# Patient Record
Sex: FEMALE | Race: BLACK OR AFRICAN AMERICAN | NOT HISPANIC OR LATINO | Employment: OTHER | URBAN - METROPOLITAN AREA
[De-identification: names, ages, dates, MRNs, and addresses within clinical notes are randomized per-mention and may not be internally consistent; named-entity substitution may affect disease eponyms.]

---

## 2017-01-02 DIAGNOSIS — G43.009 MIGRAINE WITHOUT AURA AND WITHOUT STATUS MIGRAINOSUS, NOT INTRACTABLE: ICD-10-CM

## 2017-01-02 DIAGNOSIS — F41.9 ANXIETY: Primary | ICD-10-CM

## 2017-01-02 RX ORDER — MIRTAZAPINE 15 MG/1
7.5 TABLET, ORALLY DISINTEGRATING ORAL AT BEDTIME
Qty: 45 TABLET | Refills: 0 | Status: SHIPPED | OUTPATIENT
Start: 2017-01-02 | End: 2017-03-20

## 2017-01-02 RX ORDER — SUMATRIPTAN 50 MG/1
50-100 TABLET, FILM COATED ORAL
Qty: 18 TABLET | Refills: 1 | Status: SHIPPED | OUTPATIENT
Start: 2017-01-02 | End: 2017-02-28

## 2017-01-02 NOTE — TELEPHONE ENCOUNTER
Mirtazapine       Last Written Prescription Date: 10/4/16  Last Fill Quantity: 15; # refills: 3  Last Office Visit with [facility], [facility] or  Health prescribing provider:  11/10/16        Last PHQ-9 score on record=   PHQ-9 SCORE 8/3/2016   Total Score -   Total Score 7       AST       26   11/14/2016  ALT       40   11/14/2016          Sumatriptan      Last Written Prescription Date: 10/21/16  Last Fill Quantity: 18, # refills: 0  Last Office Visit with [facility], [facility] or  Health prescribing provider: 11/10/16       BP Readings from Last 3 Encounters:   11/14/16 101/64   11/10/16 135/79   11/10/16 146/97

## 2017-01-03 ENCOUNTER — TELEPHONE (OUTPATIENT)
Dept: GASTROENTEROLOGY | Facility: CLINIC | Age: 37
End: 2017-01-03

## 2017-01-03 ENCOUNTER — TELEPHONE (OUTPATIENT)
Dept: OTHER | Facility: CLINIC | Age: 37
End: 2017-01-03

## 2017-01-03 DIAGNOSIS — K31.84 GASTROPARESIS: Primary | ICD-10-CM

## 2017-01-03 NOTE — TELEPHONE ENCOUNTER
Patient called  and indicated she needed a j tube change. She states the balloon broke and the tube keeps pulling out. Per pt and IR notes she should have this procedure done with offsite anesthesia. I will coordinate this and call her back on 1/4/17. She confirms this.    Velma Cheek DNP, APRN  Interventional Radiology   Phone: [phone]  Pager: [phone]

## 2017-01-03 NOTE — TELEPHONE ENCOUNTER
Doreen is calling Dr. Wahl.  She reports she has both a G tube and a J tube.  She reports her J tube slides in and out.  She thinks there is a balloon problem.  She seems to recall that last time she had J tube problems, after wards IR told her she should have it done in the OR next time. For now she has the tube taped securely to her stomach.    I spoke with Dr. Maryuri Henriquez's clinic nurse.  Needs to call IR. [phone].    I called Doreen back and gave her the above information and transferred her to IR.  She verbalizes understanding and agreement with the plan.

## 2017-01-04 NOTE — TELEPHONE ENCOUNTER
J tube change scheduled for 1/11 at 9 am, check-in to unit 3C at 7 am. Will need H/P completed prior to procedure. Pt confirms this. NPO 8 hrs prior. Water ok up to 2 hrs prior. May take meds that AM. Do not need to hold anticoagulation for j tube change.     Velma Cheek DNP, APRN  Interventional Radiology   Phone: [phone]  Pager: [phone]

## 2017-01-05 ENCOUNTER — HOSPITAL ENCOUNTER (EMERGENCY)
Facility: CLINIC | Age: 37
Discharge: HOME | End: 2017-01-05
Attending: EMERGENCY MEDICINE | Admitting: EMERGENCY MEDICINE
Payer: COMMERCIAL

## 2017-01-05 ENCOUNTER — CARE COORDINATION (OUTPATIENT)
Dept: GASTROENTEROLOGY | Facility: CLINIC | Age: 37
End: 2017-01-05

## 2017-01-05 ENCOUNTER — APPOINTMENT (OUTPATIENT)
Dept: INTERVENTIONAL RADIOLOGY/VASCULAR | Facility: CLINIC | Age: 37
End: 2017-01-05
Attending: NURSE PRACTITIONER
Payer: COMMERCIAL

## 2017-01-05 VITALS
WEIGHT: 179 LBS | DIASTOLIC BLOOD PRESSURE: 110 MMHG | BODY MASS INDEX: 28.91 KG/M2 | TEMPERATURE: 98.2 F | RESPIRATION RATE: 15 BRPM | HEART RATE: 84 BPM | OXYGEN SATURATION: 100 % | SYSTOLIC BLOOD PRESSURE: 128 MMHG

## 2017-01-05 DIAGNOSIS — K31.84 GASTROPARESIS: ICD-10-CM

## 2017-01-05 DIAGNOSIS — K94.13 MALFUNCTION OF JEJUNOSTOMY TUBE (H): ICD-10-CM

## 2017-01-05 LAB
ALBUMIN SERPL-MCNC: 3.6 G/DL (ref 3.4–5)
ALP SERPL-CCNC: 149 U/L (ref 40–150)
ALT SERPL W P-5'-P-CCNC: 50 U/L (ref 0–50)
ANION GAP SERPL CALCULATED.3IONS-SCNC: 8 MMOL/L (ref 3–14)
AST SERPL W P-5'-P-CCNC: 33 U/L (ref 0–45)
BASOPHILS # BLD AUTO: 0 10E9/L (ref 0–0.2)
BASOPHILS NFR BLD AUTO: 0.3 %
BILIRUB SERPL-MCNC: 0.4 MG/DL (ref 0.2–1.3)
BUN SERPL-MCNC: 8 MG/DL (ref 7–30)
CALCIUM SERPL-MCNC: 9.1 MG/DL (ref 8.5–10.1)
CHLORIDE SERPL-SCNC: 106 MMOL/L (ref 94–109)
CO2 SERPL-SCNC: 24 MMOL/L (ref 20–32)
CREAT SERPL-MCNC: 0.84 MG/DL (ref 0.52–1.04)
DIFFERENTIAL METHOD BLD: ABNORMAL
EOSINOPHIL # BLD AUTO: 0 10E9/L (ref 0–0.7)
EOSINOPHIL NFR BLD AUTO: 0.4 %
ERYTHROCYTE [DISTWIDTH] IN BLOOD BY AUTOMATED COUNT: 18.4 % (ref 10–15)
GFR SERPL CREATININE-BSD FRML MDRD: 77 ML/MIN/1.7M2
GLUCOSE SERPL-MCNC: 95 MG/DL (ref 70–99)
HCG SERPL QL: NEGATIVE
HCT VFR BLD AUTO: 35.3 % (ref 35–47)
HGB BLD-MCNC: 11.2 G/DL (ref 11.7–15.7)
IMM GRANULOCYTES # BLD: 0 10E9/L (ref 0–0.4)
IMM GRANULOCYTES NFR BLD: 0.2 %
INR PPP: 1.06 (ref 0.86–1.14)
LIPASE SERPL-CCNC: 148 U/L (ref 73–393)
LYMPHOCYTES # BLD AUTO: 2.4 10E9/L (ref 0.8–5.3)
LYMPHOCYTES NFR BLD AUTO: 21.4 %
MCH RBC QN AUTO: 25.5 PG (ref 26.5–33)
MCHC RBC AUTO-ENTMCNC: 31.7 G/DL (ref 31.5–36.5)
MCV RBC AUTO: 80 FL (ref 78–100)
MONOCYTES # BLD AUTO: 0.4 10E9/L (ref 0–1.3)
MONOCYTES NFR BLD AUTO: 3.3 %
NEUTROPHILS # BLD AUTO: 8.5 10E9/L (ref 1.6–8.3)
NEUTROPHILS NFR BLD AUTO: 74.4 %
NRBC # BLD AUTO: 0 10*3/UL
NRBC BLD AUTO-RTO: 0 /100
PLATELET # BLD AUTO: 496 10E9/L (ref 150–450)
POTASSIUM SERPL-SCNC: 3.9 MMOL/L (ref 3.4–5.3)
PROT SERPL-MCNC: 8.6 G/DL (ref 6.8–8.8)
RBC # BLD AUTO: 4.4 10E12/L (ref 3.8–5.2)
SODIUM SERPL-SCNC: 138 MMOL/L (ref 133–144)
WBC # BLD AUTO: 11.4 10E9/L (ref 4–11)

## 2017-01-05 PROCEDURE — 25000132 ZZH RX MED GY IP 250 OP 250 PS 637: Performed by: EMERGENCY MEDICINE

## 2017-01-05 PROCEDURE — 99153 MOD SED SAME PHYS/QHP EA: CPT

## 2017-01-05 PROCEDURE — 25000125 ZZHC RX 250: Performed by: EMERGENCY MEDICINE

## 2017-01-05 PROCEDURE — 85025 COMPLETE CBC W/AUTO DIFF WBC: CPT | Performed by: EMERGENCY MEDICINE

## 2017-01-05 PROCEDURE — 49451 REPLACE DUOD/JEJ TUBE PERC: CPT

## 2017-01-05 PROCEDURE — 27210903 ZZH KIT CR5

## 2017-01-05 PROCEDURE — 27210916 ZZ H TUBE GASTRO CR5

## 2017-01-05 PROCEDURE — 96376 TX/PRO/DX INJ SAME DRUG ADON: CPT | Mod: XE | Performed by: EMERGENCY MEDICINE

## 2017-01-05 PROCEDURE — 25000125 ZZHC RX 250: Mod: ZNDC | Performed by: RADIOLOGY

## 2017-01-05 PROCEDURE — 96361 HYDRATE IV INFUSION ADD-ON: CPT | Performed by: EMERGENCY MEDICINE

## 2017-01-05 PROCEDURE — 96374 THER/PROPH/DIAG INJ IV PUSH: CPT | Mod: XE | Performed by: EMERGENCY MEDICINE

## 2017-01-05 PROCEDURE — C1769 GUIDE WIRE: HCPCS

## 2017-01-05 PROCEDURE — 99285 EMERGENCY DEPT VISIT HI MDM: CPT | Mod: 25 | Performed by: EMERGENCY MEDICINE

## 2017-01-05 PROCEDURE — 80053 COMPREHEN METABOLIC PANEL: CPT | Performed by: EMERGENCY MEDICINE

## 2017-01-05 PROCEDURE — 84703 CHORIONIC GONADOTROPIN ASSAY: CPT | Performed by: RADIOLOGY

## 2017-01-05 PROCEDURE — 96375 TX/PRO/DX INJ NEW DRUG ADDON: CPT | Performed by: EMERGENCY MEDICINE

## 2017-01-05 PROCEDURE — 85610 PROTHROMBIN TIME: CPT | Performed by: EMERGENCY MEDICINE

## 2017-01-05 PROCEDURE — 25000128 H RX IP 250 OP 636: Performed by: EMERGENCY MEDICINE

## 2017-01-05 PROCEDURE — 99285 EMERGENCY DEPT VISIT HI MDM: CPT | Mod: Z6 | Performed by: EMERGENCY MEDICINE

## 2017-01-05 PROCEDURE — 99152 MOD SED SAME PHYS/QHP 5/>YRS: CPT

## 2017-01-05 PROCEDURE — 27210995 ZZH RX 272: Performed by: RADIOLOGY

## 2017-01-05 PROCEDURE — 83690 ASSAY OF LIPASE: CPT | Performed by: EMERGENCY MEDICINE

## 2017-01-05 RX ORDER — HYDROMORPHONE HYDROCHLORIDE 1 MG/ML
0.5 INJECTION, SOLUTION INTRAMUSCULAR; INTRAVENOUS; SUBCUTANEOUS
Status: COMPLETED | OUTPATIENT
Start: 2017-01-05 | End: 2017-01-05

## 2017-01-05 RX ORDER — HYDROMORPHONE HYDROCHLORIDE 1 MG/ML
0.5 INJECTION, SOLUTION INTRAMUSCULAR; INTRAVENOUS; SUBCUTANEOUS
Status: ACTIVE | OUTPATIENT
Start: 2017-01-05 | End: 2017-01-05

## 2017-01-05 RX ORDER — DIPHENHYDRAMINE HYDROCHLORIDE 50 MG/ML
50 INJECTION INTRAMUSCULAR; INTRAVENOUS EVERY 6 HOURS PRN
Status: DISCONTINUED | OUTPATIENT
Start: 2017-01-05 | End: 2017-01-05 | Stop reason: HOSPADM

## 2017-01-05 RX ORDER — HYDROMORPHONE HYDROCHLORIDE 2 MG/1
2 TABLET ORAL EVERY 4 HOURS PRN
Qty: 18 TABLET | Refills: 0 | Status: SHIPPED | OUTPATIENT
Start: 2017-01-05 | End: 2017-01-09

## 2017-01-05 RX ORDER — HYDROMORPHONE HYDROCHLORIDE 1 MG/ML
0.5 INJECTION, SOLUTION INTRAMUSCULAR; INTRAVENOUS; SUBCUTANEOUS
Status: DISCONTINUED | OUTPATIENT
Start: 2017-01-05 | End: 2017-01-05 | Stop reason: HOSPADM

## 2017-01-05 RX ORDER — HYDROCODONE BITARTRATE AND ACETAMINOPHEN 5; 325 MG/1; MG/1
1 TABLET ORAL ONCE
Status: DISCONTINUED | OUTPATIENT
Start: 2017-01-05 | End: 2017-01-05 | Stop reason: HOSPADM

## 2017-01-05 RX ORDER — HYDROMORPHONE HCL/0.9% NACL/PF 0.2MG/0.2
0.2 SYRINGE (ML) INTRAVENOUS
Status: DISCONTINUED | OUTPATIENT
Start: 2017-01-05 | End: 2017-01-05

## 2017-01-05 RX ORDER — HYDROMORPHONE HYDROCHLORIDE 2 MG/1
2 TABLET ORAL ONCE
Status: COMPLETED | OUTPATIENT
Start: 2017-01-05 | End: 2017-01-05

## 2017-01-05 RX ORDER — LIDOCAINE HYDROCHLORIDE 10 MG/ML
INJECTION, SOLUTION INFILTRATION; PERINEURAL
Status: DISCONTINUED
Start: 2017-01-05 | End: 2017-01-05 | Stop reason: HOSPADM

## 2017-01-05 RX ORDER — FLUMAZENIL 0.1 MG/ML
0.2 INJECTION, SOLUTION INTRAVENOUS
Status: DISCONTINUED | OUTPATIENT
Start: 2017-01-05 | End: 2017-01-05 | Stop reason: HOSPADM

## 2017-01-05 RX ORDER — DIPHENHYDRAMINE HYDROCHLORIDE 50 MG/ML
25 INJECTION INTRAMUSCULAR; INTRAVENOUS EVERY 6 HOURS PRN
Status: DISCONTINUED | OUTPATIENT
Start: 2017-01-05 | End: 2017-01-05

## 2017-01-05 RX ORDER — SODIUM CHLORIDE 9 MG/ML
INJECTION, SOLUTION INTRAVENOUS CONTINUOUS
Status: DISCONTINUED | OUTPATIENT
Start: 2017-01-05 | End: 2017-01-05 | Stop reason: HOSPADM

## 2017-01-05 RX ORDER — DIPHENHYDRAMINE HCL 25 MG
25 CAPSULE ORAL EVERY 6 HOURS PRN
Status: DISCONTINUED | OUTPATIENT
Start: 2017-01-05 | End: 2017-01-05

## 2017-01-05 RX ADMIN — LIDOCAINE HYDROCHLORIDE 8 ML: 10 INJECTION, SOLUTION EPIDURAL; INFILTRATION; INTRACAUDAL; PERINEURAL at 18:25

## 2017-01-05 RX ADMIN — HYDROMORPHONE HYDROCHLORIDE 2 MG: 10 INJECTION, SOLUTION INTRAMUSCULAR; INTRAVENOUS; SUBCUTANEOUS at 19:06

## 2017-01-05 RX ADMIN — MIDAZOLAM 4 MG: 1 INJECTION INTRAMUSCULAR; INTRAVENOUS at 19:05

## 2017-01-05 RX ADMIN — LIDOCAINE HYDROCHLORIDE 10 ML: 20 JELLY TOPICAL at 18:20

## 2017-01-05 RX ADMIN — HYDROMORPHONE HYDROCHLORIDE 0.5 MG: 10 INJECTION, SOLUTION INTRAMUSCULAR; INTRAVENOUS; SUBCUTANEOUS at 16:17

## 2017-01-05 RX ADMIN — HYDROMORPHONE HYDROCHLORIDE 0.5 MG: 10 INJECTION, SOLUTION INTRAMUSCULAR; INTRAVENOUS; SUBCUTANEOUS at 17:33

## 2017-01-05 RX ADMIN — HYDROMORPHONE HYDROCHLORIDE 0.5 MG: 10 INJECTION, SOLUTION INTRAMUSCULAR; INTRAVENOUS; SUBCUTANEOUS at 14:17

## 2017-01-05 RX ADMIN — SODIUM CHLORIDE: 9 INJECTION, SOLUTION INTRAVENOUS at 14:21

## 2017-01-05 RX ADMIN — HYDROMORPHONE HYDROCHLORIDE 2 MG: 2 TABLET ORAL at 20:35

## 2017-01-05 RX ADMIN — DIPHENHYDRAMINE HYDROCHLORIDE 50 MG: 50 INJECTION, SOLUTION INTRAMUSCULAR; INTRAVENOUS at 18:20

## 2017-01-05 ASSESSMENT — ENCOUNTER SYMPTOMS
ABDOMINAL PAIN: 1
NECK PAIN: 0
BACK PAIN: 0
AGITATION: 0
DYSURIA: 0
FEVER: 0
LIGHT-HEADEDNESS: 0
CHILLS: 0
DIFFICULTY URINATING: 0
ABDOMINAL DISTENTION: 1
BRUISES/BLEEDS EASILY: 0
ADENOPATHY: 0
NAUSEA: 0
COLOR CHANGE: 0
SHORTNESS OF BREATH: 0
NECK STIFFNESS: 0
VOMITING: 0
POLYDIPSIA: 0

## 2017-01-05 NOTE — ED NOTES
J tube fell out sometime last night or this am. Tube broke over the weekend. Had had this tract for over 1 year.

## 2017-01-05 NOTE — ED PROVIDER NOTES
"  History     Chief Complaint   Patient presents with     Abdominal Pain     HPI  Doreen Peralta is a 36 year old female with a history of gastroparesis s/p J-tube placement who presents for evaluation of abdominal pain. The patient reports that over the weekend she was having trouble with her J-tube becoming displaced. She was able to manage this by placing it and taping. The patient reports that this morning she awoke to find her the balloon of her J-tube completely out of the stoma. It had been in place prior to going to sleep the night before. Since this morning, she did try eating soup, and this made her quite nauseated and gave her abdominal discomfort. She also has pain at the J-tube stoma site from trying to place the tube back in herself. The patient reports that she has appointment scheduled next week Wednesday to have this tube replaced. She states that she has to \"special schedule\" these procedures for general anesthesia as she \"doesn't do good\" otherwise.    I have reviewed the Medications, Allergies, Past Medical and [facility] History, and Social History in the Epic system.    Current Facility-Administered Medications   Medication     HYDROcodone-acetaminophen (NORCO) 5-325 MG per tablet 1 tablet     0.9% sodium chloride infusion     lidocaine 1 % injection     Current Outpatient Prescriptions   Medication     mirtazapine (REMERON SOL-TAB) 15 MG ODT tab     SUMAtriptan (IMITREX) 50 MG tablet     ondansetron (ZOFRAN ODT) 4 MG ODT tab     rivaroxaban ANTICOAGULANT (XARELTO) 20 MG TABS tablet     rivaroxaban ANTICOAGULANT (XARELTO) 15 MG TABS tablet     DULoxetine (CYMBALTA) 60 MG capsule     cloNIDine (CATAPRES) 0.2 MG tablet     nortriptyline (PAMELOR) 10 MG capsule     Nutritional Supplements (COMPLEAT) LIQD     cholestyramine light (QUESTRAN) 4 GM packet     ACETAMINOPHEN PO     albuterol 90 MCG/ACT inhaler     Past Medical History   Diagnosis Date     PONV (postoperative nausea and vomiting)      " Asthma      Constipation      chronic     Enteritis      Chronic pain      Hx of abnormal Pap smear      s/p LEEP - no further details provided     Colonic dysmotility      s/p subtotal colectomy     Thrombosis, hepatic vein (H)      microvascular     Bilateral ovarian cysts      Gastro-oesophageal reflux disease      Other chronic pain      Cervical cancer (H) 01/01/2008     cervical cancer      H/O ileostomy      IBS (irritable bowel syndrome)      Fungemia 5/5/2016     E. coli sepsis (H) 5/8/2016     Hypertension        Past [facility] History   Procedure Laterality Date     Laparoscopic oophorectomy Right 2009     [facility]     Laparoscopic cholecystectomy  2002     [facility] c[facility]. stones duct     Esophagoscopy, gastroscopy, duodenoscopy (egd), combined  7/10/2012     Procedure: COMBINED ESOPHAGOSCOPY, GASTROSCOPY, DUODENOSCOPY (EGD);  Upper Endoscopy, Ileoscopy    Latex Allergy  with biopsies;  Surgeon: Nicole Redding MD;  Location: [facility]     Colonoscopy  7/10/2012     Procedure: COLONOSCOPY;;  Surgeon: Nicole Redding MD;  Location: [facility] OR     Leep tx, cervical  2009     [facility]     Laparoscopic ileostomy  1/20/2012     [facility], loop     Esophagoscopy, gastroscopy, duodenoscopy (egd), combined N/A 11/5/2014     Procedure: COMBINED ESOPHAGOSCOPY, GASTROSCOPY, DUODENOSCOPY (EGD);  Surgeon: Nicole Redding MD;  [facility]     Laparoscopic assisted colectomy  1/20/2012     Procedure:LAPAROSCOPIC ASSISTED COLECTOMY; Laparoscopic Ileostomy       Laparoscopic assisted colectomy left (descending)  10/24/2012     Procedure: LAPAROSCOPIC ASSISTED COLECTOMY LEFT (DESCENDING);   Hand Assisted Laproscopic Subtotal abdominal Colectomy,Iieorectal anastamosis, Ileostomy Closure.       Remove gastrostomy tube adult N/A 12/12/2014     Procedure: REMOVE GASTROSTOMY TUBE ADULT;  Surgeon: Nicole Redding MD;  Location: [facility] GI     Replace gastrostomy tube adult  5/19/15     Hc replace  gastrostomy/cecostomy tube percutaneous Left 5/19/2015     Procedure: REPLACE GASTROSTOMY TUBE, PERCUTANEOUS;  Surgeon: Melecio Morejon Chi, MD;  Location: [facility] GI     Hc replace duodenostomy/jejunostomy tube percutaneous N/A 8/27/2015     Procedure: REPLACE GASTROJEJUNOSTOMY TUBE, PERCUTANEOUS;  Surgeon: Mio Holder MD;  Location: [facility]     Hc [facility] endoscopy w placement gastrostomy tube percut N/A 10/1/2015     Procedure: COMBINED ESOPHAGOSCOPY, GASTROSCOPY, DUODENOSCOPY (EGD), PLACE PERCUTANEOUS ENDOSCOPIC GASTROSTOMY TUBE;  Surgeon: Mio Holder MD;  [facility]     Laparoscopic assisted insertion tube jejunostomy N/A 10/16/2015     Procedure: LAPAROSCOPIC ASSISTED INSERTION TUBE JEJUNOSTOMY;  Surgeon: Elsa Mdeel MD;  Location[facility]     Picc insertion Left 10/21/2015     5fr DL Power PICC, 37cm (2cm external) in the L basilic vein w/ tip in the SVC RA junction.     Hc replace duodenostomy/jejunostomy tube percutaneous N/A 1/7/2016     Procedure: REPLACE JEJUNOSTOMY TUBE, PERCUTANEOUS;  Surgeon: Elsa Medel MD;  [facility]     Endoscopic insertion tube gastrostomy N/A 1/21/2016     Procedure: ENDOSCOPIC INSERTION TUBE GASTROSTOMY;  Surgeon: Nicole Redding MD;  [facility]     Hc replace duodenostomy/jejunostomy tube percutaneous N/A 1/28/2016     Procedure: REPLACE JEJUNOSTOMY TUBE, PERCUTANEOUS;  Surgeon: Elsa Medel MD;  [facility]     Laparotomy exploratory N/A 1/28/2016     Procedure: LAPAROTOMY EXPLORATORY;  Surgeon: Elsa Medel MD;  [facility]     Remove port vascular access Right 6/30/2016     Procedure: REMOVE PORT VASCULAR ACCESS;  Surgeon: Pradeep Orosco MD;  Location: [facility] OR     Echo adal  7/19/2016            N/A 7/20/2016     Procedure: ANESTHESIA OUT OF OR;  Surgeon: GENERIC ANESTHESIA PROVIDER;  Location: [facility]       Family History   Problem Relation Age of Onset     Thyroid Disease Mother      Sjogren's Mother       GASTROINTESTINAL DISEASE Mother      Intermittent nausea vomiting diarrhea     Colon Polyps Mother      Lupus Maternal Grandmother      CANCER Maternal Grandfather      Lung     Colon Cancer Maternal Grandfather 65     CANCER Paternal Grandmother      Lung      CEREBROVASCULAR DISEASE Paternal Grandmother      DIABETES Paternal Grandmother      Cardiovascular Paternal Grandmother      CHF     CANCER Paternal Grandfather      Lung     Glaucoma Paternal Grandfather      Prostate Problems Father      prostate enlargement     Abdominal Aortic Aneurysm Other      Macular Degeneration No family hx of        Social History   Substance Use Topics     Smoking status: Former Smoker -- 1.00 packs/day for 4 years     Types: Cigarettes     Quit date: 01/01/2004     Smokeless tobacco: Former User     Alcohol Use: No        Allergies   Allergen Reactions     Hyoscyamine Rash     Metoclopramide Other (See Comments)     Eye twitching.      Peaches [Peach] Other (See Comments)     Raw. Cooked OK     Sucralose Other (See Comments)     All artificial sweeteners. Aspartame also. Swollen glands     Advair Diskus Other (See Comments)     Throat burns     Azithromycin Other (See Comments)     Burning in throat     Compazine [Prochlorperazine] Visual Disturbance     Contrast Dye Itching     States is allergic to CT contrast dye     Cyclobenzaprine Visual Disturbance     Fentanyl Other (See Comments)     migraine     Ibuprofen GI Disturbance     Lactulose Nausea and Vomiting     Gas and bloating     Levaquin [Levofloxacin] Swelling     Per ED M.D. And RN      Morphine Sulfate Other (See Comments)     Chest pain       Oxycodone Other (See Comments)     Burning throat, but can take Norco.      Penicillins Other (See Comments)     Family hx of resp arrest, she has never taken  Ok with cephalosporins     Rizatriptan Visual Disturbance     Droperidol Hives and Rash     Isovue [Iopamidol] Palpitations     Pt had racing heart and sob      Ketorolac  Anxiety     Latex Swelling and Rash     Kiwi, likely also avacado, ? banana     Levsin Rash       Review of Systems   Constitutional: Negative for fever and chills.   HENT: Negative for congestion.    Eyes: Negative for visual disturbance.   Respiratory: Negative for shortness of breath.    Cardiovascular: Negative for chest pain.   Gastrointestinal: Positive for abdominal pain and abdominal distention. Negative for nausea and vomiting.   Endocrine: Negative for polydipsia and polyuria.   Genitourinary: Negative for dysuria and difficulty urinating.   Musculoskeletal: Negative for back pain, neck pain and neck stiffness.   Skin: Negative for color change.   Neurological: Negative for light-headedness.   Hematological: Negative for adenopathy. Does not bruise/bleed easily.   Psychiatric/Behavioral: Negative for behavioral problems and agitation.       Physical Exam   BP: (!) 145/113 mmHg  Pulse: 86  Resp: 18  Weight: 81.194 kg (179 lb)  SpO2: 100 %  Physical Exam   Constitutional: She is oriented to person, place, and time. She appears well-developed and well-nourished. No distress.   HENT:   Head: Normocephalic and atraumatic.   Mouth/Throat: Oropharynx is clear and moist. No oropharyngeal exudate.   Eyes: Conjunctivae and EOM are normal. No scleral icterus.   Neck: Normal range of motion.   Cardiovascular: Normal rate, normal heart sounds and intact distal pulses.    Pulmonary/Chest: Effort normal and breath sounds normal. No respiratory distress. She has no wheezes. She has no rales.   Abdominal: Soft. Bowel sounds are normal. She exhibits no distension. There is no tenderness. There is no rebound and no guarding.   G-tube in place with green grainage. J-tube stoma present without drainage.   Musculoskeletal: Normal range of motion. She exhibits no edema or tenderness.   Neurological: She is alert and oriented to person, place, and time. No cranial nerve deficit. She exhibits normal muscle tone. Coordination  normal.   Skin: Skin is warm. No rash noted. She is not diaphoretic.   Psychiatric: She has a normal mood and affect. Her behavior is normal. Judgment and thought content normal.   Nursing note and vitals reviewed.      ED Course   Procedures       1:03 PM  The patient was seen and examined by Jennie Ramirez MD, in Room 09.        Critical Care time:  none               Labs Ordered and Resulted from Time of ED Arrival Up to the Time of Departure from the ED   CBC WITH PLATELETS DIFFERENTIAL - Abnormal; Notable for the following:     WBC 11.4 (*)     Hemoglobin 11.2 (*)     MCH 25.5 (*)     RDW 18.4 (*)     Platelet Count 496 (*)     Absolute Neutrophil 8.5 (*)     All other components within normal limits   COMPREHENSIVE METABOLIC PANEL   LIPASE   INR   PERIPHERAL IV CATHETER       Assessments & Plan (with Medical Decision Making)   This is a 36 year old woman presenting with J tube malfunction. Differential diagnosis: J tube malfunction, accidental dislodgment of J tube, gastroparesis.    After thorough history and physical examination, the patient appears to be in no acute distress.  I will attempt to place a red rubber catheter and consult interventional radiology service. Patient agrees with plan.    1:40 PM  I was unable to advance rubber catheterthrough patient's J-tube stoma site. There was significant amount of resistance and patient was in pain. I will obtain intravenous line, hydrate her with IV saline, treat her pain with IV Dilaudid, obtain laboratory studies. I did discuss her case with interventional radiology nurse and they will consult and attempt to place a J-tube via procedural sedation.    3:47 PM  Patient will be signed out to my partner pending IR placement of jejunostomy tube.       This part of the medical record was transcribed by Remi Carrasco, Medical Scribe, from a dictation done by Jennie Ramirez MD.   I have reviewed the nursing notes.  I have reviewed the findings, diagnosis, plan  and need for follow up with the patient.    New Prescriptions    No medications on file       Final diagnoses:   Malfunction of jejunostomy tube (H)     IUmer, am serving as a trained medical scribe to document services personally performed by Jennie Ramirez MD, based on the provider's statements to me.      Jennie DE LA TORRE MD, was physically present and have reviewed and verified the accuracy of this note documented by Umer Martinez.    1/5/2017   [facility], [place], EMERGENCY DEPARTMENT      Jennie Ramirez MD  01/05/17 1541

## 2017-01-05 NOTE — ED AVS SNAPSHOT
[facility], Emergency Department    [address], [place] [address]    Phone:  [phone]                                       Doreen Peralta   MRN: [id number]    Department:  [facility], Emergency Department   Date of Visit:  1/5/2017           Patient Information     Date Of Birth          1980        Your diagnoses for this visit were:     Malfunction of jejunostomy tube (H)     Gastroparesis        You were seen by Jennie Ramirez MD and Onesimo Landon MD.        Discharge Instructions       Please make an appointment to follow up with Your Primary Care Provider in 5 days even if entirely better.  You can call to discuss the appropriate follow up timing with your doctor.     Return to the emergency department for any issues with your new tube, worsening back pain, abdominal pain, fevers or chills, burning with urination, nausea or vomiting, weakness or any other concerns as given or discussed.      Future Appointments        Provider Department Dept Phone Center    1/9/2017 9:20 AM Paul Mata MD [facility] [phone] [place]    1/11/2017 9:00 AM [facility] IR ROOM 2 [facility], Interventional Radiology [phone] [facility]    3/27/2017 9:00 AM Yrui Wahl MD Gastroenterology and [phone] [facility]      24 Hour Appointment Hotline       To make an appointment at any [facility], call [phone] ([phone]). If you don't have a family doctor or clinic, we will help you find one. [facility] are conveniently located to serve the needs of you and your family.          ED Discharge Orders     Discharge Instructions       If questions or problems arise regarding tube function (e.g. leaking, dislodges, etc.) Contact Interventional Radiology department 24 hours a day.    For procedures that were done at the [facility] sites,   8:00-4:30 PM Monday through Friday    Contact:[phone].    For afterhours and weekends  call the [place] main phone line [phone] and ask for the [place] IR on call physician number.    If DIRECTED by the RADIOLOGIST, related to specific problems with the tube functioning,  go to the Emergency Department.                     Review of your medicines      START taking        Dose / Directions Last dose taken    HYDROmorphone 2 MG tablet   Commonly known as:  DILAUDID   Dose:  2 mg   Quantity:  18 tablet        Take 1 tablet (2 mg) by mouth every 4 hours as needed for pain   Refills:  0          Our records show that you are taking the medicines listed below. If these are incorrect, please call your family doctor or clinic.        Dose / Directions Last dose taken    ACETAMINOPHEN PO   Dose:  500-1000 mg        Take 500-1,000 mg by mouth every 6 hours as needed for pain   Refills:  0        albuterol 90 MCG/ACT inhaler   Dose:  2 puff        Inhale 2 puffs into the lungs every 6 hours as needed   Refills:  0        cholestyramine light 4 GM Packet   Commonly known as:  QUESTRAN   Dose:  4 g   Quantity:  100 packet        Take 1 packet (4 g) by mouth 2 times daily   Refills:  3        cloNIDine 0.2 MG tablet   Commonly known as:  CATAPRES   Dose:  0.4 mg   Quantity:  60 tablet        Take 2 tablets (0.4 mg) by mouth every evening   Refills:  5        COMPLEAT Liqd   Quantity:  30 each        Infuse at 50 ml/hour for 5 hours daily through j-tube Flush j-tube with 30 ml water before and after each infusion of Compleat   Refills:  0        DULoxetine 60 MG EC capsule   Commonly known as:  CYMBALTA   Dose:  60 mg   Quantity:  90 capsule        Take 1 capsule (60 mg) by mouth daily   Refills:  3        mirtazapine 15 MG ODT tab   Commonly known as:  REMERON SOL-TAB   Dose:  7.5 mg   Quantity:  45 tablet        0.5 tablets (7.5 mg) by Orally disintegrating tablet route At Bedtime   Refills:  0        nortriptyline 10 MG capsule   Commonly known as:  PAMELOR   Dose:  30-40 mg   Quantity:  120 capsule         Take 3-4 capsules (30-40 mg) by mouth At Bedtime   Refills:  3        ondansetron 4 MG ODT tab   Commonly known as:  ZOFRAN ODT   Dose:  4 mg   Quantity:  30 tablet        Take 1 tablet (4 mg) by mouth every 6 hours as needed for nausea   Refills:  0        * rivaroxaban ANTICOAGULANT 15 MG Tabs tablet   Commonly known as:  XARELTO   Dose:  15 mg   Quantity:  60 tablet        Take 1 tablet (15 mg) by mouth 2 times daily (with meals)   Refills:  1        * rivaroxaban ANTICOAGULANT 20 MG Tabs tablet   Commonly known as:  XARELTO   Dose:  20 mg   Quantity:  90 tablet        Take 1 tablet (20 mg) by mouth daily (with dinner)   Refills:  1        SUMAtriptan 50 MG tablet   Commonly known as:  IMITREX   Dose:   mg   Quantity:  18 tablet        Take 1-2 tablets ( mg) by mouth at onset of headache for migraine - may repeat dose after 2h if headache recurs.  Max: 200mg/24 hours   Refills:  1        * Notice:  This list has 2 medication(s) that are the same as other medications prescribed for you. Read the directions carefully, and ask your doctor or other care provider to review them with you.            Prescriptions were sent or printed at these locations (1 Prescription)                   Other Prescriptions                Printed at Department/Unit printer (1 of 1)         HYDROmorphone (DILAUDID) 2 MG tablet                Procedures and tests performed during your visit     CBC with platelets differential    Comprehensive metabolic panel    HCG qualitative    INR    IR Jejunostomy Tube Change    Lipase    Peripheral IV catheter      Orders Needing Specimen Collection     None      Pending Results     Date and Time Order Name Status Description    1/5/2017 1555 IR Jejunostomy Tube Change Preliminary             Pending Culture Results     No orders found from 1/4/2017 to 1/6/2017.            Thank you for choosing [facility]       Thank you for choosing [facility] for your care. Our goal is always to  provide you with excellent care. Hearing back from our patients is one way we can continue to improve our services. Please take a few minutes to complete the written survey that you may receive in the mail after you visit with us. Thank you!        [employer] Information     [employer] gives you secure access to your electronic health record. If you see a primary care provider, you can also send messages to your care team and make appointments. If you have questions, please call your primary care clinic.  If you do not have a primary care provider, please call [phone] and they will assist you.        Care EveryWhere ID     This is your Care EveryWhere ID. This could be used by other organizations to access your [facility] medical records  [id number]        After Visit Summary       This is your record. Keep this with you and show to your community pharmacist(s) and doctor(s) at your next visit.

## 2017-01-05 NOTE — ED AVS SNAPSHOT
[facility], [place], Emergency Department    [address] [place] [address]    Phone:  [phone]                                       Doreen Peralta   MRN: [id number]    Department:  [facility], Emergency Department   Date of Visit:  1/5/2017           After Visit Summary Signature Page     I have received my discharge instructions, and my questions have been answered. I have discussed any challenges I see with this plan with the nurse or doctor.    ..........................................................................................................................................  Patient/Patient Representative Signature      ..........................................................................................................................................  Patient Representative Print Name and Relationship to Patient    ..................................................               ................................................  Date                                            Time    ..........................................................................................................................................  Reviewed by Signature/Title    ...................................................              ..............................................  Date                                                            Time

## 2017-01-05 NOTE — PROGRESS NOTES
Patient called to report GJ tube had completed fell out. Patient called requesting urgent appointment to replace GJ tube as she does not want tract to close. Patient has needed to visit ED in past. Patient was already scheduled for OR procedure to replace Tube. Recommended patient contact IR or OR to see if appointment could be moved up. Patient states she will go to ED if can not get appointment.

## 2017-01-06 NOTE — PROGRESS NOTES
Interventional Radiology Brief Post Procedure Note    Procedure: @FVRISFRMTLINK([id number])@    Proceduralist: Kylie Carl MD    Assistant: Alex P. Pallas, MD    Time Out: Prior to the start of the procedure and with procedural staff participation, I verbally confirmed the patient s identity using two indicators, relevant allergies, that the procedure was appropriate and matched the consent or emergent situation, and that the correct equipment/implants were available. Immediately prior to starting the procedure I conducted the Time Out with the procedural staff and re-confirmed the patient s name, procedure, and site/side. (The Joint [facility] universal protocol was followed.)  Yes    Sedation: IR Nurse Monitored Care   Post Procedure Summary:  Prior to the start of the procedure and with procedural staff participation, I verbally confirmed the patient s identity using two indicators, relevant allergies, that the procedure was appropriate and matched the consent or emergent situation, and that the correct equipment/implants were available. Immediately prior to starting the procedure I conducted the Time Out with the procedural staff and re-confirmed the patient s name, procedure, and site/side. (The Joint Commission universal protocol was followed.)  Yes       Sedatives: Fentanyl and Midazolam (Versed)    Vital signs, airway and pulse oximetry were monitored and remained stable throughout the procedure and sedation was maintained until the procedure was complete.  The patient was monitored by staff until sedation discharge criteria were met.    Patient tolerance: Patient tolerated the procedure well with no immediate complications.    Time of sedation in minutes: 45 Minutes minutes from beginning to end of physician one to one monitoring.    Findings: replaced J tube.    Estimated Blood Loss: Minimal    Fluoroscopy Time: 15 minutes    SPECIMENS: None    Complications: 1. None     Condition: Stable    Plan: See  dictation.    Comments: See dictated procedure note for full details.    Alex P. Pallas, MD

## 2017-01-06 NOTE — PROGRESS NOTES
Interventional Radiology Intra-procedural Nursing Note    Patient Name: Doreen Peralta  Medical Record Number: [id number]  Today's Date: January 5, 2017    Start Time: 1820  End of procedure time: 1910  Procedure: direct jejunostomy tube replacement  Report given to: [facility]  : not needed    Attending MD in room during timeout: Dr Carl  Proceduralist: Dr Pallas    Sedation start time: 1820  Sedation end time: 1910  Sedation medications given:  benadryl 50 mg, versed 4 mg, dilaudid 2 mg IV    D: Patient brought to IR room 3 at 1800. Informed consent obtained by Dr Pallas. Patient and her Mother; Aby, expressed their concern regarding the Patient receiving adequate sedation during the procedure. Patient has multiple allergies to narcotics. Reviewed sedation doses given with previous IR repositioning of the direct jejunostomy and gastric tube. Discussed sedation plan with Dr Pallas and the Patient.   A: Patient tolerated the procedure without apparent incident. She experienced moderate discomfort during the procedure despite receiving similar doses of conscious sedation medications.   P: Patient returned to the ED post procedure. Patient continues to complain of sharp stabbing pain post procedure.     Laurence Da Silva RN  [phone]

## 2017-01-06 NOTE — PROGRESS NOTES
Interventional Radiology Pre-Procedure Sedation Assessment   Time of Assessment: 6:08 PM    Expected Level: Moderate Sedation    Indication: Sedation is required for the following type of Procedure: GI    Sedation and procedural consent: Risks, benefits and alternatives were discussed with Patient    PO Intake: Appropriately NPO for procedure    ASA Class: Class 2 - MILD SYSTEMIC DISEASE, NO ACUTE PROBLEMS, NO FUNCTIONAL LIMITATIONS.    Mallampati: Grade 2:  Soft palate, base of uvula, tonsillar pillars, and portion of posterior pharyngeal wall visible    Lungs: Lungs Clear with good breath sounds bilaterally    Heart: Normal heart sounds and rate    History and physical reviewed and no updates needed. I have reviewed the lab findings, diagnostic data, medications, and the plan for sedation. I have determined this patient to be an appropriate candidate for the planned sedation and procedure and have reassessed the patient IMMEDIATELY PRIOR to sedation and procedure.    Alex P. Pallas, MD

## 2017-01-06 NOTE — ED NOTES
Patient signed out to me by evening attending.  Anticipated IR replacement of feeding tube. If successfully performed, patient should be discharged  Reason for my R with successfully performed feeding tube. Patient had some ongoing pain following the procedure, requesting several doses for oral analgesia. This was provided. She will follow up with her primary doctors or return to the emergency department for worsening signs or symptoms.    Onesimo Landon MD  01/05/17 [id number]

## 2017-01-06 NOTE — DISCHARGE INSTRUCTIONS
Please make an appointment to follow up with Your Primary Care Provider in 5 days even if entirely better.  You can call to discuss the appropriate follow up timing with your doctor.     Return to the emergency department for any issues with your new tube, worsening back pain, abdominal pain, fevers or chills, burning with urination, nausea or vomiting, weakness or any other concerns as given or discussed.

## 2017-01-09 ENCOUNTER — OFFICE VISIT (OUTPATIENT)
Dept: RHEUMATOLOGY | Facility: CLINIC | Age: 37
End: 2017-01-09
Attending: PHYSICIAN ASSISTANT
Payer: COMMERCIAL

## 2017-01-09 VITALS
BODY MASS INDEX: 29.54 KG/M2 | OXYGEN SATURATION: 99 % | HEIGHT: 66 IN | WEIGHT: 183.8 LBS | TEMPERATURE: 97.7 F | SYSTOLIC BLOOD PRESSURE: 129 MMHG | HEART RATE: 98 BPM | DIASTOLIC BLOOD PRESSURE: 79 MMHG

## 2017-01-09 DIAGNOSIS — R50.9 FEVER, UNSPECIFIED: ICD-10-CM

## 2017-01-09 DIAGNOSIS — R11.10 INTRACTABLE VOMITING, PRESENCE OF NAUSEA NOT SPECIFIED, UNSPECIFIED VOMITING TYPE: Primary | ICD-10-CM

## 2017-01-09 DIAGNOSIS — Z83.2 FAMILY HISTORY OF AUTOIMMUNE DISORDER: Primary | ICD-10-CM

## 2017-01-09 LAB
CK SERPL-CCNC: 44 U/L (ref 30–225)
CRP SERPL-MCNC: 142 MG/L (ref 0–8)
ERYTHROCYTE [SEDIMENTATION RATE] IN BLOOD BY WESTERGREN METHOD: 75 MM/H (ref 0–20)

## 2017-01-09 PROCEDURE — 99000 SPECIMEN HANDLING OFFICE-LAB: CPT | Performed by: INTERNAL MEDICINE

## 2017-01-09 PROCEDURE — 86146 BETA-2 GLYCOPROTEIN ANTIBODY: CPT | Performed by: INTERNAL MEDICINE

## 2017-01-09 PROCEDURE — 85597 PHOSPHOLIPID PLTLT NEUTRALIZ: CPT | Performed by: INTERNAL MEDICINE

## 2017-01-09 PROCEDURE — 82306 VITAMIN D 25 HYDROXY: CPT | Performed by: INTERNAL MEDICINE

## 2017-01-09 PROCEDURE — 85613 RUSSELL VIPER VENOM DILUTED: CPT | Performed by: INTERNAL MEDICINE

## 2017-01-09 PROCEDURE — 86140 C-REACTIVE PROTEIN: CPT | Mod: 90 | Performed by: INTERNAL MEDICINE

## 2017-01-09 PROCEDURE — 85730 THROMBOPLASTIN TIME PARTIAL: CPT | Performed by: INTERNAL MEDICINE

## 2017-01-09 PROCEDURE — 85652 RBC SED RATE AUTOMATED: CPT | Performed by: INTERNAL MEDICINE

## 2017-01-09 PROCEDURE — 99203 OFFICE O/P NEW LOW 30 MIN: CPT | Performed by: INTERNAL MEDICINE

## 2017-01-09 PROCEDURE — 86225 DNA ANTIBODY NATIVE: CPT | Performed by: INTERNAL MEDICINE

## 2017-01-09 PROCEDURE — 86147 CARDIOLIPIN ANTIBODY EA IG: CPT | Performed by: INTERNAL MEDICINE

## 2017-01-09 PROCEDURE — 36415 COLL VENOUS BLD VENIPUNCTURE: CPT | Performed by: INTERNAL MEDICINE

## 2017-01-09 PROCEDURE — 86235 NUCLEAR ANTIGEN ANTIBODY: CPT | Performed by: INTERNAL MEDICINE

## 2017-01-09 PROCEDURE — 00000401 ZZHCL STATISTIC THROMBIN TIME NC: Performed by: INTERNAL MEDICINE

## 2017-01-09 PROCEDURE — 86160 COMPLEMENT ANTIGEN: CPT | Performed by: INTERNAL MEDICINE

## 2017-01-09 PROCEDURE — 82550 ASSAY OF CK (CPK): CPT | Performed by: INTERNAL MEDICINE

## 2017-01-09 PROCEDURE — 00000167 ZZHCL STATISTIC INR NC: Performed by: INTERNAL MEDICINE

## 2017-01-09 RX ORDER — ONDANSETRON 4 MG/1
4 TABLET, ORALLY DISINTEGRATING ORAL EVERY 6 HOURS PRN
Qty: 30 TABLET | Refills: 0 | Status: ON HOLD | OUTPATIENT
Start: 2017-01-09 | End: 2017-01-28

## 2017-01-09 NOTE — Clinical Note
Larisa,  I don't think that Ms. Peralta's recurrent fevers are due to an autoimmune issue.  I am more concerned about infection > blood clot.  Fevers start each time she has a J-tube change, per patient, but also sometimes spontaneously.  She notes having a tender hard area around the J-tube insertion site that was tender on my exam - could this be the source if infectious?  Another though is blood clot causing the fevers.  She has had multiple clots and possibly is clotting despite her anticoagulation?  I recommended that she speak with the provider managing her J-tube, an ID specialist (she wanted to see someone else than Dr. Chavis so I put in a referral), and her hematologist.  Please let me know if you have questions.  Thanks, Paul

## 2017-01-09 NOTE — NURSING NOTE
"Chief Complaint   Patient presents with     Consult     fevers,rashs and digestive issues x4 mo. ESR and CRP have been high       Initial /79 mmHg  Pulse 98  Temp(Src) 97.7  F (36.5  C) (Oral)  Ht 1.67 m (5' 5.75\")  Wt 83.371 kg (183 lb 12.8 oz)  BMI 29.89 kg/m2  SpO2 99% Estimated body mass index is 29.89 kg/(m^2) as calculated from the following:    Height as of this encounter: 1.67 m (5' 5.75\").    Weight as of this encounter: 83.371 kg (183 lb 12.8 oz).  BP completed using cuff size: regular  Sandra Stoner MA           RAPID3 (0-30) Cumulative Score  13.7          RAPID3 Weighted Score (divide #4 by 3 and that is the weighted score)  4.6           "

## 2017-01-09 NOTE — TELEPHONE ENCOUNTER
ONDANSETRON      Last Written Prescription Date: 12-16-16  Last Fill Quantity: 30,  # refills: 0   Last Office Visit with G, [facility] or [facility] prescribing provider: 11-10-16

## 2017-01-09 NOTE — PROGRESS NOTES
Rheumatology Clinic Visit      Doreen Peralta MRN# [id number]   YOB: 1980 Age: 36 year old      Date of visit: 1/09/17   Referring provider: Larisa Lorenzo   PCP: Larisa Lorenzo     Chief Complaint   Patient presents with:  Consult: fevers,rashs and digestive issues x4 mo. ESR and CRP have been high      Assessment and Plan     1. Recurrent fevers and a family history of lupus: Recurrent fevers, occuring about once weekly but not occuring at regular intervals; always associated with J-tube changes but may also occur spontaneously.  Fevers take 3-4 days to resolve.  Fevers started in July when she was septic.  Fevers are not associated with a rash, abdominal pain, joint pain, or any other symptom.  History of blood clots in her bilateral arms that she relates to PICC lines; also history of portal vein thrombosis; she is on chronic anticoagulation now.  She comes today to be assessed for the recurrent fevers in the setting of a family history of lupus. Low suspicion for lupus as the cause but will check labs today.  Also low suspicion for an autoinflammatory cause.  I suspect that her fevers are either related to blood clot or possibly infection.  She notes having a hard tender area just superior to the J-tube insertion site and she correlates fevers with J-tube changes so I recommended that she have this evaluated by the provider helping to manage the J-tube.  I also recommended that she continue to follow with an infectious disease specialist.  A clotting event could cause fever, but it seems unusual to be on and off in that case; I advised her to speak about this with her hematologist.    - Labs: CBC, CMP, ESR, CRP, FERMÍN by immunofluorescence, SMITA, dsDNA, C3, C4, APS labs, CK, UA, Uprotein:creatinine    Ms. Peralta verbalized agreement with and understanding of the rational for the diagnosis and treatment plan.  All questions were answered to best of my ability and the patient's satisfaction. Ms. Peralta  "was advised to contact the clinic with any questions that may arise after the clinic visit.      Thank you for involving me in the care of the patient    Return to clinic: No scheduled return appointment in rheumatology needed at this time. Return PRN.    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    Today, Ms. Peralta reports that she has been having weekly fevers that occur for 3-4 days at a time.  She wears a \"potato necklace\" that helps some.  This has been occuring since she was septic in July.  Has been to the ED multiple times because of the fevers without etiology found.  Fevers always happen after J-tube changes. She also tells me that there is a hard tender area just superior to where the J-tube enters.  Fevers may also occur spontaneously that still take a few days to resolve.  Has had a week without a fever, and this has happened 2-3 times since onset of the fevers.  Fevers measured by the patient to be 103-104 degrees F.  Fevers resolve spontaneously; will improve for several hours with tylenol.  Ancestry of , , northern .  She had a PICC line and port that had both become infected.  She has these ports because she says that she does not absorb things well and needs additional nutrients.  Hx of hepatic blood clot and bilateral arm clots related to PICC lines; followed by hematologist who is treating with chronic anticoagulation with rivaroxaban.   Nausea all the time.  Rash on her face once in a while that she says is her psoriasis.  Reportedly dx'd with psoriatic arthritis in the past and was told to use topical " cream; she says it smelled like tar. No joint pain.  No morning stiffness.  No rash associated with her fevers. No other symptoms occur with the fevers.     Denies nausea, vomiting, constipation, diarrhea. No abdominal pain. No chest pain/pressure, palpitations, or shortness of breath. No LE swelling. No neck pain. No oral or nasal sores.  No rash. No sicca symptoms. No photosensitivity or photophobia. No eye pain or redness. No history of inflammatory eye disease.  No history of serositis.  No history of Raynaud's Phenomenon.  No seizure history.       Tobacco: none  EtOH: none  Drugs: none    ROS   GEN: See HPI  SKIN: See HPI  HEENT: No epistaxis. No oral or nasal ulcers.  CV: No chest pain, pressure, palpitations, or dyspnea on exertion.  PULM: No SOB, wheeze, cough.  GI: See HPI  : No blood in urine.  MSK: See HPI.  NEURO: No numbness, tingling, or weakness.  ENDO: No heat/cold intolerance.  EXT: No LE swelling  PSYCH: see HPI    Active Problem List     Patient Active Problem List   Diagnosis     Dehydration     Constipation by delayed colonic transit     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     Unspecified intestinal obstruction (H)     Hx SBO     S/P LEEP of cervix     Gastroparesis     Migraines     Intermittent asthma     Allergic rhinitis     Abnormal Pap smear of cervix     PEG (percutaneous endoscopic gastrostomy) status     Health Care Home     PEG tube malfunction (H)     Malfunction of gastrostomy tube (H)     Malfunctioning jejunostomy tube (H)     Jejunostomy tube fell out     Jejunostomy tube present (H)     S/P partial resection of colon     Malnutrition (H)     Long-term (current) use of anticoagulants [Z79.01]     Anxiety     Sepsis due to Klebsiella (H)     Anemia in other chronic diseases classified elsewhere     Munchausen syndrome - previously suspected     Gram-negative bacteremia     Sepsis (H)     Anemia, iron deficiency     Mitral regurgitation mild-mod by Echo June 2016     Candidemia  (H)     Atopic rhinitis     Duodenitis     Migraine     Patellofemoral stress syndrome     Hyperbilirubinemia     Recurrent polymicrobial bacteremia assiciated with IV access     Right brachail/axillary dvt     Chronic diarrhea     Intractable vomiting     Fever, unspecified     Deep vein thrombosis (DVT) (H) [I82.409]     Coagulation defect (H) [D68.9]     Abdominal pain     Nausea & vomiting     Fever     Dislodged gastrostomy tube (H)     Attention to G-tube (H)     Chronic abdominal pain     Past Medical History     Past Medical History   Diagnosis Date     PONV (postoperative nausea and vomiting)      Asthma      Constipation      chronic     Enteritis      Chronic pain      Hx of abnormal Pap smear      s/p LEEP - no further details provided     Colonic dysmotility      s/p subtotal colectomy     Thrombosis, hepatic vein (H)      microvascular     Bilateral ovarian cysts      Gastro-oesophageal reflux disease      Other chronic pain      Cervical cancer (H) 01/01/2008     cervical cancer      H/O ileostomy      IBS (irritable bowel syndrome)      Fungemia 5/5/2016     E. coli sepsis (H) 5/8/2016     Hypertension      Past [facility] History     Past [facility] History   Procedure Laterality Date     Laparoscopic oophorectomy Right 2009     [facility]     Laparoscopic cholecystectomy  2002     [facility] stones duct     Esophagoscopy, gastroscopy, duodenoscopy (egd), combined  7/10/2012     Procedure: COMBINED ESOPHAGOSCOPY, GASTROSCOPY, DUODENOSCOPY (EGD);  Upper Endoscopy, Ileoscopy    Latex Allergy  with biopsies;  Surgeon: Nicole Redding MD;  [facility]: [facility]     Colonoscopy  7/10/2012     Procedure: COLONOSCOPY;;  Surgeon: Nicole Redding MD;  Location: [facility]     Leep tx, cervical  2009     [facility]     Laparoscopic ileostomy  1/20/2012     [facility], loop     Esophagoscopy, gastroscopy, duodenoscopy (egd), combined N/A 11/5/2014     Procedure: COMBINED ESOPHAGOSCOPY, GASTROSCOPY,  DUODENOSCOPY (EGD);  Surgeon: Nicole Redding MD;  Location: [facility]     Laparoscopic assisted colectomy  1/20/2012     Procedure:LAPAROSCOPIC ASSISTED COLECTOMY; Laparoscopic Ileostomy       Laparoscopic assisted colectomy left (descending)  10/24/2012     Procedure: LAPAROSCOPIC ASSISTED COLECTOMY LEFT (DESCENDING);   Hand Assisted Laproscopic Subtotal abdominal Colectomy,Iieorectal anastamosis, Ileostomy Closure.       Remove gastrostomy tube adult N/A 12/12/2014     Procedure: REMOVE GASTROSTOMY TUBE ADULT;  Surgeon: Nicole Redding MD;  [facility]     Replace gastrostomy tube adult  5/19/15     Hc replace gastrostomy/cecostomy tube percutaneous Left 5/19/2015     Procedure: REPLACE GASTROSTOMY TUBE, PERCUTANEOUS;  Surgeon: Melecio Morejon Chi, MD;  Location: [facility]     Hc replace duodenostomy/jejunostomy tube percutaneous N/A 8/27/2015     Procedure: REPLACE GASTROJEJUNOSTOMY TUBE, PERCUTANEOUS;  Surgeon: Mio Holder MD;  [facility]     Hc ugi endoscopy w placement gastrostomy tube percut N/A 10/1/2015     Procedure: COMBINED ESOPHAGOSCOPY, GASTROSCOPY, DUODENOSCOPY (EGD), PLACE PERCUTANEOUS ENDOSCOPIC GASTROSTOMY TUBE;  Surgeon: Mio Holder MD;  [facility]     Laparoscopic assisted insertion tube jejunostomy N/A 10/16/2015     Procedure: LAPAROSCOPIC ASSISTED INSERTION TUBE JEJUNOSTOMY;  Surgeon: Elsa Medel MD;  [facility]     Picc insertion Left 10/21/2015     5fr DL Power PICC, 37cm (2cm external) in the L basilic vein w/ tip in the SVC RA junction.     Hc replace duodenostomy/jejunostomy tube percutaneous N/A 1/7/2016     Procedure: REPLACE JEJUNOSTOMY TUBE, PERCUTANEOUS;  Surgeon: Elsa Medel MD;  [facility]     Endoscopic insertion tube gastrostomy N/A 1/21/2016     Procedure: ENDOSCOPIC INSERTION TUBE GASTROSTOMY;  Surgeon: Nicole Redding MD;  [facility]     Hc replace duodenostomy/jejunostomy tube percutaneous N/A 1/28/2016      Procedure: REPLACE JEJUNOSTOMY TUBE, PERCUTANEOUS;  Surgeon: Elsa Medel MD;  [facility]     Laparotomy exploratory N/A 1/28/2016     Procedure: LAPAROTOMY EXPLORATORY;  Surgeon: Elsa Medel MD;  [facility]     Remove port vascular access Right 6/30/2016     Procedure: REMOVE PORT VASCULAR ACCESS;  Surgeon: Pradeep Orosco MD;  [facility]     Echo adal  7/19/2016            N/A 7/20/2016     Procedure: ANESTHESIA OUT OF OR;  Surgeon: GENERIC ANESTHESIA PROVIDER;  Location: [facility]     Allergy     Allergies   Allergen Reactions     Hyoscyamine Rash     Metoclopramide Other (See Comments)     Eye twitching.      Peaches [Peach] Other (See Comments)     Raw. Cooked OK     Sucralose Other (See Comments)     All artificial sweeteners. Aspartame also. Swollen glands     Advair Diskus Other (See Comments)     Throat burns     Azithromycin Other (See Comments)     Burning in throat     Compazine [Prochlorperazine] Visual Disturbance     Contrast Dye Itching     States is allergic to CT contrast dye     Cyclobenzaprine Visual Disturbance     Fentanyl Other (See Comments)     migraine     Ibuprofen GI Disturbance     Lactulose Nausea and Vomiting     Gas and bloating     Levaquin [Levofloxacin] Swelling     Per ED M.D. And RN      Morphine Sulfate Other (See Comments)     Chest pain       Oxycodone Other (See Comments)     Burning throat, but can take Norco.      Penicillins Other (See Comments)     Family hx of resp arrest, she has never taken  Ok with cephalosporins     Rizatriptan Visual Disturbance     Droperidol Hives and Rash     Isovue [Iopamidol] Palpitations     Pt had racing heart and sob      Ketorolac Anxiety     Latex Swelling and Rash     Kiwi, likely also avacado, ? banana     Levsin Rash     Current Medication List     Current Outpatient Prescriptions   Medication Sig     mirtazapine (REMERON SOL-TAB) 15 MG ODT tab 0.5 tablets (7.5 mg) by Orally disintegrating tablet route At  Bedtime     SUMAtriptan (IMITREX) 50 MG tablet Take 1-2 tablets ( mg) by mouth at onset of headache for migraine - may repeat dose after 2h if headache recurs.  Max: 200mg/24 hours     ondansetron (ZOFRAN ODT) 4 MG ODT tab Take 1 tablet (4 mg) by mouth every 6 hours as needed for nausea     rivaroxaban ANTICOAGULANT (XARELTO) 20 MG TABS tablet Take 1 tablet (20 mg) by mouth daily (with dinner)     rivaroxaban ANTICOAGULANT (XARELTO) 15 MG TABS tablet Take 1 tablet (15 mg) by mouth 2 times daily (with meals)     DULoxetine (CYMBALTA) 60 MG capsule Take 1 capsule (60 mg) by mouth daily     cloNIDine (CATAPRES) 0.2 MG tablet Take 2 tablets (0.4 mg) by mouth every evening     nortriptyline (PAMELOR) 10 MG capsule Take 3-4 capsules (30-40 mg) by mouth At Bedtime     Nutritional Supplements (COMPLEAT) LIQD Infuse at 50 ml/hour for 5 hours daily through j-tube  Flush j-tube with 30 ml water before and after each infusion of Compleat     cholestyramine light (QUESTRAN) 4 GM packet Take 1 packet (4 g) by mouth 2 times daily     ACETAMINOPHEN PO Take 500-1,000 mg by mouth every 6 hours as needed for pain      albuterol 90 MCG/ACT inhaler Inhale 2 puffs into the lungs every 6 hours as needed      No current facility-administered medications for this visit.         Social History   See HPI    Family History     Family History   Problem Relation Age of Onset     Thyroid Disease Mother      Sjogren's Mother      GASTROINTESTINAL DISEASE Mother      Intermittent nausea vomiting diarrhea     Colon Polyps Mother      Lupus Maternal Grandmother      CANCER Maternal Grandfather      Lung     Colon Cancer Maternal Grandfather 65     CANCER Paternal Grandmother      Lung      CEREBROVASCULAR DISEASE Paternal Grandmother      DIABETES Paternal Grandmother      Cardiovascular Paternal Grandmother      CHF     CANCER Paternal Grandfather      Lung     Glaucoma Paternal Grandfather      Prostate Problems Father      prostate  "enlargement     Abdominal Aortic Aneurysm Other      Macular Degeneration No family hx of      Daughter: \"kikuchi's lupus\"  Mother: Sjogren's Syndrome, thyroid disease  Mother's cousin: SLE    Physical Exam     Temp Readings from Last 3 Encounters:   01/09/17 97.7  F (36.5  C) Oral   01/05/17 98.2  F (36.8  C)    11/14/16 99  F (37.2  C) Axillary     BP Readings from Last 5 Encounters:   01/09/17 129/79   01/05/17 128/110   11/14/16 101/64   11/10/16 135/79   11/10/16 146/97     Pulse Readings from Last 1 Encounters:   01/09/17 98     Resp Readings from Last 1 Encounters:   01/05/17 15     Estimated body mass index is 29.89 kg/(m^2) as calculated from the following:    Height as of this encounter: 1.67 m (5' 5.75\").    Weight as of this encounter: 83.371 kg (183 lb 12.8 oz).    GEN: NAD  HEENT: MMM. No oral lesions. Anicteric, noninjected sclera  CV: S1, S2. RRR. No m/r/g.  PULM: CTA bilaterally. No w/c.  ABD: +BS.   MSK:  Hands, wrists, and elbows without synovial swelling, increased warmth, tenderness to palpation, or overlying erythema.  Negative MCP squeeze.   Bilateral shoulders nontender to palpation and without swelling.  Knees, ankles, and feet without swelling to tenderness to palpation.       NEURO: UE and LE strengths 5/5 and equal bilaterally.   SKIN: No rash.  Mildly tender to palpation just superior to the J-tube insertion point on the skin, but no hard bump on palpation that she described  EXT: No LE edema  PSYCH: Alert. Appropriate.    Labs / Imaging (select studies)     Antiphospholipid Antibodies  Recent Labs   Lab Test  04/11/13   1611   CARG  <15.0 Interpretation:  Negative   JAEL  <12.5 Interpretation:  Negative     CBC  Recent Labs   Lab Test  01/05/17   1421  11/14/16   2100  11/10/16   1622   WBC  11.4*  6.4  6.5   RBC  4.40  4.18  4.25   HGB  11.2*  10.7*  11.0*   HCT  35.3  33.1*  34.4*   MCV  80  79  81   RDW  18.4*  18.3*  18.4*   PLT  496*  317  305   MCH  25.5*  25.6*  25.9*   MCHC  " 31.7  32.3  32.0   NEUTROPHIL  74.4  77.2  83.0   LYMPH  21.4  15.7  11.2   MONOCYTE  3.3  5.3  5.4   EOSINOPHIL  0.4  1.3  0.2   BASOPHIL  0.3  0.3  0.2   ANEU  8.5*  4.9  5.4   ALYM  2.4  1.0  0.7*   JORJE  0.4  0.3  0.4   AEOS  0.0  0.1  0.0   ABAS  0.0  0.0  0.0     CMP  Recent Labs   Lab Test  01/05/17   1421  11/14/16   2100  11/10/16   1622   NA  138  138  136   POTASSIUM  3.9  4.0  3.4   CHLORIDE  106  104  101   CO2  24  24  27   ANIONGAP  8  10  8   GLC  95  102*  100*   BUN  8  5*  5*   CR  0.84  1.15*  0.96   GFRESTIMATED  77  54*  66   GFRESTBLACK  >90   GFR Calc    65  80   TARA  9.1  8.9  9.2   BILITOTAL  0.4  0.5  1.0   ALBUMIN  3.6  3.2*  3.7   PROTTOTAL  8.6  8.3  8.6   ALKPHOS  149  165*  149   AST  33  26  25   ALT  50  40  45     HgA1c  Recent Labs   Lab Test  01/07/12   0748   A1C  5.1     Iron Studies  Recent Labs   Lab Test  08/03/16   0928  07/10/16   0604  01/23/13   1515   JORDYN  43  16  11   IRON  38  11*  26*   FEB  532*  400  393   IRONSAT  7*  3*  7*     Calcium/VitaminD  Recent Labs   Lab Test  01/05/17   1421  11/14/16   2100  11/10/16   1622   03/12/16   0638   10/15/14   1639   01/23/13   1515   TARA  9.1  8.9  9.2   < >  8.7   < >  9.2   < >  8.5   VITDT   --    --    --    --   <13  Season, race, dietary intake, and treatment affect the concentration of   25-hydroxy-Vitamin D. Values may decrease during winter months and increase   during summer months. Values 20-29 ug/L may indicate Vitamin D insufficiency   and values <20 ug/L may indicate Vitamin D deficiency.   Vitamin D determination is routinely performed by an immunoassay specific for   25 hydroxyvitamin D3.  If an individual is on vitamin D2 (ergocalciferol)   supplementation, please specify 25 OH vitamin D2 and D3 level determination by   LCMSMS test VITD23.  *   --   21*   --   20*    < > = values in this interval not displayed.     ESR/CRP  Recent Labs   Lab Test  10/20/16   0703  08/30/16   0505  08/29/16    1800   07/16/16   1835   06/22/16   1520  06/21/16   1416   SED   --    --    --    --   52*   --   55*  58*   CRP  28.5*  100.0*  109.0*   < >  12.4*   < >  12.2*  14.0*    < > = values in this interval not displayed.     TSH/T4  Recent Labs   Lab Test  07/21/16   1027  06/21/16   1416  04/15/14   0755  01/23/13   1515  01/07/12   0748   TSH  0.69  0.25*  1.37  0.90  1.08   T4   --   0.99   --   1.00  1.04     Lipid Panel  Recent Labs   Lab Test  01/19/16   0548 12/15/15 11/30/15   04/15/14   0755   CHOL   --    --    --    --   152   TRIG  135  60  157   < >  69   HDL   --    --    --    --   75   LDL   --    --    --    --   63   VLDL   --    --    --    --   14   CHOLHDLRATIO   --    --    --    --   2.0    < > = values in this interval not displayed.     UA  Recent Labs   Lab Test  11/14/16   2145  11/10/16   1803  10/20/16   0720  10/16/16   1740   08/29/16   1715   07/07/16   1550   06/24/16   0246   04/27/16   1405   02/03/16   1801   COLOR  Straw  Yellow  Pink  Yellow   < >  Yellow   < >  Red   < >  Yellow   < >  Light Yellow   < >  Yellow   APPEARANCE  Clear  Clear  Clear  Clear   < >  Clear   < >  Turbid   < >  Clear   < >  Clear   < >  Clear   URINEGLC  Negative  Negative  Negative  Negative   < >  Negative   < >  Negative   < >  Negative   < >  Negative   < >  Negative   URINEBILI  Negative  Negative  Negative  Negative   < >  Negative   < >  Negative   < >  Negative   < >  Negative   < >  Negative   SG  1.010  1.011  1.010  1.015   < >  1.010   < >  1.010   < >  1.010   < >  1.004   < >  1.050*   URINEPH  7.5*  6.0  7.0  6.0   < >  6.0   < >  7.5*   < >  7.0   < >  7.0   < >  8.0*   PROTEIN  Negative  Negative  Negative  Negative   < >  Negative   < >  30*   < >  Negative   < >  Negative   < >  Negative   UROBILINOGEN  0.2   --   0.2  0.2   < >  1.0   < >  0.2   < >  0.2   < >   --    < >   --    NITRITE  Negative  Negative  Negative  Negative   < >  Negative   < >  Negative   < >  Negative   < >   Negative   < >  Negative   UBLD  Moderate*  Negative  Large*  Trace*   < >  Large*   < >  Moderate*   < >  Negative   < >  Negative   < >  Negative   LEUKEST  Negative  Negative  Trace*  Negative   < >  Negative   < >  Moderate*   < >  Negative   < >  Negative   < >  Negative   WBCU  O - 2   --   2-5*  O - 2   --   O - 2   < >  >100*   < >  O - 2   < >  <1   < >  1   RBCU  5-10*   --   25-50*  O - 2   --   5-10*   < >  >100*   < >  O - 2   < >  0   < >  2   SQUAMOUSEPI  Many*   --   Moderate*   --    --   Few   < >  Moderate*   < >  Few   --    --    < >   --    BACTERIA  Moderate*   --    --    --    --    --    --   Many*   --   Few*   --    --    --    --    MUCOUS   --    --    --    --    --    --    --   Present*   --    --    --   Present*   --   Present*    < > = values in this interval not displayed.     Urine Microscopic  Recent Labs   Lab Test  11/14/16   2145  10/20/16   0720  10/16/16   1740  08/29/16   1715   07/07/16   1550   06/24/16   0246   04/27/16   1405   02/03/16   1801   WBCU  O - 2  2-5*  O - 2  O - 2   < >  >100*   < >  O - 2   < >  <1   < >  1   RBCU  5-10*  25-50*  O - 2  5-10*   < >  >100*   < >  O - 2   < >  0   < >  2   SQUAMOUSEPI  Many*  Moderate*   --   Few   < >  Moderate*   < >  Few   --    --    < >   --    BACTERIA  Moderate*   --    --    --    --   Many*   --   Few*   --    --    --    --    MUCOUS   --    --    --    --    --   Present*   --    --    --   Present*   --   Present*    < > = values in this interval not displayed.       Immunization History     Immunization History   Administered Date(s) Administered     Influenza (IIV3) 10/01/2009     Pneumococcal 23 valent 07/18/2014          Chart documentation done in part with Dragon Voice recognition Software. Although reviewed after completion, some word and grammatical error may remain.    Paul Mata MD

## 2017-01-09 NOTE — MR AVS SNAPSHOT
After Visit Summary   1/9/2017    Doreen Peralta    MRN: [id number]           Patient Information     Date Of Birth          1980        Visit Information        Provider Department      1/9/2017 9:20 AM Paul Mata MD [facility]        Today's Diagnoses     Family history of autoimmune disorder    -  1     Fever, unspecified            Follow-ups after your visit        Additional Services     INFECTIOUS DISEASE REFERRAL       Your provider has referred you to:   [facility]: [facility] (Infectious Disease and HIV Clinic) [facility] [phone]   [email or web address]    Please be aware that coverage of these services is subject to the terms and limitations of your health insurance plan.  Call member services at your health plan with any benefit or coverage questions.      Please bring the following with you to your appointment:    (1) Any X-Rays, CTs or MRIs which have been performed.  Contact the facility where they were done to arrange for  prior to your scheduled appointment.    (2) List of current medications   (3) This referral request   (4) Any documents/labs given to you for this referral                  Your next 10 appointments already scheduled     Mar 27, 2017  9:00 AM   (Arrive by 8:45 AM)   Return Visit with Yuri Wahl MD   Gastroenterology and IBD ([facility])    [address] [place] [id number] [phone]              Future tests that were ordered for you today     Open Future Orders        Priority Expected Expires Ordered    Beta 2 Glycoprotein 1 Antibody IgM Routine 1/9/2017 1/23/2017 1/9/2017    CK total Routine 1/9/2017 1/23/2017 1/9/2017    Complement C3 Routine 1/9/2017 1/23/2017 1/9/2017    Complement C4 Routine 1/9/2017 1/23/2017 1/9/2017    CRP inflammation Routine 1/9/2017 1/23/2017 1/9/2017    DNA double stranded antibodies  Routine 1/9/2017 1/23/2017 1/9/2017    Erythrocyte sedimentation rate auto Routine 1/9/2017 1/23/2017 1/9/2017    Protein  random urine Routine 1/9/2017 1/23/2017 1/9/2017    UA with Microscopic reflex to Culture Routine 1/9/2017 1/23/2017 1/9/2017    Vitamin D Deficiency Routine 1/9/2017 1/30/2017 1/9/2017    Nuclear Antibody FERMÍN by IFA IgG Routine 1/9/2017 1/23/2017 1/9/2017    SMITA antibody panel Routine 1/9/2017 1/23/2017 1/9/2017    Cardiolipin Roc[place]le IgG and IgM Routine 1/9/2017 1/23/2017 1/9/2017    Lupus panel Routine 1/9/2017 1/23/2017 1/9/2017    Beta 2 Glycoprotein 1 Antibody IgG Routine 1/9/2017 1/23/2017 1/9/2017            Who to contact     If you have questions or need follow up information about today's clinic visit or your schedule please contact [facility] directly at [phone].  Normal or non-critical lab and imaging results will be communicated to you by true[x] Mediahart, letter or phone within 4 business days after the clinic has received the results. If you do not hear from us within 7 days, please contact the clinic through [employer] or phone. If you have a critical or abnormal lab result, we will notify you by phone as soon as possible.  Submit refill requests through [employer] or call your pharmacy and they will forward the refill request to us. Please allow 3 business days for your refill to be completed.          Additional Information About Your Visit        [employer] Information     [employer] gives you secure access to your electronic health record. If you see a primary care provider, you can also send messages to your care team and make appointments. If you have questions, please call your primary care clinic.  If you do not have a primary care provider, please call [phone] and they will assist you.        Care EveryWhere ID     This is your Care EveryWhere ID. This could be used by other organizations to access your [place] medical records  [id number]        Your Vitals Were      "Pulse Temperature Height BMI (Body Mass Index) Pulse Oximetry       98 97.7  F (36.5  C) (Oral) 1.67 m (5' 5.75\") 29.89 kg/m2 99%        Blood Pressure from Last 3 Encounters:   01/09/17 129/79   01/05/17 128/110   11/14/16 101/64    Weight from Last 3 Encounters:   01/09/17 83.371 kg (183 lb 12.8 oz)   01/05/17 81.194 kg (179 lb)   11/14/16 86.183 kg (190 lb)              We Performed the Following     INFECTIOUS DISEASE REFERRAL        Primary Care Provider Office Phone # Fax #    Larisa Lorenzo PA-C [phone]       [facility] [place] [address]  [place] [address]        Thank you!     Thank you for choosing [facility]  for your care. Our goal is always to provide you with excellent care. Hearing back from our patients is one way we can continue to improve our services. Please take a few minutes to complete the written survey that you may receive in the mail after your visit with us. Thank you!             Your Updated Medication List - Protect others around you: Learn how to safely use, store and throw away your medicines at www.disposemymeds.org.          This list is accurate as of: 1/9/17 10:29 AM.  Always use your most recent med list.                   Brand Name Dispense Instructions for use    ACETAMINOPHEN PO      Take 500-1,000 mg by mouth every 6 hours as needed for pain       albuterol 90 MCG/ACT inhaler      Inhale 2 puffs into the lungs every 6 hours as needed       cholestyramine light 4 GM Packet    QUESTRAN    100 packet    Take 1 packet (4 g) by mouth 2 times daily       cloNIDine 0.2 MG tablet    CATAPRES    60 tablet    Take 2 tablets (0.4 mg) by mouth every evening       COMPLEAT Liqd     30 each    Infuse at 50 ml/hour for 5 hours daily through j-tube Flush j-tube with 30 ml water before and after each infusion of Compleat       DULoxetine 60 MG EC capsule    CYMBALTA    90 capsule    Take 1 capsule (60 mg) by mouth daily       mirtazapine 15 MG ODT " tab    REMERON SOL-TAB    45 tablet    0.5 tablets (7.5 mg) by Orally disintegrating tablet route At Bedtime       nortriptyline 10 MG capsule    PAMELOR    120 capsule    Take 3-4 capsules (30-40 mg) by mouth At Bedtime       ondansetron 4 MG ODT tab    ZOFRAN ODT    30 tablet    Take 1 tablet (4 mg) by mouth every 6 hours as needed for nausea       * rivaroxaban ANTICOAGULANT 15 MG Tabs tablet    XARELTO    60 tablet    Take 1 tablet (15 mg) by mouth 2 times daily (with meals)       * rivaroxaban ANTICOAGULANT 20 MG Tabs tablet    XARELTO    90 tablet    Take 1 tablet (20 mg) by mouth daily (with dinner)       SUMAtriptan 50 MG tablet    IMITREX    18 tablet    Take 1-2 tablets ( mg) by mouth at onset of headache for migraine - may repeat dose after 2h if headache recurs.  Max: 200mg/24 hours       * Notice:  This list has 2 medication(s) that are the same as other medications prescribed for you. Read the directions carefully, and ask your doctor or other care provider to review them with you.

## 2017-01-10 LAB
C3 SERPL-MCNC: 188 MG/DL (ref 76–169)
C4 SERPL-MCNC: 29 MG/DL (ref 15–50)
CARDIOLIPIN ANTIBODY IGG: NORMAL GPL-U/ML (ref 0–19.9)
CARDIOLIPIN ANTIBODY IGM: 0.4 MPL-U/ML (ref 0–19.9)
DEPRECATED CALCIDIOL+CALCIFEROL SERPL-MC: ABNORMAL UG/L (ref 20–75)
ENA RNP IGG SER IA-ACNC: 0.3 AI (ref 0–0.9)
ENA SCL70 IGG SER IA-ACNC: NORMAL AI (ref 0–0.9)
ENA SM IGG SER-ACNC: NORMAL AI (ref 0–0.9)
ENA SS-A IGG SER IA-ACNC: NORMAL AI (ref 0–0.9)
ENA SS-B IGG SER IA-ACNC: NORMAL AI (ref 0–0.9)

## 2017-01-11 LAB — NUCLEAR IGG TITR SER IF: NORMAL {TITER}

## 2017-01-12 LAB
B2 GLYCOPROT1 IGG SERPL IA-ACNC: 1 U/ML
B2 GLYCOPROT1 IGM SERPL IA-ACNC: NORMAL U/ML
DSDNA AB SER-ACNC: 1 IU/ML
LA PPP-IMP: ABNORMAL

## 2017-01-15 NOTE — PROGRESS NOTES
"Quick Note:    Suzy KELLER, please see message below.    CHARMS PPEC message sent:  \"Ms. Peralta,    Your labs do not suggest a rheumatologic etiology for your fevers. I am still more concerned about possible infection. Another thought is recurrent blood clot causing the fevers despite the anticoagulation; it would be a good idea to pass this by your hematologist.     Your vitamin D is very low. Please speak with Ms. Lorenzo to have the vitamin D deficiency addressed.     Please let me know if you have any questions.    Sincerely,  Paul Mata MD  1/15/2017 1:13 AM\"  ______  "

## 2017-01-16 ENCOUNTER — TELEPHONE (OUTPATIENT)
Dept: FAMILY MEDICINE | Facility: CLINIC | Age: 37
End: 2017-01-16

## 2017-01-16 DIAGNOSIS — E55.9 VITAMIN D DEFICIENCY: Primary | ICD-10-CM

## 2017-01-16 NOTE — TELEPHONE ENCOUNTER
Please call patient with the following info:    Prescription for jacklyn dose vitamin D sent to patient's pharmacy - take 1 tab/cap once weekly x12 weeks. We'll recheck her levels in 3 months.

## 2017-01-18 ENCOUNTER — OFFICE VISIT (OUTPATIENT)
Dept: INFECTIOUS DISEASES | Facility: CLINIC | Age: 37
End: 2017-01-18
Attending: INTERNAL MEDICINE
Payer: COMMERCIAL

## 2017-01-18 VITALS
SYSTOLIC BLOOD PRESSURE: 138 MMHG | BODY MASS INDEX: 29.8 KG/M2 | DIASTOLIC BLOOD PRESSURE: 91 MMHG | HEIGHT: 66 IN | WEIGHT: 185.4 LBS | HEART RATE: 62 BPM | TEMPERATURE: 98.5 F

## 2017-01-18 DIAGNOSIS — E55.9 VITAMIN D DEFICIENCY: ICD-10-CM

## 2017-01-18 DIAGNOSIS — T50.8X5S ALLERGIC REACTION TO CONTRAST DYE, SEQUELA: ICD-10-CM

## 2017-01-18 DIAGNOSIS — R50.9 FEVER, UNSPECIFIED: ICD-10-CM

## 2017-01-18 DIAGNOSIS — R50.9 FEVER, UNSPECIFIED: Primary | ICD-10-CM

## 2017-01-18 LAB
ALBUMIN SERPL-MCNC: 3.8 G/DL (ref 3.4–5)
ALP SERPL-CCNC: 166 U/L (ref 40–150)
ALT SERPL W P-5'-P-CCNC: 32 U/L (ref 0–50)
ANION GAP SERPL CALCULATED.3IONS-SCNC: 7 MMOL/L (ref 3–14)
AST SERPL W P-5'-P-CCNC: 19 U/L (ref 0–45)
BASOPHILS # BLD AUTO: 0.1 10E9/L (ref 0–0.2)
BASOPHILS NFR BLD AUTO: 0.4 %
BILIRUB SERPL-MCNC: 0.4 MG/DL (ref 0.2–1.3)
BUN SERPL-MCNC: 13 MG/DL (ref 7–30)
CALCIUM SERPL-MCNC: 9.7 MG/DL (ref 8.5–10.1)
CHLORIDE SERPL-SCNC: 103 MMOL/L (ref 94–109)
CO2 SERPL-SCNC: 25 MMOL/L (ref 20–32)
CREAT SERPL-MCNC: 0.91 MG/DL (ref 0.52–1.04)
CRP SERPL-MCNC: 8.2 MG/L (ref 0–8)
DIFFERENTIAL METHOD BLD: ABNORMAL
EOSINOPHIL # BLD AUTO: 0.1 10E9/L (ref 0–0.7)
EOSINOPHIL NFR BLD AUTO: 0.6 %
ERYTHROCYTE [DISTWIDTH] IN BLOOD BY AUTOMATED COUNT: 17.9 % (ref 10–15)
ERYTHROCYTE [SEDIMENTATION RATE] IN BLOOD BY WESTERGREN METHOD: 94 MM/H (ref 0–20)
GFR SERPL CREATININE-BSD FRML MDRD: 70 ML/MIN/1.7M2
GLUCOSE SERPL-MCNC: 81 MG/DL (ref 70–99)
HCT VFR BLD AUTO: 36.1 % (ref 35–47)
HGB BLD-MCNC: 11.2 G/DL (ref 11.7–15.7)
IMM GRANULOCYTES # BLD: 0.1 10E9/L (ref 0–0.4)
IMM GRANULOCYTES NFR BLD: 0.6 %
LYMPHOCYTES # BLD AUTO: 3.1 10E9/L (ref 0.8–5.3)
LYMPHOCYTES NFR BLD AUTO: 28 %
MCH RBC QN AUTO: 25.1 PG (ref 26.5–33)
MCHC RBC AUTO-ENTMCNC: 31 G/DL (ref 31.5–36.5)
MCV RBC AUTO: 81 FL (ref 78–100)
MONOCYTES # BLD AUTO: 0.5 10E9/L (ref 0–1.3)
MONOCYTES NFR BLD AUTO: 4.7 %
NEUTROPHILS # BLD AUTO: 7.4 10E9/L (ref 1.6–8.3)
NEUTROPHILS NFR BLD AUTO: 65.7 %
NRBC # BLD AUTO: 0 10*3/UL
NRBC BLD AUTO-RTO: 0 /100
PLATELET # BLD AUTO: 597 10E9/L (ref 150–450)
POTASSIUM SERPL-SCNC: 4 MMOL/L (ref 3.4–5.3)
PROCALCITONIN SERPL-MCNC: NORMAL NG/ML
PROT SERPL-MCNC: 9.4 G/DL (ref 6.8–8.8)
RBC # BLD AUTO: 4.46 10E12/L (ref 3.8–5.2)
SODIUM SERPL-SCNC: 135 MMOL/L (ref 133–144)
WBC # BLD AUTO: 11.2 10E9/L (ref 4–11)

## 2017-01-18 PROCEDURE — 87385 HISTOPLASMA CAPSUL AG IA: CPT | Performed by: INTERNAL MEDICINE

## 2017-01-18 PROCEDURE — 80053 COMPREHEN METABOLIC PANEL: CPT | Performed by: PHYSICIAN ASSISTANT

## 2017-01-18 PROCEDURE — 86698 HISTOPLASMA ANTIBODY: CPT | Performed by: PHYSICIAN ASSISTANT

## 2017-01-18 PROCEDURE — 86606 ASPERGILLUS ANTIBODY: CPT | Performed by: PHYSICIAN ASSISTANT

## 2017-01-18 PROCEDURE — 86140 C-REACTIVE PROTEIN: CPT | Performed by: PHYSICIAN ASSISTANT

## 2017-01-18 PROCEDURE — 86635 COCCIDIOIDES ANTIBODY: CPT | Performed by: PHYSICIAN ASSISTANT

## 2017-01-18 PROCEDURE — 87040 BLOOD CULTURE FOR BACTERIA: CPT | Performed by: INTERNAL MEDICINE

## 2017-01-18 PROCEDURE — 86612 BLASTOMYCES ANTIBODY: CPT | Performed by: PHYSICIAN ASSISTANT

## 2017-01-18 PROCEDURE — 82306 VITAMIN D 25 HYDROXY: CPT | Performed by: PHYSICIAN ASSISTANT

## 2017-01-18 PROCEDURE — 85025 COMPLETE CBC W/AUTO DIFF WBC: CPT | Performed by: PHYSICIAN ASSISTANT

## 2017-01-18 PROCEDURE — 36415 COLL VENOUS BLD VENIPUNCTURE: CPT | Performed by: PHYSICIAN ASSISTANT

## 2017-01-18 PROCEDURE — 84145 PROCALCITONIN (PCT): CPT | Performed by: PHYSICIAN ASSISTANT

## 2017-01-18 PROCEDURE — 85652 RBC SED RATE AUTOMATED: CPT | Performed by: PHYSICIAN ASSISTANT

## 2017-01-18 PROCEDURE — 99212 OFFICE O/P EST SF 10 MIN: CPT | Mod: ZF

## 2017-01-18 ASSESSMENT — PAIN SCALES - GENERAL: PAINLEVEL: NO PAIN (0)

## 2017-01-18 NOTE — NURSING NOTE
"Chief Complaint   Patient presents with     New Patient     fever unspecified       Initial /91 mmHg  Pulse 62  Temp(Src) 98.5  F (36.9  C) (Oral)  Ht 1.676 m (5' 6\")  Wt 84.097 kg (185 lb 6.4 oz)  BMI 29.94 kg/m2 Estimated body mass index is 29.94 kg/(m^2) as calculated from the following:    Height as of this encounter: 1.676 m (5' 6\").    Weight as of this encounter: 84.097 kg (185 lb 6.4 oz).  BP completed using cuff size: regular  "

## 2017-01-18 NOTE — PROGRESS NOTES
INFECTIOUS DISEASE CLINIC    REASON FOR VISIT:   Fevers    REFERRED BY:  Dr. Mata, Rheumatologist and Dr. Chavis, Infectious Disease    HISTORY OF PRESENT ILLNESS:   Doreen Peralta is a 35 y/o female with a complex medical history.   She has been followed by Dr. Chavis of ID for previous bacteremia and candidemia and more recently for persistent fevers, but Dr. Chavis's practice is closing.  Dr. Mata of Rheumatology and Dr. Chavis referred Doreen to us to continue evaluation and management of persistent fevers.    Doreen has undergone several bowel surgeries.  It seems that the initial concern was gastroparesis and irritable bowel syndrome. She has a subtotal colectomy and history of ileostomy and has suffered from malnutrition.  She currently has a G-J tube, and at one point was on TPN > 1 year ago. Now she uses formula via her G-tube as needed.  Over the summer 2016, she did still have a port in that was previously used for TPN.  This is when she developed an Acinetobacter bacteremia as well as a a Candida parapsilosis fungemia.  Her port was removed, and she has not had any further bacteremias or fungemias identified on culture. She also has a history of bilateral upper extremity DVTs and portal vein thrombosis, and is on chronic anticoagulation (first warfarin and changed to Xarelto in September 2016).  No other recent medication changes.    Since the summer 2016 Doreen reports that she has fevers that recur every 1-2 weeks.  She does not think she has gone more than 2 weeks without having a fever since July 2016.  She describes feeling body aches first, followed by fevers up to 103-104 degrees F.  This is measured with a thermometer in the mouth, or under the arm (if under the arm, she adds a degree when reporting).  Fevers will last up to 3-4 days and she is usually bedridden during this time.  Acetaminophen will bring the temperature down to 101 F, but will not take the fevers away.  She describes a loss of  appetite, with abdominal pain in the right middle with frequent vomiting of gastric contents and occasional bile. She has a baseline watery diarrhea up to 10 times per day that does not change with fevers.  She denies any cough or other respiratory symptoms.  No sore throat or rhinorrhea. No SOB or chest pain.  No specific joint pains or swelling, just overall body aches.  She does note occasional lymphadenopathy but not always.  No rashes.  She notes that every time she has her G-J tube manipulated she will have fevers.  But, the fevers can also occur independently of tube changes.     Doreen is seen frequently in medical care.  She was hospitalized 11/8/16 for a dislodged G-tube and had fevers 100.7 F - 102 F.  She was discharged but returned to the ED 11/14/16.   The last time Dr. Chavis saw her in the office, she was concerned for sepsis and sent her to the ED.   Most times when Doreen presents for care, she has blood cultures drawn.  They have all been negative since July 2016.  She also frequently has CTs of the Abdomen/Pelvis (upwards of 30 in the [employer] system alone) due to her history and frequent complaints of abdominal pain.  She has a reported allergy to IV contrast and requests IV benadryl before her scans.    She was scheduled for a routine J tube change 1/11/17, but the tube fell out 1/5/17 and it was replaced by IR on 1/5/17.  After this most recent placement, Doreen noted a hard tender area just superior to the J-tube insertion site.   Labs from 1/5/17 did show evidence of significant inflammation with a WBC count of 11.4, ESR of 75, and CRP of 142.  Platelets were also up at 496. She did have a fever episode after this procedure.  However, today this area is soft and non tender.  She is currently afebrile.    Exposures:  No known contacts with active tuberculosis.  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  She has not been around anyone with HIV to  her knowledge, but is around other immunocompromised hosts with cancer frequently.  She gets infusions of IV fluids twice weekly in an infusion center with others receiving chemotherapy.  Her aunt  of uterine cancer in 2016.  She does not have any contacts with those incarcerated or with a history of prolonged incarceration.    Doreen has not traveled internationally.  She has not traveled to the [place]  [place]  This past summer she was in the hospital for much of the time.  She does not recall any time out at a cabin, in the woods, on a lake, and doubts she had any exposure to ticks.  No caves.  No bat exposure.    She works only a couple of hours per week now in a clinic.  She lives with her  and 3 daughters aged 16, 15, and 13 in an apartment.  Her parents are 5 miles away and help a lot.  All family members are healthy and have not had any fevers.       PAST MEDICAL HISTORY:    Past Medical History   Diagnosis Date     Asthma      Bilateral ovarian cysts      Cervical cancer (H) 2008     cervical cancer      Chronic pain      Colonic dysmotility      s/p subtotal colectomy     Constipation      chronic     E. coli sepsis (H) 2016     Enteritis      Fungemia 2016     Gastro-oesophageal reflux disease      H/O ileostomy      Hx of abnormal Pap smear      s/p LEEP - no further details provided     Hypertension      IBS (irritable bowel syndrome)      Other chronic pain      PONV (postoperative nausea and vomiting)      Thrombosis, hepatic vein (H)      microvascular     PAST SURGICAL HISTORY:  Past [facility] History   Procedure Laterality Date     Laparoscopic oophorectomy Right 2009     Yazdanism     Laparoscopic cholecystectomy       [facility] stones duct     Esophagoscopy, gastroscopy, duodenoscopy (egd), combined  7/10/2012     Procedure: COMBINED ESOPHAGOSCOPY, GASTROSCOPY, DUODENOSCOPY (EGD);  Upper  Endoscopy, Ileoscopy    Latex Allergy  with biopsies;  Surgeon: Nicole Redding MD;  Location: [facility]     Colonoscopy  7/10/2012     Procedure: COLONOSCOPY;;  Surgeon: Nicole Redding MD;  Location: [facility] OR     Leep tx, cervical  2009     [facility]     Laparoscopic ileostomy  1/20/2012     [facility], loop     Esophagoscopy, gastroscopy, duodenoscopy (egd), combined N/A 11/5/2014     Procedure: COMBINED ESOPHAGOSCOPY, GASTROSCOPY, DUODENOSCOPY (EGD);  Surgeon: Nicole Redding MD;  Location: [facility] OR     Laparoscopic assisted colectomy  1/20/2012     Procedure:LAPAROSCOPIC ASSISTED COLECTOMY; Laparoscopic Ileostomy       Laparoscopic assisted colectomy left (descending)  10/24/2012     Procedure: LAPAROSCOPIC ASSISTED COLECTOMY LEFT (DESCENDING);   Hand Assisted Laproscopic Subtotal abdominal Colectomy,Iieorectal anastamosis, Ileostomy Closure.       Remove gastrostomy tube adult N/A 12/12/2014     Procedure: REMOVE GASTROSTOMY TUBE ADULT;  Surgeon: Nicole Redding MD;  Location: [facility]     Replace gastrostomy tube adult  5/19/15     Hc replace gastrostomy/cecostomy tube percutaneous Left 5/19/2015     Procedure: REPLACE GASTROSTOMY TUBE, PERCUTANEOUS;  Surgeon: Melecio Morejon Chi, MD;  Location: [facility]     Hc replace duodenostomy/jejunostomy tube percutaneous N/A 8/27/2015     Procedure: REPLACE GASTROJEJUNOSTOMY TUBE, PERCUTANEOUS;  Surgeon: Mio Holder MD;  Location: [facility] OR      ugi endoscopy w placement gastrostomy tube percut N/A 10/1/2015     Procedure: COMBINED ESOPHAGOSCOPY, GASTROSCOPY, DUODENOSCOPY (EGD), PLACE PERCUTANEOUS ENDOSCOPIC GASTROSTOMY TUBE;  Surgeon: Mio Holder MD;  Location: [facility]     Laparoscopic assisted insertion tube jejunostomy N/A 10/16/2015     Procedure: LAPAROSCOPIC ASSISTED INSERTION TUBE JEJUNOSTOMY;  Surgeon: Elsa Medel MD;  Location: [facility]     Picc insertion Left 10/21/2015     5fr DL Power PICC, 37cm (2cm external) in the L basilic  vein w/ tip in the SVC RA junction.     Hc replace duodenostomy/jejunostomy tube percutaneous N/A 1/7/2016     Procedure: REPLACE JEJUNOSTOMY TUBE, PERCUTANEOUS;  Surgeon: Elsa Medel MD;  Location[facility]     Endoscopic insertion tube gastrostomy N/A 1/21/2016     Procedure: ENDOSCOPIC INSERTION TUBE GASTROSTOMY;  Surgeon: Nicole Redding MD;  Location: [facility]     Hc replace duodenostomy/jejunostomy tube percutaneous N/A 1/28/2016     Procedure: REPLACE JEJUNOSTOMY TUBE, PERCUTANEOUS;  Surgeon: Elsa Medel MD;  [facility]: [facility]     Laparotomy exploratory N/A 1/28/2016     Procedure: LAPAROTOMY EXPLORATORY;  Surgeon: Elsa Medel MD;  Location: [facility]     Remove port vascular access Right 6/30/2016     Procedure: REMOVE PORT VASCULAR ACCESS;  Surgeon: Pradeep rOosco MD;  Location: [facility]     Echo adal  7/19/2016            N/A 7/20/2016     Procedure: ANESTHESIA OUT OF OR;  Surgeon: GENERIC ANESTHESIA PROVIDER;  Location: [facility] OR       FAMILY PAST MEDICAL HISTORY:  Family History   Problem Relation Age of Onset     Thyroid Disease Mother      Sjogren's Mother      GASTROINTESTINAL DISEASE Mother      Intermittent nausea vomiting diarrhea     Colon Polyps Mother      Lupus Maternal Grandmother      CANCER Maternal Grandfather      Lung     Colon Cancer Maternal Grandfather 65     CANCER Paternal Grandmother      Lung      CEREBROVASCULAR DISEASE Paternal Grandmother      DIABETES Paternal Grandmother      Cardiovascular Paternal Grandmother      CHF     CANCER Paternal Grandfather      Lung     Glaucoma Paternal Grandfather      Prostate Problems Father      prostate enlargement     Abdominal Aortic Aneurysm Other      Macular Degeneration No family hx of      SOCIAL HISTORY:  Social History     Social History     Marital status:      Spouse name: Mitali     Number of children: 3     Years of education: N/A     Occupational History     Medical Assistant [employer]      Pediatric clinic     Social History Main Topics     Smoking status: Former Smoker     Packs/day: 1.00     Years: 4.00     Types: Cigarettes     Quit date: 1/1/2004     Smokeless tobacco: Former User     Alcohol use No     Drug use: No     Sexual activity: Yes     Partners: Male     Other Topics Concern     Not on file     Social History Narrative     CURRENT MEDICATIONS:    Current Outpatient Prescriptions   Medication     SUMAtriptan (IMITREX) 50 MG tablet     rivaroxaban ANTICOAGULANT (XARELTO) 20 MG TABS tablet     DULoxetine (CYMBALTA) 60 MG capsule     cloNIDine (CATAPRES) 0.2 MG tablet     Nutritional Supplements (COMPLEAT) LIQD     ACETAMINOPHEN PO     albuterol 90 MCG/ACT inhaler     nortriptyline (PAMELOR) 10 MG capsule     HYDROmorphone (DILAUDID) 2 MG tablet     ondansetron (ZOFRAN ODT) 4 MG ODT tab     mirtazapine (REMERON SOL-TAB) 15 MG ODT tab     No current facility-administered medications for this visit.         ALLERGIES:      Allergies   Allergen Reactions     Hyoscyamine Rash     Metoclopramide Other (See Comments)     Eye twitching.      Peaches [Peach] Other (See Comments)     Raw. Cooked OK     Sucralose Other (See Comments)     All artificial sweeteners. Aspartame also. Swollen glands     Advair Diskus Other (See Comments)     Throat burns     Azithromycin Other (See Comments)     Burning in throat     Compazine [Prochlorperazine] Visual Disturbance     Contrast Dye Itching     States is allergic to CT contrast dye     Cyclobenzaprine Visual Disturbance     Fentanyl Other (See Comments)     migraine     Ibuprofen GI Disturbance     Lactulose Nausea and Vomiting     Gas and bloating     Levaquin [Levofloxacin] Swelling     Per ED M.D. And RN      Morphine Sulfate Other (See Comments)     Chest pain       Oxycodone Other (See Comments)     Burning throat, but can take Norco.      Penicillins Other (See Comments)     Family hx of resp arrest, she has never taken  Ok with cephalosporins      "Rizatriptan Visual Disturbance     Droperidol Hives and Rash     Isovue [Iopamidol] Palpitations     Pt had racing heart and sob      Ketorolac Anxiety     Latex Swelling and Rash     Kiwi, likely also avacado, ? banana     Levsin Rash     REVIEW OF SYSTEMS: A full 12-point review of systems was obtained, pertinent positives and negatives as above.     PHYSICAL EXAMINATION:    VITAL SIGNS:  BP (!) 138/91  Pulse 62  Temp 98.5  F (36.9  C) (Oral)  Ht 1.676 m (5' 6\")  Wt 84.1 kg (185 lb 6.4 oz)  BMI 29.92 kg/m2  GENERAL:   No acute distress    HEENT: No icterus or injection. Oropharynx moist and clear without lesions or exudate.    NECK: Supple and nontender  LYMPH:  No cervical, axillary or inguinal lymphadenopathy  LUNGS: Clear to ausculation bilaterally without any increased work of breathing  HEART: Regular rate and rhythm and no murmur, gallop or rub    ABDOMEN: Tubes site clean and intact.  No induration, swelling, or tenderness. Normoactive bowel sounds, soft, nontender, nondistended, no hepatosplenomegaly.    /GYN:  Deferred.  EXTREMITIES: Warm and well perfused without clubbing, cyanosis, or edema  SKIN:  No rashes  NEURO:  Awake, alert, no focal neurologic deficits.  PSYCH: Affect normal. Speech fluent and appropriate.     LABORATORY DATA:    Reviewed in EPIC  Labs from 1/5/17 did show evidence of significant inflammation with a WBC count of 11.4, ESR of 75, and CRP of 142.  Platelets were also up at 496.    ASSESSMENT AND PLAN:   Doreen Peralta is a 35 y/o female with a complex medical history that includes multiple bowel surgeries (initially with gastroparesis) and currently with G/J tube.  History of Acinetobacter bacteremia and Candida parapsilosis fungemia in 7/2016 s/p port removal and no subsequent positive cultures.  With recurrently fevers every 1-2 weeks since summer 2016.    At this point, the etiology of her fevers are unclear. Many blood cultures have been negative, which is reassuring. We are " concerned for a sub-acute endocarditis that could present this way with an organism that may not grow readily on routine blood cultures.  Doreen did have an echocardiogram during her summer 2016 hospital stay, but nothing since.  We will get some blood cultures.   Multiple CTs of the Abdomen/Pelvis have failed to demonstrate an intra-abdominal source.  She does not have respiratory symptoms, but with this degree of prolonged fevers we would like a more complete evaluation of her lungs.  Since we will be getting a scan of her lungs, we will go ahead and do another CT of the Abdomen/Pelvis as well, this time with both oral and IV contrast.  Endemic mycoses could cause these symptoms.  We will check a fungal antibody panel that will tell us what she may have been exposed to (checks for Histoplasma, Blastomyces, and Coccidioides antibodies.  Also checks for Aspergillus antibodies which is usually not helpful as many people have had exposure to Aspergillus).  We will also get Histoplasma urinary antigen and repeat lab work to trend inflammatory markers.  We will add a procalcitonin as this has not previously been checked.     1.  CT Chest/Abdomen/Pelvis with oral and IV contrast  (patient requests IV benadryl before scans)   2.  Echocardiogram  3.  Blood cultures  4.  Fungal ab panel, Histoplasma urinary antigen, CMP, CBC, CRP, Sed Rate, procalcitonin   5.  Asked patient to keep a careful journal of fevers and associated symptoms.  We may consider checking labs again when febrile.  6.  Return to care in 1 month.  Patient has my email address to contact me with interval concerns or events.    Patient was discussed with the Infectious Disease attending and clinic preceptor, Dr. Grazyna Ferguson.      Todd Price MD    Fellow in Adult and Pediatric Infectious Disease    pager: [phone]    Attending Attestation:  I evaluated Ms. Peralta with fellow Dr. Price.  I have reviewed today's labs, vitals and imaging  results. This note reflects our joint findings, assessment and recommendations.    Grazyna Ferguson MD  [phone]

## 2017-01-18 NOTE — MR AVS SNAPSHOT
After Visit Summary   1/18/2017    Doreen Peralta    MRN: [id number]           Patient Information     Date Of Birth          1980        Visit Information        Provider Department      1/18/2017 2:00 PM Todd Price MD [facility] and Infectious Diseases        Today's Diagnoses     Fever, unspecified    -  1       Care Instructions        Thank you for coming in to see us today regarding your persistent fevers.      We will draw some blood today to run the following tests:    Orders Placed This Encounter   Procedures     Comprehensive metabolic panel     Standing Status: Future      Number of Occurrences:       Standing Expiration Date: 2/18/2017     CBC with platelets differential     Last Lab Result: HEMOGLOBIN (g/dL)       Date                     Value                 01/05/2017               11.2*            ----------     Standing Status: Future      Number of Occurrences:       Standing Expiration Date: 2/18/2017     CRP inflammation     Standing Status: Future      Number of Occurrences:       Standing Expiration Date: 2/18/2017     Erythrocyte sedimentation rate auto     Standing Status: Future      Number of Occurrences:       Standing Expiration Date: 2/18/2017     Procalcitonin     Standing Status: Future      Number of Occurrences:       Standing Expiration Date: 2/18/2017     Fungal Antibody by Immunodiffusion     Standing Status: Future      Number of Occurrences:       Standing Expiration Date: 2/18/2017     Histoplasma Capsulatum Antigen (Non Blood)     Standing Status: Future      Number of Occurrences:       Standing Expiration Date: 2/18/2017     Blood culture, aerobic     Blood culture, aerobic     Standing Status: Future      Number of Occurrences:       Standing Expiration Date: 2/18/2017     Blood culture, anaerobic     Blood culture, anaerobic     Standing Status: Future      Number of Occurrences:       Standing Expiration Date: 2/18/2017     Blood  culture, aerobic     Blood culture, aerobic     Standing Status: Future      Number of Occurrences:       Standing Expiration Date: 2/18/2017     Blood culture, anaerobic     Blood culture, anaerobic     Standing Status: Future      Number of Occurrences:       Standing Expiration Date: 2/18/2017       We have also ordered a CT of the Chest + Abdomen + Pelvis with both oral and IV contrast.   It is ok to have Benadryl beforehand given your allergy to IV contrast.  I have ordered this and also put it into the CT order along with my pager number if there are issues.    We have also ordered an Echocardiogram to be repeated.    We will call you with any concerning results, and otherwise planning on seeing you back in about 1 month.   (2/15)    Please keep a careful diary of your fevers - when they start, how long they last, if they respond to acetaminophen/ibuprofen, how you feel before and during fevers, any additional symptoms, and anything else you think is important.    Please contact us if you have any interval concerns before our next appointment.    Todd Price MD  Fellow, Infectious Disease    and    Grazyna Ferguson MD  Senior Staff in clinic, Infectious Disease    Please contact me if you have questions or concerns in the interim.        Follow-ups after your visit        Your next 10 appointments already scheduled     Mar 27, 2017  9:00 AM   (Arrive by 8:45 AM)   Return Visit with Yuri Wahl MD   [facility] ([facility])    [address] [place] [address] [id number] [phone]              Future tests that were ordered for you today     Open Future Orders        Priority Expected Expires Ordered    Blood culture, aerobic Routine 1/18/2017 2/18/2017 1/18/2017    Blood culture, anaerobic Routine 1/18/2017 2/18/2017 1/18/2017    Blood culture, aerobic Routine 1/18/2017 2/18/2017 1/18/2017    Blood culture, anaerobic Routine 1/18/2017  2/18/2017 1/18/2017    Echocardiogram Complete Routine  1/18/2018 1/18/2017    CT Chest abdomen w contrast Routine  1/18/2018 1/18/2017    Comprehensive metabolic panel Routine 1/18/2017 2/18/2017 1/18/2017    CBC with platelets differential Routine 1/18/2017 2/18/2017 1/18/2017    CRP inflammation Routine 1/18/2017 2/18/2017 1/18/2017    Erythrocyte sedimentation rate auto Routine 1/18/2017 2/18/2017 1/18/2017    Procalcitonin Routine 1/18/2017 2/18/2017 1/18/2017    Fungal Antibody by Immunodiffusion Routine 1/18/2017 2/18/2017 1/18/2017    Histoplasma Capsulatum Antigen (Non Blood) Routine 1/18/2017 2/18/2017 1/18/2017            Who to contact     If you have questions or need follow up information about today's clinic visit or your schedule please contact [facility] DISE[facility] directly at [phone].  Normal or non-critical lab and imaging results will be communicated to you by ChatIDhart, letter or phone within 4 business days after the clinic has received the results. If you do not hear from us within 7 days, please contact the clinic through Football Meistert or phone. If you have a critical or abnormal lab result, we will notify you by phone as soon as possible.  Submit refill requests through Colingo or call your pharmacy and they will forward the refill request to us. Please allow 3 business days for your refill to be completed.          Additional Information About Your Visit        Colingo Information     Colingo gives you secure access to your electronic health record. If you see a primary care provider, you can also send messages to your care team and make appointments. If you have questions, please call your primary care clinic.  If you do not have a primary care provider, please call [phone] and they will assist you.        Care EveryWhere ID     This is your Care EveryWhere ID. This could be used by other organizations to access your [facility] medical records  [id number]       "  Your Vitals Were     Pulse Temperature Height BMI (Body Mass Index)          62 98.5  F (36.9  C) (Oral) 1.676 m (5' 6\") 29.94 kg/m2         Blood Pressure from Last 3 Encounters:   01/18/17 138/91   01/09/17 129/79   01/05/17 128/110    Weight from Last 3 Encounters:   01/18/17 84.097 kg (185 lb 6.4 oz)   01/09/17 83.371 kg (183 lb 12.8 oz)   01/05/17 81.194 kg (179 lb)               Primary Care Provider Office Phone # Fax #    Larisa Lorenzo PA-C [phone]       [facility] [address]  [place] [address]        Thank you!     Thank you for choosing [facility] INFECTIOUS DISEAS[facility]  for your care. Our goal is always to provide you with excellent care. Hearing back from our patients is one way we can continue to improve our services. Please take a few minutes to complete the written survey that you may receive in the mail after your visit with us. Thank you!             Your Updated Medication List - Protect others around you: Learn how to safely use, store and throw away your medicines at www.disposemymeds.org.          This list is accurate as of: 1/18/17  3:47 PM.  Always use your most recent med list.                   Brand Name Dispense Instructions for use    ACETAMINOPHEN PO      Take 500-1,000 mg by mouth every 6 hours as needed for pain       albuterol 90 MCG/ACT inhaler      Inhale 2 puffs into the lungs every 6 hours as needed       cholecalciferol 21763 UNITS capsule    VITAMIN D3    12 capsule    Take 1 capsule (50,000 Units) by mouth once a week for 12 doses       cholestyramine light 4 GM Packet    QUESTRAN    100 packet    Take 1 packet (4 g) by mouth 2 times daily       cloNIDine 0.2 MG tablet    CATAPRES    60 tablet    Take 2 tablets (0.4 mg) by mouth every evening       COMPLEAT Liqd     30 each    Infuse at 50 ml/hour for 5 hours daily through j-tube Flush j-tube with 30 ml water before and after each infusion of Compleat       DULoxetine 60 " MG EC capsule    CYMBALTA    90 capsule    Take 1 capsule (60 mg) by mouth daily       mirtazapine 15 MG ODT tab    REMERON SOL-TAB    45 tablet    0.5 tablets (7.5 mg) by Orally disintegrating tablet route At Bedtime       nortriptyline 10 MG capsule    PAMELOR    120 capsule    Take 3-4 capsules (30-40 mg) by mouth At Bedtime       ondansetron 4 MG ODT tab    ZOFRAN ODT    30 tablet    Take 1 tablet (4 mg) by mouth every 6 hours as needed for nausea       * rivaroxaban ANTICOAGULANT 15 MG Tabs tablet    XARELTO    60 tablet    Take 1 tablet (15 mg) by mouth 2 times daily (with meals)       * rivaroxaban ANTICOAGULANT 20 MG Tabs tablet    XARELTO    90 tablet    Take 1 tablet (20 mg) by mouth daily (with dinner)       SUMAtriptan 50 MG tablet    IMITREX    18 tablet    Take 1-2 tablets ( mg) by mouth at onset of headache for migraine - may repeat dose after 2h if headache recurs.  Max: 200mg/24 hours       * Notice:  This list has 2 medication(s) that are the same as other medications prescribed for you. Read the directions carefully, and ask your doctor or other care provider to review them with you.

## 2017-01-18 NOTE — PATIENT INSTRUCTIONS
Thank you for coming in to see us today regarding your persistent fevers.      We will draw some blood today to run the following tests:    Orders Placed This Encounter   Procedures     Comprehensive metabolic panel     Standing Status: Future      Number of Occurrences:       Standing Expiration Date: 2/18/2017     CBC with platelets differential     Last Lab Result: HEMOGLOBIN (g/dL)       Date                     Value                 01/05/2017               11.2*            ----------     Standing Status: Future      Number of Occurrences:       Standing Expiration Date: 2/18/2017     CRP inflammation     Standing Status: Future      Number of Occurrences:       Standing Expiration Date: 2/18/2017     Erythrocyte sedimentation rate auto     Standing Status: Future      Number of Occurrences:       Standing Expiration Date: 2/18/2017     Procalcitonin     Standing Status: Future      Number of Occurrences:       Standing Expiration Date: 2/18/2017     Fungal Antibody by Immunodiffusion     Standing Status: Future      Number of Occurrences:       Standing Expiration Date: 2/18/2017     Histoplasma Capsulatum Antigen (Non Blood)     Standing Status: Future      Number of Occurrences:       Standing Expiration Date: 2/18/2017     Blood culture, aerobic     Blood culture, aerobic     Standing Status: Future      Number of Occurrences:       Standing Expiration Date: 2/18/2017     Blood culture, anaerobic     Blood culture, anaerobic     Standing Status: Future      Number of Occurrences:       Standing Expiration Date: 2/18/2017     Blood culture, aerobic     Blood culture, aerobic     Standing Status: Future      Number of Occurrences:       Standing Expiration Date: 2/18/2017     Blood culture, anaerobic     Blood culture, anaerobic     Standing Status: Future      Number of Occurrences:       Standing Expiration Date: 2/18/2017       We have also ordered a CT of the Chest + Abdomen + Pelvis with both oral  and IV contrast.   It is ok to have Benadryl beforehand given your allergy to IV contrast.  I have ordered this and also put it into the CT order along with my pager number if there are issues.    We have also ordered an Echocardiogram to be repeated.    We will call you with any concerning results, and otherwise planning on seeing you back in about 1 month.   (2/15)    Please keep a careful diary of your fevers - when they start, how long they last, if they respond to acetaminophen/ibuprofen, how you feel before and during fevers, any additional symptoms, and anything else you think is important.    Please contact us if you have any interval concerns before our next appointment.    Todd Price MD  Fellow, Infectious Disease    and    Grazyna Ferguson MD  Senior Staff in clinic, Infectious Disease    Please contact me if you have questions or concerns in the interim.

## 2017-01-19 RX ORDER — METHYLPREDNISOLONE 4 MG/1
TABLET ORAL
Qty: 16 TABLET | Refills: 0 | Status: SHIPPED | OUTPATIENT
Start: 2017-01-19 | End: 2017-01-20

## 2017-01-20 ENCOUNTER — HOSPITAL ENCOUNTER (OUTPATIENT)
Dept: CT IMAGING | Facility: CLINIC | Age: 37
Discharge: HOME | End: 2017-01-20
Admitting: INTERNAL MEDICINE
Payer: COMMERCIAL

## 2017-01-20 ENCOUNTER — HOSPITAL ENCOUNTER (OUTPATIENT)
Dept: CARDIOLOGY | Facility: CLINIC | Age: 37
Discharge: HOME | End: 2017-01-20
Admitting: INTERNAL MEDICINE
Payer: COMMERCIAL

## 2017-01-20 DIAGNOSIS — R50.9 FEVER, UNSPECIFIED: ICD-10-CM

## 2017-01-20 LAB
BACTERIA SPEC CULT: NORMAL
BACTERIA SPEC CULT: NORMAL
DEPRECATED CALCIDIOL+CALCIFEROL SERPL-MC: ABNORMAL UG/L (ref 20–75)
MICRO REPORT STATUS: NORMAL
MICRO REPORT STATUS: NORMAL
SPECIMEN SOURCE: NORMAL
SPECIMEN SOURCE: NORMAL
VITAMIN D2 SERPL-MCNC: <5 UG/L
VITAMIN D3 SERPL-MCNC: 14 UG/L

## 2017-01-20 PROCEDURE — 93306 TTE W/DOPPLER COMPLETE: CPT | Mod: 26 | Performed by: INTERNAL MEDICINE

## 2017-01-20 PROCEDURE — 74177 CT ABD & PELVIS W/CONTRAST: CPT

## 2017-01-20 PROCEDURE — 25000125 ZZHC RX 250: Performed by: RADIOLOGY

## 2017-01-20 PROCEDURE — 25500064 ZZH RX 255 OP 636: Performed by: RADIOLOGY

## 2017-01-20 PROCEDURE — 71260 CT THORAX DX C+: CPT

## 2017-01-20 RX ORDER — IOPAMIDOL 755 MG/ML
100 INJECTION, SOLUTION INTRAVASCULAR ONCE
Status: COMPLETED | OUTPATIENT
Start: 2017-01-20 | End: 2017-01-20

## 2017-01-20 RX ADMIN — SODIUM CHLORIDE 60 ML: 9 INJECTION, SOLUTION INTRAVENOUS at 08:24

## 2017-01-20 RX ADMIN — IOPAMIDOL 90 ML: 755 INJECTION, SOLUTION INTRAVENOUS at 08:24

## 2017-01-23 LAB
ASPERGILLUS AB SER QL ID: ABNORMAL
B DERMAT AB SER QL ID: ABNORMAL
COCCIDIOIDES AB SPEC QL ID: ABNORMAL
H CAPSUL AB TITR SER ID: ABNORMAL {TITER}
LAB SCANNED RESULT: NORMAL

## 2017-01-23 NOTE — TELEPHONE ENCOUNTER
Left message for patient to call back pod 2 line.([phone]). Please inform patient of below info. Also needs to repeat in 3 months.

## 2017-01-23 NOTE — TELEPHONE ENCOUNTER
Nahid dose is not covered by insurance. Patient taking 2,000 IU 2x weekly. Wondering if this is okay?

## 2017-01-24 LAB
BACTERIA SPEC CULT: NO GROWTH
BACTERIA SPEC CULT: NO GROWTH
Lab: NORMAL
MICRO REPORT STATUS: NORMAL
MICRO REPORT STATUS: NORMAL
SPECIMEN SOURCE: NORMAL
SPECIMEN SOURCE: NORMAL

## 2017-01-26 ENCOUNTER — APPOINTMENT (OUTPATIENT)
Dept: GENERAL RADIOLOGY | Facility: CLINIC | Age: 37
DRG: 392 | End: 2017-01-26
Attending: FAMILY MEDICINE
Payer: COMMERCIAL

## 2017-01-26 ENCOUNTER — OFFICE VISIT (OUTPATIENT)
Dept: FAMILY MEDICINE | Facility: CLINIC | Age: 37
End: 2017-01-26
Payer: COMMERCIAL

## 2017-01-26 ENCOUNTER — HOSPITAL ENCOUNTER (INPATIENT)
Facility: CLINIC | Age: 37
LOS: 2 days | Discharge: HOME | DRG: 392 | End: 2017-01-28
Attending: FAMILY MEDICINE | Admitting: INTERNAL MEDICINE
Payer: COMMERCIAL

## 2017-01-26 DIAGNOSIS — R10.9 CHRONIC ABDOMINAL PAIN: ICD-10-CM

## 2017-01-26 DIAGNOSIS — Z93.4 JEJUNOSTOMY TUBE PRESENT (H): ICD-10-CM

## 2017-01-26 DIAGNOSIS — G89.29 CHRONIC ABDOMINAL PAIN: ICD-10-CM

## 2017-01-26 DIAGNOSIS — Z93.1 PEG (PERCUTANEOUS ENDOSCOPIC GASTROSTOMY) STATUS (H): Primary | ICD-10-CM

## 2017-01-26 DIAGNOSIS — R10.84 ABDOMINAL PAIN, GENERALIZED: Primary | ICD-10-CM

## 2017-01-26 DIAGNOSIS — R11.10 INTRACTABLE VOMITING, PRESENCE OF NAUSEA NOT SPECIFIED, UNSPECIFIED VOMITING TYPE: ICD-10-CM

## 2017-01-26 DIAGNOSIS — K31.84 GASTROPARESIS: ICD-10-CM

## 2017-01-26 PROBLEM — R65.10 SIRS (SYSTEMIC INFLAMMATORY RESPONSE SYNDROME) (H): Status: ACTIVE | Noted: 2017-01-26

## 2017-01-26 PROBLEM — R11.2 NAUSEA AND VOMITING: Status: ACTIVE | Noted: 2017-01-26

## 2017-01-26 PROBLEM — Z86.718 HISTORY OF DEEP VENOUS THROMBOSIS: Status: ACTIVE | Noted: 2017-01-26

## 2017-01-26 LAB
ALBUMIN SERPL-MCNC: 4.2 G/DL (ref 3.4–5)
ALP SERPL-CCNC: 156 U/L (ref 40–150)
ALT SERPL W P-5'-P-CCNC: 24 U/L (ref 0–50)
ANION GAP SERPL CALCULATED.3IONS-SCNC: 9 MMOL/L (ref 3–14)
AST SERPL W P-5'-P-CCNC: 12 U/L (ref 0–45)
BASOPHILS # BLD AUTO: 0 10E9/L (ref 0–0.2)
BASOPHILS NFR BLD AUTO: 0.1 %
BILIRUB SERPL-MCNC: 0.4 MG/DL (ref 0.2–1.3)
BUN SERPL-MCNC: 13 MG/DL (ref 7–30)
CALCIUM SERPL-MCNC: 9.8 MG/DL (ref 8.5–10.1)
CHLORIDE SERPL-SCNC: 104 MMOL/L (ref 94–109)
CO2 SERPL-SCNC: 26 MMOL/L (ref 20–32)
CREAT SERPL-MCNC: 0.96 MG/DL (ref 0.52–1.04)
DIFFERENTIAL METHOD BLD: ABNORMAL
EOSINOPHIL # BLD AUTO: 0 10E9/L (ref 0–0.7)
EOSINOPHIL NFR BLD AUTO: 0 %
ERYTHROCYTE [DISTWIDTH] IN BLOOD BY AUTOMATED COUNT: 18.8 % (ref 10–15)
GFR SERPL CREATININE-BSD FRML MDRD: 66 ML/MIN/1.7M2
GLUCOSE SERPL-MCNC: 147 MG/DL (ref 70–99)
HCT VFR BLD AUTO: 37.6 % (ref 35–47)
HGB BLD-MCNC: 12.2 G/DL (ref 11.7–15.7)
IMM GRANULOCYTES # BLD: 0.1 10E9/L (ref 0–0.4)
IMM GRANULOCYTES NFR BLD: 0.3 %
LACTATE BLD-SCNC: 1.9 MMOL/L (ref 0.7–2.1)
LYMPHOCYTES # BLD AUTO: 0.9 10E9/L (ref 0.8–5.3)
LYMPHOCYTES NFR BLD AUTO: 5.4 %
MCH RBC QN AUTO: 25.3 PG (ref 26.5–33)
MCHC RBC AUTO-ENTMCNC: 32.4 G/DL (ref 31.5–36.5)
MCV RBC AUTO: 78 FL (ref 78–100)
MONOCYTES # BLD AUTO: 0.2 10E9/L (ref 0–1.3)
MONOCYTES NFR BLD AUTO: 1.1 %
NEUTROPHILS # BLD AUTO: 14.9 10E9/L (ref 1.6–8.3)
NEUTROPHILS NFR BLD AUTO: 93.1 %
PLATELET # BLD AUTO: 584 10E9/L (ref 150–450)
POTASSIUM SERPL-SCNC: 4.2 MMOL/L (ref 3.4–5.3)
PROT SERPL-MCNC: 9.4 G/DL (ref 6.8–8.8)
RBC # BLD AUTO: 4.82 10E12/L (ref 3.8–5.2)
SODIUM SERPL-SCNC: 139 MMOL/L (ref 133–144)
WBC # BLD AUTO: 16 10E9/L (ref 4–11)

## 2017-01-26 PROCEDURE — 25000132 ZZH RX MED GY IP 250 OP 250 PS 637: Performed by: PEDIATRICS

## 2017-01-26 PROCEDURE — 96374 THER/PROPH/DIAG INJ IV PUSH: CPT

## 2017-01-26 PROCEDURE — 25000125 ZZHC RX 250: Performed by: FAMILY MEDICINE

## 2017-01-26 PROCEDURE — 99285 EMERGENCY DEPT VISIT HI MDM: CPT | Performed by: FAMILY MEDICINE

## 2017-01-26 PROCEDURE — 25000132 ZZH RX MED GY IP 250 OP 250 PS 637: Performed by: FAMILY MEDICINE

## 2017-01-26 PROCEDURE — 96376 TX/PRO/DX INJ SAME DRUG ADON: CPT

## 2017-01-26 PROCEDURE — 99222 1ST HOSP IP/OBS MODERATE 55: CPT | Mod: AI | Performed by: INTERNAL MEDICINE

## 2017-01-26 PROCEDURE — 25000128 H RX IP 250 OP 636: Performed by: PEDIATRICS

## 2017-01-26 PROCEDURE — 80053 COMPREHEN METABOLIC PANEL: CPT | Performed by: FAMILY MEDICINE

## 2017-01-26 PROCEDURE — 87086 URINE CULTURE/COLONY COUNT: CPT | Performed by: INTERNAL MEDICINE

## 2017-01-26 PROCEDURE — 99214 OFFICE O/P EST MOD 30 MIN: CPT | Performed by: PHYSICIAN ASSISTANT

## 2017-01-26 PROCEDURE — 25000125 ZZHC RX 250: Performed by: INTERNAL MEDICINE

## 2017-01-26 PROCEDURE — 25000128 H RX IP 250 OP 636: Performed by: FAMILY MEDICINE

## 2017-01-26 PROCEDURE — 99207 ZZC MOONLIGHTING INDICATOR: CPT | Performed by: INTERNAL MEDICINE

## 2017-01-26 PROCEDURE — 99285 EMERGENCY DEPT VISIT HI MDM: CPT | Mod: 25

## 2017-01-26 PROCEDURE — 36415 COLL VENOUS BLD VENIPUNCTURE: CPT | Performed by: FAMILY MEDICINE

## 2017-01-26 PROCEDURE — 84145 PROCALCITONIN (PCT): CPT | Performed by: INTERNAL MEDICINE

## 2017-01-26 PROCEDURE — 12000000 ZZH R&B MED SURG/OB

## 2017-01-26 PROCEDURE — 71010 XR CHEST PORT 1 VW: CPT | Mod: TC

## 2017-01-26 PROCEDURE — 25000125 ZZHC RX 250: Performed by: PEDIATRICS

## 2017-01-26 PROCEDURE — 74000 XR ABDOMEN 1 VW: CPT | Mod: TC

## 2017-01-26 PROCEDURE — 96375 TX/PRO/DX INJ NEW DRUG ADDON: CPT

## 2017-01-26 PROCEDURE — 25000132 ZZH RX MED GY IP 250 OP 250 PS 637: Performed by: INTERNAL MEDICINE

## 2017-01-26 PROCEDURE — 87040 BLOOD CULTURE FOR BACTERIA: CPT | Performed by: FAMILY MEDICINE

## 2017-01-26 PROCEDURE — 83605 ASSAY OF LACTIC ACID: CPT | Performed by: FAMILY MEDICINE

## 2017-01-26 PROCEDURE — 96361 HYDRATE IV INFUSION ADD-ON: CPT

## 2017-01-26 PROCEDURE — 87040 BLOOD CULTURE FOR BACTERIA: CPT | Performed by: INTERNAL MEDICINE

## 2017-01-26 PROCEDURE — 85025 COMPLETE CBC W/AUTO DIFF WBC: CPT | Performed by: FAMILY MEDICINE

## 2017-01-26 PROCEDURE — 25000128 H RX IP 250 OP 636: Performed by: INTERNAL MEDICINE

## 2017-01-26 RX ORDER — ERTAPENEM 1 G/1
1 INJECTION, POWDER, LYOPHILIZED, FOR SOLUTION INTRAMUSCULAR; INTRAVENOUS ONCE
Status: DISCONTINUED | OUTPATIENT
Start: 2017-01-26 | End: 2017-01-26

## 2017-01-26 RX ORDER — ONDANSETRON 2 MG/ML
4 INJECTION INTRAMUSCULAR; INTRAVENOUS EVERY 6 HOURS PRN
Status: DISCONTINUED | OUTPATIENT
Start: 2017-01-26 | End: 2017-01-28 | Stop reason: HOSPADM

## 2017-01-26 RX ORDER — CHOLESTYRAMINE 4 G/9G
1 POWDER, FOR SUSPENSION ORAL 2 TIMES DAILY
Status: DISCONTINUED | OUTPATIENT
Start: 2017-01-26 | End: 2017-01-28 | Stop reason: HOSPADM

## 2017-01-26 RX ORDER — MIRTAZAPINE 7.5 MG/1
7.5 TABLET, FILM COATED ORAL AT BEDTIME
Status: DISCONTINUED | OUTPATIENT
Start: 2017-01-26 | End: 2017-01-28 | Stop reason: HOSPADM

## 2017-01-26 RX ORDER — ONDANSETRON 4 MG/1
4 TABLET, ORALLY DISINTEGRATING ORAL EVERY 6 HOURS PRN
Status: DISCONTINUED | OUTPATIENT
Start: 2017-01-26 | End: 2017-01-28 | Stop reason: HOSPADM

## 2017-01-26 RX ORDER — HYDROMORPHONE HYDROCHLORIDE 1 MG/ML
.3-.5 INJECTION, SOLUTION INTRAMUSCULAR; INTRAVENOUS; SUBCUTANEOUS
Status: DISCONTINUED | OUTPATIENT
Start: 2017-01-26 | End: 2017-01-26

## 2017-01-26 RX ORDER — ERTAPENEM 1 G/1
1 INJECTION, POWDER, LYOPHILIZED, FOR SOLUTION INTRAMUSCULAR; INTRAVENOUS EVERY 24 HOURS
Status: DISCONTINUED | OUTPATIENT
Start: 2017-01-26 | End: 2017-01-28 | Stop reason: HOSPADM

## 2017-01-26 RX ORDER — MIRTAZAPINE 15 MG/1
7.5 TABLET, ORALLY DISINTEGRATING ORAL AT BEDTIME
Status: DISCONTINUED | OUTPATIENT
Start: 2017-01-26 | End: 2017-01-26

## 2017-01-26 RX ORDER — NUTRIT SUPP/INULIN/FOS/FIBER 0.05G-1.06
50 LIQUID (ML) ORAL DAILY
Status: DISCONTINUED | OUTPATIENT
Start: 2017-01-26 | End: 2017-01-26 | Stop reason: RX

## 2017-01-26 RX ORDER — ONDANSETRON 4 MG/1
4 TABLET, FILM COATED ORAL EVERY 6 HOURS PRN
Status: DISCONTINUED | OUTPATIENT
Start: 2017-01-26 | End: 2017-01-26

## 2017-01-26 RX ORDER — NALOXONE HYDROCHLORIDE 0.4 MG/ML
.1-.4 INJECTION, SOLUTION INTRAMUSCULAR; INTRAVENOUS; SUBCUTANEOUS
Status: DISCONTINUED | OUTPATIENT
Start: 2017-01-26 | End: 2017-01-28 | Stop reason: HOSPADM

## 2017-01-26 RX ORDER — CLONIDINE HYDROCHLORIDE 0.1 MG/1
0.4 TABLET ORAL EVERY EVENING
Status: DISCONTINUED | OUTPATIENT
Start: 2017-01-26 | End: 2017-01-28 | Stop reason: HOSPADM

## 2017-01-26 RX ORDER — ACETAMINOPHEN 325 MG/1
650 TABLET ORAL EVERY 4 HOURS PRN
Status: DISCONTINUED | OUTPATIENT
Start: 2017-01-26 | End: 2017-01-28 | Stop reason: HOSPADM

## 2017-01-26 RX ORDER — LIDOCAINE 40 MG/G
CREAM TOPICAL
Status: DISCONTINUED | OUTPATIENT
Start: 2017-01-26 | End: 2017-01-26

## 2017-01-26 RX ORDER — SODIUM CHLORIDE 9 MG/ML
INJECTION, SOLUTION INTRAVENOUS CONTINUOUS
Status: DISCONTINUED | OUTPATIENT
Start: 2017-01-26 | End: 2017-01-28 | Stop reason: HOSPADM

## 2017-01-26 RX ORDER — CHOLESTYRAMINE LIGHT 4 G/5.7G
4 POWDER, FOR SUSPENSION ORAL 2 TIMES DAILY
Status: DISCONTINUED | OUTPATIENT
Start: 2017-01-26 | End: 2017-01-26

## 2017-01-26 RX ORDER — SODIUM CHLORIDE 9 MG/ML
1000 INJECTION, SOLUTION INTRAVENOUS CONTINUOUS
Status: DISCONTINUED | OUTPATIENT
Start: 2017-01-26 | End: 2017-01-26

## 2017-01-26 RX ORDER — NORTRIPTYLINE HCL 10 MG
30-40 CAPSULE ORAL AT BEDTIME
Status: DISCONTINUED | OUTPATIENT
Start: 2017-01-26 | End: 2017-01-28 | Stop reason: HOSPADM

## 2017-01-26 RX ORDER — ALBUTEROL SULFATE 0.83 MG/ML
2.5 SOLUTION RESPIRATORY (INHALATION)
Status: DISCONTINUED | OUTPATIENT
Start: 2017-01-26 | End: 2017-01-26 | Stop reason: CLARIF

## 2017-01-26 RX ORDER — ONDANSETRON 2 MG/ML
4 INJECTION INTRAMUSCULAR; INTRAVENOUS EVERY 30 MIN PRN
Status: DISCONTINUED | OUTPATIENT
Start: 2017-01-26 | End: 2017-01-26

## 2017-01-26 RX ORDER — DULOXETIN HYDROCHLORIDE 30 MG/1
60 CAPSULE, DELAYED RELEASE ORAL DAILY
Status: DISCONTINUED | OUTPATIENT
Start: 2017-01-26 | End: 2017-01-28 | Stop reason: HOSPADM

## 2017-01-26 RX ORDER — NALOXONE HYDROCHLORIDE 0.4 MG/ML
.1-.4 INJECTION, SOLUTION INTRAMUSCULAR; INTRAVENOUS; SUBCUTANEOUS
Status: DISCONTINUED | OUTPATIENT
Start: 2017-01-26 | End: 2017-01-26

## 2017-01-26 RX ORDER — SUMATRIPTAN 25 MG/1
50-100 TABLET, FILM COATED ORAL
Status: DISCONTINUED | OUTPATIENT
Start: 2017-01-26 | End: 2017-01-28 | Stop reason: HOSPADM

## 2017-01-26 RX ADMIN — SODIUM CHLORIDE 1000 ML: 9 INJECTION, SOLUTION INTRAVENOUS at 15:39

## 2017-01-26 RX ADMIN — NORTRIPTYLINE HYDROCHLORIDE 40 MG: 10 CAPSULE ORAL at 21:45

## 2017-01-26 RX ADMIN — CLONIDINE HYDROCHLORIDE 0.4 MG: 0.1 TABLET ORAL at 20:47

## 2017-01-26 RX ADMIN — MIRTAZAPINE 7.5 MG: 7.5 TABLET, FILM COATED ORAL at 21:45

## 2017-01-26 RX ADMIN — Medication 1 MG: at 22:28

## 2017-01-26 RX ADMIN — VANCOMYCIN HYDROCHLORIDE 1500 MG: 10 INJECTION, POWDER, LYOPHILIZED, FOR SOLUTION INTRAVENOUS at 21:46

## 2017-01-26 RX ADMIN — HYDROMORPHONE HYDROCHLORIDE 1 MG: 2 TABLET ORAL at 18:56

## 2017-01-26 RX ADMIN — HYDROMORPHONE HYDROCHLORIDE 1 MG: 1 INJECTION, SOLUTION INTRAMUSCULAR; INTRAVENOUS; SUBCUTANEOUS at 15:34

## 2017-01-26 RX ADMIN — RIVAROXABAN 20 MG: 10 TABLET, FILM COATED ORAL at 20:47

## 2017-01-26 RX ADMIN — ERTAPENEM SODIUM 1 G: 1 INJECTION, POWDER, LYOPHILIZED, FOR SOLUTION INTRAMUSCULAR; INTRAVENOUS at 21:43

## 2017-01-26 RX ADMIN — ONDANSETRON HYDROCHLORIDE 4 MG: 2 SOLUTION INTRAMUSCULAR; INTRAVENOUS at 15:34

## 2017-01-26 RX ADMIN — DULOXETINE HYDROCHLORIDE 60 MG: 30 CAPSULE, DELAYED RELEASE ORAL at 20:47

## 2017-01-26 RX ADMIN — HYDROMORPHONE HYDROCHLORIDE 1 MG: 1 INJECTION, SOLUTION INTRAMUSCULAR; INTRAVENOUS; SUBCUTANEOUS at 18:08

## 2017-01-26 RX ADMIN — SODIUM CHLORIDE: 9 INJECTION, SOLUTION INTRAVENOUS at 20:46

## 2017-01-26 RX ADMIN — HYDROMORPHONE HYDROCHLORIDE 1 MG: 1 INJECTION, SOLUTION INTRAMUSCULAR; INTRAVENOUS; SUBCUTANEOUS at 16:54

## 2017-01-26 ASSESSMENT — ACTIVITIES OF DAILY LIVING (ADL)
RETIRED_EATING: 0-->INDEPENDENT
EATING: 0-->INDEPENDENT
TRANSFERRING: 0-->INDEPENDENT
FALL_HISTORY_WITHIN_LAST_SIX_MONTHS: NO
RETIRED_COMMUNICATION: 0-->UNDERSTANDS/COMMUNICATES WITHOUT DIFFICULTY
AMBULATION: 0-->INDEPENDENT
BATHING: 0-->INDEPENDENT
CHANGE_IN_FUNCTIONAL_STATUS_SINCE_ONSET_OF_CURRENT_ILLNESS/INJURY: NO
TOILETING: 0-->INDEPENDENT
AMBULATION: 0-->INDEPENDENT
SWALLOWING: 0-->SWALLOWS FOODS/LIQUIDS WITHOUT DIFFICULTY
BATHING: 0-->INDEPENDENT
DRESS: 0-->INDEPENDENT
TOILETING: 0-->INDEPENDENT
SWALLOWING: 0-->SWALLOWS FOODS/LIQUIDS WITHOUT DIFFICULTY
COMMUNICATION: 0-->UNDERSTANDS/COMMUNICATES WITHOUT DIFFICULTY
COGNITION: 0 - NO COGNITION ISSUES REPORTED
TRANSFERRING: 0-->INDEPENDENT
DRESS: 0-->INDEPENDENT

## 2017-01-26 ASSESSMENT — ENCOUNTER SYMPTOMS: ABDOMINAL PAIN: 1

## 2017-01-26 NOTE — PROGRESS NOTES
SUBJECTIVE:                                                    Doreen Peralta is a 36 year old female who presents to clinic today for the following health issues:    Abdominal pain and bloating  Started Tuesday following daughters gymnastics meet - helped moved some equipment   Getting worse since Tuesday   Bloating and pain - constant   Scale 1-10 patient is rating pain at an 8   Nothing relieves pain   Different pain than she's ever had      Problem list and histories reviewed & adjusted, as indicated.  Additional history: as documented    Patient Active Problem List   Diagnosis     Dehydration     Constipation by delayed colonic transit     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     Unspecified intestinal obstruction (H)     Hx SBO     S/P LEEP of cervix     Gastroparesis     Migraines     Intermittent asthma     Allergic rhinitis     Abnormal Pap smear of cervix     PEG (percutaneous endoscopic gastrostomy) status     Health Care Home     PEG tube malfunction (H)     Malfunction of gastrostomy tube (H)     Malfunctioning jejunostomy tube (H)     Jejunostomy tube fell out     Jejunostomy tube present (H)     S/P partial resection of colon     Malnutrition (H)     Long-term (current) use of anticoagulants [Z79.01]     Anxiety     Sepsis due to Klebsiella (H)     Anemia in other chronic diseases classified elsewhere     Munchausen syndrome - previously suspected     Gram-negative bacteremia     Sepsis (H)     Anemia, iron deficiency     Mitral regurgitation mild-mod by Echo June 2016     Candidemia (H)     Atopic rhinitis     Duodenitis     Migraine     Patellofemoral stress syndrome     Hyperbilirubinemia     Recurrent polymicrobial bacteremia assiciated with IV access     Right brachail/axillary dvt     Chronic diarrhea     Intractable vomiting     Fever, unspecified     Deep vein thrombosis (DVT) (H) [I82.409]     Coagulation defect (H) [D68.9]     Nausea & vomiting     Fever     Dislodged gastrostomy tube (H)      Attention to G-tube (H)     Chronic abdominal pain     Vitamin D deficiency     Past [facility] History   Procedure Laterality Date     Laparoscopic oophorectomy Right 2009     [facility]     Laparoscopic cholecystectomy  2002     [facility] stones duct     Esophagoscopy, gastroscopy, duodenoscopy (egd), combined  7/10/2012     Procedure: COMBINED ESOPHAGOSCOPY, GASTROSCOPY, DUODENOSCOPY (EGD);  Upper Endoscopy, Ileoscopy    Latex Allergy  with biopsies;  Surgeon: Nicole Redding MD;  [facility]: [facility]     Colonoscopy  7/10/2012     Procedure: COLONOSCOPY;;  Surgeon: Nicole Redding MD;  Location: [facility]     Leep tx, cervical  2009     [facility]     Laparoscopic ileostomy  1/20/2012     [facility], loop     Esophagoscopy, gastroscopy, duodenoscopy (egd), combined N/A 11/5/2014     Procedure: COMBINED ESOPHAGOSCOPY, GASTROSCOPY, DUODENOSCOPY (EGD);  Surgeon: Nicole Redidng MD;  Location: [facility]     Laparoscopic assisted colectomy  1/20/2012     Procedure:LAPAROSCOPIC ASSISTED COLECTOMY; Laparoscopic Ileostomy       Laparoscopic assisted colectomy left (descending)  10/24/2012     Procedure: LAPAROSCOPIC ASSISTED COLECTOMY LEFT (DESCENDING);   Hand Assisted Laproscopic Subtotal abdominal Colectomy,Iieorectal anastamosis, Ileostomy Closure.       Remove gastrostomy tube adult N/A 12/12/2014     Procedure: REMOVE GASTROSTOMY TUBE ADULT;  Surgeon: Nicole Redding MD;  [facility]: [facility]     Replace gastrostomy tube adult  5/19/15     Hc replace gastrostomy/cecostomy tube percutaneous Left 5/19/2015     Procedure: REPLACE GASTROSTOMY TUBE, PERCUTANEOUS;  Surgeon: Melecio Morejon Chi, MD;  Location: [facility]     Hc replace duodenostomy/jejunostomy tube percutaneous N/A 8/27/2015     Procedure: REPLACE GASTROJEJUNOSTOMY TUBE, PERCUTANEOUS;  Surgeon: Mio Holder MD;  Location[facility]     Hc [facility] endoscopy w placement gastrostomy tube percut N/A 10/1/2015     Procedure: COMBINED ESOPHAGOSCOPY,  GASTROSCOPY, DUODENOSCOPY (EGD), PLACE PERCUTANEOUS ENDOSCOPIC GASTROSTOMY TUBE;  Surgeon: Mio Holder MD;  Location: [facility]     Laparoscopic assisted insertion tube jejunostomy N/A 10/16/2015     Procedure: LAPAROSCOPIC ASSISTED INSERTION TUBE JEJUNOSTOMY;  Surgeon: Elsa Medel MD;  Location: [facility]     Picc insertion Left 10/21/2015     5fr DL Power PICC, 37cm (2cm external) in the L basilic vein w/ tip in the SVC RA junction.     Hc replace duodenostomy/jejunostomy tube percutaneous N/A 1/7/2016     Procedure: REPLACE JEJUNOSTOMY TUBE, PERCUTANEOUS;  Surgeon: Elsa Medel MD;  [facility]     Endoscopic insertion tube gastrostomy N/A 1/21/2016     Procedure: ENDOSCOPIC INSERTION TUBE GASTROSTOMY;  Surgeon: Nicole Redding MD;  [facility]     Hc replace duodenostomy/jejunostomy tube percutaneous N/A 1/28/2016     Procedure: REPLACE JEJUNOSTOMY TUBE, PERCUTANEOUS;  Surgeon: Elsa Medel MD;  [facility]     Laparotomy exploratory N/A 1/28/2016     Procedure: LAPAROTOMY EXPLORATORY;  Surgeon: Elsa Medel MD;  [facility]     Remove port vascular access Right 6/30/2016     Procedure: REMOVE PORT VASCULAR ACCESS;  Surgeon: Pradeep Orosco MD;  Location[facility]     Echo adal  7/19/2016            N/A 7/20/2016     Procedure: ANESTHESIA OUT OF OR;  Surgeon: GENERIC ANESTHESIA PROVIDER;  Location: [facility]       Social History   Substance Use Topics     Smoking status: Former Smoker -- 1.00 packs/day for 4 years     Types: Cigarettes     Quit date: 01/01/2004     Smokeless tobacco: Former User     Alcohol Use: No     Family History   Problem Relation Age of Onset     Thyroid Disease Mother      Sjogren's Mother      GASTROINTESTINAL DISEASE Mother      Intermittent nausea vomiting diarrhea     Colon Polyps Mother      Lupus Maternal Grandmother      CANCER Maternal Grandfather      Lung     Colon Cancer Maternal Grandfather 65     CANCER Paternal Grandmother       Lung      CEREBROVASCULAR DISEASE Paternal Grandmother      DIABETES Paternal Grandmother      Cardiovascular Paternal Grandmother      CHF     CANCER Paternal Grandfather      Lung     Glaucoma Paternal Grandfather      Prostate Problems Father      prostate enlargement     Abdominal Aortic Aneurysm Other      Macular Degeneration No family hx of            ROS:  Constitutional, musculoskeletal, gi systems are negative, except as otherwise noted.    OBJECTIVE:                                                    There were no vitals taken for this visit.  There is no weight on file to calculate BMI.  GENERAL APPEARANCE: healthy, alert and mild distress  MS: extremities normal- no gross deformities noted  NEURO: Normal strength and tone  PSYCH: mentation appears normal and tearful       ASSESSMENT:                                                      1. PEG (percutaneous endoscopic gastrostomy) status    2. Chronic abdominal pain         PLAN:                                                    Attempted to get a hold of Interventional radiology for ~40 minutes. Once I finally was able to discuss the patient's case with someone they recommended eval in the ER. This was discussed with the patient - there isn't much I can do for her in family medicine. She needs to be evaluated for a dislodged tube or hematoma, etc. Patient instructed to get to the ER as soon as she can.    The patient was in agreement with the plan today and had no questions or concerns prior to leaving the clinic.     Larisa Lorenzo PA-C  [facility]

## 2017-01-26 NOTE — ED NOTES
Doreen Peralta is a 36 year old female who was seen in clinic today for the following health issues:    Abdominal pain and bloating  Started Tuesday following daughters gymnastics meet - helped moved some objects    Getting worse since Tuesday    Bloating and pain - constant    Scale 1-10 patient is rating pain at an 8    Nothing relieves pain    Different pain than she's ever had    Sent to ED from the clinic.   Pt has a G tube and a J tube.

## 2017-01-26 NOTE — IP AVS SNAPSHOT
MRN:[id number]                      After Visit Summary   1/26/2017    Doreen Peralta    MRN: [id number]           Thank you!     Thank you for choosing [facility] for your care. Our goal is always to provide you with excellent care. Hearing back from our patients is one way we can continue to improve our services. Please take a few minutes to complete the written survey that you may receive in the mail after you visit with us. Thank you!        Patient Information     Date Of Birth          1980        About your hospital stay     You were admitted on:  January 26, 2017 You last received care in the:  [address] [facility]    You were discharged on:  January 28, 2017        Reason for your hospital stay       Hospitalized for abdominal pain with vomiting and improved                  Who to Call     For medical emergencies, please call 911.  For non-urgent questions about your medical care, please call your primary care provider or clinic, [phone]          Attending Provider     Provider    Jake Warren MD Katter, James T, MD       Primary Care Provider Office Phone # Fax #    Larisa Lorenzo PA-C [phone]       [facility] [place] [address]  [place] [address]        After Care Instructions     Activity       Your activity upon discharge: no driving while on narcotic analgesics (hydromorphone)            Diet       Follow this diet upon discharge: Advance to your usual diet as tolerated, use home enteral tube feeds (Complete) via your j-tube according to your usual home routine            Tubes and drains       You are going home with the following tubes or drains: feeding tube J-Tube.   Tube cares per hospital or home care instructions                  Follow-up Appointments     Follow-up and recommended labs and tests        Follow up with primary care provider, Larisa Lorenzo, within 2 weeks, for hospital follow- up.       "            Your next 10 appointments already scheduled     Feb 06, 2017  9:00 AM   Office Visit with Larisa Lorenzo PA-C   [facility]    [address] [address] [phone]           Bring a current list of meds and any records pertaining to this visit.  For Physicals, please bring immunization records and any forms needing to be filled out.  Please arrive 10 minutes early to complete paperwork.            Feb 15, 2017 12:30 PM   (Arrive by 12:15 PM)   Return Visit with Todd Price MD   [facility] ([facility] [place])    [address]  3rd [address]  [place] [facility] [address] [phone]            Mar 27, 2017  9:00 AM   (Arrive by 8:45 AM)   Return Visit with Yuri Wahl MD   [facility] ([facility])    [address]  [address]  [place] [facility] [address] [phone]              Pending Results     Date and Time Order Name Status Description    1/26/2017 2002 Blood culture Preliminary     1/26/2017 1810 Blood culture ONE site Preliminary             Statement of Approval     Ordered          01/28/17 0846  I have reviewed and agree with all the recommendations and orders detailed in this document.   EFFECTIVE NOW     Approved and electronically signed by:  Gilmer Lazcano MD             Admission Information        Provider Department Dept Phone    1/26/2017 Gilmer Lazcano MD [facility] [phone]      Your Vitals Were     Blood Pressure Pulse Temperature Respirations    128/73 mmHg 76 97.6  F (36.4  C) (Oral) 18    Height Weight BMI (Body Mass Index) Pulse Oximetry    1.676 m (5' 6\") 86 kg (189 lb 9.5 oz) 30.62 kg/m2 100%    Last Period             01/19/2017         MyChart Information     [facility] gives you secure access to your electronic health record. If you see a primary care provider, you can also " send messages to your care team and make appointments. If you have questions, please call your primary care clinic.  If you do not have a primary care provider, please call [phone] and they will assist you.        Care EveryWhere ID     This is your Care EveryWhere ID. This could be used by other organizations to access your [facility] medical records  [id number]           Review of your medicines      START taking        Dose / Directions    HYDROmorphone 2 MG tablet   Commonly known as:  DILAUDID   Used for:  Abdominal pain, generalized        Dose:  2 mg   Take 1 tablet (2 mg) by mouth every 3 hours as needed for moderate to severe pain   Quantity:  60 tablet   Refills:  0         CONTINUE these medicines which may have CHANGED, or have new prescriptions. If we are uncertain of the size of tablets/capsules you have at home, strength may be listed as something that might have changed.        Dose / Directions    ondansetron 4 MG ODT tab   Commonly known as:  ZOFRAN ODT   This may have changed:  how much to take   Used for:  Intractable vomiting, presence of nausea not specified, unspecified vomiting type, Gastroparesis        Dose:  4-8 mg   Take 1-2 tablets (4-8 mg) by mouth every 6 hours as needed for nausea   Quantity:  40 tablet   Refills:  0         CONTINUE these medicines which have NOT CHANGED        Dose / Directions    ACETAMINOPHEN PO        Dose:  500-1000 mg   Take 500-1,000 mg by mouth every 6 hours as needed for pain   Refills:  0       albuterol 90 MCG/ACT inhaler        Dose:  2 puff   Inhale 2 puffs into the lungs every 6 hours as needed   Refills:  0       cloNIDine 0.2 MG tablet   Commonly known as:  CATAPRES   Used for:  Anxiety        Dose:  0.4 mg   Take 2 tablets (0.4 mg) by mouth every evening   Quantity:  60 tablet   Refills:  5       COMPLEAT Liqd   Used for:  Non-intractable vomiting with nausea, vomiting of unspecified type, Jejunostomy tube present (H), Malnutrition (H)         Infuse at 50 ml/hour for 5 hours daily through j-tube Flush j-tube with 30 ml water before and after each infusion of Compleat   Quantity:  30 each   Refills:  0       DULoxetine 60 MG EC capsule   Commonly known as:  CYMBALTA   Used for:  Abdominal pain, epigastric, Abdominal migraine, not intractable        Dose:  60 mg   Take 1 capsule (60 mg) by mouth daily   Quantity:  90 capsule   Refills:  3       mirtazapine 15 MG ODT tab   Commonly known as:  REMERON SOL-TAB   Used for:  Anxiety        Dose:  7.5 mg   0.5 tablets (7.5 mg) by Orally disintegrating tablet route At Bedtime   Quantity:  45 tablet   Refills:  0       nortriptyline 10 MG capsule   Commonly known as:  PAMELOR   Used for:  Neuropathic pain, Primary insomnia        Dose:  30-40 mg   Take 3-4 capsules (30-40 mg) by mouth At Bedtime   Quantity:  120 capsule   Refills:  3       rivaroxaban ANTICOAGULANT 20 MG Tabs tablet   Commonly known as:  XARELTO   Used for:  Deep vein thrombosis (DVT) of upper extremity, unspecified chronicity, unspecified laterality, unspecified vein (H)        Dose:  20 mg   Take 1 tablet (20 mg) by mouth daily (with dinner)   Quantity:  90 tablet   Refills:  1       SUMAtriptan 50 MG tablet   Commonly known as:  IMITREX   Used for:  Migraine without aura and without status migrainosus, not intractable        Dose:   mg   Take 1-2 tablets ( mg) by mouth at onset of headache for migraine - may repeat dose after 2h if headache recurs.  Max: 200mg/24 hours   Quantity:  18 tablet   Refills:  1            Where to get your medicines      Some of these will need a paper prescription and others can be bought over the counter. Ask your nurse if you have questions.     Bring a paper prescription for each of these medications    - HYDROmorphone 2 MG tablet  - ondansetron 4 MG ODT tab             Protect others around you: Learn how to safely use, store and throw away your medicines at www.disposemymeds.org.              Medication List: This is a list of all your medications and when to take them. Check marks below indicate your daily home schedule. Keep this list as a reference.      Medications           Morning Afternoon Evening Bedtime As Needed    ACETAMINOPHEN PO   Take 500-1,000 mg by mouth every 6 hours as needed for pain                                albuterol 90 MCG/ACT inhaler   Inhale 2 puffs into the lungs every 6 hours as needed                                cloNIDine 0.2 MG tablet   Commonly known as:  CATAPRES   Take 2 tablets (0.4 mg) by mouth every evening   Last time this was given:  0.4 mg on 1/27/2017  7:00 PM                                COMPLEAT Liqd   Infuse at 50 ml/hour for 5 hours daily through j-tube Flush j-tube with 30 ml water before and after each infusion of Compleat                                DULoxetine 60 MG EC capsule   Commonly known as:  CYMBALTA   Take 1 capsule (60 mg) by mouth daily   Last time this was given:  60 mg on 1/27/2017  9:59 AM                                HYDROmorphone 2 MG tablet   Commonly known as:  DILAUDID   Take 1 tablet (2 mg) by mouth every 3 hours as needed for moderate to severe pain   Last time this was given:  2 mg on 1/28/2017  7:31 AM                                mirtazapine 15 MG ODT tab   Commonly known as:  REMERON SOL-TAB   0.5 tablets (7.5 mg) by Orally disintegrating tablet route At Bedtime                                nortriptyline 10 MG capsule   Commonly known as:  PAMELOR   Take 3-4 capsules (30-40 mg) by mouth At Bedtime   Last time this was given:  40 mg on 1/27/2017  9:28 PM                                ondansetron 4 MG ODT tab   Commonly known as:  ZOFRAN ODT   Take 1-2 tablets (4-8 mg) by mouth every 6 hours as needed for nausea   Last time this was given:  4 mg on 1/28/2017  5:50 AM                                rivaroxaban ANTICOAGULANT 20 MG Tabs tablet   Commonly known as:  XARELTO   Take 1 tablet (20 mg) by mouth daily (with  dinner)   Last time this was given:  20 mg on 1/27/2017  5:34 PM                                SUMAtriptan 50 MG tablet   Commonly known as:  IMITREX   Take 1-2 tablets ( mg) by mouth at onset of headache for migraine - may repeat dose after 2h if headache recurs.  Max: 200mg/24 hours

## 2017-01-26 NOTE — ED PROVIDER NOTES
"  History     Chief Complaint   Patient presents with     Abdominal Pain     The history is provided by the patient and a parent.     Doreen Peralta is a 36 year old female with a history of chronic abdominal pain who presents to the emergency department with abdominal pain. Patient has a G tube and J tube in place. She states that on Tuesday her abdominal pain worsened. She tried flushing out her J tube yesterday but states this caused significant pain in her left side abdomen. Patient reports it feels as if the bottom of her J tube is \"poking wrong\" and causing this pain. She says the G tube feels normal. Patient has tried Tylenol without relief. She reports that she usually receives Dilaudid because she is allergic to every other pain medication. Patient recently had a CT scan last Friday (1/20) because she was having fevers and her mother states it was for infectious disease and not abdominal pain.    She was seen in the [facility] clinic today for this abdominal pain:  Abdominal pain and bloating  Started Tuesday following daughters gymnastics meet - helped moved some equipment    Getting worse since Tuesday    Bloating and pain - constant    Scale 1-10 patient is rating pain at an 8    Nothing relieves pain    Different pain than she's ever had    I have reviewed the Medications, Allergies, Past Medical and [facility] History, and Social History in the Epic system.    Patient Active Problem List   Diagnosis     Dehydration     Constipation by delayed colonic transit     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     Unspecified intestinal obstruction (H)     Hx SBO     S/P LEEP of cervix     Gastroparesis     Migraines     Intermittent asthma     Allergic rhinitis     Abnormal Pap smear of cervix     PEG (percutaneous endoscopic gastrostomy) status     Health Care Home     PEG tube malfunction (H)     Malfunction of gastrostomy tube (H)     Malfunctioning jejunostomy tube (H)     Jejunostomy tube fell out     " Jejunostomy tube present (H)     S/P partial resection of colon     Malnutrition (H)     Long-term (current) use of anticoagulants [Z79.01]     Anxiety     Sepsis due to Klebsiella (H)     Anemia in other chronic diseases classified elsewhere     Munchausen syndrome - previously suspected     Gram-negative bacteremia     Sepsis (H)     Anemia, iron deficiency     Mitral regurgitation mild-mod by Echo June 2016     Candidemia (H)     Atopic rhinitis     Duodenitis     Migraine     Patellofemoral stress syndrome     Hyperbilirubinemia     Recurrent polymicrobial bacteremia assiciated with IV access     Right brachail/axillary dvt     Chronic diarrhea     Intractable vomiting     Fever, unspecified     Deep vein thrombosis (DVT) (H) [I82.409]     Coagulation defect (H) [D68.9]     Nausea & vomiting     Fever     Dislodged gastrostomy tube (H)     Attention to G-tube (H)     Chronic abdominal pain     Vitamin D deficiency     Past Medical History   Diagnosis Date     PONV (postoperative nausea and vomiting)      Asthma      Constipation      chronic     Enteritis      Chronic pain      Hx of abnormal Pap smear      s/p LEEP - no further details provided     Colonic dysmotility      s/p subtotal colectomy     Thrombosis, hepatic vein (H)      microvascular     Bilateral ovarian cysts      Gastro-oesophageal reflux disease      Other chronic pain      Cervical cancer (H) 01/01/2008     cervical cancer      H/O ileostomy      IBS (irritable bowel syndrome)      Fungemia 5/5/2016     E. coli sepsis (H) 5/8/2016     Hypertension        Past [facility] History   Procedure Laterality Date     Laparoscopic oophorectomy Right 2009     [facility]     Laparoscopic cholecystectomy  2002     [facility] stones duct     Esophagoscopy, gastroscopy, duodenoscopy (egd), combined  7/10/2012     Procedure: COMBINED ESOPHAGOSCOPY, GASTROSCOPY, DUODENOSCOPY (EGD);  Upper Endoscopy, Ileoscopy    Latex Allergy  with biopsies;  Surgeon:  Nicole Redding MD;  [facility]: [facility]     Colonoscopy  7/10/2012     Procedure: COLONOSCOPY;;  Surgeon: Nicole Redding MD;  Location: [facility]     Leep tx, cervical  2009     [facility]     Laparoscopic ileostomy  1/20/2012     [facility], loop     Esophagoscopy, gastroscopy, duodenoscopy (egd), combined N/A 11/5/2014     Procedure: COMBINED ESOPHAGOSCOPY, GASTROSCOPY, DUODENOSCOPY (EGD);  Surgeon: Nicole Redding MD;  [facility]     Laparoscopic assisted colectomy  1/20/2012     Procedure:LAPAROSCOPIC ASSISTED COLECTOMY; Laparoscopic Ileostomy       Laparoscopic assisted colectomy left (descending)  10/24/2012     Procedure: LAPAROSCOPIC ASSISTED COLECTOMY LEFT (DESCENDING);   Hand Assisted Laproscopic Subtotal abdominal Colectomy,Iieorectal anastamosis, Ileostomy Closure.       Remove gastrostomy tube adult N/A 12/12/2014     Procedure: REMOVE GASTROSTOMY TUBE ADULT;  Surgeon: Nicole Redding MD;  [facility]     Replace gastrostomy tube adult  5/19/15     Hc replace gastrostomy/cecostomy tube percutaneous Left 5/19/2015     Procedure: REPLACE GASTROSTOMY TUBE, PERCUTANEOUS;  Surgeon: Melecio Morejon Chi, MD;  Location: [facility]     Hc replace duodenostomy/jejunostomy tube percutaneous N/A 8/27/2015     Procedure: REPLACE GASTROJEJUNOSTOMY TUBE, PERCUTANEOUS;  Surgeon: Mio Holder MD;  [facility]     Hc [facility] endoscopy w placement gastrostomy tube percut N/A 10/1/2015     Procedure: COMBINED ESOPHAGOSCOPY, GASTROSCOPY, DUODENOSCOPY (EGD), PLACE PERCUTANEOUS ENDOSCOPIC GASTROSTOMY TUBE;  Surgeon: Mio Holder MD;  [facility]     Laparoscopic assisted insertion tube jejunostomy N/A 10/16/2015     Procedure: LAPAROSCOPIC ASSISTED INSERTION TUBE JEJUNOSTOMY;  Surgeon: Elsa Medel MD;  [facility]     Picc insertion Left 10/21/2015     5fr DL Power PICC, 37cm (2cm external) in the L basilic vein w/ tip in the SVC RA junction.     Hc replace  duodenostomy/jejunostomy tube percutaneous N/A 1/7/2016     Procedure: REPLACE JEJUNOSTOMY TUBE, PERCUTANEOUS;  Surgeon: Elsa Medel MD;  [facility]     Endoscopic insertion tube gastrostomy N/A 1/21/2016     Procedure: ENDOSCOPIC INSERTION TUBE GASTROSTOMY;  Surgeon: Nicole Redding MD;  [facility]     Hc replace duodenostomy/jejunostomy tube percutaneous N/A 1/28/2016     Procedure: REPLACE JEJUNOSTOMY TUBE, PERCUTANEOUS;  Surgeon: Elsa Medel MD;  [facility]     Laparotomy exploratory N/A 1/28/2016     Procedure: LAPAROTOMY EXPLORATORY;  Surgeon: Elsa Medel MD;  [facility]     Remove port vascular access Right 6/30/2016     Procedure: REMOVE PORT VASCULAR ACCESS;  Surgeon: Pradeep Orosco MD;  [facility]     Echo adal  7/19/2016            N/A 7/20/2016     Procedure: ANESTHESIA OUT OF OR;  Surgeon: GENERIC ANESTHESIA PROVIDER;  Location: [facility]       Family History   Problem Relation Age of Onset     Thyroid Disease Mother      Sjogren's Mother      GASTROINTESTINAL DISEASE Mother      Intermittent nausea vomiting diarrhea     Colon Polyps Mother      Lupus Maternal Grandmother      CANCER Maternal Grandfather      Lung     Colon Cancer Maternal Grandfather 65     CANCER Paternal Grandmother      Lung      CEREBROVASCULAR DISEASE Paternal Grandmother      DIABETES Paternal Grandmother      Cardiovascular Paternal Grandmother      CHF     CANCER Paternal Grandfather      Lung     Glaucoma Paternal Grandfather      Prostate Problems Father      prostate enlargement     Abdominal Aortic Aneurysm Other      Macular Degeneration No family hx of        Social History   Substance Use Topics     Smoking status: Former Smoker -- 1.00 packs/day for 4 years     Types: Cigarettes     Quit date: 01/01/2004     Smokeless tobacco: Former User     Alcohol Use: No        Immunization History   Administered Date(s) Administered     Influenza (IIV3) 10/01/2009      Pneumococcal 23 valent 07/18/2014          Allergies   Allergen Reactions     Hyoscyamine Rash     Metoclopramide Other (See Comments)     Eye twitching.      Peaches [Peach] Other (See Comments)     Raw. Cooked OK     Sucralose Other (See Comments)     All artificial sweeteners. Aspartame also. Swollen glands     Advair Diskus Other (See Comments)     Throat burns     Azithromycin Other (See Comments)     Burning in throat     Compazine [Prochlorperazine] Visual Disturbance     Contrast Dye Itching     States is allergic to CT contrast dye     Cyclobenzaprine Visual Disturbance     Fentanyl Other (See Comments)     migraine     Ibuprofen GI Disturbance     Lactulose Nausea and Vomiting     Gas and bloating     Levaquin [Levofloxacin] Swelling     Per ED M.D. And RN      Morphine Sulfate Other (See Comments)     Chest pain       Oxycodone Other (See Comments)     Burning throat, but can take Norco.      Penicillins Other (See Comments)     Family hx of resp arrest, she has never taken  Ok with cephalosporins     Rizatriptan Visual Disturbance     Droperidol Hives and Rash     Isovue [Iopamidol] Palpitations     Pt had racing heart and sob      Ketorolac Anxiety     Latex Swelling and Rash     Kiwi, likely also avacado, ? banana     Levsin Rash       Current Outpatient Prescriptions   Medication Sig Dispense Refill     cholecalciferol (VITAMIN D3) 85936 UNITS capsule Take 1 capsule (50,000 Units) by mouth once a week for 12 doses 12 capsule 0     ondansetron (ZOFRAN ODT) 4 MG ODT tab Take 1 tablet (4 mg) by mouth every 6 hours as needed for nausea 30 tablet 0     mirtazapine (REMERON SOL-TAB) 15 MG ODT tab 0.5 tablets (7.5 mg) by Orally disintegrating tablet route At Bedtime 45 tablet 0     SUMAtriptan (IMITREX) 50 MG tablet Take 1-2 tablets ( mg) by mouth at onset of headache for migraine - may repeat dose after 2h if headache recurs.  Max: 200mg/24 hours 18 tablet 1     rivaroxaban ANTICOAGULANT (XARELTO) 20  MG TABS tablet Take 1 tablet (20 mg) by mouth daily (with dinner) 90 tablet 1     rivaroxaban ANTICOAGULANT (XARELTO) 15 MG TABS tablet Take 1 tablet (15 mg) by mouth 2 times daily (with meals) 60 tablet 1     DULoxetine (CYMBALTA) 60 MG capsule Take 1 capsule (60 mg) by mouth daily 90 capsule 3     cloNIDine (CATAPRES) 0.2 MG tablet Take 2 tablets (0.4 mg) by mouth every evening 60 tablet 5     nortriptyline (PAMELOR) 10 MG capsule Take 3-4 capsules (30-40 mg) by mouth At Bedtime 120 capsule 3     Nutritional Supplements (COMPLEAT) LIQD Infuse at 50 ml/hour for 5 hours daily through j-tube  Flush j-tube with 30 ml water before and after each infusion of Compleat 30 each 0     cholestyramine light (QUESTRAN) 4 GM packet Take 1 packet (4 g) by mouth 2 times daily 100 packet 3     ACETAMINOPHEN PO Take 500-1,000 mg by mouth every 6 hours as needed for pain        albuterol 90 MCG/ACT inhaler Inhale 2 puffs into the lungs every 6 hours as needed        Review of Systems   Gastrointestinal: Positive for abdominal pain.   All other systems reviewed and are negative.        From the [place] Prescription Drug Monitoring website:  She did get 18 tabs dilaudid on Jan 5th.     01/05/2017 01/05/2017 HYDROMORPHONE 2 MG TABLET  [id number] 18  3 0  0 [id number] REGLA SHUKLA)  [place], [place]  [id number] [facility].  [place], [place] TAIWO JIMENEZ  1980  [address] [place] [id number] 11/15/2016  11/15/2016 HYDROMORPHONE 2 MG TABLET  [id number] 20  3 0  0 [id number] SAKINA MITCHELL  [place]  [id number] [place], [place] TAIWO JIMENEZ  1980  [address] [facility] [place] [address] [id number] 11/09/2016 11/09/2016 HYDROMORPHONE 2 MG TABLET  [id number] 24  3 0  0 [id number] [place], [place]  [id number] [facility]  [place], [place] TAIWO JIMENEZ  1980  [address]  [place] [address]        Physical  "Exam   BP: 147/89 mmHg  Pulse: 121  Temp: 98.5  F (36.9  C)  Resp: 16  SpO2: 100 %    Physical Exam   Constitutional: She is oriented to person, place, and time. She appears well-developed.   HENT:   Head: Normocephalic and atraumatic.   Eyes: Conjunctivae and EOM are normal.   Neck: Normal range of motion.   Abdominal: Bowel sounds are normal.   Musculoskeletal: Normal range of motion.   Neurological: She is alert and oriented to person, place, and time.   Skin: Skin is warm and dry.   Psychiatric: She has a normal mood and affect. Her behavior is normal.   Nursing note and vitals reviewed.      ED Course  This patient has a narcotic pain agreement signed with notes that we are not to give narcotics in the emergency department.  She also has suspected Munchhausen's syndrome noted on her chart.  She has chronic abdominal pain with a G-tube which drains her stomach and a J-tube used for nutrition. The patient is complaining today of pain at the site of the J-tube and states that the J-tube is not working \"normally.\"  Because of these type of complaints and her medical history this patient has had an excessive amount of radiology studies.  The most recent CT study from Jan 20th has a note from the radiologist verifying this concern: \"This patient has had an excessive number of CT scans with almost 30 CT abdomen and pelvis scans since 2012 in the [employer] system alone, not counting other sites. This significantly raises the patient's risk due to the radiation exposure. Recommend close clinical evaluation prior to ordering prior CT scans.\"  I went back 12 abdominal CT scans to March 2016 before there was a scan that shows possible SBO.   I am going to try to work this visit up without CT scan initially. I recognized that there is some risk clinically with this plan of action given that this patient has several complicating factors as noted above.  I don't think that another CT scan should be ordered before we try to " evaluate her with labs, detailed exam, flushing and aspirating from both the G-tube and J-tube, etc.  Until I have a better idea of the cause of her symptoms I am willing to give one IV dose of dilaudid, 1 mg, here in the ED.      Procedures      Results for orders placed or performed during the hospital encounter of 01/26/17 (from the past 24 hour(s))   CBC with platelets differential   Result Value Ref Range    WBC 16.0 (H) 4.0 - 11.0 10e9/L    RBC Count 4.82 3.8 - 5.2 10e12/L    Hemoglobin 12.2 11.7 - 15.7 g/dL    Hematocrit 37.6 35.0 - 47.0 %    MCV 78 78 - 100 fl    MCH 25.3 (L) 26.5 - 33.0 pg    MCHC 32.4 31.5 - 36.5 g/dL    RDW 18.8 (H) 10.0 - 15.0 %    Platelet Count 584 (H) 150 - 450 10e9/L    Diff Method Automated Method     % Neutrophils 93.1 %    % Lymphocytes 5.4 %    % Monocytes 1.1 %    % Eosinophils 0.0 %    % Basophils 0.1 %    % Immature Granulocytes 0.3 %    Absolute Neutrophil 14.9 (H) 1.6 - 8.3 10e9/L    Absolute Lymphocytes 0.9 0.8 - 5.3 10e9/L    Absolute Monocytes 0.2 0.0 - 1.3 10e9/L    Absolute Eosinophils 0.0 0.0 - 0.7 10e9/L    Absolute Basophils 0.0 0.0 - 0.2 10e9/L    Abs Immature Granulocytes 0.1 0 - 0.4 10e9/L   Comprehensive metabolic panel   Result Value Ref Range    Sodium 139 133 - 144 mmol/L    Potassium 4.2 3.4 - 5.3 mmol/L    Chloride 104 94 - 109 mmol/L    Carbon Dioxide 26 20 - 32 mmol/L    Anion Gap 9 3 - 14 mmol/L    Glucose 147 (H) 70 - 99 mg/dL    Urea Nitrogen 13 7 - 30 mg/dL    Creatinine 0.96 0.52 - 1.04 mg/dL    GFR Estimate 66 >60 mL/min/1.7m2    GFR Estimate If Black 80 >60 mL/min/1.7m2    Calcium 9.8 8.5 - 10.1 mg/dL    Bilirubin Total 0.4 0.2 - 1.3 mg/dL    Albumin 4.2 3.4 - 5.0 g/dL    Protein Total 9.4 (H) 6.8 - 8.8 g/dL    Alkaline Phosphatase 156 (H) 40 - 150 U/L    ALT 24 0 - 50 U/L    AST 12 0 - 45 U/L   Lactic acid whole blood   Result Value Ref Range    Lactic Acid 1.9 0.7 - 2.1 mmol/L   Chest  XR, 1 view portable    Narrative    PORTABLE CHEST ONE VIEW    1/26/2017 5:09 PM     HISTORY: Elevated WBC.    COMPARISON: 11/10/2016      Impression    IMPRESSION: Normal.   XR Abdomen 1 View    Narrative    ABDOMEN ONE VIEW  1/26/2017  5:09 PM     HISTORY: Elevated WBC    COMPARISON: None.    FINDINGS: There is a gastrostomy tube whose tip projects within small  bowel. The bowel gas pattern is within normal limits. No evidence to  suggest obstruction.     *Note: Due to a large number of results and/or encounters for the requested time period, some results have not been displayed. A complete set of results can be found in Results Review.       Medications   0.9% sodium chloride BOLUS (0 mLs Intravenous Stopped 1/26/17 1656)     Followed by   0.9% sodium chloride infusion (not administered)   sodium chloride (PF) 0.9% PF flush 3 mL (not administered)   sodium chloride (PF) 0.9% PF flush 3 mL (not administered)   HYDROmorphone (DILAUDID) half-tab 1 mg (not administered)   ondansetron (ZOFRAN) tablet 4 mg (not administered)   HYDROmorphone (DILAUDID) injection 1 mg (1 mg Intravenous Given 1/26/17 3540)       Assessments & Plan (with Medical Decision Making)  Doreen is here with abdominal pain and tachycardia. She has been ill now for 2 days. Her J tube has not been infusing well and she has pain when anything is put into the J-tube. Here in the ED she has an oral temperature below 99 F, tachycardia of 121 bpm, normal blood pressure and abdominal pain on exam.  The J tube did accept 10 mL of sterile water with no resistance but she had pain when it was infused. Her labs today show a WBC of 16,000, normal hgb, and a her CMP shows glucose 147, alk phos 156 and protein 9.4. We did an xray of the abdomen and of the chest and these show no obvious abnormalities. I tried to speak to Infectious Disease about this patient.   We were not able to connect apparently. I spoke with Dr Mario and are planning to schedule her dilaudid as it is at home, use oral zofran and not start any  antibiotics on her. She will come in as an observation patient.      I have reviewed the nursing notes.    I have reviewed the findings, diagnosis, plan and need for follow up with the patient.    New Prescriptions    No medications on file       Final diagnoses:   Chronic abdominal pain       This document serves as a record of services personally performed by Jake Warren MD. It was created on their behalf by Justine Mario, a trained medical scribe. The creation of this record is based on the provider's personal observations and the statements of the patient. This document has been checked and approved by the attending provider.     Note: Chart documentation done in part with Dragon Voice Recognition software. Although reviewed after completion, some word and grammatical errors may remain.    1/26/2017   [facility] EMERGENCY DEPARTMENT      Jake Warren MD  01/26/17 1827

## 2017-01-26 NOTE — IP AVS SNAPSHOT
[address] [facility]    [address] [place] [address]    Phone:  [phone]                                       After Visit Summary   1/26/2017    Doreen Peralta    MRN: [id number]           After Visit Summary Signature Page     I have received my discharge instructions, and my questions have been answered. I have discussed any challenges I see with this plan with the nurse or doctor.    ..........................................................................................................................................  Patient/Patient Representative Signature      ..........................................................................................................................................  Patient Representative Print Name and Relationship to Patient    ..................................................               ................................................  Date                                            Time    ..........................................................................................................................................  Reviewed by Signature/Title    ...................................................              ..............................................  Date                                                            Time

## 2017-01-27 LAB
ANION GAP SERPL CALCULATED.3IONS-SCNC: 9 MMOL/L (ref 3–14)
BUN SERPL-MCNC: 11 MG/DL (ref 7–30)
CALCIUM SERPL-MCNC: 8.1 MG/DL (ref 8.5–10.1)
CHLORIDE SERPL-SCNC: 107 MMOL/L (ref 94–109)
CO2 SERPL-SCNC: 23 MMOL/L (ref 20–32)
CREAT SERPL-MCNC: 0.82 MG/DL (ref 0.52–1.04)
ERYTHROCYTE [DISTWIDTH] IN BLOOD BY AUTOMATED COUNT: 18.7 % (ref 10–15)
GFR SERPL CREATININE-BSD FRML MDRD: 79 ML/MIN/1.7M2
GLUCOSE SERPL-MCNC: 107 MG/DL (ref 70–99)
HCT VFR BLD AUTO: 32.6 % (ref 35–47)
HGB BLD-MCNC: 10.3 G/DL (ref 11.7–15.7)
MCH RBC QN AUTO: 24.8 PG (ref 26.5–33)
MCHC RBC AUTO-ENTMCNC: 31.6 G/DL (ref 31.5–36.5)
MCV RBC AUTO: 79 FL (ref 78–100)
PLATELET # BLD AUTO: 459 10E9/L (ref 150–450)
POTASSIUM SERPL-SCNC: 3.9 MMOL/L (ref 3.4–5.3)
PROCALCITONIN SERPL-MCNC: NORMAL NG/ML
RBC # BLD AUTO: 4.15 10E12/L (ref 3.8–5.2)
SODIUM SERPL-SCNC: 139 MMOL/L (ref 133–144)
WBC # BLD AUTO: 15.6 10E9/L (ref 4–11)

## 2017-01-27 PROCEDURE — 12000000 ZZH R&B MED SURG/OB

## 2017-01-27 PROCEDURE — 85027 COMPLETE CBC AUTOMATED: CPT | Performed by: INTERNAL MEDICINE

## 2017-01-27 PROCEDURE — 25000132 ZZH RX MED GY IP 250 OP 250 PS 637: Performed by: PEDIATRICS

## 2017-01-27 PROCEDURE — 80048 BASIC METABOLIC PNL TOTAL CA: CPT | Performed by: INTERNAL MEDICINE

## 2017-01-27 PROCEDURE — 25000125 ZZHC RX 250: Performed by: INTERNAL MEDICINE

## 2017-01-27 PROCEDURE — 25000128 H RX IP 250 OP 636: Performed by: PEDIATRICS

## 2017-01-27 PROCEDURE — 25000132 ZZH RX MED GY IP 250 OP 250 PS 637: Performed by: FAMILY MEDICINE

## 2017-01-27 PROCEDURE — 25000132 ZZH RX MED GY IP 250 OP 250 PS 637: Performed by: INTERNAL MEDICINE

## 2017-01-27 PROCEDURE — 36415 COLL VENOUS BLD VENIPUNCTURE: CPT | Performed by: INTERNAL MEDICINE

## 2017-01-27 PROCEDURE — 25000125 ZZHC RX 250: Performed by: PEDIATRICS

## 2017-01-27 PROCEDURE — 99232 SBSQ HOSP IP/OBS MODERATE 35: CPT | Performed by: INTERNAL MEDICINE

## 2017-01-27 RX ORDER — HYDROMORPHONE HYDROCHLORIDE 2 MG/1
2 TABLET ORAL
Status: DISCONTINUED | OUTPATIENT
Start: 2017-01-27 | End: 2017-01-28 | Stop reason: HOSPADM

## 2017-01-27 RX ADMIN — Medication 1 MG: at 07:35

## 2017-01-27 RX ADMIN — HYDROMORPHONE HYDROCHLORIDE 2 MG: 2 TABLET ORAL at 13:03

## 2017-01-27 RX ADMIN — VANCOMYCIN HYDROCHLORIDE 1500 MG: 10 INJECTION, POWDER, LYOPHILIZED, FOR SOLUTION INTRAVENOUS at 09:42

## 2017-01-27 RX ADMIN — HYDROMORPHONE HYDROCHLORIDE 2 MG: 2 TABLET ORAL at 22:18

## 2017-01-27 RX ADMIN — VANCOMYCIN HYDROCHLORIDE 1500 MG: 10 INJECTION, POWDER, LYOPHILIZED, FOR SOLUTION INTRAVENOUS at 22:18

## 2017-01-27 RX ADMIN — Medication 1 MG: at 04:36

## 2017-01-27 RX ADMIN — DULOXETINE HYDROCHLORIDE 60 MG: 30 CAPSULE, DELAYED RELEASE ORAL at 09:59

## 2017-01-27 RX ADMIN — HYDROMORPHONE HYDROCHLORIDE 2 MG: 2 TABLET ORAL at 09:59

## 2017-01-27 RX ADMIN — HYDROMORPHONE HYDROCHLORIDE 2 MG: 2 TABLET ORAL at 19:00

## 2017-01-27 RX ADMIN — ONDANSETRON 4 MG: 4 TABLET, ORALLY DISINTEGRATING ORAL at 08:34

## 2017-01-27 RX ADMIN — HYDROMORPHONE HYDROCHLORIDE 2 MG: 2 TABLET ORAL at 15:51

## 2017-01-27 RX ADMIN — Medication 1 MG: at 01:34

## 2017-01-27 RX ADMIN — MIRTAZAPINE 7.5 MG: 7.5 TABLET, FILM COATED ORAL at 22:18

## 2017-01-27 RX ADMIN — ONDANSETRON 4 MG: 2 INJECTION INTRAMUSCULAR; INTRAVENOUS at 17:40

## 2017-01-27 RX ADMIN — ERTAPENEM SODIUM 1 G: 1 INJECTION, POWDER, LYOPHILIZED, FOR SOLUTION INTRAMUSCULAR; INTRAVENOUS at 21:28

## 2017-01-27 RX ADMIN — NORTRIPTYLINE HYDROCHLORIDE 40 MG: 10 CAPSULE ORAL at 21:28

## 2017-01-27 RX ADMIN — RIVAROXABAN 20 MG: 10 TABLET, FILM COATED ORAL at 17:34

## 2017-01-27 RX ADMIN — CLONIDINE HYDROCHLORIDE 0.4 MG: 0.1 TABLET ORAL at 19:00

## 2017-01-27 NOTE — H&P
[facility]    History and Physical  Hospitalist       Date of Admission:  1/26/2017  Date of Service (when I saw the patient): 01/26/2017    Assessment and Plan      Active Problems:    SIRS (systemic inflammatory response syndrome) (H)    Assessment: presents with leucocytosis and tachycardia and reports a fever but no fever documented here thus far.  She has a history of recurrent polymicrobial infections including GNR bacteremia but has not had recurrent bacteremia since her port was taken out.  Her only symptom currently is abdominal pain which has been consistent every time she presents with concerns for infection.  She has had multiple CT scans done including one done 6 days ago with no sources found for infection.  She has had CHELO that was unremarkable.  She does not currently have any neurologic, respiratory, urinary symptoms and reports only her chronic diarrhea.   Even though she has not had recurrent bacteremia since her port was taken out she presents with tachycardia and leucocytosis which could be early sepsis.  She has normal BP and lactic acid is normal but with her history of recurrent blood stream infections feel she is at high enough risk for developing sepsis that she will need IV antibiotics for at least 48 hours while culture results are being monitored.  She is being followed now by ID due to these recurring infections.    Plan: admit to inpatient, BC done, send UC, start invanz and vanco, follow temps and WBC      Munchausen syndrome - previously suspected    Assessment: This has been previously suspected and was addressed at the [facility].  Patient has denied and her mother felt this was unlikely.  She has previously lost both her J tube and G tube on the same night and as mentioned above has not had recurring infections since her port was removed and there was concern she may have been intentionally contaminating her port.  She was previously offered psychiatry  referral but declined     Plan: no intervention      Chronic abdominal pain    Assessment: chronic and once again worsened when she gets ill.  No CT done due to more than 30 CT's being done with no etiology found    Plan: treat her pain but hold on further imaging at this time      History of deep venous thrombosis    Assessment: will continue her xarelto    Plan: as above      Nausea and vomiting    Assessment: chronic and stable.  She previously has mentioned recurring vomiting but when hospitalized does not have recurring vomiting and has been witnessed at the pharmacy buying food    Plan: IV fluids, clear liquids and ADAT, zofran as needed      Intermittent asthma    Assessment: inactive    Plan: no intervention      PEG (percutaneous endoscopic gastrostomy) status    Assessment: functioning normally and site looks good    Plan: no intervention      Jejunostomy tube present (H)    Assessment: flushes easily and site looks good    Plan: no intervention      Chronic diarrhea    Assessment: reports chronic diarrhea    Plan: will monitor while here    DVT Prophylaxis: xarelto  Code Status: Full Code    Disposition: Expected discharge in 2 days once BC and UC return negative.    Nikhil Mario MD    Primary Care Physician  Larisa Lorenzo    Chief Complaint  Abdominal pain    History is obtained from the patient    History of Present Illness  Doreen Peralta is a 36 year old female who presents with abdominal pain.  She has fever to as high as 103 she reports at home that comes once every 1 - 2 weeks.  She hasn't had any recent fevers but previously when she has increasing abdominal pain is when she tends to have fever.  She has not been having problems with headache, neck pain, cough, SOB, CP, dysuria, urgency or urinary frequency.  She reports chronic diarrhea but is not changed from her baseline.  She denies myalagias or joint pain.  On evaluation in the ED she is found to have leucocytosis and  tachycardia and so is now admitted with plans as outlined above.     Past Medical History   I have reviewed this patient's medical history and updated it with pertinent information if needed.   Past Medical History   Diagnosis Date     PONV (postoperative nausea and vomiting)      Asthma      Constipation      chronic     Enteritis      Chronic pain      Hx of abnormal Pap smear      s/p LEEP - no further details provided     Colonic dysmotility      s/p subtotal colectomy     Thrombosis, hepatic vein (H)      microvascular     Bilateral ovarian cysts      Gastro-oesophageal reflux disease      Other chronic pain      Cervical cancer (H) 01/01/2008     cervical cancer      H/O ileostomy      IBS (irritable bowel syndrome)      Fungemia 5/5/2016     E. coli sepsis (H) 5/8/2016     Hypertension        Past [facility] History  I have reviewed this patient's surgical history and updated it with pertinent information if needed.  Past [facility] History   Procedure Laterality Date     Laparoscopic oophorectomy Right 2009     [facility]     Laparoscopic cholecystectomy  2002     [facility] c[facility]. stones duct     Esophagoscopy, gastroscopy, duodenoscopy (egd), combined  7/10/2012     Procedure: COMBINED ESOPHAGOSCOPY, GASTROSCOPY, DUODENOSCOPY (EGD);  Upper Endoscopy, Ileoscopy    Latex Allergy  with biopsies;  Surgeon: Nicole Redding MD;  [facility]: [facility]     Colonoscopy  7/10/2012     Procedure: COLONOSCOPY;;  Surgeon: Nicole Redding MD;  Location: [facility] OR     Leep tx, cervical  2009     [facility]     Laparoscopic ileostomy  1/20/2012     [facility], loop     Esophagoscopy, gastroscopy, duodenoscopy (egd), combined N/A 11/5/2014     Procedure: COMBINED ESOPHAGOSCOPY, GASTROSCOPY, DUODENOSCOPY (EGD);  Surgeon: Nicole Redding MD;  [facility]: [facility]     Laparoscopic assisted colectomy  1/20/2012     Procedure:LAPAROSCOPIC ASSISTED COLECTOMY; Laparoscopic Ileostomy       Laparoscopic assisted colectomy left  (descending)  10/24/2012     Procedure: LAPAROSCOPIC ASSISTED COLECTOMY LEFT (DESCENDING);   Hand Assisted Laproscopic Subtotal abdominal Colectomy,Iieorectal anastamosis, Ileostomy Closure.       Remove gastrostomy tube adult N/A 12/12/2014     Procedure: REMOVE GASTROSTOMY TUBE ADULT;  Surgeon: Nicole Redding MD;  [facility]     Replace gastrostomy tube adult  5/19/15     Hc replace gastrostomy/cecostomy tube percutaneous Left 5/19/2015     Procedure: REPLACE GASTROSTOMY TUBE, PERCUTANEOUS;  Surgeon: Melecio Morejon Chi, MD;  Location: [facility]     Hc replace duodenostomy/jejunostomy tube percutaneous N/A 8/27/2015     Procedure: REPLACE GASTROJEJUNOSTOMY TUBE, PERCUTANEOUS;  Surgeon: Mio Holder MD;  Location: [facility]     Hc [facility] endoscopy w placement gastrostomy tube percut N/A 10/1/2015     Procedure: COMBINED ESOPHAGOSCOPY, GASTROSCOPY, DUODENOSCOPY (EGD), PLACE PERCUTANEOUS ENDOSCOPIC GASTROSTOMY TUBE;  Surgeon: Mio Holder MD;  [facility]     Laparoscopic assisted insertion tube jejunostomy N/A 10/16/2015     Procedure: LAPAROSCOPIC ASSISTED INSERTION TUBE JEJUNOSTOMY;  Surgeon: Elsa Medel MD;  Location[facility]     Picc insertion Left 10/21/2015     5fr DL Power PICC, 37cm (2cm external) in the L basilic vein w/ tip in the SVC RA junction.     Hc replace duodenostomy/jejunostomy tube percutaneous N/A 1/7/2016     Procedure: REPLACE JEJUNOSTOMY TUBE, PERCUTANEOUS;  Surgeon: Elsa Medel MD;  [facility]     Endoscopic insertion tube gastrostomy N/A 1/21/2016     Procedure: ENDOSCOPIC INSERTION TUBE GASTROSTOMY;  Surgeon: Nicole Redding MD;  [facility]     Hc replace duodenostomy/jejunostomy tube percutaneous N/A 1/28/2016     Procedure: REPLACE JEJUNOSTOMY TUBE, PERCUTANEOUS;  Surgeon: Elsa Medel MD;  [facility]     Laparotomy exploratory N/A 1/28/2016     Procedure: LAPAROTOMY EXPLORATORY;  Surgeon: Elsa Medel MD;  [facility]      Remove port vascular access Right 2016     Procedure: REMOVE PORT VASCULAR ACCESS;  Surgeon: Pradeep Orosco MD;  Location[facility]     Echo adal  2016            N/A 2016     Procedure: ANESTHESIA OUT OF OR;  Surgeon: GENERIC ANESTHESIA PROVIDER;  Location: [facility]       Prior to Admission Medications  Prior to Admission Medications   Prescriptions Last Dose Informant Patient Reported? Taking?   ACETAMINOPHEN PO  Self Yes No   Sig: Take 500-1,000 mg by mouth every 6 hours as needed for pain    DULoxetine (CYMBALTA) 60 MG capsule   No No   Sig: Take 1 capsule (60 mg) by mouth daily   Nutritional Supplements (COMPLEAT) LIQD  Self No No   Sig: Infuse at 50 ml/hour for 5 hours daily through j-tube  Flush j-tube with 30 ml water before and after each infusion of Compleat   SUMAtriptan (IMITREX) 50 MG tablet   No No   Sig: Take 1-2 tablets ( mg) by mouth at onset of headache for migraine - may repeat dose after 2h if headache recurs.  Max: 200mg/24 hours   albuterol 90 MCG/ACT inhaler  Self Yes No   Sig: Inhale 2 puffs into the lungs every 6 hours as needed    cholecalciferol (VITAMIN D3) 56141 UNITS capsule   No No   Sig: Take 1 capsule (50,000 Units) by mouth once a week for 12 doses   cholestyramine light (QUESTRAN) 4 GM packet  Self No No   Sig: Take 1 packet (4 g) by mouth 2 times daily   cloNIDine (CATAPRES) 0.2 MG tablet   No No   Sig: Take 2 tablets (0.4 mg) by mouth every evening   mirtazapine (REMERON SOL-TAB) 15 MG ODT tab   No No   Si.5 tablets (7.5 mg) by Orally disintegrating tablet route At Bedtime   nortriptyline (PAMELOR) 10 MG capsule   No No   Sig: Take 3-4 capsules (30-40 mg) by mouth At Bedtime   ondansetron (ZOFRAN ODT) 4 MG ODT tab   No No   Sig: Take 1 tablet (4 mg) by mouth every 6 hours as needed for nausea   rivaroxaban ANTICOAGULANT (XARELTO) 15 MG TABS tablet   No No   Sig: Take 1 tablet (15 mg) by mouth 2 times daily (with meals)   rivaroxaban ANTICOAGULANT  (XARELTO) 20 MG TABS tablet   No No   Sig: Take 1 tablet (20 mg) by mouth daily (with dinner)      Facility-Administered Medications: None     Allergies  Allergies   Allergen Reactions     Hyoscyamine Rash     Metoclopramide Other (See Comments)     Eye twitching.      Peaches [Peach] Other (See Comments)     Raw. Cooked OK     Sucralose Other (See Comments)     All artificial sweeteners. Aspartame also. Swollen glands     Advair Diskus Other (See Comments)     Throat burns     Azithromycin Other (See Comments)     Burning in throat     Compazine [Prochlorperazine] Visual Disturbance     Contrast Dye Itching     States is allergic to CT contrast dye     Cyclobenzaprine Visual Disturbance     Fentanyl Other (See Comments)     migraine     Ibuprofen GI Disturbance     Lactulose Nausea and Vomiting     Gas and bloating     Levaquin [Levofloxacin] Swelling     Per ED M.D. And RN      Morphine Sulfate Other (See Comments)     Chest pain       Oxycodone Other (See Comments)     Burning throat, but can take Norco.      Penicillins Other (See Comments)     Family hx of resp arrest, she has never taken  Ok with cephalosporins     Rizatriptan Visual Disturbance     Droperidol Hives and Rash     Isovue [Iopamidol] Palpitations     Pt had racing heart and sob      Ketorolac Anxiety     Latex Swelling and Rash     Kiwi, likely also avacado, ? banana     Levsin Rash       Social History  I have reviewed this patient's social history and updated it with pertinent information if needed. Doreen JOSEPH Peralta  reports that she quit smoking about 13 years ago. Her smoking use included Cigarettes. She has a 4 pack-year smoking history. She has quit using smokeless tobacco. She reports that she does not drink alcohol or use illicit drugs.    Family History  I have reviewed this patient's family history and updated it with pertinent information if needed.   Family History   Problem Relation Age of Onset     Thyroid Disease Mother       Sjogren's Mother      GASTROINTESTINAL DISEASE Mother      Intermittent nausea vomiting diarrhea     Colon Polyps Mother      Lupus Maternal Grandmother      CANCER Maternal Grandfather      Lung     Colon Cancer Maternal Grandfather 65     CANCER Paternal Grandmother      Lung      CEREBROVASCULAR DISEASE Paternal Grandmother      DIABETES Paternal Grandmother      Cardiovascular Paternal Grandmother      CHF     CANCER Paternal Grandfather      Lung     Glaucoma Paternal Grandfather      Prostate Problems Father      prostate enlargement     Abdominal Aortic Aneurysm Other      Macular Degeneration No family hx of        Review of Systems  The 10 point Review of Systems is negative other than noted in the HPI or here.     Physical Exam  Temp: 98.9  F (37.2  C) Temp src: Oral BP: 126/72 mmHg Pulse: 121   Resp: 16 SpO2: 100 %      Vital Signs with Ranges  Temp:  [98.5  F (36.9  C)-98.9  F (37.2  C)] 98.9  F (37.2  C)  Pulse:  [121] 121  Resp:  [16] 16  BP: (119-147)/(72-89) 126/72 mmHg  SpO2:  [100 %] 100 %  0 lbs 0 oz    Constitutional:   awake, alert, cooperative, no apparent distress, and appears stated age     Eyes:   Lids and lashes normal, pupils equal, round and reactive to light, extra ocular muscles intact, sclera clear, conjunctiva normal     ENT:   Normocephalic, without obvious abnormality, atraumatic, sinuses nontender on palpation, external ears without lesions, oral pharynx with moist mucous membranes, tonsils without erythema or exudates, gums normal and good dentition.     Neck:   Supple, symmetrical, trachea midline, no adenopathy, thyroid symmetric, not enlarged and no tenderness, skin normal     Hematologic / Lymphatic:   no cervical lymphadenopathy and no supraclavicular lymphadenopathy     Back:   Symmetric, no curvature, spinous processes are non-tender on palpation, paraspinous muscles are non-tender on palpation, no costal vertebral tenderness     Lungs:   No increased work of breathing,  good air exchange, clear to auscultation bilaterally, no crackles or wheezing     Cardiovascular:   Normal apical impulse, regular rate and rhythm, normal S1 and S2, no S3 or S4, and no murmur noted     Abdomen:   +BS.  Soft.  Non distended.  Non tender when distracted.  G tube and J tube sites both look good     Neurologic:   Awake, alert, oriented to name, place and time.  Cranial nerves II-XII are grossly intact.  Motor is 5 out of 5 bilaterally.    Sensory is intact.        Data  Data reviewed today:  I personally reviewed the chest x-ray image(s) showing no infiltrates.    Recent Labs  Lab 01/26/17  1500   WBC 16.0*   HGB 12.2   MCV 78   *      POTASSIUM 4.2   CHLORIDE 104   CO2 26   BUN 13   CR 0.96   ANIONGAP 9   TARA 9.8   *   ALBUMIN 4.2   PROTTOTAL 9.4*   BILITOTAL 0.4   ALKPHOS 156*   ALT 24   AST 12       Recent Results (from the past 24 hour(s))   Chest  XR, 1 view portable    Narrative    PORTABLE CHEST ONE VIEW   1/26/2017 5:09 PM     HISTORY: Elevated WBC.    COMPARISON: 11/10/2016      Impression    IMPRESSION: Normal.    ROSALVA MCDUFFIE MD   XR Abdomen 1 View    Narrative    ABDOMEN ONE VIEW  1/26/2017  5:09 PM     HISTORY: Elevated WBC    COMPARISON: None.    FINDINGS: There is a gastrostomy tube whose tip projects within small  bowel. The bowel gas pattern is within normal limits. No evidence to  suggest obstruction.    ROSALVA MCDUFFIE MD

## 2017-01-27 NOTE — PROGRESS NOTES
S-(situation): Patient arrives to room 271 from ED @ 2000    B-(background): abdominal pain    A-(assessment): Pain across her abdomen, VSS, afebrile, no skin issues    R-(recommendations): Orders reviewed with pt . Will monitor patient per MD orders.    Inpatient nursing criteria listed below were met:    Health care directives status obtained and documented:yes  Core Measures assessed (SSI): {no  SCD's Documented: no order as of yet  Vaccine assessment done and vaccines ordered if appropriate: prior and refused  Skin issues/needs documented:n/a  Isolation needs addressed, if appropriate:none  Fall Prevention: Care plan updated, Education given and documentedyes  MRSA swab completed for patient 55 years and older (exclude BENITO and TKA): yes  My Chart patient sign up addressed and documented:prior  Care Plan initiated: yes  Education Assessment documented:yes  Education Documented (Pre-existing chronic infection such as, MRSA/VRE need education on admission): yes  New medication patient education completed and documented (Possible Side Effects of Common Medications handout):yes  Home medications if not able to send immediately home with family stored here:n/a.     Reminder note placed in discharge instructions: n/a  Discharge planning review completed (admission navigator) yes

## 2017-01-27 NOTE — CONSULTS
Pharmacy Vancomycin Initial Note  Date of Service 2017  Patient's  1980  36 year old, female    Indication: Sepsis    Current estimated CrCl = Estimated Creatinine Clearance: 88.8 mL/min (based on Cr of 0.96).    Creatinine for last 3 days  2017:  3:00 PM Creatinine 0.96 mg/dL    Recent Vancomycin Level(s) for last 3 days  No results found for requested labs within last 3 days.      Vancomycin IV Administrations (past 72 hours)      No vancomycin orders with administrations in past 72 hours.                Nephrotoxins and other renal medications (Future)    Start     Dose/Rate Route Frequency Ordered Stop    17  vancomycin (VANCOCIN) 1,500 mg in NaCl 0.9 % 250 mL intermittent infusion      1,500 mg  200 mL/hr over 1.5 Hours Intravenous EVERY 12 HOURS 17            Contrast Orders - past 72 hours     None                Plan:  1.  Start vancomycin  1500 mg IV q12h.   2.  Goal Trough Level: 15-20 mg/L   3.  Pharmacy will check trough levels as appropriate in 1-3 Days.    4. Serum creatinine levels will be ordered daily for the first week of therapy and at least twice weekly for subsequent weeks.    5. [place] method utilized to dose vancomycin therapy: Method 2    Deanne Moore

## 2017-01-27 NOTE — PROGRESS NOTES
"[facility]    Hospitalist Progress Note    Date of Service (when I saw the patient): 01/27/2017    Assessment and Plan  Doreen Peralta is a 36 year old female who was admitted on 1/26/2017.     Active Problems:    SIRS (systemic inflammatory response syndrome) (H)    Assessment: tachycardia now resolved.  WBC stable to slightly improved.  No fever.  Abdominal pain unchanged.  No reports of positive cultures but cultures are obviously young.  Still need to consider the possibility of recurrent bacteremia considering her history.  She has not had recurrent bacteremia after her port was taken out other than right after the port was taken out and then in association with an upper extremity clot associated with her port that occurred about 2 weeks after the port was out.  Recent CT negative.  CHELO done last summer showed no vegetations.  She is following now with ID.     Plan: continue invanz and vanco.  Monitor vital signs and WBC as well as UC and BC results.  Have planned on at least 48 hours of IV antibiotics and monitoring culture results before deciding to stop antibiotics      Chronic abdominal pain    Assessment: some worsening with her abdominal pain but this has been a consistent history with her.  Multiple CT scans have not shown any abnormalities so repeat imaging not repeated on this admission.  Exam is reassuring.    Plan: continue with po dilaudid for now      History of deep venous thrombosis    Assessment: on xarelto with no new symptoms    Plan: continue xarelto      Nausea and vomiting    Assessment: no vomiting since being here despite her report of vomiting being \"normal\" for her.  Tolerating regular diet without difficulty    Plan: monitor      Intermittent asthma    Assessment: inactive    Plan: no intervention      PEG (percutaneous endoscopic gastrostomy) status    Assessment: no concerns about her PEG    Plan: monitor      Jejunostomy tube present (H)    " "Assessment: no concerns    Plan: monitor      Munchausen syndrome - previously suspected    Assessment: as previously noted.  Has had episodes of both PEG and PEJ \"fallling out\" in the same night and previous concerns about her contaminating her port leading to the bacteremia, but both she and her mother deny this.  She has previously been offered psychiatry evaluation which she declined    Plan: no intervention      Chronic diarrhea    Assessment: no diarrhea since being here    Plan: monitor      DVT Prophylaxis: Xarelto  Code Status: Full Code    Disposition: Expected discharge in 2 days once BC and UC remain negative.    Nikhil Mario MD    Interval History  Still with ongoing abdominal pain.  No vomiting or diarrhea    -Data reviewed today: I reviewed all new labs and imaging results over the last 24 hours. I personally reviewed no images or EKG's today.    Physical Exam  Temp: 97.4  F (36.3  C) Temp src: Oral BP: 134/79 mmHg Pulse: 69   Resp: 18 SpO2: 96 % O2 Device: None (Room air)    Filed Vitals:    01/26/17 2003   Weight: 83 kg (182 lb 15.7 oz)     Vital Signs with Ranges  Temp:  [97.4  F (36.3  C)-98.9  F (37.2  C)] 97.4  F (36.3  C)  Pulse:  [] 69  Resp:  [16-18] 18  BP: (108-147)/(72-89) 134/79 mmHg  SpO2:  [96 %-100 %] 96 %  I/O last 3 completed shifts:  In: 681.67 [P.O.:160; I.V.:521.67]  Out: 700 [Urine:700]    Lungs:  CTA auscultation throughout        Cardiovascular:   RRR with no murmur, rub or gallop     Abdomen:   +BS.  Soft, NT         Medications    - MEDICATION INSTRUCTIONS -       NaCl 100 mL/hr at 01/26/17 2046       cloNIDine  0.4 mg Oral QPM     DULoxetine  60 mg Oral Daily     nortriptyline  30-40 mg Oral At Bedtime     rivaroxaban ANTICOAGULANT  20 mg Oral Daily with supper     ertapenem (INVanz) IV  1 g Intravenous Q24H     vancomycin (VANCOCIN) IV  1,500 mg Intravenous Q12H     mirtazapine  7.5 mg Oral At Bedtime     cholestyramine  1 packet Oral BID       Data    Recent " Labs  Lab 01/27/17  0518 01/26/17  1500   WBC 15.6* 16.0*   HGB 10.3* 12.2   MCV 79 78   * 584*    139   POTASSIUM 3.9 4.2   CHLORIDE 107 104   CO2 23 26   BUN 11 13   CR 0.82 0.96   ANIONGAP 9 9   TARA 8.1* 9.8   * 147*   ALBUMIN  --  4.2   PROTTOTAL  --  9.4*   BILITOTAL  --  0.4   ALKPHOS  --  156*   ALT  --  24   AST  --  12       Recent Results (from the past 24 hour(s))   Chest  XR, 1 view portable    Narrative    PORTABLE CHEST ONE VIEW   1/26/2017 5:09 PM     HISTORY: Elevated WBC.    COMPARISON: 11/10/2016      Impression    IMPRESSION: Normal.    ROSALVA MCDUFFIE MD   XR Abdomen 1 View    Narrative    ABDOMEN ONE VIEW  1/26/2017  5:09 PM     HISTORY: Elevated WBC    COMPARISON: None.    FINDINGS: There is a gastrostomy tube whose tip projects within small  bowel. The bowel gas pattern is within normal limits. No evidence to  suggest obstruction.    ROSALVA MCDUFFIE MD

## 2017-01-27 NOTE — ED NOTES
ED Nursing criteria listed below was addressed during verbal handoff:     Abnormal vitals: N/A  Abnormal results: N/A  Med Reconciliation completed: Yes  Meds given in ED: Yes  Any Overdue Meds: N/A  Core Measures: N/A  Isolation: N/A  Special needs: N/A  Skin assessment: Yes    Observation Patient  Education provided: Yes

## 2017-01-27 NOTE — PLAN OF CARE
Problem: Goal Outcome Summary  Goal: Goal Outcome Summary  Outcome: No Change  VSS. Afebrile. Pt c/o pain/discomfort to abdomen. PRN pain medication given per MAR. Somewhat effective. Pt requesting toast and walking off the floor to vending machines. BS active throughout. No other complaints.

## 2017-01-27 NOTE — PHARMACY-VANCOMYCIN DOSING SERVICE
Pharmacy Vancomycin Note  Date of Service 2017  Patient's  1980   36 year old, female    Indication: Sepsis  Goal Trough Level: 15-20 mg/L  Day of Therapy: 2  Current Vancomycin regimen:  1500 mg IV q12h    Current estimated CrCl = Estimated Creatinine Clearance: 104 mL/min (based on Cr of 0.82).    Creatinine for last 3 days  2017:  3:00 PM Creatinine 0.96 mg/dL  2017:  5:18 AM Creatinine 0.82 mg/dL    Recent Vancomycin Levels (past 3 days)  No results found for requested labs within last 3 days.    Vancomycin IV Administrations (past 72 hours)                   vancomycin (VANCOCIN) 1,500 mg in NaCl 0.9 % 250 mL intermittent infusion (mg) 1,500 mg New Bag 17 0942     1,500 mg New Bag 17                Nephrotoxins and other renal medications (Future)    Start     Dose/Rate Route Frequency Ordered Stop    17 2100  vancomycin (VANCOCIN) 1,500 mg in NaCl 0.9 % 250 mL intermittent infusion      1,500 mg  200 mL/hr over 1.5 Hours Intravenous EVERY 12 HOURS 17               Contrast Orders - past 72 hours     None          Interpretation of levels and current regimen:    Has serum creatinine changed > 50% in last 72 hours: No    Urine output:  unable to determine    Renal Function: Improving    Plan:  1.  Continue Current Dose  2.  Pharmacy will check trough levels as appropriate in 1-3 Days.    3. Serum creatinine levels will be ordered daily for the first week of therapy and at least twice weekly for subsequent weeks.      Deanne Moore        .

## 2017-01-28 VITALS
OXYGEN SATURATION: 100 % | RESPIRATION RATE: 18 BRPM | HEIGHT: 66 IN | BODY MASS INDEX: 30.47 KG/M2 | HEART RATE: 76 BPM | DIASTOLIC BLOOD PRESSURE: 73 MMHG | WEIGHT: 189.6 LBS | TEMPERATURE: 97.6 F | SYSTOLIC BLOOD PRESSURE: 128 MMHG

## 2017-01-28 PROBLEM — R10.84 ABDOMINAL PAIN, GENERALIZED: Status: ACTIVE | Noted: 2017-01-28

## 2017-01-28 LAB
BACTERIA SPEC CULT: NORMAL
ERYTHROCYTE [DISTWIDTH] IN BLOOD BY AUTOMATED COUNT: 18.7 % (ref 10–15)
HCT VFR BLD AUTO: 30 % (ref 35–47)
HGB BLD-MCNC: 9.6 G/DL (ref 11.7–15.7)
MCH RBC QN AUTO: 25.3 PG (ref 26.5–33)
MCHC RBC AUTO-ENTMCNC: 32 G/DL (ref 31.5–36.5)
MCV RBC AUTO: 79 FL (ref 78–100)
MICRO REPORT STATUS: NORMAL
PLATELET # BLD AUTO: 376 10E9/L (ref 150–450)
RBC # BLD AUTO: 3.8 10E12/L (ref 3.8–5.2)
SPECIMEN SOURCE: NORMAL
WBC # BLD AUTO: 12.2 10E9/L (ref 4–11)

## 2017-01-28 PROCEDURE — 99238 HOSP IP/OBS DSCHRG MGMT 30/<: CPT | Performed by: PEDIATRICS

## 2017-01-28 PROCEDURE — 85027 COMPLETE CBC AUTOMATED: CPT | Performed by: INTERNAL MEDICINE

## 2017-01-28 PROCEDURE — 25000125 ZZHC RX 250: Performed by: INTERNAL MEDICINE

## 2017-01-28 PROCEDURE — 36416 COLLJ CAPILLARY BLOOD SPEC: CPT | Performed by: INTERNAL MEDICINE

## 2017-01-28 PROCEDURE — 25000132 ZZH RX MED GY IP 250 OP 250 PS 637: Performed by: PEDIATRICS

## 2017-01-28 RX ORDER — ONDANSETRON 4 MG/1
4-8 TABLET, ORALLY DISINTEGRATING ORAL EVERY 6 HOURS PRN
Qty: 40 TABLET | Refills: 0 | Status: SHIPPED | OUTPATIENT
Start: 2017-01-28 | End: 2017-03-20

## 2017-01-28 RX ORDER — HYDROMORPHONE HYDROCHLORIDE 2 MG/1
2 TABLET ORAL
Qty: 60 TABLET | Refills: 0 | Status: SHIPPED | OUTPATIENT
Start: 2017-01-28 | End: 2017-02-15

## 2017-01-28 RX ADMIN — HYDROMORPHONE HYDROCHLORIDE 2 MG: 2 TABLET ORAL at 01:28

## 2017-01-28 RX ADMIN — HYDROMORPHONE HYDROCHLORIDE 2 MG: 2 TABLET ORAL at 07:31

## 2017-01-28 RX ADMIN — ONDANSETRON 4 MG: 4 TABLET, ORALLY DISINTEGRATING ORAL at 05:50

## 2017-01-28 RX ADMIN — HYDROMORPHONE HYDROCHLORIDE 2 MG: 2 TABLET ORAL at 04:31

## 2017-01-28 NOTE — PLAN OF CARE
Problem: Goal Outcome Summary  Goal: Goal Outcome Summary  Outcome: No Change  Pt has been without an IV since 0010. IV had a probable extravasation at the end of Vancomycin infusion. L upper arm was tender at site, slightly firm at site, no redness or inflammation noted. IV was dc'd, cold pack applied per pharmacy recommendation. IV extravasation documentation in EPIC, see f/s. Attempt to start new IV multiple times by three nurses with no success. Pt has asked for oral Dilaudid every three hours for abdominal pain. Bowel sounds are active, abdomen is soft. Pt asked for zofran after returning to room from going outside for a short time. Vss. Will continue to monitor previous IV site, attempt new IV start, pain control.

## 2017-01-28 NOTE — PLAN OF CARE
Problem: Goal Outcome Summary  Goal: Goal Outcome Summary  Outcome: No Change  Patients vitals stable. Alert and oriented x4. Up independently in the room. Patient has been ambulating in the halls. Bowels present. Patient has moderate to severe pain in lower mid-right abdomen. Receiving oral Dilaudid every 3 hours to manage pain. Patient was nauseous after dinner, received Zofran x1. Patients IV infiltrated. Was able to get access in left upper forearm with the ultrasound. WBC 15.6 today.

## 2017-01-28 NOTE — DISCHARGE SUMMARY
[facility]    Discharge Summary  Hospitalist    Date of Admission:  1/26/2017  Date of Discharge:  1/28/2017  Discharging Provider: Gilmer Lazcano  Date of Service (when I saw the patient): 01/28/2017    Discharge Diagnoses    SIRS (systemic inflammatory response syndrome) (H)    Abdominal pain, generalized    Gastroparesis    Chronic diarrhea    Chronic abdominal pain    Nausea and vomiting    Hx SBO    Intermittent asthma    PEG (percutaneous endoscopic gastrostomy) status    Jejunostomy tube present (H)    S/P partial resection of colon    Munchausen syndrome - previously suspected    History of deep venous thrombosis    * No resolved hospital problems. *      History of Present Illness  Doreen Peralta is an 36 year old female with very complicated health history including recurrent bacteremia, chronic abdominal pain, and previous bowel surgery including partial colon resection with ongoing jejunostomy tube who presented with self-reported history of recurring fevers and worsening abdominal pain.   Because of concern for possible infection including bacteremia or sepsis, she was hospitalized. See admission history and physical for details.    Hospital Course  Doreen Peralta was admitted on 1/26/2017.  The following problems were addressed during her hospitalization:    Problem #1 neutrophilic leukocytosis and SIRS due to hypovolemia with hemoconcentration.  Initial WBC was elevated with left shift.   Blood and urine cultures were negative during her hospital stay.  She did not have fever during this hospital stay.  Pro-calcitonin level was undetectable.  She was initially treated with IV fluids and empiric parenteral antibiotics. Leukocytosis improved.  Once infection was not identified after 24-48 hours and her normal pro-calcitonin results were available, empiric antibiotic therapy was discontinued.  After investigation, there was suspicion for apparent SIRS due to hypovolemia  and dehydration, and hemoconcentration probably contributed to her initial leukocytosis.  Hypovolemia, dehydration, and hemoconcentration was attributed to inadequate oral intake and recent vomiting and diarrhea.    Problem #2 gastroparesis, abdominal pain, vomiting, and diarrhea.  Her abdominal pain was worse as compared with her usual baseline.  She described nausea with vomiting to her severe pain exacerbating underlying chronic gastroparesis with which she said she had been previously diagnosed after extensive GI evaluation.  Abdominal CT about a week previously had not demonstrated any acute abnormalities and was not repeated during this hospital stay because of her relatively benign clinical course and the numerous abdominal CT scans that she has had previously raising concern for excessive radiation exposure.  Abdominal x-ray during this hospital stay was benign without any signs of malpositioning of her j-tube, and her j-tube appeared to be in good position clinically with normal returns.  Vomiting resolved after starting oral narcotic analgesics.  Nausea was controlled with Zofran and pain was adequately controlled with oral narcotic analgesics by discharge.   She did not have ongoing diarrhea during her hospital stay.   She was able to tolerate advancing diet with adequate oral intake by discharge and was noted by nursing to be purchasing food from a vending machine of her own accord during this hospital stay.  Her indwelling jejunostomy tube was not used for enteral nutrition during this hospital stay, but she expressed intent to resume use of enteral feeding at home after discharge according to her usual home protocol.  Cause for her recently worsening abdominal pain, vomiting, and diarrhea was unclear although exacerbation of chronic gastroparesis was possible.  Close outpatient follow-up with gastroenterology was recommended.    Gilmer Lazcano MD    Significant Results and Procedures  No procedures  "performed during this admission    Pending Results  These results will be followed up by PCP   Unresulted Labs Ordered in the Past 30 Days of this Admission     Date and Time Order Name Status Description    1/26/2017 2002 Blood culture Preliminary     1/26/2017 1810 Blood culture ONE site Preliminary           Code Status  Full Code       Primary Care Physician  Larisa Lorenzo    Physical Exam  189 lbs 9.53 oz  /73 mmHg  Pulse 76  Temp(Src) 97.6  F (36.4  C) (Oral)  Resp 18  Ht 1.676 m (5' 6\")  Wt 86 kg (189 lb 9.5 oz)  BMI 30.62 kg/m2  SpO2 100%  LMP 01/19/2017  Breastfeeding? No    Appears worried  Hypoactive bowel sounds, possible mild abdominal distention, soft abdomen, tenderness present throughout without involuntary guarding or rebound    Discharge Disposition  Discharged to home  Condition at discharge: Stable    Consultations This Hospital Stay  PHARMACY TO DOSE VANCO    Time Spent on This Encounter  I, Gilmer Lazcano, personally saw the patient today and spent less than or equal to 30 minutes discharging this patient.    Discharge Orders    Reason for your hospital stay   Hospitalized for abdominal pain with vomiting and improved     Follow-up and recommended labs and tests    Follow up with primary care provider, Larisa Lorenzo, within 2 weeks, for hospital follow- up.     Activity   Your activity upon discharge: no driving while on narcotic analgesics (hydromorphone)     Tubes and drains   You are going home with the following tubes or drains: feeding tube J-Tube.   Tube cares per hospital or home care instructions     Full Code     Diet   Follow this diet upon discharge: Advance to your usual diet as tolerated, use home enteral tube feeds (Complete) via your j-tube according to your usual home routine       Discharge Medications  Current Discharge Medication List      START taking these medications    Details   HYDROmorphone (DILAUDID) 2 MG tablet Take 1 tablet (2 mg) by mouth " every 3 hours as needed for moderate to severe pain  Qty: 60 tablet, Refills: 0    Associated Diagnoses: Abdominal pain, generalized         CONTINUE these medications which have CHANGED    Details   ondansetron (ZOFRAN ODT) 4 MG ODT tab Take 1-2 tablets (4-8 mg) by mouth every 6 hours as needed for nausea  Qty: 40 tablet, Refills: 0    Associated Diagnoses: Intractable vomiting, presence of nausea not specified, unspecified vomiting type; Gastroparesis         CONTINUE these medications which have NOT CHANGED    Details   mirtazapine (REMERON SOL-TAB) 15 MG ODT tab 0.5 tablets (7.5 mg) by Orally disintegrating tablet route At Bedtime  Qty: 45 tablet, Refills: 0    Associated Diagnoses: Anxiety      rivaroxaban ANTICOAGULANT (XARELTO) 20 MG TABS tablet Take 1 tablet (20 mg) by mouth daily (with dinner)  Qty: 90 tablet, Refills: 1    Associated Diagnoses: Deep vein thrombosis (DVT) of upper extremity, unspecified chronicity, unspecified laterality, unspecified vein (H)      DULoxetine (CYMBALTA) 60 MG capsule Take 1 capsule (60 mg) by mouth daily  Qty: 90 capsule, Refills: 3    Associated Diagnoses: Abdominal pain, epigastric; Abdominal migraine, not intractable      cloNIDine (CATAPRES) 0.2 MG tablet Take 2 tablets (0.4 mg) by mouth every evening  Qty: 60 tablet, Refills: 5    Associated Diagnoses: Anxiety      nortriptyline (PAMELOR) 10 MG capsule Take 3-4 capsules (30-40 mg) by mouth At Bedtime  Qty: 120 capsule, Refills: 3    Associated Diagnoses: Neuropathic pain; Primary insomnia      Nutritional Supplements (COMPLEAT) LIQD Infuse at 50 ml/hour for 5 hours daily through j-tube  Flush j-tube with 30 ml water before and after each infusion of Compleat  Qty: 30 each, Refills: 0    Associated Diagnoses: Non-intractable vomiting with nausea, vomiting of unspecified type; Jejunostomy tube present (H); Malnutrition (H)      ACETAMINOPHEN PO Take 500-1,000 mg by mouth every 6 hours as needed for pain       albuterol 90  MCG/ACT inhaler Inhale 2 puffs into the lungs every 6 hours as needed       SUMAtriptan (IMITREX) 50 MG tablet Take 1-2 tablets ( mg) by mouth at onset of headache for migraine - may repeat dose after 2h if headache recurs.  Max: 200mg/24 hours  Qty: 18 tablet, Refills: 1    Associated Diagnoses: Migraine without aura and without status migrainosus, not intractable           Allergies  Allergies   Allergen Reactions     Hyoscyamine Rash     Metoclopramide Other (See Comments)     Eye twitching.      Peaches [Peach] Other (See Comments)     Raw. Cooked OK     Sucralose Other (See Comments)     All artificial sweeteners. Aspartame also. Swollen glands     Advair Diskus Other (See Comments)     Throat burns     Azithromycin Other (See Comments)     Burning in throat     Compazine [Prochlorperazine] Visual Disturbance     Contrast Dye Itching     States is allergic to CT contrast dye     Cyclobenzaprine Visual Disturbance     Fentanyl Other (See Comments)     migraine     Ibuprofen GI Disturbance     Lactulose Nausea and Vomiting     Gas and bloating     Levaquin [Levofloxacin] Swelling     Per ED M.D. And RN      Morphine Sulfate Other (See Comments)     Chest pain       Oxycodone Other (See Comments)     Burning throat, but can take Norco.      Penicillins Other (See Comments)     Family hx of resp arrest, she has never taken  Ok with cephalosporins     Rizatriptan Visual Disturbance     Droperidol Hives and Rash     Isovue [Iopamidol] Palpitations     Pt had racing heart and sob      Ketorolac Anxiety     Latex Swelling and Rash     Kiwi, likely also avacado, ? banana     Levsin Rash     Data  Most Recent 3 CBC's:  Recent Labs   Lab Test  01/28/17   0525  01/27/17   0518  01/26/17   1500   WBC  12.2*  15.6*  16.0*   HGB  9.6*  10.3*  12.2   MCV  79  79  78   PLT  376  459*  584*      Most Recent 3 BMP's:  Recent Labs   Lab Test  01/27/17   0518  01/26/17   1500  01/18/17   1644   NA  139  139  135   POTASSIUM   3.9  4.2  4.0   CHLORIDE  107  104  103   CO2  23  26  25   BUN  11  13  13   CR  0.82  0.96  0.91   ANIONGAP  9  9  7   TARA  8.1*  9.8  9.7   GLC  107*  147*  81     Most Recent 2 LFT's:  Recent Labs   Lab Test  01/26/17   1500  01/18/17   1644   AST  12  19   ALT  24  32   ALKPHOS  156*  166*   BILITOTAL  0.4  0.4     Most Recent Cholesterol Panel:  Recent Labs   Lab Test  01/19/16   0548   04/15/14   0755   CHOL   --    --   152   LDL   --    --   63   HDL   --    --   75   TRIG  135   < >  69    < > = values in this interval not displayed.     Most Recent 6 Bacteria Isolates From Any Culture (See EPIC Reports for Culture Details):  Recent Labs   Lab Test  01/26/17   2015  01/26/17   1835  01/26/17   1640  01/18/17   1644  01/18/17   1610  01/18/17   1609   CULT  <10,000 colonies/mL Mixed gram positive hipolito  No growth after 2 days  No growth after 2 days  No growth  No growth  Canceled, Test credited Duplicate request  Canceled, Test credited Duplicate request       Results for orders placed or performed during the hospital encounter of 01/26/17   Chest  XR, 1 view portable    Narrative    PORTABLE CHEST ONE VIEW   1/26/2017 5:09 PM     HISTORY: Elevated WBC.    COMPARISON: 11/10/2016      Impression    IMPRESSION: Normal.    ROSALVA MCDUFFIE MD   XR Abdomen 1 View    Narrative    ABDOMEN ONE VIEW  1/26/2017  5:09 PM     HISTORY: Elevated WBC    COMPARISON: None.    FINDINGS: There is a gastrostomy tube whose tip projects within small  bowel. The bowel gas pattern is within normal limits. No evidence to  suggest obstruction.    ROSALVA MCDUFFIE MD     *Note: Due to a large number of results and/or encounters for the requested time period, some results have not been displayed. A complete set of results can be found in Results Review.

## 2017-01-28 NOTE — PLAN OF CARE
Problem: Goal Outcome Summary  Goal: Goal Outcome Summary  Outcome: Adequate for Discharge Date Met:  01/28/17  S-(situation): Patient discharged to home via ambulatory with family    B-(background): abdominal pain    A-(assessment): patient reports pain tolerable with pain regimen of dilaudid and receiving oral zofran for nausea with relief of nausea.  VSS and afebrile.    R-(recommendations): Discharge instructions reviewed with patient . Listed belongings gathered and returned to patient.           Discharge Nursing Criteria:     Care Plan and Patient education resolved: Yes    New Medications- pt has been educated about purpose and side effects: Yes    Vaccines  Pneumonia Vaccine verified at discharge: Yes  Influenza status verified at discharge:  Yes      MISC  Prescriptions if needed, hard copies sent with patient  Yes  Home and hospital aquired medications returned to patient: NA  Medication Bin checked and emptied on discharge Yes  Patient reports post-discharge pain management plan is effective: Yes

## 2017-01-30 ENCOUNTER — CARE COORDINATION (OUTPATIENT)
Dept: CARE COORDINATION | Facility: CLINIC | Age: 37
End: 2017-01-30

## 2017-01-30 NOTE — Clinical Note
Health Care Home - Access Care Plan    About Me  Patient Name:  Doreen Peralta    YOB: 1980  Age:                            36 year old   Kristyn MRN:         [id number] Telephone Information:     Home Phone [phone]   Mobile [phone]       Address:    [address]   [place] [address] Email address:  [email or web address]      Emergency Contact(s)  Name Relationship Lgl Grd Work Phone Home Phone Mobile Phone   1. NIECY PERALTAR* Spouse No none [phone]   2. DEBI KENDRICK Mother No none [phone]             Health Maintenance:      My Access Plan  Medical Emergency 911   Questions or concerns during clinic hours Primary Clinic Line, I will call the clinic directly: Primary Clinic: [facility] [phone]   24 Hour Appointment Line [phone] or  [phone]  (toll free)   24 Hour Nurse Line [phone] (toll free)   Questions or concerns outside clinic hours 24 Hour Appointment Line, I will call the after-hours on-call line:   [facility] [phone] or [phone] (toll-free)   Preferred Urgent Care Preferred Urgent Care: [facility] [phone]   Preferred Hospital Preferred Hospital: [facility]  [phone]   Preferred Pharmacy [facility] [address] [place] [address] AT [address]     Behavioral Health Crisis Line [facility], [phone] or [phone]     My Care Team Members  Patient Care Team       Relationship Specialty Notifications Start End    Larisa Lorenzo PA-C PCP - General Physician Assistant  4/11/14     Phone: [phone] Fax: [phone]         [facility] [address] [place] [address]    Sheridan Harris PA-C Physician Assistant Physician Assistant  2/4/14     Phone: [phone] Fax: [phone]         [facility]  [facility] [address] [facility] [address]    Elsa Medel MD Referring Physician Colon and Rectal Surgery  11/13/15     Comment:  referring to interventional radiology    Phone: [phone] Fax: [phone]         [address] [facility] [address]    Crystal Pérez, RN Nurse Coordinator Colon and Rectal Surgery Admissions 11/19/15     Comment:  [phone]    Phone: [phone]         Yuri Wahl MD Physician Internal Medicine  9/13/16     Phone: [phone] Fax: [phone]         [address] [address]    Paul Mata MD MD Rheumatology  1/13/17     Comment:  Referred pt to ID    Phone: [phone] Fax: [phone]         [address] [address]    Todd Price MD Resident Student in organized health care education/training program  1/13/17     Phone: [phone] Fax: [phone]         [address] [address]        My Medical and Care Information  Problem List   Patient Active Problem List   Diagnosis     Constipation by delayed colonic transit     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     Hx SBO     S/P LEEP of cervix     Gastroparesis     Migraines     Intermittent asthma     Allergic rhinitis     Abnormal Pap smear of cervix     PEG (percutaneous endoscopic gastrostomy) status     Health Care Home     PEG tube malfunction (H)     Malfunction of gastrostomy tube (H)     Malfunctioning jejunostomy tube (H)     Jejunostomy tube present (H)     S/P partial resection of colon     Malnutrition (H)     Long-term (current) use of anticoagulants [Z79.01]     Anxiety     Anemia in other chronic diseases classified elsewhere     Munchausen syndrome - previously suspected     Anemia, iron deficiency     Mitral regurgitation mild-mod by Echo June 2016     Atopic rhinitis     Migraine     Patellofemoral stress syndrome     Hyperbilirubinemia     Chronic  diarrhea     Coagulation defect (H) [D68.9]     Attention to G-tube (H)     Chronic abdominal pain     Vitamin D deficiency     SIRS (systemic inflammatory response syndrome) (H)     History of deep venous thrombosis     Nausea and vomiting     Abdominal pain, generalized      Current Medications and Allergies:  See printed Medication Report

## 2017-01-30 NOTE — Clinical Note
[place] [facility]  [address] [facility] [address] [place] [address]  [phone]        January 30, 2017      Doreen Peralta  [address]   [place] [address]    Dear Doreen,  I am the Clinic Care Coordinator that works with your primary care provider's clinic. I wanted to introduce myself and provide you with my contact information for you to be able to call me with any questions or concerns. Below is a description of what Clinic Care Coordination is and how I can further assist you.     The Clinic Care Coordinator role is a Registered Nurse and/or  who understands the health care system. The goal of Clinic Care Coordination is to help you manage your health and improve access to the [facility] system in the most efficient manner.  The Registered Nurse can assist you in meeting your health care goals by providing education, coordinating services, and strengthening the communication among your providers. The  can assist you with financial, behavioral, psychosocial, and chemical dependency and counseling/psychiatric resources.    Please feel free to keep this letter and contact information to contact me at [phone], [phone] with any further questions or concerns that may arise. We at [facility] are focused on providing you with the highest-quality healthcare experience possible and that all starts with you.       Sincerely,     Melecio Smith RN  Clinic Care Coordinator    Enclosed: I have enclosed a copy of a 24 Hour Access Plan. This has helpful phone numbers for you to call when needed. Please keep this in an easy to access place to use as needed.

## 2017-01-30 NOTE — PROGRESS NOTES
Clinic Care Coordination Contact  [facility]/Voicemail    Referral Source: IP/TCU Report  Clinical Data: Care Coordinator Outreach  Outreach attempted x 1.  Left message on voicemail with call back information and requested return call.  Plan: Care Coordinator will mail out care coordination introduction letter with care coordinator contact information and explanation of care coordination services. Care Coordinator will try to reach patient again in 3-5 business days.  Melecio Smith RN  Clinic Care Coordinator  [phone] or [phone]

## 2017-01-31 ENCOUNTER — TELEPHONE (OUTPATIENT)
Dept: FAMILY MEDICINE | Facility: CLINIC | Age: 37
End: 2017-01-31

## 2017-01-31 DIAGNOSIS — F51.01 PRIMARY INSOMNIA: ICD-10-CM

## 2017-01-31 DIAGNOSIS — M79.2 NEUROPATHIC PAIN: Primary | ICD-10-CM

## 2017-01-31 DIAGNOSIS — R50.9 FEVER, UNSPECIFIED: ICD-10-CM

## 2017-01-31 LAB
ALBUMIN SERPL-MCNC: 3.1 G/DL (ref 3.4–5)
ALBUMIN UR-MCNC: NEGATIVE MG/DL
ALP SERPL-CCNC: 184 U/L (ref 40–150)
ALT SERPL W P-5'-P-CCNC: 73 U/L (ref 0–50)
ANION GAP SERPL CALCULATED.3IONS-SCNC: 9 MMOL/L (ref 3–14)
APPEARANCE UR: CLEAR
AST SERPL W P-5'-P-CCNC: 63 U/L (ref 0–45)
BACTERIA #/AREA URNS HPF: ABNORMAL /HPF
BASOPHILS # BLD AUTO: 0 10E9/L (ref 0–0.2)
BASOPHILS NFR BLD AUTO: 0.1 %
BILIRUB SERPL-MCNC: 0.7 MG/DL (ref 0.2–1.3)
BILIRUB UR QL STRIP: NEGATIVE
BUN SERPL-MCNC: 5 MG/DL (ref 7–30)
CALCIUM SERPL-MCNC: 8.5 MG/DL (ref 8.5–10.1)
CHLORIDE SERPL-SCNC: 103 MMOL/L (ref 94–109)
CO2 SERPL-SCNC: 24 MMOL/L (ref 20–32)
COLOR UR AUTO: YELLOW
CREAT SERPL-MCNC: 0.74 MG/DL (ref 0.52–1.04)
CRP SERPL-MCNC: 154 MG/L (ref 0–8)
DIFFERENTIAL METHOD BLD: ABNORMAL
EOSINOPHIL # BLD AUTO: 0 10E9/L (ref 0–0.7)
EOSINOPHIL NFR BLD AUTO: 0.2 %
ERYTHROCYTE [DISTWIDTH] IN BLOOD BY AUTOMATED COUNT: 18.3 % (ref 10–15)
ERYTHROCYTE [SEDIMENTATION RATE] IN BLOOD BY WESTERGREN METHOD: 54 MM/H (ref 0–20)
GFR SERPL CREATININE-BSD FRML MDRD: 89 ML/MIN/1.7M2
GLUCOSE SERPL-MCNC: 152 MG/DL (ref 70–99)
GLUCOSE UR STRIP-MCNC: NEGATIVE MG/DL
HCT VFR BLD AUTO: 32.9 % (ref 35–47)
HGB BLD-MCNC: 10.2 G/DL (ref 11.7–15.7)
HGB UR QL STRIP: NEGATIVE
IMM GRANULOCYTES # BLD: 0 10E9/L (ref 0–0.4)
IMM GRANULOCYTES NFR BLD: 0.2 %
KETONES UR STRIP-MCNC: NEGATIVE MG/DL
LEUKOCYTE ESTERASE UR QL STRIP: NEGATIVE
LYMPHOCYTES # BLD AUTO: 0.4 10E9/L (ref 0.8–5.3)
LYMPHOCYTES NFR BLD AUTO: 4.3 %
MCH RBC QN AUTO: 25 PG (ref 26.5–33)
MCHC RBC AUTO-ENTMCNC: 31 G/DL (ref 31.5–36.5)
MCV RBC AUTO: 81 FL (ref 78–100)
MONOCYTES # BLD AUTO: 0.1 10E9/L (ref 0–1.3)
MONOCYTES NFR BLD AUTO: 1.3 %
NEUTROPHILS # BLD AUTO: 8.6 10E9/L (ref 1.6–8.3)
NEUTROPHILS NFR BLD AUTO: 93.9 %
NITRATE UR QL: NEGATIVE
NON-SQ EPI CELLS #/AREA URNS LPF: ABNORMAL /LPF
PH UR STRIP: 7.5 PH (ref 5–7)
PLATELET # BLD AUTO: 297 10E9/L (ref 150–450)
POTASSIUM SERPL-SCNC: 3.8 MMOL/L (ref 3.4–5.3)
PROCALCITONIN SERPL-MCNC: 0.51 NG/ML
PROT SERPL-MCNC: 7.8 G/DL (ref 6.8–8.8)
RBC # BLD AUTO: 4.08 10E12/L (ref 3.8–5.2)
RBC #/AREA URNS AUTO: ABNORMAL /HPF (ref 0–2)
SODIUM SERPL-SCNC: 136 MMOL/L (ref 133–144)
SP GR UR STRIP: 1.01 (ref 1–1.03)
URN SPEC COLLECT METH UR: ABNORMAL
UROBILINOGEN UR STRIP-ACNC: 1 EU/DL (ref 0.2–1)
WBC # BLD AUTO: 9.1 10E9/L (ref 4–11)
WBC #/AREA URNS AUTO: ABNORMAL /HPF (ref 0–2)

## 2017-01-31 PROCEDURE — 87449 NOS EACH ORGANISM AG IA: CPT | Mod: 90 | Performed by: FAMILY MEDICINE

## 2017-01-31 PROCEDURE — 81001 URINALYSIS AUTO W/SCOPE: CPT | Performed by: FAMILY MEDICINE

## 2017-01-31 PROCEDURE — 99000 SPECIMEN HANDLING OFFICE-LAB: CPT | Performed by: FAMILY MEDICINE

## 2017-01-31 PROCEDURE — 86140 C-REACTIVE PROTEIN: CPT | Performed by: INTERNAL MEDICINE

## 2017-01-31 PROCEDURE — 36415 COLL VENOUS BLD VENIPUNCTURE: CPT | Performed by: FAMILY MEDICINE

## 2017-01-31 PROCEDURE — 80053 COMPREHEN METABOLIC PANEL: CPT | Performed by: INTERNAL MEDICINE

## 2017-01-31 PROCEDURE — 87385 HISTOPLASMA CAPSUL AG IA: CPT | Mod: 90 | Performed by: FAMILY MEDICINE

## 2017-01-31 PROCEDURE — 85025 COMPLETE CBC W/AUTO DIFF WBC: CPT | Performed by: FAMILY MEDICINE

## 2017-01-31 PROCEDURE — 85652 RBC SED RATE AUTOMATED: CPT | Performed by: FAMILY MEDICINE

## 2017-01-31 PROCEDURE — 84145 PROCALCITONIN (PCT): CPT | Performed by: INTERNAL MEDICINE

## 2017-01-31 RX ORDER — NORTRIPTYLINE HCL 10 MG
30-40 CAPSULE ORAL AT BEDTIME
Qty: 120 CAPSULE | Refills: 1 | Status: SHIPPED | OUTPATIENT
Start: 2017-01-31 | End: 2017-03-22

## 2017-01-31 NOTE — PROGRESS NOTES
"Doreen Peralta  Gender: female  : 1980  [address]   [place] [address]  [phone] (home)     Medical Record: [id number]  Pharmacy: [id number] - [place], [place] - [address] AT [facility] [address]  Primary Care Provider: Larisa Lorenzo    Parent's names are: Aby Rainey (mother) and Data Unavailable (father).      [facility]      Discharge Phone Call:  Key Words/Key Times      Introduction - AIDET (Acknowledge, Introduce, Duration, Explanation)      Empathy-   We are calling to see how you are since your recent stay in the hospital?     Call back COMMENTS: Doing a little better. Keeping food down, but still having some pain.      Clinical Questions -  (f/u appts, medication side effects/purpose, ability to care for self at home) \"For your safety, it is important to us that you understand the purpose and side effects of your medications, can you tell me what your new medications are?\"     Call back COMMENTS: zofran for nausea. Pain med makes me sleepy.      Staff Recognition -  We like to recognize staff and physicians who have done an excellent job.  Do you remember any people from your care team that you would like recognize?     Call back COMMENTS: All the nurses were great. Dr Lazcano is awesome.      Very Good Care -  We want to provide very good care to all patients.  How was your care?     Call back COMMENTS: pretty good      Opportunities for Improvement -  Our goal is to be the best.  Do you have any suggestions for things that we could improve upon?     Call back COMMENTS: I would have liked to be kept updated in the ER.       Thank You       "

## 2017-01-31 NOTE — TELEPHONE ENCOUNTER
Nortriptyline     Last Written Prescription Date: 01/08/17  Last Fill Quantity: 120, # refills: 3  Last Office Visit with [facility], [facility] or [facility] prescribing provider: 01/26/17        BP Readings from Last 3 Encounters:   01/28/17 128/73   01/18/17 138/91   01/09/17 129/79     Last PHQ-9 score on record=   PHQ-9 SCORE 8/3/2016   Total Score -   Total Score 7

## 2017-02-01 LAB
BACTERIA SPEC CULT: NORMAL
BACTERIA SPEC CULT: NORMAL
MICRO REPORT STATUS: NORMAL
MICRO REPORT STATUS: NORMAL
SPECIMEN SOURCE: NORMAL
SPECIMEN SOURCE: NORMAL

## 2017-02-02 LAB
LAB SCANNED RESULT: NORMAL

## 2017-02-03 ENCOUNTER — E-VISIT (OUTPATIENT)
Dept: INTERNAL MEDICINE | Facility: CLINIC | Age: 37
End: 2017-02-03

## 2017-02-03 DIAGNOSIS — Z53.9 ERRONEOUS ENCOUNTER--DISREGARD: Primary | ICD-10-CM

## 2017-02-03 NOTE — PROGRESS NOTES
Clinic Care Coordination Contact  [facility]/Voicemail    Referral Source: IP/TCU Report  Clinical Data: Care Coordinator Outreach  Outreach attempted x 2.  Left message on voicemail with call back information and requested return call.  Plan: Care Coordinator mailed out care coordination introduction letter on 1-30. Care Coordinator will do no further outreaches at this time.  Melecio Smith RN  Clinic Care Coordinator  [phone] or [phone]

## 2017-02-05 LAB — LAB SCANNED RESULT: NORMAL

## 2017-02-06 ENCOUNTER — HOSPITAL ENCOUNTER (EMERGENCY)
Facility: CLINIC | Age: 37
Discharge: ANOTHER HOSPITAL | End: 2017-02-06
Attending: NURSE PRACTITIONER | Admitting: NURSE PRACTITIONER
Payer: COMMERCIAL

## 2017-02-06 VITALS
SYSTOLIC BLOOD PRESSURE: 125 MMHG | OXYGEN SATURATION: 99 % | BODY MASS INDEX: 29.07 KG/M2 | RESPIRATION RATE: 28 BRPM | WEIGHT: 180 LBS | HEART RATE: 107 BPM | TEMPERATURE: 100.7 F | DIASTOLIC BLOOD PRESSURE: 64 MMHG

## 2017-02-06 DIAGNOSIS — K31.84 GASTROPARESIS: ICD-10-CM

## 2017-02-06 DIAGNOSIS — R10.9 CHRONIC ABDOMINAL PAIN: ICD-10-CM

## 2017-02-06 DIAGNOSIS — A41.9 SEPSIS, DUE TO UNSPECIFIED ORGANISM: Primary | ICD-10-CM

## 2017-02-06 DIAGNOSIS — G89.29 CHRONIC ABDOMINAL PAIN: ICD-10-CM

## 2017-02-06 LAB
ALBUMIN SERPL-MCNC: 3.2 G/DL (ref 3.4–5)
ALBUMIN UR-MCNC: NEGATIVE MG/DL
ALP SERPL-CCNC: 269 U/L (ref 40–150)
ALT SERPL W P-5'-P-CCNC: 51 U/L (ref 0–50)
ANION GAP SERPL CALCULATED.3IONS-SCNC: 9 MMOL/L (ref 3–14)
APPEARANCE UR: CLEAR
AST SERPL W P-5'-P-CCNC: 46 U/L (ref 0–45)
BASOPHILS # BLD AUTO: 0 10E9/L (ref 0–0.2)
BASOPHILS NFR BLD AUTO: 0.1 %
BILIRUB SERPL-MCNC: 0.8 MG/DL (ref 0.2–1.3)
BILIRUB UR QL STRIP: NEGATIVE
BUN SERPL-MCNC: 6 MG/DL (ref 7–30)
CALCIUM SERPL-MCNC: 9.3 MG/DL (ref 8.5–10.1)
CHLORIDE SERPL-SCNC: 104 MMOL/L (ref 94–109)
CO2 SERPL-SCNC: 25 MMOL/L (ref 20–32)
COLOR UR AUTO: YELLOW
CREAT SERPL-MCNC: 0.85 MG/DL (ref 0.52–1.04)
DIFFERENTIAL METHOD BLD: ABNORMAL
EOSINOPHIL # BLD AUTO: 0 10E9/L (ref 0–0.7)
EOSINOPHIL NFR BLD AUTO: 0.1 %
ERYTHROCYTE [DISTWIDTH] IN BLOOD BY AUTOMATED COUNT: 18.1 % (ref 10–15)
FLUAV+FLUBV AG SPEC QL: NEGATIVE
FLUAV+FLUBV AG SPEC QL: NORMAL
GFR SERPL CREATININE-BSD FRML MDRD: 76 ML/MIN/1.7M2
GLUCOSE SERPL-MCNC: 102 MG/DL (ref 70–99)
GLUCOSE UR STRIP-MCNC: NEGATIVE MG/DL
HCG UR QL: NEGATIVE
HCT VFR BLD AUTO: 32.7 % (ref 35–47)
HGB BLD-MCNC: 10.5 G/DL (ref 11.7–15.7)
HGB UR QL STRIP: ABNORMAL
IMM GRANULOCYTES # BLD: 0.1 10E9/L (ref 0–0.4)
IMM GRANULOCYTES NFR BLD: 0.4 %
KETONES UR STRIP-MCNC: NEGATIVE MG/DL
LACTATE BLD-SCNC: 1.9 MMOL/L (ref 0.7–2.1)
LACTATE BLD-SCNC: 2 MMOL/L (ref 0.7–2.1)
LEUKOCYTE ESTERASE UR QL STRIP: NEGATIVE
LYMPHOCYTES # BLD AUTO: 0.8 10E9/L (ref 0.8–5.3)
LYMPHOCYTES NFR BLD AUTO: 4.2 %
MCH RBC QN AUTO: 24.6 PG (ref 26.5–33)
MCHC RBC AUTO-ENTMCNC: 32.1 G/DL (ref 31.5–36.5)
MCV RBC AUTO: 77 FL (ref 78–100)
MONOCYTES # BLD AUTO: 0.8 10E9/L (ref 0–1.3)
MONOCYTES NFR BLD AUTO: 4.5 %
NEUTROPHILS # BLD AUTO: 16.4 10E9/L (ref 1.6–8.3)
NEUTROPHILS NFR BLD AUTO: 90.7 %
NITRATE UR QL: NEGATIVE
NON-SQ EPI CELLS #/AREA URNS LPF: ABNORMAL /LPF
PH UR STRIP: 8 PH (ref 5–7)
PLATELET # BLD AUTO: 382 10E9/L (ref 150–450)
POTASSIUM SERPL-SCNC: 3.9 MMOL/L (ref 3.4–5.3)
PROT SERPL-MCNC: 8.4 G/DL (ref 6.8–8.8)
RBC # BLD AUTO: 4.26 10E12/L (ref 3.8–5.2)
RBC #/AREA URNS AUTO: ABNORMAL /HPF (ref 0–2)
SODIUM SERPL-SCNC: 138 MMOL/L (ref 133–144)
SP GR UR STRIP: 1.01 (ref 1–1.03)
SPECIMEN SOURCE: NORMAL
URN SPEC COLLECT METH UR: ABNORMAL
UROBILINOGEN UR STRIP-ACNC: 0.2 EU/DL (ref 0.2–1)
WBC # BLD AUTO: 18.1 10E9/L (ref 4–11)
WBC #/AREA URNS AUTO: ABNORMAL /HPF (ref 0–2)

## 2017-02-06 PROCEDURE — 87077 CULTURE AEROBIC IDENTIFY: CPT | Performed by: NURSE PRACTITIONER

## 2017-02-06 PROCEDURE — 96367 TX/PROPH/DG ADDL SEQ IV INF: CPT

## 2017-02-06 PROCEDURE — 80053 COMPREHEN METABOLIC PANEL: CPT | Performed by: NURSE PRACTITIONER

## 2017-02-06 PROCEDURE — 96366 THER/PROPH/DIAG IV INF ADDON: CPT

## 2017-02-06 PROCEDURE — 96376 TX/PRO/DX INJ SAME DRUG ADON: CPT

## 2017-02-06 PROCEDURE — 25000125 ZZHC RX 250: Performed by: NURSE PRACTITIONER

## 2017-02-06 PROCEDURE — 87040 BLOOD CULTURE FOR BACTERIA: CPT | Performed by: NURSE PRACTITIONER

## 2017-02-06 PROCEDURE — 99285 EMERGENCY DEPT VISIT HI MDM: CPT | Mod: 25

## 2017-02-06 PROCEDURE — 83605 ASSAY OF LACTIC ACID: CPT | Performed by: NURSE PRACTITIONER

## 2017-02-06 PROCEDURE — 87804 INFLUENZA ASSAY W/OPTIC: CPT | Performed by: NURSE PRACTITIONER

## 2017-02-06 PROCEDURE — 36415 COLL VENOUS BLD VENIPUNCTURE: CPT | Performed by: NURSE PRACTITIONER

## 2017-02-06 PROCEDURE — 87186 SC STD MICRODIL/AGAR DIL: CPT | Performed by: NURSE PRACTITIONER

## 2017-02-06 PROCEDURE — 25000132 ZZH RX MED GY IP 250 OP 250 PS 637: Performed by: NURSE PRACTITIONER

## 2017-02-06 PROCEDURE — 25000128 H RX IP 250 OP 636: Performed by: NURSE PRACTITIONER

## 2017-02-06 PROCEDURE — 96375 TX/PRO/DX INJ NEW DRUG ADDON: CPT

## 2017-02-06 PROCEDURE — 81025 URINE PREGNANCY TEST: CPT | Performed by: NURSE PRACTITIONER

## 2017-02-06 PROCEDURE — 96361 HYDRATE IV INFUSION ADD-ON: CPT

## 2017-02-06 PROCEDURE — 96365 THER/PROPH/DIAG IV INF INIT: CPT

## 2017-02-06 PROCEDURE — 85025 COMPLETE CBC W/AUTO DIFF WBC: CPT | Performed by: NURSE PRACTITIONER

## 2017-02-06 PROCEDURE — 81001 URINALYSIS AUTO W/SCOPE: CPT | Performed by: NURSE PRACTITIONER

## 2017-02-06 PROCEDURE — 99284 EMERGENCY DEPT VISIT MOD MDM: CPT | Performed by: EMERGENCY MEDICINE

## 2017-02-06 RX ORDER — ACETAMINOPHEN 650 MG/1
650 SUPPOSITORY RECTAL ONCE
Status: COMPLETED | OUTPATIENT
Start: 2017-02-06 | End: 2017-02-06

## 2017-02-06 RX ORDER — ERTAPENEM 1 G/1
1 INJECTION, POWDER, LYOPHILIZED, FOR SOLUTION INTRAMUSCULAR; INTRAVENOUS ONCE
Status: COMPLETED | OUTPATIENT
Start: 2017-02-06 | End: 2017-02-06

## 2017-02-06 RX ORDER — SODIUM CHLORIDE 9 MG/ML
1000 INJECTION, SOLUTION INTRAVENOUS CONTINUOUS
Status: DISCONTINUED | OUTPATIENT
Start: 2017-02-06 | End: 2017-02-06 | Stop reason: HOSPADM

## 2017-02-06 RX ORDER — ONDANSETRON 2 MG/ML
4 INJECTION INTRAMUSCULAR; INTRAVENOUS EVERY 30 MIN PRN
Status: DISCONTINUED | OUTPATIENT
Start: 2017-02-06 | End: 2017-02-06 | Stop reason: HOSPADM

## 2017-02-06 RX ORDER — DEXAMETHASONE SODIUM PHOSPHATE 10 MG/ML
10 INJECTION, SOLUTION INTRAMUSCULAR; INTRAVENOUS ONCE
Status: COMPLETED | OUTPATIENT
Start: 2017-02-06 | End: 2017-02-06

## 2017-02-06 RX ORDER — LORAZEPAM 2 MG/ML
1 INJECTION INTRAMUSCULAR ONCE
Status: COMPLETED | OUTPATIENT
Start: 2017-02-06 | End: 2017-02-06

## 2017-02-06 RX ORDER — DIPHENHYDRAMINE HYDROCHLORIDE 50 MG/ML
25 INJECTION INTRAMUSCULAR; INTRAVENOUS ONCE
Status: COMPLETED | OUTPATIENT
Start: 2017-02-06 | End: 2017-02-06

## 2017-02-06 RX ADMIN — HYDROMORPHONE HYDROCHLORIDE 1 MG: 1 INJECTION, SOLUTION INTRAMUSCULAR; INTRAVENOUS; SUBCUTANEOUS at 18:14

## 2017-02-06 RX ADMIN — LORAZEPAM 1 MG: 2 INJECTION INTRAMUSCULAR; INTRAVENOUS at 17:30

## 2017-02-06 RX ADMIN — DEXAMETHASONE SODIUM PHOSPHATE 10 MG: 10 INJECTION, SOLUTION INTRAMUSCULAR; INTRAVENOUS at 19:25

## 2017-02-06 RX ADMIN — HYDROMORPHONE HYDROCHLORIDE 1 MG: 1 INJECTION, SOLUTION INTRAMUSCULAR; INTRAVENOUS; SUBCUTANEOUS at 17:05

## 2017-02-06 RX ADMIN — VANCOMYCIN HYDROCHLORIDE 1500 MG: 1 INJECTION, POWDER, LYOPHILIZED, FOR SOLUTION INTRAVENOUS at 18:14

## 2017-02-06 RX ADMIN — ERTAPENEM SODIUM 1 G: 1 INJECTION, POWDER, LYOPHILIZED, FOR SOLUTION INTRAMUSCULAR; INTRAVENOUS at 17:29

## 2017-02-06 RX ADMIN — DIPHENHYDRAMINE HYDROCHLORIDE 25 MG: 50 INJECTION, SOLUTION INTRAMUSCULAR; INTRAVENOUS at 18:14

## 2017-02-06 RX ADMIN — SODIUM CHLORIDE 1000 ML: 9 INJECTION, SOLUTION INTRAVENOUS at 16:55

## 2017-02-06 RX ADMIN — HYDROMORPHONE HYDROCHLORIDE 1 MG: 1 INJECTION, SOLUTION INTRAMUSCULAR; INTRAVENOUS; SUBCUTANEOUS at 17:29

## 2017-02-06 RX ADMIN — SODIUM CHLORIDE 1000 ML: 9 INJECTION, SOLUTION INTRAVENOUS at 18:14

## 2017-02-06 RX ADMIN — ONDANSETRON HYDROCHLORIDE 4 MG: 2 SOLUTION INTRAMUSCULAR; INTRAVENOUS at 16:52

## 2017-02-06 RX ADMIN — DIPHENHYDRAMINE HYDROCHLORIDE 25 MG: 50 INJECTION, SOLUTION INTRAMUSCULAR; INTRAVENOUS at 16:52

## 2017-02-06 RX ADMIN — ACETAMINOPHEN 650 MG: 325 SUPPOSITORY RECTAL at 16:52

## 2017-02-06 RX ADMIN — HYDROMORPHONE HYDROCHLORIDE 1 MG: 1 INJECTION, SOLUTION INTRAMUSCULAR; INTRAVENOUS; SUBCUTANEOUS at 19:51

## 2017-02-06 RX ADMIN — HYDROMORPHONE HYDROCHLORIDE 1 MG: 1 INJECTION, SOLUTION INTRAMUSCULAR; INTRAVENOUS; SUBCUTANEOUS at 16:51

## 2017-02-06 ASSESSMENT — ENCOUNTER SYMPTOMS
EYES NEGATIVE: 1
FEVER: 1
ROS SKIN COMMENTS: FLUSHED
NAUSEA: 1
RESPIRATORY NEGATIVE: 1
ACTIVITY CHANGE: 1
MYALGIAS: 1
ABDOMINAL PAIN: 1
HEADACHES: 1

## 2017-02-06 NOTE — ED PROVIDER NOTES
History     Chief Complaint   Patient presents with     Fever     Headache     The history is provided by the patient.     Doreen Peralta is a 36 year old female who presents to the emergency department today via EMS with complaints of abdominal pain headache and fever upwards of 104 at home today.  Patient was just discharged on January 28 after being admitted for SIRS, she was started on antibiotics empirically and after having no source of infection the antibiotics were stopped, it was felt that her elevated lactic acid and leukocytosis was secondary to significant dehydration.  Patient reports nausea, she reports normal bowel movements, she denies a sore throat, she denies any neck pain.    I have reviewed the Medications, Allergies, Past Medical and [facility] History, and Social History in the Epic system.    Review of Systems   Constitutional: Positive for fever and activity change.   Eyes: Negative.    Respiratory: Negative.    Gastrointestinal: Positive for nausea and abdominal pain.   Musculoskeletal: Positive for myalgias.   Skin:        Flushed   Neurological: Positive for headaches.   All other systems reviewed and are negative.      Physical Exam   BP: 124/72 mmHg  Pulse: 107  Temp: 101.3  F (38.5  C)  Resp: 22  Weight: 81.647 kg (180 lb)  SpO2: 97 %  Physical Exam   Constitutional: She appears distressed (Secondary to pain).   HENT:   Head: Normocephalic.   Mouth/Throat: No oropharyngeal exudate.   Modestly dry mucous membranes   Neck: Normal range of motion. No rigidity.   Cardiovascular: Regular rhythm, normal heart sounds and intact distal pulses.    Mildly tachycardic but normotensive   Pulmonary/Chest: Effort normal and breath sounds normal. No respiratory distress.   Abdominal: Soft. There is tenderness (significant, generalized, increased on right side G and J-tube sites are unremarkable).   Musculoskeletal: Normal range of motion.   Lymphadenopathy:     She has no cervical adenopathy.    Neurological: She is alert.   Psychiatric:   Screaming in pain       ED Course   Procedures        Assessments & Plan (with Medical Decision Making)  Neck: Is a 36-year-old female with a complicated medical history including gastroparesis, colectomy, and placement of the J-tube/G-tube who presents to the emergency department today with increased abdominal pain, fever, nausea and headache.  Patient arrives here via EMS, she is crying because she reports her stomach pain is severe, she denies any respiratory complaints, she denies a sore throat, she denies any dysuria, she reports she has been stooling normally.  Patient was just discharged from our facility on January 28 after being treated for sepsis, she was taken off of antibiotics as blood cultures return negative and no source at that time was identified.  Patient on arrival here has a temperature of 101.3.  Peripheral IV was established and patient was given a liter of fluid with Dilaudid for pain and Zofran for nausea.  CBC reveals a white count of 18.1 with a left shift, hemoglobin is stable at 10.5.  Lactic acid returned at the upper limits of normal at 2.  She was swabbed for influenza given her fever and time of year with combined headache, this is negative.  Urinalysis was obtained and is negative, urine pregnancy is negative.  Comprehensive metabolic panel is consistent with a transaminitis with a mildly elevated ALT and AST and alkaline phosphatase of 269.  Patient has had multiple doses of pain medications while here, she has received 2 L normal saline, she has had Ativan for anxiety and Zofran for nausea.  Based on patient's septic status I feel she is appropriate for admission, I discussed patient with the hospitalist Dr. Mario, he voices concern about bringing patient into our facility as she continues to present with these fevers, leukocytosis and sepsis state with no evidence of bacterial infection or source.  Patient has established a  "infectious disease doctor at the [facility], Dr. Price.  It is felt at this time, patient will be better suited at the [facility] with infectious disease involvement and possibly more specialties.  I feel this is reasonable and in patient's best interest.  Patient was initially accepted at the [facility] by Dr. Bradshaw.  I was contacted later and informed that they are full and unable to accept patient, [facility] was contacted as they also have infectious disease, they accepted patient, I discussed this with the patient who begin to cry and refuses to be transferred to [facility] with reported prior \"bad\" experiences there, \"they almost killed me\".  At this time, patient likely needs to go outside of the [facility] system due to lack availability and refusal to be transferred to available hospital.  Patient and her mom both feel that [facility] would be acceptable.  I discussed patient with Dr. Rios who was gracious enough to accept patient for transfer.  Patient and mom are updated on plan of care, patient will be transferred in stable condition via ALS ground transport.  Patient was staffed in the emergency department with Dr. Lam.       I have reviewed the nursing notes.    I have reviewed the findings, diagnosis, plan and need for follow up with the patient.    New Prescriptions    No medications on file       Final diagnoses:   Sepsis, due to unspecified organism (H)   Gastroparesis   Chronic abdominal pain       2/6/2017   [facility] EMERGENCY DEPARTMENT      Shaunna Raines APRN CNP  02/06/17 2025    Shaunna Rainse APRN CNP  02/06/17 2025  "

## 2017-02-06 NOTE — CONSULTS
Doreen Peralta is a 36 year old female, Vancomycin dose: 1500 mg IV Q12H  Indication: Sepsis  Day of Therapy: 1  Renal Function: Stable  Goal Trough Level: 15-20 mg/L  Plan: Continue Current Dose  Will continue to follow daily and check levels as appropriate in 1-3 Days.  Avinash Jennings, [facility]

## 2017-02-07 NOTE — ED NOTES
"Pt states \"I didn't feel that benadryl go in my IV, what dosage did you give me.\" Pt told given 25 mg. \"I usually get 50 mg, I need a lot of medication before I feel better.\"   "

## 2017-02-07 NOTE — ED NOTES
"During entire ED stay pt has been chilled then flushed. \"This is the worst I have ever felt.\" Pt given emotional support. Pt requesting benadryl at this time. Pt states decadron has helped headache a little bit. Last temp 101.7. Antibiotics infused.   "

## 2017-02-09 LAB
BACTERIA SPEC CULT: ABNORMAL
Lab: 30
MICRO REPORT STATUS: ABNORMAL
MICROORGANISM SPEC CULT: ABNORMAL
SPECIMEN SOURCE: ABNORMAL

## 2017-02-12 LAB
BACTERIA SPEC CULT: NORMAL
Lab: 3392
MICRO REPORT STATUS: NORMAL
SPECIMEN SOURCE: NORMAL

## 2017-02-15 ENCOUNTER — HOSPITAL ENCOUNTER (INPATIENT)
Facility: CLINIC | Age: 37
LOS: 7 days | Discharge: HOME HEALTH | DRG: 394 | End: 2017-02-22
Attending: EMERGENCY MEDICINE | Admitting: INTERNAL MEDICINE
Payer: COMMERCIAL

## 2017-02-15 ENCOUNTER — OFFICE VISIT (OUTPATIENT)
Dept: INFECTIOUS DISEASES | Facility: CLINIC | Age: 37
End: 2017-02-15
Attending: COLON & RECTAL SURGERY
Payer: COMMERCIAL

## 2017-02-15 VITALS
DIASTOLIC BLOOD PRESSURE: 80 MMHG | HEART RATE: 86 BPM | SYSTOLIC BLOOD PRESSURE: 116 MMHG | RESPIRATION RATE: 18 BRPM | TEMPERATURE: 98.9 F

## 2017-02-15 DIAGNOSIS — R78.81 BACTEREMIA: ICD-10-CM

## 2017-02-15 DIAGNOSIS — R50.9 FEVER OF UNKNOWN ORIGIN: Primary | ICD-10-CM

## 2017-02-15 DIAGNOSIS — R10.84 ABDOMINAL PAIN, GENERALIZED: ICD-10-CM

## 2017-02-15 DIAGNOSIS — D72.829 LEUKOCYTOSIS, UNSPECIFIED TYPE: ICD-10-CM

## 2017-02-15 PROBLEM — R10.9 ABDOMINAL PAIN: Status: ACTIVE | Noted: 2017-02-15

## 2017-02-15 LAB
ALBUMIN SERPL-MCNC: 3.5 G/DL (ref 3.4–5)
ALBUMIN UR-MCNC: NEGATIVE MG/DL
ALP SERPL-CCNC: 164 U/L (ref 40–150)
ALT SERPL W P-5'-P-CCNC: 31 U/L (ref 0–50)
ANION GAP SERPL CALCULATED.3IONS-SCNC: 8 MMOL/L (ref 3–14)
APPEARANCE UR: CLEAR
AST SERPL W P-5'-P-CCNC: 20 U/L (ref 0–45)
BASOPHILS # BLD AUTO: 0 10E9/L (ref 0–0.2)
BASOPHILS NFR BLD AUTO: 0.2 %
BILIRUB SERPL-MCNC: 0.8 MG/DL (ref 0.2–1.3)
BILIRUB UR QL STRIP: NEGATIVE
BUN SERPL-MCNC: 11 MG/DL (ref 7–30)
CALCIUM SERPL-MCNC: 8.9 MG/DL (ref 8.5–10.1)
CHLORIDE SERPL-SCNC: 103 MMOL/L (ref 94–109)
CO2 SERPL-SCNC: 25 MMOL/L (ref 20–32)
COLOR UR AUTO: ABNORMAL
CREAT SERPL-MCNC: 0.94 MG/DL (ref 0.52–1.04)
CRP SERPL-MCNC: 8.3 MG/L (ref 0–8)
DIFFERENTIAL METHOD BLD: ABNORMAL
EOSINOPHIL # BLD AUTO: 0.1 10E9/L (ref 0–0.7)
EOSINOPHIL NFR BLD AUTO: 0.3 %
ERYTHROCYTE [DISTWIDTH] IN BLOOD BY AUTOMATED COUNT: 17.2 % (ref 10–15)
ERYTHROCYTE [SEDIMENTATION RATE] IN BLOOD BY WESTERGREN METHOD: 71 MM/H (ref 0–20)
ERYTHROCYTE [SEDIMENTATION RATE] IN BLOOD BY WESTERGREN METHOD: 82 MM/H (ref 0–20)
GFR SERPL CREATININE-BSD FRML MDRD: 68 ML/MIN/1.7M2
GLUCOSE BLDC GLUCOMTR-MCNC: 91 MG/DL (ref 70–99)
GLUCOSE SERPL-MCNC: 79 MG/DL (ref 70–99)
GLUCOSE UR STRIP-MCNC: NEGATIVE MG/DL
HCT VFR BLD AUTO: 31.7 % (ref 35–47)
HGB BLD-MCNC: 9.9 G/DL (ref 11.7–15.7)
HGB UR QL STRIP: NEGATIVE
IMM GRANULOCYTES # BLD: 0 10E9/L (ref 0–0.4)
IMM GRANULOCYTES NFR BLD: 0.2 %
KETONES UR STRIP-MCNC: NEGATIVE MG/DL
LACTATE BLD-SCNC: 1.6 MMOL/L (ref 0.7–2.1)
LEUKOCYTE ESTERASE UR QL STRIP: NEGATIVE
LYMPHOCYTES # BLD AUTO: 1.8 10E9/L (ref 0.8–5.3)
LYMPHOCYTES NFR BLD AUTO: 10.5 %
MCH RBC QN AUTO: 24.3 PG (ref 26.5–33)
MCHC RBC AUTO-ENTMCNC: 31.2 G/DL (ref 31.5–36.5)
MCV RBC AUTO: 78 FL (ref 78–100)
MONOCYTES # BLD AUTO: 0.8 10E9/L (ref 0–1.3)
MONOCYTES NFR BLD AUTO: 4.9 %
MUCOUS THREADS #/AREA URNS LPF: PRESENT /LPF
NEUTROPHILS # BLD AUTO: 14.3 10E9/L (ref 1.6–8.3)
NEUTROPHILS NFR BLD AUTO: 83.9 %
NITRATE UR QL: NEGATIVE
NRBC # BLD AUTO: 0.1 10*3/UL
NRBC BLD AUTO-RTO: 0 /100
PH UR STRIP: 6.5 PH (ref 5–7)
PLATELET # BLD AUTO: 639 10E9/L (ref 150–450)
POTASSIUM SERPL-SCNC: 3.5 MMOL/L (ref 3.4–5.3)
PROCALCITONIN SERPL-MCNC: 0.26 NG/ML
PROT SERPL-MCNC: 8.9 G/DL (ref 6.8–8.8)
RBC # BLD AUTO: 4.07 10E12/L (ref 3.8–5.2)
RBC #/AREA URNS AUTO: <1 /HPF (ref 0–2)
SODIUM SERPL-SCNC: 136 MMOL/L (ref 133–144)
SP GR UR STRIP: 1.01 (ref 1–1.03)
SQUAMOUS #/AREA URNS AUTO: 1 /HPF (ref 0–1)
URN SPEC COLLECT METH UR: ABNORMAL
UROBILINOGEN UR STRIP-MCNC: NORMAL MG/DL (ref 0–2)
WBC # BLD AUTO: 17 10E9/L (ref 4–11)
WBC #/AREA URNS AUTO: 1 /HPF (ref 0–2)

## 2017-02-15 PROCEDURE — 87385 HISTOPLASMA CAPSUL AG IA: CPT | Performed by: STUDENT IN AN ORGANIZED HEALTH CARE EDUCATION/TRAINING PROGRAM

## 2017-02-15 PROCEDURE — 96376 TX/PRO/DX INJ SAME DRUG ADON: CPT | Performed by: EMERGENCY MEDICINE

## 2017-02-15 PROCEDURE — 87186 SC STD MICRODIL/AGAR DIL: CPT | Performed by: EMERGENCY MEDICINE

## 2017-02-15 PROCEDURE — 96375 TX/PRO/DX INJ NEW DRUG ADDON: CPT | Performed by: EMERGENCY MEDICINE

## 2017-02-15 PROCEDURE — 83605 ASSAY OF LACTIC ACID: CPT | Performed by: EMERGENCY MEDICINE

## 2017-02-15 PROCEDURE — 80053 COMPREHEN METABOLIC PANEL: CPT | Performed by: EMERGENCY MEDICINE

## 2017-02-15 PROCEDURE — 80307 DRUG TEST PRSMV CHEM ANLYZR: CPT | Performed by: STUDENT IN AN ORGANIZED HEALTH CARE EDUCATION/TRAINING PROGRAM

## 2017-02-15 PROCEDURE — 85025 COMPLETE CBC W/AUTO DIFF WBC: CPT | Performed by: EMERGENCY MEDICINE

## 2017-02-15 PROCEDURE — 96361 HYDRATE IV INFUSION ADD-ON: CPT | Performed by: EMERGENCY MEDICINE

## 2017-02-15 PROCEDURE — 12000008 ZZH R&B INTERMEDIATE UMMC

## 2017-02-15 PROCEDURE — 25000128 H RX IP 250 OP 636: Performed by: EMERGENCY MEDICINE

## 2017-02-15 PROCEDURE — 87077 CULTURE AEROBIC IDENTIFY: CPT | Performed by: EMERGENCY MEDICINE

## 2017-02-15 PROCEDURE — 85652 RBC SED RATE AUTOMATED: CPT | Performed by: STUDENT IN AN ORGANIZED HEALTH CARE EDUCATION/TRAINING PROGRAM

## 2017-02-15 PROCEDURE — 87800 DETECT AGNT MULT DNA DIREC: CPT | Performed by: EMERGENCY MEDICINE

## 2017-02-15 PROCEDURE — 99285 EMERGENCY DEPT VISIT HI MDM: CPT | Performed by: EMERGENCY MEDICINE

## 2017-02-15 PROCEDURE — 99223 1ST HOSP IP/OBS HIGH 75: CPT | Mod: AI | Performed by: INTERNAL MEDICINE

## 2017-02-15 PROCEDURE — 80320 DRUG SCREEN QUANTALCOHOLS: CPT | Performed by: STUDENT IN AN ORGANIZED HEALTH CARE EDUCATION/TRAINING PROGRAM

## 2017-02-15 PROCEDURE — 87086 URINE CULTURE/COLONY COUNT: CPT | Performed by: EMERGENCY MEDICINE

## 2017-02-15 PROCEDURE — 99213 OFFICE O/P EST LOW 20 MIN: CPT | Mod: ZF

## 2017-02-15 PROCEDURE — 00000146 ZZHCL STATISTIC GLUCOSE BY METER IP

## 2017-02-15 PROCEDURE — 36415 COLL VENOUS BLD VENIPUNCTURE: CPT | Performed by: STUDENT IN AN ORGANIZED HEALTH CARE EDUCATION/TRAINING PROGRAM

## 2017-02-15 PROCEDURE — 85652 RBC SED RATE AUTOMATED: CPT | Performed by: EMERGENCY MEDICINE

## 2017-02-15 PROCEDURE — 25000125 ZZHC RX 250: Performed by: STUDENT IN AN ORGANIZED HEALTH CARE EDUCATION/TRAINING PROGRAM

## 2017-02-15 PROCEDURE — 84145 PROCALCITONIN (PCT): CPT | Performed by: EMERGENCY MEDICINE

## 2017-02-15 PROCEDURE — 25000132 ZZH RX MED GY IP 250 OP 250 PS 637: Performed by: STUDENT IN AN ORGANIZED HEALTH CARE EDUCATION/TRAINING PROGRAM

## 2017-02-15 PROCEDURE — 99285 EMERGENCY DEPT VISIT HI MDM: CPT | Mod: Z6 | Performed by: EMERGENCY MEDICINE

## 2017-02-15 PROCEDURE — 81001 URINALYSIS AUTO W/SCOPE: CPT | Performed by: EMERGENCY MEDICINE

## 2017-02-15 PROCEDURE — 87040 BLOOD CULTURE FOR BACTERIA: CPT | Performed by: EMERGENCY MEDICINE

## 2017-02-15 PROCEDURE — 96365 THER/PROPH/DIAG IV INF INIT: CPT | Performed by: EMERGENCY MEDICINE

## 2017-02-15 PROCEDURE — 82308 ASSAY OF CALCITONIN: CPT | Performed by: EMERGENCY MEDICINE

## 2017-02-15 PROCEDURE — 25000128 H RX IP 250 OP 636: Performed by: STUDENT IN AN ORGANIZED HEALTH CARE EDUCATION/TRAINING PROGRAM

## 2017-02-15 PROCEDURE — 86140 C-REACTIVE PROTEIN: CPT | Performed by: EMERGENCY MEDICINE

## 2017-02-15 RX ORDER — NORTRIPTYLINE HCL 10 MG
30-40 CAPSULE ORAL AT BEDTIME
Status: DISCONTINUED | OUTPATIENT
Start: 2017-02-15 | End: 2017-02-22 | Stop reason: HOSPADM

## 2017-02-15 RX ORDER — ALBUTEROL SULFATE 90 UG/1
2 AEROSOL, METERED RESPIRATORY (INHALATION) 4 TIMES DAILY PRN
Status: DISCONTINUED | OUTPATIENT
Start: 2017-02-15 | End: 2017-02-22 | Stop reason: HOSPADM

## 2017-02-15 RX ORDER — CEFTRIAXONE 2 G/1
2 INJECTION, POWDER, FOR SOLUTION INTRAMUSCULAR; INTRAVENOUS EVERY 24 HOURS
Status: DISCONTINUED | OUTPATIENT
Start: 2017-02-15 | End: 2017-02-17

## 2017-02-15 RX ORDER — HYDROMORPHONE HYDROCHLORIDE 1 MG/ML
0.5 INJECTION, SOLUTION INTRAMUSCULAR; INTRAVENOUS; SUBCUTANEOUS ONCE
Status: COMPLETED | OUTPATIENT
Start: 2017-02-15 | End: 2017-02-15

## 2017-02-15 RX ORDER — MIRTAZAPINE 15 MG/1
7.5 TABLET, ORALLY DISINTEGRATING ORAL AT BEDTIME
Status: DISCONTINUED | OUTPATIENT
Start: 2017-02-15 | End: 2017-02-22 | Stop reason: HOSPADM

## 2017-02-15 RX ORDER — ACETAMINOPHEN 325 MG/1
650 TABLET ORAL EVERY 4 HOURS PRN
Status: DISCONTINUED | OUTPATIENT
Start: 2017-02-15 | End: 2017-02-22 | Stop reason: HOSPADM

## 2017-02-15 RX ORDER — HYDROMORPHONE HYDROCHLORIDE 2 MG/1
2-4 TABLET ORAL EVERY 4 HOURS PRN
Status: DISCONTINUED | OUTPATIENT
Start: 2017-02-15 | End: 2017-02-16

## 2017-02-15 RX ORDER — NALOXONE HYDROCHLORIDE 0.4 MG/ML
.1-.4 INJECTION, SOLUTION INTRAMUSCULAR; INTRAVENOUS; SUBCUTANEOUS
Status: DISCONTINUED | OUTPATIENT
Start: 2017-02-15 | End: 2017-02-22 | Stop reason: HOSPADM

## 2017-02-15 RX ORDER — SODIUM CHLORIDE 9 MG/ML
INJECTION, SOLUTION INTRAVENOUS CONTINUOUS
Status: DISCONTINUED | OUTPATIENT
Start: 2017-02-15 | End: 2017-02-22 | Stop reason: HOSPADM

## 2017-02-15 RX ORDER — ONDANSETRON 4 MG/1
4 TABLET, ORALLY DISINTEGRATING ORAL EVERY 6 HOURS PRN
Status: DISCONTINUED | OUTPATIENT
Start: 2017-02-15 | End: 2017-02-22 | Stop reason: HOSPADM

## 2017-02-15 RX ORDER — ONDANSETRON 2 MG/ML
4 INJECTION INTRAMUSCULAR; INTRAVENOUS EVERY 6 HOURS PRN
Status: DISCONTINUED | OUTPATIENT
Start: 2017-02-15 | End: 2017-02-22 | Stop reason: HOSPADM

## 2017-02-15 RX ORDER — CLONIDINE HYDROCHLORIDE 0.2 MG/1
0.4 TABLET ORAL EVERY EVENING
Status: DISCONTINUED | OUTPATIENT
Start: 2017-02-15 | End: 2017-02-22 | Stop reason: HOSPADM

## 2017-02-15 RX ORDER — ONDANSETRON 2 MG/ML
4 INJECTION INTRAMUSCULAR; INTRAVENOUS
Status: COMPLETED | OUTPATIENT
Start: 2017-02-15 | End: 2017-02-15

## 2017-02-15 RX ORDER — DULOXETIN HYDROCHLORIDE 60 MG/1
60 CAPSULE, DELAYED RELEASE ORAL DAILY
Status: DISCONTINUED | OUTPATIENT
Start: 2017-02-16 | End: 2017-02-22 | Stop reason: HOSPADM

## 2017-02-15 RX ADMIN — HYDROMORPHONE HYDROCHLORIDE 4 MG: 2 TABLET ORAL at 19:54

## 2017-02-15 RX ADMIN — HYDROMORPHONE HYDROCHLORIDE 0.5 MG: 10 INJECTION, SOLUTION INTRAMUSCULAR; INTRAVENOUS; SUBCUTANEOUS at 15:30

## 2017-02-15 RX ADMIN — Medication 7.5 MG: at 21:15

## 2017-02-15 RX ADMIN — ONDANSETRON 4 MG: 4 TABLET, ORALLY DISINTEGRATING ORAL at 21:54

## 2017-02-15 RX ADMIN — SODIUM CHLORIDE: 9 INJECTION, SOLUTION INTRAVENOUS at 20:15

## 2017-02-15 RX ADMIN — SODIUM CHLORIDE 1000 ML: 9 INJECTION, SOLUTION INTRAVENOUS at 16:29

## 2017-02-15 RX ADMIN — HYDROMORPHONE HYDROCHLORIDE 1 MG: 1 INJECTION, SOLUTION INTRAMUSCULAR; INTRAVENOUS; SUBCUTANEOUS at 17:22

## 2017-02-15 RX ADMIN — HYDROMORPHONE HYDROCHLORIDE 1 MG: 1 INJECTION, SOLUTION INTRAMUSCULAR; INTRAVENOUS; SUBCUTANEOUS at 16:27

## 2017-02-15 RX ADMIN — HYDROMORPHONE HYDROCHLORIDE 4 MG: 2 TABLET ORAL at 23:57

## 2017-02-15 RX ADMIN — CEFTRIAXONE 2 G: 2 INJECTION, POWDER, FOR SOLUTION INTRAMUSCULAR; INTRAVENOUS at 21:05

## 2017-02-15 RX ADMIN — NORTRIPTYLINE HYDROCHLORIDE 40 MG: 10 CAPSULE ORAL at 21:16

## 2017-02-15 RX ADMIN — ONDANSETRON 4 MG: 2 INJECTION INTRAMUSCULAR; INTRAVENOUS at 16:28

## 2017-02-15 RX ADMIN — CLONIDINE HYDROCHLORIDE 0.4 MG: 0.2 TABLET ORAL at 21:15

## 2017-02-15 RX ADMIN — CEFEPIME 2 G: 2 INJECTION, POWDER, FOR SOLUTION INTRAMUSCULAR; INTRAVENOUS at 17:14

## 2017-02-15 RX ADMIN — HYDROMORPHONE HYDROCHLORIDE 1 MG: 1 INJECTION, SOLUTION INTRAMUSCULAR; INTRAVENOUS; SUBCUTANEOUS at 18:52

## 2017-02-15 ASSESSMENT — ACTIVITIES OF DAILY LIVING (ADL)
AMBULATION: 0-->INDEPENDENT
TRANSFERRING: 0-->INDEPENDENT
TRANSFERRING: 0-->INDEPENDENT
RETIRED_EATING: 0-->INDEPENDENT
FALL_HISTORY_WITHIN_LAST_SIX_MONTHS: NO
SWALLOWING: 0-->SWALLOWS FOODS/LIQUIDS WITHOUT DIFFICULTY
COMMUNICATION: 0-->UNDERSTANDS/COMMUNICATES WITHOUT DIFFICULTY
BATHING: 0-->INDEPENDENT
EATING: 0-->INDEPENDENT
COGNITION: 0 - NO COGNITION ISSUES REPORTED
DRESS: 0-->INDEPENDENT
RETIRED_COMMUNICATION: 0-->UNDERSTANDS/COMMUNICATES WITHOUT DIFFICULTY
SWALLOWING: 0-->SWALLOWS FOODS/LIQUIDS WITHOUT DIFFICULTY
DRESS: 0-->INDEPENDENT
TOILETING: 0-->INDEPENDENT
TOILETING: 0-->INDEPENDENT
BATHING: 0-->INDEPENDENT
AMBULATION: 0-->INDEPENDENT

## 2017-02-15 ASSESSMENT — ENCOUNTER SYMPTOMS
NAUSEA: 1
WEAKNESS: 1
BLOOD IN STOOL: 0
DIARRHEA: 1
SHORTNESS OF BREATH: 0
FATIGUE: 1
FEVER: 0
ABDOMINAL PAIN: 1
ANAL BLEEDING: 0

## 2017-02-15 ASSESSMENT — PAIN SCALES - GENERAL: PAINLEVEL: EXTREME PAIN (8)

## 2017-02-15 NOTE — IP AVS SNAPSHOT
[address] [address]    Phone:  [phone]                                       After Visit Summary   2/15/2017    Doreen Peralta    MRN: [id number]           After Visit Summary Signature Page     I have received my discharge instructions, and my questions have been answered. I have discussed any challenges I see with this plan with the nurse or doctor.    ..........................................................................................................................................  Patient/Patient Representative Signature      ..........................................................................................................................................  Patient Representative Print Name and Relationship to Patient    ..................................................               ................................................  Date                                            Time    ..........................................................................................................................................  Reviewed by Signature/Title    ...................................................              ..............................................  Date                                                            Time

## 2017-02-15 NOTE — LETTER
Transition Communication Hand-off for Care Transitions to Next Level of Care Provider    Name: Doreen Peralta  MRN #: [id number]  Primary Care Provider: Larisa Lorenzo     Primary Clinic:  [facility] [address]  [place] [address]     Reason for Hospitalization:  Abdominal pain, generalized [R10.84]  Bacteremia [R78.81]  Leukocytosis, unspecified type [D72.829]  Admit Date/Time: 2/15/2017  2:23 PM  Discharge Date: 2/22/17  Payor Source: Payor: BLUE PLUS / Plan: BLUE PLUS [place] / Product Type: HMO /            Reason for Communication Hand-off Referral: Multiple providers/specialties    Discharge Plan: home with continued tube feedings. GJ tube to come out outpatient       Concern for non-adherence with plan of care:   Y/N n  Discharge Needs Assessment:  Needs       Most Recent Value    Home Infusion Provider [facility] [phone], Fax: [phone]          Already enrolled in Tele-monitoring program and name of program:    Follow-up specialty is recommended: Yes    Follow-up plan:  Future Appointments  Date Time Provider Department Center   3/27/2017 9:00 AM Yuri Wahl MD [facility]       Any outstanding tests or procedures:        Referrals     Future Labs/Procedures    Home infusion referral     Comments:    Your provider has referred you to: [facility]: [facility] [phone]   [email or web address]    Local Address (if different from home address): N/A    Anticipated Length of Therapy: per md order    Home Infusion Pharmacist to adjust therapy based on labs and clinical assessments: Yes    Labs:  May draw labs from Venous Catheter: Yes  Home Infusion Pharmacist to order labs based on therapy type and clinical assessments: Yes  Call/Fax Lab Results to: pcp    Agency Staff to assess nursing needs for Infusion Therapy.    Tube feedings and supplies  Compleat 50ml/hour for 5 hours daily            Key Recommendations:       Moriah Toribio    AVS/Discharge Summary is the source of truth; this is a helpful guide for improved communication of patient story

## 2017-02-15 NOTE — NURSING NOTE
"Chief Complaint   Patient presents with     RECHECK     Infection follow up       Initial /80 (BP Location: Left arm, Patient Position: Prone)  Pulse 86  Temp 98.9  F (37.2  C) (Oral)  Resp 18  LMP 01/19/2017 Estimated body mass index is 29.05 kg/(m^2) as calculated from the following:    Height as of 1/26/17: 1.676 m (5' 6\").    Weight as of 2/6/17: 81.6 kg (180 lb).  Medication Reconciliation: complete    "

## 2017-02-15 NOTE — MR AVS SNAPSHOT
After Visit Summary   2/15/2017    Doreen Peralta    MRN: [id number]           Patient Information     Date Of Birth          1980        Visit Information        Provider Department      2/15/2017 12:30 PM Todd Price MD [facility] and Infectious Diseases        Today's Diagnoses     Fever of unknown origin    -  1       Follow-ups after your visit        Your next 10 appointments already scheduled     Mar 23, 2017  2:00 PM CDT   (Arrive by 1:45 PM)   Return Visit with Todd Price MD   [facility] and Infectious Diseases ([facility])    [address] [place] [address]  [address] [facility] [address] [phone]            Mar 27, 2017  9:00 AM CDT   (Arrive by 8:45 AM)   Return Visit with Yuri Wahl MD   [facility] and IBD ([facility])    [address] [place] [address]  [address] [facility] [address]   [phone]            Mar 27, 2017 12:30 PM CDT   (Arrive by 12:15 PM)   New Patient Visit with Adria Joshi MD   [facility] Pan[facility] Biliary ([facility])    [address] [place] [address]  [address] [facility] [id number] [phone]              Who to contact     If you have questions or need follow up information about today's clinic visit or your schedule please contact [facility] AND INFECTIOUS DISEASES directly at [phone].  Normal or non-critical lab and imaging results will be communicated to you by MyChart, letter or phone within 4 business days after the clinic has received the results. If you do not hear from us within 7 days, please contact the clinic through MyChart or phone. If you have a critical or abnormal lab result, we will notify you by phone as soon as possible.  Submit refill requests through [employer] or call your pharmacy and they will forward the refill request to us. Please allow 3 business days for your refill to be  completed.          Additional Information About Your Visit        [employer] Information     [employer] gives you secure access to your electronic health record. If you see a primary care provider, you can also send messages to your care team and make appointments. If you have questions, please call your primary care clinic.  If you do not have a primary care provider, please call [phone] and they will assist you.        Care EveryWhere ID     This is your Care EveryWhere ID. This could be used by other organizations to access your [facility] medical records  [id number]        Your Vitals Were     Pulse Temperature Respirations Last Period          86 98.9  F (37.2  C) (Oral) 18 01/19/2017         Blood Pressure from Last 3 Encounters:   02/22/17 140/77   02/15/17 116/80   02/06/17 125/64    Weight from Last 3 Encounters:   02/20/17 84.8 kg (187 lb)   02/06/17 81.6 kg (180 lb)   01/28/17 86 kg (189 lb 9.5 oz)              Today, you had the following     No orders found for display       Primary Care Provider Office Phone # Fax #    Larisa Lorenzo PA-C [phone]       [facility] [address]  [place] [address]        Thank you!     Thank you for choosing [facility] AND INFECTIOUS DISEASES  for your care. Our goal is always to provide you with excellent care. Hearing back from our patients is one way we can continue to improve our services. Please take a few minutes to complete the written survey that you may receive in the mail after your visit with us. Thank you!             Your Updated Medication List - Protect others around you: Learn how to safely use, store and throw away your medicines at www.disposemymeds.org.          This list is accurate as of: 2/15/17  2:23 PM.  Always use your most recent med list.                   Brand Name Dispense Instructions for use    ACETAMINOPHEN PO      Take 500-1,000 mg by mouth every 6 hours as needed for pain       albuterol  90 MCG/ACT inhaler      Inhale 2 puffs into the lungs every 6 hours as needed       cloNIDine 0.2 MG tablet    CATAPRES    60 tablet    Take 2 tablets (0.4 mg) by mouth every evening       COMPLEAT Liqd     30 each    Infuse at 50 ml/hour for 5 hours daily through j-tube Flush j-tube with 30 ml water before and after each infusion of Compleat       DULoxetine 60 MG EC capsule    CYMBALTA    90 capsule    Take 1 capsule (60 mg) by mouth daily       levofloxacin 750 MG tablet    LEVAQUIN    9 tablet    Take 1 tablet (750 mg) by mouth daily for 9 days       mirtazapine 15 MG ODT tab    REMERON SOL-TAB    45 tablet    0.5 tablets (7.5 mg) by Orally disintegrating tablet route At Bedtime       nortriptyline 10 MG capsule    PAMELOR    120 capsule    Take 3-4 capsules (30-40 mg) by mouth At Bedtime       ondansetron 4 MG ODT tab    ZOFRAN ODT    40 tablet    Take 1-2 tablets (4-8 mg) by mouth every 6 hours as needed for nausea       rivaroxaban ANTICOAGULANT 20 MG Tabs tablet    XARELTO    90 tablet    Take 1 tablet (20 mg) by mouth daily (with dinner)       VITAMIN D (CHOLECALCIFEROL) PO      Take 2,000 Units by mouth daily

## 2017-02-15 NOTE — ED PROVIDER NOTES
History     Chief Complaint   Patient presents with     Abdominal Pain     HPI  Doreen Peralta is a 36 year old female with a complex past medical history significant for gastroparesis s/p GJ-tube placement for tube feedings, chronic abdominal pain, multiple bowel surgeries including partial colectomy, chronic malnutrition and recurrent infections with history of sepsis/fungemia d/t infected port (7/2016) who presents to the emergency department today from clinic with complaint of abdominal pain.     Of note, the patient had hospital stay at [facility] 1/26-1/28/17 for neutrophilic leukocytosis and SIRS d/t hypovolemia; blood and urine cultures negative during that stay, abdominal CT and x-ray also negative, and the patient remained afebrile during that admission. Subsequently, the patient was seen at [facility] on 2/6/2017. She had fever with temperature of 101.3  F, white count of 18.1 with left shift, transaminitis with mildly elevated ALT and AST, alkaline phosphatase of 269. Most recent blood cultures were negative, and no other source of infection was immediately identified; however, the plan was made to admit the patient. Due to availability and preference concerns, the patient was ultimately transferred to [facility]. The patient was subsequently hospitalized at [facility] 2/6-2/9/2017, and during that stay her blood cultures drawn at [facility] ED did return positive initially for GNR, but eventually grew Klebsiella oxytoca resistant to ampicillin, otherwise pan-sensitive. She transitioned from a course of IV ertapenem to PO ciprofloxacin (to complete a 14-day course), with subsequent CT abdomen/pelvis again unremarkable and UA during that stay also negative. In addition to ciprofloxacin, the patient was discharged with a 5-day course of PO Dilaudid to manage pain.    The patient reports that since her recent discharge 9 days ago, she has had worsening right-sided abdominal pain. This  pain is not relieved by her at-home PO Dilaudid. She states that this pain is similar to the pain she's had when she's had previous infections. She cites specific concern that she is not well absorbing the ciprofloxacin she is taking. The patient also endorses nausea, fatigue, generalized weakness, and malaise. The patient denies any back pain/posterior component to her abdominal pain. She also denies any vaginal bleeding or rectal bleeding. She states that she has been passing gas/stool as usual, though she notes that she has loose stools chronically, which is unchanged.    The patient reports that she was advised by her ID specialist, Dr. Price, to come in to the ER for possible admission.     No history of diabetes. History of IBS, no history of UC.    [facility] history: In addition to multiple bowel surgeries, including partial colectomy, patient has had cholecystectomy    I have reviewed the Medications, Allergies, Past Medical and [facility] History, and Social History in the Epic system.    Current Facility-Administered Medications   Medication     cloNIDine (CATAPRES) tablet 0.4 mg     DULoxetine (CYMBALTA) EC capsule 60 mg     nortriptyline (PAMELOR) capsule 30-40 mg     mirtazapine (REMERON SOL-TAB) solu-half-tab 7.5 mg     rivaroxaban ANTICOAGULANT (XARELTO) tablet 20 mg     naloxone (NARCAN) injection 0.1-0.4 mg     Patient is already receiving anticoagulation with heparin, enoxaparin (LOVENOX), warfarin (COUMADIN)  or other anticoagulant medication     0.9% sodium chloride infusion     acetaminophen (TYLENOL) tablet 650 mg     ondansetron (ZOFRAN-ODT) ODT tab 4 mg    Or     ondansetron (ZOFRAN) injection 4 mg     HYDROmorphone (DILAUDID) tablet 2-4 mg     albuterol (PROAIR HFA/PROVENTIL HFA/VENTOLIN HFA) Inhaler 2 puff     cefTRIAXone (ROCEPHIN) 2 g vial to attach to  ml bag for ADULTS or NS 50 ml bag for PEDS     Past Medical History   Diagnosis Date     Asthma      Bilateral ovarian cysts       Cervical cancer (H) 01/01/2008     cervical cancer      Chronic pain      Colonic dysmotility      s/p subtotal colectomy     Constipation      chronic     E. coli sepsis (H) 5/8/2016     Enteritis      Fungemia 5/5/2016     Gastro-oesophageal reflux disease      H/O ileostomy      Hx of abnormal Pap smear      s/p LEEP - no further details provided     Hypertension      IBS (irritable bowel syndrome)      Other chronic pain      PONV (postoperative nausea and vomiting)      Thrombosis, hepatic vein (H)      microvascular       Past [facility] History   Procedure Laterality Date     Laparoscopic oophorectomy Right 2009     [facility]     Laparoscopic cholecystectomy  2002     [facility] ctr. stones duct     Esophagoscopy, gastroscopy, duodenoscopy (egd), combined  7/10/2012     Procedure: COMBINED ESOPHAGOSCOPY, GASTROSCOPY, DUODENOSCOPY (EGD);  Upper Endoscopy, Ileoscopy    Latex Allergy  with biopsies;  Surgeon: Nicole Redding MD;  [facility]: [facility]     Colonoscopy  7/10/2012     Procedure: COLONOSCOPY;;  Surgeon: Nicole Redding MD;  Location: [facility]     Leep tx, cervical  2009     [facility]     Laparoscopic ileostomy  1/20/2012     [facility], loop     Esophagoscopy, gastroscopy, duodenoscopy (egd), combined N/A 11/5/2014     Procedure: COMBINED ESOPHAGOSCOPY, GASTROSCOPY, DUODENOSCOPY (EGD);  Surgeon: Nicole Redding MD;  [facility]     Laparoscopic assisted colectomy  1/20/2012     Procedure:LAPAROSCOPIC ASSISTED COLECTOMY; Laparoscopic Ileostomy       Laparoscopic assisted colectomy left (descending)  10/24/2012     Procedure: LAPAROSCOPIC ASSISTED COLECTOMY LEFT (DESCENDING);   Hand Assisted Laproscopic Subtotal abdominal Colectomy,Iieorectal anastamosis, Ileostomy Closure.       Remove gastrostomy tube adult N/A 12/12/2014     Procedure: REMOVE GASTROSTOMY TUBE ADULT;  Surgeon: Nicole Redding MD;  [facility]     Replace gastrostomy tube adult  5/19/15     Hc replace  gastrostomy/cecostomy tube percutaneous Left 5/19/2015     Procedure: REPLACE GASTROSTOMY TUBE, PERCUTANEOUS;  Surgeon: Melecio Morejon Chi, MD;  [facility]     Hc replace duodenostomy/jejunostomy tube percutaneous N/A 8/27/2015     Procedure: REPLACE GASTROJEJUNOSTOMY TUBE, PERCUTANEOUS;  Surgeon: Mio Holder MD;  [facility]     Hc [facility] endoscopy w placement gastrostomy tube percut N/A 10/1/2015     Procedure: COMBINED ESOPHAGOSCOPY, GASTROSCOPY, DUODENOSCOPY (EGD), PLACE PERCUTANEOUS ENDOSCOPIC GASTROSTOMY TUBE;  Surgeon: Mio Holder MD;  [facility]     Laparoscopic assisted insertion tube jejunostomy N/A 10/16/2015     Procedure: LAPAROSCOPIC ASSISTED INSERTION TUBE JEJUNOSTOMY;  Surgeon: Elsa Medel MD;  [facility]     Picc insertion Left 10/21/2015     5fr DL Power PICC, 37cm (2cm external) in the L basilic vein w/ tip in the SVC RA junction.     Hc replace duodenostomy/jejunostomy tube percutaneous N/A 1/7/2016     Procedure: REPLACE JEJUNOSTOMY TUBE, PERCUTANEOUS;  Surgeon: Elsa Medel MD;  [facility]     Endoscopic insertion tube gastrostomy N/A 1/21/2016     Procedure: ENDOSCOPIC INSERTION TUBE GASTROSTOMY;  Surgeon: Nicole Redding MD;  [facility]     Hc replace duodenostomy/jejunostomy tube percutaneous N/A 1/28/2016     Procedure: REPLACE JEJUNOSTOMY TUBE, PERCUTANEOUS;  Surgeon: Elsa Medel MD;  [facility]     Laparotomy exploratory N/A 1/28/2016     Procedure: LAPAROTOMY EXPLORATORY;  Surgeon: Elsa Medel MD;  [facility]     Remove port vascular access Right 6/30/2016     Procedure: REMOVE PORT VASCULAR ACCESS;  Surgeon: Pradeep Orosco MD;  Location: [facility]     Echo adal  7/19/2016            N/A 7/20/2016     Procedure: ANESTHESIA OUT OF OR;  Surgeon: GENERIC ANESTHESIA PROVIDER;  Location: [facility]       Family History   Problem Relation Age of Onset     Thyroid Disease Mother      Sjogren's Mother       GASTROINTESTINAL DISEASE Mother      Intermittent nausea vomiting diarrhea     Colon Polyps Mother      Prostate Problems Father      prostate enlargement     Lupus Maternal Grandmother      CANCER Maternal Grandfather      Lung     Colon Cancer Maternal Grandfather 65     CANCER Paternal Grandmother      Lung      CEREBROVASCULAR DISEASE Paternal Grandmother      DIABETES Paternal Grandmother      Cardiovascular Paternal Grandmother      CHF     CANCER Paternal Grandfather      Lung     Glaucoma Paternal Grandfather      Abdominal Aortic Aneurysm Other      Macular Degeneration No family hx of        Social History   Substance Use Topics     Smoking status: Former Smoker     Packs/day: 1.00     Years: 4.00     Types: Cigarettes     Quit date: 1/1/2004     Smokeless tobacco: Former User     Alcohol use No        Allergies   Allergen Reactions     Hyoscyamine Rash     Metoclopramide Other (See Comments)     Eye twitching.      Peaches [Peach] Other (See Comments)     Raw. Cooked OK     Sucralose Other (See Comments)     All artificial sweeteners. Aspartame also. Swollen glands     Advair Diskus Other (See Comments)     Throat burns     Azithromycin Other (See Comments)     Burning in throat     Compazine [Prochlorperazine] Visual Disturbance     Contrast Dye Itching     States is allergic to CT contrast dye     Cyclobenzaprine Visual Disturbance     Fentanyl Other (See Comments)     migraine     Ibuprofen GI Disturbance     Lactulose Nausea and Vomiting     Gas and bloating     Levaquin [Levofloxacin] Swelling     Per ED M.D. And RN      Morphine Sulfate Other (See Comments)     Chest pain       Oxycodone Other (See Comments)     Burning throat, but can take Norco.      Penicillins Other (See Comments)     Family hx of resp arrest, she has never taken  Ok with cephalosporins     Rizatriptan Visual Disturbance     Droperidol Hives and Rash     Isovue [Iopamidol] Palpitations     Pt had racing heart and sob       Ketorolac Anxiety     Latex Swelling and Rash     Kiwi, likely also avacado, ? banana     Levsin Rash       Review of Systems   Constitutional: Positive for fatigue. Negative for fever.        Malaise   Respiratory: Negative for shortness of breath.    Cardiovascular: Negative for chest pain.   Gastrointestinal: Positive for abdominal pain (right-sided), diarrhea (chronically loose stools; unchnaged from her baseline) and nausea. Negative for anal bleeding and blood in stool.   Genitourinary: Negative for vaginal bleeding.   Neurological: Positive for weakness (generalized).       Physical Exam   BP: 135/88  Heart Rate: 109  Temp: 98.9  F (37.2  C)  SpO2: 100 %  Physical Exam   Constitutional: She appears well-developed and well-nourished. No distress.   Eyes: Right eye exhibits no discharge. Left eye exhibits no discharge.   Cardiovascular: Normal rate.    Pulmonary/Chest: No stridor. No respiratory distress. She has no wheezes.   Abdominal: She exhibits no distension. There is no tenderness.   Musculoskeletal: She exhibits no edema.   Neurological: She is alert. No cranial nerve deficit.   Skin: She is not diaphoretic. No erythema.       ED Course     ED Course     Procedures  Results for orders placed or performed during the hospital encounter of 02/15/17 (from the past 24 hour(s))   CBC with platelets differential   Result Value Ref Range    WBC 17.0 (H) 4.0 - 11.0 10e9/L    RBC Count 4.07 3.8 - 5.2 10e12/L    Hemoglobin 9.9 (L) 11.7 - 15.7 g/dL    Hematocrit 31.7 (L) 35.0 - 47.0 %    MCV 78 78 - 100 fl    MCH 24.3 (L) 26.5 - 33.0 pg    MCHC 31.2 (L) 31.5 - 36.5 g/dL    RDW 17.2 (H) 10.0 - 15.0 %    Platelet Count 639 (H) 150 - 450 10e9/L    Diff Method Automated Method     % Neutrophils 83.9 %    % Lymphocytes 10.5 %    % Monocytes 4.9 %    % Eosinophils 0.3 %    % Basophils 0.2 %    % Immature Granulocytes 0.2 %    Nucleated RBCs 0 0 /100    Absolute Neutrophil 14.3 (H) 1.6 - 8.3 10e9/L    Absolute Lymphocytes  1.8 0.8 - 5.3 10e9/L    Absolute Monocytes 0.8 0.0 - 1.3 10e9/L    Absolute Eosinophils 0.1 0.0 - 0.7 10e9/L    Absolute Basophils 0.0 0.0 - 0.2 10e9/L    Abs Immature Granulocytes 0.0 0 - 0.4 10e9/L    Absolute Nucleated RBC 0.1    Comprehensive metabolic panel   Result Value Ref Range    Sodium 136 133 - 144 mmol/L    Potassium 3.5 3.4 - 5.3 mmol/L    Chloride 103 94 - 109 mmol/L    Carbon Dioxide 25 20 - 32 mmol/L    Anion Gap 8 3 - 14 mmol/L    Glucose 79 70 - 99 mg/dL    Urea Nitrogen 11 7 - 30 mg/dL    Creatinine 0.94 0.52 - 1.04 mg/dL    GFR Estimate 68 >60 mL/min/1.7m2    GFR Estimate If Black 82 >60 mL/min/1.7m2    Calcium 8.9 8.5 - 10.1 mg/dL    Bilirubin Total 0.8 0.2 - 1.3 mg/dL    Albumin 3.5 3.4 - 5.0 g/dL    Protein Total 8.9 (H) 6.8 - 8.8 g/dL    Alkaline Phosphatase 164 (H) 40 - 150 U/L    ALT 31 0 - 50 U/L    AST 20 0 - 45 U/L   Lactic acid   Result Value Ref Range    Lactic Acid 1.6 0.7 - 2.1 mmol/L   CRP inflammation   Result Value Ref Range    CRP Inflammation 8.3 (H) 0.0 - 8.0 mg/L   Erythrocyte sedimentation rate auto   Result Value Ref Range    Sed Rate 71 (H) 0 - 20 mm/h   Procalcitonin   Result Value Ref Range    Procalcitonin 0.26 ng/ml   UA with Microscopic   Result Value Ref Range    Color Urine Light Yellow     Appearance Urine Clear     Glucose Urine Negative NEG mg/dL    Bilirubin Urine Negative NEG    Ketones Urine Negative NEG mg/dL    Specific Gravity Urine 1.010 1.003 - 1.035    Blood Urine Negative NEG    pH Urine 6.5 5.0 - 7.0 pH    Protein Albumin Urine Negative NEG mg/dL    Urobilinogen mg/dL Normal 0.0 - 2.0 mg/dL    Nitrite Urine Negative NEG    Leukocyte Esterase Urine Negative NEG    Source Midstream Urine     WBC Urine 1 0 - 2 /HPF    RBC Urine <1 0 - 2 /HPF    Squamous Epithelial /HPF Urine 1 0 - 1 /HPF    Mucous Urine Present (A) NEG /LPF   Urine Culture Aerobic Bacterial   Result Value Ref Range    Specimen Description Midstream Urine     Special Requests Specimen  received in preservative     Culture Micro Pending     Micro Report Status Pending    Drug abuse screen 8 urine (UR)   Result Value Ref Range    Amphetamine Qual Urine  NEG     Negative   Cutoff for a negative amphetamine is 500 ng/mL or less.      Barbiturates Qual Urine  NEG     Negative   Cutoff for a negative barbiturate is 200 ng/mL or less.      Benzodiazepine Qual Urine  NEG     Negative   Cutoff for a negative benzodiazepine is 200 ng/mL or less.      Cannabinoids Qual Urine  NEG     Negative   Cutoff for a negative cannabinoid is 50 ng/mL or less.      Cocaine Qual Urine  NEG     Negative   Cutoff for a negative cocaine is 300 ng/mL or less.      Ethanol Qual Urine  NEG     Negative   Cutoff for a negative urine ethanol is 0.05 g/dL or less      Opiates Qualitative Urine (A) NEG     Positive   Cutoff for a positive opiate is greater than 300 ng/mL. This is an unconfirmed   screening result to be used for medical purposes only.      PCP Qual Urine Pending NEG   Erythrocyte sedimentation rate auto   Result Value Ref Range    Sed Rate 82 (H) 0 - 20 mm/h   Glucose by meter   Result Value Ref Range    Glucose 91 70 - 99 mg/dL     *Note: Due to a large number of results and/or encounters for the requested time period, some results have not been displayed. A complete set of results can be found in Results Review.       Assessments & Plan (with Medical Decision Making)   This is a 36-year-old female with a history of IBS status post colonic resection who presents with a history of recurrent infection and fevers. The patient was seen at the ID clinic earlier today and was transferred to here. She was recently admitted 1 week prior with sepsis and her culture grew Klebsiella; she was discharged to home with ciprofloxacin, however, she has had loose stools and does not feel like she has been absorbing quite appropriately. She is presenting today with severe abdominal pain which she reports that she usually has when  she has infections. In the ED, her white count is 17,000, which is almost unchanged from her white count 1 week prior, which was 18 when she was bacteremic. I have discussed the case with TONY Doyle, and we will obtain more labs, including sed rate and CRP, and we ll plan to admit her for further evaluation.    This part of the document was transcribed by Umer Martinez for Mary Peterson MD.    I have reviewed the nursing notes.    I have reviewed the findings, diagnosis, plan and need for follow up with the patient.    Current Discharge Medication List          Final diagnoses:   Abdominal pain, generalized   Leukocytosis, unspecified type   Bacteremia     I, Umer Martinez, am serving as a trained medical scribe to document services personally performed by Mary Peterson MD, based on the provider's statements to me.      I, Mary Peterson MD, was physically present and have reviewed and verified the accuracy of this note documented by Umer Martinez.      2/15/2017   [facility], [place], EMERGENCY DEPARTMENT     Mary Peterson MD  02/16/17 0044

## 2017-02-15 NOTE — IP AVS SNAPSHOT
MRN:[id number]                      After Visit Summary   2/15/2017    Doreen Peralta    MRN: [id number]           Thank you!     Thank you for choosing [facility] for your care. Our goal is always to provide you with excellent care. Hearing back from our patients is one way we can continue to improve our services. Please take a few minutes to complete the written survey that you may receive in the mail after you visit with us. Thank you!        Patient Information     Date Of Birth          1980        About your hospital stay     You were admitted on:  February 15, 2017 You last received care in the:  [address] [facility]    You were discharged on:  February 22, 2017        Reason for your hospital stay       You had bacterial infection in your bloodstream from unknown source which we are continuing treatment for (until 3/3). YOu were started on meropenem IV, transitioned to levofloxacin and continue until 3/3/17 as prescribed. There's a possibility the G-J tube is the source of infection, but not clear, and no medical reason why you would need this, so would encourage oral diet, and follow-up with GI as outpatient to determine whether you still need this. Workup was negative for gastrointestinal etiology at this time.                  Who to Call     For medical emergencies, please call 911.  For non-urgent questions about your medical care, please call your primary care provider or clinic, [phone]  For questions related to your surgery, please call your surgery clinic        Attending Provider     Provider Specialty    Mary Peterson MD Emergency Medicine    Franck Meraz MD Internal Medicine    Rosanne Tan DO Internal Medicine    Tien Hunt MD Internal Medicine       Primary Care Provider Office Phone # Fax #    Larisa Lorenzo PA-C [phone]       [facility] [address]  [place] [address]        After Care Instructions     Activity        Your activity upon discharge: activity as tolerated            Diet       Follow this diet upon discharge:      Regular Diet Adult            Discharge Instructions       Please go to all follow-up appointments and take all medications as prescribed.  Take levofloxacin 750 mg once a day until 3/3/17.  Follow-up with your PCP in 1 week and with infectious disease in 3-4 weeks with Dr. Price after you finish your antibiotic and see how you are doing.  Will need to trial just oral intake for 3 months and at that time if you tolerate, discuss with your primary care physician and infectious disease to determine plan for your G-tube. Primary care physician can help you set up an appointment with the gastroenterologists.  Consider psychology and psychiatry follow-up if you need help with life-stressors and pain.  Avoid narcotics as they can worsen your abdominal symptoms and a cause for dependency.  If you experience any fevers, shortness of breath, worsening abdominal symptoms, or any other life-threatening or concerning symptoms, please see a health-care provider as soon as possible.                  Follow-up Appointments     Adult [facility] Follow-up and recommended labs and tests       Follow up with primary care provider, Larisa Lorenzo, within 7 days for hospital follow- up.    Follow-up with infectious disease in 3-4 weeks with Dr. Price.    Appointments on [place] and/or [facility] (with [facility] or [facility] provider or service). Call [phone] if you haven't heard regarding these appointments within 7 days of discharge.                  Your next 10 appointments already scheduled     Mar 27, 2017  9:00 AM CDT   (Arrive by 8:45 AM)   Return Visit with Yuri Wahl MD   [facility] ([facility])    [address] [place] [id number] [phone]              Additional Services     Home infusion referral       Your provider has  referred you to: [facility]: [facility] [phone]   [email or web address]    Local Address (if different from home address): N/A    Anticipated Length of Therapy: per md order    Home Infusion Pharmacist to adjust therapy based on labs and clinical assessments: Yes    Labs:  May draw labs from Venous Catheter: Yes  Home Infusion Pharmacist to order labs based on therapy type and clinical assessments: Yes  Call/Fax Lab Results to: pcp    Agency Staff to assess nursing needs for Infusion Therapy.    Tube feedings and supplies                  Pending Results     Date and Time Order Name Status Description    2/20/2017 0100 Blood culture Preliminary     2/20/2017 0100 Blood culture Preliminary     2/19/2017 0100 Blood culture Preliminary     2/19/2017 0100 Blood culture Preliminary     2/18/2017 0100 Blood culture Preliminary     2/18/2017 0100 Blood culture Preliminary     2/17/2017 0729 Blood culture Preliminary     2/17/2017 0729 Blood culture Preliminary     2/15/2017 1548 Blood culture Preliminary             Statement of Approval     Ordered          02/22/17 0956  I have reviewed and agree with all the recommendations and orders detailed in this document.  EFFECTIVE NOW     Approved and electronically signed by:  Gee Peraza MD             Admission Information     Date & Time Provider Department Dept. Phone    2/15/2017 Tien Hunt MD [address] [facility] [phone]      Your Vitals Were     Blood Pressure Pulse Temperature Respirations Weight Last Period    140/77 80 96.7  F (35.9  C) (Oral) 16 84.8 kg (187 lb) 01/19/2017    Pulse Oximetry BMI (Body Mass Index)                98% 30.18 kg/m2          [facility] Information     [employer] gives you secure access to your electronic health record. If you see a primary care provider, you can also send messages to your care team and make appointments. If you have questions, please call your  primary care clinic.  If you do not have a primary care provider, please call [phone] and they will assist you.        Care EveryWhere ID     This is your Care EveryWhere ID. This could be used by other organizations to access your [facility] medical records  [id number]           Review of your medicines      START taking        Dose / Directions    levofloxacin 750 MG tablet   Commonly known as:  LEVAQUIN   Used for:  Bacteremia        Dose:  750 mg   Take 1 tablet (750 mg) by mouth daily for 9 days   Quantity:  9 tablet   Refills:  0         CONTINUE these medicines which have NOT CHANGED        Dose / Directions    ACETAMINOPHEN PO        Dose:  500-1000 mg   Take 500-1,000 mg by mouth every 6 hours as needed for pain   Refills:  0       albuterol 90 MCG/ACT inhaler        Dose:  2 puff   Inhale 2 puffs into the lungs every 6 hours as needed   Refills:  0       cloNIDine 0.2 MG tablet   Commonly known as:  CATAPRES   Used for:  Anxiety        Dose:  0.4 mg   Take 2 tablets (0.4 mg) by mouth every evening   Quantity:  60 tablet   Refills:  5       COMPLEAT Liqd   Used for:  Non-intractable vomiting with nausea, vomiting of unspecified type, Jejunostomy tube present (H), Malnutrition (H)        Infuse at 50 ml/hour for 5 hours daily through j-tube Flush j-tube with 30 ml water before and after each infusion of Compleat   Quantity:  30 each   Refills:  0       DULoxetine 60 MG EC capsule   Commonly known as:  CYMBALTA   Used for:  Abdominal pain, epigastric, Abdominal migraine, not intractable        Dose:  60 mg   Take 1 capsule (60 mg) by mouth daily   Quantity:  90 capsule   Refills:  3       mirtazapine 15 MG ODT tab   Commonly known as:  REMERON SOL-TAB   Used for:  Anxiety        Dose:  7.5 mg   0.5 tablets (7.5 mg) by Orally disintegrating tablet route At Bedtime   Quantity:  45 tablet   Refills:  0       nortriptyline 10 MG capsule   Commonly known as:  PAMELOR   Used for:  Neuropathic pain, Primary  insomnia        Dose:  30-40 mg   Take 3-4 capsules (30-40 mg) by mouth At Bedtime   Quantity:  120 capsule   Refills:  1       ondansetron 4 MG ODT tab   Commonly known as:  ZOFRAN ODT   Used for:  Intractable vomiting, presence of nausea not specified, unspecified vomiting type, Gastroparesis        Dose:  4-8 mg   Take 1-2 tablets (4-8 mg) by mouth every 6 hours as needed for nausea   Quantity:  40 tablet   Refills:  0       rivaroxaban ANTICOAGULANT 20 MG Tabs tablet   Commonly known as:  XARELTO   Used for:  Deep vein thrombosis (DVT) of upper extremity, unspecified chronicity, unspecified laterality, unspecified vein (H)        Dose:  20 mg   Take 1 tablet (20 mg) by mouth daily (with dinner)   Quantity:  90 tablet   Refills:  1       SUMAtriptan 50 MG tablet   Commonly known as:  IMITREX   Used for:  Migraine without aura and without status migrainosus, not intractable        Dose:   mg   Take 1-2 tablets ( mg) by mouth at onset of headache for migraine - may repeat dose after 2h if headache recurs.  Max: 200mg/24 hours   Quantity:  18 tablet   Refills:  1       VITAMIN D (CHOLECALCIFEROL) PO        Dose:  2000 Units   Take 2,000 Units by mouth daily   Refills:  0            Where to get your medicines      These medications were sent to [facility] [address] [place] [address] AT [facility] & [address] [facility] [address] [address]     Phone:  [phone]     levofloxacin 750 MG tablet                Protect others around you: Learn how to safely use, store and throw away your medicines at www.disposemymeds.org.             Medication List: This is a list of all your medications and when to take them. Check marks below indicate your daily home schedule. Keep this list as a reference.      Medications           Morning Afternoon Evening Bedtime As Needed    ACETAMINOPHEN PO   Take 500-1,000 mg by mouth every 6 hours as needed for pain   Last time this was  given:  650 mg on 2/18/2017  9:24 AM                                albuterol 90 MCG/ACT inhaler   Inhale 2 puffs into the lungs every 6 hours as needed                                cloNIDine 0.2 MG tablet   Commonly known as:  CATAPRES   Take 2 tablets (0.4 mg) by mouth every evening   Last time this was given:  0.4 mg on 2/21/2017  8:56 PM   Next Dose Due:  2/22/17 @ 8:00 pm                                   COMPLEAT Liqd   Infuse at 50 ml/hour for 5 hours daily through j-tube Flush j-tube with 30 ml water before and after each infusion of Compleat                                DULoxetine 60 MG EC capsule   Commonly known as:  CYMBALTA   Take 1 capsule (60 mg) by mouth daily   Last time this was given:  60 mg on 2/22/2017  8:17 AM   Next Dose Due:  2/23/17 @ 8:00 am                                   levofloxacin 750 MG tablet   Commonly known as:  LEVAQUIN   Take 1 tablet (750 mg) by mouth daily for 9 days                                mirtazapine 15 MG ODT tab   Commonly known as:  REMERON SOL-TAB   0.5 tablets (7.5 mg) by Orally disintegrating tablet route At Bedtime   Last time this was given:  7.5 mg on 2/21/2017 10:07 PM   Next Dose Due:  2/22/17 @ 10:00 pm                                   nortriptyline 10 MG capsule   Commonly known as:  PAMELOR   Take 3-4 capsules (30-40 mg) by mouth At Bedtime   Last time this was given:  40 mg on 2/21/2017 10:07 PM   Next Dose Due:  2/22/17 @ 10:00 pm                                   ondansetron 4 MG ODT tab   Commonly known as:  ZOFRAN ODT   Take 1-2 tablets (4-8 mg) by mouth every 6 hours as needed for nausea   Last time this was given:  4 mg on 2/21/2017  4:21 PM                                rivaroxaban ANTICOAGULANT 20 MG Tabs tablet   Commonly known as:  XARELTO   Take 1 tablet (20 mg) by mouth daily (with dinner)   Last time this was given:  20 mg on 2/21/2017  4:21 PM   Next Dose Due:  2/22/17 @ 6:00 pm                                   SUMAtriptan 50 MG  tablet   Commonly known as:  IMITREX   Take 1-2 tablets ( mg) by mouth at onset of headache for migraine - may repeat dose after 2h if headache recurs.  Max: 200mg/24 hours                                VITAMIN D (CHOLECALCIFEROL) PO   Take 2,000 Units by mouth daily

## 2017-02-15 NOTE — ED NOTES
Pt was sent from clinic today for continued abdominal pain since a week ago Monday. Pt also reports that at that time she was admitted to [facility] for sepsis and abdominal pain. Pt received IV antibiotics at [facility] and on Thursday of last week went home on oral antibiotics. Per pt abdominal pain has not decreased since discharge. Pt reports pain 7-8/10, on arrival pt afebrile. , other vitals stable. Pt gets tube feedings from G/J tube at home but has not been able to take feedings due to abdominal pain.

## 2017-02-15 NOTE — PROGRESS NOTES
INFECTIOUS DISEASE CLINIC    REASON FOR VISIT:  follow-up recurrent fevers    HISTORY OF PRESENT ILLNESS:   Doreen Peralta is a 37 y/o female with a complex medical history that includes multiple bowel surgeries (initially with gastroparesis) and currently with G/J tube.  History of Acinetobacter bacteremia and Candida parapsilosis fungemia in 7/2016 s/p port removal and clearance of cultures.  With recurrent fevers every 1-2 weeks since summer 2016..  She returns for her regularly scheduled appointment.  We last saw her in clinic 1/18/17 when she first presented to care.    Our work up from 1/18/17 included an echocardiogram (normal), CT of the Chest/Abdomen/Pelvis (no acute findings), blood cultures (no growth, FINAL), Fungal ab panel (Histoplasma Ab positive), Histoplasma urinary antigen (negative), CMP (stable elevation of Alk Phos 166), CBC (WBC 11.2, Hg 11, Platelets up 597), CRP (8.2), Sed Rate (94), procalcitonin (<0.05).    Since our last visit, Doreen reports two hospitalizations and at least three discrete febrile episodes:    () Her first hospitalization 1/26-1/28/17 at [facility] was for severe abdominal pain.  No etiology was identified.  She was afebrile throughout the hospitalization.      () Doreen contacted me after this hospitalization to report active fevers.  We repeated labs while she was febrile (by self-report) on 1/31/17 and noted a significant increase in CRP and procalcitonin from labs on 1/18 when she was not reporting fever.  CRP 8.2 > 154 and procalcitonin <0.05 to 0.51.   Our initial work up had revealed a positive Histoplasma ab from blood, but negative Histoplasma ag from urine.  We checked Blastomyces blood and urine Ags and Histoplasma blood and urine Ags to be sure there was no active Histoplasma infection, and these were all negative.   The fever episode seemed to go away without further intervention.    () Fevers returned and Doreen had severe right sided abdominal pain  "once again.  She returned to the ED at [facility].  This time she had a leukocytosis to 18 and one blood culture grew Klebsiella oxytoca. They attempted transfer to [facility], but beds were full.  Doreen was admitted to [facility] instead.  We do not have those records, but Doreen reports that they repeated a CT of the Abdomen and discharged her with oral ciprofloxacin.    Since hospital discharge Doreen reports feeling \"exhausted\" with persistent severe right sided abdominal pain.  She has felt chills, and has elevated temperatures up to 100 F.  She appears quite ill during our encounter, and is tearful and emotional.  She is having normal bowel movements (last one this morning, formed and soft), and no vomiting.  She is taking the ciprofloxacin.  She is not eating, and not using any formula via G/J tube either due to pain.      PRIOR HISTORY FOR DOCUMENTATION:    Doreen has undergone several bowel surgeries.  It seems that the initial concern was gastroparesis and irritable bowel syndrome. She has a subtotal colectomy and history of ileostomy and has suffered from malnutrition.  She currently has a G-J tube, and at one point was on TPN > 1 year ago. Now she uses formula via her G-tube as needed.  Over the summer 2016, she did still have a port in that was previously used for TPN.  This is when she developed an Acinetobacter bacteremia as well as a a Candida parapsilosis fungemia.  Her port was removed, and she has not had any further bacteremias or fungemias identified on culture. She also has a history of bilateral upper extremity DVTs and portal vein thrombosis, and is on chronic anticoagulation (first warfarin and changed to Xarelto in September 2016).  No other recent medication changes.    Since the summer 2016 Doreen reports that she has fevers that recur every 1-2 weeks.  She does not think she has gone more than 2 weeks without having a fever since July 2016.  She describes feeling body aches " first, followed by fevers up to 103-104 degrees F.  This is measured with a thermometer in the mouth, or under the arm (if under the arm, she adds a degree when reporting).  Fevers will last up to 3-4 days and she is usually bedridden during this time.  Acetaminophen will bring the temperature down to 101 F, but will not take the fevers away.  She describes a loss of appetite, with abdominal pain in the right middle with frequent vomiting of gastric contents and occasional bile. She has a baseline watery diarrhea up to 10 times per day that does not change with fevers.  She denies any cough or other respiratory symptoms.  No sore throat or rhinorrhea. No SOB or chest pain.  No specific joint pains or swelling, just overall body aches.  She does note occasional lymphadenopathy but not always.  No rashes.  She notes that every time she has her G-J tube manipulated she will have fevers.  But, the fevers can also occur independently of tube changes.     Doreen is seen frequently in medical care.  She was hospitalized 11/8/16 for a dislodged G-tube and had fevers 100.7 F - 102 F.  She was discharged but returned to the ED 11/14/16.   The last time Dr. Chavis saw her in the office, she was concerned for sepsis and sent her to the ED.   Most times when Doreen presents for care, she has blood cultures drawn.  They had all been negative since July 2016.  She also frequently has CTs of the Abdomen/Pelvis (upwards of 30 in the [facility] system alone) due to her history and frequent complaints of abdominal pain.  She has a reported allergy to IV contrast and requests IV benadryl before her scans.    She was scheduled for a routine J tube change 1/11/17, but the tube fell out 1/5/17 and it was replaced by IR on 1/5/17.  After this most recent placement, Doreen noted a hard tender area just superior to the J-tube insertion site.   Labs from 1/5/17 did show evidence of significant inflammation with a WBC count of 11.4, ESR of 75, and  CRP of 142.  Platelets were also up at 496. She did have a fever episode after this procedure.  However, today this area is soft and non tender.  She is currently afebrile.    Exposures:  No known contacts with active tuberculosis.  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  She has not been around anyone with HIV to her knowledge, but is around other immunocompromised hosts with cancer frequently.  She gets infusions of IV fluids twice weekly in an infusion center with others receiving chemotherapy.  Her aunt  of uterine cancer in 2016.  She does not have any contacts with those incarcerated or with a history of prolonged incarceration.    Doreen has not traveled internationally.  She has not traveled to the [place]  [place]  This past summer she was in the hospital for much of the time.  She does not recall any time out at a cabin, in the woods, on a lake, and doubts she had any exposure to ticks.  No caves.  No bat exposure.    She works only a couple of hours per week now in a clinic.  She lives with her  and 3 daughters aged 16, 15, and 13 in an apartment.  Her parents are 5 miles away and help a lot.  All family members are healthy and have not had any fevers.       PAST MEDICAL HISTORY:    Past Medical History   Diagnosis Date     Asthma      Bilateral ovarian cysts      Cervical cancer (H) 2008     cervical cancer      Chronic pain      Colonic dysmotility      s/p subtotal colectomy     Constipation      chronic     E. coli sepsis (H) 2016     Enteritis      Fungemia 2016     Gastro-oesophageal reflux disease      H/O ileostomy      Hx of abnormal Pap smear      s/p LEEP - no further details provided     Hypertension      IBS (irritable bowel syndrome)      Other chronic pain      PONV (postoperative nausea and vomiting)      Thrombosis, hepatic vein (H)       microvascular     PAST SURGICAL HISTORY:  Past [facility] History   Procedure Laterality Date     Laparoscopic oophorectomy Right 2009     [facility]     Laparoscopic cholecystectomy  2002     [facility] stones duct     Esophagoscopy, gastroscopy, duodenoscopy (egd), combined  7/10/2012     Procedure: COMBINED ESOPHAGOSCOPY, GASTROSCOPY, DUODENOSCOPY (EGD);  Upper Endoscopy, Ileoscopy    Latex Allergy  with biopsies;  Surgeon: Nicole Redding MD;  Location: [facility]     Colonoscopy  7/10/2012     Procedure: COLONOSCOPY;;  Surgeon: Nicole Redding MD;  Location: [facility] OR     Leep tx, cervical  2009     [facility]     Laparoscopic ileostomy  1/20/2012     [facility], loop     Esophagoscopy, gastroscopy, duodenoscopy (egd), combined N/A 11/5/2014     Procedure: COMBINED ESOPHAGOSCOPY, GASTROSCOPY, DUODENOSCOPY (EGD);  Surgeon: Nicole Redding MD;  Location: [facility] OR     Laparoscopic assisted colectomy  1/20/2012     Procedure:LAPAROSCOPIC ASSISTED COLECTOMY; Laparoscopic Ileostomy       Laparoscopic assisted colectomy left (descending)  10/24/2012     Procedure: LAPAROSCOPIC ASSISTED COLECTOMY LEFT (DESCENDING);   Hand Assisted Laproscopic Subtotal abdominal Colectomy,Iieorectal anastamosis, Ileostomy Closure.       Remove gastrostomy tube adult N/A 12/12/2014     Procedure: REMOVE GASTROSTOMY TUBE ADULT;  Surgeon: Nicole Redding MD;  Location: [facility]     Replace gastrostomy tube adult  5/19/15     Hc replace gastrostomy/cecostomy tube percutaneous Left 5/19/2015     Procedure: REPLACE GASTROSTOMY TUBE, PERCUTANEOUS;  Surgeon: Melecio Morejon Chi, MD;  Location: [facility]     Hc replace duodenostomy/jejunostomy tube percutaneous N/A 8/27/2015     Procedure: REPLACE GASTROJEJUNOSTOMY TUBE, PERCUTANEOUS;  Surgeon: Mio Holder MD;  Location: [facility]     Hc [facility] endoscopy w placement gastrostomy tube percut N/A 10/1/2015     Procedure: COMBINED ESOPHAGOSCOPY, GASTROSCOPY, DUODENOSCOPY (EGD), PLACE  PERCUTANEOUS ENDOSCOPIC GASTROSTOMY TUBE;  Surgeon: Mio Holder MD;  Location: [facility]     Laparoscopic assisted insertion tube jejunostomy N/A 10/16/2015     Procedure: LAPAROSCOPIC ASSISTED INSERTION TUBE JEJUNOSTOMY;  Surgeon: Elsa Medel MD;  Location: [facility]     Picc insertion Left 10/21/2015     5fr DL Power PICC, 37cm (2cm external) in the L basilic vein w/ tip in the SVC RA junction.     Hc replace duodenostomy/jejunostomy tube percutaneous N/A 1/7/2016     Procedure: REPLACE JEJUNOSTOMY TUBE, PERCUTANEOUS;  Surgeon: Elsa Medel MD;  Location: [facility]     Endoscopic insertion tube gastrostomy N/A 1/21/2016     Procedure: ENDOSCOPIC INSERTION TUBE GASTROSTOMY;  Surgeon: Nicole Redding MD;  Location: [facility]     Hc replace duodenostomy/jejunostomy tube percutaneous N/A 1/28/2016     Procedure: REPLACE JEJUNOSTOMY TUBE, PERCUTANEOUS;  Surgeon: Elsa Medel MD;  [facility]     Laparotomy exploratory N/A 1/28/2016     Procedure: LAPAROTOMY EXPLORATORY;  Surgeon: Elsa Medel MD;  Location: [facility]     Remove port vascular access Right 6/30/2016     Procedure: REMOVE PORT VASCULAR ACCESS;  Surgeon: Pradeep Orosco MD;  Location: [facility]     Echo adal  7/19/2016            N/A 7/20/2016     Procedure: ANESTHESIA OUT OF OR;  Surgeon: GENERIC ANESTHESIA PROVIDER;  Location: [facility]       FAMILY PAST MEDICAL HISTORY:  Family History   Problem Relation Age of Onset     Thyroid Disease Mother      Sjogren's Mother      GASTROINTESTINAL DISEASE Mother      Intermittent nausea vomiting diarrhea     Colon Polyps Mother      Lupus Maternal Grandmother      CANCER Maternal Grandfather      Lung     Colon Cancer Maternal Grandfather 65     CANCER Paternal Grandmother      Lung      CEREBROVASCULAR DISEASE Paternal Grandmother      DIABETES Paternal Grandmother      Cardiovascular Paternal Grandmother      CHF     CANCER Paternal Grandfather      Lung     Glaucoma Paternal Grandfather       Prostate Problems Father      prostate enlargement     Abdominal Aortic Aneurysm Other      Macular Degeneration No family hx of      SOCIAL HISTORY:  Social History     Social History     Marital status:      Spouse name: Mitali     Number of children: 3     Years of education: N/A     Occupational History     Medical Assistant [employer]     Pediatric clinic     Social History Main Topics     Smoking status: Former Smoker     Packs/day: 1.00     Years: 4.00     Types: Cigarettes     Quit date: 1/1/2004     Smokeless tobacco: Former User     Alcohol use No     Drug use: No     Sexual activity: Yes     Partners: Male     Other Topics Concern     Not on file     Social History Narrative     CURRENT MEDICATIONS:    No current facility-administered medications for this visit.      Current Outpatient Prescriptions   Medication     nortriptyline (PAMELOR) 10 MG capsule     ondansetron (ZOFRAN ODT) 4 MG ODT tab     mirtazapine (REMERON SOL-TAB) 15 MG ODT tab     SUMAtriptan (IMITREX) 50 MG tablet     rivaroxaban ANTICOAGULANT (XARELTO) 20 MG TABS tablet     DULoxetine (CYMBALTA) 60 MG capsule     cloNIDine (CATAPRES) 0.2 MG tablet     Nutritional Supplements (COMPLEAT) LIQD     ACETAMINOPHEN PO     albuterol 90 MCG/ACT inhaler        ALLERGIES:      Allergies   Allergen Reactions     Hyoscyamine Rash     Metoclopramide Other (See Comments)     Eye twitching.      Peaches [Peach] Other (See Comments)     Raw. Cooked OK     Sucralose Other (See Comments)     All artificial sweeteners. Aspartame also. Swollen glands     Advair Diskus Other (See Comments)     Throat burns     Azithromycin Other (See Comments)     Burning in throat     Compazine [Prochlorperazine] Visual Disturbance     Contrast Dye Itching     States is allergic to CT contrast dye     Cyclobenzaprine Visual Disturbance     Fentanyl Other (See Comments)     migraine     Ibuprofen GI Disturbance     Lactulose Nausea and Vomiting     Gas  and bloating     Levaquin [Levofloxacin] Swelling     Per ED M.D. And RN      Morphine Sulfate Other (See Comments)     Chest pain       Oxycodone Other (See Comments)     Burning throat, but can take Norco.      Penicillins Other (See Comments)     Family hx of resp arrest, she has never taken  Ok with cephalosporins     Rizatriptan Visual Disturbance     Droperidol Hives and Rash     Isovue [Iopamidol] Palpitations     Pt had racing heart and sob      Ketorolac Anxiety     Latex Swelling and Rash     Kiwi, likely also avacado, ? banana     Levsin Rash     REVIEW OF SYSTEMS: A full 12-point review of systems was obtained, pertinent positives and negatives as above.     PHYSICAL EXAMINATION:    VITAL SIGNS:  /80 (BP Location: Left arm, Patient Position: Prone)  Pulse 86  Temp 98.9  F (37.2  C) (Oral)  Resp 18  LMP 01/19/2017  GENERAL:   Appears ill, is tearful and emotional  HEENT: No icterus or injection. Oropharynx moist and clear without lesions or exudate.    NECK: Supple and nontender  LYMPH:  No cervical, axillary or inguinal lymphadenopathy  LUNGS: Clear to ausculation bilaterally without any increased work of breathing  HEART: Regular rate and rhythm and no murmur, gallop or rub    ABDOMEN: Tubes site clean and intact.  No induration, swelling, or tenderness around sites. Normoactive bowel sounds, soft, generally tender over the right side, nondistended, no hepatosplenomegaly.    /GYN:  Deferred.  EXTREMITIES: Warm and well perfused without clubbing, cyanosis, or edema  SKIN:  No rashes  NEURO:  Awake, alert, no focal neurologic deficits.  PSYCH: Affect emotional. Speech fluent and appropriate.     LABORATORY DATA:    Reviewed in EPIC and detailed above.     ASSESSMENT AND PLAN:   Doreen Peralta is a 35 y/o female with a complex medical history that includes multiple bowel surgeries (initially with gastroparesis) and currently with G/J tube.  History of Acinetobacter bacteremia and Candida  parapsilosis fungemia in 7/2016 s/p port removal with clearance of cultures.  She reports recurrent fevers every 1-2 weeks ever since summer 2016.  Now, she has a recent bacteremia 2/6/17 with Klebsiella oxytoca of an un-identified etiology on oral ciprofloxacin, still experiencing chills and fatigue and abdominal pain despite 9 days of antibiotic therapy.    We will send Doreen directly to our ED today for further evaluation.  She is currently afebrile with normal vital signs, otherwise we would have sought direct admission.  Still, we would recommend admission for further work-up and management.  Given her persistent symptoms, we worry that she has persisted with bacteremia.  We are concerned that the etiology of this bacteremia was not identified, especially in the setting of recurrent fevers for the last 8 months.  We are concerned for an occult infection that has not yet been identified.   We would suggest repeat labs for CBC, CMP, CRP, ESR, and procalcitonin.  We would repeat blood cultures, from at least two sites, both aerobic and anaerobic.   We would consider additional work-up for etiology to include invasive testing, but would refrain from ordering a repeat CT of the Abdomen/Pelvis as patient has had 30+ without any evidence of infection.    Patient was discussed with the Infectious Disease attending and clinic preceptor, Dr. Grazyna Ferguson.      Please contact me directly with any further questions regarding her care.     Todd Price MD    Fellow in Adult and Pediatric Infectious Disease    pager: [phone]    Attending Attestation:  I evaluated Ms. Peralta with fellow Dr. Price and agree with transfer to the ED for further evaluation.  We will discuss with the ED and accepting team, should she be admitted.   I have reviewed today's labs, vitals and imaging results. This note reflects our joint findings, assessment and recommendations.    Grazyna Ferguson MD  [phone]

## 2017-02-15 NOTE — LETTER
2/15/2017       RE: Doreen Peralta  [address]   [place] [address]     Dear Colleague,    Thank you for referring your patient, Doreen Peralta, to the [facility] AND INFECTIOUS DISEASES at [facility]. Please see a copy of my visit note below.      INFECTIOUS DISEASE CLINIC    REASON FOR VISIT:  follow-up recurrent fevers    HISTORY OF PRESENT ILLNESS:   Doreen Peralta is a 35 y/o female with a complex medical history that includes multiple bowel surgeries (initially with gastroparesis) and currently with G/J tube.  History of Acinetobacter bacteremia and Candida parapsilosis fungemia in 7/2016 s/p port removal and clearance of cultures.  With recurrent fevers every 1-2 weeks since summer 2016..  She returns for her regularly scheduled appointment.  We last saw her in clinic 1/18/17 when she first presented to care.    Our work up from 1/18/17 included an echocardiogram (normal), CT of the Chest/Abdomen/Pelvis (no acute findings), blood cultures (no growth, FINAL), Fungal ab panel (Histoplasma Ab positive), Histoplasma urinary antigen (negative), CMP (stable elevation of Alk Phos 166), CBC (WBC 11.2, Hg 11, Platelets up 597), CRP (8.2), Sed Rate (94), procalcitonin (<0.05).    Since our last visit, Doreen reports two hospitalizations and at least three discrete febrile episodes:    () Her first hospitalization 1/26-1/28/17 at [facility] was for severe abdominal pain.  No etiology was identified.  She was afebrile throughout the hospitalization.      () Doreen contacted me after this hospitalization to report active fevers.  We repeated labs while she was febrile (by self-report) on 1/31/17 and noted a significant increase in CRP and procalcitonin from labs on 1/18 when she was not reporting fever.  CRP 8.2 > 154 and procalcitonin <0.05 to 0.51.   Our initial work up had revealed a positive Histoplasma ab from blood, but negative Histoplasma ag  "from urine.  We checked Blastomyces blood and urine Ags and Histoplasma blood and urine Ags to be sure there was no active Histoplasma infection, and these were all negative.   The fever episode seemed to go away without further intervention.    () Fevers returned and Doreen had severe right sided abdominal pain once again.  She returned to the ED at [facility].  This time she had a leukocytosis to 18 and one blood culture grew Klebsiella oxytoca. They attempted transfer to [facility], but beds were full.  Doreen was admitted to [facility] instead.  We do not have those records, but Doreen reports that they repeated a CT of the Abdomen and discharged her with oral ciprofloxacin.    Since hospital discharge Doreen reports feeling \"exhausted\" with persistent severe right sided abdominal pain.  She has felt chills, and has elevated temperatures up to 100 F.  She appears quite ill during our encounter, and is tearful and emotional.  She is having normal bowel movements (last one this morning, formed and soft), and no vomiting.  She is taking the ciprofloxacin.  She is not eating, and not using any formula via G/J tube either due to pain.      PRIOR HISTORY FOR DOCUMENTATION:    Doreen has undergone several bowel surgeries.  It seems that the initial concern was gastroparesis and irritable bowel syndrome. She has a subtotal colectomy and history of ileostomy and has suffered from malnutrition.  She currently has a G-J tube, and at one point was on TPN > 1 year ago. Now she uses formula via her G-tube as needed.  Over the summer 2016, she did still have a port in that was previously used for TPN.  This is when she developed an Acinetobacter bacteremia as well as a a Candida parapsilosis fungemia.  Her port was removed, and she has not had any further bacteremias or fungemias identified on culture. She also has a history of bilateral upper extremity DVTs and portal vein thrombosis, and is on chronic " anticoagulation (first warfarin and changed to Xarelto in September 2016).  No other recent medication changes.    Since the summer 2016 Doreen reports that she has fevers that recur every 1-2 weeks.  She does not think she has gone more than 2 weeks without having a fever since July 2016.  She describes feeling body aches first, followed by fevers up to 103-104 degrees F.  This is measured with a thermometer in the mouth, or under the arm (if under the arm, she adds a degree when reporting).  Fevers will last up to 3-4 days and she is usually bedridden during this time.  Acetaminophen will bring the temperature down to 101 F, but will not take the fevers away.  She describes a loss of appetite, with abdominal pain in the right middle with frequent vomiting of gastric contents and occasional bile. She has a baseline watery diarrhea up to 10 times per day that does not change with fevers.  She denies any cough or other respiratory symptoms.  No sore throat or rhinorrhea. No SOB or chest pain.  No specific joint pains or swelling, just overall body aches.  She does note occasional lymphadenopathy but not always.  No rashes.  She notes that every time she has her G-J tube manipulated she will have fevers.  But, the fevers can also occur independently of tube changes.     Doreen is seen frequently in medical care.  She was hospitalized 11/8/16 for a dislodged G-tube and had fevers 100.7 F - 102 F.  She was discharged but returned to the ED 11/14/16.   The last time Dr. Chavis saw her in the office, she was concerned for sepsis and sent her to the ED.   Most times when Doreen presents for care, she has blood cultures drawn.  They had all been negative since July 2016.  She also frequently has CTs of the Abdomen/Pelvis (upwards of 30 in the [employer] system alone) due to her history and frequent complaints of abdominal pain.  She has a reported allergy to IV contrast and requests IV benadryl before her scans.    She was  scheduled for a routine J tube change 17, but the tube fell out 17 and it was replaced by IR on 17.  After this most recent placement, Doreen noted a hard tender area just superior to the J-tube insertion site.   Labs from 17 did show evidence of significant inflammation with a WBC count of 11.4, ESR of 75, and CRP of 142.  Platelets were also up at 496. She did have a fever episode after this procedure.  However, today this area is soft and non tender.  She is currently afebrile.    Exposures:  No known contacts with active tuberculosis.  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  She has not been around anyone with HIV to her knowledge, but is around other immunocompromised hosts with cancer frequently.  She gets infusions of IV fluids twice weekly in an infusion center with others receiving chemotherapy.  Her aunt  of uterine cancer in 2016.  She does not have any contacts with those incarcerated or with a history of prolonged incarceration.    Doreen has not traveled internationally.  She has not traveled to the [place]  [place]  This past summer she was in the hospital for much of the time.  She does not recall any time out at a cabin, in the woods, on a lake, and doubts she had any exposure to ticks.  No caves.  No bat exposure.    She works only a couple of hours per week now in a clinic.  She lives with her  and 3 daughters aged 16, 15, and 13 in an apartment.  Her parents are 5 miles away and help a lot.  All family members are healthy and have not had any fevers.       PAST MEDICAL HISTORY:    Past Medical History   Diagnosis Date     Asthma      Bilateral ovarian cysts      Cervical cancer (H) 2008     cervical cancer      Chronic pain      Colonic dysmotility      s/p subtotal colectomy     Constipation      chronic     E. coli sepsis (H) 2016      Enteritis      Fungemia 5/5/2016     Gastro-oesophageal reflux disease      H/O ileostomy      Hx of abnormal Pap smear      s/p LEEP - no further details provided     Hypertension      IBS (irritable bowel syndrome)      Other chronic pain      PONV (postoperative nausea and vomiting)      Thrombosis, hepatic vein (H)      microvascular     PAST SURGICAL HISTORY:  Past [facility] History   Procedure Laterality Date     Laparoscopic oophorectomy Right 2009     [facility]     Laparoscopic cholecystectomy  2002     [facility] stones duct     Esophagoscopy, gastroscopy, duodenoscopy (egd), combined  7/10/2012     Procedure: COMBINED ESOPHAGOSCOPY, GASTROSCOPY, DUODENOSCOPY (EGD);  Upper Endoscopy, Ileoscopy    Latex Allergy  with biopsies;  Surgeon: Nicole Redding MD;  Location: [facility]     Colonoscopy  7/10/2012     Procedure: COLONOSCOPY;;  Surgeon: Nicole Redding MD;  Location: [facility] OR     Leep tx, cervical  2009     [facility]     Laparoscopic ileostomy  1/20/2012     [facility], loop     Esophagoscopy, gastroscopy, duodenoscopy (egd), combined N/A 11/5/2014     Procedure: COMBINED ESOPHAGOSCOPY, GASTROSCOPY, DUODENOSCOPY (EGD);  Surgeon: Nicole Redding MD;  Location: [facility] OR     Laparoscopic assisted colectomy  1/20/2012     Procedure:LAPAROSCOPIC ASSISTED COLECTOMY; Laparoscopic Ileostomy       Laparoscopic assisted colectomy left (descending)  10/24/2012     Procedure: LAPAROSCOPIC ASSISTED COLECTOMY LEFT (DESCENDING);   Hand Assisted Laproscopic Subtotal abdominal Colectomy,Iieorectal anastamosis, Ileostomy Closure.       Remove gastrostomy tube adult N/A 12/12/2014     Procedure: REMOVE GASTROSTOMY TUBE ADULT;  Surgeon: Nicole Redding MD;  Location:  GI     Replace gastrostomy tube adult  5/19/15     Hc replace gastrostomy/cecostomy tube percutaneous Left 5/19/2015     Procedure: REPLACE GASTROSTOMY TUBE, PERCUTANEOUS;  Surgeon: Melecio Morejon Chi, MD;  Location:  GI     Hc replace  duodenostomy/jejunostomy tube percutaneous N/A 8/27/2015     Procedure: REPLACE GASTROJEJUNOSTOMY TUBE, PERCUTANEOUS;  Surgeon: Mio Holder MD;  Location: [facility] OR     Hc [facility] endoscopy w placement gastrostomy tube percut N/A 10/1/2015     Procedure: COMBINED ESOPHAGOSCOPY, GASTROSCOPY, DUODENOSCOPY (EGD), PLACE PERCUTANEOUS ENDOSCOPIC GASTROSTOMY TUBE;  Surgeon: Mio Holder MD;  Location: [facility] GI     Laparoscopic assisted insertion tube jejunostomy N/A 10/16/2015     Procedure: LAPAROSCOPIC ASSISTED INSERTION TUBE JEJUNOSTOMY;  Surgeon: Elsa Medel MD;  Location: [facility] OR     Picc insertion Left 10/21/2015     5fr DL Power PICC, 37cm (2cm external) in the L basilic vein w/ tip in the SVC RA junction.     Hc replace duodenostomy/jejunostomy tube percutaneous N/A 1/7/2016     Procedure: REPLACE JEJUNOSTOMY TUBE, PERCUTANEOUS;  Surgeon: Elsa Medel MD;  Location: [facility] OR     Endoscopic insertion tube gastrostomy N/A 1/21/2016     Procedure: ENDOSCOPIC INSERTION TUBE GASTROSTOMY;  Surgeon: Nicole Redding MD;  Location: [facility] OR     Hc replace duodenostomy/jejunostomy tube percutaneous N/A 1/28/2016     Procedure: REPLACE JEJUNOSTOMY TUBE, PERCUTANEOUS;  Surgeon: Elsa Medel MD;  Location: [facility]     Laparotomy exploratory N/A 1/28/2016     Procedure: LAPAROTOMY EXPLORATORY;  Surgeon: Elsa Medel MD;  Location: [facility]     Remove port vascular access Right 6/30/2016     Procedure: REMOVE PORT VASCULAR ACCESS;  Surgeon: Pradeep Orosco MD;  Location: [facility] OR     Echo adal  7/19/2016            N/A 7/20/2016     Procedure: ANESTHESIA OUT OF OR;  Surgeon: GENERIC ANESTHESIA PROVIDER;  Location: [facility] OR       FAMILY PAST MEDICAL HISTORY:  Family History   Problem Relation Age of Onset     Thyroid Disease Mother      Sjogren's Mother      GASTROINTESTINAL DISEASE Mother      Intermittent nausea vomiting diarrhea     Colon Polyps Mother      Lupus Maternal Grandmother      CANCER  Maternal Grandfather      Lung     Colon Cancer Maternal Grandfather 65     CANCER Paternal Grandmother      Lung      CEREBROVASCULAR DISEASE Paternal Grandmother      DIABETES Paternal Grandmother      Cardiovascular Paternal Grandmother      CHF     CANCER Paternal Grandfather      Lung     Glaucoma Paternal Grandfather      Prostate Problems Father      prostate enlargement     Abdominal Aortic Aneurysm Other      Macular Degeneration No family hx of      SOCIAL HISTORY:  Social History     Social History     Marital status:      Spouse name: Mitali     Number of children: 3     Years of education: N/A     Occupational History     Medical Assistant [employer]     Pediatric clinic     Social History Main Topics     Smoking status: Former Smoker     Packs/day: 1.00     Years: 4.00     Types: Cigarettes     Quit date: 1/1/2004     Smokeless tobacco: Former User     Alcohol use No     Drug use: No     Sexual activity: Yes     Partners: Male     Other Topics Concern     Not on file     Social History Narrative     CURRENT MEDICATIONS:    No current facility-administered medications for this visit.      Current Outpatient Prescriptions   Medication     nortriptyline (PAMELOR) 10 MG capsule     ondansetron (ZOFRAN ODT) 4 MG ODT tab     mirtazapine (REMERON SOL-TAB) 15 MG ODT tab     SUMAtriptan (IMITREX) 50 MG tablet     rivaroxaban ANTICOAGULANT (XARELTO) 20 MG TABS tablet     DULoxetine (CYMBALTA) 60 MG capsule     cloNIDine (CATAPRES) 0.2 MG tablet     Nutritional Supplements (COMPLEAT) LIQD     ACETAMINOPHEN PO     albuterol 90 MCG/ACT inhaler        ALLERGIES:      Allergies   Allergen Reactions     Hyoscyamine Rash     Metoclopramide Other (See Comments)     Eye twitching.      Peaches [Peach] Other (See Comments)     Raw. Cooked OK     Sucralose Other (See Comments)     All artificial sweeteners. Aspartame also. Swollen glands     Advair Diskus Other (See Comments)     Throat burns      Azithromycin Other (See Comments)     Burning in throat     Compazine [Prochlorperazine] Visual Disturbance     Contrast Dye Itching     States is allergic to CT contrast dye     Cyclobenzaprine Visual Disturbance     Fentanyl Other (See Comments)     migraine     Ibuprofen GI Disturbance     Lactulose Nausea and Vomiting     Gas and bloating     Levaquin [Levofloxacin] Swelling     Per ED M.D. And RN      Morphine Sulfate Other (See Comments)     Chest pain       Oxycodone Other (See Comments)     Burning throat, but can take Norco.      Penicillins Other (See Comments)     Family hx of resp arrest, she has never taken  Ok with cephalosporins     Rizatriptan Visual Disturbance     Droperidol Hives and Rash     Isovue [Iopamidol] Palpitations     Pt had racing heart and sob      Ketorolac Anxiety     Latex Swelling and Rash     Kiwi, likely also avacado, ? banana     Levsin Rash     REVIEW OF SYSTEMS: A full 12-point review of systems was obtained, pertinent positives and negatives as above.     PHYSICAL EXAMINATION:    VITAL SIGNS:  /80 (BP Location: Left arm, Patient Position: Prone)  Pulse 86  Temp 98.9  F (37.2  C) (Oral)  Resp 18  LMP 01/19/2017  GENERAL:   Appears ill, is tearful and emotional  HEENT: No icterus or injection. Oropharynx moist and clear without lesions or exudate.    NECK: Supple and nontender  LYMPH:  No cervical, axillary or inguinal lymphadenopathy  LUNGS: Clear to ausculation bilaterally without any increased work of breathing  HEART: Regular rate and rhythm and no murmur, gallop or rub    ABDOMEN: Tubes site clean and intact.  No induration, swelling, or tenderness around sites. Normoactive bowel sounds, soft, generally tender over the right side, nondistended, no hepatosplenomegaly.    /GYN:  Deferred.  EXTREMITIES: Warm and well perfused without clubbing, cyanosis, or edema  SKIN:  No rashes  NEURO:  Awake, alert, no focal neurologic deficits.  PSYCH: Affect emotional. Speech  fluent and appropriate.     LABORATORY DATA:    Reviewed in EPIC and detailed above.     ASSESSMENT AND PLAN:   Doreen Peralta is a 35 y/o female with a complex medical history that includes multiple bowel surgeries (initially with gastroparesis) and currently with G/J tube.  History of Acinetobacter bacteremia and Candida parapsilosis fungemia in 7/2016 s/p port removal with clearance of cultures.  She reports recurrent fevers every 1-2 weeks ever since summer 2016.  Now, she has a recent bacteremia 2/6/17 with Klebsiella oxytoca of an un-identified etiology on oral ciprofloxacin, still experiencing chills and fatigue and abdominal pain despite 9 days of antibiotic therapy.    We will send Doreen directly to our ED today for further evaluation.  She is currently afebrile with normal vital signs, otherwise we would have sought direct admission.  Still, we would recommend admission for further work-up and management.  Given her persistent symptoms, we worry that she has persisted with bacteremia.  We are concerned that the etiology of this bacteremia was not identified, especially in the setting of recurrent fevers for the last 8 months.  We are concerned for an occult infection that has not yet been identified.   We would suggest repeat labs for CBC, CMP, CRP, ESR, and procalcitonin.  We would repeat blood cultures, from at least two sites, both aerobic and anaerobic.   We would consider additional work-up for etiology to include invasive testing, but would refrain from ordering a repeat CT of the Abdomen/Pelvis as patient has had 30+ without any evidence of infection.    Patient was discussed with the Infectious Disease attending and clinic preceptor, Dr. Grazyna Ferguson.      Please contact me directly with any further questions regarding her care.     Todd Price MD    Fellow in Adult and Pediatric Infectious Disease    pager: [phone]    Attending Attestation:  I evaluated Ms. Peralta with fellow  Dr. Price and agree with transfer to the ED for further evaluation.  We will discuss with the ED and accepting team, should she be admitted.   I have reviewed today's labs, vitals and imaging results. This note reflects our joint findings, assessment and recommendations.    Grazyna Ferguson MD  [phone]        Again, thank you for allowing me to participate in the care of your patient.      Sincerely,    Todd Price MD

## 2017-02-16 ENCOUNTER — APPOINTMENT (OUTPATIENT)
Dept: ULTRASOUND IMAGING | Facility: CLINIC | Age: 37
DRG: 394 | End: 2017-02-16
Attending: INTERNAL MEDICINE
Payer: COMMERCIAL

## 2017-02-16 LAB
AMPHETAMINES UR QL SCN: ABNORMAL
ANION GAP SERPL CALCULATED.3IONS-SCNC: 9 MMOL/L (ref 3–14)
BACTERIA SPEC CULT: NORMAL
BARBITURATES UR QL: ABNORMAL
BENZODIAZ UR QL: ABNORMAL
BUN SERPL-MCNC: 7 MG/DL (ref 7–30)
CALCIUM SERPL-MCNC: 8.2 MG/DL (ref 8.5–10.1)
CANNABINOIDS UR QL SCN: ABNORMAL
CHLORIDE SERPL-SCNC: 110 MMOL/L (ref 94–109)
CO2 SERPL-SCNC: 23 MMOL/L (ref 20–32)
COCAINE UR QL: ABNORMAL
CREAT SERPL-MCNC: 0.96 MG/DL (ref 0.52–1.04)
CRP SERPL-MCNC: 22 MG/L (ref 0–8)
ERYTHROCYTE [DISTWIDTH] IN BLOOD BY AUTOMATED COUNT: 17 % (ref 10–15)
ERYTHROCYTE [SEDIMENTATION RATE] IN BLOOD BY WESTERGREN METHOD: 84 MM/H (ref 0–20)
ETHANOL UR QL SCN: ABNORMAL
GFR SERPL CREATININE-BSD FRML MDRD: 66 ML/MIN/1.7M2
GLUCOSE SERPL-MCNC: 103 MG/DL (ref 70–99)
HCT VFR BLD AUTO: 27.5 % (ref 35–47)
HGB BLD-MCNC: 8.4 G/DL (ref 11.7–15.7)
INR PPP: 1.11 (ref 0.86–1.14)
Lab: NORMAL
MAGNESIUM SERPL-MCNC: 2.3 MG/DL (ref 1.6–2.3)
MCH RBC QN AUTO: 24 PG (ref 26.5–33)
MCHC RBC AUTO-ENTMCNC: 30.5 G/DL (ref 31.5–36.5)
MCV RBC AUTO: 79 FL (ref 78–100)
MICRO REPORT STATUS: NORMAL
OPIATES UR QL SCN: ABNORMAL
PCP UR QL SCN: ABNORMAL
PHOSPHATE SERPL-MCNC: 3.4 MG/DL (ref 2.5–4.5)
PLATELET # BLD AUTO: 465 10E9/L (ref 150–450)
POTASSIUM SERPL-SCNC: 3.3 MMOL/L (ref 3.4–5.3)
POTASSIUM SERPL-SCNC: 4.2 MMOL/L (ref 3.4–5.3)
PROCALCITONIN SERPL-MCNC: 0.07 NG/ML
RBC # BLD AUTO: 3.5 10E12/L (ref 3.8–5.2)
SODIUM SERPL-SCNC: 141 MMOL/L (ref 133–144)
SPECIMEN SOURCE: NORMAL
WBC # BLD AUTO: 6.1 10E9/L (ref 4–11)

## 2017-02-16 PROCEDURE — 76700 US EXAM ABDOM COMPLETE: CPT

## 2017-02-16 PROCEDURE — 40000556 ZZH STATISTIC PERIPHERAL IV START W US GUIDANCE

## 2017-02-16 PROCEDURE — 36415 COLL VENOUS BLD VENIPUNCTURE: CPT | Performed by: STUDENT IN AN ORGANIZED HEALTH CARE EDUCATION/TRAINING PROGRAM

## 2017-02-16 PROCEDURE — 85652 RBC SED RATE AUTOMATED: CPT | Performed by: INTERNAL MEDICINE

## 2017-02-16 PROCEDURE — 25000128 H RX IP 250 OP 636: Performed by: INTERNAL MEDICINE

## 2017-02-16 PROCEDURE — 85027 COMPLETE CBC AUTOMATED: CPT | Performed by: STUDENT IN AN ORGANIZED HEALTH CARE EDUCATION/TRAINING PROGRAM

## 2017-02-16 PROCEDURE — 99233 SBSQ HOSP IP/OBS HIGH 50: CPT | Mod: GC | Performed by: INTERNAL MEDICINE

## 2017-02-16 PROCEDURE — 80048 BASIC METABOLIC PNL TOTAL CA: CPT | Performed by: STUDENT IN AN ORGANIZED HEALTH CARE EDUCATION/TRAINING PROGRAM

## 2017-02-16 PROCEDURE — 85610 PROTHROMBIN TIME: CPT | Performed by: STUDENT IN AN ORGANIZED HEALTH CARE EDUCATION/TRAINING PROGRAM

## 2017-02-16 PROCEDURE — 25000125 ZZHC RX 250: Performed by: STUDENT IN AN ORGANIZED HEALTH CARE EDUCATION/TRAINING PROGRAM

## 2017-02-16 PROCEDURE — 99222 1ST HOSP IP/OBS MODERATE 55: CPT | Performed by: NURSE PRACTITIONER

## 2017-02-16 PROCEDURE — 86140 C-REACTIVE PROTEIN: CPT | Performed by: INTERNAL MEDICINE

## 2017-02-16 PROCEDURE — 25000132 ZZH RX MED GY IP 250 OP 250 PS 637: Performed by: STUDENT IN AN ORGANIZED HEALTH CARE EDUCATION/TRAINING PROGRAM

## 2017-02-16 PROCEDURE — 84100 ASSAY OF PHOSPHORUS: CPT | Performed by: INTERNAL MEDICINE

## 2017-02-16 PROCEDURE — 84145 PROCALCITONIN (PCT): CPT | Performed by: INTERNAL MEDICINE

## 2017-02-16 PROCEDURE — 93976 VASCULAR STUDY: CPT | Mod: XS

## 2017-02-16 PROCEDURE — 25000128 H RX IP 250 OP 636: Performed by: STUDENT IN AN ORGANIZED HEALTH CARE EDUCATION/TRAINING PROGRAM

## 2017-02-16 PROCEDURE — 25000132 ZZH RX MED GY IP 250 OP 250 PS 637: Performed by: INTERNAL MEDICINE

## 2017-02-16 PROCEDURE — 84132 ASSAY OF SERUM POTASSIUM: CPT | Performed by: INTERNAL MEDICINE

## 2017-02-16 PROCEDURE — 12000001 ZZH R&B MED SURG/OB UMMC

## 2017-02-16 PROCEDURE — 83735 ASSAY OF MAGNESIUM: CPT | Performed by: INTERNAL MEDICINE

## 2017-02-16 RX ORDER — HYDROCODONE BITARTRATE AND ACETAMINOPHEN 5; 325 MG/1; MG/1
1 TABLET ORAL EVERY 4 HOURS PRN
Status: DISCONTINUED | OUTPATIENT
Start: 2017-02-16 | End: 2017-02-16

## 2017-02-16 RX ORDER — POTASSIUM CHLORIDE 20MEQ/15ML
40 LIQUID (ML) ORAL ONCE
Status: COMPLETED | OUTPATIENT
Start: 2017-02-16 | End: 2017-02-16

## 2017-02-16 RX ORDER — HYDROMORPHONE HYDROCHLORIDE 2 MG/1
2-4 TABLET ORAL EVERY 4 HOURS PRN
Status: DISCONTINUED | OUTPATIENT
Start: 2017-02-16 | End: 2017-02-17

## 2017-02-16 RX ORDER — LIDOCAINE 50 MG/G
1-3 PATCH TOPICAL
Status: DISCONTINUED | OUTPATIENT
Start: 2017-02-16 | End: 2017-02-22 | Stop reason: HOSPADM

## 2017-02-16 RX ADMIN — RIVAROXABAN 20 MG: 20 TABLET, FILM COATED ORAL at 16:39

## 2017-02-16 RX ADMIN — Medication 7.5 MG: at 21:10

## 2017-02-16 RX ADMIN — HYDROCODONE BITARTRATE AND ACETAMINOPHEN 1 TABLET: 5; 325 TABLET ORAL at 15:27

## 2017-02-16 RX ADMIN — CLONIDINE HYDROCHLORIDE 0.4 MG: 0.2 TABLET ORAL at 19:44

## 2017-02-16 RX ADMIN — CEFTRIAXONE 2 G: 2 INJECTION, POWDER, FOR SOLUTION INTRAMUSCULAR; INTRAVENOUS at 19:43

## 2017-02-16 RX ADMIN — HYDROCODONE BITARTRATE AND ACETAMINOPHEN 1 TABLET: 5; 325 TABLET ORAL at 11:29

## 2017-02-16 RX ADMIN — NORTRIPTYLINE HYDROCHLORIDE 40 MG: 10 CAPSULE ORAL at 21:10

## 2017-02-16 RX ADMIN — Medication 40 MEQ: at 11:20

## 2017-02-16 RX ADMIN — HYDROMORPHONE HYDROCHLORIDE 4 MG: 2 TABLET ORAL at 16:39

## 2017-02-16 RX ADMIN — LIDOCAINE 2 PATCH: 50 PATCH TOPICAL at 11:22

## 2017-02-16 RX ADMIN — SODIUM CHLORIDE: 9 INJECTION, SOLUTION INTRAVENOUS at 22:57

## 2017-02-16 RX ADMIN — HYDROMORPHONE HYDROCHLORIDE 4 MG: 2 TABLET ORAL at 03:57

## 2017-02-16 RX ADMIN — SODIUM CHLORIDE: 9 INJECTION, SOLUTION INTRAVENOUS at 07:45

## 2017-02-16 RX ADMIN — HYDROMORPHONE HYDROCHLORIDE 4 MG: 2 TABLET ORAL at 19:45

## 2017-02-16 RX ADMIN — HYDROMORPHONE HYDROCHLORIDE 4 MG: 2 TABLET ORAL at 08:01

## 2017-02-16 RX ADMIN — DULOXETINE 60 MG: 60 CAPSULE, DELAYED RELEASE ORAL at 08:01

## 2017-02-16 RX ADMIN — ONDANSETRON 4 MG: 2 INJECTION INTRAMUSCULAR; INTRAVENOUS at 07:44

## 2017-02-16 NOTE — PHARMACY-ADMISSION MEDICATION HISTORY
Admission medication history interview status for the 2/15/2017 admission is complete. See Epic admission navigator for allergy information, pharmacy, prior to admission medications and immunization status.     Medication history interview sources:  Patient's recall, [facility] fill history (QUIANA Pham [phone])    Changes made to PTA medication list (reason)  Added:   1. Cholecalficerol 2000 IU daily - patient reports taking this at home  Deleted: None  Changed: None    Additional medication history information (including reliability of information, actions taken by pharmacist):  1. Was not able to verify with [facility] that the patient received her flu shot this year.  2. The patient reports that PTA she was taking Ciprofloxacin 500 mg BID for a positive blood culture from her recent hospitalization at [facility]. Started on 2/9/17, duration of therapy is 12 days (end date is 2/20/17)  3. The patient is taking Xarelto 20 mg QD d/t her past medical history of clots (in liver and arms).      Prior to Admission medications    Medication Sig Last Dose Taking? Auth Provider   VITAMIN D, CHOLECALCIFEROL, PO Take 2,000 Units by mouth daily Past Month Yes Unknown, Entered By History   nortriptyline (PAMELOR) 10 MG capsule Take 3-4 capsules (30-40 mg) by mouth At Bedtime 2/14/2017 at PM Yes Larisa Lorenzo PA-C   ondansetron (ZOFRAN ODT) 4 MG ODT tab Take 1-2 tablets (4-8 mg) by mouth every 6 hours as needed for nausea 2/15/2017 at AM Yes Gilmer Lazcano MD   mirtazapine (REMERON SOL-TAB) 15 MG ODT tab 0.5 tablets (7.5 mg) by Orally disintegrating tablet route At Bedtime 2/14/2017 at PM Yes Larisa Lorenzo PA-C   SUMAtriptan (IMITREX) 50 MG tablet Take 1-2 tablets ( mg) by mouth at onset of headache for migraine - may repeat dose after 2h if headache recurs.  Max: 200mg/24 hours Past Month Yes Larisa Lorenzo PA-C   rivaroxaban ANTICOAGULANT (XARELTO) 20 MG TABS tablet Take 1 tablet (20  mg) by mouth daily (with dinner) 2/14/2017 at PM Yes Larisa Lorenzo PA-C   DULoxetine (CYMBALTA) 60 MG capsule Take 1 capsule (60 mg) by mouth daily 2/15/2017 at AM Yes aLrisa Lorenzo PA-C   cloNIDine (CATAPRES) 0.2 MG tablet Take 2 tablets (0.4 mg) by mouth every evening 2/14/2017 at PM Yes Larisa Lorenzo PA-C   ACETAMINOPHEN PO Take 500-1,000 mg by mouth every 6 hours as needed for pain  Past Week Yes Reported, Patient   albuterol 90 MCG/ACT inhaler Inhale 2 puffs into the lungs every 6 hours as needed  Past Week Yes Reported, Patient   Nutritional Supplements (COMPLEAT) LIQD Infuse at 50 ml/hour for 5 hours daily through j-tube  Flush j-tube with 30 ml water before and after each infusion of Compleat Unknown  Gilmer Lazcano MD         Medication history completed by:  Oli Arboleda, PharmD Student  February 16, 2017

## 2017-02-16 NOTE — PROGRESS NOTES
CLINICAL NUTRITION SERVICES - ASSESSMENT NOTE     Nutrition Prescription    RECOMMENDATIONS FOR MDs/PROVIDERS TO ORDER:  Patient is Requesting to advance diet as tolerated to Regular. Pt states that she knows what to order. Otherwise, Low fiber , low fat diet is recommended for  Gastroparesis     Malnutrition Status:    Patient does not meet two of the above criteria necessary for diagnosing malnutrition    Recommendations already ordered by Registered Dietitian (RD):  Ordered 2 can's of complete daily - pt to utilize per need via pump per pt's request. ( 480 ml, likely 20 ml/hr if to run at continuous rate).     Future/Additional Recommendations:  None      REASON FOR ASSESSMENT  Doreen Peralta is a/an 36 year old female assessed by the dietitian for Provider Order - Registered Dietitian to Assess and Order TF per Medical Nutrition Therapy Protocol      - Chart reviewed: pt with past hx of gastroparesis that spiraled into multiple bowel surgeries including colon resection and cholecystectomy and current G-J tube.    - Has had recurrent fevers with abdominal pain since summer 2016. She has had previous episodes of Klebsiella bacteremia and from 2/6 - 2/9.    - Presented with progressive worsening of abdominal pain, Admitted with recurrent chills, abdominal pain, malaise, and anorexia/dehydration after recent hospitalization and treatment for Klebsiella oxytoca.       NUTRITION HISTORY  Per discussion with patient, has not been able to eat or have tube feeds since Tuesday.    - Home TF regimen in the past has consisted of using Compleat (non soy, non whey - proteins) formula via J-tube with goal of 2 can's per day the most.     - Patient does take PO, regular diet. If unable to tolerate much PO, then uses 1 can of complete TF via pump, low rate infusion only.     - Pt has past history of gastroparesis and GI motility issues. She is s/p colectomy per chart review. Pt had a J-tube placed in October 2015 and was  "exchanged later on. Pt notes that, she has tried other tube feed formulas, but Compleat is the one she seems to tolerate best.      - Pt's home care is [facility]. Compleat tube feeding (soy and whey free) formula,   Is not on [employer] formulary list. Pt Notes that she is not following any specific diet at this time. Eats what she can tolerate. Pt would like to be on a Regular diet while in the hospital.       CURRENT NUTRITION ORDERS  Diet: NPO ( Patient NPO since midnight for abdominal and pelvic US on 2/16 today). Pt was eating a clear liquid diet tray at the time     EN: J tube clamped, G tube to gravity.    - Pt is requesting to send 2 can's of Compleat formula daily and she will set it up via pump, when needs to start TF.       LABS  K+: 3.3 - L  Mg++/Phos: N/A       MEDICATIONS  Medications reviewed    ANTHROPOMETRICS  Height: 0 cm (Data Unavailable) - 167.6 cm ( 5'6\")    Most Recent Weight: 85.5 kg (188 lb 8 oz) - admission wt on 2/15/17   IBW: 59 kg ( 144% IBW)   BMI: 30.42 kg /m2 - Obesity Grade I BMI 30-34.9  Weight History: within baseline,  wt with large fluctuations  Wt Readings from Last 25 Encounters:   02/15/17 85.5 kg (188 lb 8 oz)   02/06/17 81.6 kg (180 lb)   01/28/17 86 kg (189 lb 9.5 oz)   01/18/17 84.1 kg (185 lb 6.4 oz)   01/09/17 83.4 kg (183 lb 12.8 oz)   01/05/17 81.2 kg (179 lb)   11/14/16 86.2 kg (190 lb)   11/10/16 86.2 kg (190 lb)   11/07/16 86.2 kg (190 lb)   11/03/16 89.4 kg (197 lb)   10/27/16 82 kg (180 lb 12.8 oz)   10/26/16 88.4 kg (194 lb 12.8 oz)   10/25/16 81.6 kg (180 lb)   10/20/16 81.6 kg (180 lb)   10/16/16 85.5 kg (188 lb 7.9 oz)   10/14/16 82.6 kg (182 lb)   10/11/16 81 kg (178 lb 9.6 oz)   09/26/16 80.7 kg (178 lb)   09/19/16 81.6 kg (180 lb)   09/16/16 86.2 kg (190 lb)   09/08/16 82.2 kg (181 lb 3.2 oz)   09/02/16 81.6 kg (180 lb)   08/30/16 86.8 kg (191 lb 5.8 oz)   08/26/16 84.6 kg (186 lb 9 oz)   08/16/16 81.7 kg (180 lb 3.2 oz)         Dosing Weight: 66 kg adjusted wt " based on admission wt     ASSESSED NUTRITION NEEDS  Estimated Energy Needs: 1320 - 1650  kcals/day (20 - 25 kcals/kg)  Justification: Maintenance  Estimated Protein Needs: 66 - 79  grams protein/day (1 - 1.2 grams of pro/kg)  Justification: Maintenance  Estimated Fluid Needs: (1 mL/kcal)   Justification: Maintenance    PHYSICAL FINDINGS  Extremities: No LE edema, no LE lesions  G-J tube located epigastric       MALNUTRITION  % Intake: low po intake since 3 days ago - No meeting malnutrition criteria  % Weight Loss: None noted / unable to determine   Subcutaneous Fat Loss: None observed  Muscle Loss: None observed  Fluid Accumulation/Edema: None noted  Malnutrition Diagnosis: Patient does not meet two of the above criteria necessary for diagnosing malnutrition    NUTRITION DIAGNOSIS  Inadequate oral intake related to abdominal pain and recent infection as evidenced by pt's report of minimal po intake since 2-3 days ago.     INTERVENTIONS  Implementation  Nutrition Education: Role of RD in TF plan of care   Enteral Nutrition - Ordered   Discussed TF with [facility]     Goals  Total average nutritional intake to meet a minimum of 20 kcal/kg and 1.0 gm PRO/kg daily (per dosing wt 66 kg adjusted wt ).    Monitoring/Evaluation  Progress toward goals will be monitored and evaluated per protocol.    Kathy Johnson RD/YASH  Pager [phone]

## 2017-02-16 NOTE — H&P
[facility]   Internal Medicine H&P     Patient Name: Doreen Peralta   : 1980, Age: 36 year old  MRN: [id number]  PCP: Larisa Lorenzo    CC: fever, abdominal pain, malaise    HPI: 35 yo F with hx of gastroparesis that spiraled into multiple bowel surgeries including colon resection and cholecystectomy and current G-J tube, recurrent fevers with abdominal pain since summer . She has had previous episodes of Klebsiella bacteremia and from  -  she was admitted to  [facility] for fever 101.3, WBC 18.1 w/left shift found to be due to Klebsiella oxytoca. She was initially treated with IV ertapenem then switched to PO cipro 14d for d/c. UA remained neg throughout. She was d/c'd with 5 day course of PO dilaudid.    Today she reports that she felt progressively worst since discharge with worsening abdominal pain culminating in her being unable to eat or have tube feeds since Saturday or , though she has tried to keep drinking water. She has had shaking chills three times this past week (last on Monday) though she has not taken her temperature. Per mom, she was in bed all day for the past several days.    She has had diarrhea for some time, particularly since her tube feeds, and has BMs variable number of times a day but feels she is at her baseline. She has felt dizzy, room spinning, when getting up from laying fown for the last 2-3 days. She has not noticed any blood in her stool. She also has not noticed any change with her urine. She has on and off headaches at baseline which have not changed.    In the ED she rec'd 1L NS, 1x cefepime 2g, and had hr 94, bp 134/74, and sat 99% on RA.    ROS: 10 point ROS completed and negative except as noted in HPI.    ALLERGIES:   Allergies   Allergen Reactions     Hyoscyamine Rash     Metoclopramide Other (See Comments)     Eye twitching.      Peaches [Peach] Other (See Comments)     Raw. Cooked OK     Sucralose  Other (See Comments)     All artificial sweeteners. Aspartame also. Swollen glands     Advair Diskus Other (See Comments)     Throat burns     Azithromycin Other (See Comments)     Burning in throat     Compazine [Prochlorperazine] Visual Disturbance     Contrast Dye Itching     States is allergic to CT contrast dye     Cyclobenzaprine Visual Disturbance     Fentanyl Other (See Comments)     migraine     Ibuprofen GI Disturbance     Lactulose Nausea and Vomiting     Gas and bloating     Levaquin [Levofloxacin] Swelling     Per ED M.D. And RN      Morphine Sulfate Other (See Comments)     Chest pain       Oxycodone Other (See Comments)     Burning throat, but can take Norco.      Penicillins Other (See Comments)     Family hx of resp arrest, she has never taken  Ok with cephalosporins     Rizatriptan Visual Disturbance     Droperidol Hives and Rash     Isovue [Iopamidol] Palpitations     Pt had racing heart and sob      Ketorolac Anxiety     Latex Swelling and Rash     Kiwi, likely also avacado, ? banana     Levsin Rash       Home Meds:  No current facility-administered medications on file prior to encounter.   Current Outpatient Prescriptions on File Prior to Encounter:  ondansetron (ZOFRAN ODT) 4 MG ODT tab Take 1-2 tablets (4-8 mg) by mouth every 6 hours as needed for nausea   mirtazapine (REMERON SOL-TAB) 15 MG ODT tab 0.5 tablets (7.5 mg) by Orally disintegrating tablet route At Bedtime   SUMAtriptan (IMITREX) 50 MG tablet Take 1-2 tablets ( mg) by mouth at onset of headache for migraine - may repeat dose after 2h if headache recurs.  Max: 200mg/24 hours   rivaroxaban ANTICOAGULANT (XARELTO) 20 MG TABS tablet Take 1 tablet (20 mg) by mouth daily (with dinner)   DULoxetine (CYMBALTA) 60 MG capsule Take 1 capsule (60 mg) by mouth daily   cloNIDine (CATAPRES) 0.2 MG tablet Take 2 tablets (0.4 mg) by mouth every evening   Nutritional Supplements (COMPLEAT) LIQD Infuse at 50 ml/hour for 5 hours daily through  j-tubeFlush j-tube with 30 ml water before and after each infusion of Compleat   ACETAMINOPHEN PO Take 500-1,000 mg by mouth every 6 hours as needed for pain    nortriptyline (PAMELOR) 10 MG capsule Take 3-4 capsules (30-40 mg) by mouth At Bedtime   albuterol 90 MCG/ACT inhaler Inhale 2 puffs into the lungs every 6 hours as needed        PMedHx/PSurgHx:    Patient Active Problem List   Diagnosis     Constipation by delayed colonic transit     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     Hx SBO     S/P LEEP of cervix     Gastroparesis     Migraines     Intermittent asthma     Allergic rhinitis     Abnormal Pap smear of cervix     PEG (percutaneous endoscopic gastrostomy) status     Health Care Home     PEG tube malfunction (H)     Malfunction of gastrostomy tube (H)     Malfunctioning jejunostomy tube (H)     Jejunostomy tube present (H)     S/P partial resection of colon     Malnutrition (H)     Long-term (current) use of anticoagulants [Z79.01]     Anxiety     Anemia in other chronic diseases classified elsewhere     Munchausen syndrome - previously suspected     Anemia, iron deficiency     Mitral regurgitation mild-mod by Echo June 2016     Atopic rhinitis     Migraine     Patellofemoral stress syndrome     Hyperbilirubinemia     Chronic diarrhea     Coagulation defect (H) [D68.9]     Attention to G-tube (H)     Chronic abdominal pain     Vitamin D deficiency     SIRS (systemic inflammatory response syndrome) (H)     History of deep venous thrombosis     Nausea and vomiting     Abdominal pain, generalized     Past [facility] History   Procedure Laterality Date     Laparoscopic oophorectomy Right 2009     [facility]     Laparoscopic cholecystectomy  2002     [facility] stones duct     Esophagoscopy, gastroscopy, duodenoscopy (egd), combined  7/10/2012     Procedure: COMBINED ESOPHAGOSCOPY, GASTROSCOPY, DUODENOSCOPY (EGD);  Upper Endoscopy, Ileoscopy    Latex Allergy  with biopsies;  Surgeon: Nicole Redding MD;   [facility]     Colonoscopy  7/10/2012     Procedure: COLONOSCOPY;;  Surgeon: Nicole Redding MD;  Location: [facility]     Leep tx, cervical  2009     [facility]     Laparoscopic ileostomy  1/20/2012     [facility], loop     Esophagoscopy, gastroscopy, duodenoscopy (egd), combined N/A 11/5/2014     Procedure: COMBINED ESOPHAGOSCOPY, GASTROSCOPY, DUODENOSCOPY (EGD);  Surgeon: Nicole Redding MD;  [facility]     Laparoscopic assisted colectomy  1/20/2012     Procedure:LAPAROSCOPIC ASSISTED COLECTOMY; Laparoscopic Ileostomy       Laparoscopic assisted colectomy left (descending)  10/24/2012     Procedure: LAPAROSCOPIC ASSISTED COLECTOMY LEFT (DESCENDING);   Hand Assisted Laproscopic Subtotal abdominal Colectomy,Iieorectal anastamosis, Ileostomy Closure.       Remove gastrostomy tube adult N/A 12/12/2014     Procedure: REMOVE GASTROSTOMY TUBE ADULT;  Surgeon: Nicole Redding MD;  [facility]     Replace gastrostomy tube adult  5/19/15     Hc replace gastrostomy/cecostomy tube percutaneous Left 5/19/2015     Procedure: REPLACE GASTROSTOMY TUBE, PERCUTANEOUS;  Surgeon: Melecio Morejon Chi, MD;  [facility]     Hc replace duodenostomy/jejunostomy tube percutaneous N/A 8/27/2015     Procedure: REPLACE GASTROJEJUNOSTOMY TUBE, PERCUTANEOUS;  Surgeon: Mio Holder MD;  [facility]     Hc [facility] endoscopy w placement gastrostomy tube percut N/A 10/1/2015     Procedure: COMBINED ESOPHAGOSCOPY, GASTROSCOPY, DUODENOSCOPY (EGD), PLACE PERCUTANEOUS ENDOSCOPIC GASTROSTOMY TUBE;  Surgeon: Mio Holder MD;  [facility]     Laparoscopic assisted insertion tube jejunostomy N/A 10/16/2015     Procedure: LAPAROSCOPIC ASSISTED INSERTION TUBE JEJUNOSTOMY;  Surgeon: Elsa Medel MD;  [facility]     Picc insertion Left 10/21/2015     5fr DL Power PICC, 37cm (2cm external) in the L basilic vein w/ tip in the SVC RA junction.     Hc replace duodenostomy/jejunostomy tube percutaneous  N/A 1/7/2016     Procedure: REPLACE JEJUNOSTOMY TUBE, PERCUTANEOUS;  Surgeon: Elsa Medel MD;  [facility]     Endoscopic insertion tube gastrostomy N/A 1/21/2016     Procedure: ENDOSCOPIC INSERTION TUBE GASTROSTOMY;  Surgeon: Nicole Redding MD;  [facility]     Hc replace duodenostomy/jejunostomy tube percutaneous N/A 1/28/2016     Procedure: REPLACE JEJUNOSTOMY TUBE, PERCUTANEOUS;  Surgeon: Elsa Medel MD;  [facility]     Laparotomy exploratory N/A 1/28/2016     Procedure: LAPAROTOMY EXPLORATORY;  Surgeon: Elsa Medel MD;  [facility]     Remove port vascular access Right 6/30/2016     Procedure: REMOVE PORT VASCULAR ACCESS;  Surgeon: Pradeep Orosco MD;  Location: [facility]     Echo adal  7/19/2016            N/A 7/20/2016     Procedure: ANESTHESIA OUT OF OR;  Surgeon: GENERIC ANESTHESIA PROVIDER;  Location: [facility]       SocHx:  Social History     Social History     Marital status:      Spouse name: Mitali     Number of children: 3     Years of education: N/A     Occupational History     Medical Assistant [employer]     Pediatric clinic     Social History Main Topics     Smoking status: Former Smoker     Packs/day: 1.00     Years: 4.00     Types: Cigarettes     Quit date: 1/1/2004     Smokeless tobacco: Former User     Alcohol use No     Drug use: No     Sexual activity: Yes     Partners: Male     Other Topics Concern     Not on file     Social History Narrative       FamHx:  Family History   Problem Relation Age of Onset     Thyroid Disease Mother      Sjogren's Mother      GASTROINTESTINAL DISEASE Mother      Intermittent nausea vomiting diarrhea     Colon Polyps Mother      Lupus Maternal Grandmother      CANCER Maternal Grandfather      Lung     Colon Cancer Maternal Grandfather 65     CANCER Paternal Grandmother      Lung      CEREBROVASCULAR DISEASE Paternal Grandmother      DIABETES Paternal Grandmother      Cardiovascular Paternal  Grandmother      CHF     CANCER Paternal Grandfather      Lung     Glaucoma Paternal Grandfather      Prostate Problems Father      prostate enlargement     Abdominal Aortic Aneurysm Other      Macular Degeneration No family hx of         Vitals, Exam, Data     Physical exam:  Temp:  [98.9  F (37.2  C)] 98.9  F (37.2  C)  Pulse:  [86] 86  Heart Rate:  [109] 109  Resp:  [16-18] 16  BP: (115-135)/(64-89) 128/75  SpO2:  [98 %-100 %] 98 %  On RA    Wt Readings from Last 2 Encounters:   02/06/17 81.6 kg (180 lb)   01/28/17 86 kg (189 lb 9.5 oz)     No intake or output data in the 24 hours ending 02/15/17 1817    Physical Exam:   General: in some distress, abdominal guarding, crying  HEENT: No Scleral icterus. NCAT, MMM. PERRL, EOMI. No LAD  Cardiac: Normal rate, regular rhythm. No m/r/g. Normal S1, S2.  Pulm: CTAB, no wheezes, or crackles. Normal respiratory effort  Abd: Soft, non distended, TTP in RUQ without rebound. G-J tube located epigastric without erythema, irritation, or discharge. No hepatosplenomegaly.  Skin: No jaundice, No rash, dry skin over knuckles bilaterally. Small erosion under lip surrounded by old scab (patient reports picking at it).  Extremities: No LE edema, no LE lesions  Joints: No inflammation noted on joints  Neuro: A&Ox3, no focal deficits    CBC  Recent Labs  Lab 02/15/17  1445   WBC 17.0*   HGB 9.9*   MCV 78   *       BMP  Recent Labs  Lab 02/15/17  1445      POTASSIUM 3.5   CHLORIDE 103   CO2 25   ANIONGAP 8   GLC 79   BUN 11   CR 0.94   TARA 8.9     Liver panel  Recent Labs  Lab 02/15/17  1445   PROTTOTAL 8.9*   ALBUMIN 3.5   BILITOTAL 0.8   ALKPHOS 164*   AST 20   ALT 31     CRP  1/31/17 154.0  2/8/17 4.75  2/15/17 8.3    procal  2/7/17 23.41  2/15/17 0.26    tsh  2/7/17 0.18  (while ill)  7/21/16 0.69    Cultures  2/6/17 Blood culture [facility] +klebsiella  CULTURED ON THE 1ST DAY OF INCUBATION: KLEBSIELLA OXYTOCA (ANJEL)      Antibiotic Sensitivity Unit Status     AMIKACIN <=2  Susceptible ug/mL Final     AMPICILLIN 16 Resistant ug/mL Final     AMPICILLIN/SULBACTAM 4 Susceptible ug/mL Final     CEFEPIME <=1 Susceptible ug/mL Final     CEFTAZIDIME <=1 Susceptible ug/mL Final     CEFTRIAXONE <=1 Susceptible ug/mL Final     CIPROFLOXACIN <=0.25 Susceptible ug/mL Final     GENTAMICIN <=1 Susceptible ug/mL Final     LEVOFLOXACIN <=0.12 Susceptible ug/mL Final     MEROPENEM <=0.25 Susceptible ug/mL Final     Piperacillin/Tazo <=4 Susceptible ug/mL Final     TOBRAMYCIN <=1 Susceptible ug/mL Final     Trimethoprim/Sulfa <=1/19 Susceptible ug/mL Final     Blood - see assessment and plan. 2 blood Cx taken today, pending  UA w/o bacteria, leuk esterase negative    Imaging:  CT C/A/P w/contrast 1/20/17  1. A few tiny (0.2 cm) nodules are seen in the lungs and are likely of no clinical significance. For an indeterminate lung nodule < or equal to 4 mm   2. Percutaneous gastric and small bowel feeding tubes are again noted.  3. No evidence for infiltrate, abscess, or other etiology for patient's fever is identified.  4. Postoperative changes status post cholecystectomy, partial colectomy and possible oophorectomy.  5. Tiny nonobstructive right intrarenal calculus is unchanged.  6. This patient has had an excessive number of CT scans with almost 30 CT abdomen and pelvis scans since 2012 in the [facility] system alone, not counting other sites. This significantly raises the patient's risk due to the radiation exposure. Recommend close clinical evaluation prior to ordering prior CT scans.    CT Abd/pelvis w/contrast Feb 7th @ [facility]  Visualized lung parenchyma is clear.  No pericardial or pleural effusion.  No hiatus hernia.  Cholecystectomy clips.  Mild intrahepatic ductal dilatation.  No mass or cyst.  Spleen is uniform attenuation and normal size.  No adrenal mass.  No pancreatic inflammation.  Percutaneous gastrostomy.  Anastomotic surgical staples right colon.  No dilatation or inflammation of small  bowel.  Percutaneous jejunostomy.  No air or fluid in the peritoneum.  Kidneys enhance normally.  Tiny nonobstructing calculus midpole right kidney measures about 2-3 mm.  Uterus is unremarkable.  No adnexal mass.  Urinary bladder is unremarkable.  No pelvic or inguinal adenopathy.  No retroperitoneal or mesenteric adenopathy.  No bone finding of significance.    Impression:  1. No source for fever or pain.  Prior attenuation differential seen in the posterior right liver is no longer apparent.  2. Right hemicolectomy changes.  3. Percutaneous gastrostomy and jejunostomy.  4. Small stable nonobstructing caliceal calculus right kidney   5. Prior cholecystectomy.    Cardio: EF 55-60% no mass/veg     Assessment & Plan     37 yo F with hx of gastroparesis that spiraled into multiple bowel surgeries now with G-J tube admitted with recurrent chills, abdominal pain, malaise, and anorexia/dehydration after recent hospitalization and treatment for Klebsiella oxytoca.     # subjective fevers, chills, RUQ pain likely 2/2 klebsiella infection with un-identified source  # chronic diarrhea, related to tube feeds v.  Klebsiella  # dehydration, elevated inflammatory markers  Recurrent klebsiella infections without identified source, particularly this occurrence on which patient does not have a portocath ro any lines beside G-J. Patient has had a TTE w/o mass/vege (1/20/17), negative Right heart cath culture 9/27/2016, EF 55-60% as well as multiple CTs without evidence of nidus of infection. Mild elevations of alkP.    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 Kidney stone, splenic microabscesses (though no such evidence on CT and no splenomegaly). Cardiac source seems unlikely given normal TTE and RH acth Cx but still a  possibility in the absence of CHELO.    Notably, Klebsiella oxytoca is a known cause ofdiarrhea. (Miguel et al 2013)    ID history:  - E. Coli 5/3/16  - acinetobacter bacteremia 7/2016  - candida parapsilosis fungemia 7/2016  - histoplasma Ab+ 1/2017  - histoplasma urinary antigen negative 1/2017  - Klebsiella oxytoca 6/21/16 from portocath, 7/7/16 from [facility], 2/9/17 from [facility] (most recent)     PLAN:    > ID consult place, case discussed with Dr. Price (outpatient provider)    > US full abd/pelvis with dopplers    > GI consult for possible colonoscopy    > Consider urology and general surgery consults to further evaluate potential sources    > ceftriaxone 2g q24hr    > trend CRP QOD, procal twice weekly    # dehydration, loss of appetite  # abdominal pain, waxing/waning chronic pain  Patient reports receiving 4 mg dilaudid PO q3h at [facility]. There, the Pain Service was consulted given the patient has had numerous prescriptions and intermittent courses of opioids related to her many ED and hospital visits. She was discharged with 5 day course (2/9 - 2/14) of She rec'd 3 mg IV dilaudid in the ED without change in mental status and only mild-mod reduction in pain    PLAN:   > tylenol 650 mg prn   > 2-4 mg dilaudid PO q4h   > zofran prn   > pain management consult   > nutrition consult to restart tube feeds   > normal saline maintenance fluid at 100 cc/hr   > strict I&O, monitor urine output    # stable conditions  Bilateral UE DVTs - continue home xarelto  Asthma - albuterol prn  Anxiety - monitor    FEN: 100 cc/hr, replete prn, regular diet+/-tube feeds as toelrated  Prophylaxis:  DVT: continue home xarelto   GI: none at this time Lung: incentive spirometer  Code Status: FULL CODE  Medical Decision Maker: mother, Aby gordillo, [phone]    Disposition: pending futher evaluation of infectious source    please contact [facility] cross cover pager with questions.    Eitan Chris MD, PhD  PGY-1, Internal  Medicine  [phone]    Patient was seen and discussed with attending physician Dr. Meraz who agrees with above assessment and plan.    Patient seen with the house staff today. Agree with plan as outlined with the following additions/exceptions    Vitals, imaging and labs reviewed for today.  Franck Meraz MD  [facility] Medicine Attending  [phone]

## 2017-02-16 NOTE — PROGRESS NOTES
Care Coordinator Progress Note     Admission Date/Time:  2/15/2017  Attending MD:  Rsoanne Tan DO     Data  Chart reviewed, discussed with interdisciplinary team.   Patient was admitted for:    Abdominal pain, generalized  Leukocytosis, unspecified type  Bacteremia.    Concerns with insurance coverage for discharge needs: None.  Current Living Situation: Patient lives with spouse.  Support System: Supportive and Involved  Services Involved: Home Infusion  Transportation: Family or Friend will provide  Barriers to Discharge: medical course    Coordination of Care and Referrals: Provided patient/family with options for Home Infusion.        Assessment  Chart reviewed. Met with patient at bedside, introduced self and RNCC role. Patient open to [facility] for tube feedings; resumption order placed. Denies further needs at this time. RNCC to continue to follow for discharge planning and needs.     Plan  Anticipated Discharge Date:  TBD  Anticipated Discharge Plan:  Home with resumption of home infusion    Moriah Toribio RN

## 2017-02-16 NOTE — PROVIDER NOTIFICATION
Writer sent text page to Dr. Dyson [phone] regarding pt complaint of new pain regime Norco 5-325 not being effective with pain control. Writer received call back at 1615 from Dr. Dyson and stated he would consult with pain team. After consult with pain team order was placed for Dilaudid 2-4mg, and Norco was D/Cd

## 2017-02-16 NOTE — PLAN OF CARE
Problem: Goal Outcome Summary  Goal: Goal Outcome Summary  Outcome: No Change  VSS, on RA, A&Ox4. Pt had small emesis this AM, IV zofran given with relief. Pt went down for an abdominal and pelvic US at 0900, returned to floor at 1100. Pt NPO in prep for US. Pt has had decreased appetite over the past week, nutritional consult placed, clear liquid diet ordered. G and J tubes clamped. NS running at 100cc/hr through PIV. 2 lidocaine patches applied to right lower abdomen for pain control. Pt was getting oral dilaudid, dilaudid discontinued and pt now taking norco Q4 hours for abdominal pain. Pt independent, ambulates halls frequently.

## 2017-02-16 NOTE — PLAN OF CARE
Problem: Goal Outcome Summary  Goal: Goal Outcome Summary  5B-PT: Per consult with RN, OT and chart review, pt is indep with ADLs and functional mobility. No acute PT needs at this time. Will complete orders, please reorder if needs arise.

## 2017-02-16 NOTE — PHARMACY-CONSULT NOTE
Pharmacy Tube Feeding Consult    Medication reviewed for administration by feeding tube and for potential food/drug interactions.    Recommendation: No changes are needed at this time.     Pharmacy will continue to follow as new medications are ordered.    Paola Espinal, PharmD, BCPS

## 2017-02-16 NOTE — PROGRESS NOTES
Inpatient Pain Management Service: Consultation      DATE OF CONSULT: February 16, 2017        REASON FOR PAIN CONSULTATION:  Doreen Peralta is a 36 year old female I am seeing in consultation at the request of Flavio Gonsalez MD for evaluation and recommendations for her acute on chronic pain      CHIEF PAIN COMPLAINT: abdominal pain      ASSESSMENT:   1. Acute on chronic abdominal pain, etiology unclear at this time, Patient was admitted to [facility] 02/06-02/09 and treated for Klebsiella Bacteremia unidentified source with recurrent fevers and increased abdominal pain. Patient has had multiple admissions over the past several months for similar symptoms, recurrent fevers and increased abdominal pain.   2. History of gastroparesis with history of multiple abdominal surgeries including: colon resections, oophorectomy, cholecystectomy.   3. History of anxiety disorder  4. Has GJ tube in place.   5. Not on chronic opioids prior to admission, not currently being followed by a pain clinic. Patient has been seen in at the [place] pain clinic in 2015, and was tapered off tramadol. Patient reports since that time, she manages her chronic abdominal pain with non-opioid adjuvants and non-pharmacologic therapies at home very well, until she has episodes of fever that she describes increases her abdominal pain.     Outpatient medications related to pain prior to admission:   --NOT receiving chronic opioids prior to admission  --Patient receiving opioids from multiple providers for limited supplies.    Outpatient opioids prescribed by: multiple providers, for limited supplies.  PRIMARY CARE PROVIDER: Larisa Lorenzo  PAIN SPECIALIST: Not currently following with a Chronic Pain Specialist.    [facility] database reviewed: reviewed, multiple prescriptions for short supplies of opioids from multiple providers.      TREATMENT RECOMMENDATIONS/PLAN:   1. Agree with continuing oral opioids at low doses while workup is  ongoing    Continue hydrocodone-acetaminophen 5-325 mg, 1 tablet PO Q 4 hours PRN    If workup is negative, would recommend tapering off opioids prior to discharge.  2. Avoid IV opioids unless there is a contraindication to patient taking orals.   3. Start lidocaine 5% patches, 1-3 patches TD Q 12 hours on and 12 hours off, apply patches to abdomen, apply patches at night per patient request.    If helpful, she can obtain these over the counter outpatient.   4. Start menthol patch, 1 patch, TD Q 8hours, apply when lidocaine patches are off.    If helpful she can obtain these over the counter outpatient   5. If primary team ok with the possibility of masking fevers can schedule additional acetaminophen 325mg PO Q 8hours, (this dose accounts for acetaminophen in hydrocodone-acetaminophen and is less than 3 grams total daily dose). If hydrocodone-acetaminophen dose changes, you will need to adjust dose of scheduled acetaminophen.  6. Ice/heat 20 minutes Q1 hours PRN apply to abdomen.   7. Patient reports she uses a TENS unit at home that is effective for management of her chronic abdominal pain.     Patient to coordinate with her mother the possibility of bringing her home TENS unit in to the hospital to use.   8. Health Psychology consult for coping strategies.   9. Defer anxiety control to the primary team, if anxiety becomes an issue consult Psychiatry.   10. Bowel regimen for opioid induced constipation per primary team, while on opioids.   11. Avoid  ketorolac, and ibuprofen, due to intolerance (ketorolac-anxiety, and ibuprofen- GI upset). Can consider celecoxib (Celebrex) but patient declines trying new medications due to apprehension of adverse side effects, and multiple drug intolerances/allergies.  12. Avoid gabapentin and pregabalin patient reports she has sever nausea and vomiting with these  13. Avoid hyoscyamine due to allergy, patient denies any abdominal cramping at this time.   14. Continue duloxetine,  and nortiptyline, these are her home medications.   15. Patient would benefit from re-establishing care with a chronic pain specialist outpatient, for management of her chronic abdominal pain.   16. Pain service will sign off at this time.         ASSESSMENT AND RECOMMENDATIONS DISCUSSED WITH: Flavio Gonsalez MD, plan discussed with Dr. Rasheed Brito MD from the pain service.       Thank you for consulting the Inpatient Pain Management Service.   The above recommendations are to be acted upon at the primary team s discretion.    To reach us:  Mon - Friday 8 AM - 3 PM: Pager [phone]   After hours, weekends and holidays: Primary service should call [phone] for the on-call pain specialist    HISTORY OF PRESENT ILLNESS: Doreen Peralta is a 36 year old female with history of gastroparesis, multiple abdominal surgeries, colon resections, oophorectomy, cholecystectomy, GJ tube, anxiety disorder, depression, recurrent fevers and klebsiella bacteremia. Patient was admitted to [facility] 02/06-02/09 and treated for Klebsiella Bacteremia unidentified source with recurrent fevers and increased abdominal pain. Patient has had multiple admissions over the past several months for similar symptoms, recurrent fevers and increased abdominal pain. Patient was admitted here at [facility] on 02/15/17 with re-current fevers and increased abdominal pain with iteology of abdominal pain unknown and infection source undetermined at this time. Workup is ongoing. GI and ID consulted.    Today patient states her pain is located in on the right side of her abdomen. She reports the pain is sharp, constant. She reports he pain is worse with activity, and her pain is improved with pain medication. She reports he last BM was yesterday, She reports having diarrhea 5-10 times per day at baseline. She is up ambulating in the room and hallway. She reports poor sleep last night due to pain. She denies any ETOH abuse or illicit drug use.        PAIN HISTORY:   Location: abdomen, right upper and lower quadrants  Duration: constant  Quality of the pain: sharp  Aggravating factors: activity  Relieving factors : rest and pain medication    CAPA (Clinically Aligned Pain Assessment)  Comfort (How is your pain?): Tolerable with discomfort  Change in Pain (Since your last medication/intervention?): Getting better  Pain Control (How are your pain treatments working?): Partially effective pain control  Functioning (Are you able to do activities to get better?) : Can do most things, but pain gets in the way of some   Sleep (Does your pain management allow you to sleep or rest?): Awake with pain most of the night       FUNCTIONAL STATUS:  Change:      improving  Oral intake:     NPO for abdominal US today.   Bowel function:    Liquid or diarrhea 5 times per day  Activity level:     Up ambulating independently in room  Ambulating in [place]  Sleep:      Reports poor sleep last night due to pain  Mood:      anxiety and tired, poor energy      REVIEW OF 10 BODY SYSTEMS: 10 point ROS of systems including Constitutional, Eyes, Respiratory, Cardiovascular, Gastroenterology, Genitourinary, Integumentary, Musculoskeletal, Psychiatric were all negative except for pertinent positives noted in my HPI.       INPATIENT MEDICATIONS PERTINENT TO PAIN CONSULT:   --HYDROmorphone 4 mg PO Q 4 hours PRN  --acetaminophen 650 mg PO Q 4 hours PRN  --duloxetine 60 mg PO Daily   --notriptyline 40 mg PO Q HS  --mirtazapine 7.5 mg PO Q HS      PAST PAIN TREATMENTS:   TENS unit- helps  Aromatherapy-helps  Relaxation/meditation-helps  Distraction-helps  --ketorolac- anxiety  --oxycodone- burning throat  --morphine- chest pain  --ibuprofen- GI upset  --hyoscyamine-rash  --cyclobenzaprine- visual disturbances  --fentanyl- migraines      CURRENT MEDICATIONS:   Current Facility-Administered Medications Ordered in Epic   Medication Dose Route Frequency Provider Last Rate Last Dose     potassium  chloride (KAYCIEL) solution 40 mEq  40 mEq Oral Once Flavio Gonsalez MD         HYDROcodone-acetaminophen (NORCO) 5-325 MG per tablet 1 tablet  1 tablet Oral Q4H PRN Gee Peraza MD         cloNIDine (CATAPRES) tablet 0.4 mg  0.4 mg Oral QPM Eitan Chris MD   0.4 mg at 02/15/17 2115     DULoxetine (CYMBALTA) EC capsule 60 mg  60 mg Oral Daily Eitan Chris MD   60 mg at 02/16/17 0801     nortriptyline (PAMELOR) capsule 30-40 mg  30-40 mg Oral At Bedtime Eitan Chris MD   40 mg at 02/15/17 2116     mirtazapine (REMERON SOL-TAB) solu-half-tab 7.5 mg  7.5 mg Orally disintegrating tablet At Bedtime Eitan Chris MD   7.5 mg at 02/15/17 2115     rivaroxaban ANTICOAGULANT (XARELTO) tablet 20 mg  20 mg Oral Daily with supper Eitan Chris MD         naloxone (NARCAN) injection 0.1-0.4 mg  0.1-0.4 mg Intravenous Q2 Min PRN Eitan Chris MD         Patient is already receiving anticoagulation with heparin, enoxaparin (LOVENOX), warfarin (COUMADIN)  or other anticoagulant medication   Does not apply Continuous PRN Eitan Chris MD         0.9% sodium chloride infusion   Intravenous Continuous Eitan Chris  mL/hr at 02/16/17 0745       acetaminophen (TYLENOL) tablet 650 mg  650 mg Oral Q4H PRN Eitan Chris MD         ondansetron (ZOFRAN-ODT) ODT tab 4 mg  4 mg Oral Q6H PRN Eitan Chris MD   4 mg at 02/15/17 2154    Or     ondansetron (ZOFRAN) injection 4 mg  4 mg Intravenous Q6H PRN Eitan Chris MD   4 mg at 02/16/17 0744     albuterol (PROAIR HFA/PROVENTIL HFA/VENTOLIN HFA) Inhaler 2 puff  2 puff Inhalation 4x Daily PRN Eitan Chris MD         cefTRIAXone (ROCEPHIN) 2 g vial to attach to  ml bag for ADULTS or NS 50 ml bag for PEDS  2 g Intravenous Q24H Eitan Chris  mL/hr at 02/15/17 2105 2 g at 02/15/17 2105     No current [facility]-ordered outpatient prescriptions on file.           HOME/PREVIOUS MEDICATIONS:    Prior to Admission medications    Medication Sig Start Date End Date Taking? Authorizing Provider   ondansetron (ZOFRAN ODT) 4 MG ODT tab Take 1-2 tablets (4-8 mg) by mouth every 6 hours as needed for nausea 1/28/17  Yes Gilmer Lazcano MD   mirtazapine (REMERON SOL-TAB) 15 MG ODT tab 0.5 tablets (7.5 mg) by Orally disintegrating tablet route At Bedtime 1/2/17  Yes Larisa Lorenzo PA-C   SUMAtriptan (IMITREX) 50 MG tablet Take 1-2 tablets ( mg) by mouth at onset of headache for migraine - may repeat dose after 2h if headache recurs.  Max: 200mg/24 hours 1/2/17  Yes Larisa Lorenzo PA-C   rivaroxaban ANTICOAGULANT (XARELTO) 20 MG TABS tablet Take 1 tablet (20 mg) by mouth daily (with dinner) 11/14/16  Yes Larisa Lorenzo PA-C   DULoxetine (CYMBALTA) 60 MG capsule Take 1 capsule (60 mg) by mouth daily 9/23/16  Yes Larisa Lorenzo PA-C   cloNIDine (CATAPRES) 0.2 MG tablet Take 2 tablets (0.4 mg) by mouth every evening 9/23/16  Yes Larisa Lorenzo PA-C   Nutritional Supplements (COMPLEAT) LIQD Infuse at 50 ml/hour for 5 hours daily through j-tube  Flush j-tube with 30 ml water before and after each infusion of Compleat 7/10/16  Yes Gilmer Lazcano MD   ACETAMINOPHEN PO Take 500-1,000 mg by mouth every 6 hours as needed for pain    Yes Reported, Patient   nortriptyline (PAMELOR) 10 MG capsule Take 3-4 capsules (30-40 mg) by mouth At Bedtime 1/31/17   Larisa Lorenzo PA-C   albuterol 90 MCG/ACT inhaler Inhale 2 puffs into the lungs every 6 hours as needed     Reported, Patient         ALLERGIES:    Allergies   Allergen Reactions     Hyoscyamine Rash     Metoclopramide Other (See Comments)     Eye twitching.      Peaches [Peach] Other (See Comments)     Raw. Cooked OK     Sucralose Other (See Comments)     All artificial sweeteners. Aspartame also. Swollen glands     Advair Diskus Other (See Comments)     Throat burns     Azithromycin Other (See Comments)     Burning in throat      Compazine [Prochlorperazine] Visual Disturbance     Contrast Dye Itching     States is allergic to CT contrast dye     Cyclobenzaprine Visual Disturbance     Fentanyl Other (See Comments)     migraine     Ibuprofen GI Disturbance     Lactulose Nausea and Vomiting     Gas and bloating     Levaquin [Levofloxacin] Swelling     Per ED M.D. And RN      Morphine Sulfate Other (See Comments)     Chest pain       Oxycodone Other (See Comments)     Burning throat, but can take Norco.      Penicillins Other (See Comments)     Family hx of resp arrest, she has never taken  Ok with cephalosporins     Rizatriptan Visual Disturbance     Droperidol Hives and Rash     Isovue [Iopamidol] Palpitations     Pt had racing heart and sob      Ketorolac Anxiety     Latex Swelling and Rash     Kiwi, likely also avacado, ? banana     Levsin Rash            PAST MEDICAL AND PSYCHIATRIC HISTORY:    Past Medical History   Diagnosis Date     Asthma      Bilateral ovarian cysts      Cervical cancer (H) 01/01/2008     cervical cancer      Chronic pain      Colonic dysmotility      s/p subtotal colectomy     Constipation      chronic     E. coli sepsis (H) 5/8/2016     Enteritis      Fungemia 5/5/2016     Gastro-oesophageal reflux disease      H/O ileostomy      Hx of abnormal Pap smear      s/p LEEP - no further details provided     Hypertension      IBS (irritable bowel syndrome)      Other chronic pain      PONV (postoperative nausea and vomiting)      Thrombosis, hepatic vein (H)      microvascular           PAST SURGICAL HISTORY:   Past [facility] History   Procedure Laterality Date     Laparoscopic oophorectomy Right 2009     [facility]     Laparoscopic cholecystectomy  2002     [facility] ctr. stones duct     Esophagoscopy, gastroscopy, duodenoscopy (egd), combined  7/10/2012     Procedure: COMBINED ESOPHAGOSCOPY, GASTROSCOPY, DUODENOSCOPY (EGD);  Upper Endoscopy, Ileoscopy    Latex Allergy  with biopsies;  Surgeon: Nicole Redding,  MD;  Location: [facility] OR     Colonoscopy  7/10/2012     Procedure: COLONOSCOPY;;  Surgeon: Nicole Redding MD;  Location: [facility] OR     Leep tx, cervical  2009     [facility]     Laparoscopic ileostomy  1/20/2012     [facility], loop     Esophagoscopy, gastroscopy, duodenoscopy (egd), combined N/A 11/5/2014     Procedure: COMBINED ESOPHAGOSCOPY, GASTROSCOPY, DUODENOSCOPY (EGD);  Surgeon: Nicole Redding MD;  Location: [facility] OR     Laparoscopic assisted colectomy  1/20/2012     Procedure:LAPAROSCOPIC ASSISTED COLECTOMY; Laparoscopic Ileostomy       Laparoscopic assisted colectomy left (descending)  10/24/2012     Procedure: LAPAROSCOPIC ASSISTED COLECTOMY LEFT (DESCENDING);   Hand Assisted Laproscopic Subtotal abdominal Colectomy,Iieorectal anastamosis, Ileostomy Closure.       Remove gastrostomy tube adult N/A 12/12/2014     Procedure: REMOVE GASTROSTOMY TUBE ADULT;  Surgeon: Nicole Redding MD;  Location: [facility] GI     Replace gastrostomy tube adult  5/19/15     Hc replace gastrostomy/cecostomy tube percutaneous Left 5/19/2015     Procedure: REPLACE GASTROSTOMY TUBE, PERCUTANEOUS;  Surgeon: Melecio Morejon Chi, MD;  Location: [facility] GI     Hc replace duodenostomy/jejunostomy tube percutaneous N/A 8/27/2015     Procedure: REPLACE GASTROJEJUNOSTOMY TUBE, PERCUTANEOUS;  Surgeon: Mio Holder MD;  Location: [facility] OR     Hc [facility] endoscopy w placement gastrostomy tube percut N/A 10/1/2015     Procedure: COMBINED ESOPHAGOSCOPY, GASTROSCOPY, DUODENOSCOPY (EGD), PLACE PERCUTANEOUS ENDOSCOPIC GASTROSTOMY TUBE;  Surgeon: Mio Holder MD;  Location: [facility]     Laparoscopic assisted insertion tube jejunostomy N/A 10/16/2015     Procedure: LAPAROSCOPIC ASSISTED INSERTION TUBE JEJUNOSTOMY;  Surgeon: Elsa Medel MD;  Location: [facility] OR     Picc insertion Left 10/21/2015     5fr DL Power PICC, 37cm (2cm external) in the L basilic vein w/ tip in the SVC RA junction.     Hc replace duodenostomy/jejunostomy tube  percutaneous N/A 1/7/2016     Procedure: REPLACE JEJUNOSTOMY TUBE, PERCUTANEOUS;  Surgeon: Elsa Medel MD;  [facility]     Endoscopic insertion tube gastrostomy N/A 1/21/2016     Procedure: ENDOSCOPIC INSERTION TUBE GASTROSTOMY;  Surgeon: Nicole Redding MD;  [facility]     Hc replace duodenostomy/jejunostomy tube percutaneous N/A 1/28/2016     Procedure: REPLACE JEJUNOSTOMY TUBE, PERCUTANEOUS;  Surgeon: Elsa Medel MD;  [facility]     Laparotomy exploratory N/A 1/28/2016     Procedure: LAPAROTOMY EXPLORATORY;  Surgeon: Elsa Medel MD;  Location[facility]     Remove port vascular access Right 6/30/2016     Procedure: REMOVE PORT VASCULAR ACCESS;  Surgeon: Pradeep Orosco MD;  Location: [facility]     Echo adal  7/19/2016            N/A 7/20/2016     Procedure: ANESTHESIA OUT OF OR;  Surgeon: GENERIC ANESTHESIA PROVIDER;  Location: [facility] OR           FAMILY HISTORY: family history includes Abdominal Aortic Aneurysm in an other family member; CANCER in her maternal grandfather, paternal grandfather, and paternal grandmother; CEREBROVASCULAR DISEASE in her paternal grandmother; Cardiovascular in her paternal grandmother; Colon Cancer (age of onset: 65) in her maternal grandfather; Colon Polyps in her mother; DIABETES in her paternal grandmother; GASTROINTESTINAL DISEASE in her mother; Glaucoma in her paternal grandfather; Lupus in her maternal grandmother; Prostate Problems in her father; Sjogren's in her mother; Thyroid Disease in her mother. There is no history of Macular Degeneration.      HEALTH & LIFESTYLE PRACTICES:   Tobacco:  reports that she quit smoking about 13 years ago. Her smoking use included Cigarettes. She has a 4.00 pack-year smoking history. She has quit using smokeless tobacco.  Alcohol:  reports that she does not drink alcohol.  Illicit drugs:  reports that she does not use illicit drugs.       SOCIAL HISTORY: Lives in [place], [place]      LABORATORY VALUES:   Last  Basic Metabolic Panel:  Lab Results   Component Value Date     02/16/2017      Lab Results   Component Value Date    POTASSIUM 3.3 02/16/2017     Lab Results   Component Value Date    CHLORIDE 110 02/16/2017     Lab Results   Component Value Date    TARA 8.2 02/16/2017     Lab Results   Component Value Date    CO2 23 02/16/2017     Lab Results   Component Value Date    BUN 7 02/16/2017     Lab Results   Component Value Date    CR 0.96 02/16/2017     Lab Results   Component Value Date     02/16/2017       CBC results:  Lab Results   Component Value Date    WBC 6.1 02/16/2017     Lab Results   Component Value Date    HGB 8.4 02/16/2017     Lab Results   Component Value Date    HCT 27.5 02/16/2017     Lab Results   Component Value Date     02/16/2017     11/30/2015       LFT results:  Lab Results   Component Value Date    AST 20 02/15/2017     Lab Results   Component Value Date    ALT 31 02/15/2017     Lab Results   Component Value Date    ALKPHOS 164 02/15/2017         Lab Results   Component Value Date    ALBUMIN 3.5 02/15/2017         Lab Results   Component Value Date    INR 1.11 02/16/2017    INR 1.0 11/08/2016    INR 3.6 11/02/2016         DIAGNOSTIC TESTS:   02/16/17 abdominal US:  Impression:   1. Normal grayscale and color Doppler ultrasound evaluation of the  liver.  2. No abdominal source of infection identified sonographically.    02/16/17 Pelvic US:  IMPRESSION: Negative pelvic ultrasound. No sonographic findings to  explain bacteremia.    Labs above reviewed as well as additional relevant diagnostic studies from the EPIC record.       PHYSICAL EXAMINATION:  VITAL SIGNS:  B/P: 133/76, T: 98.1, P: 89, R: 16    CONSTITUTIONAL/GENERAL APPEARANCE: Alert and Oriented x3  HEAD:Normocephalic  NECK: free range of motion  ENT: moist mucous membranes  EYES: PERRLA and Pupils 5mm  PULMONARY:non-labored  CHEST WALL:symetrical chest wall expansion  CARDIOVASCULAR:acyanotic and peripheral  pulses +2 all extremities  ABDOMINAL:abdomen, round, tender to light palpation over right upper and lower quadrant, GJ tube intact, non-tender to left abdomen. Able to perform straight leg raises with some mild increase in abdominal pain.  MUSCULOSKELETAL/BACK/SPINE/EXTREMITIES: gross motor function intact all extremities.    GAIT: not assessed, patient resting in bed  NEURO:  SILT all extremities, no allodynia to abdomen  SKIN:  normal, warm, dry, no rashes on exposed skin  PSYCHIATRIC/BEHAVIORAL/OBSERVATIONS:     Judgment/Insight - intact   Orientation - oriented x 3   Memory - intact   Mood and affect - anxious, tired      TIME SPENT: 50 minutes including 35 minutes of face-to-face time counseling her  about her pain management treatment options, and coordinating care with the primary team.    Derek Lieberman CNP  February 16, 2017, 10:31 AM  Inpatient Pain Management Service

## 2017-02-16 NOTE — CONSULTS
[facility] ID SERVICE CONSULTATION     Patient:  Doreen Peralta   Date of birth 1980, Medical record number [id number]  Date of Visit:  02/16/2017  Date of Admission: 2/15/2017  Consult Requester: Franck Meraz MD            Assessment and Recommendations:   ASSESSMENT:  1. Klebsiella oxytoca bacteremia  2. Acute on chronic abdominal pain     RECOMMENDATION:  1. Etiology of acute on chronic abdominal pain continues to be unclear. Given her extensive GI problems and surgeries, translocation of gut hipolito is the most likely source. This has not been confirmed by imaging despite exhaustive imaging. There are some concerns regarding factitious disorder, with several red flags present (heavily medicalized lifestyle, background as a medical technician, unexplained fevers and bacteremias, heavy focus on pain medications and advocating for line access). This concern was exacerbated when nursing noted some evidence of possible skin popping lesions on her flank (non-dominant side); when an attempt to examined her skin was performed by the ID team, the patient refused. At the same time, her past medical history provides a very plausible reason for her to have issues with bacteremias. Also, each episode involves a different organism, which may suggest against a factitious source. She has been having recurrent fevers and increased WBC. Her blood cultures during her admission at [facility] from 2/6-2/9 did grow out Klebsiella oxytoca resistant to ampicillin. She was treated with IV ertapenem during her admission at [facility] from 2/6-2/9/17 and was discharged on oral ciprofloxacin for a 14-day course. She had an unremarkable abdominal/pelvis CT scan. The patient is concerned that 2/2 her previous surgeries, chronic diarrhea, and vomiting, she has not been absorbing the ciprofloxacin. Possible etiology of her infections with different organisms could be a microperforation of her colon or a type of bowel leakage. Agree with GI  "for consideration of colonoscopy rather than another CT scan; at this point the patient is declining this procedure because she \"does not like the GI people here\". Due to her history of multiple blood infections in the past year and her significant past GI surgical history, would recommend further GI work up in addition to antibiotic treatment.   - colonoscopy, per GI  - continue ceftriaxone 2g Q24H  - will follow blood cultures closely; will want no growth x 48 hrs prior to consideration for line placement     Attestation:  This patient has been seen and evaluated by me, Kwadwo Ansari MD.  I discussed this patient with the fellow and/or resident(s) and agree with the findings and plan in this note. I also personally edited this note to reflect my findings. I have reviewed today's vital signs, medications, labs and imaging.   MALDONADO Ansari M.D.  [facility] ID Service Staff  [phone]   ________________________________________________________________    Consult Question: Recent Klebsiella bacteremia, assistance with antibiotic management  Admission Diagnosis: Abdominal pain, generalized [R10.84]  Bacteremia [R78.81]  Leukocytosis, unspecified type [D72.829]         History of Present Illness:     Doreen Peralta is a 36 year old female with complicated past medical history including gastroparesis, multiple intra-abdominal surgeries (including subtotal colectomy, cholecystectomy, exploratory laparotomy, oophorectomy, laparoscopic ileostomy), chronic abdominal pain, GJ tube in place for intermittent tube feedings, PICC-associated DVT and portal vein thrombosis on rivaroxaban, recent hospitalization at [facility] (2/6-2/9) for Klebsiella bacteremia discharged on ciprofloxacin, and FUO since summer 2016 (has been evaluated by hematology, gastroenterology, rheumatology, and infectious diseases with no definitive diagnosis) who presents with worsening abdominal pain, nausea, vomiting, and intermittent chills since discharge on " 2/9. She has been unable to take in food or thick liquids, both through her G-J tube and orally. This exacerbates her abdominal pain and causes nausea/vomiting. She has been able to take her oral medications with small sips of water. She continues to experience multiple episodes of diarrhea per day, which she states is chronic since her bowel surgeries. She was admitted from clinic (see excellent medical summary from [facility] Clinic on 2/15/2017) and placed on Ceftriaxone. Since admission she has been afebrile. He main complaint is her abdominal pain. Her major focus during our discussion was regarding PICC line placement and advocating for prolonged IV therapy.       Recent culture results include:  Culture Micro   Date Value Ref Range Status   02/15/2017 Pending  Incomplete   02/15/2017 No growth after 11 hours  Final   02/15/2017 No growth after 13 hours  Final   02/06/2017 No growth after 6 days  Final   02/06/2017 (A)  Final    Cultured on the 1st day of incubation: Klebsiella oxytoca  Called and read back by Faiza Venegas at [facility] Lab result line at 1114 CR  PLEASE CALL DR. JOHN WITH UPDATES ON THIS CULTURE [phone]     01/26/2017 <10,000 colonies/mL Mixed gram positive hipolito  Final   01/26/2017 No growth after 6 days  Final   01/26/2017 No growth after 6 days  Final   01/18/2017 No growth  Final   01/18/2017 No growth  Final              Review of Systems:   CONSTITUTIONAL:  No fevers or chills   RESPIRATORY:  negative for cough with sputum and dyspnea  CARDIOVASCULAR:  negative for chest pain, dyspnea  GASTROINTESTINAL:  Positive for nausea, vomiting, abdominal pain and diarrhea (chronic). No constipation  GENITOURINARY:  negative for dysuria  HEME:  No easy bruising  INTEGUMENT:  negative for rash and pruritus  NEURO:  Negative for headache           Past Medical History:     Past Medical History   Diagnosis Date     Asthma      Bilateral ovarian cysts      Cervical cancer (H) 01/01/2008      cervical cancer      Chronic pain      Colonic dysmotility      s/p subtotal colectomy     Constipation      chronic     E. coli sepsis (H) 5/8/2016     Enteritis      Fungemia 5/5/2016     Gastro-oesophageal reflux disease      H/O ileostomy      Hx of abnormal Pap smear      s/p LEEP - no further details provided     Hypertension      IBS (irritable bowel syndrome)      Other chronic pain      PONV (postoperative nausea and vomiting)      Thrombosis, hepatic vein (H)      microvascular            Past [facility] History:     Past [facility] History   Procedure Laterality Date     Laparoscopic oophorectomy Right 2009     [facility]     Laparoscopic cholecystectomy  2002     [facility] stones duct     Esophagoscopy, gastroscopy, duodenoscopy (egd), combined  7/10/2012     Procedure: COMBINED ESOPHAGOSCOPY, GASTROSCOPY, DUODENOSCOPY (EGD);  Upper Endoscopy, Ileoscopy    Latex Allergy  with biopsies;  Surgeon: Nicole Redding MD;  [facility]     Colonoscopy  7/10/2012     Procedure: COLONOSCOPY;;  Surgeon: Nicole Redding MD;  [facility] OR     Leep tx, cervical  2009     [facility]     Laparoscopic ileostomy  1/20/2012     [facility], loop     Esophagoscopy, gastroscopy, duodenoscopy (egd), combined N/A 11/5/2014     Procedure: COMBINED ESOPHAGOSCOPY, GASTROSCOPY, DUODENOSCOPY (EGD);  Surgeon: Nicole Redding MD;  [facility]     Laparoscopic assisted colectomy  1/20/2012     Procedure:LAPAROSCOPIC ASSISTED COLECTOMY; Laparoscopic Ileostomy       Laparoscopic assisted colectomy left (descending)  10/24/2012     Procedure: LAPAROSCOPIC ASSISTED COLECTOMY LEFT (DESCENDING);   Hand Assisted Laproscopic Subtotal abdominal Colectomy,Iieorectal anastamosis, Ileostomy Closure.       Remove gastrostomy tube adult N/A 12/12/2014     Procedure: REMOVE GASTROSTOMY TUBE ADULT;  Surgeon: Nicole Redding MD;  [facility]     Replace gastrostomy tube adult  5/19/15     Hc replace  gastrostomy/cecostomy tube percutaneous Left 5/19/2015     Procedure: REPLACE GASTROSTOMY TUBE, PERCUTANEOUS;  Surgeon: Melecio Morejon Chi, MD;  [facility]     Hc replace duodenostomy/jejunostomy tube percutaneous N/A 8/27/2015     Procedure: REPLACE GASTROJEJUNOSTOMY TUBE, PERCUTANEOUS;  Surgeon: Mio Holder MD;  [facility]     Hc [facility] endoscopy w placement gastrostomy tube percut N/A 10/1/2015     Procedure: COMBINED ESOPHAGOSCOPY, GASTROSCOPY, DUODENOSCOPY (EGD), PLACE PERCUTANEOUS ENDOSCOPIC GASTROSTOMY TUBE;  Surgeon: Mio Holder MD;  [facility]     Laparoscopic assisted insertion tube jejunostomy N/A 10/16/2015     Procedure: LAPAROSCOPIC ASSISTED INSERTION TUBE JEJUNOSTOMY;  Surgeon: Elsa Medel MD;  [facility]     Picc insertion Left 10/21/2015     5fr DL Power PICC, 37cm (2cm external) in the L basilic vein w/ tip in the SVC RA junction.     Hc replace duodenostomy/jejunostomy tube percutaneous N/A 1/7/2016     Procedure: REPLACE JEJUNOSTOMY TUBE, PERCUTANEOUS;  Surgeon: Elsa Medel MD;  [facility]     Endoscopic insertion tube gastrostomy N/A 1/21/2016     Procedure: ENDOSCOPIC INSERTION TUBE GASTROSTOMY;  Surgeon: Nicole Redding MD;  [facility]     Hc replace duodenostomy/jejunostomy tube percutaneous N/A 1/28/2016     Procedure: REPLACE JEJUNOSTOMY TUBE, PERCUTANEOUS;  Surgeon: Elsa Medel MD;  [facility]     Laparotomy exploratory N/A 1/28/2016     Procedure: LAPAROTOMY EXPLORATORY;  Surgeon: Elsa Medel MD;  [facility]     Remove port vascular access Right 6/30/2016     Procedure: REMOVE PORT VASCULAR ACCESS;  Surgeon: Pradeep Orosco MD;  Location: [facility]     Echo adal  7/19/2016            N/A 7/20/2016     Procedure: ANESTHESIA OUT OF OR;  Surgeon: GENERIC ANESTHESIA PROVIDER;  Location[facility]            Family History:     Family History   Problem Relation Age of Onset     Thyroid Disease Mother       Sjogren's Mother      GASTROINTESTINAL DISEASE Mother      Intermittent nausea vomiting diarrhea     Colon Polyps Mother      Prostate Problems Father      prostate enlargement     Lupus Maternal Grandmother      CANCER Maternal Grandfather      Lung     Colon Cancer Maternal Grandfather 65     CANCER Paternal Grandmother      Lung      CEREBROVASCULAR DISEASE Paternal Grandmother      DIABETES Paternal Grandmother      Cardiovascular Paternal Grandmother      CHF     CANCER Paternal Grandfather      Lung     Glaucoma Paternal Grandfather      Abdominal Aortic Aneurysm Other      Macular Degeneration No family hx of             Social History:     Social History   Substance Use Topics     Smoking status: Former Smoker     Packs/day: 1.00     Years: 4.00     Types: Cigarettes     Quit date: 1/1/2004     Smokeless tobacco: Former User     Alcohol use No     History   Sexual Activity     Sexual activity: Yes     Partners: Male            Current Medications (antimicrobials listed in bold):       potassium chloride  40 mEq Oral Once     cloNIDine  0.4 mg Oral QPM     DULoxetine  60 mg Oral Daily     nortriptyline  30-40 mg Oral At Bedtime     mirtazapine  7.5 mg Orally disintegrating tablet At Bedtime     rivaroxaban ANTICOAGULANT  20 mg Oral Daily with supper     cefTRIAXone  2 g Intravenous Q24H            Allergies:     Allergies   Allergen Reactions     Hyoscyamine Rash     Metoclopramide Other (See Comments)     Eye twitching.      Peaches [Peach] Other (See Comments)     Raw. Cooked OK     Sucralose Other (See Comments)     All artificial sweeteners. Aspartame also. Swollen glands     Advair Diskus Other (See Comments)     Throat burns     Azithromycin Other (See Comments)     Burning in throat     Compazine [Prochlorperazine] Visual Disturbance     Contrast Dye Itching     States is allergic to CT contrast dye     Cyclobenzaprine Visual Disturbance     Fentanyl Other (See Comments)     migraine     Ibuprofen GI  Disturbance     Lactulose Nausea and Vomiting     Gas and bloating     Levaquin [Levofloxacin] Swelling     Per ED M.D. And RN      Morphine Sulfate Other (See Comments)     Chest pain       Oxycodone Other (See Comments)     Burning throat, but can take Norco.      Penicillins Other (See Comments)     Family hx of resp arrest, she has never taken  Ok with cephalosporins     Rizatriptan Visual Disturbance     Droperidol Hives and Rash     Isovue [Iopamidol] Palpitations     Pt had racing heart and sob      Ketorolac Anxiety     Latex Swelling and Rash     Kiwi, likely also avacado, ? banana     Levsin Rash            Physical Exam:   Vitals were reviewed  Patient Vitals for the past 24 hrs:   BP Temp Temp src Pulse Heart Rate Resp SpO2 Weight   02/16/17 0600 133/76 98.1  F (36.7  C) Axillary - 84 16 96 % -   02/16/17 0222 130/80 98.1  F (36.7  C) Oral - 85 16 97 % -   02/15/17 2254 127/77 97.9  F (36.6  C) Oral - 85 16 99 % -   02/15/17 2115 131/69 - - - - - - -   02/15/17 2043 - - - - - - - 85.5 kg (188 lb 8 oz)   02/15/17 1915 131/80 98.3  F (36.8  C) Axillary 89 89 18 100 % -   02/15/17 1900 151/64 - - - - - 97 % -   02/15/17 1845 (!) 155/91 - - - - - 98 % -   02/15/17 1830 131/69 - - - - - 99 % -   02/15/17 1827 - - - - - - 100 % -   02/15/17 1815 134/74 - - - - - 100 % -   02/15/17 1725 - - - - - 16 98 % -   02/15/17 1715 128/75 - - - - - 98 % -   02/15/17 1645 - - - - - 16 99 % -   02/15/17 1630 - - - - - - 100 % -   02/15/17 1615 134/89 - - - - - 100 % -   02/15/17 1600 115/64 - - - - - 100 % -   02/15/17 1417 135/88 98.9  F (37.2  C) Oral - 109 - 100 % -     Ranges for his vital signs:  Temp:  [97.9  F (36.6  C)-98.9  F (37.2  C)] 98.1  F (36.7  C)  Pulse:  [86-89] 89  Heart Rate:  [] 84  Resp:  [16-18] 16  BP: (115-155)/(64-91) 133/76  SpO2:  [96 %-100 %] 96 %    Physical Examination:  GENERAL:  well-developed, well-nourished, in bed in no acute distress.   HEENT:  Head is normocephalic, atraumatic    EYES:  Eyes have anicteric sclerae without conjunctival injection or stigmata of endocarditis.    ENT:  Oropharynx is moist without exudates or ulcers. Tongue is midline  NECK:  Supple. No cervical lymphadenopathy  LUNGS:  Clear to auscultation bilateral.   CARDIOVASCULAR:  Regular rate and rhythm with no murmurs, gallops or rubs.  ABDOMEN:  Normal bowel sounds, soft, nondistended. TTP, R>L. No appreciable hepatosplenomegaly. G-J tube in place, no noted surrounding erythema or discharge.  SKIN:  Line is in place without any surrounding erythema or exudate. Unable to perform thorough skin exam. Patient is wearing long sleeves and pants and would prefer to defer exam.  NEUROLOGIC:  Grossly nonfocal. Active x4 extremities         Laboratory Data:     Inflammatory Markers    Recent Labs   Lab Test  02/15/17   2039  02/15/17   1445  01/31/17   1010  01/18/17   1644  01/09/17   1042  10/20/16   0703  08/30/16   0505  08/29/16   1800  08/03/16   0928   07/16/16   1835   06/22/16   1520  06/21/16   1416   SED  82*  71*  54*  94*  75*   --    --    --    --    --   52*   --   55*  58*   CRP   --   8.3*  154.0*  8.2*  142.0*  28.5*  100.0*  109.0*  3.4   < >  12.4*   < >  12.2*  14.0*    < > = values in this interval not displayed.       Hematology Studies  Recent Labs   Lab Test  02/16/17   0707  02/15/17   1445  02/06/17   1650  01/31/17   1010  01/28/17   0525  01/27/17   0518  01/26/17   1500  01/18/17   1644  01/05/17   1421   WBC  6.1  17.0*  18.1*  9.1  12.2*  15.6*  16.0*  11.2*  11.4*   ANEU   --   14.3*  16.4*  8.6*   --    --   14.9*  7.4  8.5*   AEOS   --   0.1  0.0  0.0   --    --   0.0  0.1  0.0   HGB  8.4*  9.9*  10.5*  10.2*  9.6*  10.3*  12.2  11.2*  11.2*   MCV  79  78  77*  81  79  79  78  81  80   PLT  465*  639*  382  297  376  459*  584*  597*  496*       Immune Globulin Studies  Recent Labs   Lab Test  01/23/13   1515   IGA  252       Metabolic Studies   Recent Labs   Lab Test  02/16/17   0707   02/15/17   1445  02/06/17   1650  01/31/17   1010  01/27/17   0518   NA  141  136  138  136  139   POTASSIUM  3.3*  3.5  3.9  3.8  3.9   CHLORIDE  110*  103  104  103  107   CO2  23  25  25  24  23   BUN  7  11  6*  5*  11   CR  0.96  0.94  0.85  0.74  0.82   GFRESTIMATED  66  68  76  89  79       Hepatic Studies  Recent Labs   Lab Test  02/15/17   1445  02/06/17   1650  01/31/17   1010  01/26/17   1500  01/18/17   1644  01/05/17   1421   BILITOTAL  0.8  0.8  0.7  0.4  0.4  0.4   ALKPHOS  164*  269*  184*  156*  166*  149   ALBUMIN  3.5  3.2*  3.1*  4.2  3.8  3.6   AST  20  46*  63*  12  19  33   ALT  31  51*  73*  24  32  50       Thyroid Studies    Recent Labs   Lab Test  07/21/16   1027  06/21/16   1416   01/23/13   1515  01/07/12   0748   TSH  0.69  0.25*   < >  0.90  1.08   T4   --   0.99   --   1.00  1.04   T3   --    --    --    --   74    < > = values in this interval not displayed.       Microbiology:  Culture Micro   Date Value Ref Range Status   02/15/2017 Pending  Incomplete   02/15/2017 No growth after 11 hours  Final   02/15/2017 No growth after 13 hours  Final   02/06/2017 No growth after 6 days  Final   02/06/2017 (A)  Final    Cultured on the 1st day of incubation: Klebsiella oxytoca  Called and read back by Faiza Venegas at [facility] Lab result line at 1114 CR  PLEASE CALL DR. JOHN WITH UPDATES ON THIS CULTURE [phone]     01/26/2017 <10,000 colonies/mL Mixed gram positive hipolito  Final   01/26/2017 No growth after 6 days  Final   01/26/2017 No growth after 6 days  Final   01/18/2017 No growth  Final   01/18/2017 No growth  Final   01/18/2017 Canceled, Test credited Duplicate request  Final   01/18/2017 Canceled, Test credited Duplicate request  Final   11/14/2016 No beta hemolytic Streptococcus Group A isolated  Final   11/14/2016 No growth after 7 days  Final   11/14/2016 No growth after 7 days  Final   11/10/2016 No growth  Final   11/10/2016 No growth  Final   10/20/2016 No growth  after 6 days  Final   10/20/2016 No growth after 6 days  Final   10/20/2016 Moderate growth Coagulase negative Staphylococcus (A)  Final   10/16/2016 No growth after 6 days  Final   10/16/2016 No growth after 6 days  Final   09/22/2016 No growth after 5 days  Final   08/29/2016 No growth after 6 days  Final   08/29/2016 No growth after 6 days  Final   08/03/2016 No growth  Final   07/20/2016 No growth  Final   07/19/2016 No growth  Final   07/18/2016 No growth  Final   07/18/2016 No growth  Final   07/17/2016 No growth  Final   07/16/2016 (A)  Final    Cultured on the 2nd day of incubation: Acinetobacter ursingii  Cultured on the 2nd day of incubation: Candida parapsilosis complex  Critical Value/Significant Value, preliminary result only, called to and read   back by JEFFERY MILLS RN [facility] ON 07.18.2106 AT 0250 YEAST CALLED TO JEFFERY MILLS RN AT 0645 DS/TA  Sent to [facility] for Verigene testing 7/18/16 TA  ID and Sensitivities are being done by [facility] . Susceptibility testing   requested by Dr. Romero for Ertapenem on Acinetobacter 7/20/16 @1308 AV  (Note)  POSITIVE for ACINETOBACTER SPECIES by Verigene multiplex nucleic acid  test. Final identification and antimicrobial susceptibility testing  will be verified by standard methods.    Specimen tested with Verigene multiplex, gram-negative blood culture  nucleic acid test for the following targets: Acinetobacter sp.,  Citrobacter sp., Enterobacter sp., Proteus sp., E. coli, K.  pneumoniae/oxytoca, P. aeruginosa, and the following resistance  markers: CTXM, KPC, NDM, VIM, IMP and OXA.    Critical Value/Significant Value called to and read back by Jessica Amaya RN (7C) at 1712 on 7.18.16.KD     07/16/2016 No growth after 6 days  Final   07/10/2016 No growth after 6 days  Final   07/10/2016 No growth after 6 days  Final   07/07/2016 <10,000 colonies/mL Mixed gram positive hipolito  Final   07/07/2016 (A)  Final    Cultured on the 1st day of incubation: Klebsiella  oxytoca  Critical Value/Significant Value, preliminary result only, called to and read   back by Doreen Pereyra at 0825 7/8/16 DS  Sent for Verigene testing to [facility]  (Note)  POSITIVE for KLEBSIELLA OXYTOCA by Verigene multiplex nucleic acid  test. Final identification and antimicrobial susceptibility testing  will be verified by standard methods.    Specimen tested with Verigene multiplex, gram-negative blood culture  nucleic acid test for the following targets: Acinetobacter sp.,  Citrobacter sp., Enterobacter sp., Proteus sp., E. coli, K.  pneumoniae/oxytoca, P. aeruginosa, and the following resistance  markers: CTXM, KPC, NDM, VIM, IMP and OXA.    Critical Value/Significant Value called to and read back by Connor Cavanaugh, [facility] @ 8054 on 7.8.16. CONCHIS     07/07/2016 No growth after 6 days  Final   07/01/2016 No growth after 6 days  Final   07/01/2016 No growth after 6 days  Final   06/30/2016   Final    Canceled, Test credited Incorrectly ordered by PCU/Clinic   06/30/2016 No growth  Final   06/30/2016 <10 colonies yeast, no further identification (A)  Final   06/29/2016 (A)  Final    Cultured on the 1st day of incubation: Candida parapsilosis complex  Critical Value/Significant Value, preliminary result only, called to and read   back by NATHANAEL LOW RN IN MED SURG AT 1140 ON 6/30/16 TA  Cultured on the 2nd day of incubation: Enterococcus faecalis  Critical Value/Significant Value, preliminary result only, called to and read   back by Shanae Hough Lab  @0852 07/02/16 gd     06/29/2016 <10,000 colonies/mL Mixed gram positive hipolito  Final   06/29/2016 No growth after 6 days  Final   06/29/2016 No growth  Final   06/29/2016 No growth  Final   06/25/2016 (A)  Final    Cultured on the 2nd day of incubation: Candida parapsilosis complex  Critical Value/Significant Value, preliminary result only, called to and read   back by Maria Dolores Kinney RN at [facility] at 1440 on  6/27/16 DC  sent to [facility] to ID DC     06/24/2016 <10,000 colonies/mL Mixed gram positive hipolito  Final   06/23/2016 No growth after 6 days  Final   06/23/2016 (A)  Final    Cultured on the 1st day of incubation: Klebsiella oxytoca  Critical Value/Significant Value, preliminary result only, called to and read   back by NATHANAEL IN MED SURG AT 1315 ON 6/24/16 DC     06/22/2016 (A)  Final    Cultured on the 1st day of incubation: Klebsiella oxytoca  Critical Value/Significant Value, preliminary result only, called to and read   back by EMILIANA MORENO RN IN ED AT 0650 TARA  SAME ORGANISM GROWING IN OTHER BLOOD CULTURE BOTTLE, SEE [id number] FOR ID/SENS DC     06/21/2016 No growth after 6 days  Final   06/21/2016 (A)  Final    Cultured on the 1st day of incubation: Klebsiella oxytoca  Called and read back by Rosario Chamberlain in ed at 0809 CR     06/14/2016   Final    10,000 to 50,000 colonies/mL Mixed gram positive hipolito   06/14/2016 No growth after 6 days  Final   06/14/2016 No growth after 6 days  Final   06/14/2016 No growth after 6 days  Final   06/14/2016 No growth after 6 days  Final   05/10/2016 No growth after 6 days  Final   05/09/2016 No growth  Final   05/08/2016 No growth  Final   05/07/2016 No growth  Final   05/06/2016 No growth  Final   05/05/2016 No growth  Final   05/03/2016 No anaerobes isolated  Final   05/03/2016 (A)  Final    Candida parapsilosis complex Susceptibility testing done on previous specimen  ACCESSION [id number]  Smear Critical Value, preliminary result only, called to and read back by SANDY GUARDADO 1035 05.05.2016 KLA     05/03/2016 (A)  Final    Candida parapsilosis complex Susceptibility testing done on previous specimen  [id number]  Smear Critical Value, preliminary result only, called to and read back by  ALANA HERNANDEZ, RM. 8820 1239 05.05.2016 KLA     05/03/2016 (A)  Final    Candida parapsilosis complex Susceptibility testing done on previous specimen  ACCESSION  [id number]  Critical Value, preliminary result only, called to and read back by Margaret Ward RN on Med Surg,05/05/16 0311.RR     05/03/2016 (A)  Final    Escherichia coli Susceptibility testing done on previous specimen ,see Accession   Number [id number].  Critical Value, preliminary result only, called to and read back by GURMEET GORDON RN IN ED, 05.03.16 1900 BY YW  [place]indy parapsilosis complex isolated  Critical Value, preliminary result only, called to and read back by AYDIN PRATHER   ON MEDSURG, 05.04.16 1000 BY EMILY     05/03/2016 (A)  Final    Escherichia coli  Critical Value, preliminary result only, called to and read back by ADA APTHAK RN IN ED, 05/03/16 1720 BY MIGUE     02/23/2016 Test canceled - Lab  error  Final   02/18/2016 No growth  Final   02/18/2016   Final    Incorrectly ordered by PCU/Clinic  Ordered CTC instead.  Canceled, Test credited     02/18/2016 No growth  Final   02/18/2016 No growth  Final   01/30/2016 No growth  Final   01/30/2016 No growth  Final   01/30/2016 No growth  Final   01/29/2016 (A)  Final    Cultured on the 1st day of incubation: Acinetobacter species, not baumannii  Critical Value/Significant Value, preliminary result only, called to and read   back by Bruna Little RN 6B 1/30/16 @0427 AV  Cultured on the 1st day of incubation: Pseudomonas fluorescens putida group  Critical Value/Significant Value, preliminary result only, called to and read   back by Zoë Painter RN @ 1102. 01/31/16  (Note)  POSITIVE for ACINETOBACTER SPECIES by [employer] multiplex nucleic acid  test. Final identification and antimicrobial susceptibility testing  will be verified by standard methods.    Specimen tested with Verigene multiplex, gram-negative blood culture  nucleic acid test for the following targets: Acinetobacter sp.,  Citrobacter sp., Enterobacter sp., Proteus sp., E. coli, K.  pneumoniae/oxytoca, P. aeruginosa, and the following resistance  markers: CTXM, KPC, NDM, VIM, IMP  and OXA.    Critical Value/Significant Value called to and read back by Bruna Lyon RN at 0642 1/30/16.DK       01/29/2016 No growth  Final   01/26/2016 No growth after 6 days  Final   01/04/2016   Final    No Salmonella, Shigella, Campylobacter, E. coli O157, Aeromonas, or Plesiomonas   isolated.     08/26/2015   Final    No Salmonella, Shigella, Campylobacter, E. coli O157, Aeromonas, or Plesiomonas   isolated.     04/10/2013   Final    10 to 50,000 colonies/mL Mixed gram positive hipolito  Multiple species present, probable perineal contamination.  Susceptibility testing not routinely done   04/10/2013 No growth  Final   04/10/2013 No growth  Final   12/24/2012   Final    No Salmonella, Shigella, Campylobacter, E. coli O157, Aeromonas, or Plesiomonas   isolated.   01/06/2012 No growth  Final   01/06/2012 No growth  Final       Urine Studies    Recent Labs   Lab Test  02/15/17   1712  02/06/17   1745  01/31/17   1038  11/14/16   2145  11/10/16   1803  10/20/16   0720   LEUKEST  Negative  Negative  Negative  Negative  Negative  Trace*   WBCU  1  O - 2  O - 2  O - 2   --   2-5*       Vancomycin Levels  Recent Labs   Lab Test  06/15/16   1910  05/20/16   0845  05/16/16   0825   VANCOMYCIN  10.4  11.9  13.1

## 2017-02-16 NOTE — PLAN OF CARE
Problem: Goal Outcome Summary  Goal: Goal Outcome Summary  Outcome: No Change  Pt arrived to 5B at 1915, pt was settled and adult profile completed.Pt admitted for abdominal pain.  Pt is making complaints of pain to right side of abdomen. Pt was given oral pain medications and stated that pain was tolerable when pain follow up was made. Pt made complaints of nausea and was given oral zofran. Pt is independent with ADLs and ambulation. Pt NPO. Pt has Normal Saline running at 100 mLs. Pt had IV antibiotics this shift and showed no adverse s/sx. Pt felt blood sugar was low writer took BG and was normal at 91.Continue to monitor.

## 2017-02-16 NOTE — PROGRESS NOTES
INTERNAL MEDICINE PROGRESS NOTE    Doreen Peralta ([id number]) admitted on 2/15/2017  02/16/2017    Assessment & Plan:  35 yo F with hx of gastroparesis that spiraled into multiple bowel surgeries now with G-J tube admitted with recurrent chills, abdominal pain, malaise, and anorexia/dehydration after recent hospitalization and treatment for Klebsiella oxytoca.      # subjective fevers, chills, RUQ pain likely 2/2 klebsiella infection with un-identified source  # chronic diarrhea, related to tube feeds v. Klebsiella  # dehydration, elevated inflammatory markers  Recurrent klebsiella infections without identified source, particularly this occurrence on which patient does not have a portocath ro any lines beside G-J. Patient has had a TTE w/o mass/vege (1/20/17), negative Right heart cath culture 9/27/2016, EF 55-60% as well as multiple CTs without evidence of nidus of infection. Mild elevations of alkP.        Notably, Klebsiella oxytoca is a known cause ofdiarrhea. (Miguel et al 2013)     ID history:  - E. Coli 5/3/16  - acinetobacter bacteremia 7/2016  - candida parapsilosis fungemia 7/2016  - histoplasma Ab+ 1/2017  - histoplasma urinary antigen negative 1/2017  - Klebsiella oxytoca 6/21/16 from portocath, 7/7/16 from [facility], 2/9/17 from [facility] (most recent)    Currently we are repeating her cultures -blood c/s , urine c/s , UA  So far no bacterial growth ; US abdomen with no obvious source found   Will continue with ceftriaxone    Given that we did not get any bacteremic source or growth  or obvious source of infection further workup at this time is not warranted  Will wait formal ID consult;  There was discussion with ID today regarding further GI work up; pt declined GI consult or colonoscopy at this time despite having clear understanding of our rationale of recommendations.    Will revisit that with ID tomorrow;     -Continue with ceftriaxone 2gm IV q 24hr  - IV fluids   -continue to trend inflammatory  markers  -will continue to address further work up depending on culture result  -holding off GI consult for now given pt refusal     # dehydration, loss of appetite  # abdominal pain, waxing/waning chronic pain  Patient reports receiving 4 mg dilaudid PO q3h at [facility]. There, the Pain Service was consulted given the patient has had numerous prescriptions and intermittent courses of opioids related to her many ED and hospital visits. She was discharged with 5 day course (2/9 - 2/14) of She rec'd 3 mg IV dilaudid in the ED without change in mental status and only mild-mod reduction in pain     PLAN:  > tylenol 650 mg prn  > 2-4 mg dilaudid PO q4h  > zofran prn  > pain management consult- lidocaine patch, along with adjunctive management with ice and heat pack, TENS unit if available was recommended by pain consult team; will out referral to follow with pain clinic as an outpatient  > nutrition consult to restart tube feeds  > normal saline maintenance fluid at 100 cc/hr       # stable conditions  Bilateral UE DVTs - continue home xarelto  Asthma - albuterol prn  Anxiety - monitor     FEN: 100 cc/hr, replete prn, regular diet+/-tube feeds as toelrated  Prophylaxis: DVT: continue home xarelto GI: none at this time Lung: incentive spirometer  Code Status: FULL CODE  Medical Decision Maker: mother, Aby gordillo, [phone]     Disposition: pending futher evaluation of infectious source  Patient was seen and discussed with  who agrees with above assessment and plan.    ==================================================================    24 hr events- was complaining of abdominal pain  Subjective:  Felt better in am; still lower abdominal pain but better that yesterday; no fever or chills overnight   Denies nausea, vomiting   Was getting dilaudid oral overnight for pain management     Objective:  Most recent vital signs:  /76 (BP Location: Left arm)  Pulse 89  Temp 98.1  F (36.7  C) (Axillary)  Resp 16  Wt  85.5 kg (188 lb 8 oz)  LMP 01/19/2017  SpO2 96%  BMI 30.42 kg/m2  Temp:  [97.9  F (36.6  C)-98.9  F (37.2  C)] 98.1  F (36.7  C)  Pulse:  [86-89] 89  Heart Rate:  [] 84  Resp:  [16-18] 16  BP: (115-155)/(64-91) 133/76  SpO2:  [96 %-100 %] 96 %  Wt Readings from Last 2 Encounters:   02/15/17 85.5 kg (188 lb 8 oz)   02/06/17 81.6 kg (180 lb)       Intake/Output Summary (Last 24 hours) at 02/16/17 0812  Last data filed at 02/16/17 0200   Gross per 24 hour   Intake              275 ml   Output             1000 ml   Net             -725 ml       Physical exam:  General: no apparent distress;  HEENT: No Scleral icterus. NCAT, MMM. PERRL, EOMI. No LAD  Cardiac: Normal rate, regular rhythm. No m/r/g. Normal S1, S2.  Pulm: CTAB, no wheezes, or crackles. Normal respiratory effort  Abd: Soft, non distended, TTP in RUQ without rebound. G-J tube located epigastric without erythema, irritation, or discharge. No hepatosplenomegaly.  Skin: No jaundice, No rash. Small erosion under lip surrounded by old scab (patient reports picking at it).  Extremities: No LE edema, no LE lesions  Joints: No inflammation noted on joints  Neuro: A&Ox3, no focal deficits     Labs (Past three days):  CMP  Recent Labs  Lab 02/16/17  0707 02/15/17  1445    136   POTASSIUM 3.3* 3.5   CHLORIDE 110* 103   CO2 23 25   ANIONGAP 9 8   * 79   BUN 7 11   CR 0.96 0.94   GFRESTIMATED 66 68   GFRESTBLACK 80 82   TARA 8.2* 8.9   PROTTOTAL  --  8.9*   ALBUMIN  --  3.5   BILITOTAL  --  0.8   ALKPHOS  --  164*   AST  --  20   ALT  --  31     CBC  Recent Labs  Lab 02/16/17  0707 02/15/17  1445   WBC 6.1 17.0*   RBC 3.50* 4.07   HGB 8.4* 9.9*   HCT 27.5* 31.7*   MCV 79 78   MCH 24.0* 24.3*   MCHC 30.5* 31.2*   RDW 17.0* 17.2*   * 639*     INR  Recent Labs  Lab 02/16/17  0707   INR 1.11     Arterial Blood GasNo lab results found in last 7 days.      Imaging/procedure results:  US pelvic complete:  IMPRESSION: Negative pelvic ultrasound. No  sonographic findings to  explain bacteremia.

## 2017-02-16 NOTE — PLAN OF CARE
Problem: Goal Outcome Summary  Goal: Goal Outcome Summary  Patient A/Ox4, VSS. Patient NPO since midnight for abdominal and pelvic US in a.m., tech called to ask that she drink several cups of water to have full bladder for test. Pt c/o abdominal pain, PRN oxy given 2x with some relief, T pump ordered and applied. J tube clamped, G tube to gravity and output is watery. Pt independently ambulates in [place] and room, can make needs known. Did not sleep during shift. Will continue to monitor and alert MDs to any changes.

## 2017-02-16 NOTE — PLAN OF CARE
Problem: Goal Outcome Summary  Goal: Goal Outcome Summary     5B: OT orders received. Per chart review and discussion with RN, pt is (I) with ADLs, transfers, and mobility. Pt currently does not have any acute OT needs. Will complete orders - please reorder if situation changes.

## 2017-02-17 LAB
ANION GAP SERPL CALCULATED.3IONS-SCNC: 9 MMOL/L (ref 3–14)
BACTERIA SPEC CULT: NORMAL
BACTERIA SPEC CULT: NORMAL
BUN SERPL-MCNC: 3 MG/DL (ref 7–30)
CALCIT SERPL-MCNC: NORMAL NG/L
CALCIUM SERPL-MCNC: 8.1 MG/DL (ref 8.5–10.1)
CHLORIDE SERPL-SCNC: 107 MMOL/L (ref 94–109)
CO2 SERPL-SCNC: 22 MMOL/L (ref 20–32)
CREAT SERPL-MCNC: 0.89 MG/DL (ref 0.52–1.04)
CRP SERPL-MCNC: 27 MG/L (ref 0–8)
ERYTHROCYTE [DISTWIDTH] IN BLOOD BY AUTOMATED COUNT: 17.1 % (ref 10–15)
FLUAV+FLUBV RNA SPEC QL NAA+PROBE: NORMAL
FLUAV+FLUBV RNA SPEC QL NAA+PROBE: NORMAL
GFR SERPL CREATININE-BSD FRML MDRD: 71 ML/MIN/1.7M2
GLUCOSE SERPL-MCNC: 98 MG/DL (ref 70–99)
HCT VFR BLD AUTO: 26.8 % (ref 35–47)
HGB BLD-MCNC: 8.2 G/DL (ref 11.7–15.7)
MAGNESIUM SERPL-MCNC: 2.2 MG/DL (ref 1.6–2.3)
MCH RBC QN AUTO: 24.3 PG (ref 26.5–33)
MCHC RBC AUTO-ENTMCNC: 30.6 G/DL (ref 31.5–36.5)
MCV RBC AUTO: 79 FL (ref 78–100)
MICRO REPORT STATUS: NORMAL
MICRO REPORT STATUS: NORMAL
PHOSPHATE SERPL-MCNC: 2.8 MG/DL (ref 2.5–4.5)
PLATELET # BLD AUTO: 442 10E9/L (ref 150–450)
POTASSIUM SERPL-SCNC: 3.6 MMOL/L (ref 3.4–5.3)
PROCALCITONIN SERPL-MCNC: 0.46 NG/ML
RBC # BLD AUTO: 3.38 10E12/L (ref 3.8–5.2)
RSV RNA SPEC NAA+PROBE: NORMAL
SODIUM SERPL-SCNC: 139 MMOL/L (ref 133–144)
SPECIMEN SOURCE: NORMAL
WBC # BLD AUTO: 5.9 10E9/L (ref 4–11)

## 2017-02-17 PROCEDURE — 25000132 ZZH RX MED GY IP 250 OP 250 PS 637: Performed by: STUDENT IN AN ORGANIZED HEALTH CARE EDUCATION/TRAINING PROGRAM

## 2017-02-17 PROCEDURE — 99233 SBSQ HOSP IP/OBS HIGH 50: CPT | Mod: GC | Performed by: INTERNAL MEDICINE

## 2017-02-17 PROCEDURE — 87631 RESP VIRUS 3-5 TARGETS: CPT | Performed by: INTERNAL MEDICINE

## 2017-02-17 PROCEDURE — 86140 C-REACTIVE PROTEIN: CPT | Performed by: INTERNAL MEDICINE

## 2017-02-17 PROCEDURE — 84100 ASSAY OF PHOSPHORUS: CPT | Performed by: INTERNAL MEDICINE

## 2017-02-17 PROCEDURE — 36415 COLL VENOUS BLD VENIPUNCTURE: CPT | Performed by: INTERNAL MEDICINE

## 2017-02-17 PROCEDURE — 25000132 ZZH RX MED GY IP 250 OP 250 PS 637: Performed by: INTERNAL MEDICINE

## 2017-02-17 PROCEDURE — 87633 RESP VIRUS 12-25 TARGETS: CPT | Performed by: INTERNAL MEDICINE

## 2017-02-17 PROCEDURE — 25000128 H RX IP 250 OP 636: Performed by: INTERNAL MEDICINE

## 2017-02-17 PROCEDURE — 80048 BASIC METABOLIC PNL TOTAL CA: CPT | Performed by: INTERNAL MEDICINE

## 2017-02-17 PROCEDURE — 84145 PROCALCITONIN (PCT): CPT | Performed by: INTERNAL MEDICINE

## 2017-02-17 PROCEDURE — 87077 CULTURE AEROBIC IDENTIFY: CPT | Performed by: INTERNAL MEDICINE

## 2017-02-17 PROCEDURE — 87040 BLOOD CULTURE FOR BACTERIA: CPT | Performed by: INTERNAL MEDICINE

## 2017-02-17 PROCEDURE — 84134 ASSAY OF PREALBUMIN: CPT | Performed by: INTERNAL MEDICINE

## 2017-02-17 PROCEDURE — 83735 ASSAY OF MAGNESIUM: CPT | Performed by: INTERNAL MEDICINE

## 2017-02-17 PROCEDURE — 12000001 ZZH R&B MED SURG/OB UMMC

## 2017-02-17 PROCEDURE — 25000125 ZZHC RX 250: Performed by: STUDENT IN AN ORGANIZED HEALTH CARE EDUCATION/TRAINING PROGRAM

## 2017-02-17 PROCEDURE — 85027 COMPLETE CBC AUTOMATED: CPT | Performed by: INTERNAL MEDICINE

## 2017-02-17 PROCEDURE — 25000125 ZZHC RX 250: Performed by: INTERNAL MEDICINE

## 2017-02-17 RX ORDER — HYDROMORPHONE HYDROCHLORIDE 2 MG/1
2 TABLET ORAL
Status: DISCONTINUED | OUTPATIENT
Start: 2017-02-17 | End: 2017-02-17

## 2017-02-17 RX ORDER — DIPHENHYDRAMINE HYDROCHLORIDE 50 MG/ML
50 INJECTION INTRAMUSCULAR; INTRAVENOUS ONCE
Status: DISCONTINUED | OUTPATIENT
Start: 2017-02-17 | End: 2017-02-17

## 2017-02-17 RX ORDER — HYDROCODONE BITARTRATE AND ACETAMINOPHEN 5; 325 MG/1; MG/1
1-2 TABLET ORAL EVERY 6 HOURS PRN
Status: DISCONTINUED | OUTPATIENT
Start: 2017-02-17 | End: 2017-02-17

## 2017-02-17 RX ORDER — MEROPENEM 1 G/1
1 INJECTION, POWDER, FOR SOLUTION INTRAVENOUS EVERY 8 HOURS
Status: DISCONTINUED | OUTPATIENT
Start: 2017-02-17 | End: 2017-02-22 | Stop reason: HOSPADM

## 2017-02-17 RX ORDER — OXYCODONE HYDROCHLORIDE 5 MG/1
5 TABLET ORAL EVERY 6 HOURS PRN
Status: DISCONTINUED | OUTPATIENT
Start: 2017-02-17 | End: 2017-02-18

## 2017-02-17 RX ORDER — HYDROCODONE BITARTRATE AND ACETAMINOPHEN 5; 325 MG/1; MG/1
1 TABLET ORAL EVERY 6 HOURS PRN
Status: DISCONTINUED | OUTPATIENT
Start: 2017-02-17 | End: 2017-02-17

## 2017-02-17 RX ORDER — HYDROMORPHONE HYDROCHLORIDE 2 MG/1
2 TABLET ORAL EVERY 4 HOURS PRN
Status: DISCONTINUED | OUTPATIENT
Start: 2017-02-17 | End: 2017-02-17

## 2017-02-17 RX ORDER — LIDOCAINE 40 MG/G
CREAM TOPICAL
Status: DISCONTINUED | OUTPATIENT
Start: 2017-02-17 | End: 2017-02-22 | Stop reason: HOSPADM

## 2017-02-17 RX ORDER — HEPARIN SODIUM,PORCINE 10 UNIT/ML
2-5 VIAL (ML) INTRAVENOUS
Status: DISCONTINUED | OUTPATIENT
Start: 2017-02-17 | End: 2017-02-22 | Stop reason: HOSPADM

## 2017-02-17 RX ADMIN — CLONIDINE HYDROCHLORIDE 0.4 MG: 0.2 TABLET ORAL at 22:13

## 2017-02-17 RX ADMIN — RIVAROXABAN 20 MG: 20 TABLET, FILM COATED ORAL at 17:46

## 2017-02-17 RX ADMIN — HYDROMORPHONE HYDROCHLORIDE 4 MG: 2 TABLET ORAL at 04:18

## 2017-02-17 RX ADMIN — Medication 7.5 MG: at 21:15

## 2017-02-17 RX ADMIN — OXYCODONE HYDROCHLORIDE 5 MG: 5 TABLET ORAL at 14:06

## 2017-02-17 RX ADMIN — SODIUM CHLORIDE: 9 INJECTION, SOLUTION INTRAVENOUS at 10:49

## 2017-02-17 RX ADMIN — HYDROMORPHONE HYDROCHLORIDE 2 MG: 2 TABLET ORAL at 08:24

## 2017-02-17 RX ADMIN — ACETAMINOPHEN 650 MG: 325 TABLET, FILM COATED ORAL at 14:09

## 2017-02-17 RX ADMIN — NORTRIPTYLINE HYDROCHLORIDE 40 MG: 10 CAPSULE ORAL at 21:15

## 2017-02-17 RX ADMIN — OXYCODONE HYDROCHLORIDE 5 MG: 5 TABLET ORAL at 20:45

## 2017-02-17 RX ADMIN — DULOXETINE 60 MG: 60 CAPSULE, DELAYED RELEASE ORAL at 08:24

## 2017-02-17 RX ADMIN — LIDOCAINE 1 PATCH: 50 PATCH TOPICAL at 12:37

## 2017-02-17 RX ADMIN — HYDROMORPHONE HYDROCHLORIDE 4 MG: 2 TABLET ORAL at 00:07

## 2017-02-17 RX ADMIN — MEROPENEM 1 G: 1 INJECTION, POWDER, FOR SOLUTION INTRAVENOUS at 17:45

## 2017-02-17 NOTE — CONSULTS
GASTROENTEROLOGY CONSULTATION      Date of Admission:  2/15/2017          Chief Complaint:   We were asked by Flavio Gonsalez MD to evaluate this patient with Klebsiella bacteremia with concern of GI source.         ASSESSMENT AND RECOMMENDATIONS:   Assessment:  Doreen Peralta is a 36 year old female with a history of slow transit that required subtotal colectomy with G and J tube placement, and eventually ileo-rectal anastamosis with chronic nausea, vomiting, diarrhea and abdominal bloating. PMH fungemia, E. Coli sepsis, hepatic vein thrombosis, chronic pain, colonic dysmotility, IBS, GERD, asthma, line related upper extremity DVT on Xarelto, cervical cancer, oophorectomy and cholecystectomy.  Patient had gastric emptying study in September in 2009, which was normal. Last abd/pelvic CT on 2/7/17 from OSH was normal. Pelvic US completed on 2/15/17 is negative. LFTs, lipase and albumin are normal.  CRP is elevated in the setting of Klebsiella oxytoca bacteremia, appreciate our colleagues from infectious diseases recommendation for bacteremia management.     Etiology of patient's acute on chronic RUQ abdominal pain is unclear. Given patient's recurrent bacteremia, it is reasonable to proceed with colonoscopy to evaluate if there is GI source that might explain the bacteremia. However, we have to consider other sources, such as skin or G and J tubes. Patient could benefit reviewing hand hygiene and keeping skin clean and intact.      Recommendations  -- PPI twice daily  -- Please obtain stool test for her the diarrhea (C. Diff, enteric pathogen, giardia, ova and parasite)  -- Monitor stool amount, consistency and color until patient starts her bowel prep  -- Full liquid diet today  -- Clear liquid on tomorrow and 4 liter Golytely on 2/18/17  -- Avoid narcotics as it worsens nausea, vomiting and abdominal pain  -- Toxins  screening  -- If stool infectious study come back negative, add fiber and/or guar gum for  stool thickening in the setting of tube feeds  Gastroenterology follow up recommendations: To be determined  Patient care plan discussed with Dr. Muller, GI staff physician. Thank you for involving us in this patient's care. Please do not hesitate to contact the GI service with any questions or concerns.     Zoë Mae CNP  Department of Gastroenterology   -------------------------------------------------------------------------------------------------------------------   History is obtained from the patient and the medical record.          History of Present Illness:   Doreen Peralta is a 36 year old female with a history of slow transit that required subtotal colectomy with G and J tube placement, and eventually ileo-rectal anastamosis with chronic nausea, vomiting, diarrhea and abdominal bloating. PMH fungemia, E. Coli sepsis, hepatic vein thrombosis, chronic pain, colonic dysmotility, IBS, GERD, asthma, line related upper extremity DVT on Xarelto, cervical cancer, oophorectomy and cholecystectomy.  Patient had gastric emptying study in September in 2009, which was normal. Last abd/pelvic CT on 2/7/17 from OSH was normal. Pelvic US completed on 2/15/17 is negative. LFTs, lipase and albumin are normal.  CRP is elevated in the setting of Klebsiella oxytoca bacteremia, appreciate our colleagues from infectious diseases recommendation for bacteremia management.     Patient came in with worsening RUQ abdominal pain, nausea/vomiting and diarrhea. She also had fevers at home. Patient denies NSAID, ETOH, or drug use.         Past Medical History:   Reviewed and edited as appropriate  Past Medical History   Diagnosis Date     Asthma      Bilateral ovarian cysts      Cervical cancer (H) 01/01/2008     cervical cancer      Chronic pain      Colonic dysmotility      s/p subtotal colectomy     Constipation      chronic     E. coli sepsis (H) 5/8/2016     Enteritis      Fungemia 5/5/2016     Gastro-oesophageal reflux  disease      H/O ileostomy      Hx of abnormal Pap smear      s/p LEEP - no further details provided     Hypertension      IBS (irritable bowel syndrome)      Other chronic pain      PONV (postoperative nausea and vomiting)      Thrombosis, hepatic vein (H)      microvascular            Past [facility] History:   Reviewed and edited as appropriate   Past [facility] History   Procedure Laterality Date     Laparoscopic oophorectomy Right 2009     [facility]     Laparoscopic cholecystectomy  2002     [facility] ctr. stones duct     Esophagoscopy, gastroscopy, duodenoscopy (egd), combined  7/10/2012     Procedure: COMBINED ESOPHAGOSCOPY, GASTROSCOPY, DUODENOSCOPY (EGD);  Upper Endoscopy, Ileoscopy    Latex Allergy  with biopsies;  Surgeon: Nicole Redding MD;  [facility]     Colonoscopy  7/10/2012     Procedure: COLONOSCOPY;;  Surgeon: Nicole Redding MD;  [facility]     Leep tx, cervical  2009     [facility]     Laparoscopic ileostomy  1/20/2012     [facility], loop     Esophagoscopy, gastroscopy, duodenoscopy (egd), combined N/A 11/5/2014     Procedure: COMBINED ESOPHAGOSCOPY, GASTROSCOPY, DUODENOSCOPY (EGD);  Surgeon: Nicole Redding MD;  [facility]     Laparoscopic assisted colectomy  1/20/2012     Procedure:LAPAROSCOPIC ASSISTED COLECTOMY; Laparoscopic Ileostomy       Laparoscopic assisted colectomy left (descending)  10/24/2012     Procedure: LAPAROSCOPIC ASSISTED COLECTOMY LEFT (DESCENDING);   Hand Assisted Laproscopic Subtotal abdominal Colectomy,Iieorectal anastamosis, Ileostomy Closure.       Remove gastrostomy tube adult N/A 12/12/2014     Procedure: REMOVE GASTROSTOMY TUBE ADULT;  Surgeon: Nicole Redding MD;  Location:  GI     Replace gastrostomy tube adult  5/19/15     Hc replace gastrostomy/cecostomy tube percutaneous Left 5/19/2015     Procedure: REPLACE GASTROSTOMY TUBE, PERCUTANEOUS;  Surgeon: Melecio Morejon Chi, MD;  Location:  GI     Hc replace  duodenostomy/jejunostomy tube percutaneous N/A 8/27/2015     Procedure: REPLACE GASTROJEJUNOSTOMY TUBE, PERCUTANEOUS;  Surgeon: Mio Holder MD;  [facility]     Hc [facility] endoscopy w placement gastrostomy tube percut N/A 10/1/2015     Procedure: COMBINED ESOPHAGOSCOPY, GASTROSCOPY, DUODENOSCOPY (EGD), PLACE PERCUTANEOUS ENDOSCOPIC GASTROSTOMY TUBE;  Surgeon: Mio Holder MD;  [facility]     Laparoscopic assisted insertion tube jejunostomy N/A 10/16/2015     Procedure: LAPAROSCOPIC ASSISTED INSERTION TUBE JEJUNOSTOMY;  Surgeon: Elsa Medel MD;  [facility]     Picc insertion Left 10/21/2015     5fr DL Power PICC, 37cm (2cm external) in the L basilic vein w/ tip in the SVC RA junction.     Hc replace duodenostomy/jejunostomy tube percutaneous N/A 1/7/2016     Procedure: REPLACE JEJUNOSTOMY TUBE, PERCUTANEOUS;  Surgeon: Esla Medel MD;  [facility]     Endoscopic insertion tube gastrostomy N/A 1/21/2016     Procedure: ENDOSCOPIC INSERTION TUBE GASTROSTOMY;  Surgeon: Nicole Redding MD;  [facility]     Hc replace duodenostomy/jejunostomy tube percutaneous N/A 1/28/2016     Procedure: REPLACE JEJUNOSTOMY TUBE, PERCUTANEOUS;  Surgeon: Elsa Medel MD;  [facility]     Laparotomy exploratory N/A 1/28/2016     Procedure: LAPAROTOMY EXPLORATORY;  Surgeon: Elsa Medel MD;  [facility]     Remove port vascular access Right 6/30/2016     Procedure: REMOVE PORT VASCULAR ACCESS;  Surgeon: Pradeep Orosco MD;  Location[facility]     Echo adal  7/19/2016            N/A 7/20/2016     Procedure: ANESTHESIA OUT OF OR;  Surgeon: GENERIC ANESTHESIA PROVIDER;  Location: [facility]            Previous Endoscopy:   No results found. However, due to the size of the patient record, not all encounters were searched. Please check Results Review for a complete set of results.         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[employer]     Pediatric clinic     Social History Main Topics     Smoking status: Former Smoker     Packs/day: 1.00     Years: 4.00     Types: Cigarettes     Quit date: 1/1/2004     Smokeless tobacco: Former User     Alcohol use No     Drug use: No     Sexual activity: Yes     Partners: Male     Other Topics Concern     Not on file     Social History Narrative            Family History:   Reviewed and edited as appropriate  Family History   Problem Relation Age of Onset     Thyroid Disease Mother      Sjogren's Mother      GASTROINTESTINAL DISEASE Mother      Intermittent nausea vomiting diarrhea     Colon Polyps Mother      Prostate Problems Father      prostate enlargement     Lupus Maternal Grandmother      CANCER Maternal Grandfather      Lung     Colon Cancer Maternal Grandfather 65     CANCER Paternal Grandmother      Lung      CEREBROVASCULAR DISEASE Paternal Grandmother      DIABETES Paternal Grandmother      Cardiovascular Paternal Grandmother      CHF     CANCER Paternal Grandfather      Lung     Glaucoma Paternal Grandfather      Abdominal Aortic Aneurysm Other      Macular Degeneration No family hx of            Allergies:   Reviewed and edited as appropriate     Allergies   Allergen Reactions     Hyoscyamine Rash     Metoclopramide Other (See Comments)     Eye twitching.      Peaches [Peach] Other (See Comments)     Raw. Cooked OK     Sucralose Other (See Comments)     All artificial sweeteners. Aspartame also. Swollen glands     Advair Diskus Other (See Comments)     Throat burns     Azithromycin Other (See Comments)     Burning in throat     Compazine [Prochlorperazine] Visual Disturbance     Contrast Dye Itching     States is allergic to CT contrast dye     Cyclobenzaprine Visual Disturbance     Fentanyl Other (See Comments)      migraine     Ibuprofen GI Disturbance     Lactulose Nausea and Vomiting     Gas and bloating     Levaquin [Levofloxacin] Swelling     Per ED M.D. And RN      Morphine Sulfate Other (See Comments)     Chest pain       Oxycodone Other (See Comments)     Burning throat, but can take Norco.      Penicillins Other (See Comments)     Family hx of resp arrest, she has never taken  Ok with cephalosporins     Rizatriptan Visual Disturbance     Droperidol Hives and Rash     Isovue [Iopamidol] Palpitations     Pt had racing heart and sob      Ketorolac Anxiety     Latex Swelling and Rash     Kiwi, likely also avacado, ? banana     Levsin Rash            Medications:     Prescriptions Prior to Admission   Medication Sig Dispense Refill Last Dose     VITAMIN D, CHOLECALCIFEROL, PO Take 2,000 Units by mouth daily   Past Month     nortriptyline (PAMELOR) 10 MG capsule Take 3-4 capsules (30-40 mg) by mouth At Bedtime 120 capsule 1 2/14/2017 at PM     ondansetron (ZOFRAN ODT) 4 MG ODT tab Take 1-2 tablets (4-8 mg) by mouth every 6 hours as needed for nausea 40 tablet 0 2/15/2017 at AM     mirtazapine (REMERON SOL-TAB) 15 MG ODT tab 0.5 tablets (7.5 mg) by Orally disintegrating tablet route At Bedtime 45 tablet 0 2/14/2017 at PM     SUMAtriptan (IMITREX) 50 MG tablet Take 1-2 tablets ( mg) by mouth at onset of headache for migraine - may repeat dose after 2h if headache recurs.  Max: 200mg/24 hours 18 tablet 1 Past Month     rivaroxaban ANTICOAGULANT (XARELTO) 20 MG TABS tablet Take 1 tablet (20 mg) by mouth daily (with dinner) 90 tablet 1 2/14/2017 at PM     DULoxetine (CYMBALTA) 60 MG capsule Take 1 capsule (60 mg) by mouth daily 90 capsule 3 2/15/2017 at AM     cloNIDine (CATAPRES) 0.2 MG tablet Take 2 tablets (0.4 mg) by mouth every evening 60 tablet 5 2/14/2017 at PM     ACETAMINOPHEN PO Take 500-1,000 mg by mouth every 6 hours as needed for pain    Past Week     albuterol 90 MCG/ACT inhaler Inhale 2 puffs into the lungs  every 6 hours as needed    Past Week     Nutritional Supplements (COMPLEAT) LIQD Infuse at 50 ml/hour for 5 hours daily through j-tube  Flush j-tube with 30 ml water before and after each infusion of Compleat 30 each 0 Unknown             Review of Systems:   A complete review of systems was performed and is negative except as noted in the HPI           Physical Exam:   /88 (BP Location: Right arm)  Pulse 89  Temp 99.7  F (37.6  C) (Axillary)  Resp 18  Wt 85.6 kg (188 lb 12.8 oz)  LMP 01/19/2017  SpO2 100%  BMI 30.47 kg/m2  Wt:   Wt Readings from Last 2 Encounters:   02/16/17 85.6 kg (188 lb 12.8 oz)   02/06/17 81.6 kg (180 lb)      Constitutional: cooperative, pleasant, not dyspneic/diaphoretic, no acute distress  Eyes: Sclera anicteric/injected  Ears/nose/mouth/throat: Normal oropharynx without ulcers or exudate, mucus membranes moist, hearing intact  Neck: supple, thyroid normal size  CV: RRR. No edema in LE  Respiratory: Unlabored breathing. CTA bilaterally.  Lymph: No axillary, submandibular, supraclavicular or inguinal lymphadenopathy  Abd: Nondistended, +bs, no hepatosplenomegaly, nontender, no peritoneal signs  Skin: warm, perfused, no jaundice  Neuro: AAO x 3, No asterixis  Psych: Normal affect  MSK: Normal gait         Data:   Labs and imaging below were independently reviewed and interpreted    BMP  Recent Labs  Lab 02/17/17  0646 02/16/17  1422 02/16/17  0707 02/15/17  1445     --  141 136   POTASSIUM 3.6 4.2 3.3* 3.5   CHLORIDE 107  --  110* 103   TARA 8.1*  --  8.2* 8.9   CO2 22  --  23 25   BUN 3*  --  7 11   CR 0.89  --  0.96 0.94   GLC 98  --  103* 79     CBC  Recent Labs  Lab 02/17/17  0646 02/16/17  0707 02/15/17  1445   WBC 5.9 6.1 17.0*   RBC 3.38* 3.50* 4.07   HGB 8.2* 8.4* 9.9*   HCT 26.8* 27.5* 31.7*   MCV 79 79 78   MCH 24.3* 24.0* 24.3*   MCHC 30.6* 30.5* 31.2*   RDW 17.1* 17.0* 17.2*    465* 639*     INR  Recent Labs  Lab 02/16/17  0707   INR 1.11     LFTs  Recent  Labs  Lab 02/15/17  1445   ALKPHOS 164*   AST 20   ALT 31   BILITOTAL 0.8   PROTTOTAL 8.9*   ALBUMIN 3.5      PANCNo lab results found in last 7 days.

## 2017-02-17 NOTE — PLAN OF CARE
Problem: Goal Outcome Summary  Goal: Goal Outcome Summary  Patient A/O, VSS. No acute incidences during shift, but patient slept very little. C/o pain, Oxy given 2x with some relief. Pt up ambulating in boudreaux and room. NS infusing at 125 ml/hr. Previous site of right upper arm PIV slightly pink, tender, warm. Ice pack applied. Likely d/c on Saturday. Will continue to monitor and alert MDs to any changes.

## 2017-02-17 NOTE — PROGRESS NOTES
General Infectious Disease Service Progress Note     Patient:  Doreen Peralta  Date of birth 1980, Medical record number [id number]  Date of Visit:  02/17/2017  Attending Physician: Rosanne Tan DO         Assessment and Recommendations:   ASSESSMENT:  1. Klebsiella oxytoca bacteremia; now with Acinetobacter bacteremia; unclear etiology  2. Acute on chronic abdominal pain      RECOMMENDATION:  1. Etiology of acute on chronic abdominal pain continues to be unclear. Given her extensive GI problems and surgeries, translocation of gut hipolito is the most likely source. This has not been confirmed by imaging despite exhaustive imaging. There are some concerns regarding factitious disorder, with several red flags present (heavily medicalized lifestyle, background as a medical technician, unexplained fevers and bacteremias, heavy focus on pain medications and advocating for line access). This concern was exacerbated when nursing noted some evidence of possible skin popping lesions on her flank (non-dominant side); when an attempt to examined her skin was performed by the ID team, the patient refused. At the same time, her past medical history provides a very plausible reason for her to have issues with bacteremias. Also, each episode involves a different organism, which may suggest against a factitious source. She has been having recurrent fevers and increased WBC. Her blood cultures during her admission at [facility] from 2/6-2/9 did grow out Klebsiella oxytoca resistant to ampicillin. She was treated with IV ertapenem during her admission at [facility] from 2/6-2/9/17 and was discharged on oral ciprofloxacin for a 14-day course. She had an unremarkable abdominal/pelvis CT scan. The patient is concerned that 2/2 her previous surgeries, chronic diarrhea, and vomiting, she has not been absorbing the ciprofloxacin. She did have a subtotal colectomy with ileosigmoid anastomosis in 2012. Possible etiology of her  "infections with different organisms could be a microperforation of her colon or a type of bowel leakage. Agree with GI for consideration of colonoscopy rather than another CT scan; at this point the patient is declining this procedure because she \"does not like the GI people here\". On discussing the subject again this morning, patient is willing to have colonoscopy here during her hospital stay, if it is deemed necessary. However, she reports she has needed general anesthesia in the past due to a \"kink\". She also is willing to have the colonoscopy as an outpatient, as she is feeling better today. Due to her history of multiple blood infections in the past year and her significant past GI surgical history, would recommend further GI work up in addition to antibiotic treatment. Her blood culture from 2/15/2017 returned positive for Acinetobacter. She previously had blood cultures positive for Acinetobacter on 7/16/16, and also on 1/29/16 sensitivities below. As this can be a resistant organism and she has had considerable antibiotic exposure since these cultures, recommend switching to meropenem until full sensitivities are back.    - Defer PICC placement until no growth x48 hours  - Would defer to GI for colonoscopy during inpatient stay, patient reports she requires general anesthesia.  - D/C Ceftriaxone and swtich to Meropenem 1g IV Q8 hrs  - awaiting susceptibility report     7/16/16   CULTURED ON THE 2ND DAY OF INCUBATION: ACINETOBACTER URSINGII (ANJEL)      Antibiotic Sensitivity Unit Status     AMIKACIN <=4.0 Susceptible ug/mL Final     AMPICILLIN/SULBACTAM <=8.0 Susceptible ug/mL Final     CEFEPIME <=2.0 Susceptible ug/mL Final     CEFTAZIDIME 4.0 Susceptible ug/mL Final     CEFTRIAXONE 8.0 Susceptible ug/mL Final     CIPROFLOXACIN <=0.5 Susceptible ug/mL Final     Comment: See comment below  MICs (minimum inhibitory concentrations) of antibiotics which include \"No   Interpretation\" means there are no national " regulatory guidelines for   interpretation available.  MICs with a greater than sign (>) should be   considered resistant for safety reasons.  Further advice can be sought from   IDDL, Pharm Ds, and Infectious Diseases consultants.  Final     ERTAPENEM 2.0 No interp available ug/mL Final     GENTAMICIN <=1.0 Susceptible ug/mL Final     LEVOFLOXACIN <=1.0 Susceptible ug/mL Final     MEROPENEM <=1.0 Susceptible ug/mL Final     Piperacillin/Tazo <=8.0 Susceptible ug/mL Final     TOBRAMYCIN <=1.0 Susceptible ug/mL Final     Trimethoprim/Sulfa <=2.0/38.0 Susceptible ug/mL Final             1/29/16  CULTURED ON THE 1ST DAY OF INCUBATION: ACINETOBACTER SPECIES, NOT BAUMANNII (ANJEL)      Antibiotic Sensitivity Unit Status     AMIKACIN <=4.0 Susceptible ug/mL Final     AMPICILLIN/SULBACTAM <=8.0 Susceptible ug/mL Final     CEFEPIME <=2.0 Susceptible ug/mL Final     CEFTAZIDIME <=1.0 Susceptible ug/mL Final     CEFTRIAXONE <=4.0 Susceptible ug/mL Final     CIPROFLOXACIN <=0.5 Susceptible ug/mL Final     GENTAMICIN <=1.0 Susceptible ug/mL Final     LEVOFLOXACIN <=1.0 Susceptible ug/mL Final     MEROPENEM <=1.0 Susceptible ug/mL Final     Piperacillin/Tazo <=8.0 Susceptible ug/mL Final     TOBRAMYCIN <=1.0 Susceptible ug/mL Final     Trimethoprim/Sulfa <=2.0/38.0 Susceptible ug/mL Final       ID will continue to follow this patient. This patient will be transferred to the [facility] ID Service on Monday for ongoing ID services. Please contact the [facility] ID Service faculty for any further questions on this patient after 8AM Monday morning.    Scribe Disclosure:   I, Kwadwo Ansari, am serving as a scribe; to document services personally performed by MALDONADO Ansari- -based on data collection and the provider's statements to me.     Provider Disclosure:  I agree with above History, Review of Systems, Physical exam and Plan.  I have reviewed the content of the documentation and have edited it as needed. I have personally  performed the services documented here and the documentation accurately represents those services and the decisions I have made.      MALDONADO Ansari M.D.  [facility] Service Staff  [phone]         Interval History:     HPI:  Doreen is still experiencing abdominal pain this morning, but states it is improving. She is still experiencing nausea. She feels the IV antibiotics are helping her symptoms and would like to continue her IV antibiotics at home with a PICC. She has continued to be afebrile without a white count during her admission.        Pertinent cultures include:  Culture Micro   Date Value Ref Range Status   02/17/2017 Pending  Incomplete   02/15/2017   Final    <10,000 colonies/mL urogenital hipolito Susceptibility testing not routinely done   02/15/2017 No growth after 2 days  Final   02/15/2017 No growth after 2 days  Final   02/06/2017 No growth after 6 days  Final       CRP Trend:  Recent Labs   Lab Test  02/17/17   0646  02/16/17   1422  02/15/17   1445  01/31/17   1010  01/18/17   1644  01/09/17   1042   CRP  27.0*  22.0*  8.3*  154.0*  8.2*  142.0*            Review of Systems:   CONSTITUTIONAL:    No fever  ; no chills.      RESPIRATORY:  negative for cough or shortness of breath  CARDIOVASCULAR:  negative for chest pain  GASTROINTESTINAL:  Positive for nausea, diarrhea and abdominal pain. No vomiting or constipation  GENITOURINARY:  negative for dysuria  INTEGUMENT:  negative for rash, pruritus or skin changes  NEURO:  Positive for headache (chronic)         Current Medications (antimicrobials listed in bold):       lidocaine  1-3 patch Transdermal Q24h    And     lidocaine   Transdermal Q24H    And     lidocaine   Transdermal Q8H     cloNIDine  0.4 mg Oral QPM     DULoxetine  60 mg Oral Daily     nortriptyline  30-40 mg Oral At Bedtime     mirtazapine  7.5 mg Orally disintegrating tablet At Bedtime     rivaroxaban ANTICOAGULANT  20 mg Oral Daily with supper     cefTRIAXone  2 g Intravenous Q24H               Allergies:     Allergies   Allergen Reactions     Hyoscyamine Rash     Metoclopramide Other (See Comments)     Eye twitching.      Peaches [Peach] Other (See Comments)     Raw. Cooked OK     Sucralose Other (See Comments)     All artificial sweeteners. Aspartame also. Swollen glands     Advair Diskus Other (See Comments)     Throat burns     Azithromycin Other (See Comments)     Burning in throat     Compazine [Prochlorperazine] Visual Disturbance     Contrast Dye Itching     States is allergic to CT contrast dye     Cyclobenzaprine Visual Disturbance     Fentanyl Other (See Comments)     migraine     Ibuprofen GI Disturbance     Lactulose Nausea and Vomiting     Gas and bloating     Levaquin [Levofloxacin] Swelling     Per ED M.D. And RN      Morphine Sulfate Other (See Comments)     Chest pain       Oxycodone Other (See Comments)     Burning throat, but can take Norco.      Penicillins Other (See Comments)     Family hx of resp arrest, she has never taken  Ok with cephalosporins     Rizatriptan Visual Disturbance     Droperidol Hives and Rash     Isovue [Iopamidol] Palpitations     Pt had racing heart and sob      Ketorolac Anxiety     Latex Swelling and Rash     Kiwi, likely also avacado, ? banana     Levsin Rash          Physical Exam:   Vitals were reviewed  Patient Vitals for the past 24 hrs:   BP Temp Temp src Pulse Heart Rate Resp SpO2 Weight   02/17/17 0634 122/74 98.3  F (36.8  C) Oral 89 - 18 - -   02/16/17 2232 119/59 98.3  F (36.8  C) Oral 90 90 18 100 % -   02/16/17 1944 134/66 - - - - - - -   02/16/17 1923 - - - - - - - 85.6 kg (188 lb 12.8 oz)   02/16/17 1452 139/72 99.3  F (37.4  C) Axillary - 82 18 98 % -       Physical Examination:  GENERAL: well-developed, well-nourished, in no acute distress. Walking with IV pole  HEENT: Head is normocephalic, atraumatic   ABDOMEN: Normal bowel sounds, soft, nondistended. TTP, R>L. No appreciable hepatosplenomegaly. G-J tube in place, no noted  surrounding erythema or discharge.  SKIN: Line is in place without any surrounding erythema or exudate.   NEUROLOGIC: Grossly nonfocal. Active x4 extremities         Laboratory Data:   ID Labs:  Recent Labs   Lab Test  02/17/17   0646  02/16/17   1422  02/15/17   1445  01/31/17   1010  01/18/17   1644  01/09/17   1042   CRP  27.0*  22.0*  8.3*  154.0*  8.2*  142.0*     Recent Labs   Lab Test  02/17/17   0646  02/16/17   0707  02/15/17   1445  02/06/17   1650  01/31/17   1010  01/28/17   0525   WBC  5.9  6.1  17.0*  18.1*  9.1  12.2*     Recent Labs   Lab Test  02/17/17   0646  02/16/17   0707  02/15/17   1445  02/06/17   1650   CR  0.89  0.96  0.94  0.85   GFRESTIMATED  71  66  68  76       Hematology Studies  Recent Labs   Lab Test  02/17/17   0646  02/16/17   0707  02/15/17   1445  02/06/17   1650  01/31/17   1010  01/28/17   0525   01/26/17   1500  01/18/17   1644  01/05/17   1421   WBC  5.9  6.1  17.0*  18.1*  9.1  12.2*   < >  16.0*  11.2*  11.4*   ANEU   --    --   14.3*  16.4*  8.6*   --    --   14.9*  7.4  8.5*   AEOS   --    --   0.1  0.0  0.0   --    --   0.0  0.1  0.0   HCT  26.8*  27.5*  31.7*  32.7*  32.9*  30.0*   < >  37.6  36.1  35.3   PLT  442  465*  639*  382  297  376   < >  584*  597*  496*    < > = values in this interval not displayed.       Metabolic  Recent Labs   Lab Test  02/17/17   0646  02/16/17   0707  02/15/17   1445   NA  139  141  136   BUN  3*  7  11   CO2  22  23  25   CR  0.89  0.96  0.94   GFRESTIMATED  71  66  68       Hepatic Studies  Recent Labs   Lab Test  02/15/17   1445  02/06/17   1650  01/31/17   1010   BILITOTAL  0.8  0.8  0.7   ALKPHOS  164*  269*  184*   ALBUMIN  3.5  3.2*  3.1*   AST  20  46*  63*   ALT  31  51*  73*       Immunologlobulins  Recent Labs   Lab Test  02/16/17   1422  02/15/17   2039  02/15/17   1445   01/23/13   1515   IGA   --    --    --    --   252   SED  84*  82*  71*   < >  41*    < > = values in this interval not displayed.

## 2017-02-17 NOTE — PROGRESS NOTES
"SPIRITUAL HEALTH SERVICES  SPIRITUAL ASSESSMENT Progress Note  [facility]    Although Doreen's chart SHS column was listed as \"no thank you\" I noticed she had SHS visits during previous admits. Her nurse, Hosea, said she was \"sleeping lightly but might be okay with a visit.\" I entered Doreen's room and she awoke when I spoke her name. She said she \"appreciated the visit but wanted to sleep - there's so much noise here and I couldn't sleep overnight.\" I offered prayer, which she accepted. She requested prayer for healing so she could return home.       [facility] is available anytime upon request and will follow up per LOS.      Edith Multani   Intern  Pager [phone]    "

## 2017-02-17 NOTE — PROGRESS NOTES
Care Coordinator- Discharge Planning     Admission Date/Time:  2/15/2017  Attending MD:  Rosanne Tan DO     Data  Date of initial CC assessment:  Chart reviewed, discussed with interdisciplinary team.   Patient was admitted for:   1. Abdominal pain, generalized    2. Leukocytosis, unspecified type    3. Bacteremia           Assessment  Notified by MD that patient will be discharging home on IV antibiotics. Met with patient at bedside. States she has done this multiple times in the past with different antibiotics. Choosing to continue to use [facility]. Referral placed and patient will be covered at 100%. Refusing patient learning center and feels she will not need any education. PICC line to be placed and still waiting on which IV antibiotic. Probable discharge home tomorrow. Please page on call [facility] [phone]) to help coordinate with [facility] on delivery of med and supplies.    Coordination of Care and Referrals: Provided patient/family with options for home infusion    Addendum 1452: updated by MD that patient has positive blood cultures and PICC line will not be placed today. Patient will remain hospitalized over the weekend.      Plan  Anticipated Discharge Date: tomorrow  Anticipated Discharge Plan: home with home infusion    Moriah Toribio

## 2017-02-17 NOTE — PROVIDER NOTIFICATION
Dr Stauffer paged: Pt has temp of 100.7. Pt has tylenol ordered for pain. Are you okay with giving Tylenol (masking temp)?

## 2017-02-17 NOTE — PROVIDER NOTIFICATION
"Pt refused labs that were put in at 1630, pt stated she had been \"stuck enough today\",writer paged oncall at [phone], provider did return call and labs are ok to be drawn in am.  "

## 2017-02-17 NOTE — PROGRESS NOTES
INTERNAL MEDICINE PROGRESS NOTE    Doreen Peralta ([id number]) admitted on 2/15/2017  02/17/2017    Assessment & Plan:  37 yo F with hx of gastroparesis that spiraled into multiple bowel surgeries now with G-J tube admitted with recurrent chills, abdominal pain, malaise, and anorexia/dehydration after recent hospitalization and treatment for Klebsiella oxytoca.      # subjective fevers, chills, RUQ pain likely 2/2 klebsiella infection with un-identified source  # chronic diarrhea, related to tube feeds v. Klebsiella  # dehydration, elevated inflammatory markers      Recurrent klebsiella infections without identified source, particularly this occurrence on which patient does not have a portocath r/o any lines beside G-J. Patient has had a TTE w/o mass/vege (1/20/17), negative Right heart cath culture 9/27/2016, EF 55-60% as well as multiple CTs without evidence of nidus of infection. Mild elevations of alkP.        Notably, Klebsiella oxytoca is a known cause ofdiarrhea. (Miguel et al 2013)     ID history:  - E. Coli 5/3/16  - acinetobacter bacteremia 7/2016  - candida parapsilosis fungemia 7/2016  - histoplasma Ab+ 1/2017  - histoplasma urinary antigen negative 1/2017  - Klebsiella oxytoca 6/21/16 from portocath, 7/7/16 from R. [facility], 2/9/17 from [facility] (most recent)    Now repeat culture with gram negative rods- acinetobacter     Currently we are repeating her cultures -blood c/s , urine c/s , UA  US with no obvious source  As per ID recs started on meropenem IV  Also GI consult for possible colonoscopy to look for any microperforation or source of infection-colonoscopy on sunday  Also stool studies sent today - cdiff, enteric panel and stool ova and parasites      -Continue with meropenem 1gm IV every 8hrs   - IV fluids   -continue to trend inflammatory markers    Source of infection is still unknown; pt does have presence of scab and puncture wound on her  upper extremity   dont know if there was any form of  factitious source  So far no known source and pt does not have any PICC line or port line to be cause of infection         # dehydration, loss of appetite  # abdominal pain, waxing/waning chronic pain  Patient reports receiving 4 mg dilaudid PO q3h at [facility]. There, the Pain Service was consulted given the patient has had numerous prescriptions and intermittent courses of opioids related to her many ED and hospital visits.     She was discharged with 5 day course (2/9 - 2/14) of She rec'd 3 mg IV dilaudid in the ED without change in mental status and only mild-mod reduction in pain     PLAN:  > tylenol 650 mg prn  > deescalating from dilaudid PRN to oxycodone 5mg PO q6hrs PRN  > zofran prn  > pain management consult- lidocaine patch, along with adjunctive management with ice and heat pack, TENS unit if available was recommended by pain consult team; will out referral to follow with pain clinic as an outpatient  > nutrition consult to restart tube feeds  > normal saline maintenance fluid at 100 cc/hr       # stable conditions  Bilateral UE DVTs - continue home xarelto; will hold dose for tm  Asthma - albuterol prn   Anxiety - monitor     FEN: 100 cc/hr, replete prn, regular diet+/-tube feeds as toelrated  Prophylaxis: DVT: continue home xarelto GI: none at this time Lung: incentive spirometer  Code Status: FULL CODE  Medical Decision Maker: mother, Aby gordillo, [phone]     Disposition: pending futher evaluation of infectious source  Patient was seen and discussed with  who agrees with above assessment and plan.    ==================================================================    24 hr events-   Subjective:  Felt better in am; still lower abdominal pain but better that yesterday; no fever or chills overnight   Denies nausea, vomiting   Was getting dilaudid oral overnight for pain management     Objective:  Most recent vital signs:  /81 (BP Location: Right arm)  Pulse 89  Temp 99  F (37.2  C)  (Axillary)  Resp 18  Wt 85.6 kg (188 lb 12.8 oz)  LMP 01/19/2017  SpO2 98%  BMI 30.47 kg/m2  Temp:  [98.3  F (36.8  C)-100.7  F (38.2  C)] 99  F (37.2  C)  Pulse:  [89-90] 89  Heart Rate:  [] 101  Resp:  [18] 18  BP: (119-143)/(59-88) 141/81  SpO2:  [98 %-100 %] 98 %  Wt Readings from Last 2 Encounters:   02/16/17 85.6 kg (188 lb 12.8 oz)   02/06/17 81.6 kg (180 lb)       Intake/Output Summary (Last 24 hours) at 02/16/17 0812  Last data filed at 02/16/17 0200   Gross per 24 hour   Intake              275 ml   Output             1000 ml   Net             -725 ml       Physical exam:  General: no apparent distress;  HEENT: No Scleral icterus. NCAT, MMM. PERRL, EOMI. No LAD  Cardiac: Normal rate, regular rhythm. No m/r/g. Normal S1, S2.  Pulm: CTAB, no wheezes, or crackles. Normal respiratory effort  Abd: Soft, non distended, TTP in RUQ without rebound. G-J tube located epigastric without erythema, irritation, or discharge. No hepatosplenomegaly.  Skin: No jaundice, No rash. Small erosion under lip surrounded by old scab (patient reports picking at it).  Extremities: No LE edema, no LE lesions  Joints: No inflammation noted on joints  Neuro: A&Ox3, no focal deficits     Labs (Past three days):  CMP    Recent Labs  Lab 02/17/17  0646 02/16/17  1422 02/16/17  0707 02/15/17  1445     --  141 136   POTASSIUM 3.6 4.2 3.3* 3.5   CHLORIDE 107  --  110* 103   CO2 22  --  23 25   ANIONGAP 9  --  9 8   GLC 98  --  103* 79   BUN 3*  --  7 11   CR 0.89  --  0.96 0.94   GFRESTIMATED 71  --  66 68   GFRESTBLACK 86  --  80 82   TARA 8.1*  --  8.2* 8.9   MAG 2.2 2.3  --   --    PHOS 2.8 3.4  --   --    PROTTOTAL  --   --   --  8.9*   ALBUMIN  --   --   --  3.5   BILITOTAL  --   --   --  0.8   ALKPHOS  --   --   --  164*   AST  --   --   --  20   ALT  --   --   --  31     CBC    Recent Labs  Lab 02/17/17  0646 02/16/17  0707 02/15/17  1445   WBC 5.9 6.1 17.0*   RBC 3.38* 3.50* 4.07   HGB 8.2* 8.4* 9.9*   HCT 26.8* 27.5*  31.7*   MCV 79 79 78   MCH 24.3* 24.0* 24.3*   MCHC 30.6* 30.5* 31.2*   RDW 17.1* 17.0* 17.2*    465* 639*     INR    Recent Labs  Lab 02/16/17  0707   INR 1.11     Arterial Blood GasNo lab results found in last 7 days.    Microbiology:  Culture Micro (Abnormal) 02/15/2017  4:41       Cultured on the 2nd day of incubation: Gram negative rods   Critical Value/Significant Value, preliminary result only, called to and read    back by Shazia Encinas RN @1257 on 2/17/17. .   (Note)   POSITIVE for ACINETOBACTER SPECIES by [employer] multiplex nucleic acid   test. Final identification and antimicrobial susceptibility testing   will be verified by standard methods.          Imaging/procedure results:  US pelvic complete:  IMPRESSION: Negative pelvic ultrasound. No sonographic findings to  explain bacteremia.

## 2017-02-17 NOTE — PLAN OF CARE
Problem: Goal Outcome Summary  Goal: Goal Outcome Summary  Outcome: No Change  Writer cared for pt from [phone]. VSS besides temp this afternoon (MD's aware). A/Ox4. Dilaudid PO given for c/o abd pain x1. Up ind in room and halls. Blood cultures obtained this morning. Tolerating clears. IVMF of NS infusing at 125ml/hr. Old PIV site painful; hot packs provided with some improvement. PICC to be placed sometime after 3pm today.      Plan: Continue to monitor and support POC. Provided pain/fever interventions. Possible DC tomorrow.

## 2017-02-17 NOTE — PROVIDER NOTIFICATION
Pt upset with change in pain medication, Pt tried Norco yesterday with no relief in pain. Writer sent text page to Dr. Gonsalez and did not receive response. Writer then sent text page to Britney Infante and updated on labs call regarding postive blood cultures to right hand, gram negative rods, were noted from sample collected on 2/15/17. Dr. Tan contacted writer and was updated, PICC line cancelled due to cultures, and will speak with pt regarding pain medications.

## 2017-02-17 NOTE — PLAN OF CARE
Problem: Goal Outcome Summary  Goal: Goal Outcome Summary  Outcome: No Change  Pt is A/Ox4. Pt is making complaints of pain to abdomen and did get oral dilaudid 8mg x2. Pt had IV antibiotics this shift and did no adverse s/sx were noted. Pt has IV fluids running at 125 mLs. Pt diet is clear liquid and did tolerate well this shift intaking 600 mLs of liquids, no nausea or emesis this shift. VSS. Continue to monitor.

## 2017-02-18 ENCOUNTER — APPOINTMENT (OUTPATIENT)
Dept: ULTRASOUND IMAGING | Facility: CLINIC | Age: 37
DRG: 394 | End: 2017-02-18
Attending: INTERNAL MEDICINE
Payer: COMMERCIAL

## 2017-02-18 LAB
ANION GAP SERPL CALCULATED.3IONS-SCNC: 8 MMOL/L (ref 3–14)
BASOPHILS # BLD AUTO: 0 10E9/L (ref 0–0.2)
BASOPHILS NFR BLD AUTO: 0.3 %
BUN SERPL-MCNC: 4 MG/DL (ref 7–30)
C DIFF TOX B STL QL: NORMAL
CALCIUM SERPL-MCNC: 8.5 MG/DL (ref 8.5–10.1)
CAMPYLOBACTER GROUP BY NAT: NOT DETECTED
CHLORIDE SERPL-SCNC: 107 MMOL/L (ref 94–109)
CO2 SERPL-SCNC: 22 MMOL/L (ref 20–32)
CREAT SERPL-MCNC: 0.76 MG/DL (ref 0.52–1.04)
DIFFERENTIAL METHOD BLD: ABNORMAL
ENTERIC PATHOGEN COMMENT: NORMAL
EOSINOPHIL # BLD AUTO: 0.2 10E9/L (ref 0–0.7)
EOSINOPHIL NFR BLD AUTO: 2.4 %
ERYTHROCYTE [DISTWIDTH] IN BLOOD BY AUTOMATED COUNT: 16.9 % (ref 10–15)
FLUAV H1 2009 PAND RNA SPEC QL NAA+PROBE: NEGATIVE
FLUAV H1 RNA SPEC QL NAA+PROBE: NEGATIVE
FLUAV H3 RNA SPEC QL NAA+PROBE: NEGATIVE
FLUAV RNA SPEC QL NAA+PROBE: NEGATIVE
FLUBV RNA SPEC QL NAA+PROBE: NEGATIVE
GFR SERPL CREATININE-BSD FRML MDRD: 87 ML/MIN/1.7M2
GLUCOSE BLDC GLUCOMTR-MCNC: 106 MG/DL (ref 70–99)
GLUCOSE BLDC GLUCOMTR-MCNC: 96 MG/DL (ref 70–99)
GLUCOSE SERPL-MCNC: 81 MG/DL (ref 70–99)
HADV DNA SPEC QL NAA+PROBE: NEGATIVE
HADV DNA SPEC QL NAA+PROBE: NEGATIVE
HCT VFR BLD AUTO: 27.2 % (ref 35–47)
HGB BLD-MCNC: 8.4 G/DL (ref 11.7–15.7)
HMPV RNA SPEC QL NAA+PROBE: NEGATIVE
HPIV1 RNA SPEC QL NAA+PROBE: NEGATIVE
HPIV2 RNA SPEC QL NAA+PROBE: NEGATIVE
HPIV3 RNA SPEC QL NAA+PROBE: NEGATIVE
IMM GRANULOCYTES # BLD: 0 10E9/L (ref 0–0.4)
IMM GRANULOCYTES NFR BLD: 0.1 %
LYMPHOCYTES # BLD AUTO: 2 10E9/L (ref 0.8–5.3)
LYMPHOCYTES NFR BLD AUTO: 26.6 %
MAGNESIUM SERPL-MCNC: 2.4 MG/DL (ref 1.6–2.3)
MCH RBC QN AUTO: 24.1 PG (ref 26.5–33)
MCHC RBC AUTO-ENTMCNC: 30.9 G/DL (ref 31.5–36.5)
MCV RBC AUTO: 78 FL (ref 78–100)
MICROBIOLOGIST REVIEW: NORMAL
MONOCYTES # BLD AUTO: 0.4 10E9/L (ref 0–1.3)
MONOCYTES NFR BLD AUTO: 4.7 %
NEUTROPHILS # BLD AUTO: 4.9 10E9/L (ref 1.6–8.3)
NEUTROPHILS NFR BLD AUTO: 65.9 %
NOROVIRUS I AND II BY NAT: NOT DETECTED
NRBC # BLD AUTO: 0 10*3/UL
NRBC BLD AUTO-RTO: 1 /100
PHOSPHATE SERPL-MCNC: 2.6 MG/DL (ref 2.5–4.5)
PLATELET # BLD AUTO: 479 10E9/L (ref 150–450)
POTASSIUM SERPL-SCNC: 4.1 MMOL/L (ref 3.4–5.3)
PREALB SERPL IA-MCNC: 18 MG/DL (ref 15–45)
RBC # BLD AUTO: 3.49 10E12/L (ref 3.8–5.2)
RHINOVIRUS RNA SPEC QL NAA+PROBE: NEGATIVE
ROTAVIRUS A BY NAT: NOT DETECTED
RSV RNA SPEC QL NAA+PROBE: NEGATIVE
RSV RNA SPEC QL NAA+PROBE: NEGATIVE
SALMONELLA SPECIES BY NAT: NOT DETECTED
SHIGA TOXIN 1 GENE BY NAT: NOT DETECTED
SHIGA TOXIN 2 GENE BY NAT: NOT DETECTED
SHIGELLA SP+EIEC IPAH STL QL NAA+PROBE: NOT DETECTED
SODIUM SERPL-SCNC: 138 MMOL/L (ref 133–144)
SPECIMEN SOURCE: NORMAL
SPECIMEN SOURCE: NORMAL
VIBRIO GROUP BY NAT: NOT DETECTED
WBC # BLD AUTO: 7.5 10E9/L (ref 4–11)
YERSINIA ENTEROCOLITICA BY NAT: NOT DETECTED

## 2017-02-18 PROCEDURE — 93971 EXTREMITY STUDY: CPT | Mod: RT

## 2017-02-18 PROCEDURE — 84100 ASSAY OF PHOSPHORUS: CPT | Performed by: INTERNAL MEDICINE

## 2017-02-18 PROCEDURE — 87329 GIARDIA AG IA: CPT | Performed by: INTERNAL MEDICINE

## 2017-02-18 PROCEDURE — 99233 SBSQ HOSP IP/OBS HIGH 50: CPT | Mod: GC | Performed by: INTERNAL MEDICINE

## 2017-02-18 PROCEDURE — 36415 COLL VENOUS BLD VENIPUNCTURE: CPT | Performed by: INTERNAL MEDICINE

## 2017-02-18 PROCEDURE — 25000132 ZZH RX MED GY IP 250 OP 250 PS 637: Performed by: STUDENT IN AN ORGANIZED HEALTH CARE EDUCATION/TRAINING PROGRAM

## 2017-02-18 PROCEDURE — 00000146 ZZHCL STATISTIC GLUCOSE BY METER IP

## 2017-02-18 PROCEDURE — 25000132 ZZH RX MED GY IP 250 OP 250 PS 637: Performed by: INTERNAL MEDICINE

## 2017-02-18 PROCEDURE — 25000128 H RX IP 250 OP 636: Performed by: INTERNAL MEDICINE

## 2017-02-18 PROCEDURE — 87209 SMEAR COMPLEX STAIN: CPT | Performed by: INTERNAL MEDICINE

## 2017-02-18 PROCEDURE — 80048 BASIC METABOLIC PNL TOTAL CA: CPT | Performed by: INTERNAL MEDICINE

## 2017-02-18 PROCEDURE — 12000001 ZZH R&B MED SURG/OB UMMC

## 2017-02-18 PROCEDURE — 87177 OVA AND PARASITES SMEARS: CPT | Performed by: INTERNAL MEDICINE

## 2017-02-18 PROCEDURE — 85025 COMPLETE CBC W/AUTO DIFF WBC: CPT | Performed by: INTERNAL MEDICINE

## 2017-02-18 PROCEDURE — 87040 BLOOD CULTURE FOR BACTERIA: CPT | Performed by: INTERNAL MEDICINE

## 2017-02-18 PROCEDURE — 87506 IADNA-DNA/RNA PROBE TQ 6-11: CPT | Performed by: INTERNAL MEDICINE

## 2017-02-18 PROCEDURE — 25000125 ZZHC RX 250: Performed by: INTERNAL MEDICINE

## 2017-02-18 PROCEDURE — 83735 ASSAY OF MAGNESIUM: CPT | Performed by: INTERNAL MEDICINE

## 2017-02-18 PROCEDURE — 87493 C DIFF AMPLIFIED PROBE: CPT | Performed by: INTERNAL MEDICINE

## 2017-02-18 RX ORDER — OXYCODONE HYDROCHLORIDE 5 MG/1
5 TABLET ORAL EVERY 4 HOURS PRN
Status: DISCONTINUED | OUTPATIENT
Start: 2017-02-18 | End: 2017-02-20

## 2017-02-18 RX ORDER — NAPROXEN 250 MG/1
250 TABLET ORAL 2 TIMES DAILY WITH MEALS
Status: DISCONTINUED | OUTPATIENT
Start: 2017-02-18 | End: 2017-02-18

## 2017-02-18 RX ORDER — OXYCODONE HYDROCHLORIDE 5 MG/1
5 TABLET ORAL ONCE
Status: COMPLETED | OUTPATIENT
Start: 2017-02-18 | End: 2017-02-18

## 2017-02-18 RX ADMIN — NORTRIPTYLINE HYDROCHLORIDE 40 MG: 10 CAPSULE ORAL at 21:59

## 2017-02-18 RX ADMIN — OXYCODONE HYDROCHLORIDE 5 MG: 5 TABLET ORAL at 20:03

## 2017-02-18 RX ADMIN — Medication 7.5 MG: at 21:59

## 2017-02-18 RX ADMIN — OXYCODONE HYDROCHLORIDE 5 MG: 5 TABLET ORAL at 15:23

## 2017-02-18 RX ADMIN — DULOXETINE 60 MG: 60 CAPSULE, DELAYED RELEASE ORAL at 09:23

## 2017-02-18 RX ADMIN — POLYETHYLENE GLYCOL 3350, SODIUM SULFATE ANHYDROUS, SODIUM BICARBONATE, SODIUM CHLORIDE, POTASSIUM CHLORIDE 4000 ML: 236; 22.74; 6.74; 5.86; 2.97 POWDER, FOR SOLUTION ORAL at 20:04

## 2017-02-18 RX ADMIN — OXYCODONE HYDROCHLORIDE 5 MG: 5 TABLET ORAL at 09:24

## 2017-02-18 RX ADMIN — OXYCODONE HYDROCHLORIDE 5 MG: 5 TABLET ORAL at 04:50

## 2017-02-18 RX ADMIN — MEROPENEM 1 G: 1 INJECTION, POWDER, FOR SOLUTION INTRAVENOUS at 09:24

## 2017-02-18 RX ADMIN — MEROPENEM 1 G: 1 INJECTION, POWDER, FOR SOLUTION INTRAVENOUS at 23:56

## 2017-02-18 RX ADMIN — ACETAMINOPHEN 650 MG: 325 TABLET, FILM COATED ORAL at 09:24

## 2017-02-18 RX ADMIN — CLONIDINE HYDROCHLORIDE 0.4 MG: 0.2 TABLET ORAL at 20:03

## 2017-02-18 RX ADMIN — OXYCODONE HYDROCHLORIDE 5 MG: 5 TABLET ORAL at 23:56

## 2017-02-18 RX ADMIN — VANCOMYCIN HYDROCHLORIDE 1500 MG: 10 INJECTION, POWDER, LYOPHILIZED, FOR SOLUTION INTRAVENOUS at 02:48

## 2017-02-18 RX ADMIN — MEROPENEM 1 G: 1 INJECTION, POWDER, FOR SOLUTION INTRAVENOUS at 01:07

## 2017-02-18 RX ADMIN — LIDOCAINE 1 PATCH: 50 PATCH TOPICAL at 17:05

## 2017-02-18 RX ADMIN — OXYCODONE HYDROCHLORIDE 5 MG: 5 TABLET ORAL at 11:20

## 2017-02-18 RX ADMIN — MEROPENEM 1 G: 1 INJECTION, POWDER, FOR SOLUTION INTRAVENOUS at 18:12

## 2017-02-18 RX ADMIN — SODIUM CHLORIDE: 9 INJECTION, SOLUTION INTRAVENOUS at 20:07

## 2017-02-18 NOTE — PLAN OF CARE
"Problem: Goal Outcome Summary  Goal: Goal Outcome Summary  Outcome: No Change  Patient continues to experience abdominal pain, partially relieved by oxycodone. Frequency increased to every 4 hours. Patient remains on clear liquid diet wiith TF off. Patient told this RN that she would be having a colonoscopy tomorrow and that there \"would be no point\" to starting TF today. G-J clamped. US of Right upper arm done. Area slightly pink and slightly warmer than rest of arm. Patient states it was \"much worse yesterday\". Vancomycin DC'd. Continue analgesics as ordered.      "

## 2017-02-18 NOTE — PLAN OF CARE
"Problem: Goal Outcome Summary  Goal: Goal Outcome Summary  Outcome: No Change  Pt is A/Ox4. Pt has made complaints of pain and has received oxycodone x2 this shift. Pt stated to writer that oxycodone was \"helping\" with pain relief. Pt made complaints of pain to right arm,  Writer noted redness, and did outline area with marker, site was also warm to touch, text page was sent to on call at [phone], return call was made and writer updated provider, provider saw pt and ordered ultrasound. Pt had IV that was removed yesterday evening. IV was removed by writer last night due to pain with infusion of fluids, no redness or pain was noted by writer after removal.Pt had no stools this shift so stool sample was not collected, will update on coming staff. Sabrina did communicate with pt the importance of collecting all stools for nursing to chart and sample to be taken. Pt VSS this shift. Continue to monitor.      "

## 2017-02-18 NOTE — PHARMACY-VANCOMYCIN DOSING SERVICE
Pharmacy Vancomycin Initial Note  Date of Service 2017  Patient's  1980  36 year old, female    Indication: Skin and Soft Tissue Infection    Current estimated CrCl = Estimated Creatinine Clearance: 97.2 mL/min (based on Cr of 0.89).    Creatinine for last 3 days  2/15/2017:  2:45 PM Creatinine 0.94 mg/dL  2017:  7:07 AM Creatinine 0.96 mg/dL  2017:  6:46 AM Creatinine 0.89 mg/dL    Recent Vancomycin Level(s) for last 3 days  No results found for requested labs within last 72 hours.      Vancomycin IV Administrations (past 72 hours)      No vancomycin orders with administrations in past 72 hours.                Nephrotoxins and other renal medications (Future)    Start     Dose/Rate Route Frequency Ordered Stop    17  vancomycin (VANCOCIN) 1,500 mg in NaCl 0.9 % 250 mL intermittent infusion      1,500 mg Intravenous EVERY 12 HOURS 17 1950            Contrast Orders - past 72 hours     None                Plan:  1.  Start vancomycin  1500 mg IV q12hrs.   2.  Goal Trough Level: 10-15 mg/L   3.  Pharmacy will check trough levels as appropriate in 1-3 Days.    4. Serum creatinine levels will be ordered a minimum of twice weekly.    5. [place] method utilized to dose vancomycin therapy: Method 2    Thanks for the consult.  Trever Fabian, Pharm.D, BCPS            .

## 2017-02-18 NOTE — PLAN OF CARE
Problem: Goal Outcome Summary  Goal: Goal Outcome Summary  Outcome: No Change  VSS. A&O. NS @ 125 ml/hr. PRN oxy x1. Continues on IV antibiotics. Slept on & off throughout night. Continue to monitor.

## 2017-02-18 NOTE — PROGRESS NOTES
INTERNAL MEDICINE PROGRESS NOTE    Doreen Peralta ([id number]) admitted on 2/15/2017  02/18/2017    Assessment & Plan:  35 yo F with hx of gastroparesis that spiraled into multiple bowel surgeries now with G-J tube admitted with recurrent chills, abdominal pain, malaise, and anorexia/dehydration after recent hospitalization and treatment for Klebsiella oxytoca.     Now positive with Acinetobacter bacteremia and working up for source      # subjective fevers, chills, RUQ pain likely 2/2 klebsiella infection with un-identified source  # chronic diarrhea, related to tube feeds v. Klebsiella  # elevated inflammatory markers    Recurrent klebsiella infections without identified source, particularly this occurrence on which patient does not have a portocath r/o any lines beside G-J. Patient has had a TTE w/o mass/vege (1/20/17), negative Right heart cath culture 9/27/2016, EF 55-60% as well as multiple CTs without evidence of nidus of infection. Mild elevations of alkP.        Notably, Klebsiella oxytoca is a known cause ofdiarrhea. (Miguel et al 2013)     ID history:  - E. Coli 5/3/16  - acinetobacter bacteremia 7/2016  - candida parapsilosis fungemia 7/2016  - histoplasma Ab+ 1/2017  - histoplasma urinary antigen negative 1/2017  - Klebsiella oxytoca 6/21/16 from portocath, 7/7/16 from R. Arm, 2/9/17 from [facility] (most recent)    Now repeat culture with gram negative rods- acinetobacter ; non lactose fermenting bacteria     Currently we are repeating her cultures -blood c/s , urine c/s , UA  US with no obvious source  As per ID recs started on meropenem IV  Also GI consult for possible colonoscopy to look for any microperforation or source of infection-colonoscopy on sunday  Also stool studies sent today - cdiff, enteric panel and stool ova and parasites    -Plan for colonoscopy tm  -hold xeralto  -Continue with meropenem 1gm IV every 8hrs   - IV fluids   -continue to trend inflammatory markers    Source of  infection is still unknown; pt does have presence of scab and puncture wound on her  upper extremity   dont know if there was any form of factitious source  So far no known source and pt does not have any PICC line or port line to be cause of infection      #) Superficial thrombophlebitis:  #)Partially occlusive thrombus in basilic vein    - supportive management with cold compression, arm elevation, NSAIDS and diclofenac gel  -no indication of antibiotics           # dehydration, loss of appetite  # abdominal pain, waxing/waning chronic pain  Patient reports receiving 4 mg dilaudid PO q3h at [facility]. There, the Pain Service was consulted given the patient has had numerous prescriptions and intermittent courses of opioids related to her many ED and hospital visits. Multiple providers have expressed concern that patient may have factitious/Munchausen syndrome. Will pursue colonscopy to r/o possible source of infectious, but if negative, then will involve psychiatry. If negative colonoscopy, consider removal of GJ tube. Please refer to addended medicine progress note on 2/17/17.     She was discharged with 5 day course (2/9 - 2/14) of She rec'd 3 mg IV dilaudid in the ED without change in mental status and only mild-mod reduction in pain     PLAN:  > tylenol 650 mg prn  > will continue with oxycodone 5mg q4Hrs PRN; have specifically told her that we will attempt to wean before discharge  > zofran prn  > pain management consult- lidocaine patch, along with adjunctive management with ice and heat pack, TENS unit if available was recommended by pain consult team; will out referral to follow with pain clinic as an outpatient  > nutrition consult to restart tube feeds  > normal saline maintenance fluid at 100 cc/hr       # stable conditions  Bilateral UE DVTs - continue home xarelto; will hold dose for overnight for colonoscopy tomorrow   Asthma - albuterol prn   Anxiety - monitor     FEN: 100 cc/hr, replete prn, regular  diet+/-tube feeds as toelrated  Prophylaxis: DVT: continue home xarelto GI: none at this time Lung: incentive spirometer  Code Status: FULL CODE  Medical Decision Maker: mother, Aby gordillo, [phone]     Disposition: pending futher evaluation of infectious source  Patient was seen and discussed with  who agrees with above assessment and plan.    ==================================================================    24 hr events- found to have right upper extremity swelling and redness, RUQ US obtained, started on vanc due to concern for cellulitis, and have discontinued this morning as US shows superficial thrombophlebitis, no evidence of cellulitis    Subjective:  Complaining of right upper extremity pain and swelling  Objective:  Most recent vital signs:  /76 (BP Location: Right leg)  Pulse 90  Temp 99.1  F (37.3  C) (Oral)  Resp 18  Wt 85.6 kg (188 lb 12.8 oz)  LMP 01/19/2017  SpO2 98%  BMI 30.47 kg/m2  Temp:  [98.3  F (36.8  C)-99.1  F (37.3  C)] 99.1  F (37.3  C)  Pulse:  [78-90] 90  Heart Rate:  [] 83  Resp:  [16-18] 18  BP: (117-152)/(62-81) 152/76  SpO2:  [97 %-99 %] 98 %  Wt Readings from Last 2 Encounters:   02/16/17 85.6 kg (188 lb 12.8 oz)   02/06/17 81.6 kg (180 lb)       Intake/Output Summary (Last 24 hours) at 02/16/17 0812  Last data filed at 02/16/17 0200   Gross per 24 hour   Intake              275 ml   Output             1000 ml   Net             -725 ml       Physical exam:  General: no apparent distress;  HEENT: No Scleral icterus. NCAT, MMM. PERRL, EOMI. No LAD  Cardiac: Normal rate, regular rhythm. No m/r/g. Normal S1, S2.  Pulm: CTAB, no wheezes, or crackles. Normal respiratory effort  Abd: Soft, non distended, TTP in RUQ and right estrella-umbilical area without rebound. G-J tube located epigastric without erythema, irritation, or discharge. No hepatosplenomegaly.  Skin: No jaundice, No rash. Small erosion under lip surrounded by old scab (patient reports picking at  it). Eroded papules RUE. Area of improving erythema RUE.  Extremities: No LE edema, no LE lesions  Joints: No inflammation noted on joints  Neuro: A&Ox3, no focal deficits     Labs (Past three days):  CMP    Recent Labs  Lab 02/18/17  0719 02/17/17  0646 02/16/17  1422 02/16/17  0707 02/15/17  1445    139  --  141 136   POTASSIUM 4.1 3.6 4.2 3.3* 3.5   CHLORIDE 107 107  --  110* 103   CO2 22 22  --  23 25   ANIONGAP 8 9  --  9 8   GLC 81 98  --  103* 79   BUN 4* 3*  --  7 11   CR 0.76 0.89  --  0.96 0.94   GFRESTIMATED 87 71  --  66 68   GFRESTBLACK >90African American GFR Calc 86  --  80 82   TARA 8.5 8.1*  --  8.2* 8.9   MAG 2.4* 2.2 2.3  --   --    PHOS 2.6 2.8 3.4  --   --    PROTTOTAL  --   --   --   --  8.9*   ALBUMIN  --   --   --   --  3.5   BILITOTAL  --   --   --   --  0.8   ALKPHOS  --   --   --   --  164*   AST  --   --   --   --  20   ALT  --   --   --   --  31     CBC    Recent Labs  Lab 02/18/17  0719 02/17/17  0646 02/16/17  0707 02/15/17  1445   WBC 7.5 5.9 6.1 17.0*   RBC 3.49* 3.38* 3.50* 4.07   HGB 8.4* 8.2* 8.4* 9.9*   HCT 27.2* 26.8* 27.5* 31.7*   MCV 78 79 79 78   MCH 24.1* 24.3* 24.0* 24.3*   MCHC 30.9* 30.6* 30.5* 31.2*   RDW 16.9* 17.1* 17.0* 17.2*   * 442 465* 639*     INR    Recent Labs  Lab 02/16/17  0707   INR 1.11     Arterial Blood GasNo lab results found in last 7 days.    Microbiology:  Culture Micro (Abnormal) 02/15/2017  4:41       Cultured on the 2nd day of incubation: Gram negative rods   Critical Value/Significant Value, preliminary result only, called to and read    back by Shazia Encinas RN @1257 on 2/17/17. .   (Note)   POSITIVE for ACINETOBACTER SPECIES by [employer] multiplex nucleic acid   test. Final identification and antimicrobial susceptibility testing   will be verified by standard methods.          Imaging/procedure results:  US pelvic complete:  IMPRESSION: Negative pelvic ultrasound. No sonographic findings to  explain bacteremia.    PABLOE  U/S:  Partially occlusive thrombus in the basilic vein in the  mid upper arm. The basilic vein is a superficial vein.

## 2017-02-18 NOTE — PROGRESS NOTES
Called about worsening pain and redness of right upper extremity at site of previous extravasated IV.     Right bicep area was warm, swollen, painful to touch, and red. I was able to palpate a cord in the bicep area that was concerning for a thrombus.     Cellulitis vs Superficial Thrombophlebitis: Right upper extremity ultrasound was ordered stat and patient was initiated on vancomycin for possible cellulitis

## 2017-02-19 LAB
ANION GAP SERPL CALCULATED.3IONS-SCNC: 8 MMOL/L (ref 3–14)
BASOPHILS # BLD AUTO: 0 10E9/L (ref 0–0.2)
BASOPHILS NFR BLD AUTO: 0.1 %
BUN SERPL-MCNC: 4 MG/DL (ref 7–30)
CALCIUM SERPL-MCNC: 8.4 MG/DL (ref 8.5–10.1)
CHLORIDE SERPL-SCNC: 110 MMOL/L (ref 94–109)
CO2 SERPL-SCNC: 22 MMOL/L (ref 20–32)
COLONOSCOPY: NORMAL
CREAT SERPL-MCNC: 0.76 MG/DL (ref 0.52–1.04)
DIFFERENTIAL METHOD BLD: ABNORMAL
EOSINOPHIL # BLD AUTO: 0.1 10E9/L (ref 0–0.7)
EOSINOPHIL NFR BLD AUTO: 1.3 %
ERYTHROCYTE [DISTWIDTH] IN BLOOD BY AUTOMATED COUNT: 16.8 % (ref 10–15)
GFR SERPL CREATININE-BSD FRML MDRD: 86 ML/MIN/1.7M2
GLUCOSE BLDC GLUCOMTR-MCNC: 112 MG/DL (ref 70–99)
GLUCOSE BLDC GLUCOMTR-MCNC: 90 MG/DL (ref 70–99)
GLUCOSE SERPL-MCNC: 86 MG/DL (ref 70–99)
HCT VFR BLD AUTO: 27 % (ref 35–47)
HGB BLD-MCNC: 8.2 G/DL (ref 11.7–15.7)
IMM GRANULOCYTES # BLD: 0 10E9/L (ref 0–0.4)
IMM GRANULOCYTES NFR BLD: 0.1 %
LYMPHOCYTES # BLD AUTO: 1.8 10E9/L (ref 0.8–5.3)
LYMPHOCYTES NFR BLD AUTO: 22.6 %
MCH RBC QN AUTO: 23.7 PG (ref 26.5–33)
MCHC RBC AUTO-ENTMCNC: 30.4 G/DL (ref 31.5–36.5)
MCV RBC AUTO: 78 FL (ref 78–100)
MONOCYTES # BLD AUTO: 0.4 10E9/L (ref 0–1.3)
MONOCYTES NFR BLD AUTO: 5.3 %
NEUTROPHILS # BLD AUTO: 5.7 10E9/L (ref 1.6–8.3)
NEUTROPHILS NFR BLD AUTO: 70.6 %
NRBC # BLD AUTO: 0 10*3/UL
NRBC BLD AUTO-RTO: 0 /100
PLATELET # BLD AUTO: 503 10E9/L (ref 150–450)
POTASSIUM SERPL-SCNC: 4.1 MMOL/L (ref 3.4–5.3)
RBC # BLD AUTO: 3.46 10E12/L (ref 3.8–5.2)
SODIUM SERPL-SCNC: 140 MMOL/L (ref 133–144)
WBC # BLD AUTO: 8 10E9/L (ref 4–11)

## 2017-02-19 PROCEDURE — 36415 COLL VENOUS BLD VENIPUNCTURE: CPT | Performed by: INTERNAL MEDICINE

## 2017-02-19 PROCEDURE — 12000001 ZZH R&B MED SURG/OB UMMC

## 2017-02-19 PROCEDURE — 25000132 ZZH RX MED GY IP 250 OP 250 PS 637: Performed by: INTERNAL MEDICINE

## 2017-02-19 PROCEDURE — 40000141 ZZH STATISTIC PERIPHERAL IV START W/O US GUIDANCE

## 2017-02-19 PROCEDURE — 25000132 ZZH RX MED GY IP 250 OP 250 PS 637: Performed by: STUDENT IN AN ORGANIZED HEALTH CARE EDUCATION/TRAINING PROGRAM

## 2017-02-19 PROCEDURE — 40000257 ZZH STATISTIC CONSULT NO CHARGE VASC ACCESS

## 2017-02-19 PROCEDURE — 99233 SBSQ HOSP IP/OBS HIGH 50: CPT | Mod: GC | Performed by: INTERNAL MEDICINE

## 2017-02-19 PROCEDURE — 45378 DIAGNOSTIC COLONOSCOPY: CPT | Performed by: INTERNAL MEDICINE

## 2017-02-19 PROCEDURE — 87040 BLOOD CULTURE FOR BACTERIA: CPT | Performed by: INTERNAL MEDICINE

## 2017-02-19 PROCEDURE — 0DJD8ZZ INSPECTION OF LOWER INTESTINAL TRACT, VIA NATURAL OR ARTIFICIAL OPENING ENDOSCOPIC: ICD-10-PCS | Performed by: INTERNAL MEDICINE

## 2017-02-19 PROCEDURE — 25000128 H RX IP 250 OP 636: Performed by: INTERNAL MEDICINE

## 2017-02-19 PROCEDURE — 80048 BASIC METABOLIC PNL TOTAL CA: CPT | Performed by: INTERNAL MEDICINE

## 2017-02-19 PROCEDURE — 25000125 ZZHC RX 250: Performed by: INTERNAL MEDICINE

## 2017-02-19 PROCEDURE — 85025 COMPLETE CBC W/AUTO DIFF WBC: CPT | Performed by: INTERNAL MEDICINE

## 2017-02-19 PROCEDURE — 00000146 ZZHCL STATISTIC GLUCOSE BY METER IP

## 2017-02-19 PROCEDURE — 40000556 ZZH STATISTIC PERIPHERAL IV START W US GUIDANCE

## 2017-02-19 PROCEDURE — 27210995 ZZH RX 272

## 2017-02-19 RX ORDER — DIPHENHYDRAMINE HYDROCHLORIDE 50 MG/ML
INJECTION INTRAMUSCULAR; INTRAVENOUS PRN
Status: DISCONTINUED | OUTPATIENT
Start: 2017-02-19 | End: 2017-02-19 | Stop reason: HOSPADM

## 2017-02-19 RX ORDER — HYDROMORPHONE HCL/0.9% NACL/PF 0.2MG/0.2
.2-.4 SYRINGE (ML) INTRAVENOUS
Status: DISCONTINUED | OUTPATIENT
Start: 2017-02-19 | End: 2017-02-19 | Stop reason: HOSPADM

## 2017-02-19 RX ADMIN — SODIUM CHLORIDE: 9 INJECTION, SOLUTION INTRAVENOUS at 21:48

## 2017-02-19 RX ADMIN — OXYCODONE HYDROCHLORIDE 5 MG: 5 TABLET ORAL at 12:47

## 2017-02-19 RX ADMIN — LIDOCAINE 1 PATCH: 50 PATCH TOPICAL at 13:13

## 2017-02-19 RX ADMIN — MEROPENEM 1 G: 1 INJECTION, POWDER, FOR SOLUTION INTRAVENOUS at 16:57

## 2017-02-19 RX ADMIN — OXYCODONE HYDROCHLORIDE 5 MG: 5 TABLET ORAL at 04:06

## 2017-02-19 RX ADMIN — DULOXETINE 60 MG: 60 CAPSULE, DELAYED RELEASE ORAL at 09:41

## 2017-02-19 RX ADMIN — MEROPENEM 1 G: 1 INJECTION, POWDER, FOR SOLUTION INTRAVENOUS at 09:41

## 2017-02-19 RX ADMIN — SODIUM CHLORIDE: 9 INJECTION, SOLUTION INTRAVENOUS at 04:13

## 2017-02-19 RX ADMIN — MEROPENEM 1 G: 1 INJECTION, POWDER, FOR SOLUTION INTRAVENOUS at 23:58

## 2017-02-19 RX ADMIN — NORTRIPTYLINE HYDROCHLORIDE 40 MG: 10 CAPSULE ORAL at 21:47

## 2017-02-19 RX ADMIN — RIVAROXABAN 20 MG: 20 TABLET, FILM COATED ORAL at 16:48

## 2017-02-19 RX ADMIN — OXYCODONE HYDROCHLORIDE 5 MG: 5 TABLET ORAL at 16:48

## 2017-02-19 RX ADMIN — Medication 7.5 MG: at 21:47

## 2017-02-19 RX ADMIN — OXYCODONE HYDROCHLORIDE 5 MG: 5 TABLET ORAL at 21:46

## 2017-02-19 RX ADMIN — OXYCODONE HYDROCHLORIDE 5 MG: 5 TABLET ORAL at 08:16

## 2017-02-19 RX ADMIN — CLONIDINE HYDROCHLORIDE 0.4 MG: 0.2 TABLET ORAL at 19:15

## 2017-02-19 RX ADMIN — Medication 1 ML: at 19:07

## 2017-02-19 NOTE — PROCEDURES
[facility] Brief Operative Note    Procedure:  Colonoscopy   Post-operative diagnosis:  Incomplete colonoscopy due to patient intolerance   Staff Physician:  Dr. Randell Muller   Fellow/Assistant(s):  NA    Specimens:  Please see final procedure note for further details.   Findings:  Normal   Complications:  None.   Condition:  Stable   Recommendations  Diet:  Clear liquid diet  PPI:  N/A  Anti-coagulants/platelets:  N/A  Octreotide:  N/A  Discharge Planning:   Will plan to repeat colonoscopy under [facility] on Tuesday 2/21/17

## 2017-02-19 NOTE — PLAN OF CARE
Problem: Goal Outcome Summary  Goal: Goal Outcome Summary  Outcome: No Change  VSS. A&Ox4. NPO. NS @ 125 ml/hr. Reporting abdominal pain; PRN oxy x2. Working on GoLYTELY throughout night. Drank approx half. Stools still not clear. Writer provided education & encouragement to patient about importance of drinking all of it. Patient reported that it was not comfortable to push too much thorough G tube at a time. Is currently still working on medication. Continue to monitor.

## 2017-02-19 NOTE — PLAN OF CARE
Problem: Goal Outcome Summary  Goal: Goal Outcome Summary  Outcome: No Change  VSS on RA.  AOx4.  Up ad berny and independent.  New PIV placed.  IVF running per order.  Oxycodone administered X2 for ABD and R arm pain.  Both cold and warm packs placed on right upper arm throughout shift- warm packs more effective for pain reduction per pt statement.  Positive blood culture for gram neg rods in R arm.  TF held per pt, for colonoscopy in AM.  NPO at midnight.  Golytely given- pt slowly pushing via G tube.  Pt able to make needs known.  Continue with POC and notify provider with any changes or concerns.

## 2017-02-19 NOTE — PROGRESS NOTES
GI Progress note:     Colonoscopy attempted today, but unable to pass scope beyond rectosigmoid junction due to patient intolerance.  Plan to repeat with [facility].      I note that patient has a history of gastroparesis.  This appears to be based on OSH gastric emptying study with t-1/2 of 170 minutes (not in our system) and a smart pill study (2012) with gastric transit time of 4 hours and 48 minutes.  It appears at this time that she was on narcotics and also having constipation.  A repeat gastric emptying study at the [facility] was normal: 5/26/2011 - t-1/2 of 55 minutes.  A gastric emptying study in 2009 ([facility]) was also normal.  Additionally a 24 hour duodenojejunal manometry showed normal fasting activity and no major impairment to motor activity on feeding.     She did have evidence of chronic constipation (which can secondarily delay gastric and small bowel motility).  Sitz marker study in 2011 showed retention of 23 markers on day 5.     It seems her predominant GI motility issues was chronic constipation (although unclear to me if this was primary or secondary to narcotics).  She underwent a loop ileostomy trial to determine if her symptoms improved. She developed dehydration and high ileostomy output.  Ultimately underwent subtotal colectomy on 10/24/12 and reversal of ileostomy. Following that she had good bowel function although still required chronic narcotics.      At this point I would hesitate to give her any diagnosis of abnormal GI motility due to her ongoing medications that affect GI motility.  Some of her earlier abnormalities were likely related to chronic constipation.  I am not convinced that there is a current reason for her G or J tube.  If the colonoscopy is normal I would advocate a full trial of PO intake with the of removing G and J tubes as they are certainly a potential source of infection (although would suspect something on the CT scan - abscess, fat stranding etc).       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  I recommend minimal GI work up for chronic symptoms.  We will repeat colonoscopy under [facility].  However chronic GI symptoms including abdominal pain are best managed through psychiatry and pain clinic.  Strongly recommend against narcotics for chronic pain in this setting.      Will plan for colonoscopy Tuesday 2/21/17 under [facility].     Randell Muller  GI Staff  [id number]

## 2017-02-19 NOTE — PROGRESS NOTES
INTERNAL MEDICINE PROGRESS NOTE    Doreen Peralta ([id number]) admitted on 2/15/2017  02/19/2017    Assessment & Plan:  35 yo F with hx of gastroparesis that spiraled into multiple bowel surgeries now with G-J tube admitted with recurrent chills, abdominal pain, malaise, and anorexia/dehydration after recent hospitalization and treatment for Klebsiella oxytoca.      Now positive with Acinetobacter bacteremia and working up for source      # subjective fevers, chills, RUQ pain likely 2/2 klebsiella infection with un-identified source  # chronic diarrhea, related to tube feeds v. Klebsiella  # elevated inflammatory markers    Recurrent klebsiella infections without identified source, particularly this occurrence on which patient does not have a portocath r/o any lines beside G-J. Patient has had a TTE w/o mass/vege (1/20/17), negative Right heart cath culture 9/27/2016, EF 55-60% as well as multiple CTs without evidence of nidus of infection. Mild elevations of alkP.      ID history:  - E. Coli 5/3/16  - acinetobacter bacteremia 7/2016  - candida parapsilosis fungemia 7/2016  - histoplasma Ab+ 1/2017  - histoplasma urinary antigen negative 1/2017  - Klebsiella oxytoca 6/21/16 from portocath, 7/7/16 from R. Arm, 2/9/17 from [facility] (most recent)    =Now repeat culture with gram negative rods- acinetobacter ; non lactose fermenting bacteria     Currently we are repeating her cultures -blood c/s , urine c/s , UA  US with no obvious source  As per ID recs started on meropenem IV -meropenem IV started on 2/17 - till now  -failed colonoscopy today as patient was not able to tolerate this and GI will re  attempt on tuesday  -Continue with meropenem 1gm IV every 8hrs  Started on meropenem 2/17   - IV fluids     Source of infection is still unknown; pt does have presence of scab and puncture wound on her  upper extremity   dont know if there was any form of factitious source  So far no known source and pt does not have any  PICC line or port line to be cause of infection  Colonoscopy under [facility] on tuesday    -will continue to follow blood culture; if persistent positive then consider TTE        #) Superficial thrombophlebitis:  #)Partially occlusive thrombus in basilic vein    - supportive management with cold compression, arm elevation, NSAIDS and diclofenac gel  -no indication of antibiotics           # dehydration, loss of appetite  # abdominal pain, waxing/waning chronic pain  Patient reports receiving 4 mg dilaudid PO q3h at [facility]. There, the Pain Service was consulted given the patient has had numerous prescriptions and intermittent courses of opioids related to her many ED and hospital visits.         Multiple providers have expressed concern that patient may have factitious/Munchausen syndrome. Will pursue colonscopy to r/o possible source of infectious, but if negative, then will involve psychiatry. If negative colonoscopy, consider removal of GJ tube. Please refer to addended medicine progress note on 2/17/17.     She was discharged with 5 day course (2/9 - 2/14) of She rec'd 3 mg IV dilaudid in the ED without change in mental status and only mild-mod reduction in pain     PLAN:  > tylenol 650 mg prn  > will continue with oxycodone 5mg q6Hrs PRN- deescalated today ; have specifically told her that we will attempt to wean before discharge  > zofran prn  > pain management consult- lidocaine patch, along with adjunctive management with ice and heat pack, TENS unit if available was recommended by pain consult team; will out referral to follow with pain clinic as an outpatient  > nutrition consult to restart tube feeds  > normal saline maintenance fluid at 100 cc/hr       # stable conditions  Bilateral UE DVTs - continue home xarelto  Asthma - albuterol prn   Anxiety - monitor     FEN: 100 cc/hr, regular diet+/-tube feeds as tolerated  Prophylaxis: DVT: continue home xarelto GI: none at this time Lung: incentive spirometer  Code  Status: FULL CODE  Medical Decision Maker: mother, Aby gordillo, [phone]     Disposition: pending futher evaluation of infectious source  Patient was seen and discussed with  who agrees with above assessment and plan.    ==================================================================      Subjective:  Feeling better today; wanted to make sure that her colonoscopy in under anesthesia   Objective:  Most recent vital signs:  /86 (BP Location: Right leg)  Pulse 70  Temp 98.6  F (37  C) (Oral)  Resp 18  Wt 84.7 kg (186 lb 11.2 oz)  LMP 01/19/2017  SpO2 100%  BMI 30.13 kg/m2  Temp:  [97.8  F (36.6  C)-99.4  F (37.4  C)] 98.6  F (37  C)  Pulse:  [70-90] 70  Heart Rate:  [83] 83  Resp:  [18] 18  BP: (130-152)/(68-86) 151/86  SpO2:  [98 %-100 %] 100 %  Wt Readings from Last 2 Encounters:   02/18/17 84.7 kg (186 lb 11.2 oz)   02/06/17 81.6 kg (180 lb)       Intake/Output Summary (Last 24 hours) at 02/16/17 0812  Last data filed at 02/16/17 0200   Gross per 24 hour   Intake              275 ml   Output             1000 ml   Net             -725 ml       Physical exam:  General: no apparent distress;  HEENT: No Scleral icterus. NCAT, MMM. PERRL, EOMI. No LAD  Cardiac: Normal rate, regular rhythm. No m/r/g. Normal S1, S2.  Pulm: CTAB, no wheezes, or crackles. Normal respiratory effort  Abd: Soft, non distended, TTP in RUQ and right estrella-umbilical area without rebound. G-J tube located epigastric without erythema, irritation, or discharge. No hepatosplenomegaly.  Skin: No jaundice, No rash. Small erosion under lip surrounded by old scab (patient reports picking at it). Eroded papules RUE. Area of improving erythema RUE.  Extremities: No LE edema, no LE lesions  Joints: No inflammation noted on joints  Neuro: A&Ox3, no focal deficits     Labs (Past three days):  CMP    Recent Labs  Lab 02/19/17  0728 02/18/17  0719 02/17/17  0646 02/16/17  1422 02/16/17  0707 02/15/17  1445    138 139  --  141 136    POTASSIUM 4.1 4.1 3.6 4.2 3.3* 3.5   CHLORIDE 110* 107 107  --  110* 103   CO2 22 22 22  --  23 25   ANIONGAP 8 8 9  --  9 8   GLC 86 81 98  --  103* 79   BUN 4* 4* 3*  --  7 11   CR 0.76 0.76 0.89  --  0.96 0.94   GFRESTIMATED 86 87 71  --  66 68   GFRESTBLACK >90African American GFR Calc >90African American GFR Calc 86  --  80 82   TARA 8.4* 8.5 8.1*  --  8.2* 8.9   MAG  --  2.4* 2.2 2.3  --   --    PHOS  --  2.6 2.8 3.4  --   --    PROTTOTAL  --   --   --   --   --  8.9*   ALBUMIN  --   --   --   --   --  3.5   BILITOTAL  --   --   --   --   --  0.8   ALKPHOS  --   --   --   --   --  164*   AST  --   --   --   --   --  20   ALT  --   --   --   --   --  31     CBC    Recent Labs  Lab 02/19/17  0728 02/18/17  0719 02/17/17  0646 02/16/17  0707   WBC 8.0 7.5 5.9 6.1   RBC 3.46* 3.49* 3.38* 3.50*   HGB 8.2* 8.4* 8.2* 8.4*   HCT 27.0* 27.2* 26.8* 27.5*   MCV 78 78 79 79   MCH 23.7* 24.1* 24.3* 24.0*   MCHC 30.4* 30.9* 30.6* 30.5*   RDW 16.8* 16.9* 17.1* 17.0*   * 479* 442 465*     INR    Recent Labs  Lab 02/16/17  0707   INR 1.11     Arterial Blood GasNo lab results found in last 7 days.    Microbiology:  Culture Micro (Abnormal) 02/15/2017  4:41       Cultured on the 2nd day of incubation: Gram negative rods   Critical Value/Significant Value, preliminary result only, called to and read    back by Shazia Encinas, RN @1257 on 2/17/17. .   (Note)   POSITIVE for ACINETOBACTER SPECIES by [employer] multiplex nucleic acid   test. Final identification and antimicrobial susceptibility testing   will be verified by standard methods.        Positive culture from gram negative rods        Imaging/procedure results:  US pelvic complete:  IMPRESSION: Negative pelvic ultrasound. No sonographic findings to  explain bacteremia.    RUE U/S:  Partially occlusive thrombus in the basilic vein in the  mid upper arm. The basilic vein is a superficial vein.

## 2017-02-20 LAB
ANION GAP SERPL CALCULATED.3IONS-SCNC: 7 MMOL/L (ref 3–14)
BUN SERPL-MCNC: 6 MG/DL (ref 7–30)
CALCIUM SERPL-MCNC: 8.6 MG/DL (ref 8.5–10.1)
CHLORIDE SERPL-SCNC: 110 MMOL/L (ref 94–109)
CO2 SERPL-SCNC: 22 MMOL/L (ref 20–32)
CREAT SERPL-MCNC: 0.74 MG/DL (ref 0.52–1.04)
ERYTHROCYTE [DISTWIDTH] IN BLOOD BY AUTOMATED COUNT: 16.9 % (ref 10–15)
G LAMBLIA AG STL QL IA: NORMAL
GFR SERPL CREATININE-BSD FRML MDRD: 90 ML/MIN/1.7M2
GLUCOSE SERPL-MCNC: 85 MG/DL (ref 70–99)
HCT VFR BLD AUTO: 27.8 % (ref 35–47)
HGB BLD-MCNC: 8.7 G/DL (ref 11.7–15.7)
LAB SCANNED RESULT: NORMAL
MCH RBC QN AUTO: 24.3 PG (ref 26.5–33)
MCHC RBC AUTO-ENTMCNC: 31.3 G/DL (ref 31.5–36.5)
MCV RBC AUTO: 78 FL (ref 78–100)
MICRO REPORT STATUS: NORMAL
MICRO REPORT STATUS: NORMAL
O+P STL MICRO: NORMAL
PLATELET # BLD AUTO: 455 10E9/L (ref 150–450)
POTASSIUM SERPL-SCNC: 4.4 MMOL/L (ref 3.4–5.3)
RBC # BLD AUTO: 3.58 10E12/L (ref 3.8–5.2)
SODIUM SERPL-SCNC: 139 MMOL/L (ref 133–144)
SPECIMEN SOURCE: NORMAL
SPECIMEN SOURCE: NORMAL
WBC # BLD AUTO: 5.8 10E9/L (ref 4–11)

## 2017-02-20 PROCEDURE — 12000001 ZZH R&B MED SURG/OB UMMC

## 2017-02-20 PROCEDURE — 25000132 ZZH RX MED GY IP 250 OP 250 PS 637: Performed by: INTERNAL MEDICINE

## 2017-02-20 PROCEDURE — 25000128 H RX IP 250 OP 636: Performed by: INTERNAL MEDICINE

## 2017-02-20 PROCEDURE — 80048 BASIC METABOLIC PNL TOTAL CA: CPT | Performed by: INTERNAL MEDICINE

## 2017-02-20 PROCEDURE — 25000125 ZZHC RX 250: Performed by: STUDENT IN AN ORGANIZED HEALTH CARE EDUCATION/TRAINING PROGRAM

## 2017-02-20 PROCEDURE — 99233 SBSQ HOSP IP/OBS HIGH 50: CPT | Performed by: INTERNAL MEDICINE

## 2017-02-20 PROCEDURE — 25000132 ZZH RX MED GY IP 250 OP 250 PS 637: Performed by: STUDENT IN AN ORGANIZED HEALTH CARE EDUCATION/TRAINING PROGRAM

## 2017-02-20 PROCEDURE — 87040 BLOOD CULTURE FOR BACTERIA: CPT | Performed by: INTERNAL MEDICINE

## 2017-02-20 PROCEDURE — 36415 COLL VENOUS BLD VENIPUNCTURE: CPT | Performed by: INTERNAL MEDICINE

## 2017-02-20 PROCEDURE — 85027 COMPLETE CBC AUTOMATED: CPT | Performed by: INTERNAL MEDICINE

## 2017-02-20 RX ORDER — OXYCODONE HYDROCHLORIDE 5 MG/1
5 TABLET ORAL EVERY 6 HOURS PRN
Status: DISCONTINUED | OUTPATIENT
Start: 2017-02-20 | End: 2017-02-21

## 2017-02-20 RX ORDER — OXYCODONE HYDROCHLORIDE 5 MG/1
5 TABLET ORAL EVERY 8 HOURS PRN
Status: DISCONTINUED | OUTPATIENT
Start: 2017-02-20 | End: 2017-02-20

## 2017-02-20 RX ADMIN — MEROPENEM 1 G: 1 INJECTION, POWDER, FOR SOLUTION INTRAVENOUS at 08:19

## 2017-02-20 RX ADMIN — CLONIDINE HYDROCHLORIDE 0.4 MG: 0.2 TABLET ORAL at 19:41

## 2017-02-20 RX ADMIN — OXYCODONE HYDROCHLORIDE 5 MG: 5 TABLET ORAL at 13:11

## 2017-02-20 RX ADMIN — MEROPENEM 1 G: 1 INJECTION, POWDER, FOR SOLUTION INTRAVENOUS at 16:44

## 2017-02-20 RX ADMIN — Medication 7.5 MG: at 21:16

## 2017-02-20 RX ADMIN — LIDOCAINE 1 PATCH: 50 PATCH TOPICAL at 12:24

## 2017-02-20 RX ADMIN — POLYETHYLENE GLYCOL 3350, SODIUM SULFATE ANHYDROUS, SODIUM BICARBONATE, SODIUM CHLORIDE, POTASSIUM CHLORIDE 2000 ML: 236; 22.74; 6.74; 5.86; 2.97 POWDER, FOR SOLUTION ORAL at 16:44

## 2017-02-20 RX ADMIN — OXYCODONE HYDROCHLORIDE 5 MG: 5 TABLET ORAL at 19:41

## 2017-02-20 RX ADMIN — NORTRIPTYLINE HYDROCHLORIDE 40 MG: 10 CAPSULE ORAL at 21:16

## 2017-02-20 RX ADMIN — SODIUM CHLORIDE: 9 INJECTION, SOLUTION INTRAVENOUS at 23:40

## 2017-02-20 RX ADMIN — OXYCODONE HYDROCHLORIDE 5 MG: 5 TABLET ORAL at 06:49

## 2017-02-20 RX ADMIN — DULOXETINE 60 MG: 60 CAPSULE, DELAYED RELEASE ORAL at 08:19

## 2017-02-20 RX ADMIN — ONDANSETRON 4 MG: 4 TABLET, ORALLY DISINTEGRATING ORAL at 21:16

## 2017-02-20 RX ADMIN — SODIUM CHLORIDE: 9 INJECTION, SOLUTION INTRAVENOUS at 14:29

## 2017-02-20 RX ADMIN — SODIUM CHLORIDE: 9 INJECTION, SOLUTION INTRAVENOUS at 05:39

## 2017-02-20 RX ADMIN — MEROPENEM 1 G: 1 INJECTION, POWDER, FOR SOLUTION INTRAVENOUS at 23:40

## 2017-02-20 NOTE — PLAN OF CARE
Problem: Goal Outcome Summary  Goal: Goal Outcome Summary  Outcome: No Change  D: Up herself in room and boudreaux. To start golReflektionly prep later today, She has tolerated clear liquid diet and has rec. One pain medication today.

## 2017-02-20 NOTE — PROGRESS NOTES
GASTROENTEROLOGY PROGRESS NOTE    ASSESSMENT:  Doreen Peralta is a 36 year old female with a history of slow transit and chronic constipation that required subtotal colectomy with G and J tube placement, and eventually ileo-rectal anastamosis with chronic nausea, vomiting, diarrhea and abdominal bloating. PMH fungemia, E. Coli sepsis, hepatic vein thrombosis, chronic pain, colonic dysmotility, IBS, GERD, asthma, line related upper extremity DVT on Xarelto, cervical cancer, oophorectomy and cholecystectomy. Patient had gastric emptying study in September 2009 and in May 2011, which were both normal. Last abd/pelvic CT on 2/7/17 from OSH was normal. Pelvic US completed on 2/15/17 is negative. LFTs, lipase and albumin are normal. CRP is elevated in the setting of Klebsiella oxytoca bacteremia, appreciate our colleagues from infectious diseases recommendation for bacteremia management. C. Diff and enteric pathogens were negative on 2/18/17.     Etiology of patient's acute on chronic RUQ abdominal pain is unclear. Given patient's recurrent bacteremia, colonoscopy to evaluate if there is GI source that might explain the bacteremia was attempted, and due to discomfort, the procedure was aborted. Plan is to attempt colonoscopy under [facility] tomorrow.  Other sources are also on differential, such as skin or G and J tubes contamination. Patient could also benefit weaning off narcotics, and Dr. Tan is currently working on tapering off in next couple of days.    RECOMMENDATIONS:  -- PPI twice daily  -- Review giardia, ova and parasite  -- Clear liquid and 2 liter Golytely on today, NPO at 7:00am tomorrow for colonoscopy under [facility]  -- If colonoscopy is negative plan is to maximize oral nutrition     Gastroenterology follow up recommendations:  Overall, if colonoscopy is negative, we recommend oral nutrition as patient does not have evidence of gastroparesis. She could also benefit psychological evaluation for her chronic  pain.  G-tube can be pulled at bedside but J-tube will need to be arranged as an outpatient with   Patient care plan discussed with Dr. Muller, GI staff physician. Thank you for involving us in this patient's care. Please do not hesitate to contact the GI service with any questions or concerns.     Zoë Mae  Gastroenterology Nurse Practitioner  _______________________________________________________________  S: Abdominal pain    O:  Blood pressure 128/70, pulse 72, temperature 97.2  F (36.2  C), temperature source Oral, resp. rate 18, weight 84.1 kg (185 lb 6.4 oz), last menstrual period 01/19/2017, SpO2 98 %, not currently breastfeeding.    Constitutional: cooperative, pleasant, not dyspneic/diaphoretic, no acute distress  Eyes: Sclera anicteric/injected  Ears/nose/mouth/throat: Normal oropharynx without ulcers or exudate, mucus membranes moist, hearing intact  Neck: supple, thyroid normal size  CV: RRR. No edema in LE  Respiratory: Unlabored breathing. CTA bilaterally.  Lymph: No axillary, submandibular, supraclavicular or inguinal lymphadenopathy  Abd: Nondistended, +bs, no hepatosplenomegaly, nontender, no peritoneal signs  Skin: warm, perfused, no jaundice  Neuro: AAO x 3, No asterixis  Psych: Normal affect  MSK: Normal gait    LABS:  BMP  Recent Labs  Lab 02/20/17  0631 02/19/17  0728 02/18/17  0719 02/17/17  0646    140 138 139   POTASSIUM 4.4 4.1 4.1 3.6   CHLORIDE 110* 110* 107 107   TARA 8.6 8.4* 8.5 8.1*   CO2 22 22 22 22   BUN 6* 4* 4* 3*   CR 0.74 0.76 0.76 0.89   GLC 85 86 81 98     CBC  Recent Labs  Lab 02/20/17  0631 02/19/17  0728 02/18/17  0719 02/17/17  0646   WBC 5.8 8.0 7.5 5.9   RBC 3.58* 3.46* 3.49* 3.38*   HGB 8.7* 8.2* 8.4* 8.2*   HCT 27.8* 27.0* 27.2* 26.8*   MCV 78 78 78 79   MCH 24.3* 23.7* 24.1* 24.3*   MCHC 31.3* 30.4* 30.9* 30.6*   RDW 16.9* 16.8* 16.9* 17.1*   * 503* 479* 442     INR  Recent Labs  Lab 02/16/17  0707   INR 1.11     LFTs  Recent Labs  Lab  02/15/17  1445   ALKPHOS 164*   AST 20   ALT 31   BILITOTAL 0.8   PROTTOTAL 8.9*   ALBUMIN 3.5      PANCNo lab results found in last 7 days.    HPI:  Doreen Peralta is a 36 year old female with a history of slow transit that required subtotal colectomy with G and J tube placement, and eventually ileo-rectal anastamosis with chronic nausea, vomiting, diarrhea and abdominal bloating. PMH fungemia, E. Coli sepsis, hepatic vein thrombosis, chronic pain, colonic dysmotility, IBS, GERD, asthma, line related upper extremity DVT on Xarelto, cervical cancer, oophorectomy and cholecystectomy. Patient had gastric emptying study in September in 2009, which was normal. Last abd/pelvic CT on 2/7/17 from OSH was normal. Pelvic US completed on 2/15/17 is negative. LFTs, lipase and albumin are normal. CRP is elevated in the setting of Klebsiella oxytoca bacteremia, appreciate our colleagues from infectious diseases recommendation for bacteremia management.      Patient came in with worsening RUQ abdominal pain, nausea/vomiting and diarrhea. She also had fevers at home. Patient denies NSAID, ETOH, or drug use.     ROS: A comprehensive Review of Systems was asked and answered in the negative unless specifically commented upon in the HPI    PMHx:  Past Medical History   Diagnosis Date     Asthma      Bilateral ovarian cysts      Cervical cancer (H) 01/01/2008     cervical cancer      Chronic pain      Colonic dysmotility      s/p subtotal colectomy     Constipation      chronic     E. coli sepsis (H) 5/8/2016     Enteritis      Fungemia 5/5/2016     Gastro-oesophageal reflux disease      H/O ileostomy      Hx of abnormal Pap smear      s/p LEEP - no further details provided     Hypertension      IBS (irritable bowel syndrome)      Other chronic pain      PONV (postoperative nausea and vomiting)      Thrombosis, hepatic vein (H)      microvascular       PSurgHx:   Past [facility] History   Procedure Laterality Date     Laparoscopic  oophorectomy Right 2009     [facility]     Laparoscopic cholecystectomy  2002     [facility] stones duct     Esophagoscopy, gastroscopy, duodenoscopy (egd), combined  7/10/2012     Procedure: COMBINED ESOPHAGOSCOPY, GASTROSCOPY, DUODENOSCOPY (EGD);  Upper Endoscopy, Ileoscopy    Latex Allergy  with biopsies;  Surgeon: Nicole Redding MD;  [facility]     Colonoscopy  7/10/2012     Procedure: COLONOSCOPY;;  Surgeon: Nicole Redding MD;  Location: [facility]     Leep tx, cervical  2009     [facility]     Laparoscopic ileostomy  1/20/2012     [facility], loop     Esophagoscopy, gastroscopy, duodenoscopy (egd), combined N/A 11/5/2014     Procedure: COMBINED ESOPHAGOSCOPY, GASTROSCOPY, DUODENOSCOPY (EGD);  Surgeon: Nicole Redding MD;  [facility]     Laparoscopic assisted colectomy  1/20/2012     Procedure:LAPAROSCOPIC ASSISTED COLECTOMY; Laparoscopic Ileostomy       Laparoscopic assisted colectomy left (descending)  10/24/2012     Procedure: LAPAROSCOPIC ASSISTED COLECTOMY LEFT (DESCENDING);   Hand Assisted Laproscopic Subtotal abdominal Colectomy,Iieorectal anastamosis, Ileostomy Closure.       Remove gastrostomy tube adult N/A 12/12/2014     Procedure: REMOVE GASTROSTOMY TUBE ADULT;  Surgeon: Nicole Redding MD;  [facility]: [facility]     Replace gastrostomy tube adult  5/19/15     Hc replace gastrostomy/cecostomy tube percutaneous Left 5/19/2015     Procedure: REPLACE GASTROSTOMY TUBE, PERCUTANEOUS;  Surgeon: Melecio Morejon Chi, MD;  Location: [facility]     Hc replace duodenostomy/jejunostomy tube percutaneous N/A 8/27/2015     Procedure: REPLACE GASTROJEJUNOSTOMY TUBE, PERCUTANEOUS;  Surgeon: Mio Holder MD;  [facility]     Hc [facility] endoscopy w placement gastrostomy tube percut N/A 10/1/2015     Procedure: COMBINED ESOPHAGOSCOPY, GASTROSCOPY, DUODENOSCOPY (EGD), PLACE PERCUTANEOUS ENDOSCOPIC GASTROSTOMY TUBE;  Surgeon: Mio Holder MD;  [facility]     Laparoscopic  assisted insertion tube jejunostomy N/A 10/16/2015     Procedure: LAPAROSCOPIC ASSISTED INSERTION TUBE JEJUNOSTOMY;  Surgeon: Elsa Medel MD;  [facility]     Picc insertion Left 10/21/2015     5fr DL Power PICC, 37cm (2cm external) in the L basilic vein w/ tip in the SVC RA junction.     Hc replace duodenostomy/jejunostomy tube percutaneous N/A 1/7/2016     Procedure: REPLACE JEJUNOSTOMY TUBE, PERCUTANEOUS;  Surgeon: Elsa Medel MD;  [facility]     Endoscopic insertion tube gastrostomy N/A 1/21/2016     Procedure: ENDOSCOPIC INSERTION TUBE GASTROSTOMY;  Surgeon: Nicole Redding MD;  [facility]     Hc replace duodenostomy/jejunostomy tube percutaneous N/A 1/28/2016     Procedure: REPLACE JEJUNOSTOMY TUBE, PERCUTANEOUS;  Surgeon: Elsa Medle MD;  [facility]     Laparotomy exploratory N/A 1/28/2016     Procedure: LAPAROTOMY EXPLORATORY;  Surgeon: Elsa Medel MD;  [facility]     Remove port vascular access Right 6/30/2016     Procedure: REMOVE PORT VASCULAR ACCESS;  Surgeon: Pradeep Orosco MD;  [facility]     Echo adal  7/19/2016            N/A 7/20/2016     Procedure: ANESTHESIA OUT OF OR;  Surgeon: GENERIC ANESTHESIA PROVIDER;  Location: [facility]     Colonoscopy N/A 2/19/2017     Procedure: COLONOSCOPY;  Surgeon: Randell Muller MD;  Location: [facility]       MEDS:    No current facility-administered medications on file prior to encounter.   Current Outpatient Prescriptions on File Prior to Encounter:  nortriptyline (PAMELOR) 10 MG capsule Take 3-4 capsules (30-40 mg) by mouth At Bedtime   ondansetron (ZOFRAN ODT) 4 MG ODT tab Take 1-2 tablets (4-8 mg) by mouth every 6 hours as needed for nausea   mirtazapine (REMERON SOL-TAB) 15 MG ODT tab 0.5 tablets (7.5 mg) by Orally disintegrating tablet route At Bedtime   SUMAtriptan (IMITREX) 50 MG tablet Take 1-2 tablets ( mg) by mouth at onset of headache for migraine - may repeat dose after 2h if  headache recurs.  Max: 200mg/24 hours   rivaroxaban ANTICOAGULANT (XARELTO) 20 MG TABS tablet Take 1 tablet (20 mg) by mouth daily (with dinner)   DULoxetine (CYMBALTA) 60 MG capsule Take 1 capsule (60 mg) by mouth daily   cloNIDine (CATAPRES) 0.2 MG tablet Take 2 tablets (0.4 mg) by mouth every evening   ACETAMINOPHEN PO Take 500-1,000 mg by mouth every 6 hours as needed for pain    albuterol 90 MCG/ACT inhaler Inhale 2 puffs into the lungs every 6 hours as needed    Nutritional Supplements (COMPLEAT) LIQD Infuse at 50 ml/hour for 5 hours daily through j-tubeFlush j-tube with 30 ml water before and after each infusion of Compleat       ALLERGIES:    Allergies   Allergen Reactions     Hyoscyamine Rash     Metoclopramide Other (See Comments)     Eye twitching.      Peaches [Peach] Other (See Comments)     Raw. Cooked OK     Sucralose Other (See Comments)     All artificial sweeteners. Aspartame also. Swollen glands     Advair Diskus Other (See Comments)     Throat burns     Azithromycin Other (See Comments)     Burning in throat     Compazine [Prochlorperazine] Visual Disturbance     Contrast Dye Itching     States is allergic to CT contrast dye     Cyclobenzaprine Visual Disturbance     Fentanyl Other (See Comments)     migraine     Ibuprofen GI Disturbance     Lactulose Nausea and Vomiting     Gas and bloating     Levaquin [Levofloxacin] Swelling     Per ED M.D. And RN      Morphine Sulfate Other (See Comments)     Chest pain       Oxycodone Other (See Comments)     Burning throat, but can take Norco.      Penicillins Other (See Comments)     Family hx of resp arrest, she has never taken  Ok with cephalosporins     Rizatriptan Visual Disturbance     Droperidol Hives and Rash     Isovue [Iopamidol] Palpitations     Pt had racing heart and sob      Ketorolac Anxiety     Latex Swelling and Rash     Kiwi, likely also avacado, ? banana     Levsin Rash       FHx:  Family History   Problem Relation Age of Onset      Thyroid Disease Mother      Sjogren's Mother      GASTROINTESTINAL DISEASE Mother      Intermittent nausea vomiting diarrhea     Colon Polyps Mother      Prostate Problems Father      prostate enlargement     Lupus Maternal Grandmother      CANCER Maternal Grandfather      Lung     Colon Cancer Maternal Grandfather 65     CANCER Paternal Grandmother      Lung      CEREBROVASCULAR DISEASE Paternal Grandmother      DIABETES Paternal Grandmother      Cardiovascular Paternal Grandmother      CHF     CANCER Paternal Grandfather      Lung     Glaucoma Paternal Grandfather      Abdominal Aortic Aneurysm Other      Macular Degeneration No family hx of        SOCIAL Hx:  Social History     Social History     Marital status:      Spouse name: Mitali     Number of children: 3     Years of education: N/A     Occupational History     Medical Assistant [employer]     Pediatric clinic     Social History Main Topics     Smoking status: Former Smoker     Packs/day: 1.00     Years: 4.00     Types: Cigarettes     Quit date: 1/1/2004     Smokeless tobacco: Former User     Alcohol use No     Drug use: No     Sexual activity: Yes     Partners: Male     Other Topics Concern     Not on file     Social History Narrative

## 2017-02-20 NOTE — PLAN OF CARE
Problem: Goal Outcome Summary  Goal: Goal Outcome Summary  Outcome: No Change  VSS. NS @ 125 ml/hr. No request for pain medication this shift. Slept throughout night. Continue to monitor.

## 2017-02-20 NOTE — PLAN OF CARE
Problem: Goal Outcome Summary  Goal: Goal Outcome Summary  Outcome: No Change  VSS on RA.  AOx4.  Up ad berny and independent.  New PIV placed.  IVF running per order.  Oxycodone administered X2 for ABD and R arm pain.  Cold packs placed on right upper arm.  TF held per pt.  Colonoscopy scheduled for Tuesday.  Pt able to make needs known.  Continue with POC and notify provider with any changes or concerns.

## 2017-02-20 NOTE — PROGRESS NOTES
[facility] - Internal Medicine Daily Note   Date of Service: 2/20/2017  Patient: Doreen Peralta  MRN: [id number]  Admission Date: 2/15/2017  Hospital Day # 5    Assessment & Plan: Doreen Peralta is a 36 year old female slow transit constipation s/p partial colectomy, GJ tube placement for venting/nutriotion who presents with recurrent bacteremia.      Acinetobacter & Ochrobacterium anthropi bacteremia - source unclear.  - colonoscopy tomorrow  - If colonoscopy is negative recommendation is removal of GJ tube.  Please see previous notes. The patient does not have gastroparesis and based on GI work up thus far there is not currently a clinical indication for GJ placement.  - ID and GI consulted  - BC 2/18,19, 20 negative to date  - Continue meropenem pending ID recs   - would recommend AGAINST placing a PICC line.  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  The patient can get antibiotics in the infusion center to continue treatment of her bacteremia once discharged.  - If colonoscopy is negative would recommend psychiatry consult.  - Please see my attestation to medicine note dated 2/17/2017 for further details    Chronic abd pain - there is not pathology to warrant the use of opiates.  The patient has had ~39 CT abd/pelvis without pathology.    - Wean opiates: oxycodone 5mg Q6hr prn today, 5mg Q8hr prn tomorrow then discontinue  - Would not recommend opiates on discharge  - the patient does NOT have gastroparesis  - Please see my attestation to medicine note dated 2/17/2017 for further details    Consulting Services: ID, GI    CODE: Full  DVT: Ambulate TID which the patient is doing  Diet/fluids: Clear liquid diet, bowel prep tonight  Disposition: Home in the next 1-2 days      Pt's care was discussed with bedside RN     Rosanne Tan D.O.  Internal Medicine Staff Hospitalist Service   [facility]         ___________________________________________________________________    Subjective & Interval Hx:  Patient feels well.  Denies current abd pain.  Long discussion today regarding removal of GJ tube if her colonoscopy is negative given concern that this is a source or recurrent bacteremia.  Patient is amenable to this plan.    Last 24 hr care team notes reviewed.   ROS:  4 point ROS including Respiratory, CV, GI and , other than that noted in the HPI, is negative.    Medications: Reviewed in EPIC. List below for reference    Physical Exam:    Blood pressure 128/70, pulse 72, temperature 97.2  F (36.2  C), temperature source Oral, resp. rate 18, weight 84.1 kg (185 lb 6.4 oz), last menstrual period 01/19/2017, SpO2 98 %, not currently breastfeeding.  General: Resting comfortbaly  HEENT: no scleral icterus  Heart/CV: Regular rate and rythm  Abdomen/GI: Soft,  ND, NT GJ in place without surrounding erythema  Extremities/MSK: no edema  Neuro/Psych: Alert and oriented x 4    Lines/Tubes:   Peripheral IV 02/19/17 Left Upper arm (Active)   Site Assessment WDL 2/20/2017  8:00 AM   Line Status Infusing 2/20/2017  8:00 AM   Phlebitis Scale 0-->no symptoms 2/20/2017  8:00 AM   Infiltration Scale 0 2/20/2017  8:00 AM   Number of days:1       Labs & Studies of Note: I personally reviewed the following studies:     Blood cultures:    Unresulted Labs Ordered in the Past 30 Days of this Admission     Date and Time Order Name Status Description    2/20/2017 0100 Blood culture Preliminary     2/20/2017 0100 Blood culture Preliminary     2/19/2017 0100 Blood culture Preliminary     2/19/2017 0100 Blood culture Preliminary     2/18/2017 0100 Blood culture Preliminary     2/18/2017 0100 Blood culture Preliminary     2/17/2017 1515 Ova and Parasite Exam Routine In process     2/17/2017 0729 Blood culture Preliminary     2/17/2017 0729 Blood culture Preliminary     2/15/2017 1939 Histoplasma capsulatum antigen In process     2/15/2017  1548 Blood culture Preliminary

## 2017-02-20 NOTE — PROGRESS NOTES
"  Care Coordinator- Discharge Planning     Admission Date/Time:  2/15/2017  Attending MD:  Rosanne Tan DO     Data  Date of initial CC assessment:  Chart reviewed, discussed with interdisciplinary team.   Patient was admitted for:   1. Abdominal pain, generalized    2. Leukocytosis, unspecified type    3. Bacteremia           Assessment  Notified by Dr. Tan that patient will possibly be discharged on an IV antibiotic that could be done outpatient daily. Currently patient on merrem every 8 hours which would need to be done at home and PICC line would need to placed.   Met with patient to discuss option of outpatient infusion. Patient has done this in the past at [facility] outpatient infusion. Patient states she would rather have a PICC line because she \"has no veins and last time it would take about 3 pokes to get a PIV in.\" message relayed to medicine team. Treatment plan will need to be placed if outpatient infusion. Awaiting ID recs.    Coordination of Care and Referrals: Provided patient/family with options for outpatient infusion     Plan  Anticipated Discharge Date: TBD  Anticipated Discharge Plan: home with home infusion vs. Outpatient infusion    Moriah Toribio  "

## 2017-02-21 ENCOUNTER — ANESTHESIA (OUTPATIENT)
Dept: GASTROENTEROLOGY | Facility: CLINIC | Age: 37
DRG: 394 | End: 2017-02-21
Payer: COMMERCIAL

## 2017-02-21 ENCOUNTER — ANESTHESIA EVENT (OUTPATIENT)
Dept: GASTROENTEROLOGY | Facility: CLINIC | Age: 37
DRG: 394 | End: 2017-02-21
Payer: COMMERCIAL

## 2017-02-21 LAB
BACTERIA SPEC CULT: ABNORMAL
COLONOSCOPY: NORMAL
GLUCOSE BLDC GLUCOMTR-MCNC: 104 MG/DL (ref 70–99)
LAB SCANNED RESULT: NORMAL
MICRO REPORT STATUS: ABNORMAL
MICROORGANISM SPEC CULT: ABNORMAL
MICROORGANISM SPEC CULT: ABNORMAL
SPECIMEN SOURCE: ABNORMAL

## 2017-02-21 PROCEDURE — 25000125 ZZHC RX 250: Performed by: NURSE ANESTHETIST, CERTIFIED REGISTERED

## 2017-02-21 PROCEDURE — 99233 SBSQ HOSP IP/OBS HIGH 50: CPT | Mod: GC | Performed by: PEDIATRICS

## 2017-02-21 PROCEDURE — 25000128 H RX IP 250 OP 636: Performed by: INTERNAL MEDICINE

## 2017-02-21 PROCEDURE — 37000008 ZZH ANESTHESIA TECHNICAL FEE, 1ST 30 MIN: Performed by: INTERNAL MEDICINE

## 2017-02-21 PROCEDURE — 12000008 ZZH R&B INTERMEDIATE UMMC

## 2017-02-21 PROCEDURE — 00000146 ZZHCL STATISTIC GLUCOSE BY METER IP

## 2017-02-21 PROCEDURE — 45378 DIAGNOSTIC COLONOSCOPY: CPT | Performed by: INTERNAL MEDICINE

## 2017-02-21 PROCEDURE — 25000132 ZZH RX MED GY IP 250 OP 250 PS 637: Performed by: INTERNAL MEDICINE

## 2017-02-21 PROCEDURE — 25000132 ZZH RX MED GY IP 250 OP 250 PS 637: Performed by: STUDENT IN AN ORGANIZED HEALTH CARE EDUCATION/TRAINING PROGRAM

## 2017-02-21 PROCEDURE — 0DJD8ZZ INSPECTION OF LOWER INTESTINAL TRACT, VIA NATURAL OR ARTIFICIAL OPENING ENDOSCOPIC: ICD-10-PCS | Performed by: INTERNAL MEDICINE

## 2017-02-21 PROCEDURE — 37000009 ZZH ANESTHESIA TECHNICAL FEE, EACH ADDTL 15 MIN: Performed by: INTERNAL MEDICINE

## 2017-02-21 PROCEDURE — 25000125 ZZHC RX 250: Performed by: STUDENT IN AN ORGANIZED HEALTH CARE EDUCATION/TRAINING PROGRAM

## 2017-02-21 RX ORDER — ONDANSETRON 2 MG/ML
4 INJECTION INTRAMUSCULAR; INTRAVENOUS EVERY 30 MIN PRN
Status: CANCELLED | OUTPATIENT
Start: 2017-02-21

## 2017-02-21 RX ORDER — PROPOFOL 10 MG/ML
INJECTION, EMULSION INTRAVENOUS PRN
Status: DISCONTINUED | OUTPATIENT
Start: 2017-02-21 | End: 2017-02-21

## 2017-02-21 RX ORDER — NALOXONE HYDROCHLORIDE 0.4 MG/ML
.1-.4 INJECTION, SOLUTION INTRAMUSCULAR; INTRAVENOUS; SUBCUTANEOUS
Status: CANCELLED | OUTPATIENT
Start: 2017-02-21 | End: 2017-02-22

## 2017-02-21 RX ORDER — OXYCODONE HYDROCHLORIDE 5 MG/1
5 TABLET ORAL EVERY 8 HOURS PRN
Status: DISCONTINUED | OUTPATIENT
Start: 2017-02-21 | End: 2017-02-22 | Stop reason: HOSPADM

## 2017-02-21 RX ORDER — SODIUM CHLORIDE, SODIUM LACTATE, POTASSIUM CHLORIDE, CALCIUM CHLORIDE 600; 310; 30; 20 MG/100ML; MG/100ML; MG/100ML; MG/100ML
INJECTION, SOLUTION INTRAVENOUS CONTINUOUS
Status: CANCELLED | OUTPATIENT
Start: 2017-02-21

## 2017-02-21 RX ORDER — MEPERIDINE HYDROCHLORIDE 25 MG/ML
12.5 INJECTION INTRAMUSCULAR; INTRAVENOUS; SUBCUTANEOUS
Status: CANCELLED | OUTPATIENT
Start: 2017-02-21

## 2017-02-21 RX ORDER — PROPOFOL 10 MG/ML
INJECTION, EMULSION INTRAVENOUS CONTINUOUS PRN
Status: DISCONTINUED | OUTPATIENT
Start: 2017-02-21 | End: 2017-02-21

## 2017-02-21 RX ORDER — LIDOCAINE HYDROCHLORIDE 20 MG/ML
INJECTION, SOLUTION INFILTRATION; PERINEURAL PRN
Status: DISCONTINUED | OUTPATIENT
Start: 2017-02-21 | End: 2017-02-21

## 2017-02-21 RX ORDER — ONDANSETRON 4 MG/1
4 TABLET, ORALLY DISINTEGRATING ORAL EVERY 30 MIN PRN
Status: CANCELLED | OUTPATIENT
Start: 2017-02-21

## 2017-02-21 RX ADMIN — RIVAROXABAN 20 MG: 20 TABLET, FILM COATED ORAL at 16:21

## 2017-02-21 RX ADMIN — PROPOFOL 100 MCG/KG/MIN: 10 INJECTION, EMULSION INTRAVENOUS at 12:54

## 2017-02-21 RX ADMIN — PROPOFOL 20 MG: 10 INJECTION, EMULSION INTRAVENOUS at 12:50

## 2017-02-21 RX ADMIN — PROPOFOL 10 MG: 10 INJECTION, EMULSION INTRAVENOUS at 13:03

## 2017-02-21 RX ADMIN — SODIUM CHLORIDE: 9 INJECTION, SOLUTION INTRAVENOUS at 16:15

## 2017-02-21 RX ADMIN — PROPOFOL 50 MG: 10 INJECTION, EMULSION INTRAVENOUS at 12:58

## 2017-02-21 RX ADMIN — Medication 7.5 MG: at 22:07

## 2017-02-21 RX ADMIN — LIDOCAINE HYDROCHLORIDE 40 MG: 20 INJECTION, SOLUTION INFILTRATION; PERINEURAL at 12:47

## 2017-02-21 RX ADMIN — OXYCODONE HYDROCHLORIDE 5 MG: 5 TABLET ORAL at 05:53

## 2017-02-21 RX ADMIN — ONDANSETRON 4 MG: 4 TABLET, ORALLY DISINTEGRATING ORAL at 16:21

## 2017-02-21 RX ADMIN — DULOXETINE 60 MG: 60 CAPSULE, DELAYED RELEASE ORAL at 08:07

## 2017-02-21 RX ADMIN — PROPOFOL 40 MG: 10 INJECTION, EMULSION INTRAVENOUS at 13:01

## 2017-02-21 RX ADMIN — OXYCODONE HYDROCHLORIDE 5 MG: 5 TABLET ORAL at 14:10

## 2017-02-21 RX ADMIN — NORTRIPTYLINE HYDROCHLORIDE 40 MG: 10 CAPSULE ORAL at 22:07

## 2017-02-21 RX ADMIN — MEROPENEM 1 G: 1 INJECTION, POWDER, FOR SOLUTION INTRAVENOUS at 08:06

## 2017-02-21 RX ADMIN — PROPOFOL 80 MG: 10 INJECTION, EMULSION INTRAVENOUS at 12:46

## 2017-02-21 RX ADMIN — OXYCODONE HYDROCHLORIDE 5 MG: 5 TABLET ORAL at 22:08

## 2017-02-21 RX ADMIN — MEROPENEM 1 G: 1 INJECTION, POWDER, FOR SOLUTION INTRAVENOUS at 16:14

## 2017-02-21 RX ADMIN — CLONIDINE HYDROCHLORIDE 0.4 MG: 0.2 TABLET ORAL at 20:56

## 2017-02-21 RX ADMIN — SODIUM CHLORIDE: 9 INJECTION, SOLUTION INTRAVENOUS at 08:07

## 2017-02-21 NOTE — BRIEF OP NOTE
Gastroenterology Endoscopy Suite Brief Operative Note    Procedure:  Colonoscopy   Post-operative diagnosis:  unremarkable colonoscopy   Staff Physician:  Dr. Randell Muller   Fellow/Assistant(s):  Dr. Km Do    Specimens:  Please see final procedure note for further details.   Findings:  normal colonoscopy with healthy looking ileorectal anastomosis, normal examined ileum   Complications:  None.   Condition:  Stable   Recommendations  Diet:  Return to previous diet  PPI:  N/A  Anti-coagulants/platelets:  N/A  Octreotide:  N/A  Discharge Planning:   Based on primary team.

## 2017-02-21 NOTE — PROGRESS NOTES
[facility] Infectious Disease Service Progress Note     Patient:  Doreen Peralta  Date of birth 1980, Medical record number [id number]  Date of Visit:  02/21/2017  Attending Physician: Rosanne Tan DO         Assessment and Recommendations:   ASSESSMENT:  1. Klebsiella oxytoca bacteremia; now with Acinetobacter (not baumannii) and Ochrobactrum anthropi bacteremia; unclear etiology; concern for G-J tube as possible source  2. Acute on chronic abdominal pain        RECOMMENDATIONS:  1. Continue meropenem while inpatient  2. Prior to discharge, transition to oral levofloxacin 750mg PO daily; may be provided IV if patient is intolerant to oral therapy down the road  3. Plan to complete a 14 day treatment course (through 3/3/2017)  4. Strongly favor removal of G-J tube if feasible as this is the most likely biologic source of her recurrent bacteremias  5. F/U with Dr. Price in ID clinic in 3-4 weeks     DISCUSSION:  1. Etiology of acute on chronic abdominal pain continues to be unclear. Per GI note, her need for her G-J tube is questionable. 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 There are some concerns regarding factitious disorder, with several red flags present (heavily medicalized lifestyle, background as a medical technician, unexplained fevers and bacteremias, heavy focus on pain medications and advocating for line access). At the same time, her past medical history provides a very plausible reason for her to have issues with bacteremias. Also, each episode involves a different organism, which may suggest against a factitious source. She has been having recurrent fevers and increased WBC. Her blood cultures during her admission at [facility] from 2/6-2/9 did grow out Klebsiella oxytoca resistant to ampicillin. She was treated with IV ertapenem during her admission at [facility] from  2/6-2/9/17 and was discharged on oral ciprofloxacin for a 14-day course. She had an unremarkable abdominal/pelvis CT scan. The patient is concerned that 2/2 her previous surgeries, chronic diarrhea, and vomiting, she has not been absorbing the ciprofloxacin. She did have a subtotal colectomy with ileosigmoid anastomosis in 2012. Agree with GI for consideration of colonoscopy rather than another CT scan; this occurred today.     Her blood cultures from 2/15/2017 returned positive for Acinetobacter (not baumannii) and Ochrobactrum anthropi. She has had issues with the former isolate back in 7/2016. She has not had the latter isolated before. Ochrobactrum anthropi is a rare cause of human disease; when it does occur, it is generally in the immunocompromised and associated with lines. It is not considered to be particularly virulent, although cases of IE have occurred. It has been associated with inducible AmpC beta-lactamase. Her current meropenem is covering both these organisms. Given the frequency of dosing required by meropenem, ertapenem was added to the sensitivity testing; both isolates show an ANJEL of 2, which would make the use of this agent questionable. Alternatively, fluoroquinolones are an option. If we go with this option, then I would favor using an oral route as it would eliminate the need for IV access. This may be especially important as the organisms she has are associated with line-infections and she has also had issues with PICC-related thromboses.     MALDONADO Ansari M.D.  [facility] Service Staff  [phone]           Interval History:     HPI:  Coloscopy went well; preliminary report shows normal bowel. We discussed the latest culture results and plan for treatment. Patient is contemplating her need for the G-J tube. Anticipates discharge tomorrow. No new complaints. No fever or leukocytosis. Last positive blood culture was 2/17; no growth since.        Pertinent cultures include:  Culture  Micro   Date Value Ref Range Status   02/20/2017 No growth after 22 hours  Final   02/20/2017 No growth after 22 hours  Final   02/19/2017 No growth after 2 days  Final   02/19/2017 No growth after 2 days  Final   02/18/2017 No growth after 3 days  Final       CRP Trend:  Recent Labs   Lab Test  02/17/17   0646  02/16/17   1422  02/15/17   1445  01/31/17   1010  01/18/17   1644  01/09/17   1042   CRP  27.0*  22.0*  8.3*  154.0*  8.2*  142.0*            Review of Systems:   CONSTITUTIONAL:    No fever  ; no chills.      RESPIRATORY:  negative for cough or shortness of breath  CARDIOVASCULAR:  negative for chest pain  GASTROINTESTINAL:  Positive for nausea, diarrhea and abdominal pain. No vomiting or constipation  GENITOURINARY:  negative for dysuria  INTEGUMENT:  negative for rash, pruritus or skin changes  NEURO:  Positive for headache (chronic)         Current Medications (antimicrobials listed in bold):       polyethylene glycol  2,000 mL Oral Once     meropenem  1 g Intravenous Q8H     lidocaine  1-3 patch Transdermal Q24h    And     lidocaine   Transdermal Q24H    And     lidocaine   Transdermal Q8H     cloNIDine  0.4 mg Oral QPM     DULoxetine  60 mg Oral Daily     nortriptyline  30-40 mg Oral At Bedtime     mirtazapine  7.5 mg Orally disintegrating tablet At Bedtime              Allergies:     Allergies   Allergen Reactions     Hyoscyamine Rash     Metoclopramide Other (See Comments)     Eye twitching.      Peaches [Peach] Other (See Comments)     Raw. Cooked OK     Sucralose Other (See Comments)     All artificial sweeteners. Aspartame also. Swollen glands     Advair Diskus Other (See Comments)     Throat burns     Azithromycin Other (See Comments)     Burning in throat     Compazine [Prochlorperazine] Visual Disturbance     Contrast Dye Itching     States is allergic to CT contrast dye     Cyclobenzaprine Visual Disturbance     Fentanyl Other (See Comments)     migraine     Ibuprofen GI Disturbance      Lactulose Nausea and Vomiting     Gas and bloating     Levaquin [Levofloxacin] Swelling     Per ED M.D. And RN      Morphine Sulfate Other (See Comments)     Chest pain       Oxycodone Other (See Comments)     Burning throat, but can take Norco.      Penicillins Other (See Comments)     Family hx of resp arrest, she has never taken  Ok with cephalosporins     Rizatriptan Visual Disturbance     Droperidol Hives and Rash     Isovue [Iopamidol] Palpitations     Pt had racing heart and sob      Ketorolac Anxiety     Latex Swelling and Rash     Kiwi, likely also avacado, ? banana     Levsin Rash          Physical Exam:   Vitals were reviewed  Patient Vitals for the past 24 hrs:   BP Temp Temp src Pulse Resp SpO2 Weight   02/21/17 0546 120/55 97  F (36.1  C) Oral 77 18 99 % -   02/20/17 2215 121/55 97.5  F (36.4  C) Oral 64 18 100 % -   02/20/17 1838 152/79 98.5  F (36.9  C) Oral 78 18 100 % -   02/20/17 1700 - - - - - - 84.8 kg (187 lb)   02/20/17 1525 146/75 98.4  F (36.9  C) Oral 81 18 99 % -       Physical Examination:  GENERAL: well-developed, well-nourished, in no acute distress. Walking with IV pole  HEENT: Head is normocephalic, atraumatic   ABDOMEN: Normal bowel sounds, soft, nondistended. TTP, R>L. No appreciable hepatosplenomegaly. G-J tube in place, no noted surrounding erythema or discharge.  SKIN: Line is in place without any surrounding erythema or exudate.   NEUROLOGIC: Grossly nonfocal. Active x4 extremities         Laboratory Data:   ID Labs:  Recent Labs   Lab Test  02/17/17   0646  02/16/17   1422  02/15/17   1445  01/31/17   1010  01/18/17   1644  01/09/17   1042   CRP  27.0*  22.0*  8.3*  154.0*  8.2*  142.0*     Recent Labs   Lab Test  02/20/17   0631  02/19/17   0728  02/18/17   0719  02/17/17   0646  02/16/17   0707  02/15/17   1445   WBC  5.8  8.0  7.5  5.9  6.1  17.0*     Recent Labs   Lab Test  02/20/17   0631  02/19/17   0728  02/18/17   0719  02/17/17   0646   CR  0.74  0.76  0.76  0.89    GFRESTIMATED  90  86  87  71       Hematology Studies  Recent Labs   Lab Test  02/20/17   0631  02/19/17   0728  02/18/17   0719  02/17/17   0646  02/16/17   0707  02/15/17   1445  02/06/17   1650  01/31/17   1010   01/26/17   1500   WBC  5.8  8.0  7.5  5.9  6.1  17.0*  18.1*  9.1   < >  16.0*   ANEU   --   5.7  4.9   --    --   14.3*  16.4*  8.6*   --   14.9*   AEOS   --   0.1  0.2   --    --   0.1  0.0  0.0   --   0.0   HCT  27.8*  27.0*  27.2*  26.8*  27.5*  31.7*  32.7*  32.9*   < >  37.6   PLT  455*  503*  479*  442  465*  639*  382  297   < >  584*    < > = values in this interval not displayed.       Metabolic  Recent Labs   Lab Test  02/20/17   0631  02/19/17   0728  02/18/17   0719   NA  139  140  138   BUN  6*  4*  4*   CO2  22  22  22   CR  0.74  0.76  0.76   GFRESTIMATED  90  86  87       Hepatic Studies  Recent Labs   Lab Test  02/15/17   1445  02/06/17   1650  01/31/17   1010   BILITOTAL  0.8  0.8  0.7   ALKPHOS  164*  269*  184*   ALBUMIN  3.5  3.2*  3.1*   AST  20  46*  63*   ALT  31  51*  73*       Immunologlobulins  Recent Labs   Lab Test  02/16/17   1422  02/15/17   2039  02/15/17   1445   01/23/13   1515   IGA   --    --    --    --   252   SED  84*  82*  71*   < >  41*    < > = values in this interval not displayed.

## 2017-02-21 NOTE — ANESTHESIA PREPROCEDURE EVALUATION
Anesthesia Evaluation         Anesthesia Plan      History & Physical Review  History and physical reviewed and following examination; no interval change.    ASA Status:  3 .    NPO Status:  > 6 hours    Plan for MAC with Intravenous induction. Maintenance will be TIVA.  Reason for MAC:  Difficulty with conscious sedation (QS)         Postoperative Care      Consents  Anesthetic plan, risks, benefits and alternatives discussed with:  Patient..          .  ANESTHESIA PREOP EVALUATION  Yes, NPO. NPO Status: Room Service  Adult Formula Drip Feeding: Continuous Other; Compleat; Jejunostomy; Goal Rate: 20; mL/hr; Medication - Tube Feeding Flush Frequency: At least 15-30 mL water before and after medication administration and with tube clogging; Amout to Send (Nutriti...  NPO Time Specified    Procedure: Procedure(s):   - Wound Class: II-Clean Contaminated    HPI: Presents for colonoscopy     PMHx/PSHx/ROS:  PAST MEDICAL HISTORY:   Past Medical History   Diagnosis Date     Asthma      Bilateral ovarian cysts      Cervical cancer (H) 01/01/2008     cervical cancer      Chronic pain      Colonic dysmotility      s/p subtotal colectomy     Constipation      chronic     E. coli sepsis (H) 5/8/2016     Enteritis      Fungemia 5/5/2016     Gastro-oesophageal reflux disease      H/O ileostomy      Hx of abnormal Pap smear      s/p LEEP - no further details provided     Hypertension      IBS (irritable bowel syndrome)      Other chronic pain      PONV (postoperative nausea and vomiting)      Thrombosis, hepatic vein (H)      microvascular       PAST SURGICAL HISTORY:   Past [facility] History   Procedure Laterality Date     Laparoscopic oophorectomy Right 2009     [facility]     Laparoscopic cholecystectomy  2002     [facility] stones duct     Esophagoscopy, gastroscopy, duodenoscopy (egd), combined  7/10/2012     Procedure: COMBINED ESOPHAGOSCOPY, GASTROSCOPY, DUODENOSCOPY (EGD);  Upper Endoscopy, Ileoscopy    Latex  Allergy  with biopsies;  Surgeon: Nicole Redding MD;  Location: [facility]     Colonoscopy  7/10/2012     Procedure: COLONOSCOPY;;  Surgeon: Nicole Redding MD;  Location: [facility] OR     Leep tx, cervical  2009     [facility]     Laparoscopic ileostomy  1/20/2012     [facility], loop     Esophagoscopy, gastroscopy, duodenoscopy (egd), combined N/A 11/5/2014     Procedure: COMBINED ESOPHAGOSCOPY, GASTROSCOPY, DUODENOSCOPY (EGD);  Surgeon: Nicole Redding MD;  Location: [facility] OR     Laparoscopic assisted colectomy  1/20/2012     Procedure:LAPAROSCOPIC ASSISTED COLECTOMY; Laparoscopic Ileostomy       Laparoscopic assisted colectomy left (descending)  10/24/2012     Procedure: LAPAROSCOPIC ASSISTED COLECTOMY LEFT (DESCENDING);   Hand Assisted Laproscopic Subtotal abdominal Colectomy,Iieorectal anastamosis, Ileostomy Closure.       Remove gastrostomy tube adult N/A 12/12/2014     Procedure: REMOVE GASTROSTOMY TUBE ADULT;  Surgeon: Nicole Redding MD;  Location: [facility]     Replace gastrostomy tube adult  5/19/15     Hc replace gastrostomy/cecostomy tube percutaneous Left 5/19/2015     Procedure: REPLACE GASTROSTOMY TUBE, PERCUTANEOUS;  Surgeon: Melecio Morejon Chi, MD;  Location: [facility]     Hc replace duodenostomy/jejunostomy tube percutaneous N/A 8/27/2015     Procedure: REPLACE GASTROJEJUNOSTOMY TUBE, PERCUTANEOUS;  Surgeon: Mio Holder MD;  Location: [facility]     Hc [facility] endoscopy w placement gastrostomy tube percut N/A 10/1/2015     Procedure: COMBINED ESOPHAGOSCOPY, GASTROSCOPY, DUODENOSCOPY (EGD), PLACE PERCUTANEOUS ENDOSCOPIC GASTROSTOMY TUBE;  Surgeon: Mio Holder MD;  Location: [facility]     Laparoscopic assisted insertion tube jejunostomy N/A 10/16/2015     Procedure: LAPAROSCOPIC ASSISTED INSERTION TUBE JEJUNOSTOMY;  Surgeon: Elsa Medel MD;  Location: [facility]     Picc insertion Left 10/21/2015     5fr DL Power PICC, 37cm (2cm external) in the L basilic vein w/ tip in the SVC RA  junction.     Hc replace duodenostomy/jejunostomy tube percutaneous N/A 1/7/2016     Procedure: REPLACE JEJUNOSTOMY TUBE, PERCUTANEOUS;  Surgeon: Elsa Medel MD;  Location: [facility]     Endoscopic insertion tube gastrostomy N/A 1/21/2016     Procedure: ENDOSCOPIC INSERTION TUBE GASTROSTOMY;  Surgeon: Nicole Redding MD;  Location: [facility]     Hc replace duodenostomy/jejunostomy tube percutaneous N/A 1/28/2016     Procedure: REPLACE JEJUNOSTOMY TUBE, PERCUTANEOUS;  Surgeon: Elsa Medel MD;  Location: [facility]     Laparotomy exploratory N/A 1/28/2016     Procedure: LAPAROTOMY EXPLORATORY;  Surgeon: Elsa Medel MD;  Location: [facility]     Remove port vascular access Right 6/30/2016     Procedure: REMOVE PORT VASCULAR ACCESS;  Surgeon: Pradeep Orosco MD;  Location: [facility]     Echo adal  7/19/2016            N/A 7/20/2016     Procedure: ANESTHESIA OUT OF OR;  Surgeon: GENERIC ANESTHESIA PROVIDER;  Location: [facility]     Colonoscopy N/A 2/19/2017     Procedure: COLONOSCOPY;  Surgeon: Randell Muller MD;  Location: [facility]       FAMILY HISTORY:   Family History   Problem Relation Age of Onset     Thyroid Disease Mother      Sjogren's Mother      GASTROINTESTINAL DISEASE Mother      Intermittent nausea vomiting diarrhea     Colon Polyps Mother      Prostate Problems Father      prostate enlargement     Lupus Maternal Grandmother      CANCER Maternal Grandfather      Lung     Colon Cancer Maternal Grandfather 65     CANCER Paternal Grandmother      Lung      CEREBROVASCULAR DISEASE Paternal Grandmother      DIABETES Paternal Grandmother      Cardiovascular Paternal Grandmother      CHF     CANCER Paternal Grandfather      Lung     Glaucoma Paternal Grandfather      Abdominal Aortic Aneurysm Other      Macular Degeneration No family hx of          Past Anes Hx: No personal or family h/o anesthesia problems    Soc Hx:   Tobacco:   EtOH:     Allergies:   Allergies   Allergen Reactions     Hyoscyamine Rash      Metoclopramide Other (See Comments)     Eye twitching.      Peaches [Peach] Other (See Comments)     Raw. Cooked OK     Sucralose Other (See Comments)     All artificial sweeteners. Aspartame also. Swollen glands     Advair Diskus Other (See Comments)     Throat burns     Azithromycin Other (See Comments)     Burning in throat     Compazine [Prochlorperazine] Visual Disturbance     Contrast Dye Itching     States is allergic to CT contrast dye     Cyclobenzaprine Visual Disturbance     Fentanyl Other (See Comments)     migraine     Ibuprofen GI Disturbance     Lactulose Nausea and Vomiting     Gas and bloating     Levaquin [Levofloxacin] Swelling     Per ED M.D. And RN      Morphine Sulfate Other (See Comments)     Chest pain       Oxycodone Other (See Comments)     Burning throat, but can take Norco.      Penicillins Other (See Comments)     Family hx of resp arrest, she has never taken  Ok with cephalosporins     Rizatriptan Visual Disturbance     Droperidol Hives and Rash     Isovue [Iopamidol] Palpitations     Pt had racing heart and sob      Ketorolac Anxiety     Latex Swelling and Rash     Kiwi, likely also avacado, ? banana     Levsin Rash       Meds:   Prescriptions Prior to Admission   Medication Sig Dispense Refill Last Dose     VITAMIN D, CHOLECALCIFEROL, PO Take 2,000 Units by mouth daily   Past Month     nortriptyline (PAMELOR) 10 MG capsule Take 3-4 capsules (30-40 mg) by mouth At Bedtime 120 capsule 1 2/14/2017 at PM     ondansetron (ZOFRAN ODT) 4 MG ODT tab Take 1-2 tablets (4-8 mg) by mouth every 6 hours as needed for nausea 40 tablet 0 2/15/2017 at AM     mirtazapine (REMERON SOL-TAB) 15 MG ODT tab 0.5 tablets (7.5 mg) by Orally disintegrating tablet route At Bedtime 45 tablet 0 2/14/2017 at PM     SUMAtriptan (IMITREX) 50 MG tablet Take 1-2 tablets ( mg) by mouth at onset of headache for migraine - may repeat dose after 2h if headache recurs.  Max: 200mg/24 hours 18 tablet 1 Past Month      rivaroxaban ANTICOAGULANT (XARELTO) 20 MG TABS tablet Take 1 tablet (20 mg) by mouth daily (with dinner) 90 tablet 1 2/14/2017 at PM     DULoxetine (CYMBALTA) 60 MG capsule Take 1 capsule (60 mg) by mouth daily 90 capsule 3 2/15/2017 at AM     cloNIDine (CATAPRES) 0.2 MG tablet Take 2 tablets (0.4 mg) by mouth every evening 60 tablet 5 2/14/2017 at PM     ACETAMINOPHEN PO Take 500-1,000 mg by mouth every 6 hours as needed for pain    Past Week     albuterol 90 MCG/ACT inhaler Inhale 2 puffs into the lungs every 6 hours as needed    Past Week     Nutritional Supplements (COMPLEAT) LIQD Infuse at 50 ml/hour for 5 hours daily through j-tube  Flush j-tube with 30 ml water before and after each infusion of Compleat 30 each 0 Unknown       No current outpatient prescriptions on file.       Physical Exam:  VS: T 97, P 77, /55, R 18, SpO2 99% Weight   Wt Readings from Last 2 Encounters:   02/20/17 84.8 kg (187 lb)   02/06/17 81.6 kg (180 lb)         Airway: MP 2, TM>3FB, Neck full ROM  Dentition: no loose teeth  Heart: RRR  Lungs: CTAB      BMP:  Lab Results   Component Value Date     02/20/2017      Lab Results   Component Value Date    POTASSIUM 4.4 02/20/2017     Lab Results   Component Value Date    CHLORIDE 110 02/20/2017     Lab Results   Component Value Date    TARA 8.6 02/20/2017     Lab Results   Component Value Date    CO2 22 02/20/2017     Lab Results   Component Value Date    BUN 6 02/20/2017     Lab Results   Component Value Date    CR 0.74 02/20/2017     Lab Results   Component Value Date    GLC 85 02/20/2017        CBC:  Lab Results   Component Value Date    WBC 5.8 02/20/2017     Lab Results   Component Value Date    HGB 8.7 02/20/2017     Lab Results   Component Value Date    HCT 27.8 02/20/2017     Lab Results   Component Value Date     02/20/2017     11/30/2015        Coags/Type and Screen  Lab Results   Component Value Date    INR 1.11 02/16/2017    INR 1.0 11/08/2016    INR  3.6 11/02/2016     No results found for: PT  Type and Screen:      Assessment/Plan:  - ASA 3  - MAC with standard ASA monitors, IV induction, balanced anesthetic  - PIV  - Antibiotics per surgery  - PONV prophylaxis  - Blood products available, possible administration discussed with patient  Aldair Jaimes MD    2/21/2017  10:18 AM

## 2017-02-21 NOTE — PLAN OF CARE
Problem: Goal Outcome Summary  Goal: Goal Outcome Summary  Outcome: No Change  Patient started colonoscopy prep around 1700. She is pushing it down her G port with a syringe. She will have colonoscopy in OR tomorrow, so will need shower. Uncertain as to time of procedure. Up ad berny in room and halls. Continue prep. Analgesics as ordered for pain.

## 2017-02-21 NOTE — ANESTHESIA CARE TRANSFER NOTE
Patient: Doreen Peralta    Procedure(s):   - Wound Class: II-Clean Contaminated    Diagnosis: Pain  Diagnosis Additional Information: No value filed.    Anesthesia Type:   MAC     Note:  Airway :Face Mask  Patient transferred to:Phase II  Comments: VSS on arrival, report to RN.      Vitals: (Last set prior to Anesthesia Care Transfer)    CRNA VITALS  2/21/2017 1253 - 2/21/2017 1323      2/21/2017             Resp Rate (set): 10                Electronically Signed By: GERARD Baeza CRNA  February 21, 2017  1:23 PM

## 2017-02-21 NOTE — PROGRESS NOTES
GASTROENTEROLOGY PROGRESS NOTE    ASSESSMENT:  Doreen Peralta is a 36 year old female with a history of slow transit and chronic constipation that required subtotal colectomy with G and J tube placement, and eventually ileo-rectal anastamosis with chronic nausea, vomiting, diarrhea and abdominal bloating. PMH fungemia, E. Coli sepsis, hepatic vein thrombosis, chronic pain, colonic dysmotility, IBS, GERD, asthma, line related upper extremity DVT on Xarelto, cervical cancer, oophorectomy and cholecystectomy. Patient had gastric emptying study in September 2009 and in May 2011, which were both normal. Last abd/pelvic CT on 2/7/17 from OSH was normal. Pelvic US completed on 2/15/17 is negative. LFTs, lipase and albumin are normal. CRP is elevated in the setting of Klebsiella oxytoca bacteremia, appreciate our colleagues from infectious diseases recommendation for bacteremia management. C. Diff, ova/prasite, giardia and enteric pathogens were negative on 2/18/17.     Etiology of patient's acute on chronic RUQ abdominal pain is unclear. Given patient's recurrent bacteremia, colonoscopy was done today (2/21/17) to evaluate for GI source of bacteremia, and the entire examined colon was unremarkable.  Other sources are also on differential, such as skin or G and J tubes contamination. We recommend patient's nutritional intake to be transitioned to oral.      RECOMMENDATIONS:  -- Patient needs to follow up with her primary care provider to monitor that patient is doing well with oral intake  -- After 3 months of oral nutrition only, PCP can refer patient for endoscopy procedure to pull J tube and G tube as an outpatient  -- PPI twice daily  -- Avoid narcotics as it can worsen patient's nausea and abdominal pain  -- We appreciate primary care team's care  -- GI team is signing off patient to primary team      Patient care plan discussed with Dr. Muller, GI staff physician. Thank you for involving us in this patient's  care. Please do not hesitate to contact the GI service with any questions or concerns.     Zoë Mae  Gastroenterology Nurse Practitioner  _______________________________________________________________  S: Abdominal pain is unchanged.     O:  Blood pressure 136/74, pulse 73, temperature 97.8  F (36.6  C), temperature source Oral, resp. rate 14, weight 84.8 kg (187 lb), last menstrual period 01/19/2017, SpO2 98 %, not currently breastfeeding.    Constitutional: cooperative, pleasant, not dyspneic/diaphoretic, no acute distress  Eyes: Sclera anicteric/injected  Ears/nose/mouth/throat: Normal oropharynx without ulcers or exudate, mucus membranes moist, hearing intact  Neck: supple, thyroid normal size  CV: RRR. No edema in LE  Respiratory: Unlabored breathing. CTA bilaterally.  Lymph: No axillary, submandibular, supraclavicular or inguinal lymphadenopathy  Abd: Nondistended, +bs, no hepatosplenomegaly, nontender, no peritoneal signs  Skin: warm, perfused, no jaundice  Neuro: AAO x 3, No asterixis  Psych: Normal affect  MSK: Normal gait    LABS:  BMP    Recent Labs  Lab 02/20/17  0631 02/19/17  0728 02/18/17  0719 02/17/17  0646    140 138 139   POTASSIUM 4.4 4.1 4.1 3.6   CHLORIDE 110* 110* 107 107   TARA 8.6 8.4* 8.5 8.1*   CO2 22 22 22 22   BUN 6* 4* 4* 3*   CR 0.74 0.76 0.76 0.89   GLC 85 86 81 98     CBC    Recent Labs  Lab 02/20/17  0631 02/19/17  0728 02/18/17  0719 02/17/17  0646   WBC 5.8 8.0 7.5 5.9   RBC 3.58* 3.46* 3.49* 3.38*   HGB 8.7* 8.2* 8.4* 8.2*   HCT 27.8* 27.0* 27.2* 26.8*   MCV 78 78 78 79   MCH 24.3* 23.7* 24.1* 24.3*   MCHC 31.3* 30.4* 30.9* 30.6*   RDW 16.9* 16.8* 16.9* 17.1*   * 503* 479* 442     INR    Recent Labs  Lab 02/16/17  0707   INR 1.11     LFTs    Recent Labs  Lab 02/15/17  1445   ALKPHOS 164*   AST 20   ALT 31   BILITOTAL 0.8   PROTTOTAL 8.9*   ALBUMIN 3.5      PANCNo lab results found in last 7 days.    HPI:  Doreen Peralta is a 36 year old female with a  history of slow transit that required subtotal colectomy with G and J tube placement, and eventually ileo-rectal anastamosis with chronic nausea, vomiting, diarrhea and abdominal bloating. PMH fungemia, E. Coli sepsis, hepatic vein thrombosis, chronic pain, colonic dysmotility, IBS, GERD, asthma, line related upper extremity DVT on Xarelto, cervical cancer, oophorectomy and cholecystectomy. Patient had gastric emptying study in September in 2009, which was normal. Last abd/pelvic CT on 2/7/17 from OSH was normal. Pelvic US completed on 2/15/17 is negative. LFTs, lipase and albumin are normal. CRP is elevated in the setting of Klebsiella oxytoca bacteremia, appreciate our colleagues from infectious diseases recommendation for bacteremia management.      Patient came in with worsening RUQ abdominal pain, nausea/vomiting and diarrhea. She also had fevers at home. Patient denies NSAID, ETOH, or drug use.     ROS: A comprehensive Review of Systems was asked and answered in the negative unless specifically commented upon in the HPI    PMHx:  Past Medical History   Diagnosis Date     Asthma      Bilateral ovarian cysts      Cervical cancer (H) 01/01/2008     cervical cancer      Chronic pain      Colonic dysmotility      s/p subtotal colectomy     Constipation      chronic     E. coli sepsis (H) 5/8/2016     Enteritis      Fungemia 5/5/2016     Gastro-oesophageal reflux disease      H/O ileostomy      Hx of abnormal Pap smear      s/p LEEP - no further details provided     Hypertension      IBS (irritable bowel syndrome)      Other chronic pain      PONV (postoperative nausea and vomiting)      Thrombosis, hepatic vein (H)      microvascular       PSurgHx:   Past [facility] History   Procedure Laterality Date     Laparoscopic oophorectomy Right 2009     [facility]     Laparoscopic cholecystectomy  2002     [facility] stones duct     Esophagoscopy, gastroscopy, duodenoscopy (egd), combined  7/10/2012     Procedure:  COMBINED ESOPHAGOSCOPY, GASTROSCOPY, DUODENOSCOPY (EGD);  Upper Endoscopy, Ileoscopy    Latex Allergy  with biopsies;  Surgeon: Nicole Redding MD;  [facility]: [facility]     Colonoscopy  7/10/2012     Procedure: COLONOSCOPY;;  Surgeon: Nicole Redding MD;  Location: [facility]     Leep tx, cervical  2009     [facility]     Laparoscopic ileostomy  1/20/2012     [facility], loop     Esophagoscopy, gastroscopy, duodenoscopy (egd), combined N/A 11/5/2014     Procedure: COMBINED ESOPHAGOSCOPY, GASTROSCOPY, DUODENOSCOPY (EGD);  Surgeon: Nicole Redding MD;  [facility]     Laparoscopic assisted colectomy  1/20/2012     Procedure:LAPAROSCOPIC ASSISTED COLECTOMY; Laparoscopic Ileostomy       Laparoscopic assisted colectomy left (descending)  10/24/2012     Procedure: LAPAROSCOPIC ASSISTED COLECTOMY LEFT (DESCENDING);   Hand Assisted Laproscopic Subtotal abdominal Colectomy,Iieorectal anastamosis, Ileostomy Closure.       Remove gastrostomy tube adult N/A 12/12/2014     Procedure: REMOVE GASTROSTOMY TUBE ADULT;  Surgeon: Nicole Redding MD;  [facility]: [facility]     Replace gastrostomy tube adult  5/19/15     Hc replace gastrostomy/cecostomy tube percutaneous Left 5/19/2015     Procedure: REPLACE GASTROSTOMY TUBE, PERCUTANEOUS;  Surgeon: Melecio Morejon Chi, MD;  Location: [facility]     Hc replace duodenostomy/jejunostomy tube percutaneous N/A 8/27/2015     Procedure: REPLACE GASTROJEJUNOSTOMY TUBE, PERCUTANEOUS;  Surgeon: Mio Holder MD;  [facility]      ugi endoscopy w placement gastrostomy tube percut N/A 10/1/2015     Procedure: COMBINED ESOPHAGOSCOPY, GASTROSCOPY, DUODENOSCOPY (EGD), PLACE PERCUTANEOUS ENDOSCOPIC GASTROSTOMY TUBE;  Surgeon: Mio Holder MD;  [facility]     Laparoscopic assisted insertion tube jejunostomy N/A 10/16/2015     Procedure: LAPAROSCOPIC ASSISTED INSERTION TUBE JEJUNOSTOMY;  Surgeon: Elsa Medel MD;  [facility]     Picc insertion Left 10/21/2015      5fr DL Power PICC, 37cm (2cm external) in the L basilic vein w/ tip in the SVC RA junction.     Hc replace duodenostomy/jejunostomy tube percutaneous N/A 1/7/2016     Procedure: REPLACE JEJUNOSTOMY TUBE, PERCUTANEOUS;  Surgeon: Elsa Medel MD;  [facility]     Endoscopic insertion tube gastrostomy N/A 1/21/2016     Procedure: ENDOSCOPIC INSERTION TUBE GASTROSTOMY;  Surgeon: Nicole Redding MD;  [facility]     Hc replace duodenostomy/jejunostomy tube percutaneous N/A 1/28/2016     Procedure: REPLACE JEJUNOSTOMY TUBE, PERCUTANEOUS;  Surgeon: Elsa Medel MD;  [facility]     Laparotomy exploratory N/A 1/28/2016     Procedure: LAPAROTOMY EXPLORATORY;  Surgeon: Elsa Medel MD;  [facility]     Remove port vascular access Right 6/30/2016     Procedure: REMOVE PORT VASCULAR ACCESS;  Surgeon: Pradeep Orosco MD;  [facility]     Echo adal  7/19/2016            N/A 7/20/2016     Procedure: ANESTHESIA OUT OF OR;  Surgeon: GENERIC ANESTHESIA PROVIDER;  Location: [facility]     Colonoscopy N/A 2/19/2017     Procedure: COLONOSCOPY;  Surgeon: Randell Muller MD;  Location: [facility]       MEDS:    No current facility-administered medications on file prior to encounter.   Current Outpatient Prescriptions on File Prior to Encounter:  nortriptyline (PAMELOR) 10 MG capsule Take 3-4 capsules (30-40 mg) by mouth At Bedtime   ondansetron (ZOFRAN ODT) 4 MG ODT tab Take 1-2 tablets (4-8 mg) by mouth every 6 hours as needed for nausea   mirtazapine (REMERON SOL-TAB) 15 MG ODT tab 0.5 tablets (7.5 mg) by Orally disintegrating tablet route At Bedtime   SUMAtriptan (IMITREX) 50 MG tablet Take 1-2 tablets ( mg) by mouth at onset of headache for migraine - may repeat dose after 2h if headache recurs.  Max: 200mg/24 hours   rivaroxaban ANTICOAGULANT (XARELTO) 20 MG TABS tablet Take 1 tablet (20 mg) by mouth daily (with dinner)   DULoxetine (CYMBALTA) 60 MG capsule Take 1 capsule (60 mg)  by mouth daily   cloNIDine (CATAPRES) 0.2 MG tablet Take 2 tablets (0.4 mg) by mouth every evening   ACETAMINOPHEN PO Take 500-1,000 mg by mouth every 6 hours as needed for pain    albuterol 90 MCG/ACT inhaler Inhale 2 puffs into the lungs every 6 hours as needed    Nutritional Supplements (COMPLEAT) LIQD Infuse at 50 ml/hour for 5 hours daily through j-tubeFlush j-tube with 30 ml water before and after each infusion of Compleat       ALLERGIES:    Allergies   Allergen Reactions     Hyoscyamine Rash     Metoclopramide Other (See Comments)     Eye twitching.      Peaches [Peach] Other (See Comments)     Raw. Cooked OK     Sucralose Other (See Comments)     All artificial sweeteners. Aspartame also. Swollen glands     Advair Diskus Other (See Comments)     Throat burns     Azithromycin Other (See Comments)     Burning in throat     Compazine [Prochlorperazine] Visual Disturbance     Contrast Dye Itching     States is allergic to CT contrast dye     Cyclobenzaprine Visual Disturbance     Fentanyl Other (See Comments)     migraine     Ibuprofen GI Disturbance     Lactulose Nausea and Vomiting     Gas and bloating     Levaquin [Levofloxacin] Swelling     Per ED M.D. And RN      Morphine Sulfate Other (See Comments)     Chest pain       Oxycodone Other (See Comments)     Burning throat, but can take Norco.      Penicillins Other (See Comments)     Family hx of resp arrest, she has never taken  Ok with cephalosporins     Rizatriptan Visual Disturbance     Droperidol Hives and Rash     Isovue [Iopamidol] Palpitations     Pt had racing heart and sob      Ketorolac Anxiety     Latex Swelling and Rash     Kiwi, likely also avacado, ? banana     Levsin Rash       FHx:  Family History   Problem Relation Age of Onset     Thyroid Disease Mother      Sjogren's Mother      GASTROINTESTINAL DISEASE Mother      Intermittent nausea vomiting diarrhea     Colon Polyps Mother      Prostate Problems Father      prostate enlargement      Lupus Maternal Grandmother      CANCER Maternal Grandfather      Lung     Colon Cancer Maternal Grandfather 65     CANCER Paternal Grandmother      Lung      CEREBROVASCULAR DISEASE Paternal Grandmother      DIABETES Paternal Grandmother      Cardiovascular Paternal Grandmother      CHF     CANCER Paternal Grandfather      Lung     Glaucoma Paternal Grandfather      Abdominal Aortic Aneurysm Other      Macular Degeneration No family hx of        SOCIAL Hx:  Social History     Social History     Marital status:      Spouse name: Mitali     Number of children: 3     Years of education: N/A     Occupational History     Medical Assistant [employer]     Pediatric clinic     Social History Main Topics     Smoking status: Former Smoker     Packs/day: 1.00     Years: 4.00     Types: Cigarettes     Quit date: 1/1/2004     Smokeless tobacco: Former User     Alcohol use No     Drug use: No     Sexual activity: Yes     Partners: Male     Other Topics Concern     Not on file     Social History Narrative

## 2017-02-21 NOTE — OR NURSING
Colonoscopy completed. Patient tolerated procedure well with MAC anesthesia, all monitoring of vitals signs and administration of sedation per CRNA, please refer to flowsheets in EPIC for further information.

## 2017-02-21 NOTE — PLAN OF CARE
Problem: Goal Outcome Summary  Goal: Goal Outcome Summary  Outcome: No Change  VSS. A&Ox4. NS @ 125 ml/hr. Reporting abdominal pain; PRN oxy x1. Had coloscopy done today. Plan for tentative d/c tomorrow.

## 2017-02-21 NOTE — ANESTHESIA POSTPROCEDURE EVALUATION
Patient: Doreen Peralta    Procedure(s):   - Wound Class: II-Clean Contaminated    Diagnosis:Pain  Diagnosis Additional Information: No value filed.    Anesthesia Type:  MAC    Note:  Anesthesia Post Evaluation    Patient location during evaluation: PACU  Patient participation: Able to fully participate in evaluation  Level of consciousness: awake  Pain management: adequate  Airway patency: patent  Cardiovascular status: acceptable  Respiratory status: acceptable  Hydration status: acceptable     Anesthetic complications: None          Last vitals:  Vitals:    02/21/17 1335 02/21/17 1339 02/21/17 1355   BP: 129/76  136/74   Pulse:   73   Resp: 14 12 14   Temp:   36.6  C (97.8  F)   SpO2: 98% 99% 98%         Electronically Signed By: Aldair Jaimes MD  February 21, 2017  2:07 PM

## 2017-02-21 NOTE — PROGRESS NOTES
INTERNAL MEDICINE PROGRESS NOTE    Doreen Peralta ([id number]) admitted on 2/15/2017  02/21/2017    Assessment & Plan:    Doreen Peralta is a 36 year old female slow transit constipation s/p partial colectomy, GJ tube placement for venting/nutriotion who presents with recurrent bacteremia of unknown source and chronic abdominal pain of unclear origin.        Acinetobacter & Ochrobacterium anthropi bacteremia - source unclear.  - colonoscopy today  - If colonoscopy is negative recommendation is removal of GJ tube. Please see previous notes. Per GI, they would be hesitant to diagnose patient with abnormal GI motility disorder given her opioid use and largely negative workup for underlying GI disorder. Thus, there is not currently a clinical indication for GJ placement per GI, and could be possible source of her bacteremia.  - ID and GI consulted  - BC 2/18,19, 20 negative to date  - On meropenem per ID, but would need to discuss possibility of qday antibiotic, ie ertapenem. Pending ID recs, will discuss today.   - would recommend AGAINST placing a PICC line. Patient has had line associated infections and concern for opioid use.   The patient can get antibiotics in the infusion center to continue treatment of her bacteremia once discharged.  - If colonoscopy is negative may recommend psychiatry consult to assist her chronic pain  - Please see Dr. Tan's attestation to medicine note dated 2/17/2017 for further details     Chronic abd pain - there is not pathology to warrant the use of opiates. The patient has had ~39 CT abd/pelvis without pathology.   - Wean opiates: oxycodone 5mg Q6hr prn today, 5mg Q8hr prn tomorrow then discontinue  - Would not recommend opiates on discharge  - the patient does not have evidence of gastroparesis per GI  - Please see attestation to medicine note dated 2/17/2017 for further details       # stable conditions  Bilateral UE DVTs - resume home xarelto after colonscopy ; will hold  dose for overnight for colonoscopy tomorrow   Asthma - albuterol prn   Anxiety - monitor    Consulting Services: ID, GI     CODE: Full  DVT: Ambulate TID which the patient is doing  Diet/fluids: Resume clear liquid diet after colonoscopy  Disposition: Home in the next 1-2 days    Patient was seen and discussed with Dr. Hunt who agrees with above assessment and plan.    ==================================================================    24 hr events:  Completed GoLytely before MN, stool clear, GT clamped, NPO at MN    Subjective:  Patient with tolerable abdominal pain worse on right side, laying comfortably. No F/C. No SOB. Increased stool output after GoLytely.    Objective:  Most recent vital signs:  /55 (BP Location: Left leg)  Pulse 77  Temp 97  F (36.1  C) (Oral)  Resp 18  Wt 84.8 kg (187 lb)  LMP 01/19/2017  SpO2 99%  BMI 30.18 kg/m2  Temp:  [97  F (36.1  C)-98.5  F (36.9  C)] 97  F (36.1  C)  Pulse:  [64-81] 77  Resp:  [18] 18  BP: (120-152)/(55-79) 120/55  SpO2:  [99 %-100 %] 99 %  Wt Readings from Last 2 Encounters:   02/20/17 84.8 kg (187 lb)   02/06/17 81.6 kg (180 lb)       Intake/Output Summary (Last 24 hours) at 02/21/17 0721  Last data filed at 02/21/17 0659   Gross per 24 hour   Intake             3340 ml   Output                0 ml   Net             3340 ml       Physical exam:  General: Patient lying comfortably in bed, NAD  HEENT: EOMI, PERRL, no scleral icterus or injection, no bruits appreciated, no JVD, MMM  Cardiac: RRR, no m/r/g appreciated.   Respiratory: CTAB, no wheezes, rhonchi or crackles appreciated.  GI: NABS, ND, mild TTP right periumbilical area, no guarding or rebound  Extremities: No LE edema, pulses DP 2+, radial pulses 2+   Skin: No acute lesions appreciated  Neuro: AOx3, CN II-XII grossly intact, normal muscle power, normal sensory function    Labs (Past three days):  CMP  Recent Labs  Lab 02/20/17  0631 02/19/17  0728 02/18/17  0719 02/17/17  0646 02/16/17  1422   02/15/17  1445    140 138 139  --   < > 136   POTASSIUM 4.4 4.1 4.1 3.6 4.2  < > 3.5   CHLORIDE 110* 110* 107 107  --   < > 103   CO2 22 22 22 22  --   < > 25   ANIONGAP 7 8 8 9  --   < > 8   GLC 85 86 81 98  --   < > 79   BUN 6* 4* 4* 3*  --   < > 11   CR 0.74 0.76 0.76 0.89  --   < > 0.94   GFRESTIMATED 90 86 87 71  --   < > 68   GFRESTBLACK >90African American GFR Calc >90African American GFR Calc >90African American GFR Calc 86  --   < > 82   TARA 8.6 8.4* 8.5 8.1*  --   < > 8.9   MAG  --   --  2.4* 2.2 2.3  --   --    PHOS  --   --  2.6 2.8 3.4  --   --    PROTTOTAL  --   --   --   --   --   --  8.9*   ALBUMIN  --   --   --   --   --   --  3.5   BILITOTAL  --   --   --   --   --   --  0.8   ALKPHOS  --   --   --   --   --   --  164*   AST  --   --   --   --   --   --  20   ALT  --   --   --   --   --   --  31   < > = values in this interval not displayed.  CBC    Recent Labs  Lab 02/20/17  0631 02/19/17  0728 02/18/17  0719 02/17/17  0646   WBC 5.8 8.0 7.5 5.9   RBC 3.58* 3.46* 3.49* 3.38*   HGB 8.7* 8.2* 8.4* 8.2*   HCT 27.8* 27.0* 27.2* 26.8*   MCV 78 78 78 79   MCH 24.3* 23.7* 24.1* 24.3*   MCHC 31.3* 30.4* 30.9* 30.6*   RDW 16.9* 16.8* 16.9* 17.1*   * 503* 479* 442     INR    Recent Labs  Lab 02/16/17  0707   INR 1.11     Arterial Blood GasNo lab results found in last 7 days.

## 2017-02-21 NOTE — PLAN OF CARE
Problem: Goal Outcome Summary  Goal: Goal Outcome Summary  Outcome: No Change  [id number]: A&O, VSS on RA. Pt c/o pain in her abdomen that was well controlled with PRN Oxy x1. C/o nausea x1; PRN Zofran given with relief. Pt continues with colonoscopy prep using her G tube as insertion site; tolerating well. Pt to have colonoscopy in OR tomorrow, time of procedure unknown. Pt will need to shower before hand. NPO at midnight. Pt was incontinent of urine x1. PIV patent and infusing IVMF's. Continue to monitor and follow POC.

## 2017-02-21 NOTE — PLAN OF CARE
Problem: Goal Outcome Summary  Goal: Goal Outcome Summary  Outcome: No Change  D/I: Pt is A&O x4, vitally stable on RA. Completed Golytely before midnight and stool clear. No c/o nausea/pain this shift. G tube clamped. Up in room independently. Maintenance IVF infusing at 125/hr.    P: Continue to monitor and follow POC. NPO for colonoscopy today.

## 2017-02-21 NOTE — PROGRESS NOTES
Care Coordinator- Discharge Planning     Admission Date/Time:  2/15/2017  Attending MD:  Tien Hunt MD     Data  Date of initial CC assessment:  Chart reviewed, discussed with interdisciplinary team.   Patient was admitted for:   1. Abdominal pain, generalized    2. Leukocytosis, unspecified type    3. Bacteremia           Assessment  Per ID recommendations, patient able to discharge on oral levaquin. IV antibiotic referral with [facility] discontinued. It is also recommended that patients GJ tube be removed. If that is the case, writer will cancel patients TF with [facility] as well. [facility] to continue to follow for discharge planning.       Plan  Anticipated Discharge Date: TBD  Anticipated Discharge Plan: home    Moriah Toribio

## 2017-02-21 NOTE — PLAN OF CARE
Problem: Goal Outcome Summary  Goal: Goal Outcome Summary  Outcome: No Change  D/I: Pt is A&O x4, vitally stable on RA. Completed Golytely before midnight and stool clear. No c/o nausea/pain this shift. G tube clamped. Up in room independently. Maintenance IVF infusing at 100/hr.    P: Continue to monitor and follow POC. NPO for colonoscopy today.

## 2017-02-22 ENCOUNTER — CARE COORDINATION (OUTPATIENT)
Dept: CARDIOLOGY | Facility: CLINIC | Age: 37
End: 2017-02-22

## 2017-02-22 ENCOUNTER — MEDICAL CORRESPONDENCE (OUTPATIENT)
Dept: HEALTH INFORMATION MANAGEMENT | Facility: CLINIC | Age: 37
End: 2017-02-22

## 2017-02-22 VITALS
DIASTOLIC BLOOD PRESSURE: 77 MMHG | RESPIRATION RATE: 16 BRPM | WEIGHT: 187 LBS | SYSTOLIC BLOOD PRESSURE: 140 MMHG | HEART RATE: 80 BPM | OXYGEN SATURATION: 98 % | BODY MASS INDEX: 30.18 KG/M2 | TEMPERATURE: 96.7 F

## 2017-02-22 LAB
ANION GAP SERPL CALCULATED.3IONS-SCNC: 8 MMOL/L (ref 3–14)
BACTERIA SPEC CULT: ABNORMAL
BUN SERPL-MCNC: 6 MG/DL (ref 7–30)
CALCIUM SERPL-MCNC: 8.5 MG/DL (ref 8.5–10.1)
CHLORIDE SERPL-SCNC: 110 MMOL/L (ref 94–109)
CO2 SERPL-SCNC: 23 MMOL/L (ref 20–32)
CREAT SERPL-MCNC: 0.67 MG/DL (ref 0.52–1.04)
ERYTHROCYTE [DISTWIDTH] IN BLOOD BY AUTOMATED COUNT: 16.5 % (ref 10–15)
GFR SERPL CREATININE-BSD FRML MDRD: ABNORMAL ML/MIN/1.7M2
GLUCOSE SERPL-MCNC: 83 MG/DL (ref 70–99)
HCT VFR BLD AUTO: 26.4 % (ref 35–47)
HGB BLD-MCNC: 8 G/DL (ref 11.7–15.7)
MCH RBC QN AUTO: 23.8 PG (ref 26.5–33)
MCHC RBC AUTO-ENTMCNC: 30.3 G/DL (ref 31.5–36.5)
MCV RBC AUTO: 79 FL (ref 78–100)
MICRO REPORT STATUS: ABNORMAL
PLATELET # BLD AUTO: 504 10E9/L (ref 150–450)
POTASSIUM SERPL-SCNC: 3.8 MMOL/L (ref 3.4–5.3)
RBC # BLD AUTO: 3.36 10E12/L (ref 3.8–5.2)
SODIUM SERPL-SCNC: 141 MMOL/L (ref 133–144)
SPECIMEN SOURCE: ABNORMAL
WBC # BLD AUTO: 4.9 10E9/L (ref 4–11)

## 2017-02-22 PROCEDURE — 80048 BASIC METABOLIC PNL TOTAL CA: CPT | Performed by: STUDENT IN AN ORGANIZED HEALTH CARE EDUCATION/TRAINING PROGRAM

## 2017-02-22 PROCEDURE — 25000132 ZZH RX MED GY IP 250 OP 250 PS 637: Performed by: STUDENT IN AN ORGANIZED HEALTH CARE EDUCATION/TRAINING PROGRAM

## 2017-02-22 PROCEDURE — 25000128 H RX IP 250 OP 636: Performed by: INTERNAL MEDICINE

## 2017-02-22 PROCEDURE — 25000132 ZZH RX MED GY IP 250 OP 250 PS 637: Performed by: INTERNAL MEDICINE

## 2017-02-22 PROCEDURE — 36415 COLL VENOUS BLD VENIPUNCTURE: CPT | Performed by: STUDENT IN AN ORGANIZED HEALTH CARE EDUCATION/TRAINING PROGRAM

## 2017-02-22 PROCEDURE — 85027 COMPLETE CBC AUTOMATED: CPT | Performed by: STUDENT IN AN ORGANIZED HEALTH CARE EDUCATION/TRAINING PROGRAM

## 2017-02-22 PROCEDURE — 99239 HOSP IP/OBS DSCHRG MGMT >30: CPT | Mod: GC | Performed by: PEDIATRICS

## 2017-02-22 RX ORDER — LEVOFLOXACIN 750 MG/1
750 TABLET, FILM COATED ORAL DAILY
Qty: 9 TABLET | Refills: 0 | Status: SHIPPED
Start: 2017-02-22 | End: 2017-03-03

## 2017-02-22 RX ADMIN — LIDOCAINE 2 PATCH: 50 PATCH TOPICAL at 10:13

## 2017-02-22 RX ADMIN — DULOXETINE 60 MG: 60 CAPSULE, DELAYED RELEASE ORAL at 08:17

## 2017-02-22 RX ADMIN — MEROPENEM 1 G: 1 INJECTION, POWDER, FOR SOLUTION INTRAVENOUS at 00:01

## 2017-02-22 RX ADMIN — MEROPENEM 1 G: 1 INJECTION, POWDER, FOR SOLUTION INTRAVENOUS at 08:17

## 2017-02-22 RX ADMIN — SODIUM CHLORIDE: 9 INJECTION, SOLUTION INTRAVENOUS at 01:45

## 2017-02-22 NOTE — PROGRESS NOTES
"[facility]  \"Hello, my name is Layla Isidro , and I am calling from the [facility].  I want to check in and see how you are doing, after leaving the hospital.  You may also receive a call from your Care Coordinator (care team), but I want to make sure you don t have any urgent needs.  I have a couple questions to review with you:     Post-Discharge Outreach                                                    Doreen Peralta is a 36 year old female     Follow-up Appointments           Adult [facility] Follow-up and recommended labs and tests       Follow up with primary care provider, Larisa Lorenzo, within 7 days for hospital follow- up.   Follow-up with infectious disease in 3-4 weeks with Dr. Price.     Appointments on [place] and/or [facility] (with [facility] or [facility] provider or service). Call [phone] if you haven't heard regarding these appointments within 7 days of discharge.                       Your next 10 appointments already scheduled            Mar 27, 2017 9:00 AM CDT   (Arrive by 8:45 AM)   Return Visit with Yuri Wahl MD   [facility] ([facility])               Care Team:    Patient Care Team       Relationship Specialty Notifications Start End    Larisa Lorenzo PA-C PCP - General Physician Assistant  4/11/14     Phone: [phone] Fax: [phone]         [facility] [address] [facility] [address]    Sheridan Harris PA-C Physician Assistant Physician Assistant  2/4/14     Phone: [phone] Fax: [phone]         [facility] [address] [facility] [address]    Elsa Medel MD Referring Physician Colon and Rectal Surgery  11/13/15     Comment:  referring to interventional radiology    Phone: [phone] Fax: [phone]          [facility] [address] [facility] [address] [facility] [address]    Crystal Pérez, RN Nurse Coordinator Colon and Rectal " Surgery Admissions 11/19/15     Comment:  [phone]    Phone: [phone]         Yuri Wahl MD Physician Internal Medicine  9/13/16     Phone: [phone] Fax: [phone]         [facility] [address] [facility] [address]    Paul Mata MD MD Rheumatology  1/13/17     Comment:  Referred pt to ID    Phone: [phone] Fax: [phone]         [address] [address]    Todd Price MD Resident Student in organized health care education/training program  1/13/17     Phone: [phone] Fax: [phone]         [facility] [address] [address]            Transition of Care Review                                                      Patient was called three times and no answer so post 24 hr DC follow up calls will be closed out       Next 5 appointments (look out 90 days)     Feb 28, 2017 12:40 PM CST   Office Visit with Larisa Lorenzo PA-C   [facility]    [address] [address] [facility] [address] [phone]                      Plan                                                      Thanks for your time.  Your Care Coordinator may follow-up within the next couple days.  In the meantime if you have questions, concerns or problems call your care team.        Layla Isidro

## 2017-02-22 NOTE — PLAN OF CARE
Problem: Goal Outcome Summary  Goal: Goal Outcome Summary  Outcome: Improving  /77  Pulse 80  Temp 96.7  F (35.9  C) (Oral)  Resp 16  Wt 84.8 kg (187 lb)  LMP 01/19/2017  SpO2 98%  BMI 30.18 kg/m2 VSS stable on RA.  Alert/ox4.  Enteric isolation.  36 year old female admitted with abdominal pain, leukocytosis, anorexia, dehydration,and bacteremia.  PMH includes;  Gastroparesis s/p multiple bowel surgeries resulting in G-J tube. Pt slept between cares. G-J tube intact and changed dressings at HS. Regular diet.  Patient endorses her plans to make decision whether to have G-J tube removed and is considering the changes as potential source of infection per GI.  PRN oxy given x1 for abdominal  Pain.  L PIV patent and infusing NS at 125ml/hr.   IV Meropenem given per orders.  Pt hopeful to be discharged early in am. Note recommendations for follow up with chronic GI symptoms including abdominal pain via pain clinic and psychiatry.  Pt would benefit from f/u support  With  Melecio Smith RN CC   ( [phone] or [phone] ) to help coordinate oupt follow pain team support.    Plan:  Continue to monitor and follow POC.  Notify MD of changes.  Pt hopeful to be discharged early in am.

## 2017-02-22 NOTE — PLAN OF CARE
Problem: Goal Outcome Summary  Goal: Goal Outcome Summary  Outcome: No Change  Pt is A/Ox4. Pt ate 75% of the evening meal, and did request oral Zofran for nausea, no emesis was noted this shift, no further complaints of nausea was made after Zofran was given. Pt independent with ambulation and did ambulate outside of room this shift. VSS. Continue to monitor.

## 2017-02-22 NOTE — PLAN OF CARE
Problem: Goal Outcome Summary  Goal: Goal Outcome Summary  Outcome: Adequate for Discharge Date Met:  02/22/17  Pt discharging home, family providing ride. PIV removed. Pt instructed on importance of antibiotic regimen and finishing the entire dose. Discharge packet given with follow up appt instructions, where to  antibiotic scripts, and activity and diet instructions. Pt had no further questions.

## 2017-02-22 NOTE — PROGRESS NOTES
Care Coordinator- Discharge Planning     Admission Date/Time:  2/15/2017  Attending MD:  Tien Hunt MD     Data  Date of initial CC assessment:  2/17/17  Chart reviewed, discussed with interdisciplinary team.   Patient was admitted for:   1. Abdominal pain, generalized    2. Leukocytosis, unspecified type    3. Bacteremia         Assessment  Full assessment completed in previous note    Coordination of Care and Referrals: Provided patient/family with options for Home Infusion.  Patient medically cleared for discharge today. Will continue on TF at home until outpatient appointment to take GJ tube out. Notified [facility]. DC on oral abx.      Plan  Anticipated Discharge Date:  today  Anticipated Discharge Plan:  Home with resumption of services    CTS Handoff completed:  YES    Moriah Toribio RN

## 2017-02-23 ENCOUNTER — CARE COORDINATION (OUTPATIENT)
Dept: CARE COORDINATION | Facility: CLINIC | Age: 37
End: 2017-02-23

## 2017-02-23 LAB
BACTERIA SPEC CULT: NO GROWTH
MICRO REPORT STATUS: NORMAL
SPECIMEN SOURCE: NORMAL

## 2017-02-23 NOTE — LETTER
[facility]  [address] [place] [address]    February 23, 2017    Doreen Peralta  [facility]   [place] [address]    Dear Doreen,  I am the Clinic Care Coordinator that works with your primary care provider's clinic. I have been trying to reach you recently to introduce Clinic Care Coordination and to see if there was anything I could assist you with.  Below is a description of what Clinic Care Coordination is and how I can further assist you.     The Clinic Care Coordinator role is a Registered Nurse and/or  who understands the health care system. The goal of Clinic Care Coordination is to help you manage your health and improve access to the [facility] in the most efficient manner.  The Registered Nurse can assist you in meeting your health care goals by providing education, coordinating services, and strengthening the communication among your providers. The  can assist you with financial, behavioral, psychosocial, and chemical dependency and counseling/psychiatric resources.    Please feel free to keep this letter and contact information to contact me at [phone] with any further questions or concerns that may arise. We at [facility] are focused on providing you with the highest-quality healthcare experience possible and that all starts with you.       Sincerely,     Neha Pierre RN BSN, PHN RN Care Coordinator  [facility]  [email or web address]  [phone]  2/23/2017 3:35 PM  Enclosed: I have enclosed a copy of a 24 Hour Access Plan. This has helpful phone numbers for you to call when needed. Please keep this in an easy to access place to use as needed.

## 2017-02-23 NOTE — PROGRESS NOTES
Clinic Care Coordination Contact  [facility]/Voicemail    Referral Source: CTS    Clinical Data: Care Coordinator Outreach: Pt discharged from [facility] with history of chronic abdominal pain requiring enteral support.  Pt has a f/u appointment with her PCP scheduled for 2/28/17, ID f/u on 3/23/17 and GI f/u on 3/27/17.  Per hospital discharge summary providers are to review possibility of having pt GJ removed.  Pt diet was advanced to a regular diet however pt continues with enteral support.     Outreach attempted x 1.  Left message on voicemail with call back information and requested return call.    Plan: Care Coordinator will mail out care coordination introduction letter with care coordinator contact information and explanation of care coordination services. Care Coordinator will try to reach patient again in 1-2 business days.      Neha Pierre RN BSN, PHN RN Care Coordinator  [facility]  [email or web address]  [phone]  2/23/2017 3:29 PM

## 2017-02-23 NOTE — LETTER
My Access Plan   Presenting Problem Signs and Symptoms Treatment Plan    Questions or concerns during clinic hours    I will call the clinic directly:    [facility]   [address]  [place] [address]   [phone]        Questions or concerns outside clinic hours    I will call the 24 hour nurse line at [phone]    Patient needs to schedule an appointment    I will call the 24 hour scheduling team at [phone] or clinic directly at [phone]    Same day treatment     I will call the clinic first, nurse line if after hours, urgent care and express care if needed   Clinic Care Coordinators (RN/Social Work):   Melecio Smith RN   [facility]      Melecio WARNER [phone]    Nohelia VIDALW  [phone]       Crisis Services: Behavioral or Mental Health   [facility] ([facility] Providers) [phone]    [facility] [phone]    [facility] [phone]    [facility] (24/7): [phone]       Emergency treatment--Immediately    [facility] 821

## 2017-02-23 NOTE — DISCHARGE SUMMARY
[facility] 2  Discharge Summary    Doreen Peralta  [id number]    Date of Admission:  2/15/2017  Date of Discharge:  2/22/2017   Admitting Physician:  Franck Meraz MD  Discharge Physician:  Tien Hunt  Discharging Service:  Internal Medicine, [facility] 2           Follow up Summary:     Primary Care Physician   Larisa Lorenzo     Follow up with PCP in 1-2 weeks post discharge.  - Discuss pain management with non opioids.    - Discuss G-J tube removal given concern for possible source.      Follow up with ID   - Monitor for resolution of bacteremia.      Follow up with GI upon decision to remove G-J tube as discussed .      Discharge Diagnoses      1] Bacteremia - acinetobacter and ochrobacterium.     2] Acute on chronic abdominal pain.     3] h/o DVT.     4] Asthma.     5] Anxiety.      History of Present Illness     Doreen Peralta is a 36 year old female slow transit constipation s/p partial colectomy, GJ tube placement for venting/nutriotion who presents with recurrent bacteremia of unknown source and chronic abdominal pain of unclear origin.    Consultations This Hospital Stay      GI  Infectious disease.      Hospital Course       Acinetobacter & Ochrobacterium anthropi bacteremia - source unclear.  - Colonoscopy is negative recommendation is removal of GJ tube - see results below.  - ID and GI consulted  - BC 2/18,19, 20 negative to date  - On meropenem per ID during hospital stay.    - Transitioned to Levofloxacin PO to complete a course until 3/3/2017.    - Follow up with ID for continued monitoring for bacteremia.    - Long discussion with GI and ID - strange circumstances for recurrent bacteremia  - in the past with klebsiella oxytoca and now with the organisms above. There was a concern for possible G-J tube causing persistent bacteremia - however there is currently no clear source identified. See imaging that has been done thus far - more than 40 CTs over the past few  years. Unlikely this intentional contamination as one would expect more of a polymicrobial infection.    - Given ongoing concerns with recurrent bacteremia - pt will follow up with PCP, ID and discuss G-J tube removal as an outpt - she will need to schedule with Dr. Joshi - GI for removal of her separate J tube endoscopically.  G tube can be removed at bedside.     Chronic abd pain - there is not pathology to warrant the use of opiates. The patient has had ~39 CT abd/pelvis without pathology.   Per GI, they would be hesitant to diagnose patient with abnormal GI motility disorder given her opioid use and largely negative workup for underlying GI disorder. Thus, there is not currently a clinical indication for GJ placement per GI, and could be possible source of her bacteremia.  - Wean opiates: oxycodone 5mg Q6hr prn today, 5mg Q8hr prn tomorrow then discontinue  - Would not recommend opiates on discharge  - the patient does not have evidence of gastroparesis per GI  - Counseled patient on considering psychology and psychiatry to assist with coping with chronic pain.        Stable conditions  Bilateral UE DVTs - On Xarelto  Asthma - albuterol prn   Anxiety - monitor      Physical Exam   Blood pressure 140/77, pulse 80, temperature 96.7  F (35.9  C), temperature source Oral, resp. rate 16, weight 84.8 kg (187 lb), last menstrual period 01/19/2017, SpO2 98 %, not currently breastfeeding.  General: No acute distress, resting comfortably in hospital bed.    HEENT: EOMI, PERRL MM moist.    Neck: Supple.    Respiratory: CTAB.    Heart/CV: RRR, no murmurs, rubs or gallops.    Abdomen/GI: soft, non tender. NA bowel sounds.  G-J tube in place.    Extremities/MSK: No lower extremity edema.    Skin: No skin breaks or rash.    Neuro: No focal neurologic findings.    Psych: Affect normal. Mood normal.      Lines/Tubes:      Significant Results and Procedures        Colonoscopy 2/19:     Findings:        The perianal and digital  rectal examinations were normal.        The rectum and sigmoid colon appeared normal.        There was evidence of a prior side-to-side ileo-colonic anastomosis in        the recto-sigmoid colon. This was patent. This was characterized by        healthy appearing mucosa. This was traversed. Healthy look mucosa in        entire examined terminal ileum.                                                                                     Impression:          - The rectum and sigmoid colon appeared normal.                        - Patent side-to-side healthy looking ileo-colonic                        anastomosis.                        - Normal examined terminal ileum.                        - No source of bacteremia found during this endoscopic                        exam.   Recommendation:      - Return patient to [facility] for [facility] care.                        - Return to previous diet.                                                       Pending Results   These results will be followed up by PCP, ID  Unresulted Labs Ordered in the Past 30 Days of this Admission     Date and Time Order Name Status Description    2/20/2017 0100 Blood culture Preliminary     2/20/2017 0100 Blood culture Preliminary     2/19/2017 0100 Blood culture Preliminary     2/19/2017 0100 Blood culture Preliminary     2/18/2017 0100 Blood culture Preliminary     2/18/2017 0100 Blood culture Preliminary     2/17/2017 0729 Blood culture Preliminary     2/17/2017 0729 Blood culture Preliminary           Discharge Medications   Discharge Medication List as of 2/22/2017 10:00 AM      START taking these medications    Details   levofloxacin (LEVAQUIN) 750 MG tablet Take 1 tablet (750 mg) by mouth daily for 9 days, Disp-9 tablet, R-0, Fax         CONTINUE these medications which have NOT CHANGED    Details   ondansetron (ZOFRAN ODT) 4 MG ODT tab Take 1-2 tablets (4-8 mg) by mouth every 6 hours as needed for nausea, Disp-40 tablet, R-0, Local  Print      mirtazapine (REMERON SOL-TAB) 15 MG ODT tab 0.5 tablets (7.5 mg) by Orally disintegrating tablet route At Bedtime, Disp-45 tablet, R-0, E-Prescribe      rivaroxaban ANTICOAGULANT (XARELTO) 20 MG TABS tablet Take 1 tablet (20 mg) by mouth daily (with dinner), Disp-90 tablet, R-1, E-Prescribe      DULoxetine (CYMBALTA) 60 MG capsule Take 1 capsule (60 mg) by mouth daily, Disp-90 capsule, R-3, E-Prescribe      cloNIDine (CATAPRES) 0.2 MG tablet Take 2 tablets (0.4 mg) by mouth every evening, Disp-60 tablet, R-5, E-Prescribe      Nutritional Supplements (COMPLEAT) LIQD Infuse at 50 ml/hour for 5 hours daily through j-tube  Flush j-tube with 30 ml water before and after each infusion of Compleat, Disp-30 each, R-0, Fax      ACETAMINOPHEN PO Take 500-1,000 mg by mouth every 6 hours as needed for pain , Historical      albuterol 90 MCG/ACT inhaler Inhale 2 puffs into the lungs every 6 hours as needed , Historical      VITAMIN D, CHOLECALCIFEROL, PO Take 2,000 Units by mouth daily, Historical      nortriptyline (PAMELOR) 10 MG capsule Take 3-4 capsules (30-40 mg) by mouth At Bedtime, Disp-120 capsule, R-1, E-Prescribe      SUMAtriptan (IMITREX) 50 MG tablet Take 1-2 tablets ( mg) by mouth at onset of headache for migraine - may repeat dose after 2h if headache recurs.  Max: 200mg/24 hours, Disp-18 tablet, R-1, E-Prescribe           Discharge Orders     Discharge Procedure Orders  Home infusion referral     Activity   Order Comments: Your activity upon discharge: activity as tolerated   Order Specific Question Answer Comments   Is discharge order? Yes      Reason for your hospital stay   Order Comments: You had bacterial infection in your bloodstream from unknown source which we are continuing treatment for (until 3/3). YOu were started on meropenem IV, transitioned to levofloxacin and continue until 3/3/17 as prescribed. There's a possibility the G-J tube is the source of infection, but not clear, and no  medical reason why you would need this, so would encourage oral diet, and follow-up with GI as outpatient to determine whether you still need this. Workup was negative for gastrointestinal etiology at this time.     Adult [facility] Follow-up and recommended labs and tests   Order Comments: Follow up with primary care provider, Larisa Lorenzo, within 7 days for hospital follow- up.    Follow-up with infectious disease in 3-4 weeks with Dr. Price.    Appointments on [place] and/or [facility] (with [facility] or [facility] provider or service). Call [phone] if you haven't heard regarding these appointments within 7 days of discharge.     Discharge Instructions   Order Comments: Please go to all follow-up appointments and take all medications as prescribed.  Take levofloxacin 750 mg once a day until 3/3/17.  Follow-up with your PCP in 1 week and with infectious disease in 3-4 weeks with Dr. Price after you finish your antibiotic and see how you are doing.  Will need to trial just oral intake for 3 months and at that time if you tolerate, discuss with your primary care physician and infectious disease to determine plan for your G-tube. Primary care physician can help you set up an appointment with the gastroenterologists.  Consider psychology and psychiatry follow-up if you need help with life-stressors and pain.  Avoid narcotics as they can worsen your abdominal symptoms and a cause for dependency.  If you experience any fevers, shortness of breath, worsening abdominal symptoms, or any other life-threatening or concerning symptoms, please see a health-care provider as soon as possible.     Full Code     Diet   Order Comments: Follow this diet upon discharge:     Regular Diet Adult   Order Specific Question Answer Comments   Is discharge order? Yes         Allergies   Allergies   Allergen Reactions     Hyoscyamine Rash     Metoclopramide Other (See Comments)     Eye twitching.      Peaches [Peach] Other (See  Comments)     Raw. Cooked OK     Sucralose Other (See Comments)     All artificial sweeteners. Aspartame also. Swollen glands     Advair Diskus Other (See Comments)     Throat burns     Azithromycin Other (See Comments)     Burning in throat     Compazine [Prochlorperazine] Visual Disturbance     Contrast Dye Itching     States is allergic to CT contrast dye     Cyclobenzaprine Visual Disturbance     Fentanyl Other (See Comments)     migraine     Ibuprofen GI Disturbance     Lactulose Nausea and Vomiting     Gas and bloating     Levaquin [Levofloxacin] Swelling     Per ED M.D. And RN      Morphine Sulfate Other (See Comments)     Chest pain       Oxycodone Other (See Comments)     Burning throat, but can take Norco.      Penicillins Other (See Comments)     Family hx of resp arrest, she has never taken  Ok with cephalosporins     Rizatriptan Visual Disturbance     Droperidol Hives and Rash     Isovue [Iopamidol] Palpitations     Pt had racing heart and sob      Ketorolac Anxiety     Latex Swelling and Rash     Kiwi, likely also avacado, ? banana     Levsin Rash     Data   Results for orders placed or performed during the hospital encounter of 02/15/17   US Abdomen Complete w Doppler Complete    Narrative    EXAMINATION: US ABDOMEN COMPLETE WITH DOPPLER,  2/16/2017 10:43 AM     COMPARISON: CT 1/20/2017    HISTORY: Klebsiella bacteremia, unknown source.    TECHNIQUE: The abdomen was scanned in standard fashion with  specialized ultrasound transducer(s) using both gray-scale and limited  color Doppler techniques.    Findings:  Liver: The liver demonstrates normal homogeneous echotexture. No  evidence of a focal hepatic mass. The main portal vein is patent with  antegrade flow, measuring 0.6 cm.    Extrahepatic portal vein flow is antegrade, measuring 39 cm/sec.  Right portal vein flow is antegrade, measuring 19 cm/sec.  Left portal vein flow is antegrade, measuring 30 cm/sec.    Flow in the hepatic artery is towards  the liver and:  144 cm/sec peak systolic  0.67 resistive index.     The splenic vein is patent and flow is towards the liver.  The left,  middle, and right hepatic veins are patent with flow towards the IVC.  The IVC is patent with flow towards the heart.   The visualized aorta  is not dilated.    Gallbladder: Gallbladder is surgically absent.    Bile Ducts: Both the intra- and extrahepatic biliary system are of  normal caliber. The common bile duct measures 4 mm in diameter.    Pancreas: Visualized portions of the pancreas are unremarkable.     Kidneys: Both kidneys are of normal echotexture, without mass or  hydronephrosis.  The craniocaudal dimensions are: right- 11 cm, left-  11.2 cm.    Spleen: The spleen is normal in size,  measuring 12.5 cm in sagittal  dimension.    Aorta and IVC: The visualized portions of the aorta and IVC are  unremarkable. The proximal aorta measures 1.8 cm in diameter and the  IVC measures 1.4 cm in diameter.    Fluid: No evidence of ascites or pleural effusions.      Impression    Impression:   1.  Normal grayscale and color Doppler ultrasound evaluation of the  liver.  2.  No abdominal source of infection identified sonographically.    I have personally reviewed the examination and initial interpretation  and I agree with the findings.    CHANO ARIZA MD   US Pelvic Complete w Transvaginal & Abd/Pel Duplex Limited    Narrative    EXAMINATION: US PELVIS COMPLETE W TRANSVAGINAL AND DOPPLER LIMITED,  2/16/2017 10:55 AM     COMPARISON: CT 1/20/2017.    HISTORY: Bacteremia, evaluation for source    TECHNIQUE: The pelvis was scanned in standard fashion with  transabdominal and transvaginal transducer(s) using both grey scale  and color Doppler techniques.    FINDINGS:  The uterus measures 9.5 x 5.0 x 4.1 cm cm, and there is no evidence of  a focal fibroid.  The uterus is retroverted. The endometrium is within  normal limits and measures 4.3 mm. There is a small cyst in the lower  uterine  segment. There is no free fluid in the pelvis.    The right ovary is not visualized, reportedly surgically absent. No  abnormal mass seen in the right adnexa. The left ovary measures 3.0 x  2.4 x 1.8 cm. There is normal blood flow to the ovaries.      Impression    IMPRESSION: Negative pelvic ultrasound. No sonographic findings to  explain bacteremia.    I have personally reviewed the examination and initial interpretation  and I agree with the findings.    CHANO ARIZA MD   US Upper Extremity Venous Duplex Right Portable    Narrative    US UPPER EXTREMITY VENOUS DUPLEX RIGHT PORTABLE   2/18/2017 9:24 AM      HISTORY: Concern for superficial thrombophlebitis vs cellulitis    COMPARISON: CT chest 1/20/2017    Findings:   Right side:  The internal jugular, innominate, subclavian, and axillary veins  demonstrate normal phasicity. The internal jugular, axillary,  brachial, and cephalic veins are fully compressible without evidence  of internal thrombus. Partially occlusive thrombus in the basilic vein  in the mid upper arm. The basilic vein is otherwise fully compressible  throughout.      Impression    Impression: Partially occlusive thrombus in the basilic vein in the  mid upper arm. The basilic vein is a superficial vein.    I have personally reviewed the examination and initial interpretation  and I agree with the findings.    JOON HUFF MD     *Note: Due to a large number of results and/or encounters for the requested time period, some results have not been displayed. A complete set of results can be found in Results Review.       Discharge Disposition   Discharged to home  Condition at discharge: Stable  Code Status   Full Code    Pt was seen and discussed with Dr. Gilbert Og MD    PGY2  [phone]

## 2017-02-24 ENCOUNTER — TELEPHONE (OUTPATIENT)
Dept: FAMILY MEDICINE | Facility: CLINIC | Age: 37
End: 2017-02-24

## 2017-02-24 LAB
BACTERIA SPEC CULT: ABNORMAL
BACTERIA SPEC CULT: NO GROWTH
BACTERIA SPEC CULT: NO GROWTH
MICRO REPORT STATUS: ABNORMAL
MICRO REPORT STATUS: NORMAL
MICRO REPORT STATUS: NORMAL
SPECIMEN SOURCE: ABNORMAL
SPECIMEN SOURCE: NORMAL
SPECIMEN SOURCE: NORMAL

## 2017-02-24 NOTE — TELEPHONE ENCOUNTER
Care plan/treatment orders received for patient. Form placed in Dr. Lopez's in box for signature in absence of Larisa.

## 2017-02-25 LAB
BACTERIA SPEC CULT: NO GROWTH
BACTERIA SPEC CULT: NO GROWTH
MICRO REPORT STATUS: NORMAL
MICRO REPORT STATUS: NORMAL
SPECIMEN SOURCE: NORMAL
SPECIMEN SOURCE: NORMAL

## 2017-02-28 ENCOUNTER — TELEPHONE (OUTPATIENT)
Dept: FAMILY MEDICINE | Facility: CLINIC | Age: 37
End: 2017-02-28

## 2017-02-28 DIAGNOSIS — G43.009 MIGRAINE WITHOUT AURA AND WITHOUT STATUS MIGRAINOSUS, NOT INTRACTABLE: ICD-10-CM

## 2017-02-28 RX ORDER — SUMATRIPTAN 50 MG/1
50-100 TABLET, FILM COATED ORAL
Qty: 18 TABLET | Refills: 1 | Status: SHIPPED | OUTPATIENT
Start: 2017-02-28 | End: 2018-02-20

## 2017-02-28 NOTE — TELEPHONE ENCOUNTER
Routing refill request to provider for review/approval because:  Patient needs to be seen because:  Due for hospital f/u and flag warnings, RN unable to refill  Pended for approval.  Ekta Jaimes RN

## 2017-02-28 NOTE — TELEPHONE ENCOUNTER
SUMATRIPTAN      Last Written Prescription Date: 2-3-17  Last Fill Quantity: 18, # refills: 1  Last Office Visit with [facility], [facility] or [facility] prescribing provider: 1-26-17       BP Readings from Last 3 Encounters:   02/22/17 140/77   02/15/17 116/80   02/06/17 125/64

## 2017-03-03 NOTE — PROGRESS NOTES
Clinic Care Coordination Contact  [facility]/Voicemail    Referral Source: CTS  Clinical Data: Care Coordinator Outreach  Outreach attempted x 3.  Left message on voicemail with call back information and requested return call.  Plan: Care Coordinator mailed out care coordination introduction letter on 2/23/17. Care Coordinator will do no further outreaches at this time.      Neha Pierre, RN BSN, PHN RN Care Coordinator  [facility]  [email or web address]  [phone]  3/3/2017 4:05 PM

## 2017-03-08 ENCOUNTER — TELEPHONE (OUTPATIENT)
Dept: GASTROENTEROLOGY | Facility: CLINIC | Age: 37
End: 2017-03-08

## 2017-03-15 ENCOUNTER — CARE COORDINATION (OUTPATIENT)
Dept: GASTROENTEROLOGY | Facility: CLINIC | Age: 37
End: 2017-03-15

## 2017-03-15 NOTE — PROGRESS NOTES
Left detailed message on voicemail for Doreen reminding of upcoming appointment with Dr. Joshi 3/27.  Provided contact information if needs to reschedule.

## 2017-03-20 DIAGNOSIS — F41.9 ANXIETY: ICD-10-CM

## 2017-03-20 DIAGNOSIS — K31.84 GASTROPARESIS: ICD-10-CM

## 2017-03-20 DIAGNOSIS — R11.10 INTRACTABLE VOMITING, PRESENCE OF NAUSEA NOT SPECIFIED, UNSPECIFIED VOMITING TYPE: ICD-10-CM

## 2017-03-20 RX ORDER — CLONIDINE HYDROCHLORIDE 0.2 MG/1
0.4 TABLET ORAL EVERY EVENING
Qty: 180 TABLET | Refills: 0 | Status: SHIPPED | OUTPATIENT
Start: 2017-03-20 | End: 2017-06-24

## 2017-03-20 RX ORDER — ONDANSETRON 4 MG/1
4-8 TABLET, ORALLY DISINTEGRATING ORAL EVERY 6 HOURS PRN
Qty: 40 TABLET | Refills: 0 | Status: ON HOLD | OUTPATIENT
Start: 2017-03-20 | End: 2017-04-09

## 2017-03-20 RX ORDER — MIRTAZAPINE 15 MG/1
7.5 TABLET, ORALLY DISINTEGRATING ORAL AT BEDTIME
Qty: 45 TABLET | Refills: 0 | Status: SHIPPED | OUTPATIENT
Start: 2017-03-20 | End: 2018-04-08

## 2017-03-20 NOTE — TELEPHONE ENCOUNTER
CLONIDINE      Last Written Prescription Date: 2-22-17  Last Fill Quantity: 60, # refills: 5  Last Office Visit with [facility], [facility] or [facility] prescribing provider: 1-26-17       Potassium   Date Value Ref Range Status   02/22/2017 3.8 3.4 - 5.3 mmol/L Final     Creatinine   Date Value Ref Range Status   02/22/2017 0.67 0.52 - 1.04 mg/dL Final     BP Readings from Last 3 Encounters:   02/22/17 140/77   02/15/17 116/80   02/06/17 125/64       Mirtazapine       Last Written Prescription Date: 02/22/17  Last Fill Quantity: 45; # refills: 0  Last Office Visit with [facility], [facility] or [facility] prescribing provider:  01/26/17        Last PHQ-9 score on record=   PHQ-9 SCORE 8/3/2016   Total Score -   Total Score 7       Lab Results   Component Value Date    AST 20 02/15/2017     Lab Results   Component Value Date    ALT 31 02/15/2017         Ondansetron      Last Written Prescription Date: 01/09/17  Last Fill Quantity: 30,  # refills: 0   Last Office Visit with [facility], [facility] or [facility] prescribing provider: 01/26/17

## 2017-03-20 NOTE — TELEPHONE ENCOUNTER
Routing refill request to provider for review/approval because:  Drug interaction warning-above recommended dose  Zofran is new dosage from other provider.   Pt due for follow up and reminder given X1.

## 2017-03-22 DIAGNOSIS — M79.2 NEUROPATHIC PAIN: ICD-10-CM

## 2017-03-22 DIAGNOSIS — F51.01 PRIMARY INSOMNIA: ICD-10-CM

## 2017-03-22 NOTE — TELEPHONE ENCOUNTER
Nortriptyline     Last Written Prescription Date:2/24/17  Last Fill Quantity: 120, # refills:1  Last Office Visit with [facility], [facility]P or  Health prescribing provider:1/26/17       BP Readings from Last 3 Encounters:   02/22/17 140/77   02/15/17 116/80   02/06/17 125/64     Last PHQ-9 score on record=   PHQ-9 SCORE 8/3/2016   Total Score -   Total Score 7

## 2017-03-22 NOTE — TELEPHONE ENCOUNTER
Routing refill request to provider for review/approval because:  Rn unable to refill with associated diagnosis.  Pended for approval.  Ekta Jaimes RN

## 2017-03-23 RX ORDER — NORTRIPTYLINE HCL 10 MG
30-40 CAPSULE ORAL AT BEDTIME
Qty: 120 CAPSULE | Refills: 5 | Status: SHIPPED | OUTPATIENT
Start: 2017-03-23 | End: 2018-04-08

## 2017-03-27 ENCOUNTER — CARE COORDINATION (OUTPATIENT)
Dept: GASTROENTEROLOGY | Facility: CLINIC | Age: 37
End: 2017-03-27

## 2017-03-27 ENCOUNTER — OFFICE VISIT (OUTPATIENT)
Dept: GASTROENTEROLOGY | Facility: CLINIC | Age: 37
End: 2017-03-27

## 2017-03-27 VITALS
WEIGHT: 185 LBS | BODY MASS INDEX: 29.73 KG/M2 | TEMPERATURE: 97.6 F | DIASTOLIC BLOOD PRESSURE: 83 MMHG | HEIGHT: 66 IN | HEART RATE: 93 BPM | OXYGEN SATURATION: 100 % | SYSTOLIC BLOOD PRESSURE: 138 MMHG

## 2017-03-27 DIAGNOSIS — K94.13 MALFUNCTIONING JEJUNOSTOMY TUBE (H): ICD-10-CM

## 2017-03-27 DIAGNOSIS — Z78.9 ENCOUNTER FOR GASTROJEJUNAL TUBE PLACEMENT: Primary | ICD-10-CM

## 2017-03-27 DIAGNOSIS — R10.84 ABDOMINAL PAIN, GENERALIZED: Primary | ICD-10-CM

## 2017-03-27 DIAGNOSIS — E46 MALNUTRITION (H): ICD-10-CM

## 2017-03-27 RX ORDER — ONDANSETRON 4 MG/1
TABLET, FILM COATED ORAL
Status: ON HOLD | COMMUNITY
Start: 2011-04-04 | End: 2017-03-30

## 2017-03-27 ASSESSMENT — PAIN SCALES - GENERAL: PAINLEVEL: MILD PAIN (2)

## 2017-03-27 NOTE — MR AVS SNAPSHOT
After Visit Summary   3/27/2017    Doreen Peralta    MRN: [id number]           Patient Information     Date Of Birth          1980        Visit Information        Provider Department      3/27/2017 9:00 AM Yuri Wahl MD [facility]        Care Instructions    I've included a brief summary of our discussion and care plan from today's visit below.  Please review this information with your primary care provider.  _______________________________________________________________________      1. Thanks for coming into see us today!    2. We will have Dr. Dunaway nurse contact you with scheduling IR for GJ exchange for efren-key    3. Start benefiber (slowly) with sugar (not the sugar free kind) 1/2 teaspoon at a time and then slowly ramp up to make sure you are tolerating it ok. Go slow and expect some bloating over time.    4. Consider additional oral nutritional supplementation by using a fortified smoothie formula: Boost/Ensure/Vivonex + ice +fruit of your choice + protein/milk powder (almond/soy milk can be a good protein alternative if lactose-intolerant), may also add yogurt/Kefir if you like.  - You may use this 1-2 times per day as your baseline nutritional supplement in addition to your regular meals.    5. Return to GI Clinic with me in 3-4 months to review your progress, sooner if symptomatic.    - If you are unable to schedule this follow-up appointment today, please contact Roxie Gregg at [phone] within the next week to help set up this necessary appointment.    _______________________________________________________________________    It was a pleasure seeing you in clinic today - please be in touch if there are any further questions that arise following today's visit.  During business hours, you may reach my Clinic Coordinator at [phone].  For urgent/emergent questions after business hours, you may reach the on-call GI Fellow by contacting the [facility]  at [phone].    Any benign/non-urgent test results are usually communicated via letter or [employer] message within 1-2 weeks after completion.  Urgent results (those that require a change in the previously-discussed care plan) are usually communicated via a phone call once available from our clinic staff to discuss the results and the next steps in your evaluation.    I recommend signing up for [employer] access if you have not already done so and are comfortable with using a computer.  This allows for online access to your lab results and also helps you communicate efficiently with my clinic should any questions arise in your care.    We have Financial Counseling services available through our clinic.  If you have questions about your insurance coverage or payment responsibilities, particularly before you undergo any tests or procedures, please let us know and we can arrange a consultation accordingly to help you make informed decisions about your healthcare.    Sincerely,     Yuri Wahl MD  Gastroenterology Fellow          Follow-ups after your visit        Follow-up notes from your care team     Return in about 3 months (around 6/27/2017).      Your next 10 appointments already scheduled     Mar 27, 2017 12:30 PM CDT   (Arrive by 12:15 PM)   New Patient Visit with Adria Joshi MD   [facility] ([facility])    [address] [place] [address] [id number] [phone]            Jul 17, 2017  9:00 AM CDT   (Arrive by 8:45 AM)   Return Visit with Yuri Wahl MD   [facility] ([facility])    [address] [place] [address] [id number] [phone]              Future tests that were ordered for you today     Open Future Orders        Priority Expected Expires Ordered    IR Gastro Jejunostomy Tube Placement Routine  3/28/2018 3/27/2017            Who to contact      "Please call your clinic at [phone] to:    Ask questions about your health    Make or cancel appointments    Discuss your medicines    Learn about your test results    Speak to your doctor   If you have compliments or concerns about an experience at your clinic, or if you wish to file a complaint, please contact [facility] Patient Relations at [phone] or email us at [email or web address]         Additional Information About Your Visit        [employer] Information     [employer] gives you secure access to your electronic health record. If you see a primary care provider, you can also send messages to your care team and make appointments. If you have questions, please call your primary care clinic.  If you do not have a primary care provider, please call [phone] and they will assist you.      [employer] is an electronic gateway that provides easy, online access to your medical records. With [employer], you can request a clinic appointment, read your test results, renew a prescription or communicate with your care team.     To access your existing account, please contact your [facility] or call [phone] for assistance.        Care EveryWhere ID     This is your Care EveryWhere ID. This could be used by other organizations to access your [place] medical records  [id number]        Your Vitals Were     Pulse Temperature Height Last Period Pulse Oximetry BMI (Body Mass Index)    93 97.6  F (36.4  C) 1.676 m (5' 6\") 03/20/2017 100% 29.86 kg/m2       Blood Pressure from Last 3 Encounters:   03/27/17 138/83   02/22/17 140/77   02/15/17 116/80    Weight from Last 3 Encounters:   03/27/17 83.9 kg (185 lb)   02/20/17 84.8 kg (187 lb)   02/06/17 81.6 kg (180 lb)              Today, you had the following     No orders found for display       Primary Care Provider Office Phone # Fax #    Larisa Lorenzo PA-C [phone]       [facility] " [place] [address]  [place] [address]        Thank you!     Thank you for choosing [facility]  for your care. Our goal is always to provide you with excellent care. Hearing back from our patients is one way we can continue to improve our services. Please take a few minutes to complete the written survey that you may receive in the mail after your visit with us. Thank you!             Your Updated Medication List - Protect others around you: Learn how to safely use, store and throw away your medicines at www.disposemymeds.org.          This list is accurate as of: 3/27/17 10:19 AM.  Always use your most recent med list.                   Brand Name Dispense Instructions for use    ACETAMINOPHEN PO      Take 500-1,000 mg by mouth every 6 hours as needed for pain       albuterol 90 MCG/ACT inhaler      Inhale 2 puffs into the lungs every 6 hours as needed       cloNIDine 0.2 MG tablet    CATAPRES    180 tablet    Take 2 tablets (0.4 mg) by mouth every evening       COMPLEAT Liqd     30 each    Infuse at 50 ml/hour for 5 hours daily through j-tube Flush j-tube with 30 ml water before and after each infusion of Compleat       DULoxetine 60 MG EC capsule    CYMBALTA    90 capsule    Take 1 capsule (60 mg) by mouth daily       mirtazapine 15 MG ODT tab    REMERON SOL-TAB    45 tablet    0.5 tablets (7.5 mg) by Orally disintegrating tablet route At Bedtime       nortriptyline 10 MG capsule    PAMELOR    120 capsule    Take 3-4 capsules (30-40 mg) by mouth At Bedtime       ondansetron 4 MG ODT tab    ZOFRAN ODT    40 tablet    Take 1-2 tablets (4-8 mg) by mouth every 6 hours as needed for nausea       ondansetron 4 MG tablet    ZOFRAN         * study - rivaroxaban oral suspension    IDS# [id number]     Take 20 mg by mouth       * rivaroxaban ANTICOAGULANT 20 MG Tabs tablet    XARELTO    90 tablet    Take 1 tablet (20 mg) by mouth daily (with dinner)       SUMAtriptan 50 MG tablet    IMITREX    18  tablet    Take 1-2 tablets ( mg) by mouth at onset of headache for migraine - may repeat dose after 2h if headache recurs.  Max: 200mg/24 hours       VITAMIN D (CHOLECALCIFEROL) PO      Take 2,000 Units by mouth daily       * Notice:  This list has 2 medication(s) that are the same as other medications prescribed for you. Read the directions carefully, and ask your doctor or other care provider to review them with you.

## 2017-03-27 NOTE — NURSING NOTE
"Chief Complaint   Patient presents with     RECHECK     abd pain       Vitals:    03/27/17 0928   BP: 138/83   Pulse: 93   Temp: 97.6  F (36.4  C)   SpO2: 100%   Weight: 83.9 kg (185 lb)   Height: 1.676 m (5' 6\")       Body mass index is 29.86 kg/(m^2).                            "

## 2017-03-27 NOTE — PATIENT INSTRUCTIONS
I've included a brief summary of our discussion and care plan from today's visit below.  Please review this information with your primary care provider.  _______________________________________________________________________      1. Thanks for coming into see us today!    2. We will have Dr. Dunaway nurse contact you with scheduling IR for GJ exchange for efren-key    3. Start benefiber (slowly) with sugar (not the sugar free kind) 1/2 teaspoon at a time and then slowly ramp up to make sure you are tolerating it ok. Go slow and expect some bloating over time.    4. Consider additional oral nutritional supplementation by using a fortified smoothie formula: Boost/Ensure/Vivonex + ice +fruit of your choice + protein/milk powder (almond/soy milk can be a good protein alternative if lactose-intolerant), may also add yogurt/Kefir if you like.  - You may use this 1-2 times per day as your baseline nutritional supplement in addition to your regular meals.    5. Return to GI Clinic with me in 3-4 months to review your progress, sooner if symptomatic.    - If you are unable to schedule this follow-up appointment today, please contact Roxie Gregg at [phone] within the next week to help set up this necessary appointment.    _______________________________________________________________________    It was a pleasure seeing you in clinic today - please be in touch if there are any further questions that arise following today's visit.  During business hours, you may reach my Clinic Coordinator at [phone].  For urgent/emergent questions after business hours, you may reach the on-call GI Fellow by contacting the [facility] at [phone].    Any benign/non-urgent test results are usually communicated via letter or MyChart message within 1-2 weeks after completion.  Urgent results (those that require a change in the previously-discussed care plan) are usually communicated via a phone call once  available from our clinic staff to discuss the results and the next steps in your evaluation.    I recommend signing up for [employer] access if you have not already done so and are comfortable with using a computer.  This allows for online access to your lab results and also helps you communicate efficiently with my clinic should any questions arise in your care.    We have Financial Counseling services available through our clinic.  If you have questions about your insurance coverage or payment responsibilities, particularly before you undergo any tests or procedures, please let us know and we can arrange a consultation accordingly to help you make informed decisions about your healthcare.    Sincerely,     Yuri Wahl MD  Gastroenterology Fellow

## 2017-03-27 NOTE — PROGRESS NOTES
Received message from GI fellow to organize a GJ tube exchange to a low profile Dwight key tube in IR.   Message left for patient for plan and to call to schedule through IR. Number given.    Order placed for tube exchange in IR.     Lea BEY RN Coordinator  Dr. Joshi, Dr. Holder & Dr. Millan  [facility]creas[facility]Biliary  [phone] #[phone]

## 2017-03-27 NOTE — LETTER
3/27/2017       RE: Doreen Peralta  [address]   [place] [address]     Dear Colleague,    Thank you for referring your patient, Doreen Peralta, to the [facility] at [facility]. Please see a copy of my visit note below.    Clinic follow-up. Last seen 4/2016.     Background: complex abdominal history mostly centered around lifelong slow transit constipation eventually requiring subtotal colectomy and eventual ileo-rectal (?some sigmoid as well) anastamosis. Her baseline stool habits are diarrhea with loose/watery stools 3-4 times daily. Tried cholestyramine, pediatric loperamide at very low doses but could not tolerate due to abdominal pain. Tried fiber, although it was metamucil, so got bloating and pain, couldn't tolerate so stopped taking.   Has unfortunately had several hospitalizations in the interim for sepsis of unclear origin, working with ID to further elucidate cause. Has had polymicrobial bacteremia even without lines. There is some suspicion the GJ may be causing issues. She does have a fair bit of granulation tissue around it which bleeds from time to time and still has drainage from the area frequently. No pain at site, no surrounding skin erythema. Keeps vigilant about cleaning and has protective gauze over at all times.   She is still using the G tube to vent at least weekly when she has nausea, vomiting but has not used the J portion of the tube for some time as she eats normally in between periods of nausea and when she is sick she can't tolerate J tube feedings anyhow. Has an appointment later today with Dr. Joshi to have tubes removed.   She does note that she will at time forget that the tube is there when hooked up to drainage and she will put considerable strain on the tube itself, causing some bleeding and trauma. This happens, unfortunately, several times per week.   For the past few weeks, she has been fever free and  "(with exception of spells of nausea and abdominal pain) she is doing relatively well.  It is worth noting that during most recent hospitalization, she had a colonoscopy that showed a healthy ileo-colonic anastamosis and no mucosal disease. She has also had CTs that show no inflammation/fat stranding or sinus tracts near the GJ site.     O:  /83  Pulse 93  Temp 97.6  F (36.4  C)  Ht 1.676 m (5' 6\")  Wt 83.9 kg (185 lb)  LMP 03/20/2017  SpO2 100%  BMI 29.86 kg/m2  GEN: well-appearing, in NAD, her hair is shorter than the last time she saw me  HEENT: sclera anicteric, NCAT, PERRL  PULM: CTAB  CV: borderline tachy, RR  ABD: GJ to R of midline, covered in gauze. Under gauze GJ entry point looks healthy with a fair amount of granulation tissue present. No drainage. Diffuse mild TTP, no rebound or involuntary guarding.  EXTREM: WWP, trace LE edema    Weight trend has been stable.     Labs reviewed, albumin 3.5, stable anemia with borderline low MCV and mild increase in RDW, micro reviewed as well.     A: Mrs. Peralta is a 36 year old female with history as detailed above who presents to clinic for routine follow-up. She is overall still symptomatic with frequent nausea requiring G-tube for venting and drainage of enteric contents. Although I certainly understand IDs concern regarding removal of all indwelling lines, at this time, I cannot in good fan recommend removal as I am certain she will require immediate or near immediate replacement.    #Abdominal pain, nausea, diarrhea:  -Most c/w central abdominal pain in the setting of significant medicalization and intra-abdominal/luminal surgeries.   -Not entirely c/w IBS-D given no relation of pain to alteratiions in defecation and no relief with defecation.  -Will d/w Yvoana Linda, our GI health psychologist, regarding utility of CBT/DBT for what component of central abdominal pain can be addressed.  -Trials of insoluble fiber, loperamide and cholestyramine " limited by patient symptoms  -Slow trial (SLOW!) of soluble fiber-->1/2 tsp of beniefiber/citrucel daily in 8 oz water when feeling good, then slowly go up by 1/2 tsp until at 1 tbs BID for assistance with consistency.     #Need for G-tube  -Patient does not need J portion of tube and, for whatever reason, manipulation of the J portion is what causes fevers, pain. Will convert GJ to Dwight-Key (low profile with no extension tubing) through same tract to allow her to vent/drain. Placing through different tract in no way guarantees the same problem wont occur and exposes patient to unnecessary invasive procedures. IR will do this in the next few days.  -Extensive counseling on the importance of taking extreme care when G hooked up to drainage as chronic irritation/pulling on G tube is the reason she has granulation tissue and bleeding.       RTC 3 months to assess interval symptom burden. Will discuss meeting with Yovana Linda at that time as well    Total time spent in direct counseling and conversation 35 minutes    Discussed with Dr. Nabeel Wahl  GI fellow     ATTENDING ATTESTATION:    REFERRING PROVIDER: KYLE Lorenzo  DATE SEEN: 3/31/17      Thank you for this consultation. It was a pleasure to participate in the care of this patient; please contact us with any further questions.    Patient was discussed, seen, and examined by me, Gilmer Lees. The plan of care and pertinent data/imaging were also reviewed with the GI Fellow in clinic today. Agree with the joint assessment and plan as delineated above.    Please contact me with any further questions.    Gilmer Lees MD    [facility] - Department of Medicine  Division of Gastroenterology

## 2017-03-28 DIAGNOSIS — G89.29 CHRONIC ABDOMINAL PAIN: Primary | ICD-10-CM

## 2017-03-28 DIAGNOSIS — R10.9 CHRONIC ABDOMINAL PAIN: Primary | ICD-10-CM

## 2017-03-28 NOTE — PROGRESS NOTES
Clinic follow-up. Last seen 4/2016.     Background: complex abdominal history mostly centered around lifelong slow transit constipation eventually requiring subtotal colectomy and eventual ileo-rectal (?some sigmoid as well) anastamosis. Her baseline stool habits are diarrhea with loose/watery stools 3-4 times daily. Tried cholestyramine, pediatric loperamide at very low doses but could not tolerate due to abdominal pain. Tried fiber, although it was metamucil, so got bloating and pain, couldn't tolerate so stopped taking.   Has unfortunately had several hospitalizations in the interim for sepsis of unclear origin, working with ID to further elucidate cause. Has had polymicrobial bacteremia even without lines. There is some suspicion the GJ may be causing issues. She does have a fair bit of granulation tissue around it which bleeds from time to time and still has drainage from the area frequently. No pain at site, no surrounding skin erythema. Keeps vigilant about cleaning and has protective gauze over at all times.   She is still using the G tube to vent at least weekly when she has nausea, vomiting but has not used the J portion of the tube for some time as she eats normally in between periods of nausea and when she is sick she can't tolerate J tube feedings anyhow. Has an appointment later today with Dr. Joshi to have tubes removed.   She does note that she will at time forget that the tube is there when hooked up to drainage and she will put considerable strain on the tube itself, causing some bleeding and trauma. This happens, unfortunately, several times per week.   For the past few weeks, she has been fever free and (with exception of spells of nausea and abdominal pain) she is doing relatively well.  It is worth noting that during most recent hospitalization, she had a colonoscopy that showed a healthy ileo-colonic anastamosis and no mucosal disease. She has also had CTs that show no inflammation/fat  "stranding or sinus tracts near the GJ site.     O:  /83  Pulse 93  Temp 97.6  F (36.4  C)  Ht 1.676 m (5' 6\")  Wt 83.9 kg (185 lb)  LMP 03/20/2017  SpO2 100%  BMI 29.86 kg/m2  GEN: well-appearing, in NAD, her hair is shorter than the last time she saw me  HEENT: sclera anicteric, NCAT, PERRL  PULM: CTAB  CV: borderline tachy, RR  ABD: GJ to R of midline, covered in gauze. Under gauze GJ entry point looks healthy with a fair amount of granulation tissue present. No drainage. Diffuse mild TTP, no rebound or involuntary guarding.  EXTREM: WWP, trace LE edema    Weight trend has been stable.     Labs reviewed, albumin 3.5, stable anemia with borderline low MCV and mild increase in RDW, micro reviewed as well.     A: Mrs. Peralta is a 36 year old female with history as detailed above who presents to clinic for routine follow-up. She is overall still symptomatic with frequent nausea requiring G-tube for venting and drainage of enteric contents. Although I certainly understand IDs concern regarding removal of all indwelling lines, at this time, I cannot in good fan recommend removal as I am certain she will require immediate or near immediate replacement.    #Abdominal pain, nausea, diarrhea:  -Most c/w central abdominal pain in the setting of significant medicalization and intra-abdominal/luminal surgeries.   -Not entirely c/w IBS-D given no relation of pain to alteratiions in defecation and no relief with defecation.  -Will d/w Yovana Linda, our GI health psychologist, regarding utility of CBT/DBT for what component of central abdominal pain can be addressed.  -Trials of insoluble fiber, loperamide and cholestyramine limited by patient symptoms  -Slow trial (SLOW!) of soluble fiber-->1/2 tsp of beniefiber/citrucel daily in 8 oz water when feeling good, then slowly go up by 1/2 tsp until at 1 tbs BID for assistance with consistency.     #Need for G-tube  -Patient does not need J portion of tube and, for " whatever reason, manipulation of the J portion is what causes fevers, pain. Will convert GJ to Dwight-Key (low profile with no extension tubing) through same tract to allow her to vent/drain. Placing through different tract in no way guarantees the same problem wont occur and exposes patient to unnecessary invasive procedures. IR will do this in the next few days.  -Extensive counseling on the importance of taking extreme care when G hooked up to drainage as chronic irritation/pulling on G tube is the reason she has granulation tissue and bleeding.       RTC 3 months to assess interval symptom burden. Will discuss meeting with Yovana Linda at that time as well    Total time spent in direct counseling and conversation 35 minutes    Discussed with Dr. Nabeel Wahl  GI fellow     ATTENDING ATTESTATION:    REFERRING PROVIDER: KYLE Lorenzo  DATE SEEN: 3/31/17      Thank you for this consultation. It was a pleasure to participate in the care of this patient; please contact us with any further questions.    Patient was discussed, seen, and examined by me, Gilmer Lees. The plan of care and pertinent data/imaging were also reviewed with the GI Fellow in clinic today. Agree with the joint assessment and plan as delineated above.    Please contact me with any further questions.    Gilmer Lees MD    [facility] - Department of Medicine  Division of Gastroenterology

## 2017-03-29 ENCOUNTER — TELEPHONE (OUTPATIENT)
Dept: INTERVENTIONAL RADIOLOGY/VASCULAR | Facility: CLINIC | Age: 37
End: 2017-03-29

## 2017-03-29 DIAGNOSIS — G89.29 CHRONIC ABDOMINAL PAIN: ICD-10-CM

## 2017-03-29 DIAGNOSIS — R10.9 CHRONIC ABDOMINAL PAIN: ICD-10-CM

## 2017-03-29 LAB
ALBUMIN SERPL-MCNC: 3.4 G/DL (ref 3.4–5)
ALP SERPL-CCNC: 148 U/L (ref 40–150)
ALT SERPL W P-5'-P-CCNC: 35 U/L (ref 0–50)
ANION GAP SERPL CALCULATED.3IONS-SCNC: 7 MMOL/L (ref 3–14)
AST SERPL W P-5'-P-CCNC: 23 U/L (ref 0–45)
BASOPHILS # BLD AUTO: 0 10E9/L (ref 0–0.2)
BASOPHILS NFR BLD AUTO: 0.3 %
BILIRUB SERPL-MCNC: 0.4 MG/DL (ref 0.2–1.3)
BUN SERPL-MCNC: 13 MG/DL (ref 7–30)
CALCIUM SERPL-MCNC: 9.1 MG/DL (ref 8.5–10.1)
CHLORIDE SERPL-SCNC: 103 MMOL/L (ref 94–109)
CO2 SERPL-SCNC: 24 MMOL/L (ref 20–32)
CREAT SERPL-MCNC: 0.88 MG/DL (ref 0.52–1.04)
CRP SERPL-MCNC: <2.9 MG/L (ref 0–8)
DIFFERENTIAL METHOD BLD: ABNORMAL
EOSINOPHIL # BLD AUTO: 0 10E9/L (ref 0–0.7)
EOSINOPHIL NFR BLD AUTO: 0 %
ERYTHROCYTE [DISTWIDTH] IN BLOOD BY AUTOMATED COUNT: 16.7 % (ref 10–15)
ERYTHROCYTE [SEDIMENTATION RATE] IN BLOOD BY WESTERGREN METHOD: 36 MM/H (ref 0–20)
GFR SERPL CREATININE-BSD FRML MDRD: 73 ML/MIN/1.7M2
GLUCOSE SERPL-MCNC: 97 MG/DL (ref 70–99)
HCT VFR BLD AUTO: 33.2 % (ref 35–47)
HGB BLD-MCNC: 10.2 G/DL (ref 11.7–15.7)
LYMPHOCYTES # BLD AUTO: 1.4 10E9/L (ref 0.8–5.3)
LYMPHOCYTES NFR BLD AUTO: 11.8 %
MCH RBC QN AUTO: 23.6 PG (ref 26.5–33)
MCHC RBC AUTO-ENTMCNC: 30.7 G/DL (ref 31.5–36.5)
MCV RBC AUTO: 77 FL (ref 78–100)
MONOCYTES # BLD AUTO: 0.3 10E9/L (ref 0–1.3)
MONOCYTES NFR BLD AUTO: 2.2 %
NEUTROPHILS # BLD AUTO: 10.1 10E9/L (ref 1.6–8.3)
NEUTROPHILS NFR BLD AUTO: 85.7 %
PLATELET # BLD AUTO: 490 10E9/L (ref 150–450)
POTASSIUM SERPL-SCNC: 4.1 MMOL/L (ref 3.4–5.3)
PROT SERPL-MCNC: 8.2 G/DL (ref 6.8–8.8)
RBC # BLD AUTO: 4.32 10E12/L (ref 3.8–5.2)
SODIUM SERPL-SCNC: 134 MMOL/L (ref 133–144)
WBC # BLD AUTO: 11.7 10E9/L (ref 4–11)

## 2017-03-29 PROCEDURE — 80053 COMPREHEN METABOLIC PANEL: CPT | Performed by: INTERNAL MEDICINE

## 2017-03-29 PROCEDURE — 85652 RBC SED RATE AUTOMATED: CPT | Performed by: FAMILY MEDICINE

## 2017-03-29 PROCEDURE — 85025 COMPLETE CBC W/AUTO DIFF WBC: CPT | Performed by: FAMILY MEDICINE

## 2017-03-29 PROCEDURE — 36415 COLL VENOUS BLD VENIPUNCTURE: CPT | Performed by: FAMILY MEDICINE

## 2017-03-29 PROCEDURE — 86140 C-REACTIVE PROTEIN: CPT | Performed by: INTERNAL MEDICINE

## 2017-03-30 ENCOUNTER — HOSPITAL ENCOUNTER (OUTPATIENT)
Facility: CLINIC | Age: 37
Discharge: HOME | End: 2017-03-30
Attending: INTERNAL MEDICINE | Admitting: INTERNAL MEDICINE
Payer: COMMERCIAL

## 2017-03-30 ENCOUNTER — APPOINTMENT (OUTPATIENT)
Dept: MEDSURG UNIT | Facility: CLINIC | Age: 37
End: 2017-03-30
Attending: INTERNAL MEDICINE
Payer: COMMERCIAL

## 2017-03-30 ENCOUNTER — APPOINTMENT (OUTPATIENT)
Dept: INTERVENTIONAL RADIOLOGY/VASCULAR | Facility: CLINIC | Age: 37
End: 2017-03-30
Attending: INTERNAL MEDICINE
Payer: COMMERCIAL

## 2017-03-30 VITALS
SYSTOLIC BLOOD PRESSURE: 143 MMHG | OXYGEN SATURATION: 98 % | BODY MASS INDEX: 29.73 KG/M2 | DIASTOLIC BLOOD PRESSURE: 89 MMHG | TEMPERATURE: 98.4 F | HEART RATE: 77 BPM | RESPIRATION RATE: 16 BRPM | HEIGHT: 66 IN | WEIGHT: 185 LBS

## 2017-03-30 DIAGNOSIS — K94.13 MALFUNCTIONING JEJUNOSTOMY TUBE (H): Primary | ICD-10-CM

## 2017-03-30 DIAGNOSIS — Z78.9 ENCOUNTER FOR GASTROJEJUNAL TUBE PLACEMENT: ICD-10-CM

## 2017-03-30 PROCEDURE — C1887 CATHETER, GUIDING: HCPCS

## 2017-03-30 PROCEDURE — 25000128 H RX IP 250 OP 636: Performed by: PHYSICIAN ASSISTANT

## 2017-03-30 PROCEDURE — C1769 GUIDE WIRE: HCPCS

## 2017-03-30 PROCEDURE — 40000556 ZZH STATISTIC PERIPHERAL IV START W US GUIDANCE

## 2017-03-30 PROCEDURE — 25000132 ZZH RX MED GY IP 250 OP 250 PS 637: Performed by: STUDENT IN AN ORGANIZED HEALTH CARE EDUCATION/TRAINING PROGRAM

## 2017-03-30 PROCEDURE — 25000128 H RX IP 250 OP 636: Performed by: STUDENT IN AN ORGANIZED HEALTH CARE EDUCATION/TRAINING PROGRAM

## 2017-03-30 PROCEDURE — 84703 CHORIONIC GONADOTROPIN ASSAY: CPT | Performed by: RADIOLOGY

## 2017-03-30 PROCEDURE — 49446 CHANGE G-TUBE TO G-J PERC: CPT

## 2017-03-30 PROCEDURE — 40000166 ZZH STATISTIC PP CARE STAGE 1

## 2017-03-30 PROCEDURE — 27210995 ZZH RX 272

## 2017-03-30 PROCEDURE — 27210905 ZZH KIT CR7

## 2017-03-30 PROCEDURE — 25000125 ZZHC RX 250: Performed by: STUDENT IN AN ORGANIZED HEALTH CARE EDUCATION/TRAINING PROGRAM

## 2017-03-30 PROCEDURE — 96374 THER/PROPH/DIAG INJ IV PUSH: CPT

## 2017-03-30 RX ORDER — HYDROMORPHONE HYDROCHLORIDE 2 MG/1
2 TABLET ORAL ONCE
Status: COMPLETED | OUTPATIENT
Start: 2017-03-30 | End: 2017-03-30

## 2017-03-30 RX ORDER — HYDROMORPHONE HYDROCHLORIDE 1 MG/ML
0.5 INJECTION, SOLUTION INTRAMUSCULAR; INTRAVENOUS; SUBCUTANEOUS EVERY 5 MIN PRN
Status: DISCONTINUED | OUTPATIENT
Start: 2017-03-30 | End: 2017-03-30 | Stop reason: HOSPADM

## 2017-03-30 RX ORDER — LIDOCAINE 40 MG/G
CREAM TOPICAL
Status: DISCONTINUED | OUTPATIENT
Start: 2017-03-30 | End: 2017-03-30 | Stop reason: HOSPADM

## 2017-03-30 RX ORDER — SODIUM CHLORIDE 9 MG/ML
INJECTION, SOLUTION INTRAVENOUS CONTINUOUS
Status: DISCONTINUED | OUTPATIENT
Start: 2017-03-30 | End: 2017-03-30 | Stop reason: HOSPADM

## 2017-03-30 RX ORDER — FLUMAZENIL 0.1 MG/ML
0.2 INJECTION, SOLUTION INTRAVENOUS
Status: DISCONTINUED | OUTPATIENT
Start: 2017-03-30 | End: 2017-03-30 | Stop reason: HOSPADM

## 2017-03-30 RX ORDER — DIPHENHYDRAMINE HYDROCHLORIDE 50 MG/ML
50 INJECTION INTRAMUSCULAR; INTRAVENOUS ONCE
Status: COMPLETED | OUTPATIENT
Start: 2017-03-30 | End: 2017-03-30

## 2017-03-30 RX ORDER — HYDROMORPHONE HCL/0.9% NACL/PF 0.2MG/0.2
0.2 SYRINGE (ML) INTRAVENOUS ONCE
Status: COMPLETED | OUTPATIENT
Start: 2017-03-30 | End: 2017-03-30

## 2017-03-30 RX ADMIN — SODIUM CHLORIDE: 9 INJECTION, SOLUTION INTRAVENOUS at 08:50

## 2017-03-30 RX ADMIN — MIDAZOLAM 4 MG: 1 INJECTION INTRAMUSCULAR; INTRAVENOUS at 10:37

## 2017-03-30 RX ADMIN — DIPHENHYDRAMINE HYDROCHLORIDE 50 MG: 50 INJECTION, SOLUTION INTRAMUSCULAR; INTRAVENOUS at 09:53

## 2017-03-30 RX ADMIN — LIDOCAINE HYDROCHLORIDE 10 ML: 20 JELLY TOPICAL at 10:43

## 2017-03-30 RX ADMIN — HYDROMORPHONE HYDROCHLORIDE 1 MG: 10 INJECTION, SOLUTION INTRAMUSCULAR; INTRAVENOUS; SUBCUTANEOUS at 10:37

## 2017-03-30 RX ADMIN — HYDROMORPHONE HYDROCHLORIDE 0.2 MG: 10 INJECTION, SOLUTION INTRAMUSCULAR; INTRAVENOUS; SUBCUTANEOUS at 11:42

## 2017-03-30 RX ADMIN — HYDROMORPHONE HYDROCHLORIDE 2 MG: 2 TABLET ORAL at 12:17

## 2017-03-30 NOTE — PROGRESS NOTES
Interventional Radiology Brief Post Procedure Note    Procedure: G tube exchange for a GJ. Direct J removal    Proceduralist: AKHIL Mcmullen    Assistant: SWATI Bullock    Time Out: Prior to the start of the procedure and with procedural staff participation, I verbally confirmed the patient s identity using two indicators, relevant allergies, that the procedure was appropriate and matched the consent or emergent situation, and that the correct equipment/implants were available. Immediately prior to starting the procedure I conducted the Time Out with the procedural staff and re-confirmed the patient s name, procedure, and site/side. (The Joint Commission universal protocol was followed.)  Yes    Sedation: None. Local Anesthestic used    Findings: Uncomplicated procedure    Estimated Blood Loss: None    Fluoroscopy Time: 9.4 minutes    SPECIMENS: None    Complications: 1. None     Condition: Stable    Plan: tube ready for use    Comments: See dictated procedure note for full details.    Alfredo Bullock MD

## 2017-03-30 NOTE — PROGRESS NOTES
Prep and teaching complete for GJ tube change to button. Mother, Aby at bedside, 2A, room 4. Pt requires vascular access for IV, [facility] here now. Awaiting consent and H and P update.

## 2017-03-30 NOTE — PROGRESS NOTES
"1040Interventional Radiology Intra-procedural Nursing Note    Patient Name: Doreen Peralta  Medical Record Number: [id number]  Today's Date: March 30, 2017    Start Time: 1005  End of procedure time: 1040  Procedure: exchange gastrostomy to a gastrojejunostomy tube, remove direct jejunostomy tube   Report given to: Vijaya WARNER 2A  : not needed    Attending MD in room during timeout: Dr Mcmullen  Proceduralist: Dr Bullock    Sedation start time: 1000  Sedation end time: 1040  Sedation medications given: versed 4 mg, benadryl 50 mg, dilaudid 1 mg    D: Patient brought to IR room 3 at 0940. Verified informed consent with Patient. She expressed her concern regarding pain during the procedure. Her concerns were addressed and reassurances were given. Despite reassurances, Patient appeared anxious and requested sedation and narcotics prior to the procedure.   A: Patient tolerated the procedure without apparent incident. She continued to be anxious during the procedure and complained of \"sharp inside\" pain whenever the guidewire was advanced.  P: Patient returned to 2A for post procedure cares. She continues to complain of \"inside cramping pain\".    Laurence Da Silva RN  [phone]    "

## 2017-03-30 NOTE — IP AVS SNAPSHOT
MRN:[id number]                      After Visit Summary   3/30/2017    Doreen Peralta    MRN: [id number]           Visit Information        Department      3/30/2017  7:58 AM [address] [facility]          Review of your medicines      UNREVIEWED medicines. Ask your doctor about these medicines        Dose / Directions    ACETAMINOPHEN PO        Dose:  500-1000 mg   Take 500-1,000 mg by mouth every 6 hours as needed for pain   Refills:  0       albuterol 90 MCG/ACT inhaler        Dose:  2 puff   Inhale 2 puffs into the lungs every 6 hours as needed   Refills:  0       cloNIDine 0.2 MG tablet   Commonly known as:  CATAPRES   Used for:  Anxiety        Dose:  0.4 mg   Take 2 tablets (0.4 mg) by mouth every evening   Quantity:  180 tablet   Refills:  0       COMPLEAT Liqd   Used for:  Non-intractable vomiting with nausea, vomiting of unspecified type, Jejunostomy tube present (H), Malnutrition (H)        Infuse at 50 ml/hour for 5 hours daily through j-tube Flush j-tube with 30 ml water before and after each infusion of Compleat   Quantity:  30 each   Refills:  0       DULoxetine 60 MG EC capsule   Commonly known as:  CYMBALTA   Used for:  Abdominal pain, epigastric, Abdominal migraine, not intractable        Dose:  60 mg   Take 1 capsule (60 mg) by mouth daily   Quantity:  90 capsule   Refills:  3       mirtazapine 15 MG ODT tab   Commonly known as:  REMERON SOL-TAB   Used for:  Anxiety        Dose:  7.5 mg   0.5 tablets (7.5 mg) by Orally disintegrating tablet route At Bedtime   Quantity:  45 tablet   Refills:  0       nortriptyline 10 MG capsule   Commonly known as:  PAMELOR   Used for:  Neuropathic pain, Primary insomnia        Dose:  30-40 mg   Take 3-4 capsules (30-40 mg) by mouth At Bedtime   Quantity:  120 capsule   Refills:  5       ondansetron 4 MG ODT tab   Commonly known as:  ZOFRAN ODT   Used for:  Intractable vomiting, presence of nausea not specified, unspecified vomiting type,  Gastroparesis        Dose:  4-8 mg   Take 1-2 tablets (4-8 mg) by mouth every 6 hours as needed for nausea   Quantity:  40 tablet   Refills:  0       * study - rivaroxaban oral suspension   Commonly known as:  IDS# [id number]        Dose:  20 mg   Take 20 mg by mouth   Refills:  0       * rivaroxaban ANTICOAGULANT 20 MG Tabs tablet   Commonly known as:  XARELTO   Used for:  Deep vein thrombosis (DVT) of upper extremity, unspecified chronicity, unspecified laterality, unspecified vein (H)        Dose:  20 mg   Take 1 tablet (20 mg) by mouth daily (with dinner)   Quantity:  90 tablet   Refills:  1       SUMAtriptan 50 MG tablet   Commonly known as:  IMITREX   Used for:  Migraine without aura and without status migrainosus, not intractable        Dose:   mg   Take 1-2 tablets ( mg) by mouth at onset of headache for migraine - may repeat dose after 2h if headache recurs.  Max: 200mg/24 hours   Quantity:  18 tablet   Refills:  1       VITAMIN D (CHOLECALCIFEROL) PO        Dose:  2000 Units   Take 2,000 Units by mouth daily   Refills:  0       * Notice:  This list has 2 medication(s) that are the same as other medications prescribed for you. Read the directions carefully, and ask your doctor or other care provider to review them with you.             Protect others around you: Learn how to safely use, store and throw away your medicines at www.disposemymeds.org.         Follow-ups after your visit        Your next 10 appointments already scheduled     Jul 17, 2017  9:00 AM CDT   (Arrive by 8:45 AM)   Return Visit with Yuri Wahl MD   [facility] G[facility]enterology [facility] IBD ([facility])    [address] [place] [id number] [phone]               Care Instructions        After Care Instructions     Discharge Instructions       If questions or problems arise regarding tube function (e.g. leaking, dislodges, etc.) Contact Interventional Radiology department 24 hours a  day.     For procedures that were done at the [facility] sites   8:00-4:30 PM Monday through Friday    Contact:[phone]    For afterhours and weekends call the [facility] main phone line   [phone] and ask for the [facility] IR on call physician number.    If DIRECTED by the RADIOLOGIST, related to specific problems with the tube functioning,  go to the Emergency Department.            Discharge Instructions       Patient to make a follow up appointment in IR clinic in 10-14 days for the removal of the retention sutures at the G or GJ tube site. Phone number is [phone] if needed.                  Further instructions from your care team         Feeding Tube Replacement  Your feeding tube has been replaced. Unless advised otherwise, you may resume your usual feeding schedule. Feeding tubes are usually replaced every 6 to 12 months.  Home care  The following will help you care for yourself at home:    Continue feedings as usual.    Before preparing the formula or touching the feeding tube, the caregiver should wash his or her hands with soap and water.    Because the tube is narrow, use only commercial feeding formulas so the tube won't get clogged. These formulas are designed to provide all the protein, carbohydrates, vitamins, and minerals needed. Follow your doctor s advice (or the registered dietitian or nurse who is working with you) regarding the number of feedings to give each day.    Flush the tube with clear water before and after feedings or after medicine has been given through the tube.    When feeding, the patient should be upright or reclining at not less than 30  to reduce the risk of the feeding solution draining upward toward the throat causing aspiration into the lungs. Keep the patient in this position for at least 30 minutes after the feeding.    Infuse food slowly. It may take more than 1 hour for 1 feeding session.    If the tube becomes blocked, flush with a syringe  full of water.    If the patient feels bloated after feeding, remove the cap from the end of the tube so that extra air in the stomach can flow out. Ask the patient to cough. This will help remove the extra air.    Oral and dental care is needed, even if the patient is not taking any food or liquids by mouth. Brush teeth and gums daily. Keep the lips moist with a lip balm or petroleum jelly.  Follow-up care  Follow up with your doctor or as advised by our staff.  Call 911  Call 911 if any of the following occur:    Chest pain    Trouble breathing  When to seek medical care  Get prompt medical attention or contact your doctor if any of the following occur:    Feeding tube falls out    Feeding tube becomes blocked and you are not able to clear it    Redness, pus, or bleeding at the insertion site    Leakage of stomach contents around the tube onto the abdomen    Pain or swelling in your abdomen that gets worse    Fever of 100.4 F (38 C) or higher, or as directed by your health care provider    2193-0733 The [employer]. [address] [place] [address]. All rights reserved. This information is not intended as a substitute for professional medical care. Always follow your healthcare professional's instructions.    [facility]  Going Home after Sedation      For 24 hours:    An adult should stay with you.    Relax and take it easy.    DO NOT make any important legal decisions.    DO NOT drive or operate machines at home or at work.    Resume your regular diet and drink plenty of fluids.    CALL THE PHYSICIAN IF:    You develop nausea or vomiting    You develop hives or a rash or any unexplained itching    ADDITIONAL INSTRUCTIONS:    [facility] INTERVENTIONAL RADIOLOGY DEPARTMENT        Procedure Physician:    Dr Bullock and Dr Blunt                             Date of procedure:March 30, 2017        Telephone numbers:     [phone]      Monday-Friday 8:00 am to 4:30 pm                "                               [phone]       After 4:30 pm Monday-Friday, Weekends & Holidays. Ask for the Interventional Radiologist on call.                                                                                                                                                                       Someone is  available 24 hours/day        [facility] toll free number: [phone]  Monday-Friday 8:00 am to 4:30 pm                                                                                                                                                                                                                              Additional Information About Your Visit        [employer] Information     [employer] gives you secure access to your electronic health record. If you see a primary care provider, you can also send messages to your care team and make appointments. If you have questions, please call your primary care clinic.  If you do not have a primary care provider, please call [phone] and they will assist you.        Care EveryWhere ID     This is your Care EveryWhere ID. This could be used by other organizations to access your [facility] medical records  [id number]        Your Vitals Were     Blood Pressure Pulse Temperature Respirations Height Weight    145/95 77 98.4  F (36.9  C) (Oral) 14 1.676 m (5' 6\") 83.9 kg (185 lb)    Last Period Pulse Oximetry BMI (Body Mass Index)             03/20/2017 98% 29.86 kg/m2          Primary Care Provider Office Phone # Fax #    Larisa Lorenzo PA-C [phone]      Thank you!     Thank you for choosing [facility] for your care. Our goal is always to provide you with excellent care. Hearing back from our patients is one way we can continue to improve our services. Please take a few minutes to complete the written survey that you may receive in the mail after you visit with us. Thank you!             Medication List: This is a list of all " your medications and when to take them. Check marks below indicate your daily home schedule. Keep this list as a reference.      Medications           Morning Afternoon Evening Bedtime As Needed    ACETAMINOPHEN PO   Take 500-1,000 mg by mouth every 6 hours as needed for pain                                albuterol 90 MCG/ACT inhaler   Inhale 2 puffs into the lungs every 6 hours as needed                                cloNIDine 0.2 MG tablet   Commonly known as:  CATAPRES   Take 2 tablets (0.4 mg) by mouth every evening                                COMPLEAT Liqd   Infuse at 50 ml/hour for 5 hours daily through j-tube Flush j-tube with 30 ml water before and after each infusion of Compleat                                DULoxetine 60 MG EC capsule   Commonly known as:  CYMBALTA   Take 1 capsule (60 mg) by mouth daily                                mirtazapine 15 MG ODT tab   Commonly known as:  REMERON SOL-TAB   0.5 tablets (7.5 mg) by Orally disintegrating tablet route At Bedtime                                nortriptyline 10 MG capsule   Commonly known as:  PAMELOR   Take 3-4 capsules (30-40 mg) by mouth At Bedtime                                ondansetron 4 MG ODT tab   Commonly known as:  ZOFRAN ODT   Take 1-2 tablets (4-8 mg) by mouth every 6 hours as needed for nausea                                * study - rivaroxaban oral suspension   Commonly known as:  IDS# [id number]   Take 20 mg by mouth                                * rivaroxaban ANTICOAGULANT 20 MG Tabs tablet   Commonly known as:  XARELTO   Take 1 tablet (20 mg) by mouth daily (with dinner)                                SUMAtriptan 50 MG tablet   Commonly known as:  IMITREX   Take 1-2 tablets ( mg) by mouth at onset of headache for migraine - may repeat dose after 2h if headache recurs.  Max: 200mg/24 hours                                VITAMIN D (CHOLECALCIFEROL) PO   Take 2,000 Units by mouth daily                                * Notice:   This list has 2 medication(s) that are the same as other medications prescribed for you. Read the directions carefully, and ask your doctor or other care provider to review them with you.

## 2017-03-30 NOTE — DISCHARGE INSTRUCTIONS
Feeding Tube Replacement  Your feeding tube has been replaced. Unless advised otherwise, you may resume your usual feeding schedule. Feeding tubes are usually replaced every 6 to 12 months.  Home care  The following will help you care for yourself at home:    Continue feedings as usual.    Before preparing the formula or touching the feeding tube, the caregiver should wash his or her hands with soap and water.    Because the tube is narrow, use only commercial feeding formulas so the tube won't get clogged. These formulas are designed to provide all the protein, carbohydrates, vitamins, and minerals needed. Follow your doctor s advice (or the registered dietitian or nurse who is working with you) regarding the number of feedings to give each day.    Flush the tube with clear water before and after feedings or after medicine has been given through the tube.    When feeding, the patient should be upright or reclining at not less than 30  to reduce the risk of the feeding solution draining upward toward the throat causing aspiration into the lungs. Keep the patient in this position for at least 30 minutes after the feeding.    Infuse food slowly. It may take more than 1 hour for 1 feeding session.    If the tube becomes blocked, flush with a syringe full of water.    If the patient feels bloated after feeding, remove the cap from the end of the tube so that extra air in the stomach can flow out. Ask the patient to cough. This will help remove the extra air.    Oral and dental care is needed, even if the patient is not taking any food or liquids by mouth. Brush teeth and gums daily. Keep the lips moist with a lip balm or petroleum jelly.  Follow-up care  Follow up with your doctor or as advised by our staff.  Call 911  Call 911 if any of the following occur:    Chest pain    Trouble breathing  When to seek medical care  Get prompt medical attention or contact your doctor if any of the following occur:    Feeding tube  falls out    Feeding tube becomes blocked and you are not able to clear it    Redness, pus, or bleeding at the insertion site    Leakage of stomach contents around the tube onto the abdomen    Pain or swelling in your abdomen that gets worse    Fever of 100.4 F (38 C) or higher, or as directed by your health care provider    0922-7694 [employer]. [address] [place] [address]. All rights reserved. This information is not intended as a substitute for professional medical care. Always follow your healthcare professional's instructions.    [facility]  Going Home after Sedation      For 24 hours:    An adult should stay with you.    Relax and take it easy.    DO NOT make any important legal decisions.    DO NOT drive or operate machines at home or at work.    Resume your regular diet and drink plenty of fluids.    CALL THE PHYSICIAN IF:    You develop nausea or vomiting    You develop hives or a rash or any unexplained itching    ADDITIONAL INSTRUCTIONS:    [facility] INTERVENTIONAL RADIOLOGY DEPARTMENT        Procedure Physician:    Dr Bullock and Dr Blunt                             Date of procedure:March 30, 2017        Telephone numbers:     [phone]      Monday-Friday 8:00 am to 4:30 pm                                              [phone]       After 4:30 pm Monday-Friday, Weekends & Holidays. Ask for the Interventional Radiologist on call.                                                                                                                                                                       Someone is  available 24 hours/day        [facility] toll free number: [phone]  Monday-Friday 8:00 am to 4:30 pm

## 2017-03-30 NOTE — PROGRESS NOTES
Pt reported increased pain post proedure. Pt clutching abdomen, sites (new GJ tube and site from previous GTube) are dry and intact. Pt reports pain is from new GT radiating to the right. This writer has cared for pt twice previously and her pain concerns are consistent with previous procedures. With pain BP increased and pt wasteary. IR staff was here to speak with pt. Pain meds given, IV and PO dose. Pt requested 1 or 2 pain pills to take home. IR staff denied pt's request. Encouraged pt to talk to her primary doctor to see if she could get pain meds for procedures.     Pt and Mother report concern that she was expecting a button G Tube but instead got a G J tube; encouraged pt to speak with patient relations, gave pt the information for Pt relations. IR staff did speak with pt about this.     Pt was able to walk but continues to clutch abdomen and report pain, although reports pain is lessened some with oral medication. Sites remain flat and dry. Pt only taking sips of water, declines any food; Voided. Discharge papers reviewed and a copy given to pt. Discharged to home per wheelchair with family.

## 2017-03-30 NOTE — IP AVS SNAPSHOT
[address] [address]                                       After Visit Summary   3/30/2017    Doreen Peralta    MRN: [id number]           After Visit Summary Signature Page     I have received my discharge instructions, and my questions have been answered. I have discussed any challenges I see with this plan with the nurse or doctor.    ..........................................................................................................................................  Patient/Patient Representative Signature      ..........................................................................................................................................  Patient Representative Print Name and Relationship to Patient    ..................................................               ................................................  Date                                            Time    ..........................................................................................................................................  Reviewed by Signature/Title    ...................................................              ..............................................  Date                                                            Time

## 2017-03-30 NOTE — PROGRESS NOTES
Pt returned to 2A s/p G tube exchange. VSS, pt c/o site discomfort. Pt repositioned. Mom at bedside. Continue to monitor

## 2017-03-30 NOTE — PROGRESS NOTES
Interventional Radiology Pre-Procedure Sedation Assessment   Time of Assessment: 9:33 AM    Expected Level: Moderate Sedation    Indication: Sedation is required for the following type of Procedure: GI    Sedation and procedural consent: Risks, benefits and alternatives were discussed with Patient    PO Intake: Appropriately NPO for procedure    ASA Class: Class 3 - SEVERE SYSTEMIC DISEASE, DEFINITE FUNCTIONAL LIMITATIONS.    Mallampati: Grade 2:  Soft palate, base of uvula, tonsillar pillars, and portion of posterior pharyngeal wall visible    Lungs: Lungs Clear with good breath sounds bilaterally    Heart: Normal heart sounds and rate    History and physical reviewed and no updates needed. I have reviewed the lab findings, diagnostic data, medications, and the plan for sedation. I have determined this patient to be an appropriate candidate for the planned sedation and procedure and have reassessed the patient IMMEDIATELY PRIOR to sedation and procedure.    Alfredo Bullock MD

## 2017-03-31 DIAGNOSIS — G47.00 INSOMNIA, UNSPECIFIED TYPE: Primary | ICD-10-CM

## 2017-03-31 DIAGNOSIS — K31.84 GASTROPARESIS: ICD-10-CM

## 2017-03-31 RX ORDER — TRAZODONE HYDROCHLORIDE 50 MG/1
50-100 TABLET, FILM COATED ORAL
Qty: 180 TABLET | Refills: 1 | Status: SHIPPED | OUTPATIENT
Start: 2017-03-31 | End: 2017-10-13

## 2017-03-31 NOTE — TELEPHONE ENCOUNTER
Trazodone      Last Written Prescription Date:  11/09/15  Last Fill Quantity: 90,   # refills: 0  Last Office Visit with [facility], [facility] or [facility] prescribing provider: 01/26/17  Future Office visit:       Routing refill request to provider for review/approval because:  Drug not active on patient's medication list  Medication is reported/historical

## 2017-04-02 ENCOUNTER — OFFICE VISIT (OUTPATIENT)
Dept: URGENT CARE | Facility: URGENT CARE | Age: 37
End: 2017-04-02
Payer: COMMERCIAL

## 2017-04-02 ENCOUNTER — APPOINTMENT (OUTPATIENT)
Dept: CT IMAGING | Facility: CLINIC | Age: 37
End: 2017-04-02
Attending: EMERGENCY MEDICINE
Payer: COMMERCIAL

## 2017-04-02 ENCOUNTER — HOSPITAL ENCOUNTER (OUTPATIENT)
Facility: CLINIC | Age: 37
Setting detail: OBSERVATION
Discharge: HOME | End: 2017-04-03
Attending: EMERGENCY MEDICINE | Admitting: HOSPITALIST
Payer: COMMERCIAL

## 2017-04-02 ENCOUNTER — RADIANT APPOINTMENT (OUTPATIENT)
Dept: GENERAL RADIOLOGY | Facility: CLINIC | Age: 37
End: 2017-04-02
Attending: NURSE PRACTITIONER
Payer: COMMERCIAL

## 2017-04-02 VITALS
BODY MASS INDEX: 31.99 KG/M2 | SYSTOLIC BLOOD PRESSURE: 138 MMHG | OXYGEN SATURATION: 100 % | DIASTOLIC BLOOD PRESSURE: 86 MMHG | HEART RATE: 102 BPM | WEIGHT: 198.2 LBS | TEMPERATURE: 99.8 F

## 2017-04-02 DIAGNOSIS — R10.13 ABDOMINAL PAIN, EPIGASTRIC: ICD-10-CM

## 2017-04-02 DIAGNOSIS — K31.84 GASTROPARESIS: Primary | ICD-10-CM

## 2017-04-02 DIAGNOSIS — Z93.1 GASTROINTESTINAL TUBE PRESENT (H): ICD-10-CM

## 2017-04-02 DIAGNOSIS — Z93.1 G TUBE FEEDINGS (H): ICD-10-CM

## 2017-04-02 DIAGNOSIS — T85.528A GASTROJEJUNOSTOMY TUBE DISLODGEMENT: ICD-10-CM

## 2017-04-02 LAB
ALBUMIN SERPL-MCNC: 3.9 G/DL (ref 3.4–5)
ALP SERPL-CCNC: 162 U/L (ref 40–150)
ALT SERPL W P-5'-P-CCNC: 31 U/L (ref 0–50)
ANION GAP SERPL CALCULATED.3IONS-SCNC: 11 MMOL/L (ref 3–14)
AST SERPL W P-5'-P-CCNC: 14 U/L (ref 0–45)
BASOPHILS # BLD AUTO: 0 10E9/L (ref 0–0.2)
BASOPHILS NFR BLD AUTO: 0.2 %
BILIRUB SERPL-MCNC: 0.4 MG/DL (ref 0.2–1.3)
BUN SERPL-MCNC: 10 MG/DL (ref 7–30)
CALCIUM SERPL-MCNC: 9.9 MG/DL (ref 8.5–10.1)
CHLORIDE SERPL-SCNC: 108 MMOL/L (ref 94–109)
CO2 SERPL-SCNC: 21 MMOL/L (ref 20–32)
CREAT SERPL-MCNC: 0.86 MG/DL (ref 0.52–1.04)
CRP SERPL-MCNC: 4.1 MG/L (ref 0–8)
DIFFERENTIAL METHOD BLD: ABNORMAL
EOSINOPHIL # BLD AUTO: 0 10E9/L (ref 0–0.7)
EOSINOPHIL NFR BLD AUTO: 0 %
ERYTHROCYTE [DISTWIDTH] IN BLOOD BY AUTOMATED COUNT: 16.8 % (ref 10–15)
ERYTHROCYTE [SEDIMENTATION RATE] IN BLOOD BY WESTERGREN METHOD: 37 MM/H (ref 0–20)
GFR SERPL CREATININE-BSD FRML MDRD: 74 ML/MIN/1.7M2
GLUCOSE SERPL-MCNC: 106 MG/DL (ref 70–99)
HCT VFR BLD AUTO: 35.4 % (ref 35–47)
HGB BLD-MCNC: 11 G/DL (ref 11.7–15.7)
IMM GRANULOCYTES # BLD: 0.1 10E9/L (ref 0–0.4)
IMM GRANULOCYTES NFR BLD: 0.4 %
LACTATE BLD-SCNC: 1.6 MMOL/L (ref 0.7–2.1)
LIPASE SERPL-CCNC: 128 U/L (ref 73–393)
LYMPHOCYTES # BLD AUTO: 1.2 10E9/L (ref 0.8–5.3)
LYMPHOCYTES NFR BLD AUTO: 8.9 %
MCH RBC QN AUTO: 23.6 PG (ref 26.5–33)
MCHC RBC AUTO-ENTMCNC: 31.1 G/DL (ref 31.5–36.5)
MCV RBC AUTO: 76 FL (ref 78–100)
MONOCYTES # BLD AUTO: 0.2 10E9/L (ref 0–1.3)
MONOCYTES NFR BLD AUTO: 1.7 %
NEUTROPHILS # BLD AUTO: 11.8 10E9/L (ref 1.6–8.3)
NEUTROPHILS NFR BLD AUTO: 88.8 %
NRBC # BLD AUTO: 0 10*3/UL
NRBC BLD AUTO-RTO: 0 /100
PLATELET # BLD AUTO: 527 10E9/L (ref 150–450)
POTASSIUM SERPL-SCNC: 4.8 MMOL/L (ref 3.4–5.3)
PROT SERPL-MCNC: 9.2 G/DL (ref 6.8–8.8)
RBC # BLD AUTO: 4.66 10E12/L (ref 3.8–5.2)
SODIUM SERPL-SCNC: 140 MMOL/L (ref 133–144)
WBC # BLD AUTO: 13.2 10E9/L (ref 4–11)

## 2017-04-02 PROCEDURE — 74176 CT ABD & PELVIS W/O CONTRAST: CPT

## 2017-04-02 PROCEDURE — 87040 BLOOD CULTURE FOR BACTERIA: CPT | Performed by: EMERGENCY MEDICINE

## 2017-04-02 PROCEDURE — 99220 ZZC INITIAL OBSERVATION CARE,LEVL III: CPT | Mod: AI | Performed by: HOSPITALIST

## 2017-04-02 PROCEDURE — 99207 ZZC APP CREDIT; MD BILLING SHARED VISIT: CPT | Performed by: PHYSICIAN ASSISTANT

## 2017-04-02 PROCEDURE — 85025 COMPLETE CBC W/AUTO DIFF WBC: CPT | Performed by: EMERGENCY MEDICINE

## 2017-04-02 PROCEDURE — 25000128 H RX IP 250 OP 636: Performed by: EMERGENCY MEDICINE

## 2017-04-02 PROCEDURE — 40000556 ZZH STATISTIC PERIPHERAL IV START W US GUIDANCE

## 2017-04-02 PROCEDURE — 99285 EMERGENCY DEPT VISIT HI MDM: CPT | Mod: 25

## 2017-04-02 PROCEDURE — 83605 ASSAY OF LACTIC ACID: CPT | Performed by: EMERGENCY MEDICINE

## 2017-04-02 PROCEDURE — 96374 THER/PROPH/DIAG INJ IV PUSH: CPT

## 2017-04-02 PROCEDURE — 99214 OFFICE O/P EST MOD 30 MIN: CPT | Performed by: NURSE PRACTITIONER

## 2017-04-02 PROCEDURE — 25000128 H RX IP 250 OP 636

## 2017-04-02 PROCEDURE — 25000132 ZZH RX MED GY IP 250 OP 250 PS 637: Performed by: PHYSICIAN ASSISTANT

## 2017-04-02 PROCEDURE — 86140 C-REACTIVE PROTEIN: CPT | Performed by: EMERGENCY MEDICINE

## 2017-04-02 PROCEDURE — 83690 ASSAY OF LIPASE: CPT | Performed by: EMERGENCY MEDICINE

## 2017-04-02 PROCEDURE — 74020 XR ABDOMEN 2 VW: CPT

## 2017-04-02 PROCEDURE — G0378 HOSPITAL OBSERVATION PER HR: HCPCS

## 2017-04-02 PROCEDURE — 80053 COMPREHEN METABOLIC PANEL: CPT | Performed by: EMERGENCY MEDICINE

## 2017-04-02 PROCEDURE — 85652 RBC SED RATE AUTOMATED: CPT | Performed by: EMERGENCY MEDICINE

## 2017-04-02 PROCEDURE — 27210995 ZZH RX 272

## 2017-04-02 PROCEDURE — 96375 TX/PRO/DX INJ NEW DRUG ADDON: CPT

## 2017-04-02 PROCEDURE — 99284 EMERGENCY DEPT VISIT MOD MDM: CPT | Mod: Z6 | Performed by: EMERGENCY MEDICINE

## 2017-04-02 PROCEDURE — 25000128 H RX IP 250 OP 636: Performed by: PHYSICIAN ASSISTANT

## 2017-04-02 RX ORDER — ACETAMINOPHEN 500 MG
500-1000 TABLET ORAL EVERY 6 HOURS PRN
Status: DISCONTINUED | OUTPATIENT
Start: 2017-04-02 | End: 2017-04-03 | Stop reason: HOSPADM

## 2017-04-02 RX ORDER — HYDROMORPHONE HYDROCHLORIDE 2 MG/1
2 TABLET ORAL ONCE
Status: DISCONTINUED | OUTPATIENT
Start: 2017-04-02 | End: 2017-04-02

## 2017-04-02 RX ORDER — ONDANSETRON 2 MG/ML
4 INJECTION INTRAMUSCULAR; INTRAVENOUS ONCE
Status: DISCONTINUED | OUTPATIENT
Start: 2017-04-02 | End: 2017-04-02

## 2017-04-02 RX ORDER — MIRTAZAPINE 15 MG/1
7.5 TABLET, ORALLY DISINTEGRATING ORAL AT BEDTIME
Status: DISCONTINUED | OUTPATIENT
Start: 2017-04-02 | End: 2017-04-03 | Stop reason: HOSPADM

## 2017-04-02 RX ORDER — ONDANSETRON 2 MG/ML
INJECTION INTRAMUSCULAR; INTRAVENOUS
Status: DISCONTINUED
Start: 2017-04-02 | End: 2017-04-02 | Stop reason: HOSPADM

## 2017-04-02 RX ORDER — CLONIDINE HYDROCHLORIDE 0.2 MG/1
0.4 TABLET ORAL EVERY EVENING
Status: DISCONTINUED | OUTPATIENT
Start: 2017-04-02 | End: 2017-04-03 | Stop reason: HOSPADM

## 2017-04-02 RX ORDER — ONDANSETRON 2 MG/ML
4 INJECTION INTRAMUSCULAR; INTRAVENOUS EVERY 6 HOURS PRN
Status: DISCONTINUED | OUTPATIENT
Start: 2017-04-02 | End: 2017-04-03 | Stop reason: HOSPADM

## 2017-04-02 RX ORDER — ONDANSETRON 4 MG/1
4 TABLET, ORALLY DISINTEGRATING ORAL EVERY 6 HOURS PRN
Status: DISCONTINUED | OUTPATIENT
Start: 2017-04-02 | End: 2017-04-03 | Stop reason: HOSPADM

## 2017-04-02 RX ORDER — LIDOCAINE 40 MG/G
CREAM TOPICAL
Status: DISCONTINUED | OUTPATIENT
Start: 2017-04-02 | End: 2017-04-03

## 2017-04-02 RX ORDER — HYDROMORPHONE HYDROCHLORIDE 1 MG/ML
INJECTION, SOLUTION INTRAMUSCULAR; INTRAVENOUS; SUBCUTANEOUS
Status: DISCONTINUED
Start: 2017-04-02 | End: 2017-04-02 | Stop reason: HOSPADM

## 2017-04-02 RX ORDER — NORTRIPTYLINE HCL 10 MG
40 CAPSULE ORAL AT BEDTIME
Status: DISCONTINUED | OUTPATIENT
Start: 2017-04-02 | End: 2017-04-03 | Stop reason: HOSPADM

## 2017-04-02 RX ORDER — LIDOCAINE 40 MG/G
CREAM TOPICAL
Status: DISCONTINUED | OUTPATIENT
Start: 2017-04-02 | End: 2017-04-03 | Stop reason: HOSPADM

## 2017-04-02 RX ORDER — SODIUM CHLORIDE 9 MG/ML
INJECTION, SOLUTION INTRAVENOUS CONTINUOUS
Status: DISCONTINUED | OUTPATIENT
Start: 2017-04-02 | End: 2017-04-03 | Stop reason: HOSPADM

## 2017-04-02 RX ORDER — AMOXICILLIN 250 MG
1-2 CAPSULE ORAL 2 TIMES DAILY PRN
Status: DISCONTINUED | OUTPATIENT
Start: 2017-04-02 | End: 2017-04-03 | Stop reason: HOSPADM

## 2017-04-02 RX ORDER — DULOXETIN HYDROCHLORIDE 60 MG/1
60 CAPSULE, DELAYED RELEASE ORAL DAILY
Status: DISCONTINUED | OUTPATIENT
Start: 2017-04-03 | End: 2017-04-03 | Stop reason: HOSPADM

## 2017-04-02 RX ORDER — NALOXONE HYDROCHLORIDE 0.4 MG/ML
.1-.4 INJECTION, SOLUTION INTRAMUSCULAR; INTRAVENOUS; SUBCUTANEOUS
Status: DISCONTINUED | OUTPATIENT
Start: 2017-04-02 | End: 2017-04-03 | Stop reason: HOSPADM

## 2017-04-02 RX ORDER — SODIUM CHLORIDE 9 MG/ML
INJECTION, SOLUTION INTRAVENOUS CONTINUOUS
Status: DISCONTINUED | OUTPATIENT
Start: 2017-04-02 | End: 2017-04-02

## 2017-04-02 RX ORDER — NALOXONE HYDROCHLORIDE 0.4 MG/ML
.1-.4 INJECTION, SOLUTION INTRAMUSCULAR; INTRAVENOUS; SUBCUTANEOUS
Status: DISCONTINUED | OUTPATIENT
Start: 2017-04-02 | End: 2017-04-03

## 2017-04-02 RX ORDER — POLYETHYLENE GLYCOL 3350 17 G/17G
17 POWDER, FOR SOLUTION ORAL DAILY PRN
Status: DISCONTINUED | OUTPATIENT
Start: 2017-04-02 | End: 2017-04-03 | Stop reason: HOSPADM

## 2017-04-02 RX ORDER — HYDROMORPHONE HYDROCHLORIDE 1 MG/ML
INJECTION, SOLUTION INTRAMUSCULAR; INTRAVENOUS; SUBCUTANEOUS
Status: COMPLETED
Start: 2017-04-02 | End: 2017-04-02

## 2017-04-02 RX ORDER — TRAZODONE HYDROCHLORIDE 50 MG/1
50-100 TABLET, FILM COATED ORAL
Status: DISCONTINUED | OUTPATIENT
Start: 2017-04-02 | End: 2017-04-03 | Stop reason: HOSPADM

## 2017-04-02 RX ORDER — HYDROMORPHONE HYDROCHLORIDE 1 MG/ML
0.5 INJECTION, SOLUTION INTRAMUSCULAR; INTRAVENOUS; SUBCUTANEOUS ONCE
Status: COMPLETED | OUTPATIENT
Start: 2017-04-02 | End: 2017-04-02

## 2017-04-02 RX ADMIN — HYDROMORPHONE HYDROCHLORIDE 2 MG: 2 TABLET ORAL at 23:20

## 2017-04-02 RX ADMIN — HYDROMORPHONE HYDROCHLORIDE 0.5 MG: 10 INJECTION, SOLUTION INTRAMUSCULAR; INTRAVENOUS; SUBCUTANEOUS at 21:31

## 2017-04-02 RX ADMIN — ONDANSETRON 4 MG: 2 INJECTION INTRAMUSCULAR; INTRAVENOUS at 23:19

## 2017-04-02 RX ADMIN — SODIUM CHLORIDE: 9 INJECTION, SOLUTION INTRAVENOUS at 21:31

## 2017-04-02 RX ADMIN — CLONIDINE HYDROCHLORIDE 0.4 MG: 0.2 TABLET ORAL at 23:20

## 2017-04-02 RX ADMIN — HYDROMORPHONE HYDROCHLORIDE 0.5 MG: 1 INJECTION, SOLUTION INTRAMUSCULAR; INTRAVENOUS; SUBCUTANEOUS at 21:31

## 2017-04-02 ASSESSMENT — ENCOUNTER SYMPTOMS
ABDOMINAL DISTENTION: 1
ABDOMINAL PAIN: 1
VOMITING: 0
FEVER: 1
CHILLS: 1

## 2017-04-02 ASSESSMENT — PAIN DESCRIPTION - DESCRIPTORS: DESCRIPTORS: ACHING

## 2017-04-02 NOTE — ED PROVIDER NOTES
History     Chief Complaint   Patient presents with     Abdominal Pain     Nausea & Vomiting     Bloated     Gtube Problem     new on Th 3/30, GJ tube flushing but not draining, no residual     HPI  Doreen Peralta is a 36 year old female with a complex past medical history significant for gastroparesis s/p GJ-tube placement for venting/feedings, chronic abdominal pain, multiple bowel surgeries including partial colectomy, chronic malnutrition and recurrent infections with history of sepsis/fungemia d/t infected port (7/2016) who presents for evaluation of abdominal pain and GJ tube dysfunction. Of note, patient is four days status post G tube exchange (performed 03/30/17).     Patient presents today with complaints of severe abdominal pain and increased abdominal distention over the past four days since her tube was replaced. She reports she was supposed to have a G tube placed, but reportedly mistakenly had a GJ tube placed instead. She believes her abdominal distention is secondary to dysfunction and/or improper placement of the GJ tube that was replaced Thursday. She reports she has had to lay down all weekend because when she sits up she has pain and difficulty breathing. She reports she has been trying to eat PO on her own, so was only supposed to have a G tube for venting. She reports she drains her tube multiple times throughout the day. She has been able to flush her GJ tube appropriately with no issues. Additionally, she complains of chills for the past two days. On arrival, she does have a low grade temperature of 99.4 degrees. She has been burping since her tube was exchanged, but denies vomiting. She has been having diarrhea, but has not been passing any flatus. Her last bowel movement was this afternoon around 1 PM. She reports a history of bowel obstructions and notes her current symptoms could be due to a bowel obstruction.     Abdominal x-ray from [facility] @ 12:02 PM  Impression:  Gastrostomy tube in place overlying the gastric antrum.  The visualized bowel gas pattern appears unremarkable. No radiographic  evidence to suggest obstruction.    I have reviewed the Medications, Allergies, Past Medical and [facility] History, and Social History in the [facility] system.  Past Medical History:   Diagnosis Date     Asthma      Bilateral ovarian cysts      Cervical cancer (H) 01/01/2008    cervical cancer      Chronic pain      Colonic dysmotility     s/p subtotal colectomy     Constipation     chronic     E. coli sepsis (H) 5/8/2016     Enteritis      Fungemia 5/5/2016     Gastro-oesophageal reflux disease      H/O ileostomy      Hx of abnormal Pap smear     s/p LEEP - no further details provided     Hypertension      IBS (irritable bowel syndrome)      Other chronic pain      PONV (postoperative nausea and vomiting)      Thrombosis, hepatic vein (H)     microvascular       Past [facility] History:   Procedure Laterality Date     COLONOSCOPY  7/10/2012    Procedure: COLONOSCOPY;;  Surgeon: Nicole Redding MD;  Location: [facility] OR     COLONOSCOPY N/A 2/19/2017    Procedure: COLONOSCOPY;  Surgeon: Randell Muller MD;  Location: [facility] GI     COLONOSCOPY N/A 2/21/2017    Procedure: COLONOSCOPY;  Surgeon: Randell Muller MD;  Location:  GI     ECHO CHELO  7/19/2016          ENDOSCOPIC INSERTION TUBE GASTROSTOMY N/A 1/21/2016    Procedure: ENDOSCOPIC INSERTION TUBE GASTROSTOMY;  Surgeon: Nicole Redding MD;  Location:  OR     ESOPHAGOSCOPY, GASTROSCOPY, DUODENOSCOPY (EGD), COMBINED  7/10/2012    Procedure: COMBINED ESOPHAGOSCOPY, GASTROSCOPY, DUODENOSCOPY (EGD);  Upper Endoscopy, Ileoscopy    Latex Allergy  with biopsies;  Surgeon: Nicole Redding MD;  Location:  OR     ESOPHAGOSCOPY, GASTROSCOPY, DUODENOSCOPY (EGD), COMBINED N/A 11/5/2014    Procedure: COMBINED ESOPHAGOSCOPY, GASTROSCOPY, DUODENOSCOPY (EGD);  Surgeon: Nicole Redding MD;  Location:  OR      REPLACE  DUODENOSTOMY/JEJUNOSTOMY TUBE PERCUTANEOUS N/A 8/27/2015    Procedure: REPLACE GASTROJEJUNOSTOMY TUBE, PERCUTANEOUS;  Surgeon: Mio Holder MD;  Location: [facility] OR     HC REPLACE DUODENOSTOMY/JEJUNOSTOMY TUBE PERCUTANEOUS N/A 1/7/2016    Procedure: REPLACE JEJUNOSTOMY TUBE, PERCUTANEOUS;  Surgeon: Elsa Medel MD;  Location: [facility] OR     HC REPLACE DUODENOSTOMY/JEJUNOSTOMY TUBE PERCUTANEOUS N/A 1/28/2016    Procedure: REPLACE JEJUNOSTOMY TUBE, PERCUTANEOUS;  Surgeon: Elsa Medel MD;  Location: [facility] OR     HC REPLACE GASTROSTOMY/CECOSTOMY TUBE PERCUTANEOUS Left 5/19/2015    Procedure: REPLACE GASTROSTOMY TUBE, PERCUTANEOUS;  Surgeon: Melecio Morejon Chi, MD;  [facility]     HC UGI ENDOSCOPY W PLACEMENT GASTROSTOMY TUBE PERCUT N/A 10/1/2015    Procedure: COMBINED ESOPHAGOSCOPY, GASTROSCOPY, DUODENOSCOPY (EGD), PLACE PERCUTANEOUS ENDOSCOPIC GASTROSTOMY TUBE;  Surgeon: Mio Holder MD;  Location:  GI     LAPAROSCOPIC ASSISTED COLECTOMY  1/20/2012    Procedure:LAPAROSCOPIC ASSISTED COLECTOMY; Laparoscopic Ileostomy       LAPAROSCOPIC ASSISTED COLECTOMY LEFT (DESCENDING)  10/24/2012    Procedure: LAPAROSCOPIC ASSISTED COLECTOMY LEFT (DESCENDING);   Hand Assisted Laproscopic Subtotal abdominal Colectomy,Iieorectal anastamosis, Ileostomy Closure.       LAPAROSCOPIC ASSISTED INSERTION TUBE JEJUNOSTOMY N/A 10/16/2015    Procedure: LAPAROSCOPIC ASSISTED INSERTION TUBE JEJUNOSTOMY;  Surgeon: Elsa Medel MD;  Location:  OR     LAPAROSCOPIC CHOLECYSTECTOMY  2002    [facility] stones duct     LAPAROSCOPIC ILEOSTOMY  1/20/2012    [facility], loop     LAPAROSCOPIC OOPHORECTOMY Right 2009    [facility]     LAPAROTOMY EXPLORATORY N/A 1/28/2016    Procedure: LAPAROTOMY EXPLORATORY;  Surgeon: Elsa Medel MD;  Location:  OR     LEEP TX, CERVICAL  2009    [facility]     PICC INSERTION Left 10/21/2015    5fr DL Power PICC, 37cm (2cm external) in the L basilic vein w/ tip in the SVC RA  junction.     REMOVE GASTROSTOMY TUBE ADULT N/A 12/12/2014    Procedure: REMOVE GASTROSTOMY TUBE ADULT;  Surgeon: Nicole Redding MD;  Location: [facility] GI     REMOVE PORT VASCULAR ACCESS Right 6/30/2016    Procedure: REMOVE PORT VASCULAR ACCESS;  Surgeon: Pradeep Orosco MD;  Location: [facility] OR     replace GASTROSTOMY TUBE ADULT  5/19/15       Family History   Problem Relation Age of Onset     Thyroid Disease Mother      Sjogren's Mother      GASTROINTESTINAL DISEASE Mother      Intermittent nausea vomiting diarrhea     Colon Polyps Mother      Prostate Problems Father      prostate enlargement     Lupus Maternal Grandmother      CANCER Maternal Grandfather      Lung     Colon Cancer Maternal Grandfather 65     CANCER Paternal Grandmother      Lung      CEREBROVASCULAR DISEASE Paternal Grandmother      DIABETES Paternal Grandmother      Cardiovascular Paternal Grandmother      CHF     CANCER Paternal Grandfather      Lung     Glaucoma Paternal Grandfather      Abdominal Aortic Aneurysm Other      Macular Degeneration No family hx of        Social History   Substance Use Topics     Smoking status: Former Smoker     Packs/day: 1.00     Years: 4.00     Types: Cigarettes     Quit date: 1/1/2004     Smokeless tobacco: Former User     Alcohol use No     No current facility-administered medications for this encounter.      Current Outpatient Prescriptions   Medication     traZODone (DESYREL) 50 MG tablet     study - rivaroxaban (IDS# [id number]) oral suspension     nortriptyline (PAMELOR) 10 MG capsule     cloNIDine (CATAPRES) 0.2 MG tablet     mirtazapine (REMERON SOL-TAB) 15 MG ODT tab     ondansetron (ZOFRAN ODT) 4 MG ODT tab     SUMAtriptan (IMITREX) 50 MG tablet     VITAMIN D, CHOLECALCIFEROL, PO     rivaroxaban ANTICOAGULANT (XARELTO) 20 MG TABS tablet     DULoxetine (CYMBALTA) 60 MG capsule     Nutritional Supplements (COMPLEAT) LIQD     ACETAMINOPHEN PO     albuterol 90 MCG/ACT inhaler        Allergies   Allergen  "Reactions     Hyoscyamine Rash     Metoclopramide Other (See Comments)     Eye twitching.      Peaches [Peach] Other (See Comments)     Raw. Cooked OK     Sucralose Other (See Comments)     All artificial sweeteners. Aspartame also. Swollen glands     Advair Diskus Other (See Comments)     Throat burns     Azithromycin Other (See Comments)     Burning in throat     Compazine [Prochlorperazine] Visual Disturbance     Contrast Dye Itching     States is allergic to CT contrast dye     Cyclobenzaprine Visual Disturbance     Fentanyl Other (See Comments)     migraine     Ibuprofen GI Disturbance     Lactulose Nausea and Vomiting     Gas and bloating     Levaquin [Levofloxacin] Swelling     Per ED M.D. And RN      Morphine Sulfate Other (See Comments)     Chest pain       Oxycodone Other (See Comments)     Burning throat, but can take Norco.      Penicillins Other (See Comments)     Family hx of resp arrest, she has never taken  Ok with cephalosporins     Rizatriptan Visual Disturbance     Droperidol Hives and Rash     Isovue [Iopamidol] Palpitations     Pt had racing heart and sob      Ketorolac Anxiety     Latex Swelling and Rash     Kiwi, likely also avacado, ? banana     Levsin Rash       Review of Systems   Constitutional: Positive for chills and fever (low grade 99.4).   Gastrointestinal: Positive for abdominal distention and abdominal pain. Negative for vomiting.       Physical Exam   BP: (!) 146/100  Heart Rate: 124  Temp: 99.4  F (37.4  C)  Height: 167.6 cm (5' 6\")  Weight: 83.9 kg (185 lb)  SpO2: 100 %    Physical Exam  Gen:A&Ox3, uncomfortable.   HEENT:PERRL, no facial tenderness or wounds, head atraumatic, oropharynx clear, mucous membranes moist, TMs clear bilaterally  Back: no CVA tendernes  CV:RRR without murmurs  PULM:Clear to auscultation bilaterally  Abd: tender in epigastrium and LUQ, moderately distended. Bowel sounds present. GJ tube in LUQ.   UE:No traumatic injuries, skin normal  LE:no traumatic " injuries, skin normal, no LE edema  Neuro:CN II-XII intact, strength 5/5 throughout  Skin: no rashes or ecchymoses    ED Course     ED Course     Procedures       3:52 PM  The patient was seen and examined by Dr. Paredes in Room 6.          Critical Care time:  none  Labs Ordered and Resulted from Time of ED Arrival Up to the Time of Departure from the ED   CBC WITH PLATELETS DIFFERENTIAL - Abnormal; Notable for the following:        Result Value    WBC 13.2 (*)     Hemoglobin 11.0 (*)     MCV 76 (*)     MCH 23.6 (*)     MCHC 31.1 (*)     RDW 16.8 (*)     Platelet Count 527 (*)     Absolute Neutrophil 11.8 (*)     All other components within normal limits   COMPREHENSIVE METABOLIC PANEL - Abnormal; Notable for the following:     Glucose 106 (*)     Protein Total 9.2 (*)     Alkaline Phosphatase 162 (*)     All other components within normal limits   ERYTHROCYTE SEDIMENTATION RATE AUTO - Abnormal; Notable for the following:     Sed Rate 37 (*)     All other components within normal limits   LIPASE   CRP INFLAMMATION   LACTIC ACID WHOLE BLOOD       Assessments & Plan (with Medical Decision Making)   35 yo F with a hx of partial colectomy presenting with abdominal pain, distention and malfunctioning GJ tube.   Afebrile, tachycardic.   IV access obtained and pt was treated with IV fluid, dilaudid and zofran.   Labs sent and notable for lactic acid of 1.6, Normal CRP, minimally elevated ESR at 37, normal Cr and lipase. WBC increased to 13.   I reviewed the abdominal xray done earlier today at Urgent Care. Concerning for possible malpositioning of GJ tube.   Pt's case was discussed with the Radiology resident on call and given concern for tube malpositioning and possible SBO she was sent for a CT A/P for additional evaluation.   CT negative for SBO or other cause of abdominal pain. Migration of the jejunostomy tube into the stomach.   I attempted to decompress the pt's GI bloating by connecting the G tube to low  intermittent suction. Some air and succus removed but pt did not tolerate suction for more than 10-15 minutes due to pain.     Due to pt's hx of bacteremia, I have concerns today about risk for infection since pt is reporting chills and her WBC count has increased. Blood cultures x2 were sent.     Pt's case was discussed with the IM triage hospitalist and she was admitted on Obs status for infection monitoring and for IR tube change or repositioning tomorrow.     Some of pt's care was provided during EPIC downtime. Please see the scanned paper documentation for additional details.     I have reviewed the nursing notes.    I have reviewed the findings, diagnosis, plan and need for follow up with the patient.    Discharge Medication List as of 4/3/2017  7:36 PM          Final diagnoses:   Gastrojejunostomy tube dislodgement (H)   Abdominal pain, epigastric   I, Korin Prather, am serving as a trained medical scribe to document services personally performed by Caitie Paredes MD, based on the provider's statements to me.   I, Caitie Paredes MD, was physically present and have reviewed and verified the accuracy of this note documented by Korin Prather.      4/2/2017   [facility], [place], EMERGENCY DEPARTMENT    MD FATEMEH Mesa Katrina Anne, MD  04/04/17 [id number]

## 2017-04-02 NOTE — PATIENT INSTRUCTIONS
Discussed results of evaluation and tests today.  N. Observe symptoms x 12-24 hours. To ER if significant increase in symptoms or concerning symptoms occur. Symptomatic care discussed.    Follow-up with your [facility] on Monday to the Emergency department of worsening  of symptoms or further concerns     Audrain diet, rest, fluids as tolerated.     Go to Emergency Room if sx worsen or change.     Patient voiced understanding of instructions given.          Abdominal Pain  Abdominal pain is pain in the stomach or intestinal area. Everyone has this pain from time to time. In many cases it goes away on its own. But abdominal pain can sometimes be due to a serious problem, such as appendicitis. So it s important to know when to seek help.  Causes of abdominal pain  There are many possible causes of abdominal pain. Common causes in adults include:    Constipation, diarrhea, or gas    GERD (gastroesophageal reflux disease) movement of stomach acid into the esophagus, also known as acid reflux or heartburn    Peptic ulcer (a sore in the lining of the stomach or small intestine)    Inflammation of the gallbladder, liver, or pancreas    Gallstones or kidney stones    Appendicitis     Obstruction of the intestines     Hernia (bulging of an internal organ through a muscle or other tissue)    Urinary tract infections    In women, menstrual cramps, fibroids, or endometriosis of the uterus    Inflammation or infection of the intestines  Diagnosing the cause of abdominal pain  Your health care provider will examine you to help find the cause of your pain. If needed, tests will be ordered. Because abdominal pain has so many possible causes, it can be hard to discover the reason for the pain. Giving details about your pain can help. Be ready to tell your health care provider where and when you feel the pain and what makes it better or worse. Also mention whether you have other symptoms such as fever, tiredness, nausea,  vomiting, or changes in bathroom habits.  Treating abdominal pain  Certain causes of pain, such as appendicitis or a bowel obstruction, need emergency treatment. Other problems can be treated with rest, fluids, or medications. Your health care provider can give you specific instructions for treatment or self-care based on the cause of your pain.  If you have vomiting or diarrhea, sip water or other clear fluids. When you are ready to eat solid foods again, start with small amounts of easy-to-digest, low-fat foods, such as applesauce, toast, or crackers.   When to call the doctor  Call 911 or go to the hospital right away if you:    Can t pass stool and are vomiting    Are vomiting blood or have black, tarry diarrhea    Also have chest, neck, or shoulder pain    Feel like you are about to pass out    Have pain in your shoulder blades with nausea    Have sudden, excruciating abdominal pain    Have new, severe pain unlike any you have felt before    Have a belly that is rigid, hard, and tender to touch  Call your doctor if you have:    Pain for more than 5 days    Bloating for more than 2 days    Diarrhea for more than 5 days    Fever of 101 F (38.3 C) or higher    Pain that continues to worsen    Unexplained weight loss    Continued lack of appetite    Blood in the stool  How to prevent abdominal pain  Here are some tips to help prevent abdominal pain:    Eat smaller amounts of food at one time.    Avoid greasy, fried, or other high-fat foods.    Avoid foods that give you gas.    Exercise regularly.    Drink plenty of fluids.  To help prevent symptoms of gastroesophageal reflux disease (GERD):    Quit smoking.    Reduce alcohol and certain foods that increase stomach acid.     Lose excess weight.    Finish eating at least 2 hours before you go to bed or lie down.    Elevate the head of your bed.    7489-3332 [employer]. [address] [place] [address], [place] [address]. All rights reserved. This information is  not intended as a substitute for professional medical care. Always follow your healthcare professional's instructions.        Gastroparesis  Gastroparesis (also called delayed gastric emptying) occurs when the stomach takes longer than normal to empty of food. This is due to a problem with motility (the movement of the muscles in the digestive tract). For many people, gastroparesis is a lifelong condition. But treatment can help relieve symptoms and prevent complications. Read on to learn more about gastroparesis and how it can be managed.  How gastroparesis develops     Gastroparesis means that food and fluids move too slowly out of the stomach into the duodenum.   With normal motility, signals from nerves tell the stomach muscles when to contract. These muscles move food from the stomach into the duodenum (the first part of the small bowel). With gastroparesis, the nerves or muscles are damaged. This causes motility to slow down or stop completely. As a result, food cannot move from the stomach properly. This delayed emptying can cause nausea, vomiting, and other symptoms. Malnutrition can result. Bezoars (hardened lumps of food) can form in the stomach and cause other complications as well.  Causes of gastroparesis  Gastroparesis can be caused by any of the following:    Diabetes    Surgery involving any of the digestive organs, such as the stomach and bowels    Certain medications, such as strong pain medicines (narcotics)    Certain conditions, such as systemic scleroderma, Parkinson s disease, and thyroid disease  In many cases, the cause of gastroparesis cannot be found.  Signs and symptoms of gastroparesis  These can include:    Nausea and vomiting    Feeling full quickly when eating    Abdominal pain    Heartburn    Abdominal bloating    Weight loss    Loss of appetite    High and low blood sugar levels (in people with diabetes)  Diagnosing gastroparesis  Your doctor will ask about your symptoms and health  history. You ll also be examined. In addition, blood tests and X-rays are often done to check your health and rule out other problems. To confirm the problem, you may need other tests as well. These can include:    Upper endoscopy. This is done to see inside the stomach and duodenum. For the test, an endoscope is used. This is a thin, flexible tube with a tiny camera on the end. It s inserted through the mouth and down into the stomach and duodenum.    Upper gastrointestinal (GI) series. This is done to take X-rays of the upper GI tract from the mouth to the small bowel. For the test, a substance called barium is used. The barium coats the upper GI tract so that it will show up clearly on X-rays.    Gastric emptying scan. This is done to measure how quickly food leaves the stomach. For the test, a meal containing a harmless radioactive substance (tracer) is eaten. Then scans of the stomach are done. The tracer shows up clearly on the scans and shows the movement of the food through the stomach.  Treating gastroparesis  The goal of treatment is to help you manage your condition. Treatment may include one or more of the following:    Dietary changes. You may need to make changes to your eating habits and daily diet. For instance, your doctor may instruct you to eat small meals throughout the day. Doing this can keep you from feeling full too quickly. You may be placed on a liquid or  soft  diet. This means you ll eat liquid foods or foods that are mashed or put through a . In addition, you may need to avoid foods high in fats and fiber. These can slow digestion. For more help with your diet, your doctor can refer you to a dietitian. In severe cases, you may need a feeding tube. This sends liquid food or medication directly to your small bowel, bypassing the stomach.    Medicines. These can help manage symptoms, such as nausea and vomiting. They can also improve motility. Each medicine has specific risks and  side effects. Your doctor can tell you more about any medicine that is prescribed for you.    Surgery. You may need to have a tube surgically inserted into the stomach. The tube removes excess air and fluid. This can relieve severe symptoms of nausea and vomiting. In rare cases, other surgery may be needed on the stomach or small bowel. This is to create a new passageway for food to be emptied from the stomach.    Gastric electrical stimulation. This treatment is done less often and may not be available. Your doctor can tell you more about this treatment if it is a choice for you.  Diabetes and gastroparesis  If you have diabetes, gastroparesis can make it harder to manage your blood sugar level. You ll need to take extra steps in your treatment to prevent complications. Work with your doctor to learn what you can do to protect your health. For more information, contact the American Diabetes Association, www.diabetes.org.   Long-term concerns   With treatment, most people can manage their symptoms and maintain their usual routines. If your symptoms are moderate to severe, you may need to see your doctor more frequently for checkups. Also, other treatments will likely be needed.    8200-2180 [employer]. [address] [place] [address], [place] [address]. All rights reserved. This information is not intended as a substitute for professional medical care. Always follow your healthcare professional's instructions.

## 2017-04-02 NOTE — MR AVS SNAPSHOT
After Visit Summary   4/2/2017    Doreen Peralta    MRN: [id number]           Patient Information     Date Of Birth          1980        Visit Information        Provider Department      4/2/2017 11:05 AM Chasity Mason APRN [facility]        Today's Diagnoses     G tube feedings (H)    -  1      Care Instructions    Discussed results of evaluation and tests today.  N. Observe symptoms x 12-24 hours. To ER if significant increase in symptoms or concerning symptoms occur. Symptomatic care discussed.    Follow-up with your [facility] on Monday to the Emergency department of worsening  of symptoms or further concerns     [place] diet, rest, fluids as tolerated.     Go to Emergency Room if sx worsen or change.     Patient voiced understanding of instructions given.          Abdominal Pain  Abdominal pain is pain in the stomach or intestinal area. Everyone has this pain from time to time. In many cases it goes away on its own. But abdominal pain can sometimes be due to a serious problem, such as appendicitis. So it s important to know when to seek help.  Causes of abdominal pain  There are many possible causes of abdominal pain. Common causes in adults include:    Constipation, diarrhea, or gas    GERD (gastroesophageal reflux disease) movement of stomach acid into the esophagus, also known as acid reflux or heartburn    Peptic ulcer (a sore in the lining of the stomach or small intestine)    Inflammation of the gallbladder, liver, or pancreas    Gallstones or kidney stones    Appendicitis     Obstruction of the intestines     Hernia (bulging of an internal organ through a muscle or other tissue)    Urinary tract infections    In women, menstrual cramps, fibroids, or endometriosis of the uterus    Inflammation or infection of the intestines  Diagnosing the cause of abdominal pain  Your health care provider will examine you to help find the cause of your  pain. If needed, tests will be ordered. Because abdominal pain has so many possible causes, it can be hard to discover the reason for the pain. Giving details about your pain can help. Be ready to tell your health care provider where and when you feel the pain and what makes it better or worse. Also mention whether you have other symptoms such as fever, tiredness, nausea, vomiting, or changes in bathroom habits.  Treating abdominal pain  Certain causes of pain, such as appendicitis or a bowel obstruction, need emergency treatment. Other problems can be treated with rest, fluids, or medications. Your health care provider can give you specific instructions for treatment or self-care based on the cause of your pain.  If you have vomiting or diarrhea, sip water or other clear fluids. When you are ready to eat solid foods again, start with small amounts of easy-to-digest, low-fat foods, such as applesauce, toast, or crackers.   When to call the doctor  Call 911 or go to the hospital right away if you:    Can t pass stool and are vomiting    Are vomiting blood or have black, tarry diarrhea    Also have chest, neck, or shoulder pain    Feel like you are about to pass out    Have pain in your shoulder blades with nausea    Have sudden, excruciating abdominal pain    Have new, severe pain unlike any you have felt before    Have a belly that is rigid, hard, and tender to touch  Call your doctor if you have:    Pain for more than 5 days    Bloating for more than 2 days    Diarrhea for more than 5 days    Fever of 101 F (38.3 C) or higher    Pain that continues to worsen    Unexplained weight loss    Continued lack of appetite    Blood in the stool  How to prevent abdominal pain  Here are some tips to help prevent abdominal pain:    Eat smaller amounts of food at one time.    Avoid greasy, fried, or other high-fat foods.    Avoid foods that give you gas.    Exercise regularly.    Drink plenty of fluids.  To help prevent  symptoms of gastroesophageal reflux disease (GERD):    Quit smoking.    Reduce alcohol and certain foods that increase stomach acid.     Lose excess weight.    Finish eating at least 2 hours before you go to bed or lie down.    Elevate the head of your bed.    7544-6984 [employer]. [address] [place] [address]. All rights reserved. This information is not intended as a substitute for professional medical care. Always follow your healthcare professional's instructions.        Gastroparesis  Gastroparesis (also called delayed gastric emptying) occurs when the stomach takes longer than normal to empty of food. This is due to a problem with motility (the movement of the muscles in the digestive tract). For many people, gastroparesis is a lifelong condition. But treatment can help relieve symptoms and prevent complications. Read on to learn more about gastroparesis and how it can be managed.  How gastroparesis develops     Gastroparesis means that food and fluids move too slowly out of the stomach into the duodenum.   With normal motility, signals from nerves tell the stomach muscles when to contract. These muscles move food from the stomach into the duodenum (the first part of the small bowel). With gastroparesis, the nerves or muscles are damaged. This causes motility to slow down or stop completely. As a result, food cannot move from the stomach properly. This delayed emptying can cause nausea, vomiting, and other symptoms. Malnutrition can result. Bezoars (hardened lumps of food) can form in the stomach and cause other complications as well.  Causes of gastroparesis  Gastroparesis can be caused by any of the following:    Diabetes    Surgery involving any of the digestive organs, such as the stomach and bowels    Certain medications, such as strong pain medicines (narcotics)    Certain conditions, such as systemic scleroderma, Parkinson s disease, and thyroid disease  In many cases, the  cause of gastroparesis cannot be found.  Signs and symptoms of gastroparesis  These can include:    Nausea and vomiting    Feeling full quickly when eating    Abdominal pain    Heartburn    Abdominal bloating    Weight loss    Loss of appetite    High and low blood sugar levels (in people with diabetes)  Diagnosing gastroparesis  Your doctor will ask about your symptoms and health history. You ll also be examined. In addition, blood tests and X-rays are often done to check your health and rule out other problems. To confirm the problem, you may need other tests as well. These can include:    Upper endoscopy. This is done to see inside the stomach and duodenum. For the test, an endoscope is used. This is a thin, flexible tube with a tiny camera on the end. It s inserted through the mouth and down into the stomach and duodenum.    Upper gastrointestinal (GI) series. This is done to take X-rays of the upper GI tract from the mouth to the small bowel. For the test, a substance called barium is used. The barium coats the upper GI tract so that it will show up clearly on X-rays.    Gastric emptying scan. This is done to measure how quickly food leaves the stomach. For the test, a meal containing a harmless radioactive substance (tracer) is eaten. Then scans of the stomach are done. The tracer shows up clearly on the scans and shows the movement of the food through the stomach.  Treating gastroparesis  The goal of treatment is to help you manage your condition. Treatment may include one or more of the following:    Dietary changes. You may need to make changes to your eating habits and daily diet. For instance, your doctor may instruct you to eat small meals throughout the day. Doing this can keep you from feeling full too quickly. You may be placed on a liquid or  soft  diet. This means you ll eat liquid foods or foods that are mashed or put through a . In addition, you may need to avoid foods high in fats and  fiber. These can slow digestion. For more help with your diet, your doctor can refer you to a dietitian. In severe cases, you may need a feeding tube. This sends liquid food or medication directly to your small bowel, bypassing the stomach.    Medicines. These can help manage symptoms, such as nausea and vomiting. They can also improve motility. Each medicine has specific risks and side effects. Your doctor can tell you more about any medicine that is prescribed for you.    Surgery. You may need to have a tube surgically inserted into the stomach. The tube removes excess air and fluid. This can relieve severe symptoms of nausea and vomiting. In rare cases, other surgery may be needed on the stomach or small bowel. This is to create a new passageway for food to be emptied from the stomach.    Gastric electrical stimulation. This treatment is done less often and may not be available. Your doctor can tell you more about this treatment if it is a choice for you.  Diabetes and gastroparesis  If you have diabetes, gastroparesis can make it harder to manage your blood sugar level. You ll need to take extra steps in your treatment to prevent complications. Work with your doctor to learn what you can do to protect your health. For more information, contact the American Diabetes Association, www.diabetes.org.   Long-term concerns   With treatment, most people can manage their symptoms and maintain their usual routines. If your symptoms are moderate to severe, you may need to see your doctor more frequently for checkups. Also, other treatments will likely be needed.    3967-3339 [employer]. [address] [place] [address], [place] [address]. All rights reserved. This information is not intended as a substitute for professional medical care. Always follow your healthcare professional's instructions.              Follow-ups after your visit        Your next 10 appointments already scheduled     Jul 17, 2017  9:00 AM CDT    (Arrive by 8:45 AM)   Return Visit with Yuri Wahl MD   [facility] ([facility] [place])    [address]  4th Floor  [facility] [address] [phone]              Who to contact     If you have questions or need follow up information about today's clinic visit or your schedule please contact [facility] directly at [phone].  Normal or non-critical lab and imaging results will be communicated to you by [employer], letter or phone within 4 business days after the clinic has received the results. If you do not hear from us within 7 days, please contact the clinic through "[employer]t or phone. If you have a critical or abnormal lab result, we will notify you by phone as soon as possible.  Submit refill requests through [email or web address] or call your pharmacy and they will forward the refill request to us. Please allow 3 business days for your refill to be completed.          Additional Information About Your Visit        [employer] Information     [email or web address] gives you secure access to your electronic health record. If you see a primary care provider, you can also send messages to your care team and make appointments. If you have questions, please call your primary care clinic.  If you do not have a primary care provider, please call [phone] and they will assist you.        Care EveryWhere ID     This is your Care EveryWhere ID. This could be used by other organizations to access your [facility] medical records  [id number]        Your Vitals Were     Pulse Temperature Last Period Pulse Oximetry BMI (Body Mass Index)       102 99.8  F (37.7  C) (Tympanic) 03/20/2017 100% 31.99 kg/m2        Blood Pressure from Last 3 Encounters:   04/02/17 138/86   03/30/17 143/89   03/27/17 138/83    Weight from Last 3 Encounters:   04/02/17 198 lb 3.2 oz (89.9 kg)   03/30/17 185 lb (83.9 kg)   03/27/17 185 lb (83.9 kg)               Primary Care Provider Office  Phone # Fax #    Larisa Lorenzo PA-C [phone]       [facility] [place] [address]  [place] [address]        Thank you!     Thank you for choosing [facility]  for your care. Our goal is always to provide you with excellent care. Hearing back from our patients is one way we can continue to improve our services. Please take a few minutes to complete the written survey that you may receive in the mail after your visit with us. Thank you!             Your Updated Medication List - Protect others around you: Learn how to safely use, store and throw away your medicines at www.disposemymeds.org.          This list is accurate as of: 4/2/17 12:35 PM.  Always use your most recent med list.                   Brand Name Dispense Instructions for use    ACETAMINOPHEN PO      Take 500-1,000 mg by mouth every 6 hours as needed for pain       albuterol 90 MCG/ACT inhaler      Inhale 2 puffs into the lungs every 6 hours as needed       cloNIDine 0.2 MG tablet    CATAPRES    180 tablet    Take 2 tablets (0.4 mg) by mouth every evening       COMPLEAT Liqd     30 each    Infuse at 50 ml/hour for 5 hours daily through j-tube Flush j-tube with 30 ml water before and after each infusion of Compleat       DULoxetine 60 MG EC capsule    CYMBALTA    90 capsule    Take 1 capsule (60 mg) by mouth daily       mirtazapine 15 MG ODT tab    REMERON SOL-TAB    45 tablet    0.5 tablets (7.5 mg) by Orally disintegrating tablet route At Bedtime       nortriptyline 10 MG capsule    PAMELOR    120 capsule    Take 3-4 capsules (30-40 mg) by mouth At Bedtime       ondansetron 4 MG ODT tab    ZOFRAN ODT    40 tablet    Take 1-2 tablets (4-8 mg) by mouth every 6 hours as needed for nausea       * study - rivaroxaban oral suspension    IDS# [id number]     Take 20 mg by mouth       * rivaroxaban ANTICOAGULANT 20 MG Tabs tablet    XARELTO    90 tablet    Take 1 tablet (20 mg) by mouth daily (with dinner)        SUMAtriptan 50 MG tablet    IMITREX    18 tablet    Take 1-2 tablets ( mg) by mouth at onset of headache for migraine - may repeat dose after 2h if headache recurs.  Max: 200mg/24 hours       traZODone 50 MG tablet    DESYREL    180 tablet    Take 1-2 tablets ( mg) by mouth nightly as needed 1-2 tabs at bedtime PRN       VITAMIN D (CHOLECALCIFEROL) PO      Take 2,000 Units by mouth daily       * Notice:  This list has 2 medication(s) that are the same as other medications prescribed for you. Read the directions carefully, and ask your doctor or other care provider to review them with you.

## 2017-04-02 NOTE — ED NOTES
"Pt presents with abd pain, nausea, abd bloating and GJ tube problems. GJ tube was replaced Th 3/30 and has not drained since then nor produced any residuals and her abd pain and distention has increased since placement. Pt feels \"that it is not in the right spot and it feels like it is rubbing right under my diaphragm.\" Pt was supposed to get a Sharif placed on Th but reports that the wrong device was placed. Pt took tylenol at 1330 with no relief.    Vital Signs - BP: 146/100 Patient Position: Sitting Heart Rate: 124 Pulse/Heart Rate Source: Left SpO2: 100 % O2 Device: None (Room air) Temp: 99.4  F (37.4  C) Temp src: Oral General Capillary Refill: less than/equal to 3 secs Patient Currently in Pain: Yes HR/Pulse Review: 124  "

## 2017-04-02 NOTE — IP AVS SNAPSHOT
MRN:[id number]                      After Visit Summary   4/2/2017    Doreen Peralta    MRN: [id number]           Thank you!     Thank you for choosing [facility] for your care. Our goal is always to provide you with excellent care. Hearing back from our patients is one way we can continue to improve our services. Please take a few minutes to complete the written survey that you may receive in the mail after you visit with us. Thank you!        Patient Information     Date Of Birth          1980        About your hospital stay     You were admitted on:  April 2, 2017 You last received care in the:  Unit 6D Observation [facility]    You were discharged on:  April 3, 2017        Reason for your hospital stay       You were admitted with abdominal pain and found to have the J part of your tube not in the right place. IR changed your GJ into a G tube - we cannot do the smaller Dwight-key because it will not vent well.                  Who to Call     For medical emergencies, please call 911.  For non-urgent questions about your medical care, please call your primary care provider or clinic, [phone]          Attending Provider     Provider Specialty    Caitie Paredes MD Emergency Medicine    Sparks, Nikhil IGLESIAS MD HOSPITALIST    Tommy Lozano MD Internal Medicine       Primary Care Provider Office Phone # Fax #    Larisa Lorenzo PA-C [phone]       [facility] [place] [address]  [place] [address]         When to contact your care team       Call your primary doctor if you have any of the following: new, severe abdominal pain, unable to vent tube, or have any other concerns.                  After Care Instructions     Activity       Your activity upon discharge: activity as tolerated and no driving today            Diet       Follow this diet upon discharge: Regular diet, as you were doing before.            Tubes and drains       You are going  home with the following tubes or drains: feeding tube G-Tube.   Tube cares per hospital or home care instructions                  Follow-up Appointments     Follow Up and recommended labs and tests       You should follow up with GI as planned in 3 months. Next time you see your PCP ask if you still need to be on the blood thinner. They may have you connect with the blood doctors again.                  Your next 10 appointments already scheduled     Apr 06, 2017  9:00 AM CDT   Office Visit with Larisa Lorenzo PA-C   [facility] ([facility]    [address] [facility] [address] [phone]           Bring a current list of meds and any records pertaining to this visit.  For Physicals, please bring immunization records and any forms needing to be filled out.  Please arrive 10 minutes early to complete paperwork.            Jul 17, 2017  9:00 AM CDT   (Arrive by 8:45 AM)   Return Visit with Yuri Wahl MD   [facility] ([facility])    [address] [place] [address]  4th [facility] [address] [phone]              Additional Services     Home infusion referral       Your provider has referred you to: [facility]: [facility] [place] [phone] [email or web address]     Local Address (if different from home address): N/A     Anticipated Length of Therapy: per md order     Home Infusion Pharmacist to adjust therapy based on labs and clinical assessments: Yes        Agency Staff to assess nursing needs for Infusion Therapy.     Tube Feedings and supplies  Complete 50ml/hour for 5 hours per day                  Pending Results     Date and Time Order Name Status Description    4/3/2017 0858 IR Gastrostomy Tube Change In process     4/2/2017 1605 Blood culture Preliminary             Statement of Approval     Ordered          04/03/17 1931  I have reviewed and agree with all  "the recommendations and orders detailed in this document.  EFFECTIVE NOW     Approved and electronically signed by:  Shawna Chavez NP             Admission Information     Date & Time Provider Department Dept. Phone    4/2/2017 Tommy Lozano MD Unit 6D Observation [facility] [phone]      Your Vitals Were     Blood Pressure Temperature Respirations Height Weight Last Period    154/110 (BP Location: Right arm) 98.1  F (36.7  C) (Axillary) 16 1.676 m (5' 6\") 89.6 kg (197 lb 8 oz) 03/20/2017 (Exact Date)    Pulse Oximetry BMI (Body Mass Index)                100% 31.88 kg/m2          [employer] [facility] Information     [employer] gives you secure access to your electronic health record. If you see a primary care provider, you can also send messages to your care team and make appointments. If you have questions, please call your primary care clinic.  If you do not have a primary care provider, please call [phone] and they will assist you.        Care EveryWhere ID     This is your Care EveryWhere ID. This could be used by other organizations to access your [facility] medical records  [id number]           Review of your medicines      CONTINUE these medicines which have NOT CHANGED        Dose / Directions    ACETAMINOPHEN PO        Dose:  500-1000 mg   Take 500-1,000 mg by mouth every 6 hours as needed for pain   Refills:  0       albuterol 90 MCG/ACT inhaler        Dose:  2 puff   Inhale 2 puffs into the lungs every 6 hours as needed   Refills:  0       cloNIDine 0.2 MG tablet   Commonly known as:  CATAPRES   Used for:  Anxiety        Dose:  0.4 mg   Take 2 tablets (0.4 mg) by mouth every evening   Quantity:  180 tablet   Refills:  0       COMPLEAT Liqd   Used for:  Non-intractable vomiting with nausea, vomiting of unspecified type, Jejunostomy tube present (H), Malnutrition (H)        Infuse at 50 ml/hour for 5 hours daily through j-tube Flush j-tube with 30 ml water before and after each infusion of " Compleat   Quantity:  30 each   Refills:  0       DULoxetine 60 MG EC capsule   Commonly known as:  CYMBALTA   Used for:  Abdominal pain, epigastric, Abdominal migraine, not intractable        Dose:  60 mg   Take 1 capsule (60 mg) by mouth daily   Quantity:  90 capsule   Refills:  3       mirtazapine 15 MG ODT tab   Commonly known as:  REMERON SOL-TAB   Used for:  Anxiety        Dose:  7.5 mg   0.5 tablets (7.5 mg) by Orally disintegrating tablet route At Bedtime   Quantity:  45 tablet   Refills:  0       nortriptyline 10 MG capsule   Commonly known as:  PAMELOR   Used for:  Neuropathic pain, Primary insomnia        Dose:  30-40 mg   Take 3-4 capsules (30-40 mg) by mouth At Bedtime   Quantity:  120 capsule   Refills:  5       ondansetron 4 MG ODT tab   Commonly known as:  ZOFRAN ODT   Used for:  Intractable vomiting, presence of nausea not specified, unspecified vomiting type, Gastroparesis        Dose:  4-8 mg   Take 1-2 tablets (4-8 mg) by mouth every 6 hours as needed for nausea   Quantity:  40 tablet   Refills:  0       * study - rivaroxaban oral suspension   Commonly known as:  IDS# [id number]        Dose:  20 mg   Take 20 mg by mouth   Refills:  0       * rivaroxaban ANTICOAGULANT 20 MG Tabs tablet   Commonly known as:  XARELTO   Used for:  Deep vein thrombosis (DVT) of upper extremity, unspecified chronicity, unspecified laterality, unspecified vein (H)        Dose:  20 mg   Take 1 tablet (20 mg) by mouth daily (with dinner)   Quantity:  90 tablet   Refills:  1       SUMAtriptan 50 MG tablet   Commonly known as:  IMITREX   Used for:  Migraine without aura and without status migrainosus, not intractable        Dose:   mg   Take 1-2 tablets ( mg) by mouth at onset of headache for migraine - may repeat dose after 2h if headache recurs.  Max: 200mg/24 hours   Quantity:  18 tablet   Refills:  1       traZODone 50 MG tablet   Commonly known as:  DESYREL   Used for:  Insomnia, unspecified type        Dose:    mg   Take 1-2 tablets ( mg) by mouth nightly as needed 1-2 tabs at bedtime PRN   Quantity:  180 tablet   Refills:  1       VITAMIN D (CHOLECALCIFEROL) PO        Dose:  2000 Units   Take 2,000 Units by mouth daily   Refills:  0       * Notice:  This list has 2 medication(s) that are the same as other medications prescribed for you. Read the directions carefully, and ask your doctor or other care provider to review them with you.             Protect others around you: Learn how to safely use, store and throw away your medicines at www.disposemymeds.org.             Medication List: This is a list of all your medications and when to take them. Check marks below indicate your daily home schedule. Keep this list as a reference.      Medications           Morning Afternoon Evening Bedtime As Needed    ACETAMINOPHEN PO   Take 500-1,000 mg by mouth every 6 hours as needed for pain   Last time this was given:  1,000 mg on 4/3/2017  2:35 PM                                albuterol 90 MCG/ACT inhaler   Inhale 2 puffs into the lungs every 6 hours as needed                                cloNIDine 0.2 MG tablet   Commonly known as:  CATAPRES   Take 2 tablets (0.4 mg) by mouth every evening   Last time this was given:  0.4 mg on 4/2/2017 11:20 PM                                COMPLEAT Liqd   Infuse at 50 ml/hour for 5 hours daily through j-tube Flush j-tube with 30 ml water before and after each infusion of Compleat                                DULoxetine 60 MG EC capsule   Commonly known as:  CYMBALTA   Take 1 capsule (60 mg) by mouth daily   Last time this was given:  60 mg on 4/3/2017  7:37 AM                                mirtazapine 15 MG ODT tab   Commonly known as:  REMERON SOL-TAB   0.5 tablets (7.5 mg) by Orally disintegrating tablet route At Bedtime   Last time this was given:  7.5 mg on 4/3/2017 12:03 AM                                nortriptyline 10 MG capsule   Commonly known as:  PAMELOR   Take 3-4  capsules (30-40 mg) by mouth At Bedtime   Last time this was given:  40 mg on 4/3/2017 12:03 AM                                ondansetron 4 MG ODT tab   Commonly known as:  ZOFRAN ODT   Take 1-2 tablets (4-8 mg) by mouth every 6 hours as needed for nausea                                * study - rivaroxaban oral suspension   Commonly known as:  IDS# [id number]   Take 20 mg by mouth                                * rivaroxaban ANTICOAGULANT 20 MG Tabs tablet   Commonly known as:  XARELTO   Take 1 tablet (20 mg) by mouth daily (with dinner)                                SUMAtriptan 50 MG tablet   Commonly known as:  IMITREX   Take 1-2 tablets ( mg) by mouth at onset of headache for migraine - may repeat dose after 2h if headache recurs.  Max: 200mg/24 hours                                traZODone 50 MG tablet   Commonly known as:  DESYREL   Take 1-2 tablets ( mg) by mouth nightly as needed 1-2 tabs at bedtime PRN   Last time this was given:  100 mg on 4/3/2017 12:13 AM                                VITAMIN D (CHOLECALCIFEROL) PO   Take 2,000 Units by mouth daily                                * Notice:  This list has 2 medication(s) that are the same as other medications prescribed for you. Read the directions carefully, and ask your doctor or other care provider to review them with you.

## 2017-04-03 ENCOUNTER — APPOINTMENT (OUTPATIENT)
Dept: INTERVENTIONAL RADIOLOGY/VASCULAR | Facility: CLINIC | Age: 37
End: 2017-04-03
Attending: INTERNAL MEDICINE
Payer: COMMERCIAL

## 2017-04-03 VITALS
HEIGHT: 66 IN | OXYGEN SATURATION: 100 % | DIASTOLIC BLOOD PRESSURE: 110 MMHG | TEMPERATURE: 98.1 F | RESPIRATION RATE: 16 BRPM | WEIGHT: 197.5 LBS | BODY MASS INDEX: 31.74 KG/M2 | SYSTOLIC BLOOD PRESSURE: 154 MMHG

## 2017-04-03 LAB
ALBUMIN SERPL-MCNC: 3.3 G/DL (ref 3.4–5)
ALP SERPL-CCNC: 127 U/L (ref 40–150)
ALT SERPL W P-5'-P-CCNC: 23 U/L (ref 0–50)
ANION GAP SERPL CALCULATED.3IONS-SCNC: 11 MMOL/L (ref 3–14)
AST SERPL W P-5'-P-CCNC: 11 U/L (ref 0–45)
BASOPHILS # BLD AUTO: 0 10E9/L (ref 0–0.2)
BASOPHILS NFR BLD AUTO: 0.2 %
BILIRUB SERPL-MCNC: 1.2 MG/DL (ref 0.2–1.3)
BUN SERPL-MCNC: 12 MG/DL (ref 7–30)
CALCIUM SERPL-MCNC: 8.3 MG/DL (ref 8.5–10.1)
CHLORIDE SERPL-SCNC: 106 MMOL/L (ref 94–109)
CO2 SERPL-SCNC: 21 MMOL/L (ref 20–32)
CREAT SERPL-MCNC: 0.92 MG/DL (ref 0.52–1.04)
DIFFERENTIAL METHOD BLD: ABNORMAL
EOSINOPHIL # BLD AUTO: 0 10E9/L (ref 0–0.7)
EOSINOPHIL NFR BLD AUTO: 0.3 %
ERYTHROCYTE [DISTWIDTH] IN BLOOD BY AUTOMATED COUNT: 16.7 % (ref 10–15)
GFR SERPL CREATININE-BSD FRML MDRD: 69 ML/MIN/1.7M2
GLUCOSE BLDC GLUCOMTR-MCNC: 88 MG/DL (ref 70–99)
GLUCOSE SERPL-MCNC: 88 MG/DL (ref 70–99)
HCT VFR BLD AUTO: 30.7 % (ref 35–47)
HGB BLD-MCNC: 9.1 G/DL (ref 11.7–15.7)
IMM GRANULOCYTES # BLD: 0 10E9/L (ref 0–0.4)
IMM GRANULOCYTES NFR BLD: 0.3 %
INR PPP: 1 (ref 0.86–1.14)
LYMPHOCYTES # BLD AUTO: 3.7 10E9/L (ref 0.8–5.3)
LYMPHOCYTES NFR BLD AUTO: 31.1 %
MCH RBC QN AUTO: 23 PG (ref 26.5–33)
MCHC RBC AUTO-ENTMCNC: 29.6 G/DL (ref 31.5–36.5)
MCV RBC AUTO: 78 FL (ref 78–100)
MONOCYTES # BLD AUTO: 0.9 10E9/L (ref 0–1.3)
MONOCYTES NFR BLD AUTO: 7.6 %
NEUTROPHILS # BLD AUTO: 7.1 10E9/L (ref 1.6–8.3)
NEUTROPHILS NFR BLD AUTO: 60.5 %
NRBC # BLD AUTO: 0 10*3/UL
NRBC BLD AUTO-RTO: 0 /100
PLATELET # BLD AUTO: 441 10E9/L (ref 150–450)
POTASSIUM SERPL-SCNC: 3.3 MMOL/L (ref 3.4–5.3)
PROT SERPL-MCNC: 7.4 G/DL (ref 6.8–8.8)
RBC # BLD AUTO: 3.96 10E12/L (ref 3.8–5.2)
SODIUM SERPL-SCNC: 139 MMOL/L (ref 133–144)
WBC # BLD AUTO: 11.8 10E9/L (ref 4–11)

## 2017-04-03 PROCEDURE — C1769 GUIDE WIRE: HCPCS

## 2017-04-03 PROCEDURE — 36415 COLL VENOUS BLD VENIPUNCTURE: CPT | Performed by: PHYSICIAN ASSISTANT

## 2017-04-03 PROCEDURE — 25000128 H RX IP 250 OP 636: Performed by: PHYSICIAN ASSISTANT

## 2017-04-03 PROCEDURE — 25000128 H RX IP 250 OP 636: Performed by: INTERNAL MEDICINE

## 2017-04-03 PROCEDURE — 25000128 H RX IP 250 OP 636: Performed by: STUDENT IN AN ORGANIZED HEALTH CARE EDUCATION/TRAINING PROGRAM

## 2017-04-03 PROCEDURE — 00000146 ZZHCL STATISTIC GLUCOSE BY METER IP

## 2017-04-03 PROCEDURE — 99217 ZZC OBSERVATION CARE DISCHARGE: CPT | Performed by: INTERNAL MEDICINE

## 2017-04-03 PROCEDURE — 25500064 ZZH RX 255 OP 636: Performed by: RADIOLOGY

## 2017-04-03 PROCEDURE — 85025 COMPLETE CBC W/AUTO DIFF WBC: CPT | Performed by: PHYSICIAN ASSISTANT

## 2017-04-03 PROCEDURE — G0378 HOSPITAL OBSERVATION PER HR: HCPCS

## 2017-04-03 PROCEDURE — 25000132 ZZH RX MED GY IP 250 OP 250 PS 637: Performed by: PHYSICIAN ASSISTANT

## 2017-04-03 PROCEDURE — 27210903 ZZH KIT CR5

## 2017-04-03 PROCEDURE — 80053 COMPREHEN METABOLIC PANEL: CPT | Performed by: PHYSICIAN ASSISTANT

## 2017-04-03 PROCEDURE — 85610 PROTHROMBIN TIME: CPT | Performed by: PHYSICIAN ASSISTANT

## 2017-04-03 PROCEDURE — 40000556 ZZH STATISTIC PERIPHERAL IV START W US GUIDANCE

## 2017-04-03 PROCEDURE — 27210916 ZZ H TUBE GASTRO CR5

## 2017-04-03 PROCEDURE — 49450 REPLACE G/C TUBE PERC: CPT

## 2017-04-03 RX ORDER — IODIXANOL 320 MG/ML
50 INJECTION, SOLUTION INTRAVASCULAR ONCE
Status: COMPLETED | OUTPATIENT
Start: 2017-04-03 | End: 2017-04-03

## 2017-04-03 RX ORDER — ONDANSETRON 4 MG/1
TABLET, FILM COATED ORAL
Qty: 90 TABLET | Refills: 0 | OUTPATIENT
Start: 2017-04-03

## 2017-04-03 RX ORDER — HYDROMORPHONE HYDROCHLORIDE 1 MG/ML
0.5 INJECTION, SOLUTION INTRAMUSCULAR; INTRAVENOUS; SUBCUTANEOUS ONCE
Status: COMPLETED | OUTPATIENT
Start: 2017-04-03 | End: 2017-04-03

## 2017-04-03 RX ORDER — DIPHENHYDRAMINE HYDROCHLORIDE 50 MG/ML
50 INJECTION INTRAMUSCULAR; INTRAVENOUS ONCE
Status: COMPLETED | OUTPATIENT
Start: 2017-04-03 | End: 2017-04-03

## 2017-04-03 RX ORDER — FLUMAZENIL 0.1 MG/ML
0.2 INJECTION, SOLUTION INTRAVENOUS
Status: DISCONTINUED | OUTPATIENT
Start: 2017-04-03 | End: 2017-04-03 | Stop reason: HOSPADM

## 2017-04-03 RX ADMIN — HYDROMORPHONE HYDROCHLORIDE 0.5 MG: 10 INJECTION, SOLUTION INTRAMUSCULAR; INTRAVENOUS; SUBCUTANEOUS at 06:49

## 2017-04-03 RX ADMIN — TRAZODONE HYDROCHLORIDE 100 MG: 50 TABLET ORAL at 00:13

## 2017-04-03 RX ADMIN — MIDAZOLAM 1 MG: 1 INJECTION INTRAMUSCULAR; INTRAVENOUS at 18:18

## 2017-04-03 RX ADMIN — ONDANSETRON 4 MG: 2 INJECTION INTRAMUSCULAR; INTRAVENOUS at 16:05

## 2017-04-03 RX ADMIN — HYDROMORPHONE HYDROCHLORIDE 2 MG: 2 TABLET ORAL at 15:37

## 2017-04-03 RX ADMIN — ONDANSETRON 4 MG: 2 INJECTION INTRAMUSCULAR; INTRAVENOUS at 07:38

## 2017-04-03 RX ADMIN — MIRTAZAPINE 7.5 MG: 15 TABLET, ORALLY DISINTEGRATING ORAL at 00:03

## 2017-04-03 RX ADMIN — HYDROMORPHONE HYDROCHLORIDE 2 MG: 2 TABLET ORAL at 03:14

## 2017-04-03 RX ADMIN — DIPHENHYDRAMINE HYDROCHLORIDE 50 MG: 50 INJECTION, SOLUTION INTRAMUSCULAR; INTRAVENOUS at 18:18

## 2017-04-03 RX ADMIN — HYDROMORPHONE HYDROCHLORIDE 2 MG: 2 TABLET ORAL at 11:22

## 2017-04-03 RX ADMIN — IODIXANOL 10 ML: 320 INJECTION, SOLUTION INTRAVASCULAR at 18:49

## 2017-04-03 RX ADMIN — HYDROMORPHONE HYDROCHLORIDE 2 MG: 2 TABLET ORAL at 18:56

## 2017-04-03 RX ADMIN — SODIUM CHLORIDE: 9 INJECTION, SOLUTION INTRAVENOUS at 07:37

## 2017-04-03 RX ADMIN — SODIUM CHLORIDE: 9 INJECTION, SOLUTION INTRAVENOUS at 00:03

## 2017-04-03 RX ADMIN — ACETAMINOPHEN 1000 MG: 500 TABLET, FILM COATED ORAL at 14:35

## 2017-04-03 RX ADMIN — HYDROMORPHONE HYDROCHLORIDE 2 MG: 2 TABLET ORAL at 07:38

## 2017-04-03 RX ADMIN — NORTRIPTYLINE HYDROCHLORIDE 40 MG: 10 CAPSULE ORAL at 00:03

## 2017-04-03 RX ADMIN — DULOXETINE 60 MG: 60 CAPSULE, DELAYED RELEASE ORAL at 07:37

## 2017-04-03 ASSESSMENT — PAIN DESCRIPTION - DESCRIPTORS
DESCRIPTORS: ACHING
DESCRIPTORS: ACHING

## 2017-04-03 NOTE — PLAN OF CARE
Problem: Discharge Planning  Goal: Discharge Planning (Adult, OB, Behavioral, Peds)  Outcome: No Change  Outpatient/Observation goals to be met before discharge home:  -diagnostic tests and consults completed and resulted: no  -vital signs normal or at patient baseline: yes  -tolerating oral intake to maintain hydration: no, patient is NPO  -adequate pain control on oral analgesics: yes  Replacement of GJ tube to G tube: No, to be replaced today

## 2017-04-03 NOTE — PROGRESS NOTES
Care Coordinator- Discharge Planning     Admission Date/Time:  4/2/2017  Attending MD:  Tommy Lozano*     Data  Chart reviewed, discussed with interdisciplinary team.   Patient was admitted for:   1. Gastroparesis         Assessment  Concerns with insurance coverage for discharge needs: None.  Current Living Situation: Patient lives with family.  Support System: Supportive and Involved  Services Involved: Home Infusion  Transportation: Family or Friend will provide  Barriers to Discharge: pending medical clearance, dc today to home with continued TF      Coordination of Care and Referrals: Provided patient/family with options for Home Infusion.      Plan  Anticipated Discharge Date:  4/3/2017   Anticipated Discharge Plan: home with continued TF with [facility].     CTS Handoff completed:  Pending final dc.     Jeanne Patel, RNCC, BSN    [facility]  [address] [place] [id number]    [email or web address]    Office: [phone] Pager: [phone]

## 2017-04-03 NOTE — PROGRESS NOTES
Interventional Radiology Brief Post Procedure Note    Procedure: GJ to G tube change    Proceduralist: Bakari Mcmullen MD and None    Assistant: None    Time Out: Prior to the start of the procedure and with procedural staff participation, I verbally confirmed the patient s identity using two indicators, relevant allergies, that the procedure was appropriate and matched the consent or emergent situation, and that the correct equipment/implants were available. Immediately prior to starting the procedure I conducted the Time Out with the procedural staff and re-confirmed the patient s name, procedure, and site/side. (The Joint Commission universal protocol was followed.)  Yes    Sedation: IR Nurse Monitored Care     Post Procedure Summary:  Prior to the start of the procedure and with procedural staff participation, I verbally confirmed the patient s identity using two indicators, relevant allergies, that the procedure was appropriate and matched the consent or emergent situation, and that the correct equipment/implants were available. Immediately prior to starting the procedure I conducted the Time Out with the procedural staff and re-confirmed the patient s name, procedure, and site/side. (The Joint Commission universal protocol was followed.)  Yes       Sedatives: Fentanyl and Midazolam (Versed)    Vital signs, airway and pulse oximetry were monitored and remained stable throughout the procedure and sedation was maintained until the procedure was complete.  The patient was monitored by staff until sedation discharge criteria were met.    Patient tolerance: Patient tolerated the procedure well with no immediate complications.    Time of sedation in minutes: 15 Minutes minutes from beginning to end of physician one to one monitoring.    Findings: GJ coiled in stomach. GJ changed to 16F ANJEL G tube.    Estimated Blood Loss: None    Fluoroscopy Time: Less than 1 minute    SPECIMENS: None    Complications: 1. None      Condition: Stable    Plan: G tube ready for use.    Comments: See dictated procedure note for full details.    Bakari Mcmullen MD

## 2017-04-03 NOTE — PROGRESS NOTES
Interventional Radiology Intra-procedural Nursing Note    Patient Name: Doreen Peralta  Medical Record Number: [id number]  Today's Date: April 3, 2017    Start Time: 1820  End of procedure time: 1840  Procedure:eXCHANGE G-J FOR G BUTTON  Report given to: Peggy 6 D RN  Time pt departs: 1878  : NO  Other Notes: Arrives from PCU  Stating that she wants sedation for  tube exchange. Became angry   stating that I was rude when said I  didn't think she needed it.  Insisted she needed sedation stating that it was our fault the wrong tube weven though original order states GJ tube placement.    Elizabeth M. Agerbeck

## 2017-04-03 NOTE — PLAN OF CARE
Problem: Discharge Planning  Goal: Discharge Planning (Adult, OB, Behavioral, Peds)  Outpatient/Observation goals to be met before discharge home:  -vital signs normal or at patient baseline: Monitoring   -tolerating oral intake to maintain hydration: No on NPO   -adequate pain control on oral analgesics: No   Replacement of GJ tube to G tube: No, scheduled today

## 2017-04-03 NOTE — PROVIDER NOTIFICATION
Text paged Gold [phone] at [phone], patient requesting to have Dilaudid frequency increased to every 3 hours, pain med not lasting for 4 hours, waiting for call back/orders.

## 2017-04-03 NOTE — H&P
[facility] History [facility] Physical  Department of Internal Medicine    Patient Name: Doreen Peralta MRN# [id number]   Age: 36 year old YOB: 1980     Date of Admission: 4/2/2017    Home clinic:   Primary care provider: Larisa Lorenzo         Assessment and Plan:   Doreen Peralta is a 36 year old female with a past medical history of slow transit constipation, gastroparesis s/p partial colectomy, GJ tube placement (w/ replacement 03/30 by IR) for venting, chronic abdominal pain who is admitted to medicine for acute on chronic abdominal pain, abdominal distention and GJ tube malfunction w/ G portion not able to be vented.     #Slow transit constipation, gastroparesis s/p partial colectomy, GJ tube placement (w/ replacement 03/30 by IR) for venting, acute chronic abdominal pain: GJ replaced 03/30, now unable to vent. CT abd w/ PCT GJ w/ malpositioned J tube coiled in the stomach and not post pyloric, no bowel obstruction, acute abnormality in abdomen/pelvis. Acute on chronic pain, distention and nausea is likely due to GJ malfunction and inability to vent. Did not tolerate LIMS due to pain. BP mildly elevated, HR mildly elevated, afebrile.   -IR, GI consults placed, ED did discuss w/ IR, would be helpful if teams would discuss replacement tomorrow prior to procedure as G was recommended by GI, GJ was placed by IR, discrepancy on buttons and tube per notes  -NPO w/ exception of meds  -IVF hydration w/ NS at 75 cc/hour  -Pain mediation w/ oral dilaudid overnight  -Zofran PRN for nausea  -If worsening distention, nausea would place NG to LIMS for decompression, patient refuses at this point  -Continue cymbalta, nortriptyline for chronic pain  -Trend labs in AM    #Leukocytosis: WBC count of 13.2, ANC of 11.8 in patient w/ history of fungemia, polymicrobial bacteremia. No lines in place. Afebrile, CRP negative, ESR mildly elevated (unchanged from 03/29 and improving from 02/2017) and non toxic  appearing. Could be 2/2 acute process as above.  -Blood cultures x2 in process from ED  -Trend WBC count    #Anxiety: On clonidine, nortriptyline, trazadone, cymbalta PTA. Continue.     #H/O DVT: Denies taking xarelto regularly PTA. Endorses h/o DVT in UE. Onc note from 11/2016 recommend continuation of anticoagulation and do not list stop date. No further follow up. Last US 02/2017 w/ non occlusive thrombus of basilic vein which is superficial, US in 07/2016 w/ obstructive thrombus in brachial vein extending to brachial/axillary venous confluence. No e/o DVT.   -Will hold xarelto tonight for procedure  -Consider repeat US tomorrow & discuss w/ hematology, patient likely doesn't need AC given previous US findings and no current line (PICC)    CODE: Full  FEN: NPO, IVF hydration  DVT: Mechanical, SCDs, no pharm w/ procedure tomorrow  LINES: PIV  Disposition/Admission Status: <2 midnights for GJ tube malfunction    Plan of care was discussed with attending physician, Dr. Whitt.     Roma Urban PA-C  Hospitalist Service           Chief Complaint:   Abdominal pain, distention, nausea         HPI:   History is obtained from the patient, and medical record.     The patient notes that she has a h/o chronic abdominal pain and distention that is improved with venting her G tube. She notes that she saw GI last Monday and they had recommended her GJ tube be exchanged for a G tube w/ buttons. She went to IR on 03/30 and a GJ was placed (the patient is upset by this). She notes since the procedure there is nothing coming out of her G when venting, her bag is always empty and she has noted nausea, distention. She notes a loose BM this AM which is normal for her, normal color, no bleeding noted. She has 3-4 loose stools/day. She denies any focal s/s of infection, maybe feels a low grade fever.          Review of Systems:   A 10 point ROS was performed and negative unless otherwise noted in HPI.          Past Medical History:    Reviewed and updated in Epic.   Past Medical History:   Diagnosis Date     Asthma      Bilateral ovarian cysts      Cervical cancer (H) 01/01/2008    cervical cancer      Chronic pain      Colonic dysmotility     s/p subtotal colectomy     Constipation     chronic     E. coli sepsis (H) 5/8/2016     Enteritis      Fungemia 5/5/2016     Gastro-oesophageal reflux disease      H/O ileostomy      Hx of abnormal Pap smear     s/p LEEP - no further details provided     Hypertension      IBS (irritable bowel syndrome)      Other chronic pain      PONV (postoperative nausea and vomiting)      Thrombosis, hepatic vein (H)     microvascular            Past [facility] History:   Reviewed and updated in Epic.   Past [facility] History:   Procedure Laterality Date     COLONOSCOPY  7/10/2012    Procedure: COLONOSCOPY;;  Surgeon: Nicole Redding MD;  Location: [facility]     COLONOSCOPY N/A 2/19/2017    Procedure: COLONOSCOPY;  Surgeon: Randell Muller MD;  Location: [facility] GI     COLONOSCOPY N/A 2/21/2017    Procedure: COLONOSCOPY;  Surgeon: Randell Muller MD;  Location: [facility]     ECHO CHELO  7/19/2016          ENDOSCOPIC INSERTION TUBE GASTROSTOMY N/A 1/21/2016    Procedure: ENDOSCOPIC INSERTION TUBE GASTROSTOMY;  Surgeon: Nicole Redding MD;  Location: [facility]     ESOPHAGOSCOPY, GASTROSCOPY, DUODENOSCOPY (EGD), COMBINED  7/10/2012    Procedure: COMBINED ESOPHAGOSCOPY, GASTROSCOPY, DUODENOSCOPY (EGD);  Upper Endoscopy, Ileoscopy    Latex Allergy  with biopsies;  Surgeon: Nicole Redding MD;  Location: [facility]     ESOPHAGOSCOPY, GASTROSCOPY, DUODENOSCOPY (EGD), COMBINED N/A 11/5/2014    Procedure: COMBINED ESOPHAGOSCOPY, GASTROSCOPY, DUODENOSCOPY (EGD);  Surgeon: Nicole Redding MD;  Location: [facility]     HC REPLACE DUODENOSTOMY/JEJUNOSTOMY TUBE PERCUTANEOUS N/A 8/27/2015    Procedure: REPLACE GASTROJEJUNOSTOMY TUBE, PERCUTANEOUS;  Surgeon: Mio Holder MD;  Location: [facility]     HC REPLACE  DUODENOSTOMY/JEJUNOSTOMY TUBE PERCUTANEOUS N/A 1/7/2016    Procedure: REPLACE JEJUNOSTOMY TUBE, PERCUTANEOUS;  Surgeon: Elsa Medel MD;  Location: [facility]     HC REPLACE DUODENOSTOMY/JEJUNOSTOMY TUBE PERCUTANEOUS N/A 1/28/2016    Procedure: REPLACE JEJUNOSTOMY TUBE, PERCUTANEOUS;  Surgeon: Elsa Medel MD;  Location: [facility]     HC REPLACE GASTROSTOMY/CECOSTOMY TUBE PERCUTANEOUS Left 5/19/2015    Procedure: REPLACE GASTROSTOMY TUBE, PERCUTANEOUS;  Surgeon: Melecio Morejon Chi, MD;  Location:  GI     HC UGI ENDOSCOPY W PLACEMENT GASTROSTOMY TUBE PERCUT N/A 10/1/2015    Procedure: COMBINED ESOPHAGOSCOPY, GASTROSCOPY, DUODENOSCOPY (EGD), PLACE PERCUTANEOUS ENDOSCOPIC GASTROSTOMY TUBE;  Surgeon: Mio Holder MD;  Location: [facility]     LAPAROSCOPIC ASSISTED COLECTOMY  1/20/2012    Procedure:LAPAROSCOPIC ASSISTED COLECTOMY; Laparoscopic Ileostomy       LAPAROSCOPIC ASSISTED COLECTOMY LEFT (DESCENDING)  10/24/2012    Procedure: LAPAROSCOPIC ASSISTED COLECTOMY LEFT (DESCENDING);   Hand Assisted Laproscopic Subtotal abdominal Colectomy,Iieorectal anastamosis, Ileostomy Closure.       LAPAROSCOPIC ASSISTED INSERTION TUBE JEJUNOSTOMY N/A 10/16/2015    Procedure: LAPAROSCOPIC ASSISTED INSERTION TUBE JEJUNOSTOMY;  Surgeon: Elsa Medel MD;  Location:  OR     LAPAROSCOPIC CHOLECYSTECTOMY  2002    [facility] c[facility]. stones duct     LAPAROSCOPIC ILEOSTOMY  1/20/2012    [facility], loop     LAPAROSCOPIC OOPHORECTOMY Right 2009    [facility]     LAPAROTOMY EXPLORATORY N/A 1/28/2016    Procedure: LAPAROTOMY EXPLORATORY;  Surgeon: Elsa Medel MD;  Location:  OR     LEEP TX, CERVICAL  2009    [facility]     PICC INSERTION Left 10/21/2015    5fr DL Power PICC, 37cm (2cm external) in the L basilic vein w/ tip in the SVC RA junction.     REMOVE GASTROSTOMY TUBE ADULT N/A 12/12/2014    Procedure: REMOVE GASTROSTOMY TUBE ADULT;  Surgeon: Nicole Redding MD;  Location:  GI     REMOVE PORT VASCULAR ACCESS  Right 6/30/2016    Procedure: REMOVE PORT VASCULAR ACCESS;  Surgeon: Pradeep Orosco MD;  Location: [facility] OR     replace GASTROSTOMY TUBE ADULT  5/19/15            Social History:   Reviewed and updated in [employer].   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   Family History   Problem Relation Age of Onset     Thyroid Disease Mother      Sjogren's Mother      GASTROINTESTINAL DISEASE Mother      Intermittent nausea vomiting diarrhea     Colon Polyps Mother      Prostate Problems Father      prostate enlargement     Lupus Maternal Grandmother      CANCER Maternal Grandfather      Lung     Colon Cancer Maternal Grandfather 65     CANCER Paternal Grandmother      Lung      CEREBROVASCULAR DISEASE Paternal Grandmother      DIABETES Paternal Grandmother      Cardiovascular Paternal Grandmother      CHF     CANCER Paternal Grandfather      Lung     Glaucoma Paternal Grandfather      Abdominal Aortic Aneurysm Other      Macular Degeneration No family hx of             Allergies:      Allergies   Allergen Reactions     Hyoscyamine Rash     Metoclopramide Other (See Comments)     Eye twitching.      Peaches [Peach] Other (See Comments)     Raw. Cooked OK     Sucralose Other (See Comments)     All artificial sweeteners. Aspartame also. Swollen glands     Advair Diskus Other (See Comments)     Throat burns     Azithromycin Other (See Comments)      "Burning in throat     Compazine [Prochlorperazine] Visual Disturbance     Contrast Dye Itching     States is allergic to CT contrast dye     Cyclobenzaprine Visual Disturbance     Fentanyl Other (See Comments)     migraine     Ibuprofen GI Disturbance     Lactulose Nausea and Vomiting     Gas and bloating     Levaquin [Levofloxacin] Swelling     Per ED M.D. And RN      Morphine Sulfate Other (See Comments)     Chest pain       Oxycodone Other (See Comments)     Burning throat, but can take Norco.      Penicillins Other (See Comments)     Family hx of resp arrest, she has never taken  Ok with cephalosporins     Rizatriptan Visual Disturbance     Droperidol Hives and Rash     Isovue [Iopamidol] Palpitations     Pt had racing heart and sob      Ketorolac Anxiety     Latex Swelling and Rash     Kiwi, likely also avacado, ? banana     Levsin Rash            Medications:     (Not in a hospital admission)          Physical Exam:   Blood pressure (!) 151/96, temperature 99.4  F (37.4  C), temperature source Oral, height 1.676 m (5' 6\"), weight 83.9 kg (185 lb), last menstrual period 03/20/2017, SpO2 100 %, not currently breastfeeding.  GENERAL: Alert and oriented x 3. Lying comfortably in bed.   HEENT: Anicteric sclera. Mucous membranes moist and without lesions.   CV: RRR. S1, S2. No murmurs appreciated.   RESPIRATORY: Effort normal on RA. Lungs CTAB with no wheezing, rales, rhonchi.   GI: Abdomen soft and distended, bowel sounds present. Diffuse tenderness to palpation, no rebound, guarding. GJ in place in RUQ.   MUSCULOSKELETAL: No joint swelling or tenderness. Moves all extremities.   NEUROLOGICAL: No focal deficits.   EXTREMITIES: No peripheral edema. Intact bilateral pedal pulses.   SKIN: No jaundice. No rashes.     Lines/Tubes/Drains:   Peripheral IV 04/02/17 Right Upper arm (Active)   Number of days:0            Data:   I personally reviewed the following studies:  CMP, lipase, INR, CBC, glucose  Unresulted Labs " Ordered in the Past 30 Days of this Admission     Date and Time Order Name Status Description    4/2/2017 1605 Blood culture Preliminary         XR ABDOMEN 2 VW 4/2/2017 12:02 PM  IMPRESSION: Gastrostomy tube in place overlying the gastric antrum.  The visualized bowel gas pattern appears unremarkable. No radiographic  evidence to suggest obstruction.    CT abd/pelvis 04/02  Impression:  1. Percutaneous gastrojejunostomy tube, malpositioned jejunostomy tube  coiled in the stomach and not postpyloric. Recommend reposition under  fluoroscopy by interventional radiology, on a nonemergent basis.  2. No bowel obstruction. No acute abnormality in the abdomen or  pelvis.  3. Nonobstructing calculus in the interpolar region right kidney, 3  mm.

## 2017-04-03 NOTE — PLAN OF CARE
Problem: Discharge Planning  Goal: Discharge Planning (Adult, OB, Behavioral, Peds)  Outcome: No Change  Outpatient/Observation goals to be met before discharge home:  diagnostic tests and consults completed and resulted: yes  -vital signs normal or at patient baseline: yes  -tolerating oral intake to maintain hydration: no, patient is NPO  -adequate pain control on oral analgesics: pain is manageable  Replacement of GJ tube to G tube: No, to be replaced today

## 2017-04-03 NOTE — DISCHARGE SUMMARY
[facility], [place]  Discharge Summary - Hospitalist    Date of Admission:  4/2/2017  Date of Discharge:  4/3/2017  Discharging Provider: Tommy Lozano    Discharge Diagnoses   # Malpositioned GJ tube  # Slow transit constipation, gastroparesis s/p partial colectomy, GJ tube placement  # Acute on chronic abdominal pain  # Leukocytosis  # Hx of provoked DVTs    History of Present Illness   Doreen Peralta is a 36 year old female with a past medical history of slow transit constipation, gastroparesis s/p partial colectomy, GJ tube placement (w/ replacement 03/30 by IR) for venting, chronic abdominal pain who is admitted to medicine for acute on chronic abdominal pain, abdominal distention and GJ tube malfunction w/ G portion not able to be vented.     Please see Admission H&P for additional details.     Hospital Course   Doreen Peralta was admitted on 4/2/2017.  The following problems were addressed during her hospitalization:    # Slow transit constipation, gastroparesis s/p partial colectomy, GJ tube placement  # Acute on chronic abdominal pain  # Malpositioned GJ tube  - Seen by GI 3/27/17 with plan to remove GJ and place G tube into same tract (Dwight-Anthony per note). Patient uses G for venting but not any feeds; was not using J and there was concern this was causing pain. On 3/30 pt underwent gastrostomy exchange to GH tube and removal of direct jejunostomy tube. Pt and mother expressed concern that they were anticipating a G Dwight-key and were given number for Patient Relations - per notes IR Staff spoke with patient about this.   - Admitted with acute on chronic abdominal pain, malpositioned J tube coiled in the stomach, and inability to vent G. I spoke with the GI Fellow (Dr. Gasca) and she agreed that the patient only needed G access. I spoke w/ IR and we discussed that the pt vents the G tube and sometimes hooks it up to a bag. She underwent removal of the GJ and placement  "of a G tube.   - She was treated with PO narcotics for acute pain while IP but was not discharged on any narcotics.   - Family very upset about GJ tube exchange on 3/30. I did not speak with the mother but left a VM with Risk Management.     # Leukocytosis  WBC count of 13.2 on admit, downtrended to 11.8 on day of discharge. Likely 2/2 stress given malpositioned J tube. Although she does have a history of fungemia, polymicrobial bacteremia she has no lines in place, afebrile, and CRP negative. BCx were drawn and NGTD at the time of discharge.     # Hx of provoked DVTs  Previously on coumadin. Last seen by Dr. Millan (Heme/Onc) on 11/3/16 - \"with the patient current medical issues and past medical history of severe pleural episodes of thromboembolic disease, the patient is a higher risk for recurrence of deep venous thrombosis. I will recommend patient to continue on anticoagulation.\" No stop date or follow up noted. Continued Xalerto at discharge (though pt reported she was not taking regularly) but recommend ongoing conversations w/ PCP/Hem-Onc regarding treatment duration as no longer has lines in place.     # Anxiety: Continued home clonidine, nortriptyline, trazadone, cymbalta PTA. .     Tommy Lozano    Significant Results and Procedures   GJ exchanged for G tube by IR    Pending Results   These results will be followed up by [facility].  Unresulted Labs Ordered in the Past 30 Days of this Admission     Date and Time Order Name Status Description    4/2/2017 1605 Blood culture Preliminary         Code Status   Full Code    Primary Care Physician   Larisa Lorenzo    Physical Exam   Temp: 98.1  F (36.7  C) Temp src: Axillary BP: (!) 154/110   Heart Rate: 84 Resp: 16 SpO2: 100 % O2 Device: None (Room air)    Vitals:    04/02/17 1520 04/02/17 2303   Weight: 83.9 kg (185 lb) 89.6 kg (197 lb 8 oz)     Vital Signs with Ranges  Temp:  [97.2  F (36.2  C)-98.5  F (36.9  C)] 98.1  F (36.7  C)  Heart " Rate:  [75-92] 84  Resp:  [11-16] 16  BP: (111-157)/() 154/110  SpO2:  [96 %-100 %] 100 %  I/O last 3 completed shifts:  In: 60 [P.O.:60]  Out: -   GENERAL: Alert and oriented x 3. Lying comfortably in bed. Shift in bed on her own.   HEENT: Anicteric sclera. Mucous membranes moist and without lesions.   CV: RRR. S1, S2. No murmurs appreciated.   RESPIRATORY: Effort normal on RA. Lungs CTAB with no wheezing, rales, rhonchi.   GI: Abdomen soft and distended, bowel sounds present. Mild diffuse tenderness to palpation, no rebound, guarding. Wearing TENS units on lower abdomen. GJ in place in RUQ.   MUSCULOSKELETAL: No joint swelling or tenderness. Moves all extremities.   NEUROLOGICAL: No focal deficits.   EXTREMITIES: No peripheral edema. Intact bilateral pedal pulses.   SKIN: No jaundice. No rashes.     Time Spent on this Encounter   I, Tommy Lozano, personally saw the patient today and spent greater than 30 minutes discharging this patient.    Discharge Disposition   Discharged to home  Condition at discharge: Stable    Consultations This Hospital Stay   GI LUMINAL ADULT IP CONSULT  VASCULAR ACCESS CARE ADULT IP CONSULT    Discharge Orders     Home infusion referral     Follow Up and recommended labs and tests   You should follow up with GI as planned in 3 months. Next time you see your PCP ask if you still need to be on the blood thinner. They may have you connect with the blood doctors again.     Activity   Your activity upon discharge: activity as tolerated and no driving today     When to contact your care team   Call your primary doctor if you have any of the following: new, severe abdominal pain, unable to vent tube, or have any other concerns.     Tubes and drains   You are going home with the following tubes or drains: feeding tube G-Tube.   Tube cares per hospital or home care instructions     Reason for your hospital stay   You were admitted with abdominal pain and found to have the J part  your tube not in the right place. IR changed your GJ into a G tube.     Full Code     Diet   Follow this diet upon discharge: Regular diet, as you were doing before.       Discharge Medications   Current Discharge Medication List      CONTINUE these medications which have NOT CHANGED    Details   traZODone (DESYREL) 50 MG tablet Take 1-2 tablets ( mg) by mouth nightly as needed 1-2 tabs at bedtime PRN  Qty: 180 tablet, Refills: 1    Associated Diagnoses: Insomnia, unspecified type      study - rivaroxaban (IDS# [id number]) oral suspension Take 20 mg by mouth      nortriptyline (PAMELOR) 10 MG capsule Take 3-4 capsules (30-40 mg) by mouth At Bedtime  Qty: 120 capsule, Refills: 5    Associated Diagnoses: Neuropathic pain; Primary insomnia      cloNIDine (CATAPRES) 0.2 MG tablet Take 2 tablets (0.4 mg) by mouth every evening  Qty: 180 tablet, Refills: 0    Associated Diagnoses: Anxiety      mirtazapine (REMERON SOL-TAB) 15 MG ODT tab 0.5 tablets (7.5 mg) by Orally disintegrating tablet route At Bedtime  Qty: 45 tablet, Refills: 0    Associated Diagnoses: Anxiety      ondansetron (ZOFRAN ODT) 4 MG ODT tab Take 1-2 tablets (4-8 mg) by mouth every 6 hours as needed for nausea  Qty: 40 tablet, Refills: 0    Associated Diagnoses: Intractable vomiting, presence of nausea not specified, unspecified vomiting type; Gastroparesis      SUMAtriptan (IMITREX) 50 MG tablet Take 1-2 tablets ( mg) by mouth at onset of headache for migraine - may repeat dose after 2h if headache recurs.  Max: 200mg/24 hours  Qty: 18 tablet, Refills: 1    Comments: Due for appointment for further refills, please give reminder.  Associated Diagnoses: Migraine without aura and without status migrainosus, not intractable      VITAMIN D, CHOLECALCIFEROL, PO Take 2,000 Units by mouth daily      rivaroxaban ANTICOAGULANT (XARELTO) 20 MG TABS tablet Take 1 tablet (20 mg) by mouth daily (with dinner)  Qty: 90 tablet, Refills: 1    Associated Diagnoses:  Deep vein thrombosis (DVT) of upper extremity, unspecified chronicity, unspecified laterality, unspecified vein (H)      DULoxetine (CYMBALTA) 60 MG capsule Take 1 capsule (60 mg) by mouth daily  Qty: 90 capsule, Refills: 3    Associated Diagnoses: Abdominal pain, epigastric; Abdominal migraine, not intractable      Nutritional Supplements (COMPLEAT) LIQD Infuse at 50 ml/hour for 5 hours daily through j-tube  Flush j-tube with 30 ml water before and after each infusion of Compleat  Qty: 30 each, Refills: 0    Associated Diagnoses: Non-intractable vomiting with nausea, vomiting of unspecified type; Jejunostomy tube present (H); Malnutrition (H)      ACETAMINOPHEN PO Take 500-1,000 mg by mouth every 6 hours as needed for pain       albuterol 90 MCG/ACT inhaler Inhale 2 puffs into the lungs every 6 hours as needed            Allergies   Allergies   Allergen Reactions     Hyoscyamine Rash     Metoclopramide Other (See Comments)     Eye twitching.      Peaches [Peach] Other (See Comments)     Raw. Cooked OK     Sucralose Other (See Comments)     All artificial sweeteners. Aspartame also. Swollen glands     Advair Diskus Other (See Comments)     Throat burns     Azithromycin Other (See Comments)     Burning in throat     Compazine [Prochlorperazine] Visual Disturbance     Contrast Dye Itching     States is allergic to CT contrast dye     Cyclobenzaprine Visual Disturbance     Fentanyl Other (See Comments)     migraine     Ibuprofen GI Disturbance     Lactulose Nausea and Vomiting     Gas and bloating     Levaquin [Levofloxacin] Swelling     Per ED M.D. And RN      Morphine Sulfate Other (See Comments)     Chest pain       Oxycodone Other (See Comments)     Burning throat, but can take Norco.      Penicillins Other (See Comments)     Family hx of resp arrest, she has never taken  Ok with cephalosporins     Rizatriptan Visual Disturbance     Droperidol Hives and Rash     Isovue [Iopamidol] Palpitations     Pt had racing  heart and sob      Ketorolac Anxiety     Latex Swelling and Rash     Kiwi, likely also avacado, ? banyara     Levsin Rash

## 2017-04-03 NOTE — ED NOTES
Patient requests IV pain medication. MD states patient has to wait awhile since patient recently received 2 mg oral dilaudid not long ago (see paper chart from epic down time

## 2017-04-03 NOTE — PROGRESS NOTES
Interventional Radiology Pre-Procedure Sedation Assessment   Time of Assessment: 6:35 PM    Expected Level: Minimal Sedation    Indication: Sedation is required for the following type of Procedure: GI    Sedation and procedural consent: Risks, benefits and alternatives were discussed with Patient    PO Intake: Appropriately NPO for procedure    ASA Class: Class 2 - MILD SYSTEMIC DISEASE, NO ACUTE PROBLEMS, NO FUNCTIONAL LIMITATIONS.    Mallampati: Grade 2:  Soft palate, base of uvula, tonsillar pillars, and portion of posterior pharyngeal wall visible    Lungs: Lungs Clear with good breath sounds bilaterally    Heart: Normal heart sounds and rate    History and physical reviewed and no updates needed. I have reviewed the lab findings, diagnostic data, medications, and the plan for sedation. I have determined this patient to be an appropriate candidate for the planned sedation and procedure and have reassessed the patient IMMEDIATELY PRIOR to sedation and procedure.    Alfredo Bullock MD

## 2017-04-04 ENCOUNTER — TELEPHONE (OUTPATIENT)
Dept: GASTROENTEROLOGY | Facility: CLINIC | Age: 37
End: 2017-04-04

## 2017-04-04 NOTE — PROGRESS NOTES
Text paged gold cross cover x 2, patient is very anxious to leave and would like to d/c ASAP, waiting for orders.

## 2017-04-04 NOTE — TELEPHONE ENCOUNTER
Doreen is calling for GT supplies.  Needs orders to [employer].  She requested [employer] fax us the request but they were unwilling.  Stating needs to come from us first.  Each month she needs:  Paper tape 2 inch,  #10 rolls   2 X 2 IV gauze   #4 boxes  60 cc catheter tip syringe    #1  Bard urinary drainage bag, 2.000 ml   #30  Adkins continuous pressure gastric relief valve system 50 ml   #30    Fax orders to [employer] [phone]    Will route to Dr. Maryuri Henriquez / Dr. Lees's clinic nurse.  She also is trying to get GI on her My Chart care team, currently unable to access them.

## 2017-04-05 ENCOUNTER — CARE COORDINATION (OUTPATIENT)
Dept: GASTROENTEROLOGY | Facility: CLINIC | Age: 37
End: 2017-04-05

## 2017-04-05 NOTE — PROGRESS NOTES
Contacted patient in regards to triage call- patient requesting GTube supplies.    Contacted [employer] at [phone] and started request for supplies.    Left message for patient with [employer] contact number and details for patient to call and complete order.    Doreen is calling for GT supplies. Needs orders to [employer]. She requested [employer] fax us the request but they were unwilling. Stating needs to come from us first.  Each month she needs:  Paper tape 2 inch, #10 rolls   2 X 2 IV gauze #4 boxes  60 cc catheter tip syringe #1  Bard urinary drainage bag, 2.000 ml #30  Adkins continuous pressure gastric relief valve system 50 ml #30     Fax orders to [employer] [phone]     Will route to Dr. Maryuri Henriquez / Dr. Lees's clinic nurse.  She also is trying to get GI on her My Chart care team, currently unable to access them.

## 2017-04-06 ENCOUNTER — TELEPHONE (OUTPATIENT)
Dept: FAMILY MEDICINE | Facility: CLINIC | Age: 37
End: 2017-04-06

## 2017-04-06 NOTE — TELEPHONE ENCOUNTER
Notification of [facility] discharge received. Patient is no longer doing tube feeding. Goals met. Form placed in Larisa's in basket for review and signature

## 2017-04-07 ENCOUNTER — HOSPITAL ENCOUNTER (INPATIENT)
Facility: CLINIC | Age: 37
LOS: 2 days | Discharge: HOME | DRG: 816 | End: 2017-04-09
Attending: FAMILY MEDICINE | Admitting: INTERNAL MEDICINE
Payer: COMMERCIAL

## 2017-04-07 ENCOUNTER — APPOINTMENT (OUTPATIENT)
Dept: GENERAL RADIOLOGY | Facility: CLINIC | Age: 37
DRG: 816 | End: 2017-04-07
Attending: FAMILY MEDICINE
Payer: COMMERCIAL

## 2017-04-07 ENCOUNTER — OFFICE VISIT (OUTPATIENT)
Dept: FAMILY MEDICINE | Facility: CLINIC | Age: 37
End: 2017-04-07
Payer: COMMERCIAL

## 2017-04-07 VITALS — DIASTOLIC BLOOD PRESSURE: 80 MMHG | OXYGEN SATURATION: 100 % | HEART RATE: 119 BPM | SYSTOLIC BLOOD PRESSURE: 130 MMHG

## 2017-04-07 DIAGNOSIS — R65.10 SIRS (SYSTEMIC INFLAMMATORY RESPONSE SYNDROME) (H): ICD-10-CM

## 2017-04-07 DIAGNOSIS — R65.10 SIRS (SYSTEMIC INFLAMMATORY RESPONSE SYNDROME) (H): Primary | ICD-10-CM

## 2017-04-07 DIAGNOSIS — R10.9 ABDOMINAL PAIN: ICD-10-CM

## 2017-04-07 DIAGNOSIS — R78.81 BACTEREMIA: ICD-10-CM

## 2017-04-07 DIAGNOSIS — K31.84 GASTROPARESIS: ICD-10-CM

## 2017-04-07 DIAGNOSIS — R53.83 FATIGUE, UNSPECIFIED TYPE: Primary | ICD-10-CM

## 2017-04-07 DIAGNOSIS — R10.9 CHRONIC ABDOMINAL PAIN: ICD-10-CM

## 2017-04-07 DIAGNOSIS — G89.29 CHRONIC ABDOMINAL PAIN: ICD-10-CM

## 2017-04-07 DIAGNOSIS — R11.10 INTRACTABLE VOMITING, PRESENCE OF NAUSEA NOT SPECIFIED, UNSPECIFIED VOMITING TYPE: ICD-10-CM

## 2017-04-07 DIAGNOSIS — R10.84 ABDOMINAL PAIN, GENERALIZED: Primary | ICD-10-CM

## 2017-04-07 LAB
ALBUMIN SERPL-MCNC: 3.6 G/DL (ref 3.4–5)
ALP SERPL-CCNC: 136 U/L (ref 40–150)
ALT SERPL W P-5'-P-CCNC: 36 U/L (ref 0–50)
ANION GAP SERPL CALCULATED.3IONS-SCNC: 8 MMOL/L (ref 3–14)
ANION GAP SERPL CALCULATED.3IONS-SCNC: 9 MMOL/L (ref 3–14)
AST SERPL W P-5'-P-CCNC: 21 U/L (ref 0–45)
BASOPHILS # BLD AUTO: 0 10E9/L (ref 0–0.2)
BASOPHILS # BLD AUTO: 0 10E9/L (ref 0–0.2)
BASOPHILS NFR BLD AUTO: 0.2 %
BASOPHILS NFR BLD AUTO: 0.3 %
BILIRUB SERPL-MCNC: 0.3 MG/DL (ref 0.2–1.3)
BUN SERPL-MCNC: 9 MG/DL (ref 7–30)
BUN SERPL-MCNC: 9 MG/DL (ref 7–30)
CALCIUM SERPL-MCNC: 8.6 MG/DL (ref 8.5–10.1)
CALCIUM SERPL-MCNC: 8.8 MG/DL (ref 8.5–10.1)
CHLORIDE SERPL-SCNC: 108 MMOL/L (ref 94–109)
CHLORIDE SERPL-SCNC: 109 MMOL/L (ref 94–109)
CO2 SERPL-SCNC: 22 MMOL/L (ref 20–32)
CO2 SERPL-SCNC: 23 MMOL/L (ref 20–32)
CREAT SERPL-MCNC: 0.92 MG/DL (ref 0.52–1.04)
CREAT SERPL-MCNC: 0.94 MG/DL (ref 0.52–1.04)
CRP SERPL-MCNC: 11.9 MG/L (ref 0–8)
CRP SERPL-MCNC: 9.8 MG/L (ref 0–8)
DIFFERENTIAL METHOD BLD: ABNORMAL
DIFFERENTIAL METHOD BLD: ABNORMAL
EOSINOPHIL # BLD AUTO: 0.1 10E9/L (ref 0–0.7)
EOSINOPHIL # BLD AUTO: 0.1 10E9/L (ref 0–0.7)
EOSINOPHIL NFR BLD AUTO: 0.4 %
EOSINOPHIL NFR BLD AUTO: 0.9 %
ERYTHROCYTE [DISTWIDTH] IN BLOOD BY AUTOMATED COUNT: 17.8 % (ref 10–15)
ERYTHROCYTE [DISTWIDTH] IN BLOOD BY AUTOMATED COUNT: 18.3 % (ref 10–15)
ERYTHROCYTE [SEDIMENTATION RATE] IN BLOOD BY WESTERGREN METHOD: 41 MM/H (ref 0–20)
ERYTHROCYTE [SEDIMENTATION RATE] IN BLOOD BY WESTERGREN METHOD: 45 MM/H (ref 0–20)
GFR SERPL CREATININE-BSD FRML MDRD: 67 ML/MIN/1.7M2
GFR SERPL CREATININE-BSD FRML MDRD: 69 ML/MIN/1.7M2
GLUCOSE SERPL-MCNC: 87 MG/DL (ref 70–99)
GLUCOSE SERPL-MCNC: 94 MG/DL (ref 70–99)
HCT VFR BLD AUTO: 33.6 % (ref 35–47)
HCT VFR BLD AUTO: 33.6 % (ref 35–47)
HGB BLD-MCNC: 10.2 G/DL (ref 11.7–15.7)
HGB BLD-MCNC: 10.3 G/DL (ref 11.7–15.7)
IMM GRANULOCYTES # BLD: 0 10E9/L (ref 0–0.4)
IMM GRANULOCYTES NFR BLD: 0.3 %
LACTATE BLD-SCNC: 1 MMOL/L (ref 0.7–2.1)
LYMPHOCYTES # BLD AUTO: 2.3 10E9/L (ref 0.8–5.3)
LYMPHOCYTES # BLD AUTO: 2.3 10E9/L (ref 0.8–5.3)
LYMPHOCYTES NFR BLD AUTO: 20.4 %
LYMPHOCYTES NFR BLD AUTO: 24.4 %
MCH RBC QN AUTO: 22.8 PG (ref 26.5–33)
MCH RBC QN AUTO: 23.5 PG (ref 26.5–33)
MCHC RBC AUTO-ENTMCNC: 30.4 G/DL (ref 31.5–36.5)
MCHC RBC AUTO-ENTMCNC: 30.7 G/DL (ref 31.5–36.5)
MCV RBC AUTO: 75 FL (ref 78–100)
MCV RBC AUTO: 77 FL (ref 78–100)
MONOCYTES # BLD AUTO: 0.7 10E9/L (ref 0–1.3)
MONOCYTES # BLD AUTO: 0.7 10E9/L (ref 0–1.3)
MONOCYTES NFR BLD AUTO: 6.3 %
MONOCYTES NFR BLD AUTO: 7.7 %
NEUTROPHILS # BLD AUTO: 6.2 10E9/L (ref 1.6–8.3)
NEUTROPHILS # BLD AUTO: 8.1 10E9/L (ref 1.6–8.3)
NEUTROPHILS NFR BLD AUTO: 66.8 %
NEUTROPHILS NFR BLD AUTO: 72.3 %
PLATELET # BLD AUTO: 483 10E9/L (ref 150–450)
PLATELET # BLD AUTO: 500 10E9/L (ref 150–450)
POTASSIUM SERPL-SCNC: 3.8 MMOL/L (ref 3.4–5.3)
POTASSIUM SERPL-SCNC: 4.2 MMOL/L (ref 3.4–5.3)
PROT SERPL-MCNC: 8.3 G/DL (ref 6.8–8.8)
RBC # BLD AUTO: 4.39 10E12/L (ref 3.8–5.2)
RBC # BLD AUTO: 4.47 10E12/L (ref 3.8–5.2)
SODIUM SERPL-SCNC: 139 MMOL/L (ref 133–144)
SODIUM SERPL-SCNC: 140 MMOL/L (ref 133–144)
WBC # BLD AUTO: 11.2 10E9/L (ref 4–11)
WBC # BLD AUTO: 9.3 10E9/L (ref 4–11)

## 2017-04-07 PROCEDURE — 99285 EMERGENCY DEPT VISIT HI MDM: CPT | Mod: 25 | Performed by: FAMILY MEDICINE

## 2017-04-07 PROCEDURE — 86140 C-REACTIVE PROTEIN: CPT | Performed by: FAMILY MEDICINE

## 2017-04-07 PROCEDURE — 83605 ASSAY OF LACTIC ACID: CPT | Performed by: FAMILY MEDICINE

## 2017-04-07 PROCEDURE — 85025 COMPLETE CBC W/AUTO DIFF WBC: CPT | Performed by: FAMILY MEDICINE

## 2017-04-07 PROCEDURE — 25000125 ZZHC RX 250: Performed by: PEDIATRICS

## 2017-04-07 PROCEDURE — 85652 RBC SED RATE AUTOMATED: CPT | Performed by: FAMILY MEDICINE

## 2017-04-07 PROCEDURE — 99207 ZZC CDG-MDM COMPONENT: MEETS HIGH - UP CODED: CPT | Performed by: INTERNAL MEDICINE

## 2017-04-07 PROCEDURE — 36415 COLL VENOUS BLD VENIPUNCTURE: CPT | Performed by: FAMILY MEDICINE

## 2017-04-07 PROCEDURE — 96375 TX/PRO/DX INJ NEW DRUG ADDON: CPT | Performed by: FAMILY MEDICINE

## 2017-04-07 PROCEDURE — 25000128 H RX IP 250 OP 636: Performed by: PEDIATRICS

## 2017-04-07 PROCEDURE — 25000132 ZZH RX MED GY IP 250 OP 250 PS 637: Performed by: INTERNAL MEDICINE

## 2017-04-07 PROCEDURE — 99223 1ST HOSP IP/OBS HIGH 75: CPT | Mod: AI | Performed by: INTERNAL MEDICINE

## 2017-04-07 PROCEDURE — 12000000 ZZH R&B MED SURG/OB

## 2017-04-07 PROCEDURE — 84145 PROCALCITONIN (PCT): CPT | Performed by: INTERNAL MEDICINE

## 2017-04-07 PROCEDURE — 99214 OFFICE O/P EST MOD 30 MIN: CPT | Performed by: PHYSICIAN ASSISTANT

## 2017-04-07 PROCEDURE — 86140 C-REACTIVE PROTEIN: CPT | Performed by: INTERNAL MEDICINE

## 2017-04-07 PROCEDURE — 96365 THER/PROPH/DIAG IV INF INIT: CPT | Performed by: FAMILY MEDICINE

## 2017-04-07 PROCEDURE — 99207 ZZC MOONLIGHTING INDICATOR: CPT | Performed by: INTERNAL MEDICINE

## 2017-04-07 PROCEDURE — 80048 BASIC METABOLIC PNL TOTAL CA: CPT | Performed by: FAMILY MEDICINE

## 2017-04-07 PROCEDURE — 80053 COMPREHEN METABOLIC PANEL: CPT | Performed by: INTERNAL MEDICINE

## 2017-04-07 PROCEDURE — 87040 BLOOD CULTURE FOR BACTERIA: CPT | Mod: 91 | Performed by: INTERNAL MEDICINE

## 2017-04-07 PROCEDURE — 25000128 H RX IP 250 OP 636: Performed by: INTERNAL MEDICINE

## 2017-04-07 PROCEDURE — 96376 TX/PRO/DX INJ SAME DRUG ADON: CPT | Performed by: FAMILY MEDICINE

## 2017-04-07 PROCEDURE — 74020 XR ABDOMEN 2 VW: CPT | Mod: TC

## 2017-04-07 PROCEDURE — 25000128 H RX IP 250 OP 636: Performed by: FAMILY MEDICINE

## 2017-04-07 RX ORDER — SUMATRIPTAN 25 MG/1
50-100 TABLET, FILM COATED ORAL
Status: DISCONTINUED | OUTPATIENT
Start: 2017-04-07 | End: 2017-04-09 | Stop reason: HOSPADM

## 2017-04-07 RX ORDER — MIRTAZAPINE 7.5 MG/1
7.5 TABLET, FILM COATED ORAL AT BEDTIME
Status: DISCONTINUED | OUTPATIENT
Start: 2017-04-07 | End: 2017-04-09 | Stop reason: HOSPADM

## 2017-04-07 RX ORDER — HYDROMORPHONE HYDROCHLORIDE 1 MG/ML
0.5 INJECTION, SOLUTION INTRAMUSCULAR; INTRAVENOUS; SUBCUTANEOUS
Status: COMPLETED | OUTPATIENT
Start: 2017-04-07 | End: 2017-04-07

## 2017-04-07 RX ORDER — MEROPENEM 1 G/1
1 INJECTION, POWDER, FOR SOLUTION INTRAVENOUS EVERY 6 HOURS
Status: DISCONTINUED | OUTPATIENT
Start: 2017-04-08 | End: 2017-04-09 | Stop reason: HOSPADM

## 2017-04-07 RX ORDER — DULOXETIN HYDROCHLORIDE 30 MG/1
60 CAPSULE, DELAYED RELEASE ORAL DAILY
Status: DISCONTINUED | OUTPATIENT
Start: 2017-04-08 | End: 2017-04-09 | Stop reason: HOSPADM

## 2017-04-07 RX ORDER — ERTAPENEM 1 G/1
1 INJECTION, POWDER, LYOPHILIZED, FOR SOLUTION INTRAMUSCULAR; INTRAVENOUS ONCE
Status: COMPLETED | OUTPATIENT
Start: 2017-04-07 | End: 2017-04-07

## 2017-04-07 RX ORDER — LIDOCAINE 40 MG/G
CREAM TOPICAL
Status: DISCONTINUED | OUTPATIENT
Start: 2017-04-07 | End: 2017-04-07

## 2017-04-07 RX ORDER — NALOXONE HYDROCHLORIDE 0.4 MG/ML
.1-.4 INJECTION, SOLUTION INTRAMUSCULAR; INTRAVENOUS; SUBCUTANEOUS
Status: DISCONTINUED | OUTPATIENT
Start: 2017-04-07 | End: 2017-04-09 | Stop reason: HOSPADM

## 2017-04-07 RX ORDER — TRAZODONE HYDROCHLORIDE 50 MG/1
50-100 TABLET, FILM COATED ORAL
Status: DISCONTINUED | OUTPATIENT
Start: 2017-04-07 | End: 2017-04-09 | Stop reason: HOSPADM

## 2017-04-07 RX ORDER — ONDANSETRON 4 MG/1
4 TABLET, ORALLY DISINTEGRATING ORAL EVERY 6 HOURS PRN
Status: DISCONTINUED | OUTPATIENT
Start: 2017-04-07 | End: 2017-04-09 | Stop reason: HOSPADM

## 2017-04-07 RX ORDER — ONDANSETRON 2 MG/ML
4 INJECTION INTRAMUSCULAR; INTRAVENOUS EVERY 6 HOURS PRN
Status: DISCONTINUED | OUTPATIENT
Start: 2017-04-07 | End: 2017-04-09 | Stop reason: HOSPADM

## 2017-04-07 RX ORDER — SODIUM CHLORIDE 9 MG/ML
1000 INJECTION, SOLUTION INTRAVENOUS CONTINUOUS
Status: DISCONTINUED | OUTPATIENT
Start: 2017-04-07 | End: 2017-04-07

## 2017-04-07 RX ORDER — ACETAMINOPHEN 325 MG/1
650 TABLET ORAL EVERY 4 HOURS PRN
Status: DISCONTINUED | OUTPATIENT
Start: 2017-04-07 | End: 2017-04-09 | Stop reason: HOSPADM

## 2017-04-07 RX ORDER — ONDANSETRON 2 MG/ML
4 INJECTION INTRAMUSCULAR; INTRAVENOUS EVERY 30 MIN PRN
Status: DISCONTINUED | OUTPATIENT
Start: 2017-04-07 | End: 2017-04-07

## 2017-04-07 RX ORDER — CLONIDINE HYDROCHLORIDE 0.1 MG/1
0.4 TABLET ORAL EVERY EVENING
Status: DISCONTINUED | OUTPATIENT
Start: 2017-04-07 | End: 2017-04-09 | Stop reason: HOSPADM

## 2017-04-07 RX ORDER — SODIUM CHLORIDE 9 MG/ML
INJECTION, SOLUTION INTRAVENOUS CONTINUOUS
Status: DISCONTINUED | OUTPATIENT
Start: 2017-04-07 | End: 2017-04-08

## 2017-04-07 RX ADMIN — ACETAMINOPHEN 650 MG: 325 TABLET ORAL at 21:27

## 2017-04-07 RX ADMIN — HYDROMORPHONE HYDROCHLORIDE 1 MG: 2 TABLET ORAL at 23:45

## 2017-04-07 RX ADMIN — MIRTAZAPINE 7.5 MG: 7.5 TABLET, FILM COATED ORAL at 20:33

## 2017-04-07 RX ADMIN — HYDROMORPHONE HYDROCHLORIDE 0.5 MG: 1 INJECTION, SOLUTION INTRAMUSCULAR; INTRAVENOUS; SUBCUTANEOUS at 19:27

## 2017-04-07 RX ADMIN — HYDROMORPHONE HYDROCHLORIDE 1 MG: 1 INJECTION, SOLUTION INTRAMUSCULAR; INTRAVENOUS; SUBCUTANEOUS at 17:14

## 2017-04-07 RX ADMIN — SODIUM CHLORIDE 1000 ML: 9 INJECTION, SOLUTION INTRAVENOUS at 17:14

## 2017-04-07 RX ADMIN — ERTAPENEM SODIUM 1 G: 1 INJECTION, POWDER, LYOPHILIZED, FOR SOLUTION INTRAMUSCULAR; INTRAVENOUS at 19:06

## 2017-04-07 RX ADMIN — HYDROMORPHONE HYDROCHLORIDE 0.5 MG: 1 INJECTION, SOLUTION INTRAMUSCULAR; INTRAVENOUS; SUBCUTANEOUS at 17:56

## 2017-04-07 RX ADMIN — CLONIDINE HYDROCHLORIDE 0.4 MG: 0.1 TABLET ORAL at 20:32

## 2017-04-07 RX ADMIN — RIVAROXABAN 20 MG: 10 TABLET, FILM COATED ORAL at 20:32

## 2017-04-07 RX ADMIN — SODIUM CHLORIDE: 9 INJECTION, SOLUTION INTRAVENOUS at 20:33

## 2017-04-07 RX ADMIN — ONDANSETRON 4 MG: 2 INJECTION, SOLUTION INTRAMUSCULAR; INTRAVENOUS at 17:18

## 2017-04-07 RX ADMIN — VANCOMYCIN HYDROCHLORIDE 1500 MG: 1 INJECTION, POWDER, LYOPHILIZED, FOR SOLUTION INTRAVENOUS at 20:32

## 2017-04-07 RX ADMIN — ONDANSETRON 4 MG: 2 INJECTION, SOLUTION INTRAMUSCULAR; INTRAVENOUS at 17:59

## 2017-04-07 RX ADMIN — HYDROMORPHONE HYDROCHLORIDE 0.5 MG: 1 INJECTION, SOLUTION INTRAMUSCULAR; INTRAVENOUS; SUBCUTANEOUS at 18:21

## 2017-04-07 RX ADMIN — HYDROMORPHONE HYDROCHLORIDE 1 MG: 2 TABLET ORAL at 20:32

## 2017-04-07 ASSESSMENT — ENCOUNTER SYMPTOMS
DIZZINESS: 0
NEUROLOGICAL NEGATIVE: 1
SEIZURES: 0
ORTHOPNEA: 0
DIARRHEA: 0
HEMOPTYSIS: 0
EYE PAIN: 0
SPUTUM PRODUCTION: 0
DEPRESSION: 0
DIAPHORESIS: 0
DYSURIA: 0
FOCAL WEAKNESS: 0
DOUBLE VISION: 0
BACK PAIN: 0
WEAKNESS: 0
MYALGIAS: 0
BLURRED VISION: 0
NAUSEA: 0
SORE THROAT: 0
LOSS OF CONSCIOUSNESS: 0
HALLUCINATIONS: 0
NECK PAIN: 0
HEARTBURN: 0
CONSTIPATION: 0
WEIGHT LOSS: 0
EYE DISCHARGE: 0
PALPITATIONS: 0
TINGLING: 0
SHORTNESS OF BREATH: 0
EYE REDNESS: 0
ABDOMINAL PAIN: 0
VOMITING: 0
SENSORY CHANGE: 0
FEVER: 0
FREQUENCY: 0
BLOOD IN STOOL: 0
PHOTOPHOBIA: 0
WHEEZING: 0
COUGH: 0
HEADACHES: 0
INSOMNIA: 0
NERVOUS/ANXIOUS: 0

## 2017-04-07 ASSESSMENT — ACTIVITIES OF DAILY LIVING (ADL)
FALL_HISTORY_WITHIN_LAST_SIX_MONTHS: NO
EATING: 0-->INDEPENDENT
RETIRED_EATING: 0-->INDEPENDENT
RETIRED_COMMUNICATION: 0-->UNDERSTANDS/COMMUNICATES WITHOUT DIFFICULTY
BATHING: 0-->INDEPENDENT
COGNITION: 0 - NO COGNITION ISSUES REPORTED
SWALLOWING: 0-->SWALLOWS FOODS/LIQUIDS WITHOUT DIFFICULTY
DRESS: 0-->INDEPENDENT
TRANSFERRING: 0-->INDEPENDENT
BATHING: 0-->INDEPENDENT
AMBULATION: 2-->ASSISTIVE PERSON
TRANSFERRING: 2-->ASSISTIVE PERSON
TOILETING: 0-->INDEPENDENT
DRESS: 0-->INDEPENDENT
COMMUNICATION: 0-->UNDERSTANDS/COMMUNICATES WITHOUT DIFFICULTY
TOILETING: 2-->ASSISTIVE PERSON
AMBULATION: 0-->INDEPENDENT
SWALLOWING: 0-->SWALLOWS FOODS/LIQUIDS WITHOUT DIFFICULTY

## 2017-04-07 ASSESSMENT — LIFESTYLE VARIABLES: SUBSTANCE_ABUSE: 0

## 2017-04-07 NOTE — IP AVS SNAPSHOT
[address] [facility]    [address] [place] [address]    Phone:  [phone]                                       After Visit Summary   4/7/2017    Doreen Peralta    MRN: [id number]           After Visit Summary Signature Page     I have received my discharge instructions, and my questions have been answered. I have discussed any challenges I see with this plan with the nurse or doctor.    ..........................................................................................................................................  Patient/Patient Representative Signature      ..........................................................................................................................................  Patient Representative Print Name and Relationship to Patient    ..................................................               ................................................  Date                                            Time    ..........................................................................................................................................  Reviewed by Signature/Title    ...................................................              ..............................................  Date                                                            Time

## 2017-04-07 NOTE — NURSING NOTE
"Chief Complaint   Patient presents with     Dizziness       Initial BP (!) 141/92 (BP Location: Right arm, Cuff Size: Adult Regular)  Pulse 119  LMP 03/20/2017 (Exact Date)  SpO2 100% Estimated body mass index is 31.88 kg/(m^2) as calculated from the following:    Height as of 4/2/17: 5' 6\" (1.676 m).    Weight as of 4/2/17: 197 lb 8 oz (89.6 kg).  Medication Reconciliation: unable or not appropriate to perform    Health Maintenance that is potentially due pending provider review:  na    N/a  Liv Monroy M.A.        "

## 2017-04-07 NOTE — MR AVS SNAPSHOT
After Visit Summary   4/7/2017    Doreen Peralta    MRN: [id number]           Patient Information     Date Of Birth          1980        Visit Information        Provider Department      4/7/2017 1:40 PM Neo Schafer PA-C [facility]        Today's Diagnoses     Fatigue, unspecified type    -  1       Follow-ups after your visit        Follow-up notes from your care team     Return if symptoms worsen or fail to improve.      Your next 10 appointments already scheduled     Jul 17, 2017  9:00 AM CDT   (Arrive by 8:45 AM)   Return Visit with Yuri Wahl MD   [facility]    [address]  [address] [facility] [address] [phone]              Who to contact     If you have questions or need follow up information about today's clinic visit or your schedule please contact [facility] directly at [phone].  Normal or non-critical lab and imaging results will be communicated to you by MyChart, letter or phone within 4 business days after the clinic has received the results. If you do not hear from us within 7 days, please contact the clinic through [employer] or phone. If you have a critical or abnormal lab result, we will notify you by phone as soon as possible.  Submit refill requests through [employer] or call your pharmacy and they will forward the refill request to us. Please allow 3 business days for your refill to be completed.          Additional Information About Your Visit        MyChart Information     [employer] gives you secure access to your electronic health record. If you see a primary care provider, you can also send messages to your care team and make appointments. If you have questions, please call your primary care clinic.  If you do not have a primary care provider, please call [phone] and they will assist you.        Care EveryWhere ID     This is your Care EveryWhere  ID. This could be used by other organizations to access your [facility] medical records  [id number]        Your Vitals Were     Pulse Last Period Pulse Oximetry             119 03/20/2017 (Exact Date) 100%          Blood Pressure from Last 3 Encounters:   04/07/17 130/80   04/03/17 (!) 154/110   04/02/17 138/86    Weight from Last 3 Encounters:   04/02/17 197 lb 8 oz (89.6 kg)   04/02/17 198 lb 3.2 oz (89.9 kg)   03/30/17 185 lb (83.9 kg)              Today, you had the following     No orders found for display       Primary Care Provider Office Phone # Fax #    Larisa Lorenzo PA-C [phone]       [facility] [address] [place] [address]        Thank you!     Thank you for choosing [facility]  for your care. Our goal is always to provide you with excellent care. Hearing back from our patients is one way we can continue to improve our services. Please take a few minutes to complete the written survey that you may receive in the mail after your visit with us. Thank you!             Your Updated Medication List - Protect others around you: Learn how to safely use, store and throw away your medicines at www.disposemymeds.org.          This list is accurate as of: 4/7/17  3:58 PM.  Always use your most recent med list.                   Brand Name Dispense Instructions for use    ACETAMINOPHEN PO      Take 500-1,000 mg by mouth every 6 hours as needed for pain       albuterol 90 MCG/ACT inhaler      Inhale 2 puffs into the lungs every 6 hours as needed       cloNIDine 0.2 MG tablet    CATAPRES    180 tablet    Take 2 tablets (0.4 mg) by mouth every evening       COMPLEAT Liqd     30 each    Infuse at 50 ml/hour for 5 hours daily through j-tube Flush j-tube with 30 ml water before and after each infusion of Compleat       DULoxetine 60 MG EC capsule    CYMBALTA    90 capsule    Take 1 capsule (60 mg) by mouth daily       mirtazapine 15 MG ODT tab    REMERON SOL-TAB     45 tablet    0.5 tablets (7.5 mg) by Orally disintegrating tablet route At Bedtime       nortriptyline 10 MG capsule    PAMELOR    120 capsule    Take 3-4 capsules (30-40 mg) by mouth At Bedtime       ondansetron 4 MG ODT tab    ZOFRAN ODT    40 tablet    Take 1-2 tablets (4-8 mg) by mouth every 6 hours as needed for nausea       * study - rivaroxaban oral suspension    IDS# [id number]     Take 20 mg by mouth       * rivaroxaban ANTICOAGULANT 20 MG Tabs tablet    XARELTO    90 tablet    Take 1 tablet (20 mg) by mouth daily (with dinner)       SUMAtriptan 50 MG tablet    IMITREX    18 tablet    Take 1-2 tablets ( mg) by mouth at onset of headache for migraine - may repeat dose after 2h if headache recurs.  Max: 200mg/24 hours       traZODone 50 MG tablet    DESYREL    180 tablet    Take 1-2 tablets ( mg) by mouth nightly as needed 1-2 tabs at bedtime PRN       VITAMIN D (CHOLECALCIFEROL) PO      Take 2,000 Units by mouth daily       * Notice:  This list has 2 medication(s) that are the same as other medications prescribed for you. Read the directions carefully, and ask your doctor or other care provider to review them with you.

## 2017-04-07 NOTE — PROGRESS NOTES
HPI    SUBJECTIVE:                                                    Doreen Peralta is a 36 year old female who presents to clinic today for lab work and was feeling lightheaded and was brought into an exam room. I was asked to see this patient because she needed help walking initially. She has had a recent history of sepsis and has been admitted to the hospital. Her infectious disease doctor has ordered lab work and that is the reason she is here.       Problem list and histories reviewed & adjusted, as indicated.  Additional history: as documented    Patient Active Problem List   Diagnosis     Constipation by delayed colonic transit     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     Hx SBO     S/P LEEP of cervix     Gastroparesis     Migraines     Intermittent asthma     Allergic rhinitis     Abnormal Pap smear of cervix     PEG (percutaneous endoscopic gastrostomy) status     Health Care Home     PEG tube malfunction (H)     Malfunction of gastrostomy tube (H)     Malfunctioning jejunostomy tube (H)     Jejunostomy tube present (H)     S/P partial resection of colon     Malnutrition (H)     Long-term (current) use of anticoagulants [Z79.01]     Anxiety     Anemia in other chronic diseases classified elsewhere     Munchausen syndrome - previously suspected     Anemia, iron deficiency     Mitral regurgitation mild-mod by Echo June 2016     Atopic rhinitis     Migraine     Patellofemoral stress syndrome     Hyperbilirubinemia     Chronic diarrhea     Coagulation defect (H) [D68.9]     Attention to G-tube (H)     Chronic abdominal pain     Vitamin D deficiency     SIRS (systemic inflammatory response syndrome) (H)     History of deep venous thrombosis     Nausea and vomiting     Abdominal pain, generalized     Abdominal pain     Insomnia, unspecified type     Past [facility] History:   Procedure Laterality Date     COLONOSCOPY  7/10/2012    Procedure: COLONOSCOPY;;  Surgeon: Nicole Redding MD;  Location:  OR      COLONOSCOPY N/A 2/19/2017    Procedure: COLONOSCOPY;  Surgeon: Randell Muller MD;  Location: [facility]     COLONOSCOPY N/A 2/21/2017    Procedure: COLONOSCOPY;  Surgeon: Randell Muller MD;  [facility]     ECHO CHELO  7/19/2016          ENDOSCOPIC INSERTION TUBE GASTROSTOMY N/A 1/21/2016    Procedure: ENDOSCOPIC INSERTION TUBE GASTROSTOMY;  Surgeon: Nicole Redding MD;  [facility]     ESOPHAGOSCOPY, GASTROSCOPY, DUODENOSCOPY (EGD), COMBINED  7/10/2012    Procedure: COMBINED ESOPHAGOSCOPY, GASTROSCOPY, DUODENOSCOPY (EGD);  Upper Endoscopy, Ileoscopy    Latex Allergy  with biopsies;  Surgeon: Nicole Redding MD;  [facility]     ESOPHAGOSCOPY, GASTROSCOPY, DUODENOSCOPY (EGD), COMBINED N/A 11/5/2014    Procedure: COMBINED ESOPHAGOSCOPY, GASTROSCOPY, DUODENOSCOPY (EGD);  Surgeon: Nicole Redding MD;  [facility]     HC REPLACE DUODENOSTOMY/JEJUNOSTOMY TUBE PERCUTANEOUS N/A 8/27/2015    Procedure: REPLACE GASTROJEJUNOSTOMY TUBE, PERCUTANEOUS;  Surgeon: Mio Holder MD;  [facility]     HC REPLACE DUODENOSTOMY/JEJUNOSTOMY TUBE PERCUTANEOUS N/A 1/7/2016    Procedure: REPLACE JEJUNOSTOMY TUBE, PERCUTANEOUS;  Surgeon: Elsa Medel MD;  [facility]     HC REPLACE DUODENOSTOMY/JEJUNOSTOMY TUBE PERCUTANEOUS N/A 1/28/2016    Procedure: REPLACE JEJUNOSTOMY TUBE, PERCUTANEOUS;  Surgeon: Elsa Medel MD;  [facility]     HC REPLACE GASTROSTOMY/CECOSTOMY TUBE PERCUTANEOUS Left 5/19/2015    Procedure: REPLACE GASTROSTOMY TUBE, PERCUTANEOUS;  Surgeon: Melecio Morejon Chi, MD;  [facility]     HC UGI ENDOSCOPY W PLACEMENT GASTROSTOMY TUBE PERCUT N/A 10/1/2015    Procedure: COMBINED ESOPHAGOSCOPY, GASTROSCOPY, DUODENOSCOPY (EGD), PLACE PERCUTANEOUS ENDOSCOPIC GASTROSTOMY TUBE;  Surgeon: Mio Holder MD;  [facility]     LAPAROSCOPIC ASSISTED COLECTOMY  1/20/2012    Procedure:LAPAROSCOPIC ASSISTED COLECTOMY; Laparoscopic Ileostomy       LAPAROSCOPIC  ASSISTED COLECTOMY LEFT (DESCENDING)  10/24/2012    Procedure: LAPAROSCOPIC ASSISTED COLECTOMY LEFT (DESCENDING);   Hand Assisted Laproscopic Subtotal abdominal Colectomy,Iieorectal anastamosis, Ileostomy Closure.       LAPAROSCOPIC ASSISTED INSERTION TUBE JEJUNOSTOMY N/A 10/16/2015    Procedure: LAPAROSCOPIC ASSISTED INSERTION TUBE JEJUNOSTOMY;  Surgeon: Elsa Medel MD;  [facility]     LAPAROSCOPIC CHOLECYSTECTOMY  2002    [facility] stones duct     LAPAROSCOPIC ILEOSTOMY  1/20/2012    [facility], loop     LAPAROSCOPIC OOPHORECTOMY Right 2009    [facility]     LAPAROTOMY EXPLORATORY N/A 1/28/2016    Procedure: LAPAROTOMY EXPLORATORY;  Surgeon: Elsa Medel MD;  [facility]     LEEP TX, CERVICAL  2009    [facility]     PICC INSERTION Left 10/21/2015    5fr DL Power PICC, 37cm (2cm external) in the L basilic vein w/ tip in the SVC RA junction.     REMOVE GASTROSTOMY TUBE ADULT N/A 12/12/2014    Procedure: REMOVE GASTROSTOMY TUBE ADULT;  Surgeon: Nicole Redding MD;  [facility]     REMOVE PORT VASCULAR ACCESS Right 6/30/2016    Procedure: REMOVE PORT VASCULAR ACCESS;  Surgeon: Pradeep Orosco MD;  Location: [facility]     replace GASTROSTOMY TUBE ADULT  5/19/15       Social History   Substance Use Topics     Smoking status: Former Smoker     Packs/day: 1.00     Years: 4.00     Types: Cigarettes     Quit date: 1/1/2004     Smokeless tobacco: Former User     Alcohol use No     Family History   Problem Relation Age of Onset     Thyroid Disease Mother      Sjogren's Mother      GASTROINTESTINAL DISEASE Mother      Intermittent nausea vomiting diarrhea     Colon Polyps Mother      Prostate Problems Father      prostate enlargement     Lupus Maternal Grandmother      CANCER Maternal Grandfather      Lung     Colon Cancer Maternal Grandfather 65     CANCER Paternal Grandmother      Lung      CEREBROVASCULAR DISEASE Paternal Grandmother      DIABETES Paternal Grandmother       Cardiovascular Paternal Grandmother      CHF     CANCER Paternal Grandfather      Lung     Glaucoma Paternal Grandfather      Abdominal Aortic Aneurysm Other      Macular Degeneration No family hx of          Current Outpatient Prescriptions   Medication Sig Dispense Refill     traZODone (DESYREL) 50 MG tablet Take 1-2 tablets ( mg) by mouth nightly as needed 1-2 tabs at bedtime  tablet 1     study - rivaroxaban (IDS# [id number]) oral suspension Take 20 mg by mouth       nortriptyline (PAMELOR) 10 MG capsule Take 3-4 capsules (30-40 mg) by mouth At Bedtime 120 capsule 5     cloNIDine (CATAPRES) 0.2 MG tablet Take 2 tablets (0.4 mg) by mouth every evening 180 tablet 0     mirtazapine (REMERON SOL-TAB) 15 MG ODT tab 0.5 tablets (7.5 mg) by Orally disintegrating tablet route At Bedtime 45 tablet 0     ondansetron (ZOFRAN ODT) 4 MG ODT tab Take 1-2 tablets (4-8 mg) by mouth every 6 hours as needed for nausea 40 tablet 0     SUMAtriptan (IMITREX) 50 MG tablet Take 1-2 tablets ( mg) by mouth at onset of headache for migraine - may repeat dose after 2h if headache recurs.  Max: 200mg/24 hours 18 tablet 1     VITAMIN D, CHOLECALCIFEROL, PO Take 2,000 Units by mouth daily       rivaroxaban ANTICOAGULANT (XARELTO) 20 MG TABS tablet Take 1 tablet (20 mg) by mouth daily (with dinner) 90 tablet 1     DULoxetine (CYMBALTA) 60 MG capsule Take 1 capsule (60 mg) by mouth daily 90 capsule 3     Nutritional Supplements (COMPLEAT) LIQD Infuse at 50 ml/hour for 5 hours daily through j-tube  Flush j-tube with 30 ml water before and after each infusion of Compleat 30 each 0     ACETAMINOPHEN PO Take 500-1,000 mg by mouth every 6 hours as needed for pain        albuterol 90 MCG/ACT inhaler Inhale 2 puffs into the lungs every 6 hours as needed        Allergies   Allergen Reactions     Hyoscyamine Rash     Metoclopramide Other (See Comments)     Eye twitching.      Peaches [Peach] Other (See Comments)     Raw. Cooked OK      Sucralose Other (See Comments)     All artificial sweeteners. Aspartame also. Swollen glands     Advair Diskus Other (See Comments)     Throat burns     Azithromycin Other (See Comments)     Burning in throat     Compazine [Prochlorperazine] Visual Disturbance     Contrast Dye Itching     States is allergic to CT contrast dye     Cyclobenzaprine Visual Disturbance     Fentanyl Other (See Comments)     migraine     Ibuprofen GI Disturbance     Lactulose Nausea and Vomiting     Gas and bloating     Levaquin [Levofloxacin] Swelling     Per ED M.D. And RN      Morphine Sulfate Other (See Comments)     Chest pain       Oxycodone Other (See Comments)     Burning throat, but can take Norco.      Penicillins Other (See Comments)     Family hx of resp arrest, she has never taken  Ok with cephalosporins     Rizatriptan Visual Disturbance     Droperidol Hives and Rash     Isovue [Iopamidol] Palpitations     Pt had racing heart and sob      Ketorolac Anxiety     Latex Swelling and Rash     Kiwi, likely also avacado, ? banana     Levsin Rash     Labs reviewed in EPIC    Reviewed and updated as needed this visit by clinical staff       Reviewed and updated as needed this visit by Provider               Review of Systems   Constitutional: Positive for malaise/fatigue. Negative for diaphoresis, fever and weight loss.   HENT: Negative for congestion, ear discharge, ear pain, hearing loss, nosebleeds and sore throat.    Eyes: Negative for blurred vision, double vision, photophobia, pain, discharge and redness.   Respiratory: Negative for cough, hemoptysis, sputum production, shortness of breath and wheezing.    Cardiovascular: Negative for chest pain, palpitations, orthopnea and leg swelling.   Gastrointestinal: Negative for abdominal pain, blood in stool, constipation, diarrhea, heartburn, melena, nausea and vomiting.   Genitourinary: Negative.  Negative for dysuria, frequency and urgency.   Musculoskeletal: Negative for back pain,  joint pain, myalgias and neck pain.   Skin: Negative for itching and rash.   Neurological: Negative.  Negative for dizziness, tingling, sensory change, focal weakness, seizures, loss of consciousness, weakness and headaches.   Endo/Heme/Allergies: Negative.    Psychiatric/Behavioral: Negative for depression, hallucinations, substance abuse and suicidal ideas. The patient is not nervous/anxious and does not have insomnia.          Physical Exam   Constitutional: She is oriented to person, place, and time and well-developed, well-nourished, and in no distress. No distress.   HENT:   Head: Normocephalic and atraumatic.   Right Ear: External ear normal.   Left Ear: External ear normal.   Nose: Nose normal.   Eyes: Conjunctivae and EOM are normal. Pupils are equal, round, and reactive to light. Right eye exhibits no discharge. Left eye exhibits no discharge. No scleral icterus.   Neck: Normal range of motion. Neck supple. No JVD present. No tracheal deviation present. No thyromegaly present.   Cardiovascular: Normal rate, regular rhythm, normal heart sounds and intact distal pulses.  Exam reveals no gallop and no friction rub.    No murmur heard.  Pulmonary/Chest: Effort normal and breath sounds normal. No stridor. No respiratory distress. She has no wheezes. She has no rales. She exhibits no tenderness.   Abdominal: Soft. Bowel sounds are normal. She exhibits no distension and no mass. There is no tenderness. There is no rebound and no guarding.   Musculoskeletal: Normal range of motion. She exhibits no edema or tenderness.   Lymphadenopathy:     She has no cervical adenopathy.   Neurological: She is alert and oriented to person, place, and time. She has normal reflexes. No cranial nerve deficit. She exhibits normal muscle tone. Gait normal.   Skin: Skin is warm and dry. No rash noted. She is not diaphoretic. No erythema. No pallor.   Psychiatric: Mood, memory, affect and judgment normal.         (R53.83) Fatigue,  unspecified type  (primary encounter diagnosis)  Comment:   Plan: Her CBC was reassuring and sedimentation rate was elevated at 45. After resting in the clinic she was able to ambulate on her own and she will go home and rest, drink fluids and discuss further cares with her infectious disease doctor

## 2017-04-07 NOTE — ED PROVIDER NOTES
"  History     Chief Complaint   Patient presents with     Fever     HPI  Doreen Peralta is a 36 year old female who presents with concerns of a fever, abdominal pain and headache for the past 24 hours.  Patient is a long history of sepsis in the past.  Patient recently had her feeding tubes changed and then it redone also in the past week.  She wonders if this could be the source of her new infection.  She states she's had a Tmax at home of 100.4.  She states her skin feels like it's on fire like it has before when she has been septic.  She's had some nausea but no vomiting.  She feels very bloated in her abdomen and is having her usual abdominal pain but it's worse than normal.  She denies any dysuria or hematuria.  Patient was seen at her clinic earlier today at the request of her infectious disease doctor to have labs done and they were normal.      I have reviewed the Medications, Allergies, Past Medical and [facility] History, and Social History in the Epic system.    Review of Systems   All other systems reviewed and are negative.      Physical Exam   BP: 119/77  Pulse: 91  Heart Rate: 91  Temp: 99.4  F (37.4  C)  Resp: 16  Height:  (5'6\")  Weight: 83.9 kg (185 lb)  SpO2: 100 %  Physical Exam   Constitutional: She is oriented to person, place, and time. She appears well-developed and well-nourished. No distress.   HENT:   Mouth/Throat: Oropharynx is clear and moist.   Eyes: Conjunctivae are normal.   Neck: Normal range of motion. Neck supple.   Cardiovascular: Normal rate, regular rhythm, normal heart sounds and intact distal pulses.  Exam reveals no gallop and no friction rub.    No murmur heard.  Pulmonary/Chest: Effort normal and breath sounds normal. No respiratory distress. She has no wheezes. She has no rales. She exhibits no tenderness.   Abdominal: Soft. Bowel sounds are normal. She exhibits no distension and no mass. There is tenderness (Tenderness to light palpation generalized.). There is no " guarding.   Musculoskeletal: Normal range of motion. She exhibits no edema or tenderness.   Neurological: She is alert and oriented to person, place, and time.   Skin: Skin is warm and dry. No rash noted. She is not diaphoretic.   Psychiatric: She has a normal mood and affect. Judgment normal.   Nursing note and vitals reviewed.      ED Course     ED Course     Procedures         Results for orders placed or performed during the hospital encounter of 04/07/17   Basic metabolic panel   Result Value Ref Range    Sodium 139 133 - 144 mmol/L    Potassium 4.2 3.4 - 5.3 mmol/L    Chloride 108 94 - 109 mmol/L    Carbon Dioxide 22 20 - 32 mmol/L    Anion Gap 9 3 - 14 mmol/L    Glucose 94 70 - 99 mg/dL    Urea Nitrogen 9 7 - 30 mg/dL    Creatinine 0.94 0.52 - 1.04 mg/dL    GFR Estimate 67 >60 mL/min/1.7m2    GFR Estimate If Black 81 >60 mL/min/1.7m2    Calcium 8.6 8.5 - 10.1 mg/dL   CBC with platelets differential   Result Value Ref Range    WBC 11.2 (H) 4.0 - 11.0 10e9/L    RBC Count 4.47 3.8 - 5.2 10e12/L    Hemoglobin 10.2 (L) 11.7 - 15.7 g/dL    Hematocrit 33.6 (L) 35.0 - 47.0 %    MCV 75 (L) 78 - 100 fl    MCH 22.8 (L) 26.5 - 33.0 pg    MCHC 30.4 (L) 31.5 - 36.5 g/dL    RDW 18.3 (H) 10.0 - 15.0 %    Platelet Count 483 (H) 150 - 450 10e9/L    Diff Method Automated Method     % Neutrophils 72.3 %    % Lymphocytes 20.4 %    % Monocytes 6.3 %    % Eosinophils 0.4 %    % Basophils 0.3 %    % Immature Granulocytes 0.3 %    Absolute Neutrophil 8.1 1.6 - 8.3 10e9/L    Absolute Lymphocytes 2.3 0.8 - 5.3 10e9/L    Absolute Monocytes 0.7 0.0 - 1.3 10e9/L    Absolute Eosinophils 0.1 0.0 - 0.7 10e9/L    Absolute Basophils 0.0 0.0 - 0.2 10e9/L    Abs Immature Granulocytes 0.0 0 - 0.4 10e9/L   CRP inflammation   Result Value Ref Range    CRP Inflammation 9.8 (H) 0.0 - 8.0 mg/L   Erythrocyte sedimentation rate auto   Result Value Ref Range    Sed Rate 41 (H) 0 - 20 mm/h   Lactic acid whole blood   Result Value Ref Range    Lactic Acid  1.0 0.7 - 2.1 mmol/L     *Note: Due to a large number of results and/or encounters for the requested time period, some results have not been displayed. A complete set of results can be found in Results Review.     Medications   lidocaine 1 % 1 mL (not administered)   lidocaine (LMX4) kit (not administered)   sodium chloride (PF) 0.9% PF flush 3 mL (not administered)   sodium chloride (PF) 0.9% PF flush 3 mL (not administered)   0.9% sodium chloride BOLUS (1,000 mLs Intravenous New Bag 4/7/17 1714)     Followed by   0.9% sodium chloride infusion (not administered)   ondansetron (ZOFRAN) injection 4 mg (4 mg Intravenous Given 4/7/17 1759)   HYDROmorphone (PF) (DILAUDID) injection 0.5 mg (0.5 mg Intravenous Given 4/7/17 1821)   HYDROmorphone (DILAUDID) injection 1 mg (1 mg Intravenous Given 4/7/17 1714)     labs reviewed and patience white count and CRP both have gone up from just a few hours earlier.  With her history of recurrent sepsis, this is highly differential.  Like to bring her in for observation for emperic IV antibiotics, recheck labs in the morning and follow-up of her blood culture.  The blood cultures were drawn earlier today.  I discussed the case with the hospital is and he is okay with this plan.  We will start the patient on Zoysyn and vancomycin.    Assessments & Plan (with Medical Decision Making)  bacteremia, chronic abdominal pain      I have reviewed the nursing notes.    I have reviewed the findings, diagnosis, plan and need for follow up with the patient.        4/7/2017   [facility] EMERGENCY DEPARTMENT     Braxton Almeida MD  04/07/17 8001       Braxton Almeida MD  04/13/17 2620

## 2017-04-07 NOTE — IP AVS SNAPSHOT
MRN:[id number]                      After Visit Summary   4/7/2017    Doreen Peralta    MRN: [id number]           Thank you!     Thank you for choosing [facility] for your care. Our goal is always to provide you with excellent care. Hearing back from our patients is one way we can continue to improve our services. Please take a few minutes to complete the written survey that you may receive in the mail after you visit with us. Thank you!        Patient Information     Date Of Birth          1980        Designated Caregiver       Most Recent Value    Caregiver    Will someone help with your care after discharge? yes    Name of designated caregiver Mitali- Spouse    Phone number of caregiver [phone]    Caregiver address same as patient       About your hospital stay     You were admitted on:  April 7, 2017 You last received care in the:  [address]    You were discharged on:  April 9, 2017        Reason for your hospital stay       Hospitalized due to fever and elevated WBC with recent blood stream infection and improved, no signs of bloodstream infection on preliminary results from this hospital stay.                  Who to Call     For medical emergencies, please call 911.  For non-urgent questions about your medical care, please call your primary care provider or clinic, [phone]          Attending Provider     Provider Specialty    Braxton Almeida MD Emergency Medicine    Gilmer Lazcano MD Internal Medicine       Primary Care Provider Office Phone # Fax #    Larisa Lorenzo PA-C [phone]       [facility] [address]  [place] [address]        After Care Instructions     Activity       Your activity upon discharge: activity as tolerated and no driving while on narcotic analgesics (hydromorphone)            Diet       Follow this diet upon discharge: Advance to a regular diet as tolerated            Tubes and  "drains       You are going home with the following tubes or drains: g-tube.  Tube cares per hospital or home care instructions. Vent g-tube daily according to usual home routine.                  Follow-up Appointments     Follow-up and recommended labs and tests        Follow up with primary care provider, Larisa Lorenzo, within 2 weeks, for hospital follow- up.                  Your next 10 appointments already scheduled     Jul 17, 2017  9:00 AM CDT   (Arrive by 8:45 AM)   Return Visit with Yuri Wahl MD   [facility] ([facility] [place])    [address] [place] [address]  [address] [facility] [address] [phone]              Further instructions from your care team       Patient will make own follow up appointment per her request.      Pending Results     Date and Time Order Name Status Description    4/7/2017 1310 BLOOD CULTURE Preliminary     4/7/2017 1310 BLOOD CULTURE Preliminary     4/7/2017 1310 BLOOD CULTURE Preliminary     4/7/2017 1310 BLOOD CULTURE Preliminary             Statement of Approval     Ordered          04/09/17 1022  I have reviewed and agree with all the recommendations and orders detailed in this document.  EFFECTIVE NOW     Approved and electronically signed by:  Gilmer Lazcano MD             Admission Information     Date & Time Provider Department Dept. Phone    4/7/2017 Gilmer Lazcano MD [address] Medical [facility] [phone]      Your Vitals Were     Blood Pressure Pulse Temperature Respirations Height Weight    109/69 89 98.9  F (37.2  C) (Oral) 16 1.676 m (5' 6\") 86 kg (189 lb 9.5 oz)    Last Period Pulse Oximetry BMI (Body Mass Index)             03/20/2017 (Exact Date) 100% 30.6 kg/m2         MyChart Information     [employer] gives you secure access to your electronic health record. If you see a primary care provider, you can also send messages to your care team and make appointments. If you have questions, please call " your primary care clinic.  If you do not have a primary care provider, please call [phone] and they will assist you.        Care EveryWhere ID     This is your Care EveryWhere ID. This could be used by other organizations to access your [facility] medical records  [id number]           Review of your medicines      START taking        Dose / Directions    HYDROmorphone 2 MG tablet   Commonly known as:  DILAUDID   Used for:  Abdominal pain, generalized        Dose:  2 mg   Take 1 tablet (2 mg) by mouth every 3 hours as needed for moderate to severe pain   Quantity:  24 tablet   Refills:  0         CONTINUE these medicines which have NOT CHANGED        Dose / Directions    ACETAMINOPHEN PO        Dose:  500-1000 mg   Take 500-1,000 mg by mouth every 6 hours as needed for pain   Refills:  0       albuterol 90 MCG/ACT inhaler        Dose:  2 puff   Inhale 2 puffs into the lungs every 6 hours as needed   Refills:  0       cloNIDine 0.2 MG tablet   Commonly known as:  CATAPRES   Used for:  Anxiety        Dose:  0.4 mg   Take 2 tablets (0.4 mg) by mouth every evening   Quantity:  180 tablet   Refills:  0       DULoxetine 60 MG EC capsule   Commonly known as:  CYMBALTA   Used for:  Abdominal pain, epigastric, Abdominal migraine, not intractable        Dose:  60 mg   Take 1 capsule (60 mg) by mouth daily   Quantity:  90 capsule   Refills:  3       mirtazapine 15 MG ODT tab   Commonly known as:  REMERON SOL-TAB   Used for:  Anxiety        Dose:  7.5 mg   0.5 tablets (7.5 mg) by Orally disintegrating tablet route At Bedtime   Quantity:  45 tablet   Refills:  0       nortriptyline 10 MG capsule   Commonly known as:  PAMELOR   Used for:  Neuropathic pain, Primary insomnia        Dose:  30-40 mg   Take 3-4 capsules (30-40 mg) by mouth At Bedtime   Quantity:  120 capsule   Refills:  5       ondansetron 4 MG ODT tab   Commonly known as:  ZOFRAN ODT   Used for:  Intractable vomiting, presence of nausea not specified, unspecified  vomiting type, Gastroparesis        Dose:  4-8 mg   Take 1-2 tablets (4-8 mg) by mouth every 6 hours as needed for nausea   Quantity:  40 tablet   Refills:  0       rivaroxaban ANTICOAGULANT 20 MG Tabs tablet   Commonly known as:  XARELTO   Used for:  Deep vein thrombosis (DVT) of upper extremity, unspecified chronicity, unspecified laterality, unspecified vein (H)        Dose:  20 mg   Take 1 tablet (20 mg) by mouth daily (with dinner)   Quantity:  90 tablet   Refills:  1       SUMAtriptan 50 MG tablet   Commonly known as:  IMITREX   Used for:  Migraine without aura and without status migrainosus, not intractable        Dose:   mg   Take 1-2 tablets ( mg) by mouth at onset of headache for migraine - may repeat dose after 2h if headache recurs.  Max: 200mg/24 hours   Quantity:  18 tablet   Refills:  1       traZODone 50 MG tablet   Commonly known as:  DESYREL   Used for:  Insomnia, unspecified type        Dose:   mg   Take 1-2 tablets ( mg) by mouth nightly as needed 1-2 tabs at bedtime PRN   Quantity:  180 tablet   Refills:  1            Where to get your medicines      Some of these will need a paper prescription and others can be bought over the counter. Ask your nurse if you have questions.     Bring a paper prescription for each of these medications     HYDROmorphone 2 MG tablet    ondansetron 4 MG ODT tab                Protect others around you: Learn how to safely use, store and throw away your medicines at www.disposemymeds.org.             Medication List: This is a list of all your medications and when to take them. Check marks below indicate your daily home schedule. Keep this list as a reference.      Medications           Morning Afternoon Evening Bedtime As Needed    ACETAMINOPHEN PO   Take 500-1,000 mg by mouth every 6 hours as needed for pain   Last time this was given:  650 mg on 4/9/2017  6:08 AM                                   albuterol 90 MCG/ACT inhaler   Inhale 2  puffs into the lungs every 6 hours as needed                                   cloNIDine 0.2 MG tablet   Commonly known as:  CATAPRES   Take 2 tablets (0.4 mg) by mouth every evening   Last time this was given:  0.4 mg on 4/8/2017  8:34 PM                                   DULoxetine 60 MG EC capsule   Commonly known as:  CYMBALTA   Take 1 capsule (60 mg) by mouth daily   Last time this was given:  60 mg on 4/9/2017  9:06 AM                                   HYDROmorphone 2 MG tablet   Commonly known as:  DILAUDID   Take 1 tablet (2 mg) by mouth every 3 hours as needed for moderate to severe pain   Last time this was given:  2 mg on 4/9/2017  9:05 AM                                   mirtazapine 15 MG ODT tab   Commonly known as:  REMERON SOL-TAB   0.5 tablets (7.5 mg) by Orally disintegrating tablet route At Bedtime                                   nortriptyline 10 MG capsule   Commonly known as:  PAMELOR   Take 3-4 capsules (30-40 mg) by mouth At Bedtime                                   ondansetron 4 MG ODT tab   Commonly known as:  ZOFRAN ODT   Take 1-2 tablets (4-8 mg) by mouth every 6 hours as needed for nausea   Last time this was given:  4 mg on 4/8/2017  2:59 PM                                   rivaroxaban ANTICOAGULANT 20 MG Tabs tablet   Commonly known as:  XARELTO   Take 1 tablet (20 mg) by mouth daily (with dinner)   Last time this was given:  20 mg on 4/8/2017  4:23 PM                                   SUMAtriptan 50 MG tablet   Commonly known as:  IMITREX   Take 1-2 tablets ( mg) by mouth at onset of headache for migraine - may repeat dose after 2h if headache recurs.  Max: 200mg/24 hours                                   traZODone 50 MG tablet   Commonly known as:  DESYREL   Take 1-2 tablets ( mg) by mouth nightly as needed 1-2 tabs at bedtime PRN

## 2017-04-08 LAB
ANION GAP SERPL CALCULATED.3IONS-SCNC: 8 MMOL/L (ref 3–14)
BACTERIA SPEC CULT: NO GROWTH
BUN SERPL-MCNC: 7 MG/DL (ref 7–30)
CALCIUM SERPL-MCNC: 7.8 MG/DL (ref 8.5–10.1)
CHLORIDE SERPL-SCNC: 109 MMOL/L (ref 94–109)
CO2 SERPL-SCNC: 23 MMOL/L (ref 20–32)
CREAT SERPL-MCNC: 0.9 MG/DL (ref 0.52–1.04)
ERYTHROCYTE [DISTWIDTH] IN BLOOD BY AUTOMATED COUNT: 17.8 % (ref 10–15)
GFR SERPL CREATININE-BSD FRML MDRD: 71 ML/MIN/1.7M2
GLUCOSE SERPL-MCNC: 99 MG/DL (ref 70–99)
HCT VFR BLD AUTO: 29.8 % (ref 35–47)
HGB BLD-MCNC: 9.1 G/DL (ref 11.7–15.7)
MCH RBC QN AUTO: 23.3 PG (ref 26.5–33)
MCHC RBC AUTO-ENTMCNC: 30.5 G/DL (ref 31.5–36.5)
MCV RBC AUTO: 76 FL (ref 78–100)
MICRO REPORT STATUS: NORMAL
PLATELET # BLD AUTO: 412 10E9/L (ref 150–450)
POTASSIUM SERPL-SCNC: 3.6 MMOL/L (ref 3.4–5.3)
PROCALCITONIN SERPL-MCNC: NORMAL NG/ML
RBC # BLD AUTO: 3.91 10E12/L (ref 3.8–5.2)
SODIUM SERPL-SCNC: 140 MMOL/L (ref 133–144)
SPECIMEN SOURCE: NORMAL
WBC # BLD AUTO: 7.5 10E9/L (ref 4–11)

## 2017-04-08 PROCEDURE — 36415 COLL VENOUS BLD VENIPUNCTURE: CPT | Performed by: INTERNAL MEDICINE

## 2017-04-08 PROCEDURE — 25000125 ZZHC RX 250: Performed by: PEDIATRICS

## 2017-04-08 PROCEDURE — 25000132 ZZH RX MED GY IP 250 OP 250 PS 637: Performed by: INTERNAL MEDICINE

## 2017-04-08 PROCEDURE — 25800025 ZZH RX 258: Performed by: PEDIATRICS

## 2017-04-08 PROCEDURE — 40000556 ZZH STATISTIC PERIPHERAL IV START W US GUIDANCE

## 2017-04-08 PROCEDURE — 80048 BASIC METABOLIC PNL TOTAL CA: CPT | Performed by: INTERNAL MEDICINE

## 2017-04-08 PROCEDURE — 25000128 H RX IP 250 OP 636: Performed by: PEDIATRICS

## 2017-04-08 PROCEDURE — 25000128 H RX IP 250 OP 636: Performed by: INTERNAL MEDICINE

## 2017-04-08 PROCEDURE — 25000125 ZZHC RX 250: Performed by: INTERNAL MEDICINE

## 2017-04-08 PROCEDURE — 85027 COMPLETE CBC AUTOMATED: CPT | Performed by: INTERNAL MEDICINE

## 2017-04-08 PROCEDURE — 99232 SBSQ HOSP IP/OBS MODERATE 35: CPT | Performed by: PEDIATRICS

## 2017-04-08 PROCEDURE — 12000000 ZZH R&B MED SURG/OB

## 2017-04-08 RX ORDER — DEXTROSE MONOHYDRATE, SODIUM CHLORIDE, AND POTASSIUM CHLORIDE 50; 1.49; 4.5 G/1000ML; G/1000ML; G/1000ML
INJECTION, SOLUTION INTRAVENOUS CONTINUOUS
Status: DISCONTINUED | OUTPATIENT
Start: 2017-04-08 | End: 2017-04-09 | Stop reason: HOSPADM

## 2017-04-08 RX ORDER — HYDROMORPHONE HYDROCHLORIDE 2 MG/1
2 TABLET ORAL
Status: DISCONTINUED | OUTPATIENT
Start: 2017-04-08 | End: 2017-04-09 | Stop reason: HOSPADM

## 2017-04-08 RX ADMIN — VANCOMYCIN HYDROCHLORIDE 1500 MG: 1 INJECTION, POWDER, LYOPHILIZED, FOR SOLUTION INTRAVENOUS at 08:29

## 2017-04-08 RX ADMIN — HYDROMORPHONE HYDROCHLORIDE 2 MG: 2 TABLET ORAL at 21:03

## 2017-04-08 RX ADMIN — MIRTAZAPINE 7.5 MG: 7.5 TABLET, FILM COATED ORAL at 20:34

## 2017-04-08 RX ADMIN — SODIUM CHLORIDE: 9 INJECTION, SOLUTION INTRAVENOUS at 11:57

## 2017-04-08 RX ADMIN — HYDROMORPHONE HYDROCHLORIDE 2 MG: 2 TABLET ORAL at 09:00

## 2017-04-08 RX ADMIN — DULOXETINE HYDROCHLORIDE 60 MG: 30 CAPSULE, DELAYED RELEASE ORAL at 09:00

## 2017-04-08 RX ADMIN — ACETAMINOPHEN 650 MG: 325 TABLET ORAL at 21:42

## 2017-04-08 RX ADMIN — ONDANSETRON 4 MG: 4 TABLET, ORALLY DISINTEGRATING ORAL at 09:00

## 2017-04-08 RX ADMIN — ACETAMINOPHEN 650 MG: 325 TABLET ORAL at 16:23

## 2017-04-08 RX ADMIN — POTASSIUM CHLORIDE, DEXTROSE MONOHYDRATE AND SODIUM CHLORIDE: 150; 5; 450 INJECTION, SOLUTION INTRAVENOUS at 15:02

## 2017-04-08 RX ADMIN — MEROPENEM 1 G: 1 INJECTION, POWDER, FOR SOLUTION INTRAVENOUS at 07:54

## 2017-04-08 RX ADMIN — ONDANSETRON 4 MG: 4 TABLET, ORALLY DISINTEGRATING ORAL at 01:57

## 2017-04-08 RX ADMIN — HYDROMORPHONE HYDROCHLORIDE 2 MG: 2 TABLET ORAL at 14:59

## 2017-04-08 RX ADMIN — RIVAROXABAN 20 MG: 10 TABLET, FILM COATED ORAL at 16:23

## 2017-04-08 RX ADMIN — MEROPENEM 1 G: 1 INJECTION, POWDER, FOR SOLUTION INTRAVENOUS at 15:53

## 2017-04-08 RX ADMIN — HYDROMORPHONE HYDROCHLORIDE 2 MG: 2 TABLET ORAL at 05:58

## 2017-04-08 RX ADMIN — CLONIDINE HYDROCHLORIDE 0.4 MG: 0.1 TABLET ORAL at 20:34

## 2017-04-08 RX ADMIN — HYDROMORPHONE HYDROCHLORIDE 2 MG: 2 TABLET ORAL at 11:56

## 2017-04-08 RX ADMIN — HYDROMORPHONE HYDROCHLORIDE 2 MG: 2 TABLET ORAL at 23:58

## 2017-04-08 RX ADMIN — ONDANSETRON 4 MG: 4 TABLET, ORALLY DISINTEGRATING ORAL at 14:59

## 2017-04-08 RX ADMIN — HYDROMORPHONE HYDROCHLORIDE 2 MG: 2 TABLET ORAL at 02:56

## 2017-04-08 RX ADMIN — HYDROMORPHONE HYDROCHLORIDE 2 MG: 2 TABLET ORAL at 18:00

## 2017-04-08 RX ADMIN — VANCOMYCIN HYDROCHLORIDE 1500 MG: 1 INJECTION, POWDER, LYOPHILIZED, FOR SOLUTION INTRAVENOUS at 21:42

## 2017-04-08 RX ADMIN — ONDANSETRON HYDROCHLORIDE 4 MG: 2 SOLUTION INTRAMUSCULAR; INTRAVENOUS at 21:03

## 2017-04-08 RX ADMIN — MEROPENEM 1 G: 1 INJECTION, POWDER, FOR SOLUTION INTRAVENOUS at 20:34

## 2017-04-08 ASSESSMENT — PAIN DESCRIPTION - DESCRIPTORS: DESCRIPTORS: CONSTANT;CRAMPING;SHARP

## 2017-04-08 NOTE — PHARMACY-VANCOMYCIN DOSING SERVICE
Pharmacy Vancomycin Note  Date of Service 2017  Patient's  1980   36 year old, female    Indication: Sepsis  Goal Trough Level: 15-20 mg/L  Day of Therapy: 2  Current Vancomycin regimen:  1500 mg IV q12h    Current estimated CrCl = Estimated Creatinine Clearance: 98.1 mL/min (based on Cr of 0.9).    Creatinine for last 3 days  2017:  1:10 PM Creatinine 0.92 mg/dL;  5:28 PM Creatinine 0.94 mg/dL  2017:  5:55 AM Creatinine 0.90 mg/dL    Recent Vancomycin Levels (past 3 days)  No results found for requested labs within last 72 hours.    Vancomycin IV Administrations (past 72 hours)                   vancomycin (VANCOCIN) 1500 mg in 0.9% NaCl 250 mL PREMIX (mg) 1,500 mg New Bag 17 0829     1,500 mg New Bag 17                Nephrotoxins and other renal medications (Future)    Start     Dose/Rate Route Frequency Ordered Stop    17  vancomycin (VANCOCIN) 1500 mg in 0.9% NaCl 250 mL PREMIX      1,500 mg Intravenous EVERY 12 HOURS 17 2019               Contrast Orders - past 72 hours     None          Interpretation of levels and current regimen:  Has serum creatinine changed > 50% in last 72 hours: No    Urine output:  good urine output    Renal Function: Stable    Plan:  1.  Continue Current Dose  2.  Pharmacy will check trough levels as appropriate in 1-3 Days.    3. Serum creatinine levels will be ordered daily for the first week of therapy and at least twice weekly for subsequent weeks.      Mark Anthony Jimenez, PharmD        .

## 2017-04-08 NOTE — PLAN OF CARE
Problem: Goal Outcome Summary  Goal: Goal Outcome Summary  Outcome: No Change  Pt has been afebrile this shift. VS are stable. Pt pain medication was increased d/t uncontrolled pain. Up ad berny. Bowel sounds are active et audible. SATISH et RU abdomen tender to palpate. Pt has been nauseated once this shift treated with antiemetic. Nausea was relieved. Cont on clear liquid diet pt not tolerating an advanced diet. Gtube is clamped. Call light within reach will cont to monitor.

## 2017-04-08 NOTE — H&P
[facility]    History and Physical  Hospitalist       Date of Admission:  4/7/2017  Date of Service (when I saw the patient): 04/07/17    Assessment & Plan       Active Problems:    Sepsis (H) - possible    Assessment: low grade fevers reported at home and WBC climbing from earlier this morning.  She has had recurrent bacteremic episodes of unclear etiology although the most recent suspicion has been her J and G tubes.  She recently had a GJ tube removed and G tube placed.  She has had some bleeding from the site.  With her last hospital stay she grew ochobactrum and acinetobacter whereas previously she would usually grow Klebsiella.  BC were done earlier today but will need IV antibiotics over two midnights since one of her BC took two days to grow with her last hospital stay and will therefore admit to inpatient.    Plan: admit to inpatient, IV zosyn and meropenem, check procalcitonin, follow BC results, follow temps and WBC.      Anemia in other chronic diseases classified elsewhere    Assessment: stable    Plan: follow      Chronic abdominal pain    Assessment: chronic although worsened in the last couple of days. Abdominal xray is unremarkable    Plan: oral dilaudid and venting using her G tube      History of deep venous thrombosis    Assessment: no signs of recurrent DVT    Plan: conitinue xarelto      Migraines    Assessment: chronic    Plan: continue home meds unchanged      Intermittent asthma    Assessment: chronic but has been inactive    Plan: no intervention      PEG (percutaneous endoscopic gastrostomy) status    Assessment: tube in place and appears to be functioning.  Can drain to gravity    Plan: as above    DVT Prophylaxis: xarelto  Code Status: Full Code    Disposition: Expected discharge in 2 days once BC negative and not having ongoing fevers or other signs of sepsis.    Nikhil Mario MD    Primary Care Physician   Larisa Lorenzo    Chief Complaint    Fever    History is obtained from the patient    History of Present Illness   Doreen Peralta is a 36 year old female who presents with fever, weakness, fatigue, chills.  She started feeling very tired yesterday.  Today her legs started feeling weak and she started having more diffuse abdominal pain.  She then started having fever to 100.4 and came to the ED.  She has an extensive previous history of recurrent bacteremia and sepsis with the most recent thought being the source of infections has been her J tube.  Her J tube has been removed and she now has just a G tube for venting. Due to concerns for early sepsis and the need to monitor closely for 2 midnights will admit to inpatient.    Past Medical History    I have reviewed this patient's medical history and updated it with pertinent information if needed.   Past Medical History:   Diagnosis Date     Asthma      Bilateral ovarian cysts      Cervical cancer (H) 01/01/2008    cervical cancer      Chronic pain      Colonic dysmotility     s/p subtotal colectomy     Constipation     chronic     E. coli sepsis (H) 5/8/2016     Enteritis      Fungemia 5/5/2016     Gastro-oesophageal reflux disease      H/O ileostomy      Hx of abnormal Pap smear     s/p LEEP - no further details provided     Hypertension      IBS (irritable bowel syndrome)      Other chronic pain      PONV (postoperative nausea and vomiting)      Thrombosis, hepatic vein (H)     microvascular       Past [facility] History   I have reviewed this patient's surgical history and updated it with pertinent information if needed.  Past [facility] History:   Procedure Laterality Date     COLONOSCOPY  7/10/2012    Procedure: COLONOSCOPY;;  Surgeon: Nicole Redding MD;  [facility]     COLONOSCOPY N/A 2/19/2017    Procedure: COLONOSCOPY;  Surgeon: Randell Muller MD;  Location: [facility]     COLONOSCOPY N/A 2/21/2017    Procedure: COLONOSCOPY;  Surgeon: Randell Muller MD;  Location: [facility]      ECHO CHELO  7/19/2016          ENDOSCOPIC INSERTION TUBE GASTROSTOMY N/A 1/21/2016    Procedure: ENDOSCOPIC INSERTION TUBE GASTROSTOMY;  Surgeon: Nicole Redding MD;  Location[facility]     ESOPHAGOSCOPY, GASTROSCOPY, DUODENOSCOPY (EGD), COMBINED  7/10/2012    Procedure: COMBINED ESOPHAGOSCOPY, GASTROSCOPY, DUODENOSCOPY (EGD);  Upper Endoscopy, Ileoscopy    Latex Allergy  with biopsies;  Surgeon: Nicole Redding MD;  [facility]     ESOPHAGOSCOPY, GASTROSCOPY, DUODENOSCOPY (EGD), COMBINED N/A 11/5/2014    Procedure: COMBINED ESOPHAGOSCOPY, GASTROSCOPY, DUODENOSCOPY (EGD);  Surgeon: Nicole Redding MD;  [facility]     HC REPLACE DUODENOSTOMY/JEJUNOSTOMY TUBE PERCUTANEOUS N/A 8/27/2015    Procedure: REPLACE GASTROJEJUNOSTOMY TUBE, PERCUTANEOUS;  Surgeon: Mio Holder MD;  [facility]     HC REPLACE DUODENOSTOMY/JEJUNOSTOMY TUBE PERCUTANEOUS N/A 1/7/2016    Procedure: REPLACE JEJUNOSTOMY TUBE, PERCUTANEOUS;  Surgeon: Elsa Medel MD;  [facility]     HC REPLACE DUODENOSTOMY/JEJUNOSTOMY TUBE PERCUTANEOUS N/A 1/28/2016    Procedure: REPLACE JEJUNOSTOMY TUBE, PERCUTANEOUS;  Surgeon: Elsa Medel MD;  [facility]     HC REPLACE GASTROSTOMY/CECOSTOMY TUBE PERCUTANEOUS Left 5/19/2015    Procedure: REPLACE GASTROSTOMY TUBE, PERCUTANEOUS;  Surgeon: Melecio Morejon Chi, MD;  [facility]     HC UGI ENDOSCOPY W PLACEMENT GASTROSTOMY TUBE PERCUT N/A 10/1/2015    Procedure: COMBINED ESOPHAGOSCOPY, GASTROSCOPY, DUODENOSCOPY (EGD), PLACE PERCUTANEOUS ENDOSCOPIC GASTROSTOMY TUBE;  Surgeon: Mio Holder MD;  [facility]     LAPAROSCOPIC ASSISTED COLECTOMY  1/20/2012    Procedure:LAPAROSCOPIC ASSISTED COLECTOMY; Laparoscopic Ileostomy       LAPAROSCOPIC ASSISTED COLECTOMY LEFT (DESCENDING)  10/24/2012    Procedure: LAPAROSCOPIC ASSISTED COLECTOMY LEFT (DESCENDING);   Hand Assisted Laproscopic Subtotal abdominal Colectomy,Iieorectal anastamosis, Ileostomy Closure.        LAPAROSCOPIC ASSISTED INSERTION TUBE JEJUNOSTOMY N/A 10/16/2015    Procedure: LAPAROSCOPIC ASSISTED INSERTION TUBE JEJUNOSTOMY;  Surgeon: Elsa Medel MD;  [facility]     LAPAROSCOPIC CHOLECYSTECTOMY      [facility] stones duct     LAPAROSCOPIC ILEOSTOMY  2012    [facility], loop     LAPAROSCOPIC OOPHORECTOMY Right     [facility]     LAPAROTOMY EXPLORATORY N/A 2016    Procedure: LAPAROTOMY EXPLORATORY;  Surgeon: Elsa Medel MD;  [facility]     LEEP TX, CERVICAL      [facility]     PICC INSERTION Left 10/21/2015    5fr DL Power PICC, 37cm (2cm external) in the L basilic vein w/ tip in the SVC RA junction.     REMOVE GASTROSTOMY TUBE ADULT N/A 2014    Procedure: REMOVE GASTROSTOMY TUBE ADULT;  Surgeon: Nicole Redding MD;  [facility]     REMOVE PORT VASCULAR ACCESS Right 2016    Procedure: REMOVE PORT VASCULAR ACCESS;  Surgeon: Pradeep Orosco MD;  [facility]     replace GASTROSTOMY TUBE ADULT  5/19/15       Prior to Admission Medications   Prior to Admission Medications   Prescriptions Last Dose Informant Patient Reported? Taking?   ACETAMINOPHEN PO 2017 at 1430 Self Yes Yes   Sig: Take 500-1,000 mg by mouth every 6 hours as needed for pain    DULoxetine (CYMBALTA) 60 MG capsule 2017 at 0800  No Yes   Sig: Take 1 capsule (60 mg) by mouth daily   SUMAtriptan (IMITREX) 50 MG tablet Past Month at Unknown time  No Yes   Sig: Take 1-2 tablets ( mg) by mouth at onset of headache for migraine - may repeat dose after 2h if headache recurs.  Max: 200mg/24 hours   albuterol 90 MCG/ACT inhaler Past Week at Unknown time Self Yes Yes   Sig: Inhale 2 puffs into the lungs every 6 hours as needed    cloNIDine (CATAPRES) 0.2 MG tablet 2017 at 2000  No Yes   Sig: Take 2 tablets (0.4 mg) by mouth every evening   mirtazapine (REMERON SOL-TAB) 15 MG ODT tab 2017 at 2200  No Yes   Si.5 tablets (7.5 mg) by Orally disintegrating  tablet route At Bedtime   nortriptyline (PAMELOR) 10 MG capsule 4/6/2017 at 2200  No Yes   Sig: Take 3-4 capsules (30-40 mg) by mouth At Bedtime   ondansetron (ZOFRAN ODT) 4 MG ODT tab Past Month at Unknown time  No Yes   Sig: Take 1-2 tablets (4-8 mg) by mouth every 6 hours as needed for nausea   rivaroxaban ANTICOAGULANT (XARELTO) 20 MG TABS tablet Past Month at Unknown time  No Yes   Sig: Take 1 tablet (20 mg) by mouth daily (with dinner)   traZODone (DESYREL) 50 MG tablet 4/6/2017 at 2200  No Yes   Sig: Take 1-2 tablets ( mg) by mouth nightly as needed 1-2 tabs at bedtime PRN      Facility-Administered Medications: None     Allergies   Allergies   Allergen Reactions     Hyoscyamine Rash     Metoclopramide Other (See Comments)     Eye twitching.      Peaches [Peach] Other (See Comments)     Raw. Cooked OK     Sucralose Other (See Comments)     All artificial sweeteners. Aspartame also. Swollen glands     Advair Diskus Other (See Comments)     Throat burns     Azithromycin Other (See Comments)     Burning in throat     Compazine [Prochlorperazine] Visual Disturbance     Contrast Dye Itching     States is allergic to CT contrast dye     Cyclobenzaprine Visual Disturbance     Fentanyl Other (See Comments)     migraine     Ibuprofen GI Disturbance     Lactulose Nausea and Vomiting     Gas and bloating     Levaquin [Levofloxacin] Swelling     Per ED M.D. And RN      Morphine Sulfate Other (See Comments)     Chest pain       Oxycodone Other (See Comments)     Burning throat, but can take Norco.      Penicillins Other (See Comments)     Family hx of resp arrest, she has never taken  Ok with cephalosporins     Rizatriptan Visual Disturbance     Droperidol Hives and Rash     Isovue [Iopamidol] Palpitations     Pt had racing heart and sob      Ketorolac Anxiety     Latex Swelling and Rash     Kiwi, likely also avacado, ? banana     Levsin Rash       Social History   I have reviewed this patient's social history and  updated it with pertinent information if needed. Doreen Peralta  reports that she quit smoking about 13 years ago. Her smoking use included Cigarettes. She has a 4.00 pack-year smoking history. She has quit using smokeless tobacco. She reports that she does not drink alcohol or use illicit drugs.    Family History   I have reviewed this patient's family history and updated it with pertinent information if needed.   Family History   Problem Relation Age of Onset     Thyroid Disease Mother      Sjogren's Mother      GASTROINTESTINAL DISEASE Mother      Intermittent nausea vomiting diarrhea     Colon Polyps Mother      Prostate Problems Father      prostate enlargement     Lupus Maternal Grandmother      CANCER Maternal Grandfather      Lung     Colon Cancer Maternal Grandfather 65     CANCER Paternal Grandmother      Lung      CEREBROVASCULAR DISEASE Paternal Grandmother      DIABETES Paternal Grandmother      Cardiovascular Paternal Grandmother      CHF     CANCER Paternal Grandfather      Lung     Glaucoma Paternal Grandfather      Abdominal Aortic Aneurysm Other      Macular Degeneration No family hx of        Review of Systems   The 10 point Review of Systems is negative other than noted in the HPI or here.     Physical Exam   Temp: 99.1  F (37.3  C) Temp src: Oral BP: 118/81 Pulse: 91 Heart Rate: 91 Resp: 16 SpO2: 98 % O2 Device: None (Room air)    Vital Signs with Ranges  Temp:  [99.1  F (37.3  C)-99.4  F (37.4  C)] 99.1  F (37.3  C)  Pulse:  [] 91  Heart Rate:  [91] 91  Resp:  [16] 16  BP: (107-141)/(77-92) 118/81  SpO2:  [98 %-100 %] 98 %  185 lbs 0 oz    Constitutional:   awake, alert, cooperative, no apparent distress, and appears stated age     Eyes:   Lids and lashes normal, pupils equal, round and reactive to light, extra ocular muscles intact, sclera clear, conjunctiva normal     ENT:   Normocephalic, without obvious abnormality, atraumatic, sinuses nontender on palpation, external ears without  lesions, oral pharynx with moist mucous membranes, tonsils without erythema or exudates, gums normal and good dentition.     Neck:   Supple, symmetrical, trachea midline, no adenopathy, thyroid symmetric, not enlarged and no tenderness, skin normal     Hematologic / Lymphatic:   no cervical lymphadenopathy and no supraclavicular lymphadenopathy     Back:   Symmetric, no curvature, spinous processes are non-tender on palpation, paraspinous muscles are non-tender on palpation, no costal vertebral tenderness     Lungs:   No increased work of breathing, good air exchange, clear to auscultation bilaterally, no crackles or wheezing     Cardiovascular:   Normal apical impulse, regular rate and rhythm, normal S1 and S2, no S3 or S4, and no murmur noted     Abdomen:   +BS.  Soft without focal tenderness.  No guarding or rebound     Neurologic:   Awake, alert, oriented to name, place and time.  Cranial nerves II-XII are grossly intact.  Motor is 5 out of 5 bilaterally.    Sensory is intact.         Data   Data reviewed today:  I personally reviewed no images or EKG's today.    Recent Labs  Lab 04/07/17  1728 04/07/17  1310 04/03/17  0852 04/02/17  1653   WBC 11.2* 9.3 11.8* 13.2*   HGB 10.2* 10.3* 9.1* 11.0*   MCV 75* 77* 78 76*   * 500* 441 527*   INR  --   --  1.00  --      --  139 140   POTASSIUM 4.2  --  3.3* 4.8   CHLORIDE 108  --  106 108   CO2 22  --  21 21   BUN 9  --  12 10   CR 0.94  --  0.92 0.86   ANIONGAP 9  --  11 11   TARA 8.6  --  8.3* 9.9   GLC 94  --  88 106*   ALBUMIN  --   --  3.3* 3.9   PROTTOTAL  --   --  7.4 9.2*   BILITOTAL  --   --  1.2 0.4   ALKPHOS  --   --  127 162*   ALT  --   --  23 31   AST  --   --  11 14   LIPASE  --   --   --  128       Recent Results (from the past 24 hour(s))   XR Abdomen 2 Views    Narrative    XR ABDOMEN 2 VW  4/7/2017 6:47 PM     HISTORY:  chronic abd pain, recent new g tube placed    COMPARISON: Film dated 4/3/2017    FINDINGS:  Gastrostomy tube is in place.  It does not appear to have  changed in position when compared to the previous film. No free air is  seen. Gas is seen in the colon. There are a few nonspecific air-fluid  levels in the midabdomen.      Impression    IMPRESSION: Nonspecific gas pattern with a few air-fluid levels in  nondilated loops of small bowel.

## 2017-04-08 NOTE — PROGRESS NOTES
[facility]    Hospitalist Progress Note    Date of Service (when I saw the patient): 04/08/2017    Assessment & Plan   Doreen Peralta is a 36 year old female who was admitted on 4/7/2017.  Clinical status is unchanged.  It remains unclear whether she has infection and sepsis.  Leukocytosis has resolved and she has not had fever in the hospital.  Blood cultures are negative at less than 24 hours.  PEG appears to be functioning adequately.  Chronic anticoagulation appears to be tolerated with minimal bleeding and stable hemoglobin.     Active Problems:    Sepsis (H) - possible    Migraines    Anemia in other chronic diseases classified elsewhere    Chronic abdominal pain    Intermittent asthma    PEG (percutaneous endoscopic gastrostomy) status    History of deep venous thrombosis    Continue empiric parenteral antibiotics for at least another 24 hours pending culture results and also await results of her pro-calcitonin level  Continue IV fluids but switch to D5 half-normal saline with potassium chloride today until oral intake improves.    Continue usual cares of her PEG tube     DVT Prophylaxis: Xarelto  Code Status: Full Code    Disposition: Expected discharge tomorrow if blood cultures are negative at that time and there are no other concerns for bacterial infection.    Gilmer Lazcano MD    Interval History   She is not feeling any better today.  She is generally achy all over.  She has had chills although she has not had fever.  She has mild headache.  She is nauseated although has not vomited.  She has not had appetite.  Her current symptoms remind her of how she has felt in the past with previous episodes of sepsis and bacteremia.  She does think that the bleeding and yellow-green drainage around her recently placed PEG tube are decreased today compared with yesterday.  She has been stable hemodynamically.  Her respiratory status has been stable.  She is voiding well.       -Data reviewed today: I reviewed all new labs and imaging results over the last 24 hours. I personally reviewed no images or EKG's today.    Physical Exam   Temp: 98.4  F (36.9  C) Temp src: Oral BP: 143/83 Pulse: 84 Heart Rate: 93 Resp: 18 SpO2: 99 % O2 Device: None (Room air)    Vitals:    04/07/17 1605 04/07/17 2007   Weight: 83.9 kg (185 lb) 89 kg (196 lb 3.4 oz)     Vital Signs with Ranges  Temp:  [97.2  F (36.2  C)-99.4  F (37.4  C)] 98.4  F (36.9  C)  Pulse:  [] 84  Heart Rate:  [87-93] 93  Resp:  [15-18] 18  BP: (107-153)/() 143/83  SpO2:  [96 %-100 %] 99 %  I/O last 3 completed shifts:  In: 1488 [P.O.:480; I.V.:1008]  Out: 100 [Emesis/NG output:100]    Constitutional: Appears uncomfortable resting in bed, tearful during the interview   Respiratory: Normal respiratory effort, clear lungs   Cardiovascular: Regular rate and rhythm, good peripheral pulses and normal capillary refill  GI: Dry dressing surrounding her PEG tube, soft abdomen without distention   Skin/Integumen: No rashes   Neuro: Normal mental status     Medications     - MEDICATION INSTRUCTIONS -       NaCl 100 mL/hr at 04/08/17 1157       meropenem  1 g Intravenous Q6H     cloNIDine  0.4 mg Oral QPM     DULoxetine  60 mg Oral Daily     mirtazapine  7.5 mg Oral At Bedtime     rivaroxaban ANTICOAGULANT  20 mg Oral Daily with supper     vancomycin (VANCOCIN) IV  1,500 mg Intravenous Q12H       Data   Data reviewed today:  I personally reviewed no images or EKG's today.    Recent Labs  Lab 04/08/17  0555 04/07/17  1728 04/07/17  1310 04/03/17  0852 04/02/17  1653   WBC 7.5 11.2* 9.3 11.8* 13.2*   HGB 9.1* 10.2* 10.3* 9.1* 11.0*   MCV 76* 75* 77* 78 76*    483* 500* 441 527*   INR  --   --   --  1.00  --     139 140 139 140   POTASSIUM 3.6 4.2 3.8 3.3* 4.8   CHLORIDE 109 108 109 106 108   CO2 23 22 23 21 21   BUN 7 9 9 12 10   CR 0.90 0.94 0.92 0.92 0.86   ANIONGAP 8 9 8 11 11   TARA 7.8* 8.6 8.8 8.3* 9.9   GLC 99 94 87 88  106*   ALBUMIN  --   --  3.6 3.3* 3.9   PROTTOTAL  --   --  8.3 7.4 9.2*   BILITOTAL  --   --  0.3 1.2 0.4   ALKPHOS  --   --  136 127 162*   ALT  --   --  36 23 31   AST  --   --  21 11 14   LIPASE  --   --   --   --  128       Recent Results (from the past 24 hour(s))   XR Abdomen 2 Views    Narrative    XR ABDOMEN 2 VW  4/7/2017 6:47 PM     HISTORY:  chronic abd pain, recent new g tube placed    COMPARISON: Film dated 4/3/2017    FINDINGS:  Gastrostomy tube is in place. It does not appear to have  changed in position when compared to the previous film. No free air is  seen. Gas is seen in the colon. There are a few nonspecific air-fluid  levels in the midabdomen.      Impression    IMPRESSION: Nonspecific gas pattern with a few air-fluid levels in  nondilated loops of small bowel.    NELIDA MCMAHON MD     All 4 blood cultures are negative so far at less than 24 hours  Pro-calcitonin level from yesterday is pending

## 2017-04-08 NOTE — CONSULTS
Pharmacy Vancomycin Initial Note  Date of Service 2017  Patient's  1980  36 year old, female    Indication: Bacteremia    Current estimated CrCl = Estimated Creatinine Clearance: 91.1 mL/min (based on Cr of 0.94).    Creatinine for last 3 days  2017:  5:28 PM Creatinine 0.94 mg/dL    Recent Vancomycin Level(s) for last 3 days  No results found for requested labs within last 72 hours.      Vancomycin IV Administrations (past 72 hours)      No vancomycin orders with administrations in past 72 hours.                Nephrotoxins and other renal medications (Future)    Start     Dose/Rate Route Frequency Ordered Stop    17  vancomycin (VANCOCIN) 1500 mg in 0.9% NaCl 250 mL PREMIX      1,500 mg Intravenous EVERY 12 HOURS 17            Contrast Orders - past 72 hours     None                Plan:  1.  Start vancomycin  1500 mg IV q12h.   2.  Goal Trough Level: 15-20 mg/L   3.  Pharmacy will check trough levels as appropriate in 1-3 Days.    4. Serum creatinine levels will be ordered daily for the first week of therapy and at least twice weekly for subsequent weeks.    5. Hutchinson method utilized to dose vancomycin therapy: Method 2    Gera Espinoza, PharmD

## 2017-04-08 NOTE — ED NOTES
ED Nursing criteria listed below was addressed during verbal handoff:     Abnormal vitals: Yes  Abnormal results: Yes  Med Reconciliation completed: Yes  Meds given in ED: Yes  Any Overdue Meds: Yes  Core Measures: Yes  Isolation: No  Special needs: No  Skin assessment: Yes    Observation Patient  Education provided: N/A

## 2017-04-08 NOTE — PROGRESS NOTES
S-(situation): Patient arrives to room 270 via cart from ED.     B-(background): Fever, abdominal pain and headache.     A-(assessment): Patient afebrile. Elevated BP; all other vitals stable. Patient complains of severe abdominal pain. Patients skin intact. G tube to abdomen. Lungs are clear. Bowels active and present throughout.     R-(recommendations): Orders reviewed with patient. Will monitor patient per MD orders.     Inpatient nursing criteria listed below were met:    Health care directives status obtained and documented: Yes  Core Measures assessed (SSI): Yes  SCD's Documented: Yes  Vaccine assessment done and vaccines ordered if appropriate: Yes  Skin issues/needs documented:NA  Isolation needs addressed, if appropriate: NA  Fall Prevention: Care plan updated, Education given and documented NA  MRSA swab completed for patient 55 years and older (exclude BENITO and TKA): NA  My Chart patient sign up addressed and documented: Yes  Care Plan initiated: Yes  Education Assessment documented:Yes  Education Documented (Pre-existing chronic infection such as, MRSA/VRE need education on admission): Yes  New medication patient education completed and documented (Possible Side Effects of Common Medications handout): Yes  Home medications if not able to send immediately home with family stored here: NA   Reminder note placed in discharge instructions: NA  Discharge planning review completed (admission navigator) Yes

## 2017-04-09 VITALS
BODY MASS INDEX: 30.47 KG/M2 | HEART RATE: 89 BPM | SYSTOLIC BLOOD PRESSURE: 109 MMHG | WEIGHT: 189.6 LBS | DIASTOLIC BLOOD PRESSURE: 69 MMHG | HEIGHT: 66 IN | RESPIRATION RATE: 16 BRPM | TEMPERATURE: 98.9 F | OXYGEN SATURATION: 100 %

## 2017-04-09 LAB
CREAT SERPL-MCNC: 0.9 MG/DL (ref 0.52–1.04)
GFR SERPL CREATININE-BSD FRML MDRD: 71 ML/MIN/1.7M2
VANCOMYCIN SERPL-MCNC: 16.5 MG/L

## 2017-04-09 PROCEDURE — 80202 ASSAY OF VANCOMYCIN: CPT | Performed by: PEDIATRICS

## 2017-04-09 PROCEDURE — 99238 HOSP IP/OBS DSCHRG MGMT 30/<: CPT | Performed by: PEDIATRICS

## 2017-04-09 PROCEDURE — 25800025 ZZH RX 258: Performed by: PEDIATRICS

## 2017-04-09 PROCEDURE — 82565 ASSAY OF CREATININE: CPT | Performed by: PEDIATRICS

## 2017-04-09 PROCEDURE — 25000125 ZZHC RX 250: Performed by: PEDIATRICS

## 2017-04-09 PROCEDURE — 25000132 ZZH RX MED GY IP 250 OP 250 PS 637: Performed by: INTERNAL MEDICINE

## 2017-04-09 PROCEDURE — 36415 COLL VENOUS BLD VENIPUNCTURE: CPT | Performed by: PEDIATRICS

## 2017-04-09 PROCEDURE — 25000128 H RX IP 250 OP 636: Performed by: INTERNAL MEDICINE

## 2017-04-09 PROCEDURE — 25000128 H RX IP 250 OP 636: Performed by: PEDIATRICS

## 2017-04-09 RX ORDER — HYDROMORPHONE HYDROCHLORIDE 2 MG/1
2 TABLET ORAL
Qty: 24 TABLET | Refills: 0 | Status: SHIPPED | OUTPATIENT
Start: 2017-04-09 | End: 2017-05-16

## 2017-04-09 RX ORDER — ONDANSETRON 4 MG/1
4-8 TABLET, ORALLY DISINTEGRATING ORAL EVERY 6 HOURS PRN
Qty: 40 TABLET | Refills: 0 | Status: SHIPPED | OUTPATIENT
Start: 2017-04-09 | End: 2017-06-26

## 2017-04-09 RX ADMIN — DULOXETINE HYDROCHLORIDE 60 MG: 30 CAPSULE, DELAYED RELEASE ORAL at 09:06

## 2017-04-09 RX ADMIN — HYDROMORPHONE HYDROCHLORIDE 2 MG: 2 TABLET ORAL at 09:05

## 2017-04-09 RX ADMIN — VANCOMYCIN HYDROCHLORIDE 1500 MG: 1 INJECTION, POWDER, LYOPHILIZED, FOR SOLUTION INTRAVENOUS at 09:05

## 2017-04-09 RX ADMIN — POTASSIUM CHLORIDE, DEXTROSE MONOHYDRATE AND SODIUM CHLORIDE: 150; 5; 450 INJECTION, SOLUTION INTRAVENOUS at 06:08

## 2017-04-09 RX ADMIN — HYDROMORPHONE HYDROCHLORIDE 2 MG: 2 TABLET ORAL at 06:01

## 2017-04-09 RX ADMIN — MEROPENEM 1 G: 1 INJECTION, POWDER, FOR SOLUTION INTRAVENOUS at 01:55

## 2017-04-09 RX ADMIN — ONDANSETRON HYDROCHLORIDE 4 MG: 2 SOLUTION INTRAMUSCULAR; INTRAVENOUS at 06:08

## 2017-04-09 RX ADMIN — ACETAMINOPHEN 650 MG: 325 TABLET ORAL at 06:08

## 2017-04-09 RX ADMIN — HYDROMORPHONE HYDROCHLORIDE 2 MG: 2 TABLET ORAL at 03:05

## 2017-04-09 RX ADMIN — MEROPENEM 1 G: 1 INJECTION, POWDER, FOR SOLUTION INTRAVENOUS at 07:46

## 2017-04-09 NOTE — PHARMACY-VANCOMYCIN DOSING SERVICE
Pharmacy Vancomycin Note  Date of Service 2017  Patient's  1980   36 year old, female    Indication: Bacteremia  Goal Trough Level: 15-20 mg/L  Day of Therapy: 3  Current Vancomycin regimen:  1500 mg IV q12h    Current estimated CrCl = Estimated Creatinine Clearance: 96.4 mL/min (based on Cr of 0.9).    Creatinine for last 3 days  2017:  1:10 PM Creatinine 0.92 mg/dL;  5:28 PM Creatinine 0.94 mg/dL  2017:  5:55 AM Creatinine 0.90 mg/dL  2017:  7:45 AM Creatinine 0.90 mg/dL    Recent Vancomycin Levels (past 3 days)  2017:  7:45 AM Vancomycin Level 16.5 mg/L    Vancomycin IV Administrations (past 72 hours)                   vancomycin (VANCOCIN) 1500 mg in 0.9% NaCl 250 mL PREMIX (mg) 1,500 mg New Bag 17     1,500 mg New Bag  0829     1,500 mg New Bag 17                Nephrotoxins and other renal medications (Future)    Start     Dose/Rate Route Frequency Ordered Stop    17  vancomycin (VANCOCIN) 1500 mg in 0.9% NaCl 250 mL PREMIX      1,500 mg Intravenous EVERY 12 HOURS 17 2019               Contrast Orders - past 72 hours     None          Interpretation of levels and current regimen:  Trough level is  Therapeutic    Has serum creatinine changed > 50% in last 72 hours: No    Urine output:  unable to determine    Renal Function: Stable    Plan:  1.  Continue Current Dose  2.  Pharmacy will check trough levels as appropriate in 1-3 Days.    3. Serum creatinine levels will be ordered a minimum of twice weekly.      Antonio Vazquez        .

## 2017-04-09 NOTE — PLAN OF CARE
Problem: Goal Outcome Summary  Goal: Goal Outcome Summary  Outcome: Adequate for Discharge Date Met:  04/09/17  S-(situation): Patient discharged to home via ambulatory with      B-(background): sepsis     A-(assessment): Continues to reports abdominal pain and nausea, requesting oral zofran and oral dilaudid for management - effective.  VSS, afebrile.  Patient will make follow up appointment.       R-(recommendations): Discharge instructions reviewed with patient. Listed belongings gathered and returned to patient.           Discharge Nursing Criteria:      Care Plan and Patient education resolved: Yes     New Medications- pt has been educated about purpose and side effects: Yes     Vaccines  Pneumonia Vaccine verified at discharge: Yes  Influenza status verified at discharge:  Yes     MISC  Prescriptions if needed, hard copies sent with patient  Yes  Home and hospital aquired medications returned to patient: NA  Medication Bin checked and emptied on discharge Yes  Patient reports post-discharge pain management plan is effective: Yes

## 2017-04-09 NOTE — DISCHARGE SUMMARY
[facility]    Discharge Summary  Hospitalist    Date of Admission:  4/7/2017  Date of Discharge:  4/9/2017  Discharging Provider: Gilmer Lazcano  Date of Service (when I saw the patient): 04/09/17    Discharge Diagnoses     Fever    Migraines    Anemia in other chronic diseases classified elsewhere    Chronic abdominal pain    Intermittent asthma    PEG (percutaneous endoscopic gastrostomy) status    History of deep venous thrombosis    * No resolved hospital problems. *      History of Present Illness   Doreen Peralta is an 36 year old female with complex health history including recurring sepsis and bacteremia most recently in February and recent replacement of G-tube who presented with three-day history of worsening malaise, abdominal pain, and new onset of fever.  Because of initial concern for possible sepsis, she was hospitalized. See admission history and physical for details.    Hospital Course   Doreen Peralta was admitted on 4/7/2017.  The following problems were addressed during her hospitalization:    Problem #1 fever and leukocytosis.  She reported fever to 100.4 at home, and WBC was 11,200 at admission.  Pro-calcitonin was undetectable and blood cultures were negative for 2 days.  She was initially admitted and treated with parenteral vancomycin and Zosyn because of concern for sepsis.  She had no fever during her hospital stay, and leukocytosis resolved.  No infection was identified during this hospital stay, so she was discharged home without antibiotic therapy once cultures had been negative for 2 days.  She may have had recent fever and mild leukocytosis associated with postprocedural reaction to recent G-tube replacement.    Problem #2 abdominal pain.  Abdominal x-ray demonstrated G-tube that appeared to be in appropriate position and unchanged compared with previously.  There was no free air or dilated loops of bowel.  She initially had small amount of bleeding  "and greenish drainage around her G-tube.  This subsided during her hospital stay with local cares.  She had persistent abdominal pain throughout her hospital stay.  Pain was adequately controlled with oral analgesics.  She was able to tolerate some oral intake.  Her G-tube appeared to be functioning adequately.  After investigation, postprocedural acute abdominal pain superimposed upon her chronic abdominal pain syndrome was suspected.    Problem #3 history of venous thromboembolism.  Chronic Xarelto therapy was continued during this hospital stay.  Aside from a scant amount of bloody drainage on the bandage surrounding her recently replaced G-tube, no active bleeding was evident during this hospital stay.  Hemoglobin dropped from 10 to 9 but she had been treated with IV fluids during this hospital stay, so this was probably dilutional effect.  She was hemodynamically stable.  Significant active bleeding with acute blood loss anemia was not suspected.    Gilmer Lazcano MD    Significant Results and Procedures   No procedures performed during this admission    Pending Results   These results will be followed up by PCP  Unresulted Labs Ordered in the Past 30 Days of this Admission     Date and Time Order Name Status Description    4/7/2017 1310 BLOOD CULTURE Preliminary     4/7/2017 1310 BLOOD CULTURE Preliminary     4/7/2017 1310 BLOOD CULTURE Preliminary     4/7/2017 1310 BLOOD CULTURE Preliminary           Code Status   Full Code       Primary Care Physician   Larisa Lorenzo    Physical Exam   189 lbs 9.53 oz  /69  Pulse 89  Temp 98.9  F (37.2  C) (Oral)  Resp 16  Ht 1.676 m (5' 6\")  Wt 86 kg (189 lb 9.5 oz)  LMP 03/20/2017 (Exact Date)  SpO2 100%  BMI 30.6 kg/m2    No acute distress resting in bed  dressing surrounding her G-tube site appears dry  Soft abdomen, diffusely tender without peritoneal signs    Discharge Disposition   Discharged to home  Condition at discharge: " Stable    Consultations This Hospital Stay   PHARMACY TO DOSE VANCO  PHARMACY TO DOSE VANCO    Time Spent on this Encounter   I, Gilmer Lazcano, personally saw the patient today and spent less than or equal to 30 minutes discharging this patient.    Discharge Orders     Reason for your hospital stay   Hospitalized due to fever and elevated WBC with recent blood stream infection and improved, no signs of bloodstream infection on preliminary results from this hospital stay.     Follow-up and recommended labs and tests    Follow up with primary care provider, Larisa Lorenzo, within 2 weeks, for hospital follow- up.     Activity   Your activity upon discharge: activity as tolerated and no driving while on narcotic analgesics (hydromorphone)     Tubes and drains   You are going home with the following tubes or drains: g-tube.  Tube cares per hospital or home care instructions. Vent g-tube daily according to usual home routine.     Full Code     Diet   Follow this diet upon discharge: Advance to a regular diet as tolerated       Discharge Medications   Current Discharge Medication List      START taking these medications    Details   HYDROmorphone (DILAUDID) 2 MG tablet Take 1 tablet (2 mg) by mouth every 3 hours as needed for moderate to severe pain  Qty: 24 tablet, Refills: 0    Associated Diagnoses: Abdominal pain, generalized         CONTINUE these medications which have CHANGED    Details   ondansetron (ZOFRAN ODT) 4 MG ODT tab Take 1-2 tablets (4-8 mg) by mouth every 6 hours as needed for nausea  Qty: 40 tablet, Refills: 0    Associated Diagnoses: Intractable vomiting, presence of nausea not specified, unspecified vomiting type; Gastroparesis         CONTINUE these medications which have NOT CHANGED    Details   traZODone (DESYREL) 50 MG tablet Take 1-2 tablets ( mg) by mouth nightly as needed 1-2 tabs at bedtime PRN  Qty: 180 tablet, Refills: 1    Associated Diagnoses: Insomnia, unspecified type       nortriptyline (PAMELOR) 10 MG capsule Take 3-4 capsules (30-40 mg) by mouth At Bedtime  Qty: 120 capsule, Refills: 5    Associated Diagnoses: Neuropathic pain; Primary insomnia      cloNIDine (CATAPRES) 0.2 MG tablet Take 2 tablets (0.4 mg) by mouth every evening  Qty: 180 tablet, Refills: 0    Associated Diagnoses: Anxiety      mirtazapine (REMERON SOL-TAB) 15 MG ODT tab 0.5 tablets (7.5 mg) by Orally disintegrating tablet route At Bedtime  Qty: 45 tablet, Refills: 0    Associated Diagnoses: Anxiety      SUMAtriptan (IMITREX) 50 MG tablet Take 1-2 tablets ( mg) by mouth at onset of headache for migraine - may repeat dose after 2h if headache recurs.  Max: 200mg/24 hours  Qty: 18 tablet, Refills: 1    Comments: Due for appointment for further refills, please give reminder.  Associated Diagnoses: Migraine without aura and without status migrainosus, not intractable      rivaroxaban ANTICOAGULANT (XARELTO) 20 MG TABS tablet Take 1 tablet (20 mg) by mouth daily (with dinner)  Qty: 90 tablet, Refills: 1    Associated Diagnoses: Deep vein thrombosis (DVT) of upper extremity, unspecified chronicity, unspecified laterality, unspecified vein (H)      DULoxetine (CYMBALTA) 60 MG capsule Take 1 capsule (60 mg) by mouth daily  Qty: 90 capsule, Refills: 3    Associated Diagnoses: Abdominal pain, epigastric; Abdominal migraine, not intractable      ACETAMINOPHEN PO Take 500-1,000 mg by mouth every 6 hours as needed for pain       albuterol 90 MCG/ACT inhaler Inhale 2 puffs into the lungs every 6 hours as needed            Allergies   Allergies   Allergen Reactions     Hyoscyamine Rash     Metoclopramide Other (See Comments)     Eye twitching.      Peaches [Peach] Other (See Comments)     Raw. Cooked OK     Sucralose Other (See Comments)     All artificial sweeteners. Aspartame also. Swollen glands     Advair Diskus Other (See Comments)     Throat burns     Azithromycin Other (See Comments)     Burning in throat      Compazine [Prochlorperazine] Visual Disturbance     Contrast Dye Itching     States is allergic to CT contrast dye     Cyclobenzaprine Visual Disturbance     Fentanyl Other (See Comments)     migraine     Ibuprofen GI Disturbance     Lactulose Nausea and Vomiting     Gas and bloating     Levaquin [Levofloxacin] Swelling     Per ED M.D. And RN      Morphine Sulfate Other (See Comments)     Chest pain       Oxycodone Other (See Comments)     Burning throat, but can take Norco.      Penicillins Other (See Comments)     Family hx of resp arrest, she has never taken  Ok with cephalosporins     Rizatriptan Visual Disturbance     Droperidol Hives and Rash     Isovue [Iopamidol] Palpitations     Pt had racing heart and sob      Ketorolac Anxiety     Latex Swelling and Rash     Kiwi, likely also avacado, ? banana     Levsin Rash     Data   Most Recent 3 CBC's:  Recent Labs   Lab Test  04/08/17   0555  04/07/17   1728  04/07/17   1310   WBC  7.5  11.2*  9.3   HGB  9.1*  10.2*  10.3*   MCV  76*  75*  77*   PLT  412  483*  500*      Most Recent 3 BMP's:  Recent Labs   Lab Test  04/09/17   0745  04/08/17   0555  04/07/17   1728  04/07/17   1310   NA   --   140  139  140   POTASSIUM   --   3.6  4.2  3.8   CHLORIDE   --   109  108  109   CO2   --   23  22  23   BUN   --   7  9  9   CR  0.90  0.90  0.94  0.92   ANIONGAP   --   8  9  8   TARA   --   7.8*  8.6  8.8   GLC   --   99  94  87     Most Recent 2 LFT's:  Recent Labs   Lab Test  04/07/17   1310  04/03/17   0852   AST  21  11   ALT  36  23   ALKPHOS  136  127   BILITOTAL  0.3  1.2       Most Recent 6 Bacteria Isolates From Any Culture (See EPIC Reports for Culture Details):  Recent Labs   Lab Test  04/07/17   1340  04/07/17   1330  04/02/17   1653  02/20/17   0636  02/20/17   0631  02/19/17   0914   CULT  No growth after 2 days  No growth after 2 days  No growth after 2 days  No growth after 2 days  No growth  No growth  No growth  No growth       Results for orders placed  or performed during the hospital encounter of 04/07/17   XR Abdomen 2 Views    Narrative    XR ABDOMEN 2 VW  4/7/2017 6:47 PM     HISTORY:  chronic abd pain, recent new g tube placed    COMPARISON: Film dated 4/3/2017    FINDINGS:  Gastrostomy tube is in place. It does not appear to have  changed in position when compared to the previous film. No free air is  seen. Gas is seen in the colon. There are a few nonspecific air-fluid  levels in the midabdomen.      Impression    IMPRESSION: Nonspecific gas pattern with a few air-fluid levels in  nondilated loops of small bowel.    NELIDA MCMAHON MD     *Note: Due to a large number of results and/or encounters for the requested time period, some results have not been displayed. A complete set of results can be found in Results Review.

## 2017-04-09 NOTE — PLAN OF CARE
Problem: Goal Outcome Summary  Goal: Goal Outcome Summary  Outcome: Improving  Abdominal pain controlled with Dilaudid, c/o nausea, zofran given, emesis X1. Ambulating independently in [place], tolerated well.   Abdomen soft, non tender. Active bowel sounds.

## 2017-04-09 NOTE — PLAN OF CARE
Problem: Goal Outcome Summary  Goal: Goal Outcome Summary  Outcome: No Change  VSS.  Dilaudid for upper abd pain, states Rx is effective.  No nausea.  Ambulated in [place].

## 2017-04-10 ENCOUNTER — TELEPHONE (OUTPATIENT)
Dept: GASTROENTEROLOGY | Facility: CLINIC | Age: 37
End: 2017-04-10

## 2017-04-10 NOTE — PROGRESS NOTES
"Doreen Peralta  Gender: female  : 1980  [address]   [place] [address]  [phone] (home)     Medical Record: [id number]  Pharmacy: [id number] - [place], [place] - [address] AT [address] [facility] & [address]  Primary Care Provider: Larisa Lorenzo    Parent's names are: Aby Rainey (mother) and Data Unavailable (father).      [facility]  April 10, 2017     Discharge Phone Call:  Key Words/Key Times      Introduction - AIDET (Acknowledge, Introduce, Duration, Explanation)      Empathy-   We are calling to see how you are since your recent stay in the hospital?     Call back COMMENTS: I'm doing much better.      Clinical Questions -  (f/u appts, medication side effects/purpose, ability to care for self at home) \"For your safety, it is important to us that you understand the purpose and side effects of your medications, can you tell me what your new medications are?\"     Call back COMMENTS: I was sent home with pain medication, which I have taken before. But I'm barely taking it anymore.      Staff Recognition -  We like to recognize staff and physicians who have done an excellent job.  Do you remember any people from your care team that you would like recognize?     Call back COMMENTS: My nurse Connie was great and I always like Dr. Lazcano.      Very Good Care -  We want to provide very good care to all patients.  How was your care?     Call back COMMENTS: Fabulous, it's always great there.      Opportunities for Improvement -  Our goal is to be the best.  Do you have any suggestions for things that we could improve upon?     Call back COMMENTS: Not really.      Thank You       G-tube in place upon discharge.      "

## 2017-04-10 NOTE — TELEPHONE ENCOUNTER
Nancy, RN case manager [employer], calling for Dr. Wahl.  She reports she left a message last week for Dr. Muller's nurse but hasn't heard back.  Most recently has seen Dr. Wahl, will pass onto [place].  Doreen has been in ED 15 times plus 11 inpatient visits in the past 12 months.  Reaching out to see if there is anything Nancy can do to help try to keep her out of the hospital or ED?  Is the patient compliant? Get a feel for what's going on?    Will route to [place].

## 2017-04-13 LAB
BACTERIA SPEC CULT: NORMAL
MICRO REPORT STATUS: NORMAL
SPECIMEN SOURCE: NORMAL

## 2017-05-10 DIAGNOSIS — R11.10 INTRACTABLE VOMITING, PRESENCE OF NAUSEA NOT SPECIFIED, UNSPECIFIED VOMITING TYPE: ICD-10-CM

## 2017-05-10 DIAGNOSIS — K31.84 GASTROPARESIS: ICD-10-CM

## 2017-05-11 RX ORDER — ONDANSETRON 4 MG/1
TABLET, ORALLY DISINTEGRATING ORAL
Qty: 40 TABLET | Refills: 0 | Status: SHIPPED | OUTPATIENT
Start: 2017-05-11 | End: 2017-05-16

## 2017-05-11 NOTE — TELEPHONE ENCOUNTER
ONDANSETRON      Last Written Prescription Date: 4-13-17  Last Fill Quantity: 40,  # refills: 0   Last Office Visit with G, P or [facility] prescribing provider: 4-7-17

## 2017-05-11 NOTE — TELEPHONE ENCOUNTER
Routing refill request to provider for review/approval because:  Med previously filled by another provider.  Pended for approval.  Ekta Jaimes RN

## 2017-05-15 ENCOUNTER — APPOINTMENT (OUTPATIENT)
Dept: GENERAL RADIOLOGY | Facility: CLINIC | Age: 37
End: 2017-05-15
Attending: FAMILY MEDICINE
Payer: COMMERCIAL

## 2017-05-15 ENCOUNTER — HOSPITAL ENCOUNTER (EMERGENCY)
Facility: CLINIC | Age: 37
Discharge: HOME | End: 2017-05-15
Attending: FAMILY MEDICINE | Admitting: FAMILY MEDICINE
Payer: COMMERCIAL

## 2017-05-15 VITALS
RESPIRATION RATE: 16 BRPM | DIASTOLIC BLOOD PRESSURE: 80 MMHG | OXYGEN SATURATION: 98 % | SYSTOLIC BLOOD PRESSURE: 126 MMHG | HEIGHT: 66 IN | TEMPERATURE: 98.6 F | HEART RATE: 86 BPM

## 2017-05-15 DIAGNOSIS — R10.9 ABDOMINAL PAIN, UNSPECIFIED LOCATION: ICD-10-CM

## 2017-05-15 DIAGNOSIS — G89.29 CHRONIC ABDOMINAL PAIN: ICD-10-CM

## 2017-05-15 DIAGNOSIS — R10.9 CHRONIC ABDOMINAL PAIN: ICD-10-CM

## 2017-05-15 LAB
ALBUMIN SERPL-MCNC: 3.4 G/DL (ref 3.4–5)
ALP SERPL-CCNC: 135 U/L (ref 40–150)
ALT SERPL W P-5'-P-CCNC: 26 U/L (ref 0–50)
ANION GAP SERPL CALCULATED.3IONS-SCNC: 12 MMOL/L (ref 3–14)
AST SERPL W P-5'-P-CCNC: 15 U/L (ref 0–45)
BASOPHILS # BLD AUTO: 0 10E9/L (ref 0–0.2)
BASOPHILS NFR BLD AUTO: 0.2 %
BILIRUB SERPL-MCNC: 0.3 MG/DL (ref 0.2–1.3)
BUN SERPL-MCNC: 8 MG/DL (ref 7–30)
CALCIUM SERPL-MCNC: 9.3 MG/DL (ref 8.5–10.1)
CHLORIDE SERPL-SCNC: 105 MMOL/L (ref 94–109)
CO2 SERPL-SCNC: 23 MMOL/L (ref 20–32)
CREAT SERPL-MCNC: 0.8 MG/DL (ref 0.52–1.04)
DIFFERENTIAL METHOD BLD: ABNORMAL
EOSINOPHIL # BLD AUTO: 0 10E9/L (ref 0–0.7)
EOSINOPHIL NFR BLD AUTO: 0.4 %
ERYTHROCYTE [DISTWIDTH] IN BLOOD BY AUTOMATED COUNT: 19.6 % (ref 10–15)
GFR SERPL CREATININE-BSD FRML MDRD: 81 ML/MIN/1.7M2
GLUCOSE SERPL-MCNC: 98 MG/DL (ref 70–99)
HCT VFR BLD AUTO: 36.4 % (ref 35–47)
HGB BLD-MCNC: 11.1 G/DL (ref 11.7–15.7)
IMM GRANULOCYTES # BLD: 0 10E9/L (ref 0–0.4)
IMM GRANULOCYTES NFR BLD: 0.4 %
LACTATE BLD-SCNC: 1.5 MMOL/L (ref 0.7–2.1)
LIPASE SERPL-CCNC: 120 U/L (ref 73–393)
LYMPHOCYTES # BLD AUTO: 2 10E9/L (ref 0.8–5.3)
LYMPHOCYTES NFR BLD AUTO: 18.2 %
MCH RBC QN AUTO: 23.4 PG (ref 26.5–33)
MCHC RBC AUTO-ENTMCNC: 30.5 G/DL (ref 31.5–36.5)
MCV RBC AUTO: 77 FL (ref 78–100)
MONOCYTES # BLD AUTO: 0.7 10E9/L (ref 0–1.3)
MONOCYTES NFR BLD AUTO: 6.3 %
NEUTROPHILS # BLD AUTO: 8.2 10E9/L (ref 1.6–8.3)
NEUTROPHILS NFR BLD AUTO: 74.5 %
PLATELET # BLD AUTO: 463 10E9/L (ref 150–450)
POTASSIUM SERPL-SCNC: 3.9 MMOL/L (ref 3.4–5.3)
PROT SERPL-MCNC: 8.1 G/DL (ref 6.8–8.8)
RBC # BLD AUTO: 4.74 10E12/L (ref 3.8–5.2)
SODIUM SERPL-SCNC: 140 MMOL/L (ref 133–144)
WBC # BLD AUTO: 11 10E9/L (ref 4–11)

## 2017-05-15 PROCEDURE — 74020 XR ABDOMEN 2 VW: CPT | Mod: TC

## 2017-05-15 PROCEDURE — 25000128 H RX IP 250 OP 636: Performed by: FAMILY MEDICINE

## 2017-05-15 PROCEDURE — 36415 COLL VENOUS BLD VENIPUNCTURE: CPT | Performed by: FAMILY MEDICINE

## 2017-05-15 PROCEDURE — 99285 EMERGENCY DEPT VISIT HI MDM: CPT | Mod: 25 | Performed by: FAMILY MEDICINE

## 2017-05-15 PROCEDURE — 85025 COMPLETE CBC W/AUTO DIFF WBC: CPT | Performed by: FAMILY MEDICINE

## 2017-05-15 PROCEDURE — 83605 ASSAY OF LACTIC ACID: CPT | Performed by: FAMILY MEDICINE

## 2017-05-15 PROCEDURE — 96361 HYDRATE IV INFUSION ADD-ON: CPT | Performed by: FAMILY MEDICINE

## 2017-05-15 PROCEDURE — 96374 THER/PROPH/DIAG INJ IV PUSH: CPT | Performed by: FAMILY MEDICINE

## 2017-05-15 PROCEDURE — 83690 ASSAY OF LIPASE: CPT | Performed by: FAMILY MEDICINE

## 2017-05-15 PROCEDURE — 96375 TX/PRO/DX INJ NEW DRUG ADDON: CPT | Performed by: FAMILY MEDICINE

## 2017-05-15 PROCEDURE — 99285 EMERGENCY DEPT VISIT HI MDM: CPT | Mod: Z6 | Performed by: FAMILY MEDICINE

## 2017-05-15 PROCEDURE — 80053 COMPREHEN METABOLIC PANEL: CPT | Performed by: FAMILY MEDICINE

## 2017-05-15 RX ORDER — SODIUM CHLORIDE 9 MG/ML
1000 INJECTION, SOLUTION INTRAVENOUS CONTINUOUS
Status: DISCONTINUED | OUTPATIENT
Start: 2017-05-15 | End: 2017-05-15 | Stop reason: HOSPADM

## 2017-05-15 RX ORDER — LIDOCAINE 40 MG/G
CREAM TOPICAL
Status: DISCONTINUED | OUTPATIENT
Start: 2017-05-15 | End: 2017-05-15 | Stop reason: HOSPADM

## 2017-05-15 RX ORDER — ONDANSETRON 2 MG/ML
4 INJECTION INTRAMUSCULAR; INTRAVENOUS EVERY 30 MIN PRN
Status: DISCONTINUED | OUTPATIENT
Start: 2017-05-15 | End: 2017-05-15 | Stop reason: HOSPADM

## 2017-05-15 RX ORDER — HYDROMORPHONE HYDROCHLORIDE 1 MG/ML
0.5 INJECTION, SOLUTION INTRAMUSCULAR; INTRAVENOUS; SUBCUTANEOUS ONCE
Status: COMPLETED | OUTPATIENT
Start: 2017-05-15 | End: 2017-05-15

## 2017-05-15 RX ADMIN — SODIUM CHLORIDE 1000 ML: 9 INJECTION, SOLUTION INTRAVENOUS at 15:53

## 2017-05-15 RX ADMIN — ONDANSETRON HYDROCHLORIDE 4 MG: 2 SOLUTION INTRAMUSCULAR; INTRAVENOUS at 15:53

## 2017-05-15 RX ADMIN — HYDROMORPHONE HYDROCHLORIDE 0.5 MG: 1 INJECTION, SOLUTION INTRAMUSCULAR; INTRAVENOUS; SUBCUTANEOUS at 16:08

## 2017-05-15 ASSESSMENT — ENCOUNTER SYMPTOMS
FEVER: 1
VOMITING: 1
ABDOMINAL PAIN: 1
SHORTNESS OF BREATH: 0
COUGH: 0
WEAKNESS: 1
NAUSEA: 1

## 2017-05-15 NOTE — ED AVS SNAPSHOT
[facility] Emergency Department    [address] [place] [address]    Phone:  [phone]    Fax:  [phone]                                       Doreen Peralta   MRN: [id number]    Department:  [facility] Emergency Department   Date of Visit:  5/15/2017           Patient Information     Date Of Birth          1980        Your diagnoses for this visit were:     Chronic abdominal pain        You were seen by Jake Warren MD.      Follow-up Information     Schedule an appointment as soon as possible for a visit with Larisa Lorenzo PA-C.    Specialty:  Physician Assistant    Why:  As needed    Contact information:    [facility] [place]  [address]  [place] [address] [phone]          Discharge Instructions         Chronic Abdominal Pain    The exact cause of your abdominal (stomach) pain is not certain. This does not mean that this is something to worry about, or the right tests were not done. Everyone likes to know the exact cause of the problem, but sometimes with abdominal pain, there is no clear-cut cause, and this could be a good thing. The good news is that your symptoms can be treated, and you will feel better.   Your condition does not seem serious now; however, sometimes the signs of a serious problem may take more time to appear. For this reason, it is important for you to watch for any new symptoms, problems, or worsening of your condition.  Over the next few days, the abdominal pain may come and go, or be continuous. Other common symptoms can include nausea and vomiting. Sometimes it can be difficult to tell if you feel nauseous, you may just feel bad and not associate that feeling with nausea. Constipation, diarrhea, and a fever may go along with the pain.  The pain may continue even if treated correctly over the following days. Depending on how things go, sometimes the cause can become clear and may require further or different treatment.  Additional evaluations, medications, or tests may be needed.  Home care  Your health care provider may prescribe medications for pain, symptoms, or an infection.  Follow the health care provider's instructions for taking these medications.  General care    Rest until your next exam. No strenuous activities.    Try to find positions that ease discomfort. A small pillow placed on the abdomen may help relieve pain.    Something warm on your abdomen (such as a heating pad) may help, but be careful not to burn yourself.  Diet    Do not force yourself to eat, especially if having cramps, vomiting, or diarrhea.    Water is important so you do not get dehydrated. Soup may also be good. Sports drinks may also help, especially if they are not too acidic. Make sure you don't drink sugary drinks as this can make things worse. Take liquids in small amounts. Do not guzzle them.    Caffeine sometimes makes the pain and cramping worse.    Avoid dairy products if you have vomiting or diarrhea.    Don't eat large amounts at a time. Wait a few minutes between bites.    Eat a diet low in fiber (called a low-residue diet). Foods allowed include refined breads, white rice, fruit and vegetable juices without pulp, tender meats. These foods will pass more easily through the intestine.    Avoid fried or fatty foods, dairy, alcohol and spicy foods until your symptoms go away.  Follow-up care  Please contact Larisa Lorenzo about follow up.   When to seek medical care  Seek prompt medical care if any of the following occur:    Pain gets worse or moves to the right lower abdomen    New or worsening vomiting or diarrhea    Swelling of the abdomen    Unable to pass stool for more than three days    New fever over 101  F (38.3 C), or rising fever    Blood in vomit or bowel movements (dark red or black color)    Jaundice (yellow color of eyes and skin)    Weakness, dizziness    Chest, arm, back, neck or jaw pain    Unexpected vaginal bleeding  or missed period  Call 911  Call emergency services if any of the following occur:    Trouble breathing    Confusion    Fainting or loss of consciousness    Rapid heart rate    Seizure    Thank you for choosing our Emergency Department for your care.     Sincerely,    Dr Brian Warren M.D.            Future Appointments        Provider Department Dept Phone Center    7/17/2017 8:40 AM Gilmer Lees MD [facility] Gastroenterology and -[phone] [facility]      24 Hour Appointment Hotline       To make an appointment at any [facility], call [phone] ([phone]). If you don't have a family doctor or clinic, we will help you find one. [facility] are conveniently located to serve the needs of you and your family.             Review of your medicines      Our records show that you are taking the medicines listed below. If these are incorrect, please call your family doctor or clinic.        Dose / Directions Last dose taken    ACETAMINOPHEN PO   Dose:  500-1000 mg        Take 500-1,000 mg by mouth every 6 hours as needed for pain   Refills:  0        albuterol 90 MCG/ACT inhaler   Dose:  2 puff        Inhale 2 puffs into the lungs every 6 hours as needed   Refills:  0        cloNIDine 0.2 MG tablet   Commonly known as:  CATAPRES   Dose:  0.4 mg   Quantity:  180 tablet        Take 2 tablets (0.4 mg) by mouth every evening   Refills:  0        DULoxetine 60 MG EC capsule   Commonly known as:  CYMBALTA   Dose:  60 mg   Quantity:  90 capsule        Take 1 capsule (60 mg) by mouth daily   Refills:  3        HYDROmorphone 2 MG tablet   Commonly known as:  DILAUDID   Dose:  2 mg   Quantity:  24 tablet        Take 1 tablet (2 mg) by mouth every 3 hours as needed for moderate to severe pain   Refills:  0        mirtazapine 15 MG ODT tab   Commonly known as:  REMERON SOL-TAB   Dose:  7.5 mg   Quantity:  45 tablet        0.5 tablets (7.5 mg) by Orally disintegrating tablet route At Bedtime   Refills:  0         nortriptyline 10 MG capsule   Commonly known as:  PAMELOR   Dose:  30-40 mg   Quantity:  120 capsule        Take 3-4 capsules (30-40 mg) by mouth At Bedtime   Refills:  5        * ondansetron 4 MG ODT tab   Commonly known as:  ZOFRAN ODT   Dose:  4-8 mg   Quantity:  40 tablet        Take 1-2 tablets (4-8 mg) by mouth every 6 hours as needed for nausea   Refills:  0        * ondansetron 4 MG ODT tab   Commonly known as:  ZOFRAN-ODT   Quantity:  40 tablet        DISSOLVE 1 TO 2 TABLETS(4 TO 8 MG) ON THE TONGUE EVERY 6 HOURS AS NEEDED FOR NAUSEA   Refills:  0        rivaroxaban ANTICOAGULANT 20 MG Tabs tablet   Commonly known as:  XARELTO   Dose:  20 mg   Quantity:  90 tablet        Take 1 tablet (20 mg) by mouth daily (with dinner)   Refills:  1        SUMAtriptan 50 MG tablet   Commonly known as:  IMITREX   Dose:   mg   Quantity:  18 tablet        Take 1-2 tablets ( mg) by mouth at onset of headache for migraine - may repeat dose after 2h if headache recurs.  Max: 200mg/24 hours   Refills:  1        traZODone 50 MG tablet   Commonly known as:  DESYREL   Dose:   mg   Quantity:  180 tablet        Take 1-2 tablets ( mg) by mouth nightly as needed 1-2 tabs at bedtime PRN   Refills:  1        * Notice:  This list has 2 medication(s) that are the same as other medications prescribed for you. Read the directions carefully, and ask your doctor or other care provider to review them with you.            Procedures and tests performed during your visit     CBC with platelets differential    Comprehensive metabolic panel    Lactic acid whole blood    Lipase    XR Abdomen 2 Views      Orders Needing Specimen Collection     None      Pending Results     No orders found from 5/13/2017 to 5/16/2017.            Pending Culture Results     No orders found from 5/13/2017 to 5/16/2017.            Pending Results Instructions     If you had any lab results that were not finalized at the time of your Discharge,  you can call the ED Lab Result RN at [phone]. You will be contacted by this team for any positive Lab results or changes in treatment. The nurses are available 7 days a week from 10A to 6:30P.  You can leave a message 24 hours per day and they will return your call.        Thank you for choosing [facility]       Thank you for choosing [facility] for your care. Our goal is always to provide you with excellent care. Hearing back from our patients is one way we can continue to improve our services. Please take a few minutes to complete the written survey that you may receive in the mail after you visit with us. Thank you!        [employer] Information     [employer] gives you secure access to your electronic health record. If you see a primary care provider, you can also send messages to your care team and make appointments. If you have questions, please call your primary care clinic.  If you do not have a primary care provider, please call [phone] and they will assist you.        Care EveryWhere ID     This is your Care EveryWhere ID. This could be used by other organizations to access your [facility] medical records  [id number]        After Visit Summary       This is your record. Keep this with you and show to your community pharmacist(s) and doctor(s) at your next visit.

## 2017-05-15 NOTE — ED AVS SNAPSHOT
[facility] Emergency Department    [address] [place] [address]    Phone:  [phone]    Fax:  [phone]                                       Doreen Peralta   MRN: [id number]    Department:  [facility] Emergency Department   Date of Visit:  5/15/2017           After Visit Summary Signature Page     I have received my discharge instructions, and my questions have been answered. I have discussed any challenges I see with this plan with the nurse or doctor.    ..........................................................................................................................................  Patient/Patient Representative Signature      ..........................................................................................................................................  Patient Representative Print Name and Relationship to Patient    ..................................................               ................................................  Date                                            Time    ..........................................................................................................................................  Reviewed by Signature/Title    ...................................................              ..............................................  Date                                                            Time

## 2017-05-15 NOTE — ED PROVIDER NOTES
"  History     Chief Complaint   Patient presents with     Abdominal Pain     Nausea & Vomiting     The history is provided by the patient and medical records.     Doreen Peralta is a 37 year old female who presents to the emergency department with abdominal pain and vomiting. She states that on Saturday she began feeling \"weird\" and weak. She said she laid in bed all Saturday and Sunday. Then Sunday night she developed a fever of 101 degrees then this morning she developed abdominal pain and vomiting. Patient has a significant history of abdominal issues and states she has had a feeding tube for over two years. However, she says she tries to eat oral fluids when possible. Patient was last on antibiotics in April when she was in the hospital for similar issues to today. Patient denies cough, shortness of breath, or anyone else sick at home. Patient just finished her period so does not believe she is pregnant.     She was hospitalized here in April 2017:  History of Present Illness  Doreen Peralta is an 36 year old female with complex health history including recurring sepsis and bacteremia most recently in February and recent replacement of G-tube who presented with three-day history of worsening malaise, abdominal pain, and new onset of fever. Because of initial concern for possible sepsis, she was hospitalized. See admission history and physical for details.        Hospital Course  Doreen Peralta was admitted on 4/7/2017. The following problems were addressed during her hospitalization:     Problem #1 fever and leukocytosis. She reported fever to 100.4 at home, and WBC was 11,200 at admission. Pro-calcitonin was undetectable and blood cultures were negative for 2 days. She was initially admitted and treated with parenteral vancomycin and Zosyn because of concern for sepsis. She had no fever during her hospital stay, and leukocytosis resolved. No infection was identified during this hospital stay, so she was " discharged home without antibiotic therapy once cultures had been negative for 2 days. She may have had recent fever and mild leukocytosis associated with postprocedural reaction to recent G-tube replacement.     Problem #2 abdominal pain. Abdominal x-ray demonstrated G-tube that appeared to be in appropriate position and unchanged compared with previously. There was no free air or dilated loops of bowel. She initially had small amount of bleeding and greenish drainage around her G-tube. This subsided during her hospital stay with local cares. She had persistent abdominal pain throughout her hospital stay. Pain was adequately controlled with oral analgesics. She was able to tolerate some oral intake. Her G-tube appeared to be functioning adequately. After investigation, postprocedural acute abdominal pain superimposed upon her chronic abdominal pain syndrome was suspected.     Problem #3 history of venous thromboembolism. Chronic Xarelto therapy was continued during this hospital stay. Aside from a scant amount of bloody drainage on the bandage surrounding her recently replaced G-tube, no active bleeding was evident during this hospital stay. Hemoglobin dropped from 10 to 9 but she had been treated with IV fluids during this hospital stay, so this was probably dilutional effect. She was hemodynamically stable. Significant active bleeding with acute blood loss anemia was not suspected.     Gilmer Lazcano MD      I have reviewed the Medications, Allergies, Past Medical and [facility] History, and Social History in the Epic system.    Patient Active Problem List   Diagnosis     Constipation by delayed colonic transit     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     Hx SBO     S/P LEEP of cervix     Gastroparesis     Migraines     Intermittent asthma     Allergic rhinitis     Abnormal Pap smear of cervix     PEG (percutaneous endoscopic gastrostomy) status     Health Care Home     PEG tube malfunction (H)     Malfunction of  gastrostomy tube (H)     Malfunctioning jejunostomy tube (H)     Jejunostomy tube present (H)     S/P partial resection of colon     Malnutrition (H)     Long-term (current) use of anticoagulants [Z79.01]     Anxiety     Anemia in other chronic diseases classified elsewhere     Munchausen syndrome - previously suspected     Anemia, iron deficiency     Mitral regurgitation mild-mod by Echo June 2016     Atopic rhinitis     Migraine     Patellofemoral stress syndrome     Hyperbilirubinemia     Chronic diarrhea     Coagulation defect (H) [D68.9]     Fever     Attention to G-tube (H)     Chronic abdominal pain     Vitamin D deficiency     SIRS (systemic inflammatory response syndrome) (H)     History of deep venous thrombosis     Nausea and vomiting     Abdominal pain, generalized     Abdominal pain     Insomnia, unspecified type     Past Medical History:   Diagnosis Date     Asthma      Bilateral ovarian cysts      Cervical cancer (H) 01/01/2008    cervical cancer      Chronic pain      Colonic dysmotility     s/p subtotal colectomy     Constipation     chronic     E. coli sepsis (H) 5/8/2016     Enteritis      Fungemia 5/5/2016     Gastro-oesophageal reflux disease      H/O ileostomy      Hx of abnormal Pap smear     s/p LEEP - no further details provided     Hypertension      IBS (irritable bowel syndrome)      Other chronic pain      PONV (postoperative nausea and vomiting)      Thrombosis, hepatic vein (H)     microvascular       Past [facility] History:   Procedure Laterality Date     COLONOSCOPY  7/10/2012    Procedure: COLONOSCOPY;;  Surgeon: Nicole Redding MD;  Location: [facility]     COLONOSCOPY N/A 2/19/2017    Procedure: COLONOSCOPY;  Surgeon: Randell Muller MD;  Location: [facility]     COLONOSCOPY N/A 2/21/2017    Procedure: COLONOSCOPY;  Surgeon: Randell Muller MD;  Location: [facility]     ECHO CHELO  7/19/2016          ENDOSCOPIC INSERTION TUBE GASTROSTOMY N/A 1/21/2016    Procedure: ENDOSCOPIC  INSERTION TUBE GASTROSTOMY;  Surgeon: Nicole Redding MD;  Location: [facility]     ESOPHAGOSCOPY, GASTROSCOPY, DUODENOSCOPY (EGD), COMBINED  7/10/2012    Procedure: COMBINED ESOPHAGOSCOPY, GASTROSCOPY, DUODENOSCOPY (EGD);  Upper Endoscopy, Ileoscopy    Latex Allergy  with biopsies;  Surgeon: Nicole Redding MD;  Location: [facility]     ESOPHAGOSCOPY, GASTROSCOPY, DUODENOSCOPY (EGD), COMBINED N/A 11/5/2014    Procedure: COMBINED ESOPHAGOSCOPY, GASTROSCOPY, DUODENOSCOPY (EGD);  Surgeon: Nicole Redding MD;  Location[facility]     HC REPLACE DUODENOSTOMY/JEJUNOSTOMY TUBE PERCUTANEOUS N/A 8/27/2015    Procedure: REPLACE GASTROJEJUNOSTOMY TUBE, PERCUTANEOUS;  Surgeon: Mio Holder MD;  [facility]     HC REPLACE DUODENOSTOMY/JEJUNOSTOMY TUBE PERCUTANEOUS N/A 1/7/2016    Procedure: REPLACE JEJUNOSTOMY TUBE, PERCUTANEOUS;  Surgeon: Elsa Medel MD;  [facility]     HC REPLACE DUODENOSTOMY/JEJUNOSTOMY TUBE PERCUTANEOUS N/A 1/28/2016    Procedure: REPLACE JEJUNOSTOMY TUBE, PERCUTANEOUS;  Surgeon: Elsa Medel MD;  [facility]     HC REPLACE GASTROSTOMY/CECOSTOMY TUBE PERCUTANEOUS Left 5/19/2015    Procedure: REPLACE GASTROSTOMY TUBE, PERCUTANEOUS;  Surgeon: Melecio Morejon Chi, MD;  Location: [facility]     HC UGI ENDOSCOPY W PLACEMENT GASTROSTOMY TUBE PERCUT N/A 10/1/2015    Procedure: COMBINED ESOPHAGOSCOPY, GASTROSCOPY, DUODENOSCOPY (EGD), PLACE PERCUTANEOUS ENDOSCOPIC GASTROSTOMY TUBE;  Surgeon: Mio Holder MD;  [facility]     LAPAROSCOPIC ASSISTED COLECTOMY  1/20/2012    Procedure:LAPAROSCOPIC ASSISTED COLECTOMY; Laparoscopic Ileostomy       LAPAROSCOPIC ASSISTED COLECTOMY LEFT (DESCENDING)  10/24/2012    Procedure: LAPAROSCOPIC ASSISTED COLECTOMY LEFT (DESCENDING);   Hand Assisted Laproscopic Subtotal abdominal Colectomy,Iieorectal anastamosis, Ileostomy Closure.       LAPAROSCOPIC ASSISTED INSERTION TUBE JEJUNOSTOMY N/A 10/16/2015    Procedure: LAPAROSCOPIC ASSISTED  INSERTION TUBE JEJUNOSTOMY;  Surgeon: Elsa Medel MD;  Location: [facility]     LAPAROSCOPIC CHOLECYSTECTOMY  2002    [facility] stones duct     LAPAROSCOPIC ILEOSTOMY  1/20/2012    [facility], loop     LAPAROSCOPIC OOPHORECTOMY Right 2009    [facility]     LAPAROTOMY EXPLORATORY N/A 1/28/2016    Procedure: LAPAROTOMY EXPLORATORY;  Surgeon: Elsa Medel MD;  Location: [facility]     LEEP TX, CERVICAL  2009    [facility]     PICC INSERTION Left 10/21/2015    5fr DL Power PICC, 37cm (2cm external) in the L basilic vein w/ tip in the SVC RA junction.     REMOVE GASTROSTOMY TUBE ADULT N/A 12/12/2014    Procedure: REMOVE GASTROSTOMY TUBE ADULT;  Surgeon: Nicole Redding MD;  Location: [facility]     REMOVE PORT VASCULAR ACCESS Right 6/30/2016    Procedure: REMOVE PORT VASCULAR ACCESS;  Surgeon: Pradeep Orosco MD;  [facility]     replace GASTROSTOMY TUBE ADULT  5/19/15       Family History   Problem Relation Age of Onset     Thyroid Disease Mother      Sjogren's Mother      GASTROINTESTINAL DISEASE Mother      Intermittent nausea vomiting diarrhea     Colon Polyps Mother      Prostate Problems Father      prostate enlargement     Lupus Maternal Grandmother      CANCER Maternal Grandfather      Lung     Colon Cancer Maternal Grandfather 65     CANCER Paternal Grandmother      Lung      CEREBROVASCULAR DISEASE Paternal Grandmother      DIABETES Paternal Grandmother      Cardiovascular Paternal Grandmother      CHF     CANCER Paternal Grandfather      Lung     Glaucoma Paternal Grandfather      Abdominal Aortic Aneurysm Other      Macular Degeneration No family hx of        Social History   Substance Use Topics     Smoking status: Former Smoker     Packs/day: 1.00     Years: 4.00     Types: Cigarettes     Quit date: 1/1/2004     Smokeless tobacco: Former User     Alcohol use No        Immunization History   Administered Date(s) Administered     Influenza (IIV3) 10/01/2009     Pneumococcal 23 valent  07/18/2014          Allergies   Allergen Reactions     Hyoscyamine Rash     Metoclopramide Other (See Comments)     Eye twitching.      Peaches [Peach] Other (See Comments)     Raw. Cooked OK     Sucralose Other (See Comments)     All artificial sweeteners. Aspartame also. Swollen glands     Advair Diskus Other (See Comments)     Throat burns     Azithromycin Other (See Comments)     Burning in throat     Compazine [Prochlorperazine] Visual Disturbance     Contrast Dye Itching     States is allergic to CT contrast dye     Cyclobenzaprine Visual Disturbance     Fentanyl Other (See Comments)     migraine     Ibuprofen GI Disturbance     Lactulose Nausea and Vomiting     Gas and bloating     Levaquin [Levofloxacin] Swelling     Per ED M.D. And RN      Morphine Sulfate Other (See Comments)     Chest pain       Oxycodone Other (See Comments)     Burning throat, but can take Norco.      Penicillins Other (See Comments)     Family hx of resp arrest, she has never taken  Ok with cephalosporins     Rizatriptan Visual Disturbance     Droperidol Hives and Rash     Isovue [Iopamidol] Palpitations     Pt had racing heart and sob      Ketorolac Anxiety     Latex Swelling and Rash     Kiwi, likely also avacado, ? banana     Levsin Rash       Current Outpatient Prescriptions   Medication Sig Dispense Refill     ondansetron (ZOFRAN ODT) 4 MG ODT tab Take 1-2 tablets (4-8 mg) by mouth every 6 hours as needed for nausea 40 tablet 0     traZODone (DESYREL) 50 MG tablet Take 1-2 tablets ( mg) by mouth nightly as needed 1-2 tabs at bedtime  tablet 1     nortriptyline (PAMELOR) 10 MG capsule Take 3-4 capsules (30-40 mg) by mouth At Bedtime 120 capsule 5     cloNIDine (CATAPRES) 0.2 MG tablet Take 2 tablets (0.4 mg) by mouth every evening 180 tablet 0     mirtazapine (REMERON SOL-TAB) 15 MG ODT tab 0.5 tablets (7.5 mg) by Orally disintegrating tablet route At Bedtime 45 tablet 0     SUMAtriptan (IMITREX) 50 MG tablet Take 1-2  "tablets ( mg) by mouth at onset of headache for migraine - may repeat dose after 2h if headache recurs.  Max: 200mg/24 hours 18 tablet 1     rivaroxaban ANTICOAGULANT (XARELTO) 20 MG TABS tablet Take 1 tablet (20 mg) by mouth daily (with dinner) 90 tablet 1     DULoxetine (CYMBALTA) 60 MG capsule Take 1 capsule (60 mg) by mouth daily 90 capsule 3     ACETAMINOPHEN PO Take 500-1,000 mg by mouth every 6 hours as needed for pain        albuterol 90 MCG/ACT inhaler Inhale 2 puffs into the lungs every 6 hours as needed        ondansetron (ZOFRAN-ODT) 4 MG ODT tab DISSOLVE 1 TO 2 TABLETS(4 TO 8 MG) ON THE TONGUE EVERY 6 HOURS AS NEEDED FOR NAUSEA 40 tablet 0     HYDROmorphone (DILAUDID) 2 MG tablet Take 1 tablet (2 mg) by mouth every 3 hours as needed for moderate to severe pain 24 tablet 0     Review of Systems   Constitutional: Positive for fever.   Respiratory: Negative for cough and shortness of breath.    Gastrointestinal: Positive for abdominal pain, nausea and vomiting.   Neurological: Positive for weakness (general).   All other systems reviewed and are negative.      Physical Exam   BP: (!) 138/98  Pulse: 86  Temp: 98.6  F (37  C)  Resp: 16  Height: 167.6 cm (5' 6\")  SpO2: 99 %    Physical Exam   Constitutional: She is oriented to person, place, and time. She appears well-developed and well-nourished. She appears distressed.   HENT:   Head: Normocephalic and atraumatic.   Right Ear: External ear normal.   Left Ear: External ear normal.   Eyes: Conjunctivae and EOM are normal.   Neck: Normal range of motion. Neck supple.   Cardiovascular: Normal rate, regular rhythm, normal heart sounds and intact distal pulses.    No murmur heard.  Pulmonary/Chest: Effort normal and breath sounds normal. No respiratory distress. She has no wheezes. She has no rales.   Abdominal: Soft. Bowel sounds are normal. She exhibits no distension. There is tenderness. There is rebound and guarding.   G tube noted midline "   Musculoskeletal: Normal range of motion. She exhibits no edema or tenderness.   Neurological: She is alert and oriented to person, place, and time.   Skin: Skin is warm and dry.   Psychiatric: She has a normal mood and affect. Her behavior is normal.   Nursing note and vitals reviewed.      ED Course     ED Course     Procedures       Results for orders placed or performed during the hospital encounter of 05/15/17 (from the past 24 hour(s))   CBC with platelets differential   Result Value Ref Range    WBC 11.0 4.0 - 11.0 10e9/L    RBC Count 4.74 3.8 - 5.2 10e12/L    Hemoglobin 11.1 (L) 11.7 - 15.7 g/dL    Hematocrit 36.4 35.0 - 47.0 %    MCV 77 (L) 78 - 100 fl    MCH 23.4 (L) 26.5 - 33.0 pg    MCHC 30.5 (L) 31.5 - 36.5 g/dL    RDW 19.6 (H) 10.0 - 15.0 %    Platelet Count 463 (H) 150 - 450 10e9/L    Diff Method Automated Method     % Neutrophils 74.5 %    % Lymphocytes 18.2 %    % Monocytes 6.3 %    % Eosinophils 0.4 %    % Basophils 0.2 %    % Immature Granulocytes 0.4 %    Absolute Neutrophil 8.2 1.6 - 8.3 10e9/L    Absolute Lymphocytes 2.0 0.8 - 5.3 10e9/L    Absolute Monocytes 0.7 0.0 - 1.3 10e9/L    Absolute Eosinophils 0.0 0.0 - 0.7 10e9/L    Absolute Basophils 0.0 0.0 - 0.2 10e9/L    Abs Immature Granulocytes 0.0 0 - 0.4 10e9/L   Comprehensive metabolic panel   Result Value Ref Range    Sodium 140 133 - 144 mmol/L    Potassium 3.9 3.4 - 5.3 mmol/L    Chloride 105 94 - 109 mmol/L    Carbon Dioxide 23 20 - 32 mmol/L    Anion Gap 12 3 - 14 mmol/L    Glucose 98 70 - 99 mg/dL    Urea Nitrogen 8 7 - 30 mg/dL    Creatinine 0.80 0.52 - 1.04 mg/dL    GFR Estimate 81 >60 mL/min/1.7m2    GFR Estimate If Black >90   GFR Calc   >60 mL/min/1.7m2    Calcium 9.3 8.5 - 10.1 mg/dL    Bilirubin Total 0.3 0.2 - 1.3 mg/dL    Albumin 3.4 3.4 - 5.0 g/dL    Protein Total 8.1 6.8 - 8.8 g/dL    Alkaline Phosphatase 135 40 - 150 U/L    ALT 26 0 - 50 U/L    AST 15 0 - 45 U/L   Lactic acid whole blood   Result Value Ref  Range    Lactic Acid 1.5 0.7 - 2.1 mmol/L   Lipase   Result Value Ref Range    Lipase 120 73 - 393 U/L   XR Abdomen 2 Views    Narrative    XR ABDOMEN 2 VW 5/15/2017 4:20 PM    HISTORY: Abdominal pain. G-tube.    COMPARISON: 4/7/2017    FINDINGS: The bowel gas pattern is nonobstructive. No evidence of free  intraperitoneal air. Gastrostomy tube is evident.      Impression    IMPRESSION: Nonobstructive gas pattern.    REJI YIP MD     *Note: Due to a large number of results and/or encounters for the requested time period, some results have not been displayed. A complete set of results can be found in Results Review.       Medications   lidocaine 1 % 1 mL (not administered)   lidocaine (LMX4) kit (not administered)   sodium chloride (PF) 0.9% PF flush 3 mL (not administered)   sodium chloride (PF) 0.9% PF flush 3 mL (3 mLs Intracatheter Given 5/15/17 1552)   0.9% sodium chloride BOLUS (0 mLs Intravenous Stopped 5/15/17 1655)     Followed by   0.9% sodium chloride infusion (not administered)   ondansetron (ZOFRAN) injection 4 mg (4 mg Intravenous Given 5/15/17 1553)   HYDROmorphone (PF) (DILAUDID) injection 0.5 mg (0.5 mg Intravenous Given 5/15/17 1608)     Assessments & Plan (with Medical Decision Making)  Doreen is a 37 year old female who presents to the ED with complaints of abdominal pain, fever, and vomiting that began today. Patient was recently hospitalized for fever and abdominal pain in April. Upon arrival, patient had blood pressure of 138/98 and was otherwise vitally stable. On exam, she has tenderness with palpation of any portion of her abdomen.  We did give her 1 dose of Dilaudid. Patient was given Zofran and IV fluids. Labs were drawn including CBC, CMP, lipase, and lactic acid.  These labs were completely normal. An abdominal xray was done showing no abnormalities.  I went back to talk to the patient.  Because she has normal labs and normal x-ray I do not plan to give her any more Dilaudid.  She  had one bolus of IV fluid and had 8 minutes left on the bag of IV fluid.  She told me that she felt comfortable going home and was discharged from the ED.  I did not send her home with any narcotics.       I have reviewed the nursing notes.    I have reviewed the findings, diagnosis, plan and need for follow up with the patient.    Discharge Medication List as of 5/15/2017  5:15 PM          Final diagnoses:   Chronic abdominal pain     This document serves as a record of services personally performed by Jake Warren MD. It was created on their behalf by Justine Mario, a trained medical scribe. The creation of this record is based on the provider's personal observations and the statements of the patient. This document has been checked and approved by the attending provider.     Note: Chart documentation done in part with Dragon Voice Recognition software. Although reviewed after completion, some word and grammatical errors may remain.    5/15/2017   [facility] EMERGENCY DEPARTMENT     Jake Warren MD  05/15/17 [id number]

## 2017-05-15 NOTE — ED NOTES
Started not feeling well since Saturday. Having loose diarrhea and fever today. Has a feeding tube for drainage in her abdomen. Has had this tube for the past 2 years.  She has history of obstruction issues. Having nausea today too and emesis

## 2017-05-16 ENCOUNTER — CARE COORDINATION (OUTPATIENT)
Dept: GASTROENTEROLOGY | Facility: CLINIC | Age: 37
End: 2017-05-16

## 2017-05-16 ENCOUNTER — APPOINTMENT (OUTPATIENT)
Dept: GENERAL RADIOLOGY | Facility: CLINIC | Age: 37
End: 2017-05-16
Attending: NURSE PRACTITIONER
Payer: COMMERCIAL

## 2017-05-16 ENCOUNTER — HOSPITAL ENCOUNTER (EMERGENCY)
Facility: CLINIC | Age: 37
Discharge: HOME | End: 2017-05-16
Attending: NURSE PRACTITIONER | Admitting: NURSE PRACTITIONER
Payer: COMMERCIAL

## 2017-05-16 VITALS
DIASTOLIC BLOOD PRESSURE: 90 MMHG | TEMPERATURE: 98.2 F | RESPIRATION RATE: 16 BRPM | OXYGEN SATURATION: 99 % | SYSTOLIC BLOOD PRESSURE: 140 MMHG | HEART RATE: 72 BPM

## 2017-05-16 DIAGNOSIS — K31.84 GASTROPARESIS: ICD-10-CM

## 2017-05-16 DIAGNOSIS — R10.84 ABDOMINAL PAIN, GENERALIZED: ICD-10-CM

## 2017-05-16 DIAGNOSIS — R11.2 INTRACTABLE VOMITING WITH NAUSEA, UNSPECIFIED VOMITING TYPE: ICD-10-CM

## 2017-05-16 LAB
ALBUMIN SERPL-MCNC: 3.6 G/DL (ref 3.4–5)
ALP SERPL-CCNC: 135 U/L (ref 40–150)
ALT SERPL W P-5'-P-CCNC: 35 U/L (ref 0–50)
ANION GAP SERPL CALCULATED.3IONS-SCNC: 11 MMOL/L (ref 3–14)
AST SERPL W P-5'-P-CCNC: 33 U/L (ref 0–45)
BASOPHILS # BLD AUTO: 0 10E9/L (ref 0–0.2)
BASOPHILS NFR BLD AUTO: 0.3 %
BILIRUB SERPL-MCNC: 0.3 MG/DL (ref 0.2–1.3)
BUN SERPL-MCNC: 9 MG/DL (ref 7–30)
CALCIUM SERPL-MCNC: 8.7 MG/DL (ref 8.5–10.1)
CHLORIDE SERPL-SCNC: 107 MMOL/L (ref 94–109)
CO2 SERPL-SCNC: 20 MMOL/L (ref 20–32)
CREAT SERPL-MCNC: 0.86 MG/DL (ref 0.52–1.04)
DIFFERENTIAL METHOD BLD: ABNORMAL
EOSINOPHIL # BLD AUTO: 0.1 10E9/L (ref 0–0.7)
EOSINOPHIL NFR BLD AUTO: 0.6 %
ERYTHROCYTE [DISTWIDTH] IN BLOOD BY AUTOMATED COUNT: 19.3 % (ref 10–15)
GFR SERPL CREATININE-BSD FRML MDRD: 74 ML/MIN/1.7M2
GLUCOSE SERPL-MCNC: 78 MG/DL (ref 70–99)
HCT VFR BLD AUTO: 35.9 % (ref 35–47)
HGB BLD-MCNC: 11 G/DL (ref 11.7–15.7)
IMM GRANULOCYTES # BLD: 0 10E9/L (ref 0–0.4)
IMM GRANULOCYTES NFR BLD: 0.2 %
LACTATE BLD-SCNC: 1.2 MMOL/L (ref 0.7–2.1)
LIPASE SERPL-CCNC: 127 U/L (ref 73–393)
LYMPHOCYTES # BLD AUTO: 2.4 10E9/L (ref 0.8–5.3)
LYMPHOCYTES NFR BLD AUTO: 25.8 %
MCH RBC QN AUTO: 23.7 PG (ref 26.5–33)
MCHC RBC AUTO-ENTMCNC: 30.6 G/DL (ref 31.5–36.5)
MCV RBC AUTO: 77 FL (ref 78–100)
MONOCYTES # BLD AUTO: 0.6 10E9/L (ref 0–1.3)
MONOCYTES NFR BLD AUTO: 5.9 %
NEUTROPHILS # BLD AUTO: 6.3 10E9/L (ref 1.6–8.3)
NEUTROPHILS NFR BLD AUTO: 67.2 %
PLATELET # BLD AUTO: 457 10E9/L (ref 150–450)
POTASSIUM SERPL-SCNC: 4.4 MMOL/L (ref 3.4–5.3)
PROT SERPL-MCNC: 8.1 G/DL (ref 6.8–8.8)
RBC # BLD AUTO: 4.64 10E12/L (ref 3.8–5.2)
SODIUM SERPL-SCNC: 138 MMOL/L (ref 133–144)
WBC # BLD AUTO: 9.3 10E9/L (ref 4–11)

## 2017-05-16 PROCEDURE — 80053 COMPREHEN METABOLIC PANEL: CPT | Performed by: NURSE PRACTITIONER

## 2017-05-16 PROCEDURE — 96374 THER/PROPH/DIAG INJ IV PUSH: CPT | Performed by: NURSE PRACTITIONER

## 2017-05-16 PROCEDURE — 74020 XR ABDOMEN 2 VW: CPT | Mod: TC

## 2017-05-16 PROCEDURE — 96375 TX/PRO/DX INJ NEW DRUG ADDON: CPT | Performed by: NURSE PRACTITIONER

## 2017-05-16 PROCEDURE — 25000128 H RX IP 250 OP 636: Performed by: NURSE PRACTITIONER

## 2017-05-16 PROCEDURE — 25800025 ZZH RX 258: Performed by: NURSE PRACTITIONER

## 2017-05-16 PROCEDURE — 96361 HYDRATE IV INFUSION ADD-ON: CPT | Performed by: NURSE PRACTITIONER

## 2017-05-16 PROCEDURE — 99285 EMERGENCY DEPT VISIT HI MDM: CPT | Mod: 25 | Performed by: NURSE PRACTITIONER

## 2017-05-16 PROCEDURE — 83605 ASSAY OF LACTIC ACID: CPT | Performed by: NURSE PRACTITIONER

## 2017-05-16 PROCEDURE — 99285 EMERGENCY DEPT VISIT HI MDM: CPT | Mod: Z6 | Performed by: NURSE PRACTITIONER

## 2017-05-16 PROCEDURE — 96376 TX/PRO/DX INJ SAME DRUG ADON: CPT | Performed by: NURSE PRACTITIONER

## 2017-05-16 PROCEDURE — 83690 ASSAY OF LIPASE: CPT | Performed by: NURSE PRACTITIONER

## 2017-05-16 PROCEDURE — 85025 COMPLETE CBC W/AUTO DIFF WBC: CPT | Performed by: NURSE PRACTITIONER

## 2017-05-16 PROCEDURE — 36415 COLL VENOUS BLD VENIPUNCTURE: CPT | Performed by: NURSE PRACTITIONER

## 2017-05-16 PROCEDURE — S5010 5% DEXTROSE AND 0.45% SALINE: HCPCS | Performed by: NURSE PRACTITIONER

## 2017-05-16 RX ORDER — DIPHENHYDRAMINE HYDROCHLORIDE 50 MG/ML
25 INJECTION INTRAMUSCULAR; INTRAVENOUS ONCE
Status: COMPLETED | OUTPATIENT
Start: 2017-05-16 | End: 2017-05-16

## 2017-05-16 RX ORDER — ONDANSETRON 2 MG/ML
4 INJECTION INTRAMUSCULAR; INTRAVENOUS EVERY 30 MIN PRN
Status: DISCONTINUED | OUTPATIENT
Start: 2017-05-16 | End: 2017-05-16 | Stop reason: HOSPADM

## 2017-05-16 RX ORDER — DIPHENHYDRAMINE HCL 25 MG
25 TABLET ORAL EVERY 6 HOURS PRN
Qty: 30 TABLET | Refills: 0 | Status: SHIPPED | OUTPATIENT
Start: 2017-05-16 | End: 2018-04-29

## 2017-05-16 RX ORDER — HYDROMORPHONE HYDROCHLORIDE 2 MG/1
2 TABLET ORAL EVERY 6 HOURS PRN
Qty: 18 TABLET | Refills: 0 | Status: SHIPPED | OUTPATIENT
Start: 2017-05-16 | End: 2017-06-26

## 2017-05-16 RX ADMIN — HYDROMORPHONE HYDROCHLORIDE 1 MG: 1 INJECTION, SOLUTION INTRAMUSCULAR; INTRAVENOUS; SUBCUTANEOUS at 17:12

## 2017-05-16 RX ADMIN — HYDROMORPHONE HYDROCHLORIDE 1 MG: 1 INJECTION, SOLUTION INTRAMUSCULAR; INTRAVENOUS; SUBCUTANEOUS at 15:00

## 2017-05-16 RX ADMIN — DIPHENHYDRAMINE HYDROCHLORIDE 25 MG: 50 INJECTION, SOLUTION INTRAMUSCULAR; INTRAVENOUS at 17:11

## 2017-05-16 RX ADMIN — DIPHENHYDRAMINE HYDROCHLORIDE 25 MG: 50 INJECTION, SOLUTION INTRAMUSCULAR; INTRAVENOUS at 15:40

## 2017-05-16 RX ADMIN — DEXTROSE AND SODIUM CHLORIDE 1000 ML: 5; 450 INJECTION, SOLUTION INTRAVENOUS at 17:32

## 2017-05-16 RX ADMIN — ONDANSETRON HYDROCHLORIDE 4 MG: 2 SOLUTION INTRAMUSCULAR; INTRAVENOUS at 14:57

## 2017-05-16 RX ADMIN — SODIUM CHLORIDE 1000 ML: 9 INJECTION, SOLUTION INTRAVENOUS at 14:56

## 2017-05-16 RX ADMIN — HYDROMORPHONE HYDROCHLORIDE 1 MG: 1 INJECTION, SOLUTION INTRAMUSCULAR; INTRAVENOUS; SUBCUTANEOUS at 15:42

## 2017-05-16 ASSESSMENT — ENCOUNTER SYMPTOMS
CHILLS: 1
DIARRHEA: 1
ABDOMINAL PAIN: 1
APPETITE CHANGE: 1
NAUSEA: 1
BLOOD IN STOOL: 0
VOMITING: 1
ANAL BLEEDING: 0
FATIGUE: 1
ABDOMINAL DISTENTION: 1

## 2017-05-16 NOTE — ED AVS SNAPSHOT
[facility] Emergency Department    [address] [place] [address]    Phone:  [phone]    Fax:  [phone]                                       Doreen Peralta   MRN: [id number]    Department:  [facility] Emergency Department   Date of Visit:  5/16/2017           Patient Information     Date Of Birth          1980        Your diagnoses for this visit were:     Abdominal pain, generalized     Gastroparesis     Intractable vomiting with nausea, unspecified vomiting type        You were seen by Shaunna Raines, GERARD CNP.      Follow-up Information     Follow up with Larisa Lorenzo PA-C In 1 week.    Specialty:  Physician Assistant    Contact information:     [facility]  [address]  [place] [id number] [phone]          Follow up with [facility] Emergency Department.    Specialty:  EMERGENCY MEDICINE    Why:  If symptoms worsen    Contact information:    [address] [place] [address] [phone]    Additional information:    From [address]: Exit at [address] on south side of [place]. Turn right on [address]. Turn left at stoplight on [address]. [facility] will be in view two blocks ahead        Discharge Instructions         Gastroparesis  Gastroparesis (also called delayed gastric emptying) occurs when the stomach takes longer than normal to empty of food. This is due to a problem with motility (the movement of the muscles in the digestive tract). For many people, gastroparesis is a lifelong condition. But treatment can help relieve symptoms and prevent complications. Read on to learn more about gastroparesis and how it can be managed.  How gastroparesis develops     Gastroparesis means that food and fluids move too slowly out of the stomach into the duodenum.   With normal motility, signals from nerves tell the stomach muscles when to contract. These muscles move food from the stomach into the  duodenum (the first part of the small bowel). With gastroparesis, the nerves or muscles are damaged. This causes motility to slow down or stop completely. As a result, food cannot move from the stomach properly. This delayed emptying can cause nausea, vomiting, and other symptoms. Malnutrition can result. Bezoars (hardened lumps of food) can form in the stomach and cause other complications as well.  Causes of gastroparesis  Gastroparesis can be caused by any of the following:    Diabetes    Surgery involving any of the digestive organs, such as the stomach and bowels    Certain medications, such as strong pain medicines (narcotics)    Certain conditions, such as systemic scleroderma, Parkinson s disease, and thyroid disease  In many cases, the cause of gastroparesis cannot be found.  Signs and symptoms of gastroparesis  These can include:    Nausea and vomiting    Feeling full quickly when eating    Abdominal pain    Heartburn    Abdominal bloating    Weight loss    Loss of appetite    High and low blood sugar levels (in people with diabetes)  Diagnosing gastroparesis  Your doctor will ask about your symptoms and health history. You ll also be examined. In addition, blood tests and X-rays are often done to check your health and rule out other problems. To confirm the problem, you may need other tests as well. These can include:    Upper endoscopy. This is done to see inside the stomach and duodenum. For the test, an endoscope is used. This is a thin, flexible tube with a tiny camera on the end. It s inserted through the mouth and down into the stomach and duodenum.    Upper gastrointestinal (GI) series. This is done to take X-rays of the upper GI tract from the mouth to the small bowel. For the test, a substance called barium is used. The barium coats the upper GI tract so that it will show up clearly on X-rays.    Gastric emptying scan. This is done to measure how quickly food leaves the stomach. For the test, a  meal containing a harmless radioactive substance (tracer) is eaten. Then scans of the stomach are done. The tracer shows up clearly on the scans and shows the movement of the food through the stomach.  Treating gastroparesis  The goal of treatment is to help you manage your condition. Treatment may include one or more of the following:    Dietary changes. You may need to make changes to your eating habits and daily diet. For instance, your doctor may instruct you to eat small meals throughout the day. Doing this can keep you from feeling full too quickly. You may be placed on a liquid or  soft  diet. This means you ll eat liquid foods or foods that are mashed or put through a . In addition, you may need to avoid foods high in fats and fiber. These can slow digestion. For more help with your diet, your doctor can refer you to a dietitian. In severe cases, you may need a feeding tube. This sends liquid food or medication directly to your small bowel, bypassing the stomach.    Medicines. These can help manage symptoms, such as nausea and vomiting. They can also improve motility. Each medicine has specific risks and side effects. Your doctor can tell you more about any medicine that is prescribed for you.    Surgery. You may need to have a tube surgically inserted into the stomach. The tube removes excess air and fluid. This can relieve severe symptoms of nausea and vomiting. In rare cases, other surgery may be needed on the stomach or small bowel. This is to create a new passageway for food to be emptied from the stomach.    Gastric electrical stimulation. This treatment is done less often and may not be available. Your doctor can tell you more about this treatment if it is a choice for you.  Diabetes and gastroparesis  If you have diabetes, gastroparesis can make it harder to manage your blood sugar level. You ll need to take extra steps in your treatment to prevent complications. Work with your doctor to learn  what you can do to protect your health. For more information, contact the American Diabetes Association, www.diabetes.org.   Long-term concerns   With treatment, most people can manage their symptoms and maintain their usual routines. If your symptoms are moderate to severe, you may need to see your doctor more frequently for checkups. Also, other treatments will likely be needed.    4220-4636 [employer]. [address] [place] [address], [place] [address]. All rights reserved. This information is not intended as a substitute for professional medical care. Always follow your healthcare professional's instructions.          Future Appointments        Provider Department Dept Phone Center    7/17/2017 8:40 AM Gilmer Lees MD [facility] Gastroenterology and [phone] [facility]      24 Hour Appointment Hotline       To make an appointment at any [facility], call [phone] ([phone]). If you don't have a family doctor or clinic, we will help you find one. [facility] clinics are conveniently located to serve the needs of you and your family.             Review of your medicines      START taking        Dose / Directions Last dose taken    diphenhydrAMINE 25 MG tablet   Commonly known as:  BENADRYL ALLERGY   Dose:  25 mg   Quantity:  30 tablet        Take 1 tablet (25 mg) by mouth every 6 hours as needed for itching or other (Nausea)   Refills:  0        HYDROmorphone 2 MG tablet   Commonly known as:  DILAUDID   Dose:  2 mg   Quantity:  18 tablet        Take 1 tablet (2 mg) by mouth every 6 hours as needed for severe pain   Refills:  0          Our records show that you are taking the medicines listed below. If these are incorrect, please call your family doctor or clinic.        Dose / Directions Last dose taken    ACETAMINOPHEN PO   Dose:  500-1000 mg        Take 500-1,000 mg by mouth every 6 hours as needed for pain   Refills:  0        albuterol 90 MCG/ACT inhaler   Dose:  2 puff        Inhale 2  puffs into the lungs every 6 hours as needed   Refills:  0        cloNIDine 0.2 MG tablet   Commonly known as:  CATAPRES   Dose:  0.4 mg   Quantity:  180 tablet        Take 2 tablets (0.4 mg) by mouth every evening   Refills:  0        DULoxetine 60 MG EC capsule   Commonly known as:  CYMBALTA   Dose:  60 mg   Quantity:  90 capsule        Take 1 capsule (60 mg) by mouth daily   Refills:  3        mirtazapine 15 MG ODT tab   Commonly known as:  REMERON SOL-TAB   Dose:  7.5 mg   Quantity:  45 tablet        0.5 tablets (7.5 mg) by Orally disintegrating tablet route At Bedtime   Refills:  0        nortriptyline 10 MG capsule   Commonly known as:  PAMELOR   Dose:  30-40 mg   Quantity:  120 capsule        Take 3-4 capsules (30-40 mg) by mouth At Bedtime   Refills:  5        ondansetron 4 MG ODT tab   Commonly known as:  ZOFRAN ODT   Dose:  4-8 mg   Quantity:  40 tablet        Take 1-2 tablets (4-8 mg) by mouth every 6 hours as needed for nausea   Refills:  0        rivaroxaban ANTICOAGULANT 20 MG Tabs tablet   Commonly known as:  XARELTO   Dose:  20 mg   Quantity:  90 tablet        Take 1 tablet (20 mg) by mouth daily (with dinner)   Refills:  1        SUMAtriptan 50 MG tablet   Commonly known as:  IMITREX   Dose:   mg   Quantity:  18 tablet        Take 1-2 tablets ( mg) by mouth at onset of headache for migraine - may repeat dose after 2h if headache recurs.  Max: 200mg/24 hours   Refills:  1        traZODone 50 MG tablet   Commonly known as:  DESYREL   Dose:   mg   Quantity:  180 tablet        Take 1-2 tablets ( mg) by mouth nightly as needed 1-2 tabs at bedtime PRN   Refills:  1                Prescriptions were sent or printed at these locations (2 Prescriptions)                   [facility] [place], [place] - [address]   [address] [address]    Telephone:  [phone]   Fax:  [phone]   Hours:                  E-Prescribed (1 of 2)          diphenhydrAMINE (BENADRYL ALLERGY) 25 MG tablet                 Printed at Department/Unit printer (1 of 2)         HYDROmorphone (DILAUDID) 2 MG tablet                Procedures and tests performed during your visit     CBC with platelets differential    Comprehensive metabolic panel    Lactic acid whole blood    Lipase    Peripheral IV catheter    XR Abdomen 2 Views      Orders Needing Specimen Collection     Ordered          05/16/17 1342  UA with Microscopic - STAT, Prio: STAT, Needs to be Collected     Scheduled Task Status   05/16/17 1341 Collect UA with Microscopic Open   Order Class:  PCU Collect                  Pending Results     No orders found from 5/14/2017 to 5/17/2017.            Pending Culture Results     No orders found from 5/14/2017 to 5/17/2017.            Pending Results Instructions     If you had any lab results that were not finalized at the time of your Discharge, you can call the ED Lab Result RN at [phone]. You will be contacted by this team for any positive Lab results or changes in treatment. The nurses are available 7 days a week from 10A to 6:30P.  You can leave a message 24 hours per day and they will return your call.        Thank you for choosing [facility]       Thank you for choosing [facility] for your care. Our goal is always to provide you with excellent care. Hearing back from our patients is one way we can continue to improve our services. Please take a few minutes to complete the written survey that you may receive in the mail after you visit with us. Thank you!        [facility] Information     [employer] gives you secure access to your electronic health record. If you see a primary care provider, you can also send messages to your care team and make appointments. If you have questions, please call your primary care clinic.  If you do not have a primary care provider, please call [phone] and they will assist you.        Care EveryWhere ID     This is your Care EveryWhere  ID. This could be used by other organizations to access your [facility] medical records  [id number]        After Visit Summary       This is your record. Keep this with you and show to your community pharmacist(s) and doctor(s) at your next visit.

## 2017-05-16 NOTE — ED AVS SNAPSHOT
[facility] Emergency Department    [address] [place] [address]    Phone:  [phone]    Fax:  [phone]                                       Doreen Peralta   MRN: [id number]    Department:  [facility] Emergency Department   Date of Visit:  5/16/2017           After Visit Summary Signature Page     I have received my discharge instructions, and my questions have been answered. I have discussed any challenges I see with this plan with the nurse or doctor.    ..........................................................................................................................................  Patient/Patient Representative Signature      ..........................................................................................................................................  Patient Representative Print Name and Relationship to Patient    ..................................................               ................................................  Date                                            Time    ..........................................................................................................................................  Reviewed by Signature/Title    ...................................................              ..............................................  Date                                                            Time

## 2017-05-16 NOTE — PROGRESS NOTES
Contacted patient in response call center message.     Per Dr. Lees will enter generic infusion plan for weekly PRN infusion.     Will add patient to cancellation list for clinic follow up.     Patient verbalized understanding of plan.

## 2017-05-16 NOTE — ED PROVIDER NOTES
History     Chief Complaint   Patient presents with     Abdominal Pain     The history is provided by the patient.     Doreen Peralta is a 37 year old female with an extensive GI history including gastroparesis s/p G tube placement, chronic abdominal pain, multiple bowel surgeries including partial colectomy, chronic malnutrition, and recurrent sepsis, who presents to the ED complaining of diffuse abdominal pain and vomiting. Patient was initially seen in the ED yesterday at which time she reported that she has been feeling unwell over the weekend. Two days ago, she developed a fever of 101 F. The following day, she had a sudden onset of abdominal pain and vomiting. She was given IV Fluids, IV Dilaudid, and IV Zofran at that time with some relief. She had normal labs and imaging, so she was discharged and told to follow up with GI. Today, patient complains of abdominal pain, vomiting, abdominal distension, increased diarrhea, and chills. She has been unable to stay hydrated because she cannot keep anything down. Patient says that she attempted to get an appointment with GI at the [facility], but she could not get in. She complains that her pain is intolerable. Patient denies any hematochezia, melena, or other associated symptoms.     Patient Active Problem List   Diagnosis     Constipation by delayed colonic transit     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     Hx SBO     S/P LEEP of cervix     Gastroparesis     Migraines     Intermittent asthma     Allergic rhinitis     Abnormal Pap smear of cervix     PEG (percutaneous endoscopic gastrostomy) status     Health Care Home     PEG tube malfunction (H)     Malfunction of gastrostomy tube (H)     Malfunctioning jejunostomy tube (H)     Jejunostomy tube present (H)     S/P partial resection of colon     Malnutrition (H)     Long-term (current) use of anticoagulants [Z79.01]     Anxiety     Anemia in other chronic diseases classified elsewhere     Munchausen syndrome -  previously suspected     Anemia, iron deficiency     Mitral regurgitation mild-mod by Echo June 2016     Atopic rhinitis     Migraine     Patellofemoral stress syndrome     Hyperbilirubinemia     Chronic diarrhea     Coagulation defect (H) [D68.9]     Fever     Attention to G-tube (H)     Chronic abdominal pain     Vitamin D deficiency     SIRS (systemic inflammatory response syndrome) (H)     History of deep venous thrombosis     Nausea and vomiting     Abdominal pain, generalized     Abdominal pain     Insomnia, unspecified type     Past Medical History:   Diagnosis Date     Asthma      Bilateral ovarian cysts      Cervical cancer (H) 01/01/2008    cervical cancer      Chronic pain      Colonic dysmotility     s/p subtotal colectomy     Constipation     chronic     E. coli sepsis (H) 5/8/2016     Enteritis      Fungemia 5/5/2016     Gastro-oesophageal reflux disease      H/O ileostomy      Hx of abnormal Pap smear     s/p LEEP - no further details provided     Hypertension      IBS (irritable bowel syndrome)      Other chronic pain      PONV (postoperative nausea and vomiting)      Thrombosis, hepatic vein (H)     microvascular       Past [facility] History:   Procedure Laterality Date     COLONOSCOPY  7/10/2012    Procedure: COLONOSCOPY;;  Surgeon: Nicole Redding MD;  [facility]     COLONOSCOPY N/A 2/19/2017    Procedure: COLONOSCOPY;  Surgeon: Randell Muller MD;  Location: [facility]     COLONOSCOPY N/A 2/21/2017    Procedure: COLONOSCOPY;  Surgeon: Randell Muller MD;  Location: [facility]     ECHO CHELO  7/19/2016          ENDOSCOPIC INSERTION TUBE GASTROSTOMY N/A 1/21/2016    Procedure: ENDOSCOPIC INSERTION TUBE GASTROSTOMY;  Surgeon: Nicole Redding MD;  [facility]     ESOPHAGOSCOPY, GASTROSCOPY, DUODENOSCOPY (EGD), COMBINED  7/10/2012    Procedure: COMBINED ESOPHAGOSCOPY, GASTROSCOPY, DUODENOSCOPY (EGD);  Upper Endoscopy, Ileoscopy    Latex Allergy  with biopsies;  Surgeon: Miladis  Nicole Trejo MD;  Location[facility]     ESOPHAGOSCOPY, GASTROSCOPY, DUODENOSCOPY (EGD), COMBINED N/A 11/5/2014    Procedure: COMBINED ESOPHAGOSCOPY, GASTROSCOPY, DUODENOSCOPY (EGD);  Surgeon: Nicole Redding MD;  [facility]     HC REPLACE DUODENOSTOMY/JEJUNOSTOMY TUBE PERCUTANEOUS N/A 8/27/2015    Procedure: REPLACE GASTROJEJUNOSTOMY TUBE, PERCUTANEOUS;  Surgeon: Mio Holder MD;  [facility]     HC REPLACE DUODENOSTOMY/JEJUNOSTOMY TUBE PERCUTANEOUS N/A 1/7/2016    Procedure: REPLACE JEJUNOSTOMY TUBE, PERCUTANEOUS;  Surgeon: Elsa Medel MD;  [facility]     HC REPLACE DUODENOSTOMY/JEJUNOSTOMY TUBE PERCUTANEOUS N/A 1/28/2016    Procedure: REPLACE JEJUNOSTOMY TUBE, PERCUTANEOUS;  Surgeon: lEsa Medel MD;  [facility]     HC REPLACE GASTROSTOMY/CECOSTOMY TUBE PERCUTANEOUS Left 5/19/2015    Procedure: REPLACE GASTROSTOMY TUBE, PERCUTANEOUS;  Surgeon: Melecio Morejon Chi, MD;  Location: [facility]     HC UGI ENDOSCOPY W PLACEMENT GASTROSTOMY TUBE PERCUT N/A 10/1/2015    Procedure: COMBINED ESOPHAGOSCOPY, GASTROSCOPY, DUODENOSCOPY (EGD), PLACE PERCUTANEOUS ENDOSCOPIC GASTROSTOMY TUBE;  Surgeon: Mio Holder MD;  [facility]     LAPAROSCOPIC ASSISTED COLECTOMY  1/20/2012    Procedure:LAPAROSCOPIC ASSISTED COLECTOMY; Laparoscopic Ileostomy       LAPAROSCOPIC ASSISTED COLECTOMY LEFT (DESCENDING)  10/24/2012    Procedure: LAPAROSCOPIC ASSISTED COLECTOMY LEFT (DESCENDING);   Hand Assisted Laproscopic Subtotal abdominal Colectomy,Iieorectal anastamosis, Ileostomy Closure.       LAPAROSCOPIC ASSISTED INSERTION TUBE JEJUNOSTOMY N/A 10/16/2015    Procedure: LAPAROSCOPIC ASSISTED INSERTION TUBE JEJUNOSTOMY;  Surgeon: Elsa Medel MD;  [facility]     LAPAROSCOPIC CHOLECYSTECTOMY  2002    [facility] stones duct     LAPAROSCOPIC ILEOSTOMY  1/20/2012    [facility], loop     LAPAROSCOPIC OOPHORECTOMY Right 2009    [facility]     LAPAROTOMY EXPLORATORY N/A 1/28/2016     Procedure: LAPAROTOMY EXPLORATORY;  Surgeon: Elsa Medel MD;  Location: [facility]     LEEP TX, CERVICAL  2009    [facility]     PICC INSERTION Left 10/21/2015    5fr DL Power PICC, 37cm (2cm external) in the L basilic vein w/ tip in the SVC RA junction.     REMOVE GASTROSTOMY TUBE ADULT N/A 12/12/2014    Procedure: REMOVE GASTROSTOMY TUBE ADULT;  Surgeon: Nicole Redding MD;  Location: [facility]     REMOVE PORT VASCULAR ACCESS Right 6/30/2016    Procedure: REMOVE PORT VASCULAR ACCESS;  Surgeon: Pradeep Orosco MD;  Location[facility]     replace GASTROSTOMY TUBE ADULT  5/19/15       Family History   Problem Relation Age of Onset     Thyroid Disease Mother      Sjogren's Mother      GASTROINTESTINAL DISEASE Mother      Intermittent nausea vomiting diarrhea     Colon Polyps Mother      Prostate Problems Father      prostate enlargement     Lupus Maternal Grandmother      CANCER Maternal Grandfather      Lung     Colon Cancer Maternal Grandfather 65     CANCER Paternal Grandmother      Lung      CEREBROVASCULAR DISEASE Paternal Grandmother      DIABETES Paternal Grandmother      Cardiovascular Paternal Grandmother      CHF     CANCER Paternal Grandfather      Lung     Glaucoma Paternal Grandfather      Abdominal Aortic Aneurysm Other      Macular Degeneration No family hx of        Social History   Substance Use Topics     Smoking status: Former Smoker     Packs/day: 1.00     Years: 4.00     Types: Cigarettes     Quit date: 1/1/2004     Smokeless tobacco: Former User     Alcohol use No        Immunization History   Administered Date(s) Administered     Influenza (IIV3) 10/01/2009     Pneumococcal 23 valent 07/18/2014          Allergies   Allergen Reactions     Hyoscyamine Rash     Metoclopramide Other (See Comments)     Eye twitching.      Peaches [Peach] Other (See Comments)     Raw. Cooked OK     Sucralose Other (See Comments)     All artificial sweeteners. Aspartame also. Swollen glands     Advair  Diskus Other (See Comments)     Throat burns     Azithromycin Other (See Comments)     Burning in throat     Compazine [Prochlorperazine] Visual Disturbance     Contrast Dye Itching     States is allergic to CT contrast dye     Cyclobenzaprine Visual Disturbance     Fentanyl Other (See Comments)     migraine     Ibuprofen GI Disturbance     Lactulose Nausea and Vomiting     Gas and bloating     Levaquin [Levofloxacin] Swelling     Per ED M.D. And RN      Morphine Sulfate Other (See Comments)     Chest pain       Oxycodone Other (See Comments)     Burning throat, but can take Norco.      Penicillins Other (See Comments)     Family hx of resp arrest, she has never taken  Ok with cephalosporins     Rizatriptan Visual Disturbance     Droperidol Hives and Rash     Isovue [Iopamidol] Palpitations     Pt had racing heart and sob      Ketorolac Anxiety     Latex Swelling and Rash     Kiwi, likely also avacado, ? banana     Levsin Rash       Current Outpatient Prescriptions   Medication Sig Dispense Refill     ondansetron (ZOFRAN ODT) 4 MG ODT tab Take 1-2 tablets (4-8 mg) by mouth every 6 hours as needed for nausea 40 tablet 0     traZODone (DESYREL) 50 MG tablet Take 1-2 tablets ( mg) by mouth nightly as needed 1-2 tabs at bedtime  tablet 1     nortriptyline (PAMELOR) 10 MG capsule Take 3-4 capsules (30-40 mg) by mouth At Bedtime 120 capsule 5     cloNIDine (CATAPRES) 0.2 MG tablet Take 2 tablets (0.4 mg) by mouth every evening 180 tablet 0     mirtazapine (REMERON SOL-TAB) 15 MG ODT tab 0.5 tablets (7.5 mg) by Orally disintegrating tablet route At Bedtime 45 tablet 0     DULoxetine (CYMBALTA) 60 MG capsule Take 1 capsule (60 mg) by mouth daily 90 capsule 3     ACETAMINOPHEN PO Take 500-1,000 mg by mouth every 6 hours as needed for pain        SUMAtriptan (IMITREX) 50 MG tablet Take 1-2 tablets ( mg) by mouth at onset of headache for migraine - may repeat dose after 2h if headache recurs.  Max: 200mg/24  hours 18 tablet 1     rivaroxaban ANTICOAGULANT (XARELTO) 20 MG TABS tablet Take 1 tablet (20 mg) by mouth daily (with dinner) 90 tablet 1     albuterol 90 MCG/ACT inhaler Inhale 2 puffs into the lungs every 6 hours as needed        I have reviewed the Medications, Allergies, Past Medical and [facility] History, and Social History in the Epic system.    Review of Systems   Constitutional: Positive for appetite change (decreased), chills and fatigue (subjective).   Gastrointestinal: Positive for abdominal distention, abdominal pain (generalized), diarrhea, nausea and vomiting. Negative for anal bleeding and blood in stool.   All other systems reviewed and are negative.      Physical Exam   BP: (!) 133/93  Pulse: 90  Temp: 98.1  F (36.7  C)  Resp: 18  SpO2: 100 %    Physical Exam   Constitutional: She is oriented to person, place, and time. She appears distressed.   HENT:   Head: Normocephalic and atraumatic.   Eyes: Conjunctivae and EOM are normal.   Neck: Neck supple.   Cardiovascular: Normal rate, regular rhythm, normal heart sounds and intact distal pulses.    No murmur heard.  Pulmonary/Chest: Effort normal and breath sounds normal. No respiratory distress. She has no wheezes. She has no rales. She exhibits no tenderness.   Abdominal: She exhibits distension (generalized). There is tenderness (throughout).   G tube present.    Musculoskeletal: Normal range of motion.   Neurological: She is alert and oriented to person, place, and time.   Skin: Skin is warm and dry. No rash noted. She is not diaphoretic. No pallor.   Psychiatric: She has a normal mood and affect. Her behavior is normal.   Nursing note and vitals reviewed.      ED Course     ED Course     Procedures      Results for orders placed or performed during the hospital encounter of 05/16/17 (from the past 24 hour(s))   Comprehensive metabolic panel   Result Value Ref Range    Sodium 138 133 - 144 mmol/L    Potassium 4.4 3.4 - 5.3 mmol/L    Chloride 107 94 -  109 mmol/L    Carbon Dioxide 20 20 - 32 mmol/L    Anion Gap 11 3 - 14 mmol/L    Glucose 78 70 - 99 mg/dL    Urea Nitrogen 9 7 - 30 mg/dL    Creatinine 0.86 0.52 - 1.04 mg/dL    GFR Estimate 74 >60 mL/min/1.7m2    GFR Estimate If Black 90 >60 mL/min/1.7m2    Calcium 8.7 8.5 - 10.1 mg/dL    Bilirubin Total 0.3 0.2 - 1.3 mg/dL    Albumin 3.6 3.4 - 5.0 g/dL    Protein Total 8.1 6.8 - 8.8 g/dL    Alkaline Phosphatase 135 40 - 150 U/L    ALT 35 0 - 50 U/L    AST 33 0 - 45 U/L   Lactic acid whole blood   Result Value Ref Range    Lactic Acid 1.2 0.7 - 2.1 mmol/L   Lipase   Result Value Ref Range    Lipase 127 73 - 393 U/L   CBC with platelets differential   Result Value Ref Range    WBC 9.3 4.0 - 11.0 10e9/L    RBC Count 4.64 3.8 - 5.2 10e12/L    Hemoglobin 11.0 (L) 11.7 - 15.7 g/dL    Hematocrit 35.9 35.0 - 47.0 %    MCV 77 (L) 78 - 100 fl    MCH 23.7 (L) 26.5 - 33.0 pg    MCHC 30.6 (L) 31.5 - 36.5 g/dL    RDW 19.3 (H) 10.0 - 15.0 %    Platelet Count 457 (H) 150 - 450 10e9/L    Diff Method Automated Method     % Neutrophils 67.2 %    % Lymphocytes 25.8 %    % Monocytes 5.9 %    % Eosinophils 0.6 %    % Basophils 0.3 %    % Immature Granulocytes 0.2 %    Absolute Neutrophil 6.3 1.6 - 8.3 10e9/L    Absolute Lymphocytes 2.4 0.8 - 5.3 10e9/L    Absolute Monocytes 0.6 0.0 - 1.3 10e9/L    Absolute Eosinophils 0.1 0.0 - 0.7 10e9/L    Absolute Basophils 0.0 0.0 - 0.2 10e9/L    Abs Immature Granulocytes 0.0 0 - 0.4 10e9/L   XR Abdomen 2 Views    Narrative    ABDOMEN TWO VIEWS   5/16/2017 3:56 PM     HISTORY: Pain.    COMPARISON: 5/15/2017.      Impression    IMPRESSION: Gastrostomy tube projects over the left upper quadrant.  There are surgical clips from cholecystectomy. Nonobstructive gas  pattern. No evidence of free air.    BALWINDER MEADE MD     *Note: Due to a large number of results and/or encounters for the requested time period, some results have not been displayed. A complete set of results can be found in Results  Review.     Medications   0.9% sodium chloride BOLUS (0 mLs Intravenous Stopped 5/16/17 1732)   dextrose 5% and 0.45% NaCl BOLUS (0 mLs Intravenous Stopped 5/16/17 1830)   HYDROmorphone (DILAUDID) injection 1 mg (1 mg Intravenous Given 5/16/17 1500)   HYDROmorphone (DILAUDID) injection 1 mg (1 mg Intravenous Given 5/16/17 1542)   diphenhydrAMINE (BENADRYL) injection 25 mg (25 mg Intravenous Given 5/16/17 1540)   HYDROmorphone (DILAUDID) injection 1 mg (1 mg Intravenous Given 5/16/17 1712)   diphenhydrAMINE (BENADRYL) injection 25 mg (25 mg Intravenous Given 5/16/17 1711)     Assessments & Plan (with Medical Decision Making)  Doreen is 37 year old female with an extensive GI history, who presents to the ED complaining of abdominal pain, distension, nausea, and vomiting. She was seen in the ED yesterday and treated with Dilaudid, Zofran, and IV fluids. She had normal imaging and labs at that time so she was discharged. Patient  Is afebrile with normal vitals upon presentation to the ED. On exam, she exhibits abdominal tenderness and distension throughout. Patient was given IV Fluids, IV Dilaudid, and IV Zofran/Benadryl here in the ED with good relief. Labs drawn including CBC, CMP, Lactic Acid, and Lipase.  CMP, lactic acid, lipase are unremarkable, CBC returns with a hemoglobin of 11 and other findings consistent with a mild iron deficiency anemia.  Patient is feeling much better here after several doses of IV Dilaudid.  X-ray today is unremarkable.  Patient reports she feels that she goes through these episodes where she has a flare of her gastroparesis causing her pain, nausea, vomiting and if she can get through them after a few days she feels much better.  Patient was given a liter of normal saline here, her initial IV infiltrated, a 2nd IV was placed and she was given a half a bag of D5 1 half normal saline prior to this IV infiltrating.  I feel patient at this time is stable to be discharged home, she does have  Zofran at home for her nausea and vomiting, she is requesting Benadryl for home as well, prescription was sent to pharmacy for this, I will send patient home with 18 tablets of Dilaudid for severe pain.  I did discuss with patient and her mom in detail reasons to return to the emergency department, they are agreeable to plan of care, questions were answered prior to discharge.    Patient was discharged from the emergency department her mom in stable condition.       I have reviewed the nursing notes.    I have reviewed the findings, diagnosis, plan and need for follow up with the patient.    Discharge Medication List as of 5/16/2017  6:31 PM      START taking these medications    Details   diphenhydrAMINE (BENADRYL ALLERGY) 25 MG tablet Take 1 tablet (25 mg) by mouth every 6 hours as needed for itching or other (Nausea), Disp-30 tablet, R-0, E-Prescribe      HYDROmorphone (DILAUDID) 2 MG tablet Take 1 tablet (2 mg) by mouth every 6 hours as needed for severe pain, Disp-18 tablet, R-0, Local Print             Final diagnoses:   Abdominal pain, generalized   Gastroparesis   Intractable vomiting with nausea, unspecified vomiting type     This document serves as a record of services personally performed by Shaunna Raines, PATRICIA. It was created on their behalf by Kia Webb, a trained medical scribe. The creation of this record is based on the provider's personal observations and the statements of the patient. This document has been checked and approved by the attending provider.    Note: Chart documentation done in part with Dragon Voice Recognition software. Although reviewed after completion, some word and grammatical errors may remain.    5/16/2017   [facility] EMERGENCY DEPARTMENT     Shaunna Raines, GERARD CNP  05/16/17 [id number]

## 2017-05-16 NOTE — DISCHARGE INSTRUCTIONS
Gastroparesis  Gastroparesis (also called delayed gastric emptying) occurs when the stomach takes longer than normal to empty of food. This is due to a problem with motility (the movement of the muscles in the digestive tract). For many people, gastroparesis is a lifelong condition. But treatment can help relieve symptoms and prevent complications. Read on to learn more about gastroparesis and how it can be managed.  How gastroparesis develops     Gastroparesis means that food and fluids move too slowly out of the stomach into the duodenum.   With normal motility, signals from nerves tell the stomach muscles when to contract. These muscles move food from the stomach into the duodenum (the first part of the small bowel). With gastroparesis, the nerves or muscles are damaged. This causes motility to slow down or stop completely. As a result, food cannot move from the stomach properly. This delayed emptying can cause nausea, vomiting, and other symptoms. Malnutrition can result. Bezoars (hardened lumps of food) can form in the stomach and cause other complications as well.  Causes of gastroparesis  Gastroparesis can be caused by any of the following:    Diabetes    Surgery involving any of the digestive organs, such as the stomach and bowels    Certain medications, such as strong pain medicines (narcotics)    Certain conditions, such as systemic scleroderma, Parkinson s disease, and thyroid disease  In many cases, the cause of gastroparesis cannot be found.  Signs and symptoms of gastroparesis  These can include:    Nausea and vomiting    Feeling full quickly when eating    Abdominal pain    Heartburn    Abdominal bloating    Weight loss    Loss of appetite    High and low blood sugar levels (in people with diabetes)  Diagnosing gastroparesis  Your doctor will ask about your symptoms and health history. You ll also be examined. In addition, blood tests and X-rays are often done to check your health and rule out other  problems. To confirm the problem, you may need other tests as well. These can include:    Upper endoscopy. This is done to see inside the stomach and duodenum. For the test, an endoscope is used. This is a thin, flexible tube with a tiny camera on the end. It s inserted through the mouth and down into the stomach and duodenum.    Upper gastrointestinal (GI) series. This is done to take X-rays of the upper GI tract from the mouth to the small bowel. For the test, a substance called barium is used. The barium coats the upper GI tract so that it will show up clearly on X-rays.    Gastric emptying scan. This is done to measure how quickly food leaves the stomach. For the test, a meal containing a harmless radioactive substance (tracer) is eaten. Then scans of the stomach are done. The tracer shows up clearly on the scans and shows the movement of the food through the stomach.  Treating gastroparesis  The goal of treatment is to help you manage your condition. Treatment may include one or more of the following:    Dietary changes. You may need to make changes to your eating habits and daily diet. For instance, your doctor may instruct you to eat small meals throughout the day. Doing this can keep you from feeling full too quickly. You may be placed on a liquid or  soft  diet. This means you ll eat liquid foods or foods that are mashed or put through a . In addition, you may need to avoid foods high in fats and fiber. These can slow digestion. For more help with your diet, your doctor can refer you to a dietitian. In severe cases, you may need a feeding tube. This sends liquid food or medication directly to your small bowel, bypassing the stomach.    Medicines. These can help manage symptoms, such as nausea and vomiting. They can also improve motility. Each medicine has specific risks and side effects. Your doctor can tell you more about any medicine that is prescribed for you.    Surgery. You may need to have a  tube surgically inserted into the stomach. The tube removes excess air and fluid. This can relieve severe symptoms of nausea and vomiting. In rare cases, other surgery may be needed on the stomach or small bowel. This is to create a new passageway for food to be emptied from the stomach.    Gastric electrical stimulation. This treatment is done less often and may not be available. Your doctor can tell you more about this treatment if it is a choice for you.  Diabetes and gastroparesis  If you have diabetes, gastroparesis can make it harder to manage your blood sugar level. You ll need to take extra steps in your treatment to prevent complications. Work with your doctor to learn what you can do to protect your health. For more information, contact the American Diabetes Association, www.diabetes.org.   Long-term concerns   With treatment, most people can manage their symptoms and maintain their usual routines. If your symptoms are moderate to severe, you may need to see your doctor more frequently for checkups. Also, other treatments will likely be needed.    8781-2090 [employer]. [address] [place] [address], [place] [address]. All rights reserved. This information is not intended as a substitute for professional medical care. Always follow your healthcare professional's instructions.

## 2017-05-17 ENCOUNTER — INFUSION THERAPY VISIT (OUTPATIENT)
Dept: INFUSION THERAPY | Facility: CLINIC | Age: 37
End: 2017-05-17
Attending: FAMILY MEDICINE
Payer: COMMERCIAL

## 2017-05-17 VITALS
WEIGHT: 195 LBS | TEMPERATURE: 98.2 F | HEART RATE: 84 BPM | SYSTOLIC BLOOD PRESSURE: 138 MMHG | RESPIRATION RATE: 18 BRPM | OXYGEN SATURATION: 100 % | DIASTOLIC BLOOD PRESSURE: 78 MMHG | BODY MASS INDEX: 31.47 KG/M2

## 2017-05-17 DIAGNOSIS — R11.2 NAUSEA AND VOMITING, INTRACTABILITY OF VOMITING NOT SPECIFIED, UNSPECIFIED VOMITING TYPE: ICD-10-CM

## 2017-05-17 DIAGNOSIS — R10.84 ABDOMINAL PAIN, GENERALIZED: Primary | ICD-10-CM

## 2017-05-17 PROCEDURE — 96361 HYDRATE IV INFUSION ADD-ON: CPT

## 2017-05-17 PROCEDURE — 25000128 H RX IP 250 OP 636: Performed by: INTERNAL MEDICINE

## 2017-05-17 PROCEDURE — 96365 THER/PROPH/DIAG IV INF INIT: CPT

## 2017-05-17 PROCEDURE — 25000125 ZZHC RX 250: Performed by: INTERNAL MEDICINE

## 2017-05-17 RX ADMIN — SODIUM CHLORIDE 1000 ML: 9 INJECTION, SOLUTION INTRAVENOUS at 11:25

## 2017-05-17 RX ADMIN — FOLIC ACID: 5 INJECTION, SOLUTION INTRAMUSCULAR; INTRAVENOUS; SUBCUTANEOUS at 12:48

## 2017-05-17 ASSESSMENT — PAIN SCALES - GENERAL: PAINLEVEL: MODERATE PAIN (4)

## 2017-05-17 NOTE — PROGRESS NOTES
Here for IVF including a banana bag, tolerated infusion well.  Multiple attempts necessary to obtain  IV access.

## 2017-05-17 NOTE — MR AVS SNAPSHOT
After Visit Summary   5/17/2017    Doreen Peralta    MRN: [id number]           Patient Information     Date Of Birth          1980        Visit Information        Provider Department      5/17/2017 10:30 AM NL INFUSION CHAIR 4 [facility]        Today's Diagnoses     Abdominal pain, generalized    -  1    Nausea and vomiting, intractability of vomiting not specified, unspecified vomiting type          Care Instructions    Pt to return as needed next week.         Follow-ups after your visit        Follow-up notes from your care team     Return if symptoms worsen or fail to improve.      Your next 10 appointments already scheduled     Jul 17, 2017  8:40 AM CDT   (Arrive by 8:25 AM)   Return Visit with Gilmer Lees MD   [facility] ([facility])    [address]  [address] [facility] [address] [phone]              Who to contact     If you have questions or need follow up information about today's clinic visit or your schedule please contact [facility] directly at [phone].  Normal or non-critical lab and imaging results will be communicated to you by Consultedhart, letter or phone within 4 business days after the clinic has received the results. If you do not hear from us within 7 days, please contact the clinic through Consultedhart or phone. If you have a critical or abnormal lab result, we will notify you by phone as soon as possible.  Submit refill requests through Websense or call your pharmacy and they will forward the refill request to us. Please allow 3 business days for your refill to be completed.          Additional Information About Your Visit        Consultedhart Information     Websense gives you secure access to your electronic health record. If you see a primary care provider, you can also send messages to your care team and make appointments. If you have questions, please call  your primary care clinic.  If you do not have a primary care provider, please call [phone] and they will assist you.        Care EveryWhere ID     This is your Care EveryWhere ID. This could be used by other organizations to access your [facility] medical records  [id number]        Your Vitals Were     Pulse Temperature Respirations Last Period Pulse Oximetry BMI (Body Mass Index)    84 98.2  F (36.8  C) (Oral) 18 05/09/2017 (Approximate) 100% 31.47 kg/m2       Blood Pressure from Last 3 Encounters:   05/17/17 138/78   05/16/17 140/90   05/15/17 126/80    Weight from Last 3 Encounters:   05/17/17 88.5 kg (195 lb)   04/09/17 86 kg (189 lb 9.5 oz)   04/02/17 89.6 kg (197 lb 8 oz)              Today, you had the following     No orders found for display       Primary Care Provider Office Phone # Fax #    Larisa Lorenzo PA-C [phone]       [facility] [place] [address]  [place] [address]        Thank you!     Thank you for choosing [facility]  for your care. Our goal is always to provide you with excellent care. Hearing back from our patients is one way we can continue to improve our services. Please take a few minutes to complete the written survey that you may receive in the mail after your visit with us. Thank you!             Your Updated Medication List - Protect others around you: Learn how to safely use, store and throw away your medicines at www.disposemymeds.org.          This list is accurate as of: 5/17/17  2:17 PM.  Always use your most recent med list.                   Brand Name Dispense Instructions for use    ACETAMINOPHEN PO      Take 500-1,000 mg by mouth every 6 hours as needed for pain       albuterol 90 MCG/ACT inhaler      Inhale 2 puffs into the lungs every 6 hours as needed       cloNIDine 0.2 MG tablet    CATAPRES    180 tablet    Take 2 tablets (0.4 mg) by mouth every evening       diphenhydrAMINE 25 MG tablet    BENADRYL ALLERGY     30 tablet    Take 1 tablet (25 mg) by mouth every 6 hours as needed for itching or other (Nausea)       DULoxetine 60 MG EC capsule    CYMBALTA    90 capsule    Take 1 capsule (60 mg) by mouth daily       HYDROmorphone 2 MG tablet    DILAUDID    18 tablet    Take 1 tablet (2 mg) by mouth every 6 hours as needed for severe pain       mirtazapine 15 MG ODT tab    REMERON SOL-TAB    45 tablet    0.5 tablets (7.5 mg) by Orally disintegrating tablet route At Bedtime       nortriptyline 10 MG capsule    PAMELOR    120 capsule    Take 3-4 capsules (30-40 mg) by mouth At Bedtime       ondansetron 4 MG ODT tab    ZOFRAN ODT    40 tablet    Take 1-2 tablets (4-8 mg) by mouth every 6 hours as needed for nausea       rivaroxaban ANTICOAGULANT 20 MG Tabs tablet    XARELTO    90 tablet    Take 1 tablet (20 mg) by mouth daily (with dinner)       SUMAtriptan 50 MG tablet    IMITREX    18 tablet    Take 1-2 tablets ( mg) by mouth at onset of headache for migraine - may repeat dose after 2h if headache recurs.  Max: 200mg/24 hours       traZODone 50 MG tablet    DESYREL    180 tablet    Take 1-2 tablets ( mg) by mouth nightly as needed 1-2 tabs at bedtime PRN

## 2017-05-22 ENCOUNTER — HOSPITAL ENCOUNTER (EMERGENCY)
Facility: CLINIC | Age: 37
Discharge: HOME | End: 2017-05-22
Attending: EMERGENCY MEDICINE | Admitting: EMERGENCY MEDICINE
Payer: COMMERCIAL

## 2017-05-22 ENCOUNTER — APPOINTMENT (OUTPATIENT)
Dept: GENERAL RADIOLOGY | Facility: CLINIC | Age: 37
End: 2017-05-22
Attending: EMERGENCY MEDICINE
Payer: COMMERCIAL

## 2017-05-22 VITALS
HEIGHT: 66 IN | SYSTOLIC BLOOD PRESSURE: 157 MMHG | BODY MASS INDEX: 32.14 KG/M2 | TEMPERATURE: 98.5 F | DIASTOLIC BLOOD PRESSURE: 100 MMHG | HEART RATE: 118 BPM | WEIGHT: 200 LBS | RESPIRATION RATE: 17 BRPM | OXYGEN SATURATION: 100 %

## 2017-05-22 DIAGNOSIS — G89.29 CHRONIC ABDOMINAL PAIN: ICD-10-CM

## 2017-05-22 DIAGNOSIS — R10.9 CHRONIC ABDOMINAL PAIN: ICD-10-CM

## 2017-05-22 PROCEDURE — 74020 XR ABDOMEN 2 VW: CPT

## 2017-05-22 PROCEDURE — 99284 EMERGENCY DEPT VISIT MOD MDM: CPT | Mod: Z6 | Performed by: EMERGENCY MEDICINE

## 2017-05-22 PROCEDURE — 99283 EMERGENCY DEPT VISIT LOW MDM: CPT | Performed by: EMERGENCY MEDICINE

## 2017-05-22 ASSESSMENT — ENCOUNTER SYMPTOMS
VOMITING: 1
ABDOMINAL PAIN: 1
NAUSEA: 1
CONSTIPATION: 0
DIARRHEA: 0

## 2017-05-22 NOTE — ED NOTES
"Triage Assessment & Note:    BP (!) 191/112  Pulse 118  Temp 98.5  F (36.9  C) (Oral)  Resp 18  Ht 1.676 m (5' 6\")  Wt 90.7 kg (200 lb)  LMP 05/09/2017 (Approximate)  SpO2 100%  BMI 32.28 kg/m2    Patient presents with: abdominal pain and vomiting. PT reports, \" something is not right this feels like the last time I had intussusception of my intestines.     Home Treatments/Remedies: None    Febrile / Afebrile? Afebrile    Duration of C/o: 1 day    Ryan An  May 22, 2017      "

## 2017-05-22 NOTE — ED PROVIDER NOTES
"  History     Chief Complaint   Patient presents with     Abdominal Pain     Nausea & Vomiting     HPI  Doreen Peralta is a 37 year old female with an extensive GI history including gastroparesis s/p G tube placement, chronic abdominal pain, multiple bowel surgeries including partial colectomy, chronic malnutrition, and recurrent sepsis who presents for evaluation of abdominal pain, nausea, and vomiting. Patient complains of severe right lower quadrant abdominal pain with associated nausea and vomiting that has been occurring today. She states the pain is worse when she sits up or is vomiting. She has had normal bowel movements today. She states this pain feels different from her chronic abdominal pain, and describes her pain as a \"muscle spasm\". She states she has had similar pain for months, however, is typically temporary and \"releases\", but today would not \"release\".     I have reviewed the Medications, Allergies, Past Medical and [facility] History, and Social History in the [employer] system.  Past Medical History:   Diagnosis Date     Asthma      Bilateral ovarian cysts      Cervical cancer (H) 01/01/2008    cervical cancer      Chronic pain      Colonic dysmotility     s/p subtotal colectomy     Constipation     chronic     E. coli sepsis (H) 5/8/2016     Enteritis      Fungemia 5/5/2016     Gastro-oesophageal reflux disease      H/O ileostomy      Hx of abnormal Pap smear     s/p LEEP - no further details provided     Hypertension      IBS (irritable bowel syndrome)      Other chronic pain      PONV (postoperative nausea and vomiting)      Thrombosis, hepatic vein (H)     microvascular       Past [facility] History:   Procedure Laterality Date     COLONOSCOPY  7/10/2012    Procedure: COLONOSCOPY;;  Surgeon: Nicole Redding MD;  [facility]: [facility]     COLONOSCOPY N/A 2/19/2017    Procedure: COLONOSCOPY;  Surgeon: Randell Muller MD;  Location: [facility]     COLONOSCOPY N/A 2/21/2017    Procedure: COLONOSCOPY;  " Surgeon: Randell Muller MD;  Location: [facility]     ECHO CHELO  7/19/2016          ENDOSCOPIC INSERTION TUBE GASTROSTOMY N/A 1/21/2016    Procedure: ENDOSCOPIC INSERTION TUBE GASTROSTOMY;  Surgeon: Nicole Redding MD;  Location: [facility]     ESOPHAGOSCOPY, GASTROSCOPY, DUODENOSCOPY (EGD), COMBINED  7/10/2012    Procedure: COMBINED ESOPHAGOSCOPY, GASTROSCOPY, DUODENOSCOPY (EGD);  Upper Endoscopy, Ileoscopy    Latex Allergy  with biopsies;  Surgeon: Nicole Redding MD;  Location: [facility]     ESOPHAGOSCOPY, GASTROSCOPY, DUODENOSCOPY (EGD), COMBINED N/A 11/5/2014    Procedure: COMBINED ESOPHAGOSCOPY, GASTROSCOPY, DUODENOSCOPY (EGD);  Surgeon: Nicole Redding MD;  Location[facility]     HC REPLACE DUODENOSTOMY/JEJUNOSTOMY TUBE PERCUTANEOUS N/A 8/27/2015    Procedure: REPLACE GASTROJEJUNOSTOMY TUBE, PERCUTANEOUS;  Surgeon: Mio Holder MD;  [facility]     HC REPLACE DUODENOSTOMY/JEJUNOSTOMY TUBE PERCUTANEOUS N/A 1/7/2016    Procedure: REPLACE JEJUNOSTOMY TUBE, PERCUTANEOUS;  Surgeon: Elsa Medel MD;  [facility]     HC REPLACE DUODENOSTOMY/JEJUNOSTOMY TUBE PERCUTANEOUS N/A 1/28/2016    Procedure: REPLACE JEJUNOSTOMY TUBE, PERCUTANEOUS;  Surgeon: Elsa Medel MD;  [facility]     HC REPLACE GASTROSTOMY/CECOSTOMY TUBE PERCUTANEOUS Left 5/19/2015    Procedure: REPLACE GASTROSTOMY TUBE, PERCUTANEOUS;  Surgeon: Melecio Morejon Chi, MD;  Location: [facility]     HC UGI ENDOSCOPY W PLACEMENT GASTROSTOMY TUBE PERCUT N/A 10/1/2015    Procedure: COMBINED ESOPHAGOSCOPY, GASTROSCOPY, DUODENOSCOPY (EGD), PLACE PERCUTANEOUS ENDOSCOPIC GASTROSTOMY TUBE;  Surgeon: Mio Holder MD;  [facility]     LAPAROSCOPIC ASSISTED COLECTOMY  1/20/2012    Procedure:LAPAROSCOPIC ASSISTED COLECTOMY; Laparoscopic Ileostomy       LAPAROSCOPIC ASSISTED COLECTOMY LEFT (DESCENDING)  10/24/2012    Procedure: LAPAROSCOPIC ASSISTED COLECTOMY LEFT (DESCENDING);   Hand Assisted Laproscopic Subtotal abdominal  Colectomy,Iieorectal anastamosis, Ileostomy Closure.       LAPAROSCOPIC ASSISTED INSERTION TUBE JEJUNOSTOMY N/A 10/16/2015    Procedure: LAPAROSCOPIC ASSISTED INSERTION TUBE JEJUNOSTOMY;  Surgeon: Elsa Medel MD;  Location: [facility]     LAPAROSCOPIC CHOLECYSTECTOMY  2002    [facility] stones duct     LAPAROSCOPIC ILEOSTOMY  1/20/2012    [facility], loop     LAPAROSCOPIC OOPHORECTOMY Right 2009    [facility]     LAPAROTOMY EXPLORATORY N/A 1/28/2016    Procedure: LAPAROTOMY EXPLORATORY;  Surgeon: Elsa Medel MD;  Location: [facility]     LEEP TX, CERVICAL  2009    [facility]     PICC INSERTION Left 10/21/2015    5fr DL Power PICC, 37cm (2cm external) in the L basilic vein w/ tip in the SVC RA junction.     REMOVE GASTROSTOMY TUBE ADULT N/A 12/12/2014    Procedure: REMOVE GASTROSTOMY TUBE ADULT;  Surgeon: Nicole Redding MD;  [facility]: [facility]     REMOVE PORT VASCULAR ACCESS Right 6/30/2016    Procedure: REMOVE PORT VASCULAR ACCESS;  Surgeon: Pradeep Orosco MD;  Location: [facility]     replace GASTROSTOMY TUBE ADULT  5/19/15       Family History   Problem Relation Age of Onset     Thyroid Disease Mother      Sjogren's Mother      GASTROINTESTINAL DISEASE Mother      Intermittent nausea vomiting diarrhea     Colon Polyps Mother      Prostate Problems Father      prostate enlargement     Lupus Maternal Grandmother      CANCER Maternal Grandfather      Lung     Colon Cancer Maternal Grandfather 65     CANCER Paternal Grandmother      Lung      CEREBROVASCULAR DISEASE Paternal Grandmother      DIABETES Paternal Grandmother      Cardiovascular Paternal Grandmother      CHF     CANCER Paternal Grandfather      Lung     Glaucoma Paternal Grandfather      Abdominal Aortic Aneurysm Other      Macular Degeneration No family hx of        Social History   Substance Use Topics     Smoking status: Former Smoker     Packs/day: 1.00     Years: 4.00     Types: Cigarettes     Quit date: 1/1/2004     Smokeless  tobacco: Former User     Alcohol use No     No current facility-administered medications for this encounter.      Current Outpatient Prescriptions   Medication     diphenhydrAMINE (BENADRYL ALLERGY) 25 MG tablet     HYDROmorphone (DILAUDID) 2 MG tablet     ondansetron (ZOFRAN ODT) 4 MG ODT tab     traZODone (DESYREL) 50 MG tablet     nortriptyline (PAMELOR) 10 MG capsule     cloNIDine (CATAPRES) 0.2 MG tablet     mirtazapine (REMERON SOL-TAB) 15 MG ODT tab     SUMAtriptan (IMITREX) 50 MG tablet     rivaroxaban ANTICOAGULANT (XARELTO) 20 MG TABS tablet     DULoxetine (CYMBALTA) 60 MG capsule     ACETAMINOPHEN PO     albuterol 90 MCG/ACT inhaler        Allergies   Allergen Reactions     Hyoscyamine Rash     Metoclopramide Other (See Comments)     Eye twitching.      Peaches [Peach] Other (See Comments)     Raw. Cooked OK     Sucralose Other (See Comments)     All artificial sweeteners. Aspartame also. Swollen glands     Advair Diskus Other (See Comments)     Throat burns     Azithromycin Other (See Comments)     Burning in throat     Compazine [Prochlorperazine] Visual Disturbance     Contrast Dye Itching     States is allergic to CT contrast dye     Cyclobenzaprine Visual Disturbance     Fentanyl Other (See Comments)     migraine     Ibuprofen GI Disturbance     Lactulose Nausea and Vomiting     Gas and bloating     Levaquin [Levofloxacin] Swelling     Per ED M.D. And RN      Morphine Sulfate Other (See Comments)     Chest pain       Oxycodone Other (See Comments)     Burning throat, but can take Norco.      Penicillins Other (See Comments)     Family hx of resp arrest, she has never taken  Ok with cephalosporins     Rizatriptan Visual Disturbance     Droperidol Hives and Rash     Isovue [Iopamidol] Palpitations     Pt had racing heart and sob      Ketorolac Anxiety     Latex Swelling and Rash     Kiwi, likely also avacado, ? banana     Levsin Rash       Review of Systems   Gastrointestinal: Positive for abdominal  "pain, nausea and vomiting. Negative for constipation and diarrhea.   Genitourinary: Negative.    All other systems reviewed and are negative.      Physical Exam   BP: (!) 191/112  Pulse: 118  Temp: 98.5  F (36.9  C)  Resp: 18  Height: 167.6 cm (5' 6\")  Weight: 90.7 kg (200 lb)  SpO2: 100 %  Physical Exam   Constitutional: She is oriented to person, place, and time. She appears well-developed and well-nourished. No distress.   Cardiovascular: Normal rate, regular rhythm and normal heart sounds.    Pulmonary/Chest: Breath sounds normal.   Abdominal: She exhibits no distension. There is no tenderness.   Neurological: She is alert and oriented to person, place, and time.   Psychiatric: Her mood appears anxious.   Nursing note and vitals reviewed.      ED Course     ED Course     Procedures       6:28 PM  The patient was seen and examined by Dr. Stern in Room 23.     Results for orders placed or performed during the hospital encounter of 05/22/17   XR Abdomen 2 Views    Narrative    EXAMINATION: XR ABDOMEN 2 VW, 5/22/2017 6:59 PM    COMPARISON: 5/16/2017    HISTORY: RLQ abd pain    FINDINGS: Percutaneous gastrostomy tube bulb is inflated and projects  over the stomach. Cholecystectomy clips are present.    Nonobstructive bowel gas pattern. No pneumatosis, portal venous gas or  pneumoperitoneum. Lung bases are clear.      Impression    Impression: Gastrostomy over the stomach. Nonobstructive bowel gas  pattern.    I have personally reviewed the examination and initial interpretation  and I agree with the findings.    IVETH JACKSON MD     *Note: Due to a large number of results and/or encounters for the requested time period, some results have not been displayed. A complete set of results can be found in Results Review.            Labs Ordered and Resulted from Time of ED Arrival Up to the Time of Departure from the ED - No data to display       8:09 PM The patient left without being completed  Assessments & Plan " (with Medical Decision Making)   Procedural female with chronic abdominal pain now with acute intermittent exacerbation of a very focal locus in the right lower quadrant at the site of her previous colostomy.  I didn't appreciate a hernia on my examination.  Flat and upright abdomen was negative.  Before the encounter could be completed she left the emergency department without informing me.  She was upset and thought that I wasn't taking her complaint seriously.    I have reviewed the nursing notes.    I have reviewed the findings, diagnosis, plan and need for follow up with the patient.    New Prescriptions    No medications on file       Final diagnoses:   Chronic abdominal pain   I, Korin Prather, am serving as a trained medical scribe to document services personally performed by Tonio Stern MD, based on the provider's statements to me.   ITonio MD, was physically present and have reviewed and verified the accuracy of this note documented by Korin Prather.      5/22/2017   [facility], [place], EMERGENCY DEPARTMENT     Kwadwo Stern MD  05/22/17 2013

## 2017-05-22 NOTE — Clinical Note
Patient left before she was officially discharged. She did not want to see the provider again. She signed the AMA form before departure

## 2017-05-26 ENCOUNTER — HEALTH MAINTENANCE LETTER (OUTPATIENT)
Age: 37
End: 2017-05-26

## 2017-06-24 DIAGNOSIS — F41.9 ANXIETY: ICD-10-CM

## 2017-06-26 ENCOUNTER — HOSPITAL ENCOUNTER (EMERGENCY)
Facility: CLINIC | Age: 37
Discharge: HOME | End: 2017-06-26
Attending: FAMILY MEDICINE | Admitting: FAMILY MEDICINE
Payer: COMMERCIAL

## 2017-06-26 VITALS
SYSTOLIC BLOOD PRESSURE: 142 MMHG | RESPIRATION RATE: 18 BRPM | OXYGEN SATURATION: 100 % | DIASTOLIC BLOOD PRESSURE: 91 MMHG | TEMPERATURE: 97.9 F | HEART RATE: 95 BPM

## 2017-06-26 DIAGNOSIS — R11.2 NAUSEA AND VOMITING, INTRACTABILITY OF VOMITING NOT SPECIFIED, UNSPECIFIED VOMITING TYPE: ICD-10-CM

## 2017-06-26 DIAGNOSIS — R11.10 INTRACTABLE VOMITING, PRESENCE OF NAUSEA NOT SPECIFIED, UNSPECIFIED VOMITING TYPE: ICD-10-CM

## 2017-06-26 DIAGNOSIS — K31.84 GASTROPARESIS: ICD-10-CM

## 2017-06-26 DIAGNOSIS — R50.9 FEVER, UNSPECIFIED FEVER CAUSE: Primary | ICD-10-CM

## 2017-06-26 DIAGNOSIS — R10.84 ABDOMINAL PAIN, GENERALIZED: ICD-10-CM

## 2017-06-26 DIAGNOSIS — R50.9 FEVER, UNSPECIFIED FEVER CAUSE: ICD-10-CM

## 2017-06-26 LAB
ALBUMIN SERPL-MCNC: 3.9 G/DL (ref 3.4–5)
ALBUMIN SERPL-MCNC: 4.1 G/DL (ref 3.4–5)
ALP SERPL-CCNC: 168 U/L (ref 40–150)
ALP SERPL-CCNC: 168 U/L (ref 40–150)
ALT SERPL W P-5'-P-CCNC: 30 U/L (ref 0–50)
ALT SERPL W P-5'-P-CCNC: 33 U/L (ref 0–50)
ANION GAP SERPL CALCULATED.3IONS-SCNC: 10 MMOL/L (ref 3–14)
ANION GAP SERPL CALCULATED.3IONS-SCNC: 9 MMOL/L (ref 3–14)
AST SERPL W P-5'-P-CCNC: 21 U/L (ref 0–45)
AST SERPL W P-5'-P-CCNC: 25 U/L (ref 0–45)
BASOPHILS # BLD AUTO: 0 10E9/L (ref 0–0.2)
BASOPHILS NFR BLD AUTO: 0.2 %
BILIRUB SERPL-MCNC: 0.4 MG/DL (ref 0.2–1.3)
BILIRUB SERPL-MCNC: 0.5 MG/DL (ref 0.2–1.3)
BUN SERPL-MCNC: 12 MG/DL (ref 7–30)
BUN SERPL-MCNC: 15 MG/DL (ref 7–30)
CALCIUM SERPL-MCNC: 9.1 MG/DL (ref 8.5–10.1)
CALCIUM SERPL-MCNC: 9.6 MG/DL (ref 8.5–10.1)
CHLORIDE SERPL-SCNC: 102 MMOL/L (ref 94–109)
CHLORIDE SERPL-SCNC: 104 MMOL/L (ref 94–109)
CO2 SERPL-SCNC: 23 MMOL/L (ref 20–32)
CO2 SERPL-SCNC: 24 MMOL/L (ref 20–32)
CREAT SERPL-MCNC: 1 MG/DL (ref 0.52–1.04)
CREAT SERPL-MCNC: 1.01 MG/DL (ref 0.52–1.04)
CRP SERPL-MCNC: 10.2 MG/L (ref 0–8)
CRP SERPL-MCNC: 9.4 MG/L (ref 0–8)
DIFFERENTIAL METHOD BLD: ABNORMAL
EOSINOPHIL # BLD AUTO: 0.1 10E9/L (ref 0–0.7)
EOSINOPHIL NFR BLD AUTO: 0.7 %
ERYTHROCYTE [DISTWIDTH] IN BLOOD BY AUTOMATED COUNT: 18.1 % (ref 10–15)
ERYTHROCYTE [SEDIMENTATION RATE] IN BLOOD BY WESTERGREN METHOD: 35 MM/H (ref 0–20)
GFR SERPL CREATININE-BSD FRML MDRD: 62 ML/MIN/1.7M2
GFR SERPL CREATININE-BSD FRML MDRD: 63 ML/MIN/1.7M2
GLUCOSE SERPL-MCNC: 77 MG/DL (ref 70–99)
GLUCOSE SERPL-MCNC: 85 MG/DL (ref 70–99)
HCT VFR BLD AUTO: 38.1 % (ref 35–47)
HGB BLD-MCNC: 12.3 G/DL (ref 11.7–15.7)
LIPASE SERPL-CCNC: 164 U/L (ref 73–393)
LYMPHOCYTES # BLD AUTO: 2.7 10E9/L (ref 0.8–5.3)
LYMPHOCYTES NFR BLD AUTO: 30.6 %
MCH RBC QN AUTO: 24.5 PG (ref 26.5–33)
MCHC RBC AUTO-ENTMCNC: 32.3 G/DL (ref 31.5–36.5)
MCV RBC AUTO: 76 FL (ref 78–100)
MONOCYTES # BLD AUTO: 0.5 10E9/L (ref 0–1.3)
MONOCYTES NFR BLD AUTO: 5.6 %
NEUTROPHILS # BLD AUTO: 5.5 10E9/L (ref 1.6–8.3)
NEUTROPHILS NFR BLD AUTO: 62.9 %
PLATELET # BLD AUTO: 540 10E9/L (ref 150–450)
POTASSIUM SERPL-SCNC: 4.8 MMOL/L (ref 3.4–5.3)
POTASSIUM SERPL-SCNC: 4.9 MMOL/L (ref 3.4–5.3)
PROT SERPL-MCNC: 8.4 G/DL (ref 6.8–8.8)
PROT SERPL-MCNC: 9.2 G/DL (ref 6.8–8.8)
RBC # BLD AUTO: 5.03 10E12/L (ref 3.8–5.2)
SODIUM SERPL-SCNC: 135 MMOL/L (ref 133–144)
SODIUM SERPL-SCNC: 137 MMOL/L (ref 133–144)
WBC # BLD AUTO: 8.8 10E9/L (ref 4–11)

## 2017-06-26 PROCEDURE — 80053 COMPREHEN METABOLIC PANEL: CPT | Performed by: FAMILY MEDICINE

## 2017-06-26 PROCEDURE — 85652 RBC SED RATE AUTOMATED: CPT | Performed by: FAMILY MEDICINE

## 2017-06-26 PROCEDURE — 96374 THER/PROPH/DIAG INJ IV PUSH: CPT | Performed by: FAMILY MEDICINE

## 2017-06-26 PROCEDURE — 80053 COMPREHEN METABOLIC PANEL: CPT | Performed by: INTERNAL MEDICINE

## 2017-06-26 PROCEDURE — 86140 C-REACTIVE PROTEIN: CPT | Performed by: FAMILY MEDICINE

## 2017-06-26 PROCEDURE — 96361 HYDRATE IV INFUSION ADD-ON: CPT | Performed by: FAMILY MEDICINE

## 2017-06-26 PROCEDURE — 96376 TX/PRO/DX INJ SAME DRUG ADON: CPT | Performed by: FAMILY MEDICINE

## 2017-06-26 PROCEDURE — 25000128 H RX IP 250 OP 636: Performed by: FAMILY MEDICINE

## 2017-06-26 PROCEDURE — 86140 C-REACTIVE PROTEIN: CPT | Performed by: INTERNAL MEDICINE

## 2017-06-26 PROCEDURE — 85025 COMPLETE CBC W/AUTO DIFF WBC: CPT | Performed by: FAMILY MEDICINE

## 2017-06-26 PROCEDURE — 99285 EMERGENCY DEPT VISIT HI MDM: CPT | Mod: 25 | Performed by: FAMILY MEDICINE

## 2017-06-26 PROCEDURE — 83690 ASSAY OF LIPASE: CPT | Performed by: FAMILY MEDICINE

## 2017-06-26 PROCEDURE — 99285 EMERGENCY DEPT VISIT HI MDM: CPT | Mod: Z6 | Performed by: FAMILY MEDICINE

## 2017-06-26 PROCEDURE — 96375 TX/PRO/DX INJ NEW DRUG ADDON: CPT | Performed by: FAMILY MEDICINE

## 2017-06-26 PROCEDURE — 36415 COLL VENOUS BLD VENIPUNCTURE: CPT | Performed by: FAMILY MEDICINE

## 2017-06-26 RX ORDER — HYDROMORPHONE HYDROCHLORIDE 2 MG/1
2 TABLET ORAL EVERY 6 HOURS PRN
Qty: 12 TABLET | Refills: 0 | Status: SHIPPED | OUTPATIENT
Start: 2017-06-26 | End: 2017-06-30

## 2017-06-26 RX ORDER — DIPHENHYDRAMINE HYDROCHLORIDE 50 MG/ML
50 INJECTION INTRAMUSCULAR; INTRAVENOUS ONCE
Status: COMPLETED | OUTPATIENT
Start: 2017-06-26 | End: 2017-06-26

## 2017-06-26 RX ORDER — LIDOCAINE 40 MG/G
CREAM TOPICAL
Status: DISCONTINUED | OUTPATIENT
Start: 2017-06-26 | End: 2017-06-26 | Stop reason: HOSPADM

## 2017-06-26 RX ORDER — SODIUM CHLORIDE 9 MG/ML
1000 INJECTION, SOLUTION INTRAVENOUS CONTINUOUS
Status: DISCONTINUED | OUTPATIENT
Start: 2017-06-26 | End: 2017-06-26 | Stop reason: HOSPADM

## 2017-06-26 RX ORDER — ONDANSETRON 4 MG/1
4-8 TABLET, ORALLY DISINTEGRATING ORAL EVERY 6 HOURS PRN
Qty: 20 TABLET | Refills: 0 | Status: ON HOLD | OUTPATIENT
Start: 2017-06-26 | End: 2017-07-30

## 2017-06-26 RX ORDER — HYDROMORPHONE HYDROCHLORIDE 1 MG/ML
0.5 INJECTION, SOLUTION INTRAMUSCULAR; INTRAVENOUS; SUBCUTANEOUS
Status: DISCONTINUED | OUTPATIENT
Start: 2017-06-26 | End: 2017-06-26 | Stop reason: HOSPADM

## 2017-06-26 RX ORDER — ONDANSETRON 2 MG/ML
4 INJECTION INTRAMUSCULAR; INTRAVENOUS EVERY 30 MIN PRN
Status: DISCONTINUED | OUTPATIENT
Start: 2017-06-26 | End: 2017-06-26 | Stop reason: HOSPADM

## 2017-06-26 RX ADMIN — SODIUM CHLORIDE 1000 ML: 9 INJECTION, SOLUTION INTRAVENOUS at 15:43

## 2017-06-26 RX ADMIN — ONDANSETRON 4 MG: 2 INJECTION INTRAMUSCULAR; INTRAVENOUS at 15:58

## 2017-06-26 RX ADMIN — HYDROMORPHONE HYDROCHLORIDE 0.5 MG: 1 INJECTION, SOLUTION INTRAMUSCULAR; INTRAVENOUS; SUBCUTANEOUS at 16:42

## 2017-06-26 RX ADMIN — HYDROMORPHONE HYDROCHLORIDE 0.5 MG: 1 INJECTION, SOLUTION INTRAMUSCULAR; INTRAVENOUS; SUBCUTANEOUS at 15:58

## 2017-06-26 RX ADMIN — DIPHENHYDRAMINE HYDROCHLORIDE 50 MG: 50 INJECTION, SOLUTION INTRAMUSCULAR; INTRAVENOUS at 16:42

## 2017-06-26 RX ADMIN — SODIUM CHLORIDE 1000 ML: 9 INJECTION, SOLUTION INTRAVENOUS at 14:39

## 2017-06-26 RX ADMIN — ONDANSETRON 4 MG: 2 INJECTION INTRAMUSCULAR; INTRAVENOUS at 16:40

## 2017-06-26 ASSESSMENT — ENCOUNTER SYMPTOMS
CHILLS: 1
DYSURIA: 0
SORE THROAT: 0
COUGH: 0
FEVER: 1
WEAKNESS: 1
SHORTNESS OF BREATH: 1

## 2017-06-26 NOTE — ED NOTES
"Pt states she has had weakness and fevers.  Called her infectious disease doctor and had labs done.  States that she feels like she \"just needs some fluids\".  Tried to get in to infusion therapy but they are unable to get her in until 06/29/17.   "

## 2017-06-26 NOTE — ED NOTES
Called lab at [facility], lab department states that the additional labs drawn this morning need to be sent to them from the [place] location.

## 2017-06-26 NOTE — TELEPHONE ENCOUNTER
CLONIDINE      Last Written Prescription Date: 5-28-17  Last Fill Quantity: 180, # refills: 0  Last Office Visit with [facility], [facility] or [facility] prescribing provider: 4-7-17       Potassium   Date Value Ref Range Status   05/16/2017 4.4 3.4 - 5.3 mmol/L Final     Creatinine   Date Value Ref Range Status   05/16/2017 0.86 0.52 - 1.04 mg/dL Final     BP Readings from Last 3 Encounters:   05/22/17 (!) 157/100   05/17/17 138/78   05/16/17 140/90

## 2017-06-26 NOTE — ED PROVIDER NOTES
History     Chief Complaint   Patient presents with     Generalized Weakness     The history is provided by the patient.     Doreen Peralta is a 37 year old female who presents to the ED with complaints of generalized weakness. The patient states that she began feeling weak all over since Thursday. She reports experiencing fever, chills, shortness of breath, burning feeling of the skin on her forearms, muscle cramps and chest pain. She endorses having blood tests done at approximately 10:30 this morning in [facility] site, ordered by her [facility]  infectious disease physician, due to her history of Sepsis. The patient denies having a cough, dysuria, sore throat, congestion and recently being around sick people. The patient is well-known to me from multiple previous ER visits. Patient with a chronic gastroparesis condition with G-tube in place. She has had multiple similar ER visits in the past. Patient states that she feels that if she gets some IV fluids that she will feel improved.     I have reviewed the Medications, Allergies, Past Medical and [facility] History, and Social History in the Epic system.    Patient Active Problem List   Diagnosis     Constipation by delayed colonic transit     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     Hx SBO     S/P LEEP of cervix     Gastroparesis     Migraines     Intermittent asthma     Allergic rhinitis     Abnormal Pap smear of cervix     PEG (percutaneous endoscopic gastrostomy) status     Health Care Home     PEG tube malfunction (H)     Malfunction of gastrostomy tube (H)     Malfunctioning jejunostomy tube (H)     Jejunostomy tube present (H)     S/P partial resection of colon     Malnutrition (H)     Long-term (current) use of anticoagulants [Z79.01]     Anxiety     Anemia in other chronic diseases classified elsewhere     Munchausen syndrome - previously suspected     Anemia, iron deficiency     Mitral regurgitation mild-mod by Echo June 2016     Atopic rhinitis      Migraine     Patellofemoral stress syndrome     Hyperbilirubinemia     Chronic diarrhea     Coagulation defect (H) [D68.9]     Fever     Attention to G-tube (H)     Chronic abdominal pain     Vitamin D deficiency     SIRS (systemic inflammatory response syndrome) (H)     History of deep venous thrombosis     Nausea and vomiting     Abdominal pain, generalized     Abdominal pain     Insomnia, unspecified type     Past Medical History:   Diagnosis Date     Asthma      Bilateral ovarian cysts      Cervical cancer (H) 01/01/2008    cervical cancer      Chronic pain      Colonic dysmotility     s/p subtotal colectomy     Constipation     chronic     E. coli sepsis (H) 5/8/2016     Enteritis      Fungemia 5/5/2016     Gastro-oesophageal reflux disease      H/O ileostomy      Hx of abnormal Pap smear     s/p LEEP - no further details provided     Hypertension      IBS (irritable bowel syndrome)      Other chronic pain      PONV (postoperative nausea and vomiting)      Thrombosis, hepatic vein (H)     microvascular     Past [facility] History:   Procedure Laterality Date     COLONOSCOPY  7/10/2012    Procedure: COLONOSCOPY;;  Surgeon: Nicole Redding MD;  [facility]     COLONOSCOPY N/A 2/19/2017    Procedure: COLONOSCOPY;  Surgeon: Randell Muller MD;  Location: [facility]     COLONOSCOPY N/A 2/21/2017    Procedure: COLONOSCOPY;  Surgeon: Randell Muller MD;  Location: [facility]     ECHO CHELO  7/19/2016          ENDOSCOPIC INSERTION TUBE GASTROSTOMY N/A 1/21/2016    Procedure: ENDOSCOPIC INSERTION TUBE GASTROSTOMY;  Surgeon: Nicole Redding MD;  Location[facility]     ESOPHAGOSCOPY, GASTROSCOPY, DUODENOSCOPY (EGD), COMBINED  7/10/2012    Procedure: COMBINED ESOPHAGOSCOPY, GASTROSCOPY, DUODENOSCOPY (EGD);  Upper Endoscopy, Ileoscopy    Latex Allergy  with biopsies;  Surgeon: Nicole Redding MD;  [facility]     ESOPHAGOSCOPY, GASTROSCOPY, DUODENOSCOPY (EGD), COMBINED N/A 11/5/2014    Procedure: COMBINED  ESOPHAGOSCOPY, GASTROSCOPY, DUODENOSCOPY (EGD);  Surgeon: Nicole Redding MD;  [facility]     HC REPLACE DUODENOSTOMY/JEJUNOSTOMY TUBE PERCUTANEOUS N/A 8/27/2015    Procedure: REPLACE GASTROJEJUNOSTOMY TUBE, PERCUTANEOUS;  Surgeon: Mio Holder MD;  [facility]     HC REPLACE DUODENOSTOMY/JEJUNOSTOMY TUBE PERCUTANEOUS N/A 1/7/2016    Procedure: REPLACE JEJUNOSTOMY TUBE, PERCUTANEOUS;  Surgeon: Elsa Medel MD;  [facility]     HC REPLACE DUODENOSTOMY/JEJUNOSTOMY TUBE PERCUTANEOUS N/A 1/28/2016    Procedure: REPLACE JEJUNOSTOMY TUBE, PERCUTANEOUS;  Surgeon: Elsa Medel MD;  [facility]     HC REPLACE GASTROSTOMY/CECOSTOMY TUBE PERCUTANEOUS Left 5/19/2015    Procedure: REPLACE GASTROSTOMY TUBE, PERCUTANEOUS;  Surgeon: Melecio Morejon Chi, MD;  Location: [facility]     HC UGI ENDOSCOPY W PLACEMENT GASTROSTOMY TUBE PERCUT N/A 10/1/2015    Procedure: COMBINED ESOPHAGOSCOPY, GASTROSCOPY, DUODENOSCOPY (EGD), PLACE PERCUTANEOUS ENDOSCOPIC GASTROSTOMY TUBE;  Surgeon: Mio Holder MD;  [facility]     LAPAROSCOPIC ASSISTED COLECTOMY  1/20/2012    Procedure:LAPAROSCOPIC ASSISTED COLECTOMY; Laparoscopic Ileostomy       LAPAROSCOPIC ASSISTED COLECTOMY LEFT (DESCENDING)  10/24/2012    Procedure: LAPAROSCOPIC ASSISTED COLECTOMY LEFT (DESCENDING);   Hand Assisted Laproscopic Subtotal abdominal Colectomy,Iieorectal anastamosis, Ileostomy Closure.       LAPAROSCOPIC ASSISTED INSERTION TUBE JEJUNOSTOMY N/A 10/16/2015    Procedure: LAPAROSCOPIC ASSISTED INSERTION TUBE JEJUNOSTOMY;  Surgeon: Elsa Medel MD;  [facility]     LAPAROSCOPIC CHOLECYSTECTOMY  2002    [facility] c[facility]. stones duct     LAPAROSCOPIC ILEOSTOMY  1/20/2012    [facility], loop     LAPAROSCOPIC OOPHORECTOMY Right 2009    [facility]     LAPAROTOMY EXPLORATORY N/A 1/28/2016    Procedure: LAPAROTOMY EXPLORATORY;  Surgeon: Elsa Medel MD;  [facility]     LEEP TX, CERVICAL  2009    [facility]      PICC INSERTION Left 10/21/2015    5fr DL Power PICC, 37cm (2cm external) in the L basilic vein w/ tip in the SVC RA junction.     REMOVE GASTROSTOMY TUBE ADULT N/A 12/12/2014    Procedure: REMOVE GASTROSTOMY TUBE ADULT;  Surgeon: Nicole Redding MD;  [facility]     REMOVE PORT VASCULAR ACCESS Right 6/30/2016    Procedure: REMOVE PORT VASCULAR ACCESS;  Surgeon: Pradeep Orosco MD;  [facility]     replace GASTROSTOMY TUBE ADULT  5/19/15     Family History   Problem Relation Age of Onset     Thyroid Disease Mother      Sjogren's Mother      GASTROINTESTINAL DISEASE Mother      Intermittent nausea vomiting diarrhea     Colon Polyps Mother      Prostate Problems Father      prostate enlargement     Lupus Maternal Grandmother      CANCER Maternal Grandfather      Lung     Colon Cancer Maternal Grandfather 65     CANCER Paternal Grandmother      Lung      CEREBROVASCULAR DISEASE Paternal Grandmother      DIABETES Paternal Grandmother      Cardiovascular Paternal Grandmother      CHF     CANCER Paternal Grandfather      Lung     Glaucoma Paternal Grandfather      Abdominal Aortic Aneurysm Other      Macular Degeneration No family hx of      Social History   Substance Use Topics     Smoking status: Former Smoker     Packs/day: 1.00     Years: 4.00     Types: Cigarettes     Quit date: 1/1/2004     Smokeless tobacco: Former User     Alcohol use No      Immunization History   Administered Date(s) Administered     Influenza (IIV3) 10/01/2009     Pneumococcal 23 valent 07/18/2014     Allergies   Allergen Reactions     Hyoscyamine Rash     Metoclopramide Other (See Comments)     Eye twitching.      Peaches [Peach] Other (See Comments)     Raw. Cooked OK     Sucralose Other (See Comments)     All artificial sweeteners. Aspartame also. Swollen glands     Advair Diskus Other (See Comments)     Throat burns     Azithromycin Other (See Comments)     Burning in throat     Compazine [Prochlorperazine] Visual  Disturbance     Contrast Dye Itching     States is allergic to CT contrast dye     Cyclobenzaprine Visual Disturbance     Fentanyl Other (See Comments)     migraine     Ibuprofen GI Disturbance     Lactulose Nausea and Vomiting     Gas and bloating     Levaquin [Levofloxacin] Swelling     Per ED M.D. And RN      Morphine Sulfate Other (See Comments)     Chest pain       Oxycodone Other (See Comments)     Burning throat, but can take Norco.      Penicillins Other (See Comments)     Family hx of resp arrest, she has never taken  Ok with cephalosporins     Rizatriptan Visual Disturbance     Droperidol Hives and Rash     Isovue [Iopamidol] Palpitations     Pt had racing heart and sob      Ketorolac Anxiety     Latex Swelling and Rash     Kiwi, likely also avacado, ? banana     Levsin Rash     Current Outpatient Prescriptions   Medication Sig Dispense Refill     diphenhydrAMINE (BENADRYL ALLERGY) 25 MG tablet Take 1 tablet (25 mg) by mouth every 6 hours as needed for itching or other (Nausea) 30 tablet 0     ondansetron (ZOFRAN ODT) 4 MG ODT tab Take 1-2 tablets (4-8 mg) by mouth every 6 hours as needed for nausea 40 tablet 0     traZODone (DESYREL) 50 MG tablet Take 1-2 tablets ( mg) by mouth nightly as needed 1-2 tabs at bedtime  tablet 1     nortriptyline (PAMELOR) 10 MG capsule Take 3-4 capsules (30-40 mg) by mouth At Bedtime 120 capsule 5     cloNIDine (CATAPRES) 0.2 MG tablet Take 2 tablets (0.4 mg) by mouth every evening 180 tablet 0     mirtazapine (REMERON SOL-TAB) 15 MG ODT tab 0.5 tablets (7.5 mg) by Orally disintegrating tablet route At Bedtime 45 tablet 0     SUMAtriptan (IMITREX) 50 MG tablet Take 1-2 tablets ( mg) by mouth at onset of headache for migraine - may repeat dose after 2h if headache recurs.  Max: 200mg/24 hours 18 tablet 1     rivaroxaban ANTICOAGULANT (XARELTO) 20 MG TABS tablet Take 1 tablet (20 mg) by mouth daily (with dinner) 90 tablet 1     DULoxetine (CYMBALTA) 60 MG  capsule Take 1 capsule (60 mg) by mouth daily 90 capsule 3     ACETAMINOPHEN PO Take 500-1,000 mg by mouth every 6 hours as needed for pain        albuterol 90 MCG/ACT inhaler Inhale 2 puffs into the lungs every 6 hours as needed        Review of Systems   Constitutional: Positive for chills and fever.   HENT: Negative for congestion and sore throat.    Respiratory: Positive for shortness of breath. Negative for cough.    Cardiovascular: Positive for chest pain.   Genitourinary: Negative for dysuria.   Musculoskeletal:        Positive for muscle cramps   Skin:        Positive for burning feeling on forearms   Neurological: Positive for weakness.   All other systems reviewed and are negative.    Physical Exam      Physical Exam   Constitutional: She is oriented to person, place, and time. She appears well-developed and well-nourished.   HENT:   Head: Atraumatic.   Eyes: Conjunctivae and EOM are normal.   Neck: Normal range of motion. Neck supple.   Cardiovascular: Normal rate, regular rhythm and normal heart sounds.    Pulmonary/Chest: Effort normal and breath sounds normal.   Abdominal: Soft. Bowel sounds are normal.   Musculoskeletal: Normal range of motion.   Neurological: She is alert and oriented to person, place, and time.   Skin: Skin is warm and dry.   Psychiatric: She has a normal mood and affect. Her behavior is normal.   Nursing note and vitals reviewed.      ED Course     ED Course     Procedures             Critical Care time:  none          Results for orders placed or performed in visit on 06/26/17 (from the past 24 hour(s))   CBC with platelets differential   Result Value Ref Range    WBC 8.8 4.0 - 11.0 10e9/L    RBC Count 5.03 3.8 - 5.2 10e12/L    Hemoglobin 12.3 11.7 - 15.7 g/dL    Hematocrit 38.1 35.0 - 47.0 %    MCV 76 (L) 78 - 100 fl    MCH 24.5 (L) 26.5 - 33.0 pg    MCHC 32.3 31.5 - 36.5 g/dL    RDW 18.1 (H) 10.0 - 15.0 %    Platelet Count 540 (H) 150 - 450 10e9/L    Diff Method Automated Method      % Neutrophils 62.9 %    % Lymphocytes 30.6 %    % Monocytes 5.6 %    % Eosinophils 0.7 %    % Basophils 0.2 %    Absolute Neutrophil 5.5 1.6 - 8.3 10e9/L    Absolute Lymphocytes 2.7 0.8 - 5.3 10e9/L    Absolute Monocytes 0.5 0.0 - 1.3 10e9/L    Absolute Eosinophils 0.1 0.0 - 0.7 10e9/L    Absolute Basophils 0.0 0.0 - 0.2 10e9/L   Erythrocyte sedimentation rate auto   Result Value Ref Range    Sed Rate 35 (H) 0 - 20 mm/h     *Note: Due to a large number of results and/or encounters for the requested time period, some results have not been displayed. A complete set of results can be found in Results Review.     Medications   lidocaine 1 % 1 mL (not administered)   lidocaine (LMX4) kit (not administered)   sodium chloride (PF) 0.9% PF flush 3 mL (not administered)   sodium chloride (PF) 0.9% PF flush 3 mL (not administered)   0.9% sodium chloride BOLUS (not administered)     Followed by   0.9% sodium chloride infusion (not administered)     Assessments & Plan (with Medical Decision Making)   There is 37-year-old female to the emergency concerns of exacerbation of her gastroparesis condition with recent fever history although is afebrile here in the emergency room. Patient has a history for recurrent sepsis episodes in the past and had blood tests ordered by her infectious disease physician at the  [facility]  earlier today. These tests are not available at time of this ER visit and so additional blood tests performed today with reassuring results. Patient had IV access obtained and IV fluids initiated. As per the patient's usual request she complained of abdomen pain and wanted IV Dilaudid given. She also requested oral Dilaudid tablets.  I spent some time with her about my concern of her persistent narcotic use and discuss options for pain control other than narcotic use as well as discussed options for prevention/treatment of narcotic addiction.  Patient is improved and was discharged. Follow-up ecommendations given  both verbally and by type written handouts.     I have reviewed the nursing notes.    I have reviewed the findings, diagnosis, plan and need for follow up with the patient.       Discharge Medication List as of 6/26/2017  5:19 PM      START taking these medications    Details   HYDROmorphone (DILAUDID) 2 MG tablet Take 1 tablet (2 mg) by mouth every 6 hours as needed for pain maximum 4 tablet(s) per day, Disp-12 tablet, R-0, Local Print           Final diagnoses:   Nausea and vomiting, intractability of vomiting not specified, unspecified vomiting type   Abdominal pain, generalized   Gastroparesis     This document serves as a record of services personally performed by Jake Rodriguez MD. It was created on their behalf by Eleonora Moser, a trained medical scribe. The creation of this record is based on the provider's personal observations and the statements of the patient. This document has been checked and approved by the attending provider.    Note: Chart documentation done in part with Dragon Voice Recognition software. Although reviewed after completion, some word and grammatical errors may remain.    6/26/2017   [facility] EMERGENCY DEPARTMENT     Jake Rodriguez, DO  06/27/17 0824

## 2017-06-26 NOTE — ED AVS SNAPSHOT
[facility] Emergency Department    [address] [place] [address]    Phone:  [phone]    Fax:  [phone]                                       Doreen Peralta   MRN: [id number]    Department:  [facility] Emergency Department   Date of Visit:  6/26/2017           After Visit Summary Signature Page     I have received my discharge instructions, and my questions have been answered. I have discussed any challenges I see with this plan with the nurse or doctor.    ..........................................................................................................................................  Patient/Patient Representative Signature      ..........................................................................................................................................  Patient Representative Print Name and Relationship to Patient    ..................................................               ................................................  Date                                            Time    ..........................................................................................................................................  Reviewed by Signature/Title    ...................................................              ..............................................  Date                                                            Time

## 2017-06-26 NOTE — ED NOTES
"Pt now states that her abd started hurting on Sunday and has had some nausea.  States that she didn't want to say anything in front of her mom because she \"gets to worried\".   "

## 2017-06-26 NOTE — ED AVS SNAPSHOT
[facility] Emergency Department    [address] [place] [address]    Phone:  [phone]    Fax:  [phone]                                       Doreen Peralta   MRN: [id number]    Department:  [facility] Emergency Department   Date of Visit:  6/26/2017           Patient Information     Date Of Birth          1980        Your diagnoses for this visit were:     Nausea and vomiting, intractability of vomiting not specified, unspecified vomiting type     Abdominal pain, generalized     Gastroparesis        You were seen by Jake Rodriguez DO.      Follow-up Information     Follow up with Larisa Lorenzo PA-C.    Specialty:  Physician Assistant    Contact information:    [facility]  [address] [facility] [address] [phone]          Follow up with Todd Price MD.    Specialty:  Student in organized health care education/training program    Contact information:    [address]  [facility] [address] [phone]          Follow up with [facility] Emergency Department.    Specialty:  EMERGENCY MEDICINE    Why:  If symptoms worsen    Contact information:    [address] [place] [address] [phone]    Additional information:    From [address]: Exit at [address] on south side of [place]. Turn right on [address]. Turn left at stoplight on [address]. [facility] will be in view two blocks ahead        Discharge Instructions       Please read and follow the handout(s) instructions. Return, if needed, for increased or worsening symptoms and as directed by the handout(s).    Increase your fluid intake. Drinking small amounts often is the best way to take in more fluids when you are feeling nauseated.    I hope you feel better soon! I sent your new script(s) to the [facility] pharmacy.      Electronically signed, Jake Rodriguez DO      Discharge  References/Attachments     GASTROPARESIS (ENGLISH)      Future Appointments        Provider Department Dept Phone Center    6/29/2017 11:30 AM ion [address] [place] [address] Chair 1 [facility] [phone] [facility]    7/17/2017 8:40 AM Gilmer Lees MD [facility] and -[phone] [facility]      24 Hour Appointment Hotline       To make an appointment at any [facility], call [phone] ([phone]). If you don't have a family doctor or clinic, we will help you find one. [facility] are conveniently located to serve the needs of you and your family.             Review of your medicines      START taking        Dose / Directions Last dose taken    HYDROmorphone 2 MG tablet   Commonly known as:  DILAUDID   Dose:  2 mg   Quantity:  12 tablet        Take 1 tablet (2 mg) by mouth every 6 hours as needed for pain maximum 4 tablet(s) per day   Refills:  0          Our records show that you are taking the medicines listed below. If these are incorrect, please call your family doctor or clinic.        Dose / Directions Last dose taken    ACETAMINOPHEN PO   Dose:  500-1000 mg        Take 500-1,000 mg by mouth every 6 hours as needed for pain   Refills:  0        albuterol 90 MCG/ACT inhaler   Dose:  2 puff        Inhale 2 puffs into the lungs every 6 hours as needed   Refills:  0        cloNIDine 0.2 MG tablet   Commonly known as:  CATAPRES   Dose:  0.4 mg   Quantity:  180 tablet        Take 2 tablets (0.4 mg) by mouth every evening   Refills:  0        diphenhydrAMINE 25 MG tablet   Commonly known as:  BENADRYL ALLERGY   Dose:  25 mg   Quantity:  30 tablet        Take 1 tablet (25 mg) by mouth every 6 hours as needed for itching or other (Nausea)   Refills:  0        DULoxetine 60 MG EC capsule   Commonly known as:  CYMBALTA   Dose:  60 mg   Quantity:  90 capsule        Take 1 capsule (60 mg) by mouth daily   Refills:  3        mirtazapine 15 MG ODT tab   Commonly known as:   REMERON SOL-TAB   Dose:  7.5 mg   Quantity:  45 tablet        0.5 tablets (7.5 mg) by Orally disintegrating tablet route At Bedtime   Refills:  0        nortriptyline 10 MG capsule   Commonly known as:  PAMELOR   Dose:  30-40 mg   Quantity:  120 capsule        Take 3-4 capsules (30-40 mg) by mouth At Bedtime   Refills:  5        ondansetron 4 MG ODT tab   Commonly known as:  ZOFRAN ODT   Dose:  4-8 mg   Quantity:  40 tablet        Take 1-2 tablets (4-8 mg) by mouth every 6 hours as needed for nausea   Refills:  0        rivaroxaban ANTICOAGULANT 20 MG Tabs tablet   Commonly known as:  XARELTO   Dose:  20 mg   Quantity:  90 tablet        Take 1 tablet (20 mg) by mouth daily (with dinner)   Refills:  1        SUMAtriptan 50 MG tablet   Commonly known as:  IMITREX   Dose:   mg   Quantity:  18 tablet        Take 1-2 tablets ( mg) by mouth at onset of headache for migraine - may repeat dose after 2h if headache recurs.  Max: 200mg/24 hours   Refills:  1        traZODone 50 MG tablet   Commonly known as:  DESYREL   Dose:   mg   Quantity:  180 tablet        Take 1-2 tablets ( mg) by mouth nightly as needed 1-2 tabs at bedtime PRN   Refills:  1                Prescriptions were sent or printed at these locations (1 Prescription)                   Other Prescriptions                Printed at Department/Unit printer (1 of 1)         HYDROmorphone (DILAUDID) 2 MG tablet                Procedures and tests performed during your visit     CRP inflammation    Comprehensive metabolic panel    Draw and hold blood cultures    Lipase    Peripheral IV catheter      Orders Needing Specimen Collection     None      Pending Results     Date and Time Order Name Status Description    6/26/2017 1038 CRP INFLAMMATION In process     6/26/2017 1038 COMPREHENSIVE METABOLIC PANEL In process             Pending Culture Results     No orders found from 6/24/2017 to 6/27/2017.            Pending Results Instructions     If  you had any lab results that were not finalized at the time of your Discharge, you can call the ED Lab Result RN at [phone]. You will be contacted by this team for any positive Lab results or changes in treatment. The nurses are available 7 days a week from 10A to 6:30P.  You can leave a message 24 hours per day and they will return your call.        Thank you for choosing [facility]       Thank you for choosing [facility] for your care. Our goal is always to provide you with excellent care. Hearing back from our patients is one way we can continue to improve our services. Please take a few minutes to complete the written survey that you may receive in the mail after you visit with us. Thank you!        [facility] Information     [facility] gives you secure access to your electronic health record. If you see a primary care provider, you can also send messages to your care team and make appointments. If you have questions, please call your primary care clinic.  If you do not have a primary care provider, please call [phone] and they will assist you.        Care EveryWhere ID     This is your Care EveryWhere ID. This could be used by other organizations to access your [facility] medical records  [id number]        Equal Access to Services     TRAMAINE CLAYTON : Carrie Law, clark villeda, vesta long, elham gomez. So [facility] [phone].    ATENCIÓN: Si habla español, tiene a wade disposición servicios gratuitos de asistencia lingüística. Llame al [phone].    We comply with applicable federal civil rights laws and [place] laws. We do not discriminate on the basis of race, color, national origin, age, disability sex, sexual orientation or gender identity.            After Visit Summary       This is your record. Keep this with you and show to your community pharmacist(s) and doctor(s) at your next visit.

## 2017-06-26 NOTE — TELEPHONE ENCOUNTER
Routing refill request to provider for review/approval because:  BP not at goal, needs to follow up with Larisa

## 2017-06-26 NOTE — DISCHARGE INSTRUCTIONS
Please read and follow the handout(s) instructions. Return, if needed, for increased or worsening symptoms and as directed by the handout(s).    Increase your fluid intake. Drinking small amounts often is the best way to take in more fluids when you are feeling nauseated.    I hope you feel better soon! I sent your new script(s) to the [facility] pharmacy.      Electronically signed, Jake Rodriguez DO

## 2017-06-27 RX ORDER — CLONIDINE HYDROCHLORIDE 0.2 MG/1
TABLET ORAL
Qty: 180 TABLET | Refills: 0 | Status: SHIPPED | OUTPATIENT
Start: 2017-06-27 | End: 2017-09-29

## 2017-06-30 ENCOUNTER — HOSPITAL ENCOUNTER (EMERGENCY)
Facility: CLINIC | Age: 37
Discharge: HOME | End: 2017-06-30
Attending: EMERGENCY MEDICINE | Admitting: EMERGENCY MEDICINE
Payer: COMMERCIAL

## 2017-06-30 ENCOUNTER — TELEPHONE (OUTPATIENT)
Dept: FAMILY MEDICINE | Facility: CLINIC | Age: 37
End: 2017-06-30

## 2017-06-30 VITALS
BODY MASS INDEX: 33.14 KG/M2 | SYSTOLIC BLOOD PRESSURE: 125 MMHG | OXYGEN SATURATION: 100 % | RESPIRATION RATE: 12 BRPM | TEMPERATURE: 98.1 F | DIASTOLIC BLOOD PRESSURE: 80 MMHG | HEART RATE: 100 BPM | WEIGHT: 205.3 LBS

## 2017-06-30 DIAGNOSIS — R50.9 FEVER OF UNKNOWN ORIGIN: ICD-10-CM

## 2017-06-30 DIAGNOSIS — R10.9 ABDOMINAL PAIN, UNSPECIFIED LOCATION: ICD-10-CM

## 2017-06-30 DIAGNOSIS — R10.13 ABDOMINAL PAIN, EPIGASTRIC: ICD-10-CM

## 2017-06-30 LAB
ALBUMIN SERPL-MCNC: 3.6 G/DL (ref 3.4–5)
ALBUMIN UR-MCNC: NEGATIVE MG/DL
ALP SERPL-CCNC: 154 U/L (ref 40–150)
ALT SERPL W P-5'-P-CCNC: 46 U/L (ref 0–50)
ANION GAP SERPL CALCULATED.3IONS-SCNC: 10 MMOL/L (ref 3–14)
APPEARANCE UR: CLEAR
AST SERPL W P-5'-P-CCNC: 31 U/L (ref 0–45)
BASOPHILS # BLD AUTO: 0.1 10E9/L (ref 0–0.2)
BASOPHILS NFR BLD AUTO: 0.6 %
BILIRUB SERPL-MCNC: 0.4 MG/DL (ref 0.2–1.3)
BILIRUB UR QL STRIP: NEGATIVE
BUN SERPL-MCNC: 10 MG/DL (ref 7–30)
CALCIUM SERPL-MCNC: 9.2 MG/DL (ref 8.5–10.1)
CHLORIDE SERPL-SCNC: 105 MMOL/L (ref 94–109)
CO2 SERPL-SCNC: 24 MMOL/L (ref 20–32)
COLOR UR AUTO: NORMAL
CREAT SERPL-MCNC: 0.93 MG/DL (ref 0.52–1.04)
CRP SERPL-MCNC: 37 MG/L (ref 0–8)
DIFFERENTIAL METHOD BLD: ABNORMAL
EOSINOPHIL # BLD AUTO: 0.1 10E9/L (ref 0–0.7)
EOSINOPHIL NFR BLD AUTO: 0.9 %
ERYTHROCYTE [DISTWIDTH] IN BLOOD BY AUTOMATED COUNT: 19.2 % (ref 10–15)
GFR SERPL CREATININE-BSD FRML MDRD: 68 ML/MIN/1.7M2
GLUCOSE SERPL-MCNC: 103 MG/DL (ref 70–99)
GLUCOSE UR STRIP-MCNC: NEGATIVE MG/DL
HCT VFR BLD AUTO: 38.4 % (ref 35–47)
HGB BLD-MCNC: 11.8 G/DL (ref 11.7–15.7)
HGB UR QL STRIP: NEGATIVE
IMM GRANULOCYTES # BLD: 0 10E9/L (ref 0–0.4)
IMM GRANULOCYTES NFR BLD: 0.2 %
KETONES UR STRIP-MCNC: NEGATIVE MG/DL
LACTATE BLD-SCNC: 2 MMOL/L (ref 0.7–2.1)
LEUKOCYTE ESTERASE UR QL STRIP: NEGATIVE
LIPASE SERPL-CCNC: 133 U/L (ref 73–393)
LYMPHOCYTES # BLD AUTO: 2 10E9/L (ref 0.8–5.3)
LYMPHOCYTES NFR BLD AUTO: 24.5 %
MCH RBC QN AUTO: 24.1 PG (ref 26.5–33)
MCHC RBC AUTO-ENTMCNC: 30.7 G/DL (ref 31.5–36.5)
MCV RBC AUTO: 79 FL (ref 78–100)
MONOCYTES # BLD AUTO: 0.9 10E9/L (ref 0–1.3)
MONOCYTES NFR BLD AUTO: 10.3 %
NEUTROPHILS # BLD AUTO: 5.2 10E9/L (ref 1.6–8.3)
NEUTROPHILS NFR BLD AUTO: 63.5 %
NITRATE UR QL: NEGATIVE
PH UR STRIP: 7 PH (ref 5–7)
PLATELET # BLD AUTO: 549 10E9/L (ref 150–450)
POTASSIUM SERPL-SCNC: 4.2 MMOL/L (ref 3.4–5.3)
PROT SERPL-MCNC: 8.7 G/DL (ref 6.8–8.8)
RBC # BLD AUTO: 4.89 10E12/L (ref 3.8–5.2)
RBC #/AREA URNS AUTO: <1 /HPF (ref 0–2)
SODIUM SERPL-SCNC: 139 MMOL/L (ref 133–144)
SP GR UR STRIP: 1 (ref 1–1.03)
SQUAMOUS #/AREA URNS AUTO: <1 /HPF (ref 0–1)
URN SPEC COLLECT METH UR: NORMAL
UROBILINOGEN UR STRIP-MCNC: 0 MG/DL (ref 0–2)
WBC # BLD AUTO: 8.2 10E9/L (ref 4–11)
WBC #/AREA URNS AUTO: 1 /HPF (ref 0–2)

## 2017-06-30 PROCEDURE — 86140 C-REACTIVE PROTEIN: CPT | Performed by: EMERGENCY MEDICINE

## 2017-06-30 PROCEDURE — 87086 URINE CULTURE/COLONY COUNT: CPT | Performed by: EMERGENCY MEDICINE

## 2017-06-30 PROCEDURE — 96374 THER/PROPH/DIAG INJ IV PUSH: CPT | Performed by: EMERGENCY MEDICINE

## 2017-06-30 PROCEDURE — 83605 ASSAY OF LACTIC ACID: CPT | Performed by: EMERGENCY MEDICINE

## 2017-06-30 PROCEDURE — 99284 EMERGENCY DEPT VISIT MOD MDM: CPT | Mod: 25 | Performed by: EMERGENCY MEDICINE

## 2017-06-30 PROCEDURE — 99284 EMERGENCY DEPT VISIT MOD MDM: CPT | Mod: Z6 | Performed by: EMERGENCY MEDICINE

## 2017-06-30 PROCEDURE — 96361 HYDRATE IV INFUSION ADD-ON: CPT | Performed by: EMERGENCY MEDICINE

## 2017-06-30 PROCEDURE — 81001 URINALYSIS AUTO W/SCOPE: CPT | Performed by: EMERGENCY MEDICINE

## 2017-06-30 PROCEDURE — 85025 COMPLETE CBC W/AUTO DIFF WBC: CPT | Performed by: EMERGENCY MEDICINE

## 2017-06-30 PROCEDURE — 25000128 H RX IP 250 OP 636: Performed by: EMERGENCY MEDICINE

## 2017-06-30 PROCEDURE — 25000132 ZZH RX MED GY IP 250 OP 250 PS 637: Performed by: EMERGENCY MEDICINE

## 2017-06-30 PROCEDURE — 87040 BLOOD CULTURE FOR BACTERIA: CPT | Performed by: EMERGENCY MEDICINE

## 2017-06-30 PROCEDURE — 80053 COMPREHEN METABOLIC PANEL: CPT | Performed by: EMERGENCY MEDICINE

## 2017-06-30 PROCEDURE — 96375 TX/PRO/DX INJ NEW DRUG ADDON: CPT | Performed by: EMERGENCY MEDICINE

## 2017-06-30 PROCEDURE — 83690 ASSAY OF LIPASE: CPT | Performed by: EMERGENCY MEDICINE

## 2017-06-30 RX ORDER — DIPHENHYDRAMINE HYDROCHLORIDE 50 MG/ML
50 INJECTION INTRAMUSCULAR; INTRAVENOUS ONCE
Status: COMPLETED | OUTPATIENT
Start: 2017-06-30 | End: 2017-06-30

## 2017-06-30 RX ORDER — LIDOCAINE 40 MG/G
CREAM TOPICAL
Status: DISCONTINUED | OUTPATIENT
Start: 2017-06-30 | End: 2017-06-30

## 2017-06-30 RX ORDER — SODIUM CHLORIDE 9 MG/ML
1000 INJECTION, SOLUTION INTRAVENOUS CONTINUOUS
Status: DISCONTINUED | OUTPATIENT
Start: 2017-06-30 | End: 2017-06-30

## 2017-06-30 RX ORDER — ONDANSETRON 2 MG/ML
4 INJECTION INTRAMUSCULAR; INTRAVENOUS EVERY 30 MIN PRN
Status: DISCONTINUED | OUTPATIENT
Start: 2017-06-30 | End: 2017-06-30

## 2017-06-30 RX ORDER — HYDROMORPHONE HYDROCHLORIDE 2 MG/1
2 TABLET ORAL EVERY 6 HOURS PRN
Qty: 12 TABLET | Refills: 0 | Status: SHIPPED | OUTPATIENT
Start: 2017-06-30 | End: 2017-07-17

## 2017-06-30 RX ORDER — HYDROMORPHONE HYDROCHLORIDE 2 MG/1
2 TABLET ORAL
Status: DISCONTINUED | OUTPATIENT
Start: 2017-06-30 | End: 2017-06-30

## 2017-06-30 RX ADMIN — SODIUM CHLORIDE 1000 ML: 9 INJECTION, SOLUTION INTRAVENOUS at 12:51

## 2017-06-30 RX ADMIN — ONDANSETRON 4 MG: 2 INJECTION INTRAMUSCULAR; INTRAVENOUS at 13:22

## 2017-06-30 RX ADMIN — HYDROMORPHONE HYDROCHLORIDE 2 MG: 2 TABLET ORAL at 13:24

## 2017-06-30 RX ADMIN — DIPHENHYDRAMINE HYDROCHLORIDE 50 MG: 50 INJECTION, SOLUTION INTRAMUSCULAR; INTRAVENOUS at 13:44

## 2017-06-30 ASSESSMENT — ENCOUNTER SYMPTOMS
CHILLS: 1
FATIGUE: 1
FLANK PAIN: 0
NAUSEA: 1
MUSCULOSKELETAL NEGATIVE: 1
VOMITING: 1
ABDOMINAL PAIN: 1
FREQUENCY: 0
SORE THROAT: 0
NUMBNESS: 0
LIGHT-HEADEDNESS: 1
RESPIRATORY NEGATIVE: 1
FEVER: 1
DYSURIA: 0
CARDIOVASCULAR NEGATIVE: 1
TROUBLE SWALLOWING: 0

## 2017-06-30 NOTE — ED PROVIDER NOTES
History     Chief Complaint   Patient presents with     Fever     The history is provided by the patient and medical records.     Doreen Peralta is a 37 year old female who presents to the ED for evaluation of fever, nausea and vomiting, and abdominal pain that's been going on for the last 8 days.  The patient was seen and evaluated 4 days ago in the ED for the same and states that she feels worse now than she did 4 days ago.  She reports that her fever has been running anywhere from 99 F to 10 2 F and typically worsens at nighttime.    I have reviewed the Medications, Allergies, Past Medical and [facility] History, and Social History in the Epic system.    Allergies:   Allergies   Allergen Reactions     Hyoscyamine Rash     Metoclopramide Other (See Comments)     Eye twitching.      Peaches [Peach] Other (See Comments)     Raw. Cooked OK     Sucralose Other (See Comments)     All artificial sweeteners. Aspartame also. Swollen glands     Advair Diskus Other (See Comments)     Throat burns     Azithromycin Other (See Comments)     Burning in throat     Compazine [Prochlorperazine] Visual Disturbance     Contrast Dye Itching     States is allergic to CT contrast dye     Cyclobenzaprine Visual Disturbance     Fentanyl Other (See Comments)     migraine     Ibuprofen GI Disturbance     Lactulose Nausea and Vomiting     Gas and bloating     Levaquin [Levofloxacin] Swelling     Per ED M.D. And RN      Morphine Sulfate Other (See Comments)     Chest pain       Oxycodone Other (See Comments)     Burning throat, but can take Norco.      Penicillins Other (See Comments)     Family hx of resp arrest, she has never taken  Ok with cephalosporins     Rizatriptan Visual Disturbance     Droperidol Hives and Rash     Isovue [Iopamidol] Palpitations     Pt had racing heart and sob      Ketorolac Anxiety     Latex Swelling and Rash     Kiwi, likely also avacado, ? banana     Levsin Rash         No current facility-administered  medications on file prior to encounter.   Current Outpatient Prescriptions on File Prior to Encounter:  cloNIDine (CATAPRES) 0.2 MG tablet TAKE 2 TABLETS(0.4 MG) BY MOUTH EVERY EVENING   ondansetron (ZOFRAN ODT) 4 MG ODT tab Take 1-2 tablets (4-8 mg) by mouth every 6 hours as needed for nausea   diphenhydrAMINE (BENADRYL ALLERGY) 25 MG tablet Take 1 tablet (25 mg) by mouth every 6 hours as needed for itching or other (Nausea)   traZODone (DESYREL) 50 MG tablet Take 1-2 tablets ( mg) by mouth nightly as needed 1-2 tabs at bedtime PRN   nortriptyline (PAMELOR) 10 MG capsule Take 3-4 capsules (30-40 mg) by mouth At Bedtime   mirtazapine (REMERON SOL-TAB) 15 MG ODT tab 0.5 tablets (7.5 mg) by Orally disintegrating tablet route At Bedtime   SUMAtriptan (IMITREX) 50 MG tablet Take 1-2 tablets ( mg) by mouth at onset of headache for migraine - may repeat dose after 2h if headache recurs.  Max: 200mg/24 hours   rivaroxaban ANTICOAGULANT (XARELTO) 20 MG TABS tablet Take 1 tablet (20 mg) by mouth daily (with dinner)   DULoxetine (CYMBALTA) 60 MG capsule Take 1 capsule (60 mg) by mouth daily   ACETAMINOPHEN PO Take 500-1,000 mg by mouth every 6 hours as needed for pain    albuterol 90 MCG/ACT inhaler Inhale 2 puffs into the lungs every 6 hours as needed        Patient Active Problem List   Diagnosis     Constipation by delayed colonic transit     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     Hx SBO     S/P LEEP of cervix     Gastroparesis     Migraines     Intermittent asthma     Allergic rhinitis     Abnormal Pap smear of cervix     PEG (percutaneous endoscopic gastrostomy) status     Health Care Home     PEG tube malfunction (H)     Malfunction of gastrostomy tube (H)     Malfunctioning jejunostomy tube (H)     Jejunostomy tube present (H)     S/P partial resection of colon     Malnutrition (H)     Long-term (current) use of anticoagulants [Z79.01]     Anxiety     Anemia in other chronic diseases classified elsewhere      Munchausen syndrome - previously suspected     Anemia, iron deficiency     Mitral regurgitation mild-mod by Echo June 2016     Atopic rhinitis     Migraine     Patellofemoral stress syndrome     Hyperbilirubinemia     Chronic diarrhea     Coagulation defect (H) [D68.9]     Fever     Attention to G-tube (H)     Chronic abdominal pain     Vitamin D deficiency     SIRS (systemic inflammatory response syndrome) (H)     History of deep venous thrombosis     Nausea and vomiting     Abdominal pain, generalized     Abdominal pain     Insomnia, unspecified type       Past [facility] History:   Procedure Laterality Date     COLONOSCOPY  7/10/2012    Procedure: COLONOSCOPY;;  Surgeon: Nicole Redding MD;  [facility]: [facility]     COLONOSCOPY N/A 2/19/2017    Procedure: COLONOSCOPY;  Surgeon: Randell Muller MD;  Location: [facility] GI     COLONOSCOPY N/A 2/21/2017    Procedure: COLONOSCOPY;  Surgeon: Randell Muller MD;  Location: [facility] GI     ECHO CHELO  7/19/2016          ENDOSCOPIC INSERTION TUBE GASTROSTOMY N/A 1/21/2016    Procedure: ENDOSCOPIC INSERTION TUBE GASTROSTOMY;  Surgeon: Nicole Redding MD;  Location[facility] OR     ESOPHAGOSCOPY, GASTROSCOPY, DUODENOSCOPY (EGD), COMBINED  7/10/2012    Procedure: COMBINED ESOPHAGOSCOPY, GASTROSCOPY, DUODENOSCOPY (EGD);  Upper Endoscopy, Ileoscopy    Latex Allergy  with biopsies;  Surgeon: Nicole Redding MD;  [facility] OR     ESOPHAGOSCOPY, GASTROSCOPY, DUODENOSCOPY (EGD), COMBINED N/A 11/5/2014    Procedure: COMBINED ESOPHAGOSCOPY, GASTROSCOPY, DUODENOSCOPY (EGD);  Surgeon: Nicole Redding MD;  [facility] OR     HC REPLACE DUODENOSTOMY/JEJUNOSTOMY TUBE PERCUTANEOUS N/A 8/27/2015    Procedure: REPLACE GASTROJEJUNOSTOMY TUBE, PERCUTANEOUS;  Surgeon: Mio Holder MD;  [facility] OR     HC REPLACE DUODENOSTOMY/JEJUNOSTOMY TUBE PERCUTANEOUS N/A 1/7/2016    Procedure: REPLACE JEJUNOSTOMY TUBE, PERCUTANEOUS;  Surgeon: Elsa Medel MD;  [facility]  OR     HC REPLACE DUODENOSTOMY/JEJUNOSTOMY TUBE PERCUTANEOUS N/A 1/28/2016    Procedure: REPLACE JEJUNOSTOMY TUBE, PERCUTANEOUS;  Surgeon: Elsa Medel MD;  [facility] OR     HC REPLACE GASTROSTOMY/CECOSTOMY TUBE PERCUTANEOUS Left 5/19/2015    Procedure: REPLACE GASTROSTOMY TUBE, PERCUTANEOUS;  Surgeon: Melecio Morejon Chi, MD;  Location:  GI     HC U[facility] ENDOSCOPY W PLACEMENT GASTROSTOMY TUBE PERCUT N/A 10/1/2015    Procedure: COMBINED ESOPHAGOSCOPY, GASTROSCOPY, DUODENOSCOPY (EGD), PLACE PERCUTANEOUS ENDOSCOPIC GASTROSTOMY TUBE;  Surgeon: Mio Holder MD;  [facility] GI     LAPAROSCOPIC ASSISTED COLECTOMY  1/20/2012    Procedure:LAPAROSCOPIC ASSISTED COLECTOMY; Laparoscopic Ileostomy       LAPAROSCOPIC ASSISTED COLECTOMY LEFT (DESCENDING)  10/24/2012    Procedure: LAPAROSCOPIC ASSISTED COLECTOMY LEFT (DESCENDING);   Hand Assisted Laproscopic Subtotal abdominal Colectomy,Iieorectal anastamosis, Ileostomy Closure.       LAPAROSCOPIC ASSISTED INSERTION TUBE JEJUNOSTOMY N/A 10/16/2015    Procedure: LAPAROSCOPIC ASSISTED INSERTION TUBE JEJUNOSTOMY;  Surgeon: Elsa Medel MD;  [facility] OR     LAPAROSCOPIC CHOLECYSTECTOMY  2002    [facility] stones duct     LAPAROSCOPIC ILEOSTOMY  1/20/2012    [facility], loop     LAPAROSCOPIC OOPHORECTOMY Right 2009    [facility]     LAPAROTOMY EXPLORATORY N/A 1/28/2016    Procedure: LAPAROTOMY EXPLORATORY;  Surgeon: Elsa Medel MD;  Location:  OR     LEEP TX, CERVICAL  2009    [facility]     PICC INSERTION Left 10/21/2015    5fr DL Power PICC, 37cm (2cm external) in the L basilic vein w/ tip in the SVC RA junction.     REMOVE GASTROSTOMY TUBE ADULT N/A 12/12/2014    Procedure: REMOVE GASTROSTOMY TUBE ADULT;  Surgeon: Nicole Redding MD;  [facility] GI     REMOVE PORT VASCULAR ACCESS Right 6/30/2016    Procedure: REMOVE PORT VASCULAR ACCESS;  Surgeon: Pradeep Orosco MD;  Location: [facility] OR     replace GASTROSTOMY TUBE ADULT   "5/19/15       Social History   Substance Use Topics     Smoking status: Former Smoker     Packs/day: 1.00     Years: 4.00     Types: Cigarettes     Quit date: 1/1/2004     Smokeless tobacco: Former User     Alcohol use No       Most Recent Immunizations   Administered Date(s) Administered     Influenza (IIV3) 10/01/2009     Pneumococcal 23 valent 07/18/2014       BMI: Estimated body mass index is 33.14 kg/(m^2) as calculated from the following:    Height as of 5/22/17: 1.676 m (5' 6\").    Weight as of this encounter: 93.1 kg (205 lb 4.8 oz).      Review of Systems   Constitutional: Positive for chills, fatigue and fever.   HENT: Negative for sore throat and trouble swallowing.    Respiratory: Negative.    Cardiovascular: Negative.    Gastrointestinal: Positive for abdominal pain, nausea and vomiting.   Genitourinary: Negative for dysuria, flank pain, frequency and urgency.   Musculoskeletal: Negative.    Neurological: Positive for light-headedness. Negative for numbness.   All other systems reviewed and are negative.      Physical Exam   BP: 130/84  Heart Rate: 105  Temp: 97.6  F (36.4  C)  Resp: 12  Weight: 93.1 kg (205 lb 4.8 oz)  SpO2: 100 %  Physical Exam   Constitutional: She is oriented to person, place, and time. She appears well-developed. She appears distressed.   HENT:   Head: Normocephalic and atraumatic.   Mouth/Throat: Oropharynx is clear and moist.   Eyes: EOM are normal. Pupils are equal, round, and reactive to light.   Neck: Normal range of motion. Neck supple.   Cardiovascular: Normal rate, normal heart sounds and intact distal pulses.    Pulmonary/Chest: Effort normal and breath sounds normal.   Abdominal: Bowel sounds are normal. There is tenderness (Moderate epigastric tenderness to palpation without guarding or rebound). There is no rebound and no guarding.   Musculoskeletal: Normal range of motion.   Neurological: She is alert and oriented to person, place, and time.   Skin: Skin is warm. No " rash noted.   Psychiatric: She has a normal mood and affect.   Nursing note and vitals reviewed.      ED Course     ED Course     Procedures        Results for orders placed or performed during the hospital encounter of 06/30/17   CBC with platelets differential   Result Value Ref Range    WBC 8.2 4.0 - 11.0 10e9/L    RBC Count 4.89 3.8 - 5.2 10e12/L    Hemoglobin 11.8 11.7 - 15.7 g/dL    Hematocrit 38.4 35.0 - 47.0 %    MCV 79 78 - 100 fl    MCH 24.1 (L) 26.5 - 33.0 pg    MCHC 30.7 (L) 31.5 - 36.5 g/dL    RDW 19.2 (H) 10.0 - 15.0 %    Platelet Count 549 (H) 150 - 450 10e9/L    Diff Method Automated Method     % Neutrophils 63.5 %    % Lymphocytes 24.5 %    % Monocytes 10.3 %    % Eosinophils 0.9 %    % Basophils 0.6 %    % Immature Granulocytes 0.2 %    Absolute Neutrophil 5.2 1.6 - 8.3 10e9/L    Absolute Lymphocytes 2.0 0.8 - 5.3 10e9/L    Absolute Monocytes 0.9 0.0 - 1.3 10e9/L    Absolute Eosinophils 0.1 0.0 - 0.7 10e9/L    Absolute Basophils 0.1 0.0 - 0.2 10e9/L    Abs Immature Granulocytes 0.0 0 - 0.4 10e9/L   Comprehensive metabolic panel   Result Value Ref Range    Sodium 139 133 - 144 mmol/L    Potassium 4.2 3.4 - 5.3 mmol/L    Chloride 105 94 - 109 mmol/L    Carbon Dioxide 24 20 - 32 mmol/L    Anion Gap 10 3 - 14 mmol/L    Glucose 103 (H) 70 - 99 mg/dL    Urea Nitrogen 10 7 - 30 mg/dL    Creatinine 0.93 0.52 - 1.04 mg/dL    GFR Estimate 68 >60 mL/min/1.7m2    GFR Estimate If Black 82 >60 mL/min/1.7m2    Calcium 9.2 8.5 - 10.1 mg/dL    Bilirubin Total 0.4 0.2 - 1.3 mg/dL    Albumin 3.6 3.4 - 5.0 g/dL    Protein Total 8.7 6.8 - 8.8 g/dL    Alkaline Phosphatase 154 (H) 40 - 150 U/L    ALT 46 0 - 50 U/L    AST 31 0 - 45 U/L   Lipase   Result Value Ref Range    Lipase 133 73 - 393 U/L   Lactic acid whole blood   Result Value Ref Range    Lactic Acid 2.0 0.7 - 2.1 mmol/L   CRP inflammation   Result Value Ref Range    CRP Inflammation 37.0 (H) 0.0 - 8.0 mg/L   UA with Microscopic   Result Value Ref Range    Color  Urine Straw     Appearance Urine Clear     Glucose Urine Negative NEG mg/dL    Bilirubin Urine Negative NEG    Ketones Urine Negative NEG mg/dL    Specific Gravity Urine 1.004 1.003 - 1.035    Blood Urine Negative NEG    pH Urine 7.0 5.0 - 7.0 pH    Protein Albumin Urine Negative NEG mg/dL    Urobilinogen mg/dL 0.0 0.0 - 2.0 mg/dL    Nitrite Urine Negative NEG    Leukocyte Esterase Urine Negative NEG    Source Midstream Urine     WBC Urine 1 0 - 2 /HPF    RBC Urine <1 0 - 2 /HPF    Squamous Epithelial /HPF Urine <1 0 - 1 /HPF     *Note: Due to a large number of results and/or encounters for the requested time period, some results have not been displayed. A complete set of results can be found in Results Review.              Assessments & Plan (with Medical Decision Making)  Doreen is a 37-year-old female who presents to the ED with epigastric abdominal pain and fever that have been intermittent for the last 8 days.  Patient is well-known to me from her for frequent ER visits and does have a history of recurrent sepsis for which she is being followed by infectious disease at [facility].  She apparently had 2 infections in her small intestine with Acinetobacter bacteremia as well as a a Candida parapsilosis fungemia. she was treated for both of these and started to get better, however her symptoms have returned and worsened over the last 8 days.  On exam, she is mildly distressed with dry mucous membranes, normal cardiopulmonary exam, soft abdomen with epigastric tenderness to palpation, and normal bowel sounds.  There is no evidence for a rash or oropharyngeal abnormalities.  Labs were obtained and compared to her previous labs on 6/26/2017 and remain unremarkable for any changes.  Certainly her abdominal exam is underwhelming for any significant pathology.  Given her fever and elevated CRP, this is either a viral gastroenteritis and/or recurrence of her previous infection.  For now we will treat her  pain with oral Dilaudid and she will follow up with her infectious disease doctor.  Patient is in agreement with this plan and stable for discharge.       I have reviewed the nursing notes.    I have reviewed the findings, diagnosis, plan and need for follow up with the patient.       Discharge Medication List as of 6/30/2017  2:54 PM          Final diagnoses:   Fever of unknown origin   Abdominal pain, epigastric       6/30/2017   [facility] EMERGENCY DEPARTMENT     Gera Mcconnell MD  06/30/17 1530

## 2017-06-30 NOTE — ED NOTES
Was here Monday with a fever, chills, comes back today because she still has the fever and chills and states she has gone down hill since Monday.  States would like labs drawn to compare to mondays labs.

## 2017-06-30 NOTE — ED AVS SNAPSHOT
[facility] Emergency Department    [address] [place] [address]    Phone:  [phone]    Fax:  [phone]                                       Doreen Peralta   MRN: [id number]    Department:  [facility] Emergency Department   Date of Visit:  6/30/2017           After Visit Summary Signature Page     I have received my discharge instructions, and my questions have been answered. I have discussed any challenges I see with this plan with the nurse or doctor.    ..........................................................................................................................................  Patient/Patient Representative Signature      ..........................................................................................................................................  Patient Representative Print Name and Relationship to Patient    ..................................................               ................................................  Date                                            Time    ..........................................................................................................................................  Reviewed by Signature/Title    ...................................................              ..............................................  Date                                                            Time

## 2017-06-30 NOTE — DISCHARGE INSTRUCTIONS
Febrile Illness, Uncertain Cause (Adult)  You have a fever, but the cause is not certain. A fever is a natural reaction of the body to an illness such as infection due to a virus or bacteria. In most cases, the temperature itself is not harmful. It actually helps the body fight infections. A fever does not need to be treated unless you feel very uncomfortable.  Sometimes a fever can be an early sign of a more serious infection, so make sure to follow up if your condition worsens.  Home care  Unless given other instructions by your healthcare provider, follow these guidelines when caring for yourself at home.  General care    If your symptoms are severe, rest at home for the first 2 to 3 days. When you resume activity, don't let yourself get too tired.    Do not smoke. Also avoid being exposed to secondhand smoke.    Your appetite may be poor, so a light diet is fine. Avoid dehydration by drinking 6 to 8 glasses of fluids per day (such as water, soft drinks, sports drinks, juices, tea, or soup). Extra fluids will help loosen secretions in the nose and lungs.  Medicines    You can take acetaminophen or ibuprofen for pain, unless you were given a different fever-reducing/pain medicine to use. (Note: If you have chronic liver or kidney disease or have ever had a stomach ulcer or gastrointestinal bleeding, talk with your healthcare provider before using these medicines. Also talk to your provider if you are taking medicine to prevent blood clots.) Aspirin should never be given to anyone younger than 18 years of age who is ill with a viral infection or fever. It may cause severe liver or brain damage.    If you were given antibiotics, take them until they are used up, or your healthcare provider tells you to stop. It is important to finish the antibiotics even though you feel better. This is to make sure the infection has cleared. Be aware that antibiotics are not usually given for a fever with an unknown  cause.    Over-the-counter medicines will not shorten the duration of the illness. However, they may be helpful for the following symptoms: cough, sore throat, or nasal and sinus congestion. Ask your pharmacist for product suggestions. (Note: Do not use decongestants if you have high blood pressure.)  Follow-up care  Follow up with your healthcare provider, or as advised.    If a culture was done, you will be notified if your treatment needs to be changed. You can call as directed for the results.    If X-rays, a CT, or an ultrasound were done, a specialist will review them. You will be notified of any findings that may affect your care.  Call 911  Contact emergency services right away if any of these occur:    Trouble breathing or swallowing, or wheezing    Chest pain    Confusion    Extreme drowsiness or trouble awakening    Fainting or loss of consciousness    Rapid heart rate    Low blood pressure    Vomiting blood, or large amounts of blood in stool    Seizure  When to seek medical advice  Call your healthcare provider right away if any of these occur:    Cough with lots of colored sputum (mucus) or blood in your sputum    Severe headache    Face, neck, throat, or ear pain    Feeling drowsy    Abdominal pain    Repeated vomiting or diarrhea    Joint pain or a new rash    Burning when urinating    Fever of 100.4 F (38 C) or higher, that does not get better after taking fever-reducing medicine    Feeling weak or dizzy  Date Last Reviewed: 7/30/2015 2000-2017 [employer]. [address] [place] [address]. All rights reserved. This information is not intended as a substitute for professional medical care. Always follow your healthcare professional's instructions.            *Abdominal Pain, Unknown Cause (Female)    The exact cause of your abdominal (stomach) pain is not certain. This does not mean that this is something to worry about, or the right tests were not done. Everyone likes to  know the exact cause of the problem, but sometimes with abdominal pain, there is no clear-cut cause, and this could be a good thing. The good news is that your symptoms can be treated, and you will feel better.   Your condition does not seem serious now; however, sometimes the signs of a serious problem may take more time to appear. For this reason, it is important for you to watch for any new symptoms, problems, or worsening of your condition.  Over the next few days, the abdominal pain may come and go, or be continuous. Other common symptoms can include nausea and vomiting. Sometimes it can be difficult to tell if you feel nauseous, you may just feel bad and not associate that feeling with nausea. Constipation, diarrhea, and a fever may go along with the pain.  The pain may continue even if treated correctly over the following days. Depending on how things go, sometimes the cause can become clear and may require further or different treatment. Additional evaluations, medications, or tests may be needed.  Home care  Your health care provider may prescribe medications for pain, symptoms, or an infection.  Follow the health care provider's instructions for taking these medications.  General care    Rest until your next exam. No strenuous activities.    Try to find positions that ease discomfort. A small pillow placed on the abdomen may help relieve pain.    Something warm on your abdomen (such as a heating pad) may help, but be careful not to burn yourself.  Diet    Do not force yourself to eat, especially if having cramps, vomiting, or diarrhea.    Water is important so you do not get dehydrated. Soup may also be good. Sports drinks may also help, especially if they are not too acidic. Make sure you don't drink sugary drinks as this can make things worse. Take liquids in small amounts. Do not guzzle them.    Caffeine sometimes makes the pain and cramping worse.    Avoid dairy products if you have vomiting or  diarrhea.    Don't eat large amounts at a time. Wait a few minutes between bites.    Eat a diet low in fiber (called a low-residue diet). Foods allowed include refined breads, white rice, fruit and vegetable juices without pulp, tender meats. These foods will pass more easily through the intestine.    Avoid fried or fatty foods, dairy, alcohol and spicy foods until your symptoms go away.  Follow-up care  Follow up with your health care provider as instructed, or if your pain does not begin to improve in the next 24 hours.  When to seek medical care  Seek prompt medical care if any of the following occur:    Pain gets worse or moves to the right lower abdomen    New or worsening vomiting or diarrhea    Swelling of the abdomen    Unable to pass stool for more than three days    New fever over 101  F (38.3 C), or rising fever    Blood in vomit or bowel movements (dark red or black color)    Jaundice (yellow color of eyes and skin)    Weakness, dizziness    Chest, arm, back, neck or jaw pain    Unexpected vaginal bleeding or missed period  Call 911  Call emergency services if any of the following occur:    Trouble breathing    Confusion    Fainting or loss of consciousness    Rapid heart rate    Seizure    5700-2021 [facility], [address] [place] [address], [place] [address]. All rights reserved. This information is not intended as a substitute for professional medical care. Always follow your healthcare professional's instructions.

## 2017-06-30 NOTE — ED AVS SNAPSHOT
[facility] Emergency Department    [address] [place] [address]    Phone:  [phone]    Fax:  [phone]                                       Doreen Peralta   MRN: [id number]    Department:  [facility] Emergency Department   Date of Visit:  6/30/2017           Patient Information     Date Of Birth          1980        Your diagnoses for this visit were:     Fever of unknown origin     Abdominal pain, epigastric        You were seen by Gera Mcconnell MD.      Follow-up Information     Follow up with Larisa Lorenzo PA-C.    Specialty:  Physician Assistant    Why:  As needed    Contact information:    [facility]  [address]  [place] [address]  [phone]          Discharge Instructions         Febrile Illness, Uncertain Cause (Adult)  You have a fever, but the cause is not certain. A fever is a natural reaction of the body to an illness such as infection due to a virus or bacteria. In most cases, the temperature itself is not harmful. It actually helps the body fight infections. A fever does not need to be treated unless you feel very uncomfortable.  Sometimes a fever can be an early sign of a more serious infection, so make sure to follow up if your condition worsens.  Home care  Unless given other instructions by your healthcare provider, follow these guidelines when caring for yourself at home.  General care    If your symptoms are severe, rest at home for the first 2 to 3 days. When you resume activity, don't let yourself get too tired.    Do not smoke. Also avoid being exposed to secondhand smoke.    Your appetite may be poor, so a light diet is fine. Avoid dehydration by drinking 6 to 8 glasses of fluids per day (such as water, soft drinks, sports drinks, juices, tea, or soup). Extra fluids will help loosen secretions in the nose and lungs.  Medicines    You can take acetaminophen or ibuprofen for pain, unless you were given a different  fever-reducing/pain medicine to use. (Note: If you have chronic liver or kidney disease or have ever had a stomach ulcer or gastrointestinal bleeding, talk with your healthcare provider before using these medicines. Also talk to your provider if you are taking medicine to prevent blood clots.) Aspirin should never be given to anyone younger than 18 years of age who is ill with a viral infection or fever. It may cause severe liver or brain damage.    If you were given antibiotics, take them until they are used up, or your healthcare provider tells you to stop. It is important to finish the antibiotics even though you feel better. This is to make sure the infection has cleared. Be aware that antibiotics are not usually given for a fever with an unknown cause.    Over-the-counter medicines will not shorten the duration of the illness. However, they may be helpful for the following symptoms: cough, sore throat, or nasal and sinus congestion. Ask your pharmacist for product suggestions. (Note: Do not use decongestants if you have high blood pressure.)  Follow-up care  Follow up with your healthcare provider, or as advised.    If a culture was done, you will be notified if your treatment needs to be changed. You can call as directed for the results.    If X-rays, a CT, or an ultrasound were done, a specialist will review them. You will be notified of any findings that may affect your care.  Call 911  Contact emergency services right away if any of these occur:    Trouble breathing or swallowing, or wheezing    Chest pain    Confusion    Extreme drowsiness or trouble awakening    Fainting or loss of consciousness    Rapid heart rate    Low blood pressure    Vomiting blood, or large amounts of blood in stool    Seizure  When to seek medical advice  Call your healthcare provider right away if any of these occur:    Cough with lots of colored sputum (mucus) or blood in your sputum    Severe headache    Face, neck, throat, or  ear pain    Feeling drowsy    Abdominal pain    Repeated vomiting or diarrhea    Joint pain or a new rash    Burning when urinating    Fever of 100.4 F (38 C) or higher, that does not get better after taking fever-reducing medicine    Feeling weak or dizzy  Date Last Reviewed: 7/30/2015 2000-2017 [employer]. [address] [place] [address], [place] [address]. All rights reserved. This information is not intended as a substitute for professional medical care. Always follow your healthcare professional's instructions.            *Abdominal Pain, Unknown Cause (Female)    The exact cause of your abdominal (stomach) pain is not certain. This does not mean that this is something to worry about, or the right tests were not done. Everyone likes to know the exact cause of the problem, but sometimes with abdominal pain, there is no clear-cut cause, and this could be a good thing. The good news is that your symptoms can be treated, and you will feel better.   Your condition does not seem serious now; however, sometimes the signs of a serious problem may take more time to appear. For this reason, it is important for you to watch for any new symptoms, problems, or worsening of your condition.  Over the next few days, the abdominal pain may come and go, or be continuous. Other common symptoms can include nausea and vomiting. Sometimes it can be difficult to tell if you feel nauseous, you may just feel bad and not associate that feeling with nausea. Constipation, diarrhea, and a fever may go along with the pain.  The pain may continue even if treated correctly over the following days. Depending on how things go, sometimes the cause can become clear and may require further or different treatment. Additional evaluations, medications, or tests may be needed.  Home care  Your health care provider may prescribe medications for pain, symptoms, or an infection.  Follow the health care provider's instructions for taking these  medications.  General care    Rest until your next exam. No strenuous activities.    Try to find positions that ease discomfort. A small pillow placed on the abdomen may help relieve pain.    Something warm on your abdomen (such as a heating pad) may help, but be careful not to burn yourself.  Diet    Do not force yourself to eat, especially if having cramps, vomiting, or diarrhea.    Water is important so you do not get dehydrated. Soup may also be good. Sports drinks may also help, especially if they are not too acidic. Make sure you don't drink sugary drinks as this can make things worse. Take liquids in small amounts. Do not guzzle them.    Caffeine sometimes makes the pain and cramping worse.    Avoid dairy products if you have vomiting or diarrhea.    Don't eat large amounts at a time. Wait a few minutes between bites.    Eat a diet low in fiber (called a low-residue diet). Foods allowed include refined breads, white rice, fruit and vegetable juices without pulp, tender meats. These foods will pass more easily through the intestine.    Avoid fried or fatty foods, dairy, alcohol and spicy foods until your symptoms go away.  Follow-up care  Follow up with your health care provider as instructed, or if your pain does not begin to improve in the next 24 hours.  When to seek medical care  Seek prompt medical care if any of the following occur:    Pain gets worse or moves to the right lower abdomen    New or worsening vomiting or diarrhea    Swelling of the abdomen    Unable to pass stool for more than three days    New fever over 101  F (38.3 C), or rising fever    Blood in vomit or bowel movements (dark red or black color)    Jaundice (yellow color of eyes and skin)    Weakness, dizziness    Chest, arm, back, neck or jaw pain    Unexpected vaginal bleeding or missed period  Call 911  Call emergency services if any of the following occur:    Trouble breathing    Confusion    Fainting or loss of  consciousness    Rapid heart rate    Seizure    3114-6416 [facility], [address] [place] [address], [place] [address]. All rights reserved. This information is not intended as a substitute for professional medical care. Always follow your healthcare professional's instructions.          Future Appointments        Provider Department Dept Phone Center    7/17/2017 8:40 AM Gilmer Lees MD [facility] and [phone] [facility]      24 Hour Appointment Hotline       To make an appointment at any [facility], call [phone] ([phone]). If you don't have a family doctor or clinic, we will help you find one. [facility] are conveniently located to serve the needs of you and your family.             Review of your medicines      CONTINUE these medicines which may have CHANGED, or have new prescriptions. If we are uncertain of the size of tablets/capsules you have at home, strength may be listed as something that might have changed.        Dose / Directions Last dose taken    HYDROmorphone 2 MG tablet   Commonly known as:  DILAUDID   Dose:  2 mg   What changed:  reasons to take this   Quantity:  12 tablet        Take 1 tablet (2 mg) by mouth every 6 hours as needed for severe pain maximum 4 tablet(s) per day   Refills:  0          Our records show that you are taking the medicines listed below. If these are incorrect, please call your family doctor or clinic.        Dose / Directions Last dose taken    ACETAMINOPHEN PO   Dose:  500-1000 mg        Take 500-1,000 mg by mouth every 6 hours as needed for pain   Refills:  0        albuterol 90 MCG/ACT inhaler   Dose:  2 puff        Inhale 2 puffs into the lungs every 6 hours as needed   Refills:  0        cloNIDine 0.2 MG tablet   Commonly known as:  CATAPRES   Quantity:  180 tablet        TAKE 2 TABLETS(0.4 MG) BY MOUTH EVERY EVENING   Refills:  0        diphenhydrAMINE 25 MG tablet   Commonly known as:  BENADRYL ALLERGY   Dose:  25 mg    Quantity:  30 tablet        Take 1 tablet (25 mg) by mouth every 6 hours as needed for itching or other (Nausea)   Refills:  0        DULoxetine 60 MG EC capsule   Commonly known as:  CYMBALTA   Dose:  60 mg   Quantity:  90 capsule        Take 1 capsule (60 mg) by mouth daily   Refills:  3        mirtazapine 15 MG ODT tab   Commonly known as:  REMERON SOL-TAB   Dose:  7.5 mg   Quantity:  45 tablet        0.5 tablets (7.5 mg) by Orally disintegrating tablet route At Bedtime   Refills:  0        nortriptyline 10 MG capsule   Commonly known as:  PAMELOR   Dose:  30-40 mg   Quantity:  120 capsule        Take 3-4 capsules (30-40 mg) by mouth At Bedtime   Refills:  5        ondansetron 4 MG ODT tab   Commonly known as:  ZOFRAN ODT   Dose:  4-8 mg   Quantity:  20 tablet        Take 1-2 tablets (4-8 mg) by mouth every 6 hours as needed for nausea   Refills:  0        rivaroxaban ANTICOAGULANT 20 MG Tabs tablet   Commonly known as:  XARELTO   Dose:  20 mg   Quantity:  90 tablet        Take 1 tablet (20 mg) by mouth daily (with dinner)   Refills:  1        SUMAtriptan 50 MG tablet   Commonly known as:  IMITREX   Dose:   mg   Quantity:  18 tablet        Take 1-2 tablets ( mg) by mouth at onset of headache for migraine - may repeat dose after 2h if headache recurs.  Max: 200mg/24 hours   Refills:  1        traZODone 50 MG tablet   Commonly known as:  DESYREL   Dose:   mg   Quantity:  180 tablet        Take 1-2 tablets ( mg) by mouth nightly as needed 1-2 tabs at bedtime PRN   Refills:  1                Prescriptions were sent or printed at these locations (1 Prescription)                   Other Prescriptions                Printed at Department/Unit printer (1 of 1)         HYDROmorphone (DILAUDID) 2 MG tablet                Procedures and tests performed during your visit     Blood culture    CBC with platelets differential    CRP inflammation    Comprehensive metabolic panel    Lactic acid whole  blood    Lipase    Peripheral IV catheter    UA with Microscopic    Urine Culture      Orders Needing Specimen Collection     None      Pending Results     Date and Time Order Name Status Description    6/30/2017 1209 Blood culture In process     6/30/2017 1209 Urine Culture In process             Pending Culture Results     Date and Time Order Name Status Description    6/30/2017 1209 Blood culture In process     6/30/2017 1209 Urine Culture In process             Pending Results Instructions     If you had any lab results that were not finalized at the time of your Discharge, you can call the ED Lab Result RN at [phone]. You will be contacted by this team for any positive Lab results or changes in treatment. The nurses are available 7 days a week from 10A to 6:30P.  You can leave a message 24 hours per day and they will return your call.        Thank you for choosing [facility]       Thank you for choosing [facility] for your care. Our goal is always to provide you with excellent care. Hearing back from our patients is one way we can continue to improve our services. Please take a few minutes to complete the written survey that you may receive in the mail after you visit with us. Thank you!        [facility] Information     [facility] gives you secure access to your electronic health record. If you see a primary care provider, you can also send messages to your care team and make appointments. If you have questions, please call your primary care clinic.  If you do not have a primary care provider, please call [phone] and they will assist you.        Care EveryWhere ID     This is your Care EveryWhere ID. This could be used by other organizations to access your [facility] medical records  [id number]        Equal Access to Services     TRAMAINE CLAYTON : Carrie Law, clark villeda, elham guerrero. So [facility] [phone].    ATENCIÓN: Iona dobbs  español, tiene a wade disposición servicios gratuitos de asistencia lingüística. Lljuan daniel al [phone].    We comply with applicable federal civil rights laws and [place] laws. We do not discriminate on the basis of race, color, national origin, age, disability sex, sexual orientation or gender identity.            After Visit Summary       This is your record. Keep this with you and show to your community pharmacist(s) and doctor(s) at your next visit.

## 2017-06-30 NOTE — TELEPHONE ENCOUNTER
Doreen Peralta is a 37 year old female who calls with fever and Nausea and abd pain.  Doreen was seen in the ED for this on 06/26. She has a fever of 102 with chill, shaking, and vomiting. It comes in waves but feels like it has gotten worse. She has had it for 8 days.  She is asking to see a provider today and have repeat labs drawn.  No appts avail in [facility], [facility], or [facility].  Advised Doreen to contact her Infectious disease doctor to see if she will order labs as she has done this on another occasion. Advised if no direction given in a timely manor today she should report to the ED for evaluation.   Allergies:   Allergies   Allergen Reactions     Hyoscyamine Rash     Metoclopramide Other (See Comments)     Eye twitching.      Peaches [Peach] Other (See Comments)     Raw. Cooked OK     Sucralose Other (See Comments)     All artificial sweeteners. Aspartame also. Swollen glands     Advair Diskus Other (See Comments)     Throat burns     Azithromycin Other (See Comments)     Burning in throat     Compazine [Prochlorperazine] Visual Disturbance     Contrast Dye Itching     States is allergic to CT contrast dye     Cyclobenzaprine Visual Disturbance     Fentanyl Other (See Comments)     migraine     Ibuprofen GI Disturbance     Lactulose Nausea and Vomiting     Gas and bloating     Levaquin [Levofloxacin] Swelling     Per ED M.D. And RN      Morphine Sulfate Other (See Comments)     Chest pain       Oxycodone Other (See Comments)     Burning throat, but can take Norco.      Penicillins Other (See Comments)     Family hx of resp arrest, she has never taken  Ok with cephalosporins     Rizatriptan Visual Disturbance     Droperidol Hives and Rash     Isovue [Iopamidol] Palpitations     Pt had racing heart and sob      Ketorolac Anxiety     Latex Swelling and Rash     Kiwi, likely also avacado, ? banana     Levsin Rash       RECOMMENDED DISPOSITION:  To ED, another person to drive - Unless ID MD will give her  direction today.  Will comply with recommendation: Yes  If further questions/concerns or if symptoms do not improve, worsen or new symptoms develop, call your PCP or [facility] as soon as possible.      Guideline used:  Telephone Triage Protocols for Nurses, Fifth Edition, Natasha Braun RN

## 2017-07-02 LAB
BACTERIA SPEC CULT: NORMAL
MICRO REPORT STATUS: NORMAL
SPECIMEN SOURCE: NORMAL

## 2017-07-06 LAB
BACTERIA SPEC CULT: NORMAL
Lab: NORMAL
MICRO REPORT STATUS: NORMAL
SPECIMEN SOURCE: NORMAL

## 2017-07-12 ENCOUNTER — TELEPHONE (OUTPATIENT)
Dept: GASTROENTEROLOGY | Facility: CLINIC | Age: 37
End: 2017-07-12

## 2017-07-17 ENCOUNTER — OFFICE VISIT (OUTPATIENT)
Dept: GASTROENTEROLOGY | Facility: CLINIC | Age: 37
End: 2017-07-17

## 2017-07-17 VITALS
TEMPERATURE: 98.5 F | DIASTOLIC BLOOD PRESSURE: 75 MMHG | BODY MASS INDEX: 33.4 KG/M2 | WEIGHT: 207.8 LBS | HEIGHT: 66 IN | HEART RATE: 83 BPM | SYSTOLIC BLOOD PRESSURE: 117 MMHG | OXYGEN SATURATION: 100 %

## 2017-07-17 DIAGNOSIS — K56.609 SBO (SMALL BOWEL OBSTRUCTION) (H): ICD-10-CM

## 2017-07-17 DIAGNOSIS — K52.9 CHRONIC DIARRHEA: Primary | ICD-10-CM

## 2017-07-17 DIAGNOSIS — G89.29 CHRONIC ABDOMINAL PAIN: ICD-10-CM

## 2017-07-17 DIAGNOSIS — R10.9 CHRONIC ABDOMINAL PAIN: ICD-10-CM

## 2017-07-17 DIAGNOSIS — Z93.1 GASTROSTOMY TUBE DEPENDENT (H): ICD-10-CM

## 2017-07-17 RX ORDER — ALBUTEROL SULFATE 90 UG/1
1 AEROSOL, METERED RESPIRATORY (INHALATION)
Status: ON HOLD | COMMUNITY
Start: 2012-08-13 | End: 2017-07-30

## 2017-07-17 RX ORDER — ALBUTEROL SULFATE 0.83 MG/ML
2.5 SOLUTION RESPIRATORY (INHALATION)
Status: ON HOLD | COMMUNITY
Start: 2017-01-24 | End: 2017-07-30

## 2017-07-17 RX ORDER — ONDANSETRON 4 MG/1
4 TABLET, FILM COATED ORAL
COMMUNITY
End: 2017-07-17

## 2017-07-17 ASSESSMENT — PAIN SCALES - GENERAL: PAINLEVEL: NO PAIN (0)

## 2017-07-17 NOTE — PATIENT INSTRUCTIONS
I've included a brief summary of our discussion and care plan from today's visit below.  Please review this information with your primary care provider.  _______________________________________________________________________      1. It was a pleasure getting a chance to see you again to discuss your chronic issues with recurrent bowel obstruction and malnutrition in the complicated post-surgical setting, particularly given your chronic needs for nutritional/fluid support and obstruction management - continue supportive care measures as we discussed at length today, keep up the great work!    2. Agree that you will need a more stable chronic venous access replaced, and discussed the risks (specifically serious recurrent bacterial/fungal infections, particularly given recent history with PICC line infection) at length today.  - Understanding the infection risks involved, a Port-a-Cath (PAC) would be the lowest-risk option. Would only plan for this to be accessed once weekly for outpatient IV fluid replacement + Multivitamin replacement (and subsequently deaccessed thereafter), may help to reduce risk for infections seen with chronic accession in past.  - No immediate plan to resume TPN for now.  - Will readdress this issue with your Infectious Diseases Provider (Dr. Price) prior to placing referral to Interventional Radiology, particularly to get her thoughts on this complicated situation in light of the somewhat unavoidable risks.  - Would not recommend PICC placement or chronic PAC accession given history of serious chronic indwelling catheter infections, discussed at length today.    3. Recommend routine studies including nutritional markers as we discussed today. Will readdress role/timing for these once we've resolved your venous access issues.    4. Continue to use your G-tube as a vent to reduce symptoms related to evolving obstruction/bloating as we discussed today, particularly using increased G-tube tract  "drainage as another trigger to vent.  - OK to hold off soluble fiber supplementation for now, but might warrant a longer re-trial in the near-future.  - OK to use liquid loperamide as needed for \"rescue\" when you have severe diarrhea (>10 episodes/day). I'm glad that this has been so effective at a low dose to help with your symptom management, discussed risks/benefits/considerations again today.    5. Will have my clinic team work on your mother's FMLA paperwork this week per your request.  - Future completions of FMLA paperwork might need to be done in conjunction with your PCP as well depending on your overall care issues.    6. Return to GI Clinic with me in 4 months to review your progress, sooner if symptomatic.   - If you are unable to schedule this follow-up appointment today, please contact Roxie Gregg at [phone] within the next week to help set up this necessary appointment.    _______________________________________________________________________    It was a pleasure seeing you in clinic today - please be in touch if there are any further questions that arise following today's visit.  During business hours, you may reach Clinic Nurse Triage Line at [phone].  For urgent/emergent questions after business hours, you may reach the on-call GI Fellow by contacting the [facility]  at [phone].    Any benign/non-urgent test results are usually communicated via letter or hipages [employer] message within 1-2 weeks after completion.  Urgent results (those that require a change in the previously-discussed care plan) are usually communicated via a phone call once available from our clinic staff to discuss the results and the next steps in your evaluation.    I recommend signing up for Bio[employer] access if you have not already done so and are comfortable with using a computer.  This allows for online access to your lab results and also helps you communicate efficiently with my clinic should any " questions arise in your care.    We have Financial Counseling services available through our clinic.  If you have questions about your insurance coverage or payment responsibilities, particularly before you undergo any tests or procedures, please let us know and we can arrange a consultation accordingly to help you make informed decisions about your healthcare.    Sincerely,    Gilmer Lees MD    [facility] - Department of Medicine  Division of Gastroenterology

## 2017-07-17 NOTE — MR AVS SNAPSHOT
After Visit Summary   7/17/2017    Doreen Peralta    MRN: [id number]           Patient Information     Date Of Birth          1980        Visit Information        Provider Department      7/17/2017 8:40 AM Gilmer Lees MD [facility] Gastroenterology and IBD        Care Instructions    I've included a brief summary of our discussion and care plan from today's visit below.  Please review this information with your primary care provider.  _______________________________________________________________________      1. It was a pleasure getting a chance to see you again to discuss your chronic issues with recurrent bowel obstruction and malnutrition in the complicated post-surgical setting, particularly given your chronic needs for nutritional/fluid support and obstruction management - continue supportive care measures as we discussed at length today, keep up the great work!    2. Agree that you will need a more stable chronic venous access replaced, and discussed the risks (specifically serious recurrent bacterial/fungal infections, particularly given recent history with PICC line infection) at length today.  - Understanding the infection risks involved, a Port-a-Cath (PAC) would be the lowest-risk option. Would only plan for this to be accessed once weekly for outpatient IV fluid replacement + Multivitamin replacement (and subsequently deaccessed thereafter), may help to reduce risk for infections seen with chronic accession in past.  - No immediate plan to resume TPN for now.  - Will readdress this issue with your Infectious Diseases Provider (Dr. Price) prior to placing referral to Interventional Radiology, particularly to get her thoughts on this complicated situation in light of the somewhat unavoidable risks.  - Would not recommend PICC placement or chronic PAC accession given history of serious chronic indwelling catheter infections, discussed at length today.    3. Recommend routine studies  "including nutritional markers as we discussed today. Will readdress role/timing for these once we've resolved your venous access issues.    4. Continue to use your G-tube as a vent to reduce symptoms related to evolving obstruction/bloating as we discussed today, particularly using increased G-tube tract drainage as another trigger to vent.  - OK to hold off soluble fiber supplementation for now, but might warrant a longer re-trial in the near-future.  - OK to use liquid loperamide as needed for \"rescue\" when you have severe diarrhea (>10 episodes/day). I'm glad that this has been so effective at a low dose to help with your symptom management, discussed risks/benefits/considerations again today.    5. Will have my clinic team work on your mother's FMLA paperwork this week per your request.  - Future completions of FMLA paperwork might need to be done in conjunction with your PCP as well depending on your overall care issues.    6. Return to GI Clinic with me in 4 months to review your progress, sooner if symptomatic.   - If you are unable to schedule this follow-up appointment today, please contact Roxie Gregg at [phone] within the next week to help set up this necessary appointment.    _______________________________________________________________________    It was a pleasure seeing you in clinic today - please be in touch if there are any further questions that arise following today's visit.  During business hours, you may reach Clinic Nurse Triage Line at [phone].  For urgent/emergent questions after business hours, you may reach the on-call GI Fellow by contacting the [facility]  at [phone].    Any benign/non-urgent test results are usually communicated via letter or MyChart message within 1-2 weeks after completion.  Urgent results (those that require a change in the previously-discussed care plan) are usually communicated via a phone call once available from our clinic " staff to discuss the results and the next steps in your evaluation.    I recommend signing up for [employer] access if you have not already done so and are comfortable with using a computer.  This allows for online access to your lab results and also helps you communicate efficiently with my clinic should any questions arise in your care.    We have Financial Counseling services available through our clinic.  If you have questions about your insurance coverage or payment responsibilities, particularly before you undergo any tests or procedures, please let us know and we can arrange a consultation accordingly to help you make informed decisions about your healthcare.    Sincerely,    Gilmer Lees MD    [facility] - Department of Medicine  Division of Gastroenterology            Follow-ups after your visit        Your next 10 appointments already scheduled     Dec 04, 2017 10:00 AM CST   (Arrive by 9:45 AM)   Return Visit with Gilmer eLes MD   [facility] Gastroenterology and IBD ([facility])    [address] [place] [id number] [phone]              Who to contact     Please call your clinic at [phone] to:    Ask questions about your health    Make or cancel appointments    Discuss your medicines    Learn about your test results    Speak to your doctor   If you have compliments or concerns about an experience at your clinic, or if you wish to file a complaint, please contact [facility] Patient Relations at [phone] or email us at [email or web address]         Additional Information About Your Visit        Deliveredhart Information     Cardley gives you secure access to your electronic health record. If you see a primary care provider, you can also send messages to your care team and make appointments. If you have questions, please call your primary care clinic.  If you do not have a  "primary care provider, please call [phone] and they will assist you.      BioDtech is an electronic gateway that provides easy, online access to your medical records. With BioDtech, you can request a clinic appointment, read your test results, renew a prescription or communicate with your care team.     To access your existing account, please contact your [facility] or call [phone] for assistance.        Care EveryWhere ID     This is your Care EveryWhere ID. This could be used by other organizations to access your [place] medical records  [id number]        Your Vitals Were     Pulse Temperature Height Last Period Pulse Oximetry BMI (Body Mass Index)    83 98.5  F (36.9  C) (Oral) 1.676 m (5' 6\") 06/19/2017 (Approximate) 100% 33.54 kg/m2       Blood Pressure from Last 3 Encounters:   07/17/17 117/75   06/30/17 125/80   06/26/17 (!) 142/91    Weight from Last 3 Encounters:   07/17/17 94.3 kg (207 lb 12.8 oz)   06/30/17 93.1 kg (205 lb 4.8 oz)   05/22/17 90.7 kg (200 lb)              Today, you had the following     No orders found for display         Today's Medication Changes          These changes are accurate as of: 7/17/17  9:36 AM.  If you have any questions, ask your nurse or doctor.               Stop taking these medicines if you haven't already. Please contact your care team if you have questions.     HYDROmorphone 2 MG tablet   Commonly known as:  DILAUDID   Stopped by:  Gilmer Lees MD           ondansetron 4 MG tablet   Commonly known as:  ZOFRAN   Stopped by:  Gilmer Lees MD                    Primary Care Provider Office Phone # Fax #    Larisa Lorenzo PA-C [phone]       [facility] [address] [address] [place] [address]        Equal Access to Services     [facility] Center[facility] : Hadii caitie torres Sorhona, waaxda luqadaha, qaybta kaalmada adeerasmo, elham sal . So [facility] [phone].    ATENCIÓN: Si " rinku dale, tiene a wade disposición servicios gratuitos de asistencia lingüística. Janes lebron [phone].    We comply with applicable federal civil rights laws and [place] laws. We do not discriminate on the basis of race, color, national origin, age, disability sex, sexual orientation or gender identity.            Thank you!     Thank you for choosing [facility] GASTROENTEROLOGY AND IBD  for your care. Our goal is always to provide you with excellent care. Hearing back from our patients is one way we can continue to improve our services. Please take a few minutes to complete the written survey that you may receive in the mail after your visit with us. Thank you!             Your Updated Medication List - Protect others around you: Learn how to safely use, store and throw away your medicines at www.disposemymeds.org.          This list is accurate as of: 7/17/17  9:36 AM.  Always use your most recent med list.                   Brand Name Dispense Instructions for use Diagnosis    ACETAMINOPHEN PO      Take 500-1,000 mg by mouth every 6 hours as needed for pain        * albuterol 108 (90 BASE) MCG/ACT Inhaler    PROAIR HFA/PROVENTIL HFA/VENTOLIN HFA     Inhale 1 puff into the lungs        * albuterol (2.5 MG/3ML) 0.083% neb solution      Inhale 2.5 mg into the lungs        albuterol 90 MCG/ACT inhaler      Inhale 2 puffs into the lungs every 6 hours as needed        cloNIDine 0.2 MG tablet    CATAPRES    180 tablet    TAKE 2 TABLETS(0.4 MG) BY MOUTH EVERY EVENING    Anxiety       diphenhydrAMINE 25 MG tablet    BENADRYL ALLERGY    30 tablet    Take 1 tablet (25 mg) by mouth every 6 hours as needed for itching or other (Nausea)        DULoxetine 60 MG EC capsule    CYMBALTA    90 capsule    Take 1 capsule (60 mg) by mouth daily    Abdominal pain, epigastric, Abdominal migraine, not intractable       ipratropium 0.02 % neb solution    ATROVENT     Inhale 0.5 mg into the lungs        mirtazapine 15 MG ODT tab     REMERON SOL-TAB    45 tablet    0.5 tablets (7.5 mg) by Orally disintegrating tablet route At Bedtime    Anxiety       nortriptyline 10 MG capsule    PAMELOR    120 capsule    Take 3-4 capsules (30-40 mg) by mouth At Bedtime    Neuropathic pain, Primary insomnia       ondansetron 4 MG ODT tab    ZOFRAN ODT    20 tablet    Take 1-2 tablets (4-8 mg) by mouth every 6 hours as needed for nausea    Intractable vomiting, presence of nausea not specified, unspecified vomiting type, Gastroparesis       rivaroxaban ANTICOAGULANT 20 MG Tabs tablet    XARELTO    90 tablet    Take 1 tablet (20 mg) by mouth daily (with dinner)    Deep vein thrombosis (DVT) of upper extremity, unspecified chronicity, unspecified laterality, unspecified vein (H)       SUMAtriptan 50 MG tablet    IMITREX    18 tablet    Take 1-2 tablets ( mg) by mouth at onset of headache for migraine - may repeat dose after 2h if headache recurs.  Max: 200mg/24 hours    Migraine without aura and without status migrainosus, not intractable       traZODone 50 MG tablet    DESYREL    180 tablet    Take 1-2 tablets ( mg) by mouth nightly as needed 1-2 tabs at bedtime PRN    Insomnia, unspecified type       * Notice:  This list has 2 medication(s) that are the same as other medications prescribed for you. Read the directions carefully, and ask your doctor or other care provider to review them with you.

## 2017-07-17 NOTE — PROGRESS NOTES
GI CLINIC VISIT    CC/REFERRING PROVIDER: Todd Roblero  REASON FOR CONSULTATION: chronic abdominal pain, recurrent obstruction in the complicated post-surgical setting    HPI: 37 year old female w/ h/o IBS-C + superimposed colonic inertia s/p loop ileostomy 1/2012, s/p subtotal colectomy + ileosigmoid anastomosis 10/2012, s/p open J-tube placement x2 (2015, 2016) w/ eventual transition to venting G-tube 1/2016, s/p ex-lap/KATIE 1/2016, gastroparesis, chronic abdominal pain syndrome + recurrent obstructions in post-surgical setting, chronic migraine HA, recurrent PICC line-associated infections, among multiple other medical issues - presenting for f/u chronic abd pain, recurrent obstructions. Doing well overall, reports having 10 soft/loose BM daily (baseline when doing well). Readily responsive to low-dose peds loperamide, only uses this for rescue if >10 stools/day w/ good effect. Unable to tolerate fiber supplementation d/t bloating, limited trial (encouraged to try again). Major issue is venous access, reports having multiple IV sticks to obtain access at ED and at [facility] - has led pt to avoid coming in even when she knows she is actively dehydrated. Denies any tenesmus or insecurity passing flatus, no fecal incontinence or new perianal/nocturnal sxs noted. Denies any N/V/F/C/HA/NS or other const/syst/cardiopulmonary sxs, no BRBPR/melena/urinary changes, no unintentional wt loss or appetite/satiety changes. No other bowel/bladder habit changes, no dysphagia/odynophagia. No jaundice/icterus/pruritus, no acholic stools/steatorrhea, no new lumps/bumps, no jt pain/oral ulcer/rash/eye sxs noted.    ROS: 10pt ROS performed and otherwise negative.    PERTINENT PAST MEDICAL/SURGICAL HISTORY:  As noted above.    PERTINENT MEDICATIONS:  Medications reviewed with patient today, see Medication List/Assessment for details.  No other NSAID/anticoagulation reported by patient.  No other  OTC/herbal/supplements reported by patient.    SOCIAL HISTORY: Tobacco: none.    PHYSICAL EXAMINATION:  Vitals reviewed, AFVSS  Wt 207# today (incr ~10# since 4/2017)  Gen: aaox3, cooperative, pleasant, not diaphoretic, nad  HEENT: ncat, neck supple, no clad/sclad, normal op w/o ulcer/exudate, anicteric, mmm  Resp/CV without acute findings, not dyspneic/tachycardic, +old R upper chest PAC scar  Abd: +nabs, soft, nt, nd, no peritoneal s/s noted. No ecchymoses, +complicated midline vertical surgical scar, +old RLQ ileostomy and lap scars, +LUQ 16Fr G-tube relatively c/d/i w/ minimal scant leakage around tube and no surrounding cellulitis, no CVA/spinal tenderness noted.  Ext: no c/c/e  Skin: warm, perfused, no jaundice  Neuro: grossly intact, no asterixis noted    PERTINENT STUDIES:  None today    ASSESSMENT/PLAN:    1. Recurrent obstructions + chronic abdominal pain syndrome in setting of complicated post-surgical management, stable weight/nutritional status off TPN - may need to readdress placement of chronic indwelling vascular access now (likely Port-a-Cath with restrictions on access), continue supportive care as ordered for now  1. It was a pleasure getting a chance to see you again to discuss your chronic issues with recurrent bowel obstruction and malnutrition in the complicated post-surgical setting, particularly given your chronic needs for nutritional/fluid support and obstruction management - continue supportive care measures as we discussed at length today, keep up the great work!    2. Agree that you will need a more stable chronic venous access replaced, and discussed the risks (specifically serious recurrent bacterial/fungal infections, particularly given recent history with PICC line infection) at length today.  - Understanding the infection risks involved, a Port-a-Cath (PAC) would be the lowest-risk option. Would only plan for this to be accessed once weekly for outpatient IV fluid replacement +  "Multivitamin replacement (and subsequently deaccessed thereafter), may help to reduce risk for infections seen with chronic accession in past.  - No immediate plan to resume TPN for now.  - Will readdress this issue with your Infectious Diseases Provider (Dr. Price) prior to placing referral to Interventional Radiology, particularly to get her thoughts on this complicated situation in light of the somewhat unavoidable risks.  - Would not recommend PICC placement or chronic PAC accession given history of serious chronic indwelling catheter infections, discussed at length today. Case reviewed with ID (Dr. Price) this evening.    3. Recommend routine studies including nutritional markers as we discussed today. Will readdress role/timing for these once we've resolved your venous access issues.    4. Continue to use your G-tube as a vent to reduce symptoms related to evolving obstruction/bloating as we discussed today, particularly using increased G-tube tract drainage as another trigger to vent.  - OK to hold off soluble fiber supplementation for now, but might warrant a longer re-trial in the near-future.  - OK to use liquid loperamide as needed for \"rescue\" when you have severe diarrhea (>10 episodes/day). I'm glad that this has been so effective at a low dose to help with your symptom management, discussed risks/benefits/considerations again today.    5. Will have my clinic team work on your mother's FMLA paperwork this week per your request.  - Future completions of FMLA paperwork might need to be done in conjunction with your PCP as well depending on your overall care issues.    2. Colorectal Cancer Screening  S/p subtotal colectomy 10/2012, can readdress in ongoing care if surveillance of distal remnant is required.    RTC 4 months, sooner if symptomatic.      Thank you for this consultation. It was a pleasure to participate in the care of this patient; please contact us with any further questions.     Gilmer SEO" MD Nabeel   of Medicine  [facility] - Department of Medicine  Division of Gastroenterology

## 2017-07-17 NOTE — LETTER
7/17/2017       RE: Doreen Peralta  [address]   [place] [address]     Dear Colleague,    Thank you for referring your patient, Doreen Peralta, to the [facility] GASTROENTEROLOGY AND IBD at [facility]. Please see a copy of my visit note below.    GI CLINIC VISIT    CC/REFERRING PROVIDER: Todd Roblero  REASON FOR CONSULTATION: chronic abdominal pain, recurrent obstruction in the complicated post-surgical setting    HPI: 37 year old female w/ h/o IBS-C + superimposed colonic inertia s/p loop ileostomy 1/2012, s/p subtotal colectomy + ileosigmoid anastomosis 10/2012, s/p open J-tube placement x2 (2015, 2016) w/ eventual transition to venting G-tube 1/2016, s/p ex-lap/KATIE 1/2016, gastroparesis, chronic abdominal pain syndrome + recurrent obstructions in post-surgical setting, chronic migraine HA, recurrent PICC line-associated infections, among multiple other medical issues - presenting for f/u chronic abd pain, recurrent obstructions. Doing well overall, reports having 10 soft/loose BM daily (baseline when doing well). Readily responsive to low-dose peds loperamide, only uses this for rescue if >10 stools/day w/ good effect. Unable to tolerate fiber supplementation d/t bloating, limited trial (encouraged to try again). Major issue is venous access, reports having multiple IV sticks to obtain access at ED and at [facility] - has led pt to avoid coming in even when she knows she is actively dehydrated. Denies any tenesmus or insecurity passing flatus, no fecal incontinence or new perianal/nocturnal sxs noted. Denies any N/V/F/C/HA/NS or other const/syst/cardiopulmonary sxs, no BRBPR/melena/urinary changes, no unintentional wt loss or appetite/satiety changes. No other bowel/bladder habit changes, no dysphagia/odynophagia. No jaundice/icterus/pruritus, no acholic stools/steatorrhea, no new lumps/bumps, no jt pain/oral ulcer/rash/eye sxs  noted.    ROS: 10pt ROS performed and otherwise negative.    PERTINENT PAST MEDICAL/SURGICAL HISTORY:  As noted above.    PERTINENT MEDICATIONS:  Medications reviewed with patient today, see Medication List/Assessment for details.  No other NSAID/anticoagulation reported by patient.  No other OTC/herbal/supplements reported by patient.    SOCIAL HISTORY: Tobacco: none.    PHYSICAL EXAMINATION:  Vitals reviewed, AFVSS  Wt 207# today (incr ~10# since 4/2017)  Gen: aaox3, cooperative, pleasant, not diaphoretic, nad  HEENT: ncat, neck supple, no clad/sclad, normal op w/o ulcer/exudate, anicteric, mmm  Resp/CV without acute findings, not dyspneic/tachycardic, +old R upper chest PAC scar  Abd: +nabs, soft, nt, nd, no peritoneal s/s noted. No ecchymoses, +complicated midline vertical surgical scar, +old RLQ ileostomy and lap scars, +LUQ 16Fr G-tube relatively c/d/i w/ minimal scant leakage around tube and no surrounding cellulitis, no CVA/spinal tenderness noted.  Ext: no c/c/e  Skin: warm, perfused, no jaundice  Neuro: grossly intact, no asterixis noted    PERTINENT STUDIES:  None today    ASSESSMENT/PLAN:    1. Recurrent obstructions + chronic abdominal pain syndrome in setting of complicated post-surgical management, stable weight/nutritional status off TPN - may need to readdress placement of chronic indwelling vascular access now (likely Port-a-Cath with restrictions on access), continue supportive care as ordered for now  1. It was a pleasure getting a chance to see you again to discuss your chronic issues with recurrent bowel obstruction and malnutrition in the complicated post-surgical setting, particularly given your chronic needs for nutritional/fluid support and obstruction management - continue supportive care measures as we discussed at length today, keep up the great work!    2. Agree that you will need a more stable chronic venous access replaced, and discussed the risks (specifically serious recurrent  "bacterial/fungal infections, particularly given recent history with PICC line infection) at length today.  - Understanding the infection risks involved, a Port-a-Cath (PAC) would be the lowest-risk option. Would only plan for this to be accessed once weekly for outpatient IV fluid replacement + Multivitamin replacement (and subsequently deaccessed thereafter), may help to reduce risk for infections seen with chronic accession in past.  - No immediate plan to resume TPN for now.  - Will readdress this issue with your Infectious Diseases Provider (Dr. Price) prior to placing referral to Interventional Radiology, particularly to get her thoughts on this complicated situation in light of the somewhat unavoidable risks.  - Would not recommend PICC placement or chronic PAC accession given history of serious chronic indwelling catheter infections, discussed at length today. Case reviewed with ID (Dr. Price) this evening.    3. Recommend routine studies including nutritional markers as we discussed today. Will readdress role/timing for these once we've resolved your venous access issues.    4. Continue to use your G-tube as a vent to reduce symptoms related to evolving obstruction/bloating as we discussed today, particularly using increased G-tube tract drainage as another trigger to vent.  - OK to hold off soluble fiber supplementation for now, but might warrant a longer re-trial in the near-future.  - OK to use liquid loperamide as needed for \"rescue\" when you have severe diarrhea (>10 episodes/day). I'm glad that this has been so effective at a low dose to help with your symptom management, discussed risks/benefits/considerations again today.    5. Will have my clinic team work on your mother's FMLA paperwork this week per your request.  - Future completions of FMLA paperwork might need to be done in conjunction with your PCP as well depending on your overall care issues.    2. Colorectal Cancer Screening  S/p subtotal " colectomy 10/2012, can readdress in ongoing care if surveillance of distal remnant is required.    RTC 4 months, sooner if symptomatic.      Thank you for this consultation. It was a pleasure to participate in the care of this patient; please contact us with any further questions.     Gilmer Lees MD   of Medicine  [facility] - Department of Medicine  Division of Gastroenterology

## 2017-07-17 NOTE — NURSING NOTE
"Chief Complaint   Patient presents with     RECHECK     Follow up       Vitals:    07/17/17 0823   BP: 117/75   Pulse: 83   Temp: 98.5  F (36.9  C)   TempSrc: Oral   SpO2: 100%   Weight: 207 lb 12.8 oz   Height: 5' 6\"       Body mass index is 33.54 kg/(m^2).    Lawrence Alfaro CMA                          "

## 2017-07-19 ENCOUNTER — ANESTHESIA EVENT (OUTPATIENT)
Dept: SURGERY | Facility: AMBULATORY SURGERY CENTER | Age: 37
End: 2017-07-19

## 2017-07-19 RX ORDER — HEPARIN SODIUM (PORCINE) LOCK FLUSH IV SOLN 100 UNIT/ML 100 UNIT/ML
5 SOLUTION INTRAVENOUS
Status: COMPLETED | OUTPATIENT
Start: 2017-07-20 | End: 2017-07-20

## 2017-07-20 ENCOUNTER — ANESTHESIA (OUTPATIENT)
Dept: SURGERY | Facility: AMBULATORY SURGERY CENTER | Age: 37
End: 2017-07-20

## 2017-07-20 ENCOUNTER — SURGERY (OUTPATIENT)
Age: 37
End: 2017-07-20

## 2017-07-20 ENCOUNTER — HOSPITAL ENCOUNTER (OUTPATIENT)
Facility: AMBULATORY SURGERY CENTER | Age: 37
End: 2017-07-20
Attending: PHYSICIAN ASSISTANT

## 2017-07-20 VITALS
HEART RATE: 68 BPM | TEMPERATURE: 97.1 F | HEIGHT: 66 IN | RESPIRATION RATE: 16 BRPM | WEIGHT: 207.8 LBS | OXYGEN SATURATION: 100 % | SYSTOLIC BLOOD PRESSURE: 149 MMHG | BODY MASS INDEX: 33.4 KG/M2 | DIASTOLIC BLOOD PRESSURE: 80 MMHG

## 2017-07-20 DIAGNOSIS — Z95.828 PORT CATHETER IN PLACE: Primary | ICD-10-CM

## 2017-07-20 LAB
HCG, SERUM, QUAL. - QUEST: NORMAL
INR PPP: 1.02 (ref 0.86–1.14)

## 2017-07-20 RX ORDER — HEPARIN SODIUM,PORCINE 10 UNIT/ML
5 VIAL (ML) INTRAVENOUS EVERY 24 HOURS
Status: DISCONTINUED | OUTPATIENT
Start: 2017-07-20 | End: 2017-07-21 | Stop reason: HOSPADM

## 2017-07-20 RX ORDER — ONDANSETRON 2 MG/ML
INJECTION INTRAMUSCULAR; INTRAVENOUS PRN
Status: DISCONTINUED | OUTPATIENT
Start: 2017-07-20 | End: 2017-07-20

## 2017-07-20 RX ORDER — PROPOFOL 10 MG/ML
INJECTION, EMULSION INTRAVENOUS CONTINUOUS PRN
Status: DISCONTINUED | OUTPATIENT
Start: 2017-07-20 | End: 2017-07-20

## 2017-07-20 RX ORDER — LIDOCAINE 40 MG/G
CREAM TOPICAL
Status: DISCONTINUED | OUTPATIENT
Start: 2017-07-20 | End: 2017-07-21 | Stop reason: HOSPADM

## 2017-07-20 RX ORDER — SODIUM CHLORIDE, SODIUM LACTATE, POTASSIUM CHLORIDE, CALCIUM CHLORIDE 600; 310; 30; 20 MG/100ML; MG/100ML; MG/100ML; MG/100ML
INJECTION, SOLUTION INTRAVENOUS CONTINUOUS
Status: DISCONTINUED | OUTPATIENT
Start: 2017-07-20 | End: 2017-07-21 | Stop reason: HOSPADM

## 2017-07-20 RX ORDER — HYDROCODONE BITARTRATE AND ACETAMINOPHEN 5; 325 MG/1; MG/1
1-2 TABLET ORAL
Status: COMPLETED | OUTPATIENT
Start: 2017-07-20 | End: 2017-07-20

## 2017-07-20 RX ORDER — LIDOCAINE HYDROCHLORIDE 20 MG/ML
INJECTION, SOLUTION INFILTRATION; PERINEURAL PRN
Status: DISCONTINUED | OUTPATIENT
Start: 2017-07-20 | End: 2017-07-20

## 2017-07-20 RX ORDER — PROPOFOL 10 MG/ML
INJECTION, EMULSION INTRAVENOUS PRN
Status: DISCONTINUED | OUTPATIENT
Start: 2017-07-20 | End: 2017-07-20

## 2017-07-20 RX ORDER — HYDROCODONE BITARTRATE AND ACETAMINOPHEN 5; 325 MG/1; MG/1
1-2 TABLET ORAL EVERY 4 HOURS PRN
Qty: 10 TABLET | Refills: 0 | Status: SHIPPED | OUTPATIENT
Start: 2017-07-20 | End: 2017-07-27

## 2017-07-20 RX ORDER — HEPARIN SODIUM (PORCINE) LOCK FLUSH IV SOLN 100 UNIT/ML 100 UNIT/ML
5 SOLUTION INTRAVENOUS
Status: DISCONTINUED | OUTPATIENT
Start: 2017-07-20 | End: 2017-07-21 | Stop reason: HOSPADM

## 2017-07-20 RX ORDER — SODIUM CHLORIDE, SODIUM LACTATE, POTASSIUM CHLORIDE, CALCIUM CHLORIDE 600; 310; 30; 20 MG/100ML; MG/100ML; MG/100ML; MG/100ML
INJECTION, SOLUTION INTRAVENOUS CONTINUOUS PRN
Status: DISCONTINUED | OUTPATIENT
Start: 2017-07-20 | End: 2017-07-20

## 2017-07-20 RX ORDER — OXYCODONE AND ACETAMINOPHEN 5; 325 MG/1; MG/1
1-2 TABLET ORAL EVERY 4 HOURS PRN
Qty: 10 TABLET | Refills: 0 | Status: SHIPPED | OUTPATIENT
Start: 2017-07-20 | End: 2017-07-20

## 2017-07-20 RX ADMIN — PROPOFOL 150 MCG/KG/MIN: 10 INJECTION, EMULSION INTRAVENOUS at 07:55

## 2017-07-20 RX ADMIN — PROPOFOL: 10 INJECTION, EMULSION INTRAVENOUS at 08:24

## 2017-07-20 RX ADMIN — ONDANSETRON 4 MG: 2 INJECTION INTRAMUSCULAR; INTRAVENOUS at 07:57

## 2017-07-20 RX ADMIN — LIDOCAINE HYDROCHLORIDE 100 MG: 20 INJECTION, SOLUTION INFILTRATION; PERINEURAL at 07:55

## 2017-07-20 RX ADMIN — PROPOFOL 50 MG: 10 INJECTION, EMULSION INTRAVENOUS at 08:12

## 2017-07-20 RX ADMIN — SODIUM CHLORIDE, SODIUM LACTATE, POTASSIUM CHLORIDE, CALCIUM CHLORIDE: 600; 310; 30; 20 INJECTION, SOLUTION INTRAVENOUS at 07:50

## 2017-07-20 RX ADMIN — PROPOFOL 30 MG: 10 INJECTION, EMULSION INTRAVENOUS at 08:20

## 2017-07-20 RX ADMIN — HEPARIN SODIUM (PORCINE) LOCK FLUSH IV SOLN 100 UNIT/ML 5 ML: 100 SOLUTION at 08:46

## 2017-07-20 RX ADMIN — HYDROCODONE BITARTRATE AND ACETAMINOPHEN 1 TABLET: 5; 325 TABLET ORAL at 09:26

## 2017-07-20 NOTE — ANESTHESIA CARE TRANSFER NOTE
Patient: Doreen Peralta    Procedure(s):  Chest Port Placement  **Latex Allergy** - Wound Class: I-Clean    Diagnosis: Gastrostomy Tube Dependent  Diagnosis Additional Information: No value filed.    Anesthesia Type:   MAC     Note:  Airway :Room Air  Patient transferred to:Phase II  Comments: VSS and WNL, denies pain, no PONV, report to Loli WARNER      Vitals: (Last set prior to Anesthesia Care Transfer)    CRNA VITALS  7/20/2017 0830 - 7/20/2017 0906      7/20/2017             NIBP: 98/58    NIBP Mean: 81                Electronically Signed By: GERARD Orosco CRNA  July 20, 2017  9:06 AM

## 2017-07-20 NOTE — IP AVS SNAPSHOT
[facility]    [address] [place] [id number]    Phone:  [phone]    Fax:  [phone]                                       After Visit Summary   7/20/2017    Doreen Peralta    MRN: [id number]           After Visit Summary Signature Page     I have received my discharge instructions, and my questions have been answered. I have discussed any challenges I see with this plan with the nurse or doctor.    ..........................................................................................................................................  Patient/Patient Representative Signature      ..........................................................................................................................................  Patient Representative Print Name and Relationship to Patient    ..................................................               ................................................  Date                                            Time    ..........................................................................................................................................  Reviewed by Signature/Title    ...................................................              ..............................................  Date                                                            Time

## 2017-07-20 NOTE — PROCEDURES
Interventional Radiology Brief Post Procedure Note    Procedure:  IR CHEST PORT PLACEMENT > 5 YRS OF AGE     Proceduralist: Paul Nj PA-C under the direct supervision of Wilfredo Rocha PA-C    Time Out: Prior to the start of the procedure and with procedural staff participation, I verbally confirmed the patient s identity using two indicators, relevant allergies, that the procedure was appropriate and matched the consent or emergent situation, and that the correct equipment/implants were available. Immediately prior to starting the procedure I conducted the Time Out with the procedural staff and re-confirmed the patient s name, procedure, and site/side. (The Joint Commission universal protocol was followed.)  Yes      Sedation: Monitored Anesthesia Care (MAC) administered and documented by Anesthesia Care Provider    Findings: Completed image-guided placement of 8 Iranian 22cm single lumen power-injectable central venous port via right IJ. Aspirates and flushes freely, heparin locked and is ready for immediate use.    Estimated Blood Loss: Less than 10 ml    Fluoroscopy Time:  0.05 minute(s)    SPECIMENS: None    Complications: 1. None     Condition: Stable    Plan: Follow-up per primary team.     Comments: See dictated procedure note for full details.    Paul Nj PA-C

## 2017-07-20 NOTE — ANESTHESIA PREPROCEDURE EVALUATION
Anesthesia Evaluation     . Pt has had prior anesthetic.     History of anesthetic complications   - PONV        ROS/MED HX    ENT/Pulmonary:     (+)asthma , . .    Neurologic:  - neg neurologic ROS     Cardiovascular:     (+) hypertension----. : . . . :. .       METS/Exercise Tolerance:     Hematologic:     (+) Anemia, -      Musculoskeletal:  - neg musculoskeletal ROS       GI/Hepatic: Comment: Multiple GI surgeries.  Gtube in place.    (+) GERD       Renal/Genitourinary:  - ROS Renal section negative       Endo:  - neg endo ROS       Psychiatric:  - neg psychiatric ROS   (+) psychiatric history anxiety and depression      Infectious Disease:  - neg infectious disease ROS       Malignancy:      - no malignancy   Other:    - neg other ROS                 Physical Exam  Normal systems: cardiovascular, pulmonary and dental    Airway   Mallampati: II  TM distance: >3 FB  Neck ROM: full    Dental     Cardiovascular       Pulmonary                     Anesthesia Plan      History & Physical Review  History and physical reviewed and following examination; no interval change.    ASA Status:  3 .    NPO Status:  > 8 hours    Plan for MAC with Intravenous induction. Maintenance will be TIVA.  Reason for MAC:  Deep or markedly invasive procedure (G8)  PONV prophylaxis:  Ondansetron (or other 5HT-3) and Dexamethasone or Solumedrol       Postoperative Care  Postoperative pain management:  IV analgesics and Oral pain medications.      Consents  Anesthetic plan, risks, benefits and alternatives discussed with:  Patient..                          .

## 2017-07-20 NOTE — DISCHARGE INSTRUCTIONS
A collaboration between [facility] and [facility]  Experts in minimally invasive, targeted treatments performed using imaging guidance    Venous Access Device,  Port Catheter or Tunneled Central Line Placement    Today you had a procedure today to install a venous access device; either a tunneled central vein catheter or a subcutaneous port catheter.    One of our Radiology PAs performed this procedure for you today:  ? Manuel Paris PA-C  ? Andre Good PA-C  ? Wilfredo Rocha PA-C  ? Nancy Morrison PA-C    After you go home:  - Drink plenty of fluids.  Generally 6-8 (8 ounce) glasses a day is recommended.  - Resume your regular diet unless otherwise ordered by a medical provider.  - Keep any applied tape/gauze dressings clean and dry.  Change tape/gauze dressings if they get wet or soiled.  - You may shower the following day after procedure, however cover and protect from moisture any tape/gauze dressings.  You may let water hit and run over dried skin glue, but do not scrub.  Pat the area dry after showering.  - Port placement incisions are closed with absorbable suture, meaning they do not need to be removed at a later date, and a topical skin adhesive (skin glue).  This glue will wear off in 7-14 days.  Do not remove before this time.  If 14 days have passed and residual glue is present, you may gently remove it.  - Do not apply gels, lotions, or ointments to the glue site for the first 10 days as this may cause the glue to prematurely soften and fail.  - Do not perform strenuous activities or lift greater than 10 pounds for the next three days.  - If there is bleeding or oozing from the procedure site, apply firm pressure to the area for 5-10 minutes.  If the bleeding continues seek medical advice at the numbers below.  - Mild procedure site discomfort can be treated with an ice pack and over-the-counter pain relievers.        For 24 hours after any  sedation used:  - Relax and take it easy.  No strenuous activities.  - Do not drive or operate machines at home or at work.  - No alcohol consumption.  - Do not make any important or legal decisions.    Call our Interventional Radiology (IR) service if:  - If you start bleeding from the procedure site.  If you do start to bleed from the site, lie down and hold some pressure on the site.  Our radiology provider can help you decide if you need to return to the hospital.  - If you have new or worsening pain related to the procedure.  - If you have concerning swelling at the procedure site.  - If you develop persistent nausea or vomiting.  - If you develop hives or a rash or any unexplained itching.  - If you have a fever of greater than 100.5  F and chills in the first 5 days after procedure.  - Any other concerns related to your procedure.      [facility]  Interventional Radiology (IR)  [address] [place] [address]    Contact Number:  [phone]  (IR control desk)  - Monday - Friday 8:00 am - 4:30 pm    After hours for urgent concerns:  [phone]  - After 4:30 pm Monday - Friday, Weekends and Holidays.   - Ask for Interventional Radiology on-call.  Someone is available 24 hours a day.  - [facility] toll free number:  [phone]  .

## 2017-07-20 NOTE — IP AVS SNAPSHOT
MRN:[id number]                      After Visit Summary   7/20/2017    Doreen Peralta    MRN: [id number]           Thank you!     Thank you for choosing [facility] for your care. Our goal is always to provide you with excellent care. Hearing back from our patients is one way we can continue to improve our services. Please take a few minutes to complete the written survey that you may receive in the mail after you visit with us. Thank you!        Patient Information     Date Of Birth          1980        About your hospital stay     You were admitted on:  July 20, 2017 You last received care in the:  [facility]    You were discharged on:  July 20, 2017       Who to Call     For medical emergencies, please call 911.  For non-urgent questions about your medical care, please call your primary care provider or clinic, [phone]  For questions related to your surgery, please call your surgery clinic        Attending Provider     Provider Specialty    Andre Good PA-C Radiology       Primary Care Provider Office Phone # Fax #    Larisa Lorenzo PA-C [phone]      Your next 10 appointments already scheduled     Jul 24, 2017 11:00 AM CDT   Level 2 with NL INFUSION CHAIR 5   [facility] ([facility])    [address] [place] [address]   [phone]            Dec 04, 2017 10:00 AM CST   (Arrive by 9:45 AM)   Return Visit with Gilmer Lees MD   [facility] IBD ([facility])    [address]  4th Floor  [facility] [address] [phone]              Further instructions from your care team         A collaboration between [facility] and [facility]  Experts in minimally invasive, targeted treatments performed using imaging guidance    Venous Access Device,  Port Catheter or Tunneled  Central Line Placement    Today you had a procedure today to install a venous access device; either a tunneled central vein catheter or a subcutaneous port catheter.    One of our Radiology PAs performed this procedure for you today:  ? Manuel Paris PA-C  ? Andre Good PA-C  ? Wilfredo Rocha PA-C  ? Nancy Morrison PA-C    After you go home:  - Drink plenty of fluids.  Generally 6-8 (8 ounce) glasses a day is recommended.  - Resume your regular diet unless otherwise ordered by a medical provider.  - Keep any applied tape/gauze dressings clean and dry.  Change tape/gauze dressings if they get wet or soiled.  - You may shower the following day after procedure, however cover and protect from moisture any tape/gauze dressings.  You may let water hit and run over dried skin glue, but do not scrub.  Pat the area dry after showering.  - Port placement incisions are closed with absorbable suture, meaning they do not need to be removed at a later date, and a topical skin adhesive (skin glue).  This glue will wear off in 7-14 days.  Do not remove before this time.  If 14 days have passed and residual glue is present, you may gently remove it.  - Do not apply gels, lotions, or ointments to the glue site for the first 10 days as this may cause the glue to prematurely soften and fail.  - Do not perform strenuous activities or lift greater than 10 pounds for the next three days.  - If there is bleeding or oozing from the procedure site, apply firm pressure to the area for 5-10 minutes.  If the bleeding continues seek medical advice at the numbers below.  - Mild procedure site discomfort can be treated with an ice pack and over-the-counter pain relievers.        For 24 hours after any sedation used:  - Relax and take it easy.  No strenuous activities.  - Do not drive or operate machines at home or at work.  - No alcohol consumption.  - Do not make any important or legal decisions.    Call our Interventional Radiology (IR)  "service if:  - If you start bleeding from the procedure site.  If you do start to bleed from the site, lie down and hold some pressure on the site.  Our radiology provider can help you decide if you need to return to the hospital.  - If you have new or worsening pain related to the procedure.  - If you have concerning swelling at the procedure site.  - If you develop persistent nausea or vomiting.  - If you develop hives or a rash or any unexplained itching.  - If you have a fever of greater than 100.5  F and chills in the first 5 days after procedure.  - Any other concerns related to your procedure.      [facility]  Interventional Radiology (IR)  [address]  2nd Floor, [facility] [place] [address]    Contact Number:  [phone]  (IR control desk)  - Monday - Friday 8:00 am - 4:30 pm    After hours for urgent concerns:  [phone]  - After 4:30 pm Monday - Friday, Weekends and Holidays.   - Ask for Interventional Radiology on-call.  Someone is available 24 hours a day.  - [facility] toll free number:  [phone]  .            Pending Results     Date and Time Order Name Status Description    7/20/2017 0714 IR CHEST PORT PLACEMENT > 5 YRS OF AGE In process             Admission Information     Date & Time Provider Department Dept. Phone    7/20/2017 Andre Good PA-C [facility] [phone]      Your Vitals Were     Blood Pressure Pulse Temperature Respirations Height Weight    149/80 68 97.1  F (36.2  C) (Temporal) 16 1.676 m (5' 5.98\") 94.3 kg (207 lb 12.8 oz)    Last Period Pulse Oximetry BMI (Body Mass Index)             06/19/2017 (Approximate) 100% 33.56 kg/m2         [employer] Information     [employer] gives you secure access to your electronic health record. If you see a primary care provider, you can also send messages to your care team and make appointments. If you have questions, please call your primary care clinic.  If " you do not have a primary care provider, please call [phone] and they will assist you.      [employer] is an electronic gateway that provides easy, online access to your medical records. With [employer] you can request a clinic appointment, read your test results, renew a prescription or communicate with your care team.     To access your existing account, please contact your [facility] or call [phone] for assistance.        Care EveryWhere ID     This is your Care EveryWhere ID. This could be used by other organizations to access your [facility] medical records  [id number]        Equal Access to Services     [facility] : Hadlarissa Law, clark villeda, vesta long, elham sal . So [facility] [phone].    ATENCIÓN: Si habla español, tiene a wade disposición servicios gratuitos de asistencia lingüística. [facility] [phone].    We comply with applicable federal civil rights laws and [place] laws. We do not discriminate on the basis of race, color, national origin, age, disability sex, sexual orientation or gender identity.               Review of your medicines      UNREVIEWED medicines. Ask your doctor about these medicines        Dose / Directions    ACETAMINOPHEN PO        Dose:  500-1000 mg   Take 500-1,000 mg by mouth every 6 hours as needed for pain   Refills:  0       * albuterol 108 (90 BASE) MCG/ACT Inhaler   Commonly known as:  PROAIR HFA/PROVENTIL HFA/VENTOLIN HFA   Used for:  Gastrostomy tube dependent (H), SBO (small bowel obstruction) (H), Chronic abdominal pain, Chronic diarrhea        Dose:  1 puff   Inhale 1 puff into the lungs   Refills:  0       * albuterol (2.5 MG/3ML) 0.083% neb solution   Used for:  Gastrostomy tube dependent (H), SBO (small bowel obstruction) (H), Chronic abdominal pain, Chronic diarrhea        Dose:  2.5 mg   Inhale 2.5 mg into the lungs   Refills:  0       albuterol 90 MCG/ACT  inhaler        Dose:  2 puff   Inhale 2 puffs into the lungs every 6 hours as needed   Refills:  0       cloNIDine 0.2 MG tablet   Commonly known as:  CATAPRES   Used for:  Anxiety        TAKE 2 TABLETS(0.4 MG) BY MOUTH EVERY EVENING   Quantity:  180 tablet   Refills:  0       diphenhydrAMINE 25 MG tablet   Commonly known as:  BENADRYL ALLERGY        Dose:  25 mg   Take 1 tablet (25 mg) by mouth every 6 hours as needed for itching or other (Nausea)   Quantity:  30 tablet   Refills:  0       DULoxetine 60 MG EC capsule   Commonly known as:  CYMBALTA   Used for:  Abdominal pain, epigastric, Abdominal migraine, not intractable        Dose:  60 mg   Take 1 capsule (60 mg) by mouth daily   Quantity:  90 capsule   Refills:  3       ipratropium 0.02 % neb solution   Commonly known as:  ATROVENT   Used for:  Gastrostomy tube dependent (H), SBO (small bowel obstruction) (H), Chronic abdominal pain, Chronic diarrhea        Dose:  0.5 mg   Inhale 0.5 mg into the lungs   Refills:  0       mirtazapine 15 MG ODT tab   Commonly known as:  REMERON SOL-TAB   Used for:  Anxiety        Dose:  7.5 mg   0.5 tablets (7.5 mg) by Orally disintegrating tablet route At Bedtime   Quantity:  45 tablet   Refills:  0       nortriptyline 10 MG capsule   Commonly known as:  PAMELOR   Used for:  Neuropathic pain, Primary insomnia        Dose:  30-40 mg   Take 3-4 capsules (30-40 mg) by mouth At Bedtime   Quantity:  120 capsule   Refills:  5       ondansetron 4 MG ODT tab   Commonly known as:  ZOFRAN ODT   Used for:  Intractable vomiting, presence of nausea not specified, unspecified vomiting type, Gastroparesis        Dose:  4-8 mg   Take 1-2 tablets (4-8 mg) by mouth every 6 hours as needed for nausea   Quantity:  20 tablet   Refills:  0       rivaroxaban ANTICOAGULANT 20 MG Tabs tablet   Commonly known as:  XARELTO   Used for:  Deep vein thrombosis (DVT) of upper extremity, unspecified chronicity, unspecified laterality, unspecified vein (H)         Dose:  20 mg   Take 1 tablet (20 mg) by mouth daily (with dinner)   Quantity:  90 tablet   Refills:  1       SUMAtriptan 50 MG tablet   Commonly known as:  IMITREX   Used for:  Migraine without aura and without status migrainosus, not intractable        Dose:   mg   Take 1-2 tablets ( mg) by mouth at onset of headache for migraine - may repeat dose after 2h if headache recurs.  Max: 200mg/24 hours   Quantity:  18 tablet   Refills:  1       traZODone 50 MG tablet   Commonly known as:  DESYREL   Used for:  Insomnia, unspecified type        Dose:   mg   Take 1-2 tablets ( mg) by mouth nightly as needed 1-2 tabs at bedtime PRN   Quantity:  180 tablet   Refills:  1       * Notice:  This list has 2 medication(s) that are the same as other medications prescribed for you. Read the directions carefully, and ask your doctor or other care provider to review them with you.      START taking        Dose / Directions    HYDROcodone-acetaminophen 5-325 MG per tablet   Commonly known as:  NORCO   Used for:  Port catheter in place        Dose:  1-2 tablet   Take 1-2 tablets by mouth every 4 hours as needed for moderate to severe pain   Quantity:  10 tablet   Refills:  0            Where to get your medicines      Some of these will need a paper prescription and others can be bought over the counter. Ask your nurse if you have questions.     Bring a paper prescription for each of these medications     HYDROcodone-acetaminophen 5-325 MG per tablet                Protect others around you: Learn how to safely use, store and throw away your medicines at www.disposemymeds.org.             Medication List: This is a list of all your medications and when to take them. Check marks below indicate your daily home schedule. Keep this list as a reference.      Medications           Morning Afternoon Evening Bedtime As Needed    ACETAMINOPHEN PO   Take 500-1,000 mg by mouth every 6 hours as needed for pain                                 * albuterol 108 (90 BASE) MCG/ACT Inhaler   Commonly known as:  PROAIR HFA/PROVENTIL HFA/VENTOLIN HFA   Inhale 1 puff into the lungs                                * albuterol (2.5 MG/3ML) 0.083% neb solution   Inhale 2.5 mg into the lungs                                albuterol 90 MCG/ACT inhaler   Inhale 2 puffs into the lungs every 6 hours as needed                                cloNIDine 0.2 MG tablet   Commonly known as:  CATAPRES   TAKE 2 TABLETS(0.4 MG) BY MOUTH EVERY EVENING                                diphenhydrAMINE 25 MG tablet   Commonly known as:  BENADRYL ALLERGY   Take 1 tablet (25 mg) by mouth every 6 hours as needed for itching or other (Nausea)                                DULoxetine 60 MG EC capsule   Commonly known as:  CYMBALTA   Take 1 capsule (60 mg) by mouth daily                                HYDROcodone-acetaminophen 5-325 MG per tablet   Commonly known as:  NORCO   Take 1-2 tablets by mouth every 4 hours as needed for moderate to severe pain   Last time this was given:  1 tablet on 7/20/2017  9:26 AM                                ipratropium 0.02 % neb solution   Commonly known as:  ATROVENT   Inhale 0.5 mg into the lungs                                mirtazapine 15 MG ODT tab   Commonly known as:  REMERON SOL-TAB   0.5 tablets (7.5 mg) by Orally disintegrating tablet route At Bedtime                                nortriptyline 10 MG capsule   Commonly known as:  PAMELOR   Take 3-4 capsules (30-40 mg) by mouth At Bedtime                                ondansetron 4 MG ODT tab   Commonly known as:  ZOFRAN ODT   Take 1-2 tablets (4-8 mg) by mouth every 6 hours as needed for nausea                                rivaroxaban ANTICOAGULANT 20 MG Tabs tablet   Commonly known as:  XARELTO   Take 1 tablet (20 mg) by mouth daily (with dinner)                                SUMAtriptan 50 MG tablet   Commonly known as:  IMITREX   Take 1-2 tablets ( mg) by mouth at  onset of headache for migraine - may repeat dose after 2h if headache recurs.  Max: 200mg/24 hours                                traZODone 50 MG tablet   Commonly known as:  DESYREL   Take 1-2 tablets ( mg) by mouth nightly as needed 1-2 tabs at bedtime PRN                                * Notice:  This list has 2 medication(s) that are the same as other medications prescribed for you. Read the directions carefully, and ask your doctor or other care provider to review them with you.

## 2017-07-20 NOTE — ANESTHESIA POSTPROCEDURE EVALUATION
Patient: Doreen RUIZ Fabian    Procedure(s):  Chest Port Placement  **Latex Allergy** - Wound Class: I-Clean    Diagnosis:Gastrostomy Tube Dependent  Diagnosis Additional Information: No value filed.    Anesthesia Type:  MAC    Note:  Anesthesia Post Evaluation    Patient location during evaluation: PACU  Patient participation: Able to fully participate in evaluation  Level of consciousness: awake  Pain management: adequate  Airway patency: patent  Cardiovascular status: acceptable  Respiratory status: acceptable  Hydration status: balanced  PONV: none     Anesthetic complications: None          Last vitals:  Vitals:    07/20/17 0915 07/20/17 0930 07/20/17 0958   BP: 114/71 121/68 117/66   Pulse:      Resp: 16 22 16   Temp:      SpO2: 100% 100% 100%         Electronically Signed By: Newton Dahl MD  July 20, 2017  10:57 AM

## 2017-07-24 ENCOUNTER — CARE COORDINATION (OUTPATIENT)
Dept: GASTROENTEROLOGY | Facility: CLINIC | Age: 37
End: 2017-07-24

## 2017-07-24 ENCOUNTER — INFUSION THERAPY VISIT (OUTPATIENT)
Dept: INFUSION THERAPY | Facility: CLINIC | Age: 37
End: 2017-07-24
Attending: INTERNAL MEDICINE
Payer: COMMERCIAL

## 2017-07-24 VITALS
SYSTOLIC BLOOD PRESSURE: 125 MMHG | OXYGEN SATURATION: 100 % | HEART RATE: 79 BPM | BODY MASS INDEX: 31.82 KG/M2 | WEIGHT: 198 LBS | TEMPERATURE: 97.6 F | HEIGHT: 66 IN | RESPIRATION RATE: 18 BRPM | DIASTOLIC BLOOD PRESSURE: 73 MMHG

## 2017-07-24 DIAGNOSIS — R10.84 ABDOMINAL PAIN, GENERALIZED: Primary | ICD-10-CM

## 2017-07-24 DIAGNOSIS — R11.2 NAUSEA AND VOMITING, INTRACTABILITY OF VOMITING NOT SPECIFIED, UNSPECIFIED VOMITING TYPE: ICD-10-CM

## 2017-07-24 DIAGNOSIS — K31.84 GASTROPARESIS: ICD-10-CM

## 2017-07-24 PROCEDURE — 25000128 H RX IP 250 OP 636: Performed by: INTERNAL MEDICINE

## 2017-07-24 PROCEDURE — 96360 HYDRATION IV INFUSION INIT: CPT

## 2017-07-24 PROCEDURE — 25000125 ZZHC RX 250: Performed by: INTERNAL MEDICINE

## 2017-07-24 PROCEDURE — 96365 THER/PROPH/DIAG IV INF INIT: CPT

## 2017-07-24 PROCEDURE — 96361 HYDRATE IV INFUSION ADD-ON: CPT

## 2017-07-24 RX ORDER — HEPARIN SODIUM (PORCINE) LOCK FLUSH IV SOLN 100 UNIT/ML 100 UNIT/ML
500 SOLUTION INTRAVENOUS ONCE
Status: COMPLETED | OUTPATIENT
Start: 2017-07-24 | End: 2017-07-24

## 2017-07-24 RX ADMIN — FOLIC ACID: 5 INJECTION, SOLUTION INTRAMUSCULAR; INTRAVENOUS; SUBCUTANEOUS at 12:45

## 2017-07-24 RX ADMIN — SODIUM CHLORIDE, PRESERVATIVE FREE 500 UNITS: 5 INJECTION INTRAVENOUS at 13:50

## 2017-07-24 RX ADMIN — SODIUM CHLORIDE 1000 ML: 9 INJECTION, SOLUTION INTRAVENOUS at 11:25

## 2017-07-24 ASSESSMENT — PAIN SCALES - GENERAL: PAINLEVEL: NO PAIN (0)

## 2017-07-24 NOTE — PATIENT INSTRUCTIONS
Pt to return on 07-31-17 for IV hydration and MultiVit. Copies of medication list and upcoming appointments given prior to discharge.

## 2017-07-24 NOTE — PROGRESS NOTES
Patient here for IV hydration and multi vitamin. Blood return pre and post infusion. Patient tolerate well. Patient discharged to home in stable condition.

## 2017-07-24 NOTE — MR AVS SNAPSHOT
After Visit Summary   7/24/2017    Doreen Peralta    MRN: [id number]           Patient Information     Date Of Birth          1980        Visit Information        Provider Department      7/24/2017 11:00 AM NL INFUSION CHAIR 5 [facility]        Today's Diagnoses     Abdominal pain, generalized    -  1    Nausea and vomiting, intractability of vomiting not specified, unspecified vomiting type        Gastroparesis          Care Instructions    Pt to return on 07-31-17 for IV hydration and MultiVit. Copies of medication list and upcoming appointments given prior to discharge.           Follow-ups after your visit        Follow-up notes from your care team     Return in about 7 days (around 7/31/2017).      Your next 10 appointments already scheduled     Jul 31, 2017  9:30 AM CDT   Level 2 with NL INFUSION CHAIR 5   [facility] ([facility])    [address] [place] [address]   [phone]            Aug 07, 2017  8:30 AM CDT   Level 2 with NL INFUSION CHAIR 5   [facility] ([facility])    [address] [place] [address]   [phone]            Aug 21, 2017  9:30 AM CDT   Level 2 with NL INFUSION CHAIR 1   [facility] ([facility])    [address] [place] [address]   [phone]            Aug 28, 2017  8:30 AM CDT   Level 2 with NL INFUSION CHAIR 4   [facility] ([facility])    [address] [place] [address]   [phone]            Sep 05, 2017  9:30 AM CDT   Level 2 with NL INFUSION CHAIR 5   [facility] ([facility])    [address] [place] [address]   [phone]            Dec 04, 2017 10:00 AM CST   (Arrive by 9:45 AM)   Return Visit with Gilmer Lees MD   [facility] ([facility]  "[facility])    [address]  4th Floor  [facility] [address] [phone]              Who to contact     If you have questions or need follow up information about today's clinic visit or your schedule please contact [facility] directly at [phone].  Normal or non-critical lab and imaging results will be communicated to you by MyChart, letter or phone within 4 business days after the clinic has received the results. If you do not hear from us within 7 days, please contact the clinic through [employer] or phone. If you have a critical or abnormal lab result, we will notify you by phone as soon as possible.  Submit refill requests through [employer] or call your pharmacy and they will forward the refill request to us. Please allow 3 business days for your refill to be completed.          Additional Information About Your Visit        MyChart Information     [employer] gives you secure access to your electronic health record. If you see a primary care provider, you can also send messages to your care team and make appointments. If you have questions, please call your primary care clinic.  If you do not have a primary care provider, please call [phone] and they will assist you.        Care EveryWhere ID     This is your Care EveryWhere ID. This could be used by other organizations to access your [place] medical records  [id number]        Your Vitals Were     Pulse Temperature Respirations Height Last Period Pulse Oximetry    79 97.6  F (36.4  C) (Temporal) 18 1.676 m (5' 6\") 06/19/2017 (Approximate) 100%    BMI (Body Mass Index)                   31.96 kg/m2            Blood Pressure from Last 3 Encounters:   07/24/17 125/73   07/20/17 149/80   07/17/17 117/75    Weight from Last 3 Encounters:   07/24/17 89.8 kg (198 lb)   07/20/17 94.3 kg (207 lb 12.8 oz)   07/17/17 94.3 kg (207 lb 12.8 oz)              Today, you had the following     No orders found for " display       Primary Care Provider Office Phone # Fax #    Larisa Lorenzo PA-C [phone]       [facility] [address]  [place] [address]        Equal Access to Services     TRAMAINE CLAYTON : Hadii aad ku hadfango Soomaali, waaxda luqadaha, qaybta kaalmada adeegyada, waxvirginia idiin hayximenan adeannamaria solomon lalesviatayo . So [facility] [phone].    ATENCIÓN: Si habla español, tiene a wade disposición servicios gratuitos de asistencia lingüística. [facility] [phone].    We comply with applicable federal civil rights laws and [place] laws. We do not discriminate on the basis of race, color, national origin, age, disability sex, sexual orientation or gender identity.            Thank you!     Thank you for choosing [facility]  for your care. Our goal is always to provide you with excellent care. Hearing back from our patients is one way we can continue to improve our services. Please take a few minutes to complete the written survey that you may receive in the mail after your visit with us. Thank you!             Your Updated Medication List - Protect others around you: Learn how to safely use, store and throw away your medicines at www.disposemymeds.org.          This list is accurate as of: 7/24/17  2:11 PM.  Always use your most recent med list.                   Brand Name Dispense Instructions for use Diagnosis    ACETAMINOPHEN PO      Take 500-1,000 mg by mouth every 6 hours as needed for pain        * albuterol 108 (90 BASE) MCG/ACT Inhaler    PROAIR HFA/PROVENTIL HFA/VENTOLIN HFA     Inhale 1 puff into the lungs    Gastrostomy tube dependent (H), SBO (small bowel obstruction) (H), Chronic abdominal pain, Chronic diarrhea       * albuterol (2.5 MG/3ML) 0.083% neb solution      Inhale 2.5 mg into the lungs    Gastrostomy tube dependent (H), SBO (small bowel obstruction) (H), Chronic abdominal pain, Chronic diarrhea       albuterol 90 MCG/ACT inhaler      Inhale 2  puffs into the lungs every 6 hours as needed        cloNIDine 0.2 MG tablet    CATAPRES    180 tablet    TAKE 2 TABLETS(0.4 MG) BY MOUTH EVERY EVENING    Anxiety       diphenhydrAMINE 25 MG tablet    BENADRYL ALLERGY    30 tablet    Take 1 tablet (25 mg) by mouth every 6 hours as needed for itching or other (Nausea)        DULoxetine 60 MG EC capsule    CYMBALTA    90 capsule    Take 1 capsule (60 mg) by mouth daily    Abdominal pain, epigastric, Abdominal migraine, not intractable       HYDROcodone-acetaminophen 5-325 MG per tablet    NORCO    10 tablet    Take 1-2 tablets by mouth every 4 hours as needed for moderate to severe pain    Port catheter in place       ipratropium 0.02 % neb solution    ATROVENT     Inhale 0.5 mg into the lungs    Gastrostomy tube dependent (H), SBO (small bowel obstruction) (H), Chronic abdominal pain, Chronic diarrhea       mirtazapine 15 MG ODT tab    REMERON SOL-TAB    45 tablet    0.5 tablets (7.5 mg) by Orally disintegrating tablet route At Bedtime    Anxiety       nortriptyline 10 MG capsule    PAMELOR    120 capsule    Take 3-4 capsules (30-40 mg) by mouth At Bedtime    Neuropathic pain, Primary insomnia       ondansetron 4 MG ODT tab    ZOFRAN ODT    20 tablet    Take 1-2 tablets (4-8 mg) by mouth every 6 hours as needed for nausea    Intractable vomiting, presence of nausea not specified, unspecified vomiting type, Gastroparesis       rivaroxaban ANTICOAGULANT 20 MG Tabs tablet    XARELTO    90 tablet    Take 1 tablet (20 mg) by mouth daily (with dinner)    Deep vein thrombosis (DVT) of upper extremity, unspecified chronicity, unspecified laterality, unspecified vein (H)       SUMAtriptan 50 MG tablet    IMITREX    18 tablet    Take 1-2 tablets ( mg) by mouth at onset of headache for migraine - may repeat dose after 2h if headache recurs.  Max: 200mg/24 hours    Migraine without aura and without status migrainosus, not intractable       traZODone 50 MG tablet    DESYREL     180 tablet    Take 1-2 tablets ( mg) by mouth nightly as needed 1-2 tabs at bedtime PRN    Insomnia, unspecified type       * Notice:  This list has 2 medication(s) that are the same as other medications prescribed for you. Read the directions carefully, and ask your doctor or other care provider to review them with you.

## 2017-07-25 ENCOUNTER — HOSPITAL ENCOUNTER (EMERGENCY)
Facility: CLINIC | Age: 37
Discharge: ANOTHER HOSPITAL | End: 2017-07-25
Attending: NURSE PRACTITIONER | Admitting: NURSE PRACTITIONER
Payer: COMMERCIAL

## 2017-07-25 ENCOUNTER — HOSPITAL ENCOUNTER (OUTPATIENT)
Facility: CLINIC | Age: 37
Setting detail: OBSERVATION
Discharge: HOME | End: 2017-07-26
Attending: INTERNAL MEDICINE | Admitting: INTERNAL MEDICINE
Payer: COMMERCIAL

## 2017-07-25 VITALS
HEART RATE: 88 BPM | OXYGEN SATURATION: 97 % | SYSTOLIC BLOOD PRESSURE: 123 MMHG | DIASTOLIC BLOOD PRESSURE: 70 MMHG | TEMPERATURE: 98.7 F | RESPIRATION RATE: 20 BRPM

## 2017-07-25 DIAGNOSIS — K94.23 MALFUNCTION OF GASTROSTOMY TUBE (H): ICD-10-CM

## 2017-07-25 DIAGNOSIS — R11.0 NAUSEA: ICD-10-CM

## 2017-07-25 DIAGNOSIS — K31.84 GASTROPARESIS: Primary | ICD-10-CM

## 2017-07-25 DIAGNOSIS — R10.84 ABDOMINAL PAIN, GENERALIZED: ICD-10-CM

## 2017-07-25 DIAGNOSIS — R14.0 ABDOMINAL DISTENSION: ICD-10-CM

## 2017-07-25 DIAGNOSIS — T85.528A DISLODGED GASTROSTOMY TUBE: ICD-10-CM

## 2017-07-25 LAB
ALBUMIN SERPL-MCNC: 3.4 G/DL (ref 3.4–5)
ALP SERPL-CCNC: 152 U/L (ref 40–150)
ALT SERPL W P-5'-P-CCNC: 26 U/L (ref 0–50)
ANION GAP SERPL CALCULATED.3IONS-SCNC: 10 MMOL/L (ref 3–14)
AST SERPL W P-5'-P-CCNC: 17 U/L (ref 0–45)
BASOPHILS # BLD AUTO: 0 10E9/L (ref 0–0.2)
BASOPHILS NFR BLD AUTO: 0.2 %
BILIRUB SERPL-MCNC: 0.6 MG/DL (ref 0.2–1.3)
BUN SERPL-MCNC: 6 MG/DL (ref 7–30)
CALCIUM SERPL-MCNC: 9.1 MG/DL (ref 8.5–10.1)
CHLORIDE SERPL-SCNC: 107 MMOL/L (ref 94–109)
CO2 SERPL-SCNC: 24 MMOL/L (ref 20–32)
CREAT SERPL-MCNC: 0.97 MG/DL (ref 0.52–1.04)
DIFFERENTIAL METHOD BLD: ABNORMAL
EOSINOPHIL # BLD AUTO: 0.1 10E9/L (ref 0–0.7)
EOSINOPHIL NFR BLD AUTO: 0.5 %
ERYTHROCYTE [DISTWIDTH] IN BLOOD BY AUTOMATED COUNT: 18.3 % (ref 10–15)
GFR SERPL CREATININE-BSD FRML MDRD: 65 ML/MIN/1.7M2
GLUCOSE SERPL-MCNC: 86 MG/DL (ref 70–99)
HCT VFR BLD AUTO: 34.5 % (ref 35–47)
HGB BLD-MCNC: 10.8 G/DL (ref 11.7–15.7)
IMM GRANULOCYTES # BLD: 0 10E9/L (ref 0–0.4)
IMM GRANULOCYTES NFR BLD: 0.2 %
LIPASE SERPL-CCNC: 111 U/L (ref 73–393)
LYMPHOCYTES # BLD AUTO: 2.3 10E9/L (ref 0.8–5.3)
LYMPHOCYTES NFR BLD AUTO: 19.3 %
MCH RBC QN AUTO: 24.7 PG (ref 26.5–33)
MCHC RBC AUTO-ENTMCNC: 31.3 G/DL (ref 31.5–36.5)
MCV RBC AUTO: 79 FL (ref 78–100)
MONOCYTES # BLD AUTO: 0.7 10E9/L (ref 0–1.3)
MONOCYTES NFR BLD AUTO: 5.5 %
NEUTROPHILS # BLD AUTO: 9 10E9/L (ref 1.6–8.3)
NEUTROPHILS NFR BLD AUTO: 74.3 %
PLATELET # BLD AUTO: 369 10E9/L (ref 150–450)
POTASSIUM SERPL-SCNC: 3.9 MMOL/L (ref 3.4–5.3)
PROT SERPL-MCNC: 7.9 G/DL (ref 6.8–8.8)
RBC # BLD AUTO: 4.38 10E12/L (ref 3.8–5.2)
SODIUM SERPL-SCNC: 141 MMOL/L (ref 133–144)
WBC # BLD AUTO: 12.1 10E9/L (ref 4–11)

## 2017-07-25 PROCEDURE — 80053 COMPREHEN METABOLIC PANEL: CPT | Performed by: NURSE PRACTITIONER

## 2017-07-25 PROCEDURE — 99285 EMERGENCY DEPT VISIT HI MDM: CPT | Mod: 25 | Performed by: EMERGENCY MEDICINE

## 2017-07-25 PROCEDURE — 99285 EMERGENCY DEPT VISIT HI MDM: CPT | Mod: Z6 | Performed by: EMERGENCY MEDICINE

## 2017-07-25 PROCEDURE — 83690 ASSAY OF LIPASE: CPT | Performed by: NURSE PRACTITIONER

## 2017-07-25 PROCEDURE — 96361 HYDRATE IV INFUSION ADD-ON: CPT | Performed by: EMERGENCY MEDICINE

## 2017-07-25 PROCEDURE — 96375 TX/PRO/DX INJ NEW DRUG ADDON: CPT | Performed by: EMERGENCY MEDICINE

## 2017-07-25 PROCEDURE — 25000128 H RX IP 250 OP 636: Performed by: NURSE PRACTITIONER

## 2017-07-25 PROCEDURE — 85025 COMPLETE CBC W/AUTO DIFF WBC: CPT | Performed by: NURSE PRACTITIONER

## 2017-07-25 PROCEDURE — 96376 TX/PRO/DX INJ SAME DRUG ADON: CPT | Performed by: EMERGENCY MEDICINE

## 2017-07-25 PROCEDURE — 96374 THER/PROPH/DIAG INJ IV PUSH: CPT | Performed by: EMERGENCY MEDICINE

## 2017-07-25 PROCEDURE — 25000128 H RX IP 250 OP 636

## 2017-07-25 RX ORDER — DIPHENHYDRAMINE HYDROCHLORIDE 50 MG/ML
25 INJECTION INTRAMUSCULAR; INTRAVENOUS ONCE
Status: COMPLETED | OUTPATIENT
Start: 2017-07-25 | End: 2017-07-25

## 2017-07-25 RX ORDER — DIPHENHYDRAMINE HYDROCHLORIDE 50 MG/ML
INJECTION INTRAMUSCULAR; INTRAVENOUS
Status: COMPLETED
Start: 2017-07-25 | End: 2017-07-25

## 2017-07-25 RX ADMIN — DIPHENHYDRAMINE HYDROCHLORIDE 25 MG: 50 INJECTION, SOLUTION INTRAMUSCULAR; INTRAVENOUS at 22:05

## 2017-07-25 RX ADMIN — DIPHENHYDRAMINE HYDROCHLORIDE 25 MG: 50 INJECTION INTRAMUSCULAR; INTRAVENOUS at 22:05

## 2017-07-25 RX ADMIN — DIPHENHYDRAMINE HYDROCHLORIDE 25 MG: 50 INJECTION, SOLUTION INTRAMUSCULAR; INTRAVENOUS at 19:49

## 2017-07-25 RX ADMIN — HYDROMORPHONE HYDROCHLORIDE 1 MG: 1 INJECTION, SOLUTION INTRAMUSCULAR; INTRAVENOUS; SUBCUTANEOUS at 21:25

## 2017-07-25 RX ADMIN — HYDROMORPHONE HYDROCHLORIDE 1 MG: 1 INJECTION, SOLUTION INTRAMUSCULAR; INTRAVENOUS; SUBCUTANEOUS at 19:52

## 2017-07-25 RX ADMIN — HYDROMORPHONE HYDROCHLORIDE 1 MG: 1 INJECTION, SOLUTION INTRAMUSCULAR; INTRAVENOUS; SUBCUTANEOUS at 20:25

## 2017-07-25 RX ADMIN — SODIUM CHLORIDE 1000 ML: 9 INJECTION, SOLUTION INTRAVENOUS at 19:49

## 2017-07-25 ASSESSMENT — ENCOUNTER SYMPTOMS
VOMITING: 1
NAUSEA: 1
APPETITE CHANGE: 1
ABDOMINAL PAIN: 1
ABDOMINAL DISTENTION: 1
ACTIVITY CHANGE: 1

## 2017-07-25 NOTE — IP AVS SNAPSHOT
[address] Observation [address] [address]    Phone:  [phone]    Fax:  [phone]                                       After Visit Summary   7/25/2017    Doreen Peralta    MRN: [id number]           After Visit Summary Signature Page     I have received my discharge instructions, and my questions have been answered. I have discussed any challenges I see with this plan with the nurse or doctor.    ..........................................................................................................................................  Patient/Patient Representative Signature      ..........................................................................................................................................  Patient Representative Print Name and Relationship to Patient    ..................................................               ................................................  Date                                            Time    ..........................................................................................................................................  Reviewed by Signature/Title    ...................................................              ..............................................  Date                                                            Time

## 2017-07-25 NOTE — ED NOTES
Pt presents with a gastrostomy tube that fell out sometime during the night.  She reports that she usually hooks it up to gravity drainage overnight and noticed this morning that it hadn't drained anything.  Today she has felt nauseated and bloated.  She removed the bandage this afternoon to take a shower and saw that the tube had come out under the bandage

## 2017-07-25 NOTE — IP AVS SNAPSHOT
MRN:[id number]                      After Visit Summary   7/25/2017    Doreen Peralta    MRN: [id number]           Thank you!     Thank you for choosing [facility] for your care. Our goal is always to provide you with excellent care. Hearing back from our patients is one way we can continue to improve our services. Please take a few minutes to complete the written survey that you may receive in the mail after you visit with us. Thank you!        Patient Information     Date Of Birth          1980        Designated Caregiver       Most Recent Value    Caregiver    Will someone help with your care after discharge? yes    Name of designated caregiver Mitali    Phone number of caregiver [phone]    [address]giver [address]       About your hospital stay     You were admitted on:  July 25, 2017 You last received care in the:  Unit 6D Observation [facility]    You were discharged on:  July 26, 2017        Reason for your hospital stay       You were admitted after you noted your G tube was dislodged and you started having abdominal bloating and pain. There was no sign of bowel obstruction, and G tube was replaced by IR. You are still having abdominal distention, but not vomitting. G tube is draining small amount. You are stable to leave the hospital as you are tolerating clears.                  Who to Call     For medical emergencies, please call 911.  For non-urgent questions about your medical care, please call your primary care provider or clinic, [phone]          Attending Provider     Provider Specialty    Liss Hendrickson MD Internal Medicine    King, Phuong Merida MD Internal Medicine    Melody, Sultana Henley MD Internal Medicine       Primary Care Provider Office Phone # Fax #    Larisa Lorenzo PA-C [phone]       When to contact your care team       Call your primary doctor if you have any of the following: increased swelling,  abdominal pain, unable to tolerate po.                  After Care Instructions     Activity       Your activity upon discharge: activity as tolerated            Diet       Follow this diet upon discharge: Orders Placed This Encounter      Regular Diet Adult advance slowly as tolerated            Discharge Instructions       If questions or problems arise regarding tube function (e.g. leaking, dislodges, etc.) Contact Interventional Radiology department 24 hours a day.    For procedures that were done at the [facility] sites,   8:00-4:30 PM Monday through Friday    Contact:[phone].    For afterhours and weekends call the [place] main phone line [phone] and ask for the [place] IR on call physician number.    If DIRECTED by the RADIOLOGIST, related to specific problems with the tube functioning,  go to the Emergency Department.            Tubes and drains       You are going home with the following tubes or drains: G tube care as prior to hospitalization                  Follow-up Appointments     Follow Up and recommended labs and tests       Follow up with primary care provider, Larisa Lorenzo, and GI provider as needed. Contact your outpatient provider or return to ED for worsening abdominal pain, unable to tolerate po.                  Your next 10 appointments already scheduled     Jul 31, 2017  9:30 AM CDT   Level 2 with NL INFUSION CHAIR 5   [facility] ([facility])    [address] [place] [address]   [phone]            Aug 07, 2017  8:30 AM CDT   Level 2 with NL INFUSION CHAIR 5   [facility] ([facility])    [address] [place] [address]   [phone]            Aug 21, 2017  9:30 AM CDT   Level 2 with NL INFUSION CHAIR 1   [facility] ([facility])    [address] [place] [address]   [phone]            Aug  28, 2017  8:30 AM CDT   Level 2 with NL INFUSION CHAIR 4   [facility] ([facility])    [address]   [place] [address]   [phone]            Sep 05, 2017  9:30 AM CDT   Level 2 with NL INFUSION CHAIR 5   [facility] ([facility])    [address] [place] [address]   [phone]            Dec 04, 2017 10:00 AM CST   (Arrive by 9:45 AM)   Return Visit with Gilmer Lees MD   [facility] ([facility] [place])    [address]  4th [address]  [place] [address] [phone]              Pending Results     No orders found for last 3 day(s).            Statement of Approval     Ordered          07/26/17 1434  I have reviewed and agree with all the recommendations and orders detailed in this document.  EFFECTIVE NOW     Approved and electronically signed by:  Sultana Oliver MD             Admission Information     Date & Time Provider Department Dept. Phone    7/25/2017 Sultana Oliver MD Unit 6D Observation [facility] [phone]      Your Vitals Were     Blood Pressure Pulse Temperature Respirations Last Period Pulse Oximetry    132/79 81 98.9  F (37.2  C) (Oral) 14 07/14/2017 97%      MyChart Information     [employer] gives you secure access to your electronic health record. If you see a primary care provider, you can also send messages to your care team and make appointments. If you have questions, please call your primary care clinic.  If you do not have a primary care provider, please call [phone] and they will assist you.        Care EveryWhere ID     This is your Care EveryWhere ID. This could be used by other organizations to access your [place] medical records  [id number]        Equal Access to Services     TRAMAINE CLAYTON : clark Torres, elham guerrero  gloriacuongbrianne neville'aan ah. So [facility] [phone].    ATENCIÓN: Si rinku dale, tiene a wade disposición servicios gratuitos de asistencia lingüística. Janes al [phone].    We comply with applicable federal civil rights laws and [place] laws. We do not discriminate on the basis of race, color, national origin, age, disability sex, sexual orientation or gender identity.               Review of your medicines      START taking        Dose / Directions    HYDROmorphone 2 MG tablet   Commonly known as:  DILAUDID   Used for:  Gastroparesis, Malfunction of gastrostomy tube (H)        Dose:  2 mg   Take 1 tablet (2 mg) by mouth every 3 hours as needed for moderate to severe pain   Quantity:  20 tablet   Refills:  0         CONTINUE these medicines which have NOT CHANGED        Dose / Directions    ACETAMINOPHEN PO        Dose:  500-1000 mg   Take 500-1,000 mg by mouth every 6 hours as needed for pain   Refills:  0       * albuterol 108 (90 BASE) MCG/ACT Inhaler   Commonly known as:  PROAIR HFA/PROVENTIL HFA/VENTOLIN HFA   Used for:  Gastrostomy tube dependent (H), SBO (small bowel obstruction) (H), Chronic abdominal pain, Chronic diarrhea        Dose:  1 puff   Inhale 1 puff into the lungs   Refills:  0       * albuterol (2.5 MG/3ML) 0.083% neb solution   Used for:  Gastrostomy tube dependent (H), SBO (small bowel obstruction) (H), Chronic abdominal pain, Chronic diarrhea        Dose:  2.5 mg   Inhale 2.5 mg into the lungs   Refills:  0       albuterol 90 MCG/ACT inhaler        Dose:  2 puff   Inhale 2 puffs into the lungs every 6 hours as needed   Refills:  0       cloNIDine 0.2 MG tablet   Commonly known as:  CATAPRES   Used for:  Anxiety        TAKE 2 TABLETS(0.4 MG) BY MOUTH EVERY EVENING   Quantity:  180 tablet   Refills:  0       diphenhydrAMINE 25 MG tablet   Commonly known as:  BENADRYL ALLERGY        Dose:  25 mg   Take 1 tablet (25 mg) by mouth every 6 hours as needed for itching or other (Nausea)   Quantity:  30  tablet   Refills:  0       DULoxetine 60 MG EC capsule   Commonly known as:  CYMBALTA   Used for:  Abdominal pain, epigastric, Abdominal migraine, not intractable        Dose:  60 mg   Take 1 capsule (60 mg) by mouth daily   Quantity:  90 capsule   Refills:  3       HYDROcodone-acetaminophen 5-325 MG per tablet   Commonly known as:  NORCO   Used for:  Port catheter in place        Dose:  1-2 tablet   Take 1-2 tablets by mouth every 4 hours as needed for moderate to severe pain   Quantity:  10 tablet   Refills:  0       ipratropium 0.02 % neb solution   Commonly known as:  ATROVENT   Used for:  Gastrostomy tube dependent (H), SBO (small bowel obstruction) (H), Chronic abdominal pain, Chronic diarrhea        Dose:  0.5 mg   Inhale 0.5 mg into the lungs   Refills:  0       mirtazapine 15 MG ODT tab   Commonly known as:  REMERON SOL-TAB   Used for:  Anxiety        Dose:  7.5 mg   0.5 tablets (7.5 mg) by Orally disintegrating tablet route At Bedtime   Quantity:  45 tablet   Refills:  0       nortriptyline 10 MG capsule   Commonly known as:  PAMELOR   Used for:  Neuropathic pain, Primary insomnia        Dose:  30-40 mg   Take 3-4 capsules (30-40 mg) by mouth At Bedtime   Quantity:  120 capsule   Refills:  5       ondansetron 4 MG ODT tab   Commonly known as:  ZOFRAN ODT   Used for:  Intractable vomiting, presence of nausea not specified, unspecified vomiting type, Gastroparesis        Dose:  4-8 mg   Take 1-2 tablets (4-8 mg) by mouth every 6 hours as needed for nausea   Quantity:  20 tablet   Refills:  0       rivaroxaban ANTICOAGULANT 20 MG Tabs tablet   Commonly known as:  XARELTO   Used for:  Deep vein thrombosis (DVT) of upper extremity, unspecified chronicity, unspecified laterality, unspecified vein (H)        Dose:  20 mg   Take 1 tablet (20 mg) by mouth daily (with dinner)   Quantity:  90 tablet   Refills:  1       SUMAtriptan 50 MG tablet   Commonly known as:  IMITREX   Used for:  Migraine without aura and  without status migrainosus, not intractable        Dose:   mg   Take 1-2 tablets ( mg) by mouth at onset of headache for migraine - may repeat dose after 2h if headache recurs.  Max: 200mg/24 hours   Quantity:  18 tablet   Refills:  1       traZODone 50 MG tablet   Commonly known as:  DESYREL   Used for:  Insomnia, unspecified type        Dose:   mg   Take 1-2 tablets ( mg) by mouth nightly as needed 1-2 tabs at bedtime PRN   Quantity:  180 tablet   Refills:  1       * Notice:  This list has 2 medication(s) that are the same as other medications prescribed for you. Read the directions carefully, and ask your doctor or other care provider to review them with you.         Where to get your medicines      Some of these will need a paper prescription and others can be bought over the counter. Ask your nurse if you have questions.     Bring a paper prescription for each of these medications     HYDROmorphone 2 MG tablet                Protect others around you: Learn how to safely use, store and throw away your medicines at www.disposemymeds.org.             Medication List: This is a list of all your medications and when to take them. Check marks below indicate your daily home schedule. Keep this list as a reference.      Medications           Morning Afternoon Evening Bedtime As Needed    ACETAMINOPHEN PO   Take 500-1,000 mg by mouth every 6 hours as needed for pain                                * albuterol 108 (90 BASE) MCG/ACT Inhaler   Commonly known as:  PROAIR HFA/PROVENTIL HFA/VENTOLIN HFA   Inhale 1 puff into the lungs                                * albuterol (2.5 MG/3ML) 0.083% neb solution   Inhale 2.5 mg into the lungs                                albuterol 90 MCG/ACT inhaler   Inhale 2 puffs into the lungs every 6 hours as needed                                cloNIDine 0.2 MG tablet   Commonly known as:  CATAPRES   TAKE 2 TABLETS(0.4 MG) BY MOUTH EVERY EVENING   Last time this  was given:  0.4 mg on 7/26/2017  2:36 AM                                diphenhydrAMINE 25 MG tablet   Commonly known as:  BENADRYL ALLERGY   Take 1 tablet (25 mg) by mouth every 6 hours as needed for itching or other (Nausea)                                DULoxetine 60 MG EC capsule   Commonly known as:  CYMBALTA   Take 1 capsule (60 mg) by mouth daily   Last time this was given:  60 mg on 7/26/2017  8:54 AM                                HYDROcodone-acetaminophen 5-325 MG per tablet   Commonly known as:  NORCO   Take 1-2 tablets by mouth every 4 hours as needed for moderate to severe pain   Last time this was given:  2 tablets on 7/26/2017  6:45 AM                                HYDROmorphone 2 MG tablet   Commonly known as:  DILAUDID   Take 1 tablet (2 mg) by mouth every 3 hours as needed for moderate to severe pain   Last time this was given:  2 mg on 7/26/2017 11:50 AM                                ipratropium 0.02 % neb solution   Commonly known as:  ATROVENT   Inhale 0.5 mg into the lungs                                mirtazapine 15 MG ODT tab   Commonly known as:  REMERON SOL-TAB   0.5 tablets (7.5 mg) by Orally disintegrating tablet route At Bedtime   Last time this was given:  7.5 mg on 7/26/2017  2:36 AM                                nortriptyline 10 MG capsule   Commonly known as:  PAMELOR   Take 3-4 capsules (30-40 mg) by mouth At Bedtime   Last time this was given:  40 mg on 7/26/2017  2:36 AM                                ondansetron 4 MG ODT tab   Commonly known as:  ZOFRAN ODT   Take 1-2 tablets (4-8 mg) by mouth every 6 hours as needed for nausea                                rivaroxaban ANTICOAGULANT 20 MG Tabs tablet   Commonly known as:  XARELTO   Take 1 tablet (20 mg) by mouth daily (with dinner)                                SUMAtriptan 50 MG tablet   Commonly known as:  IMITREX   Take 1-2 tablets ( mg) by mouth at onset of headache for migraine - may repeat dose after 2h if  headache recurs.  Max: 200mg/24 hours                                traZODone 50 MG tablet   Commonly known as:  DESYREL   Take 1-2 tablets ( mg) by mouth nightly as needed 1-2 tabs at bedtime PRN                                * Notice:  This list has 2 medication(s) that are the same as other medications prescribed for you. Read the directions carefully, and ask your doctor or other care provider to review them with you.

## 2017-07-26 ENCOUNTER — APPOINTMENT (OUTPATIENT)
Dept: GENERAL RADIOLOGY | Facility: CLINIC | Age: 37
End: 2017-07-26
Attending: INTERNAL MEDICINE
Payer: COMMERCIAL

## 2017-07-26 ENCOUNTER — APPOINTMENT (OUTPATIENT)
Dept: INTERVENTIONAL RADIOLOGY/VASCULAR | Facility: CLINIC | Age: 37
End: 2017-07-26
Attending: INTERNAL MEDICINE
Payer: COMMERCIAL

## 2017-07-26 VITALS
RESPIRATION RATE: 14 BRPM | DIASTOLIC BLOOD PRESSURE: 79 MMHG | TEMPERATURE: 98.9 F | OXYGEN SATURATION: 97 % | SYSTOLIC BLOOD PRESSURE: 132 MMHG | HEART RATE: 81 BPM

## 2017-07-26 LAB
ANION GAP SERPL CALCULATED.3IONS-SCNC: 9 MMOL/L (ref 3–14)
BUN SERPL-MCNC: 7 MG/DL (ref 7–30)
CALCIUM SERPL-MCNC: 8.2 MG/DL (ref 8.5–10.1)
CHLORIDE SERPL-SCNC: 106 MMOL/L (ref 94–109)
CO2 SERPL-SCNC: 24 MMOL/L (ref 20–32)
CREAT SERPL-MCNC: 0.99 MG/DL (ref 0.52–1.04)
ERYTHROCYTE [DISTWIDTH] IN BLOOD BY AUTOMATED COUNT: 18.1 % (ref 10–15)
GFR SERPL CREATININE-BSD FRML MDRD: 63 ML/MIN/1.7M2
GLUCOSE SERPL-MCNC: 120 MG/DL (ref 70–99)
HCT VFR BLD AUTO: 30.5 % (ref 35–47)
HGB BLD-MCNC: 9.3 G/DL (ref 11.7–15.7)
MCH RBC QN AUTO: 24.6 PG (ref 26.5–33)
MCHC RBC AUTO-ENTMCNC: 30.5 G/DL (ref 31.5–36.5)
MCV RBC AUTO: 81 FL (ref 78–100)
PLATELET # BLD AUTO: 274 10E9/L (ref 150–450)
POTASSIUM SERPL-SCNC: 3.2 MMOL/L (ref 3.4–5.3)
RBC # BLD AUTO: 3.78 10E12/L (ref 3.8–5.2)
SODIUM SERPL-SCNC: 139 MMOL/L (ref 133–144)
WBC # BLD AUTO: 7.8 10E9/L (ref 4–11)

## 2017-07-26 PROCEDURE — 25000132 ZZH RX MED GY IP 250 OP 250 PS 637: Performed by: INTERNAL MEDICINE

## 2017-07-26 PROCEDURE — 74020 XR ABDOMEN 2 VW: CPT

## 2017-07-26 PROCEDURE — C1769 GUIDE WIRE: HCPCS

## 2017-07-26 PROCEDURE — 25000128 H RX IP 250 OP 636: Performed by: PHYSICIAN ASSISTANT

## 2017-07-26 PROCEDURE — 85027 COMPLETE CBC AUTOMATED: CPT | Performed by: INTERNAL MEDICINE

## 2017-07-26 PROCEDURE — 80048 BASIC METABOLIC PNL TOTAL CA: CPT | Performed by: INTERNAL MEDICINE

## 2017-07-26 PROCEDURE — 96374 THER/PROPH/DIAG INJ IV PUSH: CPT

## 2017-07-26 PROCEDURE — 49450 REPLACE G/C TUBE PERC: CPT

## 2017-07-26 PROCEDURE — 25000125 ZZHC RX 250: Performed by: PHYSICIAN ASSISTANT

## 2017-07-26 PROCEDURE — 25000128 H RX IP 250 OP 636: Performed by: RADIOLOGY

## 2017-07-26 PROCEDURE — 99219 ZZC INITIAL OBSERVATION CARE,LEVL II: CPT | Mod: AI | Performed by: INTERNAL MEDICINE

## 2017-07-26 PROCEDURE — 36592 COLLECT BLOOD FROM PICC: CPT | Performed by: INTERNAL MEDICINE

## 2017-07-26 PROCEDURE — C1887 CATHETER, GUIDING: HCPCS

## 2017-07-26 PROCEDURE — 27210903 ZZH KIT CR5

## 2017-07-26 PROCEDURE — G0378 HOSPITAL OBSERVATION PER HR: HCPCS

## 2017-07-26 PROCEDURE — 27210916 ZZ H TUBE GASTRO CR5

## 2017-07-26 PROCEDURE — 25000128 H RX IP 250 OP 636: Performed by: INTERNAL MEDICINE

## 2017-07-26 PROCEDURE — 99152 MOD SED SAME PHYS/QHP 5/>YRS: CPT

## 2017-07-26 RX ORDER — HYDROCODONE BITARTRATE AND ACETAMINOPHEN 5; 325 MG/1; MG/1
1-2 TABLET ORAL EVERY 4 HOURS PRN
Status: DISCONTINUED | OUTPATIENT
Start: 2017-07-26 | End: 2017-07-26

## 2017-07-26 RX ORDER — MIRTAZAPINE 15 MG/1
7.5 TABLET, ORALLY DISINTEGRATING ORAL AT BEDTIME
Status: DISCONTINUED | OUTPATIENT
Start: 2017-07-26 | End: 2017-07-26 | Stop reason: HOSPADM

## 2017-07-26 RX ORDER — HYDROMORPHONE HCL/0.9% NACL/PF 0.2MG/0.2
0.2 SYRINGE (ML) INTRAVENOUS ONCE
Status: COMPLETED | OUTPATIENT
Start: 2017-07-26 | End: 2017-07-26

## 2017-07-26 RX ORDER — HYDROMORPHONE HYDROCHLORIDE 2 MG/1
2 TABLET ORAL
Qty: 20 TABLET | Refills: 0 | Status: ON HOLD | OUTPATIENT
Start: 2017-07-26 | End: 2017-07-30

## 2017-07-26 RX ORDER — DULOXETIN HYDROCHLORIDE 60 MG/1
60 CAPSULE, DELAYED RELEASE ORAL DAILY
Status: DISCONTINUED | OUTPATIENT
Start: 2017-07-26 | End: 2017-07-26 | Stop reason: HOSPADM

## 2017-07-26 RX ORDER — NORTRIPTYLINE HCL 10 MG
30-40 CAPSULE ORAL AT BEDTIME
Status: DISCONTINUED | OUTPATIENT
Start: 2017-07-26 | End: 2017-07-26

## 2017-07-26 RX ORDER — MIRTAZAPINE 15 MG/1
7.5 TABLET, ORALLY DISINTEGRATING ORAL AT BEDTIME
Status: DISCONTINUED | OUTPATIENT
Start: 2017-07-26 | End: 2017-07-26

## 2017-07-26 RX ORDER — DIPHENHYDRAMINE HCL 25 MG
25-50 CAPSULE ORAL EVERY 6 HOURS PRN
Status: DISCONTINUED | OUTPATIENT
Start: 2017-07-26 | End: 2017-07-26 | Stop reason: HOSPADM

## 2017-07-26 RX ORDER — NALOXONE HYDROCHLORIDE 0.4 MG/ML
.1-.4 INJECTION, SOLUTION INTRAMUSCULAR; INTRAVENOUS; SUBCUTANEOUS
Status: DISCONTINUED | OUTPATIENT
Start: 2017-07-26 | End: 2017-07-26 | Stop reason: HOSPADM

## 2017-07-26 RX ORDER — LIDOCAINE HYDROCHLORIDE 10 MG/ML
1-30 INJECTION, SOLUTION EPIDURAL; INFILTRATION; INTRACAUDAL; PERINEURAL
Status: DISCONTINUED | OUTPATIENT
Start: 2017-07-26 | End: 2017-07-26 | Stop reason: HOSPADM

## 2017-07-26 RX ORDER — ONDANSETRON 2 MG/ML
4 INJECTION INTRAMUSCULAR; INTRAVENOUS EVERY 6 HOURS PRN
Status: DISCONTINUED | OUTPATIENT
Start: 2017-07-26 | End: 2017-07-26 | Stop reason: HOSPADM

## 2017-07-26 RX ORDER — HYDROMORPHONE HYDROCHLORIDE 1 MG/ML
1 INJECTION, SOLUTION INTRAMUSCULAR; INTRAVENOUS; SUBCUTANEOUS ONCE
Status: COMPLETED | OUTPATIENT
Start: 2017-07-26 | End: 2017-07-26

## 2017-07-26 RX ORDER — CLONIDINE HYDROCHLORIDE 0.2 MG/1
0.4 TABLET ORAL AT BEDTIME
Status: DISCONTINUED | OUTPATIENT
Start: 2017-07-26 | End: 2017-07-26 | Stop reason: HOSPADM

## 2017-07-26 RX ORDER — HEPARIN SODIUM,PORCINE 10 UNIT/ML
5 VIAL (ML) INTRAVENOUS ONCE
Status: COMPLETED | OUTPATIENT
Start: 2017-07-26 | End: 2017-07-26

## 2017-07-26 RX ORDER — NORTRIPTYLINE HCL 10 MG
30-40 CAPSULE ORAL AT BEDTIME
Status: DISCONTINUED | OUTPATIENT
Start: 2017-07-26 | End: 2017-07-26 | Stop reason: HOSPADM

## 2017-07-26 RX ORDER — IODIXANOL 320 MG/ML
50 INJECTION, SOLUTION INTRAVASCULAR ONCE
Status: COMPLETED | OUTPATIENT
Start: 2017-07-26 | End: 2017-07-26

## 2017-07-26 RX ORDER — ONDANSETRON 4 MG/1
4 TABLET, ORALLY DISINTEGRATING ORAL EVERY 6 HOURS PRN
Status: DISCONTINUED | OUTPATIENT
Start: 2017-07-26 | End: 2017-07-26 | Stop reason: HOSPADM

## 2017-07-26 RX ORDER — CLONIDINE HYDROCHLORIDE 0.2 MG/1
0.4 TABLET ORAL AT BEDTIME
Status: DISCONTINUED | OUTPATIENT
Start: 2017-07-26 | End: 2017-07-26

## 2017-07-26 RX ORDER — HYDROMORPHONE HYDROCHLORIDE 2 MG/1
2 TABLET ORAL
Status: DISCONTINUED | OUTPATIENT
Start: 2017-07-26 | End: 2017-07-26 | Stop reason: HOSPADM

## 2017-07-26 RX ADMIN — HYDROMORPHONE HYDROCHLORIDE 2 MG: 2 TABLET ORAL at 15:11

## 2017-07-26 RX ADMIN — SODIUM CHLORIDE, PRESERVATIVE FREE 5 ML: 5 INJECTION INTRAVENOUS at 10:20

## 2017-07-26 RX ADMIN — CLONIDINE HYDROCHLORIDE 0.4 MG: 0.2 TABLET ORAL at 02:36

## 2017-07-26 RX ADMIN — DULOXETINE 60 MG: 60 CAPSULE, DELAYED RELEASE ORAL at 08:54

## 2017-07-26 RX ADMIN — HYDROCODONE BITARTRATE AND ACETAMINOPHEN 2 TABLET: 5; 325 TABLET ORAL at 06:45

## 2017-07-26 RX ADMIN — LIDOCAINE HYDROCHLORIDE 10 ML: 20 JELLY TOPICAL at 10:16

## 2017-07-26 RX ADMIN — HYDROCODONE BITARTRATE AND ACETAMINOPHEN 2 TABLET: 5; 325 TABLET ORAL at 01:54

## 2017-07-26 RX ADMIN — Medication 0.2 MG: at 05:41

## 2017-07-26 RX ADMIN — HYDROMORPHONE HYDROCHLORIDE 2 MG: 2 TABLET ORAL at 11:50

## 2017-07-26 RX ADMIN — HYDROMORPHONE HYDROCHLORIDE 2 MG: 2 TABLET ORAL at 08:54

## 2017-07-26 RX ADMIN — HYDROMORPHONE HYDROCHLORIDE 1 MG: 1 INJECTION, SOLUTION INTRAMUSCULAR; INTRAVENOUS; SUBCUTANEOUS at 10:00

## 2017-07-26 RX ADMIN — MIRTAZAPINE 7.5 MG: 15 TABLET, FILM COATED ORAL at 02:36

## 2017-07-26 RX ADMIN — NORTRIPTYLINE HYDROCHLORIDE 40 MG: 10 CAPSULE ORAL at 02:36

## 2017-07-26 RX ADMIN — MIDAZOLAM 2 MG: 1 INJECTION INTRAMUSCULAR; INTRAVENOUS at 10:00

## 2017-07-26 RX ADMIN — DIPHENHYDRAMINE HYDROCHLORIDE 50 MG: 25 CAPSULE ORAL at 11:50

## 2017-07-26 RX ADMIN — IODIXANOL 10 ML: 320 INJECTION, SOLUTION INTRAVASCULAR at 10:17

## 2017-07-26 ASSESSMENT — PAIN DESCRIPTION - DESCRIPTORS
DESCRIPTORS: ACHING;CONSTANT;CRAMPING;DISCOMFORT
DESCRIPTORS: ACHING;CRAMPING;DISCOMFORT
DESCRIPTORS: ACHING;CONSTANT
DESCRIPTORS: ACHING;CONSTANT;CRAMPING;DISCOMFORT
DESCRIPTORS: ACHING;CONSTANT;DISCOMFORT

## 2017-07-26 NOTE — PLAN OF CARE
Problem: Discharge Planning  Goal: Discharge Planning (Adult, OB, Behavioral, Peds)  -diagnostic tests and consults completed and resulted : Yes  -tolerating oral intake to maintain hydration : Yes  -adequate pain control on oral analgesics: Yes      Pt a/o x 4; VSS; pt reports abdominal pain; g-tube placed, pt still reports abd pain and bloating. Gave medications and applied ice pack.     Also gave pt Maya drainage tubing/bag to attempt draining some fluid through the new g-tube, no results.

## 2017-07-26 NOTE — PLAN OF CARE
Problem: Discharge Planning  Goal: Discharge Planning (Adult, OB, Behavioral, Peds)  -Diagnostic tests and consults completed and resulted: No  -Tolerating oral intake to maintain hydration: No, patient is NPO/ice chips   -Adequate pain control on oral analgesics: Yes

## 2017-07-26 NOTE — ED PROVIDER NOTES
History     Chief Complaint   Patient presents with     gastrostomy tube problem     HPI  Doreen Peralta is a 37 year old female who presents to the emergency department today with complaints of abdominal pain and distention since this morning.  Patient also reports nausea and vomiting, she did not realize until she went to change the dressing over her GJ tube that it had come out at some point today.  Patient has an extensive abdominal history including gastroparesis, small bowel obstruction, and colon resection with GJ tube placement at [facility].  Patient recently had a Port-A-Cath placed in her right chest as she is a very difficult IV start.  Patient denies any current fever.  Patient denies any diarrhea.    I have reviewed the Medications, Allergies, Past Medical and [facility] History, and Social History in the Epic system.    Allergies:   Allergies   Allergen Reactions     Hyoscyamine Rash     Metoclopramide Other (See Comments)     Eye twitching.      Peaches [Peach] Other (See Comments)     Raw. Cooked OK     Sucralose Other (See Comments)     All artificial sweeteners. Aspartame also. Swollen glands     Advair Diskus Other (See Comments)     Throat burns     Azithromycin Other (See Comments)     Burning in throat     Compazine [Prochlorperazine] Visual Disturbance     Contrast Dye Itching     States is allergic to CT contrast dye     Cyclobenzaprine Visual Disturbance     Fentanyl Other (See Comments)     migraine     Ibuprofen GI Disturbance     Lactulose Nausea and Vomiting     Gas and bloating     Levaquin [Levofloxacin] Swelling     Per ED M.D. And RN      Morphine Sulfate Other (See Comments)     Chest pain       Oxycodone Other (See Comments)     Burning throat, but can take Norco.      Penicillins Other (See Comments)     Family hx of resp arrest, she has never taken  Ok with cephalosporins     Rizatriptan Visual Disturbance     Droperidol Hives and Rash     Isovue [Iopamidol]  Palpitations     Pt had racing heart and sob      Ketorolac Anxiety     Latex Swelling and Rash     Kiwi, likely also avacado, ? banana     Levsin Rash         No current facility-administered medications on file prior to encounter.   Current Outpatient Prescriptions on File Prior to Encounter:  HYDROcodone-acetaminophen (NORCO) 5-325 MG per tablet Take 1-2 tablets by mouth every 4 hours as needed for moderate to severe pain (Patient not taking: Reported on 7/24/2017)   albuterol (2.5 MG/3ML) 0.083% neb solution Inhale 2.5 mg into the lungs   albuterol (PROAIR HFA/PROVENTIL HFA/VENTOLIN HFA) 108 (90 BASE) MCG/ACT Inhaler Inhale 1 puff into the lungs   ipratropium (ATROVENT) 0.02 % neb solution Inhale 0.5 mg into the lungs   cloNIDine (CATAPRES) 0.2 MG tablet TAKE 2 TABLETS(0.4 MG) BY MOUTH EVERY EVENING   ondansetron (ZOFRAN ODT) 4 MG ODT tab Take 1-2 tablets (4-8 mg) by mouth every 6 hours as needed for nausea   diphenhydrAMINE (BENADRYL ALLERGY) 25 MG tablet Take 1 tablet (25 mg) by mouth every 6 hours as needed for itching or other (Nausea)   traZODone (DESYREL) 50 MG tablet Take 1-2 tablets ( mg) by mouth nightly as needed 1-2 tabs at bedtime PRN   nortriptyline (PAMELOR) 10 MG capsule Take 3-4 capsules (30-40 mg) by mouth At Bedtime   mirtazapine (REMERON SOL-TAB) 15 MG ODT tab 0.5 tablets (7.5 mg) by Orally disintegrating tablet route At Bedtime   SUMAtriptan (IMITREX) 50 MG tablet Take 1-2 tablets ( mg) by mouth at onset of headache for migraine - may repeat dose after 2h if headache recurs.  Max: 200mg/24 hours   rivaroxaban ANTICOAGULANT (XARELTO) 20 MG TABS tablet Take 1 tablet (20 mg) by mouth daily (with dinner)   DULoxetine (CYMBALTA) 60 MG capsule Take 1 capsule (60 mg) by mouth daily   ACETAMINOPHEN PO Take 500-1,000 mg by mouth every 6 hours as needed for pain    albuterol 90 MCG/ACT inhaler Inhale 2 puffs into the lungs every 6 hours as needed        Patient Active Problem List    Diagnosis     Constipation by delayed colonic transit     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     Hx SBO     S/P LEEP of cervix     Gastroparesis     Migraines     Intermittent asthma     Allergic rhinitis     Abnormal Pap smear of cervix     PEG (percutaneous endoscopic gastrostomy) status     Health Care Home     PEG tube malfunction (H)     Malfunction of gastrostomy tube (H)     Malfunctioning jejunostomy tube (H)     Jejunostomy tube present (H)     S/P partial resection of colon     Malnutrition (H)     Long-term (current) use of anticoagulants [Z79.01]     Anxiety     Anemia in other chronic diseases classified elsewhere     Munchausen syndrome - previously suspected     Anemia, iron deficiency     Mitral regurgitation mild-mod by Echo June 2016     Atopic rhinitis     Migraine     Patellofemoral stress syndrome     Hyperbilirubinemia     Chronic diarrhea     Coagulation defect (H) [D68.9]     Fever     Attention to G-tube (H)     Chronic abdominal pain     Vitamin D deficiency     SIRS (systemic inflammatory response syndrome) (H)     History of deep venous thrombosis     Nausea and vomiting     Abdominal pain, generalized     Abdominal pain     Insomnia, unspecified type       Past [facility] History:   Procedure Laterality Date     COLONOSCOPY  7/10/2012    Procedure: COLONOSCOPY;;  Surgeon: Nicole Redding MD;  [facility]     COLONOSCOPY N/A 2/19/2017    Procedure: COLONOSCOPY;  Surgeon: Randell Muller MD;  Location: [facility]     COLONOSCOPY N/A 2/21/2017    Procedure: COLONOSCOPY;  Surgeon: Randell Muller MD;  Location: [facility]     ECHO CHELO  7/19/2016          ENDOSCOPIC INSERTION TUBE GASTROSTOMY N/A 1/21/2016    Procedure: ENDOSCOPIC INSERTION TUBE GASTROSTOMY;  Surgeon: Nicole Redding MD;  Location: [facility]     ESOPHAGOSCOPY, GASTROSCOPY, DUODENOSCOPY (EGD), COMBINED  7/10/2012    Procedure: COMBINED ESOPHAGOSCOPY, GASTROSCOPY, DUODENOSCOPY (EGD);  Upper Endoscopy, Ileoscopy     Latex Allergy  with biopsies;  Surgeon: Nicole Redding MD;  Location: [facility]     ESOPHAGOSCOPY, GASTROSCOPY, DUODENOSCOPY (EGD), COMBINED N/A 11/5/2014    Procedure: COMBINED ESOPHAGOSCOPY, GASTROSCOPY, DUODENOSCOPY (EGD);  Surgeon: Nicole Redding MD;  Location[facility]     HC REPLACE DUODENOSTOMY/JEJUNOSTOMY TUBE PERCUTANEOUS N/A 8/27/2015    Procedure: REPLACE GASTROJEJUNOSTOMY TUBE, PERCUTANEOUS;  Surgeon: Mio Holder MD;  [facility]     HC REPLACE DUODENOSTOMY/JEJUNOSTOMY TUBE PERCUTANEOUS N/A 1/7/2016    Procedure: REPLACE JEJUNOSTOMY TUBE, PERCUTANEOUS;  Surgeon: Elsa Medel MD;  [facility]     HC REPLACE DUODENOSTOMY/JEJUNOSTOMY TUBE PERCUTANEOUS N/A 1/28/2016    Procedure: REPLACE JEJUNOSTOMY TUBE, PERCUTANEOUS;  Surgeon: Elsa Medel MD;  [facility]     HC REPLACE GASTROSTOMY/CECOSTOMY TUBE PERCUTANEOUS Left 5/19/2015    Procedure: REPLACE GASTROSTOMY TUBE, PERCUTANEOUS;  Surgeon: Melecio Morejon Chi, MD;  Location: [facility] GI     HC UGI ENDOSCOPY W PLACEMENT GASTROSTOMY TUBE PERCUT N/A 10/1/2015    Procedure: COMBINED ESOPHAGOSCOPY, GASTROSCOPY, DUODENOSCOPY (EGD), PLACE PERCUTANEOUS ENDOSCOPIC GASTROSTOMY TUBE;  Surgeon: Mio Holder MD;  Location: [facility]     INSERT PORT VASCULAR ACCESS Right 7/20/2017    Procedure: INSERT PORT VASCULAR ACCESS;  Chest Port Placement  **Latex Allergy**;  Surgeon: Coy Rocha PA-C;  [facility]     LAPAROSCOPIC ASSISTED COLECTOMY  1/20/2012    Procedure:LAPAROSCOPIC ASSISTED COLECTOMY; Laparoscopic Ileostomy       LAPAROSCOPIC ASSISTED COLECTOMY LEFT (DESCENDING)  10/24/2012    Procedure: LAPAROSCOPIC ASSISTED COLECTOMY LEFT (DESCENDING);   Hand Assisted Laproscopic Subtotal abdominal Colectomy,Iieorectal anastamosis, Ileostomy Closure.       LAPAROSCOPIC ASSISTED INSERTION TUBE JEJUNOSTOMY N/A 10/16/2015    Procedure: LAPAROSCOPIC ASSISTED INSERTION TUBE JEJUNOSTOMY;  Surgeon: Elsa Medel MD;  Location:   "OR     LAPAROSCOPIC CHOLECYSTECTOMY  2002    [facility] stones duct     LAPAROSCOPIC ILEOSTOMY  1/20/2012    [facility], loop     LAPAROSCOPIC OOPHORECTOMY Right 2009    [facility]     LAPAROTOMY EXPLORATORY N/A 1/28/2016    Procedure: LAPAROTOMY EXPLORATORY;  Surgeon: Elsa Medel MD;  Location: [facility] OR     LEEP TX, CERVICAL  2009    [facility]     PICC INSERTION Left 10/21/2015    5fr DL Power PICC, 37cm (2cm external) in the L basilic vein w/ tip in the SVC RA junction.     REMOVE GASTROSTOMY TUBE ADULT N/A 12/12/2014    Procedure: REMOVE GASTROSTOMY TUBE ADULT;  Surgeon: Nicole Redding MD;  Location: [facility]     REMOVE PORT VASCULAR ACCESS Right 6/30/2016    Procedure: REMOVE PORT VASCULAR ACCESS;  Surgeon: Pradeep Orosco MD;  Location: [facility] OR     replace GASTROSTOMY TUBE ADULT  5/19/15       Social History   Substance Use Topics     Smoking status: Former Smoker     Packs/day: 1.00     Years: 4.00     Types: Cigarettes     Quit date: 1/1/2004     Smokeless tobacco: Former User     Alcohol use No       Most Recent Immunizations   Administered Date(s) Administered     Influenza (IIV3) 10/01/2009     Pneumococcal 23 valent 07/18/2014       BMI: Estimated body mass index is 31.96 kg/(m^2) as calculated from the following:    Height as of 7/24/17: 1.676 m (5' 6\").    Weight as of 7/24/17: 89.8 kg (198 lb).      Review of Systems   Constitutional: Positive for activity change and appetite change.   Gastrointestinal: Positive for abdominal distention, abdominal pain, nausea and vomiting.   All other systems reviewed and are negative.      Physical Exam   BP: (!) 168/115  Pulse: 88  Temp: 98.2  F (36.8  C)  Resp: 16  SpO2: 100 %  Physical Exam   Constitutional: She is oriented to person, place, and time. She appears well-developed and well-nourished. She appears distressed (Secondary to pain).   HENT:   Mouth/Throat: Oropharynx is clear and moist.   Eyes: Conjunctivae are normal.   Neck: " Normal range of motion. Neck supple.   Cardiovascular: Normal rate and regular rhythm.    Pulmonary/Chest: Effort normal and breath sounds normal.   Port-A-Cath in right chest, incision is healing well   Abdominal: She exhibits distension. She exhibits no mass. There is tenderness. There is no rebound and no guarding.   3 mm puncture site to left upper abdomen where tube has fallen out   Neurological: She is alert and oriented to person, place, and time.   Skin: Skin is warm and dry. There is pallor.   Psychiatric: She has a normal mood and affect.       ED Course     ED Course     Procedures    Results for orders placed or performed during the hospital encounter of 07/25/17   CBC with platelets differential   Result Value Ref Range    WBC 12.1 (H) 4.0 - 11.0 10e9/L    RBC Count 4.38 3.8 - 5.2 10e12/L    Hemoglobin 10.8 (L) 11.7 - 15.7 g/dL    Hematocrit 34.5 (L) 35.0 - 47.0 %    MCV 79 78 - 100 fl    MCH 24.7 (L) 26.5 - 33.0 pg    MCHC 31.3 (L) 31.5 - 36.5 g/dL    RDW 18.3 (H) 10.0 - 15.0 %    Platelet Count 369 150 - 450 10e9/L    Diff Method Automated Method     % Neutrophils 74.3 %    % Lymphocytes 19.3 %    % Monocytes 5.5 %    % Eosinophils 0.5 %    % Basophils 0.2 %    % Immature Granulocytes 0.2 %    Absolute Neutrophil 9.0 (H) 1.6 - 8.3 10e9/L    Absolute Lymphocytes 2.3 0.8 - 5.3 10e9/L    Absolute Monocytes 0.7 0.0 - 1.3 10e9/L    Absolute Eosinophils 0.1 0.0 - 0.7 10e9/L    Absolute Basophils 0.0 0.0 - 0.2 10e9/L    Abs Immature Granulocytes 0.0 0 - 0.4 10e9/L   Comprehensive metabolic panel   Result Value Ref Range    Sodium 141 133 - 144 mmol/L    Potassium 3.9 3.4 - 5.3 mmol/L    Chloride 107 94 - 109 mmol/L    Carbon Dioxide 24 20 - 32 mmol/L    Anion Gap 10 3 - 14 mmol/L    Glucose 86 70 - 99 mg/dL    Urea Nitrogen 6 (L) 7 - 30 mg/dL    Creatinine 0.97 0.52 - 1.04 mg/dL    GFR Estimate 65 >60 mL/min/1.7m2    GFR Estimate If Black 78 >60 mL/min/1.7m2    Calcium 9.1 8.5 - 10.1 mg/dL    Bilirubin Total  0.6 0.2 - 1.3 mg/dL    Albumin 3.4 3.4 - 5.0 g/dL    Protein Total 7.9 6.8 - 8.8 g/dL    Alkaline Phosphatase 152 (H) 40 - 150 U/L    ALT 26 0 - 50 U/L    AST 17 0 - 45 U/L   Lipase   Result Value Ref Range    Lipase 111 73 - 393 U/L     *Note: Due to a large number of results and/or encounters for the requested time period, some results have not been displayed. A complete set of results can be found in Results Review.       Labs Ordered and Resulted from Time of ED Arrival Up to the Time of Departure from the ED - No data to display    Assessments & Plan (with Medical Decision Making)  Doreen is a 37-year-old female with an extensive abdominal history including gastroparesis, colon resection and a GJ tube who presents to the emergency department today after realizing her GJ tube had fallen out at some point today.  Please refer to HPI and focused exam.  Patient's Port-A-Cath was accessed upon her arrival here, she was given Dilaudid for pain and Benadryl for nausea per her request.  Patient was given a liter of normal saline.  Blood work was obtained including a CBC, this returns with a mild leukocytosis of 12.1 and a left shift, hemoglobin is stable for patient at 10.8.  CMP returns with an alkaline fosse of 152 and is otherwise unremarkable, lipase is 111.    Patient continues to complain of abdominal pain, distention, nausea.  Patient reports that last time this happened she waited over 2 weeks to have her tube replaced and was miserable with multiple ED visits during that time.  I discussed patient's case with the hospitalist at [facility] as well as Dr. Connell from , I was asked to place a Maya in the tract of the tube to keep the tract open, I was able to do this and inflated balloon, it was suggested that patient is able to could go home and follow-up as an outpatient at the  to have her tube replaced, when I discussed this with the patient, she feels that her pain and nausea is too extreme in  order for her to take care of herself at home.  Patient's mom is at bedside who is very involved with her care and agrees, she feels patient is unable to deal with this at home.  We do not have the capability to replace patient's GJ tube here as this was previously done by GI under fluoroscopy.  I spoke again to Dr. Soriano, hospitalist at the [facility] and the [facility] who accepted patient for transfer.  Patient will be transferred via BLS in stable condition.  Patient staffed with Dr. Lam.      I have reviewed the nursing notes.    I have reviewed the findings, diagnosis, plan and need for follow up with the patient.    New Prescriptions    No medications on file       Final diagnoses:   Abdominal pain, generalized   Dislodged gastrostomy tube (H)   Abdominal distension   Nausea       7/25/2017   [facility] EMERGENCY DEPARTMENT     Shaunna Raines, GERARD CNP  07/25/17 [id number]

## 2017-07-26 NOTE — PROGRESS NOTES
[facility] provider called back. Updated that patient has arrived to the unit and is reporting pain to the abdomen. Provider will see patient shortly.

## 2017-07-26 NOTE — PROGRESS NOTES
Interventional Radiology Intra-procedural Nursing Note    Patient Name: Doreen Peralta  Medical Record Number: [id number]  Today's Date: July 26, 2017         Start Time: 1005  End of procedure time: 1015  Procedure: Gastrostomy tube replacement  Fire Safety Score: 1    Consent Review/Timeout Performed by: Coy Rocha PA-C  Procedure Performed By: Coy Rocha PA-C     Dilaudid administered: 1 mg  Versed administered: 2 mg    Start of Sedation Time: 1000  End of Sedation Time: 1015  Total Sedation Time: 15 minutes    Report given to: Caitie WARNER  Time pt departs:  1025    Other Notes:  Alert female transported via cart from inpatient room to IR Procedure Room 4 for planned intervention.  ID band confirmed and patient acknowledges understanding of planned procedure. Patient repositioned to procedure table via hover-mat and positioned supine.  Patient prepped and draped per policy see VS flowsheet, MAR for further information.       Tube replaced without complication. For complete details, please see Providers procedure note from event.    Patient condition post procedure is stable.   Patient returned to inpatient room for post-procedure monitoring.    Ayana Cha RN

## 2017-07-26 NOTE — PROVIDER NOTIFICATION
Sent message to provider - pt wants to know if she will be d/c today. Need orders to advance her diet.

## 2017-07-26 NOTE — H&P
[facility], [place]    Internal Medicine History and Physical - Gold Service       Date of Admission:  7/25/2017    Assessment & Plan   Doreen Peralta is a 37 year old female with a history of slow transit constipation, s/p partial colectomy, G tube placement, acute on chronic abdominal pain, and h/o DVT (on xarelto) who presents with c/o abdominal pain and G dislodgement.     # Acute on chronic abdominal pain  # G tube Dislodgement  # H/o slow transit constipation, s/p partial colectomy   Worsening abdominal pain throughout the day in setting of dislodged G-tube. Uses g-tube to vent. Exam not consistent with acute abdomen. Lab w/u unremarkable including nl lipase.   - Abdominal XR to r/o obstruction  - GI/IR to evaluate for G tube placement tomorrow  - Continue PTA norco for pain control  - Zofran PRN nausea  - NPO overnight    # Leukocytosis  Mild with WBC to 12.1 on admission. No s/sx of infection. Afebrile. Likely stress response in setting of pain. -Will repeat in AM.    # H/o DVT  - Holding PTA xarelto for possible procedure in AM    # Anxiety  # Insomnia  - Continue PTA cymbalta, nortriptyline, and mirtazapine    Diet: NPO for Medical/Clinical Reasons Except for: Ice Chips  DVT Prophylaxis: Xarelto- Holding for possible procedure.   Code Status: Full Code    Disposition Plan   Expected discharge: Tomorrow; recommended to prior living arrangement once G tube replaced, pain improved, and tolerating diet.    Phuong Malik  Internal Medicine Staff Hospitalist Service  Pager: [phone]    _______________________________________________________________    Chief Complaint   Abdominal pain    History of Present Illness   Doreen Peralta is a 37 year old female with a history of gastroparesis, s/p partial colectomy, G-tube placement, acute on chronic abdominal pain, and h/o DVT (on xarelto) who presents with c/o abdominal pain and G-tube dislodgement.     She states that earlier  today she had noted some decreased output from her G-tube. She typically has to drain the bag 2-3 times a day. Had noted some associated bloating with this. Then as she was changing her dressing, her G-tube came out. She states that has had worsening abdominal pain today and nausea. Reports an episode of emesis in the morning. Typically she tolerates oral diet, but has had little to eat today. Notes that she is having regular bowel movements, 4-5 times a day loose stools, which is her baseline. No BRBPR or melena. No fevers/chills.     In ED, she received normal saline and dilaudid. Maya was placed to maintain tract patency.     Review of Systems   The 10 point Review of Systems is negative other than noted in the HPI or here.     Past Medical History    Past Medical History:   Diagnosis Date     Asthma      Bilateral ovarian cysts      Cervical cancer (H) 01/01/2008    cervical cancer      Chronic pain      Colonic dysmotility     s/p subtotal colectomy     Constipation     chronic     E. coli sepsis (H) 5/8/2016     Enteritis      Fungemia 5/5/2016     Gastro-oesophageal reflux disease      H/O ileostomy      Hx of abnormal Pap smear     s/p LEEP - no further details provided     Hypertension      IBS (irritable bowel syndrome)      Other chronic pain      PONV (postoperative nausea and vomiting)      Thrombosis, hepatic vein (H)     microvascular       Past [facility] History   Past [facility] History:   Procedure Laterality Date     COLONOSCOPY  7/10/2012    Procedure: COLONOSCOPY;;  Surgeon: Nicole Redding MD;  [facility]: [facility]     COLONOSCOPY N/A 2/19/2017    Procedure: COLONOSCOPY;  Surgeon: Randell Muller MD;  Location: [facility]     COLONOSCOPY N/A 2/21/2017    Procedure: COLONOSCOPY;  Surgeon: Randell Muller MD;  Location: [facility]     ECHO CHELO  7/19/2016          ENDOSCOPIC INSERTION TUBE GASTROSTOMY N/A 1/21/2016    Procedure: ENDOSCOPIC INSERTION TUBE GASTROSTOMY;  Surgeon: Nicole Redding,  MD;  Location[facility]     ESOPHAGOSCOPY, GASTROSCOPY, DUODENOSCOPY (EGD), COMBINED  7/10/2012    Procedure: COMBINED ESOPHAGOSCOPY, GASTROSCOPY, DUODENOSCOPY (EGD);  Upper Endoscopy, Ileoscopy    Latex Allergy  with biopsies;  Surgeon: Nicole Redding MD;  [facility]     ESOPHAGOSCOPY, GASTROSCOPY, DUODENOSCOPY (EGD), COMBINED N/A 11/5/2014    Procedure: COMBINED ESOPHAGOSCOPY, GASTROSCOPY, DUODENOSCOPY (EGD);  Surgeon: Nicole Redding MD;  [facility]     HC REPLACE DUODENOSTOMY/JEJUNOSTOMY TUBE PERCUTANEOUS N/A 8/27/2015    Procedure: REPLACE GASTROJEJUNOSTOMY TUBE, PERCUTANEOUS;  Surgeon: Mio Holder MD;  [facility]     HC REPLACE DUODENOSTOMY/JEJUNOSTOMY TUBE PERCUTANEOUS N/A 1/7/2016    Procedure: REPLACE JEJUNOSTOMY TUBE, PERCUTANEOUS;  Surgeon: Elsa Medel MD;  [facility]     HC REPLACE DUODENOSTOMY/JEJUNOSTOMY TUBE PERCUTANEOUS N/A 1/28/2016    Procedure: REPLACE JEJUNOSTOMY TUBE, PERCUTANEOUS;  Surgeon: Elsa Medel MD;  [facility]     HC REPLACE GASTROSTOMY/CECOSTOMY TUBE PERCUTANEOUS Left 5/19/2015    Procedure: REPLACE GASTROSTOMY TUBE, PERCUTANEOUS;  Surgeon: Melecio Morejon Chi, MD;  Location: [facility]     HC UGI ENDOSCOPY W PLACEMENT GASTROSTOMY TUBE PERCUT N/A 10/1/2015    Procedure: COMBINED ESOPHAGOSCOPY, GASTROSCOPY, DUODENOSCOPY (EGD), PLACE PERCUTANEOUS ENDOSCOPIC GASTROSTOMY TUBE;  Surgeon: Mio Holder MD;  [facility]     INSERT PORT VASCULAR ACCESS Right 7/20/2017    Procedure: INSERT PORT VASCULAR ACCESS;  Chest Port Placement  **Latex Allergy**;  Surgeon: Coy Rocha PA-C;  [facility]     LAPAROSCOPIC ASSISTED COLECTOMY  1/20/2012    Procedure:LAPAROSCOPIC ASSISTED COLECTOMY; Laparoscopic Ileostomy       LAPAROSCOPIC ASSISTED COLECTOMY LEFT (DESCENDING)  10/24/2012    Procedure: LAPAROSCOPIC ASSISTED COLECTOMY LEFT (DESCENDING);   Hand Assisted Laproscopic Subtotal abdominal Colectomy,Iieorectal anastamosis,  Ileostomy Closure.       LAPAROSCOPIC ASSISTED INSERTION TUBE JEJUNOSTOMY N/A 10/16/2015    Procedure: LAPAROSCOPIC ASSISTED INSERTION TUBE JEJUNOSTOMY;  Surgeon: Elsa Medel MD;  [facility]     LAPAROSCOPIC CHOLECYSTECTOMY  2002    [facility] stones duct     LAPAROSCOPIC ILEOSTOMY  1/20/2012    [facility], loop     LAPAROSCOPIC OOPHORECTOMY Right 2009    [facility]     LAPAROTOMY EXPLORATORY N/A 1/28/2016    Procedure: LAPAROTOMY EXPLORATORY;  Surgeon: Elsa Medel MD;  Location: [facility]     LEEP TX, CERVICAL  2009    [facility]     PICC INSERTION Left 10/21/2015    5fr DL Power PICC, 37cm (2cm external) in the L basilic vein w/ tip in the SVC RA junction.     REMOVE GASTROSTOMY TUBE ADULT N/A 12/12/2014    Procedure: REMOVE GASTROSTOMY TUBE ADULT;  Surgeon: Nicole Redding MD;  [facility]: [facility]     REMOVE PORT VASCULAR ACCESS Right 6/30/2016    Procedure: REMOVE PORT VASCULAR ACCESS;  Surgeon: Pradeep Orosco MD;  Location: [facility]     replace GASTROSTOMY TUBE ADULT  5/19/15       Social History   Social History   Substance Use Topics     Smoking status: Former Smoker     Packs/day: 1.00     Years: 4.00     Types: Cigarettes     Quit date: 1/1/2004     Smokeless tobacco: Former User     Alcohol use No       Family History   Family History   Problem Relation Age of Onset     Thyroid Disease Mother      Sjogren's Mother      GASTROINTESTINAL DISEASE Mother      Intermittent nausea vomiting diarrhea     Colon Polyps Mother      Prostate Problems Father      prostate enlargement     Lupus Maternal Grandmother      CANCER Maternal Grandfather      Lung     Colon Cancer Maternal Grandfather 65     CANCER Paternal Grandmother      Lung      CEREBROVASCULAR DISEASE Paternal Grandmother      DIABETES Paternal Grandmother      Cardiovascular Paternal Grandmother      CHF     CANCER Paternal Grandfather      Lung     Glaucoma Paternal Grandfather      Abdominal Aortic Aneurysm Other       Macular Degeneration No family hx of        Prior to Admission Medications   Prior to Admission Medications   Prescriptions Last Dose Informant Patient Reported? Taking?   ACETAMINOPHEN PO 2017 at 1000 Self Yes Yes   Sig: Take 500-1,000 mg by mouth every 6 hours as needed for pain    DULoxetine (CYMBALTA) 60 MG capsule 2017 at 1000  No No   Sig: Take 1 capsule (60 mg) by mouth daily   HYDROcodone-acetaminophen (NORCO) 5-325 MG per tablet More than a month at Unknown time  No No   Sig: Take 1-2 tablets by mouth every 4 hours as needed for moderate to severe pain   Patient not taking: Reported on 2017   SUMAtriptan (IMITREX) 50 MG tablet More than a month at Unknown time  No No   Sig: Take 1-2 tablets ( mg) by mouth at onset of headache for migraine - may repeat dose after 2h if headache recurs.  Max: 200mg/24 hours   albuterol (2.5 MG/3ML) 0.083% neb solution More than a month at Unknown time  Yes No   Sig: Inhale 2.5 mg into the lungs   albuterol (PROAIR HFA/PROVENTIL HFA/VENTOLIN HFA) 108 (90 BASE) MCG/ACT Inhaler More than a month at Unknown time  Yes No   Sig: Inhale 1 puff into the lungs   albuterol 90 MCG/ACT inhaler More than a month at Unknown time Self Yes No   Sig: Inhale 2 puffs into the lungs every 6 hours as needed    cloNIDine (CATAPRES) 0.2 MG tablet 2017 at Unknown time  No Yes   Sig: TAKE 2 TABLETS(0.4 MG) BY MOUTH EVERY EVENING   diphenhydrAMINE (BENADRYL ALLERGY) 25 MG tablet Past Week at Unknown time  No Yes   Sig: Take 1 tablet (25 mg) by mouth every 6 hours as needed for itching or other (Nausea)   ipratropium (ATROVENT) 0.02 % neb solution More than a month at Unknown time  Yes No   Sig: Inhale 0.5 mg into the lungs   mirtazapine (REMERON SOL-TAB) 15 MG ODT tab 2017  No No   Si.5 tablets (7.5 mg) by Orally disintegrating tablet route At Bedtime   nortriptyline (PAMELOR) 10 MG capsule 2017  No No   Sig: Take 3-4 capsules (30-40 mg) by mouth At Bedtime    ondansetron (ZOFRAN ODT) 4 MG ODT tab 7/25/2017  No No   Sig: Take 1-2 tablets (4-8 mg) by mouth every 6 hours as needed for nausea   rivaroxaban ANTICOAGULANT (XARELTO) 20 MG TABS tablet More than a month at Unknown time  No No   Sig: Take 1 tablet (20 mg) by mouth daily (with dinner)   traZODone (DESYREL) 50 MG tablet 7/23/2017  No No   Sig: Take 1-2 tablets ( mg) by mouth nightly as needed 1-2 tabs at bedtime PRN      Facility-Administered Medications: None     Allergies   Allergies   Allergen Reactions     Hyoscyamine Rash     Metoclopramide Other (See Comments)     Eye twitching.      Peaches [Peach] Other (See Comments)     Raw. Cooked OK     Sucralose Other (See Comments)     All artificial sweeteners. Aspartame also. Swollen glands     Advair Diskus Other (See Comments)     Throat burns     Azithromycin Other (See Comments)     Burning in throat     Compazine [Prochlorperazine] Visual Disturbance     Contrast Dye Itching     States is allergic to CT contrast dye     Cyclobenzaprine Visual Disturbance     Fentanyl Other (See Comments)     migraine     Ibuprofen GI Disturbance     Lactulose Nausea and Vomiting     Gas and bloating     Levaquin [Levofloxacin] Swelling     Per ED M.D. And RN      Morphine Sulfate Other (See Comments)     Chest pain       Oxycodone Other (See Comments)     Burning throat, but can take Norco.      Penicillins Other (See Comments)     Family hx of resp arrest, she has never taken  Ok with cephalosporins     Rizatriptan Visual Disturbance     Droperidol Hives and Rash     Isovue [Iopamidol] Palpitations     Pt had racing heart and sob      Ketorolac Anxiety     Latex Swelling and Rash     Kiwi, likely also avacado, ? banana     Levsin Rash       Physical Exam   Vital Signs: Temp: 98.2  F (36.8  C) Temp src: Oral     Heart Rate: 79   SpO2: 98 % O2 Device: None (Room air)    Weight: 0 lbs 0 oz    General: NAD  HEENT: PERRLA, no scleral icterus,   Cardiac: Regular rhythm, no  murmurs, no rubs, no S3 or S4. No JVD.  Pulm: Clear to auscultation bilaterally, no crackles or wheezes  Abd: Distended, mild tenderness to palpation, no rebound or guarding, decreased BS, G tube site without drainage, catheter in place  : No  suprapubic tenderness  MSK: Normal bulk and tone  Skin: No rash or bruises appreciated  Neuro: AAO x 3, no gross deficits  Extremities: Warm, not diaphoretic, no LE edema    Data   Data reviewed today: I reviewed all medications, new labs and imaging results over the last 24 hours. I personally reviewed     Data     Recent Labs  Lab 07/25/17  1940 07/20/17  0819   WBC 12.1*  --    HGB 10.8*  --    MCV 79  --      --    INR  --  1.02     --    POTASSIUM 3.9  --    CHLORIDE 107  --    CO2 24  --    BUN 6*  --    CR 0.97  --    ANIONGAP 10  --    TARA 9.1  --    GLC 86  --    ALBUMIN 3.4  --    PROTTOTAL 7.9  --    BILITOTAL 0.6  --    ALKPHOS 152*  --    ALT 26  --    AST 17  --    LIPASE 111  --

## 2017-07-26 NOTE — DISCHARGE SUMMARY
[facility] [place]    Internal Medicine Discharge Summary- Gold Service    Date of Admission:  7/25/2017  Date of Discharge:  7/26/2017  Discharging Provider: Sultana Oliver  Discharge Team: Gold 4    Discharge Diagnoses   G tube dislodgement s/p IR gastrostomy tube change  Acute on chronic abdominal pain  History of slow transit constipation   s/p partial colectomy  Hx of DVT on xarelto  Anxiety  Insomnia  Recent IR port placement on 7/20    Follow-ups Needed After Discharge   Primary care physician and GI for chronic medical issues    Hospital Course   Doreen Peralta is a 37 year old female with a history of slow transit constipation, s/p partial colectomy, G tube placement for intermittent venting, acute on chronic abdominal pain and hx of DVT on xarelto but not compliant who was admitted to observation on 7/25/2017 for abdominal pain that worsened after she noted G tube dislodgement. The following problems were addressed during her hospitalization:    G tube dislodgement s/p IR gastrostomy tube change with Acute on chronic abdominal pain: she noted gradually worsening distention and abdominal pain/fullness after she noted G tube dislodgement. Abdominal XR did not reveal any sign of bowel obstruction. IR was consulted for new G tube placement via the old tract. This was done without any immediate complication. On discharge, she is tolerating oral intake and her pain is controlled on oral dilauded prn. She was advised to advance diet slowly as tolerated. She was advised to limit use of narcotics given history of recurrent obstruction and slow transit.    Hx of DVT on xarelto: she indicated that she has not been taking xarelto for about 1 month due to non compliance. She was instructed to continue as directed by her physician. There was no finding concerning for DVT this hospitalization.    Anxiety/Insomnia: she was kept on home meds including cymbalta, nortriptyline and  mirtazapine.    Recent IR chest port placement on 7/20: She has been followed closely as outpatient for recurrent obstruction and chronic abdominal pain. Port was placed for stable chronic venous access for outpatient use for weekly IVF and MVI replacements.    Consultations This Hospital Stay   Interventional radiology for G tube change    Code Status   Full Code    Time Spent on this Encounter   I, Sultana Oliver, personally saw the patient today and spent greater than 30 minutes discharging this patient.       Sultana Oliver  Internal Medicine Staff Hospitalist Service  [facility]  Pager: [phone]  ______________________________________________________________________    Physical Exam   Vital Signs: Temp: 98.9  F (37.2  C) Temp src: Oral BP: 132/79 Pulse: 81 Heart Rate: 86 Resp: 14 SpO2: 97 % O2 Device: None (Room air) Oxygen Delivery: 2 LPM  Weight: 0 lbs 0 oz    General Appearance: Alert, appears uncomfortable due to abdominal distention and pain  Respiratory: clear anteriorly  Cardiovascular: RRR  GI: soft, distended, BS present  Skin: no rash, new G tube in place    Significant Results and Procedures   Most Recent 3 CBC's:  Recent Labs   Lab Test  07/26/17   0654  07/25/17   1940  06/30/17   1235   WBC  7.8  12.1*  8.2   HGB  9.3*  10.8*  11.8   MCV  81  79  79   PLT  274  369  549*   Most Recent 3 BMP's:  Recent Labs   Lab Test  07/26/17   0654  07/25/17   1940  06/30/17   1235   NA  139  141  139   POTASSIUM  3.2*  3.9  4.2   CHLORIDE  106  107  105   CO2  24  24  24   BUN  7  6*  10   CR  0.99  0.97  0.93   ANIONGAP  9  10  10   TARA  8.2*  9.1  9.2   GLC  120*  86  103*       ,   Results for orders placed or performed during the hospital encounter of 07/25/17   XR Abdomen 2 Views    Narrative    EXAM: XR ABDOMEN 2 VW  7/26/2017 4:55 AM     HISTORY:  Abdominal pain, evaluate for obstruction       COMPARISON: 5/22/2017    FINDINGS: Interval removal of the percutaneous gastrostomy  tube.  Cholecystectomy clips over the right upper quadrant. [facility] sutures  over the left splenic flexure. Borderline distended left-sided colon  with nonobstructive small bowel gas pattern.    A metallic device is projected over the left pelvis on 2 views but is  moved on 2 additional views. The exact etiology of the metallic device  projecting over the left pelvis is uncertain?      Impression    IMPRESSION:   1. Borderline distended left-sided colon with nonobstructive small  bowel gas pattern.  2. Presumed metal device external to the patient projected over left  hemipelvis.    I have personally reviewed the examination and initial interpretation  and I agree with the findings.    MOO SIMPSON MD   IR Gastrostomy Tube Change    Narrative    PRE-PROCEDURE DIAGNOSIS: Gastrostomy in place; gastrostomy tube  dislodged    POST-PROCEDURE DIAGNOSIS: Same    PROCEDURE: Gastrostomy tube exchange    Impression    IMPRESSION: Completed fluoroscopy-guided over wire exchange of  gastrostomy tube. New 16 Irish gastrostomy tube ready for immediate  use.    PLAN: Patient should return in 9-12 months for routine exchange or  sooner if necessary.     ----------    CLINICAL HISTORY: Chronic pancreatitis leading to gastrostomy tube  placement for nutrition. Gastrojejunostomy tube change to gastrostomy  tube due to intolerance on 4/3/2017. Existing gastrostomy tube balloon  ruptured and gastrostomy became dislodged prompting a visit to the  emergency room. A Maya catheter was placed in the tract and exchange  has been requested. Patient with significant pain during past  exchanges, so she requires sedation.    PERFORMED BY: Wilfredo Rocha PA-C    ASSIST: RT Blanca(R)    CONSENT: Written informed consent was obtained and is documented in  the patient record.    SEDATION CONSENT: It was explained to the patient that medications  used for sedation and pain relief may be administered, if needed, to  reduce anxiety and  discomfort that may be associated with the  procedure. Serious side effects from the medications are rare but may  include prolonged drowsiness and difficulty breathing, possibly  requiring placement of a breathing tube to ventilate the lungs.    MEDICATIONS: Intravenous sedation was administered with 2 mg midazolam  and 1 mg hydromorphone. Topical lidocaine gel     NURSING: The patient was placed on continuous vital signs monitoring.  Vital signs were monitored by nursing staff under IR staff  supervision.     SEDATION TIME: 15 minutes face-to-face    DESCRIPTION: The patient's upper quadrant and existing tube were  prepped and draped in the usual sterile fashion. Lidocaine gel was  used to anesthetize the tube tract. Maya catheter was transected to  deflate the balloon. Contrast injection confirmed adequate position in  the stomach. [employer] wire placed through the Maya and into the  stomach and Maya catheter was exchanged over wire under fluoroscopic  guidance for new 16 Malian gastrostomy tube. Retention balloon filled  with 5 mL and disc snugged around 4 cm laura.    COMPLICATIONS: No immediate concerns, the patient remained stable  throughout the procedure and tolerated it well.    ESTIMATED BLOOD LOSS: None    SPECIMENS: None    TAVARES MURRELL PA-C     *Note: Due to a large number of results and/or encounters for the requested time period, some results have not been displayed. A complete set of results can be found in Results Review.       Pending Results     Unresulted Labs Ordered in the Past 30 Days of this Admission     No orders found for last 61 day(s).             Primary Care Physician   Larisa Lorenzo    Discharge Disposition   Discharged to home  Condition at discharge: Stable    Discharge Orders     Discharge Instructions   If questions or problems arise regarding tube function (e.g. leaking, dislodges, etc.) Contact Interventional Radiology department 24 hours a day.    For procedures that  were done at the [facility] sites,   8:00-4:30 PM Monday through Friday    Contact:[phone].    For afterhours and weekends call the [facility] main phone line [phone] and ask for the [facility] IR on call physician number.    If DIRECTED by the RADIOLOGIST, related to specific problems with the tube functioning,  go to the Emergency Department.     Reason for your hospital stay   You were admitted after you noted your G tube was dislodged and you started having abdominal bloating and pain. There was no sign of bowel obstruction, and G tube was replaced by IR. You are still having abdominal distention, but not vomitting. G tube is draining small amount. You are stable to leave the hospital as you are tolerating clears.     Follow Up and recommended labs and tests   Follow up with primary care provider, Larisa Lorenzo, and GI provider as needed. Contact your outpatient provider or return to ED for worsening abdominal pain, unable to tolerate po.     Activity   Your activity upon discharge: activity as tolerated     When to contact your care team   Call your primary doctor if you have any of the following: increased swelling, abdominal pain, unable to tolerate po.     Tubes and drains   You are going home with the following tubes or drains: G tube care as prior to hospitalization     Full Code     Diet   Follow this diet upon discharge: Orders Placed This Encounter     Regular Diet Adult advance slowly as tolerated       Discharge Medications   Current Discharge Medication List      START taking these medications    Details   HYDROmorphone (DILAUDID) 2 MG tablet Take 1 tablet (2 mg) by mouth every 3 hours as needed for moderate to severe pain  Qty: 20 tablet, Refills: 0    Associated Diagnoses: Gastroparesis; Malfunction of gastrostomy tube (H)         CONTINUE these medications which have NOT CHANGED    Details   albuterol (2.5 MG/3ML) 0.083% neb solution Inhale 2.5 mg into the lungs     Associated Diagnoses: Gastrostomy tube dependent (H); SBO (small bowel obstruction) (H); Chronic abdominal pain; Chronic diarrhea      cloNIDine (CATAPRES) 0.2 MG tablet TAKE 2 TABLETS(0.4 MG) BY MOUTH EVERY EVENING  Qty: 180 tablet, Refills: 0    Associated Diagnoses: Anxiety      diphenhydrAMINE (BENADRYL ALLERGY) 25 MG tablet Take 1 tablet (25 mg) by mouth every 6 hours as needed for itching or other (Nausea)  Qty: 30 tablet, Refills: 0      ACETAMINOPHEN PO Take 500-1,000 mg by mouth every 6 hours as needed for pain       HYDROcodone-acetaminophen (NORCO) 5-325 MG per tablet Take 1-2 tablets by mouth every 4 hours as needed for moderate to severe pain  Qty: 10 tablet, Refills: 0    Associated Diagnoses: Port catheter in place      albuterol (PROAIR HFA/PROVENTIL HFA/VENTOLIN HFA) 108 (90 BASE) MCG/ACT Inhaler Inhale 1 puff into the lungs    Associated Diagnoses: Gastrostomy tube dependent (H); SBO (small bowel obstruction) (H); Chronic abdominal pain; Chronic diarrhea      ipratropium (ATROVENT) 0.02 % neb solution Inhale 0.5 mg into the lungs    Associated Diagnoses: Gastrostomy tube dependent (H); SBO (small bowel obstruction) (H); Chronic abdominal pain; Chronic diarrhea      ondansetron (ZOFRAN ODT) 4 MG ODT tab Take 1-2 tablets (4-8 mg) by mouth every 6 hours as needed for nausea  Qty: 20 tablet, Refills: 0    Associated Diagnoses: Intractable vomiting, presence of nausea not specified, unspecified vomiting type; Gastroparesis      traZODone (DESYREL) 50 MG tablet Take 1-2 tablets ( mg) by mouth nightly as needed 1-2 tabs at bedtime PRN  Qty: 180 tablet, Refills: 1    Associated Diagnoses: Insomnia, unspecified type      nortriptyline (PAMELOR) 10 MG capsule Take 3-4 capsules (30-40 mg) by mouth At Bedtime  Qty: 120 capsule, Refills: 5    Associated Diagnoses: Neuropathic pain; Primary insomnia      mirtazapine (REMERON SOL-TAB) 15 MG ODT tab 0.5 tablets (7.5 mg) by Orally disintegrating tablet route  At Bedtime  Qty: 45 tablet, Refills: 0    Associated Diagnoses: Anxiety      SUMAtriptan (IMITREX) 50 MG tablet Take 1-2 tablets ( mg) by mouth at onset of headache for migraine - may repeat dose after 2h if headache recurs.  Max: 200mg/24 hours  Qty: 18 tablet, Refills: 1    Comments: Due for appointment for further refills, please give reminder.  Associated Diagnoses: Migraine without aura and without status migrainosus, not intractable      rivaroxaban ANTICOAGULANT (XARELTO) 20 MG TABS tablet Take 1 tablet (20 mg) by mouth daily (with dinner)  Qty: 90 tablet, Refills: 1    Associated Diagnoses: Deep vein thrombosis (DVT) of upper extremity, unspecified chronicity, unspecified laterality, unspecified vein (H)      DULoxetine (CYMBALTA) 60 MG capsule Take 1 capsule (60 mg) by mouth daily  Qty: 90 capsule, Refills: 3    Associated Diagnoses: Abdominal pain, epigastric; Abdominal migraine, not intractable      albuterol 90 MCG/ACT inhaler Inhale 2 puffs into the lungs every 6 hours as needed            Allergies   Allergies   Allergen Reactions     Hyoscyamine Rash     Metoclopramide Other (See Comments)     Eye twitching.      Peaches [Peach] Other (See Comments)     Raw. Cooked OK     Sucralose Other (See Comments)     All artificial sweeteners. Aspartame also. Swollen glands     Advair Diskus Other (See Comments)     Throat burns     Azithromycin Other (See Comments)     Burning in throat     Compazine [Prochlorperazine] Visual Disturbance     Contrast Dye Itching     States is allergic to CT contrast dye     Cyclobenzaprine Visual Disturbance     Fentanyl Other (See Comments)     migraine     Ibuprofen GI Disturbance     Lactulose Nausea and Vomiting     Gas and bloating     Levaquin [Levofloxacin] Swelling     Per ED M.D. And RN      Morphine Sulfate Other (See Comments)     Chest pain       Oxycodone Other (See Comments)     Burning throat, but can take Norco.      Penicillins Other (See Comments)      Family hx of resp arrest, she has never taken  Ok with cephalosporins     Rizatriptan Visual Disturbance     Droperidol Hives and Rash     Isovue [Iopamidol] Palpitations     Pt had racing heart and sob      Ketorolac Anxiety     Latex Swelling and Rash     Kiwi, likely also avacado, ? banana     Levsin Rash

## 2017-07-26 NOTE — PLAN OF CARE
Problem: Discharge Planning  Goal: Discharge Planning (Adult, OB, Behavioral, Peds)  -diagnostic tests and consults completed and resulted : No  -tolerating oral intake to maintain hydration : No, NPO  -adequate pain control on oral analgesics: No     Pt a/o x 4; VSS; pt reports abdominal pain and a change in oral medications. Pt ambulated in the hallways without problems.     Waiting for IR for g-tube placement.

## 2017-07-26 NOTE — PROGRESS NOTES
DC instructions given to pt, verbalized understanding.  All belongings with pt, IV DC'd and documented. Pt walked off the unit.

## 2017-07-26 NOTE — PLAN OF CARE
Problem: Discharge Planning  Goal: Discharge Planning (Adult, OB, Behavioral, Peds)  Diagnostic tests and consults completed and resulted: No  Tolerating oral intake to maintain hydration: No, patient is NPO/ice chips   Adequate pain control on oral analgesics: No

## 2017-07-26 NOTE — PROCEDURES
Interventional Radiology Brief Post Procedure Note    Procedure: IR GASTROSTOMY TUBE CHANGE    Proceduralist: Wilfredo Rocha PA-C    Assistant: RT Blanca(R)    Time Out: Prior to the start of the procedure and with procedural staff participation, I verbally confirmed the patient s identity using two indicators, relevant allergies, that the procedure was appropriate and matched the consent or emergent situation, and that the correct equipment/implants were available. Immediately prior to starting the procedure I conducted the Time Out with the procedural staff and re-confirmed the patient s name, procedure, and site/side. (The Joint Commission universal protocol was followed.)  Yes    Sedation: IR Nurse Monitored Care   Post Procedure Summary:  Prior to the start of the procedure and with procedural staff participation, I verbally confirmed the patient s identity using two indicators, relevant allergies, that the procedure was appropriate and matched the consent or emergent situation, and that the correct equipment/implants were available. Immediately prior to starting the procedure I conducted the Time Out with the procedural staff and re-confirmed the patient s name, procedure, and site/side. (The Joint Commission universal protocol was followed.)  Yes       Sedatives: Midazolam (Versed) and hydromorphone    Vital signs, airway and pulse oximetry were monitored and remained stable throughout the procedure and sedation was maintained until the procedure was complete.  The patient was monitored by staff until sedation discharge criteria were met.    Patient tolerance: Patient tolerated the procedure well with no immediate complications.    Time of sedation in minutes: 15 minutes from beginning to end of physician one to one monitoring.    Findings: Completed fluoroscopy-guided over wire exchange of 16 Czech gastrostomy tube. Tube is ready for immediate use.    Estimated Blood Loss: None    Fluoroscopy Time: 0.7  minute(s)    SPECIMENS: None    Complications: 1. None     Condition: Stable    Plan: Return in 9-12 months for routine exchange, or sooner if necessary.    Comments: See dictated procedure note for full details.    Wilfredo Rocha PA-C

## 2017-07-26 NOTE — CONSULTS
Patient is on IR schedule 7/26/2017 for a gastrostomy tube replacement.   Labs WNL for procedure.   Pt should remain NPO. She requests sedation for these procedures.  Consent will be done prior to procedure.     Please contact the IR [place] RN at [phone] for estimated time of procedure.     Case discussed with Dr. Styles from IR and Dr. Oliver.    Velma Cheek, RED, APRN  Interventional Radiology  Phone: [phone]  Pager: [phone]

## 2017-07-26 NOTE — PROVIDER NOTIFICATION
Medicine Gold text paged Re: Patient reports that she is still having abdominal pain after receiving 2 tabs of Norco at 0154. Patient has been up and walking around the unit.

## 2017-07-27 ENCOUNTER — APPOINTMENT (OUTPATIENT)
Dept: GENERAL RADIOLOGY | Facility: CLINIC | Age: 37
DRG: 314 | End: 2017-07-27
Attending: FAMILY MEDICINE
Payer: COMMERCIAL

## 2017-07-27 ENCOUNTER — HOSPITAL ENCOUNTER (INPATIENT)
Facility: CLINIC | Age: 37
LOS: 3 days | Discharge: HOME | DRG: 314 | End: 2017-07-30
Attending: FAMILY MEDICINE | Admitting: INTERNAL MEDICINE
Payer: COMMERCIAL

## 2017-07-27 DIAGNOSIS — G89.29 CHRONIC ABDOMINAL PAIN: ICD-10-CM

## 2017-07-27 DIAGNOSIS — Z45.2 ENCOUNTER FOR CARE RELATED TO VASCULAR ACCESS PORT: ICD-10-CM

## 2017-07-27 DIAGNOSIS — R50.9 FEVER, UNSPECIFIED: ICD-10-CM

## 2017-07-27 DIAGNOSIS — F68.10 MUNCHAUSEN SYNDROME: ICD-10-CM

## 2017-07-27 DIAGNOSIS — Z93.4 JEJUNOSTOMY TUBE PRESENT (H): ICD-10-CM

## 2017-07-27 DIAGNOSIS — K52.9 CHRONIC DIARRHEA: ICD-10-CM

## 2017-07-27 DIAGNOSIS — R78.81 POSITIVE BLOOD CULTURE: ICD-10-CM

## 2017-07-27 DIAGNOSIS — K56.609 SBO (SMALL BOWEL OBSTRUCTION) (H): ICD-10-CM

## 2017-07-27 DIAGNOSIS — Z93.1 GASTROSTOMY TUBE DEPENDENT (H): ICD-10-CM

## 2017-07-27 DIAGNOSIS — R11.10 INTRACTABLE VOMITING, PRESENCE OF NAUSEA NOT SPECIFIED, UNSPECIFIED VOMITING TYPE: ICD-10-CM

## 2017-07-27 DIAGNOSIS — Z43.1 ATTENTION TO G-TUBE (H): ICD-10-CM

## 2017-07-27 DIAGNOSIS — K31.84 GASTROPARESIS: ICD-10-CM

## 2017-07-27 DIAGNOSIS — R10.9 CHRONIC ABDOMINAL PAIN: ICD-10-CM

## 2017-07-27 DIAGNOSIS — A41.59 SEPSIS DUE TO KLEBSIELLA (H): Primary | ICD-10-CM

## 2017-07-27 DIAGNOSIS — K94.23 MALFUNCTION OF GASTROSTOMY TUBE (H): ICD-10-CM

## 2017-07-27 DIAGNOSIS — R10.84 ABDOMINAL PAIN, GENERALIZED: ICD-10-CM

## 2017-07-27 DIAGNOSIS — Z90.49 S/P PARTIAL RESECTION OF COLON: ICD-10-CM

## 2017-07-27 DIAGNOSIS — F41.9 ANXIETY: ICD-10-CM

## 2017-07-27 DIAGNOSIS — R68.83 CHILLS: ICD-10-CM

## 2017-07-27 PROBLEM — A41.9 SEPSIS (H): Status: ACTIVE | Noted: 2017-07-27

## 2017-07-27 LAB
ALBUMIN SERPL-MCNC: 3.2 G/DL (ref 3.4–5)
ALBUMIN UR-MCNC: NEGATIVE MG/DL
ALP SERPL-CCNC: 158 U/L (ref 40–150)
ALT SERPL W P-5'-P-CCNC: 33 U/L (ref 0–50)
ANION GAP SERPL CALCULATED.3IONS-SCNC: 7 MMOL/L (ref 3–14)
APPEARANCE UR: CLEAR
AST SERPL W P-5'-P-CCNC: 27 U/L (ref 0–45)
BASE EXCESS BLDV CALC-SCNC: 3 MMOL/L
BASOPHILS # BLD AUTO: 0 10E9/L (ref 0–0.2)
BASOPHILS NFR BLD AUTO: 0.3 %
BILIRUB SERPL-MCNC: 1.2 MG/DL (ref 0.2–1.3)
BILIRUB UR QL STRIP: NEGATIVE
BUN SERPL-MCNC: 4 MG/DL (ref 7–30)
CALCIUM SERPL-MCNC: 8.9 MG/DL (ref 8.5–10.1)
CHLORIDE SERPL-SCNC: 104 MMOL/L (ref 94–109)
CO2 SERPL-SCNC: 27 MMOL/L (ref 20–32)
COLOR UR AUTO: ABNORMAL
CREAT SERPL-MCNC: 0.9 MG/DL (ref 0.52–1.04)
CRP SERPL-MCNC: 88.8 MG/L (ref 0–8)
DIFFERENTIAL METHOD BLD: ABNORMAL
EOSINOPHIL # BLD AUTO: 0 10E9/L (ref 0–0.7)
EOSINOPHIL NFR BLD AUTO: 0.5 %
ERYTHROCYTE [DISTWIDTH] IN BLOOD BY AUTOMATED COUNT: 18.1 % (ref 10–15)
GFR SERPL CREATININE-BSD FRML MDRD: 70 ML/MIN/1.7M2
GLUCOSE SERPL-MCNC: 113 MG/DL (ref 70–99)
GLUCOSE UR STRIP-MCNC: NEGATIVE MG/DL
HCO3 BLDV-SCNC: 28 MMOL/L (ref 21–28)
HCT VFR BLD AUTO: 32.8 % (ref 35–47)
HGB BLD-MCNC: 10.2 G/DL (ref 11.7–15.7)
HGB UR QL STRIP: NEGATIVE
IMM GRANULOCYTES # BLD: 0 10E9/L (ref 0–0.4)
IMM GRANULOCYTES NFR BLD: 0.3 %
KETONES UR STRIP-MCNC: NEGATIVE MG/DL
LACTATE BLD-SCNC: 0.9 MMOL/L (ref 0.7–2.1)
LEUKOCYTE ESTERASE UR QL STRIP: NEGATIVE
LYMPHOCYTES # BLD AUTO: 0.4 10E9/L (ref 0.8–5.3)
LYMPHOCYTES NFR BLD AUTO: 5 %
MCH RBC QN AUTO: 24.7 PG (ref 26.5–33)
MCHC RBC AUTO-ENTMCNC: 31.1 G/DL (ref 31.5–36.5)
MCV RBC AUTO: 79 FL (ref 78–100)
MONOCYTES # BLD AUTO: 0.5 10E9/L (ref 0–1.3)
MONOCYTES NFR BLD AUTO: 6.3 %
NEUTROPHILS # BLD AUTO: 6.9 10E9/L (ref 1.6–8.3)
NEUTROPHILS NFR BLD AUTO: 87.6 %
NITRATE UR QL: NEGATIVE
O2/TOTAL GAS SETTING VFR VENT: 21 %
PCO2 BLDV: 41 MM HG (ref 40–50)
PH BLDV: 7.44 PH (ref 7.32–7.43)
PH UR STRIP: 7 PH (ref 5–7)
PLATELET # BLD AUTO: 265 10E9/L (ref 150–450)
PO2 BLDV: 33 MM HG (ref 25–47)
POTASSIUM SERPL-SCNC: 3.6 MMOL/L (ref 3.4–5.3)
PROT SERPL-MCNC: 7.7 G/DL (ref 6.8–8.8)
RBC # BLD AUTO: 4.13 10E12/L (ref 3.8–5.2)
RBC #/AREA URNS AUTO: 0 /HPF (ref 0–2)
SODIUM SERPL-SCNC: 138 MMOL/L (ref 133–144)
SP GR UR STRIP: 1 (ref 1–1.03)
SQUAMOUS #/AREA URNS AUTO: <1 /HPF (ref 0–1)
URN SPEC COLLECT METH UR: ABNORMAL
UROBILINOGEN UR STRIP-MCNC: 0 MG/DL (ref 0–2)
WBC # BLD AUTO: 7.8 10E9/L (ref 4–11)
WBC #/AREA URNS AUTO: <1 /HPF (ref 0–2)

## 2017-07-27 PROCEDURE — 87086 URINE CULTURE/COLONY COUNT: CPT | Performed by: FAMILY MEDICINE

## 2017-07-27 PROCEDURE — 96366 THER/PROPH/DIAG IV INF ADDON: CPT | Performed by: FAMILY MEDICINE

## 2017-07-27 PROCEDURE — 96376 TX/PRO/DX INJ SAME DRUG ADON: CPT | Performed by: FAMILY MEDICINE

## 2017-07-27 PROCEDURE — 85025 COMPLETE CBC W/AUTO DIFF WBC: CPT | Performed by: FAMILY MEDICINE

## 2017-07-27 PROCEDURE — 86140 C-REACTIVE PROTEIN: CPT | Performed by: INTERNAL MEDICINE

## 2017-07-27 PROCEDURE — 84145 PROCALCITONIN (PCT): CPT | Performed by: INTERNAL MEDICINE

## 2017-07-27 PROCEDURE — 99285 EMERGENCY DEPT VISIT HI MDM: CPT | Mod: Z6 | Performed by: FAMILY MEDICINE

## 2017-07-27 PROCEDURE — 99222 1ST HOSP IP/OBS MODERATE 55: CPT | Mod: AI | Performed by: INTERNAL MEDICINE

## 2017-07-27 PROCEDURE — 83605 ASSAY OF LACTIC ACID: CPT | Performed by: FAMILY MEDICINE

## 2017-07-27 PROCEDURE — 96368 THER/DIAG CONCURRENT INF: CPT | Performed by: FAMILY MEDICINE

## 2017-07-27 PROCEDURE — 96365 THER/PROPH/DIAG IV INF INIT: CPT | Performed by: FAMILY MEDICINE

## 2017-07-27 PROCEDURE — 82803 BLOOD GASES ANY COMBINATION: CPT | Performed by: FAMILY MEDICINE

## 2017-07-27 PROCEDURE — 87040 BLOOD CULTURE FOR BACTERIA: CPT | Performed by: FAMILY MEDICINE

## 2017-07-27 PROCEDURE — 81001 URINALYSIS AUTO W/SCOPE: CPT | Performed by: FAMILY MEDICINE

## 2017-07-27 PROCEDURE — 74020 XR ABDOMEN PORT 2 VW: CPT | Mod: TC

## 2017-07-27 PROCEDURE — 36415 COLL VENOUS BLD VENIPUNCTURE: CPT | Performed by: FAMILY MEDICINE

## 2017-07-27 PROCEDURE — 96375 TX/PRO/DX INJ NEW DRUG ADDON: CPT | Performed by: FAMILY MEDICINE

## 2017-07-27 PROCEDURE — 80053 COMPREHEN METABOLIC PANEL: CPT | Performed by: FAMILY MEDICINE

## 2017-07-27 PROCEDURE — 99285 EMERGENCY DEPT VISIT HI MDM: CPT | Mod: 25 | Performed by: FAMILY MEDICINE

## 2017-07-27 PROCEDURE — 71010 XR CHEST PORT 1 VW: CPT | Mod: TC

## 2017-07-27 PROCEDURE — 87186 SC STD MICRODIL/AGAR DIL: CPT | Performed by: FAMILY MEDICINE

## 2017-07-27 PROCEDURE — 12000000 ZZH R&B MED SURG/OB

## 2017-07-27 PROCEDURE — 25000128 H RX IP 250 OP 636: Performed by: FAMILY MEDICINE

## 2017-07-27 PROCEDURE — 87077 CULTURE AEROBIC IDENTIFY: CPT | Performed by: FAMILY MEDICINE

## 2017-07-27 RX ORDER — ALBUTEROL SULFATE 0.83 MG/ML
2.5 SOLUTION RESPIRATORY (INHALATION) EVERY 4 HOURS PRN
Status: DISCONTINUED | OUTPATIENT
Start: 2017-07-27 | End: 2017-07-30 | Stop reason: HOSPADM

## 2017-07-27 RX ORDER — DULOXETIN HYDROCHLORIDE 30 MG/1
60 CAPSULE, DELAYED RELEASE ORAL DAILY
Status: DISCONTINUED | OUTPATIENT
Start: 2017-07-28 | End: 2017-07-30 | Stop reason: HOSPADM

## 2017-07-27 RX ORDER — DIPHENHYDRAMINE HCL 25 MG
25 CAPSULE ORAL EVERY 6 HOURS PRN
Status: DISCONTINUED | OUTPATIENT
Start: 2017-07-27 | End: 2017-07-30 | Stop reason: HOSPADM

## 2017-07-27 RX ORDER — ONDANSETRON 2 MG/ML
4 INJECTION INTRAMUSCULAR; INTRAVENOUS EVERY 30 MIN PRN
Status: DISCONTINUED | OUTPATIENT
Start: 2017-07-27 | End: 2017-07-27

## 2017-07-27 RX ORDER — ACETAMINOPHEN 325 MG/1
650 TABLET ORAL EVERY 4 HOURS PRN
Status: DISCONTINUED | OUTPATIENT
Start: 2017-07-27 | End: 2017-07-30 | Stop reason: HOSPADM

## 2017-07-27 RX ORDER — LIDOCAINE 40 MG/G
CREAM TOPICAL
Status: DISCONTINUED | OUTPATIENT
Start: 2017-07-27 | End: 2017-07-27

## 2017-07-27 RX ORDER — SODIUM CHLORIDE, SODIUM LACTATE, POTASSIUM CHLORIDE, CALCIUM CHLORIDE 600; 310; 30; 20 MG/100ML; MG/100ML; MG/100ML; MG/100ML
INJECTION, SOLUTION INTRAVENOUS CONTINUOUS
Status: DISCONTINUED | OUTPATIENT
Start: 2017-07-27 | End: 2017-07-27

## 2017-07-27 RX ORDER — ONDANSETRON 4 MG/1
4 TABLET, ORALLY DISINTEGRATING ORAL EVERY 6 HOURS PRN
Status: DISCONTINUED | OUTPATIENT
Start: 2017-07-27 | End: 2017-07-30 | Stop reason: HOSPADM

## 2017-07-27 RX ORDER — ONDANSETRON 2 MG/ML
4 INJECTION INTRAMUSCULAR; INTRAVENOUS EVERY 6 HOURS PRN
Status: DISCONTINUED | OUTPATIENT
Start: 2017-07-27 | End: 2017-07-30 | Stop reason: HOSPADM

## 2017-07-27 RX ORDER — NALOXONE HYDROCHLORIDE 0.4 MG/ML
.1-.4 INJECTION, SOLUTION INTRAMUSCULAR; INTRAVENOUS; SUBCUTANEOUS
Status: DISCONTINUED | OUTPATIENT
Start: 2017-07-27 | End: 2017-07-30 | Stop reason: HOSPADM

## 2017-07-27 RX ORDER — MEROPENEM 1 G/1
1 INJECTION, POWDER, FOR SOLUTION INTRAVENOUS ONCE
Status: COMPLETED | OUTPATIENT
Start: 2017-07-27 | End: 2017-07-27

## 2017-07-27 RX ORDER — HYDROMORPHONE HYDROCHLORIDE 1 MG/ML
0.5 INJECTION, SOLUTION INTRAMUSCULAR; INTRAVENOUS; SUBCUTANEOUS
Status: COMPLETED | OUTPATIENT
Start: 2017-07-27 | End: 2017-07-27

## 2017-07-27 RX ORDER — MIRTAZAPINE 7.5 MG/1
7.5 TABLET, FILM COATED ORAL AT BEDTIME
Status: DISCONTINUED | OUTPATIENT
Start: 2017-07-27 | End: 2017-07-30 | Stop reason: HOSPADM

## 2017-07-27 RX ORDER — HYDROMORPHONE HYDROCHLORIDE 2 MG/1
2 TABLET ORAL
Status: DISCONTINUED | OUTPATIENT
Start: 2017-07-27 | End: 2017-07-30 | Stop reason: HOSPADM

## 2017-07-27 RX ORDER — MEROPENEM 1 G/1
1 INJECTION, POWDER, FOR SOLUTION INTRAVENOUS EVERY 8 HOURS
Status: DISCONTINUED | OUTPATIENT
Start: 2017-07-28 | End: 2017-07-30

## 2017-07-27 RX ADMIN — SODIUM CHLORIDE, POTASSIUM CHLORIDE, SODIUM LACTATE AND CALCIUM CHLORIDE 2859 ML: 600; 310; 30; 20 INJECTION, SOLUTION INTRAVENOUS at 20:26

## 2017-07-27 RX ADMIN — MEROPENEM 1 G: 1 INJECTION, POWDER, FOR SOLUTION INTRAVENOUS at 21:42

## 2017-07-27 RX ADMIN — SODIUM CHLORIDE, POTASSIUM CHLORIDE, SODIUM LACTATE AND CALCIUM CHLORIDE: 600; 310; 30; 20 INJECTION, SOLUTION INTRAVENOUS at 22:19

## 2017-07-27 RX ADMIN — VANCOMYCIN HYDROCHLORIDE 2000 MG: 10 INJECTION, POWDER, LYOPHILIZED, FOR SOLUTION INTRAVENOUS at 20:52

## 2017-07-27 RX ADMIN — HYDROMORPHONE HYDROCHLORIDE 0.5 MG: 1 INJECTION, SOLUTION INTRAMUSCULAR; INTRAVENOUS; SUBCUTANEOUS at 22:04

## 2017-07-27 RX ADMIN — HYDROMORPHONE HYDROCHLORIDE 0.5 MG: 1 INJECTION, SOLUTION INTRAMUSCULAR; INTRAVENOUS; SUBCUTANEOUS at 20:25

## 2017-07-27 RX ADMIN — HYDROMORPHONE HYDROCHLORIDE 0.5 MG: 1 INJECTION, SOLUTION INTRAMUSCULAR; INTRAVENOUS; SUBCUTANEOUS at 21:01

## 2017-07-27 RX ADMIN — ONDANSETRON 4 MG: 2 INJECTION INTRAMUSCULAR; INTRAVENOUS at 20:23

## 2017-07-27 ASSESSMENT — ENCOUNTER SYMPTOMS
FEVER: 1
PALPITATIONS: 0
EYES NEGATIVE: 1
ABDOMINAL DISTENTION: 1
WOUND: 0
ACTIVITY CHANGE: 1
PSYCHIATRIC NEGATIVE: 1
BLOOD IN STOOL: 0
CHILLS: 1
APPETITE CHANGE: 1
SHORTNESS OF BREATH: 0
NAUSEA: 1
POLYPHAGIA: 0
ABDOMINAL PAIN: 1
NEUROLOGICAL NEGATIVE: 1
POLYDIPSIA: 0
FATIGUE: 1
MYALGIAS: 1

## 2017-07-27 ASSESSMENT — ACTIVITIES OF DAILY LIVING (ADL)
TOILETING: 0-->INDEPENDENT
RETIRED_EATING: 0-->INDEPENDENT
FALL_HISTORY_WITHIN_LAST_SIX_MONTHS: NO
TRANSFERRING: 0-->INDEPENDENT
BATHING: 0-->INDEPENDENT
COGNITION: 0 - NO COGNITION ISSUES REPORTED
RETIRED_COMMUNICATION: 0-->UNDERSTANDS/COMMUNICATES WITHOUT DIFFICULTY
AMBULATION: 0-->INDEPENDENT
SWALLOWING: 0-->SWALLOWS FOODS/LIQUIDS WITHOUT DIFFICULTY
DRESS: 0-->INDEPENDENT

## 2017-07-27 NOTE — IP AVS SNAPSHOT
[address] [facility]    [address] [place] [address]    Phone:  [phone]                                       After Visit Summary   7/27/2017    Doreen Peralta    MRN: [id number]           After Visit Summary Signature Page     I have received my discharge instructions, and my questions have been answered. I have discussed any challenges I see with this plan with the nurse or doctor.    ..........................................................................................................................................  Patient/Patient Representative Signature      ..........................................................................................................................................  Patient Representative Print Name and Relationship to Patient    ..................................................               ................................................  Date                                            Time    ..........................................................................................................................................  Reviewed by Signature/Title    ...................................................              ..............................................  Date                                                            Time

## 2017-07-27 NOTE — LETTER
Transition Communication Hand-off for Care Transitions to Next Level of Care Provider    Name: Doreen Peralta  MRN #: [id number]  Primary Care Provider: Larisa Lorenzo  Primary Care MD Name: Larisa Lorenzo PA-C [phone]  Primary Clinic: [facility] [place] [address]  [place] [address]  Primary Care Clinic Name: [facility]   Reason for Hospitalization:  Abdominal pain, generalized [R10.84]  Chills [R68.83]  Anxiety [F41.9]  Munchausen syndrome [F68.10]  Chronic abdominal pain [R10.9, G89.29]  S/P partial resection of colon [Z90.49]  Fever, unspecified [R50.9]  Jejunostomy tube present (H) [Z93.4]  Encounter for care related to vascular access port [Z45.2]  Admit Date/Time: 7/27/2017  7:52 PM  Discharge Date: 7/30  Payor Source: Payor: BLUE PLUS / Plan: BLUE PLUS MA / Product Type: HMO /     Readmission Assessment Measure (ROB) Risk Score/category: Very High    Plan of Care Goals/Milestone Events:   Patient Concerns:    Patient Goals:   Short-term    Long-term    Medical Goals   Short-term    Long-term          Reason for Communication Hand-off Referral: Avoidable readmission within 30 days    Discharge Plan:       Concern for non-adherence with plan of care:   Y/N Yes  Discharge Needs Assessment:  Needs       Most Recent Value    Transportation Available family or friend will provide    # of Referrals Placed by [facility]          Already enrolled in Tele-monitoring program and name of program:  no  Follow-up specialty is recommended: Yes    Follow-up plan:  Future Appointments  Date Time Provider Department Center   8/1/2017 10:00 AM NL INFUSION CHAIR 1 [facility]   8/2/2017 10:00 AM NL INFUSION CHAIR 4 [facility]   8/3/2017 10:00 AM NL INFUSION CHAIR 3 [facility]   8/4/2017 9:00 AM NL INFUSION CHAIR 3 [facility]   8/7/2017 8:30 AM NL INFUSION CHAIR 5 [facility]   8/21/2017 9:30 AM NL INFUSION CHAIR 1 [facility]   8/28/2017 8:30 AM  NL INFUSION CHAIR 4 [facility]   9/5/2017 9:30 AM NL INFUSION CHAIR 5 [facility]   12/4/2017 10:00 AM Gilmer Lees MD [facility]       Any outstanding tests or procedures:              Key Recommendations:      Alexandra Allen    AVS/Discharge Summary is the source of truth; this is a helpful guide for improved communication of patient story

## 2017-07-27 NOTE — IP AVS SNAPSHOT
MRN:[id number]                      After Visit Summary   7/27/2017    Doreen Peralta    MRN: [id number]           Thank you!     Thank you for choosing [facility] for your care. Our goal is always to provide you with excellent care. Hearing back from our patients is one way we can continue to improve our services. Please take a few minutes to complete the written survey that you may receive in the mail after you visit with us. Thank you!        Patient Information     Date Of Birth          1980        Designated Caregiver       Most Recent Value    Caregiver    Will someone help with your care after discharge? yes    Name of designated caregiver Mitali    Phone number of caregiver [phone]    Caregiver address same      About your hospital stay     You were admitted on:  July 27, 2017 You last received care in the:  [address]    You were discharged on:  July 30, 2017        Reason for your hospital stay       Hospitalized due to suspected infection of Port-a-cath and improved                  Who to Call     For medical emergencies, please call 911.  For non-urgent questions about your medical care, please call your primary care provider or clinic, [phone]          Attending Provider     Provider Specialty    Jake Rodriguez, DO Family Practice    Gilmer Lazcano MD Internal Medicine       Primary Care Provider Office Phone # Fax #    Larisa Lorenzo PA-C [phone]      After Care Instructions     Activity       Your activity upon discharge: no driving while on narcotic analgesics (hydromorphone)            Diet       Follow this diet upon discharge: Advance to your usual diet as tolerated            IV access       You are going home with the following vascular access device: Port-a-Cath.                  Follow-up Appointments     Follow-up and recommended labs and tests        Follow up with primary care provider, Larisa  Amirah Lorenzo, within 7 days for hospital follow- up.      Follow up with Dr. Price of Infectious disease within 1 month  to evaluate treatment change and for hospital follow- up.                  Your next 10 appointments already scheduled     Jul 31, 2017  9:30 AM CDT   Level 2 with NL INFUSION CHAIR 5   [facility] ([facility])    [address] [place] [address]   [phone]            Aug 07, 2017  8:30 AM CDT   Level 2 with NL INFUSION CHAIR 5   [facility] ([facility])    [address] [place] [address]   [phone]            Aug 21, 2017  9:30 AM CDT   Level 2 with NL INFUSION CHAIR 1   [facility] ([facility])    [address] [place] [address]   [phone]            Aug 28, 2017  8:30 AM CDT   Level 2 with NL INFUSION CHAIR 4   [facility] ([facility])    [address] [place] [address]   [phone]            Sep 05, 2017  9:30 AM CDT   Level 2 with NL INFUSION CHAIR 5   [facility] ([facility])    [address] [place] [address]   [phone]            Dec 04, 2017 10:00 AM CST   (Arrive by 9:45 AM)   Return Visit with Gilmer Lees MD   [facility] ([facility])    [address] [place] [address]  4th [facility] [address]   [phone]              Pending Results     Date and Time Order Name Status Description    7/27/2017 2021 Blood culture Preliminary             Statement of Approval     Ordered          07/30/17 1037  I have reviewed and agree with all the recommendations and orders detailed in this document.  EFFECTIVE NOW     Approved and electronically signed by:  Gilmer Lazcano MD             Admission Information     Date & Time Provider Department Dept. Phone    7/27/2017  "Gilmer Lazcano MD [address] Medical [facility] [phone]      Your Vitals Were     Blood Pressure Pulse Temperature Respirations Height Weight    132/83 79 98.9  F (37.2  C) (Oral) 14 1.676 m (5' 6\") 98.5 kg (217 lb 2.5 oz)    Last Period Pulse Oximetry BMI (Body Mass Index)             07/14/2017 94% 35.05 kg/m2         [employer] Information     [employer] gives you secure access to your electronic health record. If you see a primary care provider, you can also send messages to your care team and make appointments. If you have questions, please call your primary care clinic.  If you do not have a primary care provider, please call [phone] and they will assist you.        Care EveryWhere ID     This is your Care EveryWhere ID. This could be used by other organizations to access your [place] medical records  [id number]        Equal Access to Services     TRAMAINE CLAYTON AH: Carrie Law, wafanny villeda, vesta kaalmada ethan, elham gomez. So [facility] [phone].    ATENCIÓN: Si habla español, tiene a wade disposición servicios gratuitos de asistencia lingüística. Llame al [phone].    We comply with applicable federal civil rights laws and [place] laws. We do not discriminate on the basis of race, color, national origin, age, disability sex, sexual orientation or gender identity.               Review of your medicines      START taking        Dose / Directions    cefTRIAXone 1 GM vial   Commonly known as:  ROCEPHIN   Indication:  Bacteria in the Blood   Used for:  Positive blood culture        Dose:  1 g   Start taking on:  7/31/2017   Inject 1 g into the vein daily   Quantity:  110 mL   Refills:  0       ethanol (74%) ADULT 74 %   Used for:  Positive blood culture        Inject IV through Port-a-cath daily for 11 days to lock Port-a-cath after each dose of ceftriaxone and also use to lock Port-a-cath after each weekly infusion of IV fluids and MVI "   Quantity:  5 mL   Refills:  0       * order for DME   Used for:  Attention to G-tube (H)        2 by 2 gauze pads, use for dressing changes as directed   Quantity:  1 Box   Refills:  1       * order for DME   Used for:  Attention to G-tube (H)        1 inch paper tape, Use for dressing changes as directed   Quantity:  1 each   Refills:  0       * Notice:  This list has 2 medication(s) that are the same as other medications prescribed for you. Read the directions carefully, and ask your doctor or other care provider to review them with you.      CONTINUE these medicines which may have CHANGED, or have new prescriptions. If we are uncertain of the size of tablets/capsules you have at home, strength may be listed as something that might have changed.        Dose / Directions    albuterol (2.5 MG/3ML) 0.083% neb solution   This may have changed:    - how to take this  - when to take this  - reasons to take this  - Another medication with the same name was removed. Continue taking this medication, and follow the directions you see here.   Used for:  Gastrostomy tube dependent (H), SBO (small bowel obstruction) (H), Chronic abdominal pain, Chronic diarrhea        Dose:  2.5 mg   Take 1 vial (2.5 mg) by nebulization every 4 hours as needed for shortness of breath / dyspnea or wheezing   Quantity:  360 mL   Refills:  0       ipratropium 0.02 % neb solution   Commonly known as:  ATROVENT   This may have changed:    - how to take this  - when to take this  - reasons to take this   Used for:  Gastrostomy tube dependent (H), SBO (small bowel obstruction) (H), Chronic abdominal pain, Chronic diarrhea        Dose:  0.5 mg   Take 2.5 mLs (0.5 mg) by nebulization every 6 hours as needed for wheezing   Quantity:  225 mL   Refills:  0         CONTINUE these medicines which have NOT CHANGED        Dose / Directions    ACETAMINOPHEN PO        Dose:  500-1000 mg   Take 500-1,000 mg by mouth every 6 hours as needed for pain   Refills:   0       albuterol 90 MCG/ACT inhaler        Dose:  2 puff   Inhale 2 puffs into the lungs every 6 hours as needed   Refills:  0       cloNIDine 0.2 MG tablet   Commonly known as:  CATAPRES   Used for:  Anxiety        TAKE 2 TABLETS(0.4 MG) BY MOUTH EVERY EVENING   Quantity:  180 tablet   Refills:  0       diphenhydrAMINE 25 MG tablet   Commonly known as:  BENADRYL ALLERGY        Dose:  25 mg   Take 1 tablet (25 mg) by mouth every 6 hours as needed for itching or other (Nausea)   Quantity:  30 tablet   Refills:  0       DULoxetine 60 MG EC capsule   Commonly known as:  CYMBALTA   Used for:  Abdominal pain, epigastric, Abdominal migraine, not intractable        Dose:  60 mg   Take 1 capsule (60 mg) by mouth daily   Quantity:  90 capsule   Refills:  3       HYDROmorphone 2 MG tablet   Commonly known as:  DILAUDID   Used for:  Gastroparesis, Malfunction of gastrostomy tube (H)        Dose:  2 mg   Take 1 tablet (2 mg) by mouth every 3 hours as needed for moderate to severe pain   Quantity:  30 tablet   Refills:  0       mirtazapine 15 MG ODT tab   Commonly known as:  REMERON SOL-TAB   Used for:  Anxiety        Dose:  7.5 mg   0.5 tablets (7.5 mg) by Orally disintegrating tablet route At Bedtime   Quantity:  45 tablet   Refills:  0       nortriptyline 10 MG capsule   Commonly known as:  PAMELOR   Used for:  Neuropathic pain, Primary insomnia        Dose:  30-40 mg   Take 3-4 capsules (30-40 mg) by mouth At Bedtime   Quantity:  120 capsule   Refills:  5       ondansetron 4 MG ODT tab   Commonly known as:  ZOFRAN ODT   Used for:  Intractable vomiting, presence of nausea not specified, unspecified vomiting type, Gastroparesis        Dose:  4-8 mg   Take 1-2 tablets (4-8 mg) by mouth every 6 hours as needed for nausea   Quantity:  30 tablet   Refills:  0       SUMAtriptan 50 MG tablet   Commonly known as:  IMITREX   Used for:  Migraine without aura and without status migrainosus, not intractable        Dose:   mg    Take 1-2 tablets ( mg) by mouth at onset of headache for migraine - may repeat dose after 2h if headache recurs.  Max: 200mg/24 hours   Quantity:  18 tablet   Refills:  1       traZODone 50 MG tablet   Commonly known as:  DESYREL   Used for:  Insomnia, unspecified type        Dose:   mg   Take 1-2 tablets ( mg) by mouth nightly as needed 1-2 tabs at bedtime PRN   Quantity:  180 tablet   Refills:  1            Where to get your medicines      Some of these will need a paper prescription and others can be bought over the counter. Ask your nurse if you have questions.     Bring a paper prescription for each of these medications     HYDROmorphone 2 MG tablet    ondansetron 4 MG ODT tab    order for DME    order for DME                Protect others around you: Learn how to safely use, store and throw away your medicines at www.disposemymeds.org.             Medication List: This is a list of all your medications and when to take them. Check marks below indicate your daily home schedule. Keep this list as a reference.      Medications           Morning Afternoon Evening Bedtime As Needed    ACETAMINOPHEN PO   Take 500-1,000 mg by mouth every 6 hours as needed for pain   Last time this was given:  650 mg on 7/30/2017  9:44 AM                                albuterol (2.5 MG/3ML) 0.083% neb solution   Take 1 vial (2.5 mg) by nebulization every 4 hours as needed for shortness of breath / dyspnea or wheezing   Last time this was given:  2.5 mg on 7/29/2017  8:47 AM                                albuterol 90 MCG/ACT inhaler   Inhale 2 puffs into the lungs every 6 hours as needed                                cefTRIAXone 1 GM vial   Commonly known as:  ROCEPHIN   Inject 1 g into the vein daily   Start taking on:  7/31/2017   Last time this was given:  1 g on 7/30/2017  1:47 PM                                cloNIDine 0.2 MG tablet   Commonly known as:  CATAPRES   TAKE 2 TABLETS(0.4 MG) BY MOUTH EVERY  EVENING                                diphenhydrAMINE 25 MG tablet   Commonly known as:  BENADRYL ALLERGY   Take 1 tablet (25 mg) by mouth every 6 hours as needed for itching or other (Nausea)                                DULoxetine 60 MG EC capsule   Commonly known as:  CYMBALTA   Take 1 capsule (60 mg) by mouth daily   Last time this was given:  60 mg on 7/30/2017  9:44 AM                                ethanol (74%) ADULT 74 %   Inject IV through Port-a-cath daily for 11 days to lock Port-a-cath after each dose of ceftriaxone and also use to lock Port-a-cath after each weekly infusion of IV fluids and MVI   Last time this was given:  5 mLs on 7/30/2017  2:19 PM                                HYDROmorphone 2 MG tablet   Commonly known as:  DILAUDID   Take 1 tablet (2 mg) by mouth every 3 hours as needed for moderate to severe pain   Last time this was given:  2 mg on 7/30/2017 12:50 PM                                ipratropium 0.02 % neb solution   Commonly known as:  ATROVENT   Take 2.5 mLs (0.5 mg) by nebulization every 6 hours as needed for wheezing                                mirtazapine 15 MG ODT tab   Commonly known as:  REMERON SOL-TAB   0.5 tablets (7.5 mg) by Orally disintegrating tablet route At Bedtime                                nortriptyline 10 MG capsule   Commonly known as:  PAMELOR   Take 3-4 capsules (30-40 mg) by mouth At Bedtime                                ondansetron 4 MG ODT tab   Commonly known as:  ZOFRAN ODT   Take 1-2 tablets (4-8 mg) by mouth every 6 hours as needed for nausea                                * order for DME   2 by 2 gauze pads, use for dressing changes as directed                                * order for DME   1 inch paper tape, Use for dressing changes as directed                                SUMAtriptan 50 MG tablet   Commonly known as:  IMITREX   Take 1-2 tablets ( mg) by mouth at onset of headache for migraine - may repeat dose after 2h if  headache recurs.  Max: 200mg/24 hours                                traZODone 50 MG tablet   Commonly known as:  DESYREL   Take 1-2 tablets ( mg) by mouth nightly as needed 1-2 tabs at bedtime PRN                                * Notice:  This list has 2 medication(s) that are the same as other medications prescribed for you. Read the directions carefully, and ask your doctor or other care provider to review them with you.

## 2017-07-28 LAB
ANION GAP SERPL CALCULATED.3IONS-SCNC: 8 MMOL/L (ref 3–14)
BUN SERPL-MCNC: 2 MG/DL (ref 7–30)
CALCIUM SERPL-MCNC: 7.9 MG/DL (ref 8.5–10.1)
CHLORIDE SERPL-SCNC: 108 MMOL/L (ref 94–109)
CO2 SERPL-SCNC: 27 MMOL/L (ref 20–32)
CREAT SERPL-MCNC: 0.97 MG/DL (ref 0.52–1.04)
ERYTHROCYTE [DISTWIDTH] IN BLOOD BY AUTOMATED COUNT: 18.6 % (ref 10–15)
GFR SERPL CREATININE-BSD FRML MDRD: 65 ML/MIN/1.7M2
GLUCOSE SERPL-MCNC: 117 MG/DL (ref 70–99)
HCT VFR BLD AUTO: 30.5 % (ref 35–47)
HGB BLD-MCNC: 9.3 G/DL (ref 11.7–15.7)
MCH RBC QN AUTO: 24.6 PG (ref 26.5–33)
MCHC RBC AUTO-ENTMCNC: 30.5 G/DL (ref 31.5–36.5)
MCV RBC AUTO: 81 FL (ref 78–100)
PLATELET # BLD AUTO: 210 10E9/L (ref 150–450)
POTASSIUM SERPL-SCNC: 3.9 MMOL/L (ref 3.4–5.3)
PROCALCITONIN SERPL-MCNC: 2.14 NG/ML
RBC # BLD AUTO: 3.78 10E12/L (ref 3.8–5.2)
SODIUM SERPL-SCNC: 143 MMOL/L (ref 133–144)
WBC # BLD AUTO: 9 10E9/L (ref 4–11)

## 2017-07-28 PROCEDURE — 80048 BASIC METABOLIC PNL TOTAL CA: CPT | Performed by: INTERNAL MEDICINE

## 2017-07-28 PROCEDURE — 99232 SBSQ HOSP IP/OBS MODERATE 35: CPT | Performed by: INTERNAL MEDICINE

## 2017-07-28 PROCEDURE — 25000132 ZZH RX MED GY IP 250 OP 250 PS 637: Performed by: INTERNAL MEDICINE

## 2017-07-28 PROCEDURE — 12000000 ZZH R&B MED SURG/OB

## 2017-07-28 PROCEDURE — 25000128 H RX IP 250 OP 636: Performed by: INTERNAL MEDICINE

## 2017-07-28 PROCEDURE — 85027 COMPLETE CBC AUTOMATED: CPT | Performed by: INTERNAL MEDICINE

## 2017-07-28 PROCEDURE — 25000125 ZZHC RX 250: Performed by: INTERNAL MEDICINE

## 2017-07-28 RX ORDER — HEPARIN SODIUM,PORCINE 10 UNIT/ML
5-10 VIAL (ML) INTRAVENOUS EVERY 24 HOURS
Status: DISCONTINUED | OUTPATIENT
Start: 2017-07-28 | End: 2017-07-30 | Stop reason: HOSPADM

## 2017-07-28 RX ORDER — HEPARIN SODIUM (PORCINE) LOCK FLUSH IV SOLN 100 UNIT/ML 100 UNIT/ML
5 SOLUTION INTRAVENOUS
Status: DISCONTINUED | OUTPATIENT
Start: 2017-07-28 | End: 2017-07-30 | Stop reason: HOSPADM

## 2017-07-28 RX ORDER — HEPARIN SODIUM,PORCINE 10 UNIT/ML
5-10 VIAL (ML) INTRAVENOUS
Status: DISCONTINUED | OUTPATIENT
Start: 2017-07-28 | End: 2017-07-30 | Stop reason: HOSPADM

## 2017-07-28 RX ORDER — LIDOCAINE 40 MG/G
CREAM TOPICAL
Status: DISCONTINUED | OUTPATIENT
Start: 2017-07-28 | End: 2017-07-30 | Stop reason: HOSPADM

## 2017-07-28 RX ADMIN — MEROPENEM 1 G: 1 INJECTION, POWDER, FOR SOLUTION INTRAVENOUS at 13:38

## 2017-07-28 RX ADMIN — ACETAMINOPHEN 650 MG: 325 TABLET ORAL at 14:43

## 2017-07-28 RX ADMIN — DIPHENHYDRAMINE HYDROCHLORIDE 25 MG: 25 CAPSULE ORAL at 15:40

## 2017-07-28 RX ADMIN — DIPHENHYDRAMINE HYDROCHLORIDE 25 MG: 25 CAPSULE ORAL at 23:56

## 2017-07-28 RX ADMIN — ONDANSETRON 4 MG: 2 INJECTION INTRAMUSCULAR; INTRAVENOUS at 00:16

## 2017-07-28 RX ADMIN — HYDROMORPHONE HYDROCHLORIDE 2 MG: 2 TABLET ORAL at 23:55

## 2017-07-28 RX ADMIN — DEXTROSE AND SODIUM CHLORIDE: 5; 900 INJECTION, SOLUTION INTRAVENOUS at 00:16

## 2017-07-28 RX ADMIN — ACETAMINOPHEN 650 MG: 325 TABLET ORAL at 06:20

## 2017-07-28 RX ADMIN — ACETAMINOPHEN 650 MG: 325 TABLET ORAL at 19:13

## 2017-07-28 RX ADMIN — HYDROMORPHONE HYDROCHLORIDE 2 MG: 2 TABLET ORAL at 00:16

## 2017-07-28 RX ADMIN — HYDROMORPHONE HYDROCHLORIDE 2 MG: 2 TABLET ORAL at 12:13

## 2017-07-28 RX ADMIN — MIRTAZAPINE 7.5 MG: 7.5 TABLET, FILM COATED ORAL at 21:35

## 2017-07-28 RX ADMIN — DULOXETINE HYDROCHLORIDE 60 MG: 30 CAPSULE, DELAYED RELEASE ORAL at 08:08

## 2017-07-28 RX ADMIN — DEXTROSE AND SODIUM CHLORIDE: 5; 900 INJECTION, SOLUTION INTRAVENOUS at 21:35

## 2017-07-28 RX ADMIN — HYDROMORPHONE HYDROCHLORIDE 2 MG: 2 TABLET ORAL at 03:22

## 2017-07-28 RX ADMIN — MIRTAZAPINE 7.5 MG: 7.5 TABLET, FILM COATED ORAL at 00:16

## 2017-07-28 RX ADMIN — HYDROMORPHONE HYDROCHLORIDE 2 MG: 2 TABLET ORAL at 15:40

## 2017-07-28 RX ADMIN — MEROPENEM 1 G: 1 INJECTION, POWDER, FOR SOLUTION INTRAVENOUS at 20:31

## 2017-07-28 RX ADMIN — ONDANSETRON 4 MG: 2 INJECTION INTRAMUSCULAR; INTRAVENOUS at 20:30

## 2017-07-28 RX ADMIN — MEROPENEM 1 G: 1 INJECTION, POWDER, FOR SOLUTION INTRAVENOUS at 06:24

## 2017-07-28 RX ADMIN — HYDROMORPHONE HYDROCHLORIDE 2 MG: 2 TABLET ORAL at 06:20

## 2017-07-28 RX ADMIN — ACETAMINOPHEN 650 MG: 325 TABLET ORAL at 01:41

## 2017-07-28 RX ADMIN — ONDANSETRON 4 MG: 2 INJECTION INTRAMUSCULAR; INTRAVENOUS at 08:07

## 2017-07-28 RX ADMIN — VANCOMYCIN HYDROCHLORIDE 2000 MG: 1 INJECTION, POWDER, LYOPHILIZED, FOR SOLUTION INTRAVENOUS at 21:35

## 2017-07-28 RX ADMIN — ONDANSETRON 4 MG: 2 INJECTION INTRAMUSCULAR; INTRAVENOUS at 14:43

## 2017-07-28 RX ADMIN — HYDROMORPHONE HYDROCHLORIDE 2 MG: 2 TABLET ORAL at 19:13

## 2017-07-28 RX ADMIN — DEXTROSE AND SODIUM CHLORIDE: 5; 900 INJECTION, SOLUTION INTRAVENOUS at 12:04

## 2017-07-28 RX ADMIN — HYDROMORPHONE HYDROCHLORIDE 2 MG: 2 TABLET ORAL at 09:25

## 2017-07-28 RX ADMIN — ACETAMINOPHEN 650 MG: 325 TABLET ORAL at 10:22

## 2017-07-28 RX ADMIN — VANCOMYCIN HYDROCHLORIDE 2000 MG: 1 INJECTION, POWDER, LYOPHILIZED, FOR SOLUTION INTRAVENOUS at 08:08

## 2017-07-28 ASSESSMENT — PAIN DESCRIPTION - DESCRIPTORS: DESCRIPTORS: SORE;CONSTANT

## 2017-07-28 NOTE — PROGRESS NOTES
S-(situation): Patient arrives to room 269 via cart from ED    B-(background): s/p port placement and g-tube replacement, febrile    A-(assessment): nauseated, abdominal pain, febrile, moves without assist, alert and oriented    R-(recommendations): Orders reviewed with patient. Will monitor patient per MD orders.     Inpatient nursing criteria listed below were met:    Health care directives status obtained and documented: Yes  Core Measures assessed (SSI): Yes  SCD's Documented: Yes  Vaccine assessment done and vaccines ordered if appropriate: Yes  Skin issues/needs documented:Yes  Isolation needs addressed, if appropriate: NA  Fall Prevention: Care plan updated, Education given and documented Yes  MRSA swab completed for patient 55 years and older (exclude BENITO and TKA): NA  My Chart patient sign up addressed and documented: Yes  Care Plan initiated: Yes  Education Assessment documented:Yes  Education Documented (Pre-existing chronic infection such as, MRSA/VRE need education on admission): Yes  New medication patient education completed and documented (Possible Side Effects of Common Medications handout): Yes  Home medications if not able to send immediately home with family stored here: NA   Reminder note placed in discharge instructions: NA  Discharge planning review completed (admission navigator) Yes

## 2017-07-28 NOTE — PHARMACY-VANCOMYCIN DOSING SERVICE
Pharmacy Vancomycin Initial Note  Date of Service 2017  Patient's  1980  37 year old, female    Indication: Sepsis    Current estimated CrCl = Estimated Creatinine Clearance: 93.4 mL/min (based on Cr of 0.97).    Creatinine for last 3 days  2017:  7:40 PM Creatinine 0.97 mg/dL  2017:  6:54 AM Creatinine 0.99 mg/dL  2017:  7:50 PM Creatinine 0.90 mg/dL  2017:  6:15 AM Creatinine 0.97 mg/dL    Recent Vancomycin Level(s) for last 3 days  No results found for requested labs within last 72 hours.      Vancomycin IV Administrations (past 72 hours)                   vancomycin (VANCOCIN) 2,000 mg in NaCl 0.9 % 500 mL intermittent infusion (mg) 2,000 mg New Bag 17 0808    vancomycin (VANCOCIN) 2,000 mg in NaCl 0.9 % 500 mL intermittent infusion (mg) 2,000 mg New Bag 17                Nephrotoxins and other renal medications (Future)    Start     Dose/Rate Route Frequency Ordered Stop    17 0900  vancomycin (VANCOCIN) 2,000 mg in NaCl 0.9 % 500 mL intermittent infusion      2,000 mg Intravenous EVERY 12 HOURS 17 0003            Contrast Orders - past 72 hours     None                Plan:  1.  Same vancomycin dose 2000 mg IV q12h.   2.  Goal Trough Level: 15-20 mg/L   3.  Pharmacy will check trough levels as appropriate in 1-3 Days.    4. Serum creatinine levels will be ordered daily for the first week of therapy and at least twice weekly for subsequent weeks.    5. [place] method utilized to dose vancomycin therapy: Method 2    Shayan De Oliveira

## 2017-07-28 NOTE — CONSULTS
Consult for pharmacy to dose vancomycin has been acknowledged.  Please see prior note for details regarding initial dosing regimen.    Thanks,  Mark Anthony Jimenez, PharmD

## 2017-07-28 NOTE — ED PROVIDER NOTES
History     Chief Complaint   Patient presents with     Fever     HPI  Doreen Peralta is a 37 year old female who presents to the ER with concerns about a sudden onset of fever and chills this AM. The patient is well know to this ER secondary to multiple previous ER visits secondary to chronic abdominal pain. She states she was discharged form the [facility] yesterday and felt pretty well. She awoke this AM with chills and increased abdominal pains. She noted fever and stated her temp was 104 at 1:00PM today.  She tried some oral Tylenol but continued to have chills and fever so decided to come in to be examined in the ER.  Her mother states that she's had previous febrile episodes following replacement of her gastric tube in the past.  They have never been this intense with this high of fever in the past, however.  Her abdominal cramping and pains are typical for her chronic pains but seemed more intense today.  She denies vomiting today.  She denies any changes to her stools or urination today.    I reviewed the discharge summary notes from her recent hospitalization at the [facility] in which she was discharged yesterday.  I copied an excerpt from that note below:  [facility] [place]     Internal Medicine Discharge Summary- Gold Service     Date of Admission:  7/25/2017  Date of Discharge:  7/26/2017  Discharging Provider: Sultana Oliver  Discharge Team: Gold 4        Discharge Diagnoses      G tube dislodgement s/p IR gastrostomy tube change  Acute on chronic abdominal pain  History of slow transit constipation   s/p partial colectomy  Hx of DVT on xarelto  Anxiety  Insomnia  Recent IR port placement on 7/20        Follow-ups Needed After Discharge      Primary care physician and GI for chronic medical issues        Hospital Course     Doreen Peralta is a 37 year old female with a history of slow transit constipation, s/p partial colectomy, G tube placement for  intermittent venting, acute on chronic abdominal pain and hx of DVT on xarelto but not compliant who was admitted to observation on 7/25/2017 for abdominal pain that worsened after she noted G tube dislodgement. The following problems were addressed during her hospitalization:     G tube dislodgement s/p IR gastrostomy tube change with Acute on chronic abdominal pain: she noted gradually worsening distention and abdominal pain/fullness after she noted G tube dislodgement. Abdominal XR did not reveal any sign of bowel obstruction. IR was consulted for new G tube placement via the old tract. This was done without any immediate complication. On discharge, she is tolerating oral intake and her pain is controlled on oral dilauded prn. She was advised to advance diet slowly as tolerated. She was advised to limit use of narcotics given history of recurrent obstruction and slow transit.     Hx of DVT on xarelto: she indicated that she has not been taking xarelto for about 1 month due to non compliance. She was instructed to continue as directed by her physician. There was no finding concerning for DVT this hospitalization.     Anxiety/Insomnia: she was kept on home meds including cymbalta, nortriptyline and mirtazapine.     Recent IR chest port placement on 7/20: She has been followed closely as outpatient for recurrent obstruction and chronic abdominal pain. Port was placed for stable chronic venous access for outpatient use for weekly IVF and MVI replacements.        Consultations This Hospital Stay       Interventional radiology for G tube change        Code Status       Full Code       Sultana Oliver  Internal Medicine Staff Hospitalist Service  [facility]  Pager: [phone]      I have reviewed the Medications, Allergies, Past Medical and [facility] History, and Social History in the Epic system.    Patient Active Problem List   Diagnosis     Constipation by delayed colonic transit     Hepatic flow  abnormality by CT/MRI     Hx SBO     S/P LEEP of cervix     Gastroparesis     Migraines     Intermittent asthma     Allergic rhinitis     Abnormal Pap smear of cervix     PEG (percutaneous endoscopic gastrostomy) status     Health Care Home     PEG tube malfunction (H)     Malfunction of gastrostomy tube (H)     Malfunctioning jejunostomy tube (H)     Jejunostomy tube present (H)     S/P partial resection of colon     Malnutrition (H)     Long-term (current) use of anticoagulants [Z79.01]     Anxiety     Anemia in other chronic diseases classified elsewhere     Munchausen syndrome - previously suspected     Anemia, iron deficiency     Mitral regurgitation mild-mod by Echo June 2016     Atopic rhinitis     Migraine     Patellofemoral stress syndrome     Hyperbilirubinemia     Chronic diarrhea     Coagulation defect (H) [D68.9]     Fever     Attention to G-tube (H)     Chronic abdominal pain     Vitamin D deficiency     SIRS (systemic inflammatory response syndrome) (H)     History of deep venous thrombosis     Nausea and vomiting     Abdominal pain, generalized     Abdominal pain     Insomnia, unspecified type       Past Medical History:   Diagnosis Date     Asthma      Bilateral ovarian cysts      Cervical cancer (H) 01/01/2008    cervical cancer      Chronic pain      Colonic dysmotility     s/p subtotal colectomy     Constipation     chronic     E. coli sepsis (H) 5/8/2016     Enteritis      Fungemia 5/5/2016     Gastro-oesophageal reflux disease      H/O ileostomy      Hx of abnormal Pap smear     s/p LEEP - no further details provided     Hypertension      IBS (irritable bowel syndrome)      Other chronic pain      PONV (postoperative nausea and vomiting)      Thrombosis, hepatic vein (H)     microvascular        Past [facility] History:   Procedure Laterality Date     COLONOSCOPY  7/10/2012    Procedure: COLONOSCOPY;;  Surgeon: Nicole Redding MD;  [facility]     COLONOSCOPY N/A 2/19/2017    Procedure:  COLONOSCOPY;  Surgeon: Randell Muller MD;  [facility]     COLONOSCOPY N/A 2/21/2017    Procedure: COLONOSCOPY;  Surgeon: Randell Muller MD;  [facility]     ECHO CHELO  7/19/2016          ENDOSCOPIC INSERTION TUBE GASTROSTOMY N/A 1/21/2016    Procedure: ENDOSCOPIC INSERTION TUBE GASTROSTOMY;  Surgeon: Nicole Redding MD;  [facility]     ESOPHAGOSCOPY, GASTROSCOPY, DUODENOSCOPY (EGD), COMBINED  7/10/2012    Procedure: COMBINED ESOPHAGOSCOPY, GASTROSCOPY, DUODENOSCOPY (EGD);  Upper Endoscopy, Ileoscopy    Latex Allergy  with biopsies;  Surgeon: Nicole Redding MD;  [facility]     ESOPHAGOSCOPY, GASTROSCOPY, DUODENOSCOPY (EGD), COMBINED N/A 11/5/2014    Procedure: COMBINED ESOPHAGOSCOPY, GASTROSCOPY, DUODENOSCOPY (EGD);  Surgeon: Nicoel Redding MD;  [facility]     HC REPLACE DUODENOSTOMY/JEJUNOSTOMY TUBE PERCUTANEOUS N/A 8/27/2015    Procedure: REPLACE GASTROJEJUNOSTOMY TUBE, PERCUTANEOUS;  Surgeon: Mio Holder MD;  [facility]     HC REPLACE DUODENOSTOMY/JEJUNOSTOMY TUBE PERCUTANEOUS N/A 1/7/2016    Procedure: REPLACE JEJUNOSTOMY TUBE, PERCUTANEOUS;  Surgeon: Elsa Medel MD;  [facility]     HC REPLACE DUODENOSTOMY/JEJUNOSTOMY TUBE PERCUTANEOUS N/A 1/28/2016    Procedure: REPLACE JEJUNOSTOMY TUBE, PERCUTANEOUS;  Surgeon: Elsa Medel MD;  [facility]     HC REPLACE GASTROSTOMY/CECOSTOMY TUBE PERCUTANEOUS Left 5/19/2015    Procedure: REPLACE GASTROSTOMY TUBE, PERCUTANEOUS;  Surgeon: Melecio Morejon Chi, MD;  Location[facility]     HC UGI ENDOSCOPY W PLACEMENT GASTROSTOMY TUBE PERCUT N/A 10/1/2015    Procedure: COMBINED ESOPHAGOSCOPY, GASTROSCOPY, DUODENOSCOPY (EGD), PLACE PERCUTANEOUS ENDOSCOPIC GASTROSTOMY TUBE;  Surgeon: Mio Holder MD;  [facility]     INSERT PORT VASCULAR ACCESS Right 7/20/2017    Procedure: INSERT PORT VASCULAR ACCESS;  Chest Port Placement  **Latex Allergy**;  Surgeon: Coy Rocha PA-C;   [facility]     LAPAROSCOPIC ASSISTED COLECTOMY  1/20/2012    Procedure:LAPAROSCOPIC ASSISTED COLECTOMY; Laparoscopic Ileostomy       LAPAROSCOPIC ASSISTED COLECTOMY LEFT (DESCENDING)  10/24/2012    Procedure: LAPAROSCOPIC ASSISTED COLECTOMY LEFT (DESCENDING);   Hand Assisted Laproscopic Subtotal abdominal Colectomy,Iieorectal anastamosis, Ileostomy Closure.       LAPAROSCOPIC ASSISTED INSERTION TUBE JEJUNOSTOMY N/A 10/16/2015    Procedure: LAPAROSCOPIC ASSISTED INSERTION TUBE JEJUNOSTOMY;  Surgeon: Elsa Medel MD;  [facility]     LAPAROSCOPIC CHOLECYSTECTOMY  2002    [facility] stones duct     LAPAROSCOPIC ILEOSTOMY  1/20/2012    [facility], loop     LAPAROSCOPIC OOPHORECTOMY Right 2009    [facility]     LAPAROTOMY EXPLORATORY N/A 1/28/2016    Procedure: LAPAROTOMY EXPLORATORY;  Surgeon: Elsa Medel MD;  [facility]     LEEP TX, CERVICAL  2009    [facility]     PICC INSERTION Left 10/21/2015    5fr DL Power PICC, 37cm (2cm external) in the L basilic vein w/ tip in the SVC RA junction.     REMOVE GASTROSTOMY TUBE ADULT N/A 12/12/2014    Procedure: REMOVE GASTROSTOMY TUBE ADULT;  Surgeon: Nicole Redding MD;  [facility]     REMOVE PORT VASCULAR ACCESS Right 6/30/2016    Procedure: REMOVE PORT VASCULAR ACCESS;  Surgeon: Pradeep Orosco MD;  Location: [facility] OR     replace GASTROSTOMY TUBE ADULT  5/19/15       Family History   Problem Relation Age of Onset     Thyroid Disease Mother      Sjogren's Mother      GASTROINTESTINAL DISEASE Mother      Intermittent nausea vomiting diarrhea     Colon Polyps Mother      Prostate Problems Father      prostate enlargement     Lupus Maternal Grandmother      CANCER Maternal Grandfather      Lung     Colon Cancer Maternal Grandfather 65     CANCER Paternal Grandmother      Lung      CEREBROVASCULAR DISEASE Paternal Grandmother      DIABETES Paternal Grandmother      Cardiovascular Paternal Grandmother      CHF     CANCER Paternal  Grandfather      Lung     Glaucoma Paternal Grandfather      Abdominal Aortic Aneurysm Other      Macular Degeneration No family hx of        Social History     Social History     Marital status:      Spouse name: Mitali     Number of children: 3     Years of education: N/A     Occupational History     Medical Assistant [employer]     Pediatric clinic     Social History Main Topics     Smoking status: Former Smoker     Packs/day: 1.00     Years: 4.00     Types: Cigarettes     Quit date: 1/1/2004     Smokeless tobacco: Former User     Alcohol use No     Drug use: No     Sexual activity: Yes     Partners: Male     Other Topics Concern     Not on file     Social History Narrative       Current Outpatient Rx   Medication Sig Dispense Refill     HYDROmorphone (DILAUDID) 2 MG tablet Take 1 tablet (2 mg) by mouth every 3 hours as needed for moderate to severe pain 20 tablet 0     HYDROcodone-acetaminophen (NORCO) 5-325 MG per tablet Take 1-2 tablets by mouth every 4 hours as needed for moderate to severe pain (Patient not taking: Reported on 7/24/2017) 10 tablet 0     albuterol (2.5 MG/3ML) 0.083% neb solution Inhale 2.5 mg into the lungs       albuterol (PROAIR HFA/PROVENTIL HFA/VENTOLIN HFA) 108 (90 BASE) MCG/ACT Inhaler Inhale 1 puff into the lungs       ipratropium (ATROVENT) 0.02 % neb solution Inhale 0.5 mg into the lungs       cloNIDine (CATAPRES) 0.2 MG tablet TAKE 2 TABLETS(0.4 MG) BY MOUTH EVERY EVENING 180 tablet 0     ondansetron (ZOFRAN ODT) 4 MG ODT tab Take 1-2 tablets (4-8 mg) by mouth every 6 hours as needed for nausea 20 tablet 0     diphenhydrAMINE (BENADRYL ALLERGY) 25 MG tablet Take 1 tablet (25 mg) by mouth every 6 hours as needed for itching or other (Nausea) 30 tablet 0     traZODone (DESYREL) 50 MG tablet Take 1-2 tablets ( mg) by mouth nightly as needed 1-2 tabs at bedtime  tablet 1     nortriptyline (PAMELOR) 10 MG capsule Take 3-4 capsules (30-40 mg) by mouth At  Bedtime 120 capsule 5     mirtazapine (REMERON SOL-TAB) 15 MG ODT tab 0.5 tablets (7.5 mg) by Orally disintegrating tablet route At Bedtime 45 tablet 0     SUMAtriptan (IMITREX) 50 MG tablet Take 1-2 tablets ( mg) by mouth at onset of headache for migraine - may repeat dose after 2h if headache recurs.  Max: 200mg/24 hours 18 tablet 1     rivaroxaban ANTICOAGULANT (XARELTO) 20 MG TABS tablet Take 1 tablet (20 mg) by mouth daily (with dinner) 90 tablet 1     DULoxetine (CYMBALTA) 60 MG capsule Take 1 capsule (60 mg) by mouth daily 90 capsule 3     ACETAMINOPHEN PO Take 500-1,000 mg by mouth every 6 hours as needed for pain        albuterol 90 MCG/ACT inhaler Inhale 2 puffs into the lungs every 6 hours as needed          Allergies   Allergen Reactions     Hyoscyamine Rash     Metoclopramide Other (See Comments)     Eye twitching.      Peaches [Peach] Other (See Comments)     Raw. Cooked OK     Sucralose Other (See Comments)     All artificial sweeteners. Aspartame also. Swollen glands     Advair Diskus Other (See Comments)     Throat burns     Azithromycin Other (See Comments)     Burning in throat     Compazine [Prochlorperazine] Visual Disturbance     Contrast Dye Itching     States is allergic to CT contrast dye     Cyclobenzaprine Visual Disturbance     Fentanyl Other (See Comments)     migraine     Ibuprofen GI Disturbance     Lactulose Nausea and Vomiting     Gas and bloating     Levaquin [Levofloxacin] Swelling     Per ED M.D. And RN      Morphine Sulfate Other (See Comments)     Chest pain       Oxycodone Other (See Comments)     Burning throat, but can take Norco.      Penicillins Other (See Comments)     Family hx of resp arrest, she has never taken  Ok with cephalosporins     Rizatriptan Visual Disturbance     Droperidol Hives and Rash     Isovue [Iopamidol] Palpitations     Pt had racing heart and sob      Ketorolac Anxiety     Latex Swelling and Rash     Kiwi, likely also avacado, ? banana      Levsin Rash       Immunization History   Administered Date(s) Administered     Influenza (IIV3) 10/01/2009     Pneumococcal 23 valent 07/18/2014       Review of Systems   Constitutional: Positive for activity change, appetite change, chills, fatigue and fever.   HENT: Negative.    Eyes: Negative.    Respiratory: Negative for shortness of breath.    Cardiovascular: Negative for chest pain, palpitations and leg swelling.   Gastrointestinal: Positive for abdominal distention, abdominal pain and nausea. Negative for blood in stool.   Endocrine: Negative for polydipsia, polyphagia and polyuria.   Genitourinary: Negative.    Musculoskeletal: Positive for myalgias.   Skin: Negative for rash and wound.   Neurological: Negative.    Psychiatric/Behavioral: Negative.    All other systems reviewed and are negative.      Physical Exam   BP: (!) 142/103  Pulse: 105  Temp: 102.3  F (39.1  C)  Resp: 20  Weight: 95.3 kg (210 lb)  SpO2: 94 %  Physical Exam   Constitutional: She is oriented to person, place, and time. She appears well-developed and well-nourished. She appears distressed.   HENT:   Head: Normocephalic and atraumatic.   Mouth/Throat: Oropharynx is clear and moist.   Eyes: Conjunctivae and EOM are normal. Pupils are equal, round, and reactive to light.   Neck: Normal range of motion. Neck supple.   Cardiovascular: Regular rhythm, intact distal pulses and normal pulses.  Tachycardia present.    Pulmonary/Chest: Breath sounds normal. No respiratory distress. She has no wheezes. She has no rhonchi. She has no rales.       Abdominal: Soft. Normal appearance. Bowel sounds are increased. There is tenderness in the right upper quadrant.       Neurological: She is alert and oriented to person, place, and time. She has normal strength.   Skin: Skin is warm and intact. No rash noted. She is not diaphoretic.   Psychiatric: Her behavior is normal. Thought content normal. Her mood appears anxious. Her affect is labile. Her speech is  rapid and/or pressured. Cognition and memory are normal.   Nursing note and vitals reviewed.      ED Course     ED Course     Procedures             Critical Care time:  none         Results for orders placed or performed during the hospital encounter of 07/27/17   XR Chest Port 1 View    Narrative    XR CHEST PORT 1 VW  7/27/2017 9:32 PM     HISTORY:  Fever    COMPARISON: Film dated 1/26/2017    FINDINGS:  Right Port-A-Cath is in place. Heart is normal in size.  Lungs are clear.      Impression    IMPRESSION: No active infiltrate.   XR Abdomen Port 2 Views    Narrative    XR ABDOMEN PORT 2 VW  7/27/2017 9:33 PM     HISTORY:  Chronic/acute abd pain recent IR feeding tube replacement,  fever    COMPARISON: None.    FINDINGS:  A gastrostomy tube is in place. No free air is identified.  Gas is seen in large and small bowel. No evidence for bowel  obstruction.      Impression    IMPRESSION: Normal gas pattern.   Comprehensive metabolic panel   Result Value Ref Range    Sodium 138 133 - 144 mmol/L    Potassium 3.6 3.4 - 5.3 mmol/L    Chloride 104 94 - 109 mmol/L    Carbon Dioxide 27 20 - 32 mmol/L    Anion Gap 7 3 - 14 mmol/L    Glucose 113 (H) 70 - 99 mg/dL    Urea Nitrogen 4 (L) 7 - 30 mg/dL    Creatinine 0.90 0.52 - 1.04 mg/dL    GFR Estimate 70 >60 mL/min/1.7m2    GFR Estimate If Black 85 >60 mL/min/1.7m2    Calcium 8.9 8.5 - 10.1 mg/dL    Bilirubin Total 1.2 0.2 - 1.3 mg/dL    Albumin 3.2 (L) 3.4 - 5.0 g/dL    Protein Total 7.7 6.8 - 8.8 g/dL    Alkaline Phosphatase 158 (H) 40 - 150 U/L    ALT 33 0 - 50 U/L    AST 27 0 - 45 U/L   CBC with platelets differential   Result Value Ref Range    WBC 7.8 4.0 - 11.0 10e9/L    RBC Count 4.13 3.8 - 5.2 10e12/L    Hemoglobin 10.2 (L) 11.7 - 15.7 g/dL    Hematocrit 32.8 (L) 35.0 - 47.0 %    MCV 79 78 - 100 fl    MCH 24.7 (L) 26.5 - 33.0 pg    MCHC 31.1 (L) 31.5 - 36.5 g/dL    RDW 18.1 (H) 10.0 - 15.0 %    Platelet Count 265 150 - 450 10e9/L    Diff Method Automated Method     %  Neutrophils 87.6 %    % Lymphocytes 5.0 %    % Monocytes 6.3 %    % Eosinophils 0.5 %    % Basophils 0.3 %    % Immature Granulocytes 0.3 %    Absolute Neutrophil 6.9 1.6 - 8.3 10e9/L    Absolute Lymphocytes 0.4 (L) 0.8 - 5.3 10e9/L    Absolute Monocytes 0.5 0.0 - 1.3 10e9/L    Absolute Eosinophils 0.0 0.0 - 0.7 10e9/L    Absolute Basophils 0.0 0.0 - 0.2 10e9/L    Abs Immature Granulocytes 0.0 0 - 0.4 10e9/L   Lactic acid whole blood   Result Value Ref Range    Lactic Acid 0.9 0.7 - 2.1 mmol/L   UA with Microscopic   Result Value Ref Range    Color Urine Straw     Appearance Urine Clear     Glucose Urine Negative NEG mg/dL    Bilirubin Urine Negative NEG    Ketones Urine Negative NEG mg/dL    Specific Gravity Urine 1.002 (L) 1.003 - 1.035    Blood Urine Negative NEG    pH Urine 7.0 5.0 - 7.0 pH    Protein Albumin Urine Negative NEG mg/dL    Urobilinogen mg/dL 0.0 0.0 - 2.0 mg/dL    Nitrite Urine Negative NEG    Leukocyte Esterase Urine Negative NEG    Source Midstream Urine     WBC Urine <1 0 - 2 /HPF    RBC Urine 0 0 - 2 /HPF    Squamous Epithelial /HPF Urine <1 0 - 1 /HPF   Blood gas venous   Result Value Ref Range    Ph Venous 7.44 (H) 7.32 - 7.43 pH    PCO2 Venous 41 40 - 50 mm Hg    PO2 Venous 33 25 - 47 mm Hg    Bicarbonate Venous 28 21 - 28 mmol/L    Base Excess Venous 3.0 mmol/L    FIO2 21      *Note: Due to a large number of results and/or encounters for the requested time period, some results have not been displayed. A complete set of results can be found in Results Review.     Medications ordered to be administered in the ER:    Medications   lidocaine 1 % 1 mL (not administered)   lidocaine (LMX4) kit (not administered)   sodium chloride (PF) 0.9% PF flush 3 mL (not administered)   sodium chloride (PF) 0.9% PF flush 3 mL (3 mLs Intravenous Not Given 7/27/17 2038)   lactated ringers infusion (not administered)   ondansetron (ZOFRAN) injection 4 mg (4 mg Intravenous Given 7/27/17 2023)   vancomycin  (VANCOCIN) 2,000 mg in NaCl 0.9 % 500 mL intermittent infusion (2,000 mg Intravenous New Bag 7/27/17 2052)   lactated ringers BOLUS 2,859 mL (2,859 mLs Intravenous New Bag 7/27/17 2026)   meropenem (MERREM) 1 g vial to attach to  mL bag (1 g Intravenous New Bag 7/27/17 2142)   HYDROmorphone (PF) (DILAUDID) injection 0.5 mg (0.5 mg Intravenous Given 7/27/17 2204)       Assessments & Plan (with Medical Decision Making)  37-year-old female to the emergency room secondary to concerns of chills, fever, increasing abdominal pains.  Patient was discharged from the [facility] yesterday after having her gastric feeding tube replaced.  She states that she woke this morning with chills and just not feeling well with increased abdominal pain symptoms.  She had increased symptoms through the day to include a fever 104 at 1:00 today.  Despite the use of Tylenol she had continued fever and chills.  Patient is difficult to evaluate because of her chronic abdominal pain issues.  The feeding tube appeared to be an appropriate position and functional.  Patient has a catheter port in her right anterior chest wall and this also did not show signs of significant inflammation or swelling.  Patient's laboratory screening exam as well as chest x-ray and abdominal x-ray are without signs of acute infection/infiltrate/free air.  Because of her recent procedure, indwelling catheters and access ports, history of possible Munchausen's syndrome, and chronic health issues, blood cultures and IV antibiotics were initiated.  I recommended to the patient that she remain in the hospital on IV antibiotics pending culture results and further evaluation/observation for cause of her febrile illness. I spoke to Dr. Mario, hospitalist, who agrees to take over the care of the patient and came to the ER to evaluate the patient and place orders for the hospital floor in the [facility] EMR.         I have reviewed the nursing notes.    I  have reviewed the findings, diagnosis, plan and need for hospitalization with the patient and her family.     Final diagnoses:   Fever, unspecified   Chills   Abdominal pain, generalized   Chronic abdominal pain   Munchausen syndrome - previously suspected   Anxiety   S/P partial resection of colon   Jejunostomy tube present (H)   Encounter for care related to vascular access port - Possible source for recent fever illness - cultures pending.       7/27/2017   [facility] EMERGENCY DEPARTMENT     Jake Rodriguez,   07/27/17 1774

## 2017-07-28 NOTE — PROGRESS NOTES
SPIRITUAL HEALTH SERVICES  SPIRITUAL ASSESSMENT Progress Note  [facility]      Pt declined a visit from .   is available for pt/family needs.    Marshal Blackman M.Div., [facility]  Staff   Office tel: [phone]

## 2017-07-28 NOTE — PHARMACY-VANCOMYCIN DOSING SERVICE
Pharmacy Vancomycin Initial Note  Date of Service 2017  Patient's  1980  37 year old, female    Indication: Sepsis    Current estimated CrCl = Estimated Creatinine Clearance: 100.5 mL/min (based on Cr of 0.9).    Creatinine for last 3 days  2017:  7:40 PM Creatinine 0.97 mg/dL  2017:  6:54 AM Creatinine 0.99 mg/dL  2017:  7:50 PM Creatinine 0.90 mg/dL    Recent Vancomycin Level(s) for last 3 days  No results found for requested labs within last 72 hours.      Vancomycin IV Administrations (past 72 hours)                   vancomycin (VANCOCIN) 2,000 mg in NaCl 0.9 % 500 mL intermittent infusion (mg) 2,000 mg New Bag 17                Nephrotoxins and other renal medications (Future)    Start     Dose/Rate Route Frequency Ordered Stop    17  vancomycin (VANCOCIN) 2,000 mg in NaCl 0.9 % 500 mL intermittent infusion      2,000 mg Intravenous EVERY 12 HOURS 17            Contrast Orders - past 72 hours     None                Plan:  1.  Start vancomycin  2000 mg IV q12h.   2.  Goal Trough Level: 15-20 mg/L   3.  Pharmacy will check trough levels as appropriate in 1-3 Days.    4. Serum creatinine levels will be ordered daily for the first week of therapy and at least twice weekly for subsequent weeks.    5. Culver method utilized to dose vancomycin therapy: Method 2    Ghanshyam Jane, PharmD.

## 2017-07-28 NOTE — PROGRESS NOTES
Patient alert and oriented, A febrile VSS, up ad berny. Constant abdominal pain, moderately controlled with PO dilaudid Q3h. Encouraged to walk due to small amount of edema in ankles an feet. Port-a-cath infusing maintenance fluids as ordered. Peg tube clamped. Complaining of nausea, zofran IV given with effect. Tylenol to prevent fever.

## 2017-07-28 NOTE — PLAN OF CARE
Problem: Pain, Acute (Adult)  Goal: Identify Related Risk Factors and Signs and Symptoms  Related risk factors and signs and symptoms are identified upon initiation of Human Response Clinical Practice Guideline (CPG)  Outcome: No Change  Pain well controlled with PO dilaudid Q3h. Stating pain is a 5/10 most of the time.

## 2017-07-28 NOTE — ED NOTES
ED Nursing criteria listed below was addressed during verbal handoff:     Abnormal vitals: Yes  Abnormal results: Yes  Med Reconciliation completed: Yes  Meds given in ED: Yes  Any Overdue Meds: Yes  Core Measures: Yes  Isolation: No  Special needs: Yes  Skin assessment: Yes    Observation Patient  Education provided: No - Not Observation

## 2017-07-28 NOTE — PLAN OF CARE
Problem: Goal Outcome Summary  Goal: Goal Outcome Summary  Outcome: Improving  Tmax 103.1 treated with tylenol, pain controlled with oral dilaudid, zofran given x1, up independently in halls, right chest port infusing with good blood return, g-tube site CDI

## 2017-07-28 NOTE — ED NOTES
Nurse Sepsis Evaluation Note    Any Sepsis BPA alert activated: YES  Action Taken: ED Sepsis Urgent Management Order Panel Adult initiated and MD notified    Infection Present: not known    Vital signs:  Temp: 100.1  F (37.8  C) Temp src: Oral BP: (!) 113/95 Pulse: 105 Heart Rate: 77 Resp: 16 SpO2: 95 % O2 Device: None (Room air)      Labs:  WBC   Date Value Ref Range Status   07/27/2017 7.8 4.0 - 11.0 10e9/L Final     Platelet Count   Date Value Ref Range Status   07/27/2017 265 150 - 450 10e9/L Final     Lactic Acid   Date Value Ref Range Status   07/27/2017 0.9 0.7 - 2.1 mmol/L Final     Creatinine   Date Value Ref Range Status   07/27/2017 0.90 0.52 - 1.04 mg/dL Final     INR   Date Value Ref Range Status   07/20/2017 1.02 0.86 - 1.14 Final     Bilirubin Total   Date Value Ref Range Status   07/27/2017 1.2 0.2 - 1.3 mg/dL Final     Repeat lactic acid not ordered for 0 hours from previous lactate result      The patient has signs of altered level of consciousness suspicious for Confusion - GCS 14.    The rest of their physical exam is significant for:    Blood cultures drawn prior to antibiotics: YES  The patient is currently on the following antibiotics:  Anti-infectives (Future)    Start     Dose/Rate Route Frequency Ordered Stop    07/27/17 2100  vancomycin (VANCOCIN) 2,000 mg in NaCl 0.9 % 500 mL intermittent infusion      2,000 mg Intravenous EVERY 12 HOURS 07/27/17 2027              Fluid: Fluid bolus ordered  Bolus amount ordered lactated Ringer's 2000 cc's.     Patient weight:   Wt Readings from Last 1 Encounters:   07/27/17 95.3 kg (210 lb)         Were there delays in treatment? If yes, why? NO    Disposition: The patient will be transferred to Inpatient - Med / Surg for admission.     Doreen Peralta  July 27, 2017  11:30 PM

## 2017-07-28 NOTE — PROGRESS NOTES
Called lab at direction of Dr. Lazcano to see if they could tell what source the blood cultures were drawn from (peripheral vs port).  At this time, the lab stated one blood culture was drawn from peripheral and one from port.  The lab also reported as this writer was on the phone that one blood culture- the one from the port was now positive with gram negative growth.  Awaiting further ID and sensitivities.  Dr. Lazcano made aware.

## 2017-07-28 NOTE — ED NOTES
Patient's glued surgical incision is very close to port - some blood leaked from incision underneath Tegaderm.  Removed dressing, cleaned underneath, installed Biopatch and replaced dressing.

## 2017-07-28 NOTE — PROGRESS NOTES
[facility]    Hospitalist Progress Note    Date of Service (when I saw the patient): 07/28/2017    Assessment & Plan   Doreen Peralta is a 37 year old female who was admitted on 7/27/2017.     Active Problems:    Sepsis (H)    Assessment: still with high fever overnight.  Feels somewhat better this with temps down. WBC, mentation and BP remain normal.     Plan: continue vanco and zosyn.  Monitor temps and WBC.  Follow culture results.        Anemia in other chronic diseases classified elsewhere    Assessment: chronic and stable    Plan: follow      Chronic abdominal pain    Assessment: doing well on oral dilaudid    Plan: no change      History of deep venous thrombosis    Assessment: on PCD and xarelto not started since having port placed    Plan: no change      Intermittent asthma    Assessment: controlled    Plan: no change      PEG (percutaneous endoscopic gastrostomy) status    Assessment: using for decompression    Plan: no change    DVT Prophylaxis: Pneumatic Compression Devices and Ambulate every shift  Code Status: Full Code    Disposition: Expected discharge in 2 days once fevers resolved for 24 hours and cultures negative.    Nikhil Mario MD    Interval History   Had chills again through the night with fever.  Abdominal pain controlled with oral dilaudid.  Chronic diarrhea but normal for her    -Data reviewed today: I reviewed all new labs and imaging results over the last 24 hours. I personally reviewed no images or EKG's today.    Physical Exam   Temp: 99.6  F (37.6  C) Temp src: Oral BP: 121/61 Pulse: 87 Heart Rate: 87 Resp: 16 SpO2: 91 % O2 Device: None (Room air)    Vitals:    07/27/17 1942 07/27/17 2339   Weight: 95.3 kg (210 lb) 95.5 kg (210 lb 8.6 oz)     Vital Signs with Ranges  Temp:  [98.7  F (37.1  C)-103.1  F (39.5  C)] 99.6  F (37.6  C)  Pulse:  [] 87  Heart Rate:  [77-99] 87  Resp:  [11-24] 16  BP: (113-148)/() 121/61  SpO2:  [91 %-95 %]  91 %       Lungs:  CTA auscultation throughout        Cardiovascular:   RRR with no murmur, rub or gallop     Abdomen:   +BS.  Soft, NT     Port and PEG are without drainage or erythema    Medications     dextrose 5% and 0.9% NaCl 100 mL/hr at 07/28/17 0016       vancomycin (VANCOCIN) IV  2,000 mg Intravenous Q12H     DULoxetine  60 mg Oral Daily     mirtazapine  7.5 mg Oral At Bedtime     meropenem  1 g Intravenous Q8H       Data     Recent Labs  Lab 07/28/17  0615 07/27/17  1950 07/26/17  0654 07/25/17  1940   WBC 9.0 7.8 7.8 12.1*   HGB 9.3* 10.2* 9.3* 10.8*   MCV 81 79 81 79    265 274 369   NA  --  138 139 141   POTASSIUM  --  3.6 3.2* 3.9   CHLORIDE  --  104 106 107   CO2  --  27 24 24   BUN  --  4* 7 6*   CR  --  0.90 0.99 0.97   ANIONGAP  --  7 9 10   TARA  --  8.9 8.2* 9.1   GLC  --  113* 120* 86   ALBUMIN  --  3.2*  --  3.4   PROTTOTAL  --  7.7  --  7.9   BILITOTAL  --  1.2  --  0.6   ALKPHOS  --  158*  --  152*   ALT  --  33  --  26   AST  --  27  --  17   LIPASE  --   --   --  111       Recent Results (from the past 24 hour(s))   XR Chest Port 1 View    Narrative    XR CHEST PORT 1 VW  7/27/2017 9:32 PM     HISTORY:  Fever    COMPARISON: Film dated 1/26/2017    FINDINGS:  Right Port-A-Cath is in place. Heart is normal in size.  Lungs are clear.      Impression    IMPRESSION: No active infiltrate.    NELIDA MCMAHON MD   XR Abdomen Port 2 Views    Narrative    XR ABDOMEN PORT 2 VW  7/27/2017 9:33 PM     HISTORY:  Chronic/acute abd pain recent IR feeding tube replacement,  fever    COMPARISON: None.    FINDINGS:  A gastrostomy tube is in place. No free air is identified.  Gas is seen in large and small bowel. No evidence for bowel  obstruction.      Impression    IMPRESSION: Normal gas pattern.    NELIDA MCMAHON MD

## 2017-07-28 NOTE — H&P
[facility]    History and Physical  Hospitalist       Date of Admission:  7/27/2017  Date of Service (when I saw the patient): 07/27/17    Assessment & Plan       Active Problems:    Sepsis (H)    Assessment: sepsis is suspected with fever and tachycardia.  She also had brief confusion when her fever was high but that resolved.  She has no urinary or respiratory symptoms.  No headache, cough or SOB.  No change in her stools.  She just had a port placed 7 days ago and her G tube was just replaced two days ago.  At this point the source of her infection is not known but sepsis is highly suspected.      Plan: admit to inpatient, BC and UC done, start meropenem and vanco, follow temps and CBC      Anemia in other chronic diseases classified elsewhere    Assessment: chronic and stable    Plan: monitor      Chronic abdominal pain    Assessment: some mild worsening today but currently appears comfortable.  She does better with po narcotics and has not been vomiting.    Plan: continue po dilaudid      History of deep venous thrombosis    Assessment: noted past history.  EDGARDO stopped when port placed    Plan:  Ambulate and PCD for now      Intermittent asthma    Assessment: controlled    Plan: pren albuterol      PEG (percutaneous endoscopic gastrostomy) status    Assessment: tube site looks good    Plan: can use for decompression    DVT Prophylaxis: Pneumatic Compression Devices and Ambulate every shift  Code Status: Full Code    Disposition: Expected discharge in 2-3 days once cultures are negative and fevers resolved.    Nikhil Mario MD    Primary Care Physician   Larisa Lorenzo    Chief Complaint   Fever    History is obtained from the patient    History of Present Illness   Doreen Peralta is a 37 year old female who presents with fever.  She had a port placed 7 days ago and has had previous problems with central line infections.  She had her G tube replaced 2 days ago.  She  had mild increased abdominal pain today and was having slightly more nausea but otherwise was not having other symptoms.  She has not had headache, sore throat, cough, SOB, CP, change in her stools, skin sores, dysuria or urgency.  She had fever over 104 at home and therefore presented to the ED and is now being admitted.    Past Medical History    I have reviewed this patient's medical history and updated it with pertinent information if needed.   Past Medical History:   Diagnosis Date     Asthma      Bilateral ovarian cysts      Cervical cancer (H) 01/01/2008    cervical cancer      Chronic pain      Colonic dysmotility     s/p subtotal colectomy     Constipation     chronic     E. coli sepsis (H) 5/8/2016     Enteritis      Fungemia 5/5/2016     Gastro-oesophageal reflux disease      H/O ileostomy      Hx of abnormal Pap smear     s/p LEEP - no further details provided     Hypertension      IBS (irritable bowel syndrome)      Other chronic pain      PONV (postoperative nausea and vomiting)      Thrombosis, hepatic vein (H)     microvascular       Past [facility] History   I have reviewed this patient's surgical history and updated it with pertinent information if needed.  Past [facility] History:   Procedure Laterality Date     COLONOSCOPY  7/10/2012    Procedure: COLONOSCOPY;;  Surgeon: Nicole Redding MD;  [facility]     COLONOSCOPY N/A 2/19/2017    Procedure: COLONOSCOPY;  Surgeon: Randell Muller MD;  [facility]     COLONOSCOPY N/A 2/21/2017    Procedure: COLONOSCOPY;  Surgeon: Randell Muller MD;  [facility]     ECHO CHELO  7/19/2016          ENDOSCOPIC INSERTION TUBE GASTROSTOMY N/A 1/21/2016    Procedure: ENDOSCOPIC INSERTION TUBE GASTROSTOMY;  Surgeon: Nicole Redding MD;  [facility]     ESOPHAGOSCOPY, GASTROSCOPY, DUODENOSCOPY (EGD), COMBINED  7/10/2012    Procedure: COMBINED ESOPHAGOSCOPY, GASTROSCOPY, DUODENOSCOPY (EGD);  Upper Endoscopy, Ileoscopy    Latex Allergy   with biopsies;  Surgeon: Nicole Redding MD;  [facility]     ESOPHAGOSCOPY, GASTROSCOPY, DUODENOSCOPY (EGD), COMBINED N/A 11/5/2014    Procedure: COMBINED ESOPHAGOSCOPY, GASTROSCOPY, DUODENOSCOPY (EGD);  Surgeon: Nicole Redding MD;  [facility]     HC REPLACE DUODENOSTOMY/JEJUNOSTOMY TUBE PERCUTANEOUS N/A 8/27/2015    Procedure: REPLACE GASTROJEJUNOSTOMY TUBE, PERCUTANEOUS;  Surgeon: Mio Holder MD;  [facility]     HC REPLACE DUODENOSTOMY/JEJUNOSTOMY TUBE PERCUTANEOUS N/A 1/7/2016    Procedure: REPLACE JEJUNOSTOMY TUBE, PERCUTANEOUS;  Surgeon: Elsa Medel MD;  [facility]     HC REPLACE DUODENOSTOMY/JEJUNOSTOMY TUBE PERCUTANEOUS N/A 1/28/2016    Procedure: REPLACE JEJUNOSTOMY TUBE, PERCUTANEOUS;  Surgeon: Elsa Medel MD;  [facility]     HC REPLACE GASTROSTOMY/CECOSTOMY TUBE PERCUTANEOUS Left 5/19/2015    Procedure: REPLACE GASTROSTOMY TUBE, PERCUTANEOUS;  Surgeon: Melecio Morejon Chi, MD;  [facility]     HC UGI ENDOSCOPY W PLACEMENT GASTROSTOMY TUBE PERCUT N/A 10/1/2015    Procedure: COMBINED ESOPHAGOSCOPY, GASTROSCOPY, DUODENOSCOPY (EGD), PLACE PERCUTANEOUS ENDOSCOPIC GASTROSTOMY TUBE;  Surgeon: Mio Holder MD;  [facility]     INSERT PORT VASCULAR ACCESS Right 7/20/2017    Procedure: INSERT PORT VASCULAR ACCESS;  Chest Port Placement  **Latex Allergy**;  Surgeon: Coy Rocha PA-C;  [facility]     LAPAROSCOPIC ASSISTED COLECTOMY  1/20/2012    Procedure:LAPAROSCOPIC ASSISTED COLECTOMY; Laparoscopic Ileostomy       LAPAROSCOPIC ASSISTED COLECTOMY LEFT (DESCENDING)  10/24/2012    Procedure: LAPAROSCOPIC ASSISTED COLECTOMY LEFT (DESCENDING);   Hand Assisted Laproscopic Subtotal abdominal Colectomy,Iieorectal anastamosis, Ileostomy Closure.       LAPAROSCOPIC ASSISTED INSERTION TUBE JEJUNOSTOMY N/A 10/16/2015    Procedure: LAPAROSCOPIC ASSISTED INSERTION TUBE JEJUNOSTOMY;  Surgeon: Elsa Medel MD;  [facility]      LAPAROSCOPIC CHOLECYSTECTOMY  2002    [facility] stones duct     LAPAROSCOPIC ILEOSTOMY  1/20/2012    [facility], loop     LAPAROSCOPIC OOPHORECTOMY Right 2009    [facility]     LAPAROTOMY EXPLORATORY N/A 1/28/2016    Procedure: LAPAROTOMY EXPLORATORY;  Surgeon: Elsa Medel MD;  [facility]     LEEP TX, CERVICAL  2009    [facility]     PICC INSERTION Left 10/21/2015    5fr DL Power PICC, 37cm (2cm external) in the L basilic vein w/ tip in the SVC RA junction.     REMOVE GASTROSTOMY TUBE ADULT N/A 12/12/2014    Procedure: REMOVE GASTROSTOMY TUBE ADULT;  Surgeon: Nicole Redding MD;  [facility]     REMOVE PORT VASCULAR ACCESS Right 6/30/2016    Procedure: REMOVE PORT VASCULAR ACCESS;  Surgeon: Pradeep Orosco MD;  Location: [facility]     replace GASTROSTOMY TUBE ADULT  5/19/15       Prior to Admission Medications   Prior to Admission Medications   Prescriptions Last Dose Informant Patient Reported? Taking?   ACETAMINOPHEN PO 7/27/2017 at 1630 Self Yes Yes   Sig: Take 500-1,000 mg by mouth every 6 hours as needed for pain    DULoxetine (CYMBALTA) 60 MG capsule 7/27/2017 at 0900  No Yes   Sig: Take 1 capsule (60 mg) by mouth daily   HYDROmorphone (DILAUDID) 2 MG tablet 7/27/2017 at 0800  No Yes   Sig: Take 1 tablet (2 mg) by mouth every 3 hours as needed for moderate to severe pain   SUMAtriptan (IMITREX) 50 MG tablet 7/27/2017 at 0900  No Yes   Sig: Take 1-2 tablets ( mg) by mouth at onset of headache for migraine - may repeat dose after 2h if headache recurs.  Max: 200mg/24 hours   albuterol (2.5 MG/3ML) 0.083% neb solution More than a month at Unknown time  Yes No   Sig: Inhale 2.5 mg into the lungs   albuterol (PROAIR HFA/PROVENTIL HFA/VENTOLIN HFA) 108 (90 BASE) MCG/ACT Inhaler More than a month at Unknown time  Yes No   Sig: Inhale 1 puff into the lungs   albuterol 90 MCG/ACT inhaler More than a month at Unknown time Self Yes No   Sig: Inhale 2 puffs into the lungs every 6  hours as needed    cloNIDine (CATAPRES) 0.2 MG tablet 2017 at 2030  No Yes   Sig: TAKE 2 TABLETS(0.4 MG) BY MOUTH EVERY EVENING   diphenhydrAMINE (BENADRYL ALLERGY) 25 MG tablet 2017 at 1910  No Yes   Sig: Take 1 tablet (25 mg) by mouth every 6 hours as needed for itching or other (Nausea)   ipratropium (ATROVENT) 0.02 % neb solution More than a month at Unknown time  Yes No   Sig: Inhale 0.5 mg into the lungs   mirtazapine (REMERON SOL-TAB) 15 MG ODT tab 2017 at 2000  No Yes   Si.5 tablets (7.5 mg) by Orally disintegrating tablet route At Bedtime   nortriptyline (PAMELOR) 10 MG capsule 2017 at 2000  No Yes   Sig: Take 3-4 capsules (30-40 mg) by mouth At Bedtime   ondansetron (ZOFRAN ODT) 4 MG ODT tab 2017 at 1000  No Yes   Sig: Take 1-2 tablets (4-8 mg) by mouth every 6 hours as needed for nausea   traZODone (DESYREL) 50 MG tablet 2017 at 2000  No Yes   Sig: Take 1-2 tablets ( mg) by mouth nightly as needed 1-2 tabs at bedtime PRN      Facility-Administered Medications: None     Allergies   Allergies   Allergen Reactions     Hyoscyamine Rash     Metoclopramide Other (See Comments)     Eye twitching.      Peaches [Peach] Other (See Comments)     Raw. Cooked OK     Sucralose Other (See Comments)     All artificial sweeteners. Aspartame also. Swollen glands     Advair Diskus Other (See Comments)     Throat burns     Azithromycin Other (See Comments)     Burning in throat     Compazine [Prochlorperazine] Visual Disturbance     Contrast Dye Itching     States is allergic to CT contrast dye     Cyclobenzaprine Visual Disturbance     Fentanyl Other (See Comments)     migraine     Ibuprofen GI Disturbance     Lactulose Nausea and Vomiting     Gas and bloating     Levaquin [Levofloxacin] Swelling     Per ED M.D. And RN      Morphine Sulfate Other (See Comments)     Chest pain       Oxycodone Other (See Comments)     Burning throat, but can take Norco.      Penicillins Other (See  Comments)     Family hx of resp arrest, she has never taken  Ok with cephalosporins     Rizatriptan Visual Disturbance     Droperidol Hives and Rash     Isovue [Iopamidol] Palpitations     Pt had racing heart and sob      Ketorolac Anxiety     Latex Swelling and Rash     Kiwi, likely also avacado, ? banana     Levsin Rash       Social History   I have reviewed this patient's social history and updated it with pertinent information if needed. Doreen Peralta  reports that she quit smoking about 13 years ago. Her smoking use included Cigarettes. She has a 4.00 pack-year smoking history. She has quit using smokeless tobacco. She reports that she does not drink alcohol or use illicit drugs.    Family History   I have reviewed this patient's family history and updated it with pertinent information if needed.   Family History   Problem Relation Age of Onset     Thyroid Disease Mother      Sjogren's Mother      GASTROINTESTINAL DISEASE Mother      Intermittent nausea vomiting diarrhea     Colon Polyps Mother      Prostate Problems Father      prostate enlargement     Lupus Maternal Grandmother      CANCER Maternal Grandfather      Lung     Colon Cancer Maternal Grandfather 65     CANCER Paternal Grandmother      Lung      CEREBROVASCULAR DISEASE Paternal Grandmother      DIABETES Paternal Grandmother      Cardiovascular Paternal Grandmother      CHF     CANCER Paternal Grandfather      Lung     Glaucoma Paternal Grandfather      Abdominal Aortic Aneurysm Other      Macular Degeneration No family hx of        Review of Systems   The 10 point Review of Systems is negative other than noted in the HPI or here.     Physical Exam   Temp: 102.3  F (39.1  C) Temp src: Oral BP: 133/82 Pulse: 105 Heart Rate: 99 Resp: 11 SpO2: 92 % O2 Device: None (Room air)    Vital Signs with Ranges  Temp:  [102.3  F (39.1  C)] 102.3  F (39.1  C)  Pulse:  [105] 105  Heart Rate:  [82-99] 99  Resp:  [11-20] 11  BP: (133-148)/() 133/82  SpO2:   [92 %-95 %] 92 %  210 lbs 0 oz    Constitutional:   awake, alert, cooperative, no apparent distress, and appears stated age     Eyes:   Lids and lashes normal, pupils equal, round and reactive to light, extra ocular muscles intact, sclera clear, conjunctiva normal     ENT:   Normocephalic, without obvious abnormality, atraumatic, sinuses nontender on palpation, external ears without lesions, oral pharynx with moist mucous membranes, tonsils without erythema or exudates, gums normal and good dentition.     Neck:   Supple, symmetrical, trachea midline, no adenopathy, thyroid symmetric, not enlarged and no tenderness, skin normal     Hematologic / Lymphatic:   no cervical lymphadenopathy and no supraclavicular lymphadenopathy     Back:   Symmetric, no curvature, spinous processes are non-tender on palpation, paraspinous muscles are non-tender on palpation, no costal vertebral tenderness     Lungs:   No increased work of breathing, good air exchange, clear to auscultation bilaterally, no crackles or wheezing     Cardiovascular:   Tachycardic but regular.  No audible murmur, rub or gallop     Abdomen:   +BS.  Soft.  Mild distension and mild diffuse tenderness but no focal tenderness     Neurologic:   Awake, alert, oriented to name, place and time.  Cranial nerves II-XII are grossly intact.  Motor is 5 out of 5 bilaterally.    Sensory is intact.       Skin: the skin surrounding her port and her G tube are both without erythema or drainage      Data   Data reviewed today:  I personally reviewed no images or EKG's today.    Recent Labs  Lab 07/27/17  1950 07/26/17  0654 07/25/17  1940   WBC 7.8 7.8 12.1*   HGB 10.2* 9.3* 10.8*   MCV 79 81 79    274 369    139 141   POTASSIUM 3.6 3.2* 3.9   CHLORIDE 104 106 107   CO2 27 24 24   BUN 4* 7 6*   CR 0.90 0.99 0.97   ANIONGAP 7 9 10   TARA 8.9 8.2* 9.1   * 120* 86   ALBUMIN 3.2*  --  3.4   PROTTOTAL 7.7  --  7.9   BILITOTAL 1.2  --  0.6   ALKPHOS 158*  --  152*    ALT 33  --  26   AST 27  --  17   LIPASE  --   --  111       Recent Results (from the past 24 hour(s))   XR Chest Port 1 View    Narrative    XR CHEST PORT 1 VW  7/27/2017 9:32 PM     HISTORY:  Fever    COMPARISON: Film dated 1/26/2017    FINDINGS:  Right Port-A-Cath is in place. Heart is normal in size.  Lungs are clear.      Impression    IMPRESSION: No active infiltrate.   XR Abdomen Port 2 Views    Narrative    XR ABDOMEN PORT 2 VW  7/27/2017 9:33 PM     HISTORY:  Chronic/acute abd pain recent IR feeding tube replacement,  fever    COMPARISON: None.    FINDINGS:  A gastrostomy tube is in place. No free air is identified.  Gas is seen in large and small bowel. No evidence for bowel  obstruction.      Impression    IMPRESSION: Normal gas pattern.

## 2017-07-29 LAB
BACTERIA SPEC CULT: NORMAL
ERYTHROCYTE [DISTWIDTH] IN BLOOD BY AUTOMATED COUNT: 18.7 % (ref 10–15)
HCT VFR BLD AUTO: 28.3 % (ref 35–47)
HEMOCCULT STL QL: NEGATIVE
HGB BLD-MCNC: 8.6 G/DL (ref 11.7–15.7)
MCH RBC QN AUTO: 24.9 PG (ref 26.5–33)
MCHC RBC AUTO-ENTMCNC: 30.4 G/DL (ref 31.5–36.5)
MCV RBC AUTO: 82 FL (ref 78–100)
MICRO REPORT STATUS: NORMAL
PLATELET # BLD AUTO: 168 10E9/L (ref 150–450)
RBC # BLD AUTO: 3.46 10E12/L (ref 3.8–5.2)
SPECIMEN SOURCE: NORMAL
VANCOMYCIN SERPL-MCNC: 10.5 MG/L
WBC # BLD AUTO: 6.5 10E9/L (ref 4–11)

## 2017-07-29 PROCEDURE — 12000000 ZZH R&B MED SURG/OB

## 2017-07-29 PROCEDURE — 25000132 ZZH RX MED GY IP 250 OP 250 PS 637: Performed by: INTERNAL MEDICINE

## 2017-07-29 PROCEDURE — 80202 ASSAY OF VANCOMYCIN: CPT | Performed by: PEDIATRICS

## 2017-07-29 PROCEDURE — 99233 SBSQ HOSP IP/OBS HIGH 50: CPT | Performed by: PEDIATRICS

## 2017-07-29 PROCEDURE — 25000128 H RX IP 250 OP 636: Performed by: INTERNAL MEDICINE

## 2017-07-29 PROCEDURE — 25000125 ZZHC RX 250: Performed by: INTERNAL MEDICINE

## 2017-07-29 PROCEDURE — 82272 OCCULT BLD FECES 1-3 TESTS: CPT | Performed by: PEDIATRICS

## 2017-07-29 PROCEDURE — 85027 COMPLETE CBC AUTOMATED: CPT | Performed by: INTERNAL MEDICINE

## 2017-07-29 RX ADMIN — ACETAMINOPHEN 650 MG: 325 TABLET ORAL at 21:53

## 2017-07-29 RX ADMIN — DEXTROSE AND SODIUM CHLORIDE: 5; 900 INJECTION, SOLUTION INTRAVENOUS at 09:12

## 2017-07-29 RX ADMIN — HYDROMORPHONE HYDROCHLORIDE 2 MG: 2 TABLET ORAL at 05:51

## 2017-07-29 RX ADMIN — HYDROMORPHONE HYDROCHLORIDE 2 MG: 2 TABLET ORAL at 21:53

## 2017-07-29 RX ADMIN — MEROPENEM 1 G: 1 INJECTION, POWDER, FOR SOLUTION INTRAVENOUS at 14:41

## 2017-07-29 RX ADMIN — ACETAMINOPHEN 650 MG: 325 TABLET ORAL at 03:10

## 2017-07-29 RX ADMIN — MIRTAZAPINE 7.5 MG: 7.5 TABLET, FILM COATED ORAL at 21:00

## 2017-07-29 RX ADMIN — DULOXETINE HYDROCHLORIDE 60 MG: 30 CAPSULE, DELAYED RELEASE ORAL at 08:47

## 2017-07-29 RX ADMIN — HYDROMORPHONE HYDROCHLORIDE 2 MG: 2 TABLET ORAL at 03:10

## 2017-07-29 RX ADMIN — HYDROMORPHONE HYDROCHLORIDE 2 MG: 2 TABLET ORAL at 11:57

## 2017-07-29 RX ADMIN — MEROPENEM 1 G: 1 INJECTION, POWDER, FOR SOLUTION INTRAVENOUS at 21:53

## 2017-07-29 RX ADMIN — HYDROMORPHONE HYDROCHLORIDE 2 MG: 2 TABLET ORAL at 18:45

## 2017-07-29 RX ADMIN — HYDROMORPHONE HYDROCHLORIDE 2 MG: 2 TABLET ORAL at 14:42

## 2017-07-29 RX ADMIN — ALBUTEROL SULFATE 2.5 MG: 2.5 SOLUTION RESPIRATORY (INHALATION) at 08:47

## 2017-07-29 RX ADMIN — DIPHENHYDRAMINE HYDROCHLORIDE 25 MG: 25 CAPSULE ORAL at 05:51

## 2017-07-29 RX ADMIN — VANCOMYCIN HYDROCHLORIDE 2000 MG: 1 INJECTION, POWDER, LYOPHILIZED, FOR SOLUTION INTRAVENOUS at 10:23

## 2017-07-29 RX ADMIN — DIPHENHYDRAMINE HYDROCHLORIDE 25 MG: 25 CAPSULE ORAL at 11:57

## 2017-07-29 RX ADMIN — MEROPENEM 1 G: 1 INJECTION, POWDER, FOR SOLUTION INTRAVENOUS at 05:43

## 2017-07-29 RX ADMIN — HYDROMORPHONE HYDROCHLORIDE 2 MG: 2 TABLET ORAL at 08:47

## 2017-07-29 RX ADMIN — DIPHENHYDRAMINE HYDROCHLORIDE 25 MG: 25 CAPSULE ORAL at 18:45

## 2017-07-29 RX ADMIN — ACETAMINOPHEN 650 MG: 325 TABLET ORAL at 08:47

## 2017-07-29 NOTE — PLAN OF CARE
Problem: Goal Outcome Summary  Goal: Goal Outcome Summary  Outcome: No Change  Patients vitals stable on room air. Patient complains of significant abdominal pain. Administering oral Dilaudid every 3 hours. Patient received Tylenol at 1530 and 1900. Administered Benadryl at 1530.

## 2017-07-29 NOTE — PLAN OF CARE
Problem: Goal Outcome Summary  Goal: Goal Outcome Summary  Outcome: Improving  Medicated with dilaudid every 3 hours for abdominal pain. Has appeared to sleep at intervals. No complaints of nausea.

## 2017-07-29 NOTE — PROGRESS NOTES
[facility]    Hospitalist Progress Note    Date of Service (when I saw the patient): 07/29/2017    Assessment & Plan   Doreen Peralta is a 37 year old female who was admitted on 7/27/2017 with concern for sepsis associated with positive blood culture obtained from her recently placed Port-A-Cath.  Signs of sepsis are resolving, and her peripheral blood culture remains negative so far.  Nevertheless, clinical presentation was concerning for sepsis and her pro-calcitonin level was elevated in the high risk zone raising concern that positive blood culture from her Port-A-Cath is indicative of pathologic infection rather than simple colonization.  She is having more symptoms from asthma today although does not otherwise demonstrate obvious clinical signs of asthma exacerbation.  Anemia appears to have worsened although this could be dilutional as no active bleeding is evident.  However, she did also have a recent procedure to replace her PEG.    Principal Problem:    Sepsis (H)  Active Problems:    Positive blood culture - gram neg layton from port on prelim    Intermittent asthma    PEG (percutaneous endoscopic gastrostomy) status    Anemia in other chronic diseases classified elsewhere    Chronic abdominal pain    History of deep venous thrombosis    Discontinue vancomycin but continue meropenem pending culture results  Reduce IV fluids to TKO  Follow hemoglobin  Bronchodilators as needed for asthma symptoms  Continue symptomatic treatment of abdominal pain and nausea with oral and/or IV narcotics if necessary and anti-emetics    DVT Prophylaxis: Pneumatic Compression Devices  Code Status: Full Code    Disposition: Expected discharge in 2-3 days once culture results are available and an appropriate plan for ongoing antibiotic therapy has been determined.    Gilmer Lazcano    Interval History   She says she feels worse today.  She feels generally tired, worn out, and aches all over.  Her  abdominal pain and nausea are about the same.  She has some tightness in her chest suspicious for her asthma symptoms.  Fever has subsided overnight.  She has been stable hemodynamically with normal oxygenation.  She is tolerating some oral intake.  Urine output has been adequate.    -Data reviewed today: I reviewed all new labs and imaging results over the last 24 hours. I personally reviewed no images or EKG's today.    Physical Exam   Temp: 97.9  F (36.6  C) Temp src: Oral BP: (!) 142/92 Pulse: 80 Heart Rate: 80 Resp: 16 SpO2: 97 % O2 Device: None (Room air)    Vitals:    07/27/17 1942 07/27/17 2339   Weight: 95.3 kg (210 lb) 95.5 kg (210 lb 8.6 oz)     Vital Signs with Ranges  Temp:  [97.9  F (36.6  C)-98.7  F (37.1  C)] 97.9  F (36.6  C)  Pulse:  [] 80  Heart Rate:  [] 80  Resp:  [16-18] 16  BP: (122-142)/(66-92) 142/92  SpO2:  [92 %-97 %] 97 %  I/O last 3 completed shifts:  In: 810 [P.O.:810]  Out: 300 [Emesis/NG output:300]    Constitutional: No acute distress resting in bed  Respiratory: Normal respiratory effort, adequate air movement, clear lungs  Cardiovascular: Regular rate and rhythm, no murmur, good radial pulse with normal capillary refill  GI: Nondistended abdomen, hypoactive bowel sounds, soft abdomen with tenderness but no peritoneal signs      Medications     dextrose 5% and 0.9% NaCl 100 mL/hr at 07/29/17 0912       vancomycin (VANCOCIN) IV  2,000 mg Intravenous Q12H     heparin lock flush  5-10 mL Intracatheter Q24H     heparin  5 mL Intracatheter Q28 Days     DULoxetine  60 mg Oral Daily     mirtazapine  7.5 mg Oral At Bedtime     meropenem  1 g Intravenous Q8H       Data   Data reviewed today:  I personally reviewed no images or EKG's today.    Recent Labs  Lab 07/29/17  0550 07/28/17  0615 07/27/17  1950 07/26/17  0654 07/25/17 1940   WBC 6.5 9.0 7.8 7.8 12.1*   HGB 8.6* 9.3* 10.2* 9.3* 10.8*   MCV 82 81 79 81 79    210 265 274 369   NA  --  143 138 139 141   POTASSIUM  --   3.9 3.6 3.2* 3.9   CHLORIDE  --  108 104 106 107   CO2  --  27 27 24 24   BUN  --  2* 4* 7 6*   CR  --  0.97 0.90 0.99 0.97   ANIONGAP  --  8 7 9 10   TARA  --  7.9* 8.9 8.2* 9.1   GLC  --  117* 113* 120* 86   ALBUMIN  --   --  3.2*  --  3.4   PROTTOTAL  --   --  7.7  --  7.9   BILITOTAL  --   --  1.2  --  0.6   ALKPHOS  --   --  158*  --  152*   ALT  --   --  33  --  26   AST  --   --  27  --  17   LIPASE  --   --   --   --  111     Blood culture obtained from her port is growing gram-negative rods  Blood culture obtained peripherally from her right arm is negative at 2 days  Urine culture is negative  Pro-calcitonin level at admission was 2.14 which is in the high risk zone for systemic infection

## 2017-07-30 VITALS
HEART RATE: 79 BPM | HEIGHT: 66 IN | TEMPERATURE: 98.9 F | WEIGHT: 217.15 LBS | OXYGEN SATURATION: 94 % | DIASTOLIC BLOOD PRESSURE: 83 MMHG | SYSTOLIC BLOOD PRESSURE: 132 MMHG | RESPIRATION RATE: 14 BRPM | BODY MASS INDEX: 34.9 KG/M2

## 2017-07-30 PROBLEM — A41.59 SEPSIS DUE TO KLEBSIELLA (H): Status: ACTIVE | Noted: 2017-07-27

## 2017-07-30 LAB
BACTERIA SPEC CULT: ABNORMAL
HGB BLD-MCNC: 8.7 G/DL (ref 11.7–15.7)
Lab: ABNORMAL
MICRO REPORT STATUS: ABNORMAL
MICROORGANISM SPEC CULT: ABNORMAL
PROCALCITONIN SERPL-MCNC: 1.25 NG/ML
SPECIMEN SOURCE: ABNORMAL

## 2017-07-30 PROCEDURE — 84145 PROCALCITONIN (PCT): CPT | Performed by: PEDIATRICS

## 2017-07-30 PROCEDURE — 25000128 H RX IP 250 OP 636: Performed by: INTERNAL MEDICINE

## 2017-07-30 PROCEDURE — 99239 HOSP IP/OBS DSCHRG MGMT >30: CPT | Performed by: PEDIATRICS

## 2017-07-30 PROCEDURE — 25000128 H RX IP 250 OP 636: Performed by: PEDIATRICS

## 2017-07-30 PROCEDURE — 27210995 ZZH RX 272: Performed by: PEDIATRICS

## 2017-07-30 PROCEDURE — 85018 HEMOGLOBIN: CPT | Performed by: PEDIATRICS

## 2017-07-30 PROCEDURE — 25000132 ZZH RX MED GY IP 250 OP 250 PS 637: Performed by: INTERNAL MEDICINE

## 2017-07-30 RX ORDER — CEFTRIAXONE 1 G/1
1 INJECTION, POWDER, FOR SOLUTION INTRAMUSCULAR; INTRAVENOUS EVERY 24 HOURS
Status: DISCONTINUED | OUTPATIENT
Start: 2017-07-30 | End: 2017-07-30 | Stop reason: HOSPADM

## 2017-07-30 RX ORDER — CEFTRIAXONE 1 G/1
1 INJECTION, POWDER, FOR SOLUTION INTRAMUSCULAR; INTRAVENOUS EVERY 24 HOURS
Status: CANCELLED | OUTPATIENT
Start: 2017-08-01

## 2017-07-30 RX ORDER — ALBUTEROL SULFATE 0.83 MG/ML
2.5 SOLUTION RESPIRATORY (INHALATION) EVERY 4 HOURS PRN
Qty: 360 ML | COMMUNITY
Start: 2017-07-30 | End: 2022-01-31

## 2017-07-30 RX ORDER — DEHYDRATED ALCOHOL 0.98 ML/ML
5 INJECTION, SOLUTION INTRASPINAL ONCE
Status: COMPLETED | OUTPATIENT
Start: 2017-07-30 | End: 2017-07-30

## 2017-07-30 RX ORDER — HYDROMORPHONE HYDROCHLORIDE 2 MG/1
2 TABLET ORAL
Qty: 30 TABLET | Refills: 0 | Status: SHIPPED | OUTPATIENT
Start: 2017-07-30 | End: 2017-08-06

## 2017-07-30 RX ORDER — DEHYDRATED ALCOHOL 0.98 ML/ML
INJECTION, SOLUTION INTRASPINAL
Qty: 5 ML | Refills: 0 | COMMUNITY
Start: 2017-07-30 | End: 2018-01-14

## 2017-07-30 RX ORDER — DEHYDRATED ALCOHOL 0.98 ML/ML
5 INJECTION, SOLUTION INTRASPINAL ONCE
Status: CANCELLED
Start: 2017-08-01 | End: 2017-08-01

## 2017-07-30 RX ORDER — ONDANSETRON 4 MG/1
4-8 TABLET, ORALLY DISINTEGRATING ORAL EVERY 6 HOURS PRN
Qty: 30 TABLET | Refills: 0 | Status: ON HOLD | OUTPATIENT
Start: 2017-07-30 | End: 2017-08-26

## 2017-07-30 RX ORDER — DEHYDRATED ALCOHOL 0.98 ML/ML
5 INJECTION, SOLUTION INTRASPINAL ONCE
Status: DISCONTINUED | OUTPATIENT
Start: 2017-07-30 | End: 2017-07-30 | Stop reason: CLARIF

## 2017-07-30 RX ORDER — CEFTRIAXONE 1 G/1
1 INJECTION, POWDER, FOR SOLUTION INTRAMUSCULAR; INTRAVENOUS DAILY
Qty: 110 ML | Refills: 0 | COMMUNITY
Start: 2017-07-31 | End: 2017-08-19

## 2017-07-30 RX ORDER — DEHYDRATED ALCOHOL 0.98 ML/ML
5 INJECTION, SOLUTION INTRASPINAL ONCE
Status: CANCELLED
Start: 2017-07-31 | End: 2017-07-31

## 2017-07-30 RX ADMIN — ACETAMINOPHEN 650 MG: 325 TABLET ORAL at 09:44

## 2017-07-30 RX ADMIN — MEROPENEM 1 G: 1 INJECTION, POWDER, FOR SOLUTION INTRAVENOUS at 05:58

## 2017-07-30 RX ADMIN — DIPHENHYDRAMINE HYDROCHLORIDE 25 MG: 25 CAPSULE ORAL at 12:49

## 2017-07-30 RX ADMIN — Medication 5 ML: at 14:19

## 2017-07-30 RX ADMIN — DIPHENHYDRAMINE HYDROCHLORIDE 25 MG: 25 CAPSULE ORAL at 06:51

## 2017-07-30 RX ADMIN — DIPHENHYDRAMINE HYDROCHLORIDE 25 MG: 25 CAPSULE ORAL at 00:49

## 2017-07-30 RX ADMIN — HYDROMORPHONE HYDROCHLORIDE 2 MG: 2 TABLET ORAL at 03:49

## 2017-07-30 RX ADMIN — DULOXETINE HYDROCHLORIDE 60 MG: 30 CAPSULE, DELAYED RELEASE ORAL at 09:44

## 2017-07-30 RX ADMIN — CEFTRIAXONE SODIUM 1 G: 1 INJECTION, POWDER, FOR SOLUTION INTRAMUSCULAR; INTRAVENOUS at 13:47

## 2017-07-30 RX ADMIN — ACETAMINOPHEN 650 MG: 325 TABLET ORAL at 03:49

## 2017-07-30 RX ADMIN — HYDROMORPHONE HYDROCHLORIDE 2 MG: 2 TABLET ORAL at 09:44

## 2017-07-30 RX ADMIN — HYDROMORPHONE HYDROCHLORIDE 2 MG: 2 TABLET ORAL at 06:51

## 2017-07-30 RX ADMIN — HYDROMORPHONE HYDROCHLORIDE 2 MG: 2 TABLET ORAL at 00:49

## 2017-07-30 RX ADMIN — HYDROMORPHONE HYDROCHLORIDE 2 MG: 2 TABLET ORAL at 12:50

## 2017-07-30 NOTE — PLAN OF CARE
Problem: Goal Outcome Summary  Goal: Goal Outcome Summary  Outcome: No Change  Patient continues to c/o abdominal pain and is on her call light right when her next dose of Dilaudid is available, along with Benadryl and Tylenol.  No nausea overnight.  Did report her PAC dressing was oozing and coming loose- biopatch was saturated with serosanguineous fluid so dressing changed.  Continues ambulating independently.

## 2017-07-30 NOTE — CONSULTS
CARE TRANSITION SOCIAL WORK INITIAL ASSESSMENT:  Reason For Consult: discharge planning   Met with: Patient.    DATA  Principal Problem:    Sepsis due to Klebsiella (H)  Active Problems:    Gastroparesis    Intermittent asthma    PEG (percutaneous endoscopic gastrostomy) status    Malnutrition (H)    Anemia in other chronic diseases classified elsewhere    Positive blood culture - Klebsiella oxytoca from Port-a-cath culture    Chronic diarrhea    Chronic abdominal pain    History of deep venous thrombosis       [facility]   Primary Care MD Name: Larisa Lorenzo PA-C [phone]  Contact information and PCP information verified: Yes      ASSESSMENT  Cognitive Status: awake, alert and oriented.             Lives With: spouse, child(shreya), dependent     Quality Of Family Relationships: supportive, helpful, involved  Description of Support System: Supportive, Involved   Who is your support system?: , Children, Parent(s)   Support Assessment: Adequate family and caregiver support, Adequate social supports   Insurance Concerns: No Insurance issues identified        This writer met with patient and introduced self and role. Discussed discharge planning.  Patient lives at home with her  and 3 teenage children.        PLAN    Patient plans to d/c back home today.  She will come in to outpatient infusion services 1x per day for about a week and a half for IV antibiotics.  Patient reports that she will have transportation.  Writer has spoken with nursing and the hospitalist about patient's concern that she does not have gauze and tape for her g-tube, as [facility] was rechecking with her insurance, so shipment is delayed.  Scripts to be written for supplies and patient to retain her receipt from the pharmacy if having to pay out of pocket and then submit to insurance for reimbursement.     Discharge Planner   Discharge Plans in progress: yes   Barriers to discharge plan: No    Follow up plan: Patient plans to attend all follow up appointments.        Entered by: AYDIN BOSCH 07/30/2017 11:36 AM

## 2017-07-30 NOTE — PLAN OF CARE
Problem: Goal Outcome Summary  Goal: Goal Outcome Summary  Outcome: Improving  Pt is alert and oriented. Vitals stable, afebrile. Pt is up independently in room. Pt reports abdominal pain and nausea. PRN Dilaudid, Benadryl and Tylenol given per MAR. IV in left hand would not flush so it was removed. Stool sent for Occult-Neg result.

## 2017-07-30 NOTE — PROGRESS NOTES
"S-(situation): Patient discharged to home via personal vehical with mom    B-(background): sepsis    A-(assessment): /83  Pulse 79  Temp 98.9  F (37.2  C) (Oral)  Resp 14  Ht 1.676 m (5' 6\")  Wt 98.5 kg (217 lb 2.5 oz)  LMP 07/14/2017  SpO2 94%  BMI 35.05 kg/m2  Vitals stable, pt alert and oriented. Port flushes and draws well.     R-(recommendations): Discharge instructions reviewed with pt. Listed belongings gathered and returned to patient.          Discharge Nursing Criteria:     Care Plan and Patient education resolved: Yes    New Medications- pt has been educated about purpose and side effects: Yes    Vaccines  Pneumonia Vaccine verified at discharge: Yes  Influenza status verified at discharge:  Yes      Intentionally Retained Items (examples: Drains, Wound packing, ureteral stents, feliciano, PICC line or IV)  Patient was sent home with port left in place.   Information you need to know about your procedure form filled out and copy given to patient: Yes  Follow up appointment made for removal: No    MISC  Prescriptions if needed, hard copies sent with patient  Yes  Home and hospital aquired medications returned to patient: NA  Medication Bin checked and emptied on discharge Yes  Patient reports post-discharge pain management plan is effective: Yes  "

## 2017-07-30 NOTE — DISCHARGE SUMMARY
[facility]    Discharge Summary  Hospitalist    Date of Admission:  7/27/2017  Date of Discharge:  7/30/2017  Discharging Provider: Gilmer Lazcano  Date of Service (when I saw the patient): 07/30/17    Discharge Diagnoses     Sepsis due to Klebsiella (H)    Positive blood culture - Klebsiella oxytoca from Port-a-cath culture    Gastroparesis    Chronic diarrhea    Intermittent asthma    PEG (percutaneous endoscopic gastrostomy) status    Malnutrition (H)    Anemia in other chronic diseases classified elsewhere    Chronic abdominal pain    History of deep venous thrombosis    * No resolved hospital problems. *      History of Present Illness   Doreen Peralta is an 37 year old female with highly complicated health history including complex GI history with bowel surgeries, recurring dehydration necessitating frequent IV fluid administration, malnutrition necessitating IV vitamin supplementation, and recurring infections and sepsis many of which were associated with previously indwelling vascular catheters who presented with one-day history of fever to 104 and brief confusion.  She had undergone placement of a new Port-A-Cath on July 20, infusion of IV fluids and multivitamin on July 24, and replacement of her malfunctioning PEG tube on July 25 just prior to this hospitalization.  Due to concern for sepsis, she was hospitalized.  The following problems were addressed during her hospitalization:    Problem #1 sepsis and positive blood culture with Klebsiella oxytoca.  Chest x-ray was clear. Abdominal x-ray did not demonstrate any free intraperitoneal air or radiographic signs of bowel obstruction.  Blood culture drawn from her Port-A-Cath grew Klebsiella oxytoca that was resistant to ampicillin but otherwise susceptible to other antibiotics that were tested.  Peripheral blood culture was negative throughout this hospital stay.  She did not have leukocytosis, but she was tachycardic and  had elevated pro calcitonin level.  Lactic acid level was normal.  She was initially treated empirically with meropenem and vancomycin as well as IV fluids.  She did not require vasopressors.  She quickly improved clinically with resolution of fever.  Case was discussed by telephone with infectious disease Dr. Butterfield at the [facility] who advised completing a two-week course of IV antibiotic therapy with ceftriaxone administered through her Port-A-Cath.  She was given her first dose of IV ceftriaxone on the day of discharge, and arrangements were made for her to receive daily IV ceftriaxone therapy through her Port-A-Cath to complete a course of treatment.  Infectious disease also recommended using 5 mL IV ethanol flushes to lock her Port-A-Cath after any use of her Port-A-Cath for antibiotic administration, IV fluid administration, or vitamin administration.  Ongoing use of ethanol flushes indefinitely was recommended by infectious disease.  After investigation, sepsis due to Klebsiella oxytoca was suspected and appeared to be associated with her indwelling Port-A-Cath.    Problem #2 iron deficiency anemia.  She had a slight drop in her hemoglobin that coincided with IV fluid administration and was probably dilutional as no active bleeding was evident.  She was advised to follow-up with her PCP to discuss restarting iron infusion therapy for treatment of iron deficiency given her complicated gastrointestinal history with malabsorption and malnutrition.      Her other chronic medical problems appear to be stable during this hospital stay.   She tolerated adequate oral intake.  Asthma was generally stable.    Gimler Lazcano MD    Significant Results and Procedures   No procedures performed during this admission    Pending Results   These results will be followed up by PCP  Unresulted Labs Ordered in the Past 30 Days of this Admission     Date and Time Order Name Status Description    7/30/2017 0000  "Procalcitonin In process     7/27/2017 2021 Blood culture Preliminary           Code Status   Full Code       Primary Care Physician   Larisa Lorenzo    Physical Exam   217 lbs 2.45 oz  /83  Pulse 79  Temp 98.9  F (37.2  C) (Oral)  Resp 14  Ht 1.676 m (5' 6\")  Wt 98.5 kg (217 lb 2.5 oz)  LMP 07/14/2017  SpO2 94%  BMI 35.05 kg/m2    No acute distress resting in bed   nondistended abdomen, soft    Discharge Disposition   Discharged to home  Condition at discharge: Stable    Consultations This Hospital Stay   PHARMACY TO DOSE VANCO  PHARMACY TO DOSE VANCO  SOCIAL WORK IP CONSULT    Time Spent on this Encounter   I, Gilmer Lazcano, personally saw the patient today and spent greater than 30 minutes discharging this patient.    Discharge Orders     Reason for your hospital stay   Hospitalized due to suspected infection of Port-a-cath and improved     Follow-up and recommended labs and tests    Follow up with primary care provider, Larisa Lorenzo, within 7 days for hospital follow- up.      Follow up with Dr. Price of Infectious disease within 1 month  to evaluate treatment change and for hospital follow- up.     Activity   Your activity upon discharge: no driving while on narcotic analgesics (hydromorphone)     IV access   You are going home with the following vascular access device: Port-a-Cath.     Full Code     Diet   Follow this diet upon discharge: Advance to your usual diet as tolerated       Discharge Medications   Current Discharge Medication List      START taking these medications    Details   cefTRIAXone (ROCEPHIN) 1 GM vial Inject 1 g into the vein daily  Qty: 110 mL, Refills: 0    Associated Diagnoses: Sepsis due to Klebsiella (H); Positive blood culture      ethanol, 74%, ADULT 74 % Inject IV through Port-a-cath daily for 11 days to lock Port-a-cath after each dose of ceftriaxone and also use to lock Port-a-cath after each weekly infusion of IV fluids and MVI  Qty: 5 mL, Refills: 0 "    Associated Diagnoses: Positive blood culture; Sepsis due to Klebsiella (H)         CONTINUE these medications which have CHANGED    Details   HYDROmorphone (DILAUDID) 2 MG tablet Take 1 tablet (2 mg) by mouth every 3 hours as needed for moderate to severe pain  Qty: 30 tablet, Refills: 0    Associated Diagnoses: Gastroparesis; Malfunction of gastrostomy tube (H)      albuterol (2.5 MG/3ML) 0.083% neb solution Take 1 vial (2.5 mg) by nebulization every 4 hours as needed for shortness of breath / dyspnea or wheezing  Qty: 360 mL    Associated Diagnoses: Gastrostomy tube dependent (H); SBO (small bowel obstruction) (H); Chronic abdominal pain; Chronic diarrhea      ipratropium (ATROVENT) 0.02 % neb solution Take 2.5 mLs (0.5 mg) by nebulization every 6 hours as needed for wheezing  Qty: 225 mL    Associated Diagnoses: Gastrostomy tube dependent (H); SBO (small bowel obstruction) (H); Chronic abdominal pain; Chronic diarrhea      ondansetron (ZOFRAN ODT) 4 MG ODT tab Take 1-2 tablets (4-8 mg) by mouth every 6 hours as needed for nausea  Qty: 30 tablet, Refills: 0    Associated Diagnoses: Intractable vomiting, presence of nausea not specified, unspecified vomiting type; Gastroparesis         CONTINUE these medications which have NOT CHANGED    Details   cloNIDine (CATAPRES) 0.2 MG tablet TAKE 2 TABLETS(0.4 MG) BY MOUTH EVERY EVENING  Qty: 180 tablet, Refills: 0    Associated Diagnoses: Anxiety      diphenhydrAMINE (BENADRYL ALLERGY) 25 MG tablet Take 1 tablet (25 mg) by mouth every 6 hours as needed for itching or other (Nausea)  Qty: 30 tablet, Refills: 0      traZODone (DESYREL) 50 MG tablet Take 1-2 tablets ( mg) by mouth nightly as needed 1-2 tabs at bedtime PRN  Qty: 180 tablet, Refills: 1    Associated Diagnoses: Insomnia, unspecified type      nortriptyline (PAMELOR) 10 MG capsule Take 3-4 capsules (30-40 mg) by mouth At Bedtime  Qty: 120 capsule, Refills: 5    Associated Diagnoses: Neuropathic pain;  Primary insomnia      mirtazapine (REMERON SOL-TAB) 15 MG ODT tab 0.5 tablets (7.5 mg) by Orally disintegrating tablet route At Bedtime  Qty: 45 tablet, Refills: 0    Associated Diagnoses: Anxiety      SUMAtriptan (IMITREX) 50 MG tablet Take 1-2 tablets ( mg) by mouth at onset of headache for migraine - may repeat dose after 2h if headache recurs.  Max: 200mg/24 hours  Qty: 18 tablet, Refills: 1    Comments: Due for appointment for further refills, please give reminder.  Associated Diagnoses: Migraine without aura and without status migrainosus, not intractable      DULoxetine (CYMBALTA) 60 MG capsule Take 1 capsule (60 mg) by mouth daily  Qty: 90 capsule, Refills: 3    Associated Diagnoses: Abdominal pain, epigastric; Abdominal migraine, not intractable      ACETAMINOPHEN PO Take 500-1,000 mg by mouth every 6 hours as needed for pain       albuterol 90 MCG/ACT inhaler Inhale 2 puffs into the lungs every 6 hours as needed          STOP taking these medications       albuterol (PROAIR HFA/PROVENTIL HFA/VENTOLIN HFA) 108 (90 BASE) MCG/ACT Inhaler Comments:   Reason for Stopping:             Allergies   Allergies   Allergen Reactions     Hyoscyamine Rash     Metoclopramide Other (See Comments)     Eye twitching.      Peaches [Peach] Other (See Comments)     Raw. Cooked OK     Sucralose Other (See Comments)     All artificial sweeteners. Aspartame also. Swollen glands     Advair Diskus Other (See Comments)     Throat burns     Azithromycin Other (See Comments)     Burning in throat     Compazine [Prochlorperazine] Visual Disturbance     Contrast Dye Itching     States is allergic to CT contrast dye     Cyclobenzaprine Visual Disturbance     Fentanyl Other (See Comments)     migraine     Ibuprofen GI Disturbance     Lactulose Nausea and Vomiting     Gas and bloating     Levaquin [Levofloxacin] Swelling     Per ED M.D. And RN      Morphine Sulfate Other (See Comments)     Chest pain       Oxycodone Other (See  Comments)     Burning throat, but can take Norco.      Penicillins Other (See Comments)     Family hx of resp arrest, she has never taken  Ok with cephalosporins     Rizatriptan Visual Disturbance     Droperidol Hives and Rash     Isovue [Iopamidol] Palpitations     Pt had racing heart and sob      Ketorolac Anxiety     Latex Swelling and Rash     Kiwi, likely also avacado, ? banana     Levsin Rash     Data   Most Recent 3 CBC's:  Recent Labs   Lab Test  07/30/17   0545  07/29/17   0550  07/28/17   0615  07/27/17 1950   WBC   --   6.5  9.0  7.8   HGB  8.7*  8.6*  9.3*  10.2*   MCV   --   82  81  79   PLT   --   168  210  265      Most Recent 3 BMP's:  Recent Labs   Lab Test  07/28/17   0615  07/27/17 1950 07/26/17   0654   NA  143  138  139   POTASSIUM  3.9  3.6  3.2*   CHLORIDE  108  104  106   CO2  27  27  24   BUN  2*  4*  7   CR  0.97  0.90  0.99   ANIONGAP  8  7  9   TARA  7.9*  8.9  8.2*   GLC  117*  113*  120*     Most Recent 2 LFT's:  Recent Labs   Lab Test  07/27/17 1950 07/25/17   1940   AST  27  17   ALT  33  26   ALKPHOS  158*  152*   BILITOTAL  1.2  0.6     Most Recent INR's and Anticoagulation Dosing History:  Anticoagulation Dose History     Recent Dosing and Labs Latest Ref Rng & Units 10/26/2016 11/2/2016 11/8/2016 1/5/2017 2/16/2017 4/3/2017 7/20/2017    INR 0.86 - 1.14 - - - 1.06 1.11 1.00 1.02    INR 0.86 - 1.14 3.2(A) 3.6(A) - - - - -    INR Point of Care 0.86 - 1.14 - - 1.0 - - - -          Most Recent 6 Bacteria Isolates From Any Culture (See EPIC Reports for Culture Details):  Recent Labs   Lab Test  07/27/17 2116  07/27/17 2030 07/27/17 1950 06/30/17   1326  06/30/17   1235  04/07/17   1340   CULT  10,000 to 50,000 colonies/mL Mixed gram positive hipolito  No growth after 3 days  Cultured on the 1st day of incubation: Klebsiella oxytoca  Critical Value/Significant Value, preliminary result only, called to and read   back by Manda Rodriguez at 0800 7/28/17 DS  *  10,000 to 50,000  colonies/mL Mixed gram positive hipolito  No growth after 6 days  No growth after 6 days  No growth after 6 days       Results for orders placed or performed during the hospital encounter of 07/27/17   XR Chest Port 1 View    Narrative    XR CHEST PORT 1 VW  7/27/2017 9:32 PM     HISTORY:  Fever    COMPARISON: Film dated 1/26/2017    FINDINGS:  Right Port-A-Cath is in place. Heart is normal in size.  Lungs are clear.      Impression    IMPRESSION: No active infiltrate.    NELIDA MCMAHON MD   XR Abdomen Port 2 Views    Narrative    XR ABDOMEN PORT 2 VW  7/27/2017 9:33 PM     HISTORY:  Chronic/acute abd pain recent IR feeding tube replacement,  fever    COMPARISON: None.    FINDINGS:  A gastrostomy tube is in place. No free air is identified.  Gas is seen in large and small bowel. No evidence for bowel  obstruction.      Impression    IMPRESSION: Normal gas pattern.    NELIDA MCMAHON MD     *Note: Due to a large number of results and/or encounters for the requested time period, some results have not been displayed. A complete set of results can be found in Results Review.

## 2017-07-31 ENCOUNTER — INFUSION THERAPY VISIT (OUTPATIENT)
Dept: INFUSION THERAPY | Facility: CLINIC | Age: 37
End: 2017-07-31
Attending: INTERNAL MEDICINE
Payer: COMMERCIAL

## 2017-07-31 VITALS
DIASTOLIC BLOOD PRESSURE: 99 MMHG | OXYGEN SATURATION: 97 % | HEART RATE: 81 BPM | SYSTOLIC BLOOD PRESSURE: 143 MMHG | TEMPERATURE: 97.6 F | RESPIRATION RATE: 16 BRPM

## 2017-07-31 DIAGNOSIS — A41.59 SEPSIS DUE TO KLEBSIELLA (H): Primary | ICD-10-CM

## 2017-07-31 DIAGNOSIS — R78.81 POSITIVE BLOOD CULTURE: ICD-10-CM

## 2017-07-31 DIAGNOSIS — E46 MALNUTRITION (H): ICD-10-CM

## 2017-07-31 DIAGNOSIS — Z93.1 PEG (PERCUTANEOUS ENDOSCOPIC GASTROSTOMY) STATUS (H): ICD-10-CM

## 2017-07-31 DIAGNOSIS — Z93.4 JEJUNOSTOMY TUBE PRESENT (H): ICD-10-CM

## 2017-07-31 PROCEDURE — 25000128 H RX IP 250 OP 636: Performed by: PEDIATRICS

## 2017-07-31 PROCEDURE — 27210995 ZZH RX 272: Performed by: PEDIATRICS

## 2017-07-31 PROCEDURE — 96365 THER/PROPH/DIAG IV INF INIT: CPT

## 2017-07-31 RX ORDER — DEHYDRATED ALCOHOL 0.98 ML/ML
5 INJECTION, SOLUTION INTRASPINAL ONCE
Status: CANCELLED
Start: 2017-08-01 | End: 2017-08-01

## 2017-07-31 RX ORDER — CEFTRIAXONE 1 G/1
1 INJECTION, POWDER, FOR SOLUTION INTRAMUSCULAR; INTRAVENOUS EVERY 24 HOURS
Status: DISCONTINUED | OUTPATIENT
Start: 2017-07-31 | End: 2017-07-31 | Stop reason: HOSPADM

## 2017-07-31 RX ORDER — CEFTRIAXONE 1 G/1
1 INJECTION, POWDER, FOR SOLUTION INTRAMUSCULAR; INTRAVENOUS EVERY 24 HOURS
Status: CANCELLED | OUTPATIENT
Start: 2017-08-01

## 2017-07-31 RX ORDER — DEHYDRATED ALCOHOL 0.98 ML/ML
5 INJECTION, SOLUTION INTRASPINAL ONCE
Status: COMPLETED | OUTPATIENT
Start: 2017-08-01 | End: 2017-07-31

## 2017-07-31 RX ADMIN — ALCOHOL 5 ML: 0.98 INJECTION INTRASPINAL at 10:21

## 2017-07-31 RX ADMIN — CEFTRIAXONE SODIUM 1 G: 1 INJECTION, POWDER, FOR SOLUTION INTRAMUSCULAR; INTRAVENOUS at 09:47

## 2017-07-31 ASSESSMENT — PAIN SCALES - GENERAL: PAINLEVEL: MODERATE PAIN (4)

## 2017-07-31 NOTE — PATIENT INSTRUCTIONS
Pt to return on 8/1 for Rocephin. Copies of medication list and upcoming appointments given prior to discharge.

## 2017-07-31 NOTE — MR AVS SNAPSHOT
After Visit Summary   7/31/2017    Doreen Peralta    MRN: [id number]           Patient Information     Date Of Birth          1980        Visit Information        Provider Department      7/31/2017 9:30 AM NL INFUSION CHAIR 5 [facility]        Today's Diagnoses     Sepsis due to Klebsiella (H)    -  1    Positive blood culture - Klebsiella oxytoca from Port-a-cath culture        Malnutrition (H)        Jejunostomy tube present (H)        PEG (percutaneous endoscopic gastrostomy) status          Care Instructions    Pt to return on 8/1 for Rocephin. Copies of medication list and upcoming appointments given prior to discharge.           Follow-ups after your visit        Follow-up notes from your care team     Return in 1 day (on 8/1/2017).      Your next 10 appointments already scheduled     Aug 01, 2017 10:00 AM CDT   Level 2 with NL INFUSION CHAIR 1   [facility])    [address] [place] [address] Dr Tye ARAYA [address]   [phone]            Aug 02, 2017 10:00 AM CDT   Level 2 with NL INFUSION CHAIR 4   [facility] ([facility])    [address] [place] [address] Dr Tye ARAYA [address]   [phone]            Aug 03, 2017 10:00 AM CDT   Level 2 with NL INFUSION CHAIR 3   [facility] ([facility])    [address] [place] [address] Dr Tye ARAYA [address]   [phone]            Aug 04, 2017  9:00 AM CDT   Level 2 with NL INFUSION CHAIR 3   [facility] ([facility])    [address] [place] [address] Dr Tye ARAYA [address]   [phone]            Aug 07, 2017  8:30 AM CDT   Level 2 with NL INFUSION CHAIR 5   [facility] ([facility])    [address] [place] [address] Dr Tye ARAYA [address]   [phone]            Aug 21, 2017  9:30 AM CDT   Level 2 with NL INFUSION CHAIR 1   [facility] ([facility])    [address] [facility] [address] Tye ARAYA [address]   [phone]            Aug 28, 2017  8:30 AM CDT   Level 2 with NL INFUSION CHAIR 4   [facility] ([facility])    [address] [facility] [address] Tye ARAYA [address]   [phone]            Sep 05, 2017  9:30 AM CDT   Level 2 with NL INFUSION CHAIR 5   [facility] ([facility])    [address] [facility]   [place] [address]   [phone]            Dec 04, 2017 10:00 AM CST   (Arrive by 9:45 AM)   Return Visit with Gilmer Lees MD   [facility] Gastroenterology and IBD ([facility])    [address] [place] [address]  4th [facility] [address] [phone]              Who to contact     If you have questions or need follow up information about today's clinic visit or your schedule please contact [facility] INFUSION SERVICES directly at [phone].  Normal or non-critical lab and imaging results will be communicated to you by KeepRecipeshart, letter or phone within 4 business days after the clinic has received the results. If you do not hear from us within 7 days, please contact the clinic through Automattict or phone. If you have a critical or abnormal lab result, we will notify you by phone as soon as possible.  Submit refill requests through Rockit Online or call your pharmacy and they will forward the refill request to us. Please allow 3 business days for your refill to be completed.          Additional Information About Your Visit        KeepRecipeshart Information     Rockit Online gives you secure access to your electronic health record. If you see a primary care provider, you can also send messages to your care team and make appointments. If you have questions, please call your primary care clinic.  If you do not have a primary care provider, please call [phone] and they will assist you.        Care EveryWhere ID     This is your Care  EveryWhere ID. This could be used by other organizations to access your [place] medical records  [id number]        Your Vitals Were     Pulse Temperature Respirations Last Period Pulse Oximetry       81 97.6  F (36.4  C) (Temporal) 16 07/14/2017 97%        Blood Pressure from Last 3 Encounters:   07/31/17 (!) 143/99   07/30/17 132/83   07/26/17 132/79    Weight from Last 3 Encounters:   07/30/17 98.5 kg (217 lb 2.5 oz)   07/24/17 89.8 kg (198 lb)   07/20/17 94.3 kg (207 lb 12.8 oz)              Today, you had the following     No orders found for display       Primary Care Provider Office Phone # Fax #    Larisa Lorenzo PA-C [phone]       [facility] [place] [address]  [place] [address]        Equal Access to Services     [facility] : Hadii aad ku hadasho Soomaali, waaxda luqadaha, qaybta kaalmada adeegyada, waxay idiin hayaan louise sal . So [facility] [phone].    ATENCIÓN: Si habla español, tiene a wade disposición servicios gratuitos de asistencia lingüística. Llame al [phone].    We comply with applicable federal civil rights laws and [place] laws. We do not discriminate on the basis of race, color, national origin, age, disability sex, sexual orientation or gender identity.            Thank you!     Thank you for choosing [facility] INFUSION SERVICES  for your care. Our goal is always to provide you with excellent care. Hearing back from our patients is one way we can continue to improve our services. Please take a few minutes to complete the written survey that you may receive in the mail after your visit with us. Thank you!             Your Updated Medication List - Protect others around you: Learn how to safely use, store and throw away your medicines at www.disposemymeds.org.          This list is accurate as of: 7/31/17 12:34 PM.  Always use your most recent med list.                   Brand Name Dispense Instructions for use Diagnosis     ACETAMINOPHEN PO      Take 500-1,000 mg by mouth every 6 hours as needed for pain        albuterol (2.5 MG/3ML) 0.083% neb solution     360 mL    Take 1 vial (2.5 mg) by nebulization every 4 hours as needed for shortness of breath / dyspnea or wheezing    Gastrostomy tube dependent (H), SBO (small bowel obstruction) (H), Chronic abdominal pain, Chronic diarrhea       albuterol 90 MCG/ACT inhaler      Inhale 2 puffs into the lungs every 6 hours as needed        cefTRIAXone 1 GM vial    ROCEPHIN    110 mL    Inject 1 g into the vein daily    Sepsis due to Klebsiella (H), Positive blood culture       cloNIDine 0.2 MG tablet    CATAPRES    180 tablet    TAKE 2 TABLETS(0.4 MG) BY MOUTH EVERY EVENING    Anxiety       diphenhydrAMINE 25 MG tablet    BENADRYL ALLERGY    30 tablet    Take 1 tablet (25 mg) by mouth every 6 hours as needed for itching or other (Nausea)        DULoxetine 60 MG EC capsule    CYMBALTA    90 capsule    Take 1 capsule (60 mg) by mouth daily    Abdominal pain, epigastric, Abdominal migraine, not intractable       ethanol (74%) ADULT 74 %     5 mL    Inject IV through Port-a-cath daily for 11 days to lock Port-a-cath after each dose of ceftriaxone and also use to lock Port-a-cath after each weekly infusion of IV fluids and MVI    Positive blood culture, Sepsis due to Klebsiella (H)       HYDROmorphone 2 MG tablet    DILAUDID    30 tablet    Take 1 tablet (2 mg) by mouth every 3 hours as needed for moderate to severe pain    Gastroparesis, Malfunction of gastrostomy tube (H)       ipratropium 0.02 % neb solution    ATROVENT    225 mL    Take 2.5 mLs (0.5 mg) by nebulization every 6 hours as needed for wheezing    Gastrostomy tube dependent (H), SBO (small bowel obstruction) (H), Chronic abdominal pain, Chronic diarrhea       mirtazapine 15 MG ODT tab    REMERON SOL-TAB    45 tablet    0.5 tablets (7.5 mg) by Orally disintegrating tablet route At Bedtime    Anxiety       nortriptyline 10 MG capsule     PAMELOR    120 capsule    Take 3-4 capsules (30-40 mg) by mouth At Bedtime    Neuropathic pain, Primary insomnia       ondansetron 4 MG ODT tab    ZOFRAN ODT    30 tablet    Take 1-2 tablets (4-8 mg) by mouth every 6 hours as needed for nausea    Intractable vomiting, presence of nausea not specified, unspecified vomiting type, Gastroparesis       * order for DME     1 Box    2 by 2 gauze pads, use for dressing changes as directed    Attention to G-tube (H)       * order for DME     1 each    1 inch paper tape, Use for dressing changes as directed    Attention to G-tube (H)       SUMAtriptan 50 MG tablet    IMITREX    18 tablet    Take 1-2 tablets ( mg) by mouth at onset of headache for migraine - may repeat dose after 2h if headache recurs.  Max: 200mg/24 hours    Migraine without aura and without status migrainosus, not intractable       traZODone 50 MG tablet    DESYREL    180 tablet    Take 1-2 tablets ( mg) by mouth nightly as needed 1-2 tabs at bedtime PRN    Insomnia, unspecified type       * Notice:  This list has 2 medication(s) that are the same as other medications prescribed for you. Read the directions carefully, and ask your doctor or other care provider to review them with you.

## 2017-08-01 ENCOUNTER — INFUSION THERAPY VISIT (OUTPATIENT)
Dept: INFUSION THERAPY | Facility: CLINIC | Age: 37
End: 2017-08-01
Attending: INTERNAL MEDICINE
Payer: COMMERCIAL

## 2017-08-01 VITALS
SYSTOLIC BLOOD PRESSURE: 151 MMHG | OXYGEN SATURATION: 97 % | RESPIRATION RATE: 18 BRPM | HEART RATE: 85 BPM | DIASTOLIC BLOOD PRESSURE: 95 MMHG | TEMPERATURE: 99 F

## 2017-08-01 DIAGNOSIS — E46 MALNUTRITION (H): ICD-10-CM

## 2017-08-01 DIAGNOSIS — Z93.1 PEG (PERCUTANEOUS ENDOSCOPIC GASTROSTOMY) STATUS (H): ICD-10-CM

## 2017-08-01 DIAGNOSIS — Z93.4 JEJUNOSTOMY TUBE PRESENT (H): ICD-10-CM

## 2017-08-01 DIAGNOSIS — A41.59 SEPSIS DUE TO KLEBSIELLA (H): ICD-10-CM

## 2017-08-01 DIAGNOSIS — R78.81 POSITIVE BLOOD CULTURE: ICD-10-CM

## 2017-08-01 DIAGNOSIS — R10.84 ABDOMINAL PAIN, GENERALIZED: Primary | ICD-10-CM

## 2017-08-01 DIAGNOSIS — R11.2 NAUSEA AND VOMITING, INTRACTABILITY OF VOMITING NOT SPECIFIED, UNSPECIFIED VOMITING TYPE: ICD-10-CM

## 2017-08-01 PROCEDURE — 96365 THER/PROPH/DIAG IV INF INIT: CPT

## 2017-08-01 PROCEDURE — 25000128 H RX IP 250 OP 636: Performed by: PEDIATRICS

## 2017-08-01 PROCEDURE — 27210995 ZZH RX 272: Performed by: PEDIATRICS

## 2017-08-01 RX ORDER — CEFTRIAXONE 1 G/1
1 INJECTION, POWDER, FOR SOLUTION INTRAMUSCULAR; INTRAVENOUS EVERY 24 HOURS
Status: CANCELLED | OUTPATIENT
Start: 2017-08-02

## 2017-08-01 RX ORDER — DEHYDRATED ALCOHOL 0.98 ML/ML
5 INJECTION, SOLUTION INTRASPINAL ONCE
Status: CANCELLED
Start: 2017-08-02 | End: 2017-08-02

## 2017-08-01 RX ORDER — DEHYDRATED ALCOHOL 0.98 ML/ML
5 INJECTION, SOLUTION INTRASPINAL ONCE
Status: COMPLETED | OUTPATIENT
Start: 2017-08-02 | End: 2017-08-01

## 2017-08-01 RX ORDER — CEFTRIAXONE 1 G/1
1 INJECTION, POWDER, FOR SOLUTION INTRAMUSCULAR; INTRAVENOUS EVERY 24 HOURS
Status: DISCONTINUED | OUTPATIENT
Start: 2017-08-01 | End: 2017-08-01 | Stop reason: HOSPADM

## 2017-08-01 RX ADMIN — ALCOHOL 5 ML: 0.98 INJECTION INTRASPINAL at 12:01

## 2017-08-01 RX ADMIN — CEFTRIAXONE SODIUM 1 G: 1 INJECTION, POWDER, FOR SOLUTION INTRAMUSCULAR; INTRAVENOUS at 11:16

## 2017-08-01 NOTE — MR AVS SNAPSHOT
After Visit Summary   8/1/2017    Doreen Peralta    MRN: [id number]           Patient Information     Date Of Birth          1980        Visit Information        Provider Department      8/1/2017 10:00 AM NL INFUSION CHAIR 1 [facility]        Today's Diagnoses     Abdominal pain, generalized    -  1    Nausea and vomiting, intractability of vomiting not specified, unspecified vomiting type        Sepsis due to Klebsiella (H)        Positive blood culture - Klebsiella oxytoca from Port-a-cath culture        Malnutrition (H)        Jejunostomy tube present (H)        PEG (percutaneous endoscopic gastrostomy) status          Care Instructions    Pt to ret'n 8/2 for IV Rocephin.          Follow-ups after your visit        Follow-up notes from your care team     Return in 1 day (on 8/2/2017).      Your next 10 appointments already scheduled     Aug 02, 2017 10:00 AM CDT   Level 2 with NL INFUSION CHAIR 4   [facility])    [address] [place] [address] Dr Tye ARAYA [address]   [phone]            Aug 03, 2017 10:00 AM CDT   Level 2 with NL INFUSION CHAIR 3   [facility] ([facility])    [address] [place] [address] Dr Tye ARAYA [address]   [phone]            Aug 04, 2017  9:00 AM CDT   Level 2 with NL INFUSION CHAIR 3   [facility] ([facility])    [address] [place] [address] Dr Tye ARAYA [address]   [phone]            Aug 07, 2017  8:30 AM CDT   Level 2 with NL INFUSION CHAIR 5   [facility] ([facility])    [address] [place] [address] Dr Tye ARAYA [address]   [phone]            Aug 21, 2017  9:30 AM CDT   Level 2 with NL INFUSION CHAIR 1   [facility] ([facility])    [address] [place] [address] Dr Tye ARAYA [address]   [phone]            Aug 28, 2017  8:30 AM CDT   Level 2 with NL  INFUSION CHAIR 4   [facility] ([facility])    [address] [place] [address]   [phone]            Sep 05, 2017  9:30 AM CDT   Level 2 with NL INFUSION CHAIR 5   [facility] ([facility])    [address] Tye ARAYA [address]   [phone]            Dec 04, 2017 10:00 AM CST   (Arrive by 9:45 AM)   Return Visit with Gilmer Lees MD   [facility] Gastroenterology and IBD ([facility])    [address] [facility]  4th Floor  [facility] [address] [phone]              Who to contact     If you have questions or need follow up information about today's clinic visit or your schedule please contact [facility] INFUSION SERVICES directly at [phone].  Normal or non-critical lab and imaging results will be communicated to you by MyChart, letter or phone within 4 business days after the clinic has received the results. If you do not hear from us within 7 days, please contact the clinic through Honeyhart or phone. If you have a critical or abnormal lab result, we will notify you by phone as soon as possible.  Submit refill requests through LifeVantage or call your pharmacy and they will forward the refill request to us. Please allow 3 business days for your refill to be completed.          Additional Information About Your Visit        MyChart Information     Life[employer]antage gives you secure access to your electronic health record. If you see a primary care provider, you can also send messages to your care team and make appointments. If you have questions, please call your primary care clinic.  If you do not have a primary care provider, please call [phone] and they will assist you.        Care EveryWhere ID     This is your Care EveryWhere ID. This could be used by other organizations to access your [place] medical records  [id number]        Your Vitals Were     Pulse Temperature  Respirations Last Period Pulse Oximetry       85 99  F (37.2  C) (Oral) 18 07/14/2017 97%        Blood Pressure from Last 3 Encounters:   08/01/17 (!) 151/95   07/31/17 (!) 143/99   07/30/17 132/83    Weight from Last 3 Encounters:   07/30/17 98.5 kg (217 lb 2.5 oz)   07/24/17 89.8 kg (198 lb)   07/20/17 94.3 kg (207 lb 12.8 oz)              Today, you had the following     No orders found for display       Primary Care Provider Office Phone # Fax #    Larisa Lorenzo PA-C [phone]       [facility] [place] [address]  [place] [address]        Equal Access to Services     [facility] FORREST : Hadii caitie horton hadasho Soomaali, waaxda luqadaha, qaybta kaalmada adeegyada, elham asl . So [facility] [phone].    ATENCIÓN: Si habla español, tiene a wade disposición servicios gratuitos de asistencia lingüística. LlKindred Hospital Dayton [phone].    We comply with applicable federal civil rights laws and [place] laws. We do not discriminate on the basis of race, color, national origin, age, disability sex, sexual orientation or gender identity.            Thank you!     Thank you for choosing [facility] INFUSION SERVICES  for your care. Our goal is always to provide you with excellent care. Hearing back from our patients is one way we can continue to improve our services. Please take a few minutes to complete the written survey that you may receive in the mail after your visit with us. Thank you!             Your Updated Medication List - Protect others around you: Learn how to safely use, store and throw away your medicines at www.disposemymeds.org.          This list is accurate as of: 8/1/17  4:08 PM.  Always use your most recent med list.                   Brand Name Dispense Instructions for use Diagnosis    ACETAMINOPHEN PO      Take 500-1,000 mg by mouth every 6 hours as needed for pain        albuterol (2.5 MG/3ML) 0.083% neb solution     360 mL    Take 1 vial (2.5 mg) by  nebulization every 4 hours as needed for shortness of breath / dyspnea or wheezing    Gastrostomy tube dependent (H), SBO (small bowel obstruction) (H), Chronic abdominal pain, Chronic diarrhea       albuterol 90 MCG/ACT inhaler      Inhale 2 puffs into the lungs every 6 hours as needed        cefTRIAXone 1 GM vial    ROCEPHIN    110 mL    Inject 1 g into the vein daily    Sepsis due to Klebsiella (H), Positive blood culture       cloNIDine 0.2 MG tablet    CATAPRES    180 tablet    TAKE 2 TABLETS(0.4 MG) BY MOUTH EVERY EVENING    Anxiety       diphenhydrAMINE 25 MG tablet    BENADRYL ALLERGY    30 tablet    Take 1 tablet (25 mg) by mouth every 6 hours as needed for itching or other (Nausea)        DULoxetine 60 MG EC capsule    CYMBALTA    90 capsule    Take 1 capsule (60 mg) by mouth daily    Abdominal pain, epigastric, Abdominal migraine, not intractable       ethanol (74%) ADULT 74 %     5 mL    Inject IV through Port-a-cath daily for 11 days to lock Port-a-cath after each dose of ceftriaxone and also use to lock Port-a-cath after each weekly infusion of IV fluids and MVI    Positive blood culture, Sepsis due to Klebsiella (H)       HYDROmorphone 2 MG tablet    DILAUDID    30 tablet    Take 1 tablet (2 mg) by mouth every 3 hours as needed for moderate to severe pain    Gastroparesis, Malfunction of gastrostomy tube (H)       ipratropium 0.02 % neb solution    ATROVENT    225 mL    Take 2.5 mLs (0.5 mg) by nebulization every 6 hours as needed for wheezing    Gastrostomy tube dependent (H), SBO (small bowel obstruction) (H), Chronic abdominal pain, Chronic diarrhea       mirtazapine 15 MG ODT tab    REMERON SOL-TAB    45 tablet    0.5 tablets (7.5 mg) by Orally disintegrating tablet route At Bedtime    Anxiety       nortriptyline 10 MG capsule    PAMELOR    120 capsule    Take 3-4 capsules (30-40 mg) by mouth At Bedtime    Neuropathic pain, Primary insomnia       ondansetron 4 MG ODT tab    ZOFRAN ODT    30 tablet     Take 1-2 tablets (4-8 mg) by mouth every 6 hours as needed for nausea    Intractable vomiting, presence of nausea not specified, unspecified vomiting type, Gastroparesis       * order for DME     1 Box    2 by 2 gauze pads, use for dressing changes as directed    Attention to G-tube (H)       * order for DME     1 each    1 inch paper tape, Use for dressing changes as directed    Attention to G-tube (H)       SUMAtriptan 50 MG tablet    IMITREX    18 tablet    Take 1-2 tablets ( mg) by mouth at onset of headache for migraine - may repeat dose after 2h if headache recurs.  Max: 200mg/24 hours    Migraine without aura and without status migrainosus, not intractable       traZODone 50 MG tablet    DESYREL    180 tablet    Take 1-2 tablets ( mg) by mouth nightly as needed 1-2 tabs at bedtime PRN    Insomnia, unspecified type       * Notice:  This list has 2 medication(s) that are the same as other medications prescribed for you. Read the directions carefully, and ask your doctor or other care provider to review them with you.

## 2017-08-02 ENCOUNTER — INFUSION THERAPY VISIT (OUTPATIENT)
Dept: INFUSION THERAPY | Facility: CLINIC | Age: 37
End: 2017-08-02
Attending: PEDIATRICS
Payer: COMMERCIAL

## 2017-08-02 VITALS
BODY MASS INDEX: 32.28 KG/M2 | SYSTOLIC BLOOD PRESSURE: 140 MMHG | RESPIRATION RATE: 16 BRPM | DIASTOLIC BLOOD PRESSURE: 96 MMHG | HEART RATE: 88 BPM | WEIGHT: 200 LBS | TEMPERATURE: 97.4 F

## 2017-08-02 DIAGNOSIS — E46 MALNUTRITION (H): ICD-10-CM

## 2017-08-02 DIAGNOSIS — Z93.4 JEJUNOSTOMY TUBE PRESENT (H): ICD-10-CM

## 2017-08-02 DIAGNOSIS — R78.81 POSITIVE BLOOD CULTURE: ICD-10-CM

## 2017-08-02 DIAGNOSIS — Z93.1 PEG (PERCUTANEOUS ENDOSCOPIC GASTROSTOMY) STATUS (H): ICD-10-CM

## 2017-08-02 DIAGNOSIS — A41.59 SEPSIS DUE TO KLEBSIELLA (H): Primary | ICD-10-CM

## 2017-08-02 LAB
BACTERIA SPEC CULT: NORMAL
Lab: 806
MICRO REPORT STATUS: NORMAL
SPECIMEN SOURCE: NORMAL

## 2017-08-02 PROCEDURE — 25000128 H RX IP 250 OP 636: Performed by: PEDIATRICS

## 2017-08-02 PROCEDURE — 27210995 ZZH RX 272: Performed by: PEDIATRICS

## 2017-08-02 PROCEDURE — 96365 THER/PROPH/DIAG IV INF INIT: CPT

## 2017-08-02 RX ORDER — CEFTRIAXONE 1 G/1
1 INJECTION, POWDER, FOR SOLUTION INTRAMUSCULAR; INTRAVENOUS EVERY 24 HOURS
Status: CANCELLED | OUTPATIENT
Start: 2017-08-03

## 2017-08-02 RX ORDER — DEHYDRATED ALCOHOL 0.98 ML/ML
5 INJECTION, SOLUTION INTRASPINAL ONCE
Status: CANCELLED
Start: 2017-08-03 | End: 2017-08-03

## 2017-08-02 RX ORDER — CEFTRIAXONE 1 G/1
1 INJECTION, POWDER, FOR SOLUTION INTRAMUSCULAR; INTRAVENOUS EVERY 24 HOURS
Status: DISCONTINUED | OUTPATIENT
Start: 2017-08-02 | End: 2017-08-02 | Stop reason: HOSPADM

## 2017-08-02 RX ORDER — DEHYDRATED ALCOHOL 0.98 ML/ML
5 INJECTION, SOLUTION INTRASPINAL ONCE
Status: COMPLETED | OUTPATIENT
Start: 2017-08-02 | End: 2017-08-02

## 2017-08-02 RX ADMIN — ALCOHOL 5 ML: 0.98 INJECTION INTRASPINAL at 11:23

## 2017-08-02 RX ADMIN — CEFTRIAXONE SODIUM 1 G: 1 INJECTION, POWDER, FOR SOLUTION INTRAMUSCULAR; INTRAVENOUS at 10:19

## 2017-08-02 NOTE — PROGRESS NOTES
Patient here for Rocephin infusion and tolerated well. Discharged in stable condition. Return tomorrow.

## 2017-08-02 NOTE — MR AVS SNAPSHOT
After Visit Summary   8/2/2017    Doreen Peralta    MRN: [id number]           Patient Information     Date Of Birth          1980        Visit Information        Provider Department      8/2/2017 10:00 AM NL INFUSION CHAIR 4 [facility]        Today's Diagnoses     Sepsis due to Klebsiella (H)    -  1    Positive blood culture - Klebsiella oxytoca from Port-a-cath culture        Malnutrition (H)        Jejunostomy tube present (H)        PEG (percutaneous endoscopic gastrostomy) status          Care Instructions    Pt to return on 8/3/17 for Rocephin. Copies of medication list and upcoming appointments given prior to discharge.             Follow-ups after your visit        Your next 10 appointments already scheduled     Aug 03, 2017 10:00 AM CDT   Level 2 with NL INFUSION CHAIR 3   [facility] ([facility])    [address] [place] [address] Dr Tye ARAYA [address]   [phone]            Aug 04, 2017  9:00 AM CDT   Level 2 with NL INFUSION CHAIR 3   [facility] ([facility])    [address] [place] [address] Dr Tye ARAYA [address]   [phone]            Aug 07, 2017  8:30 AM CDT   Level 2 with NL INFUSION CHAIR 5   [facility] ([facility])    [address] [place] [address] Dr Tye ARAYA [address]   [phone]            Aug 21, 2017  9:30 AM CDT   Level 2 with NL INFUSION CHAIR 1   [facility] ([facility])    [address] Tye ARAYA [address]   [phone]            Aug 28, 2017  8:30 AM CDT   Level 2 with NL INFUSION CHAIR 4   [facility] ([facility])    [address] Tye ARAYA [address]   [phone]            Sep 05, 2017  9:30 AM CDT   Level 2 with NL INFUSION CHAIR 5   [facility] ([facility])    [address] Tye ARAYA  [address]   [phone]            Dec 04, 2017 10:00 AM CST   (Arrive by 9:45 AM)   Return Visit with Gilmer Lees MD   [facility] ([facility] [place])    [address] [id number] [phone]              Who to contact     If you have questions or need follow up information about today's clinic visit or your schedule please contact [facility] directly at [phone].  Normal or non-critical lab and imaging results will be communicated to you by [employer], letter or phone within 4 business days after the clinic has received the results. If you do not hear from us within 7 days, please contact the clinic through [employer] or phone. If you have a critical or abnormal lab result, we will notify you by phone as soon as possible.  Submit refill requests through [employer] or call your pharmacy and they will forward the refill request to us. Please allow 3 business days for your refill to be completed.          Additional Information About Your Visit        [employer] Information     [employer] gives you secure access to your electronic health record. If you see a primary care provider, you can also send messages to your care team and make appointments. If you have questions, please call your primary care clinic.  If you do not have a primary care provider, please call [phone] and they will assist you.        Care EveryWhere ID     This is your Care EveryWhere ID. This could be used by other organizations to access your [place] medical records  [id number]        Your Vitals Were     Pulse Temperature Respirations Last Period BMI (Body Mass Index)       88 97.4  F (36.3  C) (Temporal) 16 07/14/2017 32.28 kg/m2        Blood Pressure from Last 3 Encounters:   08/02/17 (!) 140/96   08/01/17 (!) 151/95   07/31/17 (!) 143/99    Weight from Last 3 Encounters:   08/02/17 90.7 kg (200 lb)   07/30/17 98.5 kg (217 lb 2.5 oz)    07/24/17 89.8 kg (198 lb)              Today, you had the following     No orders found for display       Primary Care Provider Office Phone # Fax #    Larisa Lorenzo PA-C [phone]       [facility] [place] [address]  [place] [address]        Equal Access to Services     [facility]: Hadii aad ku hadasho Soomaali, waaxda luqadaha, qaybta kaalmada adeegyada, waxay idiin hayaan adeeg kharash la'aan . So [facility] [phone].    ATENCIÓN: Si habla español, tiene a wade disposición servicios gratuitos de asistencia lingüística. Ll[facility] [phone].    We comply with applicable federal civil rights laws and [place] laws. We do not discriminate on the basis of race, color, national origin, age, disability sex, sexual orientation or gender identity.            Thank you!     Thank you for choosing [facility] INF[facility]  for your care. Our goal is always to provide you with excellent care. Hearing back from our patients is one way we can continue to improve our services. Please take a few minutes to complete the written survey that you may receive in the mail after your visit with us. Thank you!             Your Updated Medication List - Protect others around you: Learn how to safely use, store and throw away your medicines at www.disposemymeds.org.          This list is accurate as of: 8/2/17  3:34 PM.  Always use your most recent med list.                   Brand Name Dispense Instructions for use Diagnosis    ACETAMINOPHEN PO      Take 500-1,000 mg by mouth every 6 hours as needed for pain        albuterol (2.5 MG/3ML) 0.083% neb solution     360 mL    Take 1 vial (2.5 mg) by nebulization every 4 hours as needed for shortness of breath / dyspnea or wheezing    Gastrostomy tube dependent (H), SBO (small bowel obstruction) (H), Chronic abdominal pain, Chronic diarrhea       albuterol 90 MCG/ACT inhaler      Inhale 2 puffs into the lungs every 6 hours as needed         cefTRIAXone 1 GM vial    ROCEPHIN    110 mL    Inject 1 g into the vein daily    Sepsis due to Klebsiella (H), Positive blood culture       cloNIDine 0.2 MG tablet    CATAPRES    180 tablet    TAKE 2 TABLETS(0.4 MG) BY MOUTH EVERY EVENING    Anxiety       diphenhydrAMINE 25 MG tablet    BENADRYL ALLERGY    30 tablet    Take 1 tablet (25 mg) by mouth every 6 hours as needed for itching or other (Nausea)        DULoxetine 60 MG EC capsule    CYMBALTA    90 capsule    Take 1 capsule (60 mg) by mouth daily    Abdominal pain, epigastric, Abdominal migraine, not intractable       ethanol (74%) ADULT 74 %     5 mL    Inject IV through Port-a-cath daily for 11 days to lock Port-a-cath after each dose of ceftriaxone and also use to lock Port-a-cath after each weekly infusion of IV fluids and MVI    Positive blood culture, Sepsis due to Klebsiella (H)       HYDROmorphone 2 MG tablet    DILAUDID    30 tablet    Take 1 tablet (2 mg) by mouth every 3 hours as needed for moderate to severe pain    Gastroparesis, Malfunction of gastrostomy tube (H)       ipratropium 0.02 % neb solution    ATROVENT    225 mL    Take 2.5 mLs (0.5 mg) by nebulization every 6 hours as needed for wheezing    Gastrostomy tube dependent (H), SBO (small bowel obstruction) (H), Chronic abdominal pain, Chronic diarrhea       mirtazapine 15 MG ODT tab    REMERON SOL-TAB    45 tablet    0.5 tablets (7.5 mg) by Orally disintegrating tablet route At Bedtime    Anxiety       nortriptyline 10 MG capsule    PAMELOR    120 capsule    Take 3-4 capsules (30-40 mg) by mouth At Bedtime    Neuropathic pain, Primary insomnia       ondansetron 4 MG ODT tab    ZOFRAN ODT    30 tablet    Take 1-2 tablets (4-8 mg) by mouth every 6 hours as needed for nausea    Intractable vomiting, presence of nausea not specified, unspecified vomiting type, Gastroparesis       * order for DME     1 Box    2 by 2 gauze pads, use for dressing changes as directed    Attention to G-tube (H)        * order for DME     1 each    1 inch paper tape, Use for dressing changes as directed    Attention to G-tube (H)       SUMAtriptan 50 MG tablet    IMITREX    18 tablet    Take 1-2 tablets ( mg) by mouth at onset of headache for migraine - may repeat dose after 2h if headache recurs.  Max: 200mg/24 hours    Migraine without aura and without status migrainosus, not intractable       traZODone 50 MG tablet    DESYREL    180 tablet    Take 1-2 tablets ( mg) by mouth nightly as needed 1-2 tabs at bedtime PRN    Insomnia, unspecified type       * Notice:  This list has 2 medication(s) that are the same as other medications prescribed for you. Read the directions carefully, and ask your doctor or other care provider to review them with you.

## 2017-08-02 NOTE — PATIENT INSTRUCTIONS
Pt to return on 8/3/17 for Rocephin. Copies of medication list and upcoming appointments given prior to discharge.

## 2017-08-03 ENCOUNTER — INFUSION THERAPY VISIT (OUTPATIENT)
Dept: INFUSION THERAPY | Facility: CLINIC | Age: 37
End: 2017-08-03
Attending: FAMILY MEDICINE
Payer: COMMERCIAL

## 2017-08-03 VITALS
SYSTOLIC BLOOD PRESSURE: 143 MMHG | RESPIRATION RATE: 16 BRPM | TEMPERATURE: 96.7 F | OXYGEN SATURATION: 100 % | HEART RATE: 93 BPM | DIASTOLIC BLOOD PRESSURE: 96 MMHG

## 2017-08-03 DIAGNOSIS — R78.81 POSITIVE BLOOD CULTURE: ICD-10-CM

## 2017-08-03 DIAGNOSIS — A41.59 SEPSIS DUE TO KLEBSIELLA (H): Primary | ICD-10-CM

## 2017-08-03 DIAGNOSIS — Z93.1 PEG (PERCUTANEOUS ENDOSCOPIC GASTROSTOMY) STATUS (H): ICD-10-CM

## 2017-08-03 DIAGNOSIS — E46 MALNUTRITION (H): ICD-10-CM

## 2017-08-03 DIAGNOSIS — Z93.4 JEJUNOSTOMY TUBE PRESENT (H): ICD-10-CM

## 2017-08-03 PROCEDURE — 96365 THER/PROPH/DIAG IV INF INIT: CPT

## 2017-08-03 PROCEDURE — 25000128 H RX IP 250 OP 636: Performed by: PEDIATRICS

## 2017-08-03 PROCEDURE — 27210995 ZZH RX 272: Performed by: PEDIATRICS

## 2017-08-03 RX ORDER — DEHYDRATED ALCOHOL 0.98 ML/ML
5 INJECTION, SOLUTION INTRASPINAL ONCE
Status: CANCELLED
Start: 2017-08-04 | End: 2017-08-04

## 2017-08-03 RX ORDER — DEHYDRATED ALCOHOL 0.98 ML/ML
5 INJECTION, SOLUTION INTRASPINAL ONCE
Status: COMPLETED | OUTPATIENT
Start: 2017-08-04 | End: 2017-08-03

## 2017-08-03 RX ORDER — CEFTRIAXONE 1 G/1
1 INJECTION, POWDER, FOR SOLUTION INTRAMUSCULAR; INTRAVENOUS EVERY 24 HOURS
Status: DISCONTINUED | OUTPATIENT
Start: 2017-08-04 | End: 2017-08-03 | Stop reason: HOSPADM

## 2017-08-03 RX ORDER — CEFTRIAXONE 1 G/1
1 INJECTION, POWDER, FOR SOLUTION INTRAMUSCULAR; INTRAVENOUS EVERY 24 HOURS
Status: CANCELLED | OUTPATIENT
Start: 2017-08-04

## 2017-08-03 RX ADMIN — ALCOHOL 5 ML: 0.98 INJECTION INTRASPINAL at 11:01

## 2017-08-03 RX ADMIN — CEFTRIAXONE SODIUM 1 G: 1 INJECTION, POWDER, FOR SOLUTION INTRAMUSCULAR; INTRAVENOUS at 10:17

## 2017-08-03 NOTE — PATIENT INSTRUCTIONS
Pt to return on 8/4/17 for rocephin. Copies of medication list and upcoming appointments given prior to discharge.

## 2017-08-03 NOTE — MR AVS SNAPSHOT
After Visit Summary   8/3/2017    Doreen Peralta    MRN: [id number]           Patient Information     Date Of Birth          1980        Visit Information        Provider Department      8/3/2017 10:00 AM NL INFUSION CHAIR 3 [facility]        Today's Diagnoses     Sepsis due to Klebsiella (H)    -  1    Positive blood culture - Klebsiella oxytoca from Port-a-cath culture        Malnutrition (H)        Jejunostomy tube present (H)        PEG (percutaneous endoscopic gastrostomy) status          Care Instructions    Pt to return on 8/4/17 for rocephin. Copies of medication list and upcoming appointments given prior to discharge.           Follow-ups after your visit        Your next 10 appointments already scheduled     Aug 04, 2017  9:00 AM CDT   Level 2 with NL INFUSION CHAIR 3   [facility] ([facility])    [address] Tye ARAYA [address]   [phone]            Aug 07, 2017  8:30 AM CDT   Level 2 with NL INFUSION CHAIR 5   [facility] ([facility])    [address] Tye A[place]A [address]   [phone]            Aug 21, 2017  9:30 AM CDT   Level 2 with NL INFUSION CHAIR 1   [facility] ([facility])    [address] Tye ARAYA [address]   [phone]            Aug 28, 2017  8:30 AM CDT   Level 2 with NL INFUSION CHAIR 4   [facility] ([facility])    [address] T[place]RAY[place] [address]   [phone]            Sep 05, 2017  9:30 AM CDT   Level 2 with NL INFUSION CHAIR 5   [facility] ([facility])    [address] [place] [address]   [phone]            Dec 04, 2017 10:00 AM CST   (Arrive by 9:45 AM)   Return Visit with Gilmer Lees MD   [facility] IBD ([facility])    [address] [id number] [phone]              Who to contact     If you have questions or need follow up information about today's clinic visit or your schedule please contact [facility] INF[facility]ION [facility] directly at [phone].  Normal or non-critical lab and imaging results will be communicated to you by MyChart, letter or phone within 4 business days after the clinic has received the results. If you do not hear from us within 7 days, please contact the clinic through [employer] or phone. If you have a critical or abnormal lab result, we will notify you by phone as soon as possible.  Submit refill requests through [employer] or call your pharmacy and they will forward the refill request to us. Please allow 3 business days for your refill to be completed.          Additional Information About Your Visit        [employer] Information     [employer] gives you secure access to your electronic health record. If you see a primary care provider, you can also send messages to your care team and make appointments. If you have questions, please call your primary care clinic.  If you do not have a primary care provider, please call [phone] and they will assist you.        Care EveryWhere ID     This is your Care EveryWhere ID. This could be used by other organizations to access your [place] medical records  [id number]        Your Vitals Were     Pulse Temperature Respirations Last Period Pulse Oximetry       93 96.7  F (35.9  C) (Oral) 16 07/14/2017 100%        Blood Pressure from Last 3 Encounters:   08/03/17 (!) 143/96   08/02/17 (!) 140/96   08/01/17 (!) 151/95    Weight from Last 3 Encounters:   08/02/17 90.7 kg (200 lb)   07/30/17 98.5 kg (217 lb 2.5 oz)   07/24/17 89.8 kg (198 lb)              Today, you had the following     No orders found for display       Primary Care Provider Office Phone # Fax #    Larisa Lorenzo PA-C [phone]       [facility]  [place] [address]  [place] [address]        Equal Access to Services     [facility] : Hadii aad ku hadfango Soomaali, waaxda luqadaha, qaybta kaalmada adeerasmo, waxvirginia joaquin chuytayo pyleannamaria solomon doron . So [facility] [phone].    ATENCIÓN: Si habla español, tiene a wade disposición servicios gratuitos de asistencia lingüística. Llame al [phone].    We comply with applicable federal civil rights laws and [place] laws. We do not discriminate on the basis of race, color, national origin, age, disability sex, sexual orientation or gender identity.            Thank you!     Thank you for choosing [facility] INF[facility]ION [facility]  for your care. Our goal is always to provide you with excellent care. Hearing back from our patients is one way we can continue to improve our services. Please take a few minutes to complete the written survey that you may receive in the mail after your visit with us. Thank you!             Your Updated Medication List - Protect others around you: Learn how to safely use, store and throw away your medicines at www.disposemymeds.org.          This list is accurate as of: 8/3/17  4:26 PM.  Always use your most recent med list.                   Brand Name Dispense Instructions for use Diagnosis    ACETAMINOPHEN PO      Take 500-1,000 mg by mouth every 6 hours as needed for pain        albuterol (2.5 MG/3ML) 0.083% neb solution     360 mL    Take 1 vial (2.5 mg) by nebulization every 4 hours as needed for shortness of breath / dyspnea or wheezing    Gastrostomy tube dependent (H), SBO (small bowel obstruction) (H), Chronic abdominal pain, Chronic diarrhea       albuterol 90 MCG/ACT inhaler      Inhale 2 puffs into the lungs every 6 hours as needed        cefTRIAXone 1 GM vial    ROCEPHIN    110 mL    Inject 1 g into the vein daily    Sepsis due to Klebsiella (H), Positive blood culture       cloNIDine 0.2 MG tablet    CATAPRES    180 tablet    TAKE 2 TABLETS(0.4 MG) BY MOUTH EVERY  EVENING    Anxiety       diphenhydrAMINE 25 MG tablet    BENADRYL ALLERGY    30 tablet    Take 1 tablet (25 mg) by mouth every 6 hours as needed for itching or other (Nausea)        DULoxetine 60 MG EC capsule    CYMBALTA    90 capsule    Take 1 capsule (60 mg) by mouth daily    Abdominal pain, epigastric, Abdominal migraine, not intractable       ethanol (74%) ADULT 74 %     5 mL    Inject IV through Port-a-cath daily for 11 days to lock Port-a-cath after each dose of ceftriaxone and also use to lock Port-a-cath after each weekly infusion of IV fluids and MVI    Positive blood culture, Sepsis due to Klebsiella (H)       HYDROmorphone 2 MG tablet    DILAUDID    30 tablet    Take 1 tablet (2 mg) by mouth every 3 hours as needed for moderate to severe pain    Gastroparesis, Malfunction of gastrostomy tube (H)       ipratropium 0.02 % neb solution    ATROVENT    225 mL    Take 2.5 mLs (0.5 mg) by nebulization every 6 hours as needed for wheezing    Gastrostomy tube dependent (H), SBO (small bowel obstruction) (H), Chronic abdominal pain, Chronic diarrhea       mirtazapine 15 MG ODT tab    REMERON SOL-TAB    45 tablet    0.5 tablets (7.5 mg) by Orally disintegrating tablet route At Bedtime    Anxiety       nortriptyline 10 MG capsule    PAMELOR    120 capsule    Take 3-4 capsules (30-40 mg) by mouth At Bedtime    Neuropathic pain, Primary insomnia       ondansetron 4 MG ODT tab    ZOFRAN ODT    30 tablet    Take 1-2 tablets (4-8 mg) by mouth every 6 hours as needed for nausea    Intractable vomiting, presence of nausea not specified, unspecified vomiting type, Gastroparesis       * order for DME     1 Box    2 by 2 gauze pads, use for dressing changes as directed    Attention to G-tube (H)       * order for DME     1 each    1 inch paper tape, Use for dressing changes as directed    Attention to G-tube (H)       SUMAtriptan 50 MG tablet    IMITREX    18 tablet    Take 1-2 tablets ( mg) by mouth at onset of  headache for migraine - may repeat dose after 2h if headache recurs.  Max: 200mg/24 hours    Migraine without aura and without status migrainosus, not intractable       traZODone 50 MG tablet    DESYREL    180 tablet    Take 1-2 tablets ( mg) by mouth nightly as needed 1-2 tabs at bedtime PRN    Insomnia, unspecified type       * Notice:  This list has 2 medication(s) that are the same as other medications prescribed for you. Read the directions carefully, and ask your doctor or other care provider to review them with you.

## 2017-08-03 NOTE — PROGRESS NOTES
Patient here for Rocephin infusion. Positive blood return pre/post infusion. Tolerated well. Discharged in stable condition.

## 2017-08-03 NOTE — PROGRESS NOTES
"Doreen Peralta  Gender: female  : 1980  [address]   [place] [address]  [phone] (home)     Medical Record: [id number]  Pharmacy: [id number] - [place], [place] - [address] AT [address]  Primary Care Provider: Larisa Lorenzo    Parent's names are: Aby Rainey (mother) and Data Unavailable (father).      [facility]  August 3, 2017     Discharge Phone Call:  Key Words/Key Times      Introduction - AIDET (Acknowledge, Introduce, Duration, Explanation)      Empathy-   We are calling to see how you are since your recent stay in the hospital?     Call back COMMENTS:       Clinical Questions -  (f/u appts, medication side effects/purpose, ability to care for self at home) \"For your safety, it is important to us that you understand the purpose and side effects of your medications, can you tell me what your new medications are?\"     Call back COMMENTS:       Staff Recognition -  We like to recognize staff and physicians who have done an excellent job.  Do you remember any people from your care team that you would like recognize?     Call back COMMENTS:       Very Good Care -  We want to provide very good care to all patients.  How was your care?     Call back COMMENTS:      Opportunities for Improvement -  Our goal is to be the best.  Do you have any suggestions for things that we could improve upon?     Call back COMMENTS:       Thank You           Safe Accounts (purposfully retained items) Examples: ABBI's, Hemovac, penrose,Wound packing, Ureteral stents, Maya, PIV/Picc Line    We care about the coordination of your care  1. Do you still have  in place?   2. If the item is in place, Can you review the plan for removal with me?     8-3-17 @ 180 First callback attempt. Child answered the phone and said her mother (pt) was sleeping. Told child we would call back another time. Nirmala Carreno RN  "

## 2017-08-04 ENCOUNTER — INFUSION THERAPY VISIT (OUTPATIENT)
Dept: INFUSION THERAPY | Facility: CLINIC | Age: 37
End: 2017-08-04
Attending: FAMILY MEDICINE
Payer: COMMERCIAL

## 2017-08-04 VITALS
HEART RATE: 75 BPM | RESPIRATION RATE: 16 BRPM | DIASTOLIC BLOOD PRESSURE: 69 MMHG | OXYGEN SATURATION: 98 % | SYSTOLIC BLOOD PRESSURE: 117 MMHG | TEMPERATURE: 97.3 F

## 2017-08-04 DIAGNOSIS — Z93.4 JEJUNOSTOMY TUBE PRESENT (H): ICD-10-CM

## 2017-08-04 DIAGNOSIS — Z93.1 PEG (PERCUTANEOUS ENDOSCOPIC GASTROSTOMY) STATUS (H): ICD-10-CM

## 2017-08-04 DIAGNOSIS — E46 MALNUTRITION (H): ICD-10-CM

## 2017-08-04 DIAGNOSIS — R78.81 POSITIVE BLOOD CULTURE: ICD-10-CM

## 2017-08-04 DIAGNOSIS — A41.59 SEPSIS DUE TO KLEBSIELLA (H): Primary | ICD-10-CM

## 2017-08-04 PROCEDURE — 96365 THER/PROPH/DIAG IV INF INIT: CPT

## 2017-08-04 PROCEDURE — 25000128 H RX IP 250 OP 636: Performed by: PEDIATRICS

## 2017-08-04 PROCEDURE — 27210995 ZZH RX 272: Performed by: PEDIATRICS

## 2017-08-04 RX ORDER — DEHYDRATED ALCOHOL 0.98 ML/ML
5 INJECTION, SOLUTION INTRASPINAL ONCE
Status: CANCELLED
Start: 2017-08-05 | End: 2017-08-05

## 2017-08-04 RX ORDER — CEFTRIAXONE 1 G/1
1 INJECTION, POWDER, FOR SOLUTION INTRAMUSCULAR; INTRAVENOUS EVERY 24 HOURS
Status: DISCONTINUED | OUTPATIENT
Start: 2017-08-04 | End: 2017-08-04 | Stop reason: HOSPADM

## 2017-08-04 RX ORDER — CEFTRIAXONE 1 G/1
1 INJECTION, POWDER, FOR SOLUTION INTRAMUSCULAR; INTRAVENOUS EVERY 24 HOURS
Status: CANCELLED | OUTPATIENT
Start: 2017-08-05

## 2017-08-04 RX ORDER — DEHYDRATED ALCOHOL 0.98 ML/ML
5 INJECTION, SOLUTION INTRASPINAL ONCE
Status: COMPLETED | OUTPATIENT
Start: 2017-08-05 | End: 2017-08-04

## 2017-08-04 RX ADMIN — ALCOHOL 5 ML: 0.98 INJECTION INTRASPINAL at 10:28

## 2017-08-04 RX ADMIN — CEFTRIAXONE SODIUM 1 G: 1 INJECTION, POWDER, FOR SOLUTION INTRAMUSCULAR; INTRAVENOUS at 09:37

## 2017-08-04 ASSESSMENT — PAIN SCALES - GENERAL: PAINLEVEL: NO PAIN (0)

## 2017-08-04 NOTE — MR AVS SNAPSHOT
After Visit Summary   8/4/2017    Doreen Peralta    MRN: [id number]           Patient Information     Date Of Birth          1980        Visit Information        Provider Department      8/4/2017 9:00 AM NL INFUSION CHAIR 3 [facility]        Today's Diagnoses     Sepsis due to Klebsiella (H)    -  1    Positive blood culture - Klebsiella oxytoca from Port-a-cath culture        Malnutrition (H)        Jejunostomy tube present (H)        PEG (percutaneous endoscopic gastrostomy) status          Care Instructions    Patient will go to ER over weekend at 10 am. Then  to return on 8/7/17 in IVO  for Rocephin. Copies of medication list and upcoming appointments given prior to discharge.             Follow-ups after your visit        Your next 10 appointments already scheduled     Aug 07, 2017  8:30 AM CDT   Level 2 with NL INFUSION CHAIR 5   [facility] ([facility])    [address] [place] [address]   [phone]            Aug 08, 2017  9:30 AM CDT   Level 2 with NL INFUSION CHAIR 5   [facility] ([facility])    [address] [place] [address]   [phone]            Aug 09, 2017  8:00 AM CDT   Level 2 with NL INFUSION CHAIR 1   [facility] ([facility])    [address] [place] [address]   [phone]            Aug 21, 2017  9:30 AM CDT   Level 2 with NL INFUSION CHAIR 1   [facility] ([facility])    [address] [place] [address]   [phone]            Aug 28, 2017  8:30 AM CDT   Level 2 with NL INFUSION CHAIR 4   [facility] ([facility])    [address] [place] [address]   [phone]            Sep 05, 2017  9:30 AM CDT   Level 2 with NL INFUSION CHAIR 5   [facility] ([place]  [facility])    [address] [place] [address]   [phone]            Dec 04, 2017 10:00 AM CST   (Arrive by 9:45 AM)   Return Visit with Gilmer Lees MD   [facility] ([facility])    [address] [place] [address] [phone]              Who to contact     If you have questions or need follow up information about today's clinic visit or your schedule please contact [facility] directly at [phone].  Normal or non-critical lab and imaging results will be communicated to you by MyChart, letter or phone within 4 business days after the clinic has received the results. If you do not hear from us within 7 days, please contact the clinic through [employer] or phone. If you have a critical or abnormal lab result, we will notify you by phone as soon as possible.  Submit refill requests through [employer] or call your pharmacy and they will forward the refill request to us. Please allow 3 business days for your refill to be completed.          Additional Information About Your Visit        MyChart Information     [employer] gives you secure access to your electronic health record. If you see a primary care provider, you can also send messages to your care team and make appointments. If you have questions, please call your primary care clinic.  If you do not have a primary care provider, please call [phone] and they will assist you.        Care EveryWhere ID     This is your Care EveryWhere ID. This could be used by other organizations to access your [place] medical records  [id number]        Your Vitals Were     Pulse Temperature Respirations Last Period Pulse Oximetry       75 97.3  F (36.3  C) (Temporal) 16 07/14/2017 98%        Blood Pressure from Last 3 Encounters:   08/04/17 117/69   08/03/17 (!) 143/96   08/02/17 (!) 140/96    Weight from Last 3 Encounters:   08/02/17 90.7 kg (200 lb)    07/30/17 98.5 kg (217 lb 2.5 oz)   07/24/17 89.8 kg (198 lb)              Today, you had the following     No orders found for display       Primary Care Provider Office Phone # Fax #    Larisa Lorenzo PA-C [phone]       [facility] [place] [address]  [place] [address]        Equal Access to Services     [facility]: Hadii aad ku hadasho Soomaali, waaxda luqadaha, qaybta kaalmada adeegyada, waxay idiin hayaan adeeg kharash la'aan . So [facility] [phone].    ATENCIÓN: Si habla español, tiene a wade disposición servicios gratuitos de asistencia lingüística. [facility] [phone].    We comply with applicable federal civil rights laws and [place] laws. We do not discriminate on the basis of race, color, national origin, age, disability sex, sexual orientation or gender identity.            Thank you!     Thank you for choosing [facility]  for your care. Our goal is always to provide you with excellent care. Hearing back from our patients is one way we can continue to improve our services. Please take a few minutes to complete the written survey that you may receive in the mail after your visit with us. Thank you!             Your Updated Medication List - Protect others around you: Learn how to safely use, store and throw away your medicines at www.disposemymeds.org.          This list is accurate as of: 8/4/17 10:40 AM.  Always use your most recent med list.                   Brand Name Dispense Instructions for use Diagnosis    ACETAMINOPHEN PO      Take 500-1,000 mg by mouth every 6 hours as needed for pain        albuterol (2.5 MG/3ML) 0.083% neb solution     360 mL    Take 1 vial (2.5 mg) by nebulization every 4 hours as needed for shortness of breath / dyspnea or wheezing    Gastrostomy tube dependent (H), SBO (small bowel obstruction) (H), Chronic abdominal pain, Chronic diarrhea       albuterol 90 MCG/ACT inhaler      Inhale 2 puffs into the lungs  every 6 hours as needed        cefTRIAXone 1 GM vial    ROCEPHIN    110 mL    Inject 1 g into the vein daily    Sepsis due to Klebsiella (H), Positive blood culture       cloNIDine 0.2 MG tablet    CATAPRES    180 tablet    TAKE 2 TABLETS(0.4 MG) BY MOUTH EVERY EVENING    Anxiety       diphenhydrAMINE 25 MG tablet    BENADRYL ALLERGY    30 tablet    Take 1 tablet (25 mg) by mouth every 6 hours as needed for itching or other (Nausea)        DULoxetine 60 MG EC capsule    CYMBALTA    90 capsule    Take 1 capsule (60 mg) by mouth daily    Abdominal pain, epigastric, Abdominal migraine, not intractable       ethanol (74%) ADULT 74 %     5 mL    Inject IV through Port-a-cath daily for 11 days to lock Port-a-cath after each dose of ceftriaxone and also use to lock Port-a-cath after each weekly infusion of IV fluids and MVI    Positive blood culture, Sepsis due to Klebsiella (H)       HYDROmorphone 2 MG tablet    DILAUDID    30 tablet    Take 1 tablet (2 mg) by mouth every 3 hours as needed for moderate to severe pain    Gastroparesis, Malfunction of gastrostomy tube (H)       ipratropium 0.02 % neb solution    ATROVENT    225 mL    Take 2.5 mLs (0.5 mg) by nebulization every 6 hours as needed for wheezing    Gastrostomy tube dependent (H), SBO (small bowel obstruction) (H), Chronic abdominal pain, Chronic diarrhea       mirtazapine 15 MG ODT tab    REMERON SOL-TAB    45 tablet    0.5 tablets (7.5 mg) by Orally disintegrating tablet route At Bedtime    Anxiety       nortriptyline 10 MG capsule    PAMELOR    120 capsule    Take 3-4 capsules (30-40 mg) by mouth At Bedtime    Neuropathic pain, Primary insomnia       ondansetron 4 MG ODT tab    ZOFRAN ODT    30 tablet    Take 1-2 tablets (4-8 mg) by mouth every 6 hours as needed for nausea    Intractable vomiting, presence of nausea not specified, unspecified vomiting type, Gastroparesis       * order for DME     1 Box    2 by 2 gauze pads, use for dressing changes as directed     Attention to G-tube (H)       * order for DME     1 each    1 inch paper tape, Use for dressing changes as directed    Attention to G-tube (H)       SUMAtriptan 50 MG tablet    IMITREX    18 tablet    Take 1-2 tablets ( mg) by mouth at onset of headache for migraine - may repeat dose after 2h if headache recurs.  Max: 200mg/24 hours    Migraine without aura and without status migrainosus, not intractable       traZODone 50 MG tablet    DESYREL    180 tablet    Take 1-2 tablets ( mg) by mouth nightly as needed 1-2 tabs at bedtime PRN    Insomnia, unspecified type       * Notice:  This list has 2 medication(s) that are the same as other medications prescribed for you. Read the directions carefully, and ask your doctor or other care provider to review them with you.

## 2017-08-04 NOTE — PATIENT INSTRUCTIONS
Patient will go to ER over weekend at 10 am. Then  to return on 8/7/17 in IVO  for Rocephin. Copies of medication list and upcoming appointments given prior to discharge.

## 2017-08-04 NOTE — PROGRESS NOTES
Patient here for rocephin infusion and tolerated well. Positive blood return in port  pre/post infusion. Flushed port with ns then Ethanol. Discharged in stable condition.

## 2017-08-05 ENCOUNTER — HOSPITAL ENCOUNTER (EMERGENCY)
Facility: CLINIC | Age: 37
Discharge: HOME | End: 2017-08-05
Attending: EMERGENCY MEDICINE | Admitting: EMERGENCY MEDICINE
Payer: COMMERCIAL

## 2017-08-05 VITALS
TEMPERATURE: 98.6 F | OXYGEN SATURATION: 100 % | HEART RATE: 78 BPM | SYSTOLIC BLOOD PRESSURE: 142 MMHG | RESPIRATION RATE: 14 BRPM | DIASTOLIC BLOOD PRESSURE: 88 MMHG

## 2017-08-05 DIAGNOSIS — E46 MALNUTRITION (H): ICD-10-CM

## 2017-08-05 DIAGNOSIS — E46 UNSPECIFIED PROTEIN-CALORIE MALNUTRITION (H): ICD-10-CM

## 2017-08-05 DIAGNOSIS — R78.81 POSITIVE BLOOD CULTURE: ICD-10-CM

## 2017-08-05 DIAGNOSIS — A41.59 SEPSIS DUE TO KLEBSIELLA (H): ICD-10-CM

## 2017-08-05 DIAGNOSIS — Z93.4 JEJUNOSTOMY TUBE PRESENT (H): ICD-10-CM

## 2017-08-05 DIAGNOSIS — Z93.4 JEJUNOSTOMY STATUS (H): ICD-10-CM

## 2017-08-05 DIAGNOSIS — J45.901 ASTHMA EXACERBATION: ICD-10-CM

## 2017-08-05 DIAGNOSIS — Z93.1 GASTROSTOMY STATUS (H): ICD-10-CM

## 2017-08-05 DIAGNOSIS — Z93.1 PEG (PERCUTANEOUS ENDOSCOPIC GASTROSTOMY) STATUS (H): ICD-10-CM

## 2017-08-05 DIAGNOSIS — R78.81 BACTEREMIA: ICD-10-CM

## 2017-08-05 PROCEDURE — 25000128 H RX IP 250 OP 636: Performed by: EMERGENCY MEDICINE

## 2017-08-05 PROCEDURE — 99284 EMERGENCY DEPT VISIT MOD MDM: CPT | Mod: 25 | Performed by: EMERGENCY MEDICINE

## 2017-08-05 PROCEDURE — 27210995 ZZH RX 272: Performed by: PEDIATRICS

## 2017-08-05 PROCEDURE — 96365 THER/PROPH/DIAG IV INF INIT: CPT | Performed by: EMERGENCY MEDICINE

## 2017-08-05 PROCEDURE — 99284 EMERGENCY DEPT VISIT MOD MDM: CPT | Mod: Z6 | Performed by: EMERGENCY MEDICINE

## 2017-08-05 RX ORDER — CEFTRIAXONE 1 G/1
1 INJECTION, POWDER, FOR SOLUTION INTRAMUSCULAR; INTRAVENOUS EVERY 24 HOURS
Status: DISCONTINUED | OUTPATIENT
Start: 2017-08-05 | End: 2017-08-05 | Stop reason: HOSPADM

## 2017-08-05 RX ORDER — CEFTRIAXONE 1 G/1
1 INJECTION, POWDER, FOR SOLUTION INTRAMUSCULAR; INTRAVENOUS EVERY 24 HOURS
Status: CANCELLED | OUTPATIENT
Start: 2017-08-06

## 2017-08-05 RX ORDER — DEHYDRATED ALCOHOL 0.98 ML/ML
5 INJECTION, SOLUTION INTRASPINAL ONCE
Status: CANCELLED
Start: 2017-08-06 | End: 2017-08-06

## 2017-08-05 RX ORDER — DEHYDRATED ALCOHOL 0.98 ML/ML
5 INJECTION, SOLUTION INTRASPINAL ONCE
Status: COMPLETED | OUTPATIENT
Start: 2017-08-06 | End: 2017-08-05

## 2017-08-05 RX ORDER — CEFTRIAXONE 1 G/1
1 INJECTION, POWDER, FOR SOLUTION INTRAMUSCULAR; INTRAVENOUS EVERY 24 HOURS
Status: DISCONTINUED | OUTPATIENT
Start: 2017-08-06 | End: 2017-08-05 | Stop reason: CLARIF

## 2017-08-05 RX ORDER — PREDNISONE 20 MG/1
TABLET ORAL
Qty: 10 TABLET | Refills: 0 | Status: SHIPPED | OUTPATIENT
Start: 2017-08-05 | End: 2017-08-19

## 2017-08-05 RX ADMIN — ALCOHOL 5 ML: 0.98 INJECTION INTRASPINAL at 11:10

## 2017-08-05 RX ADMIN — CEFTRIAXONE SODIUM 1 G: 1 INJECTION, POWDER, FOR SOLUTION INTRAMUSCULAR; INTRAVENOUS at 10:33

## 2017-08-05 NOTE — ED AVS SNAPSHOT
[facility]    [address] [place] [address]    Phone:  [phone]    Fax:  [phone]                                       Doreen Peralta   MRN: [id number]    Department:  [facility] Emergency Department   Date of Visit:  8/5/2017           After Visit Summary Signature Page     I have received my discharge instructions, and my questions have been answered. I have discussed any challenges I see with this plan with the nurse or doctor.    ..........................................................................................................................................  Patient/Patient Representative Signature      ..........................................................................................................................................  Patient Representative Print Name and Relationship to Patient    ..................................................               ................................................  Date                                            Time    ..........................................................................................................................................  Reviewed by Signature/Title    ...................................................              ..............................................  Date                                                            Time

## 2017-08-05 NOTE — DISCHARGE INSTRUCTIONS
"  Asthma (Adult)  Asthma is a disease where the medium and  small air passages within the lung go into spasm and restrict the flow of air. Inflammation and swelling of the airways cause further restriction. During an acute asthma attack, these factors cause difficulty breathing, wheezing, cough and chest tightness.    An asthma attack can be triggered by many things. Common triggers include infections such as the common cold, bronchitis, pneumonia. Irritants such as smoke or pollutants in the air, emotional upset, and exercise can also trigger an attack. In many adults with asthma, allergies to dust, mold, pollen and animal dander can cause an asthma attack. Skipping doses of daily asthma medicine can also bring on an asthma attack.  Asthma can be controlled using the proper medicines prescribed by your healthcare provider and avoiding exposure to known triggers including allergens and irritants.  Home care    Take prescribed medicine exactly at the times advised. If you need medicine such as from a hand held inhaler or aerosol breathing machine more than every 4 hours, contact your healthcare provider or seek immediate medical attention. If prescribed an antibiotic or prednisone, take all of the medicine as prescribed, even if you are feeling better after a few days.    Do not smoke. Avoid being exposed to the smoke of others.    Some people with asthma have worsening of their symptoms when they take aspirin and non-steroidal or fever-reducing medicines like ibuprofen and naproxen. Talk to your healthcare provider if you think this may apply to you.  Follow-up care  Follow up with your healthcare provider, or as advised. Always bring all of your current medicines to any appointments with your healthcare provider. Also bring a complete list of medications even those not taken for asthma. If you do not already have one, talk to your healthcare provider about developing a personalized \"Asthma Action Plan.\"  A " pneumococcal (pneumonia) vaccine and yearly flu shot (every fall) are recommended. Ask your doctor about this.  When to seek medical advice  Call your healthcare provider right away if any of these occur:     Increased wheezing or shortness of breath    Need to use your inhalers more often than usual without relief    Fever of 100.4 F (38 C) or higher, or as directed by your healthcare provider    Coughing up lots of dark-colored or bloody sputum (mucus)    Chest pain with each breath    If you use a peak flow meter as part of an Asthma Action Plan, and you are still in the yellow zone (50% to 80%) 15 minutes after using inhaler medicine.  Call 911  Call 911 if any of the following occur    Trouble walking or talking because of shortness of breath    If you use a peak flow meter as part of an Asthma Action Plan and you are still in the red zone (less than 50%) 15 minutes after using inhaler medicine    Lips or fingernails turning gray or blue  Date Last Reviewed: 12/2/2015 2000-2017 [employer]. [address] [place] [address], [place] [address]. All rights reserved. This information is not intended as a substitute for professional medical care. Always follow your healthcare professional's instructions.

## 2017-08-05 NOTE — ED AVS SNAPSHOT
[facility] Emergency Department    [address] [place] [address]    Phone:  [phone]    Fax:  [phone]                                       Doreen Peralta   MRN: [id number]    Department:  [facility] Emergency Department   Date of Visit:  8/5/2017           Patient Information     Date Of Birth          1980        Your diagnoses for this visit were:     Asthma exacerbation     Sepsis due to Klebsiella (H)     Positive blood culture - Klebsiella oxytoca from Port-a-cath culture     Malnutrition (H)     Jejunostomy tube present (H)     PEG (percutaneous endoscopic gastrostomy) status        You were seen by Sam Raines MD.      Follow-up Information     Follow up with Larisa Lorenzo PA-C.    Specialty:  Physician Assistant    Why:  next week    Contact information:     LEENA DODSON  [address]  [place] [address] [phone]          Follow up with [facility] Emergency Department.    Specialty:  EMERGENCY MEDICINE    Why:  If symptoms worsen    Contact information:    [address] [place] [address] [phone]    Additional information:    From [address]: Exit at [address] on [address] side of [place]. Turn right on [address]. Turn left at stoplight on [address]. [facility] will be in view two blocks ahead        Discharge Instructions         Asthma (Adult)  Asthma is a disease where the medium and  small air passages within the lung go into spasm and restrict the flow of air. Inflammation and swelling of the airways cause further restriction. During an acute asthma attack, these factors cause difficulty breathing, wheezing, cough and chest tightness.    An asthma attack can be triggered by many things. Common triggers include infections such as the common cold, bronchitis, pneumonia. Irritants such as smoke or pollutants in the air, emotional upset, and exercise can also trigger an attack.  "In many adults with asthma, allergies to dust, mold, pollen and animal dander can cause an asthma attack. Skipping doses of daily asthma medicine can also bring on an asthma attack.  Asthma can be controlled using the proper medicines prescribed by your healthcare provider and avoiding exposure to known triggers including allergens and irritants.  Home care    Take prescribed medicine exactly at the times advised. If you need medicine such as from a hand held inhaler or aerosol breathing machine more than every 4 hours, contact your healthcare provider or seek immediate medical attention. If prescribed an antibiotic or prednisone, take all of the medicine as prescribed, even if you are feeling better after a few days.    Do not smoke. Avoid being exposed to the smoke of others.    Some people with asthma have worsening of their symptoms when they take aspirin and non-steroidal or fever-reducing medicines like ibuprofen and naproxen. Talk to your healthcare provider if you think this may apply to you.  Follow-up care  Follow up with your healthcare provider, or as advised. Always bring all of your current medicines to any appointments with your healthcare provider. Also bring a complete list of medications even those not taken for asthma. If you do not already have one, talk to your healthcare provider about developing a personalized \"Asthma Action Plan.\"  A pneumococcal (pneumonia) vaccine and yearly flu shot (every fall) are recommended. Ask your doctor about this.  When to seek medical advice  Call your healthcare provider right away if any of these occur:     Increased wheezing or shortness of breath    Need to use your inhalers more often than usual without relief    Fever of 100.4 F (38 C) or higher, or as directed by your healthcare provider    Coughing up lots of dark-colored or bloody sputum (mucus)    Chest pain with each breath    If you use a peak flow meter as part of an Asthma Action Plan, and you are " still in the yellow zone (50% to 80%) 15 minutes after using inhaler medicine.  Call 911  Call 911 if any of the following occur    Trouble walking or talking because of shortness of breath    If you use a peak flow meter as part of an Asthma Action Plan and you are still in the red zone (less than 50%) 15 minutes after using inhaler medicine    Lips or fingernails turning gray or blue  Date Last Reviewed: 12/2/2015 2000-2017 [employer]. [address] [place] [address]. All rights reserved. This information is not intended as a substitute for professional medical care. Always follow your healthcare professional's instructions.          Future Appointments        Provider Department Dept Phone Center    8/7/2017 8:30 AM 5 Infustion Chair 1 [facility] [phone] [facility]    8/8/2017 9:30 AM 5 Infustion Chair 1 [facility] [phone] [facility]    8/9/2017 8:00 AM ion Chair 1 Chair 1 [facility] [phone] [facility]    8/21/2017 9:30 AM ion Chair 1 Chair 1 [facility] [phone] [facility]    8/28/2017 8:30 AM ion 4fustion Chair 1 [facility] [phone] [facility]    9/5/2017 9:30 AM 5 Infustion Chair 1 [facility] [phone] [facility]    12/4/2017 10:00 AM Gilmer Lees MD [facility] and [phone] [facility]      24 Hour Appointment Hotline       To make an appointment at any [facility], call [phone] ([phone]). If you don't have a family doctor or clinic, we will help you find one. [facility] are conveniently located to serve the needs of you and your family.             Review of your medicines      START taking        Dose / Directions Last dose taken    predniSONE 20 MG tablet   Commonly known as:  DELTASONE   Quantity:  10 tablet        Take two tablets (= 40mg) each  day for 5 (five) days   Refills:  0          Our records show that you are taking the medicines listed below. If these are incorrect, please call your family doctor or clinic.        Dose / Directions Last dose taken    ACETAMINOPHEN PO   Dose:  500-1000 mg        Take 500-1,000 mg by mouth every 6 hours as needed for pain   Refills:  0        albuterol (2.5 MG/3ML) 0.083% neb solution   Dose:  2.5 mg   Quantity:  360 mL        Take 1 vial (2.5 mg) by nebulization every 4 hours as needed for shortness of breath / dyspnea or wheezing   Refills:  0        albuterol 90 MCG/ACT inhaler   Dose:  2 puff        Inhale 2 puffs into the lungs every 6 hours as needed   Refills:  0        cefTRIAXone 1 GM vial   Commonly known as:  ROCEPHIN   Dose:  1 g   Indication:  Bacteria in the Blood   Quantity:  110 mL        Inject 1 g into the vein daily   Refills:  0        cloNIDine 0.2 MG tablet   Commonly known as:  CATAPRES   Quantity:  180 tablet        TAKE 2 TABLETS(0.4 MG) BY MOUTH EVERY EVENING   Refills:  0        diphenhydrAMINE 25 MG tablet   Commonly known as:  BENADRYL ALLERGY   Dose:  25 mg   Quantity:  30 tablet        Take 1 tablet (25 mg) by mouth every 6 hours as needed for itching or other (Nausea)   Refills:  0        DULoxetine 60 MG EC capsule   Commonly known as:  CYMBALTA   Dose:  60 mg   Quantity:  90 capsule        Take 1 capsule (60 mg) by mouth daily   Refills:  3        ethanol (74%) ADULT 74 %   Quantity:  5 mL        Inject IV through Port-a-cath daily for 11 days to lock Port-a-cath after each dose of ceftriaxone and also use to lock Port-a-cath after each weekly infusion of IV fluids and MVI   Refills:  0        HYDROmorphone 2 MG tablet   Commonly known as:  DILAUDID   Dose:  2 mg   Quantity:  30 tablet        Take 1 tablet (2 mg) by mouth every 3 hours as needed for moderate to severe pain   Refills:  0        ipratropium 0.02 % neb solution   Commonly known as:  ATROVENT   Dose:  0.5 mg    Quantity:  225 mL        Take 2.5 mLs (0.5 mg) by nebulization every 6 hours as needed for wheezing   Refills:  0        mirtazapine 15 MG ODT tab   Commonly known as:  REMERON SOL-TAB   Dose:  7.5 mg   Quantity:  45 tablet        0.5 tablets (7.5 mg) by Orally disintegrating tablet route At Bedtime   Refills:  0        nortriptyline 10 MG capsule   Commonly known as:  PAMELOR   Dose:  30-40 mg   Quantity:  120 capsule        Take 3-4 capsules (30-40 mg) by mouth At Bedtime   Refills:  5        ondansetron 4 MG ODT tab   Commonly known as:  ZOFRAN ODT   Dose:  4-8 mg   Quantity:  30 tablet        Take 1-2 tablets (4-8 mg) by mouth every 6 hours as needed for nausea   Refills:  0        * order for DME   Quantity:  1 Box        2 by 2 gauze pads, use for dressing changes as directed   Refills:  1        * order for DME   Quantity:  1 each        1 inch paper tape, Use for dressing changes as directed   Refills:  0        SUMAtriptan 50 MG tablet   Commonly known as:  IMITREX   Dose:   mg   Quantity:  18 tablet        Take 1-2 tablets ( mg) by mouth at onset of headache for migraine - may repeat dose after 2h if headache recurs.  Max: 200mg/24 hours   Refills:  1        traZODone 50 MG tablet   Commonly known as:  DESYREL   Dose:   mg   Quantity:  180 tablet        Take 1-2 tablets ( mg) by mouth nightly as needed 1-2 tabs at bedtime PRN   Refills:  1        * Notice:  This list has 2 medication(s) that are the same as other medications prescribed for you. Read the directions carefully, and ask your doctor or other care provider to review them with you.            Prescriptions were sent or printed at these locations (1 Prescription)                   [facility] [place], [place] - [facility]    [address] [place] [address]    Telephone:  [phone]   Fax:  [phone]   Hours:                  E-Prescribed (1 of 1)         predniSONE (DELTASONE) 20 MG tablet                 Orders Needing Specimen Collection     None      Pending Results     No orders found from 8/3/2017 to 8/6/2017.            Pending Culture Results     No orders found from 8/3/2017 to 8/6/2017.            Pending Results Instructions     If you had any lab results that were not finalized at the time of your Discharge, you can call the ED Lab Result RN at [phone]. You will be contacted by this team for any positive Lab results or changes in treatment. The nurses are available 7 days a week from 10A to 6:30P.  You can leave a message 24 hours per day and they will return your call.        Thank you for choosing [facility]       Thank you for choosing [facility] for your care. Our goal is always to provide you with excellent care. Hearing back from our patients is one way we can continue to improve our services. Please take a few minutes to complete the written survey that you may receive in the mail after you visit with us. Thank you!        [facility] Information     [facility] gives you secure access to your electronic health record. If you see a primary care provider, you can also send messages to your care team and make appointments. If you have questions, please call your primary care clinic.  If you do not have a primary care provider, please call [phone] and they will assist you.        Care EveryWhere ID     This is your Care EveryWhere ID. This could be used by other organizations to access your [facility] medical records  [id number]        Equal Access to Services     TRAMAINE CLAYTON : Carrie torres Sorhona, waaxda luqadaha, qaybta kaalelham adame. So [facility] [phone].    ATENCIÓN: Si habla español, tiene a wade disposición servicios gratuitos de asistencia lingüística. Llame al [phone].    We comply with applicable federal civil rights laws and [place] laws. We do not discriminate on the basis of race, color, national origin, age,  disability sex, sexual orientation or gender identity.            After Visit Summary       This is your record. Keep this with you and show to your community pharmacist(s) and doctor(s) at your next visit.

## 2017-08-05 NOTE — ED PROVIDER NOTES
History     Chief Complaint   Patient presents with     Asthma     Infusion Therapy Only     HPI  Doreen Peralta is a 37 year old female who presents with what she describes as an asthma exacerbation.  She has had a cough and cold over the last few days.  Dura-Vent and albuterol.  She states normally when she gets like this a burst of prednisone helps.  She denies adverse reactions to prednisone in the past.  She states she somewhat short of air.  She denies fever.    I have reviewed the Medications, Allergies, Past Medical and [facility] History, and Social History in the Epic system.    Allergies:   Allergies   Allergen Reactions     Hyoscyamine Rash     Metoclopramide Other (See Comments)     Eye twitching.      Peaches [Peach] Other (See Comments)     Raw. Cooked OK     Sucralose Other (See Comments)     All artificial sweeteners. Aspartame also. Swollen glands     Advair Diskus Other (See Comments)     Throat burns     Azithromycin Other (See Comments)     Burning in throat     Compazine [Prochlorperazine] Visual Disturbance     Contrast Dye Itching     States is allergic to CT contrast dye     Cyclobenzaprine Visual Disturbance     Fentanyl Other (See Comments)     migraine     Ibuprofen GI Disturbance     Lactulose Nausea and Vomiting     Gas and bloating     Levaquin [Levofloxacin] Swelling     Per ED M.D. And RN      Morphine Sulfate Other (See Comments)     Chest pain       Oxycodone Other (See Comments)     Burning throat, but can take Norco.      Penicillins Other (See Comments)     Family hx of resp arrest, she has never taken  Ok with cephalosporins     Rizatriptan Visual Disturbance     Droperidol Hives and Rash     Isovue [Iopamidol] Palpitations     Pt had racing heart and sob      Ketorolac Anxiety     Latex Swelling and Rash     Kiwi, likely also avacado, ? banana     Levsin Rash         Current Facility-Administered Medications on File Prior to Encounter:  [COMPLETED] ethanol (74%) ADULT  injection 5 mL   [DISCONTINUED] cefTRIAXone (ROCEPHIN) 1 g vial to attach to  mL bag for ADULTS or NS 50 mL bag for PEDS   [DISCONTINUED] sodium chloride (PF) 0.9% PF flush 10 mL     Current Outpatient Prescriptions on File Prior to Encounter:  HYDROmorphone (DILAUDID) 2 MG tablet Take 1 tablet (2 mg) by mouth every 3 hours as needed for moderate to severe pain   albuterol (2.5 MG/3ML) 0.083% neb solution Take 1 vial (2.5 mg) by nebulization every 4 hours as needed for shortness of breath / dyspnea or wheezing   ipratropium (ATROVENT) 0.02 % neb solution Take 2.5 mLs (0.5 mg) by nebulization every 6 hours as needed for wheezing   ondansetron (ZOFRAN ODT) 4 MG ODT tab Take 1-2 tablets (4-8 mg) by mouth every 6 hours as needed for nausea   cefTRIAXone (ROCEPHIN) 1 GM vial Inject 1 g into the vein daily   ethanol, 74%, ADULT 74 % Inject IV through Port-a-cath daily for 11 days to lock Port-a-cath after each dose of ceftriaxone and also use to lock Port-a-cath after each weekly infusion of IV fluids and MVI   order for DME 2 by 2 gauze pads, use for dressing changes as directed   order for DME 1 inch paper tape, Use for dressing changes as directed   cloNIDine (CATAPRES) 0.2 MG tablet TAKE 2 TABLETS(0.4 MG) BY MOUTH EVERY EVENING   diphenhydrAMINE (BENADRYL ALLERGY) 25 MG tablet Take 1 tablet (25 mg) by mouth every 6 hours as needed for itching or other (Nausea)   traZODone (DESYREL) 50 MG tablet Take 1-2 tablets ( mg) by mouth nightly as needed 1-2 tabs at bedtime PRN   nortriptyline (PAMELOR) 10 MG capsule Take 3-4 capsules (30-40 mg) by mouth At Bedtime   mirtazapine (REMERON SOL-TAB) 15 MG ODT tab 0.5 tablets (7.5 mg) by Orally disintegrating tablet route At Bedtime   SUMAtriptan (IMITREX) 50 MG tablet Take 1-2 tablets ( mg) by mouth at onset of headache for migraine - may repeat dose after 2h if headache recurs.  Max: 200mg/24 hours   DULoxetine (CYMBALTA) 60 MG capsule Take 1 capsule (60 mg) by  mouth daily   ACETAMINOPHEN PO Take 500-1,000 mg by mouth every 6 hours as needed for pain    albuterol 90 MCG/ACT inhaler Inhale 2 puffs into the lungs every 6 hours as needed        Patient Active Problem List   Diagnosis     Constipation by delayed colonic transit     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     Hx SBO     S/P LEEP of cervix     Gastroparesis     Migraines     Intermittent asthma     Allergic rhinitis     Abnormal Pap smear of cervix     PEG (percutaneous endoscopic gastrostomy) status     Health Care Home     PEG tube malfunction (H)     Malfunction of gastrostomy tube (H)     Malfunctioning jejunostomy tube (H)     Jejunostomy tube present (H)     S/P partial resection of colon     Malnutrition (H)     Long-term (current) use of anticoagulants [Z79.01]     Anxiety     Anemia in other chronic diseases classified elsewhere     Munchausen syndrome - previously suspected     Anemia, iron deficiency     Positive blood culture - Klebsiella oxytoca from Port-a-cath culture     Mitral regurgitation mild-mod by Echo June 2016     Atopic rhinitis     Migraine     Patellofemoral stress syndrome     Hyperbilirubinemia     Chronic diarrhea     Coagulation defect (H) [D68.9]     Fever     Attention to G-tube (H)     Chronic abdominal pain     Vitamin D deficiency     SIRS (systemic inflammatory response syndrome) (H)     History of deep venous thrombosis     Nausea and vomiting     Abdominal pain, generalized     Abdominal pain     Insomnia, unspecified type     Sepsis due to Klebsiella (H)       Past [facility] History:   Procedure Laterality Date     COLONOSCOPY  7/10/2012    Procedure: COLONOSCOPY;;  Surgeon: Nicole Redding MD;  [facility]     COLONOSCOPY N/A 2/19/2017    Procedure: COLONOSCOPY;  Surgeon: Randell Muller MD;  [facility] GI     COLONOSCOPY N/A 2/21/2017    Procedure: COLONOSCOPY;  Surgeon: Randell Muller MD;  Location: [facility]     ECHO CHELO  7/19/2016          ENDOSCOPIC  INSERTION TUBE GASTROSTOMY N/A 1/21/2016    Procedure: ENDOSCOPIC INSERTION TUBE GASTROSTOMY;  Surgeon: Nicole Redding MD;  [facility]     ESOPHAGOSCOPY, GASTROSCOPY, DUODENOSCOPY (EGD), COMBINED  7/10/2012    Procedure: COMBINED ESOPHAGOSCOPY, GASTROSCOPY, DUODENOSCOPY (EGD);  Upper Endoscopy, Ileoscopy    Latex Allergy  with biopsies;  Surgeon: Nicole Redding MD;  [facility]     ESOPHAGOSCOPY, GASTROSCOPY, DUODENOSCOPY (EGD), COMBINED N/A 11/5/2014    Procedure: COMBINED ESOPHAGOSCOPY, GASTROSCOPY, DUODENOSCOPY (EGD);  Surgeon: Nicole Redding MD;  [facility]     HC REPLACE DUODENOSTOMY/JEJUNOSTOMY TUBE PERCUTANEOUS N/A 8/27/2015    Procedure: REPLACE GASTROJEJUNOSTOMY TUBE, PERCUTANEOUS;  Surgeon: Mio Holder MD;  [facility]     HC REPLACE DUODENOSTOMY/JEJUNOSTOMY TUBE PERCUTANEOUS N/A 1/7/2016    Procedure: REPLACE JEJUNOSTOMY TUBE, PERCUTANEOUS;  Surgeon: Elsa Medel MD;  [facility]     HC REPLACE DUODENOSTOMY/JEJUNOSTOMY TUBE PERCUTANEOUS N/A 1/28/2016    Procedure: REPLACE JEJUNOSTOMY TUBE, PERCUTANEOUS;  Surgeon: Elsa Medel MD;  [facility]     HC REPLACE GASTROSTOMY/CECOSTOMY TUBE PERCUTANEOUS Left 5/19/2015    Procedure: REPLACE GASTROSTOMY TUBE, PERCUTANEOUS;  Surgeon: Melecio Morejon Chi, MD;  Location[facility]     HC UGI ENDOSCOPY W PLACEMENT GASTROSTOMY TUBE PERCUT N/A 10/1/2015    Procedure: COMBINED ESOPHAGOSCOPY, GASTROSCOPY, DUODENOSCOPY (EGD), PLACE PERCUTANEOUS ENDOSCOPIC GASTROSTOMY TUBE;  Surgeon: Mio Holder MD;  [facility]     INSERT PORT VASCULAR ACCESS Right 7/20/2017    Procedure: INSERT PORT VASCULAR ACCESS;  Chest Port Placement  **Latex Allergy**;  Surgeon: Coy Rocha PA-C;  [facility]     LAPAROSCOPIC ASSISTED COLECTOMY  1/20/2012    Procedure:LAPAROSCOPIC ASSISTED COLECTOMY; Laparoscopic Ileostomy       LAPAROSCOPIC ASSISTED COLECTOMY LEFT (DESCENDING)  10/24/2012    Procedure: LAPAROSCOPIC  "ASSISTED COLECTOMY LEFT (DESCENDING);   Hand Assisted Laproscopic Subtotal abdominal Colectomy,Iieorectal anastamosis, Ileostomy Closure.       LAPAROSCOPIC ASSISTED INSERTION TUBE JEJUNOSTOMY N/A 10/16/2015    Procedure: LAPAROSCOPIC ASSISTED INSERTION TUBE JEJUNOSTOMY;  Surgeon: Elsa Medel MD;  [facility]     LAPAROSCOPIC CHOLECYSTECTOMY  2002    [facility] stones duct     LAPAROSCOPIC ILEOSTOMY  1/20/2012    [facility], loop     LAPAROSCOPIC OOPHORECTOMY Right 2009    [facility]     LAPAROTOMY EXPLORATORY N/A 1/28/2016    Procedure: LAPAROTOMY EXPLORATORY;  Surgeon: Elsa Medel MD;  [facility]     LEEP TX, CERVICAL  2009    [facility]     PICC INSERTION Left 10/21/2015    5fr DL Power PICC, 37cm (2cm external) in the L basilic vein w/ tip in the SVC RA junction.     REMOVE GASTROSTOMY TUBE ADULT N/A 12/12/2014    Procedure: REMOVE GASTROSTOMY TUBE ADULT;  Surgeon: Nicole Redding MD;  [facility]     REMOVE PORT VASCULAR ACCESS Right 6/30/2016    Procedure: REMOVE PORT VASCULAR ACCESS;  Surgeon: Pradeep Orosco MD;  [facility] OR     replace GASTROSTOMY TUBE ADULT  5/19/15       Social History   Substance Use Topics     Smoking status: Former Smoker     Packs/day: 1.00     Years: 4.00     Types: Cigarettes     Quit date: 1/1/2004     Smokeless tobacco: Former User     Alcohol use No       Most Recent Immunizations   Administered Date(s) Administered     Influenza (IIV3) 10/01/2009     Pneumococcal 23 valent 07/18/2014       BMI: Estimated body mass index is 32.28 kg/(m^2) as calculated from the following:    Height as of 7/27/17: 1.676 m (5' 6\").    Weight as of 8/2/17: 90.7 kg (200 lb).      Review of Systems   She is currently finishing IV Rocephin treatment for sepsis from a port.  She denies fever.  She states she feels well in that regard.  She also noted has a significant history of GI difficulties but states her GI symptoms are doing fine right now.  All " other systems are reviewed and are negative      Physical Exam   BP: (!) 147/97  Pulse: 74  Temp: 98.6  F (37  C)  Resp: 14  SpO2: 100 %  Physical Exam   Constitutional: She appears well-developed and well-nourished. No distress.   HENT:   Head: Normocephalic and atraumatic.   Mouth/Throat: Oropharynx is clear and moist.   Eyes: Pupils are equal, round, and reactive to light. No scleral icterus.   Neck: Normal range of motion. Neck supple.   Cardiovascular: Normal rate, regular rhythm, normal heart sounds and intact distal pulses.    No murmur heard.  Pulmonary/Chest: No stridor. No respiratory distress. She has no wheezes. She has no rales.   Port and right anterior chest without obvious signs of infection at this point.   Abdominal: Soft. There is no tenderness.   Musculoskeletal: She exhibits no edema or tenderness.   Neurological: She is alert.   Skin: Skin is warm and dry. No rash noted. She is not diaphoretic. No erythema. No pallor.   Psychiatric: She has a normal mood and affect.   Nursing note and vitals reviewed.      ED Course     ED Course     Procedures             Critical Care time:  none               Labs Ordered and Resulted from Time of ED Arrival Up to the Time of Departure from the ED - No data to display    Assessments & Plan (with Medical Decision Making)  37-year-old female with known asthma who reports increased dyspnea.  We'll place on a 5 day burst of prednisone which she has tolerated well in the past.  Stable for outpatient management.  Continue with albuterol and Atrovent as needed.       I have reviewed the nursing notes.    I have reviewed the findings, diagnosis, plan and need for follow up with the patient.       New Prescriptions    PREDNISONE (DELTASONE) 20 MG TABLET    Take two tablets (= 40mg) each day for 5 (five) days       Final diagnoses:   Asthma exacerbation       8/5/2017   [facility] EMERGENCY DEPARTMENT     Sam Raines MD  08/05/17 0980

## 2017-08-06 ENCOUNTER — HOSPITAL ENCOUNTER (EMERGENCY)
Facility: CLINIC | Age: 37
Discharge: HOME | End: 2017-08-06
Admitting: FAMILY MEDICINE
Payer: COMMERCIAL

## 2017-08-06 VITALS
RESPIRATION RATE: 16 BRPM | OXYGEN SATURATION: 100 % | DIASTOLIC BLOOD PRESSURE: 84 MMHG | WEIGHT: 200 LBS | BODY MASS INDEX: 32.14 KG/M2 | SYSTOLIC BLOOD PRESSURE: 145 MMHG | HEIGHT: 66 IN | TEMPERATURE: 97.2 F | HEART RATE: 72 BPM

## 2017-08-06 DIAGNOSIS — A41.9 UNSPECIFIED SEPTICEMIA(038.9) (H): ICD-10-CM

## 2017-08-06 DIAGNOSIS — Z93.4 JEJUNOSTOMY TUBE PRESENT (H): ICD-10-CM

## 2017-08-06 DIAGNOSIS — A41.59 SEPSIS DUE TO KLEBSIELLA (H): ICD-10-CM

## 2017-08-06 DIAGNOSIS — Z93.1 PEG (PERCUTANEOUS ENDOSCOPIC GASTROSTOMY) STATUS (H): ICD-10-CM

## 2017-08-06 DIAGNOSIS — E46 MALNUTRITION (H): ICD-10-CM

## 2017-08-06 DIAGNOSIS — R11.2 NAUSEA AND VOMITING, INTRACTABILITY OF VOMITING NOT SPECIFIED, UNSPECIFIED VOMITING TYPE: ICD-10-CM

## 2017-08-06 DIAGNOSIS — R78.81 POSITIVE BLOOD CULTURE: ICD-10-CM

## 2017-08-06 DIAGNOSIS — R10.84 ABDOMINAL PAIN, GENERALIZED: ICD-10-CM

## 2017-08-06 PROCEDURE — 96375 TX/PRO/DX INJ NEW DRUG ADDON: CPT | Performed by: FAMILY MEDICINE

## 2017-08-06 PROCEDURE — 40000268 ZZH STATISTIC NO CHARGES: Performed by: FAMILY MEDICINE

## 2017-08-06 PROCEDURE — 27210995 ZZH RX 272: Performed by: PEDIATRICS

## 2017-08-06 PROCEDURE — 25000128 H RX IP 250 OP 636: Performed by: PEDIATRICS

## 2017-08-06 PROCEDURE — 96365 THER/PROPH/DIAG IV INF INIT: CPT | Performed by: FAMILY MEDICINE

## 2017-08-06 RX ORDER — DEHYDRATED ALCOHOL 0.98 ML/ML
5 INJECTION, SOLUTION INTRASPINAL ONCE
Status: CANCELLED
Start: 2017-08-07 | End: 2017-08-07

## 2017-08-06 RX ORDER — CEFTRIAXONE 1 G/1
1 INJECTION, POWDER, FOR SOLUTION INTRAMUSCULAR; INTRAVENOUS EVERY 24 HOURS
Status: CANCELLED | OUTPATIENT
Start: 2017-08-07

## 2017-08-06 RX ORDER — DEHYDRATED ALCOHOL 0.98 ML/ML
5 INJECTION, SOLUTION INTRASPINAL ONCE
Status: COMPLETED | OUTPATIENT
Start: 2017-08-06 | End: 2017-08-06

## 2017-08-06 RX ORDER — DEHYDRATED ALCOHOL 0.98 ML/ML
5 INJECTION, SOLUTION INTRASPINAL ONCE
Status: DISCONTINUED | OUTPATIENT
Start: 2017-08-07 | End: 2017-08-06

## 2017-08-06 RX ORDER — CEFTRIAXONE 1 G/1
1 INJECTION, POWDER, FOR SOLUTION INTRAMUSCULAR; INTRAVENOUS EVERY 24 HOURS
Status: DISCONTINUED | OUTPATIENT
Start: 2017-08-06 | End: 2017-08-06 | Stop reason: HOSPADM

## 2017-08-06 RX ADMIN — CEFTRIAXONE SODIUM 1 G: 1 INJECTION, POWDER, FOR SOLUTION INTRAMUSCULAR; INTRAVENOUS at 10:39

## 2017-08-06 RX ADMIN — ALCOHOL 5 ML: 0.98 INJECTION INTRASPINAL at 11:16

## 2017-08-07 ENCOUNTER — INFUSION THERAPY VISIT (OUTPATIENT)
Dept: INFUSION THERAPY | Facility: CLINIC | Age: 37
End: 2017-08-07
Attending: INTERNAL MEDICINE
Payer: COMMERCIAL

## 2017-08-07 VITALS
HEART RATE: 76 BPM | OXYGEN SATURATION: 100 % | SYSTOLIC BLOOD PRESSURE: 137 MMHG | WEIGHT: 200 LBS | RESPIRATION RATE: 16 BRPM | BODY MASS INDEX: 32.28 KG/M2 | DIASTOLIC BLOOD PRESSURE: 80 MMHG | TEMPERATURE: 97.5 F

## 2017-08-07 DIAGNOSIS — A41.59 SEPSIS DUE TO KLEBSIELLA (H): Primary | ICD-10-CM

## 2017-08-07 DIAGNOSIS — R11.2 NAUSEA AND VOMITING, INTRACTABILITY OF VOMITING NOT SPECIFIED, UNSPECIFIED VOMITING TYPE: ICD-10-CM

## 2017-08-07 DIAGNOSIS — Z93.1 PEG (PERCUTANEOUS ENDOSCOPIC GASTROSTOMY) STATUS (H): ICD-10-CM

## 2017-08-07 DIAGNOSIS — Z93.4 JEJUNOSTOMY TUBE PRESENT (H): ICD-10-CM

## 2017-08-07 DIAGNOSIS — R10.84 ABDOMINAL PAIN, GENERALIZED: ICD-10-CM

## 2017-08-07 DIAGNOSIS — E46 MALNUTRITION (H): ICD-10-CM

## 2017-08-07 DIAGNOSIS — R78.81 POSITIVE BLOOD CULTURE: ICD-10-CM

## 2017-08-07 PROCEDURE — 25000125 ZZHC RX 250: Performed by: INTERNAL MEDICINE

## 2017-08-07 PROCEDURE — 25000128 H RX IP 250 OP 636: Performed by: PEDIATRICS

## 2017-08-07 PROCEDURE — 25000128 H RX IP 250 OP 636: Performed by: INTERNAL MEDICINE

## 2017-08-07 PROCEDURE — 96361 HYDRATE IV INFUSION ADD-ON: CPT

## 2017-08-07 PROCEDURE — 27210995 ZZH RX 272: Performed by: INTERNAL MEDICINE

## 2017-08-07 PROCEDURE — 96365 THER/PROPH/DIAG IV INF INIT: CPT

## 2017-08-07 RX ORDER — DEHYDRATED ALCOHOL 0.98 ML/ML
5 INJECTION, SOLUTION INTRASPINAL ONCE
Status: CANCELLED
Start: 2017-08-08 | End: 2017-08-08

## 2017-08-07 RX ORDER — CEFTRIAXONE 1 G/1
1 INJECTION, POWDER, FOR SOLUTION INTRAMUSCULAR; INTRAVENOUS EVERY 24 HOURS
Status: CANCELLED | OUTPATIENT
Start: 2017-08-08

## 2017-08-07 RX ORDER — DEHYDRATED ALCOHOL 0.98 ML/ML
5 INJECTION, SOLUTION INTRASPINAL ONCE
Status: COMPLETED | OUTPATIENT
Start: 2017-08-07 | End: 2017-08-07

## 2017-08-07 RX ORDER — CEFTRIAXONE 1 G/1
1 INJECTION, POWDER, FOR SOLUTION INTRAMUSCULAR; INTRAVENOUS EVERY 24 HOURS
Status: DISCONTINUED | OUTPATIENT
Start: 2017-08-07 | End: 2017-08-07 | Stop reason: HOSPADM

## 2017-08-07 RX ORDER — DEHYDRATED ALCOHOL 0.98 ML/ML
5 INJECTION, SOLUTION INTRASPINAL ONCE
Status: DISCONTINUED | OUTPATIENT
Start: 2017-08-08 | End: 2017-08-07

## 2017-08-07 RX ADMIN — FOLIC ACID: 5 INJECTION, SOLUTION INTRAMUSCULAR; INTRAVENOUS; SUBCUTANEOUS at 09:48

## 2017-08-07 RX ADMIN — ALCOHOL 5 ML: 0.98 INJECTION INTRASPINAL at 11:04

## 2017-08-07 RX ADMIN — CEFTRIAXONE 1 G: 1 INJECTION, POWDER, FOR SOLUTION INTRAMUSCULAR; INTRAVENOUS at 08:55

## 2017-08-07 ASSESSMENT — PAIN SCALES - GENERAL: PAINLEVEL: NO PAIN (0)

## 2017-08-07 NOTE — MR AVS SNAPSHOT
After Visit Summary   8/7/2017    Doreen Peralta    MRN: [id number]           Patient Information     Date Of Birth          1980        Visit Information        Provider Department      8/7/2017 8:30 AM NL INFUSION CHAIR 5 [facility]        Today's Diagnoses     Sepsis due to Klebsiella (H)    -  1    Positive blood culture - Klebsiella oxytoca from Port-a-cath culture        Malnutrition (H)        Jejunostomy tube present (H)        PEG (percutaneous endoscopic gastrostomy) status        Abdominal pain, generalized        Nausea and vomiting, intractability of vomiting not specified, unspecified vomiting type          Care Instructions    Pt to return on 8/8 for Rocephin. Copies of medication list and upcoming appointments given prior to discharge.           Follow-ups after your visit        Follow-up notes from your care team     Return in 1 day (on 8/8/2017).      Your next 10 appointments already scheduled     Aug 08, 2017  9:30 AM CDT   Level 2 with NL INFUSION CHAIR 5   [facility])    [address] [place] [address] Dr Tye [place] [address]   [phone]            Aug 09, 2017  8:00 AM CDT   Level 2 with NL INFUSION CHAIR 1   [facility] ([facility])    [address] [place] [address]   [phone]            Aug 21, 2017  9:30 AM CDT   Level 2 with NL INFUSION CHAIR 1   [facility] ([facility])    [address] [place] [address] Dr Tye [place]A [address]   [phone]            Aug 28, 2017  8:30 AM CDT   Level 2 with NL INFUSION CHAIR 4   [facility] ([facility])    [address] [place] [address]   [phone]            Sep 05, 2017  9:30 AM CDT   Level 2 with NL INFUSION CHAIR 5   [facility] ([facility])    [address]  [address]   [phone]            Dec 04, 2017 10:00 AM CST   (Arrive by 9:45 AM)   Return Visit with Gilmer Lees MD   [facility] ([facility] [place])    [address] [id number] [phone]              Who to contact     If you have questions or need follow up information about today's clinic visit or your schedule please contact [facility] directly at [phone].  Normal or non-critical lab and imaging results will be communicated to you by Memorandomhart, letter or phone within 4 business days after the clinic has received the results. If you do not hear from us within 7 days, please contact the clinic through readeot or phone. If you have a critical or abnormal lab result, we will notify you by phone as soon as possible.  Submit refill requests through [employer] or call your pharmacy and they will forward the refill request to us. Please allow 3 business days for your refill to be completed.          Additional Information About Your Visit        [employer] Information     [employer] gives you secure access to your electronic health record. If you see a primary care provider, you can also send messages to your care team and make appointments. If you have questions, please call your primary care clinic.  If you do not have a primary care provider, please call [phone] and they will assist you.        Care EveryWhere ID     This is your Care EveryWhere ID. This could be used by other organizations to access your [place] medical records  [id number]        Your Vitals Were     Pulse Temperature Respirations Last Period Pulse Oximetry BMI (Body Mass Index)    76 97.5  F (36.4  C) (Temporal) 16 07/14/2017 100% 32.28 kg/m2       Blood Pressure from Last 3 Encounters:   08/07/17 137/80   08/06/17 145/84   08/05/17 142/88    Weight from Last 3 Encounters:   08/07/17 90.7 kg (200 lb)   08/06/17 90.7 kg (200 lb)    08/02/17 90.7 kg (200 lb)              Today, you had the following     No orders found for display       Primary Care Provider Office Phone # Fax #    Larisa Lorenzo PA-C [phone]       [facility] [place] [address]  [place] [address]        Equal Access to Services     [facility]: Hadii aad ku hadasho Soomaali, waaxda luqadaha, qaybta kaalmada adeegyada, waxay idiin hayaan adeeg kharash la'aan . So [facility] [phone].    ATENCIÓN: Si habla español, tiene a wade disposición servicios gratuitos de asistencia lingüística. [facility] [phone].    We comply with applicable federal civil rights laws and [place] laws. We do not discriminate on the basis of race, color, national origin, age, disability sex, sexual orientation or gender identity.            Thank you!     Thank you for choosing [facility] INF[facility]ION [facility]  for your care. Our goal is always to provide you with excellent care. Hearing back from our patients is one way we can continue to improve our services. Please take a few minutes to complete the written survey that you may receive in the mail after your visit with us. Thank you!             Your Updated Medication List - Protect others around you: Learn how to safely use, store and throw away your medicines at www.disposemymeds.org.          This list is accurate as of: 8/7/17  3:04 PM.  Always use your most recent med list.                   Brand Name Dispense Instructions for use Diagnosis    ACETAMINOPHEN PO      Take 500-1,000 mg by mouth every 6 hours as needed for pain        albuterol (2.5 MG/3ML) 0.083% neb solution     360 mL    Take 1 vial (2.5 mg) by nebulization every 4 hours as needed for shortness of breath / dyspnea or wheezing    Gastrostomy tube dependent (H), SBO (small bowel obstruction) (H), Chronic abdominal pain, Chronic diarrhea       albuterol 90 MCG/ACT inhaler      Inhale 2 puffs into the lungs every 6 hours as needed         cefTRIAXone 1 GM vial    ROCEPHIN    110 mL    Inject 1 g into the vein daily    Sepsis due to Klebsiella (H), Positive blood culture       cloNIDine 0.2 MG tablet    CATAPRES    180 tablet    TAKE 2 TABLETS(0.4 MG) BY MOUTH EVERY EVENING    Anxiety       diphenhydrAMINE 25 MG tablet    BENADRYL ALLERGY    30 tablet    Take 1 tablet (25 mg) by mouth every 6 hours as needed for itching or other (Nausea)        DULoxetine 60 MG EC capsule    CYMBALTA    90 capsule    Take 1 capsule (60 mg) by mouth daily    Abdominal pain, epigastric, Abdominal migraine, not intractable       ethanol (74%) ADULT 74 %     5 mL    Inject IV through Port-a-cath daily for 11 days to lock Port-a-cath after each dose of ceftriaxone and also use to lock Port-a-cath after each weekly infusion of IV fluids and MVI    Positive blood culture, Sepsis due to Klebsiella (H)       ipratropium 0.02 % neb solution    ATROVENT    225 mL    Take 2.5 mLs (0.5 mg) by nebulization every 6 hours as needed for wheezing    Gastrostomy tube dependent (H), SBO (small bowel obstruction) (H), Chronic abdominal pain, Chronic diarrhea       mirtazapine 15 MG ODT tab    REMERON SOL-TAB    45 tablet    0.5 tablets (7.5 mg) by Orally disintegrating tablet route At Bedtime    Anxiety       nortriptyline 10 MG capsule    PAMELOR    120 capsule    Take 3-4 capsules (30-40 mg) by mouth At Bedtime    Neuropathic pain, Primary insomnia       ondansetron 4 MG ODT tab    ZOFRAN ODT    30 tablet    Take 1-2 tablets (4-8 mg) by mouth every 6 hours as needed for nausea    Intractable vomiting, presence of nausea not specified, unspecified vomiting type, Gastroparesis       * order for DME     1 Box    2 by 2 gauze pads, use for dressing changes as directed    Attention to G-tube (H)       * order for DME     1 each    1 inch paper tape, Use for dressing changes as directed    Attention to G-tube (H)       predniSONE 20 MG tablet    DELTASONE    10 tablet    Take two tablets (=  40mg) each day for 5 (five) days        SUMAtriptan 50 MG tablet    IMITREX    18 tablet    Take 1-2 tablets ( mg) by mouth at onset of headache for migraine - may repeat dose after 2h if headache recurs.  Max: 200mg/24 hours    Migraine without aura and without status migrainosus, not intractable       traZODone 50 MG tablet    DESYREL    180 tablet    Take 1-2 tablets ( mg) by mouth nightly as needed 1-2 tabs at bedtime PRN    Insomnia, unspecified type       * Notice:  This list has 2 medication(s) that are the same as other medications prescribed for you. Read the directions carefully, and ask your doctor or other care provider to review them with you.

## 2017-08-07 NOTE — PROGRESS NOTES
Pt here for IV Rocephin. Asked for her liter of IVF w MTV which was given.  Tolerated all treatments well.  Pt discharged in stable condition.

## 2017-08-07 NOTE — PATIENT INSTRUCTIONS
Pt to return on 8/8 for Rocephin. Copies of medication list and upcoming appointments given prior to discharge.

## 2017-08-08 ENCOUNTER — INFUSION THERAPY VISIT (OUTPATIENT)
Dept: INFUSION THERAPY | Facility: CLINIC | Age: 37
End: 2017-08-08
Attending: PEDIATRICS
Payer: COMMERCIAL

## 2017-08-08 VITALS
HEART RATE: 69 BPM | RESPIRATION RATE: 16 BRPM | SYSTOLIC BLOOD PRESSURE: 144 MMHG | OXYGEN SATURATION: 100 % | DIASTOLIC BLOOD PRESSURE: 92 MMHG | TEMPERATURE: 97 F

## 2017-08-08 DIAGNOSIS — E46 MALNUTRITION (H): ICD-10-CM

## 2017-08-08 DIAGNOSIS — Z93.4 JEJUNOSTOMY TUBE PRESENT (H): ICD-10-CM

## 2017-08-08 DIAGNOSIS — A41.59 SEPSIS DUE TO KLEBSIELLA (H): Primary | ICD-10-CM

## 2017-08-08 DIAGNOSIS — R78.81 POSITIVE BLOOD CULTURE: ICD-10-CM

## 2017-08-08 DIAGNOSIS — Z93.1 PEG (PERCUTANEOUS ENDOSCOPIC GASTROSTOMY) STATUS (H): ICD-10-CM

## 2017-08-08 PROCEDURE — 25000128 H RX IP 250 OP 636: Performed by: PEDIATRICS

## 2017-08-08 PROCEDURE — 27210995 ZZH RX 272: Performed by: PEDIATRICS

## 2017-08-08 PROCEDURE — 96365 THER/PROPH/DIAG IV INF INIT: CPT

## 2017-08-08 RX ORDER — DEHYDRATED ALCOHOL 0.98 ML/ML
5 INJECTION, SOLUTION INTRASPINAL ONCE
Status: CANCELLED
Start: 2017-08-09 | End: 2017-08-09

## 2017-08-08 RX ORDER — CEFTRIAXONE 1 G/1
1 INJECTION, POWDER, FOR SOLUTION INTRAMUSCULAR; INTRAVENOUS EVERY 24 HOURS
Status: DISCONTINUED | OUTPATIENT
Start: 2017-08-08 | End: 2017-08-08 | Stop reason: HOSPADM

## 2017-08-08 RX ORDER — CEFTRIAXONE 1 G/1
1 INJECTION, POWDER, FOR SOLUTION INTRAMUSCULAR; INTRAVENOUS EVERY 24 HOURS
Status: CANCELLED | OUTPATIENT
Start: 2017-08-09

## 2017-08-08 RX ORDER — DEHYDRATED ALCOHOL 0.98 ML/ML
5 INJECTION, SOLUTION INTRASPINAL ONCE
Status: COMPLETED | OUTPATIENT
Start: 2017-08-09 | End: 2017-08-08

## 2017-08-08 RX ADMIN — ALCOHOL 5 ML: 0.98 INJECTION INTRASPINAL at 09:22

## 2017-08-08 RX ADMIN — CEFTRIAXONE SODIUM 1 G: 1 INJECTION, POWDER, FOR SOLUTION INTRAMUSCULAR; INTRAVENOUS at 08:38

## 2017-08-08 ASSESSMENT — PAIN SCALES - GENERAL: PAINLEVEL: MILD PAIN (2)

## 2017-08-08 NOTE — PATIENT INSTRUCTIONS
Pt to return on 8/9 for Rocephin. Copies of medication list and upcoming appointments given prior to discharge.

## 2017-08-08 NOTE — PROGRESS NOTES
"Doreen Peralta  Gender: female  : 1980  [address]   [place] [address]  [phone] (home)     Medical Record: [id number]  Pharmacy: [id number] - [place], [place] - [address]  Primary Care Provider: Larisa Lorenzo    Parent's names are: Aby Rainey (mother) and Data Unavailable (father).      [facility]  2017     Discharge Phone Call:  Key Words/Key Times      Introduction - AIDET (Acknowledge, Introduce, Duration, Explanation)      Empathy-   We are calling to see how you are since your recent stay in the hospital?     Call back COMMENTS:  \"I am doing very well, I only have 1 more day of out patient antibiotics to do tomorrow\"      Clinical Questions -  (f/u appts, medication side effects/purpose, ability to care for self at home) \"For your safety, it is important to us that you understand the purpose and side effects of your medications, can you tell me what your new medications are?\"     Call back COMMENTS:  I have no questions or concerns      Staff Recognition -  We like to recognize staff and physicians who have done an excellent job.  Do you remember any people from your care team that you would like recognize?     Call back COMMENTS: Dr. Mario and Dr. Lazcano always take good care of me when I am there, and the nursing staff is wonderful, I have no complaints there!      Very Good Care -  We want to provide very good care to all patients.  How was your care?     Call back COMMENTS: (see above)      Opportunities for Improvement -  Our goal is to be the best.  Do you have any suggestions for things that we could improve upon?     Call back COMMENTS: No, no concerns      Thank You             Safe Accounts (purposfully retained items) Examples: ABBI's, Hemovac, penrose,Wound packing, Ureteral stents, Maya, PIV/Picc Line    We care about the coordination of your care  1. Do you still have port-a-cath " in place? yes  2. If the item is in place, Can you review the plan for removal with me? No plans for removal at this time.

## 2017-08-08 NOTE — MR AVS SNAPSHOT
After Visit Summary   8/8/2017    Doreen Peralta    MRN: [id number]           Patient Information     Date Of Birth          1980        Visit Information        Provider Department      8/8/2017 9:30 AM NL INFUSION CHAIR 5 [facility]        Today's Diagnoses     Sepsis due to Klebsiella (H)    -  1    Positive blood culture - Klebsiella oxytoca from Port-a-cath culture        Malnutrition (H)        Jejunostomy tube present (H)        PEG (percutaneous endoscopic gastrostomy) status          Care Instructions    Pt to return on 8/9 for Rocephin. Copies of medication list and upcoming appointments given prior to discharge.           Follow-ups after your visit        Follow-up notes from your care team     Return in 1 day (on 8/9/2017).      Your next 10 appointments already scheduled     Aug 09, 2017  8:00 AM CDT   Level 2 with NL INFUSION CHAIR 1   [facility] ([facility])    [address] [place] [address] Dr Tye A[place]A [address]   [phone]            Aug 21, 2017  9:30 AM CDT   Level 2 with NL INFUSION CHAIR 1   [facility] ([facility])    [address] [place] [address]   [phone]            Aug 28, 2017  8:30 AM CDT   Level 2 with NL INFUSION CHAIR 4   [facility] ([facility])    [address] [place] [address]   [phone]            Sep 05, 2017  9:30 AM CDT   Level 2 with NL INFUSION CHAIR 5   [facility] ([facility])    [address] [address]   [phone]            Dec 04, 2017 10:00 AM CST   (Arrive by 9:45 AM)   Return Visit with Gilmer Lees MD   [facility] ([facility] [place])    [address] [facility] [address] [phone]              Who to contact     If you have questions or need  follow up information about today's clinic visit or your schedule please contact [facility] INF[facility]ION SERVICES directly at [phone].  Normal or non-critical lab and imaging results will be communicated to you by MyChart, letter or phone within 4 business days after the clinic has received the results. If you do not hear from us within 7 days, please contact the clinic through [employer] or phone. If you have a critical or abnormal lab result, we will notify you by phone as soon as possible.  Submit refill requests through [employer] or call your pharmacy and they will forward the refill request to us. Please allow 3 business days for your refill to be completed.          Additional Information About Your Visit        [employer] Information     [employer] gives you secure access to your electronic health record. If you see a primary care provider, you can also send messages to your care team and make appointments. If you have questions, please call your primary care clinic.  If you do not have a primary care provider, please call [phone] and they will assist you.        Care EveryWhere ID     This is your Care EveryWhere ID. This could be used by other organizations to access your [place] medical records  [id number]        Your Vitals Were     Pulse Temperature Respirations Last Period Pulse Oximetry       69 97  F (36.1  C) (Temporal) 16 07/14/2017 100%        Blood Pressure from Last 3 Encounters:   08/08/17 (!) 144/92   08/07/17 137/80   08/06/17 145/84    Weight from Last 3 Encounters:   08/07/17 90.7 kg (200 lb)   08/06/17 90.7 kg (200 lb)   08/02/17 90.7 kg (200 lb)              Today, you had the following     No orders found for display       Primary Care Provider Office Phone # Fax #    Larisa Lorenzo PA-C [phone]       [address] [place] [address]        Equal Access to Services     TRAMAINE CLAYTON : Carrie Law, clark villeda, vesta buitrago  elham longannamaria gloriaharsh la'aan ah. So [facility] [phone].    ATENCIÓN: Si sherlyla suyapa, tiene a wade disposición servicios gratuitos de asistencia lingüística. Janes al [phone].    We comply with applicable federal civil rights laws and [place] laws. We do not discriminate on the basis of race, color, national origin, age, disability sex, sexual orientation or gender identity.            Thank you!     Thank you for choosing [facility] INF[facility]ION [facility]  for your care. Our goal is always to provide you with excellent care. Hearing back from our patients is one way we can continue to improve our services. Please take a few minutes to complete the written survey that you may receive in the mail after your visit with us. Thank you!             Your Updated Medication List - Protect others around you: Learn how to safely use, store and throw away your medicines at www.disposemymeds.org.          This list is accurate as of: 8/8/17  3:56 PM.  Always use your most recent med list.                   Brand Name Dispense Instructions for use Diagnosis    ACETAMINOPHEN PO      Take 500-1,000 mg by mouth every 6 hours as needed for pain        albuterol (2.5 MG/3ML) 0.083% neb solution     360 mL    Take 1 vial (2.5 mg) by nebulization every 4 hours as needed for shortness of breath / dyspnea or wheezing    Gastrostomy tube dependent (H), SBO (small bowel obstruction) (H), Chronic abdominal pain, Chronic diarrhea       albuterol 90 MCG/ACT inhaler      Inhale 2 puffs into the lungs every 6 hours as needed        cefTRIAXone 1 GM vial    ROCEPHIN    110 mL    Inject 1 g into the vein daily    Sepsis due to Klebsiella (H), Positive blood culture       cloNIDine 0.2 MG tablet    CATAPRES    180 tablet    TAKE 2 TABLETS(0.4 MG) BY MOUTH EVERY EVENING    Anxiety       diphenhydrAMINE 25 MG tablet    BENADRYL ALLERGY    30 tablet    Take 1 tablet (25 mg) by mouth every 6 hours as needed for itching or  other (Nausea)        DULoxetine 60 MG EC capsule    CYMBALTA    90 capsule    Take 1 capsule (60 mg) by mouth daily    Abdominal pain, epigastric, Abdominal migraine, not intractable       ethanol (74%) ADULT 74 %     5 mL    Inject IV through Port-a-cath daily for 11 days to lock Port-a-cath after each dose of ceftriaxone and also use to lock Port-a-cath after each weekly infusion of IV fluids and MVI    Positive blood culture, Sepsis due to Klebsiella (H)       ipratropium 0.02 % neb solution    ATROVENT    225 mL    Take 2.5 mLs (0.5 mg) by nebulization every 6 hours as needed for wheezing    Gastrostomy tube dependent (H), SBO (small bowel obstruction) (H), Chronic abdominal pain, Chronic diarrhea       mirtazapine 15 MG ODT tab    REMERON SOL-TAB    45 tablet    0.5 tablets (7.5 mg) by Orally disintegrating tablet route At Bedtime    Anxiety       nortriptyline 10 MG capsule    PAMELOR    120 capsule    Take 3-4 capsules (30-40 mg) by mouth At Bedtime    Neuropathic pain, Primary insomnia       ondansetron 4 MG ODT tab    ZOFRAN ODT    30 tablet    Take 1-2 tablets (4-8 mg) by mouth every 6 hours as needed for nausea    Intractable vomiting, presence of nausea not specified, unspecified vomiting type, Gastroparesis       * order for DME     1 Box    2 by 2 gauze pads, use for dressing changes as directed    Attention to G-tube (H)       * order for DME     1 each    1 inch paper tape, Use for dressing changes as directed    Attention to G-tube (H)       predniSONE 20 MG tablet    DELTASONE    10 tablet    Take two tablets (= 40mg) each day for 5 (five) days        SUMAtriptan 50 MG tablet    IMITREX    18 tablet    Take 1-2 tablets ( mg) by mouth at onset of headache for migraine - may repeat dose after 2h if headache recurs.  Max: 200mg/24 hours    Migraine without aura and without status migrainosus, not intractable       traZODone 50 MG tablet    DESYREL    180 tablet    Take 1-2 tablets ( mg)  by mouth nightly as needed 1-2 tabs at bedtime PRN    Insomnia, unspecified type       * Notice:  This list has 2 medication(s) that are the same as other medications prescribed for you. Read the directions carefully, and ask your doctor or other care provider to review them with you.

## 2017-08-09 ENCOUNTER — INFUSION THERAPY VISIT (OUTPATIENT)
Dept: INFUSION THERAPY | Facility: CLINIC | Age: 37
End: 2017-08-09
Attending: PEDIATRICS
Payer: COMMERCIAL

## 2017-08-09 VITALS
DIASTOLIC BLOOD PRESSURE: 70 MMHG | SYSTOLIC BLOOD PRESSURE: 122 MMHG | HEART RATE: 66 BPM | RESPIRATION RATE: 16 BRPM | TEMPERATURE: 97.2 F

## 2017-08-09 DIAGNOSIS — R78.81 POSITIVE BLOOD CULTURE: ICD-10-CM

## 2017-08-09 DIAGNOSIS — Z93.1 PEG (PERCUTANEOUS ENDOSCOPIC GASTROSTOMY) STATUS (H): ICD-10-CM

## 2017-08-09 DIAGNOSIS — A41.59 SEPSIS DUE TO KLEBSIELLA (H): Primary | ICD-10-CM

## 2017-08-09 DIAGNOSIS — Z93.4 JEJUNOSTOMY TUBE PRESENT (H): ICD-10-CM

## 2017-08-09 DIAGNOSIS — E46 MALNUTRITION (H): ICD-10-CM

## 2017-08-09 PROCEDURE — 25000128 H RX IP 250 OP 636: Performed by: PEDIATRICS

## 2017-08-09 PROCEDURE — 96365 THER/PROPH/DIAG IV INF INIT: CPT

## 2017-08-09 PROCEDURE — 27210995 ZZH RX 272: Performed by: PEDIATRICS

## 2017-08-09 RX ORDER — CEFTRIAXONE 1 G/1
1 INJECTION, POWDER, FOR SOLUTION INTRAMUSCULAR; INTRAVENOUS EVERY 24 HOURS
Status: DISCONTINUED | OUTPATIENT
Start: 2017-08-10 | End: 2017-08-09

## 2017-08-09 RX ORDER — CEFTRIAXONE 1 G/1
1 INJECTION, POWDER, FOR SOLUTION INTRAMUSCULAR; INTRAVENOUS EVERY 24 HOURS
Status: CANCELLED | OUTPATIENT
Start: 2017-08-10

## 2017-08-09 RX ORDER — DEHYDRATED ALCOHOL 0.98 ML/ML
5 INJECTION, SOLUTION INTRASPINAL ONCE
Status: CANCELLED
Start: 2017-08-10 | End: 2017-08-10

## 2017-08-09 RX ORDER — DEHYDRATED ALCOHOL 0.98 ML/ML
5 INJECTION, SOLUTION INTRASPINAL ONCE
Status: COMPLETED | OUTPATIENT
Start: 2017-08-10 | End: 2017-08-09

## 2017-08-09 RX ADMIN — ALCOHOL 5 ML: 0.98 INJECTION INTRASPINAL at 09:18

## 2017-08-09 RX ADMIN — CEFTRIAXONE 1 G: 1 INJECTION, POWDER, FOR SOLUTION INTRAMUSCULAR; INTRAVENOUS at 08:37

## 2017-08-09 ASSESSMENT — PAIN SCALES - GENERAL: PAINLEVEL: NO PAIN (0)

## 2017-08-09 NOTE — PROGRESS NOTES
"Pt here for IV Rocephin, tolerated well.  Pt discharged in stable condition.    Upon arrival distal end of port catheter was missing, tubing clamped. \"I don't know what happened to it\".  Pt was de-accessed and re-accessed for treatment today.  Pt de-accessed prior to discharge.    "

## 2017-08-09 NOTE — MR AVS SNAPSHOT
After Visit Summary   8/9/2017    Doreen Peralta    MRN: [id number]           Patient Information     Date Of Birth          1980        Visit Information        Provider Department      8/9/2017 8:00 AM NL INFUSION CHAIR 1 [facility]        Today's Diagnoses     Sepsis due to Klebsiella (H)    -  1    Positive blood culture - Klebsiella oxytoca from Port-a-cath culture        Malnutrition (H)        Jejunostomy tube present (H)        PEG (percutaneous endoscopic gastrostomy) status          Care Instructions    Pt to return on 8/21 for IVFs. Copies of upcoming appointments given prior to discharge.           Follow-ups after your visit        Follow-up notes from your care team     Return in 12 days (on 8/21/2017).      Your next 10 appointments already scheduled     Aug 21, 2017  9:30 AM CDT   Level 2 with NL INFUSION CHAIR 1   [facility] ([facility])    [address] [place] [address]   [phone]            Aug 28, 2017  8:30 AM CDT   Level 2 with NL INFUSION CHAIR 4   [facility] ([facility])    [address] [place] [address]   [phone]            Sep 05, 2017  9:30 AM CDT   Level 2 with NL INFUSION CHAIR 5   [facility] ([facility])    [address] [place] [address]   [phone]            Dec 04, 2017 10:00 AM CST   (Arrive by 9:45 AM)   Return Visit with Gilmer Lees MD   [facility] ([facility])    [address] [place] [address]  [address] [facility] [address] [phone]              Who to contact     If you have questions or need follow up information about today's clinic visit or your schedule please contact [facility] INF[facility]ION [facility] directly at [phone].  Normal or non-critical lab and imaging results will be communicated to you by  MyChart, letter or phone within 4 business days after the clinic has received the results. If you do not hear from us within 7 days, please contact the clinic through [employer] or phone. If you have a critical or abnormal lab result, we will notify you by phone as soon as possible.  Submit refill requests through [employer] or call your pharmacy and they will forward the refill request to us. Please allow 3 business days for your refill to be completed.          Additional Information About Your Visit        [employer] Locate Information     [employer] gives you secure access to your electronic health record. If you see a primary care provider, you can also send messages to your care team and make appointments. If you have questions, please call your primary care clinic.  If you do not have a primary care provider, please call [phone] and they will assist you.        Care EveryWhere ID     This is your Care EveryWhere ID. This could be used by other organizations to access your [place] medical records  [id number]        Your Vitals Were     Pulse Temperature Respirations Last Period          66 97.2  F (36.2  C) (Temporal) 16 07/14/2017         Blood Pressure from Last 3 Encounters:   08/09/17 122/70   08/08/17 (!) 144/92   08/07/17 137/80    Weight from Last 3 Encounters:   08/07/17 90.7 kg (200 lb)   08/06/17 90.7 kg (200 lb)   08/02/17 90.7 kg (200 lb)              Today, you had the following     No orders found for display       Primary Care Provider Office Phone # Fax #    Larisa Lorenzo PA-C [phone]       [address]  [place] [address]        Equal Access to Services     [facility]: Hadii aad ku hadasho Sojannetteali, waaxda luqadaha, qaybta kaalmada ethan, elham gomez. So [facility] [phone].    ATENCIÓN: Si habla español, tiene a wade disposición servicios gratuitos de asistencia lingüística. Llame al [phone].    We comply with applicable federal civil  rights laws and [place] laws. We do not discriminate on the basis of race, color, national origin, age, disability sex, sexual orientation or gender identity.            Thank you!     Thank you for choosing [facility] INFUSION SERVICES  for your care. Our goal is always to provide you with excellent care. Hearing back from our patients is one way we can continue to improve our services. Please take a few minutes to complete the written survey that you may receive in the mail after your visit with us. Thank you!             Your Updated Medication List - Protect others around you: Learn how to safely use, store and throw away your medicines at www.disposemymeds.org.          This list is accurate as of: 8/9/17 10:34 AM.  Always use your most recent med list.                   Brand Name Dispense Instructions for use Diagnosis    ACETAMINOPHEN PO      Take 500-1,000 mg by mouth every 6 hours as needed for pain        albuterol (2.5 MG/3ML) 0.083% neb solution     360 mL    Take 1 vial (2.5 mg) by nebulization every 4 hours as needed for shortness of breath / dyspnea or wheezing    Gastrostomy tube dependent (H), SBO (small bowel obstruction) (H), Chronic abdominal pain, Chronic diarrhea       albuterol 90 MCG/ACT inhaler      Inhale 2 puffs into the lungs every 6 hours as needed        cefTRIAXone 1 GM vial    ROCEPHIN    110 mL    Inject 1 g into the vein daily    Sepsis due to Klebsiella (H), Positive blood culture       cloNIDine 0.2 MG tablet    CATAPRES    180 tablet    TAKE 2 TABLETS(0.4 MG) BY MOUTH EVERY EVENING    Anxiety       diphenhydrAMINE 25 MG tablet    BENADRYL ALLERGY    30 tablet    Take 1 tablet (25 mg) by mouth every 6 hours as needed for itching or other (Nausea)        DULoxetine 60 MG EC capsule    CYMBALTA    90 capsule    Take 1 capsule (60 mg) by mouth daily    Abdominal pain, epigastric, Abdominal migraine, not intractable       ethanol (74%) ADULT 74 %     5 mL    Inject IV  through Port-a-cath daily for 11 days to lock Port-a-cath after each dose of ceftriaxone and also use to lock Port-a-cath after each weekly infusion of IV fluids and MVI    Positive blood culture, Sepsis due to Klebsiella (H)       ipratropium 0.02 % neb solution    ATROVENT    225 mL    Take 2.5 mLs (0.5 mg) by nebulization every 6 hours as needed for wheezing    Gastrostomy tube dependent (H), SBO (small bowel obstruction) (H), Chronic abdominal pain, Chronic diarrhea       mirtazapine 15 MG ODT tab    REMERON SOL-TAB    45 tablet    0.5 tablets (7.5 mg) by Orally disintegrating tablet route At Bedtime    Anxiety       nortriptyline 10 MG capsule    PAMELOR    120 capsule    Take 3-4 capsules (30-40 mg) by mouth At Bedtime    Neuropathic pain, Primary insomnia       ondansetron 4 MG ODT tab    ZOFRAN ODT    30 tablet    Take 1-2 tablets (4-8 mg) by mouth every 6 hours as needed for nausea    Intractable vomiting, presence of nausea not specified, unspecified vomiting type, Gastroparesis       * order for DME     1 Box    2 by 2 gauze pads, use for dressing changes as directed    Attention to G-tube (H)       * order for DME     1 each    1 inch paper tape, Use for dressing changes as directed    Attention to G-tube (H)       predniSONE 20 MG tablet    DELTASONE    10 tablet    Take two tablets (= 40mg) each day for 5 (five) days        SUMAtriptan 50 MG tablet    IMITREX    18 tablet    Take 1-2 tablets ( mg) by mouth at onset of headache for migraine - may repeat dose after 2h if headache recurs.  Max: 200mg/24 hours    Migraine without aura and without status migrainosus, not intractable       traZODone 50 MG tablet    DESYREL    180 tablet    Take 1-2 tablets ( mg) by mouth nightly as needed 1-2 tabs at bedtime PRN    Insomnia, unspecified type       * Notice:  This list has 2 medication(s) that are the same as other medications prescribed for you. Read the directions carefully, and ask your doctor  or other care provider to review them with you.

## 2017-08-19 ENCOUNTER — APPOINTMENT (OUTPATIENT)
Dept: ULTRASOUND IMAGING | Facility: CLINIC | Age: 37
End: 2017-08-19
Attending: FAMILY MEDICINE
Payer: COMMERCIAL

## 2017-08-19 ENCOUNTER — HOSPITAL ENCOUNTER (EMERGENCY)
Facility: CLINIC | Age: 37
Discharge: HOME | End: 2017-08-19
Attending: FAMILY MEDICINE | Admitting: FAMILY MEDICINE
Payer: COMMERCIAL

## 2017-08-19 VITALS
DIASTOLIC BLOOD PRESSURE: 96 MMHG | OXYGEN SATURATION: 100 % | TEMPERATURE: 98 F | WEIGHT: 202.9 LBS | BODY MASS INDEX: 32.75 KG/M2 | SYSTOLIC BLOOD PRESSURE: 148 MMHG | RESPIRATION RATE: 18 BRPM

## 2017-08-19 DIAGNOSIS — T82.9XXA COMPLICATION OF CENTRAL VENOUS CATHETER, UNSPECIFIED COMPLICATION, INITIAL ENCOUNTER: ICD-10-CM

## 2017-08-19 LAB
BASOPHILS # BLD AUTO: 0 10E9/L (ref 0–0.2)
BASOPHILS NFR BLD AUTO: 0.1 %
CRP SERPL-MCNC: 9.6 MG/L (ref 0–8)
DIFFERENTIAL METHOD BLD: ABNORMAL
EOSINOPHIL # BLD AUTO: 0.1 10E9/L (ref 0–0.7)
EOSINOPHIL NFR BLD AUTO: 1.1 %
ERYTHROCYTE [DISTWIDTH] IN BLOOD BY AUTOMATED COUNT: 17.7 % (ref 10–15)
ERYTHROCYTE [SEDIMENTATION RATE] IN BLOOD BY WESTERGREN METHOD: 33 MM/H (ref 0–20)
HCT VFR BLD AUTO: 32.1 % (ref 35–47)
HGB BLD-MCNC: 9.8 G/DL (ref 11.7–15.7)
IMM GRANULOCYTES # BLD: 0.1 10E9/L (ref 0–0.4)
IMM GRANULOCYTES NFR BLD: 0.6 %
LYMPHOCYTES # BLD AUTO: 1.8 10E9/L (ref 0.8–5.3)
LYMPHOCYTES NFR BLD AUTO: 14.3 %
MCH RBC QN AUTO: 24.7 PG (ref 26.5–33)
MCHC RBC AUTO-ENTMCNC: 30.5 G/DL (ref 31.5–36.5)
MCV RBC AUTO: 81 FL (ref 78–100)
MONOCYTES # BLD AUTO: 0.7 10E9/L (ref 0–1.3)
MONOCYTES NFR BLD AUTO: 5.7 %
NEUTROPHILS # BLD AUTO: 9.7 10E9/L (ref 1.6–8.3)
NEUTROPHILS NFR BLD AUTO: 78.2 %
PLATELET # BLD AUTO: 94 10E9/L (ref 150–450)
RBC # BLD AUTO: 3.97 10E12/L (ref 3.8–5.2)
WBC # BLD AUTO: 12.4 10E9/L (ref 4–11)

## 2017-08-19 PROCEDURE — 87040 BLOOD CULTURE FOR BACTERIA: CPT | Mod: 91 | Performed by: FAMILY MEDICINE

## 2017-08-19 PROCEDURE — 85025 COMPLETE CBC W/AUTO DIFF WBC: CPT | Performed by: FAMILY MEDICINE

## 2017-08-19 PROCEDURE — 99284 EMERGENCY DEPT VISIT MOD MDM: CPT | Mod: 25 | Performed by: FAMILY MEDICINE

## 2017-08-19 PROCEDURE — 85652 RBC SED RATE AUTOMATED: CPT | Performed by: FAMILY MEDICINE

## 2017-08-19 PROCEDURE — 99284 EMERGENCY DEPT VISIT MOD MDM: CPT | Mod: Z6 | Performed by: FAMILY MEDICINE

## 2017-08-19 PROCEDURE — 86140 C-REACTIVE PROTEIN: CPT | Performed by: FAMILY MEDICINE

## 2017-08-19 PROCEDURE — 93971 EXTREMITY STUDY: CPT | Mod: RT

## 2017-08-19 PROCEDURE — 84145 PROCALCITONIN (PCT): CPT | Performed by: FAMILY MEDICINE

## 2017-08-19 PROCEDURE — 25000128 H RX IP 250 OP 636: Performed by: FAMILY MEDICINE

## 2017-08-19 PROCEDURE — 36415 COLL VENOUS BLD VENIPUNCTURE: CPT | Performed by: FAMILY MEDICINE

## 2017-08-19 RX ORDER — LIDOCAINE 40 MG/G
CREAM TOPICAL
Status: DISCONTINUED | OUTPATIENT
Start: 2017-08-19 | End: 2017-08-19 | Stop reason: HOSPADM

## 2017-08-19 RX ORDER — HEPARIN SODIUM (PORCINE) LOCK FLUSH IV SOLN 100 UNIT/ML 100 UNIT/ML
5 SOLUTION INTRAVENOUS
Status: DISCONTINUED | OUTPATIENT
Start: 2017-08-19 | End: 2017-08-19 | Stop reason: HOSPADM

## 2017-08-19 RX ADMIN — SODIUM CHLORIDE, PRESERVATIVE FREE 5 ML: 5 INJECTION INTRAVENOUS at 14:38

## 2017-08-19 NOTE — ED AVS SNAPSHOT
[facility] Emergency Department    [address] [place] [address]    Phone:  [phone]    Fax:  [phone]                                       Doreen Peralta   MRN: [id number]    Department:  [facility] Emergency Department   Date of Visit:  8/19/2017           After Visit Summary Signature Page     I have received my discharge instructions, and my questions have been answered. I have discussed any challenges I see with this plan with the nurse or doctor.    ..........................................................................................................................................  Patient/Patient Representative Signature      ..........................................................................................................................................  Patient Representative Print Name and Relationship to Patient    ..................................................               ................................................  Date                                            Time    ..........................................................................................................................................  Reviewed by Signature/Title    ...................................................              ..............................................  Date                                                            Time

## 2017-08-19 NOTE — ED PROVIDER NOTES
History     Chief Complaint   Patient presents with     Vascular Access Problem     HPI  Doreen Peralta is a 37 year old female who has a very complicated medical history including multiple bouts of sepsis, just finished a course of IV therapy for Klebsiella sepsis.  Patient has a port that was placed recently also.  Patient presents today because she is concerned of a possible infection at the port site.  She states when she finished her IV therapy for her Klebsiella sepsis 8 days ago, they removed the access for the port and noticed that a scab developed.  She thought this was a little abnormal.  She says in the last 2 days she has had increasing discomfort in the area.  She is noticed some redness around the site.  She also feels that her right upper arm feels warmer than her other extremity.  She denies any fevers or chills.  There is no nausea or vomiting.  Patient was seen by an urgent care physician yesterday and diagnosed with a lower respiratory infection and started on a cephalosporin for this.    I have reviewed the Medications, Allergies, Past Medical and [facility] History, and Social History in the Epic system.        Review of Systems   All other systems reviewed and are negative.      Physical Exam   BP: (!) 145/101  Heart Rate: 64  Temp: 98  F (36.7  C)  Resp: 18  Weight: 92 kg (202 lb 14.4 oz)  SpO2: 99 %  Physical Exam   Constitutional: She appears well-developed and well-nourished. No distress.   HENT:   Head: Normocephalic and atraumatic.   Mouth/Throat: Oropharynx is clear and moist. No oropharyngeal exudate.   Eyes: Conjunctivae are normal.   Neck: Normal range of motion.   Cardiovascular: Normal rate and regular rhythm.    No murmur heard.  Pulmonary/Chest: Effort normal and breath sounds normal. No respiratory distress. She has no wheezes.   Skin: Skin is warm and dry. Rash noted. She is not diaphoretic.   There is a well demarcated red rash noted around the port site.  It appears to be the  shape and size of a bandage.  There is also some redness right over the site for the access of the port.  There is no drainage noted.  Please see the picture noted below.   Nursing note and vitals reviewed.          ED Course     ED Course     Procedures         Results for orders placed or performed during the hospital encounter of 08/19/17   Arm, right, venous US    Narrative    US UPPER EXTREMITY VENOUS DUPLEX RIGHT  8/19/2017 2:01 PM     HISTORY: right arm swelling, previous blood clots.    COMPARISON: None.    TECHNIQUE: Examination of the upper extremity veins was performed with  graded compression and 2-D ultrasound and color doppler spectral  waveform analysis.     FINDINGS:  There is no evidence of thrombosis of the axillary,  brachial, basilic or cephalic veins.  The veins in the forearm show no  evidence of thrombosis.      Impression    IMPRESSION: Normal upper extremity venous ultrasound.    JOON FRANCO MD   CRP inflammation   Result Value Ref Range    CRP Inflammation 9.6 (H) 0.0 - 8.0 mg/L   CBC with platelets differential   Result Value Ref Range    WBC 12.4 (H) 4.0 - 11.0 10e9/L    RBC Count 3.97 3.8 - 5.2 10e12/L    Hemoglobin 9.8 (L) 11.7 - 15.7 g/dL    Hematocrit 32.1 (L) 35.0 - 47.0 %    MCV 81 78 - 100 fl    MCH 24.7 (L) 26.5 - 33.0 pg    MCHC 30.5 (L) 31.5 - 36.5 g/dL    RDW 17.7 (H) 10.0 - 15.0 %    Platelet Count 94 (L) 150 - 450 10e9/L    Diff Method Automated Method     % Neutrophils 78.2 %    % Lymphocytes 14.3 %    % Monocytes 5.7 %    % Eosinophils 1.1 %    % Basophils 0.1 %    % Immature Granulocytes 0.6 %    Absolute Neutrophil 9.7 (H) 1.6 - 8.3 10e9/L    Absolute Lymphocytes 1.8 0.8 - 5.3 10e9/L    Absolute Monocytes 0.7 0.0 - 1.3 10e9/L    Absolute Eosinophils 0.1 0.0 - 0.7 10e9/L    Absolute Basophils 0.0 0.0 - 0.2 10e9/L    Abs Immature Granulocytes 0.1 0 - 0.4 10e9/L   Erythrocyte sedimentation rate auto   Result Value Ref Range    Sed Rate 33 (H) 0 - 20 mm/h     *Note: Due to  a large number of results and/or encounters for the requested time period, some results have not been displayed. A complete set of results can be found in Results Review.     Medications - No data to display     labs are reviewed in patient has a slightly elevated white count better CRP is much less than it was when she left the hospital.  I consultative infectious disease at the [facility] and spoke with Dr. Dinero, we review the patients complicated history and current labs and presentation.  She agrees with the blood cultures that were done from the port side and another site but she would like to hold off on treatment to see what the cultures draw since the patient does not seem clinically sick.  We'll have patient follow-up with her doctor on Monday to go over the culture results.  We'll call her sooner if anything does grow out.  She may need IV therapy again for treatment of this.  She did recommend getting an ultrasound of the upper extremity which was done but this was negative.  Patients safe to be discharged home at this point.    Assessments & Plan (with Medical Decision Making)  complications of central venous catheter      I have reviewed the nursing notes.    I have reviewed the findings, diagnosis, plan and need for follow up with the patient.      Current Discharge Medication List          Final diagnoses:   Complication of central venous catheter, unspecified complication, initial encounter       8/19/2017   [facility] EMERGENCY DEPARTMENT     Braxton Almeida MD  08/19/17 8233

## 2017-08-19 NOTE — ED NOTES
Has been having increased redness around her port for the last week and has gotten worse over the past couple of days. States her upper right arm has been hot for a couple of days.

## 2017-08-19 NOTE — DISCHARGE INSTRUCTIONS
1.  We have obtained cultures from your port site and another site, we will see if anything grows from these and then treat this accordingly.  2.  I would follow-up with your primary care doctor on Monday to go over these results.  We will call you sooner if anything does grow out sooner though.  3.  Continue to use your Cefzil as previously directed.  4.  Please return to the emergency department of symptoms do worsen.

## 2017-08-19 NOTE — ED AVS SNAPSHOT
[facility] Emergency Department    [address] [place] [address]    Phone:  [phone]    Fax:  [phone]                                       Doreen Peralta   MRN: [id number]    Department:  [facility] Emergency Department   Date of Visit:  8/19/2017           Patient Information     Date Of Birth          1980        Your diagnoses for this visit were:     Complication of central venous catheter, unspecified complication, initial encounter        You were seen by Braxton Almeida MD.      Follow-up Information     Follow up with Larisa Lorenzo PA-C. Schedule an appointment as soon as possible for a visit in 2 days.    Specialty:  Physician Assistant    Why:  For follow up on your ED stay    Contact information:    [address] [place] [id number] [phone]          Discharge Instructions       1.  We have obtained cultures from your port site and another site, we will see if anything grows from these and then treat this accordingly.  2.  I would follow-up with your primary care doctor on Monday to go over these results.  We will call you sooner if anything does grow out sooner though.  3.  Continue to use your Cefzil as previously directed.  4.  Please return to the emergency department of symptoms do worsen.    Future Appointments        Provider Department Dept Phone Center    8/21/2017 9:30 AM ion Chair 1 Chair 1 [facility] [phone] [place]    8/28/2017 8:30 AM ion 4fustion Chair 1 [facility] [phone] [place]    9/5/2017 9:30 AM 5 Infustion Chair 1 [facility] [phone] [facility]    12/4/2017 10:00 AM Gilmer Lees MD [facility] Gastroenterology and [phone] [facility]      24 Hour Appointment Hotline       To make an appointment at any [place] clinic, call [phone] ([phone]). If you don't have a family doctor or clinic, we will  help you find one. [facility] are conveniently located to serve the needs of you and your family.             Review of your medicines      Our records show that you are taking the medicines listed below. If these are incorrect, please call your family doctor or clinic.        Dose / Directions Last dose taken    ACETAMINOPHEN PO   Dose:  500-1000 mg        Take 500-1,000 mg by mouth every 6 hours as needed for pain   Refills:  0        albuterol (2.5 MG/3ML) 0.083% neb solution   Dose:  2.5 mg   Quantity:  360 mL        Take 1 vial (2.5 mg) by nebulization every 4 hours as needed for shortness of breath / dyspnea or wheezing   Refills:  0        albuterol 90 MCG/ACT inhaler   Dose:  2 puff        Inhale 2 puffs into the lungs every 6 hours as needed   Refills:  0        cloNIDine 0.2 MG tablet   Commonly known as:  CATAPRES   Quantity:  180 tablet        TAKE 2 TABLETS(0.4 MG) BY MOUTH EVERY EVENING   Refills:  0        diphenhydrAMINE 25 MG tablet   Commonly known as:  BENADRYL ALLERGY   Dose:  25 mg   Quantity:  30 tablet        Take 1 tablet (25 mg) by mouth every 6 hours as needed for itching or other (Nausea)   Refills:  0        DULoxetine 60 MG EC capsule   Commonly known as:  CYMBALTA   Dose:  60 mg   Quantity:  90 capsule        Take 1 capsule (60 mg) by mouth daily   Refills:  3        ethanol (74%) ADULT 74 %   Quantity:  5 mL        Inject IV through Port-a-cath daily for 11 days to lock Port-a-cath after each dose of ceftriaxone and also use to lock Port-a-cath after each weekly infusion of IV fluids and MVI   Refills:  0        ipratropium 0.02 % neb solution   Commonly known as:  ATROVENT   Dose:  0.5 mg   Quantity:  225 mL        Take 2.5 mLs (0.5 mg) by nebulization every 6 hours as needed for wheezing   Refills:  0        mirtazapine 15 MG ODT tab   Commonly known as:  REMERON SOL-TAB   Dose:  7.5 mg   Quantity:  45 tablet        0.5 tablets (7.5 mg) by Orally disintegrating tablet route At  Bedtime   Refills:  0        nortriptyline 10 MG capsule   Commonly known as:  PAMELOR   Dose:  30-40 mg   Quantity:  120 capsule        Take 3-4 capsules (30-40 mg) by mouth At Bedtime   Refills:  5        ondansetron 4 MG ODT tab   Commonly known as:  ZOFRAN ODT   Dose:  4-8 mg   Quantity:  30 tablet        Take 1-2 tablets (4-8 mg) by mouth every 6 hours as needed for nausea   Refills:  0        * order for DME   Quantity:  1 Box        2 by 2 gauze pads, use for dressing changes as directed   Refills:  1        * order for DME   Quantity:  1 each        1 inch paper tape, Use for dressing changes as directed   Refills:  0        SUMAtriptan 50 MG tablet   Commonly known as:  IMITREX   Dose:   mg   Quantity:  18 tablet        Take 1-2 tablets ( mg) by mouth at onset of headache for migraine - may repeat dose after 2h if headache recurs.  Max: 200mg/24 hours   Refills:  1        traZODone 50 MG tablet   Commonly known as:  DESYREL   Dose:   mg   Quantity:  180 tablet        Take 1-2 tablets ( mg) by mouth nightly as needed 1-2 tabs at bedtime PRN   Refills:  1        * Notice:  This list has 2 medication(s) that are the same as other medications prescribed for you. Read the directions carefully, and ask your doctor or other care provider to review them with you.            Procedures and tests performed during your visit     Procedure/Test Number of Times Performed    Arm, right, venous US 1    Blood culture 2    CBC with platelets differential 1    CRP inflammation 1    Erythrocyte sedimentation rate auto 1    Procalcitonin 1      Orders Needing Specimen Collection     None      Pending Results     Date and Time Order Name Status Description    8/19/2017 1300 Procalcitonin In process     8/19/2017 1127 Blood culture In process     8/19/2017 1127 Blood culture In process             Pending Culture Results     Date and Time Order Name Status Description    8/19/2017 1127 Blood culture In  process     8/19/2017 1127 Blood culture In process             Pending Results Instructions     If you had any lab results that were not finalized at the time of your Discharge, you can call the ED Lab Result RN at [phone]. You will be contacted by this team for any positive Lab results or changes in treatment. The nurses are available 7 days a week from 10A to 6:30P.  You can leave a message 24 hours per day and they will return your call.        Thank you for choosing [facility]       Thank you for choosing [facility] for your care. Our goal is always to provide you with excellent care. Hearing back from our patients is one way we can continue to improve our services. Please take a few minutes to complete the written survey that you may receive in the mail after you visit with us. Thank you!        [employer] Information     [employer] gives you secure access to your electronic health record. If you see a primary care provider, you can also send messages to your care team and make appointments. If you have questions, please call your primary care clinic.  If you do not have a primary care provider, please call [phone] and they will assist you.        Care EveryWhere ID     This is your Care EveryWhere ID. This could be used by other organizations to access your [place] medical records  [id number]        Equal Access to Services     TRAMAINE CLAYTON : Carrie Law, clark villeda, vesta long, elham gomez. So [facility] [phone].    ATENCIÓN: Si habla español, tiene a wade disposición servicios gratuitos de asistencia lingüística. Llame al [phone].    We comply with applicable federal civil rights laws and [place] laws. We do not discriminate on the basis of race, color, national origin, age, disability sex, sexual orientation or gender identity.            After Visit Summary       This is your record. Keep this with you and show to your community  pharmacist(s) and doctor(s) at your next visit.

## 2017-08-20 LAB — PROCALCITONIN SERPL-MCNC: 0.05 NG/ML

## 2017-08-24 ENCOUNTER — TELEPHONE (OUTPATIENT)
Dept: FAMILY MEDICINE | Facility: CLINIC | Age: 37
End: 2017-08-24

## 2017-08-24 ENCOUNTER — APPOINTMENT (OUTPATIENT)
Dept: GENERAL RADIOLOGY | Facility: CLINIC | Age: 37
End: 2017-08-24
Attending: PHYSICIAN ASSISTANT
Payer: COMMERCIAL

## 2017-08-24 ENCOUNTER — HOSPITAL ENCOUNTER (INPATIENT)
Facility: CLINIC | Age: 37
LOS: 2 days | Discharge: HOME | End: 2017-08-26
Attending: PHYSICIAN ASSISTANT | Admitting: INTERNAL MEDICINE
Payer: COMMERCIAL

## 2017-08-24 DIAGNOSIS — R10.84 GENERALIZED ABDOMINAL PAIN: ICD-10-CM

## 2017-08-24 DIAGNOSIS — R11.10 INTRACTABLE VOMITING, PRESENCE OF NAUSEA NOT SPECIFIED, UNSPECIFIED VOMITING TYPE: ICD-10-CM

## 2017-08-24 DIAGNOSIS — A41.9 SEPSIS (H): ICD-10-CM

## 2017-08-24 DIAGNOSIS — R11.2 INTRACTABLE VOMITING WITH NAUSEA, UNSPECIFIED VOMITING TYPE: ICD-10-CM

## 2017-08-24 DIAGNOSIS — D72.829 LEUKOCYTOSIS, UNSPECIFIED TYPE: ICD-10-CM

## 2017-08-24 DIAGNOSIS — K31.84 GASTROPARESIS: ICD-10-CM

## 2017-08-24 LAB
ALBUMIN SERPL-MCNC: 3.9 G/DL (ref 3.4–5)
ALBUMIN UR-MCNC: NEGATIVE MG/DL
ALP SERPL-CCNC: 176 U/L (ref 40–150)
ALT SERPL W P-5'-P-CCNC: 39 U/L (ref 0–50)
ANION GAP SERPL CALCULATED.3IONS-SCNC: 9 MMOL/L (ref 3–14)
APPEARANCE UR: CLEAR
AST SERPL W P-5'-P-CCNC: 27 U/L (ref 0–45)
BASOPHILS # BLD AUTO: 0.1 10E9/L (ref 0–0.2)
BASOPHILS NFR BLD AUTO: 0.3 %
BILIRUB SERPL-MCNC: 0.6 MG/DL (ref 0.2–1.3)
BILIRUB UR QL STRIP: NEGATIVE
BUN SERPL-MCNC: 10 MG/DL (ref 7–30)
CALCIUM SERPL-MCNC: 9.2 MG/DL (ref 8.5–10.1)
CHLORIDE SERPL-SCNC: 106 MMOL/L (ref 94–109)
CO2 SERPL-SCNC: 23 MMOL/L (ref 20–32)
COLOR UR AUTO: NORMAL
CREAT SERPL-MCNC: 1.03 MG/DL (ref 0.52–1.04)
CRP SERPL-MCNC: <2.9 MG/L (ref 0–8)
DIFFERENTIAL METHOD BLD: ABNORMAL
EOSINOPHIL # BLD AUTO: 0.1 10E9/L (ref 0–0.7)
EOSINOPHIL NFR BLD AUTO: 0.6 %
ERYTHROCYTE [DISTWIDTH] IN BLOOD BY AUTOMATED COUNT: 18.1 % (ref 10–15)
GFR SERPL CREATININE-BSD FRML MDRD: 61 ML/MIN/1.7M2
GLUCOSE SERPL-MCNC: 76 MG/DL (ref 70–99)
GLUCOSE UR STRIP-MCNC: NEGATIVE MG/DL
HCT VFR BLD AUTO: 41.3 % (ref 35–47)
HGB BLD-MCNC: 12.8 G/DL (ref 11.7–15.7)
HGB UR QL STRIP: NEGATIVE
IMM GRANULOCYTES # BLD: 0.1 10E9/L (ref 0–0.4)
IMM GRANULOCYTES NFR BLD: 0.4 %
KETONES UR STRIP-MCNC: NEGATIVE MG/DL
LACTATE BLD-SCNC: 0.7 MMOL/L (ref 0.7–2)
LACTATE BLD-SCNC: 2.4 MMOL/L (ref 0.7–2)
LEUKOCYTE ESTERASE UR QL STRIP: NEGATIVE
LIPASE SERPL-CCNC: 384 U/L (ref 73–393)
LYMPHOCYTES # BLD AUTO: 3.1 10E9/L (ref 0.8–5.3)
LYMPHOCYTES NFR BLD AUTO: 18.5 %
MCH RBC QN AUTO: 24.2 PG (ref 26.5–33)
MCHC RBC AUTO-ENTMCNC: 31 G/DL (ref 31.5–36.5)
MCV RBC AUTO: 78 FL (ref 78–100)
MONOCYTES # BLD AUTO: 0.8 10E9/L (ref 0–1.3)
MONOCYTES NFR BLD AUTO: 4.7 %
NEUTROPHILS # BLD AUTO: 12.6 10E9/L (ref 1.6–8.3)
NEUTROPHILS NFR BLD AUTO: 75.5 %
NITRATE UR QL: NEGATIVE
PH UR STRIP: 7 PH (ref 5–7)
PLATELET # BLD AUTO: 420 10E9/L (ref 150–450)
POTASSIUM SERPL-SCNC: 3.1 MMOL/L (ref 3.4–5.3)
PROT SERPL-MCNC: 9.4 G/DL (ref 6.8–8.8)
RBC # BLD AUTO: 5.28 10E12/L (ref 3.8–5.2)
RBC #/AREA URNS AUTO: 0 /HPF (ref 0–2)
SODIUM SERPL-SCNC: 138 MMOL/L (ref 133–144)
SOURCE: NORMAL
SP GR UR STRIP: 1 (ref 1–1.03)
SQUAMOUS #/AREA URNS AUTO: <1 /HPF (ref 0–1)
UROBILINOGEN UR STRIP-MCNC: 0 MG/DL (ref 0–2)
WBC # BLD AUTO: 16.7 10E9/L (ref 4–11)
WBC #/AREA URNS AUTO: 0 /HPF (ref 0–2)

## 2017-08-24 PROCEDURE — 81001 URINALYSIS AUTO W/SCOPE: CPT | Performed by: PHYSICIAN ASSISTANT

## 2017-08-24 PROCEDURE — 83690 ASSAY OF LIPASE: CPT | Performed by: PHYSICIAN ASSISTANT

## 2017-08-24 PROCEDURE — 74020 XR ABDOMEN 2 VW: CPT | Mod: TC

## 2017-08-24 PROCEDURE — 36415 COLL VENOUS BLD VENIPUNCTURE: CPT | Performed by: PHYSICIAN ASSISTANT

## 2017-08-24 PROCEDURE — 85025 COMPLETE CBC W/AUTO DIFF WBC: CPT | Performed by: PHYSICIAN ASSISTANT

## 2017-08-24 PROCEDURE — 86140 C-REACTIVE PROTEIN: CPT | Performed by: PHYSICIAN ASSISTANT

## 2017-08-24 PROCEDURE — 25000128 H RX IP 250 OP 636: Performed by: INTERNAL MEDICINE

## 2017-08-24 PROCEDURE — 99285 EMERGENCY DEPT VISIT HI MDM: CPT | Mod: 25 | Performed by: EMERGENCY MEDICINE

## 2017-08-24 PROCEDURE — 99222 1ST HOSP IP/OBS MODERATE 55: CPT | Mod: AI | Performed by: INTERNAL MEDICINE

## 2017-08-24 PROCEDURE — 80053 COMPREHEN METABOLIC PANEL: CPT | Performed by: PHYSICIAN ASSISTANT

## 2017-08-24 PROCEDURE — 96375 TX/PRO/DX INJ NEW DRUG ADDON: CPT

## 2017-08-24 PROCEDURE — 12000000 ZZH R&B MED SURG/OB

## 2017-08-24 PROCEDURE — 87040 BLOOD CULTURE FOR BACTERIA: CPT | Performed by: PHYSICIAN ASSISTANT

## 2017-08-24 PROCEDURE — 96365 THER/PROPH/DIAG IV INF INIT: CPT

## 2017-08-24 PROCEDURE — 87389 HIV-1 AG W/HIV-1&-2 AB AG IA: CPT | Performed by: PHYSICIAN ASSISTANT

## 2017-08-24 PROCEDURE — 25000128 H RX IP 250 OP 636: Performed by: PHYSICIAN ASSISTANT

## 2017-08-24 PROCEDURE — 99285 EMERGENCY DEPT VISIT HI MDM: CPT | Mod: 25

## 2017-08-24 PROCEDURE — 84145 PROCALCITONIN (PCT): CPT | Performed by: INTERNAL MEDICINE

## 2017-08-24 PROCEDURE — 96366 THER/PROPH/DIAG IV INF ADDON: CPT

## 2017-08-24 PROCEDURE — 83605 ASSAY OF LACTIC ACID: CPT | Performed by: PHYSICIAN ASSISTANT

## 2017-08-24 PROCEDURE — 96376 TX/PRO/DX INJ SAME DRUG ADON: CPT

## 2017-08-24 PROCEDURE — 25000132 ZZH RX MED GY IP 250 OP 250 PS 637: Performed by: INTERNAL MEDICINE

## 2017-08-24 RX ORDER — CEFTRIAXONE 1 G/1
1 INJECTION, POWDER, FOR SOLUTION INTRAMUSCULAR; INTRAVENOUS EVERY 24 HOURS
Status: DISCONTINUED | OUTPATIENT
Start: 2017-08-24 | End: 2017-08-24

## 2017-08-24 RX ORDER — ALBUTEROL SULFATE 0.83 MG/ML
2.5 SOLUTION RESPIRATORY (INHALATION) EVERY 4 HOURS PRN
Status: DISCONTINUED | OUTPATIENT
Start: 2017-08-24 | End: 2017-08-26 | Stop reason: HOSPADM

## 2017-08-24 RX ORDER — HYDROCODONE BITARTRATE AND ACETAMINOPHEN 5; 325 MG/1; MG/1
1-2 TABLET ORAL EVERY 4 HOURS PRN
Status: DISCONTINUED | OUTPATIENT
Start: 2017-08-24 | End: 2017-08-24

## 2017-08-24 RX ORDER — HYDROMORPHONE HYDROCHLORIDE 1 MG/ML
.3-.5 INJECTION, SOLUTION INTRAMUSCULAR; INTRAVENOUS; SUBCUTANEOUS
Status: DISCONTINUED | OUTPATIENT
Start: 2017-08-24 | End: 2017-08-24

## 2017-08-24 RX ORDER — MEROPENEM 1 G/1
1 INJECTION, POWDER, FOR SOLUTION INTRAVENOUS EVERY 6 HOURS
Status: DISCONTINUED | OUTPATIENT
Start: 2017-08-24 | End: 2017-08-26 | Stop reason: HOSPADM

## 2017-08-24 RX ORDER — ACETAMINOPHEN 325 MG/1
650 TABLET ORAL EVERY 4 HOURS PRN
Status: DISCONTINUED | OUTPATIENT
Start: 2017-08-24 | End: 2017-08-26 | Stop reason: HOSPADM

## 2017-08-24 RX ORDER — SODIUM CHLORIDE 9 MG/ML
1000 INJECTION, SOLUTION INTRAVENOUS CONTINUOUS
Status: DISCONTINUED | OUTPATIENT
Start: 2017-08-24 | End: 2017-08-26 | Stop reason: HOSPADM

## 2017-08-24 RX ORDER — POTASSIUM CHLORIDE 7.45 MG/ML
10 INJECTION INTRAVENOUS
Status: DISCONTINUED | OUTPATIENT
Start: 2017-08-24 | End: 2017-08-26 | Stop reason: HOSPADM

## 2017-08-24 RX ORDER — DULOXETIN HYDROCHLORIDE 30 MG/1
60 CAPSULE, DELAYED RELEASE ORAL DAILY
Status: DISCONTINUED | OUTPATIENT
Start: 2017-08-25 | End: 2017-08-26 | Stop reason: HOSPADM

## 2017-08-24 RX ORDER — NALOXONE HYDROCHLORIDE 0.4 MG/ML
.1-.4 INJECTION, SOLUTION INTRAMUSCULAR; INTRAVENOUS; SUBCUTANEOUS
Status: DISCONTINUED | OUTPATIENT
Start: 2017-08-24 | End: 2017-08-26 | Stop reason: HOSPADM

## 2017-08-24 RX ORDER — LIDOCAINE 40 MG/G
CREAM TOPICAL
Status: DISCONTINUED | OUTPATIENT
Start: 2017-08-24 | End: 2017-08-26 | Stop reason: HOSPADM

## 2017-08-24 RX ORDER — POTASSIUM CHLORIDE 1500 MG/1
20-40 TABLET, EXTENDED RELEASE ORAL
Status: DISCONTINUED | OUTPATIENT
Start: 2017-08-24 | End: 2017-08-26 | Stop reason: HOSPADM

## 2017-08-24 RX ORDER — DIPHENHYDRAMINE HCL 25 MG
25 CAPSULE ORAL EVERY 6 HOURS PRN
Status: DISCONTINUED | OUTPATIENT
Start: 2017-08-24 | End: 2017-08-26 | Stop reason: HOSPADM

## 2017-08-24 RX ORDER — KETOROLAC TROMETHAMINE 30 MG/ML
30 INJECTION, SOLUTION INTRAMUSCULAR; INTRAVENOUS ONCE
Status: DISCONTINUED | OUTPATIENT
Start: 2017-08-24 | End: 2017-08-24

## 2017-08-24 RX ORDER — TRAZODONE HYDROCHLORIDE 50 MG/1
50-100 TABLET, FILM COATED ORAL
Status: DISCONTINUED | OUTPATIENT
Start: 2017-08-24 | End: 2017-08-26 | Stop reason: HOSPADM

## 2017-08-24 RX ORDER — POTASSIUM CHLORIDE 29.8 MG/ML
20 INJECTION INTRAVENOUS
Status: DISCONTINUED | OUTPATIENT
Start: 2017-08-24 | End: 2017-08-26 | Stop reason: HOSPADM

## 2017-08-24 RX ORDER — POTASSIUM CL/LIDO/0.9 % NACL 10MEQ/0.1L
10 INTRAVENOUS SOLUTION, PIGGYBACK (ML) INTRAVENOUS
Status: DISCONTINUED | OUTPATIENT
Start: 2017-08-24 | End: 2017-08-26 | Stop reason: HOSPADM

## 2017-08-24 RX ORDER — ONDANSETRON 2 MG/ML
4 INJECTION INTRAMUSCULAR; INTRAVENOUS EVERY 6 HOURS PRN
Status: DISCONTINUED | OUTPATIENT
Start: 2017-08-24 | End: 2017-08-26 | Stop reason: HOSPADM

## 2017-08-24 RX ORDER — HYDROMORPHONE HYDROCHLORIDE 2 MG/1
2 TABLET ORAL
Status: DISCONTINUED | OUTPATIENT
Start: 2017-08-24 | End: 2017-08-26 | Stop reason: HOSPADM

## 2017-08-24 RX ORDER — CLONIDINE HYDROCHLORIDE 0.1 MG/1
0.4 TABLET ORAL EVERY EVENING
Status: DISCONTINUED | OUTPATIENT
Start: 2017-08-24 | End: 2017-08-26 | Stop reason: HOSPADM

## 2017-08-24 RX ORDER — ONDANSETRON 2 MG/ML
4 INJECTION INTRAMUSCULAR; INTRAVENOUS EVERY 30 MIN PRN
Status: DISCONTINUED | OUTPATIENT
Start: 2017-08-24 | End: 2017-08-24

## 2017-08-24 RX ORDER — ONDANSETRON 4 MG/1
4 TABLET, ORALLY DISINTEGRATING ORAL EVERY 6 HOURS PRN
Status: DISCONTINUED | OUTPATIENT
Start: 2017-08-24 | End: 2017-08-26 | Stop reason: HOSPADM

## 2017-08-24 RX ORDER — POTASSIUM CHLORIDE 1.5 G/1.58G
20-40 POWDER, FOR SOLUTION ORAL
Status: DISCONTINUED | OUTPATIENT
Start: 2017-08-24 | End: 2017-08-26 | Stop reason: HOSPADM

## 2017-08-24 RX ORDER — MIRTAZAPINE 7.5 MG/1
7.5 TABLET, FILM COATED ORAL AT BEDTIME
Status: DISCONTINUED | OUTPATIENT
Start: 2017-08-24 | End: 2017-08-26 | Stop reason: HOSPADM

## 2017-08-24 RX ORDER — MIRTAZAPINE 15 MG/1
7.5 TABLET, ORALLY DISINTEGRATING ORAL AT BEDTIME
Status: DISCONTINUED | OUTPATIENT
Start: 2017-08-24 | End: 2017-08-24

## 2017-08-24 RX ORDER — DIPHENHYDRAMINE HYDROCHLORIDE 50 MG/ML
25 INJECTION INTRAMUSCULAR; INTRAVENOUS ONCE
Status: COMPLETED | OUTPATIENT
Start: 2017-08-24 | End: 2017-08-24

## 2017-08-24 RX ORDER — NORTRIPTYLINE HCL 10 MG
40 CAPSULE ORAL AT BEDTIME
Status: DISCONTINUED | OUTPATIENT
Start: 2017-08-24 | End: 2017-08-26 | Stop reason: HOSPADM

## 2017-08-24 RX ADMIN — CEFTRIAXONE SODIUM 1 G: 1 INJECTION, POWDER, FOR SOLUTION INTRAMUSCULAR; INTRAVENOUS at 17:11

## 2017-08-24 RX ADMIN — HYDROMORPHONE HYDROCHLORIDE 2 MG: 2 TABLET ORAL at 22:10

## 2017-08-24 RX ADMIN — ONDANSETRON 4 MG: 2 INJECTION INTRAMUSCULAR; INTRAVENOUS at 16:26

## 2017-08-24 RX ADMIN — HYDROMORPHONE HYDROCHLORIDE 1 MG: 1 INJECTION, SOLUTION INTRAMUSCULAR; INTRAVENOUS; SUBCUTANEOUS at 16:20

## 2017-08-24 RX ADMIN — CLONIDINE HYDROCHLORIDE 0.4 MG: 0.1 TABLET ORAL at 22:58

## 2017-08-24 RX ADMIN — MEROPENEM 1 G: 1 INJECTION, POWDER, FOR SOLUTION INTRAVENOUS at 22:59

## 2017-08-24 RX ADMIN — HYDROMORPHONE HYDROCHLORIDE 1 MG: 1 INJECTION, SOLUTION INTRAMUSCULAR; INTRAVENOUS; SUBCUTANEOUS at 17:01

## 2017-08-24 RX ADMIN — HYDROMORPHONE HYDROCHLORIDE 1 MG: 1 INJECTION, SOLUTION INTRAMUSCULAR; INTRAVENOUS; SUBCUTANEOUS at 18:38

## 2017-08-24 RX ADMIN — NORTRIPTYLINE HYDROCHLORIDE 40 MG: 10 CAPSULE ORAL at 22:58

## 2017-08-24 RX ADMIN — SODIUM CHLORIDE 1000 ML: 9 INJECTION, SOLUTION INTRAVENOUS at 16:23

## 2017-08-24 RX ADMIN — DIPHENHYDRAMINE HYDROCHLORIDE 25 MG: 50 INJECTION, SOLUTION INTRAMUSCULAR; INTRAVENOUS at 20:07

## 2017-08-24 RX ADMIN — SODIUM CHLORIDE 1000 ML: 9 INJECTION, SOLUTION INTRAVENOUS at 22:09

## 2017-08-24 RX ADMIN — MIRTAZAPINE 7.5 MG: 7.5 TABLET, FILM COATED ORAL at 22:59

## 2017-08-24 RX ADMIN — POTASSIUM CHLORIDE 10 MEQ: 14.9 INJECTION, SOLUTION, CONCENTRATE PARENTERAL at 23:00

## 2017-08-24 RX ADMIN — VANCOMYCIN HYDROCHLORIDE 1750 MG: 1 INJECTION, POWDER, LYOPHILIZED, FOR SOLUTION INTRAVENOUS at 20:08

## 2017-08-24 ASSESSMENT — ENCOUNTER SYMPTOMS
SHORTNESS OF BREATH: 0
DYSURIA: 0
FEVER: 1
SORE THROAT: 0
MYALGIAS: 1
FATIGUE: 1
VOMITING: 1
HEMATURIA: 0
APPETITE CHANGE: 1
ABDOMINAL PAIN: 1
NAUSEA: 1
DIARRHEA: 1
COUGH: 0
CHILLS: 1

## 2017-08-24 NOTE — TELEPHONE ENCOUNTER
"Patient is calling because she was in the ED last week for possible infection of her port site.  They put Tegaderm on her port site and she states she could feel it burning while it was on.  Then when they took the Tegaderm off, it felt like her skin was being ripped off.  She is reporting now that the area where the Tegaderm was is all blistery and pussing, and raw all the way down to the white fleshy part of her skin.  She states there is something wrong.  She states she has chills and just \"does not feel right\".  She is reporting usually when she doesn't feel well she ends up in the hospital.  She has a very complicated medical history and there are no openings in clinic today.  Patient reports she does not have a fever and this is usually when she ends up being septic.  RN informed patient with her history and the way she is feeling along with no openings in clinic, she should be evaluated in the ED.  This is more complicated than a quick appointment in the family practice clinic can handle.    Patient understands and agrees to this plan.  Closing this encounter.  Ashly Garay RN      "

## 2017-08-24 NOTE — IP AVS SNAPSHOT
[address] [facility]    [address] [place] [address]    Phone:  [phone]                                       After Visit Summary   8/24/2017    Doreen Peralta    MRN: [id number]           After Visit Summary Signature Page     I have received my discharge instructions, and my questions have been answered. I have discussed any challenges I see with this plan with the nurse or doctor.    ..........................................................................................................................................  Patient/Patient Representative Signature      ..........................................................................................................................................  Patient Representative Print Name and Relationship to Patient    ..................................................               ................................................  Date                                            Time    ..........................................................................................................................................  Reviewed by Signature/Title    ...................................................              ..............................................  Date                                                            Time

## 2017-08-24 NOTE — IP AVS SNAPSHOT
MRN:[id number]                      After Visit Summary   8/24/2017    Doreen Peralta    MRN: [id number]           Thank you!     Thank you for choosing [facility] for your care. Our goal is always to provide you with excellent care. Hearing back from our patients is one way we can continue to improve our services. Please take a few minutes to complete the written survey that you may receive in the mail after you visit with us. Thank you!        Patient Information     Date Of Birth          1980        Designated Caregiver       Most Recent Value    Caregiver    Will someone help with your care after discharge? yes    Name of designated caregiver jay    Phone number of caregiver [phone]    Caregiver address [place]      About your hospital stay     You were admitted on:  August 24, 2017 You last received care in the:  [address]    You were discharged on:  August 26, 2017        Reason for your hospital stay       Active Problems:    Intermittent asthma    Munchausen syndrome - previously suspected    Chronic diarrhea    History of deep venous thrombosis    Nausea and vomiting    Abdominal pain    Sepsis (H) - possible    Hypokalemia    Essential hypertension                    Who to Call     For medical emergencies, please call 911.  For non-urgent questions about your medical care, please call your primary care provider or clinic, [phone]          Attending Provider     Provider Specialty    Vianey Warren PA-C Physician Assistant    Nikhil Mario MD Internal Medicine    [facility], Darian Schilling MD Family Practice       Primary Care Provider Office Phone # Fax #    Larisa Lorenzo PA-C [phone]      After Care Instructions     Activity       Your activity upon discharge: activity as tolerated            Diet       Follow this diet upon discharge: Orders Placed This Encounter      Clear Liquid Diet                   "  Follow-up Appointments     Follow-up and recommended labs and tests       Follow up with primary care provider, isaac  within 1-2 wks, for hospital follow- up. No follow up labs or test are needed.                  Your next 10 appointments already scheduled     Aug 28, 2017  8:30 AM CDT   Level 2 with NL INFUSION CHAIR 2   [facility] ([facility])    [address] [place] [address]   [phone]            Sep 05, 2017  9:30 AM CDT   Level 2 with NL INFUSION CHAIR 5   [facility] ([facility])    [address] [place] [address]   [phone]            Dec 04, 2017 10:00 AM CST   (Arrive by 9:45 AM)   Return Visit with Gilmer Lees MD   [facility] ([facility])    [address] [place] [address]  [address] [facility] [id number] [phone]              Pending Results     Date and Time Order Name Status Description    8/24/2017 1916 HIV Antigen Antibody Combo In process     8/24/2017 1858 Blood culture Preliminary     8/24/2017 1712 Blood culture Preliminary     8/24/2017 1547 Blood culture Preliminary             Statement of Approval     Ordered          08/26/17 1409  I have reviewed and agree with all the recommendations and orders detailed in this document.  EFFECTIVE NOW     Approved and electronically signed by:  Darian Coe MD             Admission Information     Date & Time Provider Department Dept. Phone    8/24/2017 Darian Coe MD [address] [facility] [phone]      Your Vitals Were     Blood Pressure Pulse Temperature Respirations Height Weight    128/83 76 97.3  F (36.3  C) (Oral) 18 1.676 m (5' 6\") 91 kg (200 lb 9.9 oz)    Pulse Oximetry BMI (Body Mass Index)                99% 32.38 kg/m2          MyChart Information     [employer] gives you secure access to your electronic health record. If you see " a primary care provider, you can also send messages to your care team and make appointments. If you have questions, please call your primary care clinic.  If you do not have a primary care provider, please call [phone] and they will assist you.        Care EveryWhere ID     This is your Care EveryWhere ID. This could be used by other organizations to access your [facility] medical records  [id number]        Equal Access to Services     TRAMAINE CLAYTON : Hadii caitie tylero Sojannetteali, waaxda luqadaha, qaybta kaalmada adeegyada, elham castrocuongbrianne gomez. So [facility] [phone].    ATENCIÓN: Si sherlyla suyapa, tiene a wade disposición servicios gratuitos de asistencia lingüística. Janes al [phone].    We comply with applicable federal civil rights laws and [place] laws. We do not discriminate on the basis of race, color, national origin, age, disability sex, sexual orientation or gender identity.               Review of your medicines      START taking        Dose / Directions    HYDROmorphone 2 MG tablet   Commonly known as:  DILAUDID        Dose:  1 mg   Take 0.5 tablets (1 mg) by mouth every 3 hours as needed for moderate to severe pain   Quantity:  10 tablet   Refills:  0         CONTINUE these medicines which may have CHANGED, or have new prescriptions. If we are uncertain of the size of tablets/capsules you have at home, strength may be listed as something that might have changed.        Dose / Directions    nortriptyline 10 MG capsule   Commonly known as:  PAMELOR   This may have changed:  how much to take   Used for:  Neuropathic pain, Primary insomnia        Dose:  30-40 mg   Take 3-4 capsules (30-40 mg) by mouth At Bedtime   Quantity:  120 capsule   Refills:  5         CONTINUE these medicines which have NOT CHANGED        Dose / Directions    ACETAMINOPHEN PO        Dose:  500-1000 mg   Take 500-1,000 mg by mouth every 6 hours as needed for pain   Refills:  0       albuterol (2.5 MG/3ML)  0.083% neb solution   Used for:  Gastrostomy tube dependent (H), SBO (small bowel obstruction) (H), Chronic abdominal pain, Chronic diarrhea        Dose:  2.5 mg   Take 1 vial (2.5 mg) by nebulization every 4 hours as needed for shortness of breath / dyspnea or wheezing   Quantity:  360 mL   Refills:  0       albuterol 90 MCG/ACT inhaler        Dose:  2 puff   Inhale 2 puffs into the lungs every 6 hours as needed   Refills:  0       cloNIDine 0.2 MG tablet   Commonly known as:  CATAPRES   Used for:  Anxiety        TAKE 2 TABLETS(0.4 MG) BY MOUTH EVERY EVENING   Quantity:  180 tablet   Refills:  0       diphenhydrAMINE 25 MG tablet   Commonly known as:  BENADRYL ALLERGY        Dose:  25 mg   Take 1 tablet (25 mg) by mouth every 6 hours as needed for itching or other (Nausea)   Quantity:  30 tablet   Refills:  0       DULoxetine 60 MG EC capsule   Commonly known as:  CYMBALTA   Used for:  Abdominal pain, epigastric, Abdominal migraine, not intractable        Dose:  60 mg   Take 1 capsule (60 mg) by mouth daily   Quantity:  90 capsule   Refills:  3       ethanol (74%) ADULT 74 %   Used for:  Positive blood culture, Sepsis due to Klebsiella (H)        Inject IV through Port-a-cath daily for 11 days to lock Port-a-cath after each dose of ceftriaxone and also use to lock Port-a-cath after each weekly infusion of IV fluids and MVI   Quantity:  5 mL   Refills:  0       ipratropium 0.02 % neb solution   Commonly known as:  ATROVENT   Used for:  Gastrostomy tube dependent (H), SBO (small bowel obstruction) (H), Chronic abdominal pain, Chronic diarrhea        Dose:  0.5 mg   Take 2.5 mLs (0.5 mg) by nebulization every 6 hours as needed for wheezing   Quantity:  225 mL   Refills:  0       mirtazapine 15 MG ODT tab   Commonly known as:  REMERON SOL-TAB   Used for:  Anxiety        Dose:  7.5 mg   0.5 tablets (7.5 mg) by Orally disintegrating tablet route At Bedtime   Quantity:  45 tablet   Refills:  0       ondansetron 4 MG ODT  tab   Commonly known as:  ZOFRAN ODT   Used for:  Intractable vomiting, presence of nausea not specified, unspecified vomiting type, Gastroparesis        Dose:  4-8 mg   Take 1-2 tablets (4-8 mg) by mouth every 6 hours as needed for nausea   Quantity:  30 tablet   Refills:  0       * order for DME   Used for:  Attention to G-tube (H)        2 by 2 gauze pads, use for dressing changes as directed   Quantity:  1 Box   Refills:  1       * order for DME   Used for:  Attention to G-tube (H)        1 inch paper tape, Use for dressing changes as directed   Quantity:  1 each   Refills:  0       SUMAtriptan 50 MG tablet   Commonly known as:  IMITREX   Used for:  Migraine without aura and without status migrainosus, not intractable        Dose:   mg   Take 1-2 tablets ( mg) by mouth at onset of headache for migraine - may repeat dose after 2h if headache recurs.  Max: 200mg/24 hours   Quantity:  18 tablet   Refills:  1       traZODone 50 MG tablet   Commonly known as:  DESYREL   Used for:  Insomnia, unspecified type        Dose:   mg   Take 1-2 tablets ( mg) by mouth nightly as needed 1-2 tabs at bedtime PRN   Quantity:  180 tablet   Refills:  1       * Notice:  This list has 2 medication(s) that are the same as other medications prescribed for you. Read the directions carefully, and ask your doctor or other care provider to review them with you.         Where to get your medicines      Some of these will need a paper prescription and others can be bought over the counter. Ask your nurse if you have questions.     Bring a paper prescription for each of these medications     HYDROmorphone 2 MG tablet         Information about where to get these medications is not yet available     ! Ask your nurse or doctor about these medications     ondansetron 4 MG ODT tab                Protect others around you: Learn how to safely use, store and throw away your medicines at www.disposemymeds.org.              Medication List: This is a list of all your medications and when to take them. Check marks below indicate your daily home schedule. Keep this list as a reference.      Medications           Morning Afternoon Evening Bedtime As Needed    ACETAMINOPHEN PO   Take 500-1,000 mg by mouth every 6 hours as needed for pain                                albuterol (2.5 MG/3ML) 0.083% neb solution   Take 1 vial (2.5 mg) by nebulization every 4 hours as needed for shortness of breath / dyspnea or wheezing                                albuterol 90 MCG/ACT inhaler   Inhale 2 puffs into the lungs every 6 hours as needed                                cloNIDine 0.2 MG tablet   Commonly known as:  CATAPRES   TAKE 2 TABLETS(0.4 MG) BY MOUTH EVERY EVENING   Last time this was given:  0.4 mg on 8/25/2017  7:59 PM                                diphenhydrAMINE 25 MG tablet   Commonly known as:  BENADRYL ALLERGY   Take 1 tablet (25 mg) by mouth every 6 hours as needed for itching or other (Nausea)                                DULoxetine 60 MG EC capsule   Commonly known as:  CYMBALTA   Take 1 capsule (60 mg) by mouth daily   Last time this was given:  60 mg on 8/26/2017  8:04 AM                                ethanol (74%) ADULT 74 %   Inject IV through Port-a-cath daily for 11 days to lock Port-a-cath after each dose of ceftriaxone and also use to lock Port-a-cath after each weekly infusion of IV fluids and MVI                                HYDROmorphone 2 MG tablet   Commonly known as:  DILAUDID   Take 0.5 tablets (1 mg) by mouth every 3 hours as needed for moderate to severe pain   Last time this was given:  2 mg on 8/26/2017  2:02 PM                                ipratropium 0.02 % neb solution   Commonly known as:  ATROVENT   Take 2.5 mLs (0.5 mg) by nebulization every 6 hours as needed for wheezing                                mirtazapine 15 MG ODT tab   Commonly known as:  REMERON SOL-TAB   0.5 tablets (7.5 mg) by Orally  disintegrating tablet route At Bedtime                                nortriptyline 10 MG capsule   Commonly known as:  PAMELOR   Take 3-4 capsules (30-40 mg) by mouth At Bedtime   Last time this was given:  40 mg on 8/25/2017  8:22 PM                                ondansetron 4 MG ODT tab   Commonly known as:  ZOFRAN ODT   Take 1-2 tablets (4-8 mg) by mouth every 6 hours as needed for nausea   Last time this was given:  4 mg on 8/25/2017  2:19 PM                                * order for DME   2 by 2 gauze pads, use for dressing changes as directed                                * order for DME   1 inch paper tape, Use for dressing changes as directed                                SUMAtriptan 50 MG tablet   Commonly known as:  IMITREX   Take 1-2 tablets ( mg) by mouth at onset of headache for migraine - may repeat dose after 2h if headache recurs.  Max: 200mg/24 hours                                traZODone 50 MG tablet   Commonly known as:  DESYREL   Take 1-2 tablets ( mg) by mouth nightly as needed 1-2 tabs at bedtime PRN                                * Notice:  This list has 2 medication(s) that are the same as other medications prescribed for you. Read the directions carefully, and ask your doctor or other care provider to review them with you.

## 2017-08-24 NOTE — ED PROVIDER NOTES
"  History     Chief Complaint   Patient presents with     Abdominal Pain     The history is provided by the patient.     Doreen Peralta is a 37 year old female with a complex medical history who presents to the ED complaining of abdominal pain. Patient reports she has worsened abdominal pain, nausea, vomiting, and fevers. Doreen developed abdominal pain and emesis this morning. She feels \"achy and just sick\". She has had this pain in the past. She has a fever and is taking Tylenol. She recorded a temp of 100.5F last night. She vomited 3 times this morning. She has diarrhea normally and continues to have it. Patient denies cough and sore throat. Patient was seen on 8/18 because she thought her port site was infected. She has skin irritation near her port site from a Tegaderm and she has been treating it with Bacitracin. Patient has a port for nutrition infusions. She uses the G tube for drainage due to gastroparesis but eats food by mouth normally. She reports her large bowel has been removed because it wasn't functioning.     I have reviewed the Medications, Allergies, Past Medical and [facility] History, and Social History in the Epic system.    Allergies:   Allergies   Allergen Reactions     Hyoscyamine Rash     Metoclopramide Other (See Comments)     Eye twitching.      Peaches [Peach] Other (See Comments)     Raw. Cooked OK     Sucralose Other (See Comments)     All artificial sweeteners. Aspartame also. Swollen glands     Advair Diskus Other (See Comments)     Throat burns     Azithromycin Other (See Comments)     Burning in throat     Compazine [Prochlorperazine] Visual Disturbance     Contrast Dye Itching     States is allergic to CT contrast dye     Cyclobenzaprine Visual Disturbance     Fentanyl Other (See Comments)     migraine     Ibuprofen GI Disturbance     Lactulose Nausea and Vomiting     Gas and bloating     Levaquin [Levofloxacin] Swelling     Per ED M.D. And RN      Morphine Sulfate Other (See " Comments)     Chest pain       Oxycodone Other (See Comments)     Burning throat, but can take Norco.      Penicillins Other (See Comments)     Family hx of resp arrest, she has never taken  Ok with cephalosporins     Rizatriptan Visual Disturbance     Droperidol Hives and Rash     Isovue [Iopamidol] Palpitations     Pt had racing heart and sob      Ketorolac Anxiety     Latex Swelling and Rash     Kiwi, likely also avacado, ? banana     Levsin Rash         No current facility-administered medications on file prior to encounter.   Current Outpatient Prescriptions on File Prior to Encounter:  albuterol (2.5 MG/3ML) 0.083% neb solution Take 1 vial (2.5 mg) by nebulization every 4 hours as needed for shortness of breath / dyspnea or wheezing   ipratropium (ATROVENT) 0.02 % neb solution Take 2.5 mLs (0.5 mg) by nebulization every 6 hours as needed for wheezing   ondansetron (ZOFRAN ODT) 4 MG ODT tab Take 1-2 tablets (4-8 mg) by mouth every 6 hours as needed for nausea   ethanol, 74%, ADULT 74 % Inject IV through Port-a-cath daily for 11 days to lock Port-a-cath after each dose of ceftriaxone and also use to lock Port-a-cath after each weekly infusion of IV fluids and MVI   cloNIDine (CATAPRES) 0.2 MG tablet TAKE 2 TABLETS(0.4 MG) BY MOUTH EVERY EVENING   diphenhydrAMINE (BENADRYL ALLERGY) 25 MG tablet Take 1 tablet (25 mg) by mouth every 6 hours as needed for itching or other (Nausea)   traZODone (DESYREL) 50 MG tablet Take 1-2 tablets ( mg) by mouth nightly as needed 1-2 tabs at bedtime PRN   nortriptyline (PAMELOR) 10 MG capsule Take 3-4 capsules (30-40 mg) by mouth At Bedtime (Patient taking differently: Take 40 mg by mouth At Bedtime )   mirtazapine (REMERON SOL-TAB) 15 MG ODT tab 0.5 tablets (7.5 mg) by Orally disintegrating tablet route At Bedtime   DULoxetine (CYMBALTA) 60 MG capsule Take 1 capsule (60 mg) by mouth daily   ACETAMINOPHEN PO Take 500-1,000 mg by mouth every 6 hours as needed for pain    order  for DME 2 by 2 gauze pads, use for dressing changes as directed   order for DME 1 inch paper tape, Use for dressing changes as directed   SUMAtriptan (IMITREX) 50 MG tablet Take 1-2 tablets ( mg) by mouth at onset of headache for migraine - may repeat dose after 2h if headache recurs.  Max: 200mg/24 hours   albuterol 90 MCG/ACT inhaler Inhale 2 puffs into the lungs every 6 hours as needed        Patient Active Problem List   Diagnosis     Constipation by delayed colonic transit     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     Hx SBO     S/P LEEP of cervix     Gastroparesis     Migraines     Intermittent asthma     Allergic rhinitis     Abnormal Pap smear of cervix     PEG (percutaneous endoscopic gastrostomy) status     Health Care Home     PEG tube malfunction (H)     Malfunction of gastrostomy tube (H)     Malfunctioning jejunostomy tube (H)     Jejunostomy tube present (H)     S/P partial resection of colon     Malnutrition (H)     Long-term (current) use of anticoagulants [Z79.01]     Anxiety     Anemia in other chronic diseases classified elsewhere     Munchausen syndrome - previously suspected     Anemia, iron deficiency     Positive blood culture - Klebsiella oxytoca from Port-a-cath culture     Mitral regurgitation mild-mod by Echo June 2016     Atopic rhinitis     Migraine     Patellofemoral stress syndrome     Hyperbilirubinemia     Chronic diarrhea     Coagulation defect (H) [D68.9]     Fever     Attention to G-tube (H)     Chronic abdominal pain     Vitamin D deficiency     SIRS (systemic inflammatory response syndrome) (H)     History of deep venous thrombosis     Nausea and vomiting     Abdominal pain, generalized     Abdominal pain     Insomnia, unspecified type     Sepsis due to Klebsiella (H)     Sepsis (H)       Past [facility] History:   Procedure Laterality Date     COLONOSCOPY  7/10/2012    Procedure: COLONOSCOPY;;  Surgeon: Nicole Redding MD;  [facility]     COLONOSCOPY N/A 2/19/2017     Procedure: COLONOSCOPY;  Surgeon: Randell Muller MD;  [facility]: [facility]     COLONOSCOPY N/A 2/21/2017    Procedure: COLONOSCOPY;  Surgeon: Randell Muller MD;  Location: [facility]     ECHO CHELO  7/19/2016          ENDOSCOPIC INSERTION TUBE GASTROSTOMY N/A 1/21/2016    Procedure: ENDOSCOPIC INSERTION TUBE GASTROSTOMY;  Surgeon: Nicole Redding MD;  Location[facility]     ESOPHAGOSCOPY, GASTROSCOPY, DUODENOSCOPY (EGD), COMBINED  7/10/2012    Procedure: COMBINED ESOPHAGOSCOPY, GASTROSCOPY, DUODENOSCOPY (EGD);  Upper Endoscopy, Ileoscopy    Latex Allergy  with biopsies;  Surgeon: Nicole Redding MD;  [facility]     ESOPHAGOSCOPY, GASTROSCOPY, DUODENOSCOPY (EGD), COMBINED N/A 11/5/2014    Procedure: COMBINED ESOPHAGOSCOPY, GASTROSCOPY, DUODENOSCOPY (EGD);  Surgeon: Nicole Redding MD;  [facility]     HC REPLACE DUODENOSTOMY/JEJUNOSTOMY TUBE PERCUTANEOUS N/A 8/27/2015    Procedure: REPLACE GASTROJEJUNOSTOMY TUBE, PERCUTANEOUS;  Surgeon: Mio Holder MD;  [facility]     HC REPLACE DUODENOSTOMY/JEJUNOSTOMY TUBE PERCUTANEOUS N/A 1/7/2016    Procedure: REPLACE JEJUNOSTOMY TUBE, PERCUTANEOUS;  Surgeon: Elsa Medel MD;  [facility]     HC REPLACE DUODENOSTOMY/JEJUNOSTOMY TUBE PERCUTANEOUS N/A 1/28/2016    Procedure: REPLACE JEJUNOSTOMY TUBE, PERCUTANEOUS;  Surgeon: Elsa Medel MD;  [facility]     HC REPLACE GASTROSTOMY/CECOSTOMY TUBE PERCUTANEOUS Left 5/19/2015    Procedure: REPLACE GASTROSTOMY TUBE, PERCUTANEOUS;  Surgeon: Melecio Morejon Chi, MD;  Location[facility]     HC UGI ENDOSCOPY W PLACEMENT GASTROSTOMY TUBE PERCUT N/A 10/1/2015    Procedure: COMBINED ESOPHAGOSCOPY, GASTROSCOPY, DUODENOSCOPY (EGD), PLACE PERCUTANEOUS ENDOSCOPIC GASTROSTOMY TUBE;  Surgeon: Mio Holder MD;  [facility]     INSERT PORT VASCULAR ACCESS Right 7/20/2017    Procedure: INSERT PORT VASCULAR ACCESS;  Chest Port Placement  **Latex Allergy**;  Surgeon: Coy Rocha,  "ANUP;  [facility]     LAPAROSCOPIC ASSISTED COLECTOMY  1/20/2012    Procedure:LAPAROSCOPIC ASSISTED COLECTOMY; Laparoscopic Ileostomy       LAPAROSCOPIC ASSISTED COLECTOMY LEFT (DESCENDING)  10/24/2012    Procedure: LAPAROSCOPIC ASSISTED COLECTOMY LEFT (DESCENDING);   Hand Assisted Laproscopic Subtotal abdominal Colectomy,Iieorectal anastamosis, Ileostomy Closure.       LAPAROSCOPIC ASSISTED INSERTION TUBE JEJUNOSTOMY N/A 10/16/2015    Procedure: LAPAROSCOPIC ASSISTED INSERTION TUBE JEJUNOSTOMY;  Surgeon: Elsa Medel MD;  [facility]     LAPAROSCOPIC CHOLECYSTECTOMY  2002    [facility] stones duct     LAPAROSCOPIC ILEOSTOMY  1/20/2012    [facility], loop     LAPAROSCOPIC OOPHORECTOMY Right 2009    [facility]     LAPAROTOMY EXPLORATORY N/A 1/28/2016    Procedure: LAPAROTOMY EXPLORATORY;  Surgeon: Elsa Medel MD;  [facility]     LEEP TX, CERVICAL  2009    [facility]     PICC INSERTION Left 10/21/2015    5fr DL Power PICC, 37cm (2cm external) in the L basilic vein w/ tip in the SVC RA junction.     REMOVE GASTROSTOMY TUBE ADULT N/A 12/12/2014    Procedure: REMOVE GASTROSTOMY TUBE ADULT;  Surgeon: Nicole Redding MD;  [facility]: [facility]     REMOVE PORT VASCULAR ACCESS Right 6/30/2016    Procedure: REMOVE PORT VASCULAR ACCESS;  Surgeon: Pradeep Orosco MD;  Location[facility]     replace GASTROSTOMY TUBE ADULT  5/19/15       Social History   Substance Use Topics     Smoking status: Former Smoker     Packs/day: 1.00     Years: 4.00     Types: Cigarettes     Quit date: 1/1/2004     Smokeless tobacco: Former User     Alcohol use No       Most Recent Immunizations   Administered Date(s) Administered     Influenza (IIV3) 10/01/2009     Pneumococcal 23 valent 07/18/2014       BMI: Estimated body mass index is 32.28 kg/(m^2) as calculated from the following:    Height as of 8/6/17: 1.676 m (5' 6\").    Weight as of this encounter: 90.7 kg (200 lb).      Review of Systems "   Constitutional: Positive for appetite change (decreased), chills, fatigue and fever.   HENT: Negative for congestion and sore throat.    Respiratory: Negative for cough and shortness of breath.    Cardiovascular: Negative for chest pain.   Gastrointestinal: Positive for abdominal pain, diarrhea (chronic), nausea and vomiting.   Genitourinary: Negative for dysuria and hematuria.   Musculoskeletal: Positive for myalgias.   Skin: Positive for rash (to port site from tegaderm).   All other systems reviewed and are negative.      Physical Exam   BP: (!) 126/100  Pulse: 88  Temp: 99.4  F (37.4  C)  Resp: 22  Weight: 90.7 kg (200 lb)  SpO2: 100 %  Physical Exam   Constitutional: She is oriented to person, place, and time. She appears well-developed and well-nourished. She appears distressed (whimpering, laying in fetal position, but cooperative).   HENT:   Head: Normocephalic and atraumatic.   Eyes: Conjunctivae are normal.   Cardiovascular: Normal rate, regular rhythm and normal heart sounds.    Pulmonary/Chest: Effort normal and breath sounds normal. No respiratory distress. She has no wheezes.   On right anterior chest wall, there are abrasions overlying port site without drainage, erythema. Tender to touch.   Abdominal: Soft. There is tenderness (moderate generalized throughout).   G tube site C/D/I, no surrounding erythema   Neurological: She is alert and oriented to person, place, and time.   Skin: Skin is warm and dry. She is not diaphoretic.   Psychiatric: Her speech is normal. Her mood appears anxious. Her affect is inappropriate (goes from whimpering, to very interactive and talkative).   Nursing note and vitals reviewed.      ED Course     ED Course     Procedures  None   The patient has signs of Severe Sepsis as evidenced by:    1. 2 SIRS criteria, AND  2. Suspected infection, AND   3. Organ dysfunction: Lactic Acid >2    Time severe sepsis diagnosis confirmed = 1610 as this was the time when Lactate  resulted, and the level was >2      3 Hour Severe Sepsis Bundle Completion:  1. Initial Lactic Acid Result:   Recent Labs   Lab Test  08/24/17   1817  08/24/17   1610  07/27/17   1950   LACT  0.7  2.4*  0.9     2. Blood Cultures before Antibiotics: Yes  3. Broad Spectrum Antibiotics Administered: yes, given Ceftriaxone based on previous blood culture then vancomycin to cover skin hipolito     Anti-infectives (Future)    Start     Dose/Rate Route Frequency Ordered Stop    08/24/17 1930  vancomycin (VANCOCIN) 1,750 mg in NaCl 0.9 % 500 mL intermittent infusion      1,750 mg  200 mL/hr over 1.75 Hours Intravenous EVERY 12 HOURS 08/24/17 1914      08/24/17 1700  cefTRIAXone (ROCEPHIN) 1 g vial to attach to  mL bag for ADULTS or NS 50 mL bag for PEDS      1 g  over 30 Minutes Intravenous EVERY 24 HOURS 08/24/17 1647          4. 2000 ml of IV fluids.    the patient was transferred out of the ED prior to the 6 hour laura.           Results for orders placed or performed during the hospital encounter of 08/24/17 (from the past 24 hour(s))   CBC with platelets differential   Result Value Ref Range    WBC 16.7 (H) 4.0 - 11.0 10e9/L    RBC Count 5.28 (H) 3.8 - 5.2 10e12/L    Hemoglobin 12.8 11.7 - 15.7 g/dL    Hematocrit 41.3 35.0 - 47.0 %    MCV 78 78 - 100 fl    MCH 24.2 (L) 26.5 - 33.0 pg    MCHC 31.0 (L) 31.5 - 36.5 g/dL    RDW 18.1 (H) 10.0 - 15.0 %    Platelet Count 420 150 - 450 10e9/L    Diff Method Automated Method     % Neutrophils 75.5 %    % Lymphocytes 18.5 %    % Monocytes 4.7 %    % Eosinophils 0.6 %    % Basophils 0.3 %    % Immature Granulocytes 0.4 %    Absolute Neutrophil 12.6 (H) 1.6 - 8.3 10e9/L    Absolute Lymphocytes 3.1 0.8 - 5.3 10e9/L    Absolute Monocytes 0.8 0.0 - 1.3 10e9/L    Absolute Eosinophils 0.1 0.0 - 0.7 10e9/L    Absolute Basophils 0.1 0.0 - 0.2 10e9/L    Abs Immature Granulocytes 0.1 0 - 0.4 10e9/L   Comprehensive metabolic panel   Result Value Ref Range    Sodium 138 133 - 144 mmol/L     Potassium 3.1 (L) 3.4 - 5.3 mmol/L    Chloride 106 94 - 109 mmol/L    Carbon Dioxide 23 20 - 32 mmol/L    Anion Gap 9 3 - 14 mmol/L    Glucose 76 70 - 99 mg/dL    Urea Nitrogen 10 7 - 30 mg/dL    Creatinine 1.03 0.52 - 1.04 mg/dL    GFR Estimate 61 >60 mL/min/1.7m2    GFR Estimate If Black 73 >60 mL/min/1.7m2    Calcium 9.2 8.5 - 10.1 mg/dL    Bilirubin Total 0.6 0.2 - 1.3 mg/dL    Albumin 3.9 3.4 - 5.0 g/dL    Protein Total 9.4 (H) 6.8 - 8.8 g/dL    Alkaline Phosphatase 176 (H) 40 - 150 U/L    ALT 39 0 - 50 U/L    AST 27 0 - 45 U/L   Lipase   Result Value Ref Range    Lipase 384 73 - 393 U/L   CRP inflammation   Result Value Ref Range    CRP Inflammation <2.9 0.0 - 8.0 mg/L   Lactic acid whole blood   Result Value Ref Range    Lactic Acid 2.4 (H) 0.7 - 2.0 mmol/L   UA with Microscopic reflex to Culture   Result Value Ref Range    Color Urine Straw     Appearance Urine Clear     Glucose Urine Negative NEG^Negative mg/dL    Bilirubin Urine Negative NEG^Negative    Ketones Urine Negative NEG^Negative mg/dL    Specific Gravity Urine 1.005 1.003 - 1.035    Blood Urine Negative NEG^Negative    pH Urine 7.0 5.0 - 7.0 pH    Protein Albumin Urine Negative NEG^Negative mg/dL    Urobilinogen mg/dL 0.0 0.0 - 2.0 mg/dL    Nitrite Urine Negative NEG^Negative    Leukocyte Esterase Urine Negative NEG^Negative    Source Unspecified Urine     WBC Urine 0 0 - 2 /HPF    RBC Urine 0 0 - 2 /HPF    Squamous Epithelial /HPF Urine <1 0 - 1 /HPF   Lactic acid whole blood   Result Value Ref Range    Lactic Acid 0.7 0.7 - 2.0 mmol/L   XR Abdomen 2 Views    Narrative    ABDOMEN TWO VIEWS  8/24/2017 6:56 PM     COMPARISON: 2V abdomen 7/27/2017    HISTORY: Abdominal pain, rule out obstruction.    FINDINGS: Percutaneous gastrostomy tube again noted. The balloon on  the gastrostomy tube appears slightly smaller than on the comparison  study. Cholecystectomy changes again noted. Abdominal bowel gas  pattern is nonspecific. There is no evidence for  free intraperitoneal  air.      Impression    IMPRESSION: No evidence for bowel obstruction or free air.     *Note: Due to a large number of results and/or encounters for the requested time period, some results have not been displayed. A complete set of results can be found in Results Review.       Medications   lidocaine 1 % 1 mL (not administered)   lidocaine (LMX4) kit (not administered)   sodium chloride (PF) 0.9% PF flush 3 mL (not administered)   sodium chloride (PF) 0.9% PF flush 3 mL (not administered)   0.9% sodium chloride BOLUS (1,000 mLs Intravenous New Bag 8/24/17 1623)     Followed by   0.9% sodium chloride infusion (not administered)   cefTRIAXone (ROCEPHIN) 1 g vial to attach to  mL bag for ADULTS or NS 50 mL bag for PEDS (0 g Intravenous Stopped 8/24/17 2008)   0.9% sodium chloride BOLUS (not administered)   naloxone (NARCAN) injection 0.1-0.4 mg (not administered)   acetaminophen (TYLENOL) tablet 650 mg (not administered)   HYDROcodone-acetaminophen (NORCO) 5-325 MG per tablet 1-2 tablet (not administered)   HYDROmorphone (PF) (DILAUDID) injection 0.3-0.5 mg (not administered)   ondansetron (ZOFRAN-ODT) ODT tab 4 mg (not administered)     Or   ondansetron (ZOFRAN) injection 4 mg (not administered)   vancomycin (VANCOCIN) 1,750 mg in NaCl 0.9 % 500 mL intermittent infusion (1,750 mg Intravenous New Bag 8/24/17 2008)   HYDROmorphone (DILAUDID) injection 1 mg (1 mg Intravenous Given 8/24/17 1620)   HYDROmorphone (DILAUDID) injection 1 mg (1 mg Intravenous Given 8/24/17 1701)   HYDROmorphone (DILAUDID) injection 1 mg (1 mg Intravenous Given 8/24/17 1838)   diphenhydrAMINE (BENADRYL) injection 25 mg (25 mg Intravenous Given 8/24/17 2007)       Assessments & Plan (with Medical Decision Making)  Doreen Peralta is a 37 year old female with complex medical history including multiple abdominal surgeries, repeated episodes of sepsis without clear etiology, who presented to the emergency department  "complaining of increased abdominal pain, body aches, fever, nausea and vomiting.  She's had bodyaches with the fever for a couple days but the abdominal pain with nausea/vomiting began this morning.  On arrival to the ED she had a temp of 90 9.4F within normal heart rate.  She had generalized abdominal tenderness but overall it was soft.  She was also known to have excoriative abrasions overlying her port site which she says is from a \"infection\" that she has been putting bacitracin on.  It does not appear infected to me, rather it looks like she scrubbed the skin right off.  Because of this, when obtaining IV access we decided to avoid the port site for now.  She was administered IV fluids, Dilaudid, and Zofran for symptoms.  Lab studies were obtained and were notable for a leukocytosis of 16.7 with a left shift, lactate of 2.4.  Her CRP was curiously negative however.  Rest of labs are unremarkable including a normal urine.  Abdominal x-ray showed no acute obstruction.  I did not feel CT imaging was indicated at this point as her abdomen was soft and nonsurgical, and patient agreeable to this. She does meet sepsis criteria with her lactate and white count. She has no URI or pulmonary symptoms for a source of infection. Urine negative. I am concerned she has bacteremia again based on her history. She was initially administered IV ceftriaxone as her previous blood cultures showed sensitivity to this.  She was later started on IV vancomycin to cover for normal skin hipolito. After 2 L of IV fluids her lactate was rechecked 2 hours later and it had normalized at 0.7.  It was curiously noted that her last blood culture from the end of July came from her port and grew out Klebsiella oxytoca, which is usually a fecal bacteria. It did not grow out of any other blood culture sites from that hospital stay.  I explained this to the patient that her port site had grown out likely fecal bacteria so that is what we will cover for " "today in addition to skin hipolito until we have a definitive source for her sepsis. With that info she stated \"oh my gosh, that's so weird, how would that even happen?\" Later her mother came to me outside the room and told me that Doreen had had access to her port all week because of repeated infusions they left access to it in place. Her mother stated that the original agreement before the port was placed was that she would never have access at home and when she doesn't have this access she usually doesn't develop infections. She states that she and Doreen thought through things and they think her infections are due to her using a bar soap to clean herself with the port accessed. This may be the case, but it doesn't seem extremely likely.   Regardless of the cause, I think she needs to be admitted to monitor her blood cultures and to receive continued IV antibiotics.  The patient was agreeable to hospital admission.  I discussed this case with Dr. Mario, hospitalist, who accepted the patient onto his service. The patient was staffed in the ED with Dr. Florian.   Plan:  - Admit to hospital     I have reviewed the nursing notes.    I have reviewed the findings, diagnosis, plan and need for follow up with the patient.       ED to Inpatient Handoff:    Discussed with Dr. Mario at 1855  Patient accepted for Inpatient Stay  Pending studies include blood cultures  Code Status: Not Addressed           Current Discharge Medication List          Final diagnoses:   Sepsis (H)   Leukocytosis, unspecified type   Generalized abdominal pain   Intractable vomiting with nausea, unspecified vomiting type     This document serves as a record of services personally performed by Vianey Warren PA. It was created on their behalf by Geoff Jha, a trained medical scribe. The creation of this record is based on the provider's personal observations and the statements of the patient. This document has been checked and approved by " the attending provider.    Note: Chart documentation done in part with Dragon Voice Recognition software. Although reviewed after completion, some word and grammatical errors may remain.    8/24/2017   [facility] EMERGENCY DEPARTMENT     Vianey Warren PA-C  08/24/17 6045

## 2017-08-25 LAB
BACTERIA SPEC CULT: NORMAL
BACTERIA SPEC CULT: NORMAL
C DIFF TOX B STL QL: NEGATIVE
Lab: 3392
Lab: NORMAL
POTASSIUM SERPL-SCNC: 4 MMOL/L (ref 3.4–5.3)
PROCALCITONIN SERPL-MCNC: <0.05 NG/ML
SPECIMEN SOURCE: NORMAL

## 2017-08-25 PROCEDURE — 25000128 H RX IP 250 OP 636: Performed by: PHYSICIAN ASSISTANT

## 2017-08-25 PROCEDURE — 25000132 ZZH RX MED GY IP 250 OP 250 PS 637: Performed by: INTERNAL MEDICINE

## 2017-08-25 PROCEDURE — 84132 ASSAY OF SERUM POTASSIUM: CPT | Performed by: FAMILY MEDICINE

## 2017-08-25 PROCEDURE — 25000128 H RX IP 250 OP 636: Performed by: INTERNAL MEDICINE

## 2017-08-25 PROCEDURE — 12000000 ZZH R&B MED SURG/OB

## 2017-08-25 PROCEDURE — 99232 SBSQ HOSP IP/OBS MODERATE 35: CPT | Performed by: FAMILY MEDICINE

## 2017-08-25 PROCEDURE — 87493 C DIFF AMPLIFIED PROBE: CPT | Performed by: INTERNAL MEDICINE

## 2017-08-25 PROCEDURE — 25000125 ZZHC RX 250: Performed by: PHYSICIAN ASSISTANT

## 2017-08-25 PROCEDURE — 99207 ZZC MOONLIGHTING INDICATOR: CPT | Performed by: FAMILY MEDICINE

## 2017-08-25 PROCEDURE — 36415 COLL VENOUS BLD VENIPUNCTURE: CPT | Performed by: FAMILY MEDICINE

## 2017-08-25 RX ADMIN — HYDROMORPHONE HYDROCHLORIDE 2 MG: 2 TABLET ORAL at 01:37

## 2017-08-25 RX ADMIN — MIRTAZAPINE 7.5 MG: 7.5 TABLET, FILM COATED ORAL at 20:22

## 2017-08-25 RX ADMIN — DULOXETINE HYDROCHLORIDE 60 MG: 30 CAPSULE, DELAYED RELEASE ORAL at 08:02

## 2017-08-25 RX ADMIN — POTASSIUM CHLORIDE 10 MEQ: 14.9 INJECTION, SOLUTION, CONCENTRATE PARENTERAL at 01:38

## 2017-08-25 RX ADMIN — POTASSIUM CHLORIDE 10 MEQ: 14.9 INJECTION, SOLUTION, CONCENTRATE PARENTERAL at 04:18

## 2017-08-25 RX ADMIN — HYDROMORPHONE HYDROCHLORIDE 2 MG: 2 TABLET ORAL at 16:55

## 2017-08-25 RX ADMIN — VANCOMYCIN HYDROCHLORIDE 1750 MG: 1 INJECTION, POWDER, LYOPHILIZED, FOR SOLUTION INTRAVENOUS at 08:02

## 2017-08-25 RX ADMIN — HYDROMORPHONE HYDROCHLORIDE 2 MG: 2 TABLET ORAL at 08:02

## 2017-08-25 RX ADMIN — HYDROMORPHONE HYDROCHLORIDE 2 MG: 2 TABLET ORAL at 10:47

## 2017-08-25 RX ADMIN — NORTRIPTYLINE HYDROCHLORIDE 40 MG: 10 CAPSULE ORAL at 20:22

## 2017-08-25 RX ADMIN — SODIUM CHLORIDE 1000 ML: 9 INJECTION, SOLUTION INTRAVENOUS at 10:15

## 2017-08-25 RX ADMIN — HYDROMORPHONE HYDROCHLORIDE 2 MG: 2 TABLET ORAL at 23:06

## 2017-08-25 RX ADMIN — MEROPENEM 1 G: 1 INJECTION, POWDER, FOR SOLUTION INTRAVENOUS at 10:14

## 2017-08-25 RX ADMIN — MEROPENEM 1 G: 1 INJECTION, POWDER, FOR SOLUTION INTRAVENOUS at 05:27

## 2017-08-25 RX ADMIN — HYDROMORPHONE HYDROCHLORIDE 2 MG: 2 TABLET ORAL at 14:05

## 2017-08-25 RX ADMIN — MEROPENEM 1 G: 1 INJECTION, POWDER, FOR SOLUTION INTRAVENOUS at 16:47

## 2017-08-25 RX ADMIN — POTASSIUM CHLORIDE 10 MEQ: 14.9 INJECTION, SOLUTION, CONCENTRATE PARENTERAL at 00:24

## 2017-08-25 RX ADMIN — HYDROMORPHONE HYDROCHLORIDE 2 MG: 2 TABLET ORAL at 04:55

## 2017-08-25 RX ADMIN — SODIUM CHLORIDE 1000 ML: 9 INJECTION, SOLUTION INTRAVENOUS at 22:51

## 2017-08-25 RX ADMIN — CLONIDINE HYDROCHLORIDE 0.4 MG: 0.1 TABLET ORAL at 19:59

## 2017-08-25 RX ADMIN — VANCOMYCIN HYDROCHLORIDE 1750 MG: 1 INJECTION, POWDER, LYOPHILIZED, FOR SOLUTION INTRAVENOUS at 20:21

## 2017-08-25 RX ADMIN — ONDANSETRON 4 MG: 4 TABLET, ORALLY DISINTEGRATING ORAL at 14:19

## 2017-08-25 RX ADMIN — HYDROMORPHONE HYDROCHLORIDE 2 MG: 2 TABLET ORAL at 19:59

## 2017-08-25 NOTE — CONSULTS
Transition Conference Interview    Doreen Peralta    ROB score:  High    Who helps you make decisions about your care?  Parents  How do you contact that person? I call her on the phone  Do you have a Health Care Directive on file or have you completed one?: No  Do you currently use any equipment at home?: No difficulty getting around home and community  Who helps you with grocery shopping, if anyone?:  or girls  Who helps you withmeal preparation, if anyone?:  or girls  Who helps you with laundry, if anyone?:  or girls  Who helps you with finances, if anyone?: N/A  Other Providers (Care Coordinator, County Services, PCA services, Mental Health providers, etc): No      Tell me the warning signs for when you should call the doctor.  When I start to have increase pain or a temp  If unknown EDUCATE; Educated pt?: Not needed  Are you able to use the telephone or cell phone?  Yes  Who do you call when you have questions or concerns about your health? Mom or primary  Do you know what doctor or clinic you can call if you have questions? Yes  Do you have any questions about any tests or procedures you have had while in the hospital or future ones planned?: No    How do you get to your health appointments?: Able to drive own car for transportation   Do you need help with your medicines? Not needed  Tell me more about medications. How do you remember to take them on time? Do you know their names and the reason why you take them?: Yes I do  If unknown EDUCATE; Educated pt?: Not needed      What resources do you have to be successful at home?: Help from my family  Do you have other barriers to being successful at home?: I don't think so  How do you think your home life will change due to this illness?: It has I feel bad when I can't be there for the girls my  is very supportive and so are my parents    Your goals when you leave the hospital:   Patient states:  To feel better no pain or temp  Family  states:  Not present  Patient s health care team recommends:  Talking with Doreen we thought it would be a good idea to see a counselor to deal with the frustrations of always feeling sick Doreen likes a counselor in [place] and she was going to make an appoinment with him.    The next steps for you and your health care team are:  You will:   Your support team will:   Your healthcare team will:      At the time of discharge we will be scheduling all of your follow up appointments. This appointment will be to be within 5-7 days of discharge. These timely appointments may limit the availability, is there anything I need to know when arranging this appointment? Елена Allen

## 2017-08-25 NOTE — PROGRESS NOTES
SPIRITUAL HEALTH SERVICES  SPIRITUAL ASSESSMENT Progress Note  [facility]      (Initial Visit)  (Pt known to  from previous hospitalization.)   provided a prayer and a verse of Amazing Shawna.   is available for pt/family needs.    Marshal Blackman M.Div., [facility]  Staff   Office tel: [phone]

## 2017-08-25 NOTE — PLAN OF CARE
Problem: Goal Outcome Summary  Goal: Goal Outcome Summary  Outcome: No Change  VSS. Afebrile. C/o pain/discomfort to lower abdomen. Denies n/v. PRN pain meds given per MAR which has been somewhat effective. BS active throughout. No diarrhea noted this shift. Potassium given this shift for potassium of 3.1. No complaints at this time. Pt ambulates without issue.

## 2017-08-25 NOTE — PROGRESS NOTES
[facility] Hospitalist Progress Note    Date of Service (when I saw the patient): 08/25/2017  Date of Admission: 8/24/2017     Assesment and Plan:   37 year old yo female who was admitted 8/24/2017 with fever and leukocytosis and abdominal pain, with a history of recurrent sepsis gram-negative. Admitted for treatment of suspected sepsis. Lactic acid resolved. She continues on broad-spectrum antibiotics. Day one.    Plan: Continue broad-spectrum antibiotics. Await culture results at 48 hours. Trend inflammatory markers.    Active Problems:    Intermittent asthma    Munchausen syndrome - previously suspected    Chronic diarrhea    History of deep venous thrombosis    Nausea and vomiting    Abdominal pain    Sepsis (H) - possible    Hypokalemia    Essential hypertension       DVT Prophylaxis: Pneumatic Compression Devices and Ambulate every shift  Code Status: Full Code    Dis[facility]: [facility]     Interval History   Stable overnight. Afebrile. Abdominal pain resolved. Tolerating regular diet.    -Data reviewed today: I reviewed all new labs and imaging results over the last 24 hours. I personally reviewed no images or EKG's today.    Physical Exam   Temp: 97.6  F (36.4  C) Temp src: Oral BP: 127/64 Pulse: 77 Heart Rate: 77 Resp: 16 SpO2: 98 % O2 Device: None (Room air)    Vitals:    08/24/17 1533 08/24/17 2115   Weight: 90.7 kg (200 lb) 91 kg (200 lb 9.9 oz)     Vital Signs with Ranges  Temp:  [96.9  F (36.1  C)-99.4  F (37.4  C)] 97.6  F (36.4  C)  Pulse:  [57-88] 77  Heart Rate:  [75-77] 77  Resp:  [16-22] 16  BP: (100-146)/() 127/64  SpO2:  [98 %-100 %] 98 %  I/O last 3 completed shifts:  In: 2454 [I.V.:2454]  Out: -     Well-appearing. Nontoxic. Heart regular. Lungs clear unlabored. Abdomen soft and nontender nondistended. G-tube normal appearing. There is excoriation to the right upper chest port site with some superficial skin breakdown but no signs of  infection. Neurologically intact.    Medications     NaCl 1,000 mL (08/25/17 1015)       sodium chloride (PF)  3 mL Intracatheter Q8H     meropenem  1 g Intravenous Q6H     cloNIDine  0.4 mg Oral QPM     DULoxetine  60 mg Oral Daily     nortriptyline  40 mg Oral At Bedtime     vancomycin (VANCOCIN) IV  1,750 mg Intravenous Q12H     mirtazapine  7.5 mg Oral At Bedtime       Data     Recent Labs  Lab 08/25/17  0825 08/24/17  1610 08/19/17  1234   WBC  --  16.7* 12.4*   HGB  --  12.8 9.8*   MCV  --  78 81   PLT  --  420 94*   NA  --  138  --    POTASSIUM 4.0 3.1*  --    CHLORIDE  --  106  --    CO2  --  23  --    BUN  --  10  --    CR  --  1.03  --    ANIONGAP  --  9  --    TARA  --  9.2  --    GLC  --  76  --    ALBUMIN  --  3.9  --    PROTTOTAL  --  9.4*  --    BILITOTAL  --  0.6  --    ALKPHOS  --  176*  --    ALT  --  39  --    AST  --  27  --    LIPASE  --  384  --        Recent Results (from the past 24 hour(s))   XR Abdomen 2 Views    Narrative    ABDOMEN TWO VIEWS  8/24/2017 6:56 PM     COMPARISON: 2V abdomen 7/27/2017    HISTORY: Abdominal pain, rule out obstruction.    FINDINGS: Percutaneous gastrostomy tube again noted. The balloon on  the gastrostomy tube appears slightly smaller than on the comparison  study. Cholecystectomy changes again noted. Abdominal bowel gas  pattern is nonspecific. There is no evidence for free intraperitoneal  air.      Impression    IMPRESSION: No evidence for bowel obstruction or free air.    SAFIA BERNAL MD

## 2017-08-25 NOTE — PHARMACY-VANCOMYCIN DOSING SERVICE
Pharmacy Vancomycin Initial Note  Date of Service 2017  Patient's  1980  37 year old, female    Indication: Sepsis    Current estimated CrCl = Estimated Creatinine Clearance: 85.7 mL/min (based on Cr of 1.03).    Creatinine for last 3 days  2017:  4:10 PM Creatinine 1.03 mg/dL    Recent Vancomycin Level(s) for last 3 days  No results found for requested labs within last 72 hours.      Vancomycin IV Administrations (past 72 hours)      No vancomycin orders with administrations in past 72 hours.                Nephrotoxins and other renal medications (Future)    Start     Dose/Rate Route Frequency Ordered Stop    17 1930  vancomycin (VANCOCIN) 1,750 mg in NaCl 0.9 % 500 mL intermittent infusion      1,750 mg  200 mL/hr over 1.75 Hours Intravenous EVERY 12 HOURS 17 191            Contrast Orders - past 72 hours     None                Plan:  1.  Start vancomycin  1750 mg IV q12h.   2.  Goal Trough Level: 15-20 mg/L   3.  Pharmacy will check trough levels as appropriate in 1-3 Days.    4. Serum creatinine levels will be ordered daily for the first week of therapy and at least twice weekly for subsequent weeks.    5. [place] method utilized to dose vancomycin therapy: Method 2    Deanne Moore

## 2017-08-25 NOTE — H&P
[facility]    History and Physical  Hospitalist       Date of Admission:  8/24/2017  Date of Service (when I saw the patient): 08/24/17    Assessment & Plan       Active Problems:    Sepsis (H) - possible    Assessment: presents with an elevated lactic acid and leucocytosis and reported fever from home.  She has had recurrent bouts of sepsis with bacteremia.  She did well from last Fall until this July when a port was put back in.  She was hospitalized one week after the port was put back in with Klebsiella in her blood that was drawn off the port.  She is  Now back with similar symptoms and once again concerns for sepsis.  At this point we need to treat for sepsis until cultures are negative.  We also discussed a long term plan for her which at this point appears to be to remove the port as well as her GI tube.  GI has said she needs the tube for decompression but the patient has not had a documented SBO in over two years and despite her report of malnutrition and dehydration there is no objective evidence of either.  Her weight is stable and her protein and albumin are normal.      Plan: admit to inpatient, IV vanco and meropenem for now while BC are pending.  Will need to coordinate establishing with primary care in clinic to be intimately involved with her care and to help coordinate between GI and ID.  Would advise removal of both her G tube as well as her port and would not replace unless she has objective evidence of weight loss, severe dehydration or recurrent SBO.      Nausea and vomiting    Assessment: seems to occur during episodes of sepsis without evidence of obstruction.     Plan: clear liquid diet and antiemetics      Abdominal pain    Assessment: worsens during sepsis and responds well to po dilaudid    Plan: po dilaudid      Hypokalemia    Assessment: due to vomiting and chronic diarrhea    Plan: replace and follow      Essential hypertension    Assessment: mildly  elevated likely from pain    Plan: continue clonidine      Intermittent asthma    Assessment: controlled    Plan: no change in meds      Munchausen syndrome - previously suspected    Assessment: previously suspected and could be present but difficult to prove    Plan: no intervention      Chronic diarrhea    Assessment: with recent antibiotic use need to check for c diff    Plan: send for c diff      History of deep venous thrombosis    Assessment: noted past history    Plan: PCD    DVT Prophylaxis: Pneumatic Compression Devices  Code Status: Full Code    Disposition: Expected discharge in 2-3 days once BC return negative and no signs of ongoing sepsis.    Nikhil Mario MD    Primary Care Physician   Larisa Lorenzo    Chief Complaint   Fever    History is obtained from the patient    History of Present Illness   Doreen Peralta is a 37 year old female who presents with fever, vomiting and abdominal pain. She states she has not felt well for about 6 days but last night started noticing a fever of 100.5.  Then today she had increasing abdominal pain and recurrent vomiting.  She has chronic daily diarrhea which has not changed.  She recently completed a course of antibiotics from her last admission for Klebsiella bacteremia.     Past Medical History    I have reviewed this patient's medical history and updated it with pertinent information if needed.   Past Medical History:   Diagnosis Date     Asthma      Bilateral ovarian cysts      Cervical cancer (H) 01/01/2008    cervical cancer      Chronic pain      Colonic dysmotility     s/p subtotal colectomy     Constipation     chronic     E. coli sepsis (H) 5/8/2016     Enteritis      Fungemia 5/5/2016     Gastro-oesophageal reflux disease      H/O ileostomy      Hx of abnormal Pap smear     s/p LEEP - no further details provided     Hypertension      IBS (irritable bowel syndrome)      Other chronic pain      PONV (postoperative nausea and vomiting)       Thrombosis, hepatic vein (H)     microvascular       Past [facility] History   I have reviewed this patient's surgical history and updated it with pertinent information if needed.  Past [facility] History:   Procedure Laterality Date     COLONOSCOPY  7/10/2012    Procedure: COLONOSCOPY;;  Surgeon: Nicole Redding MD;  [facility]     COLONOSCOPY N/A 2/19/2017    Procedure: COLONOSCOPY;  Surgeon: Randell Muller MD;  [facility]: [facility]     COLONOSCOPY N/A 2/21/2017    Procedure: COLONOSCOPY;  Surgeon: Randell Muller MD;  Location: [facility]     ECHO CHELO  7/19/2016          ENDOSCOPIC INSERTION TUBE GASTROSTOMY N/A 1/21/2016    Procedure: ENDOSCOPIC INSERTION TUBE GASTROSTOMY;  Surgeon: Nicole Redding MD;  Location: [facility]     ESOPHAGOSCOPY, GASTROSCOPY, DUODENOSCOPY (EGD), COMBINED  7/10/2012    Procedure: COMBINED ESOPHAGOSCOPY, GASTROSCOPY, DUODENOSCOPY (EGD);  Upper Endoscopy, Ileoscopy    Latex Allergy  with biopsies;  Surgeon: Nicole Redding MD;  [facility]     ESOPHAGOSCOPY, GASTROSCOPY, DUODENOSCOPY (EGD), COMBINED N/A 11/5/2014    Procedure: COMBINED ESOPHAGOSCOPY, GASTROSCOPY, DUODENOSCOPY (EGD);  Surgeon: Nicole Redding MD;  [facility]     HC REPLACE DUODENOSTOMY/JEJUNOSTOMY TUBE PERCUTANEOUS N/A 8/27/2015    Procedure: REPLACE GASTROJEJUNOSTOMY TUBE, PERCUTANEOUS;  Surgeon: Mio Holder MD;  [facility]     HC REPLACE DUODENOSTOMY/JEJUNOSTOMY TUBE PERCUTANEOUS N/A 1/7/2016    Procedure: REPLACE JEJUNOSTOMY TUBE, PERCUTANEOUS;  Surgeon: Elsa Medel MD;  [facility]     HC REPLACE DUODENOSTOMY/JEJUNOSTOMY TUBE PERCUTANEOUS N/A 1/28/2016    Procedure: REPLACE JEJUNOSTOMY TUBE, PERCUTANEOUS;  Surgeon: Elsa Medel MD;  [facility]     HC REPLACE GASTROSTOMY/CECOSTOMY TUBE PERCUTANEOUS Left 5/19/2015    Procedure: REPLACE GASTROSTOMY TUBE, PERCUTANEOUS;  Surgeon: Melecio Morejon Chi, MD;  Location: [facility]     HC [facility] ENDOSCOPY W PLACEMENT  GASTROSTOMY TUBE PERCUT N/A 10/1/2015    Procedure: COMBINED ESOPHAGOSCOPY, GASTROSCOPY, DUODENOSCOPY (EGD), PLACE PERCUTANEOUS ENDOSCOPIC GASTROSTOMY TUBE;  Surgeon: Mio Holder MD;  [facility]     INSERT PORT VASCULAR ACCESS Right 7/20/2017    Procedure: INSERT PORT VASCULAR ACCESS;  Chest Port Placement  **Latex Allergy**;  Surgeon: Coy Rocha PA-C;  [facility]     LAPAROSCOPIC ASSISTED COLECTOMY  1/20/2012    Procedure:LAPAROSCOPIC ASSISTED COLECTOMY; Laparoscopic Ileostomy       LAPAROSCOPIC ASSISTED COLECTOMY LEFT (DESCENDING)  10/24/2012    Procedure: LAPAROSCOPIC ASSISTED COLECTOMY LEFT (DESCENDING);   Hand Assisted Laproscopic Subtotal abdominal Colectomy,Iieorectal anastamosis, Ileostomy Closure.       LAPAROSCOPIC ASSISTED INSERTION TUBE JEJUNOSTOMY N/A 10/16/2015    Procedure: LAPAROSCOPIC ASSISTED INSERTION TUBE JEJUNOSTOMY;  Surgeon: Elsa Medel MD;  Location:  OR     LAPAROSCOPIC CHOLECYSTECTOMY  2002    [facility] stones duct     LAPAROSCOPIC ILEOSTOMY  1/20/2012    [facility], loop     LAPAROSCOPIC OOPHORECTOMY Right 2009    [facility]     LAPAROTOMY EXPLORATORY N/A 1/28/2016    Procedure: LAPAROTOMY EXPLORATORY;  Surgeon: Elsa Medel MD;  Location:  OR     LEEP TX, CERVICAL  2009    [facility]     PICC INSERTION Left 10/21/2015    5fr DL Power PICC, 37cm (2cm external) in the L basilic vein w/ tip in the SVC RA junction.     REMOVE GASTROSTOMY TUBE ADULT N/A 12/12/2014    Procedure: REMOVE GASTROSTOMY TUBE ADULT;  Surgeon: Nicole Redding MD;  [facility]: [facility]     REMOVE PORT VASCULAR ACCESS Right 6/30/2016    Procedure: REMOVE PORT VASCULAR ACCESS;  Surgeon: Pradeep Orosco MD;  Location: [facility] OR     replace GASTROSTOMY TUBE ADULT  5/19/15       Prior to Admission Medications   Prior to Admission Medications   Prescriptions Last Dose Informant Patient Reported? Taking?   ACETAMINOPHEN PO 8/24/2017 at 1000 Self Yes Yes   Sig: Take  500-1,000 mg by mouth every 6 hours as needed for pain    DULoxetine (CYMBALTA) 60 MG capsule 2017 at 0800  No Yes   Sig: Take 1 capsule (60 mg) by mouth daily   SUMAtriptan (IMITREX) 50 MG tablet Unknown at Unknown time  No No   Sig: Take 1-2 tablets ( mg) by mouth at onset of headache for migraine - may repeat dose after 2h if headache recurs.  Max: 200mg/24 hours   albuterol (2.5 MG/3ML) 0.083% neb solution 2017 at 0800  Yes Yes   Sig: Take 1 vial (2.5 mg) by nebulization every 4 hours as needed for shortness of breath / dyspnea or wheezing   albuterol 90 MCG/ACT inhaler Unknown at Unknown time Self Yes No   Sig: Inhale 2 puffs into the lungs every 6 hours as needed    cloNIDine (CATAPRES) 0.2 MG tablet 2017 at 2000  No Yes   Sig: TAKE 2 TABLETS(0.4 MG) BY MOUTH EVERY EVENING   diphenhydrAMINE (BENADRYL ALLERGY) 25 MG tablet 2017 at   No Yes   Sig: Take 1 tablet (25 mg) by mouth every 6 hours as needed for itching or other (Nausea)   ethanol, 74%, ADULT 74 % Past Month at Unknown time  Yes Yes   Sig: Inject IV through Port-a-cath daily for 11 days to lock Port-a-cath after each dose of ceftriaxone and also use to lock Port-a-cath after each weekly infusion of IV fluids and MVI   ipratropium (ATROVENT) 0.02 % neb solution Past Week at Unknown time  Yes Yes   Sig: Take 2.5 mLs (0.5 mg) by nebulization every 6 hours as needed for wheezing   mirtazapine (REMERON SOL-TAB) 15 MG ODT tab 2017 at 2000  No Yes   Si.5 tablets (7.5 mg) by Orally disintegrating tablet route At Bedtime   nortriptyline (PAMELOR) 10 MG capsule 2017 at   No Yes   Sig: Take 3-4 capsules (30-40 mg) by mouth At Bedtime   Patient taking differently: Take 40 mg by mouth At Bedtime    ondansetron (ZOFRAN ODT) 4 MG ODT tab 2017 at 0800  No Yes   Sig: Take 1-2 tablets (4-8 mg) by mouth every 6 hours as needed for nausea   order for DME   No No   Si by 2 gauze pads, use for dressing changes as  directed   order for DME   No No   Si inch paper tape, Use for dressing changes as directed   traZODone (DESYREL) 50 MG tablet 2017 at 2000  No Yes   Sig: Take 1-2 tablets ( mg) by mouth nightly as needed 1-2 tabs at bedtime PRN      Facility-Administered Medications: None     Allergies   Allergies   Allergen Reactions     Hyoscyamine Rash     Metoclopramide Other (See Comments)     Eye twitching.      Peaches [Peach] Other (See Comments)     Raw. Cooked OK     Sucralose Other (See Comments)     All artificial sweeteners. Aspartame also. Swollen glands     Advair Diskus Other (See Comments)     Throat burns     Azithromycin Other (See Comments)     Burning in throat     Compazine [Prochlorperazine] Visual Disturbance     Contrast Dye Itching     States is allergic to CT contrast dye     Cyclobenzaprine Visual Disturbance     Fentanyl Other (See Comments)     migraine     Ibuprofen GI Disturbance     Lactulose Nausea and Vomiting     Gas and bloating     Levaquin [Levofloxacin] Swelling     Per ED M.D. And RN      Morphine Sulfate Other (See Comments)     Chest pain       Oxycodone Other (See Comments)     Burning throat, but can take Norco.      Penicillins Other (See Comments)     Family hx of resp arrest, she has never taken  Ok with cephalosporins     Rizatriptan Visual Disturbance     Droperidol Hives and Rash     Isovue [Iopamidol] Palpitations     Pt had racing heart and sob      Ketorolac Anxiety     Latex Swelling and Rash     Kiwi, likely also avacado, ? banana     Levsin Rash       Social History   I have reviewed this patient's social history and updated it with pertinent information if needed. Doreen JOSEPH Peralta  reports that she quit smoking about 13 years ago. Her smoking use included Cigarettes. She has a 4.00 pack-year smoking history. She has quit using smokeless tobacco. She reports that she does not drink alcohol or use illicit drugs.    Family History   I have reviewed this patient's  family history and updated it with pertinent information if needed.   Family History   Problem Relation Age of Onset     Thyroid Disease Mother      Sjogren's Mother      GASTROINTESTINAL DISEASE Mother      Intermittent nausea vomiting diarrhea     Colon Polyps Mother      Prostate Problems Father      prostate enlargement     Lupus Maternal Grandmother      CANCER Maternal Grandfather      Lung     Colon Cancer Maternal Grandfather 65     CANCER Paternal Grandmother      Lung      CEREBROVASCULAR DISEASE Paternal Grandmother      DIABETES Paternal Grandmother      Cardiovascular Paternal Grandmother      CHF     CANCER Paternal Grandfather      Lung     Glaucoma Paternal Grandfather      Abdominal Aortic Aneurysm Other      Macular Degeneration No family hx of        Review of Systems   The 10 point Review of Systems is negative other than noted in the HPI or here.     Physical Exam   Temp: 99.4  F (37.4  C) Temp src: Oral BP: (!) 137/107 (Simultaneous filing. User may not have seen previous data.) Pulse: 88   Resp: 22 SpO2: 100 %      Vital Signs with Ranges  Temp:  [99.4  F (37.4  C)] 99.4  F (37.4  C)  Pulse:  [88] 88  Resp:  [22] 22  BP: (126-143)/() 137/107  SpO2:  [100 %] 100 %  200 lbs 0 oz    Constitutional:   awake, alert, cooperative, no apparent distress, and appears stated age     Eyes:   Lids and lashes normal, pupils equal, round and reactive to light, extra ocular muscles intact, sclera clear, conjunctiva normal     ENT:   Normocephalic, without obvious abnormality, atraumatic, sinuses nontender on palpation, external ears without lesions, oral pharynx with moist mucous membranes, tonsils without erythema or exudates, gums normal and good dentition.     Neck:   Supple, symmetrical, trachea midline, no adenopathy, thyroid symmetric, not enlarged and no tenderness, skin normal     Hematologic / Lymphatic:   no cervical lymphadenopathy and no supraclavicular lymphadenopathy     Back:   Symmetric,  no curvature, spinous processes are non-tender on palpation, paraspinous muscles are non-tender on palpation, no costal vertebral tenderness     Lungs:   No increased work of breathing, good air exchange, clear to auscultation bilaterally, no crackles or wheezing     Cardiovascular:   Normal apical impulse, regular rate and rhythm, normal S1 and S2, no S3 or S4, and no murmur noted     Abdomen:   normal bowel sounds, soft, non-distended, mild diffuse tenderness, no masses palpated, no hepatosplenomegally.  G tube in place     Neurologic:   Awake, alert, oriented to name, place and time.  Cranial nerves II-XII are grossly intact.  Motor is 5 out of 5 bilaterally.   Sensory is intact.         Data   Data reviewed today:  I personally reviewed no images or EKG's today.    Recent Labs  Lab 08/24/17  1610 08/19/17  1234   WBC 16.7* 12.4*   HGB 12.8 9.8*   MCV 78 81    94*     --    POTASSIUM 3.1*  --    CHLORIDE 106  --    CO2 23  --    BUN 10  --    CR 1.03  --    ANIONGAP 9  --    TARA 9.2  --    GLC 76  --    ALBUMIN 3.9  --    PROTTOTAL 9.4*  --    BILITOTAL 0.6  --    ALKPHOS 176*  --    ALT 39  --    AST 27  --    LIPASE 384  --        Recent Results (from the past 24 hour(s))   XR Abdomen 2 Views    Narrative    ABDOMEN TWO VIEWS  8/24/2017 6:56 PM     COMPARISON: 2V abdomen 7/27/2017    HISTORY: Abdominal pain, rule out obstruction.    FINDINGS: Percutaneous gastrostomy tube again noted. The balloon on  the gastrostomy tube appears slightly smaller than on the comparison  study. Cholecystectomy changes again noted. Abdominal bowel gas  pattern is nonspecific. There is no evidence for free intraperitoneal  air.      Impression    IMPRESSION: No evidence for bowel obstruction or free air.

## 2017-08-25 NOTE — PROGRESS NOTES
S-(situation): Patient arrives to room 272 via cart from ER    B-(background): ABD pain and n/v, low potassium     A-(assessment): VSS, afebrile, RA; patient has c/o abd pain and nausea with diarrhea (pts baseline), port area has a diameter of abrasions that is AMPARO. Lungs clear.       R-(recommendations): Orders reviewed with patient and mom. Will monitor patient per MD orders.     Inpatient nursing criteria listed below were met:    Health care directives status obtained and documented: Yes  Core Measures assessed (SSI): Yes  SCD's Documented: Yes  Vaccine assessment done and vaccines ordered if appropriate: Yes  Skin issues/needs documented:Yes  Isolation needs addressed, if appropriate: NA  Fall Prevention: Care plan updated, Education given and documented Yes  MRSA swab completed for patient 55 years and older (exclude BENITO and TKA): NA  My Chart patient sign up addressed and documented: Yes  Care Plan initiated: Yes  Education Assessment documented:Yes  Education Documented (Pre-existing chronic infection such as, MRSA/VRE need education on admission): Yes  New medication patient education completed and documented (Possible Side Effects of Common Medications handout): Yes  Home medications if not able to send immediately home with family stored here: NA   Reminder note placed in discharge instructions: NA  Discharge planning review completed (admission navigator) Yes

## 2017-08-25 NOTE — PLAN OF CARE
Care Transition Initial Assessment - RN  Reason For Consult: discharge planning   Met with: Patient.    DATA   Active Problems:    Intermittent asthma    Munchausen syndrome - previously suspected    Chronic diarrhea    History of deep venous thrombosis    Nausea and vomiting    Abdominal pain    Sepsis (H) - possible    Hypokalemia    Essential hypertension       [facility]: [facility]   Primary Care MD Name: Larisa Maura  Contact information and PCP information verified: Yes      ASSESSMENT  Cognitive Status: awake, alert and oriented.             Lives With: spouse, child(shreya), dependent  Living Arrangements: apartment  Quality Of Family Relationships: supportive, involved  Description of Support System: Involved, Supportive   Who is your support system?: , Parent(s)       Insurance Concerns: No Insurance issues identified          This writer met with pt in the room, introduced self and role. See transitional care conference Patient feeling frustrated about being sick all the time and wanting to spend more time with her family. Patient states she can not figure out why she gets infections all the time and plans on making an appointment after discharge to have port removed. Very talkative and open during interview.    PLAN    Return home follow up with primary and GI specialist and wants to have port removed. Patient plans on making an appointment with a counselor she likes in [place].      Alexandra Allen CTS RN [facility] [phone] [facility] [phone]    Discharge Planner   Discharge Plans in progress: Home with family  Barriers to discharge plan: infection  Follow up plan: Follow up with primary and GI specialist possibly get port removed       Entered by: Alexandra Allen 08/25/2017 2:58 PM

## 2017-08-25 NOTE — PLAN OF CARE
Problem: Goal Outcome Summary  Goal: Goal Outcome Summary  Outcome: Improving  Pain controlled with PO dilaudid. Loose stools X 2 this shift. Afebrile, VSS. Ambulating in [place].

## 2017-08-26 VITALS
TEMPERATURE: 97.3 F | SYSTOLIC BLOOD PRESSURE: 128 MMHG | BODY MASS INDEX: 32.24 KG/M2 | RESPIRATION RATE: 18 BRPM | WEIGHT: 200.62 LBS | OXYGEN SATURATION: 99 % | DIASTOLIC BLOOD PRESSURE: 83 MMHG | HEART RATE: 76 BPM | HEIGHT: 66 IN

## 2017-08-26 LAB
ANION GAP SERPL CALCULATED.3IONS-SCNC: 8 MMOL/L (ref 3–14)
BASOPHILS # BLD AUTO: 0 10E9/L (ref 0–0.2)
BASOPHILS NFR BLD AUTO: 0.4 %
BUN SERPL-MCNC: 4 MG/DL (ref 7–30)
CALCIUM SERPL-MCNC: 7.8 MG/DL (ref 8.5–10.1)
CHLORIDE SERPL-SCNC: 109 MMOL/L (ref 94–109)
CO2 SERPL-SCNC: 22 MMOL/L (ref 20–32)
CREAT SERPL-MCNC: 0.88 MG/DL (ref 0.52–1.04)
CRP SERPL-MCNC: 11.9 MG/L (ref 0–8)
DIFFERENTIAL METHOD BLD: ABNORMAL
EOSINOPHIL # BLD AUTO: 0.4 10E9/L (ref 0–0.7)
EOSINOPHIL NFR BLD AUTO: 6.1 %
ERYTHROCYTE [DISTWIDTH] IN BLOOD BY AUTOMATED COUNT: 17.3 % (ref 10–15)
GFR SERPL CREATININE-BSD FRML MDRD: 72 ML/MIN/1.7M2
GLUCOSE SERPL-MCNC: 81 MG/DL (ref 70–99)
HCT VFR BLD AUTO: 30.7 % (ref 35–47)
HGB BLD-MCNC: 9.5 G/DL (ref 11.7–15.7)
IMM GRANULOCYTES # BLD: 0 10E9/L (ref 0–0.4)
IMM GRANULOCYTES NFR BLD: 0.4 %
LYMPHOCYTES # BLD AUTO: 2.2 10E9/L (ref 0.8–5.3)
LYMPHOCYTES NFR BLD AUTO: 30 %
MCH RBC QN AUTO: 24.7 PG (ref 26.5–33)
MCHC RBC AUTO-ENTMCNC: 30.9 G/DL (ref 31.5–36.5)
MCV RBC AUTO: 80 FL (ref 78–100)
MONOCYTES # BLD AUTO: 0.5 10E9/L (ref 0–1.3)
MONOCYTES NFR BLD AUTO: 7.4 %
NEUTROPHILS # BLD AUTO: 4 10E9/L (ref 1.6–8.3)
NEUTROPHILS NFR BLD AUTO: 55.7 %
PLATELET # BLD AUTO: 280 10E9/L (ref 150–450)
POTASSIUM SERPL-SCNC: 3.8 MMOL/L (ref 3.4–5.3)
RBC # BLD AUTO: 3.85 10E12/L (ref 3.8–5.2)
SODIUM SERPL-SCNC: 139 MMOL/L (ref 133–144)
WBC # BLD AUTO: 7.2 10E9/L (ref 4–11)

## 2017-08-26 PROCEDURE — 36416 COLLJ CAPILLARY BLOOD SPEC: CPT | Performed by: FAMILY MEDICINE

## 2017-08-26 PROCEDURE — 25000128 H RX IP 250 OP 636: Performed by: PHYSICIAN ASSISTANT

## 2017-08-26 PROCEDURE — 25000128 H RX IP 250 OP 636: Performed by: INTERNAL MEDICINE

## 2017-08-26 PROCEDURE — 85025 COMPLETE CBC W/AUTO DIFF WBC: CPT | Performed by: FAMILY MEDICINE

## 2017-08-26 PROCEDURE — 80048 BASIC METABOLIC PNL TOTAL CA: CPT | Performed by: FAMILY MEDICINE

## 2017-08-26 PROCEDURE — 86140 C-REACTIVE PROTEIN: CPT | Performed by: FAMILY MEDICINE

## 2017-08-26 PROCEDURE — 25000132 ZZH RX MED GY IP 250 OP 250 PS 637: Performed by: INTERNAL MEDICINE

## 2017-08-26 PROCEDURE — 99207 ZZC MOONLIGHTING INDICATOR: CPT | Performed by: FAMILY MEDICINE

## 2017-08-26 PROCEDURE — 99238 HOSP IP/OBS DSCHRG MGMT 30/<: CPT | Performed by: FAMILY MEDICINE

## 2017-08-26 RX ORDER — ONDANSETRON 4 MG/1
4-8 TABLET, ORALLY DISINTEGRATING ORAL EVERY 6 HOURS PRN
Qty: 30 TABLET | Refills: 0 | Status: SHIPPED | OUTPATIENT
Start: 2017-08-26 | End: 2018-03-03

## 2017-08-26 RX ORDER — HYDROMORPHONE HYDROCHLORIDE 2 MG/1
1 TABLET ORAL
Qty: 10 TABLET | Refills: 0 | Status: SHIPPED | OUTPATIENT
Start: 2017-08-26 | End: 2017-09-01

## 2017-08-26 RX ADMIN — VANCOMYCIN HYDROCHLORIDE 1750 MG: 1 INJECTION, POWDER, LYOPHILIZED, FOR SOLUTION INTRAVENOUS at 08:04

## 2017-08-26 RX ADMIN — HYDROMORPHONE HYDROCHLORIDE 2 MG: 2 TABLET ORAL at 05:00

## 2017-08-26 RX ADMIN — DULOXETINE HYDROCHLORIDE 60 MG: 30 CAPSULE, DELAYED RELEASE ORAL at 08:04

## 2017-08-26 RX ADMIN — HYDROMORPHONE HYDROCHLORIDE 2 MG: 2 TABLET ORAL at 11:15

## 2017-08-26 RX ADMIN — ONDANSETRON 4 MG: 2 SOLUTION INTRAMUSCULAR; INTRAVENOUS at 08:55

## 2017-08-26 RX ADMIN — SODIUM CHLORIDE 1000 ML: 9 INJECTION, SOLUTION INTRAVENOUS at 11:15

## 2017-08-26 RX ADMIN — HYDROMORPHONE HYDROCHLORIDE 2 MG: 2 TABLET ORAL at 08:03

## 2017-08-26 RX ADMIN — MEROPENEM 1 G: 1 INJECTION, POWDER, FOR SOLUTION INTRAVENOUS at 12:02

## 2017-08-26 RX ADMIN — HYDROMORPHONE HYDROCHLORIDE 2 MG: 2 TABLET ORAL at 02:06

## 2017-08-26 RX ADMIN — MEROPENEM 1 G: 1 INJECTION, POWDER, FOR SOLUTION INTRAVENOUS at 00:02

## 2017-08-26 RX ADMIN — MEROPENEM 1 G: 1 INJECTION, POWDER, FOR SOLUTION INTRAVENOUS at 05:00

## 2017-08-26 RX ADMIN — HYDROMORPHONE HYDROCHLORIDE 2 MG: 2 TABLET ORAL at 14:02

## 2017-08-26 ASSESSMENT — PAIN DESCRIPTION - DESCRIPTORS: DESCRIPTORS: DISCOMFORT

## 2017-08-26 NOTE — PROGRESS NOTES
S-(situation): Patient discharged to home via walking with her mother.    B-(background): nausea, vomiting, abdominal pain    A-(assessment):  Goals were met for D/C.    R-(recommendations): Discharge instructions reviewed with pt. Listed belongings gathered and returned to patient. She prefers to make her own F/U appt.  RX will be picked up downstairs.       Discharge Nursing Criteria:     Care Plan and Patient education resolved: Yes    New Medications- pt has been educated about purpose and side effects: Yes    Vaccines  Pneumonia Vaccine verified at discharge: Yes  Influenza status verified at discharge:  Yes                 MISC  Prescriptions if needed, hard copies sent with patient   No, not with patient- sent to retail pharmacy  Home and hospital aquired medications returned to patient: NA  Medication Bin checked and emptied on discharge Yes  Patient reports post-discharge pain management plan is effective: Yes

## 2017-08-26 NOTE — PHARMACY - DISCHARGE MEDICATION RECONCILIATION
Discharge medication review for this patient is complete. Pharmacist assisted with medication reconciliation of discharge medications with prior to admission medications.     The following changes were made to the discharge medication list based on pharmacist review: none      Patient's Discharge Medication List  - medications as listed on After Visit Summary (AVS)     Review of your medicines        START taking         Dose / Directions      HYDROmorphone 2 MG tablet   Commonly known as:  DILAUDID        Dose:  1 mg   Take 0.5 tablets (1 mg) by mouth every 3 hours as needed for moderate to severe pain   Quantity:  10 tablet   Refills:  0             CONTINUE these medicines which may have CHANGED, or have new prescriptions. If we are uncertain of the size of tablets/capsules you have at home, strength may be listed as something that might have changed.         Dose / Directions      nortriptyline 10 MG capsule   Commonly known as:  PAMELOR   This may have changed:  how much to take   Used for:  Neuropathic pain, Primary insomnia        Dose:  30-40 mg   Take 3-4 capsules (30-40 mg) by mouth At Bedtime   Quantity:  120 capsule   Refills:  5             CONTINUE these medicines which have NOT CHANGED         Dose / Directions      ACETAMINOPHEN PO        Dose:  500-1000 mg   Take 500-1,000 mg by mouth every 6 hours as needed for pain   Refills:  0       albuterol (2.5 MG/3ML) 0.083% neb solution   Used for:  Gastrostomy tube dependent (H), SBO (small bowel obstruction) (H), Chronic abdominal pain, Chronic diarrhea        Dose:  2.5 mg   Take 1 vial (2.5 mg) by nebulization every 4 hours as needed for shortness of breath / dyspnea or wheezing   Quantity:  360 mL   Refills:  0       albuterol 90 MCG/ACT inhaler        Dose:  2 puff   Inhale 2 puffs into the lungs every 6 hours as needed   Refills:  0       cloNIDine 0.2 MG tablet   Commonly known as:  CATAPRES   Used for:  Anxiety        TAKE 2 TABLETS(0.4 MG) BY  MOUTH EVERY EVENING   Quantity:  180 tablet   Refills:  0       diphenhydrAMINE 25 MG tablet   Commonly known as:  BENADRYL ALLERGY        Dose:  25 mg   Take 1 tablet (25 mg) by mouth every 6 hours as needed for itching or other (Nausea)   Quantity:  30 tablet   Refills:  0       DULoxetine 60 MG EC capsule   Commonly known as:  CYMBALTA   Used for:  Abdominal pain, epigastric, Abdominal migraine, not intractable        Dose:  60 mg   Take 1 capsule (60 mg) by mouth daily   Quantity:  90 capsule   Refills:  3       ethanol (74%) ADULT 74 %   Used for:  Positive blood culture, Sepsis due to Klebsiella (H)        Inject IV through Port-a-cath daily for 11 days to lock Port-a-cath after each dose of ceftriaxone and also use to lock Port-a-cath after each weekly infusion of IV fluids and MVI   Quantity:  5 mL   Refills:  0       ipratropium 0.02 % neb solution   Commonly known as:  ATROVENT   Used for:  Gastrostomy tube dependent (H), SBO (small bowel obstruction) (H), Chronic abdominal pain, Chronic diarrhea        Dose:  0.5 mg   Take 2.5 mLs (0.5 mg) by nebulization every 6 hours as needed for wheezing   Quantity:  225 mL   Refills:  0       mirtazapine 15 MG ODT tab   Commonly known as:  REMERON SOL-TAB   Used for:  Anxiety        Dose:  7.5 mg   0.5 tablets (7.5 mg) by Orally disintegrating tablet route At Bedtime   Quantity:  45 tablet   Refills:  0       ondansetron 4 MG ODT tab   Commonly known as:  ZOFRAN ODT   Used for:  Intractable vomiting, presence of nausea not specified, unspecified vomiting type, Gastroparesis        Dose:  4-8 mg   Take 1-2 tablets (4-8 mg) by mouth every 6 hours as needed for nausea   Quantity:  30 tablet   Refills:  0       * order for DME   Used for:  Attention to G-tube (H)        2 by 2 gauze pads, use for dressing changes as directed   Quantity:  1 Box   Refills:  1       * order for DME   Used for:  Attention to G-tube (H)        1 inch paper tape, Use for dressing changes as  directed   Quantity:  1 each   Refills:  0       SUMAtriptan 50 MG tablet   Commonly known as:  IMITREX   Used for:  Migraine without aura and without status migrainosus, not intractable        Dose:   mg   Take 1-2 tablets ( mg) by mouth at onset of headache for migraine - may repeat dose after 2h if headache recurs.  Max: 200mg/24 hours   Quantity:  18 tablet   Refills:  1       traZODone 50 MG tablet   Commonly known as:  DESYREL   Used for:  Insomnia, unspecified type        Dose:   mg   Take 1-2 tablets ( mg) by mouth nightly as needed 1-2 tabs at bedtime PRN   Quantity:  180 tablet   Refills:  1       * Notice:  This list has 2 medication(s) that are the same as other medications prescribed for you. Read the directions carefully, and ask your doctor or other care provider to review them with you.             Where to get your medicines        Some of these will need a paper prescription and others can be bought over the counter. Ask your nurse if you have questions.       Bring a paper prescription for each of these medications      HYDROmorphone 2 MG tablet             Information about where to get these medications is not yet available       ! Ask your nurse or doctor about these medications      ondansetron 4 MG ODT tab

## 2017-08-26 NOTE — DISCHARGE SUMMARY
[facility]    Discharge Summary  Hospitalist    Date of Admission:  8/24/2017  Date of Discharge:  8/26/2017  Discharging Provider: Darian Coe      Discharge Diagnoses     Intermittent asthma    Munchausen syndrome - previously suspected    Chronic diarrhea    History of deep venous thrombosis    Nausea and vomiting    Abdominal pain    Sepsis (H) - possible    Hypokalemia    Essential hypertension    * No resolved hospital problems. *      History of Present Illness   Doreen Peralta is an 37 year old female  who presented with:    Copied from admission H&P:   37-year-old female presented with 6 days of fever vomiting abdominal pain. Last night a fever 100.5. She has just completed a course of antibiotics due to Klebsiella bacteremia. In the ED, she was found to have elevated lactic acid, leukocytosis, abdominal tenderness. She has chronic abdominal pain. It was decided to admit her for treatment of possible sepsis until blood cultures return. She is placed on IV vancomycin and meropenem.    Hospital Course   Doreen Peralta was admitted on 8/24/2017.  With antibiotic treatment, her lactate normalized, her white blood cell count returned to normal. There were no fevers during her hospitalization. She continued to suffer from chronic abdominal pain. This was treated with both IV and by mouth medication. This will not be the long-term.    With her history of possible Munchhausen, and given the timing of her infections as they are related to her reinsertion of indwelling devices, a long conversation was had with the patient regarding the true risks of having these medical devices which include her G-tube and her Port-A-Cath. It seems that when these are placed, she returns to the hospital for frequent serious septicemia. She is concerned about how she will manage her symptoms without her G-tube, but is peripherally willing to have her Port-A-Cath removed. This is then along, given  "discussion between her GI specialist and her infectious disease specialist. I am hopeful that with good primary care, she may be able to navigate the decision successfully and set up her best chance of health moving forward. We stressed that the risks of continued to have these devices, outweigh any symptoms she may experience, that should be amenable to alternative modalities. Not indwelling devices. Review of her chart notes that there are many other physicians involved in her care throughout hospitalization to have been concerned with the safety of ongoing devices. And have raised this point and numerous occasions with her.    Darian Coe    Significant Results and Procedures   No procedures performed during this admission    Pending Results     Unresulted Labs Ordered in the Past 30 Days of this Admission     Date and Time Order Name Status Description    8/24/2017 1916 HIV Antigen Antibody Combo In process     8/24/2017 1858 Blood culture Preliminary     8/24/2017 1712 Blood culture Preliminary     8/24/2017 1547 Blood culture Preliminary              Primary Care Physician   Larisa Lorenzo    Physical Exam   200 lbs 9.9 oz  /83  Pulse 76  Temp 97.3  F (36.3  C) (Oral)  Resp 18  Ht 1.676 m (5' 6\")  Wt 91 kg (200 lb 9.9 oz)  SpO2 99%  BMI 32.38 kg/m2    Well-appearing female. No distress. Nontoxic. Heart regular. Left lung is clear labored. Neuro grossly intact.    Discharge Disposition   Discharged to home  Condition at discharge: Stable    Consultations This Hospital Stay   PHARMACY TO DOSE VANCO  PHARMACY TO DOSE VANCO  CARE COORDINATOR IP CONSULT       Time Spent on this Encounter   I, Darian Coe, personally saw the patient today and spent less than or equal to 30 minutes discharging this patient.    Discharge Orders     Reason for your hospital stay   Active Problems:   Intermittent asthma   Munchausen syndrome - previously suspected   Chronic diarrhea   History of deep venous " thrombosis   Nausea and vomiting   Abdominal pain   Sepsis (H) - possible   Hypokalemia   Essential hypertension       Follow-up and recommended labs and tests   Follow up with primary care provider, isaac  within 1-2 wks, for hospital follow- up. No follow up labs or test are needed.     Activity   Your activity upon discharge: activity as tolerated     Diet   Follow this diet upon discharge: Orders Placed This Encounter     Clear Liquid Diet         Discharge Medications   Current Discharge Medication List      START taking these medications    Details   HYDROmorphone (DILAUDID) 2 MG tablet Take 0.5 tablets (1 mg) by mouth every 3 hours as needed for moderate to severe pain  Qty: 10 tablet, Refills: 0    Associated Diagnoses: Generalized abdominal pain         CONTINUE these medications which have CHANGED    Details   ondansetron (ZOFRAN ODT) 4 MG ODT tab Take 1-2 tablets (4-8 mg) by mouth every 6 hours as needed for nausea  Qty: 30 tablet, Refills: 0    Associated Diagnoses: Intractable vomiting, presence of nausea not specified, unspecified vomiting type; Gastroparesis         CONTINUE these medications which have NOT CHANGED    Details   albuterol (2.5 MG/3ML) 0.083% neb solution Take 1 vial (2.5 mg) by nebulization every 4 hours as needed for shortness of breath / dyspnea or wheezing  Qty: 360 mL    Associated Diagnoses: Gastrostomy tube dependent (H); SBO (small bowel obstruction) (H); Chronic abdominal pain; Chronic diarrhea      ipratropium (ATROVENT) 0.02 % neb solution Take 2.5 mLs (0.5 mg) by nebulization every 6 hours as needed for wheezing  Qty: 225 mL    Associated Diagnoses: Gastrostomy tube dependent (H); SBO (small bowel obstruction) (H); Chronic abdominal pain; Chronic diarrhea      ethanol, 74%, ADULT 74 % Inject IV through Port-a-cath daily for 11 days to lock Port-a-cath after each dose of ceftriaxone and also use to lock Port-a-cath after each weekly infusion of IV fluids and MVI  Qty: 5  mL, Refills: 0    Associated Diagnoses: Positive blood culture; Sepsis due to Klebsiella (H)      cloNIDine (CATAPRES) 0.2 MG tablet TAKE 2 TABLETS(0.4 MG) BY MOUTH EVERY EVENING  Qty: 180 tablet, Refills: 0    Associated Diagnoses: Anxiety      diphenhydrAMINE (BENADRYL ALLERGY) 25 MG tablet Take 1 tablet (25 mg) by mouth every 6 hours as needed for itching or other (Nausea)  Qty: 30 tablet, Refills: 0      traZODone (DESYREL) 50 MG tablet Take 1-2 tablets ( mg) by mouth nightly as needed 1-2 tabs at bedtime PRN  Qty: 180 tablet, Refills: 1    Associated Diagnoses: Insomnia, unspecified type      nortriptyline (PAMELOR) 10 MG capsule Take 3-4 capsules (30-40 mg) by mouth At Bedtime  Qty: 120 capsule, Refills: 5    Associated Diagnoses: Neuropathic pain; Primary insomnia      mirtazapine (REMERON SOL-TAB) 15 MG ODT tab 0.5 tablets (7.5 mg) by Orally disintegrating tablet route At Bedtime  Qty: 45 tablet, Refills: 0    Associated Diagnoses: Anxiety      DULoxetine (CYMBALTA) 60 MG capsule Take 1 capsule (60 mg) by mouth daily  Qty: 90 capsule, Refills: 3    Associated Diagnoses: Abdominal pain, epigastric; Abdominal migraine, not intractable      ACETAMINOPHEN PO Take 500-1,000 mg by mouth every 6 hours as needed for pain       !! order for DME 2 by 2 gauze pads, use for dressing changes as directed  Qty: 1 Box, Refills: 1    Associated Diagnoses: Attention to G-tube (H)      !! order for DME 1 inch paper tape, Use for dressing changes as directed  Qty: 1 each, Refills: 0    Associated Diagnoses: Attention to G-tube (H)      SUMAtriptan (IMITREX) 50 MG tablet Take 1-2 tablets ( mg) by mouth at onset of headache for migraine - may repeat dose after 2h if headache recurs.  Max: 200mg/24 hours  Qty: 18 tablet, Refills: 1    Comments: Due for appointment for further refills, please give reminder.  Associated Diagnoses: Migraine without aura and without status migrainosus, not intractable      albuterol 90  MCG/ACT inhaler Inhale 2 puffs into the lungs every 6 hours as needed        !! - Potential duplicate medications found. Please discuss with provider.        Allergies   Allergies   Allergen Reactions     Hyoscyamine Rash     Metoclopramide Other (See Comments)     Eye twitching.      Peaches [Peach] Other (See Comments)     Raw. Cooked OK     Sucralose Other (See Comments)     All artificial sweeteners. Aspartame also. Swollen glands     Advair Diskus Other (See Comments)     Throat burns     Azithromycin Other (See Comments)     Burning in throat     Compazine [Prochlorperazine] Visual Disturbance     Contrast Dye Itching     States is allergic to CT contrast dye     Cyclobenzaprine Visual Disturbance     Fentanyl Other (See Comments)     migraine     Ibuprofen GI Disturbance     Lactulose Nausea and Vomiting     Gas and bloating     Levaquin [Levofloxacin] Swelling     Per ED M.D. And RN      Morphine Sulfate Other (See Comments)     Chest pain       Oxycodone Other (See Comments)     Burning throat, but can take Norco.      Rizatriptan Visual Disturbance     Droperidol Hives and Rash     Isovue [Iopamidol] Palpitations     Pt had racing heart and sob      Ketorolac Anxiety     Latex Swelling and Rash     Kiwi, likely also avacado, ? banana     Levsin Rash     Data   Most Recent 3 CBC's:  Recent Labs   Lab Test  08/26/17   0605  08/24/17   1610  08/19/17   1234   WBC  7.2  16.7*  12.4*   HGB  9.5*  12.8  9.8*   MCV  80  78  81   PLT  280  420  94*      Most Recent 3 BMP's:  Recent Labs   Lab Test  08/26/17   0605  08/25/17   0825  08/24/17   1610  07/28/17   0615   NA  139   --   138  143   POTASSIUM  3.8  4.0  3.1*  3.9   CHLORIDE  109   --   106  108   CO2  22   --   23  27   BUN  4*   --   10  2*   CR  0.88   --   1.03  0.97   ANIONGAP  8   --   9  8   TARA  7.8*   --   9.2  7.9*   GLC  81   --   76  117*     Most Recent 2 LFT's:  Recent Labs   Lab Test  08/24/17   1610  07/27/17   1950   AST  27  27   ALT  39   33   ALKPHOS  176*  158*   BILITOTAL  0.6  1.2     Most Recent INR's and Anticoagulation Dosing History:  Anticoagulation Dose History     Recent Dosing and Labs Latest Ref Rng & Units 10/26/2016 11/2/2016 11/8/2016 1/5/2017 2/16/2017 4/3/2017 7/20/2017    INR 0.86 - 1.14 - - - 1.06 1.11 1.00 1.02    INR 0.86 - 1.14 3.2(A) 3.6(A) - - - - -    INR Point of Care 0.86 - 1.14 - - 1.0 - - - -        Most Recent 3 Troponin's:No lab results found.  Most Recent Cholesterol Panel:  Recent Labs   Lab Test  01/19/16   0548   04/15/14   0755   CHOL   --    --   152   LDL   --    --   63   HDL   --    --   75   TRIG  135   < >  69    < > = values in this interval not displayed.     Most Recent 6 Bacteria Isolates From Any Culture (See EPIC Reports for Culture Details):  Recent Labs   Lab Test  08/24/17   1935  08/24/17   1817  08/24/17   1610  08/19/17   1234  08/19/17   1145  07/27/17   2116   CULT  No growth after 2 days  No growth after 2 days  No growth after 2 days  No growth after 6 days  No growth after 6 days  10,000 to 50,000 colonies/mL Mixed gram positive hipolito     Most Recent TSH, T4 and A1c Labs:  Recent Labs   Lab Test  07/21/16   1027  06/21/16   1416   01/07/12   0748   TSH  0.69  0.25*   < >  1.08   T4   --   0.99   < >  1.04   A1C   --    --    --   5.1    < > = values in this interval not displayed.

## 2017-08-26 NOTE — PROGRESS NOTES
Patient went outside denied smoking; nurse unhooked her from the IV fluids prior to leaving the floor. When the patient came back to the room they were hooked back up to fluids. Off floor approximately 15 minutes.

## 2017-08-26 NOTE — PROGRESS NOTES
S-(situation): end of shift note    B-(background):ABD pain with n/v    A-(assessment): VSS, afebrile and RA, patient is tolerating clears, and took a break outside this shift. Abdomen is tender with +BS. abrasion near port area looks as if the scab has been peeled off.   Patient went outside for about 15 minutes. Pain has been controlled every 3 hours with pain medication.     R-(recommendations): Will continue to monitor per POC.

## 2017-08-26 NOTE — PROGRESS NOTES
Patient went off the floor; writer told patient if she is going to leave the building to go outside she would need to be SL. Patient agreed to let writer know if she was going outside; right now she agreed to stay in the building.

## 2017-08-28 LAB — HIV 1+2 AB+HIV1 P24 AG SERPL QL IA: NONREACTIVE

## 2017-08-30 ENCOUNTER — HOSPITAL ENCOUNTER (EMERGENCY)
Facility: CLINIC | Age: 37
Discharge: HOME | End: 2017-08-30
Attending: EMERGENCY MEDICINE | Admitting: EMERGENCY MEDICINE
Payer: COMMERCIAL

## 2017-08-30 ENCOUNTER — APPOINTMENT (OUTPATIENT)
Dept: GENERAL RADIOLOGY | Facility: CLINIC | Age: 37
End: 2017-08-30
Attending: EMERGENCY MEDICINE
Payer: COMMERCIAL

## 2017-08-30 ENCOUNTER — APPOINTMENT (OUTPATIENT)
Dept: CT IMAGING | Facility: CLINIC | Age: 37
End: 2017-08-30
Attending: EMERGENCY MEDICINE
Payer: COMMERCIAL

## 2017-08-30 VITALS
TEMPERATURE: 99.4 F | DIASTOLIC BLOOD PRESSURE: 83 MMHG | HEIGHT: 66 IN | SYSTOLIC BLOOD PRESSURE: 148 MMHG | RESPIRATION RATE: 14 BRPM | WEIGHT: 202.3 LBS | OXYGEN SATURATION: 99 % | BODY MASS INDEX: 32.51 KG/M2

## 2017-08-30 DIAGNOSIS — G89.18 PAIN AT SURGICAL SITE: ICD-10-CM

## 2017-08-30 DIAGNOSIS — T82.848A: ICD-10-CM

## 2017-08-30 LAB
ALBUMIN SERPL-MCNC: 3.2 G/DL (ref 3.4–5)
ALBUMIN UR-MCNC: NEGATIVE MG/DL
ALP SERPL-CCNC: 136 U/L (ref 40–150)
ALT SERPL W P-5'-P-CCNC: 30 U/L (ref 0–50)
ANION GAP SERPL CALCULATED.3IONS-SCNC: 7 MMOL/L (ref 3–14)
APPEARANCE UR: ABNORMAL
AST SERPL W P-5'-P-CCNC: 20 U/L (ref 0–45)
BACTERIA SPEC CULT: NORMAL
BASOPHILS # BLD AUTO: 0 10E9/L (ref 0–0.2)
BASOPHILS NFR BLD AUTO: 0.2 %
BILIRUB SERPL-MCNC: 0.6 MG/DL (ref 0.2–1.3)
BILIRUB UR QL STRIP: NEGATIVE
BUN SERPL-MCNC: 7 MG/DL (ref 7–30)
CALCIUM SERPL-MCNC: 8.8 MG/DL (ref 8.5–10.1)
CHLORIDE SERPL-SCNC: 107 MMOL/L (ref 94–109)
CO2 SERPL-SCNC: 24 MMOL/L (ref 20–32)
COLOR UR AUTO: ABNORMAL
CREAT SERPL-MCNC: 0.89 MG/DL (ref 0.52–1.04)
DIFFERENTIAL METHOD BLD: ABNORMAL
EOSINOPHIL # BLD AUTO: 0.1 10E9/L (ref 0–0.7)
EOSINOPHIL NFR BLD AUTO: 1.1 %
ERYTHROCYTE [DISTWIDTH] IN BLOOD BY AUTOMATED COUNT: 17.7 % (ref 10–15)
GFR SERPL CREATININE-BSD FRML MDRD: 72 ML/MIN/1.7M2
GLUCOSE SERPL-MCNC: 80 MG/DL (ref 70–99)
GLUCOSE UR STRIP-MCNC: NEGATIVE MG/DL
HCG UR QL: NEGATIVE
HCT VFR BLD AUTO: 32.4 % (ref 35–47)
HGB BLD-MCNC: 10.1 G/DL (ref 11.7–15.7)
HGB UR QL STRIP: NEGATIVE
IMM GRANULOCYTES # BLD: 0 10E9/L (ref 0–0.4)
IMM GRANULOCYTES NFR BLD: 0.3 %
INTERNAL QC OK POCT: YES
KETONES UR STRIP-MCNC: NEGATIVE MG/DL
LACTATE BLD-SCNC: 1 MMOL/L (ref 0.7–2)
LEUKOCYTE ESTERASE UR QL STRIP: NEGATIVE
LIPASE SERPL-CCNC: 145 U/L (ref 73–393)
LYMPHOCYTES # BLD AUTO: 2.1 10E9/L (ref 0.8–5.3)
LYMPHOCYTES NFR BLD AUTO: 22.5 %
Lab: 4430
Lab: NORMAL
MCH RBC QN AUTO: 24.4 PG (ref 26.5–33)
MCHC RBC AUTO-ENTMCNC: 31.2 G/DL (ref 31.5–36.5)
MCV RBC AUTO: 78 FL (ref 78–100)
MONOCYTES # BLD AUTO: 0.6 10E9/L (ref 0–1.3)
MONOCYTES NFR BLD AUTO: 6.1 %
MUCOUS THREADS #/AREA URNS LPF: PRESENT /LPF
NEUTROPHILS # BLD AUTO: 6.4 10E9/L (ref 1.6–8.3)
NEUTROPHILS NFR BLD AUTO: 69.8 %
NITRATE UR QL: NEGATIVE
NRBC # BLD AUTO: 0 10*3/UL
NRBC BLD AUTO-RTO: 0 /100
PH UR STRIP: 6 PH (ref 5–7)
PLATELET # BLD AUTO: 331 10E9/L (ref 150–450)
POTASSIUM SERPL-SCNC: 3.8 MMOL/L (ref 3.4–5.3)
PROT SERPL-MCNC: 7.4 G/DL (ref 6.8–8.8)
RBC # BLD AUTO: 4.14 10E12/L (ref 3.8–5.2)
RBC #/AREA URNS AUTO: <1 /HPF (ref 0–2)
SODIUM SERPL-SCNC: 139 MMOL/L (ref 133–144)
SOURCE: ABNORMAL
SP GR UR STRIP: 1 (ref 1–1.03)
SPECIMEN SOURCE: NORMAL
SQUAMOUS #/AREA URNS AUTO: 2 /HPF (ref 0–1)
UROBILINOGEN UR STRIP-MCNC: NORMAL MG/DL (ref 0–2)
WBC # BLD AUTO: 9.2 10E9/L (ref 4–11)
WBC #/AREA URNS AUTO: <1 /HPF (ref 0–2)

## 2017-08-30 PROCEDURE — 93010 ELECTROCARDIOGRAM REPORT: CPT | Mod: Z6 | Performed by: EMERGENCY MEDICINE

## 2017-08-30 PROCEDURE — 25000125 ZZHC RX 250

## 2017-08-30 PROCEDURE — 74176 CT ABD & PELVIS W/O CONTRAST: CPT

## 2017-08-30 PROCEDURE — 71020 XR CHEST 2 VW: CPT

## 2017-08-30 PROCEDURE — 81001 URINALYSIS AUTO W/SCOPE: CPT | Performed by: EMERGENCY MEDICINE

## 2017-08-30 PROCEDURE — 87040 BLOOD CULTURE FOR BACTERIA: CPT | Performed by: EMERGENCY MEDICINE

## 2017-08-30 PROCEDURE — 93005 ELECTROCARDIOGRAM TRACING: CPT | Performed by: EMERGENCY MEDICINE

## 2017-08-30 PROCEDURE — 80053 COMPREHEN METABOLIC PANEL: CPT | Performed by: EMERGENCY MEDICINE

## 2017-08-30 PROCEDURE — 99284 EMERGENCY DEPT VISIT MOD MDM: CPT | Mod: 25 | Performed by: EMERGENCY MEDICINE

## 2017-08-30 PROCEDURE — 83605 ASSAY OF LACTIC ACID: CPT | Performed by: EMERGENCY MEDICINE

## 2017-08-30 PROCEDURE — 99285 EMERGENCY DEPT VISIT HI MDM: CPT | Mod: 25 | Performed by: EMERGENCY MEDICINE

## 2017-08-30 PROCEDURE — 81025 URINE PREGNANCY TEST: CPT | Performed by: EMERGENCY MEDICINE

## 2017-08-30 PROCEDURE — 85025 COMPLETE CBC W/AUTO DIFF WBC: CPT | Performed by: EMERGENCY MEDICINE

## 2017-08-30 PROCEDURE — 83690 ASSAY OF LIPASE: CPT | Performed by: EMERGENCY MEDICINE

## 2017-08-30 PROCEDURE — 25000128 H RX IP 250 OP 636: Performed by: EMERGENCY MEDICINE

## 2017-08-30 RX ORDER — LIDOCAINE HYDROCHLORIDE 10 MG/ML
INJECTION, SOLUTION EPIDURAL; INFILTRATION; INTRACAUDAL; PERINEURAL
Status: COMPLETED
Start: 2017-08-30 | End: 2017-08-30

## 2017-08-30 RX ORDER — HYDROMORPHONE HYDROCHLORIDE 1 MG/ML
0.5 INJECTION, SOLUTION INTRAMUSCULAR; INTRAVENOUS; SUBCUTANEOUS ONCE
Status: COMPLETED | OUTPATIENT
Start: 2017-08-30 | End: 2017-08-30

## 2017-08-30 RX ORDER — LIDOCAINE 50 MG/G
PATCH TOPICAL
Qty: 6 PATCH | Refills: 0 | Status: SHIPPED | OUTPATIENT
Start: 2017-08-30 | End: 2017-09-01

## 2017-08-30 RX ORDER — DIPHENHYDRAMINE HYDROCHLORIDE 50 MG/ML
50 INJECTION INTRAMUSCULAR; INTRAVENOUS ONCE
Status: COMPLETED | OUTPATIENT
Start: 2017-08-30 | End: 2017-08-30

## 2017-08-30 RX ORDER — SODIUM CHLORIDE 9 MG/ML
1000 INJECTION, SOLUTION INTRAVENOUS CONTINUOUS
Status: DISCONTINUED | OUTPATIENT
Start: 2017-08-30 | End: 2017-08-30 | Stop reason: HOSPADM

## 2017-08-30 RX ORDER — HEPARIN SODIUM (PORCINE) LOCK FLUSH IV SOLN 100 UNIT/ML 100 UNIT/ML
5 SOLUTION INTRAVENOUS
Status: DISCONTINUED | OUTPATIENT
Start: 2017-08-30 | End: 2017-08-30 | Stop reason: HOSPADM

## 2017-08-30 RX ORDER — LIDOCAINE/PRILOCAINE 2.5 %-2.5%
1 CREAM (GRAM) TOPICAL ONCE
Status: DISCONTINUED | OUTPATIENT
Start: 2017-08-30 | End: 2017-08-30 | Stop reason: CLARIF

## 2017-08-30 RX ORDER — LIDOCAINE 40 MG/G
CREAM TOPICAL
Status: DISCONTINUED | OUTPATIENT
Start: 2017-08-30 | End: 2017-08-30 | Stop reason: CLARIF

## 2017-08-30 RX ADMIN — SODIUM CHLORIDE, PRESERVATIVE FREE 5 ML: 5 INJECTION INTRAVENOUS at 17:50

## 2017-08-30 RX ADMIN — SODIUM CHLORIDE 1000 ML: 900 INJECTION, SOLUTION INTRAVENOUS at 16:40

## 2017-08-30 RX ADMIN — HYDROMORPHONE HYDROCHLORIDE 0.5 MG: 1 INJECTION, SOLUTION INTRAMUSCULAR; INTRAVENOUS; SUBCUTANEOUS at 19:05

## 2017-08-30 RX ADMIN — LIDOCAINE HYDROCHLORIDE: 10 INJECTION, SOLUTION EPIDURAL; INFILTRATION; INTRACAUDAL; PERINEURAL at 17:09

## 2017-08-30 RX ADMIN — DIPHENHYDRAMINE HYDROCHLORIDE 50 MG: 50 INJECTION, SOLUTION INTRAMUSCULAR; INTRAVENOUS at 17:50

## 2017-08-30 RX ADMIN — HYDROMORPHONE HYDROCHLORIDE 0.5 MG: 10 INJECTION, SOLUTION INTRAMUSCULAR; INTRAVENOUS; SUBCUTANEOUS at 16:40

## 2017-08-30 RX ADMIN — HYDROMORPHONE HYDROCHLORIDE 0.5 MG: 1 INJECTION, SOLUTION INTRAMUSCULAR; INTRAVENOUS; SUBCUTANEOUS at 18:05

## 2017-08-30 NOTE — ED NOTES
Pt was recently discharge from the hospital post IV antibiotic treatment for a port a cath infection. Pt reports in the last month she has been hospitalized twice for port infection. Per pt her port site is now warm to touch and continues to be painful. On arrival Temp 99.4 oral, pt last took tylenol for pain two hours ago. Alert and oriented x4.

## 2017-08-30 NOTE — ED AVS SNAPSHOT
[facility], [place], Emergency Department    [address] [place] [id number]    Phone:  [phone]                                       Doreen Peralta   MRN: [id number]    Department:  [facility], Emergency Department   Date of Visit:  8/30/2017           After Visit Summary Signature Page     I have received my discharge instructions, and my questions have been answered. I have discussed any challenges I see with this plan with the nurse or doctor.    ..........................................................................................................................................  Patient/Patient Representative Signature      ..........................................................................................................................................  Patient Representative Print Name and Relationship to Patient    ..................................................               ................................................  Date                                            Time    ..........................................................................................................................................  Reviewed by Signature/Title    ...................................................              ..............................................  Date                                                            Time

## 2017-08-30 NOTE — ED PROVIDER NOTES
History     Chief Complaint   Patient presents with     Vascular Access Problem     HPI  Doreen Peralta is a 37 year old female with an extensive medical history notable for chronic diarrhea, SBO, SIRS, DVT, and HTN. The patient has been admitted twice within the last month (from 7/27-7/30 and 8/24-8/26) due to an infection (Klebsiella Oxytoca) presumed related to her port access. Additionally, the patient was seen in the ED 3 times during the same time period without admission but with complaints of similar symptoms to today. The patient was admitted 8/24-26 and treated with vancomycin and meropenem after elevated lactate and leukocytosis, was discharged without antibiotics, with port still in place.     She presents today with continuing complaints of tenderness around her port, abdominal pain, and subjective fevers. The patient reports that since discharge, she has developed increased tenderness and abdominal pain, which were characteristic of her previous admissions. She has an appointment with a surgeon to have to port removed tomorrow, and she doesn't feel as if she can wait. She reports no increase in vomiting (chronically vomits 3 times daily) or increase in diarrhea. She does note that the diarrhea has recently been dark in nature. She states that this abdominal pain began this morning, and is primarily right-sided in nature. No constipation, and the pain is not exacerbated with the consumption of food. She also reports nausea, but she took a Zofran for this at home along with some Tylenol at noon. No flank pain or urinary problems.    PAST MEDICAL HISTORY:   Past Medical History:   Diagnosis Date     Asthma      Bilateral ovarian cysts      Cervical cancer (H) 01/01/2008    cervical cancer      Chronic pain      Colonic dysmotility     s/p subtotal colectomy     Constipation     chronic     E. coli sepsis (H) 5/8/2016     Enteritis      Fungemia 5/5/2016     Gastro-oesophageal reflux disease      H/O  ileostomy      Hx of abnormal Pap smear     s/p LEEP - no further details provided     Hypertension      IBS (irritable bowel syndrome)      Other chronic pain      PONV (postoperative nausea and vomiting)      Thrombosis, hepatic vein (H)     microvascular       PAST SURGICAL HISTORY:   Past [facility] History:   Procedure Laterality Date     COLONOSCOPY  7/10/2012    Procedure: COLONOSCOPY;;  Surgeon: Nicole Redding MD;  [facility]     COLONOSCOPY N/A 2/19/2017    Procedure: COLONOSCOPY;  Surgeon: Randell Muller MD;  [facility]: [facility]     COLONOSCOPY N/A 2/21/2017    Procedure: COLONOSCOPY;  Surgeon: Randell Muller MD;  Location: [facility]     ECHO CHELO  7/19/2016          ENDOSCOPIC INSERTION TUBE GASTROSTOMY N/A 1/21/2016    Procedure: ENDOSCOPIC INSERTION TUBE GASTROSTOMY;  Surgeon: Nicole Redding MD;  Location[facility]     ESOPHAGOSCOPY, GASTROSCOPY, DUODENOSCOPY (EGD), COMBINED  7/10/2012    Procedure: COMBINED ESOPHAGOSCOPY, GASTROSCOPY, DUODENOSCOPY (EGD);  Upper Endoscopy, Ileoscopy    Latex Allergy  with biopsies;  Surgeon: Nicole Redding MD;  [facility]     ESOPHAGOSCOPY, GASTROSCOPY, DUODENOSCOPY (EGD), COMBINED N/A 11/5/2014    Procedure: COMBINED ESOPHAGOSCOPY, GASTROSCOPY, DUODENOSCOPY (EGD);  Surgeon: Nicole Redding MD;  [facility]     HC REPLACE DUODENOSTOMY/JEJUNOSTOMY TUBE PERCUTANEOUS N/A 8/27/2015    Procedure: REPLACE GASTROJEJUNOSTOMY TUBE, PERCUTANEOUS;  Surgeon: Mio Holder MD;  [facility]     HC REPLACE DUODENOSTOMY/JEJUNOSTOMY TUBE PERCUTANEOUS N/A 1/7/2016    Procedure: REPLACE JEJUNOSTOMY TUBE, PERCUTANEOUS;  Surgeon: Elsa Medel MD;  [facility]     HC REPLACE DUODENOSTOMY/JEJUNOSTOMY TUBE PERCUTANEOUS N/A 1/28/2016    Procedure: REPLACE JEJUNOSTOMY TUBE, PERCUTANEOUS;  Surgeon: Elsa Medel MD;  [facility]     HC REPLACE GASTROSTOMY/CECOSTOMY TUBE PERCUTANEOUS Left 5/19/2015    Procedure: REPLACE GASTROSTOMY  TUBE, PERCUTANEOUS;  Surgeon: Melecio Morejon Chi, MD;  Location:  GI     HC UGI ENDOSCOPY W PLACEMENT GASTROSTOMY TUBE PERCUT N/A 10/1/2015    Procedure: COMBINED ESOPHAGOSCOPY, GASTROSCOPY, DUODENOSCOPY (EGD), PLACE PERCUTANEOUS ENDOSCOPIC GASTROSTOMY TUBE;  Surgeon: Mio Holder MD;  Location:  GI     INSERT PORT VASCULAR ACCESS Right 7/20/2017    Procedure: INSERT PORT VASCULAR ACCESS;  Chest Port Placement  **Latex Allergy**;  Surgeon: Coy Rocha PA-C;  [facility]     LAPAROSCOPIC ASSISTED COLECTOMY  1/20/2012    Procedure:LAPAROSCOPIC ASSISTED COLECTOMY; Laparoscopic Ileostomy       LAPAROSCOPIC ASSISTED COLECTOMY LEFT (DESCENDING)  10/24/2012    Procedure: LAPAROSCOPIC ASSISTED COLECTOMY LEFT (DESCENDING);   Hand Assisted Laproscopic Subtotal abdominal Colectomy,Iieorectal anastamosis, Ileostomy Closure.       LAPAROSCOPIC ASSISTED INSERTION TUBE JEJUNOSTOMY N/A 10/16/2015    Procedure: LAPAROSCOPIC ASSISTED INSERTION TUBE JEJUNOSTOMY;  Surgeon: Elsa Medel MD;  Location:  OR     LAPAROSCOPIC CHOLECYSTECTOMY  2002    [facility] ctr. stones duct     LAPAROSCOPIC ILEOSTOMY  1/20/2012    [facility], loop     LAP[facility]OS[facility]ORECTOMY Right [facility]     LAPAROTOMY EXPLORATORY N/A 1/28/2016    Procedure: LAPAROTOMY EXPLORATORY;  Surgeon: Elsa Medel MD;  Location:  OR     LEEP TX, CERVICAL  2009    [facility]     PICC INSERTION Left 10/21/2015    5fr DL Power PICC, 37cm (2cm external) in the L basilic vein w/ tip in the SVC RA junction.     REMOVE GASTROSTOMY TUBE ADULT N/A 12/12/2014    Procedure: REMOVE GASTROSTOMY TUBE ADULT;  Surgeon: Nicole Redding MD;  Location:  GI     REMOVE PORT VASCULAR ACCESS Right 6/30/2016    Procedure: REMOVE PORT VASCULAR ACCESS;  Surgeon: Pradeep Orosco MD;  Location: [facility] OR     replace GASTROSTOMY TUBE ADULT  5/19/15       FAMILY HISTORY:   Family History   Problem Relation Age of Onset     Thyroid Disease  Mother      Sjogren's Mother      GASTROINTESTINAL DISEASE Mother      Intermittent nausea vomiting diarrhea     Colon Polyps Mother      Prostate Problems Father      prostate enlargement     Lupus Maternal Grandmother      CANCER Maternal Grandfather      Lung     Colon Cancer Maternal Grandfather 65     CANCER Paternal Grandmother      Lung      CEREBROVASCULAR DISEASE Paternal Grandmother      DIABETES Paternal Grandmother      Cardiovascular Paternal Grandmother      CHF     CANCER Paternal Grandfather      Lung     Glaucoma Paternal Grandfather      Abdominal Aortic Aneurysm Other      Macular Degeneration No family hx of        SOCIAL HISTORY:   Social History   Substance Use Topics     Smoking status: Former Smoker     Packs/day: 1.00     Years: 4.00     Types: Cigarettes     Quit date: 1/1/2004     Smokeless tobacco: Former User     Alcohol use No       Discharge Medication List as of 8/30/2017  7:48 PM      START taking these medications    Details   lidocaine (LIDODERM) 5 % Patch Apply to painful area at once for up to 12 h within a 24 h period.  Remove after 12 hours.Disp-6 patch, R-0Local Print         CONTINUE these medications which have NOT CHANGED    Details   HYDROmorphone (DILAUDID) 2 MG tablet Take 0.5 tablets (1 mg) by mouth every 3 hours as needed for moderate to severe pain, Disp-10 tablet, R-0, Local Print      ondansetron (ZOFRAN ODT) 4 MG ODT tab Take 1-2 tablets (4-8 mg) by mouth every 6 hours as needed for nausea, Disp-30 tablet, R-0, E-Prescribe      albuterol (2.5 MG/3ML) 0.083% neb solution Take 1 vial (2.5 mg) by nebulization every 4 hours as needed for shortness of breath / dyspnea or wheezing, Disp-360 mL, Historical      ipratropium (ATROVENT) 0.02 % neb solution Take 2.5 mLs (0.5 mg) by nebulization every 6 hours as needed for wheezing, Disp-225 mL, Historical      ethanol, 74%, ADULT 74 % Inject IV through Port-a-cath daily for 11 days to lock Port-a-cath after each dose of  ceftriaxone and also use to lock Port-a-cath after each weekly infusion of IV fluids and MVI, Disp-5 mL, R-0, Historical      !! order for DME 2 by 2 gauze pads, use for dressing changes as directedDisp-1 Box, R-1, Local Print      !! order for DME 1 inch paper tape, Use for dressing changes as directedDisp-1 each, R-0, Local Print      cloNIDine (CATAPRES) 0.2 MG tablet TAKE 2 TABLETS(0.4 MG) BY MOUTH EVERY EVENING, Disp-180 tablet, R-0, E-Prescribe      diphenhydrAMINE (BENADRYL ALLERGY) 25 MG tablet Take 1 tablet (25 mg) by mouth every 6 hours as needed for itching or other (Nausea), Disp-30 tablet, R-0, E-Prescribe      traZODone (DESYREL) 50 MG tablet Take 1-2 tablets ( mg) by mouth nightly as needed 1-2 tabs at bedtime PRN, Disp-180 tablet, R-1, E-Prescribe      nortriptyline (PAMELOR) 10 MG capsule Take 3-4 capsules (30-40 mg) by mouth At Bedtime, Disp-120 capsule, R-5, E-Prescribe      mirtazapine (REMERON SOL-TAB) 15 MG ODT tab 0.5 tablets (7.5 mg) by Orally disintegrating tablet route At Bedtime, Disp-45 tablet, R-0, E-Prescribe      SUMAtriptan (IMITREX) 50 MG tablet Take 1-2 tablets ( mg) by mouth at onset of headache for migraine - may repeat dose after 2h if headache recurs.  Max: 200mg/24 hours, Disp-18 tablet, R-1, E-PrescribeDue for appointment for further refills, please give reminder.      DULoxetine (CYMBALTA) 60 MG capsule Take 1 capsule (60 mg) by mouth daily, Disp-90 capsule, R-3, E-Prescribe      ACETAMINOPHEN PO Take 500-1,000 mg by mouth every 6 hours as needed for pain , Historical      albuterol 90 MCG/ACT inhaler Inhale 2 puffs into the lungs every 6 hours as needed , Historical       !! - Potential duplicate medications found. Please discuss with provider.             Allergies   Allergen Reactions     Hyoscyamine Rash     Metoclopramide Other (See Comments)     Eye twitching.      Peaches [Peach] Other (See Comments)     Raw. Cooked OK     Sucralose Other (See Comments)      "All artificial sweeteners. Aspartame also. Swollen glands     Advair Diskus Other (See Comments)     Throat burns     Azithromycin Other (See Comments)     Burning in throat     Compazine [Prochlorperazine] Visual Disturbance     Contrast Dye Itching     States is allergic to CT contrast dye     Cyclobenzaprine Visual Disturbance     Fentanyl Other (See Comments)     migraine     Ibuprofen GI Disturbance     Lactulose Nausea and Vomiting     Gas and bloating     Levaquin [Levofloxacin] Swelling     Per ED M.D. And RN      Morphine Sulfate Other (See Comments)     Chest pain       Oxycodone Other (See Comments)     Burning throat, but can take Norco.      Rizatriptan Visual Disturbance     Droperidol Hives and Rash     Isovue [Iopamidol] Palpitations     Pt had racing heart and sob      Ketorolac Anxiety     Latex Swelling and Rash     Kiwi, likely also avacado, ? banana     Levsin Rash         I have reviewed the Medications, Allergies, Past Medical and [facility] History, and Social History in the Epic system.    Review of Systems   A 10-system review of systems was completed and positive as noted in the HPI, otherwise negative.      Physical Exam   BP: (!) 140/93  Heart Rate: 98  Temp: 99.4  F (37.4  C)  Resp: 16  Height: 167.6 cm (5' 6\")  Weight: 91.8 kg (202 lb 4.8 oz)  SpO2: 100 %  Physical Exam  General: well-appearing, no acute distress  HENT: MMM, no oropharyngeal lesions  Eyes: PERRL, normal sclerae, no conjunctival pallor  Neck: non-tender, supple  Cardio: RRR, normal heart sounds, extremities well perfused  Resp: Normal work of breathing, clear breath sounds  Chest/Back: no visual signs of trauma, no CVA tenderness, mild erythema over port site, not overly warm  Abdomen: mild RUQ and RLQ tenderness, non-distended, no rebound, no guarding  Neuro: alert and fully oriented. CN II-XII grossly intact. Grossly normal strength and sensation in all extremities.   MSK: no deformities.   Integumentary/Skin: no rash, " normal color  Psych: normal affect, normal behavior    ED Course     ED Course     Procedures          Critical Care time:  none    Labs Ordered and Resulted from Time of ED Arrival Up to the Time of Departure from the ED   CBC WITH PLATELETS DIFFERENTIAL - Abnormal; Notable for the following:        Result Value    Hemoglobin 10.1 (*)     Hematocrit 32.4 (*)     MCH 24.4 (*)     MCHC 31.2 (*)     RDW 17.7 (*)     All other components within normal limits   COMPREHENSIVE METABOLIC PANEL - Abnormal; Notable for the following:     Albumin 3.2 (*)     All other components within normal limits   UA MACROSCOPIC WITH REFLEX TO MICRO AND CULTURE - Abnormal; Notable for the following:     Specific Gravity Urine 1.002 (*)     Squamous Epithelial /HPF Urine 2 (*)     Mucous Urine Present (*)     All other components within normal limits   HCG QUAL URINE POCT - Normal   LIPASE   LACTIC ACID WHOLE BLOOD   CENTRAL VENOUS CATHETER   PERIPHERAL IV CATHETER   BLOOD CULTURE          Assessments & Plan (with Medical Decision Making)   37 F presenting with subjective fever, tenderness at port site, and right side abdominal pain. Temperature normal on arrival and on recheck several hours later. WBC and lactate normal. Port site with mild erythema but no swelling nor warmth as would be expected for local infection at that surgical site. Blood cultures drawn.     CT abdomen/pelvis performed given abdominal pain that patient reported as usually correlating with times when she had been having fevers; abdominal abscess or some other intra-abdominal infection considered - CT negative for acute pathology, did show some ovarian cysts, but patients pain location is significantly more superior than the ovaries, likely unrelated. No gallbladder present.,LFTs unremarkable - not consistent with cholecystitis nor hepatitis. UA without evidence of UTI.     At this point, there is not objective evidence of infection. Patient has completed a recent  inpatient course of broad spectrum antibiotics. Patient safe for discharge and follow-up in the morning with surgery for potential port removal. Patient requested pain medications on discharge. [place] from many different providers - high risk behavior for developing dependence. Opioid withdrawal may be a component of the patient's abdominal pain given recent PO hydromorphone prescription which is now out. No opioids prescribed today. Lidocaine patches recommended for port-site pain, APAP for abdominal pain. Patient to return to the ED if developing fever, worsening symptoms, or other urgent/life-threatening concerns.     I have reviewed the nursing notes.    I have reviewed the findings, diagnosis, plan and need for follow up with the patient.    Discharge Medication List as of 8/30/2017  7:48 PM      START taking these medications    Details   lidocaine (LIDODERM) 5 % Patch Apply to painful area at once for up to 12 h within a 24 h period.  Remove after 12 hours.Disp-6 patch, R-0Local Print             Final diagnoses:   Pain at surgical site     I, Tanner Avelar, am serving as a trained medical scribe to document services personally performed by Olvin Shaffer MD, based on the provider's statements to me.      IOlvin MD, was physically present and have reviewed and verified the accuracy of this note documented by Tanner Avelar.       8/30/2017   [facility], [place], EMERGENCY DEPARTMENT     Olvin Shaffer MD  08/30/17 8713

## 2017-08-30 NOTE — ED AVS SNAPSHOT
[facility], Emergency Department    [address] [address]    Phone:  [phone]                                       Doreen Peralta   MRN: [id number]    Department:  [facility], Emergency Department   Date of Visit:  8/30/2017           Patient Information     Date Of Birth          1980        Your diagnoses for this visit were:     Pain at surgical site        You were seen by Olvin Shaffer MD and Nicolle Adam MD.        Discharge Instructions       Please make an appointment to follow up with Dr. Worthington as already scheduled tomorrow. Also with Your Primary Care Provider in 2 days    Return to the ED if you are having fevers, worsening symptoms, or any other urgent/life-threatening concerns.       Future Appointments        Provider Department Dept Phone Center    8/31/2017 8:00 AM Zechariah Worthington MD [facility] [phone] [facility]    9/1/2017 7:00 AM Kenji Flores DO [facility] [phone] [facility]    9/5/2017 9:30 AM  [facility] [phone] [facility]    12/4/2017 10:00 AM Gilmer Lees MD [facility] [phone] [facility]      24 Hour Appointment Hotline       To make an appointment at any [facility] call [phone] ([phone]). If you don't have a family doctor or clinic, we will help you find one. [facility]] are conveniently located to serve the needs of you and your family.             Review of your medicines      START taking        Dose / Directions Last dose taken    lidocaine 5 % Patch   Commonly known as:  LIDODERM   Quantity:  6 patch        Apply to painful area at once for up to 12 h within a 24 h period.  Remove after 12 hours.   Refills:  0          Our records show that you are taking the medicines listed below. If these are incorrect, please call your family doctor or clinic.        Dose /  Directions Last dose taken    ACETAMINOPHEN PO   Dose:  500-1000 mg        Take 500-1,000 mg by mouth every 6 hours as needed for pain   Refills:  0        albuterol (2.5 MG/3ML) 0.083% neb solution   Dose:  2.5 mg   Quantity:  360 mL        Take 1 vial (2.5 mg) by nebulization every 4 hours as needed for shortness of breath / dyspnea or wheezing   Refills:  0        albuterol 90 MCG/ACT inhaler   Dose:  2 puff        Inhale 2 puffs into the lungs every 6 hours as needed   Refills:  0        cloNIDine 0.2 MG tablet   Commonly known as:  CATAPRES   Quantity:  180 tablet        TAKE 2 TABLETS(0.4 MG) BY MOUTH EVERY EVENING   Refills:  0        diphenhydrAMINE 25 MG tablet   Commonly known as:  BENADRYL ALLERGY   Dose:  25 mg   Quantity:  30 tablet        Take 1 tablet (25 mg) by mouth every 6 hours as needed for itching or other (Nausea)   Refills:  0        DULoxetine 60 MG EC capsule   Commonly known as:  CYMBALTA   Dose:  60 mg   Quantity:  90 capsule        Take 1 capsule (60 mg) by mouth daily   Refills:  3        ethanol (74%) ADULT 74 %   Quantity:  5 mL        Inject IV through Port-a-cath daily for 11 days to lock Port-a-cath after each dose of ceftriaxone and also use to lock Port-a-cath after each weekly infusion of IV fluids and MVI   Refills:  0        HYDROmorphone 2 MG tablet   Commonly known as:  DILAUDID   Dose:  1 mg   Quantity:  10 tablet        Take 0.5 tablets (1 mg) by mouth every 3 hours as needed for moderate to severe pain   Refills:  0        ipratropium 0.02 % neb solution   Commonly known as:  ATROVENT   Dose:  0.5 mg   Quantity:  225 mL        Take 2.5 mLs (0.5 mg) by nebulization every 6 hours as needed for wheezing   Refills:  0        mirtazapine 15 MG ODT tab   Commonly known as:  REMERON SOL-TAB   Dose:  7.5 mg   Quantity:  45 tablet        0.5 tablets (7.5 mg) by Orally disintegrating tablet route At Bedtime   Refills:  0        nortriptyline 10 MG capsule   Commonly known as:   PAMELOR   Dose:  30-40 mg   Quantity:  120 capsule        Take 3-4 capsules (30-40 mg) by mouth At Bedtime   Refills:  5        ondansetron 4 MG ODT tab   Commonly known as:  ZOFRAN ODT   Dose:  4-8 mg   Quantity:  30 tablet        Take 1-2 tablets (4-8 mg) by mouth every 6 hours as needed for nausea   Refills:  0        * order for DME   Quantity:  1 Box        2 by 2 gauze pads, use for dressing changes as directed   Refills:  1        * order for DME   Quantity:  1 each        1 inch paper tape, Use for dressing changes as directed   Refills:  0        SUMAtriptan 50 MG tablet   Commonly known as:  IMITREX   Dose:   mg   Quantity:  18 tablet        Take 1-2 tablets ( mg) by mouth at onset of headache for migraine - may repeat dose after 2h if headache recurs.  Max: 200mg/24 hours   Refills:  1        traZODone 50 MG tablet   Commonly known as:  DESYREL   Dose:   mg   Quantity:  180 tablet        Take 1-2 tablets ( mg) by mouth nightly as needed 1-2 tabs at bedtime PRN   Refills:  1        * Notice:  This list has 2 medication(s) that are the same as other medications prescribed for you. Read the directions carefully, and ask your doctor or other care provider to review them with you.            Prescriptions were sent or printed at these locations (1 Prescription)                   Other Prescriptions                Printed at Department/Unit printer (1 of 1)         lidocaine (LIDODERM) 5 % Patch                Procedures and tests performed during your visit     Procedure/Test Number of Times Performed    Blood culture 2    CBC with platelets differential 1    CT Abdomen Pelvis w/o Contrast 1    Central Venous Catheter: implanted port (Port-A-Cath, Power Port) 1    Chest XR,  PA & LAT 1    Comprehensive metabolic panel 1    Lactic acid whole blood 1    Lipase 1    Peripheral IV: Standard 1    UA reflex to Microscopic and Culture 1    Vascular Access Care Adult IP Consult 1    hCG qual  urine POCT 1      Orders Needing Specimen Collection     None      Pending Results     Date and Time Order Name Status Description    8/30/2017 1708 CT Abdomen Pelvis w/o Contrast Preliminary     8/30/2017 1538 Blood culture Preliminary             Pending Culture Results     Date and Time Order Name Status Description    8/30/2017 1538 Blood culture Preliminary             Pending Results Instructions     If you had any lab results that were not finalized at the time of your Discharge, you can call the ED Lab Result RN at [phone]. You will be contacted by this team for any positive Lab results or changes in treatment. The nurses are available 7 days a week from 10A to 6:30P.  You can leave a message 24 hours per day and they will return your call.        Thank you for choosing [facility]       Thank you for choosing [facility] for your care. Our goal is always to provide you with excellent care. Hearing back from our patients is one way we can continue to improve our services. Please take a few minutes to complete the written survey that you may receive in the mail after you visit with us. Thank you!        [facility] Information     [facility] gives you secure access to your electronic health record. If you see a primary care provider, you can also send messages to your care team and make appointments. If you have questions, please call your primary care clinic.  If you do not have a primary care provider, please call [phone] and they will assist you.        Care EveryWhere ID     This is your Care EveryWhere ID. This could be used by other organizations to access your [facility] medical records  [id number]        Equal Access to Services     TRAMAINE CLAYTON : Hadii caitie Law, waaxda luqadaha, qaybta kaalmada adeannamariayada, elham gomez. So [facility] [phone].    ATENCIÓN: Si habla español, tiene a wade disposición servicios gratuitos de asistencia lingüística. Llame al  [phone].    We comply with applicable federal civil rights laws and [place] laws. We do not discriminate on the basis of race, color, national origin, age, disability sex, sexual orientation or gender identity.            After Visit Summary       This is your record. Keep this with you and show to your community pharmacist(s) and doctor(s) at your next visit.

## 2017-08-31 ENCOUNTER — OFFICE VISIT (OUTPATIENT)
Dept: SURGERY | Facility: OTHER | Age: 37
End: 2017-08-31
Payer: COMMERCIAL

## 2017-08-31 VITALS — TEMPERATURE: 97.5 F | WEIGHT: 201 LBS | HEART RATE: 78 BPM | BODY MASS INDEX: 32.44 KG/M2

## 2017-08-31 DIAGNOSIS — Z78.9 PROBLEM WITH VASCULAR ACCESS: ICD-10-CM

## 2017-08-31 DIAGNOSIS — Z91.89 AT RISK FOR COMPLICATIONS INVOLVING VASCULAR ACCESS DEVICE: Primary | ICD-10-CM

## 2017-08-31 PROCEDURE — 99243 OFF/OP CNSLTJ NEW/EST LOW 30: CPT | Performed by: SPECIALIST

## 2017-08-31 NOTE — MR AVS SNAPSHOT
After Visit Summary   8/31/2017    Doreen Peralta    MRN: [id number]           Patient Information     Date Of Birth          1980        Visit Information        Provider Department      8/31/2017 8:00 AM Zechariah Worthington MD [facility]        Today's Diagnoses     At risk for complications involving vascular access device    -  1    Problem with vascular access           Follow-ups after your visit        Your next 10 appointments already scheduled     Sep 01, 2017  7:00 AM CDT   [facility]    [address]  [facility] [address]   [phone]            Sep 05, 2017  9:30 AM CDT   Level 2 with NL INFUSION CHAIR 5   [facility] ([facility])    [address] [place] [address] [phone]            Sep 08, 2017   Procedure with Zechariah Worthington MD   [facility] ([facility])    [address] [place] [address] [phone]           From [address]: Exit at [address] on south side of [place]. Turn right on [address]. Turn left at stoplight on [address]. [facility] will be in view two blocks ahead            Dec 04, 2017 10:00 AM CST   (Arrive by 9:45 AM)   Return Visit with Gilmer Lees MD   [facility] ([facility])    [address] [address] [phone]              Who to contact     If you have questions or need follow up information about today's clinic visit or your schedule please contact [place] LEENA RIOJAS directly at [phone].  Normal or non-critical lab and imaging results will be communicated to you by MyChart, letter or phone within 4 business days after the clinic has received the results. If you do not hear from us within 7 days, please  contact the clinic through [employer] or phone. If you have a critical or abnormal lab result, we will notify you by phone as soon as possible.  Submit refill requests through [employer]iveve or call your pharmacy and they will forward the refill request to us. Please allow 3 business days for your refill to be completed.          Additional Information About Your Visit        [employer] Information     [employer] gives you secure access to your electronic health record. If you see a primary care provider, you can also send messages to your care team and make appointments. If you have questions, please call your primary care clinic.  If you do not have a primary care provider, please call [phone] and they will assist you.        Care EveryWhere ID     This is your Care EveryWhere ID. This could be used by other organizations to access your [facility] medical records  [id number]        Your Vitals Were     Pulse Temperature BMI (Body Mass Index)             78 97.5  F (36.4  C) (Temporal) 32.44 kg/m2          Blood Pressure from Last 3 Encounters:   08/30/17 148/83   08/26/17 128/83   08/19/17 (!) 148/96    Weight from Last 3 Encounters:   08/31/17 201 lb (91.2 kg)   08/30/17 202 lb 4.8 oz (91.8 kg)   08/24/17 200 lb 9.9 oz (91 kg)              Today, you had the following     No orders found for display         Today's Medication Changes          These changes are accurate as of: 8/31/17  8:32 AM.  If you have any questions, ask your nurse or doctor.               These medicines have changed or have updated prescriptions.        Dose/Directions    nortriptyline 10 MG capsule   Commonly known as:  PAMELOR   This may have changed:  how much to take   Used for:  Neuropathic pain, Primary insomnia        Dose:  30-40 mg   Take 3-4 capsules (30-40 mg) by mouth At Bedtime   Quantity:  120 capsule   Refills:  5                Primary Care Provider Office Phone #    [facility] [phone]       No address on  file        Equal Access to Services     [facility]: Hadii caitie horton melissa Law, wadreda luqadaha, qaybta tinadajaskyler long, elham gomez. So [facility] [phone].    ATENCIÓN: Si habla suyapa, tiene a wade disposición servicios gratuitos de asistencia lingüística. Janes al [phone].    We comply with applicable federal civil rights laws and [place] laws. We do not discriminate on the basis of race, color, national origin, age, disability sex, sexual orientation or gender identity.            Thank you!     Thank you for choosing [facility]  for your care. Our goal is always to provide you with excellent care. Hearing back from our patients is one way we can continue to improve our services. Please take a few minutes to complete the written survey that you may receive in the mail after your visit with us. Thank you!             Your Updated Medication List - Protect others around you: Learn how to safely use, store and throw away your medicines at www.disposemymeds.org.          This list is accurate as of: 8/31/17  8:32 AM.  Always use your most recent med list.                   Brand Name Dispense Instructions for use Diagnosis    ACETAMINOPHEN PO      Take 500-1,000 mg by mouth every 6 hours as needed for pain        albuterol (2.5 MG/3ML) 0.083% neb solution     360 mL    Take 1 vial (2.5 mg) by nebulization every 4 hours as needed for shortness of breath / dyspnea or wheezing    Gastrostomy tube dependent (H), SBO (small bowel obstruction) (H), Chronic abdominal pain, Chronic diarrhea       albuterol 90 MCG/ACT inhaler      Inhale 2 puffs into the lungs every 6 hours as needed        cloNIDine 0.2 MG tablet    CATAPRES    180 tablet    TAKE 2 TABLETS(0.4 MG) BY MOUTH EVERY EVENING    Anxiety       diphenhydrAMINE 25 MG tablet    BENADRYL ALLERGY    30 tablet    Take 1 tablet (25 mg) by mouth every 6 hours as needed for itching or other (Nausea)         DULoxetine 60 MG EC capsule    CYMBALTA    90 capsule    Take 1 capsule (60 mg) by mouth daily    Abdominal pain, epigastric, Abdominal migraine, not intractable       ethanol (74%) ADULT 74 %     5 mL    Inject IV through Port-a-cath daily for 11 days to lock Port-a-cath after each dose of ceftriaxone and also use to lock Port-a-cath after each weekly infusion of IV fluids and MVI    Positive blood culture, Sepsis due to Klebsiella (H)       HYDROmorphone 2 MG tablet    DILAUDID    10 tablet    Take 0.5 tablets (1 mg) by mouth every 3 hours as needed for moderate to severe pain    Generalized abdominal pain       ipratropium 0.02 % neb solution    ATROVENT    225 mL    Take 2.5 mLs (0.5 mg) by nebulization every 6 hours as needed for wheezing    Gastrostomy tube dependent (H), SBO (small bowel obstruction) (H), Chronic abdominal pain, Chronic diarrhea       lidocaine 5 % Patch    LIDODERM    6 patch    Apply to painful area at once for up to 12 h within a 24 h period.  Remove after 12 hours.        mirtazapine 15 MG ODT tab    REMERON SOL-TAB    45 tablet    0.5 tablets (7.5 mg) by Orally disintegrating tablet route At Bedtime    Anxiety       nortriptyline 10 MG capsule    PAMELOR    120 capsule    Take 3-4 capsules (30-40 mg) by mouth At Bedtime    Neuropathic pain, Primary insomnia       ondansetron 4 MG ODT tab    ZOFRAN ODT    30 tablet    Take 1-2 tablets (4-8 mg) by mouth every 6 hours as needed for nausea    Intractable vomiting, presence of nausea not specified, unspecified vomiting type, Gastroparesis       * order for DME     1 Box    2 by 2 gauze pads, use for dressing changes as directed    Attention to G-tube (H)       * order for DME     1 each    1 inch paper tape, Use for dressing changes as directed    Attention to G-tube (H)       SUMAtriptan 50 MG tablet    IMITREX    18 tablet    Take 1-2 tablets ( mg) by mouth at onset of headache for migraine - may repeat dose after 2h if headache  recurs.  Max: 200mg/24 hours    Migraine without aura and without status migrainosus, not intractable       traZODone 50 MG tablet    DESYREL    180 tablet    Take 1-2 tablets ( mg) by mouth nightly as needed 1-2 tabs at bedtime PRN    Insomnia, unspecified type       * Notice:  This list has 2 medication(s) that are the same as other medications prescribed for you. Read the directions carefully, and ask your doctor or other care provider to review them with you.

## 2017-08-31 NOTE — PROGRESS NOTES
Consult requested by Dr. Coe.    Reason for consultation - port removal      HPI:  Patient is a 37-year-old AA female with extensive past surgical history who had a MediPort placed in July for IV access. This month she was admitted for Klebsiella sepsis. She has had several episodes of sepsis after accessing the port. The patient is very anxious at the port is causing her infection and would like it removed. She was seen in the ER at [facility] last night but there were no objective signs of infection at the time. She reports the port is being painful and states she had fevers and chills at home but there were none documented yesterday evening. She now presents for evaluation for port removal.    Past Medical History:   Diagnosis Date     Asthma      Bilateral ovarian cysts      Cervical cancer (H) 01/01/2008    cervical cancer      Chronic pain      Colonic dysmotility     s/p subtotal colectomy     Constipation     chronic     E. coli sepsis (H) 5/8/2016     Enteritis      Fungemia 5/5/2016     Gastro-oesophageal reflux disease      H/O ileostomy      Hx of abnormal Pap smear     s/p LEEP - no further details provided     Hypertension      IBS (irritable bowel syndrome)      Other chronic pain      PONV (postoperative nausea and vomiting)      Thrombosis, hepatic vein (H)     microvascular     Past [facility] History:   Procedure Laterality Date     COLONOSCOPY  7/10/2012    Procedure: COLONOSCOPY;;  Surgeon: Nicole Redding MD;  [facility]     COLONOSCOPY N/A 2/19/2017    Procedure: COLONOSCOPY;  Surgeon: Randell Muller MD;  [facility]     COLONOSCOPY N/A 2/21/2017    Procedure: COLONOSCOPY;  Surgeon: Randell Muller MD;  [facility]     ECHO CHELO  7/19/2016          ENDOSCOPIC INSERTION TUBE GASTROSTOMY N/A 1/21/2016    Procedure: ENDOSCOPIC INSERTION TUBE GASTROSTOMY;  Surgeon: Nicole Redding MD;  [facility]     ESOPHAGOSCOPY, GASTROSCOPY, DUODENOSCOPY (EGD),  COMBINED  7/10/2012    Procedure: COMBINED ESOPHAGOSCOPY, GASTROSCOPY, DUODENOSCOPY (EGD);  Upper Endoscopy, Ileoscopy    Latex Allergy  with biopsies;  Surgeon: Nicole Redding MD;  [facility]     ESOPHAGOSCOPY, GASTROSCOPY, DUODENOSCOPY (EGD), COMBINED N/A 11/5/2014    Procedure: COMBINED ESOPHAGOSCOPY, GASTROSCOPY, DUODENOSCOPY (EGD);  Surgeon: Nicole Redding MD;  [facility]     HC REPLACE DUODENOSTOMY/JEJUNOSTOMY TUBE PERCUTANEOUS N/A 8/27/2015    Procedure: REPLACE GASTROJEJUNOSTOMY TUBE, PERCUTANEOUS;  Surgeon: Mio Holder MD;  [facility]     HC REPLACE DUODENOSTOMY/JEJUNOSTOMY TUBE PERCUTANEOUS N/A 1/7/2016    Procedure: REPLACE JEJUNOSTOMY TUBE, PERCUTANEOUS;  Surgeon: Elsa Medel MD;  [facility]     HC REPLACE DUODENOSTOMY/JEJUNOSTOMY TUBE PERCUTANEOUS N/A 1/28/2016    Procedure: REPLACE JEJUNOSTOMY TUBE, PERCUTANEOUS;  Surgeon: Elsa Medel MD;  [facility]     HC REPLACE GASTROSTOMY/CECOSTOMY TUBE PERCUTANEOUS Left 5/19/2015    Procedure: REPLACE GASTROSTOMY TUBE, PERCUTANEOUS;  Surgeon: Melecio Morejon Chi, MD;  [facility]     HC UGI ENDOSCOPY W PLACEMENT GASTROSTOMY TUBE PERCUT N/A 10/1/2015    Procedure: COMBINED ESOPHAGOSCOPY, GASTROSCOPY, DUODENOSCOPY (EGD), PLACE PERCUTANEOUS ENDOSCOPIC GASTROSTOMY TUBE;  Surgeon: Mio Holder MD;  [facility]     INSERT PORT VASCULAR ACCESS Right 7/20/2017    Procedure: INSERT PORT VASCULAR ACCESS;  Chest Port Placement  **Latex Allergy**;  Surgeon: Coy Rocha PA-C;  [facility]     LAPAROSCOPIC ASSISTED COLECTOMY  1/20/2012    Procedure:LAPAROSCOPIC ASSISTED COLECTOMY; Laparoscopic Ileostomy       LAPAROSCOPIC ASSISTED COLECTOMY LEFT (DESCENDING)  10/24/2012    Procedure: LAPAROSCOPIC ASSISTED COLECTOMY LEFT (DESCENDING);   Hand Assisted Laproscopic Subtotal abdominal Colectomy,Iieorectal anastamosis, Ileostomy Closure.       LAPAROSCOPIC ASSISTED INSERTION TUBE JEJUNOSTOMY N/A  10/16/2015    Procedure: LAPAROSCOPIC ASSISTED INSERTION TUBE JEJUNOSTOMY;  Surgeon: lEsa Medel MD;  [facility]     LAPAROSCOPIC CHOLECYSTECTOMY  2002    [facility] stones duct     LAPAROSCOPIC ILEOSTOMY  1/20/2012    [facility], loop     LAPAROSCOPIC OOPHORECTOMY Right 2009    [facility]     LAPAROTOMY EXPLORATORY N/A 1/28/2016    Procedure: LAPAROTOMY EXPLORATORY;  Surgeon: Elsa Medel MD;  [facility]     LEEP TX, CERVICAL  2009    [facility]     PICC INSERTION Left 10/21/2015    5fr DL Power PICC, 37cm (2cm external) in the L basilic vein w/ tip in the SVC RA junction.     REMOVE GASTROSTOMY TUBE ADULT N/A 12/12/2014    Procedure: REMOVE GASTROSTOMY TUBE ADULT;  Surgeon: Nicole Redding MD;  [facility]     REMOVE PORT VASCULAR ACCESS Right 6/30/2016    Procedure: REMOVE PORT VASCULAR ACCESS;  Surgeon: Pradeep Orosco MD;  Location: [facility]     replace GASTROSTOMY TUBE ADULT  5/19/15     Current Outpatient Prescriptions   Medication     lidocaine (LIDODERM) 5 % Patch     HYDROmorphone (DILAUDID) 2 MG tablet     ondansetron (ZOFRAN ODT) 4 MG ODT tab     albuterol (2.5 MG/3ML) 0.083% neb solution     ipratropium (ATROVENT) 0.02 % neb solution     ethanol, 74%, ADULT 74 %     order for DME     order for DME     cloNIDine (CATAPRES) 0.2 MG tablet     diphenhydrAMINE (BENADRYL ALLERGY) 25 MG tablet     traZODone (DESYREL) 50 MG tablet     nortriptyline (PAMELOR) 10 MG capsule     mirtazapine (REMERON SOL-TAB) 15 MG ODT tab     SUMAtriptan (IMITREX) 50 MG tablet     DULoxetine (CYMBALTA) 60 MG capsule     ACETAMINOPHEN PO     albuterol 90 MCG/ACT inhaler     No current facility-administered medications for this visit.         Allergies   Allergen Reactions     Hyoscyamine Rash     Metoclopramide Other (See Comments)     Eye twitching.      Peaches [Peach] Other (See Comments)     Raw. Cooked OK     Sucralose Other (See Comments)     All artificial sweeteners. Aspartame  also. Swollen glands     Advair Diskus Other (See Comments)     Throat burns     Azithromycin Other (See Comments)     Burning in throat     Compazine [Prochlorperazine] Visual Disturbance     Contrast Dye Itching     States is allergic to CT contrast dye     Cyclobenzaprine Visual Disturbance     Fentanyl Other (See Comments)     migraine     Ibuprofen GI Disturbance     Lactulose Nausea and Vomiting     Gas and bloating     Levaquin [Levofloxacin] Swelling     Per ED M.D. And RN      Morphine Sulfate Other (See Comments)     Chest pain       Oxycodone Other (See Comments)     Burning throat, but can take Norco.      Rizatriptan Visual Disturbance     Droperidol Hives and Rash     Isovue [Iopamidol] Palpitations     Pt had racing heart and sob      Ketorolac Anxiety     Latex Swelling and Rash     Kiwi, likely also avacado, ? banana     Levsin Rash     Social History   Substance Use Topics     Smoking status: Former Smoker     Packs/day: 1.00     Years: 4.00     Types: Cigarettes     Quit date: 1/1/2004     Smokeless tobacco: Former User     Alcohol use No     Family History   Problem Relation Age of Onset     Thyroid Disease Mother      Sjogren's Mother      GASTROINTESTINAL DISEASE Mother      Intermittent nausea vomiting diarrhea     Colon Polyps Mother      Prostate Problems Father      prostate enlargement     Lupus Maternal Grandmother      CANCER Maternal Grandfather      Lung     Colon Cancer Maternal Grandfather 65     CANCER Paternal Grandmother      Lung      CEREBROVASCULAR DISEASE Paternal Grandmother      DIABETES Paternal Grandmother      Cardiovascular Paternal Grandmother      CHF     CANCER Paternal Grandfather      Lung     Glaucoma Paternal Grandfather      Abdominal Aortic Aneurysm Other      Macular Degeneration No family hx of       ROS: 10 point ROS neg other than the symptoms noted above in the HPI.    PE:  B/P: Data Unavailable, T: 97.5, P: 78, R: Data Unavailable  General: well developed,  well nourished WF who appears her stated age  HEENT: NC/AT, EOMI, (-)icterus, (-)injection  Neck: Supple, No JVD  Chest: CTA, Right IJ mediport in place.  Tender.  Some excoriation from dressing.  No fluctuance  Heart: S1, S2, (-)m/r/g  Ext; Warm, no edema  Psych: AAOx3, anxious affect  Neuro: No focal deficits    Impression/Plan:  This is a 37-year-old lady with extensive past surgical history and episodes of sepsis after mediport placement. She does have some tenderness on at the port on exam review the information from the ER last night I do not suspect an active infection at this time. The patient is quite anxious about the port and would like it removed. The plan at this time is to proceed to port removal under MAC. The procedure, risks, benefits and alternatives were discussed and she agrees to proceed. She'll be scheduled the near future.    Zechariah Worthington MD, FACS

## 2017-08-31 NOTE — PROGRESS NOTES
"Doreen Peralta  Gender: female  : 1980  [address]   [place] [address]  [phone] (home)     Medical Record: [id number]  Pharmacy: [id number] - [place], [place] - [address]  Primary Care Provider: Raymond, [facility]    Parent's names are: Aby Rainey (mother) and Data Unavailable (father).      [facility]  2017     Discharge Phone Call:  Key Words/Key Times      Introduction - AIDET (Acknowledge, Introduce, Duration, Explanation)      Empathy-   We are calling to see how you are since your recent stay in the hospital?     Call back COMMENTS: I am feeling some better, I have just learn to deal with my medical issues.      Clinical Questions -  (f/u appts, medication side effects/purpose, ability to care for self at home) \"For your safety, it is important to us that you understand the purpose and side effects of your medications, can you tell me what your new medications are?\"     Call back COMMENTS: Pt has made her f/u appts and plans on keeping them.  Pt had no questions about her meds.      Staff Recognition -  We like to recognize staff and physicians who have done an excellent job.  Do you remember any people from your care team that you would like recognize?     Call back COMMENTS: Everyone did a great job as always.      Very Good Care -  We want to provide very good care to all patients.  How was your care?     Call back COMMENTS: Excellent care by all.      Opportunities for Improvement -  Our goal is to be the best.  Do you have any suggestions for things that we could improve upon?     Call back COMMENTS: Everything was good.      Thank You             "

## 2017-08-31 NOTE — NURSING NOTE
Surgery scheduled for Friday Sept. 8 at 1130 at [place]  Pre-op scheduled for Friday Sept. 1 with Dr. Flores at [place]  Patient informed to check in at 10 am on Sept. 8 and that a Pre-op nurse will contact her next week.  Verbally confirmed place and time of pre-op and surgery with patient..............................................Karena Daigle CMA  ([facility])

## 2017-08-31 NOTE — NURSING NOTE
"Chief Complaint   Patient presents with     Pain     discomfort at port site, also at G-tube site-discuss possible removal     Consult     Referring Kelvin Mario-ED       Initial Pulse 78  Temp 97.5  F (36.4  C) (Temporal)  Wt 201 lb (91.2 kg)  BMI 32.44 kg/m2 Estimated body mass index is 32.44 kg/(m^2) as calculated from the following:    Height as of 8/30/17: 5' 6\" (1.676 m).    Weight as of this encounter: 201 lb (91.2 kg).  Medication Reconciliation: complete    "

## 2017-08-31 NOTE — DISCHARGE INSTRUCTIONS
Please make an appointment to follow up with Dr. Worthington as already scheduled tomorrow. Also with Your Primary Care Provider in 2 days    Return to the ED if you are having fevers, worsening symptoms, or any other urgent/life-threatening concerns.

## 2017-09-01 ENCOUNTER — OFFICE VISIT (OUTPATIENT)
Dept: FAMILY MEDICINE | Facility: OTHER | Age: 37
End: 2017-09-01
Payer: COMMERCIAL

## 2017-09-01 VITALS
DIASTOLIC BLOOD PRESSURE: 70 MMHG | SYSTOLIC BLOOD PRESSURE: 102 MMHG | TEMPERATURE: 96.2 F | HEART RATE: 72 BPM | BODY MASS INDEX: 31.93 KG/M2 | OXYGEN SATURATION: 100 % | WEIGHT: 197.8 LBS

## 2017-09-01 DIAGNOSIS — Z01.818 PREOP GENERAL PHYSICAL EXAM: Primary | ICD-10-CM

## 2017-09-01 DIAGNOSIS — Z90.49 S/P PARTIAL RESECTION OF COLON: ICD-10-CM

## 2017-09-01 DIAGNOSIS — F68.10 MUNCHAUSEN SYNDROME: ICD-10-CM

## 2017-09-01 DIAGNOSIS — I34.0 NON-RHEUMATIC MITRAL REGURGITATION: ICD-10-CM

## 2017-09-01 DIAGNOSIS — A41.59 SEPSIS DUE TO KLEBSIELLA (H): ICD-10-CM

## 2017-09-01 DIAGNOSIS — Z93.1 PEG (PERCUTANEOUS ENDOSCOPIC GASTROSTOMY) STATUS (H): ICD-10-CM

## 2017-09-01 DIAGNOSIS — I10 ESSENTIAL HYPERTENSION: ICD-10-CM

## 2017-09-01 PROCEDURE — 99214 OFFICE O/P EST MOD 30 MIN: CPT | Performed by: INTERNAL MEDICINE

## 2017-09-01 ASSESSMENT — ANXIETY QUESTIONNAIRES
7. FEELING AFRAID AS IF SOMETHING AWFUL MIGHT HAPPEN: SEVERAL DAYS
6. BECOMING EASILY ANNOYED OR IRRITABLE: NOT AT ALL
5. BEING SO RESTLESS THAT IT IS HARD TO SIT STILL: NOT AT ALL
GAD7 TOTAL SCORE: 1
3. WORRYING TOO MUCH ABOUT DIFFERENT THINGS: NOT AT ALL
2. NOT BEING ABLE TO STOP OR CONTROL WORRYING: NOT AT ALL
IF YOU CHECKED OFF ANY PROBLEMS ON THIS QUESTIONNAIRE, HOW DIFFICULT HAVE THESE PROBLEMS MADE IT FOR YOU TO DO YOUR WORK, TAKE CARE OF THINGS AT HOME, OR GET ALONG WITH OTHER PEOPLE: SOMEWHAT DIFFICULT
1. FEELING NERVOUS, ANXIOUS, OR ON EDGE: NOT AT ALL

## 2017-09-01 ASSESSMENT — PATIENT HEALTH QUESTIONNAIRE - PHQ9
5. POOR APPETITE OR OVEREATING: NOT AT ALL
SUM OF ALL RESPONSES TO PHQ QUESTIONS 1-9: 3

## 2017-09-01 ASSESSMENT — PAIN SCALES - GENERAL: PAINLEVEL: NO PAIN (0)

## 2017-09-01 NOTE — PROGRESS NOTES
[facility]  [address] [facility] [address]  [phone]  Dept: [phone]    PRE-OP EVALUATION:  Today's date: 2017    Doreen Peralta (: 1980) presents for pre-operative evaluation assessment as requested by Dr. Worthington.  She requires evaluation and anesthesia risk assessment prior to undergoing surgery/procedure for treatment of port .  Proposed procedure: REMOVE PORT VASCULAR ACCESS    Date of Surgery/ Procedure: 2017  Time of Surgery/ Procedure: 11:30  Hospital/[facility] Facility: [facility]  Primary Physician: Raymond [facility]  Type of Anesthesia Anticipated: Monitor Anesthesia Care    Patient has a Health Care Directive or Living Will:  NO    1. NO - Do you have a history of heart attack, stroke, stent, bypass or surgery on an artery in the head, neck, heart or legs?  2. NO - Do you ever have any pain or discomfort in your chest?  3. NO - Do you have a history of  Heart Failure?  4. NO - Are you troubled by shortness of breath when: walking on the level, up a slight hill or at night?  5. NO - Do you currently have a cold, bronchitis or other respiratory infection?  6. NO - Do you have a cough, shortness of breath or wheezing?  7. NO - Do you sometimes get pains in the calves of your legs when you walk?  8. YES - DO YOU OR ANYONE IN YOUR FAMILY HAVE PREVIOUS HISTORY OF BLOOD CLOTS? Personal history of blood clots  9. NO - Do you or does anyone in your family have a serious bleeding problem such as prolonged bleeding following surgeries or cuts?  10. YES - HAVE YOU EVER HAD PROBLEMS WITH ANEMIA OR BEEN TOLD TO TAKE IRON PILLS? Current anemia  11. NO - Have you had any abnormal blood loss such as black, tarry or bloody stools, or abnormal vaginal bleeding?  12. NO - Have you ever had a blood transfusion?  13. NO - Have you or any of your relatives ever had problems with anesthesia?  14. NO - Do you have sleep apnea, excessive snoring or daytime  "drowsiness?  15. NO - Do you have any prosthetic heart valves?  16. NO - Do you have prosthetic joints?  17. NO - Is there any chance that you may be pregnant?        HPI:                                                      Brief HPI related to upcoming procedure: The patient has a history of recurrent port sepsis. She has a port because she becomes dehydrated and has to receive a \"banana bag\" weekly. Her dehydration apparently is the result of having had a colectomy. The colectomy is because her colon \"stopped working\". There are many concerns regarding the nature of her sepsis. Munchausen syndrome is highly suspected.      See problem list for active medical problems.  Problems all longstanding and stable, except as noted/documented.  See ROS for pertinent symptoms related to these conditions.                                                                                                  .    MEDICAL HISTORY:                                                    Patient Active Problem List    Diagnosis Date Noted     Sepsis (H) - possible 08/24/2017     Priority: Medium     Hypokalemia 08/24/2017     Priority: Medium     Essential hypertension 08/24/2017     Priority: Medium     Sepsis due to Klebsiella (H) 07/27/2017     Priority: Medium     Insomnia, unspecified type 03/31/2017     Priority: Medium     Abdominal pain 02/15/2017     Priority: Medium     Abdominal pain, generalized 01/28/2017     Priority: Medium     SIRS (systemic inflammatory response syndrome) (H) 01/26/2017     Priority: Medium     History of deep venous thrombosis 01/26/2017     Priority: Medium     Nausea and vomiting 01/26/2017     Priority: Medium     Vitamin D deficiency 01/16/2017     Priority: Medium     Chronic abdominal pain 11/15/2016     Priority: Medium     Patient is followed by Larisa Lorenzo PA-C for ongoing prescription of pain medication.  All refills should be approved by this provider, or covering " partner.    Medication(s): no regular narcotics - narcotics given at ED visits  Maximum quantity per month: N/A  Clinic visit frequency required: Q 6  months     Controlled substance agreement:  Encounter-Level CSA - 11/9/15:               Controlled Substance Agreement - Scan on 11/19/2015 11:17 AM : CONTROLLED SUBSTANCE AGREEMENT 11/09/15 (below)          Encounter-Level CSA - 2/27/15:               Controlled Substance Agreement - Scan on 3/12/2015  7:50 AM : Controlled Medication Agreement 02/27/15 (below)            Pain Clinic evaluation in the past: Yes    DIRE Total Score(s):  No flowsheet data found.    Last [facility] website verification:  done on 11/15/16   [email or web address]        Attention to G-tube (H) 11/08/2016     Priority: Medium     Fever 10/16/2016     Priority: Medium     Coagulation defect (H) [D68.9] 09/16/2016     Priority: Medium     Chronic diarrhea 07/22/2016     Priority: Medium     Migraine 07/20/2016     Priority: Medium     Positive blood culture - Klebsiella oxytoca from Port-a-cath culture 07/18/2016     Priority: Medium     Mitral regurgitation mild-mod by Echo June 2016 07/18/2016     Priority: Medium     Anemia, iron deficiency 07/17/2016     Priority: Medium     Anemia in other chronic diseases classified elsewhere 06/14/2016     Priority: Medium     Munchausen syndrome - previously suspected 06/14/2016     Priority: Medium     Long-term (current) use of anticoagulants [Z79.01] 05/16/2016     Priority: Medium     Anxiety 05/16/2016     Priority: Medium     S/P partial resection of colon 04/11/2016     Priority: Medium     Malnutrition (H) 04/11/2016     Priority: Medium     Jejunostomy tube present (H) 03/21/2016     Priority: Medium     Malfunctioning jejunostomy tube (H) 12/22/2015     Priority: Medium     Malfunction of gastrostomy tube (H) 11/19/2015     Priority: Medium     PEG tube malfunction (H) 10/16/2015     Priority: Medium     Health Care Home 01/16/2015      Priority: Medium     *See Letters for [facility] Care Plan: My Access Plan           PEG (percutaneous endoscopic gastrostomy) status 11/05/2014     Priority: Medium     Gastroparesis 04/11/2014     Priority: Medium     Overview:   Had ileostomy performed at [facility] in Jan 2012 as treatment       Migraines 04/11/2014     Priority: Medium     4/11/2014  With periods, every other month.       Intermittent asthma 04/11/2014     Priority: Medium     4/11/2014   With URIs, allergies       Allergic rhinitis 04/11/2014     Priority: Medium     Problem list name updated by automated process. Provider to review       Abnormal Pap smear of cervix 04/11/2014     Priority: Medium     4/11/2014  S/p colp and LEEP. Sees OB/GYN at [facility]. Pap every year x20 years - normal since.       S/P LEEP of cervix 02/06/2014     Priority: Medium     Patellofemoral stress syndrome 01/18/2014     Priority: Medium     Hx SBO 10/09/2013     Priority: Medium     4/11/2014  Recurrent. Sees GI ([place]. Redding [place]) every 6 months or so.  Sees feeding tube nurse next week.       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 04/11/2013     Priority: Medium     Constipation by delayed colonic transit 10/24/2012     Priority: Medium     Atopic rhinitis 03/20/2009     Priority: Medium     Hyperbilirubinemia 03/11/2009     Priority: Medium      Past Medical History:   Diagnosis Date     Asthma      Bilateral ovarian cysts      Cervical cancer (H) 01/01/2008    cervical cancer      Chronic pain      Colonic dysmotility     s/p subtotal colectomy     Constipation     chronic     E. coli sepsis (H) 5/8/2016     Enteritis      Fungemia 5/5/2016     Gastro-oesophageal reflux disease      H/O ileostomy      Hx of abnormal Pap smear     s/p LEEP - no further details provided     Hypertension      IBS (irritable bowel syndrome)      Other chronic pain      PONV (postoperative nausea and vomiting)      Thrombosis, hepatic vein (H)     microvascular     Past [facility]  History:   Procedure Laterality Date     COLONOSCOPY  7/10/2012    Procedure: COLONOSCOPY;;  Surgeon: Nicole Redding MD;  [facility]     COLONOSCOPY N/A 2/19/2017    Procedure: COLONOSCOPY;  Surgeon: Randell Muller MD;  Location: [facility]     COLONOSCOPY N/A 2/21/2017    Procedure: COLONOSCOPY;  Surgeon: Randell Muller MD;  Location: [facility]     ECHO CHELO  7/19/2016          ENDOSCOPIC INSERTION TUBE GASTROSTOMY N/A 1/21/2016    Procedure: ENDOSCOPIC INSERTION TUBE GASTROSTOMY;  Surgeon: Nicole Redding MD;  Location[facility]     ESOPHAGOSCOPY, GASTROSCOPY, DUODENOSCOPY (EGD), COMBINED  7/10/2012    Procedure: COMBINED ESOPHAGOSCOPY, GASTROSCOPY, DUODENOSCOPY (EGD);  Upper Endoscopy, Ileoscopy    Latex Allergy  with biopsies;  Surgeon: Nicole Redding MD;  [facility]     ESOPHAGOSCOPY, GASTROSCOPY, DUODENOSCOPY (EGD), COMBINED N/A 11/5/2014    Procedure: COMBINED ESOPHAGOSCOPY, GASTROSCOPY, DUODENOSCOPY (EGD);  Surgeon: Nicole Redding MD;  [facility]     HC REPLACE DUODENOSTOMY/JEJUNOSTOMY TUBE PERCUTANEOUS N/A 8/27/2015    Procedure: REPLACE GASTROJEJUNOSTOMY TUBE, PERCUTANEOUS;  Surgeon: Mio Holder MD;  [facility]     HC REPLACE DUODENOSTOMY/JEJUNOSTOMY TUBE PERCUTANEOUS N/A 1/7/2016    Procedure: REPLACE JEJUNOSTOMY TUBE, PERCUTANEOUS;  Surgeon: Elsa Medel MD;  [facility]     HC REPLACE DUODENOSTOMY/JEJUNOSTOMY TUBE PERCUTANEOUS N/A 1/28/2016    Procedure: REPLACE JEJUNOSTOMY TUBE, PERCUTANEOUS;  Surgeon: Elsa Medel MD;  [facility]     HC REPLACE GASTROSTOMY/CECOSTOMY TUBE PERCUTANEOUS Left 5/19/2015    Procedure: REPLACE GASTROSTOMY TUBE, PERCUTANEOUS;  Surgeon: Melecio Morejon Chi, MD;  Location: [facility]     HC UGI ENDOSCOPY W PLACEMENT GASTROSTOMY TUBE PERCUT N/A 10/1/2015    Procedure: COMBINED ESOPHAGOSCOPY, GASTROSCOPY, DUODENOSCOPY (EGD), PLACE PERCUTANEOUS ENDOSCOPIC GASTROSTOMY TUBE;  Surgeon: Mio Holder MD;   [facility]     INSERT PORT VASCULAR ACCESS Right 7/20/2017    Procedure: INSERT PORT VASCULAR ACCESS;  Chest Port Placement  **Latex Allergy**;  Surgeon: Coy Rocha PA-C;  [facility]     LAPAROSCOPIC ASSISTED COLECTOMY  1/20/2012    Procedure:LAPAROSCOPIC ASSISTED COLECTOMY; Laparoscopic Ileostomy       LAPAROSCOPIC ASSISTED COLECTOMY LEFT (DESCENDING)  10/24/2012    Procedure: LAPAROSCOPIC ASSISTED COLECTOMY LEFT (DESCENDING);   Hand Assisted Laproscopic Subtotal abdominal Colectomy,Iieorectal anastamosis, Ileostomy Closure.       LAPAROSCOPIC ASSISTED INSERTION TUBE JEJUNOSTOMY N/A 10/16/2015    Procedure: LAPAROSCOPIC ASSISTED INSERTION TUBE JEJUNOSTOMY;  Surgeon: Elsa Medel MD;  [facility]     LAPAROSCOPIC CHOLECYSTECTOMY  2002    [facility] stones duct     LAPAROSCOPIC ILEOSTOMY  1/20/2012    [facility], loop     LAPAROSCOPIC OOPHORECTOMY Right 2009    [facility]     LAPAROTOMY EXPLORATORY N/A 1/28/2016    Procedure: LAPAROTOMY EXPLORATORY;  Surgeon: Elsa Medel MD;  [facility]     LEEP TX, CERVICAL  2009    [facility]     PICC INSERTION Left 10/21/2015    5fr DL Power PICC, 37cm (2cm external) in the L basilic vein w/ tip in the SVC RA junction.     REMOVE GASTROSTOMY TUBE ADULT N/A 12/12/2014    Procedure: REMOVE GASTROSTOMY TUBE ADULT;  Surgeon: Nicole Redding MD;  [facility]     REMOVE PORT VASCULAR ACCESS Right 6/30/2016    Procedure: REMOVE PORT VASCULAR ACCESS;  Surgeon: Pradeep Orosco MD;  Location: [facility] OR     replace GASTROSTOMY TUBE ADULT  5/19/15     Current Outpatient Prescriptions   Medication Sig Dispense Refill     lidocaine (LIDODERM) 5 % Patch Apply to painful area at once for up to 12 h within a 24 h period.  Remove after 12 hours. 6 patch 0     ondansetron (ZOFRAN ODT) 4 MG ODT tab Take 1-2 tablets (4-8 mg) by mouth every 6 hours as needed for nausea 30 tablet 0     albuterol (2.5 MG/3ML) 0.083% neb solution Take 1 vial  (2.5 mg) by nebulization every 4 hours as needed for shortness of breath / dyspnea or wheezing 360 mL      ipratropium (ATROVENT) 0.02 % neb solution Take 2.5 mLs (0.5 mg) by nebulization every 6 hours as needed for wheezing 225 mL      ethanol, 74%, ADULT 74 % Inject IV through Port-a-cath daily for 11 days to lock Port-a-cath after each dose of ceftriaxone and also use to lock Port-a-cath after each weekly infusion of IV fluids and MVI 5 mL 0     order for DME 2 by 2 gauze pads, use for dressing changes as directed 1 Box 1     order for DME 1 inch paper tape, Use for dressing changes as directed 1 each 0     cloNIDine (CATAPRES) 0.2 MG tablet TAKE 2 TABLETS(0.4 MG) BY MOUTH EVERY EVENING 180 tablet 0     diphenhydrAMINE (BENADRYL ALLERGY) 25 MG tablet Take 1 tablet (25 mg) by mouth every 6 hours as needed for itching or other (Nausea) 30 tablet 0     traZODone (DESYREL) 50 MG tablet Take 1-2 tablets ( mg) by mouth nightly as needed 1-2 tabs at bedtime  tablet 1     nortriptyline (PAMELOR) 10 MG capsule Take 3-4 capsules (30-40 mg) by mouth At Bedtime (Patient taking differently: Take 40 mg by mouth At Bedtime ) 120 capsule 5     mirtazapine (REMERON SOL-TAB) 15 MG ODT tab 0.5 tablets (7.5 mg) by Orally disintegrating tablet route At Bedtime 45 tablet 0     SUMAtriptan (IMITREX) 50 MG tablet Take 1-2 tablets ( mg) by mouth at onset of headache for migraine - may repeat dose after 2h if headache recurs.  Max: 200mg/24 hours 18 tablet 1     DULoxetine (CYMBALTA) 60 MG capsule Take 1 capsule (60 mg) by mouth daily 90 capsule 3     ACETAMINOPHEN PO Take 500-1,000 mg by mouth every 6 hours as needed for pain        albuterol 90 MCG/ACT inhaler Inhale 2 puffs into the lungs every 6 hours as needed        OTC products: None, except as noted above    Allergies   Allergen Reactions     Hyoscyamine Rash     Metoclopramide Other (See Comments)     Eye twitching.      Peaches [Peach] Other (See Comments)      Raw. Cooked OK     Sucralose Other (See Comments)     All artificial sweeteners. Aspartame also. Swollen glands     Advair Diskus Other (See Comments)     Throat burns     Azithromycin Other (See Comments)     Burning in throat     Compazine [Prochlorperazine] Visual Disturbance     Contrast Dye Itching     States is allergic to CT contrast dye     Cyclobenzaprine Visual Disturbance     Fentanyl Other (See Comments)     migraine     Ibuprofen GI Disturbance     Lactulose Nausea and Vomiting     Gas and bloating     Levaquin [Levofloxacin] Swelling     Per ED M.D. And RN      Morphine Sulfate Other (See Comments)     Chest pain       Oxycodone Other (See Comments)     Burning throat, but can take Norco.      Rizatriptan Visual Disturbance     Droperidol Hives and Rash     Isovue [Iopamidol] Palpitations     Pt had racing heart and sob      Ketorolac Anxiety     Latex Swelling and Rash     Kiwi, likely also avacado, ? banana     Levsin Rash      Latex Allergy: NO    Social History   Substance Use Topics     Smoking status: Former Smoker     Packs/day: 1.00     Years: 4.00     Types: Cigarettes     Quit date: 1/1/2004     Smokeless tobacco: Former User     Alcohol use No     History   Drug Use No       REVIEW OF SYSTEMS:                                                    C: NEGATIVE for fever, chills, change in weight  I: NEGATIVE for worrisome rashes, moles or lesions  E: NEGATIVE for vision changes or irritation  E/M: NEGATIVE for ear, mouth and throat problems  R: NEGATIVE for significant cough or SOB  B: NEGATIVE for masses, tenderness or discharge  CV: NEGATIVE for chest pain, palpitations or peripheral edema  GI: Patient has frequent diarrhea and has had so for some time.  : NEGATIVE for frequency, dysuria, or hematuria  M: NEGATIVE for significant arthralgias or myalgia  N: NEGATIVE for weakness, dizziness or paresthesias  E: NEGATIVE for temperature intolerance, skin/hair changes  H: NEGATIVE for bleeding  problems  P: NEGATIVE for changes in mood or affect    EXAM:                                                    /70 (BP Location: Left arm, Patient Position: Chair, Cuff Size: Adult Regular)  Pulse 72  Temp 96.2  F (35.7  C) (Temporal)  Wt 197 lb 12.8 oz (89.7 kg)  LMP 08/01/2017 (Approximate)  SpO2 100%  Breastfeeding? No  BMI 31.93 kg/m2    GENERAL APPEARANCE: healthy, alert and no distress     EYES: EOMI, PERRL     HENT: ear canals and TM's normal and nose and mouth without ulcers or lesions     NECK: no adenopathy, no asymmetry, masses, or scars and thyroid normal to palpation     RESP: lungs clear to auscultation - no rales, rhonchi or wheezes     CV: regular rates and rhythm, normal S1 S2, no S3 or S4 and no murmur, click or rub     ABDOMEN:  soft, nontender, no HSM or masses and bowel sounds normal     MS: extremities normal- no gross deformities noted, no evidence of inflammation in joints, FROM in all extremities.     SKIN: no suspicious lesions or rashes     NEURO: Normal strength and tone, sensory exam grossly normal, mentation intact and speech normal     PSYCH: mentation appears normal. and affect normal/bright     LYMPHATICS: No axillary, cervical, or supraclavicular nodes    DIAGNOSTICS:                                                    No labs or EKG required for low risk surgery (cataract, skin procedure, breast biopsy, etc)  Recent laboratory work from hospitalization reviewed with patient and unremarkable    IMPRESSION:                                                    Reason for surgery/procedure: Recurrent port sepsis requiring removal of indwelling venous access port  Diagnosis/reason for consult: Perioperative risk assessment and a 37-year-old female with:   1. Preop general physical exam    2. Sepsis due to Klebsiella (H)    3. Essential hypertension    4. Non-rheumatic mitral regurgitation    5. Munchausen syndrome - previously suspected    6. S/P partial resection of  colon    7. PEG (percutaneous endoscopic gastrostomy) status    The proposed surgical procedure is considered LOW risk.    REVISED CARDIAC RISK INDEX  The patient has the following serious cardiovascular risks for perioperative complications such as (MI, PE, VFib and 3  AV Block):  No serious cardiac risks  INTERPRETATION: 0 risks: Class I (very low risk - 0.4% complication rate)    The patient has the following additional risks for perioperative complications:  No identified additional risks    No diagnosis found.    RECOMMENDATIONS:                                                          --Patient is to take all scheduled medications on the day of surgery.           APPROVAL GIVEN to proceed with proposed procedure, without further diagnostic evaluation       Signed Electronically by: Kenji Folres DO    Copy of this evaluation report is provided to requesting physician.    [facility] Preop Guidelines

## 2017-09-01 NOTE — NURSING NOTE
"Chief Complaint   Patient presents with     Pre-Op Exam       Initial /70 (BP Location: Left arm, Patient Position: Chair, Cuff Size: Adult Regular)  Pulse 72  Temp 96.2  F (35.7  C) (Temporal)  Wt 197 lb 12.8 oz (89.7 kg)  LMP 08/01/2017 (Approximate)  SpO2 100%  Breastfeeding? No  BMI 31.93 kg/m2 Estimated body mass index is 31.93 kg/(m^2) as calculated from the following:    Height as of 8/30/17: 5' 6\" (1.676 m).    Weight as of this encounter: 197 lb 12.8 oz (89.7 kg).  Medication Reconciliation: complete  Health Maintenance reviewed at today's visit patient asked to schedule/complete:   Cervical Cancer:  Patient agrees to schedule   Neha BROWN      "

## 2017-09-01 NOTE — MR AVS SNAPSHOT
After Visit Summary   9/1/2017    Doreen Peralta    MRN: [id number]           Patient Information     Date Of Birth          1980        Visit Information        Provider Department      9/1/2017 7:00 AM Kenji Flores DO [facility]        Today's Diagnoses     Preop general physical exam    -  1    Sepsis due to Klebsiella (H)        Essential hypertension        Non-rheumatic mitral regurgitation        Munchausen syndrome - previously suspected        S/P partial resection of colon        PEG (percutaneous endoscopic gastrostomy) status          Care Instructions      Before Your Surgery      Call your surgeon if there is any change in your health. This includes signs of a cold or flu (such as a sore throat, runny nose, cough, rash or fever).    Do not smoke, drink alcohol or take over the counter medicine (unless your surgeon or primary care doctor tells you to) for the 24 hours before and after surgery.    If you take prescribed drugs: Follow your doctor s orders about which medicines to take and which to stop until after surgery.    Eating and drinking prior to surgery: follow the instructions from your surgeon    Take a shower or bath the night before surgery. Use the soap your surgeon gave you to gently clean your skin. If you do not have soap from your surgeon, use your regular soap. Do not shave or scrub the surgery site.  Wear clean pajamas and have clean sheets on your bed.           Follow-ups after your visit        Your next 10 appointments already scheduled     Sep 05, 2017  9:30 AM CDT   Level 2 with NL INFUSION CHAIR 5   [facility] ([facility])    [address] [place] [address]   [phone]            Sep 08, 2017   Procedure with Zechariah Worthington MD   [facility] ([facility])    [address] [place] [address]   [phone]           From [facility] [address]:  Exit at [address] on [address] side [address]. Turn right on [address]. Turn left at stoplight on [address]. [facility] will be in view two blocks ahead            Dec 04, 2017 10:00 AM CST   (Arrive by 9:45 AM)   Return Visit with Gilmer Lees MD   [facility] ([facility] [place])    [address]  [address] [facility] [address] [phone]              Who to contact     If you have questions or need follow up information about today's clinic visit or your schedule please contact [facility] directly at [phone].  Normal or non-critical lab and imaging results will be communicated to you by MyChart, letter or phone within 4 business days after the clinic has received the results. If you do not hear from us within 7 days, please contact the clinic through [employer] or phone. If you have a critical or abnormal lab result, we will notify you by phone as soon as possible.  Submit refill requests through [employer] or call your pharmacy and they will forward the refill request to us. Please allow 3 business days for your refill to be completed.          Additional Information About Your Visit        [address] Information     [employer] gives you secure access to your electronic health record. If you see a primary care provider, you can also send messages to your care team and make appointments. If you have questions, please call your primary care clinic.  If you do not have a primary care provider, please call [phone] and they will assist you.        Care EveryWhere ID     This is your Care EveryWhere ID. This could be used by other organizations to access your [facility] medical records  [id number]        Your Vitals Were     Pulse Temperature Last Period Pulse Oximetry Breastfeeding? BMI (Body Mass Index)    72 96.2  F (35.7  C) (Temporal) 08/01/2017 (Approximate) 100% No 31.93 kg/m2       Blood Pressure from Last 3  Encounters:   09/01/17 102/70   08/30/17 148/83   08/26/17 128/83    Weight from Last 3 Encounters:   09/01/17 197 lb 12.8 oz (89.7 kg)   08/31/17 201 lb (91.2 kg)   08/30/17 202 lb 4.8 oz (91.8 kg)              Today, you had the following     No orders found for display         Today's Medication Changes          These changes are accurate as of: 9/1/17  7:32 AM.  If you have any questions, ask your nurse or doctor.               These medicines have changed or have updated prescriptions.        Dose/Directions    nortriptyline 10 MG capsule   Commonly known as:  PAMELOR   This may have changed:  how much to take   Used for:  Neuropathic pain, Primary insomnia        Dose:  30-40 mg   Take 3-4 capsules (30-40 mg) by mouth At Bedtime   Quantity:  120 capsule   Refills:  5         Stop taking these medicines if you haven't already. Please contact your care team if you have questions.     lidocaine 5 % Patch   Commonly known as:  LIDODERM   Stopped by:  Kenji Flores DO                    Primary Care Provider Office Phone #    [facility] [phone]       No address on file        Equal Access to Services     TRAMAINE CLAYTON AH: Hadii caitie horton hadfango Sorhona, waaxda luqadaha, qaybta kaalmada adeegyada, elham gomez. So [facility] [phone].    ATENCIÓN: Si habla español, tiene a wade disposición servicios gratuitos de asistencia lingüística. Llame al [phone].    We comply with applicable federal civil rights laws and [place] laws. We do not discriminate on the basis of race, color, national origin, age, disability sex, sexual orientation or gender identity.            Thank you!     Thank you for choosing [facility]  for your care. Our goal is always to provide you with excellent care. Hearing back from our patients is one way we can continue to improve our services. Please take a few minutes to complete the written survey that you may  receive in the mail after your visit with us. Thank you!             Your Updated Medication List - Protect others around you: Learn how to safely use, store and throw away your medicines at www.disposemymeds.org.          This list is accurate as of: 9/1/17  7:32 AM.  Always use your most recent med list.                   Brand Name Dispense Instructions for use Diagnosis    ACETAMINOPHEN PO      Take 500-1,000 mg by mouth every 6 hours as needed for pain        albuterol (2.5 MG/3ML) 0.083% neb solution     360 mL    Take 1 vial (2.5 mg) by nebulization every 4 hours as needed for shortness of breath / dyspnea or wheezing    Gastrostomy tube dependent (H), SBO (small bowel obstruction) (H), Chronic abdominal pain, Chronic diarrhea       albuterol 90 MCG/ACT inhaler      Inhale 2 puffs into the lungs every 6 hours as needed        cloNIDine 0.2 MG tablet    CATAPRES    180 tablet    TAKE 2 TABLETS(0.4 MG) BY MOUTH EVERY EVENING    Anxiety       diphenhydrAMINE 25 MG tablet    BENADRYL ALLERGY    30 tablet    Take 1 tablet (25 mg) by mouth every 6 hours as needed for itching or other (Nausea)        DULoxetine 60 MG EC capsule    CYMBALTA    90 capsule    Take 1 capsule (60 mg) by mouth daily    Abdominal pain, epigastric, Abdominal migraine, not intractable       ethanol (74%) ADULT 74 %     5 mL    Inject IV through Port-a-cath daily for 11 days to lock Port-a-cath after each dose of ceftriaxone and also use to lock Port-a-cath after each weekly infusion of IV fluids and MVI    Positive blood culture, Sepsis due to Klebsiella (H)       ipratropium 0.02 % neb solution    ATROVENT    225 mL    Take 2.5 mLs (0.5 mg) by nebulization every 6 hours as needed for wheezing    Gastrostomy tube dependent (H), SBO (small bowel obstruction) (H), Chronic abdominal pain, Chronic diarrhea       mirtazapine 15 MG ODT tab    REMERON SOL-TAB    45 tablet    0.5 tablets (7.5 mg) by Orally disintegrating tablet route At Bedtime     Anxiety       nortriptyline 10 MG capsule    PAMELOR    120 capsule    Take 3-4 capsules (30-40 mg) by mouth At Bedtime    Neuropathic pain, Primary insomnia       ondansetron 4 MG ODT tab    ZOFRAN ODT    30 tablet    Take 1-2 tablets (4-8 mg) by mouth every 6 hours as needed for nausea    Intractable vomiting, presence of nausea not specified, unspecified vomiting type, Gastroparesis       * order for DME     1 Box    2 by 2 gauze pads, use for dressing changes as directed    Attention to G-tube (H)       * order for DME     1 each    1 inch paper tape, Use for dressing changes as directed    Attention to G-tube (H)       SUMAtriptan 50 MG tablet    IMITREX    18 tablet    Take 1-2 tablets ( mg) by mouth at onset of headache for migraine - may repeat dose after 2h if headache recurs.  Max: 200mg/24 hours    Migraine without aura and without status migrainosus, not intractable       traZODone 50 MG tablet    DESYREL    180 tablet    Take 1-2 tablets ( mg) by mouth nightly as needed 1-2 tabs at bedtime PRN    Insomnia, unspecified type       * Notice:  This list has 2 medication(s) that are the same as other medications prescribed for you. Read the directions carefully, and ask your doctor or other care provider to review them with you.

## 2017-09-02 ASSESSMENT — ANXIETY QUESTIONNAIRES: GAD7 TOTAL SCORE: 1

## 2017-09-02 ASSESSMENT — ASTHMA QUESTIONNAIRES: ACT_TOTALSCORE: 23

## 2017-09-05 ENCOUNTER — TELEPHONE (OUTPATIENT)
Dept: SURGERY | Facility: OTHER | Age: 37
End: 2017-09-05

## 2017-09-05 ENCOUNTER — INFUSION THERAPY VISIT (OUTPATIENT)
Dept: INFUSION THERAPY | Facility: CLINIC | Age: 37
End: 2017-09-05
Attending: INTERNAL MEDICINE
Payer: COMMERCIAL

## 2017-09-05 VITALS
DIASTOLIC BLOOD PRESSURE: 85 MMHG | OXYGEN SATURATION: 100 % | TEMPERATURE: 97.5 F | HEART RATE: 84 BPM | RESPIRATION RATE: 18 BRPM | SYSTOLIC BLOOD PRESSURE: 131 MMHG

## 2017-09-05 DIAGNOSIS — R11.2 NAUSEA AND VOMITING, INTRACTABILITY OF VOMITING NOT SPECIFIED, UNSPECIFIED VOMITING TYPE: ICD-10-CM

## 2017-09-05 DIAGNOSIS — R78.81 POSITIVE BLOOD CULTURE: ICD-10-CM

## 2017-09-05 DIAGNOSIS — R10.84 ABDOMINAL PAIN, GENERALIZED: Primary | ICD-10-CM

## 2017-09-05 DIAGNOSIS — A41.59 SEPSIS DUE TO KLEBSIELLA (H): ICD-10-CM

## 2017-09-05 LAB
BACTERIA SPEC CULT: NO GROWTH
BACTERIA SPEC CULT: NO GROWTH
SPECIMEN SOURCE: NORMAL
SPECIMEN SOURCE: NORMAL

## 2017-09-05 PROCEDURE — 25000128 H RX IP 250 OP 636: Performed by: INTERNAL MEDICINE

## 2017-09-05 PROCEDURE — 96375 TX/PRO/DX INJ NEW DRUG ADDON: CPT

## 2017-09-05 PROCEDURE — 25000125 ZZHC RX 250: Performed by: INTERNAL MEDICINE

## 2017-09-05 PROCEDURE — 96365 THER/PROPH/DIAG IV INF INIT: CPT

## 2017-09-05 PROCEDURE — 27210995 ZZH RX 272: Performed by: PEDIATRICS

## 2017-09-05 RX ORDER — DEHYDRATED ALCOHOL 0.98 ML/ML
5 INJECTION, SOLUTION INTRASPINAL ONCE
Status: CANCELLED
Start: 2017-09-06 | End: 2017-09-06

## 2017-09-05 RX ORDER — DEHYDRATED ALCOHOL 0.98 ML/ML
5 INJECTION, SOLUTION INTRASPINAL ONCE
Status: COMPLETED | OUTPATIENT
Start: 2017-09-05 | End: 2017-09-05

## 2017-09-05 RX ADMIN — ALCOHOL 5 ML: 0.98 INJECTION INTRASPINAL at 11:10

## 2017-09-05 RX ADMIN — FOLIC ACID: 5 INJECTION, SOLUTION INTRAMUSCULAR; INTRAVENOUS; SUBCUTANEOUS at 09:57

## 2017-09-05 NOTE — MR AVS SNAPSHOT
After Visit Summary   9/5/2017    Doreen Peralta    MRN: [id number]           Patient Information     Date Of Birth          1980        Visit Information        Provider Department      9/5/2017 9:30 AM NL INFUSION CHAIR 5 [facility]        Today's Diagnoses     Abdominal pain, generalized    -  1    Nausea and vomiting, intractability of vomiting not specified, unspecified vomiting type        Sepsis due to Klebsiella (H)        Positive blood culture - Klebsiella oxytoca from Port-a-cath culture          Care Instructions    Pt to return on 09/08/17 per patient for port removal. Copies of medication list and upcoming appointments given prior to discharge.             Follow-ups after your visit        Your next 10 appointments already scheduled     Sep 08, 2017   Procedure with Zechariah Worthington MD   [facility] ([facility])    [address] [place] [id number] [phone]           From [facility] [address]: Exit at [address] on south side of [place]. Turn right on [address]. Turn left at stoplight on [address]. [facility] will be in view two blocks ahead            Sep 18, 2017 10:30 AM CDT   Return Visit with Zechariah Worthington MD   [facility] ([facility])    [address] [place] [address] [phone]            Dec 04, 2017 10:00 AM CST   (Arrive by 9:45 AM)   Return Visit with Gilmer Lees MD   [facility] ([facility])    [address] [place] [address] [phone]              Who to contact     If you have questions or need follow up information about today's clinic visit or your schedule please contact [facility] directly at [phone].  Normal or non-critical lab and imaging results will be communicated to you by MyChart, letter or phone  within 4 business days after the clinic has received the results. If you do not hear from us within 7 days, please contact the clinic through [employer] or phone. If you have a critical or abnormal lab result, we will notify you by phone as soon as possible.  Submit refill requests through [employer] or call your pharmacy and they will forward the refill request to us. Please allow 3 business days for your refill to be completed.          Additional Information About Your Visit        [employer] Information     [employer] gives you secure access to your electronic health record. If you see a primary care provider, you can also send messages to your care team and make appointments. If you have questions, please call your primary care clinic.  If you do not have a primary care provider, please call [phone] and they will assist you.        Care EveryWhere ID     This is your Care EveryWhere ID. This could be used by other organizations to access your [facility] medical records  [id number]        Your Vitals Were     Pulse Temperature Respirations Last Period Pulse Oximetry       84 97.5  F (36.4  C) (Temporal) 18 08/01/2017 (Approximate) 100%        Blood Pressure from Last 3 Encounters:   09/05/17 131/85   09/01/17 102/70   08/30/17 148/83    Weight from Last 3 Encounters:   09/01/17 89.7 kg (197 lb 12.8 oz)   08/31/17 91.2 kg (201 lb)   08/30/17 91.8 kg (202 lb 4.8 oz)              Today, you had the following     No orders found for display         Today's Medication Changes          These changes are accurate as of: 9/5/17 12:21 PM.  If you have any questions, ask your nurse or doctor.               These medicines have changed or have updated prescriptions.        Dose/Directions    nortriptyline 10 MG capsule   Commonly known as:  PAMELOR   This may have changed:  how much to take   Used for:  Neuropathic pain, Primary insomnia        Dose:  30-40 mg   Take 3-4 capsules (30-40 mg) by mouth At Bedtime   Quantity:  120  capsule   Refills:  5                Primary Care Provider Office Phone #    [facility] [phone]       No address on file        Equal Access to Services     TRAMAINE CLAYTON : Hadii aad ku hadfangcheryl Ani, clark malihaolman, vesta long, elham menard lashandaannamaria solomon lalesviatayo jason. So [facility] [phone].    ATENCIÓN: Si habla español, tiene a wade disposición servicios gratuitos de asistencia lingüística. Llame al [phone].    We comply with applicable federal civil rights laws and [place] laws. We do not discriminate on the basis of race, color, national origin, age, disability sex, sexual orientation or gender identity.            Thank you!     Thank you for choosing [facility]  for your care. Our goal is always to provide you with excellent care. Hearing back from our patients is one way we can continue to improve our services. Please take a few minutes to complete the written survey that you may receive in the mail after your visit with us. Thank you!             Your Updated Medication List - Protect others around you: Learn how to safely use, store and throw away your medicines at www.disposemymeds.org.          This list is accurate as of: 9/5/17 12:21 PM.  Always use your most recent med list.                   Brand Name Dispense Instructions for use Diagnosis    ACETAMINOPHEN PO      Take 500-1,000 mg by mouth every 6 hours as needed for pain        albuterol (2.5 MG/3ML) 0.083% neb solution     360 mL    Take 1 vial (2.5 mg) by nebulization every 4 hours as needed for shortness of breath / dyspnea or wheezing    Gastrostomy tube dependent (H), SBO (small bowel obstruction) (H), Chronic abdominal pain, Chronic diarrhea       albuterol 90 MCG/ACT inhaler      Inhale 2 puffs into the lungs every 6 hours as needed        cloNIDine 0.2 MG tablet    CATAPRES    180 tablet    TAKE 2 TABLETS(0.4 MG) BY MOUTH EVERY EVENING    Anxiety        diphenhydrAMINE 25 MG tablet    BENADRYL ALLERGY    30 tablet    Take 1 tablet (25 mg) by mouth every 6 hours as needed for itching or other (Nausea)        DULoxetine 60 MG EC capsule    CYMBALTA    90 capsule    Take 1 capsule (60 mg) by mouth daily    Abdominal pain, epigastric, Abdominal migraine, not intractable       ethanol (74%) ADULT 74 %     5 mL    Inject IV through Port-a-cath daily for 11 days to lock Port-a-cath after each dose of ceftriaxone and also use to lock Port-a-cath after each weekly infusion of IV fluids and MVI    Positive blood culture, Sepsis due to Klebsiella (H)       ipratropium 0.02 % neb solution    ATROVENT    225 mL    Take 2.5 mLs (0.5 mg) by nebulization every 6 hours as needed for wheezing    Gastrostomy tube dependent (H), SBO (small bowel obstruction) (H), Chronic abdominal pain, Chronic diarrhea       mirtazapine 15 MG ODT tab    REMERON SOL-TAB    45 tablet    0.5 tablets (7.5 mg) by Orally disintegrating tablet route At Bedtime    Anxiety       nortriptyline 10 MG capsule    PAMELOR    120 capsule    Take 3-4 capsules (30-40 mg) by mouth At Bedtime    Neuropathic pain, Primary insomnia       ondansetron 4 MG ODT tab    ZOFRAN ODT    30 tablet    Take 1-2 tablets (4-8 mg) by mouth every 6 hours as needed for nausea    Intractable vomiting, presence of nausea not specified, unspecified vomiting type, Gastroparesis       * order for DME     1 Box    2 by 2 gauze pads, use for dressing changes as directed    Attention to G-tube (H)       * order for DME     1 each    1 inch paper tape, Use for dressing changes as directed    Attention to G-tube (H)       SUMAtriptan 50 MG tablet    IMITREX    18 tablet    Take 1-2 tablets ( mg) by mouth at onset of headache for migraine - may repeat dose after 2h if headache recurs.  Max: 200mg/24 hours    Migraine without aura and without status migrainosus, not intractable       traZODone 50 MG tablet    DESYREL    180 tablet    Take 1-2  tablets ( mg) by mouth nightly as needed 1-2 tabs at bedtime PRN    Insomnia, unspecified type       * Notice:  This list has 2 medication(s) that are the same as other medications prescribed for you. Read the directions carefully, and ask your doctor or other care provider to review them with you.

## 2017-09-05 NOTE — PATIENT INSTRUCTIONS
Pt to return on 09/08/17 per patient for port removal. Copies of medication list and upcoming appointments given prior to discharge.

## 2017-09-05 NOTE — TELEPHONE ENCOUNTER
Surgery Scheduled    Date of Surgery 09/08/17 Time of Surgery 11:30am  Procedure: Right Side [facility] Facility: [facility]  Surgeon: Dr Worthington  Type of Anesthesia Anticipated: MAC  Pre-Op: 09/01/17 with Dr Flores   Post-Op: 09/18/17 with Dr Worthington  Pre-Certification -to be completed  Consent Signed -to be completed  Hospital Stay -same day procedure    Surgery Packet (and/or) Colonscopy Prep (was given/or mailed) to patient. Patient was also instructed to arrive 1 hour(s) prior to surgery.  Patient understood and agrees to the plan.      Elena Mars  Specialty

## 2017-09-05 NOTE — PROGRESS NOTES
Patient here today for IV hydration with multi vit. Port accessed without problems, blood return pre and post infusion. Skin around port pink but no open areas except small scab where accessed prior. When removed tegaderm, no irritation on skin after. Dry gauze placed over port. Has an appt for port removal 09/08/17. Patient discharged to home in stable condition.

## 2017-09-06 ENCOUNTER — NURSE TRIAGE (OUTPATIENT)
Dept: NURSING | Facility: CLINIC | Age: 37
End: 2017-09-06

## 2017-09-06 ENCOUNTER — HOSPITAL ENCOUNTER (EMERGENCY)
Facility: CLINIC | Age: 37
Discharge: HOME | End: 2017-09-07
Attending: FAMILY MEDICINE | Admitting: FAMILY MEDICINE
Payer: COMMERCIAL

## 2017-09-06 DIAGNOSIS — R50.9 FEVER CHILLS: ICD-10-CM

## 2017-09-06 DIAGNOSIS — R10.84 ABDOMINAL PAIN, GENERALIZED: ICD-10-CM

## 2017-09-06 LAB
ALBUMIN SERPL-MCNC: 3.4 G/DL (ref 3.4–5)
ALP SERPL-CCNC: 165 U/L (ref 40–150)
ALT SERPL W P-5'-P-CCNC: 35 U/L (ref 0–50)
ANION GAP SERPL CALCULATED.3IONS-SCNC: 12 MMOL/L (ref 3–14)
AST SERPL W P-5'-P-CCNC: 23 U/L (ref 0–45)
BASOPHILS # BLD AUTO: 0.1 10E9/L (ref 0–0.2)
BASOPHILS NFR BLD AUTO: 0.5 %
BILIRUB SERPL-MCNC: 0.8 MG/DL (ref 0.2–1.3)
BUN SERPL-MCNC: 9 MG/DL (ref 7–30)
CALCIUM SERPL-MCNC: 8.8 MG/DL (ref 8.5–10.1)
CHLORIDE SERPL-SCNC: 108 MMOL/L (ref 94–109)
CO2 SERPL-SCNC: 21 MMOL/L (ref 20–32)
CREAT SERPL-MCNC: 0.78 MG/DL (ref 0.52–1.04)
DIFFERENTIAL METHOD BLD: ABNORMAL
EOSINOPHIL # BLD AUTO: 0.1 10E9/L (ref 0–0.7)
EOSINOPHIL NFR BLD AUTO: 0.5 %
ERYTHROCYTE [DISTWIDTH] IN BLOOD BY AUTOMATED COUNT: 17.5 % (ref 10–15)
GFR SERPL CREATININE-BSD FRML MDRD: 84 ML/MIN/1.7M2
GLUCOSE SERPL-MCNC: 87 MG/DL (ref 70–99)
HCT VFR BLD AUTO: 35.8 % (ref 35–47)
HGB BLD-MCNC: 11.3 G/DL (ref 11.7–15.7)
IMM GRANULOCYTES # BLD: 0 10E9/L (ref 0–0.4)
IMM GRANULOCYTES NFR BLD: 0.3 %
LACTATE BLD-SCNC: 0.7 MMOL/L (ref 0.7–2)
LIPASE SERPL-CCNC: 163 U/L (ref 73–393)
LYMPHOCYTES # BLD AUTO: 2.6 10E9/L (ref 0.8–5.3)
LYMPHOCYTES NFR BLD AUTO: 25.8 %
MCH RBC QN AUTO: 24.5 PG (ref 26.5–33)
MCHC RBC AUTO-ENTMCNC: 31.6 G/DL (ref 31.5–36.5)
MCV RBC AUTO: 78 FL (ref 78–100)
MONOCYTES # BLD AUTO: 0.6 10E9/L (ref 0–1.3)
MONOCYTES NFR BLD AUTO: 6.1 %
NEUTROPHILS # BLD AUTO: 6.8 10E9/L (ref 1.6–8.3)
NEUTROPHILS NFR BLD AUTO: 66.8 %
PLATELET # BLD AUTO: 352 10E9/L (ref 150–450)
POTASSIUM SERPL-SCNC: 3.8 MMOL/L (ref 3.4–5.3)
PROT SERPL-MCNC: 7.9 G/DL (ref 6.8–8.8)
RBC # BLD AUTO: 4.62 10E12/L (ref 3.8–5.2)
SODIUM SERPL-SCNC: 141 MMOL/L (ref 133–144)
WBC # BLD AUTO: 10.2 10E9/L (ref 4–11)

## 2017-09-06 PROCEDURE — 99285 EMERGENCY DEPT VISIT HI MDM: CPT | Mod: 25 | Performed by: FAMILY MEDICINE

## 2017-09-06 PROCEDURE — 80053 COMPREHEN METABOLIC PANEL: CPT | Performed by: FAMILY MEDICINE

## 2017-09-06 PROCEDURE — 85025 COMPLETE CBC W/AUTO DIFF WBC: CPT | Performed by: FAMILY MEDICINE

## 2017-09-06 PROCEDURE — 99285 EMERGENCY DEPT VISIT HI MDM: CPT | Mod: Z6 | Performed by: FAMILY MEDICINE

## 2017-09-06 PROCEDURE — 96361 HYDRATE IV INFUSION ADD-ON: CPT | Performed by: FAMILY MEDICINE

## 2017-09-06 PROCEDURE — 96375 TX/PRO/DX INJ NEW DRUG ADDON: CPT | Performed by: FAMILY MEDICINE

## 2017-09-06 PROCEDURE — 87040 BLOOD CULTURE FOR BACTERIA: CPT | Performed by: FAMILY MEDICINE

## 2017-09-06 PROCEDURE — 83605 ASSAY OF LACTIC ACID: CPT | Performed by: FAMILY MEDICINE

## 2017-09-06 PROCEDURE — 83690 ASSAY OF LIPASE: CPT | Performed by: FAMILY MEDICINE

## 2017-09-06 PROCEDURE — 25000128 H RX IP 250 OP 636: Performed by: FAMILY MEDICINE

## 2017-09-06 PROCEDURE — 96374 THER/PROPH/DIAG INJ IV PUSH: CPT | Performed by: FAMILY MEDICINE

## 2017-09-06 RX ORDER — LORAZEPAM 2 MG/ML
1 INJECTION INTRAMUSCULAR ONCE
Status: COMPLETED | OUTPATIENT
Start: 2017-09-06 | End: 2017-09-06

## 2017-09-06 RX ORDER — LIDOCAINE 40 MG/G
CREAM TOPICAL
Status: DISCONTINUED | OUTPATIENT
Start: 2017-09-06 | End: 2017-09-07 | Stop reason: HOSPADM

## 2017-09-06 RX ORDER — ONDANSETRON 2 MG/ML
4 INJECTION INTRAMUSCULAR; INTRAVENOUS EVERY 30 MIN PRN
Status: DISCONTINUED | OUTPATIENT
Start: 2017-09-06 | End: 2017-09-07 | Stop reason: HOSPADM

## 2017-09-06 RX ORDER — SODIUM CHLORIDE 9 MG/ML
INJECTION, SOLUTION INTRAVENOUS CONTINUOUS
Status: DISCONTINUED | OUTPATIENT
Start: 2017-09-06 | End: 2017-09-07 | Stop reason: HOSPADM

## 2017-09-06 RX ORDER — HEPARIN SODIUM (PORCINE) LOCK FLUSH IV SOLN 100 UNIT/ML 100 UNIT/ML
5 SOLUTION INTRAVENOUS
Status: DISCONTINUED | OUTPATIENT
Start: 2017-09-06 | End: 2017-09-07 | Stop reason: HOSPADM

## 2017-09-06 RX ADMIN — ONDANSETRON 4 MG: 2 SOLUTION INTRAMUSCULAR; INTRAVENOUS at 22:48

## 2017-09-06 RX ADMIN — LORAZEPAM 1 MG: 2 INJECTION INTRAMUSCULAR; INTRAVENOUS at 23:42

## 2017-09-06 RX ADMIN — SODIUM CHLORIDE 1000 ML: 9 INJECTION, SOLUTION INTRAVENOUS at 22:47

## 2017-09-06 RX ADMIN — HYDROMORPHONE HYDROCHLORIDE 1 MG: 1 INJECTION, SOLUTION INTRAMUSCULAR; INTRAVENOUS; SUBCUTANEOUS at 22:58

## 2017-09-06 NOTE — ED AVS SNAPSHOT
[facility] Emergency Department    [address] [place] [address]    Phone:  [phone]    Fax:  [phone]                                       Doreen Peralta   MRN: [id number]    Department:  [facility] Emergency Department   Date of Visit:  9/6/2017           Patient Information     Date Of Birth          1980        Your diagnoses for this visit were:     Abdominal pain, generalized        You were seen by Sam Pugh MD.      Follow-up Information     Follow up with Kenji Flores DO.    Specialty:  Internal Medicine    Contact information:    [address] [place] [id number] [phone]          Follow up with Zechariah Worthington MD.    Specialty:  General Surgery    Why:  as scheduled    Contact information:    [address]   [place] [address] [phone]          Discharge Instructions       Continue your current medications.  Keep your appointment on Friday to have your port removed.    I wish you the very best with that procedure and your recovery.  Your lab work was very reassuring tonight.  It was nice visiting with you again tonight.   I hope you feel better soon.     Thank you for choosing [facility]. We appreciate the opportunity to meet your urgent medical needs. Please let us know if we could have done anything to make your stay more satisfying.    After discharge, please closely monitor for any new or worsening symptoms. Return to the Emergency Department if you develop any acute worsening signs or symptoms.    If you received an opiate pain medication or sedative during your visit, please do not drive for at least 8 hours.     If you had lab work, cultures or imaging studies done during your stay, the final results may still be pending. We will call you if your plan of care needs to change. However, if you are not improving as expected, please follow up with your primary care provider or clinic.     Start  any prescription medications that were prescribed to you and take them as directed.     Please see additional handouts that may be pertinent to your condition.        Future Appointments        Provider Department Dept Phone Center    9/18/2017 10:30 AM Zechariah Worthington MD [facility] [phone] [facility]    12/4/2017 10:00 AM Gilmer Lees MD [facility] [phone] [facility]      24 Hour Appointment Hotline       To make an appointment at any [facility] call [phone] ([phone]). If you don't have a family doctor or clinic, we will help you find one. [facility] clinics are conveniently located to serve the needs of you and your family.             Review of your medicines      CONTINUE these medicines which may have CHANGED, or have new prescriptions. If we are uncertain of the size of tablets/capsules you have at home, strength may be listed as something that might have changed.        Dose / Directions Last dose taken    nortriptyline 10 MG capsule   Commonly known as:  PAMELOR   Dose:  30-40 mg   What changed:  how much to take   Quantity:  120 capsule        Take 3-4 capsules (30-40 mg) by mouth At Bedtime   Refills:  5          Our records show that you are taking the medicines listed below. If these are incorrect, please call your family doctor or clinic.        Dose / Directions Last dose taken    ACETAMINOPHEN PO   Dose:  500-1000 mg        Take 500-1,000 mg by mouth every 6 hours as needed for pain   Refills:  0        albuterol (2.5 MG/3ML) 0.083% neb solution   Dose:  2.5 mg   Quantity:  360 mL        Take 1 vial (2.5 mg) by nebulization every 4 hours as needed for shortness of breath / dyspnea or wheezing   Refills:  0        albuterol 90 MCG/ACT inhaler   Dose:  2 puff        Inhale 2 puffs into the lungs every 6 hours as needed   Refills:  0        cloNIDine 0.2 MG tablet   Commonly known as:  CATAPRES   Quantity:  180 tablet        TAKE  2 TABLETS(0.4 MG) BY MOUTH EVERY EVENING   Refills:  0        diphenhydrAMINE 25 MG tablet   Commonly known as:  BENADRYL ALLERGY   Dose:  25 mg   Quantity:  30 tablet        Take 1 tablet (25 mg) by mouth every 6 hours as needed for itching or other (Nausea)   Refills:  0        DULoxetine 60 MG EC capsule   Commonly known as:  CYMBALTA   Dose:  60 mg   Quantity:  90 capsule        Take 1 capsule (60 mg) by mouth daily   Refills:  3        ethanol (74%) ADULT 74 %   Quantity:  5 mL        Inject IV through Port-a-cath daily for 11 days to lock Port-a-cath after each dose of ceftriaxone and also use to lock Port-a-cath after each weekly infusion of IV fluids and MVI   Refills:  0        ipratropium 0.02 % neb solution   Commonly known as:  ATROVENT   Dose:  0.5 mg   Quantity:  225 mL        Take 2.5 mLs (0.5 mg) by nebulization every 6 hours as needed for wheezing   Refills:  0        mirtazapine 15 MG ODT tab   Commonly known as:  REMERON SOL-TAB   Dose:  7.5 mg   Quantity:  45 tablet        0.5 tablets (7.5 mg) by Orally disintegrating tablet route At Bedtime   Refills:  0        ondansetron 4 MG ODT tab   Commonly known as:  ZOFRAN ODT   Dose:  4-8 mg   Quantity:  30 tablet        Take 1-2 tablets (4-8 mg) by mouth every 6 hours as needed for nausea   Refills:  0        * order for DME   Quantity:  1 Box        2 by 2 gauze pads, use for dressing changes as directed   Refills:  1        * order for DME   Quantity:  1 each        1 inch paper tape, Use for dressing changes as directed   Refills:  0        SUMAtriptan 50 MG tablet   Commonly known as:  IMITREX   Dose:   mg   Quantity:  18 tablet        Take 1-2 tablets ( mg) by mouth at onset of headache for migraine - may repeat dose after 2h if headache recurs.  Max: 200mg/24 hours   Refills:  1        traZODone 50 MG tablet   Commonly known as:  DESYREL   Dose:   mg   Quantity:  180 tablet        Take 1-2 tablets ( mg) by mouth nightly as  needed 1-2 tabs at bedtime PRN   Refills:  1        * Notice:  This list has 2 medication(s) that are the same as other medications prescribed for you. Read the directions carefully, and ask your doctor or other care provider to review them with you.            Procedures and tests performed during your visit     Blood culture ONE site    CBC with platelets differential    Comprehensive metabolic panel    Lactic acid whole blood    Lipase    Nurse to initiate VAD heparin  ADULT [[id number]] order set      Orders Needing Specimen Collection     Ordered          09/06/17 2144  UA with Microscopic - STAT, Prio: STAT, Needs to be Collected     Scheduled Task Status   09/06/17 2144 Collect UA with Microscopic Open   Order Class:  PCU Collect                  Pending Results     No orders found for last 3 day(s).            Pending Culture Results     No orders found for last 3 day(s).            Pending Results Instructions     If you had any lab results that were not finalized at the time of your Discharge, you can call the ED Lab Result RN at [phone]. You will be contacted by this team for any positive Lab results or changes in treatment. The nurses are available 7 days a week from 10A to 6:30P.  You can leave a message 24 hours per day and they will return your call.        Thank you for choosing [facility]       Thank you for choosing [facility] for your care. Our goal is always to provide you with excellent care. Hearing back from our patients is one way we can continue to improve our services. Please take a few minutes to complete the written survey that you may receive in the mail after you visit with us. Thank you!        [employer] Information     [employer] gives you secure access to your electronic health record. If you see a primary care provider, you can also send messages to your care team and make appointments. If you have questions, please call your primary care clinic.  If you do not have a primary care  provider, please call [phone] and they will assist you.        Care EveryWhere ID     This is your Care EveryWhere ID. This could be used by other organizations to access your [facility] medical records  [id number]        Equal Access to Services     TRAMAINE GOMEZ: Carrie Law, clark villeda, qayeta kaalmaskyler long, elham gomez. So [facility] [phone].    ATENCIÓN: Si habla español, tiene a wade disposición servicios gratuitos de asistencia lingüística. Llame al [phone].    We comply with applicable federal civil rights laws and [place] laws. We do not discriminate on the basis of race, color, national origin, age, disability sex, sexual orientation or gender identity.            After Visit Summary       This is your record. Keep this with you and show to your community pharmacist(s) and doctor(s) at your next visit.

## 2017-09-06 NOTE — ED AVS SNAPSHOT
[facility] Emergency Department    [address] [place] [address]    Phone:  [phone]    Fax:  [phone]                                       Doreen Peralta   MRN: [id number]    Department:  [facility] Emergency Department   Date of Visit:  9/6/2017           After Visit Summary Signature Page     I have received my discharge instructions, and my questions have been answered. I have discussed any challenges I see with this plan with the nurse or doctor.    ..........................................................................................................................................  Patient/Patient Representative Signature      ..........................................................................................................................................  Patient Representative Print Name and Relationship to Patient    ..................................................               ................................................  Date                                            Time    ..........................................................................................................................................  Reviewed by Signature/Title    ...................................................              ..............................................  Date                                                            Time

## 2017-09-07 ENCOUNTER — ANESTHESIA EVENT (OUTPATIENT)
Dept: SURGERY | Facility: CLINIC | Age: 37
End: 2017-09-07
Payer: COMMERCIAL

## 2017-09-07 VITALS
TEMPERATURE: 98.6 F | OXYGEN SATURATION: 99 % | DIASTOLIC BLOOD PRESSURE: 83 MMHG | RESPIRATION RATE: 12 BRPM | HEIGHT: 66 IN | WEIGHT: 197 LBS | HEART RATE: 92 BPM | SYSTOLIC BLOOD PRESSURE: 134 MMHG | BODY MASS INDEX: 31.66 KG/M2

## 2017-09-07 PROCEDURE — 25000128 H RX IP 250 OP 636: Performed by: FAMILY MEDICINE

## 2017-09-07 RX ADMIN — SODIUM CHLORIDE, PRESERVATIVE FREE 5 ML: 5 INJECTION INTRAVENOUS at 00:05

## 2017-09-07 ASSESSMENT — LIFESTYLE VARIABLES: TOBACCO_USE: 1

## 2017-09-07 ASSESSMENT — ENCOUNTER SYMPTOMS: FEVER: 1

## 2017-09-07 NOTE — ED PROVIDER NOTES
"  History     Chief Complaint   Patient presents with     Fever     Abdominal Pain     HPI  Doreen Peralta is a 37 year old female who presents to the ED with a fever for the past day or so up to 102.  Also has recurrent abdominal pain every time she gets the fevers.  She thinks her port is the source of infection and it is scheduled to be removed on Friday, 2 days from now.    She was seen in the ED at the [facility] on August 30 with similar complaints.  Her white count and lactic acid were normal.  There was no concerning erythema about the port site.  Abdominal CT was negative for any acute findings.    She is been in the hospital recently with sepsis and treated with IV antibiotics and fluids.    She had blood cultures drawn peripherally and from her port twice in the past 2 weeks and they have all been negative.    She has a G-tube but takes all of her intake orally just uses the G-tube to \"drain her stomach, and decompress\" if she is nauseous or bloated.  Tends to get fevers and sepsis when she has ports and tubes placed.  Question of Munchausen's has been raised in the past.  Long allergy list reviewed.    Past Medical History:   Diagnosis Date     Asthma      Bilateral ovarian cysts      Cervical cancer (H) 01/01/2008    cervical cancer      Chronic pain      Colonic dysmotility     s/p subtotal colectomy     Constipation     chronic     E. coli sepsis (H) 5/8/2016     Enteritis      Fungemia 5/5/2016     Gastro-oesophageal reflux disease      H/O ileostomy      Hx of abnormal Pap smear     s/p LEEP - no further details provided     Hypertension      IBS (irritable bowel syndrome)      Other chronic pain      PONV (postoperative nausea and vomiting)      Thrombosis, hepatic vein (H)     microvascular       Past [facility] History:   Procedure Laterality Date     COLONOSCOPY  7/10/2012    Procedure: COLONOSCOPY;;  Surgeon: Nicole Redding MD;  [facility]     COLONOSCOPY N/A 2/19/2017    " Procedure: COLONOSCOPY;  Surgeon: Randell Muller MD;  [facility] GI     COLONOSCOPY N/A 2/21/2017    Procedure: COLONOSCOPY;  Surgeon: Randell Muller MD;  Location[facility]     ECHO CHELO  7/19/2016          ENDOSCOPIC INSERTION TUBE GASTROSTOMY N/A 1/21/2016    Procedure: ENDOSCOPIC INSERTION TUBE GASTROSTOMY;  Surgeon: Nicole Redding MD;  [facility]     ESOPHAGOSCOPY, GASTROSCOPY, DUODENOSCOPY (EGD), COMBINED  7/10/2012    Procedure: COMBINED ESOPHAGOSCOPY, GASTROSCOPY, DUODENOSCOPY (EGD);  Upper Endoscopy, Ileoscopy    Latex Allergy  with biopsies;  Surgeon: Nicole Redding MD;  [facility]     ESOPHAGOSCOPY, GASTROSCOPY, DUODENOSCOPY (EGD), COMBINED N/A 11/5/2014    Procedure: COMBINED ESOPHAGOSCOPY, GASTROSCOPY, DUODENOSCOPY (EGD);  Surgeon: Nicole Redding MD;  [facility]     HC REPLACE DUODENOSTOMY/JEJUNOSTOMY TUBE PERCUTANEOUS N/A 8/27/2015    Procedure: REPLACE GASTROJEJUNOSTOMY TUBE, PERCUTANEOUS;  Surgeon: Mio Holder MD;  [facility]     HC REPLACE DUODENOSTOMY/JEJUNOSTOMY TUBE PERCUTANEOUS N/A 1/7/2016    Procedure: REPLACE JEJUNOSTOMY TUBE, PERCUTANEOUS;  Surgeon: Elsa Medel MD;  [facility]     HC REPLACE DUODENOSTOMY/JEJUNOSTOMY TUBE PERCUTANEOUS N/A 1/28/2016    Procedure: REPLACE JEJUNOSTOMY TUBE, PERCUTANEOUS;  Surgeon: Elsa Medel MD;  [facility]     HC REPLACE GASTROSTOMY/CECOSTOMY TUBE PERCUTANEOUS Left 5/19/2015    Procedure: REPLACE GASTROSTOMY TUBE, PERCUTANEOUS;  Surgeon: Melecio Morejon Chi, MD;  Location: [facility]     HC UGI ENDOSCOPY W PLACEMENT GASTROSTOMY TUBE PERCUT N/A 10/1/2015    Procedure: COMBINED ESOPHAGOSCOPY, GASTROSCOPY, DUODENOSCOPY (EGD), PLACE PERCUTANEOUS ENDOSCOPIC GASTROSTOMY TUBE;  Surgeon: Mio Holder MD;  [facility]     INSERT PORT VASCULAR ACCESS Right 7/20/2017    Procedure: INSERT PORT VASCULAR ACCESS;  Chest Port Placement  **Latex Allergy**;  Surgeon: Coy Rocha,  ANUP;  [facility]     LAPAROSCOPIC ASSISTED COLECTOMY  1/20/2012    Procedure:LAPAROSCOPIC ASSISTED COLECTOMY; Laparoscopic Ileostomy       LAPAROSCOPIC ASSISTED COLECTOMY LEFT (DESCENDING)  10/24/2012    Procedure: LAPAROSCOPIC ASSISTED COLECTOMY LEFT (DESCENDING);   Hand Assisted Laproscopic Subtotal abdominal Colectomy,Iieorectal anastamosis, Ileostomy Closure.       LAPAROSCOPIC ASSISTED INSERTION TUBE JEJUNOSTOMY N/A 10/16/2015    Procedure: LAPAROSCOPIC ASSISTED INSERTION TUBE JEJUNOSTOMY;  Surgeon: Elsa Medel MD;  [facility]     LAPAROSCOPIC CHOLECYSTECTOMY  2002    [facility]tr[facility] stones duct     LAPAROSCOPIC ILEOSTOMY  1/20/2012    [facility], loop     LAPAROSCOPIC OOPHORECTOMY Right 2009    [facility]     LAPAROTOMY EXPLORATORY N/A 1/28/2016    Procedure: LAPAROTOMY EXPLORATORY;  Surgeon: Elsa Medel MD;  [facility]     LEEP TX, CERVICAL  2009    [facility]     PICC INSERTION Left 10/21/2015    5fr DL Power PICC, 37cm (2cm external) in the L basilic vein w/ tip in the SVC RA junction.     REMOVE GASTROSTOMY TUBE ADULT N/A 12/12/2014    Procedure: REMOVE GASTROSTOMY TUBE ADULT;  Surgeon: Nicole Reddign MD;  [facility]: [facility]     REMOVE PORT VASCULAR ACCESS Right 6/30/2016    Procedure: REMOVE PORT VASCULAR ACCESS;  Surgeon: Pradeep Orosco MD;  Location: [facility] OR     replace GASTROSTOMY TUBE ADULT  5/19/15       Social History     Social History     Marital status:      Spouse name: Mitali     Number of children: 3     Years of education: N/A     Occupational History     Medical Assistant [employer]     Pediatric clinic     Social History Main Topics     Smoking status: Former Smoker     Packs/day: 1.00     Years: 4.00     Types: Cigarettes     Quit date: 1/1/2004     Smokeless tobacco: Former User     Alcohol use No     Drug use: No     Sexual activity: Yes     Partners: Male     Other Topics Concern     Not on file     Social History  "Narrative          Allergies   Allergen Reactions     Hyoscyamine Rash     Metoclopramide Other (See Comments)     Eye twitching.      Peaches [Peach] Other (See Comments)     Raw. Cooked OK     Sucralose Other (See Comments)     All artificial sweeteners. Aspartame also. Swollen glands     Advair Diskus Other (See Comments)     Throat burns     Azithromycin Other (See Comments)     Burning in throat     Compazine [Prochlorperazine] Visual Disturbance     Contrast Dye Itching     States is allergic to CT contrast dye     Cyclobenzaprine Visual Disturbance     Fentanyl Other (See Comments)     migraine     Ibuprofen GI Disturbance     Lactulose Nausea and Vomiting     Gas and bloating     Levaquin [Levofloxacin] Swelling     Per ED M.D. And RN      Morphine Sulfate Other (See Comments)     Chest pain       Oxycodone Other (See Comments)     Burning throat, but can take Norco.      Rizatriptan Visual Disturbance     Droperidol Hives and Rash     Isovue [Iopamidol] Palpitations     Pt had racing heart and sob      Ketorolac Anxiety     Latex Swelling and Rash     Kiwi, likely also avacado, ? banana     Levsin Rash       Med List Reviewed       I have reviewed the Medications, Allergies, Past Medical and [facility] History, and Social History in the Epic system.         Review of Systems   Constitutional: Positive for fever ( subjective ).   All other systems reviewed and are negative.      Physical Exam   BP: (!) 141/98  Pulse: 91  Heart Rate: 91  Temp: 98.9  F (37.2  C)  Resp: 16  Height: 167.6 cm (5' 6\")  Weight: 89.4 kg (197 lb)  SpO2: 99 %  Physical Exam   Constitutional: She is oriented to person, place, and time. She appears well-developed and well-nourished. She appears distressed (mild/mod).   HENT:   Mouth/Throat: Oropharynx is clear and moist.   Eyes: EOM are normal.   Neck: Neck supple.   Cardiovascular: Normal rate, regular rhythm and normal heart sounds.    No murmur heard.  Pulmonary/Chest: Effort normal " and breath sounds normal. No respiratory distress.   Skin over her right upper chest/port site is not erythematous.  There is a tiny scab from where it has been accessed.  No fluctuance or induration.   Abdominal: Soft. There is tenderness (moderate diffuse.  Gtube site has some mucous but no significant erythema or swelling.).   Musculoskeletal: Normal range of motion. She exhibits no edema.   Neurological: She is alert and oriented to person, place, and time.   Skin: Skin is warm and dry.   Psychiatric: She has a normal mood and affect.       ED Course    Blood culture was drawn from her port.  IV placed.  Labs drawn.  Was given a liter of normal saline while waiting for labs to come back.  One dose of Dilaudid for pain also while waiting for labs.      Her white count is normal.  Her lactic acid is at the low end of normal at 0.7.  She does not appear septic and her labs would support this.  She has had blood cultures drawn both peripherally and from her port twice over the past couple of weeks and they have all come back showing no growth.    It does not appear that her port site is infected but on the other hand, removing it is , in my opinion, an excellent idea as she has had difficulty with ports, lines , tubes etc. over the years.      She complained of persistent pain .  No further narcotics were given but I did give her 1 mg of Ativan IV .  This did help and she was discharged home in stable condition.  She will keep her appointment on 09/08/2017 with Dr Worthington for port removal.       ED Course     Procedures        Results for orders placed or performed during the hospital encounter of 09/06/17 (from the past 24 hour(s))   CBC with platelets differential   Result Value Ref Range    WBC 10.2 4.0 - 11.0 10e9/L    RBC Count 4.62 3.8 - 5.2 10e12/L    Hemoglobin 11.3 (L) 11.7 - 15.7 g/dL    Hematocrit 35.8 35.0 - 47.0 %    MCV 78 78 - 100 fl    MCH 24.5 (L) 26.5 - 33.0 pg    MCHC 31.6 31.5 - 36.5 g/dL    RDW  17.5 (H) 10.0 - 15.0 %    Platelet Count 352 150 - 450 10e9/L    Diff Method Automated Method     % Neutrophils 66.8 %    % Lymphocytes 25.8 %    % Monocytes 6.1 %    % Eosinophils 0.5 %    % Basophils 0.5 %    % Immature Granulocytes 0.3 %    Absolute Neutrophil 6.8 1.6 - 8.3 10e9/L    Absolute Lymphocytes 2.6 0.8 - 5.3 10e9/L    Absolute Monocytes 0.6 0.0 - 1.3 10e9/L    Absolute Eosinophils 0.1 0.0 - 0.7 10e9/L    Absolute Basophils 0.1 0.0 - 0.2 10e9/L    Abs Immature Granulocytes 0.0 0 - 0.4 10e9/L   Comprehensive metabolic panel   Result Value Ref Range    Sodium 141 133 - 144 mmol/L    Potassium 3.8 3.4 - 5.3 mmol/L    Chloride 108 94 - 109 mmol/L    Carbon Dioxide 21 20 - 32 mmol/L    Anion Gap 12 3 - 14 mmol/L    Glucose 87 70 - 99 mg/dL    Urea Nitrogen 9 7 - 30 mg/dL    Creatinine 0.78 0.52 - 1.04 mg/dL    GFR Estimate 84 >60 mL/min/1.7m2    GFR Estimate If Black >90 >60 mL/min/1.7m2    Calcium 8.8 8.5 - 10.1 mg/dL    Bilirubin Total 0.8 0.2 - 1.3 mg/dL    Albumin 3.4 3.4 - 5.0 g/dL    Protein Total 7.9 6.8 - 8.8 g/dL    Alkaline Phosphatase 165 (H) 40 - 150 U/L    ALT 35 0 - 50 U/L    AST 23 0 - 45 U/L   Lactic acid whole blood   Result Value Ref Range    Lactic Acid 0.7 0.7 - 2.0 mmol/L   Lipase   Result Value Ref Range    Lipase 163 73 - 393 U/L     *Note: Due to a large number of results and/or encounters for the requested time period, some results have not been displayed. A complete set of results can be found in Results Review.        Assessments & Plan (with Medical Decision Making)    (R10.71) Abdominal pain, generalized  Comment: Chronic-recurrent  Plan: Her labs were unremarkable tonight.  She just had a CT scan done a week ago which was unremarkable.  She is scheduled to have her port removed on 09/08/2017.  She will keep that appointment.  She plans on seeing GI in December to discuss having her G-tube removed as well.  She will follow-up with her primary provider for any medication refills.         I have reviewed the nursing notes.    I have reviewed the findings, diagnosis, plan and need for follow up with the patient.       Discharge Medication List as of 9/7/2017 12:20 AM          Final diagnoses:   Abdominal pain, generalized       9/6/2017   [facility] EMERGENCY DEPARTMENT     Sam Pugh MD  09/07/17 0253

## 2017-09-07 NOTE — ED NOTES
Patient with fever and abdominal pain starting last night. Scheduled to get port out on Friday as she says this is the source of her infection.

## 2017-09-07 NOTE — DISCHARGE INSTRUCTIONS
Continue your current medications.  Keep your appointment on Friday to have your port removed.    I wish you the very best with that procedure and your recovery.  Your lab work was very reassuring tonight.  It was nice visiting with you again tonight.   I hope you feel better soon.     Thank you for choosing [facility]. We appreciate the opportunity to meet your urgent medical needs. Please let us know if we could have done anything to make your stay more satisfying.    After discharge, please closely monitor for any new or worsening symptoms. Return to the Emergency Department if you develop any acute worsening signs or symptoms.    If you received an opiate pain medication or sedative during your visit, please do not drive for at least 8 hours.     If you had lab work, cultures or imaging studies done during your stay, the final results may still be pending. We will call you if your plan of care needs to change. However, if you are not improving as expected, please follow up with your primary care provider or clinic.     Start any prescription medications that were prescribed to you and take them as directed.     Please see additional handouts that may be pertinent to your condition.

## 2017-09-07 NOTE — TELEPHONE ENCOUNTER
"Caller states she ws hospitalized over weekend for \"sepsis\"  .  Has a port-a-cath;  Reports fever 101-102 over past 24 hrs and wants to know what to do. Currently states it 101 oral  Reviewed of AVS instruction from admission advise \"return to ED with any fever\".   Queried why she has waited 24 hrs to take actions and stated \" I don't want to go back \"  Will return to [facility] ED  Reason for Disposition    [1] Fever > 100.5 F (38.1 C) AND [2] has port (portacath), central line, or PICC line    Protocols used: FEVER-ADULT-AH  Megan Gallardo RN  FNA    "

## 2017-09-07 NOTE — H&P (VIEW-ONLY)
[facility]  [address] [facility] [address]  [phone]  Dept: [phone]    PRE-OP EVALUATION:  Today's date: 2017    Droeen Peralta (: 1980) presents for pre-operative evaluation assessment as requested by Dr. Worthington.  She requires evaluation and anesthesia risk assessment prior to undergoing surgery/procedure for treatment of port .  Proposed procedure: REMOVE PORT VASCULAR ACCESS    Date of Surgery/ Procedure: 2017  Time of Surgery/ Procedure: 11:30  Hospital/[facility] Facility: [facility]  Primary Physician: [facility] [place]  Type of Anesthesia Anticipated: Monitor Anesthesia Care    Patient has a Health Care Directive or Living Will:  NO    1. NO - Do you have a history of heart attack, stroke, stent, bypass or surgery on an artery in the head, neck, heart or legs?  2. NO - Do you ever have any pain or discomfort in your chest?  3. NO - Do you have a history of  Heart Failure?  4. NO - Are you troubled by shortness of breath when: walking on the level, up a slight hill or at night?  5. NO - Do you currently have a cold, bronchitis or other respiratory infection?  6. NO - Do you have a cough, shortness of breath or wheezing?  7. NO - Do you sometimes get pains in the calves of your legs when you walk?  8. YES - DO YOU OR ANYONE IN YOUR FAMILY HAVE PREVIOUS HISTORY OF BLOOD CLOTS? Personal history of blood clots  9. NO - Do you or does anyone in your family have a serious bleeding problem such as prolonged bleeding following surgeries or cuts?  10. YES - HAVE YOU EVER HAD PROBLEMS WITH ANEMIA OR BEEN TOLD TO TAKE IRON PILLS? Current anemia  11. NO - Have you had any abnormal blood loss such as black, tarry or bloody stools, or abnormal vaginal bleeding?  12. NO - Have you ever had a blood transfusion?  13. NO - Have you or any of your relatives ever had problems with anesthesia?  14. NO - Do you have sleep apnea, excessive snoring or daytime  "drowsiness?  15. NO - Do you have any prosthetic heart valves?  16. NO - Do you have prosthetic joints?  17. NO - Is there any chance that you may be pregnant?        HPI:                                                      Brief HPI related to upcoming procedure: The patient has a history of recurrent port sepsis. She has a port because she becomes dehydrated and has to receive a \"banana bag\" weekly. Her dehydration apparently is the result of having had a colectomy. The colectomy is because her colon \"stopped working\". There are many concerns regarding the nature of her sepsis. Munchausen syndrome is highly suspected.      See problem list for active medical problems.  Problems all longstanding and stable, except as noted/documented.  See ROS for pertinent symptoms related to these conditions.                                                                                                  .    MEDICAL HISTORY:                                                    Patient Active Problem List    Diagnosis Date Noted     Sepsis (H) - possible 08/24/2017     Priority: Medium     Hypokalemia 08/24/2017     Priority: Medium     Essential hypertension 08/24/2017     Priority: Medium     Sepsis due to Klebsiella (H) 07/27/2017     Priority: Medium     Insomnia, unspecified type 03/31/2017     Priority: Medium     Abdominal pain 02/15/2017     Priority: Medium     Abdominal pain, generalized 01/28/2017     Priority: Medium     SIRS (systemic inflammatory response syndrome) (H) 01/26/2017     Priority: Medium     History of deep venous thrombosis 01/26/2017     Priority: Medium     Nausea and vomiting 01/26/2017     Priority: Medium     Vitamin D deficiency 01/16/2017     Priority: Medium     Chronic abdominal pain 11/15/2016     Priority: Medium     Patient is followed by Larisa Lorenzo PA-C for ongoing prescription of pain medication.  All refills should be approved by this provider, or covering " partner.    Medication(s): no regular narcotics - narcotics given at ED visits  Maximum quantity per month: N/A  Clinic visit frequency required: Q 6  months     Controlled substance agreement:  Encounter-Level CSA - 11/9/15:               Controlled Substance Agreement - Scan on 11/19/2015 11:17 AM : CONTROLLED SUBSTANCE AGREEMENT 11/09/15 (below)          Encounter-Level CSA - 2/27/15:               Controlled Substance Agreement - Scan on 3/12/2015  7:50 AM : Controlled Medication Agreement 02/27/15 (below)            Pain Clinic evaluation in the past: Yes    DIRE Total Score(s):  No flowsheet data found.    Last [facility] website verification:  done on 11/15/16   [email or web address]        Attention to G-tube (H) 11/08/2016     Priority: Medium     Fever 10/16/2016     Priority: Medium     Coagulation defect (H) [D68.9] 09/16/2016     Priority: Medium     Chronic diarrhea 07/22/2016     Priority: Medium     Migraine 07/20/2016     Priority: Medium     Positive blood culture - Klebsiella oxytoca from Port-a-cath culture 07/18/2016     Priority: Medium     Mitral regurgitation mild-mod by Echo June 2016 07/18/2016     Priority: Medium     Anemia, iron deficiency 07/17/2016     Priority: Medium     Anemia in other chronic diseases classified elsewhere 06/14/2016     Priority: Medium     Munchausen syndrome - previously suspected 06/14/2016     Priority: Medium     Long-term (current) use of anticoagulants [Z79.01] 05/16/2016     Priority: Medium     Anxiety 05/16/2016     Priority: Medium     S/P partial resection of colon 04/11/2016     Priority: Medium     Malnutrition (H) 04/11/2016     Priority: Medium     Jejunostomy tube present (H) 03/21/2016     Priority: Medium     Malfunctioning jejunostomy tube (H) 12/22/2015     Priority: Medium     Malfunction of gastrostomy tube (H) 11/19/2015     Priority: Medium     PEG tube malfunction (H) 10/16/2015     Priority: Medium     Health Care Home 01/16/2015      Priority: Medium     *See Letters for [facility] Care Plan: My Access Plan           PEG (percutaneous endoscopic gastrostomy) status 11/05/2014     Priority: Medium     Gastroparesis 04/11/2014     Priority: Medium     Overview:   Had ileostomy performed at [facility] in Jan 2012 as treatment       Migraines 04/11/2014     Priority: Medium     4/11/2014  With periods, every other month.       Intermittent asthma 04/11/2014     Priority: Medium     4/11/2014   With URIs, allergies       Allergic rhinitis 04/11/2014     Priority: Medium     Problem list name updated by automated process. Provider to review       Abnormal Pap smear of cervix 04/11/2014     Priority: Medium     4/11/2014  S/p colp and LEEP. Sees OB/GYN at [facility]. Pap every year x20 years - normal since.       S/P LEEP of cervix 02/06/2014     Priority: Medium     Patellofemoral stress syndrome 01/18/2014     Priority: Medium     Hx SBO 10/09/2013     Priority: Medium     4/11/2014  Recurrent. Sees GI (Dr. Redding - at [facility]) every 6 months or so.  Sees feeding tube nurse next week.       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 04/11/2013     Priority: Medium     Constipation by delayed colonic transit 10/24/2012     Priority: Medium     Atopic rhinitis 03/20/2009     Priority: Medium     Hyperbilirubinemia 03/11/2009     Priority: Medium      Past Medical History:   Diagnosis Date     Asthma      Bilateral ovarian cysts      Cervical cancer (H) 01/01/2008    cervical cancer      Chronic pain      Colonic dysmotility     s/p subtotal colectomy     Constipation     chronic     E. coli sepsis (H) 5/8/2016     Enteritis      Fungemia 5/5/2016     Gastro-oesophageal reflux disease      H/O ileostomy      Hx of abnormal Pap smear     s/p LEEP - no further details provided     Hypertension      IBS (irritable bowel syndrome)      Other chronic pain      PONV (postoperative nausea and vomiting)      Thrombosis, hepatic vein (H)     microvascular     Past [facility]  History:   Procedure Laterality Date     COLONOSCOPY  7/10/2012    Procedure: COLONOSCOPY;;  Surgeon: Nicole Redding MD;  [facility]     COLONOSCOPY N/A 2/19/2017    Procedure: COLONOSCOPY;  Surgeon: Randell Muller MD;  Location: [facility]     COLONOSCOPY N/A 2/21/2017    Procedure: COLONOSCOPY;  Surgeon: Randell Muller MD;  Location: [facility]     ECHO CHELO  7/19/2016          ENDOSCOPIC INSERTION TUBE GASTROSTOMY N/A 1/21/2016    Procedure: ENDOSCOPIC INSERTION TUBE GASTROSTOMY;  Surgeon: Nicole Redding MD;  [facility]     ESOPHAGOSCOPY, GASTROSCOPY, DUODENOSCOPY (EGD), COMBINED  7/10/2012    Procedure: COMBINED ESOPHAGOSCOPY, GASTROSCOPY, DUODENOSCOPY (EGD);  Upper Endoscopy, Ileoscopy    Latex Allergy  with biopsies;  Surgeon: Nicole Redding MD;  [facility]     ESOPHAGOSCOPY, GASTROSCOPY, DUODENOSCOPY (EGD), COMBINED N/A 11/5/2014    Procedure: COMBINED ESOPHAGOSCOPY, GASTROSCOPY, DUODENOSCOPY (EGD);  Surgeon: Nicole Redding MD;  [facility]     HC REPLACE DUODENOSTOMY/JEJUNOSTOMY TUBE PERCUTANEOUS N/A 8/27/2015    Procedure: REPLACE GASTROJEJUNOSTOMY TUBE, PERCUTANEOUS;  Surgeon: Mio Holder MD;  [facility]     HC REPLACE DUODENOSTOMY/JEJUNOSTOMY TUBE PERCUTANEOUS N/A 1/7/2016    Procedure: REPLACE JEJUNOSTOMY TUBE, PERCUTANEOUS;  Surgeon: Elsa Medel MD;  [facility]     HC REPLACE DUODENOSTOMY/JEJUNOSTOMY TUBE PERCUTANEOUS N/A 1/28/2016    Procedure: REPLACE JEJUNOSTOMY TUBE, PERCUTANEOUS;  Surgeon: Elsa Medel MD;  [facility]     HC REPLACE GASTROSTOMY/CECOSTOMY TUBE PERCUTANEOUS Left 5/19/2015    Procedure: REPLACE GASTROSTOMY TUBE, PERCUTANEOUS;  Surgeon: Melecio Morejon Chi, MD;  Location: [facility]     HC UGI ENDOSCOPY W PLACEMENT GASTROSTOMY TUBE PERCUT N/A 10/1/2015    Procedure: COMBINED ESOPHAGOSCOPY, GASTROSCOPY, DUODENOSCOPY (EGD), PLACE PERCUTANEOUS ENDOSCOPIC GASTROSTOMY TUBE;  Surgeon: Mio Holder MD;   [facility]     INSERT PORT VASCULAR ACCESS Right 7/20/2017    Procedure: INSERT PORT VASCULAR ACCESS;  Chest Port Placement  **Latex Allergy**;  Surgeon: Coy Rocha PA-C;  [facility]     LAPAROSCOPIC ASSISTED COLECTOMY  1/20/2012    Procedure:LAPAROSCOPIC ASSISTED COLECTOMY; Laparoscopic Ileostomy       LAPAROSCOPIC ASSISTED COLECTOMY LEFT (DESCENDING)  10/24/2012    Procedure: LAPAROSCOPIC ASSISTED COLECTOMY LEFT (DESCENDING);   Hand Assisted Laproscopic Subtotal abdominal Colectomy,Iieorectal anastamosis, Ileostomy Closure.       LAPAROSCOPIC ASSISTED INSERTION TUBE JEJUNOSTOMY N/A 10/16/2015    Procedure: LAPAROSCOPIC ASSISTED INSERTION TUBE JEJUNOSTOMY;  Surgeon: Elsa Medel MD;  [facility]     LAPAROSCOPIC CHOLECYSTECTOMY  2002    [facility] stones duct     LAPAROSCOPIC ILEOSTOMY  1/20/2012    [facility], loop     LAPAROSCOPIC OOPHORECTOMY Right 2009    [facility]     LAPAROTOMY EXPLORATORY N/A 1/28/2016    Procedure: LAPAROTOMY EXPLORATORY;  Surgeon: Elsa Medel MD;  [facility]     LEEP TX, CERVICAL  2009    [facility]     PICC INSERTION Left 10/21/2015    5fr DL Power PICC, 37cm (2cm external) in the L basilic vein w/ tip in the SVC RA junction.     REMOVE GASTROSTOMY TUBE ADULT N/A 12/12/2014    Procedure: REMOVE GASTROSTOMY TUBE ADULT;  Surgeon: Nicole Redding MD;  [facility]: [facility]     REMOVE PORT VASCULAR ACCESS Right 6/30/2016    Procedure: REMOVE PORT VASCULAR ACCESS;  Surgeon: Pradeep Orosco MD;  Location: [facility] OR     replace GASTROSTOMY TUBE ADULT  5/19/15     Current Outpatient Prescriptions   Medication Sig Dispense Refill     lidocaine (LIDODERM) 5 % Patch Apply to painful area at once for up to 12 h within a 24 h period.  Remove after 12 hours. 6 patch 0     ondansetron (ZOFRAN ODT) 4 MG ODT tab Take 1-2 tablets (4-8 mg) by mouth every 6 hours as needed for nausea 30 tablet 0     albuterol (2.5 MG/3ML) 0.083% neb solution Take 1 vial  (2.5 mg) by nebulization every 4 hours as needed for shortness of breath / dyspnea or wheezing 360 mL      ipratropium (ATROVENT) 0.02 % neb solution Take 2.5 mLs (0.5 mg) by nebulization every 6 hours as needed for wheezing 225 mL      ethanol, 74%, ADULT 74 % Inject IV through Port-a-cath daily for 11 days to lock Port-a-cath after each dose of ceftriaxone and also use to lock Port-a-cath after each weekly infusion of IV fluids and MVI 5 mL 0     order for DME 2 by 2 gauze pads, use for dressing changes as directed 1 Box 1     order for DME 1 inch paper tape, Use for dressing changes as directed 1 each 0     cloNIDine (CATAPRES) 0.2 MG tablet TAKE 2 TABLETS(0.4 MG) BY MOUTH EVERY EVENING 180 tablet 0     diphenhydrAMINE (BENADRYL ALLERGY) 25 MG tablet Take 1 tablet (25 mg) by mouth every 6 hours as needed for itching or other (Nausea) 30 tablet 0     traZODone (DESYREL) 50 MG tablet Take 1-2 tablets ( mg) by mouth nightly as needed 1-2 tabs at bedtime  tablet 1     nortriptyline (PAMELOR) 10 MG capsule Take 3-4 capsules (30-40 mg) by mouth At Bedtime (Patient taking differently: Take 40 mg by mouth At Bedtime ) 120 capsule 5     mirtazapine (REMERON SOL-TAB) 15 MG ODT tab 0.5 tablets (7.5 mg) by Orally disintegrating tablet route At Bedtime 45 tablet 0     SUMAtriptan (IMITREX) 50 MG tablet Take 1-2 tablets ( mg) by mouth at onset of headache for migraine - may repeat dose after 2h if headache recurs.  Max: 200mg/24 hours 18 tablet 1     DULoxetine (CYMBALTA) 60 MG capsule Take 1 capsule (60 mg) by mouth daily 90 capsule 3     ACETAMINOPHEN PO Take 500-1,000 mg by mouth every 6 hours as needed for pain        albuterol 90 MCG/ACT inhaler Inhale 2 puffs into the lungs every 6 hours as needed        OTC products: None, except as noted above    Allergies   Allergen Reactions     Hyoscyamine Rash     Metoclopramide Other (See Comments)     Eye twitching.      Peaches [Peach] Other (See Comments)      Raw. Cooked OK     Sucralose Other (See Comments)     All artificial sweeteners. Aspartame also. Swollen glands     Advair Diskus Other (See Comments)     Throat burns     Azithromycin Other (See Comments)     Burning in throat     Compazine [Prochlorperazine] Visual Disturbance     Contrast Dye Itching     States is allergic to CT contrast dye     Cyclobenzaprine Visual Disturbance     Fentanyl Other (See Comments)     migraine     Ibuprofen GI Disturbance     Lactulose Nausea and Vomiting     Gas and bloating     Levaquin [Levofloxacin] Swelling     Per ED M.D. And RN      Morphine Sulfate Other (See Comments)     Chest pain       Oxycodone Other (See Comments)     Burning throat, but can take Norco.      Rizatriptan Visual Disturbance     Droperidol Hives and Rash     Isovue [Iopamidol] Palpitations     Pt had racing heart and sob      Ketorolac Anxiety     Latex Swelling and Rash     Kiwi, likely also avacado, ? banana     Levsin Rash      Latex Allergy: NO    Social History   Substance Use Topics     Smoking status: Former Smoker     Packs/day: 1.00     Years: 4.00     Types: Cigarettes     Quit date: 1/1/2004     Smokeless tobacco: Former User     Alcohol use No     History   Drug Use No       REVIEW OF SYSTEMS:                                                    C: NEGATIVE for fever, chills, change in weight  I: NEGATIVE for worrisome rashes, moles or lesions  E: NEGATIVE for vision changes or irritation  E/M: NEGATIVE for ear, mouth and throat problems  R: NEGATIVE for significant cough or SOB  B: NEGATIVE for masses, tenderness or discharge  CV: NEGATIVE for chest pain, palpitations or peripheral edema  GI: Patient has frequent diarrhea and has had so for some time.  : NEGATIVE for frequency, dysuria, or hematuria  M: NEGATIVE for significant arthralgias or myalgia  N: NEGATIVE for weakness, dizziness or paresthesias  E: NEGATIVE for temperature intolerance, skin/hair changes  H: NEGATIVE for bleeding  problems  P: NEGATIVE for changes in mood or affect    EXAM:                                                    /70 (BP Location: Left arm, Patient Position: Chair, Cuff Size: Adult Regular)  Pulse 72  Temp 96.2  F (35.7  C) (Temporal)  Wt 197 lb 12.8 oz (89.7 kg)  LMP 08/01/2017 (Approximate)  SpO2 100%  Breastfeeding? No  BMI 31.93 kg/m2    GENERAL APPEARANCE: healthy, alert and no distress     EYES: EOMI, PERRL     HENT: ear canals and TM's normal and nose and mouth without ulcers or lesions     NECK: no adenopathy, no asymmetry, masses, or scars and thyroid normal to palpation     RESP: lungs clear to auscultation - no rales, rhonchi or wheezes     CV: regular rates and rhythm, normal S1 S2, no S3 or S4 and no murmur, click or rub     ABDOMEN:  soft, nontender, no HSM or masses and bowel sounds normal     MS: extremities normal- no gross deformities noted, no evidence of inflammation in joints, FROM in all extremities.     SKIN: no suspicious lesions or rashes     NEURO: Normal strength and tone, sensory exam grossly normal, mentation intact and speech normal     PSYCH: mentation appears normal. and affect normal/bright     LYMPHATICS: No axillary, cervical, or supraclavicular nodes    DIAGNOSTICS:                                                    No labs or EKG required for low risk surgery (cataract, skin procedure, breast biopsy, etc)  Recent laboratory work from hospitalization reviewed with patient and unremarkable    IMPRESSION:                                                    Reason for surgery/procedure: Recurrent port sepsis requiring removal of indwelling venous access port  Diagnosis/reason for consult: Perioperative risk assessment and a 37-year-old female with:   1. Preop general physical exam    2. Sepsis due to Klebsiella (H)    3. Essential hypertension    4. Non-rheumatic mitral regurgitation    5. Munchausen syndrome - previously suspected    6. S/P partial resection of  colon    7. PEG (percutaneous endoscopic gastrostomy) status    The proposed surgical procedure is considered LOW risk.    REVISED CARDIAC RISK INDEX  The patient has the following serious cardiovascular risks for perioperative complications such as (MI, PE, VFib and 3  AV Block):  No serious cardiac risks  INTERPRETATION: 0 risks: Class I (very low risk - 0.4% complication rate)    The patient has the following additional risks for perioperative complications:  No identified additional risks    No diagnosis found.    RECOMMENDATIONS:                                                          --Patient is to take all scheduled medications on the day of surgery.           APPROVAL GIVEN to proceed with proposed procedure, without further diagnostic evaluation       Signed Electronically by: Kenji Flores DO    Copy of this evaluation report is provided to requesting physician.    [facility] Preop Guidelines

## 2017-09-07 NOTE — ED NOTES
Pt put her call light on, stating her abdominal pain is returning. I told pt she only had one dose of pain med ordered and that I would let the provider know.

## 2017-09-07 NOTE — ED NOTES
Pt states she is scheduled to get out infected port a cath ( right upper chest) in 2 days. PT stated she [facility] to get the port out earlier, but she stated there were no appts available until her scheduled appt.

## 2017-09-08 ENCOUNTER — SURGERY (OUTPATIENT)
Age: 37
End: 2017-09-08

## 2017-09-08 ENCOUNTER — ANESTHESIA (OUTPATIENT)
Dept: SURGERY | Facility: CLINIC | Age: 37
End: 2017-09-08
Payer: COMMERCIAL

## 2017-09-08 ENCOUNTER — HOSPITAL ENCOUNTER (OUTPATIENT)
Facility: CLINIC | Age: 37
Discharge: HOME | End: 2017-09-08
Attending: SPECIALIST | Admitting: SPECIALIST
Payer: COMMERCIAL

## 2017-09-08 VITALS
WEIGHT: 197 LBS | RESPIRATION RATE: 14 BRPM | DIASTOLIC BLOOD PRESSURE: 72 MMHG | TEMPERATURE: 98.2 F | HEIGHT: 66 IN | BODY MASS INDEX: 31.66 KG/M2 | OXYGEN SATURATION: 97 % | SYSTOLIC BLOOD PRESSURE: 140 MMHG

## 2017-09-08 DIAGNOSIS — G89.18 POST-OP PAIN: Primary | ICD-10-CM

## 2017-09-08 LAB — HCG UR QL: NEGATIVE

## 2017-09-08 PROCEDURE — 37000009 ZZH ANESTHESIA TECHNICAL FEE, EACH ADDTL 15 MIN: Performed by: SPECIALIST

## 2017-09-08 PROCEDURE — 36000050 ZZH SURGERY LEVEL 2 1ST 30 MIN: Performed by: SPECIALIST

## 2017-09-08 PROCEDURE — 25000132 ZZH RX MED GY IP 250 OP 250 PS 637: Performed by: SPECIALIST

## 2017-09-08 PROCEDURE — 25000128 H RX IP 250 OP 636: Performed by: NURSE ANESTHETIST, CERTIFIED REGISTERED

## 2017-09-08 PROCEDURE — 25000125 ZZHC RX 250: Performed by: SPECIALIST

## 2017-09-08 PROCEDURE — 27210794 ZZH OR GENERAL SUPPLY STERILE: Performed by: SPECIALIST

## 2017-09-08 PROCEDURE — 37000008 ZZH ANESTHESIA TECHNICAL FEE, 1ST 30 MIN: Performed by: SPECIALIST

## 2017-09-08 PROCEDURE — 36000052 ZZH SURGERY LEVEL 2 EA 15 ADDTL MIN: Performed by: SPECIALIST

## 2017-09-08 PROCEDURE — 36590 REMOVAL TUNNELED CV CATH: CPT | Performed by: SPECIALIST

## 2017-09-08 PROCEDURE — 71000027 ZZH RECOVERY PHASE 2 EACH 15 MINS: Performed by: SPECIALIST

## 2017-09-08 PROCEDURE — 25000125 ZZHC RX 250: Performed by: NURSE ANESTHETIST, CERTIFIED REGISTERED

## 2017-09-08 PROCEDURE — 81025 URINE PREGNANCY TEST: CPT | Performed by: NURSE ANESTHETIST, CERTIFIED REGISTERED

## 2017-09-08 PROCEDURE — 40000306 ZZH STATISTIC PRE PROC ASSESS II: Performed by: SPECIALIST

## 2017-09-08 PROCEDURE — S0077 INJECTION, CLINDAMYCIN PHOSP: HCPCS | Performed by: NURSE ANESTHETIST, CERTIFIED REGISTERED

## 2017-09-08 RX ORDER — SODIUM CHLORIDE, SODIUM LACTATE, POTASSIUM CHLORIDE, CALCIUM CHLORIDE 600; 310; 30; 20 MG/100ML; MG/100ML; MG/100ML; MG/100ML
INJECTION, SOLUTION INTRAVENOUS CONTINUOUS
Status: DISCONTINUED | OUTPATIENT
Start: 2017-09-08 | End: 2017-09-08 | Stop reason: HOSPADM

## 2017-09-08 RX ORDER — HYDROCODONE BITARTRATE AND ACETAMINOPHEN 5; 325 MG/1; MG/1
1-2 TABLET ORAL EVERY 4 HOURS PRN
Qty: 30 TABLET | Refills: 0 | Status: SHIPPED | OUTPATIENT
Start: 2017-09-08 | End: 2017-09-25

## 2017-09-08 RX ORDER — ONDANSETRON 4 MG/1
4 TABLET, ORALLY DISINTEGRATING ORAL EVERY 30 MIN PRN
Status: DISCONTINUED | OUTPATIENT
Start: 2017-09-08 | End: 2017-09-08 | Stop reason: HOSPADM

## 2017-09-08 RX ORDER — LIDOCAINE HYDROCHLORIDE 20 MG/ML
INJECTION, SOLUTION INFILTRATION; PERINEURAL PRN
Status: DISCONTINUED | OUTPATIENT
Start: 2017-09-08 | End: 2017-09-08

## 2017-09-08 RX ORDER — CLINDAMYCIN PHOSPHATE 900 MG/50ML
INJECTION, SOLUTION INTRAVENOUS PRN
Status: DISCONTINUED | OUTPATIENT
Start: 2017-09-08 | End: 2017-09-08

## 2017-09-08 RX ORDER — PROPOFOL 10 MG/ML
INJECTION, EMULSION INTRAVENOUS CONTINUOUS PRN
Status: DISCONTINUED | OUTPATIENT
Start: 2017-09-08 | End: 2017-09-08

## 2017-09-08 RX ORDER — HYDROCODONE BITARTRATE AND ACETAMINOPHEN 5; 325 MG/1; MG/1
1-2 TABLET ORAL
Status: COMPLETED | OUTPATIENT
Start: 2017-09-08 | End: 2017-09-08

## 2017-09-08 RX ORDER — CEFAZOLIN SODIUM 2 G/100ML
2 INJECTION, SOLUTION INTRAVENOUS
Status: DISCONTINUED | OUTPATIENT
Start: 2017-09-08 | End: 2017-09-08 | Stop reason: HOSPADM

## 2017-09-08 RX ORDER — CHLORHEXIDINE GLUCONATE 40 MG/ML
SOLUTION TOPICAL ONCE
Status: DISCONTINUED | OUTPATIENT
Start: 2017-09-08 | End: 2017-09-08 | Stop reason: HOSPADM

## 2017-09-08 RX ORDER — NALOXONE HYDROCHLORIDE 0.4 MG/ML
.1-.4 INJECTION, SOLUTION INTRAMUSCULAR; INTRAVENOUS; SUBCUTANEOUS
Status: DISCONTINUED | OUTPATIENT
Start: 2017-09-08 | End: 2017-09-08 | Stop reason: HOSPADM

## 2017-09-08 RX ORDER — BUPIVACAINE HYDROCHLORIDE AND EPINEPHRINE 5; 5 MG/ML; UG/ML
INJECTION, SOLUTION PERINEURAL PRN
Status: DISCONTINUED | OUTPATIENT
Start: 2017-09-08 | End: 2017-09-08 | Stop reason: HOSPADM

## 2017-09-08 RX ORDER — LIDOCAINE HYDROCHLORIDE 10 MG/ML
INJECTION, SOLUTION INFILTRATION; PERINEURAL PRN
Status: DISCONTINUED | OUTPATIENT
Start: 2017-09-08 | End: 2017-09-08 | Stop reason: HOSPADM

## 2017-09-08 RX ORDER — PROPOFOL 10 MG/ML
INJECTION, EMULSION INTRAVENOUS PRN
Status: DISCONTINUED | OUTPATIENT
Start: 2017-09-08 | End: 2017-09-08

## 2017-09-08 RX ORDER — ONDANSETRON 2 MG/ML
4 INJECTION INTRAMUSCULAR; INTRAVENOUS EVERY 30 MIN PRN
Status: DISCONTINUED | OUTPATIENT
Start: 2017-09-08 | End: 2017-09-08 | Stop reason: HOSPADM

## 2017-09-08 RX ORDER — HYDROMORPHONE HYDROCHLORIDE 1 MG/ML
.3-.5 INJECTION, SOLUTION INTRAMUSCULAR; INTRAVENOUS; SUBCUTANEOUS EVERY 10 MIN PRN
Status: DISCONTINUED | OUTPATIENT
Start: 2017-09-08 | End: 2017-09-08 | Stop reason: HOSPADM

## 2017-09-08 RX ORDER — CEFAZOLIN SODIUM 1 G/3ML
1 INJECTION, POWDER, FOR SOLUTION INTRAMUSCULAR; INTRAVENOUS SEE ADMIN INSTRUCTIONS
Status: DISCONTINUED | OUTPATIENT
Start: 2017-09-08 | End: 2017-09-08 | Stop reason: HOSPADM

## 2017-09-08 RX ADMIN — PROPOFOL 150 MCG/KG/MIN: 10 INJECTION, EMULSION INTRAVENOUS at 12:32

## 2017-09-08 RX ADMIN — MIDAZOLAM HYDROCHLORIDE 1 MG: 1 INJECTION, SOLUTION INTRAMUSCULAR; INTRAVENOUS at 12:29

## 2017-09-08 RX ADMIN — HYDROCODONE BITARTRATE AND ACETAMINOPHEN 2 TABLET: 5; 325 TABLET ORAL at 13:37

## 2017-09-08 RX ADMIN — HYDROMORPHONE HYDROCHLORIDE 0.5 MG: 1 INJECTION, SOLUTION INTRAMUSCULAR; INTRAVENOUS; SUBCUTANEOUS at 13:49

## 2017-09-08 RX ADMIN — LIDOCAINE HYDROCHLORIDE 10 ML: 10 INJECTION, SOLUTION INFILTRATION; PERINEURAL at 13:11

## 2017-09-08 RX ADMIN — SODIUM CHLORIDE, POTASSIUM CHLORIDE, SODIUM LACTATE AND CALCIUM CHLORIDE: 600; 310; 30; 20 INJECTION, SOLUTION INTRAVENOUS at 12:15

## 2017-09-08 RX ADMIN — PROPOFOL 50 MG: 10 INJECTION, EMULSION INTRAVENOUS at 12:33

## 2017-09-08 RX ADMIN — Medication 10 ML: at 10:00

## 2017-09-08 RX ADMIN — HYDROMORPHONE HYDROCHLORIDE 0.5 MG: 1 INJECTION, SOLUTION INTRAMUSCULAR; INTRAVENOUS; SUBCUTANEOUS at 12:40

## 2017-09-08 RX ADMIN — LIDOCAINE HYDROCHLORIDE 80 MG: 20 INJECTION, SOLUTION INFILTRATION; PERINEURAL at 12:31

## 2017-09-08 RX ADMIN — CLINDAMYCIN PHOSPHATE 900 MG: 18 INJECTION, SOLUTION INTRAVENOUS at 12:41

## 2017-09-08 RX ADMIN — HYDROMORPHONE HYDROCHLORIDE 0.5 MG: 1 INJECTION, SOLUTION INTRAMUSCULAR; INTRAVENOUS; SUBCUTANEOUS at 12:34

## 2017-09-08 RX ADMIN — MIDAZOLAM HYDROCHLORIDE 1 MG: 1 INJECTION, SOLUTION INTRAMUSCULAR; INTRAVENOUS at 12:31

## 2017-09-08 RX ADMIN — HYDROMORPHONE HYDROCHLORIDE 0.5 MG: 1 INJECTION, SOLUTION INTRAMUSCULAR; INTRAVENOUS; SUBCUTANEOUS at 13:37

## 2017-09-08 NOTE — ANESTHESIA PREPROCEDURE EVALUATION
Anesthesia Evaluation     . Pt has had prior anesthetic. Type: General and MAC    History of anesthetic complications   - PONV        ROS/MED HX    ENT/Pulmonary:     (+)allergic rhinitis, tobacco use, Past use Moderate Persistent asthma Treatment: Inhaler daily,  , . .    Neurologic:     (+)migraines,     Cardiovascular:     (+) hypertension----. Taking blood thinners : . . . :. valvular problems/murmurs type: MR . Previous cardiac testing Echodate:1/20/17results:Interpretation Summary     Left ventricular systolic function is normal. The visual ejection fraction is  estimated at 55-60%.  The right ventricular systolic function is normal.  There is mild (1+) mitral regurgitation. There is mild (1+) tricuspid  regurgitation. No aortic regurgitation is present.  There is no evidence of a mass or vegetation. This does not rule out  endocarditis.  ______________________________________________________________________________           Left Ventricle  The left ventricle is normal in size. There is mild concentric left  ventricular hypertrophy. Left ventricular systolic function is normal. The  visual ejection fraction is estimated at 55-60%. E by E prime ratio is less  than 8, that likely suggests normal left ventricular filling pressures. No  regional wall motion abnormalities noted.     Right Ventricle  The right ventricle is normal size. The right ventricular systolic function  is normal. The right ventricle is not well visualized.  Atria  Normal left atrial size. Right atrial size is normal. There is no atrial  shunt seen.     Mitral Valve  There is mild (1+) mitral regurgitation.     Tricuspid Valve  There is mild (1+) tricuspid regurgitation. The right ventricular systolic  pressure is approximated at 14mmHg plus the right atrial pressure. Small IVC  (<1.5cm) with normal respiratory collapse; right atrial pressure is estimated  at 0-5mmHg.     Aortic Valve  No aortic regurgitation is present. No hemodynamically  significant valvular  aortic stenosis.     Pulmonic Valve  The pulmonic valve is not well visualized. There is trace to mild pulmonic  valvular regurgitation. There is no pulmonic valvular stenosis.     Vessels  The aortic root is normal size.  Pericardial/Pleural  The pericardium appears normal.     Rhythm  The rhythm was normal sinus.     ______________________________________________________________________________  MMode/2D Measurements & Calculations  IVSd: 0.99 cm  LVIDd: 4.3 cm  LVIDs: 2.9 cm  LVPWd: 0.82 cm  FS: 32.5 %  EDV(Teich): 83.5 ml  ESV(Teich): 32.5 ml  LV mass(C)d: 123.9 grams  Ao root diam: 2.5 cm  LA dimension: 3.6 cm  asc Aorta Diam: 2.3 cm  LA/Ao: 1.4  LA Volume (BP): 32.0 ml     LA Volume Index (BP): 16.6 ml/m2        Doppler Measurements & Calculations  MV E max facundo: 76.2 cm/sec  MV A max facundo: 59.3 cm/sec  MV E/A: 1.3  MV dec time: 0.22 sec  PA acc time: 0.14 sec  TR max facundo: 216.1 cm/sec  TR max P.7 mmHg  Lateral E/e': 6.2  Medial E/e': 6.3            date: results:ECG reviewed date:16 results:Sinus tachycardia. Left atrial enlargement date: results:          METS/Exercise Tolerance:     Hematologic:     (+) Anemia, -      Musculoskeletal:   (+) arthritis, , , other musculoskeletal- patellofemoral stress syndrome      GI/Hepatic: Comment: Multiple GI surgeries.  Gtube in place.    (+) GERD Inflammatory bowel disease, liver disease, Other GI/Hepatic abdominal pain/hepatic flow abnormality/Nausea/vomiting/chronic diarrhea     (-) bowel prep anticipated   Renal/Genitourinary:  - ROS Renal section negative   (+) Pt has no history of transplant,       Endo:         Psychiatric:     (+) psychiatric history anxiety, depression and other (comment)      Infectious Disease:   (+) Other Infectious Disease history of sepsis      Malignancy:      - no malignancy   Other:    (+) No chance of pregnancy C-spine cleared: N/A, H/O Chronic Pain,other significant disability Other (comment)                    Physical Exam  Normal systems: cardiovascular, pulmonary and dental    Airway   Mallampati: II  TM distance: >3 FB  Neck ROM: full    Dental     Cardiovascular   Rhythm and rate: regular and normal      Pulmonary    breath sounds clear to auscultation                        Anesthesia Plan      History & Physical Review  History and physical reviewed and following examination; no interval change.    ASA Status:  3 .    NPO Status:  > 8 hours    Plan for MAC with Intravenous induction. Maintenance will be TIVA.  Reason for MAC:  Deep or markedly invasive procedure (G8)  PONV prophylaxis:  Ondansetron (or other 5HT-3) and Dexamethasone or Solumedrol       Postoperative Care  Postoperative pain management:  IV analgesics and Oral pain medications.      Consents  Anesthetic plan, risks, benefits and alternatives discussed with:  Patient..                          .

## 2017-09-08 NOTE — BRIEF OP NOTE
[facility] Brief Operative Note    Pre-operative diagnosis: right side mediport with pain   Post-operative diagnosis Same   Procedure: Procedure(s):  right side mediport removal - Wound Class: I-Clean   Surgeon: Zechariah Worthington MD, FACS   Assistants(s): Wilfredo PORRAS   Estimated blood loss: Minimal    Specimens: None   Findings: Well incorporated mediport.     Zechariah Worthington MD, FACS    #[id number]

## 2017-09-08 NOTE — OP NOTE
DATE OF PROCEDURE:  09/08/2017      PREOPERATIVE DIAGNOSIS:  Right-sided MediPort with pain.      POSTOPERATIVE DIAGNOSIS:  Right-sided Mediport with pain.      PROCEDURE:  Right-sided MediPort removal.      SURGEON:  Zechariah Worthington M.D.      ASSISTANT:  Wilfredo Evans, [facility] Technician.       ANESTHESIA:  MAC.      INDICATIONS FOR PROCEDURE:  Ms. 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  She was recently admitted to [facility] with septicemia and positive blood cultures which were resolved with antibiotics.  At that time she also complained of a lot of pain at the port site, however, she continued to have the pain but no further fevers, chills or other signs of septicemia.  Because of the continued pain at the port site the patient wished to have the port removed.      OPERATIVE FINDINGS:  Included well incorporated MediPort no signs of infection.      DETAILS OF PROCEDURE:  The patient was taken to the operating room and placed on the table in supine position.  After receiving some sedation, the area around the port, the right IJ port was prepped and draped in sterile fashion.  Timeout was performed confirming the identity of the patient as well as the procedure to be performed the area was infiltrated with a local anesthetic.  After adequate analgesia was achieved her old transverse incision was then opened subcutaneous tissue was opened using cautery.  We then dissected down to the port which was found to have a well incorporated capsule.  This was dissected free with Metzenbaum scissors.  After freeing the port from the capsule we inspected; there was no fluid or any sign of infection.  Patient was placed in Trendelenburg position and pressure was held at the right IJ and the port was removed intact.  The patient was then placed in reverse Trendelenburg position and pressure was held for 5 minutes  by the clock.  The capsule was then removed using cautery.  As the port did not appear infected, it was elected just to close it primarily.  The subcutaneous tissue was then reapproximated with 3-0 Vicryl.  The skin was closed using a running 4-0 Monocryl subcuticular stitch.  Steri-Strips and sterile dressings were applied.      The patient was then taken from the operating room to the recovery room in stable condition to be sent home.         ISI KEENE MD             D: 2017 13:26   T: 2017 15:04   MT: PRICE#[id number]      Name:     TAIWO JIMENEZ   MRN:      [id number]        Account:        [id number]   :      1980           Procedure Date: 2017      Document: [id number]

## 2017-09-08 NOTE — IP AVS SNAPSHOT
MRN:[id number]                      After Visit Summary   9/8/2017    Doreen Peralta    MRN: [id number]           Thank you!     Thank you for choosing [facility] for your care. Our goal is always to provide you with excellent care. Hearing back from our patients is one way we can continue to improve our services. Please take a few minutes to complete the written survey that you may receive in the mail after you visit with us. Thank you!        Patient Information     Date Of Birth          1980        About your hospital stay     You were admitted on:  September 8, 2017 You last received care in the:  [facility] Phase II    You were discharged on:  September 8, 2017       Who to Call     For medical emergencies, please call 911.  For non-urgent questions about your medical care, please call your primary care provider or clinic, [phone]  For questions related to your surgery, please call your surgery clinic        Attending Provider     Provider Specialty    Zechariah Worthington MD General Surgery       Primary Care Provider Office Phone #    [facility] [phone]      After Care Instructions     Diet Instructions       Resume pre-procedure diet            Discharge Instructions       Follow up appointment as instructed by Surgeon and or RN            Do not - immerse incision in water until sutures removed       Do not immerse incision in water until sutures removed            Dressing       Keep dressing clean and dry.  Dressing / incisional care as instructed by RN and or Surgeon            Ice to affected area       Ice to operative site PRN            No Alcohol       For 24 hours post procedure            No driving or operating machinery        until the day after procedure            Shower       No shower for 24 hours post procedure. May shower Postoperative Day (POD)  2                  Your next 10 appointments already scheduled     Sep 18, 2017  "10:30 AM CDT   [facility]    [address] [id number] [phone]            Dec 04, 2017 10:00 AM CST   (Arrive by 9:45 AM)   Return Visit with Gilmer Lees MD   [facility] ([facility])    [address]  [address] [facility] [id number] [phone]              Pending Results     Date and Time Order Name Status Description    9/6/2017 2147 Blood culture ONE site Preliminary             Admission Information     Date & Time Provider Department Dept. Phone    9/8/2017 Zechariah Worthington MD [facility] [phone]      Your Vitals Were     Blood Pressure Temperature Respirations Height Weight Last Period    141/93 98.2  F (36.8  C) (Oral) 14 1.676 m (5' 6\") 89.4 kg (197 lb) 08/01/2017 (Approximate)    Pulse Oximetry BMI (Body Mass Index)                100% 31.8 kg/m2          MyChart Information     [employer] gives you secure access to your electronic health record. If you see a primary care provider, you can also send messages to your care team and make appointments. If you have questions, please call your primary care clinic.  If you do not have a primary care provider, please call [phone] and they will assist you.        Care EveryWhere ID     This is your Care EveryWhere ID. This could be used by other organizations to access your [place] medical records  [id number]        Equal Access to Services     CRISTINA CLAYTON AH: Hadii caitie ku hadasho Soomaali, waaxda luqadaha, qaybta kaalmada adeegyada, elham sal . So [facility] [phone].    ATENCIÓN: Si habla español, tiene a wade disposición servicios gratuitos de asistencia lingüística. Llame al [phone].    We comply with applicable federal civil rights laws and [place] laws. We do not discriminate on the basis of race, color, national " origin, age, disability sex, sexual orientation or gender identity.               Review of your medicines      START taking        Dose / Directions    HYDROcodone-acetaminophen 5-325 MG per tablet   Commonly known as:  NORCO   Used for:  Post-op pain        Dose:  1-2 tablet   Take 1-2 tablets by mouth every 4 hours as needed for other (Moderate to Severe Pain)   Quantity:  30 tablet   Refills:  0         CONTINUE these medicines which may have CHANGED, or have new prescriptions. If we are uncertain of the size of tablets/capsules you have at home, strength may be listed as something that might have changed.        Dose / Directions    nortriptyline 10 MG capsule   Commonly known as:  PAMELOR   This may have changed:  how much to take   Used for:  Neuropathic pain, Primary insomnia        Dose:  30-40 mg   Take 3-4 capsules (30-40 mg) by mouth At Bedtime   Quantity:  120 capsule   Refills:  5         CONTINUE these medicines which have NOT CHANGED        Dose / Directions    ACETAMINOPHEN PO        Dose:  500-1000 mg   Take 500-1,000 mg by mouth every 6 hours as needed for pain   Refills:  0       albuterol (2.5 MG/3ML) 0.083% neb solution   Used for:  Gastrostomy tube dependent (H), SBO (small bowel obstruction) (H), Chronic abdominal pain, Chronic diarrhea        Dose:  2.5 mg   Take 1 vial (2.5 mg) by nebulization every 4 hours as needed for shortness of breath / dyspnea or wheezing   Quantity:  360 mL   Refills:  0       albuterol 90 MCG/ACT inhaler        Dose:  2 puff   Inhale 2 puffs into the lungs every 6 hours as needed   Refills:  0       cloNIDine 0.2 MG tablet   Commonly known as:  CATAPRES   Used for:  Anxiety        TAKE 2 TABLETS(0.4 MG) BY MOUTH EVERY EVENING   Quantity:  180 tablet   Refills:  0       diphenhydrAMINE 25 MG tablet   Commonly known as:  BENADRYL ALLERGY        Dose:  25 mg   Take 1 tablet (25 mg) by mouth every 6 hours as needed for itching or other (Nausea)   Quantity:  30 tablet    Refills:  0       DULoxetine 60 MG EC capsule   Commonly known as:  CYMBALTA   Used for:  Abdominal pain, epigastric, Abdominal migraine, not intractable        Dose:  60 mg   Take 1 capsule (60 mg) by mouth daily   Quantity:  90 capsule   Refills:  3       ethanol (74%) ADULT 74 %   Used for:  Positive blood culture, Sepsis due to Klebsiella (H)        Inject IV through Port-a-cath daily for 11 days to lock Port-a-cath after each dose of ceftriaxone and also use to lock Port-a-cath after each weekly infusion of IV fluids and MVI   Quantity:  5 mL   Refills:  0       ipratropium 0.02 % neb solution   Commonly known as:  ATROVENT   Used for:  Gastrostomy tube dependent (H), SBO (small bowel obstruction) (H), Chronic abdominal pain, Chronic diarrhea        Dose:  0.5 mg   Take 2.5 mLs (0.5 mg) by nebulization every 6 hours as needed for wheezing   Quantity:  225 mL   Refills:  0       mirtazapine 15 MG ODT tab   Commonly known as:  REMERON SOL-TAB   Used for:  Anxiety        Dose:  7.5 mg   0.5 tablets (7.5 mg) by Orally disintegrating tablet route At Bedtime   Quantity:  45 tablet   Refills:  0       ondansetron 4 MG ODT tab   Commonly known as:  ZOFRAN ODT   Used for:  Intractable vomiting, presence of nausea not specified, unspecified vomiting type, Gastroparesis        Dose:  4-8 mg   Take 1-2 tablets (4-8 mg) by mouth every 6 hours as needed for nausea   Quantity:  30 tablet   Refills:  0       * order for DME   Used for:  Attention to G-tube (H)        2 by 2 gauze pads, use for dressing changes as directed   Quantity:  1 Box   Refills:  1       * order for DME   Used for:  Attention to G-tube (H)        1 inch paper tape, Use for dressing changes as directed   Quantity:  1 each   Refills:  0       SUMAtriptan 50 MG tablet   Commonly known as:  IMITREX   Used for:  Migraine without aura and without status migrainosus, not intractable        Dose:   mg   Take 1-2 tablets ( mg) by mouth at onset of  headache for migraine - may repeat dose after 2h if headache recurs.  Max: 200mg/24 hours   Quantity:  18 tablet   Refills:  1       traZODone 50 MG tablet   Commonly known as:  DESYREL   Used for:  Insomnia, unspecified type        Dose:   mg   Take 1-2 tablets ( mg) by mouth nightly as needed 1-2 tabs at bedtime PRN   Quantity:  180 tablet   Refills:  1       * Notice:  This list has 2 medication(s) that are the same as other medications prescribed for you. Read the directions carefully, and ask your doctor or other care provider to review them with you.         Where to get your medicines      Some of these will need a paper prescription and others can be bought over the counter. Ask your nurse if you have questions.     Bring a paper prescription for each of these medications     HYDROcodone-acetaminophen 5-325 MG per tablet                Protect others around you: Learn how to safely use, store and throw away your medicines at www.disposemymeds.org.             Medication List: This is a list of all your medications and when to take them. Check marks below indicate your daily home schedule. Keep this list as a reference.      Medications           Morning Afternoon Evening Bedtime As Needed    ACETAMINOPHEN PO   Take 500-1,000 mg by mouth every 6 hours as needed for pain                                albuterol (2.5 MG/3ML) 0.083% neb solution   Take 1 vial (2.5 mg) by nebulization every 4 hours as needed for shortness of breath / dyspnea or wheezing                                albuterol 90 MCG/ACT inhaler   Inhale 2 puffs into the lungs every 6 hours as needed                                cloNIDine 0.2 MG tablet   Commonly known as:  CATAPRES   TAKE 2 TABLETS(0.4 MG) BY MOUTH EVERY EVENING                                diphenhydrAMINE 25 MG tablet   Commonly known as:  BENADRYL ALLERGY   Take 1 tablet (25 mg) by mouth every 6 hours as needed for itching or other (Nausea)                                 DULoxetine 60 MG EC capsule   Commonly known as:  CYMBALTA   Take 1 capsule (60 mg) by mouth daily                                ethanol (74%) ADULT 74 %   Inject IV through Port-a-cath daily for 11 days to lock Port-a-cath after each dose of ceftriaxone and also use to lock Port-a-cath after each weekly infusion of IV fluids and MVI                                HYDROcodone-acetaminophen 5-325 MG per tablet   Commonly known as:  NORCO   Take 1-2 tablets by mouth every 4 hours as needed for other (Moderate to Severe Pain)   Last time this was given:  2 tablets on 9/8/2017  1:37 PM                                ipratropium 0.02 % neb solution   Commonly known as:  ATROVENT   Take 2.5 mLs (0.5 mg) by nebulization every 6 hours as needed for wheezing                                mirtazapine 15 MG ODT tab   Commonly known as:  REMERON SOL-TAB   0.5 tablets (7.5 mg) by Orally disintegrating tablet route At Bedtime                                nortriptyline 10 MG capsule   Commonly known as:  PAMELOR   Take 3-4 capsules (30-40 mg) by mouth At Bedtime                                ondansetron 4 MG ODT tab   Commonly known as:  ZOFRAN ODT   Take 1-2 tablets (4-8 mg) by mouth every 6 hours as needed for nausea                                * order for DME   2 by 2 gauze pads, use for dressing changes as directed                                * order for DME   1 inch paper tape, Use for dressing changes as directed                                SUMAtriptan 50 MG tablet   Commonly known as:  IMITREX   Take 1-2 tablets ( mg) by mouth at onset of headache for migraine - may repeat dose after 2h if headache recurs.  Max: 200mg/24 hours                                traZODone 50 MG tablet   Commonly known as:  DESYREL   Take 1-2 tablets ( mg) by mouth nightly as needed 1-2 tabs at bedtime PRN                                * Notice:  This list has 2 medication(s) that are the same as other  medications prescribed for you. Read the directions carefully, and ask your doctor or other care provider to review them with you.

## 2017-09-08 NOTE — PROGRESS NOTES
Unable to get IV, CRNAs are attempting to place a central line with US. Pre-op assessment otherwise done since 1100.

## 2017-09-08 NOTE — IP AVS SNAPSHOT
[facility] Phase II    [address]     [place] [address]    Phone:  [phone]                                       After Visit Summary   9/8/2017    Doreen Peralta    MRN: [id number]           After Visit Summary Signature Page     I have received my discharge instructions, and my questions have been answered. I have discussed any challenges I see with this plan with the nurse or doctor.    ..........................................................................................................................................  Patient/Patient Representative Signature      ..........................................................................................................................................  Patient Representative Print Name and Relationship to Patient    ..................................................               ................................................  Date                                            Time    ..........................................................................................................................................  Reviewed by Signature/Title    ...................................................              ..............................................  Date                                                            Time

## 2017-09-08 NOTE — ANESTHESIA POSTPROCEDURE EVALUATION
Patient: Doreen Peralta    Procedure(s):  right side mediport removal - Wound Class: I-Clean    Diagnosis:right side mediport  Diagnosis Additional Information: No value filed.    Anesthesia Type:  MAC    Note:  Anesthesia Post Evaluation    Patient location during evaluation: Phase 2 and Bedside  Patient participation: Able to fully participate in evaluation  Level of consciousness: awake  Pain management: adequate  Cardiovascular status: acceptable, stable, hypotensive and blood pressure returned to baseline  Respiratory status: acceptable and room air  Hydration status: acceptable  PONV: none     Anesthetic complications: None    Comments: Pt doing well, C/O pain over surgery site, IV pain meds ordered, ice on over site, pt states satisfied with anesthesia care, no other concerns currently, pt will be D/C per [facility] protocol         Last vitals:  Vitals:    09/08/17 1040   BP: (!) 152/94   Resp: 14   Temp: 98.2  F (36.8  C)   SpO2: 100%         Electronically Signed By: GERARD Rivera CRNA  September 8, 2017  1:37 PM

## 2017-09-08 NOTE — ANESTHESIA CARE TRANSFER NOTE
Patient: Doreen Peralta    Procedure(s):  right side mediport removal - Wound Class: I-Clean    Diagnosis: right side mediport  Diagnosis Additional Information: No value filed.    Anesthesia Type:   MAC     Note:  Airway :Room Air  Patient transferred to:Phase II        Vitals: (Last set prior to Anesthesia Care Transfer)    CRNA VITALS  9/8/2017 1252 - 9/8/2017 1336      9/8/2017             Resp Rate (observed): 14                Electronically Signed By: GERARD Rivera CRNA  September 8, 2017  1:36 PM

## 2017-09-11 ENCOUNTER — TELEPHONE (OUTPATIENT)
Dept: SURGERY | Facility: CLINIC | Age: 37
End: 2017-09-11

## 2017-09-11 NOTE — TELEPHONE ENCOUNTER
PT calling this am. Pt had infusion port removed on 9/8/17 by Dr. Worthington. Pt states that yesterday the suture line became red, hot and painful. It is not draining, but remains that way today.   I spoke with Dr. Worthington. He would like pt to go to the ED to be assessed. Pt states she does not want to do that and will just watch and see what happens with it and if sym continue she will come in. ALANA Braswell

## 2017-09-13 LAB
BACTERIA SPEC CULT: NORMAL
Lab: NORMAL
SPECIMEN SOURCE: NORMAL

## 2017-09-25 ENCOUNTER — HOSPITAL ENCOUNTER (EMERGENCY)
Facility: CLINIC | Age: 37
Discharge: HOME | End: 2017-09-25
Attending: EMERGENCY MEDICINE | Admitting: EMERGENCY MEDICINE
Payer: COMMERCIAL

## 2017-09-25 ENCOUNTER — APPOINTMENT (OUTPATIENT)
Dept: INTERVENTIONAL RADIOLOGY/VASCULAR | Facility: CLINIC | Age: 37
End: 2017-09-25
Attending: EMERGENCY MEDICINE
Payer: COMMERCIAL

## 2017-09-25 ENCOUNTER — HOSPITAL ENCOUNTER (OUTPATIENT)
Facility: CLINIC | Age: 37
End: 2017-09-25
Admitting: INTERNAL MEDICINE

## 2017-09-25 ENCOUNTER — TELEPHONE (OUTPATIENT)
Dept: INTERVENTIONAL RADIOLOGY/VASCULAR | Facility: CLINIC | Age: 37
End: 2017-09-25

## 2017-09-25 VITALS
HEART RATE: 86 BPM | WEIGHT: 196 LBS | DIASTOLIC BLOOD PRESSURE: 85 MMHG | OXYGEN SATURATION: 99 % | RESPIRATION RATE: 14 BRPM | BODY MASS INDEX: 31.64 KG/M2 | SYSTOLIC BLOOD PRESSURE: 125 MMHG | TEMPERATURE: 98 F

## 2017-09-25 DIAGNOSIS — Z93.1 FEEDING BY G-TUBE (H): Primary | ICD-10-CM

## 2017-09-25 DIAGNOSIS — R51.9 HEADACHE BEHIND THE EYES: ICD-10-CM

## 2017-09-25 DIAGNOSIS — T85.528A DISLODGED GASTROSTOMY TUBE: ICD-10-CM

## 2017-09-25 LAB — B-HCG SERPL-ACNC: <1 IU/L (ref 0–5)

## 2017-09-25 PROCEDURE — 27210916 ZZ H TUBE GASTRO CR5

## 2017-09-25 PROCEDURE — 40000556 ZZH STATISTIC PERIPHERAL IV START W US GUIDANCE

## 2017-09-25 PROCEDURE — 84702 CHORIONIC GONADOTROPIN TEST: CPT | Performed by: RADIOLOGY

## 2017-09-25 PROCEDURE — 25000128 H RX IP 250 OP 636: Performed by: PHYSICIAN ASSISTANT

## 2017-09-25 PROCEDURE — 25000128 H RX IP 250 OP 636: Performed by: EMERGENCY MEDICINE

## 2017-09-25 PROCEDURE — 25000125 ZZHC RX 250: Performed by: RADIOLOGY

## 2017-09-25 PROCEDURE — 25000132 ZZH RX MED GY IP 250 OP 250 PS 637: Performed by: EMERGENCY MEDICINE

## 2017-09-25 PROCEDURE — 96372 THER/PROPH/DIAG INJ SC/IM: CPT

## 2017-09-25 PROCEDURE — 96374 THER/PROPH/DIAG INJ IV PUSH: CPT

## 2017-09-25 PROCEDURE — 99285 EMERGENCY DEPT VISIT HI MDM: CPT | Mod: Z6 | Performed by: EMERGENCY MEDICINE

## 2017-09-25 PROCEDURE — 17250 CHEM CAUT OF GRANLTJ TISSUE: CPT

## 2017-09-25 PROCEDURE — 96376 TX/PRO/DX INJ SAME DRUG ADON: CPT

## 2017-09-25 PROCEDURE — 25000128 H RX IP 250 OP 636: Performed by: RADIOLOGY

## 2017-09-25 PROCEDURE — 99285 EMERGENCY DEPT VISIT HI MDM: CPT | Mod: 25

## 2017-09-25 PROCEDURE — 49450 REPLACE G/C TUBE PERC: CPT

## 2017-09-25 PROCEDURE — 96375 TX/PRO/DX INJ NEW DRUG ADDON: CPT

## 2017-09-25 RX ORDER — SUMATRIPTAN 6 MG/.5ML
6 INJECTION, SOLUTION SUBCUTANEOUS ONCE
Status: COMPLETED | OUTPATIENT
Start: 2017-09-25 | End: 2017-09-25

## 2017-09-25 RX ORDER — NALOXONE HYDROCHLORIDE 0.4 MG/ML
.1-.4 INJECTION, SOLUTION INTRAMUSCULAR; INTRAVENOUS; SUBCUTANEOUS
Status: DISCONTINUED | OUTPATIENT
Start: 2017-09-25 | End: 2017-09-25 | Stop reason: HOSPADM

## 2017-09-25 RX ORDER — LIDOCAINE 40 MG/G
CREAM TOPICAL
Status: DISCONTINUED | OUTPATIENT
Start: 2017-09-25 | End: 2017-09-25 | Stop reason: HOSPADM

## 2017-09-25 RX ORDER — ONDANSETRON 2 MG/ML
4 INJECTION INTRAMUSCULAR; INTRAVENOUS EVERY 30 MIN PRN
Status: DISCONTINUED | OUTPATIENT
Start: 2017-09-25 | End: 2017-09-25 | Stop reason: HOSPADM

## 2017-09-25 RX ORDER — FLUMAZENIL 0.1 MG/ML
0.2 INJECTION, SOLUTION INTRAVENOUS
Status: DISCONTINUED | OUTPATIENT
Start: 2017-09-25 | End: 2017-09-25 | Stop reason: HOSPADM

## 2017-09-25 RX ORDER — SODIUM CHLORIDE 9 MG/ML
INJECTION, SOLUTION INTRAVENOUS CONTINUOUS
Status: DISCONTINUED | OUTPATIENT
Start: 2017-09-25 | End: 2017-09-25 | Stop reason: HOSPADM

## 2017-09-25 RX ORDER — LIDOCAINE 40 MG/G
CREAM TOPICAL
Status: CANCELLED | OUTPATIENT
Start: 2017-09-25

## 2017-09-25 RX ORDER — IODIXANOL 320 MG/ML
50 INJECTION, SOLUTION INTRAVASCULAR ONCE
Status: COMPLETED | OUTPATIENT
Start: 2017-09-25 | End: 2017-09-25

## 2017-09-25 RX ORDER — HYDROCODONE BITARTRATE AND ACETAMINOPHEN 5; 325 MG/1; MG/1
1 TABLET ORAL ONCE
Status: COMPLETED | OUTPATIENT
Start: 2017-09-25 | End: 2017-09-25

## 2017-09-25 RX ORDER — DIPHENHYDRAMINE HYDROCHLORIDE 50 MG/ML
50 INJECTION INTRAMUSCULAR; INTRAVENOUS ONCE
Status: COMPLETED | OUTPATIENT
Start: 2017-09-25 | End: 2017-09-25

## 2017-09-25 RX ORDER — HYDROMORPHONE HYDROCHLORIDE 1 MG/ML
0.5 INJECTION, SOLUTION INTRAMUSCULAR; INTRAVENOUS; SUBCUTANEOUS
Status: COMPLETED | OUTPATIENT
Start: 2017-09-25 | End: 2017-09-25

## 2017-09-25 RX ORDER — ONDANSETRON 2 MG/ML
4 INJECTION INTRAMUSCULAR; INTRAVENOUS ONCE
Status: COMPLETED | OUTPATIENT
Start: 2017-09-25 | End: 2017-09-25

## 2017-09-25 RX ORDER — DIPHENHYDRAMINE HCL 25 MG
25 CAPSULE ORAL ONCE
Status: COMPLETED | OUTPATIENT
Start: 2017-09-25 | End: 2017-09-25

## 2017-09-25 RX ORDER — FENTANYL CITRATE 50 UG/ML
25-50 INJECTION, SOLUTION INTRAMUSCULAR; INTRAVENOUS EVERY 5 MIN PRN
Status: DISCONTINUED | OUTPATIENT
Start: 2017-09-25 | End: 2017-09-25 | Stop reason: HOSPADM

## 2017-09-25 RX ORDER — SODIUM CHLORIDE 9 MG/ML
INJECTION, SOLUTION INTRAVENOUS CONTINUOUS
Status: CANCELLED | OUTPATIENT
Start: 2017-09-25

## 2017-09-25 RX ADMIN — MIDAZOLAM 2 MG: 1 INJECTION INTRAMUSCULAR; INTRAVENOUS at 17:00

## 2017-09-25 RX ADMIN — FENTANYL CITRATE 200 MCG: 50 INJECTION, SOLUTION INTRAMUSCULAR; INTRAVENOUS at 17:00

## 2017-09-25 RX ADMIN — ONDANSETRON 4 MG: 2 INJECTION INTRAMUSCULAR; INTRAVENOUS at 19:14

## 2017-09-25 RX ADMIN — SUMATRIPTAN SUCCINATE 6 MG: 6 INJECTION SUBCUTANEOUS at 19:11

## 2017-09-25 RX ADMIN — ONDANSETRON 4 MG: 2 INJECTION INTRAMUSCULAR; INTRAVENOUS at 13:32

## 2017-09-25 RX ADMIN — DIPHENHYDRAMINE HYDROCHLORIDE 50 MG: 50 INJECTION, SOLUTION INTRAMUSCULAR; INTRAVENOUS at 17:12

## 2017-09-25 RX ADMIN — LIDOCAINE HYDROCHLORIDE 6 ML: 10 INJECTION, SOLUTION EPIDURAL; INFILTRATION; INTRACAUDAL; PERINEURAL at 16:59

## 2017-09-25 RX ADMIN — LIDOCAINE HYDROCHLORIDE 10 ML: 20 JELLY TOPICAL at 17:48

## 2017-09-25 RX ADMIN — HYDROMORPHONE HYDROCHLORIDE 0.5 MG: 1 INJECTION, SOLUTION INTRAMUSCULAR; INTRAVENOUS; SUBCUTANEOUS at 19:41

## 2017-09-25 RX ADMIN — SILVER NITRATE APPLICATORS 1 APPLICATOR: 25; 75 STICK TOPICAL at 17:47

## 2017-09-25 RX ADMIN — IODIXANOL 5 ML: 320 INJECTION, SOLUTION INTRAVASCULAR at 16:59

## 2017-09-25 RX ADMIN — HYDROMORPHONE HYDROCHLORIDE 0.5 MG: 1 INJECTION, SOLUTION INTRAMUSCULAR; INTRAVENOUS; SUBCUTANEOUS at 13:32

## 2017-09-25 RX ADMIN — HYDROCODONE BITARTRATE AND ACETAMINOPHEN 1 TABLET: 5; 325 TABLET ORAL at 13:09

## 2017-09-25 ASSESSMENT — ENCOUNTER SYMPTOMS: ABDOMINAL PAIN: 1

## 2017-09-25 NOTE — PROGRESS NOTES
Prior to procedure I discussed moderate sedation with the patient.  I described our usual drugs were versed and fentanyl.  Patient also requested benadryl.  She does have adverse reactions listed to morphine and fentanyl.  Her reaction to fentanyl is migraines.  She did not think we were going to administer fentanyl and after this was discovered post-procedurally, she unfortunately started experiencing migraine symptoms.  She described the procedure as tolerable but still very painful.  She also said that Dilaudid has worked much better in the past without side effects.  Hence the reason I am writing this note, so future providers might consider this alternative pain medication.

## 2017-09-25 NOTE — ED NOTES
Pt reports g-tube fell out on Saturday, she put it back in but balloon was deflated. This am fell out again, she called IR and has appt tomorrow. Abd pain and distention is increasing. G-tube is used to drain.

## 2017-09-25 NOTE — ED AVS SNAPSHOT
[facility], Emergency Department    [address] [address]    Phone:  [phone]                                       Doreen Peralta   MRN: [id number]    Department:  [facility], Emergency Department   Date of Visit:  9/25/2017           Patient Information     Date Of Birth          1980        Your diagnoses for this visit were:     Dislodged gastrostomy tube (H)     Headache behind the eyes        You were seen by Galo Valdivia MD.        Discharge Instructions       Please make an appointment to follow up with Your Primary Care Provider as soon as possible.    Return if worsening redness or swelling across abdominal wall.    Rest, use your pain medicines as needed.        Future Appointments        Provider Department Dept Phone Center    12/4/2017 10:00 AM Gilmer Lees MD [facility] [phone] [facility]      24 Hour Appointment Hotline       To make an appointment at any [facility], call [phone] ([phone]). If you don't have a family doctor or clinic, we will help you find one. [place] clinics are conveniently located to serve the needs of you and your family.             Review of your medicines      Our records show that you are taking the medicines listed below. If these are incorrect, please call your family doctor or clinic.        Dose / Directions Last dose taken    ACETAMINOPHEN PO   Dose:  500-1000 mg        Take 500-1,000 mg by mouth every 6 hours as needed for pain   Refills:  0        albuterol (2.5 MG/3ML) 0.083% neb solution   Dose:  2.5 mg   Quantity:  360 mL        Take 1 vial (2.5 mg) by nebulization every 4 hours as needed for shortness of breath / dyspnea or wheezing   Refills:  0        albuterol 90 MCG/ACT inhaler   Dose:  2 puff        Inhale 2 puffs into the lungs every 6 hours as needed   Refills:  0        cloNIDine 0.2 MG tablet   Commonly known as:  CATAPRES   Quantity:  180 tablet        TAKE  2 TABLETS(0.4 MG) BY MOUTH EVERY EVENING   Refills:  0        diphenhydrAMINE 25 MG tablet   Commonly known as:  BENADRYL ALLERGY   Dose:  25 mg   Quantity:  30 tablet        Take 1 tablet (25 mg) by mouth every 6 hours as needed for itching or other (Nausea)   Refills:  0        DULoxetine 60 MG EC capsule   Commonly known as:  CYMBALTA   Dose:  60 mg   Quantity:  90 capsule        Take 1 capsule (60 mg) by mouth daily   Refills:  3        ethanol (74%) ADULT 74 %   Quantity:  5 mL        Inject IV through Port-a-cath daily for 11 days to lock Port-a-cath after each dose of ceftriaxone and also use to lock Port-a-cath after each weekly infusion of IV fluids and MVI   Refills:  0        ipratropium 0.02 % neb solution   Commonly known as:  ATROVENT   Dose:  0.5 mg   Quantity:  225 mL        Take 2.5 mLs (0.5 mg) by nebulization every 6 hours as needed for wheezing   Refills:  0        mirtazapine 15 MG ODT tab   Commonly known as:  REMERON SOL-TAB   Dose:  7.5 mg   Quantity:  45 tablet        0.5 tablets (7.5 mg) by Orally disintegrating tablet route At Bedtime   Refills:  0        nortriptyline 10 MG capsule   Commonly known as:  PAMELOR   Dose:  30-40 mg   Quantity:  120 capsule        Take 3-4 capsules (30-40 mg) by mouth At Bedtime   Refills:  5        ondansetron 4 MG ODT tab   Commonly known as:  ZOFRAN ODT   Dose:  4-8 mg   Quantity:  30 tablet        Take 1-2 tablets (4-8 mg) by mouth every 6 hours as needed for nausea   Refills:  0        * order for DME   Quantity:  1 Box        2 by 2 gauze pads, use for dressing changes as directed   Refills:  1        * order for DME   Quantity:  1 each        1 inch paper tape, Use for dressing changes as directed   Refills:  0        SUMAtriptan 50 MG tablet   Commonly known as:  IMITREX   Dose:   mg   Quantity:  18 tablet        Take 1-2 tablets ( mg) by mouth at onset of headache for migraine - may repeat dose after 2h if headache recurs.  Max: 200mg/24  hours   Refills:  1        traZODone 50 MG tablet   Commonly known as:  DESYREL   Dose:   mg   Quantity:  180 tablet        Take 1-2 tablets ( mg) by mouth nightly as needed 1-2 tabs at bedtime PRN   Refills:  1        * Notice:  This list has 2 medication(s) that are the same as other medications prescribed for you. Read the directions carefully, and ask your doctor or other care provider to review them with you.            Procedures and tests performed during your visit     HCG quantitative pregnancy    IR Gastrostomy Tube Change    Peripheral IV catheter    Peripheral IV: Standard    Vascular Access Care Adult IP Consult      Orders Needing Specimen Collection     None      Pending Results     Date and Time Order Name Status Description    9/25/2017 1252 IR Gastrostomy Tube Change In process             Pending Culture Results     No orders found from 9/23/2017 to 9/26/2017.            Pending Results Instructions     If you had any lab results that were not finalized at the time of your Discharge, you can call the ED Lab Result RN at [phone]. You will be contacted by this team for any positive Lab results or changes in treatment. The nurses are available 7 days a week from 10A to 6:30P.  You can leave a message 24 hours per day and they will return your call.        Thank you for choosing [facility]       Thank you for choosing [facility] for your care. Our goal is always to provide you with excellent care. Hearing back from our patients is one way we can continue to improve our services. Please take a few minutes to complete the written survey that you may receive in the mail after you visit with us. Thank you!        [facility] Information     [email or web address] gives you secure access to your electronic health record. If you see a primary care provider, you can also send messages to your care team and make appointments. If you have questions, please call your primary care clinic.  If you do not have a primary  care provider, please call [phone] and they will assist you.        Care EveryWhere ID     This is your Care EveryWhere ID. This could be used by other organizations to access your [facility] medical records  [id number]        Equal Access to Services     TRAMAINE CLAYTON : Carrie Law, wafanny villeda, qajustin kaalgraciela long, elham gomez. So [facility] [phone].    ATENCIÓN: Si habla español, tiene a wade disposición servicios gratuitos de asistencia lingüística. Llame al [phone].    We comply with applicable federal civil rights laws and [place] laws. We do not discriminate on the basis of race, color, national origin, age, disability sex, sexual orientation or gender identity.            After Visit Summary       This is your record. Keep this with you and show to your community pharmacist(s) and doctor(s) at your next visit.

## 2017-09-25 NOTE — ED AVS SNAPSHOT
[facility], [place], Emergency Department    [address] [place] [address]    Phone:  [phone]                                       Doreen Peralta   MRN: [id number]    Department:  [facility], Emergency Department   Date of Visit:  9/25/2017           After Visit Summary Signature Page     I have received my discharge instructions, and my questions have been answered. I have discussed any challenges I see with this plan with the nurse or doctor.    ..........................................................................................................................................  Patient/Patient Representative Signature      ..........................................................................................................................................  Patient Representative Print Name and Relationship to Patient    ..................................................               ................................................  Date                                            Time    ..........................................................................................................................................  Reviewed by Signature/Title    ...................................................              ..............................................  Date                                                            Time

## 2017-09-25 NOTE — PROGRESS NOTES
Interventional Radiology Pre-Procedure Sedation Assessment   Time of Assessment: 4:28 PM    Expected Level: Moderate Sedation    Indication: Sedation is required for the following type of Procedure: GI    Sedation and procedural consent: Risks, benefits and alternatives were discussed with Patient    PO Intake: Appropriately NPO for procedure    ASA Class: Class 2 - MILD SYSTEMIC DISEASE, NO ACUTE PROBLEMS, NO FUNCTIONAL LIMITATIONS.    Mallampati: Grade 2:  Soft palate, base of uvula, tonsillar pillars, and portion of posterior pharyngeal wall visible    Lungs: Lungs Clear with good breath sounds bilaterally    Heart: Normal heart sounds and rate    History and physical reviewed and no updates needed. I have reviewed the lab findings, diagnostic data, medications, and the plan for sedation. I have determined this patient to be an appropriate candidate for the planned sedation and procedure and have reassessed the patient IMMEDIATELY PRIOR to sedation and procedure.    Andre Hare MD

## 2017-09-25 NOTE — PROGRESS NOTES
Interventional Radiology Intra-procedural Nursing Note    Patient Name: Doreen Peralta  Medical Record Number: [id number]  Today's Date: September 25, 2017    Start Time:1640  End of procedure time: 1655  Procedure: gastrostomy tube replacement  Report given to: ER RN  Time pt departs:  1750       Other Notes: pt from ER to IR 4. VSS, pt a/o x 3, consent obtained and verified. Pt positioned and prepped per procedure protocol. Fentanyl 200 mcg, versed 2 mg, lido 6 cc, urojet 10 ml, silver nitrate stick x 2. Pt states she gets h/a with fentanyl ; pt given benadryl 50 mg, ST 15 minutes.    TERESA LEMONS

## 2017-09-25 NOTE — ED PROVIDER NOTES
History     Chief Complaint   Patient presents with     Abdominal Pain     HPI  Doreen Peralta is a 37 year old female with an extensive GI history including gastroparesis, status-post G-tube placement (last exchanged on 7/26/17), multiple bowel surgeries including partial colectomy, chronic abdominal pain, chronic diarrhea, SBO, recurrent sepsis, DVT, hypertension and possible Munchausen syndrome. The patient presents to the ED with G-tube concerns. The patient reports that noticed her G-tube was falling out on Saturday, 2 days ago, so she pushed it back in and taped it. She notes that the she did not check the tube yesterday, but this morning, around 8:00 AM, when she checked it she noticed that it had completely fallen out and the balloon was deflated. She noted the gastrostomy site was tender and sore. The patient then contacted IR and was referred here to the ED for further evaluation. She reports that her G-tube is used mainly for draining and venting. She did notice decreased drainage from the tube yesterday. She reports she had a 16 [place] Sharif tube in place.     Past Medical History:   Diagnosis Date     Asthma      Bilateral ovarian cysts      Cervical cancer (H) 01/01/2008    cervical cancer      Chronic pain      Colonic dysmotility     s/p subtotal colectomy     Constipation     chronic     E. coli sepsis (H) 5/8/2016     Enteritis      Fungemia 5/5/2016     Gastro-oesophageal reflux disease      H/O ileostomy      Hx of abnormal Pap smear     s/p LEEP - no further details provided     Hypertension      IBS (irritable bowel syndrome)      Other chronic pain      PONV (postoperative nausea and vomiting)      Thrombosis, hepatic vein (H)     microvascular       Past [facility] History:   Procedure Laterality Date     COLONOSCOPY  7/10/2012    Procedure: COLONOSCOPY;;  Surgeon: Nicole Redding MD;  Location: [facility]     COLONOSCOPY N/A 2/19/2017    Procedure: COLONOSCOPY;  Surgeon: Randell Muller  MD Lobo;  Location: [facility] GI     COLONOSCOPY N/A 2/21/2017    Procedure: COLONOSCOPY;  Surgeon: Randell Muller MD;  Location: [facility]     ECHO CHELO  7/19/2016          ENDOSCOPIC INSERTION TUBE GASTROSTOMY N/A 1/21/2016    Procedure: ENDOSCOPIC INSERTION TUBE GASTROSTOMY;  Surgeon: Nicole Redding MD;  Location[facility]     ESOPHAGOSCOPY, GASTROSCOPY, DUODENOSCOPY (EGD), COMBINED  7/10/2012    Procedure: COMBINED ESOPHAGOSCOPY, GASTROSCOPY, DUODENOSCOPY (EGD);  Upper Endoscopy, Ileoscopy    Latex Allergy  with biopsies;  Surgeon: Nicole Redding MD;  [facility]     ESOPHAGOSCOPY, GASTROSCOPY, DUODENOSCOPY (EGD), COMBINED N/A 11/5/2014    Procedure: COMBINED ESOPHAGOSCOPY, GASTROSCOPY, DUODENOSCOPY (EGD);  Surgeon: Nicole Redding MD;  [facility]     HC REPLACE DUODENOSTOMY/JEJUNOSTOMY TUBE PERCUTANEOUS N/A 8/27/2015    Procedure: REPLACE GASTROJEJUNOSTOMY TUBE, PERCUTANEOUS;  Surgeon: Mio Holder MD;  [facility]     HC REPLACE DUODENOSTOMY/JEJUNOSTOMY TUBE PERCUTANEOUS N/A 1/7/2016    Procedure: REPLACE JEJUNOSTOMY TUBE, PERCUTANEOUS;  Surgeon: lEsa Medel MD;  [facility]     HC REPLACE DUODENOSTOMY/JEJUNOSTOMY TUBE PERCUTANEOUS N/A 1/28/2016    Procedure: REPLACE JEJUNOSTOMY TUBE, PERCUTANEOUS;  Surgeon: Elsa Medel MD;  [facility]     HC REPLACE GASTROSTOMY/CECOSTOMY TUBE PERCUTANEOUS Left 5/19/2015    Procedure: REPLACE GASTROSTOMY TUBE, PERCUTANEOUS;  Surgeon: Melecio Morejon Chi, MD;  Location: [facility]     HC UGI ENDOSCOPY W PLACEMENT GASTROSTOMY TUBE PERCUT N/A 10/1/2015    Procedure: COMBINED ESOPHAGOSCOPY, GASTROSCOPY, DUODENOSCOPY (EGD), PLACE PERCUTANEOUS ENDOSCOPIC GASTROSTOMY TUBE;  Surgeon: Mio Holder MD;  [facility]     INSERT PORT VASCULAR ACCESS Right 7/20/2017    Procedure: INSERT PORT VASCULAR ACCESS;  Chest Port Placement  **Latex Allergy**;  Surgeon: Coy Rocha PA-C;  [facility]     LAPAROSCOPIC ASSISTED  COLECTOMY  1/20/2012    Procedure:LAPAROSCOPIC ASSISTED COLECTOMY; Laparoscopic Ileostomy       LAPAROSCOPIC ASSISTED COLECTOMY LEFT (DESCENDING)  10/24/2012    Procedure: LAPAROSCOPIC ASSISTED COLECTOMY LEFT (DESCENDING);   Hand Assisted Laproscopic Subtotal abdominal Colectomy,Iieorectal anastamosis, Ileostomy Closure.       LAPAROSCOPIC ASSISTED INSERTION TUBE JEJUNOSTOMY N/A 10/16/2015    Procedure: LAPAROSCOPIC ASSISTED INSERTION TUBE JEJUNOSTOMY;  Surgeon: Elsa Medel MD;  [facility]     LAPAROSCOPIC CHOLECYSTECTOMY  2002    [facility] stones duct     LAPAROSCOPIC ILEOSTOMY  1/20/2012    [facility], loop     LAPAROSCOPIC OOPHORECTOMY Right 2009    [facility]     LAPAROTOMY EXPLORATORY N/A 1/28/2016    Procedure: LAPAROTOMY EXPLORATORY;  Surgeon: Elsa Medel MD;  [facility]     LEEP TX, CERVICAL  2009    [facility]     PICC INSERTION Left 10/21/2015    5fr DL Power PICC, 37cm (2cm external) in the L basilic vein w/ tip in the SVC RA junction.     REMOVE GASTROSTOMY TUBE ADULT N/A 12/12/2014    Procedure: REMOVE GASTROSTOMY TUBE ADULT;  Surgeon: Nicole Redding MD;  [facility]     REMOVE PORT VASCULAR ACCESS Right 6/30/2016    Procedure: REMOVE PORT VASCULAR ACCESS;  Surgeon: Pradeep Orosco MD;  [facility] OR     REMOVE PORT VASCULAR ACCESS Right 9/8/2017    Procedure: REMOVE PORT VASCULAR ACCESS;  right side mediport removal;  Surgeon: Zechariah Worthington MD;  Location: [facility] OR     replace GASTROSTOMY TUBE ADULT  5/19/15       Family History   Problem Relation Age of Onset     Thyroid Disease Mother      Sjogren's Mother      GASTROINTESTINAL DISEASE Mother      Intermittent nausea vomiting diarrhea     Colon Polyps Mother      Prostate Problems Father      prostate enlargement     Lupus Maternal Grandmother      CANCER Maternal Grandfather      Lung     Colon Cancer Maternal Grandfather 65     CANCER Paternal Grandmother      Lung      CEREBROVASCULAR DISEASE  Paternal Grandmother      DIABETES Paternal Grandmother      Cardiovascular Paternal Grandmother      CHF     CANCER Paternal Grandfather      Lung     Glaucoma Paternal Grandfather      Abdominal Aortic Aneurysm Other      Macular Degeneration No family hx of        Social History   Substance Use Topics     Smoking status: Former Smoker     Packs/day: 1.00     Years: 4.00     Types: Cigarettes     Quit date: 1/1/2004     Smokeless tobacco: Former User     Alcohol use No     Current Facility-Administered Medications   Medication     ondansetron (ZOFRAN) injection 4 mg     Current Outpatient Prescriptions   Medication     ondansetron (ZOFRAN ODT) 4 MG ODT tab     albuterol (2.5 MG/3ML) 0.083% neb solution     ipratropium (ATROVENT) 0.02 % neb solution     ethanol, 74%, ADULT 74 %     order for DME     order for DME     cloNIDine (CATAPRES) 0.2 MG tablet     diphenhydrAMINE (BENADRYL ALLERGY) 25 MG tablet     traZODone (DESYREL) 50 MG tablet     nortriptyline (PAMELOR) 10 MG capsule     mirtazapine (REMERON SOL-TAB) 15 MG ODT tab     SUMAtriptan (IMITREX) 50 MG tablet     DULoxetine (CYMBALTA) 60 MG capsule     ACETAMINOPHEN PO     albuterol 90 MCG/ACT inhaler        Allergies   Allergen Reactions     Hyoscyamine Rash     Metoclopramide Other (See Comments)     Eye twitching.      Peaches [Peach] Other (See Comments)     Raw. Cooked OK     Sucralose Other (See Comments)     All artificial sweeteners. Aspartame also. Swollen glands     Advair Diskus Other (See Comments)     Throat burns     Azithromycin Other (See Comments)     Burning in throat     Compazine [Prochlorperazine] Visual Disturbance     Contrast Dye Itching     States is allergic to CT contrast dye     Cyclobenzaprine Visual Disturbance     Fentanyl Other (See Comments)     migraine     Ibuprofen GI Disturbance     Lactulose Nausea and Vomiting     Gas and bloating     Levaquin [Levofloxacin] Swelling     Per ED M.D. And RN      Morphine Sulfate Other  (See Comments)     Chest pain       Oxycodone Other (See Comments)     Burning throat, but can take Norco.      Rizatriptan Visual Disturbance     Droperidol Hives and Rash     Isovue [Iopamidol] Palpitations     Pt had racing heart and sob      Ketorolac Anxiety     Latex Swelling and Rash     Kiwi, likely also avacado, ? banana     Levsin Rash      I have reviewed the Medications, Allergies, Past Medical and [facility] History, and Social History in the Epic system.    Review of Systems   Gastrointestinal: Positive for abdominal pain (tenderness at G-tube site).   All other systems reviewed and are negative.      Physical Exam   Pulse: 86  Temp: 98  F (36.7  C)  Resp: 18  Weight: 88.9 kg (196 lb)  SpO2: 100 %  Physical Exam   Constitutional: No distress.   HENT:   Head: Atraumatic.   Mouth/Throat: Oropharynx is clear and moist. No oropharyngeal exudate.   Eyes: Pupils are equal, round, and reactive to light. No scleral icterus.   Cardiovascular: Normal heart sounds and intact distal pulses.    Pulmonary/Chest: Breath sounds normal. No respiratory distress.   Abdominal: Soft. Bowel sounds are normal. She exhibits distension (mild). There is no tenderness. There is no rigidity, no rebound, no guarding and no CVA tenderness.       Musculoskeletal: She exhibits no edema or tenderness.   Skin: Skin is warm. No rash noted. She is not diaphoretic.       ED Course     ED Course     Procedures     12:04 PM  The patient was seen and examined by Dr. Valdivia in Room [facility].               Critical Care time:  none             Labs Ordered and Resulted from Time of ED Arrival Up to the Time of Departure from the ED   HCG QUANTITATIVE PREGNANCY   PERIPHERAL IV CATHETER   PERIPHERAL IV CATHETER            Assessments & Plan (with Medical Decision Making)   I attempted to replace the patient's G-tube at the bedside however met significant resistance.  In the past patient has required sedation in interventional radiology.  The tube  has been out for 1-2 days history.  I made arrangements for her to have it replaced in interventional radiology.  The patient returned from the procedure and stated that she was given fentanyl which gives her headache.  She was very emotionally upset and her mother was angry.  She has a bi-temporal headache without stiff neck or photophobia.  She was given Imitrex and Zofran with little relief.  She is allergic to most medications including Toradol and phenothiazines.  The fentanyl which was given in interventional radiology was on her allergy list is causing headaches.  I explained to the patient that she would be at risk for rebound headaches with further narcotics however she preferred to try 1 dose of Dilaudid.  I explained that we would not normally use that for headaches and would not treat rebound headaches with that in the future.  The patient has chronic pain and frequent visits.  I referred her name to the care coordinator for establishment of a care plan.    I have reviewed the nursing notes.    I have reviewed the findings, diagnosis, plan and need for follow up with the patient.    Discharge Medication List as of 9/25/2017  7:39 PM          Final diagnoses:   Dislodged gastrostomy tube (H)   Headache behind the eyes     I, Ramona Gaona, am serving as a trained medical scribe to document services personally performed by Galo Valdivia MD, based on the provider's statements to me.   I, Galo Valdivia MD, was physically present and have reviewed and verified the accuracy of this note documented by Ramona Gaona.     9/25/2017   [facility], EMERGENCY DEPARTMENT     Galo Valdivia MD  09/25/17 2052

## 2017-09-25 NOTE — PROCEDURES
Interventional Radiology Brief Post Procedure Note    Procedure: G tube replacement via existing tract.      Proceduralist: Loi Blunt MD    Assistant: None    Time Out: Prior to the start of the procedure and with procedural staff participation, I verbally confirmed the patient s identity using two indicators, relevant allergies, that the procedure was appropriate and matched the consent or emergent situation, and that the correct equipment/implants were available. Immediately prior to starting the procedure I conducted the Time Out with the procedural staff and re-confirmed the patient s name, procedure, and site/side. (The Joint Commission universal protocol was followed.)  Yes        Sedation: IR Nurse Monitored Care   Post Procedure Summary:  Prior to the start of the procedure and with procedural staff participation, I verbally confirmed the patient s identity using two indicators, relevant allergies, that the procedure was appropriate and matched the consent or emergent situation, and that the correct equipment/implants were available. Immediately prior to starting the procedure I conducted the Time Out with the procedural staff and re-confirmed the patient s name, procedure, and site/side. (The Joint Commission universal protocol was followed.)  Yes       Sedatives: Fentanyl and Midazolam (Versed)    Vital signs, airway and pulse oximetry were monitored and remained stable throughout the procedure and sedation was maintained until the procedure was complete.  The patient was monitored by staff until sedation discharge criteria were met.    Patient tolerance: Patient tolerated the procedure well with no immediate complications.    Time of sedation in minutes: 15 Minutes minutes from beginning to end of physician one to one monitoring.          Findings: uncomplicated replacement via existing tract.  Granulation tissue treated with silver nitrate.     Estimated Blood Loss: Minimal    Fluoroscopy Time:   minute(s)    SPECIMENS: None    Complications: 1. None     Condition: Stable    Plan: may resume prior tube use immediately.     Comments: See dictated procedure note for full details.    Loi Blunt MD

## 2017-09-26 NOTE — DISCHARGE INSTRUCTIONS
Please make an appointment to follow up with Your Primary Care Provider as soon as possible.    Return if worsening redness or swelling across abdominal wall.    Rest, use your pain medicines as needed.

## 2017-09-29 DIAGNOSIS — F41.9 ANXIETY: ICD-10-CM

## 2017-09-29 NOTE — TELEPHONE ENCOUNTER
Routing refill request to provider for review/approval because:  Needs provider approval, last filled by another provider, patient du efor follow up.  Ekta Jaimes RN

## 2017-09-30 RX ORDER — CLONIDINE HYDROCHLORIDE 0.2 MG/1
TABLET ORAL
Qty: 60 TABLET | Refills: 0 | Status: SHIPPED | OUTPATIENT
Start: 2017-09-30 | End: 2017-10-25

## 2017-10-07 ENCOUNTER — HOSPITAL ENCOUNTER (EMERGENCY)
Facility: CLINIC | Age: 37
Discharge: HOME | End: 2017-10-07
Attending: PHYSICIAN ASSISTANT | Admitting: PHYSICIAN ASSISTANT
Payer: COMMERCIAL

## 2017-10-07 ENCOUNTER — OFFICE VISIT (OUTPATIENT)
Dept: URGENT CARE | Facility: URGENT CARE | Age: 37
End: 2017-10-07
Payer: COMMERCIAL

## 2017-10-07 VITALS
DIASTOLIC BLOOD PRESSURE: 68 MMHG | RESPIRATION RATE: 16 BRPM | BODY MASS INDEX: 30.67 KG/M2 | HEART RATE: 98 BPM | OXYGEN SATURATION: 100 % | TEMPERATURE: 99 F | WEIGHT: 190 LBS | SYSTOLIC BLOOD PRESSURE: 131 MMHG

## 2017-10-07 VITALS
DIASTOLIC BLOOD PRESSURE: 83 MMHG | BODY MASS INDEX: 31.23 KG/M2 | TEMPERATURE: 97.5 F | OXYGEN SATURATION: 100 % | SYSTOLIC BLOOD PRESSURE: 128 MMHG | WEIGHT: 193.5 LBS | HEART RATE: 110 BPM

## 2017-10-07 DIAGNOSIS — Z87.19 HX SBO: ICD-10-CM

## 2017-10-07 DIAGNOSIS — Z90.49 S/P PARTIAL RESECTION OF COLON: ICD-10-CM

## 2017-10-07 DIAGNOSIS — Z93.1 PEG (PERCUTANEOUS ENDOSCOPIC GASTROSTOMY) STATUS (H): ICD-10-CM

## 2017-10-07 DIAGNOSIS — F68.10 MUNCHAUSEN SYNDROME: ICD-10-CM

## 2017-10-07 DIAGNOSIS — R11.2 NAUSEA AND VOMITING, INTRACTABILITY OF VOMITING NOT SPECIFIED, UNSPECIFIED VOMITING TYPE: ICD-10-CM

## 2017-10-07 DIAGNOSIS — R50.9 FEVER, UNSPECIFIED FEVER CAUSE: ICD-10-CM

## 2017-10-07 DIAGNOSIS — R53.83 FATIGUE, UNSPECIFIED TYPE: Primary | ICD-10-CM

## 2017-10-07 DIAGNOSIS — R10.13 ABDOMINAL PAIN, EPIGASTRIC: ICD-10-CM

## 2017-10-07 LAB
ALBUMIN SERPL-MCNC: 3.3 G/DL (ref 3.4–5)
ALP SERPL-CCNC: 137 U/L (ref 40–150)
ALT SERPL W P-5'-P-CCNC: 35 U/L (ref 0–50)
ANION GAP SERPL CALCULATED.3IONS-SCNC: 13 MMOL/L (ref 3–14)
APAP SERPL-MCNC: <2 MG/L (ref 10–20)
AST SERPL W P-5'-P-CCNC: 28 U/L (ref 0–45)
BASOPHILS # BLD AUTO: 0.1 10E9/L (ref 0–0.2)
BASOPHILS NFR BLD AUTO: 0.5 %
BILIRUB SERPL-MCNC: 0.6 MG/DL (ref 0.2–1.3)
BUN SERPL-MCNC: 10 MG/DL (ref 7–30)
CALCIUM SERPL-MCNC: 8.2 MG/DL (ref 8.5–10.1)
CHLORIDE SERPL-SCNC: 110 MMOL/L (ref 94–109)
CO2 SERPL-SCNC: 18 MMOL/L (ref 20–32)
CREAT SERPL-MCNC: 0.84 MG/DL (ref 0.52–1.04)
CRP SERPL-MCNC: 4.6 MG/L (ref 0–8)
DIFFERENTIAL METHOD BLD: ABNORMAL
EOSINOPHIL # BLD AUTO: 0.1 10E9/L (ref 0–0.7)
EOSINOPHIL NFR BLD AUTO: 0.9 %
ERYTHROCYTE [DISTWIDTH] IN BLOOD BY AUTOMATED COUNT: 17.1 % (ref 10–15)
GFR SERPL CREATININE-BSD FRML MDRD: 77 ML/MIN/1.7M2
GLUCOSE SERPL-MCNC: 67 MG/DL (ref 70–99)
GRAM STN SPEC: ABNORMAL
GRAM STN SPEC: ABNORMAL
HCT VFR BLD AUTO: 36.9 % (ref 35–47)
HGB BLD-MCNC: 12 G/DL (ref 11.7–15.7)
IMM GRANULOCYTES # BLD: 0.1 10E9/L (ref 0–0.4)
IMM GRANULOCYTES NFR BLD: 0.6 %
LACTATE BLD-SCNC: 1.8 MMOL/L (ref 0.7–2)
LYMPHOCYTES # BLD AUTO: 2.2 10E9/L (ref 0.8–5.3)
LYMPHOCYTES NFR BLD AUTO: 21.3 %
Lab: ABNORMAL
MCH RBC QN AUTO: 24.9 PG (ref 26.5–33)
MCHC RBC AUTO-ENTMCNC: 32.5 G/DL (ref 31.5–36.5)
MCV RBC AUTO: 77 FL (ref 78–100)
MONOCYTES # BLD AUTO: 0.6 10E9/L (ref 0–1.3)
MONOCYTES NFR BLD AUTO: 5.5 %
NEUTROPHILS # BLD AUTO: 7.3 10E9/L (ref 1.6–8.3)
NEUTROPHILS NFR BLD AUTO: 71.2 %
PLATELET # BLD AUTO: 228 10E9/L (ref 150–450)
POTASSIUM SERPL-SCNC: 3.7 MMOL/L (ref 3.4–5.3)
PROT SERPL-MCNC: 7.9 G/DL (ref 6.8–8.8)
RBC # BLD AUTO: 4.81 10E12/L (ref 3.8–5.2)
SODIUM SERPL-SCNC: 141 MMOL/L (ref 133–144)
SPECIMEN SOURCE: ABNORMAL
WBC # BLD AUTO: 10.2 10E9/L (ref 4–11)

## 2017-10-07 PROCEDURE — 87040 BLOOD CULTURE FOR BACTERIA: CPT | Performed by: PHYSICIAN ASSISTANT

## 2017-10-07 PROCEDURE — 96374 THER/PROPH/DIAG INJ IV PUSH: CPT | Performed by: PHYSICIAN ASSISTANT

## 2017-10-07 PROCEDURE — 85025 COMPLETE CBC W/AUTO DIFF WBC: CPT | Performed by: PHYSICIAN ASSISTANT

## 2017-10-07 PROCEDURE — 83605 ASSAY OF LACTIC ACID: CPT | Performed by: PHYSICIAN ASSISTANT

## 2017-10-07 PROCEDURE — 36415 COLL VENOUS BLD VENIPUNCTURE: CPT | Performed by: PHYSICIAN ASSISTANT

## 2017-10-07 PROCEDURE — 99285 EMERGENCY DEPT VISIT HI MDM: CPT | Mod: 25 | Performed by: PHYSICIAN ASSISTANT

## 2017-10-07 PROCEDURE — 80053 COMPREHEN METABOLIC PANEL: CPT | Performed by: PHYSICIAN ASSISTANT

## 2017-10-07 PROCEDURE — 86140 C-REACTIVE PROTEIN: CPT | Performed by: PHYSICIAN ASSISTANT

## 2017-10-07 PROCEDURE — 25000128 H RX IP 250 OP 636: Performed by: PHYSICIAN ASSISTANT

## 2017-10-07 PROCEDURE — 87205 SMEAR GRAM STAIN: CPT | Performed by: PHYSICIAN ASSISTANT

## 2017-10-07 PROCEDURE — 80329 ANALGESICS NON-OPIOID 1 OR 2: CPT | Performed by: PHYSICIAN ASSISTANT

## 2017-10-07 PROCEDURE — 99214 OFFICE O/P EST MOD 30 MIN: CPT | Performed by: FAMILY MEDICINE

## 2017-10-07 PROCEDURE — 87106 FUNGI IDENTIFICATION YEAST: CPT | Performed by: PHYSICIAN ASSISTANT

## 2017-10-07 PROCEDURE — 96375 TX/PRO/DX INJ NEW DRUG ADDON: CPT | Performed by: PHYSICIAN ASSISTANT

## 2017-10-07 PROCEDURE — 87070 CULTURE OTHR SPECIMN AEROBIC: CPT | Performed by: PHYSICIAN ASSISTANT

## 2017-10-07 PROCEDURE — 99284 EMERGENCY DEPT VISIT MOD MDM: CPT | Mod: Z6 | Performed by: PHYSICIAN ASSISTANT

## 2017-10-07 PROCEDURE — 96361 HYDRATE IV INFUSION ADD-ON: CPT | Performed by: PHYSICIAN ASSISTANT

## 2017-10-07 RX ORDER — ONDANSETRON 2 MG/ML
4 INJECTION INTRAMUSCULAR; INTRAVENOUS ONCE
Status: COMPLETED | OUTPATIENT
Start: 2017-10-07 | End: 2017-10-07

## 2017-10-07 RX ORDER — HYDROCODONE BITARTRATE AND ACETAMINOPHEN 5; 325 MG/1; MG/1
1 TABLET ORAL
Qty: 4 TABLET | Refills: 0 | Status: SHIPPED | OUTPATIENT
Start: 2017-10-07 | End: 2017-10-16

## 2017-10-07 RX ORDER — DIPHENHYDRAMINE HYDROCHLORIDE 50 MG/ML
25 INJECTION INTRAMUSCULAR; INTRAVENOUS ONCE
Status: COMPLETED | OUTPATIENT
Start: 2017-10-07 | End: 2017-10-07

## 2017-10-07 RX ORDER — SODIUM CHLORIDE 9 MG/ML
1000 INJECTION, SOLUTION INTRAVENOUS CONTINUOUS
Status: DISCONTINUED | OUTPATIENT
Start: 2017-10-07 | End: 2017-10-07 | Stop reason: HOSPADM

## 2017-10-07 RX ORDER — HYDROMORPHONE HYDROCHLORIDE 1 MG/ML
.3-.5 INJECTION, SOLUTION INTRAMUSCULAR; INTRAVENOUS; SUBCUTANEOUS ONCE
Status: COMPLETED | OUTPATIENT
Start: 2017-10-07 | End: 2017-10-07

## 2017-10-07 RX ORDER — HYDROCODONE BITARTRATE AND ACETAMINOPHEN 5; 325 MG/1; MG/1
1 TABLET ORAL ONCE
Status: DISCONTINUED | OUTPATIENT
Start: 2017-10-07 | End: 2017-10-07 | Stop reason: HOSPADM

## 2017-10-07 RX ADMIN — DIPHENHYDRAMINE HYDROCHLORIDE 25 MG: 50 INJECTION, SOLUTION INTRAMUSCULAR; INTRAVENOUS at 15:16

## 2017-10-07 RX ADMIN — ONDANSETRON 4 MG: 2 SOLUTION INTRAMUSCULAR; INTRAVENOUS at 17:11

## 2017-10-07 RX ADMIN — SODIUM CHLORIDE 1000 ML: 9 INJECTION, SOLUTION INTRAVENOUS at 15:16

## 2017-10-07 RX ADMIN — HYDROMORPHONE HYDROCHLORIDE 0.5 MG: 1 INJECTION, SOLUTION INTRAMUSCULAR; INTRAVENOUS; SUBCUTANEOUS at 17:11

## 2017-10-07 RX ADMIN — SODIUM CHLORIDE 1000 ML: 9 INJECTION, SOLUTION INTRAVENOUS at 16:21

## 2017-10-07 ASSESSMENT — ENCOUNTER SYMPTOMS
NAUSEA: 1
FEVER: 1
DYSURIA: 0
ABDOMINAL PAIN: 1
FATIGUE: 1
SHORTNESS OF BREATH: 0
VOMITING: 1
DIARRHEA: 1
APPETITE CHANGE: 1

## 2017-10-07 NOTE — ED PROVIDER NOTES
"  History     Chief Complaint   Patient presents with     Fever     HPI  Doreen Peralta is a 37 year old female with a complex medical history to include multiple abdominal surgeries, recurrent sepsis, who presents to the emergency department complaining of fever and general malaise.  Her G-tube was dislodged on 9/25 so she went to the [facility] and had it replaced.  Since then she has had on and off fevers up to 100.5 F, worsening nausea with vomiting, and fatigue.  She complains of pain \"deep inside by the G-tube\".  Denies pain anywhere else in the abdomen.  Notes slightly increased drainage surrounding G-tube site but no redness.  Denies change in bowel patterns.  Denies difficulty breathing.  No urinary symptoms.  She is keeping clear liquids down orally, but no solid food. Taking tylenol 2 extra strength every 4 hours for symptoms, along with PO zofran.    I have reviewed the Medications, Allergies, Past Medical and [facility] History, and Social History in the Epic system.    Allergies:   Allergies   Allergen Reactions     Hyoscyamine Rash     Metoclopramide Other (See Comments)     Eye twitching.      Peaches [Peach] Other (See Comments)     Raw. Cooked OK     Sucralose Other (See Comments)     All artificial sweeteners. Aspartame also. Swollen glands     Advair Diskus Other (See Comments)     Throat burns     Azithromycin Other (See Comments)     Burning in throat     Compazine [Prochlorperazine] Visual Disturbance     Contrast Dye Itching     States is allergic to CT contrast dye     Cyclobenzaprine Visual Disturbance     Fentanyl Other (See Comments)     migraine     Ibuprofen GI Disturbance     Lactulose Nausea and Vomiting     Gas and bloating     Levaquin [Levofloxacin] Swelling     Per ED M.D. And RN      Morphine Sulfate Other (See Comments)     Chest pain       Oxycodone Other (See Comments)     Burning throat, but can take Norco.      Rizatriptan Visual Disturbance     Droperidol Hives " and Rash     Isovue [Iopamidol] Palpitations     Pt had racing heart and sob      Ketorolac Anxiety     Latex Swelling and Rash     Kiwi, likely also avacado, ? banana     Levsin Rash         No current facility-administered medications on file prior to encounter.   Current Outpatient Prescriptions on File Prior to Encounter:  cloNIDine (CATAPRES) 0.2 MG tablet TAKE 2 TABLETS(0.4 MG) BY MOUTH EVERY EVENING   ondansetron (ZOFRAN ODT) 4 MG ODT tab Take 1-2 tablets (4-8 mg) by mouth every 6 hours as needed for nausea   albuterol (2.5 MG/3ML) 0.083% neb solution Take 1 vial (2.5 mg) by nebulization every 4 hours as needed for shortness of breath / dyspnea or wheezing   ipratropium (ATROVENT) 0.02 % neb solution Take 2.5 mLs (0.5 mg) by nebulization every 6 hours as needed for wheezing   ethanol, 74%, ADULT 74 % Inject IV through Port-a-cath daily for 11 days to lock Port-a-cath after each dose of ceftriaxone and also use to lock Port-a-cath after each weekly infusion of IV fluids and MVI   order for DME 2 by 2 gauze pads, use for dressing changes as directed   order for DME 1 inch paper tape, Use for dressing changes as directed   diphenhydrAMINE (BENADRYL ALLERGY) 25 MG tablet Take 1 tablet (25 mg) by mouth every 6 hours as needed for itching or other (Nausea)   traZODone (DESYREL) 50 MG tablet Take 1-2 tablets ( mg) by mouth nightly as needed 1-2 tabs at bedtime PRN   nortriptyline (PAMELOR) 10 MG capsule Take 3-4 capsules (30-40 mg) by mouth At Bedtime (Patient taking differently: Take 40 mg by mouth At Bedtime )   mirtazapine (REMERON SOL-TAB) 15 MG ODT tab 0.5 tablets (7.5 mg) by Orally disintegrating tablet route At Bedtime   SUMAtriptan (IMITREX) 50 MG tablet Take 1-2 tablets ( mg) by mouth at onset of headache for migraine - may repeat dose after 2h if headache recurs.  Max: 200mg/24 hours   DULoxetine (CYMBALTA) 60 MG capsule Take 1 capsule (60 mg) by mouth daily   ACETAMINOPHEN PO Take 500-1,000 mg  by mouth every 6 hours as needed for pain    albuterol 90 MCG/ACT inhaler Inhale 2 puffs into the lungs every 6 hours as needed        Patient Active Problem List   Diagnosis     Constipation by delayed colonic transit     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     Hx SBO     S/P LEEP of cervix     Gastroparesis     Migraines     Intermittent asthma     Allergic rhinitis     Abnormal Pap smear of cervix     PEG (percutaneous endoscopic gastrostomy) status     Health Care Home     PEG tube malfunction (H)     Malfunction of gastrostomy tube (H)     Malfunctioning jejunostomy tube (H)     Jejunostomy tube present (H)     S/P partial resection of colon     Malnutrition (H)     Long-term (current) use of anticoagulants [Z79.01]     Anxiety     Anemia in other chronic diseases classified elsewhere     Munchausen syndrome - previously suspected     Anemia, iron deficiency     Positive blood culture - Klebsiella oxytoca from Port-a-cath culture     Mitral regurgitation mild-mod by Echo June 2016     Atopic rhinitis     Migraine     Patellofemoral stress syndrome     Hyperbilirubinemia     Chronic diarrhea     Coagulation defect (H) [D68.9]     Fever     Attention to G-tube (H)     Chronic abdominal pain     Vitamin D deficiency     SIRS (systemic inflammatory response syndrome) (H)     History of deep venous thrombosis     Nausea and vomiting     Abdominal pain, generalized     Abdominal pain     Insomnia, unspecified type     Sepsis due to Klebsiella (H)     Sepsis (H) - possible     Hypokalemia     Essential hypertension       Past [facility] History:   Procedure Laterality Date     COLONOSCOPY  7/10/2012    Procedure: COLONOSCOPY;;  Surgeon: Nicole Redding MD;  Location: [facility]     COLONOSCOPY N/A 2/19/2017    Procedure: COLONOSCOPY;  Surgeon: Randell Muller MD;  Location: [facility]     COLONOSCOPY N/A 2/21/2017    Procedure: COLONOSCOPY;  Surgeon: Randell Muller MD;  Location: [facility]     ECHO CHELO  7/19/2016           ENDOSCOPIC INSERTION TUBE GASTROSTOMY N/A 1/21/2016    Procedure: ENDOSCOPIC INSERTION TUBE GASTROSTOMY;  Surgeon: Nicole Redding MD;  Location: [facility]     ESOPHAGOSCOPY, GASTROSCOPY, DUODENOSCOPY (EGD), COMBINED  7/10/2012    Procedure: COMBINED ESOPHAGOSCOPY, GASTROSCOPY, DUODENOSCOPY (EGD);  Upper Endoscopy, Ileoscopy    Latex Allergy  with biopsies;  Surgeon: Nicole Redding MD;  Location: [facility]     ESOPHAGOSCOPY, GASTROSCOPY, DUODENOSCOPY (EGD), COMBINED N/A 11/5/2014    Procedure: COMBINED ESOPHAGOSCOPY, GASTROSCOPY, DUODENOSCOPY (EGD);  Surgeon: Nicole Redding MD;  Location: [facility]     HC REPLACE DUODENOSTOMY/JEJUNOSTOMY TUBE PERCUTANEOUS N/A 8/27/2015    Procedure: REPLACE GASTROJEJUNOSTOMY TUBE, PERCUTANEOUS;  Surgeon: Mio Holder MD;  [facility]     HC REPLACE DUODENOSTOMY/JEJUNOSTOMY TUBE PERCUTANEOUS N/A 1/7/2016    Procedure: REPLACE JEJUNOSTOMY TUBE, PERCUTANEOUS;  Surgeon: Elsa Medel MD;  [facility]     HC REPLACE DUODENOSTOMY/JEJUNOSTOMY TUBE PERCUTANEOUS N/A 1/28/2016    Procedure: REPLACE JEJUNOSTOMY TUBE, PERCUTANEOUS;  Surgeon: Elsa Medel MD;  [facility]     HC REPLACE GASTROSTOMY/CECOSTOMY TUBE PERCUTANEOUS Left 5/19/2015    Procedure: REPLACE GASTROSTOMY TUBE, PERCUTANEOUS;  Surgeon: Melecio Morejon Chi, MD;  Location: [facility]     HC UGI ENDOSCOPY W PLACEMENT GASTROSTOMY TUBE PERCUT N/A 10/1/2015    Procedure: COMBINED ESOPHAGOSCOPY, GASTROSCOPY, DUODENOSCOPY (EGD), PLACE PERCUTANEOUS ENDOSCOPIC GASTROSTOMY TUBE;  Surgeon: Mio Holder MD;  Location: [facility]     INSERT PORT VASCULAR ACCESS Right 7/20/2017    Procedure: INSERT PORT VASCULAR ACCESS;  Chest Port Placement  **Latex Allergy**;  Surgeon: Coy Rocha PA-C;  [facility]     LAPAROSCOPIC ASSISTED COLECTOMY  1/20/2012    Procedure:LAPAROSCOPIC ASSISTED COLECTOMY; Laparoscopic Ileostomy       LAPAROSCOPIC ASSISTED COLECTOMY LEFT (DESCENDING)  10/24/2012     "Procedure: LAPAROSCOPIC ASSISTED COLECTOMY LEFT (DESCENDING);   Hand Assisted Laproscopic Subtotal abdominal Colectomy,Iieorectal anastamosis, Ileostomy Closure.       LAPAROSCOPIC ASSISTED INSERTION TUBE JEJUNOSTOMY N/A 10/16/2015    Procedure: LAPAROSCOPIC ASSISTED INSERTION TUBE JEJUNOSTOMY;  Surgeon: Elsa Medel MD;  Location: [facility]     LAPAROSCOPIC CHOLECYSTECTOMY  2002    [facility] stones duct     LAPAROSCOPIC ILEOSTOMY  1/20/2012    [facility], loop     LAPAROSCOPIC OOPHORECTOMY Right 2009    [facility]     LAPAROTOMY EXPLORATORY N/A 1/28/2016    Procedure: LAPAROTOMY EXPLORATORY;  Surgeon: Elsa Medel MD;  [facility]     LEEP TX, CERVICAL  2009    [facility]     PICC INSERTION Left 10/21/2015    5fr DL Power PICC, 37cm (2cm external) in the L basilic vein w/ tip in the SVC RA junction.     REMOVE GASTROSTOMY TUBE ADULT N/A 12/12/2014    Procedure: REMOVE GASTROSTOMY TUBE ADULT;  Surgeon: Nicole Redding MD;  Location: [facility]     REMOVE PORT VASCULAR ACCESS Right 6/30/2016    Procedure: REMOVE PORT VASCULAR ACCESS;  Surgeon: Pradeep Orosco MD;  Location: [facility]     REMOVE PORT VASCULAR ACCESS Right 9/8/2017    Procedure: REMOVE PORT VASCULAR ACCESS;  right side mediport removal;  Surgeon: Zechariah Worthington MD;  Location: [facility] OR     replace GASTROSTOMY TUBE ADULT  5/19/15       Social History   Substance Use Topics     Smoking status: Former Smoker     Packs/day: 1.00     Years: 4.00     Types: Cigarettes     Quit date: 1/1/2004     Smokeless tobacco: Former User     Alcohol use No       Most Recent Immunizations   Administered Date(s) Administered     Influenza (IIV3) 10/01/2009     Pneumococcal 23 valent 07/18/2014       BMI: Estimated body mass index is 30.67 kg/(m^2) as calculated from the following:    Height as of 9/8/17: 1.676 m (5' 6\").    Weight as of this encounter: 86.2 kg (190 lb).      Review of Systems   Constitutional: Positive for appetite change, fatigue " and fever.   Respiratory: Negative for shortness of breath.    Cardiovascular: Negative for chest pain.   Gastrointestinal: Positive for abdominal pain (by G-tube site), diarrhea (chronic), nausea and vomiting.   Genitourinary: Negative for dysuria.   All other systems reviewed and are negative.      Physical Exam   BP: (!) 142/92  Pulse: 98  Temp: 99  F (37.2  C)  Resp: 16  Weight: 86.2 kg (190 lb)  SpO2: 100 %  Physical Exam   Constitutional: She is oriented to person, place, and time. She appears well-developed and well-nourished. No distress.   HENT:   Head: Normocephalic and atraumatic.   Mouth/Throat: Oropharynx is clear and moist.   Eyes: Conjunctivae are normal.   Neck: Normal range of motion. Neck supple.   Cardiovascular: Normal rate, regular rhythm and normal heart sounds.    Pulmonary/Chest: Effort normal and breath sounds normal. No respiratory distress.   Abdominal: Soft. She exhibits no distension. There is no rebound and no guarding.       Post surgical scarring noted   Neurological: She is alert and oriented to person, place, and time.   Skin: Skin is warm and dry. She is not diaphoretic.   Psychiatric: She has a normal mood and affect.   Nursing note and vitals reviewed.      ED Course     ED Course     Procedures      Results for orders placed or performed during the hospital encounter of 10/07/17 (from the past 24 hour(s))   Wound Culture Aerobic Bacterial   Result Value Ref Range    Specimen Description Abdominal G TUBE     Special Requests Specimen collected in eSwab transport (white cap)     Culture Micro PENDING    Gram stain   Result Value Ref Range    Specimen Description Abdominal Wound     Special Requests Specimen collected in eSwab transport (white cap)     Gram Stain Moderate  WBC'S seen  predominantly PMN's       Gram Stain Few  Yeast seen   (A)    CBC with platelets differential   Result Value Ref Range    WBC 10.2 4.0 - 11.0 10e9/L    RBC Count 4.81 3.8 - 5.2 10e12/L    Hemoglobin  12.0 11.7 - 15.7 g/dL    Hematocrit 36.9 35.0 - 47.0 %    MCV 77 (L) 78 - 100 fl    MCH 24.9 (L) 26.5 - 33.0 pg    MCHC 32.5 31.5 - 36.5 g/dL    RDW 17.1 (H) 10.0 - 15.0 %    Platelet Count 228 150 - 450 10e9/L    Diff Method Automated Method     % Neutrophils 71.2 %    % Lymphocytes 21.3 %    % Monocytes 5.5 %    % Eosinophils 0.9 %    % Basophils 0.5 %    % Immature Granulocytes 0.6 %    Absolute Neutrophil 7.3 1.6 - 8.3 10e9/L    Absolute Lymphocytes 2.2 0.8 - 5.3 10e9/L    Absolute Monocytes 0.6 0.0 - 1.3 10e9/L    Absolute Eosinophils 0.1 0.0 - 0.7 10e9/L    Absolute Basophils 0.1 0.0 - 0.2 10e9/L    Abs Immature Granulocytes 0.1 0 - 0.4 10e9/L   CRP inflammation   Result Value Ref Range    CRP Inflammation 4.6 0.0 - 8.0 mg/L   Acetaminophen level   Result Value Ref Range    Acetaminophen Level <2 mg/L   Comprehensive metabolic panel   Result Value Ref Range    Sodium 141 133 - 144 mmol/L    Potassium 3.7 3.4 - 5.3 mmol/L    Chloride 110 (H) 94 - 109 mmol/L    Carbon Dioxide 18 (L) 20 - 32 mmol/L    Anion Gap 13 3 - 14 mmol/L    Glucose 67 (L) 70 - 99 mg/dL    Urea Nitrogen 10 7 - 30 mg/dL    Creatinine 0.84 0.52 - 1.04 mg/dL    GFR Estimate 77 >60 mL/min/1.7m2    GFR Estimate If Black >90 >60 mL/min/1.7m2    Calcium 8.2 (L) 8.5 - 10.1 mg/dL    Bilirubin Total 0.6 0.2 - 1.3 mg/dL    Albumin 3.3 (L) 3.4 - 5.0 g/dL    Protein Total 7.9 6.8 - 8.8 g/dL    Alkaline Phosphatase 137 40 - 150 U/L    ALT 35 0 - 50 U/L    AST 28 0 - 45 U/L   Lactic acid whole blood   Result Value Ref Range    Lactic Acid 1.8 0.7 - 2.0 mmol/L     *Note: Due to a large number of results and/or encounters for the requested time period, some results have not been displayed. A complete set of results can be found in Results Review.       Medications   0.9% sodium chloride BOLUS (0 mLs Intravenous Stopped 10/7/17 1620)   diphenhydrAMINE (BENADRYL) injection 25 mg (25 mg Intravenous Given 10/7/17 1516)   ondansetron (ZOFRAN) injection 4 mg (4  mg Intravenous Given 10/7/17 1711)   HYDROmorphone (PF) (DILAUDID) injection 0.3-0.5 mg (0.5 mg Intravenous Given 10/7/17 1711)        Assessments & Plan (with Medical Decision Making)  Doreen Peralta is a 37 year old female with a very complex medical history to include several abdominal surgeries, recurrent sepsis, recent G-tube replacement due to dislodgment, who presented to the emergency department with complaints of fever and abdominal pain with nausea and vomiting.  She states the symptoms have been ongoing since the G-tube was replaced on 9/25.  On arrival to the ED her vital signs were notable for blood pressure 142/92, but heart rate in the 90s and a normal temperature.  She exhibited mild tenderness surrounding the G-tube site without significant erythema or discharge.  No tenderness throughout rest of abdomen. There was a small amount of white creamy discharge from the G-tube site that was sent for culture and Gram stain. I don't think the g-tube site is acutely infected however as discharge is not significant and I felt no underlying masses/fluctuance, and there was no surrounding erythema.   Because of her complex history of sepsis, we did do a thorough workup. She had a normal white count of 10.2 without differential changes, normal lactate of 1.8, normal LFTs. Blood cultures are pending. She was administered IV fluids, zofran, benadryl, and dilaudid here in the ED for symptoms. She reported episode of emesis during ED stay but emesis bag only had scant amount of sputum present. I did not feel imaging warranted today given reassuring exam, vital signs, and lab studies. Overall I do not think this patient is systemically ill. I think her symptoms are related to her chronic abdominal problems. The patient was reassured by this and requested to discharge. I think this is reasonable. I don't think the G-tube is probably even necessary, and she actually plans to have it removed in the next couple months.  I did give her #4 tablets norco for severe pain at home in the meantime. Offered her additional antiemetics but she declined this. I encouraged her to call GI on Monday to discuss her symptoms and to try and schedule an appointment soon. I discussed indications of when to return to the ED. The patient expressed understanding and was discharged to home.     I have reviewed the nursing notes.    I have reviewed the findings, diagnosis, plan and need for follow up with the patient.    Discharge Medication List as of 10/7/2017  6:45 PM      START taking these medications    Details   HYDROcodone-acetaminophen (NORCO) 5-325 MG per tablet Take 1 tablet by mouth nightly as needed for moderate to severe pain, Disp-4 tablet, R-0, Local Print             Final diagnoses:   Abdominal pain, epigastric   Nausea and vomiting, intractability of vomiting not specified, unspecified vomiting type       10/7/2017   [facility] EMERGENCY DEPARTMENT     Vianey Warren PA-C  10/08/17 0118

## 2017-10-07 NOTE — ED NOTES
Patient up to the bathroom but did not collect urine.  Patient aware that next time she needs to void that we need a urine sample.

## 2017-10-07 NOTE — ED AVS SNAPSHOT
[facility] Emergency Department    [address] [place] [address]    Phone:  [phone]    Fax:  [phone]                                       Doreen Peralta   MRN: [id number]    Department:  [facility] Emergency Department   Date of Visit:  10/7/2017           Patient Information     Date Of Birth          1980        Your diagnoses for this visit were:     Abdominal pain, epigastric     Nausea and vomiting, intractability of vomiting not specified, unspecified vomiting type        You were seen by Vianey Warren PA-C.      Follow-up Information     Call [facility].    Specialty:  Gastroenterology    Why:  For ER follow up    Contact information:    [address] [address] [place] [address] [phone]        Follow up with [facility] Emergency Department.    Specialty:  EMERGENCY MEDICINE    Why:  If symptoms worsen    Contact information:    [address] [place] [address] [phone]    Additional information:    From [address]: Exit at [employer] [address] on [place] side of [place]. Turn right on [address]. Turn left at stoplight on [address]. [facility] will be in view two blocks ahead        Discharge Instructions       It looks like your abdominal pain is related to your ongoing issues.  Continue using Tylenol but only take one 500 mg tablet every 4-6 hours.  Use the Norco for severe breakthrough pain.  Follow up with your primary care provider for reevaluation next week.  Try to get in to see GI as soon as you are able to, I think you would benefit from getting your G-tube removed.  We will call you if any of the cultures drawn today, back positive.  Return to the emergency department for worsening symptoms.    Future Appointments        Provider Department Dept Phone Center    12/4/2017 10:00 AM Gilmer Lees MD [facility] [phone] [facility]      24 Hour  Appointment Hotline       To make an appointment at any [facility], call [phone] ([phone]). If you don't have a family doctor or clinic, we will help you find one. [facility] are conveniently located to serve the needs of you and your family.             Review of your medicines      START taking        Dose / Directions Last dose taken    HYDROcodone-acetaminophen 5-325 MG per tablet   Commonly known as:  NORCO   Dose:  1 tablet   Quantity:  4 tablet        Take 1 tablet by mouth nightly as needed for moderate to severe pain   Refills:  0          CONTINUE these medicines which may have CHANGED, or have new prescriptions. If we are uncertain of the size of tablets/capsules you have at home, strength may be listed as something that might have changed.        Dose / Directions Last dose taken    nortriptyline 10 MG capsule   Commonly known as:  PAMELOR   Dose:  30-40 mg   What changed:  how much to take   Quantity:  120 capsule        Take 3-4 capsules (30-40 mg) by mouth At Bedtime   Refills:  5          Our records show that you are taking the medicines listed below. If these are incorrect, please call your family doctor or clinic.        Dose / Directions Last dose taken    ACETAMINOPHEN PO   Dose:  500-1000 mg        Take 500-1,000 mg by mouth every 6 hours as needed for pain   Refills:  0        albuterol (2.5 MG/3ML) 0.083% neb solution   Dose:  2.5 mg   Quantity:  360 mL        Take 1 vial (2.5 mg) by nebulization every 4 hours as needed for shortness of breath / dyspnea or wheezing   Refills:  0        albuterol 90 MCG/ACT inhaler   Dose:  2 puff        Inhale 2 puffs into the lungs every 6 hours as needed   Refills:  0        cloNIDine 0.2 MG tablet   Commonly known as:  CATAPRES   Quantity:  60 tablet        TAKE 2 TABLETS(0.4 MG) BY MOUTH EVERY EVENING   Refills:  0        diphenhydrAMINE 25 MG tablet   Commonly known as:  BENADRYL ALLERGY   Dose:  25 mg   Quantity:  30 tablet         Take 1 tablet (25 mg) by mouth every 6 hours as needed for itching or other (Nausea)   Refills:  0        DULoxetine 60 MG EC capsule   Commonly known as:  CYMBALTA   Dose:  60 mg   Quantity:  90 capsule        Take 1 capsule (60 mg) by mouth daily   Refills:  3        ethanol (74%) ADULT 74 %   Quantity:  5 mL        Inject IV through Port-a-cath daily for 11 days to lock Port-a-cath after each dose of ceftriaxone and also use to lock Port-a-cath after each weekly infusion of IV fluids and MVI   Refills:  0        ipratropium 0.02 % neb solution   Commonly known as:  ATROVENT   Dose:  0.5 mg   Quantity:  225 mL        Take 2.5 mLs (0.5 mg) by nebulization every 6 hours as needed for wheezing   Refills:  0        mirtazapine 15 MG ODT tab   Commonly known as:  REMERON SOL-TAB   Dose:  7.5 mg   Quantity:  45 tablet        0.5 tablets (7.5 mg) by Orally disintegrating tablet route At Bedtime   Refills:  0        ondansetron 4 MG ODT tab   Commonly known as:  ZOFRAN ODT   Dose:  4-8 mg   Quantity:  30 tablet        Take 1-2 tablets (4-8 mg) by mouth every 6 hours as needed for nausea   Refills:  0        * order for DME   Quantity:  1 Box        2 by 2 gauze pads, use for dressing changes as directed   Refills:  1        * order for DME   Quantity:  1 each        1 inch paper tape, Use for dressing changes as directed   Refills:  0        SUMAtriptan 50 MG tablet   Commonly known as:  IMITREX   Dose:   mg   Quantity:  18 tablet        Take 1-2 tablets ( mg) by mouth at onset of headache for migraine - may repeat dose after 2h if headache recurs.  Max: 200mg/24 hours   Refills:  1        traZODone 50 MG tablet   Commonly known as:  DESYREL   Dose:   mg   Quantity:  180 tablet        Take 1-2 tablets ( mg) by mouth nightly as needed 1-2 tabs at bedtime PRN   Refills:  1        * Notice:  This list has 2 medication(s) that are the same as other medications prescribed for you. Read the directions  carefully, and ask your doctor or other care provider to review them with you.            Prescriptions were sent or printed at these locations (1 Prescription)                   [facility]   [address] [address]    Telephone:  [phone]   Fax:  [phone]   Hours:                  Printed at Department/Unit printer (1 of 1)         HYDROcodone-acetaminophen (NORCO) 5-325 MG per tablet                Procedures and tests performed during your visit     Procedure/Test Number of Times Performed    Acetaminophen level 1    Blood culture 2    CBC with platelets differential 1    CRP inflammation 1    Comprehensive metabolic panel 1    Gram stain 1    Lactic acid whole blood 1    Peripheral IV: Standard 1    Wound Culture Aerobic Bacterial 1      Orders Needing Specimen Collection     Ordered          10/07/17 1456  UA with Microscopic reflex to Culture - STAT, [facility]: STAT, Needs to be Collected     Scheduled Task Status   10/07/17 1457 Collect UA with Microscopic reflex to Culture Open   Order Class:  PCU Collect                  Pending Results     Date and Time Order Name Status Description    10/7/2017 1457 Blood culture In process     10/7/2017 1457 Blood culture In process     10/7/2017 1426 Gram stain In process     10/7/2017 1426 Wound Culture Aerobic Bacterial In process             Pending Culture Results     Date and Time Order Name Status Description    10/7/2017 1457 Blood culture In process     10/7/2017 1457 Blood culture In process     10/7/2017 1426 Gram stain In process     10/7/2017 1426 Wound Culture Aerobic Bacterial In process             Pending Results Instructions     If you had any lab results that were not finalized at the time of your Discharge, you can call the ED Lab Result RN at [phone]. You will be contacted by this team for any positive Lab results or changes in treatment. The nurses are available 7 days a week from 10A to 6:30P.   You can leave a message 24 hours per day and they will return your call.        Thank you for choosing [facility]       Thank you for choosing [facility] for your care. Our goal is always to provide you with excellent care. Hearing back from our patients is one way we can continue to improve our services. Please take a few minutes to complete the written survey that you may receive in the mail after you visit with us. Thank you!        [facility] Information     [facility] gives you secure access to your electronic health record. If you see a primary care provider, you can also send messages to your care team and make appointments. If you have questions, please call your primary care clinic.  If you do not have a primary care provider, please call [phone] and they will assist you.        Care EveryWhere ID     This is your Care EveryWhere ID. This could be used by other organizations to access your [facility] medical records  [id number]        Equal Access to Services     TRAMAINE CLAYTON : Carrie Law, clark villeda, elham guerrero. So [facility] [phone].    ATENCIÓN: Si habla español, tiene a wade disposición servicios gratuitos de asistencia lingüística. Llame al [phone].    We comply with applicable federal civil rights laws and [place] laws. We do not discriminate on the basis of race, color, national origin, age, disability, sex, sexual orientation, or gender identity.            After Visit Summary       This is your record. Keep this with you and show to your community pharmacist(s) and doctor(s) at your next visit.

## 2017-10-07 NOTE — NURSING NOTE
"Chief Complaint   Patient presents with     Sick       Initial /83  Pulse 110  Temp 97.5  F (36.4  C) (Tympanic)  Wt 193 lb 8 oz (87.8 kg)  SpO2 100%  BMI 31.23 kg/m2 Estimated body mass index is 31.23 kg/(m^2) as calculated from the following:    Height as of 9/8/17: 5' 6\" (1.676 m).    Weight as of this encounter: 193 lb 8 oz (87.8 kg).  Medication Reconciliation: complete     KEVIN West      "

## 2017-10-07 NOTE — ED AVS SNAPSHOT
[facility] Emergency Department    [address] [place] [address]    Phone:  [phone]    Fax:  [phone]                                       Doreen Peralta   MRN: [id number]    Department:  [facility] Emergency Department   Date of Visit:  10/7/2017           After Visit Summary Signature Page     I have received my discharge instructions, and my questions have been answered. I have discussed any challenges I see with this plan with the nurse or doctor.    ..........................................................................................................................................  Patient/Patient Representative Signature      ..........................................................................................................................................  Patient Representative Print Name and Relationship to Patient    ..................................................               ................................................  Date                                            Time    ..........................................................................................................................................  Reviewed by Signature/Title    ...................................................              ..............................................  Date                                                            Time

## 2017-10-07 NOTE — PROGRESS NOTES
SUBJECTIVE:                                                    Doreen Peralta is a 37 year old female who presents to clinic today for the following health issues:      Fatigue       Duration: 1 week    Description (location/character/radiation): off and on fever, doesn't feel good     Intensity:  severe    Accompanying signs and symptoms: off and on fever, doesn't feel good     History (similar episodes/previous evaluation): had G tube replaced a week ago    Precipitating or alleviating factors: hx of septic with tube change    Therapies tried and outcome: tylenol with no relief        C/o nausea, vomiting, fever, fatigue and generalized weakness. H/o multiple comorbidities as below.     Problem list and histories reviewed & adjusted, as indicated.  Additional history: as documented    Patient Active Problem List   Diagnosis     Constipation by delayed colonic transit     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     Hx SBO     S/P LEEP of cervix     Gastroparesis     Migraines     Intermittent asthma     Allergic rhinitis     Abnormal Pap smear of cervix     PEG (percutaneous endoscopic gastrostomy) status     Health Care Home     PEG tube malfunction (H)     Malfunction of gastrostomy tube (H)     Malfunctioning jejunostomy tube (H)     Jejunostomy tube present (H)     S/P partial resection of colon     Malnutrition (H)     Long-term (current) use of anticoagulants [Z79.01]     Anxiety     Anemia in other chronic diseases classified elsewhere     Munchausen syndrome - previously suspected     Anemia, iron deficiency     Positive blood culture - Klebsiella oxytoca from Port-a-cath culture     Mitral regurgitation mild-mod by Echo June 2016     Atopic rhinitis     Migraine     Patellofemoral stress syndrome     Hyperbilirubinemia     Chronic diarrhea     Coagulation defect (H) [D68.9]     Fever     Attention to G-tube (H)     Chronic abdominal pain     Vitamin D deficiency     SIRS (systemic inflammatory response  syndrome) (H)     History of deep venous thrombosis     Nausea and vomiting     Abdominal pain, generalized     Abdominal pain     Insomnia, unspecified type     Sepsis due to Klebsiella (H)     Sepsis (H) - possible     Hypokalemia     Essential hypertension     Past [facility] History:   Procedure Laterality Date     COLONOSCOPY  7/10/2012    Procedure: COLONOSCOPY;;  Surgeon: Nicole Redding MD;  Location: [facility]     COLONOSCOPY N/A 2/19/2017    Procedure: COLONOSCOPY;  Surgeon: Randell Muller MD;  Location: [facility] GI     COLONOSCOPY N/A 2/21/2017    Procedure: COLONOSCOPY;  Surgeon: Randell Muller MD;  Location: [facility]     ECHO CHELO  7/19/2016          ENDOSCOPIC INSERTION TUBE GASTROSTOMY N/A 1/21/2016    Procedure: ENDOSCOPIC INSERTION TUBE GASTROSTOMY;  Surgeon: Nicole Redding MD;  Location: [facility]     ESOPHAGOSCOPY, GASTROSCOPY, DUODENOSCOPY (EGD), COMBINED  7/10/2012    Procedure: COMBINED ESOPHAGOSCOPY, GASTROSCOPY, DUODENOSCOPY (EGD);  Upper Endoscopy, Ileoscopy    Latex Allergy  with biopsies;  Surgeon: Nicole Redding MD;  Location: [facility]     ESOPHAGOSCOPY, GASTROSCOPY, DUODENOSCOPY (EGD), COMBINED N/A 11/5/2014    Procedure: COMBINED ESOPHAGOSCOPY, GASTROSCOPY, DUODENOSCOPY (EGD);  Surgeon: Nicole Redding MD;  Location: [facility]     HC REPLACE DUODENOSTOMY/JEJUNOSTOMY TUBE PERCUTANEOUS N/A 8/27/2015    Procedure: REPLACE GASTROJEJUNOSTOMY TUBE, PERCUTANEOUS;  Surgeon: Mio Holder MD;  Location: [facility]     HC REPLACE DUODENOSTOMY/JEJUNOSTOMY TUBE PERCUTANEOUS N/A 1/7/2016    Procedure: REPLACE JEJUNOSTOMY TUBE, PERCUTANEOUS;  Surgeon: Elsa Medel MD;  Location: [facility]     HC REPLACE DUODENOSTOMY/JEJUNOSTOMY TUBE PERCUTANEOUS N/A 1/28/2016    Procedure: REPLACE JEJUNOSTOMY TUBE, PERCUTANEOUS;  Surgeon: Elsa Medel MD;  Location: [facility]     HC REPLACE GASTROSTOMY/CECOSTOMY TUBE PERCUTANEOUS Left 5/19/2015    Procedure: REPLACE GASTROSTOMY TUBE, PERCUTANEOUS;   Surgeon: Melecio Morejon Chi, MD;  Location:  GI     HC UGI ENDOSCOPY W PLACEMENT GASTROSTOMY TUBE PERCUT N/A 10/1/2015    Procedure: COMBINED ESOPHAGOSCOPY, GASTROSCOPY, DUODENOSCOPY (EGD), PLACE PERCUTANEOUS ENDOSCOPIC GASTROSTOMY TUBE;  Surgeon: Mio Holder MD;  Location:  GI     INSERT PORT VASCULAR ACCESS Right 7/20/2017    Procedure: INSERT PORT VASCULAR ACCESS;  Chest Port Placement  **Latex Allergy**;  Surgeon: Coy Rocha PA-C;  [facility]     LAPAROSCOPIC ASSISTED COLECTOMY  1/20/2012    Procedure:LAPAROSCOPIC ASSISTED COLECTOMY; Laparoscopic Ileostomy       LAPAROSCOPIC ASSISTED COLECTOMY LEFT (DESCENDING)  10/24/2012    Procedure: LAPAROSCOPIC ASSISTED COLECTOMY LEFT (DESCENDING);   Hand Assisted Laproscopic Subtotal abdominal Colectomy,Iieorectal anastamosis, Ileostomy Closure.       LAPAROSCOPIC ASSISTED INSERTION TUBE JEJUNOSTOMY N/A 10/16/2015    Procedure: LAPAROSCOPIC ASSISTED INSERTION TUBE JEJUNOSTOMY;  Surgeon: Elsa Medel MD;  Location:  OR     LAPAROSCOPIC CHOLECYSTECTOMY  2002    [facility] stones duct     LAPAROSCOPIC ILEOSTOMY  1/20/2012    [facility], loop     LAPAROSCOPIC OOPHORECTOMY Right 2009    [facility]     LAPAROTOMY EXPLORATORY N/A 1/28/2016    Procedure: LAPAROTOMY EXPLORATORY;  Surgeon: Elsa Medel MD;  Location:  OR     LEEP TX, CERVICAL  2009    [facility]     PICC INSERTION Left 10/21/2015    5fr DL Power PICC, 37cm (2cm external) in the L basilic vein w/ tip in the SVC RA junction.     REMOVE GASTROSTOMY TUBE ADULT N/A 12/12/2014    Procedure: REMOVE GASTROSTOMY TUBE ADULT;  Surgeon: Nicole Redding MD;  Location:  GI     REMOVE PORT VASCULAR ACCESS Right 6/30/2016    Procedure: REMOVE PORT VASCULAR ACCESS;  Surgeon: Pradeep Orosco MD;  Location:  OR     REMOVE PORT VASCULAR ACCESS Right 9/8/2017    Procedure: REMOVE PORT VASCULAR ACCESS;  right side mediport removal;  Surgeon: Zechariah Worthington MD;   Location: [facility] OR     replace GASTROSTOMY TUBE ADULT  5/19/15       Social History   Substance Use Topics     Smoking status: Former Smoker     Packs/day: 1.00     Years: 4.00     Types: Cigarettes     Quit date: 1/1/2004     Smokeless tobacco: Former User     Alcohol use No     Family History   Problem Relation Age of Onset     Thyroid Disease Mother      Sjogren's Mother      GASTROINTESTINAL DISEASE Mother      Intermittent nausea vomiting diarrhea     Colon Polyps Mother      Prostate Problems Father      prostate enlargement     Lupus Maternal Grandmother      CANCER Maternal Grandfather      Lung     Colon Cancer Maternal Grandfather 65     CANCER Paternal Grandmother      Lung      CEREBROVASCULAR DISEASE Paternal Grandmother      DIABETES Paternal Grandmother      Cardiovascular Paternal Grandmother      CHF     CANCER Paternal Grandfather      Lung     Glaucoma Paternal Grandfather      Abdominal Aortic Aneurysm Other      Macular Degeneration No family hx of          Current Outpatient Prescriptions   Medication Sig Dispense Refill     cloNIDine (CATAPRES) 0.2 MG tablet TAKE 2 TABLETS(0.4 MG) BY MOUTH EVERY EVENING 60 tablet 0     ondansetron (ZOFRAN ODT) 4 MG ODT tab Take 1-2 tablets (4-8 mg) by mouth every 6 hours as needed for nausea 30 tablet 0     albuterol (2.5 MG/3ML) 0.083% neb solution Take 1 vial (2.5 mg) by nebulization every 4 hours as needed for shortness of breath / dyspnea or wheezing 360 mL      ipratropium (ATROVENT) 0.02 % neb solution Take 2.5 mLs (0.5 mg) by nebulization every 6 hours as needed for wheezing 225 mL      ethanol, 74%, ADULT 74 % Inject IV through Port-a-cath daily for 11 days to lock Port-a-cath after each dose of ceftriaxone and also use to lock Port-a-cath after each weekly infusion of IV fluids and MVI 5 mL 0     order for DME 2 by 2 gauze pads, use for dressing changes as directed 1 Box 1     order for DME 1 inch paper tape, Use for dressing changes as directed 1 each 0      diphenhydrAMINE (BENADRYL ALLERGY) 25 MG tablet Take 1 tablet (25 mg) by mouth every 6 hours as needed for itching or other (Nausea) 30 tablet 0     traZODone (DESYREL) 50 MG tablet Take 1-2 tablets ( mg) by mouth nightly as needed 1-2 tabs at bedtime  tablet 1     nortriptyline (PAMELOR) 10 MG capsule Take 3-4 capsules (30-40 mg) by mouth At Bedtime (Patient taking differently: Take 40 mg by mouth At Bedtime ) 120 capsule 5     mirtazapine (REMERON SOL-TAB) 15 MG ODT tab 0.5 tablets (7.5 mg) by Orally disintegrating tablet route At Bedtime 45 tablet 0     SUMAtriptan (IMITREX) 50 MG tablet Take 1-2 tablets ( mg) by mouth at onset of headache for migraine - may repeat dose after 2h if headache recurs.  Max: 200mg/24 hours 18 tablet 1     DULoxetine (CYMBALTA) 60 MG capsule Take 1 capsule (60 mg) by mouth daily 90 capsule 3     ACETAMINOPHEN PO Take 500-1,000 mg by mouth every 6 hours as needed for pain        albuterol 90 MCG/ACT inhaler Inhale 2 puffs into the lungs every 6 hours as needed        Allergies   Allergen Reactions     Hyoscyamine Rash     Metoclopramide Other (See Comments)     Eye twitching.      Peaches [Peach] Other (See Comments)     Raw. Cooked OK     Sucralose Other (See Comments)     All artificial sweeteners. Aspartame also. Swollen glands     Advair Diskus Other (See Comments)     Throat burns     Azithromycin Other (See Comments)     Burning in throat     Compazine [Prochlorperazine] Visual Disturbance     Contrast Dye Itching     States is allergic to CT contrast dye     Cyclobenzaprine Visual Disturbance     Fentanyl Other (See Comments)     migraine     Ibuprofen GI Disturbance     Lactulose Nausea and Vomiting     Gas and bloating     Levaquin [Levofloxacin] Swelling     Per ED M.D. And RN      Morphine Sulfate Other (See Comments)     Chest pain       Oxycodone Other (See Comments)     Burning throat, but can take Norco.      Rizatriptan Visual Disturbance      Droperidol Hives and Rash     Isovue [Iopamidol] Palpitations     Pt had racing heart and sob      Ketorolac Anxiety     Latex Swelling and Rash     Kiwi, likely also avacado, ? banana     Levsin Rash     Recent Labs   Lab Test  09/06/17   2235  08/30/17   1713   08/24/17   1610   07/21/16   1027   06/21/16   1416   01/19/16   0548  12/15/15  11/30/15   04/15/14   0755   01/07/12   0748   A1C   --    --    --    --    --    --    --    --    --    --    --    --    --    --    --    --    --   5.1   LDL   --    --    --    --    --    --    --    --    --    --    --    --    --    --    --   63   --    --    HDL   --    --    --    --    --    --    --    --    --    --    --    --    --    --    --   75   --    --    TRIG   --    --    --    --    --    --    --    --    --   135   --   60   --   157   < >  69   --    --    ALT  35  30   --   39   < >  31   < >  22   < >   --    < >   --    < >  26   < >   --    < >   --    CR  0.78  0.89   < >  1.03   < >  0.82   < >  0.72   < >  0.76   < >   --    < >  1.01   < >   --    < >  0.82   GFRESTIMATED  84  72   < >  61   < >  80   < >  >90  Non  GFR Calc     < >  87   < >   --    < >  >60   < >   --    < >  82   GFRESTBLACK  >90  87   < >  73   < >  >90   GFR Calc     < >  >90   GFR Calc     < >  >90   GFR Calc     < >   --    < >  >60   < >   --    < >  >90   POTASSIUM  3.8  3.8   < >  3.1*   < >  4.0   < >  3.6   < >  4.6   < >   --    < >  3.9   < >   --    < >  3.9   TSH   --    --    --    --    --   0.69   --   0.25*   --    --    --    --    --    --    --   1.37   < >  1.08    < > = values in this interval not displayed.      BP Readings from Last 3 Encounters:   10/07/17 128/83   09/25/17 125/85   09/08/17 140/72    Wt Readings from Last 3 Encounters:   10/07/17 193 lb 8 oz (87.8 kg)   09/25/17 196 lb (88.9 kg)   09/08/17 197 lb (89.4 kg)                  Labs reviewed in  EPIC          ROS:  Constitutional, HEENT, cardiovascular, pulmonary, gi and gu systems are negative, except as otherwise noted.      OBJECTIVE:   /83  Pulse 110  Temp 97.5  F (36.4  C) (Tympanic)  Wt 193 lb 8 oz (87.8 kg)  SpO2 100%  BMI 31.23 kg/m2  Body mass index is 31.23 kg/(m^2). manual pulse 80, regular   GENERAL: alert and looks tired   NECK: no adenopathy, no asymmetry, masses, or scars and thyroid normal to palpation  RESP: lungs clear to auscultation - no rales, rhonchi or wheezes  CV: regular rates and rhythm, normal S1 S2, no S3 or S4 and no murmur, click or rub  ABDOMEN: soft, nontender, no hepatosplenomegaly, no masses and bowel sounds normal, G- tube in situ, no erythema or discharge noted   MS: no gross musculoskeletal defects noted, no edema  SKIN: no suspicious lesions or rashes  NEURO: Normal strength and tone, mentation intact and speech normal      ASSESSMENT/PLAN:         ICD-10-CM    1. Fatigue, unspecified type R53.83    2. Fever, unspecified fever cause R50.9    3. Nausea and vomiting, intractability of vomiting not specified, unspecified vomiting type R11.2    4. PEG (percutaneous endoscopic gastrostomy) status Z93.1    5. S/P partial resection of colon Z90.49    6. Munchausen syndrome - previously suspected F68.10    7. Hx SBO Z87.19        37 year old presents with nausea, vomiting, fever and generalized weakness since G-tube replaced about 1 week ago. Past medical history is significant for multiple co-morbidities including recurrent sepsis. Transferred to ER fort further evaluation and management. Vitals are stable currently and she is not looking toxic either. She will drive herself. Patient voiced understanding and all questions answered.       Alfredo Spaulding MD  [facility]

## 2017-10-07 NOTE — MR AVS SNAPSHOT
After Visit Summary   10/7/2017    Doreen Peralta    MRN: [id number]           Patient Information     Date Of Birth          1980        Visit Information        Provider Department      10/7/2017 12:35 PM Alfredo Spaulding MD [facility]        Today's Diagnoses     Fatigue, unspecified type    -  1    Fever, unspecified fever cause        Nausea and vomiting, intractability of vomiting not specified, unspecified vomiting type        PEG (percutaneous endoscopic gastrostomy) status        S/P partial resection of colon        Munchausen syndrome - previously suspected        Hx SBO           Follow-ups after your visit        Your next 10 appointments already scheduled     Dec 04, 2017 10:00 AM CST   (Arrive by 9:45 AM)   Return Visit with Gilmer Lees MD   [facility] ([facility])    [address] [place] [id number] [phone]              Who to contact     If you have questions or need follow up information about today's clinic visit or your schedule please contact [facility] directly at [phone].  Normal or non-critical lab and imaging results will be communicated to you by Zakazakahart, letter or phone within 4 business days after the clinic has received the results. If you do not hear from us within 7 days, please contact the clinic through Zakazakahart or phone. If you have a critical or abnormal lab result, we will notify you by phone as soon as possible.  Submit refill requests through [employer] or call your pharmacy and they will forward the refill request to us. Please allow 3 business days for your refill to be completed.          Additional Information About Your Visit        MyChart Information     [employer] gives you secure access to your electronic health record. If you see a primary care provider, you can also send messages to your care team and make appointments. If you have  questions, please call your primary care clinic.  If you do not have a primary care provider, please call [phone] and they will assist you.        Care EveryWhere ID     This is your Care EveryWhere ID. This could be used by other organizations to access your [facility] medical records  [id number]        Your Vitals Were     Pulse Temperature Pulse Oximetry BMI (Body Mass Index)          110 97.5  F (36.4  C) (Tympanic) 100% 31.23 kg/m2         Blood Pressure from Last 3 Encounters:   10/07/17 128/83   09/25/17 125/85   09/08/17 140/72    Weight from Last 3 Encounters:   10/07/17 193 lb 8 oz (87.8 kg)   09/25/17 196 lb (88.9 kg)   09/08/17 197 lb (89.4 kg)              Today, you had the following     No orders found for display         Today's Medication Changes          These changes are accurate as of: 10/7/17  1:29 PM.  If you have any questions, ask your nurse or doctor.               These medicines have changed or have updated prescriptions.        Dose/Directions    nortriptyline 10 MG capsule   Commonly known as:  PAMELOR   This may have changed:  how much to take   Used for:  Neuropathic pain, Primary insomnia        Dose:  30-40 mg   Take 3-4 capsules (30-40 mg) by mouth At Bedtime   Quantity:  120 capsule   Refills:  5                Primary Care Provider Office Phone # Fax #    [facility] [phone]       [address] [facility] [address]        Equal Access to Services     TRAMAINE CLAYTON AH: Carrie tylero Sorhona, waaxda luqadaha, qaybta kaalmada adeegyaskyler, elham gomez. So [facility] [phone].    ATENCIÓN: Si habla español, tiene a wade disposición servicios gratuitos de asistencia lingüística. Llame al [phone].    We comply with applicable federal civil rights laws and [place] laws. We do not discriminate on the basis of race, color, national origin, age, disability, sex, sexual orientation, or gender identity.             Thank you!     Thank you for choosing [facility]  for your care. Our goal is always to provide you with excellent care. Hearing back from our patients is one way we can continue to improve our services. Please take a few minutes to complete the written survey that you may receive in the mail after your visit with us. Thank you!             Your Updated Medication List - Protect others around you: Learn how to safely use, store and throw away your medicines at www.disposemymeds.org.          This list is accurate as of: 10/7/17  1:29 PM.  Always use your most recent med list.                   Brand Name Dispense Instructions for use Diagnosis    ACETAMINOPHEN PO      Take 500-1,000 mg by mouth every 6 hours as needed for pain        albuterol (2.5 MG/3ML) 0.083% neb solution     360 mL    Take 1 vial (2.5 mg) by nebulization every 4 hours as needed for shortness of breath / dyspnea or wheezing    Gastrostomy tube dependent (H), SBO (small bowel obstruction), Chronic abdominal pain, Chronic diarrhea       albuterol 90 MCG/ACT inhaler      Inhale 2 puffs into the lungs every 6 hours as needed        cloNIDine 0.2 MG tablet    CATAPRES    60 tablet    TAKE 2 TABLETS(0.4 MG) BY MOUTH EVERY EVENING    Anxiety       diphenhydrAMINE 25 MG tablet    BENADRYL ALLERGY    30 tablet    Take 1 tablet (25 mg) by mouth every 6 hours as needed for itching or other (Nausea)        DULoxetine 60 MG EC capsule    CYMBALTA    90 capsule    Take 1 capsule (60 mg) by mouth daily    Abdominal pain, epigastric, Abdominal migraine, not intractable       ethanol (74%) ADULT 74 %     5 mL    Inject IV through Port-a-cath daily for 11 days to lock Port-a-cath after each dose of ceftriaxone and also use to lock Port-a-cath after each weekly infusion of IV fluids and MVI    Positive blood culture, Sepsis due to Klebsiella (H)       ipratropium 0.02 % neb solution    ATROVENT    225 mL    Take 2.5 mLs (0.5 mg) by nebulization  every 6 hours as needed for wheezing    Gastrostomy tube dependent (H), SBO (small bowel obstruction), Chronic abdominal pain, Chronic diarrhea       mirtazapine 15 MG ODT tab    REMERON SOL-TAB    45 tablet    0.5 tablets (7.5 mg) by Orally disintegrating tablet route At Bedtime    Anxiety       nortriptyline 10 MG capsule    PAMELOR    120 capsule    Take 3-4 capsules (30-40 mg) by mouth At Bedtime    Neuropathic pain, Primary insomnia       ondansetron 4 MG ODT tab    ZOFRAN ODT    30 tablet    Take 1-2 tablets (4-8 mg) by mouth every 6 hours as needed for nausea    Intractable vomiting, presence of nausea not specified, unspecified vomiting type, Gastroparesis       * order for DME     1 Box    2 by 2 gauze pads, use for dressing changes as directed    Attention to G-tube (H)       * order for DME     1 each    1 inch paper tape, Use for dressing changes as directed    Attention to G-tube (H)       SUMAtriptan 50 MG tablet    IMITREX    18 tablet    Take 1-2 tablets ( mg) by mouth at onset of headache for migraine - may repeat dose after 2h if headache recurs.  Max: 200mg/24 hours    Migraine without aura and without status migrainosus, not intractable       traZODone 50 MG tablet    DESYREL    180 tablet    Take 1-2 tablets ( mg) by mouth nightly as needed 1-2 tabs at bedtime PRN    Insomnia, unspecified type       * Notice:  This list has 2 medication(s) that are the same as other medications prescribed for you. Read the directions carefully, and ask your doctor or other care provider to review them with you.

## 2017-10-07 NOTE — DISCHARGE INSTRUCTIONS
It looks like your abdominal pain is related to your ongoing issues.  Continue using Tylenol but only take one 500 mg tablet every 4-6 hours.  Use the Norco for severe breakthrough pain.  Follow up with your primary care provider for reevaluation next week.  Try to get in to see GI as soon as you are able to, I think you would benefit from getting your G-tube removed.  We will call you if any of the cultures drawn today, back positive.  Return to the emergency department for worsening symptoms.

## 2017-10-07 NOTE — ED NOTES
"Patient reports nausea and vomiting.  Provider notified.  Holding emesis bag and saying, \"I'm sorry.\"  "

## 2017-10-09 ENCOUNTER — TELEPHONE (OUTPATIENT)
Dept: EMERGENCY MEDICINE | Facility: CLINIC | Age: 37
End: 2017-10-09

## 2017-10-09 LAB
BACTERIA SPEC CULT: ABNORMAL
BACTERIA SPEC CULT: ABNORMAL
Lab: ABNORMAL
SPECIMEN SOURCE: ABNORMAL

## 2017-10-09 NOTE — TELEPHONE ENCOUNTER
"[facility] Emergency Department Lab result notification [Adult-Female]    [facility] ED lab result protocol used  Wound culture    Reason for call  Notify of lab results, assess symptoms,  review ED providers recommendations/discharge instructions (if necessary) and advise per ED lab result f/u protocol    Lab Result (including Rx patient on, if applicable)  Final Wound culture report on 10/9/17 grew out heavy growth Windy Krusei  [facility] ED discharge antibiotic: None  Incision and Drainage performed in [facility] ED [Yes / No]: No  Patient to be notified of result, symptoms's assessed and advised per [facility] ED lab result protocol.    Information table from ED Provider visit on 10/7/17  ED diagnosis Abdominal pain, epigastric;  Nausea and vomiting   ED provider Vianey Warren PA-C   Symptoms reported at ED visit (Chief complaint, HPI) 37 year old female with a complex medical history to include multiple abdominal surgeries, recurrent sepsis, who presents to the emergency department complaining of fever and general malaise.  Her G-tube was dislodged on 9/25 so she went to the [facility] ER and had it replaced.  Since then she has had on and off fevers up to 100.5 F, worsening nausea with vomiting, and fatigue.  She complains of pain \"deep inside by the G-tube\".  Denies pain anywhere else in the abdomen.  Notes slightly increased drainage surrounding G-tube site but no redness.  Denies change in bowel patterns.  Denies difficulty breathing.  No urinary symptoms.  She is keeping clear liquids down orally, but no solid food. Taking tylenol 2 extra strength every 4 hours for symptoms, along with PO zofran.   ED providers Impression and Plan (applicable information) 37 year old female with a very complex medical history to include several abdominal surgeries, recurrent sepsis, recent G-tube replacement due to dislodgment, who presented to the emergency department with complaints of fever and " abdominal pain with nausea and vomiting.  She states the symptoms have been ongoing since the G-tube was replaced on 9/25.  On arrival to the ED her vital signs were notable for blood pressure 142/92, but heart rate in the 90s and a normal temperature.  She exhibited mild tenderness surrounding the G-tube site without significant erythema or discharge.  No tenderness throughout rest of abdomen. There was a small amount of white creamy discharge from the G-tube site that was sent for culture and Gram stain. I don't think the g-tube site is acutely infected however as discharge is not significant and I felt no underlying masses/fluctuance, and there was no surrounding erythema.   Because of her complex history of sepsis, we did do a thorough workup. She had a normal white count of 10.2 without differential changes, normal lactate of 1.8, normal LFTs. Blood cultures are pending. She was administered IV fluids, zofran, benadryl, and dilaudid here in the ED for symptoms. She reported episode of emesis during ED stay but emesis bag only had scant amount of sputum present. I did not feel imaging warranted today given reassuring exam, vital signs, and lab studies. Overall I do not think this patient is systemically ill. I think her symptoms are related to her chronic abdominal problems. The patient was reassured by this and requested to discharge. I think this is reasonable. I don't think the G-tube is probably even necessary, and she actually plans to have it removed in the next couple months. I did give her #4 tablets norco for severe pain at home in the meantime. Offered her additional antiemetics but she declined this. I encouraged her to call GI on Monday to discuss her symptoms and to try and schedule an appointment soon. I discussed indications of when to return to the ED. The patient expressed understanding and was discharged to home.   Significant Medical hx, if applicable Multiple, See problem list  "  Coumadin/Warfarin [Yes /No] No   Creatinine Level (mg/dl) NA   Creatinine clearance (ml/min), if applicable NA   Pregnant (Yes/No/NA) NA   Breastfeeding (Yes/No/NA) NA   Allergies Numerous (to Antibiotic's - Azithromycin, Levaquin,    Weight, if applicable NA      [facility] Provider Name & Recommendations (included time consulted)  At 5P, consulted with [facility] ED provider and was advised to notify Doreen of the result and have her contact her Gastroenterologist with this result.      RN Recommendations  At 5:15P, Doreen was notified of recommendations.  She reports her symptoms have not improved.  \"I have contacted my GI doctor and they can't get me in until December.  It is so frustrating for me.\"  She was advised to contact her GI provider's clinic and ask to speak with the nurse and also contact her PCP, Dr Valadez, and see there are some other recommendations.  She was encouraged to return to the ED if her symptoms worsen.    PCP follow-up Questions asked: YES       [RN Name]  Eric Gomez RN  [facility] RN  Lung Nodule and ED Lab Result F/u RN  Epic pool (ED late result f/u RN): [id number]  [facility] INCIDENTAL RADIOLOGY F/U NURSES: [id number]  Ph# [phone]    Copy of Lab result   Final Wound culture report on 10/9/17 grew out heavy growth Windy Krusei  [facility] ED discharge antibiotic: None  Incision and Drainage performed in [facility] ED [Yes / No]: No  Patient to be notified of result, symptoms's assessed and advised per [facility] ED lab result protocol.  "

## 2017-10-11 ENCOUNTER — CARE COORDINATION (OUTPATIENT)
Dept: GASTROENTEROLOGY | Facility: CLINIC | Age: 37
End: 2017-10-11

## 2017-10-13 DIAGNOSIS — G47.00 INSOMNIA, UNSPECIFIED TYPE: ICD-10-CM

## 2017-10-13 LAB
BACTERIA SPEC CULT: NORMAL
BACTERIA SPEC CULT: NORMAL
Lab: NORMAL
SPECIMEN SOURCE: NORMAL
SPECIMEN SOURCE: NORMAL

## 2017-10-13 NOTE — TELEPHONE ENCOUNTER
Trazodone       Last Written Prescription Date: 08/18/17  Last Fill Quantity: 180; # refills: 0  Last Office Visit with [facility], [facility] or  Health prescribing provider:  ?        Last PHQ-9 score on record=   PHQ-9 SCORE 9/1/2017   Total Score -   Total Score 3       Lab Results   Component Value Date    AST 28 10/07/2017     Lab Results   Component Value Date    ALT 35 10/07/2017

## 2017-10-15 DIAGNOSIS — G43.D0 ABDOMINAL MIGRAINE, NOT INTRACTABLE: ICD-10-CM

## 2017-10-15 DIAGNOSIS — R10.13 ABDOMINAL PAIN, EPIGASTRIC: ICD-10-CM

## 2017-10-16 ENCOUNTER — OFFICE VISIT (OUTPATIENT)
Dept: GASTROENTEROLOGY | Facility: CLINIC | Age: 37
End: 2017-10-16

## 2017-10-16 VITALS
HEART RATE: 80 BPM | SYSTOLIC BLOOD PRESSURE: 140 MMHG | BODY MASS INDEX: 30.41 KG/M2 | DIASTOLIC BLOOD PRESSURE: 97 MMHG | HEIGHT: 66 IN | OXYGEN SATURATION: 100 % | WEIGHT: 189.2 LBS

## 2017-10-16 DIAGNOSIS — R11.0 CHRONIC NAUSEA: ICD-10-CM

## 2017-10-16 DIAGNOSIS — K31.84 GASTROPARESIS: Primary | ICD-10-CM

## 2017-10-16 DIAGNOSIS — L08.9 SKIN INFECTION AT GASTROSTOMY TUBE SITE (H): ICD-10-CM

## 2017-10-16 DIAGNOSIS — K94.22 SKIN INFECTION AT GASTROSTOMY TUBE SITE (H): ICD-10-CM

## 2017-10-16 DIAGNOSIS — G89.29 CHRONIC ABDOMINAL PAIN: ICD-10-CM

## 2017-10-16 DIAGNOSIS — R10.9 CHRONIC ABDOMINAL PAIN: ICD-10-CM

## 2017-10-16 RX ORDER — TRAZODONE HYDROCHLORIDE 50 MG/1
TABLET, FILM COATED ORAL
Qty: 60 TABLET | Refills: 0 | Status: SHIPPED | OUTPATIENT
Start: 2017-10-16 | End: 2017-11-08

## 2017-10-16 RX ORDER — DULOXETIN HYDROCHLORIDE 60 MG/1
CAPSULE, DELAYED RELEASE ORAL
Qty: 90 CAPSULE | Refills: 1 | Status: SHIPPED | OUTPATIENT
Start: 2017-10-16

## 2017-10-16 NOTE — TELEPHONE ENCOUNTER
Routing refill request to provider for review/approval because:  Patient needs to be seen because:  Due for follow up.  Ekta Jaimes RN

## 2017-10-16 NOTE — MR AVS SNAPSHOT
After Visit Summary   10/16/2017    Doreen Peralta    MRN: [id number]           Patient Information     Date Of Birth          1980        Visit Information        Provider Department      10/16/2017 11:20 AM Gilmer Lees MD [facility]        Today's Diagnoses     Gastroparesis    -  1    Skin infection at gastrostomy tube site (H)        Chronic abdominal pain        Chronic nausea          Care Instructions    I've included a brief summary of our discussion and care plan from today's visit below.  Please review this information with your primary care provider.  _______________________________________________________________________    1. It was a pleasure getting a chance to see you again to discuss your chronic issues with recurrent bowel obstruction and malnutrition in the complicated post-surgical setting, particularly given your chronic needs for nutritional/fluid support and obstruction management - continue supportive care measures as we discussed at length today, keep up the great work!  - Discussed the interval loss of your Port-a-Cath following another line-associated infection (removed 9/2017), despite our best efforts to preserve this on this final attempt. Would not advise further chronic indwelling catheter placements going forward, as per our previous discussion.  - Discussed the ongoing intermittent G-tube tract discomfort and drainage, also in the setting of intermittent low-grade fevers. Recommend obtaining a CT scan of the Abdomen/Pelvis (non-IV contrast to avoid a difficult IV placement) to evaluate for an underlying abscess/fistula. If negative for abscess/fistula, will arrange for removal of the G-tube per our discussion today given the increasing risk/complications for the benefit conferred.    2. Recommend multimodal supportive care for the difficult post-surgical state (characterized by severe bothersome bloating + oral intake  difficulty/nausea/vomiting + diarrhea).  - Continue scheduled anti-diarrheal therapy as discussed today. Continue your mindful dietary/lifestyle modifications for chronic nausea/bloating as reviewed today as well.  - Recommend considering scheduling IV fluid replacements at least twice/month, may help reduce your healthcare exposure and the anxiety/emotional distress associated with this.  - Recommend seeing our Registered Dietician to help advise regarding a high-density liquid oral nutrition plan as we discussed today (similar to the fortified smoothie recipe principles we discussed before).  - Recommend establishing care with our Health Psychologist (Yovana Lynn) so that we can sustainably support your mental/emotional health as you navigate this difficult chronic illness.    3. Return to GI Clinic with me in 3-4 months to review your progress, sooner if symptomatic.   - If you are unable to schedule this follow-up appointment today, please contact Roxie Gregg at [phone] within the next week to help set up this necessary appointment.    _______________________________________________________________________    It was a pleasure seeing you in clinic today - please be in touch if there are any further questions that arise following today's visit.  During business hours, you may reach Clinic Nurse Triage Line at [phone].  For urgent/emergent questions after business hours, you may reach the on-call GI Fellow by contacting the [facility]  at [phone].    Any benign/non-urgent test results are usually communicated via letter or [employer]t message within 1-2 weeks after completion.  Urgent results (those that require a change in the previously-discussed care plan) are usually communicated via a phone call once available from our clinic staff to discuss the results and the next steps in your evaluation.    I recommend signing up for [employer] access if you have not already done so and are  comfortable with using a computer.  This allows for online access to your lab results and also helps you communicate efficiently with my clinic should any questions arise in your care.    We have Financial Counseling services available through our clinic.  If you have questions about your insurance coverage or payment responsibilities, particularly before you undergo any tests or procedures, please let us know and we can arrange a consultation accordingly to help you make informed decisions about your healthcare.    Sincerely,    Gilmer Lees MD    [facility] - Department of Medicine  Division of Gastroenterology            Follow-ups after your visit        Follow-up notes from your care team     Return in about 3 months (around 1/16/2018).      Your next 10 appointments already scheduled     Oct 17, 2017  2:40 PM CDT   (Arrive by 2:25 PM)   CT ABDOMEN PELVIS W/O & W CONTRAST with UCCT1   [facility] ([facility])    [address] [address] [phone]           Please bring any scans or X-rays taken at other hospitals, if similar tests were done. Also bring a list of your medicines, including vitamins, minerals and over-the-counter drugs. It is safest to leave personal items at home.  Be sure to tell your doctor:   If you have any allergies.   If there s any chance you are pregnant.   If you are breastfeeding.   If you have any special needs.  You may have contrast for this exam. To prepare:   Do not eat or drink for 2 hours before your exam. If you need to take medicine, you may take it with small sips of water. (We may ask you to take liquid medicine as well.)   The day before your exam, drink extra fluids at least six 8-ounce glasses (unless your doctor tells you to restrict your fluids).  Patients over 70 or patients with diabetes or kidney problems:   If you haven t had a blood test (creatinine  test) within the last 30 days, go to your clinic or Diagnostic Imaging Department for this test.  If you have diabetes:   If your kidney function is normal, continue taking your metformin (Avandamet, Glucophage, Glucovance, Metaglip) on the day of your exam.   If your kidney function is abnormal, wait 48 hours before restarting this medicine.  You will have oral contrast for this exam:   You will drink the contrast at home. Get this from your clinic or Diagnostic Imaging Department. Please follow the directions given.  Please wear loose clothing, such as a sweat suit or jogging clothes. Avoid snaps, zippers and other metal. We may ask you to undress and put on a hospital gown.  If you have any questions, please call the Imaging Department where you will have your exam.            Dec 04, 2017 10:00 AM CST   (Arrive by 9:45 AM)   Return Visit with Gilmer Lees MD   [facility] ([facility] [place])    [address] [place] [address] [phone]              Future tests that were ordered for you today     Open Future Orders        Priority Expected Expires Ordered    CT Abdomen Pelvis w/o & w Contrast Routine  10/16/2018 10/16/2017            Who to contact     Please call your clinic at [phone] to:    Ask questions about your health    Make or cancel appointments    Discuss your medicines    Learn about your test results    Speak to your doctor   If you have compliments or concerns about an experience at your clinic, or if you wish to file a complaint, please contact [facility] Patient Relations at [phone] or email us at [email or web address]         Additional Information About Your Visit        MyChart Information     [employer] gives you secure access to your electronic health record. If you see a primary care provider, you can also send messages to your care team and make appointments. If you  "have questions, please call your primary care clinic.  If you do not have a primary care provider, please call [phone] and they will assist you.      [employer] is an electronic gateway that provides easy, online access to your medical records. With [employer], you can request a clinic appointment, read your test results, renew a prescription or communicate with your care team.     To access your existing account, please contact your [facility] or call [phone] for assistance.        Care EveryWhere ID     This is your Care EveryWhere ID. This could be used by other organizations to access your [place] medical records  [id number]        Your Vitals Were     Pulse Height Pulse Oximetry BMI (Body Mass Index)          80 1.676 m (5' 6\") 100% 30.54 kg/m2         Blood Pressure from Last 3 Encounters:   10/16/17 (!) 140/97   10/07/17 131/68   10/07/17 128/83    Weight from Last 3 Encounters:   10/16/17 85.8 kg (189 lb 3.2 oz)   10/07/17 86.2 kg (190 lb)   10/07/17 87.8 kg (193 lb 8 oz)                 Today's Medication Changes          These changes are accurate as of: 10/16/17 12:20 PM.  If you have any questions, ask your nurse or doctor.               These medicines have changed or have updated prescriptions.        Dose/Directions    nortriptyline 10 MG capsule   Commonly known as:  PAMELOR   This may have changed:  how much to take   Used for:  Neuropathic pain, Primary insomnia        Dose:  30-40 mg   Take 3-4 capsules (30-40 mg) by mouth At Bedtime   Quantity:  120 capsule   Refills:  5                Primary Care Provider Office Phone # Fax #    [facility] [phone]       [address] [facility] [address]        Equal Access to Services     TRAMAINE CLAYTON : Carrie Law, clark villeda, elham guerrero. So [facility] [phone].    ATENCIÓN: Si rinku espbrittney, ana a wade " disposición servicios gratuitos de asistencia lingüística. Janes lebron [phone].    We comply with applicable federal civil rights laws and [place] laws. We do not discriminate on the basis of race, color, national origin, age, disability, sex, sexual orientation, or gender identity.            Thank you!     Thank you for choosing [facility]  for your care. Our goal is always to provide you with excellent care. Hearing back from our patients is one way we can continue to improve our services. Please take a few minutes to complete the written survey that you may receive in the mail after your visit with us. Thank you!             Your Updated Medication List - Protect others around you: Learn how to safely use, store and throw away your medicines at www.disposemymeds.org.          This list is accurate as of: 10/16/17 12:20 PM.  Always use your most recent med list.                   Brand Name Dispense Instructions for use Diagnosis    ACETAMINOPHEN PO      Take 500-1,000 mg by mouth every 6 hours as needed for pain        albuterol (2.5 MG/3ML) 0.083% neb solution     360 mL    Take 1 vial (2.5 mg) by nebulization every 4 hours as needed for shortness of breath / dyspnea or wheezing    Gastrostomy tube dependent (H), SBO (small bowel obstruction), Chronic abdominal pain, Chronic diarrhea       albuterol 90 MCG/ACT inhaler      Inhale 2 puffs into the lungs every 6 hours as needed        cloNIDine 0.2 MG tablet    CATAPRES    60 tablet    TAKE 2 TABLETS(0.4 MG) BY MOUTH EVERY EVENING    Anxiety       diphenhydrAMINE 25 MG tablet    BENADRYL ALLERGY    30 tablet    Take 1 tablet (25 mg) by mouth every 6 hours as needed for itching or other (Nausea)        DULoxetine 60 MG EC capsule    CYMBALTA    90 capsule    Take 1 capsule (60 mg) by mouth daily    Abdominal pain, epigastric, Abdominal migraine, not intractable       ethanol (74%) ADULT 74 %     5 mL    Inject IV through  Port-a-cath daily for 11 days to lock Port-a-cath after each dose of ceftriaxone and also use to lock Port-a-cath after each weekly infusion of IV fluids and MVI    Positive blood culture, Sepsis due to Klebsiella (H)       ipratropium 0.02 % neb solution    ATROVENT    225 mL    Take 2.5 mLs (0.5 mg) by nebulization every 6 hours as needed for wheezing    Gastrostomy tube dependent (H), SBO (small bowel obstruction), Chronic abdominal pain, Chronic diarrhea       mirtazapine 15 MG ODT tab    REMERON SOL-TAB    45 tablet    0.5 tablets (7.5 mg) by Orally disintegrating tablet route At Bedtime    Anxiety       nortriptyline 10 MG capsule    PAMELOR    120 capsule    Take 3-4 capsules (30-40 mg) by mouth At Bedtime    Neuropathic pain, Primary insomnia       ondansetron 4 MG ODT tab    ZOFRAN ODT    30 tablet    Take 1-2 tablets (4-8 mg) by mouth every 6 hours as needed for nausea    Intractable vomiting, presence of nausea not specified, unspecified vomiting type, Gastroparesis       * order for DME     1 Box    2 by 2 gauze pads, use for dressing changes as directed    Attention to G-tube (H)       * order for DME     1 each    1 inch paper tape, Use for dressing changes as directed    Attention to G-tube (H)       SUMAtriptan 50 MG tablet    IMITREX    18 tablet    Take 1-2 tablets ( mg) by mouth at onset of headache for migraine - may repeat dose after 2h if headache recurs.  Max: 200mg/24 hours    Migraine without aura and without status migrainosus, not intractable       traZODone 50 MG tablet    DESYREL    180 tablet    Take 1-2 tablets ( mg) by mouth nightly as needed 1-2 tabs at bedtime PRN    Insomnia, unspecified type       * Notice:  This list has 2 medication(s) that are the same as other medications prescribed for you. Read the directions carefully, and ask your doctor or other care provider to review them with you.

## 2017-10-16 NOTE — PROGRESS NOTES
GI CLINIC VISIT    CC/REFERRING PROVIDER: Referred Self  REASON FOR CONSULTATION: chronic abdominal pain, recurrent obstruction in the complicated post-surgical setting    HPI: 37 year old female w/ h/o IBS-C + superimposed colonic inertia s/p loop ileostomy 1/2012, s/p subtotal colectomy + ileosigmoid anastomosis 10/2012, s/p open J-tube placement x2 (2015, 2016) w/ eventual transition to venting G-tube 1/2016, s/p ex-lap/KATIE 1/2016, gastroparesis, chronic abdominal pain syndrome + recurrent obstructions in post-surgical setting, chronic migraine HA, recurrent PICC line-associated infections, among multiple other medical issues - presenting for f/u chronic abd pain, recurrent obstructions. Reports hourly loose BM, unchanged from baseline. Chronic N/V + bloating, continues to have difficulty w/ maintaining consistent PO intake (struggles w/ solid food intake, coaching re: fortified liquid intake at length today). Reports frequent loose stools throughout the day, denies any overt BRBPR. +persistent d/c from the G-tube tract (may require removal of tube, extensive d/w pt/mother today re: potential need for this). Denies any tenesmus or insecurity passing flatus, no fecal incontinence or new perianal/nocturnal sxs noted. +intermittent subj F w/o chills. Denies any HA/NS or other const/syst/cardiopulmonary sxs, no BRBPR/melena/urinary changes, no unintentional wt loss or appetite/satiety changes. No other bowel/bladder habit changes, no dysphagia/odynophagia. No jaundice/icterus/pruritus, no acholic stools/steatorrhea, no new lumps/bumps, no jt pain/oral ulcer/rash/eye sxs noted.    Much of visit was spent in empathetic listening given the frustrating clinical situation and multiple hospitalizations; discussed recurrent pattern of complications/infections w/ multiple line and tube placement that would preclude further use of these going forward. Interval removal of prior new PAC 9/2017 following recurrent line  infection, discussed that we would not order another indwelling vascular access going forward after this last effort (discussed this parameter previously). If G-tube, while effective for venting, must be d/c'd d/t tract infection/complications, would also not plan for replacement of this as well. Pt/mother understand rationale for moving away from any indwelling tube/catheter placements given the chronic infections and recurrent complications, and willing to proceed accordingly under this parameter.    ROS: 10pt ROS performed and otherwise negative.    PERTINENT PAST MEDICAL/SURGICAL HISTORY:  As noted above.    PERTINENT MEDICATIONS:  Medications reviewed with patient today, see Medication List/Assessment for details.  No other NSAID/anticoagulation reported by patient.  No other OTC/herbal/supplements reported by patient.    SOCIAL HISTORY: Tobacco: none.    PHYSICAL EXAMINATION:  Vitals reviewed, AFVSS  Wt 189# today (decr ~20# since 7/2017 visit)  Gen: aaox3, cooperative, pleasant, not diaphoretic, nad  HEENT: ncat, neck supple, no clad/sclad, normal op w/o ulcer/exudate, anicteric, mmm  Resp/CV without acute findings, not dyspneic/tachycardic  Abd: +nabs, soft, nt, nd, no peritoneal s/s noted. No ecchymoses, +complicated midline vertical surgical scar, +old RLQ ileostomy and lap scars, +LUQ 16Fr G-tube w/ moderate green leakage around tube and no surrounding cellulitis (more than at 7/2017 exam), no CVA/spinal tenderness noted.  Ext: no c/c/e  Skin: warm, perfused, no jaundice  Neuro: grossly intact, no asterixis noted    PERTINENT STUDIES:  None today, CT to be obtained in [facility] per pt's request    ASSESSMENT/PLAN:    1. Recurrent obstructions + chronic abdominal pain syndrome in setting of complicated post-surgical management, stable weight/nutritional status off TPN, interval loss of yet another chronic indwelling catheter (final attempt at this, removed 9/2017) - continue supportive care as ordered  for now  1. It was a pleasure getting a chance to see you again to discuss your chronic issues with recurrent bowel obstruction and malnutrition in the complicated post-surgical setting, particularly given your chronic needs for nutritional/fluid support and obstruction management - continue supportive care measures as we discussed at length today, keep up the great work!  - Discussed the interval loss of your Port-a-Cath following another line-associated infection (removed 9/2017), despite our best efforts to preserve this on this final attempt. Would not advise further chronic indwelling catheter placements going forward, as per our previous discussion.  - Discussed the ongoing intermittent G-tube tract discomfort and drainage, also in the setting of intermittent low-grade fevers. Recommend obtaining a CT scan of the Abdomen/Pelvis (non-IV contrast to avoid a difficult IV placement) to evaluate for an underlying abscess/fistula. If negative for abscess/fistula, will arrange for removal of the G-tube per our discussion today given the increasing risk/complications for the benefit conferred.    2. Recommend multimodal supportive care for the difficult post-surgical state (characterized by severe bothersome bloating + oral intake difficulty/nausea/vomiting + diarrhea).  - Continue scheduled anti-diarrheal therapy as discussed today. Continue your mindful dietary/lifestyle modifications for chronic nausea/bloating as reviewed today as well.  - Recommend considering scheduling IV fluid replacements at least twice/month, may help reduce your healthcare exposure and the anxiety/emotional distress associated with this.  - Recommend seeing our Registered Dietician to help advise regarding a high-density liquid oral nutrition plan as we discussed today (similar to the fortified smoothie recipe principles we discussed before).  - Recommend establishing care with our Health Psychologist (Yovana Lynn) so that we can  sustainably support your mental/emotional health as you navigate this difficult chronic illness.    2. Colorectal Cancer Screening  No high-risk FH CRC (MGM w/ CRC) , s/p subtotal colectomy 10/2012. Can readdress in ongoing care if surveillance of distal remnant is required.    RTC 3-4 months, sooner if symptomatic.      Thank you for this consultation. It was a pleasure to participate in the care of this patient; please contact us with any further questions.     Gilmer Lees MD   of Medicine  [facility] - Department of Medicine  Division of Gastroenterology      ADDENDUM 10/16/17 @1624:  Case reviewed w/ Dr. Edmond from Radiology ([facility]), concern re: cumulative radiation exposure given significant number of CT/XRs in patient's complex history (particularly since 2012).    Per discussion to reduce radiation exposure, will obtain a limited U/S of the G-tube site to evaluate for a SQ abscess. If equivocal, would attempt to obtain an MRI Abdomen (preferably w/o IV contrast to avoid difficult access) to assess for deeper abscess.    Plan would still to be to arrange removal of G-tube in the near-future, considering topical vs. systemic abx treatment depending on imaging findings.    Gilmer Lees MD   of Medicine  [facility] - Department of Medicine  Division of Gastroenterology

## 2017-10-16 NOTE — LETTER
10/16/2017       RE: Doreen Peralta  [address]   [place] [address]     Dear Colleague,    Thank you for referring your patient, Doreen Peralta, to the [facility] GASTRO[facility] IBD [facility]INIC at [facility]. Please see a copy of my visit note below.    GI CLINIC VISIT    CC/REFERRING PROVIDER: Referred Self  REASON FOR CONSULTATION: chronic abdominal pain, recurrent obstruction in the complicated post-surgical setting    HPI: 37 year old female w/ h/o IBS-C + superimposed colonic inertia s/p loop ileostomy 1/2012, s/p subtotal colectomy + ileosigmoid anastomosis 10/2012, s/p open J-tube placement x2 (2015, 2016) w/ eventual transition to venting G-tube 1/2016, s/p ex-lap/KATIE 1/2016, gastroparesis, chronic abdominal pain syndrome + recurrent obstructions in post-surgical setting, chronic migraine HA, recurrent PICC line-associated infections, among multiple other medical issues - presenting for f/u chronic abd pain, recurrent obstructions. Reports hourly loose BM, unchanged from baseline. Chronic N/V + bloating, continues to have difficulty w/ maintaining consistent PO intake (struggles w/ solid food intake, coaching re: fortified liquid intake at length today). Reports frequent loose stools throughout the day, denies any overt BRBPR. +persistent d/c from the G-tube tract (may require removal of tube, extensive d/w pt/mother today re: potential need for this). Denies any tenesmus or insecurity passing flatus, no fecal incontinence or new perianal/nocturnal sxs noted. +intermittent subj F w/o chills. Denies any HA/NS or other const/syst/cardiopulmonary sxs, no BRBPR/melena/urinary changes, no unintentional wt loss or appetite/satiety changes. No other bowel/bladder habit changes, no dysphagia/odynophagia. No jaundice/icterus/pruritus, no acholic stools/steatorrhea, no new lumps/bumps, no jt pain/oral ulcer/rash/eye sxs noted.    Much of visit was spent in  empathetic listening given the frustrating clinical situation and multiple hospitalizations; discussed recurrent pattern of complications/infections w/ multiple line and tube placement that would preclude further use of these going forward. Interval removal of prior new PAC 9/2017 following recurrent line infection, discussed that we would not order another indwelling vascular access going forward after this last effort (discussed this parameter previously). If G-tube, while effective for venting, must be d/c'd d/t tract infection/complications, would also not plan for replacement of this as well. Pt/mother understand rationale for moving away from any indwelling tube/catheter placements given the chronic infections and recurrent complications, and willing to proceed accordingly under this parameter.    ROS: 10pt ROS performed and otherwise negative.    PERTINENT PAST MEDICAL/SURGICAL HISTORY:  As noted above.    PERTINENT MEDICATIONS:  Medications reviewed with patient today, see Medication List/Assessment for details.  No other NSAID/anticoagulation reported by patient.  No other OTC/herbal/supplements reported by patient.    SOCIAL HISTORY: Tobacco: none.    PHYSICAL EXAMINATION:  Vitals reviewed, AFVSS  Wt 189# today (decr ~20# since 7/2017 visit)  Gen: aaox3, cooperative, pleasant, not diaphoretic, nad  HEENT: ncat, neck supple, no clad/sclad, normal op w/o ulcer/exudate, anicteric, mmm  Resp/CV without acute findings, not dyspneic/tachycardic  Abd: +nabs, soft, nt, nd, no peritoneal s/s noted. No ecchymoses, +complicated midline vertical surgical scar, +old RLQ ileostomy and lap scars, +LUQ 16Fr G-tube w/ moderate green leakage around tube and no surrounding cellulitis (more than at 7/2017 exam), no CVA/spinal tenderness noted.  Ext: no c/c/e  Skin: warm, perfused, no jaundice  Neuro: grossly intact, no asterixis noted    PERTINENT STUDIES:  None today, CT to be obtained in [facility] per pt's  request    ASSESSMENT/PLAN:    1. Recurrent obstructions + chronic abdominal pain syndrome in setting of complicated post-surgical management, stable weight/nutritional status off TPN, interval loss of yet another chronic indwelling catheter (final attempt at this, removed 9/2017) - continue supportive care as ordered for now  1. It was a pleasure getting a chance to see you again to discuss your chronic issues with recurrent bowel obstruction and malnutrition in the complicated post-surgical setting, particularly given your chronic needs for nutritional/fluid support and obstruction management - continue supportive care measures as we discussed at length today, keep up the great work!  - Discussed the interval loss of your Port-a-Cath following another line-associated infection (removed 9/2017), despite our best efforts to preserve this on this final attempt. Would not advise further chronic indwelling catheter placements going forward, as per our previous discussion.  - Discussed the ongoing intermittent G-tube tract discomfort and drainage, also in the setting of intermittent low-grade fevers. Recommend obtaining a CT scan of the Abdomen/Pelvis (non-IV contrast to avoid a difficult IV placement) to evaluate for an underlying abscess/fistula. If negative for abscess/fistula, will arrange for removal of the G-tube per our discussion today given the increasing risk/complications for the benefit conferred.    2. Recommend multimodal supportive care for the difficult post-surgical state (characterized by severe bothersome bloating + oral intake difficulty/nausea/vomiting + diarrhea).  - Continue scheduled anti-diarrheal therapy as discussed today. Continue your mindful dietary/lifestyle modifications for chronic nausea/bloating as reviewed today as well.  - Recommend considering scheduling IV fluid replacements at least twice/month, may help reduce your healthcare exposure and the anxiety/emotional distress associated  with this.  - Recommend seeing our Registered Dietician to help advise regarding a high-density liquid oral nutrition plan as we discussed today (similar to the fortified smoothie recipe principles we discussed before).  - Recommend establishing care with our Health Psychologist (Yovana Lynn) so that we can sustainably support your mental/emotional health as you navigate this difficult chronic illness.    2. Colorectal Cancer Screening  No high-risk FH CRC (MGM w/ CRC) , s/p subtotal colectomy 10/2012. Can readdress in ongoing care if surveillance of distal remnant is required.    RTC 3-4 months, sooner if symptomatic.      Thank you for this consultation. It was a pleasure to participate in the care of this patient; please contact us with any further questions.     Gilmer Lees MD   of Medicine  [facility] - Department of Medicine  Division of Gastroenterology      ADDENDUM 10/16/17 @1624:  Case reviewed w/ Dr. Edmond from Radiology ([facility]), concern re: cumulative radiation exposure given significant number of CT/XRs in patient's complex history (particularly since 2012).    Per discussion to reduce radiation exposure, will obtain a limited U/S of the G-tube site to evaluate for a SQ abscess. If equivocal, would attempt to obtain an MRI Abdomen (preferably w/o IV contrast to avoid difficult access) to assess for deeper abscess.    Plan would still to be to arrange removal of G-tube in the near-future, considering topical vs. systemic abx treatment depending on imaging findings.    Gilmer Lees MD   of Medicine  [facility] - Department of Medicine  Division of Gastroenterology

## 2017-10-16 NOTE — NURSING NOTE
"Chief Complaint   Patient presents with     Clinic Care Coordination - Follow-up     G tube follow up        Vitals:    10/16/17 1122   BP: (!) 140/97   BP Location: Right arm   Patient Position: Chair   Cuff Size: Adult Regular   Pulse: 80   SpO2: 100%   Weight: 85.8 kg (189 lb 3.2 oz)   Height: 1.676 m (5' 6\")       Body mass index is 30.54 kg/(m^2).    Manda Zapata RN                          "

## 2017-10-16 NOTE — PATIENT INSTRUCTIONS
I've included a brief summary of our discussion and care plan from today's visit below.  Please review this information with your primary care provider.  _______________________________________________________________________    1. It was a pleasure getting a chance to see you again to discuss your chronic issues with recurrent bowel obstruction and malnutrition in the complicated post-surgical setting, particularly given your chronic needs for nutritional/fluid support and obstruction management - continue supportive care measures as we discussed at length today, keep up the great work!  - Discussed the interval loss of your Port-a-Cath following another line-associated infection (removed 9/2017), despite our best efforts to preserve this on this final attempt. Would not advise further chronic indwelling catheter placements going forward, as per our previous discussion.  - Discussed the ongoing intermittent G-tube tract discomfort and drainage, also in the setting of intermittent low-grade fevers. Recommend obtaining a CT scan of the Abdomen/Pelvis (non-IV contrast to avoid a difficult IV placement) to evaluate for an underlying abscess/fistula. If negative for abscess/fistula, will arrange for removal of the G-tube per our discussion today given the increasing risk/complications for the benefit conferred.    2. Recommend multimodal supportive care for the difficult post-surgical state (characterized by severe bothersome bloating + oral intake difficulty/nausea/vomiting + diarrhea).  - Continue scheduled anti-diarrheal therapy as discussed today. Continue your mindful dietary/lifestyle modifications for chronic nausea/bloating as reviewed today as well.  - Recommend considering scheduling IV fluid replacements at least twice/month, may help reduce your healthcare exposure and the anxiety/emotional distress associated with this.  - Recommend seeing our Registered Dietician to help advise regarding a high-density  liquid oral nutrition plan as we discussed today (similar to the fortified smoothie recipe principles we discussed before).  - Recommend establishing care with our Health Psychologist (Yovana Lynn) so that we can sustainably support your mental/emotional health as you navigate this difficult chronic illness.    3. Return to GI Clinic with me in 3-4 months to review your progress, sooner if symptomatic.   - If you are unable to schedule this follow-up appointment today, please contact Roxie Gregg at [phone] within the next week to help set up this necessary appointment.    _______________________________________________________________________    It was a pleasure seeing you in clinic today - please be in touch if there are any further questions that arise following today's visit.  During business hours, you may reach Clinic Nurse Triage Line at [phone].  For urgent/emergent questions after business hours, you may reach the on-call GI Fellow by contacting the [facility]  at [phone].    Any benign/non-urgent test results are usually communicated via letter or [employer] message within 1-2 weeks after completion.  Urgent results (those that require a change in the previously-discussed care plan) are usually communicated via a phone call once available from our clinic staff to discuss the results and the next steps in your evaluation.    I recommend signing up for [employer] access if you have not already done so and are comfortable with using a computer.  This allows for online access to your lab results and also helps you communicate efficiently with my clinic should any questions arise in your care.    We have Financial Counseling services available through our clinic.  If you have questions about your insurance coverage or payment responsibilities, particularly before you undergo any tests or procedures, please let us know and we can arrange a consultation accordingly to help you make  informed decisions about your healthcare.    Sincerely,    Gilmer Lees MD    [facility] - Department of Medicine  Division of Gastroenterology

## 2017-10-16 NOTE — TELEPHONE ENCOUNTER
DULOXETINE    Last Written Prescription Date: 9-13-17  Last Fill Quantity: 90, # refills: 0  Last Office Visit with [facility], P or [facility] prescribing provider: 10-7-17        BP Readings from Last 3 Encounters:   10/07/17 131/68   10/07/17 128/83   09/25/17 125/85     Pulse: (for Fetzima)  Creatinine   Date Value Ref Range Status   10/07/2017 0.84 0.52 - 1.04 mg/dL Final   ]    Last PHQ-9 score on record=   PHQ-9 SCORE 9/1/2017   Total Score -   Total Score 3

## 2017-10-17 ENCOUNTER — HOSPITAL ENCOUNTER (OUTPATIENT)
Dept: ULTRASOUND IMAGING | Facility: CLINIC | Age: 37
Discharge: HOME | End: 2017-10-17
Attending: INTERNAL MEDICINE | Admitting: INTERNAL MEDICINE
Payer: COMMERCIAL

## 2017-10-17 DIAGNOSIS — G89.29 CHRONIC ABDOMINAL PAIN: ICD-10-CM

## 2017-10-17 DIAGNOSIS — R10.9 CHRONIC ABDOMINAL PAIN: ICD-10-CM

## 2017-10-17 DIAGNOSIS — K31.84 GASTROPARESIS: ICD-10-CM

## 2017-10-17 DIAGNOSIS — L08.9 SKIN INFECTION AT GASTROSTOMY TUBE SITE (H): ICD-10-CM

## 2017-10-17 DIAGNOSIS — K94.22 SKIN INFECTION AT GASTROSTOMY TUBE SITE (H): ICD-10-CM

## 2017-10-17 PROCEDURE — 76705 ECHO EXAM OF ABDOMEN: CPT

## 2017-10-18 DIAGNOSIS — K94.22 SKIN INFECTION AT GASTROSTOMY TUBE SITE (H): Primary | ICD-10-CM

## 2017-10-18 DIAGNOSIS — K31.84 GASTROPARESIS: ICD-10-CM

## 2017-10-18 DIAGNOSIS — L08.9 SKIN INFECTION AT GASTROSTOMY TUBE SITE (H): Primary | ICD-10-CM

## 2017-10-18 NOTE — TELEPHONE ENCOUNTER
Left detailed message for patient regarding US results.     Discussed plan to follow up with IR for tube removal or replacement.

## 2017-10-25 DIAGNOSIS — F41.9 ANXIETY: ICD-10-CM

## 2017-10-25 NOTE — TELEPHONE ENCOUNTER
Routing refill request to provider for review/approval because:  Shawna given x1 and patient did not follow up, please advise  Patient needs to be seen because:    Med used for anxiety so not under protocol.  Ekta Jaimes RN

## 2017-10-26 RX ORDER — CLONIDINE HYDROCHLORIDE 0.2 MG/1
0.4 TABLET ORAL EVERY EVENING
Qty: 60 TABLET | Refills: 0 | Status: SHIPPED | OUTPATIENT
Start: 2017-10-26

## 2017-11-08 DIAGNOSIS — G47.00 INSOMNIA, UNSPECIFIED TYPE: ICD-10-CM

## 2017-11-08 NOTE — TELEPHONE ENCOUNTER
Routing refill request to provider for review/approval because:  Shawna given x1 and patient did not follow up, please advise  Ekta Jaimes RN

## 2017-11-09 RX ORDER — TRAZODONE HYDROCHLORIDE 50 MG/1
TABLET, FILM COATED ORAL
Qty: 60 TABLET | Refills: 0 | Status: SHIPPED | OUTPATIENT
Start: 2017-11-09 | End: 2018-03-03

## 2017-12-01 ENCOUNTER — TELEPHONE (OUTPATIENT)
Dept: GASTROENTEROLOGY | Facility: CLINIC | Age: 37
End: 2017-12-01

## 2017-12-20 DIAGNOSIS — G47.00 INSOMNIA, UNSPECIFIED TYPE: ICD-10-CM

## 2017-12-20 NOTE — TELEPHONE ENCOUNTER
Please assist patient scheduling appt.  refused  On 12/11/17 needs appt.pharmacy notified.  Ekta Jaimes RN.

## 2018-01-02 RX ORDER — TRAZODONE HYDROCHLORIDE 50 MG/1
TABLET, FILM COATED ORAL
Qty: 60 TABLET | Refills: 0 | OUTPATIENT
Start: 2018-01-02

## 2018-01-14 ENCOUNTER — HOSPITAL ENCOUNTER (EMERGENCY)
Facility: CLINIC | Age: 38
Discharge: HOME | End: 2018-01-14
Attending: PHYSICIAN ASSISTANT | Admitting: PHYSICIAN ASSISTANT
Payer: COMMERCIAL

## 2018-01-14 VITALS
BODY MASS INDEX: 27.32 KG/M2 | SYSTOLIC BLOOD PRESSURE: 131 MMHG | RESPIRATION RATE: 18 BRPM | HEART RATE: 91 BPM | OXYGEN SATURATION: 97 % | WEIGHT: 170 LBS | DIASTOLIC BLOOD PRESSURE: 88 MMHG | HEIGHT: 66 IN | TEMPERATURE: 98.4 F

## 2018-01-14 DIAGNOSIS — R50.9 FEVER IN ADULT: ICD-10-CM

## 2018-01-14 DIAGNOSIS — R10.10 UPPER ABDOMINAL PAIN OF UNKNOWN ETIOLOGY: ICD-10-CM

## 2018-01-14 LAB
ALBUMIN SERPL-MCNC: 3 G/DL (ref 3.4–5)
ALBUMIN UR-MCNC: NEGATIVE MG/DL
ALP SERPL-CCNC: 125 U/L (ref 40–150)
ALT SERPL W P-5'-P-CCNC: 37 U/L (ref 0–50)
ANION GAP SERPL CALCULATED.3IONS-SCNC: 8 MMOL/L (ref 3–14)
APPEARANCE UR: CLEAR
AST SERPL W P-5'-P-CCNC: 18 U/L (ref 0–45)
BASOPHILS # BLD AUTO: 0 10E9/L (ref 0–0.2)
BASOPHILS NFR BLD AUTO: 0.4 %
BILIRUB SERPL-MCNC: 0.3 MG/DL (ref 0.2–1.3)
BILIRUB UR QL STRIP: NEGATIVE
BUN SERPL-MCNC: 7 MG/DL (ref 7–30)
CALCIUM SERPL-MCNC: 7.9 MG/DL (ref 8.5–10.1)
CHLORIDE SERPL-SCNC: 105 MMOL/L (ref 94–109)
CO2 SERPL-SCNC: 23 MMOL/L (ref 20–32)
COLOR UR AUTO: COLORLESS
CREAT SERPL-MCNC: 1.05 MG/DL (ref 0.52–1.04)
CRP SERPL-MCNC: 22 MG/L (ref 0–8)
DIFFERENTIAL METHOD BLD: ABNORMAL
EOSINOPHIL # BLD AUTO: 0.4 10E9/L (ref 0–0.7)
EOSINOPHIL NFR BLD AUTO: 4.3 %
ERYTHROCYTE [DISTWIDTH] IN BLOOD BY AUTOMATED COUNT: 18.7 % (ref 10–15)
GFR SERPL CREATININE-BSD FRML MDRD: 59 ML/MIN/1.7M2
GLUCOSE SERPL-MCNC: 119 MG/DL (ref 70–99)
GLUCOSE UR STRIP-MCNC: NEGATIVE MG/DL
HCT VFR BLD AUTO: 32.7 % (ref 35–47)
HGB BLD-MCNC: 10.4 G/DL (ref 11.7–15.7)
HGB UR QL STRIP: NEGATIVE
IMM GRANULOCYTES # BLD: 0.1 10E9/L (ref 0–0.4)
IMM GRANULOCYTES NFR BLD: 0.7 %
KETONES UR STRIP-MCNC: NEGATIVE MG/DL
LACTATE BLD-SCNC: 1.3 MMOL/L (ref 0.7–2)
LEUKOCYTE ESTERASE UR QL STRIP: NEGATIVE
LIPASE SERPL-CCNC: 61 U/L (ref 73–393)
LYMPHOCYTES # BLD AUTO: 1.9 10E9/L (ref 0.8–5.3)
LYMPHOCYTES NFR BLD AUTO: 20.4 %
MCH RBC QN AUTO: 24.8 PG (ref 26.5–33)
MCHC RBC AUTO-ENTMCNC: 31.8 G/DL (ref 31.5–36.5)
MCV RBC AUTO: 78 FL (ref 78–100)
MONOCYTES # BLD AUTO: 0.5 10E9/L (ref 0–1.3)
MONOCYTES NFR BLD AUTO: 5.1 %
MUCOUS THREADS #/AREA URNS LPF: PRESENT /LPF
NEUTROPHILS # BLD AUTO: 6.3 10E9/L (ref 1.6–8.3)
NEUTROPHILS NFR BLD AUTO: 69.1 %
NITRATE UR QL: NEGATIVE
PH UR STRIP: 7 PH (ref 5–7)
PLATELET # BLD AUTO: 206 10E9/L (ref 150–450)
POTASSIUM SERPL-SCNC: 3.7 MMOL/L (ref 3.4–5.3)
PROT SERPL-MCNC: 6.9 G/DL (ref 6.8–8.8)
RBC # BLD AUTO: 4.19 10E12/L (ref 3.8–5.2)
RBC #/AREA URNS AUTO: <1 /HPF (ref 0–2)
SODIUM SERPL-SCNC: 136 MMOL/L (ref 133–144)
SOURCE: ABNORMAL
SP GR UR STRIP: 1 (ref 1–1.03)
SQUAMOUS #/AREA URNS AUTO: 4 /HPF (ref 0–1)
UROBILINOGEN UR STRIP-MCNC: 0 MG/DL (ref 0–2)
WBC # BLD AUTO: 9.1 10E9/L (ref 4–11)
WBC #/AREA URNS AUTO: <1 /HPF (ref 0–2)

## 2018-01-14 PROCEDURE — 80053 COMPREHEN METABOLIC PANEL: CPT | Performed by: PHYSICIAN ASSISTANT

## 2018-01-14 PROCEDURE — 81001 URINALYSIS AUTO W/SCOPE: CPT | Performed by: PHYSICIAN ASSISTANT

## 2018-01-14 PROCEDURE — 99284 EMERGENCY DEPT VISIT MOD MDM: CPT | Mod: Z6 | Performed by: PHYSICIAN ASSISTANT

## 2018-01-14 PROCEDURE — 85025 COMPLETE CBC W/AUTO DIFF WBC: CPT | Performed by: PHYSICIAN ASSISTANT

## 2018-01-14 PROCEDURE — 86140 C-REACTIVE PROTEIN: CPT | Performed by: PHYSICIAN ASSISTANT

## 2018-01-14 PROCEDURE — 83690 ASSAY OF LIPASE: CPT | Performed by: PHYSICIAN ASSISTANT

## 2018-01-14 PROCEDURE — 99283 EMERGENCY DEPT VISIT LOW MDM: CPT | Performed by: PHYSICIAN ASSISTANT

## 2018-01-14 PROCEDURE — 87040 BLOOD CULTURE FOR BACTERIA: CPT | Performed by: PHYSICIAN ASSISTANT

## 2018-01-14 PROCEDURE — 83605 ASSAY OF LACTIC ACID: CPT | Performed by: PHYSICIAN ASSISTANT

## 2018-01-14 ASSESSMENT — ENCOUNTER SYMPTOMS
FLANK PAIN: 0
BLOOD IN STOOL: 0
FEVER: 1
NAUSEA: 1
SHORTNESS OF BREATH: 0
APPETITE CHANGE: 1
DYSURIA: 0
VOMITING: 1
COUGH: 0
DIARRHEA: 1
ABDOMINAL PAIN: 1

## 2018-01-14 NOTE — ED AVS SNAPSHOT
[facility] Emergency Department    [address]     [place] [address]    Phone:  [phone]    Fax:  [phone]                                       Doreen Peralta   MRN: [id number]    Department:  [facility] Emergency Department   Date of Visit:  1/14/2018           Patient Information     Date Of Birth          1980        Your diagnoses for this visit were:     Fever in adult     Upper abdominal pain of unknown etiology        You were seen by Vianey Warren PA-C.      Follow-up Information     Follow up with [facility] Emergency Department.    Specialty:  EMERGENCY MEDICINE    Why:  If symptoms worsen    Contact information:    [address] [place] [address] [phone]    Additional information:    From [address]: Exit at [address] on south side of [place]. Turn right on [address]. Turn left at stoplight on [address]. [facility] will be in view two blocks ahead        Call Franny Antoine MD.    Specialty:  Internal Medicine    Why:  For ER follow up    Contact information:    [facility]  [address] [id number] [phone]          Discharge Instructions       At this time, the cause of your fever and increased abdominal pain is unclear, but your lab tests were reassuring. I urge you to continue monitoring symptoms. Use Tylenol 1000 mg every 6 hours as needed for fever or pain. Please call and schedule follow-up visit with your primary care provider in the next few days for reassessment. As discussed, if symptoms worsen please return to the emergency department.    Thank you for choosing [facility]'s Emergency Department. It was a pleasure taking care of you today. If you have any questions, please call [phone].    Vianey Warren PA-C      24 Hour Appointment Hotline       To make an appointment at any [facility], call [phone] ([phone]). If you don't  have a family doctor or clinic, we will help you find one. [facility] are conveniently located to serve the needs of you and your family.             Review of your medicines      CONTINUE these medicines which may have CHANGED, or have new prescriptions. If we are uncertain of the size of tablets/capsules you have at home, strength may be listed as something that might have changed.        Dose / Directions Last dose taken    nortriptyline 10 MG capsule   Commonly known as:  PAMELOR   Dose:  30-40 mg   What changed:  how much to take   Quantity:  120 capsule        Take 3-4 capsules (30-40 mg) by mouth At Bedtime   Refills:  5          Our records show that you are taking the medicines listed below. If these are incorrect, please call your family doctor or clinic.        Dose / Directions Last dose taken    ACETAMINOPHEN PO   Dose:  500-1000 mg        Take 500-1,000 mg by mouth every 6 hours as needed for pain   Refills:  0        albuterol (2.5 MG/3ML) 0.083% neb solution   Dose:  2.5 mg   Quantity:  360 mL        Take 1 vial (2.5 mg) by nebulization every 4 hours as needed for shortness of breath / dyspnea or wheezing   Refills:  0        albuterol 90 MCG/ACT inhaler   Dose:  2 puff        Inhale 2 puffs into the lungs every 6 hours as needed   Refills:  0        cloNIDine 0.2 MG tablet   Commonly known as:  CATAPRES   Dose:  0.4 mg   Quantity:  60 tablet        Take 2 tablets (0.4 mg) by mouth every evening - DUE FOR FOLLOW UP   Refills:  0        diphenhydrAMINE 25 MG tablet   Commonly known as:  BENADRYL ALLERGY   Dose:  25 mg   Quantity:  30 tablet        Take 1 tablet (25 mg) by mouth every 6 hours as needed for itching or other (Nausea)   Refills:  0        DULoxetine 60 MG EC capsule   Commonly known as:  CYMBALTA   Quantity:  90 capsule        TAKE 1 CAPSULE(60 MG) BY MOUTH DAILY   Refills:  1        ipratropium 0.02 % neb solution   Commonly known as:  ATROVENT   Dose:  0.5 mg   Quantity:  225 mL         Take 2.5 mLs (0.5 mg) by nebulization every 6 hours as needed for wheezing   Refills:  0        mirtazapine 15 MG ODT tab   Commonly known as:  REMERON SOL-TAB   Dose:  7.5 mg   Quantity:  45 tablet        0.5 tablets (7.5 mg) by Orally disintegrating tablet route At Bedtime   Refills:  0        ondansetron 4 MG ODT tab   Commonly known as:  ZOFRAN ODT   Dose:  4-8 mg   Quantity:  30 tablet        Take 1-2 tablets (4-8 mg) by mouth every 6 hours as needed for nausea   Refills:  0        * order for DME   Quantity:  1 Box        2 by 2 gauze pads, use for dressing changes as directed   Refills:  1        * order for DME   Quantity:  1 each        1 inch paper tape, Use for dressing changes as directed   Refills:  0        SUMAtriptan 50 MG tablet   Commonly known as:  IMITREX   Dose:   mg   Quantity:  18 tablet        Take 1-2 tablets ( mg) by mouth at onset of headache for migraine - may repeat dose after 2h if headache recurs.  Max: 200mg/24 hours   Refills:  1        traZODone 50 MG tablet   Commonly known as:  DESYREL   Quantity:  60 tablet        TAKE 1 TO 2 TABLETS BY MOUTH AT BEDTIME AS NEEDED   Refills:  0        * Notice:  This list has 2 medication(s) that are the same as other medications prescribed for you. Read the directions carefully, and ask your doctor or other care provider to review them with you.            Procedures and tests performed during your visit     Blood culture    CBC with platelets differential    CRP inflammation    Comprehensive metabolic panel    Lactic acid whole blood    Lipase    UA with Microscopic      Orders Needing Specimen Collection     None      Pending Results     Date and Time Order Name Status Description    1/14/2018 2123 Blood culture In process             Pending Culture Results     Date and Time Order Name Status Description    1/14/2018 2123 Blood culture In process             Pending Results Instructions     If you had any lab results that  were not finalized at the time of your Discharge, you can call the ED Lab Result RN at [phone]. You will be contacted by this team for any positive Lab results or changes in treatment. The nurses are available 7 days a week from 10A to 6:30P.  You can leave a message 24 hours per day and they will return your call.        Thank you for choosing [facility]       Thank you for choosing [facility] for your care. Our goal is always to provide you with excellent care. Hearing back from our patients is one way we can continue to improve our services. Please take a few minutes to complete the written survey that you may receive in the mail after you visit with us. Thank you!        [employer] [facility] Directory Information     [facility] gives you secure access to your electronic health record. If you see a primary care provider, you can also send messages to your care team and make appointments. If you have questions, please call your primary care clinic.  If you do not have a primary care provider, please call [phone] and they will assist you.        Care EveryWhere ID     This is your Care EveryWhere ID. This could be used by other organizations to access your [facility] medical records  [id number]        Equal Access to Services     TRAMAINE CLAYTON : Carrie Law, clark villeda, elham guerrero. So [facility] [phone].    ATENCIÓN: Si habla español, tiene a wade disposición servicios gratuitos de asistencia lingüística. Llame al [phone].    We comply with applicable federal civil rights laws and [place] laws. We do not discriminate on the basis of race, color, national origin, age, disability, sex, sexual orientation, or gender identity.            After Visit Summary       This is your record. Keep this with you and show to your community pharmacist(s) and doctor(s) at your next visit.

## 2018-01-14 NOTE — ED AVS SNAPSHOT
[facility] Emergency Department    [address] [place] [address]    Phone:  [phone]    Fax:  [phone]                                       Doreen Peralta   MRN: [id number]    Department:  [facility] Emergency Department   Date of Visit:  1/14/2018           After Visit Summary Signature Page     I have received my discharge instructions, and my questions have been answered. I have discussed any challenges I see with this plan with the nurse or doctor.    ..........................................................................................................................................  Patient/Patient Representative Signature      ..........................................................................................................................................  Patient Representative Print Name and Relationship to Patient    ..................................................               ................................................  Date                                            Time    ..........................................................................................................................................  Reviewed by Signature/Title    ...................................................              ..............................................  Date                                                            Time

## 2018-01-15 NOTE — ED PROVIDER NOTES
"  History     Chief Complaint   Patient presents with     Abdominal Pain     Fever     HPI  Doreen Peralta is a 37 year old female with a complex medical history to include multiple abdominal surgeries, chronic abdominal pain, recurrent sepsis, who presents the emergency department complaining of abdominal pain, fever, nausea and vomiting.  She states that she woke up this morning and felt \"weird.\"  She \"just did not feel good.\"  Around noon she took her temperature and it was 102F.  She also has been vomiting more than usual, she has chronic nausea with vomiting but vomiting is more frequent today.  She is unable to count how many times she vomited, states \"it was lots.\"  She has chronic diarrhea that is unchanged from her baseline, no bloody stools or blood in vomit.  She has been taking Tylenol every 4 hours without much change in symptoms.  She tried Zofran for nausea without relief.  She localizes abdominal pain to about upper abdomen and is sharp and constant.  Denies urinary symptoms.  Denies upper respiratory symptoms.    The patient states that after a visit with GI in October 2017 she pulled her G-tube out herself at home.  She saw Dr. valencia to check things and said it looked okay.  She also has gotten off all narcotics to see if that would help her \"gastroparesis\" and chronic abdominal issues.  She states she still has intermittent abdominal pain and ongoing nausea/vomiting and diarrhea.  She does not have permanent intravenous access currently.  She has been receiving medical care through [facility] clinics.  She was hospitalized in November and at that time was advised to get off the narcotics since they could exacerbate her GI issues.    Today she states that she just wants lab work done, does not want an IV or any medications.         Problem List:    Patient Active Problem List    Diagnosis Date Noted     Sepsis (H) - possible 08/24/2017     Priority: Medium     Hypokalemia 08/24/2017     Priority: " Medium     Essential hypertension 08/24/2017     Priority: Medium     Sepsis due to Klebsiella (H) 07/27/2017     Priority: Medium     Insomnia, unspecified type 03/31/2017     Priority: Medium     Abdominal pain 02/15/2017     Priority: Medium     Abdominal pain, generalized 01/28/2017     Priority: Medium     SIRS (systemic inflammatory response syndrome) (H) 01/26/2017     Priority: Medium     History of deep venous thrombosis 01/26/2017     Priority: Medium     Nausea and vomiting 01/26/2017     Priority: Medium     Vitamin D deficiency 01/16/2017     Priority: Medium     Chronic abdominal pain 11/15/2016     Priority: Medium     Patient is followed by Larisa Lorenzo PA-C for ongoing prescription of pain medication.  All refills should be approved by this provider, or covering partner.    Medication(s): no regular narcotics - narcotics given at ED visits  Maximum quantity per month: N/A  Clinic visit frequency required: Q 6  months     Controlled substance agreement:  Encounter-Level CSA - 11/9/15:               Controlled Substance Agreement - Scan on 11/19/2015 11:17 AM : CONTROLLED SUBSTANCE AGREEMENT 11/09/15 (below)          Encounter-Level CSA - 2/27/15:               Controlled Substance Agreement - Scan on 3/12/2015  7:50 AM : Controlled Medication Agreement 02/27/15 (below)            Pain Clinic evaluation in the past: Yes    DIRE Total Score(s):  No flowsheet data found.    Last [facility] website verification:  done on 11/15/16   [email or web address]        Attention to G-tube (H) 11/08/2016     Priority: Medium     Fever 10/16/2016     Priority: Medium     Coagulation defect (H) [D68.9] 09/16/2016     Priority: Medium     Chronic diarrhea 07/22/2016     Priority: Medium     Migraine 07/20/2016     Priority: Medium     Positive blood culture - Klebsiella oxytoca from Port-a-cath culture 07/18/2016     Priority: Medium     Mitral regurgitation mild-mod by Echo June 2016 07/18/2016      Priority: Medium     Anemia, iron deficiency 07/17/2016     Priority: Medium     Anemia in other chronic diseases classified elsewhere 06/14/2016     Priority: Medium     Munchausen syndrome - previously suspected 06/14/2016     Priority: Medium     Long-term (current) use of anticoagulants [Z79.01] 05/16/2016     Priority: Medium     Anxiety 05/16/2016     Priority: Medium     S/P partial resection of colon 04/11/2016     Priority: Medium     Malnutrition (H) 04/11/2016     Priority: Medium     Jejunostomy tube present (H) 03/21/2016     Priority: Medium     Malfunctioning jejunostomy tube (H) 12/22/2015     Priority: Medium     Malfunction of gastrostomy tube (H) 11/19/2015     Priority: Medium     PEG tube malfunction (H) 10/16/2015     Priority: Medium     Health Care Home 01/16/2015     Priority: Medium     *See Letters for [facility] Care Plan: My Access Plan           PEG (percutaneous endoscopic gastrostomy) status 11/05/2014     Priority: Medium     Gastroparesis 04/11/2014     Priority: Medium     Overview:   Had ileostomy performed at [facility] in Jan 2012 as treatment       Migraines 04/11/2014     Priority: Medium     4/11/2014  With periods, every other month.       Intermittent asthma 04/11/2014     Priority: Medium     4/11/2014   With URIs, allergies       Allergic rhinitis 04/11/2014     Priority: Medium     Problem list name updated by automated process. Provider to review       Abnormal Pap smear of cervix 04/11/2014     Priority: Medium     4/11/2014  S/p colp and LEEP. Sees OB/GYN at [facility]. Pap every year x20 years - normal since.       S/P LEEP of cervix 02/06/2014     Priority: Medium     Patellofemoral stress syndrome 01/18/2014     Priority: Medium     Hx SBO 10/09/2013     Priority: Medium     4/11/2014  Recurrent. Sees GI (Dr. Redding - at [facility]) every 6 months or so.  Sees feeding tube nurse next week.       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 04/11/2013     Priority: Medium     Constipation  by delayed colonic transit 10/24/2012     Priority: Medium     Atopic rhinitis 03/20/2009     Priority: Medium     Hyperbilirubinemia 03/11/2009     Priority: Medium        Past Medical History:    Past Medical History:   Diagnosis Date     Asthma      Bilateral ovarian cysts      Cervical cancer (H) 01/01/2008     Chronic pain      Colonic dysmotility      Constipation      E. coli sepsis (H) 5/8/2016     Enteritis      Fungemia 5/5/2016     Gastro-oesophageal reflux disease      H/O ileostomy      Hx of abnormal Pap smear      Hypertension      IBS (irritable bowel syndrome)      Other chronic pain      PONV (postoperative nausea and vomiting)      Thrombosis, hepatic vein (H)        Past [facility] History:    Past [facility] History:   Procedure Laterality Date     COLONOSCOPY  7/10/2012    Procedure: COLONOSCOPY;;  Surgeon: Nicole Redding MD;  [facility]     COLONOSCOPY N/A 2/19/2017    Procedure: COLONOSCOPY;  Surgeon: Randell Muller MD;  Location: [facility] GI     COLONOSCOPY N/A 2/21/2017    Procedure: COLONOSCOPY;  Surgeon: Randell Muller MD;  Location: [facility]     ECHO CHELO  7/19/2016          ENDOSCOPIC INSERTION TUBE GASTROSTOMY N/A 1/21/2016    Procedure: ENDOSCOPIC INSERTION TUBE GASTROSTOMY;  Surgeon: Nicole Redding MD;  [facility]     ESOPHAGOSCOPY, GASTROSCOPY, DUODENOSCOPY (EGD), COMBINED  7/10/2012    Procedure: COMBINED ESOPHAGOSCOPY, GASTROSCOPY, DUODENOSCOPY (EGD);  Upper Endoscopy, Ileoscopy    Latex Allergy  with biopsies;  Surgeon: Nicole Redding MD;  [facility]     ESOPHAGOSCOPY, GASTROSCOPY, DUODENOSCOPY (EGD), COMBINED N/A 11/5/2014    Procedure: COMBINED ESOPHAGOSCOPY, GASTROSCOPY, DUODENOSCOPY (EGD);  Surgeon: Nicole Redding MD;  [facility]     HC REPLACE DUODENOSTOMY/JEJUNOSTOMY TUBE PERCUTANEOUS N/A 8/27/2015    Procedure: REPLACE GASTROJEJUNOSTOMY TUBE, PERCUTANEOUS;  Surgeon: Mio Holder MD;  [facility]     HC REPLACE  DUODENOSTOMY/JEJUNOSTOMY TUBE PERCUTANEOUS N/A 1/7/2016    Procedure: REPLACE JEJUNOSTOMY TUBE, PERCUTANEOUS;  Surgeon: Elsa Medel MD;  [facility]     HC REPLACE DUODENOSTOMY/JEJUNOSTOMY TUBE PERCUTANEOUS N/A 1/28/2016    Procedure: REPLACE JEJUNOSTOMY TUBE, PERCUTANEOUS;  Surgeon: Elsa Medel MD;  [facility]     HC REPLACE GASTROSTOMY/CECOSTOMY TUBE PERCUTANEOUS Left 5/19/2015    Procedure: REPLACE GASTROSTOMY TUBE, PERCUTANEOUS;  Surgeon: Melecio Morejon Chi, MD;  Location: [facility]     HC UGI ENDOSCOPY W PLACEMENT GASTROSTOMY TUBE PERCUT N/A 10/1/2015    Procedure: COMBINED ESOPHAGOSCOPY, GASTROSCOPY, DUODENOSCOPY (EGD), PLACE PERCUTANEOUS ENDOSCOPIC GASTROSTOMY TUBE;  Surgeon: Mio Holder MD;  [facility]     INSERT PORT VASCULAR ACCESS Right 7/20/2017    Procedure: INSERT PORT VASCULAR ACCESS;  Chest Port Placement  **Latex Allergy**;  Surgeon: Coy Rocha PA-C;  [facility]     LAPAROSCOPIC ASSISTED COLECTOMY  1/20/2012    Procedure:LAPAROSCOPIC ASSISTED COLECTOMY; Laparoscopic Ileostomy       LAPAROSCOPIC ASSISTED COLECTOMY LEFT (DESCENDING)  10/24/2012    Procedure: LAPAROSCOPIC ASSISTED COLECTOMY LEFT (DESCENDING);   Hand Assisted Laproscopic Subtotal abdominal Colectomy,Iieorectal anastamosis, Ileostomy Closure.       LAPAROSCOPIC ASSISTED INSERTION TUBE JEJUNOSTOMY N/A 10/16/2015    Procedure: LAPAROSCOPIC ASSISTED INSERTION TUBE JEJUNOSTOMY;  Surgeon: Elsa Medel MD;  [facility]     LAPAROSCOPIC CHOLECYSTECTOMY  2002    [facility] stones duct     LAPAROSCOPIC ILEOSTOMY  1/20/2012    [facility], loop     LAPAROSCOPIC OOPHORECTOMY Right 2009    [facility]     LAPAROTOMY EXPLORATORY N/A 1/28/2016    Procedure: LAPAROTOMY EXPLORATORY;  Surgeon: Elsa Medel MD;  Location[facility]     LEEP TX, CERVICAL  2009    [facility]     PICC INSERTION Left 10/21/2015    5fr DL Power PICC, 37cm (2cm external) in the L basilic vein w/ tip in the SVC RA  junction.     REMOVE GASTROSTOMY TUBE ADULT N/A 12/12/2014    Procedure: REMOVE GASTROSTOMY TUBE ADULT;  Surgeon: Nicole Redding MD;  [facility]     REMOVE PORT VASCULAR ACCESS Right 6/30/2016    Procedure: REMOVE PORT VASCULAR ACCESS;  Surgeon: Pradeep Orosco MD;  Location: [facility]     REMOVE PORT VASCULAR ACCESS Right 9/8/2017    Procedure: REMOVE PORT VASCULAR ACCESS;  right side mediport removal;  Surgeon: Zechariah Worthington MD;  Location: [facility] OR     replace GASTROSTOMY TUBE ADULT  5/19/15       Family History:    Family History   Problem Relation Age of Onset     Thyroid Disease Mother      Sjogren's Mother      GASTROINTESTINAL DISEASE Mother      Intermittent nausea vomiting diarrhea     Colon Polyps Mother      Prostate Problems Father      prostate enlargement     Lupus Maternal Grandmother      CANCER Maternal Grandfather      Lung     Colon Cancer Maternal Grandfather 65     CANCER Paternal Grandmother      Lung      CEREBROVASCULAR DISEASE Paternal Grandmother      DIABETES Paternal Grandmother      Cardiovascular Paternal Grandmother      CHF     CANCER Paternal Grandfather      Lung     Glaucoma Paternal Grandfather      Abdominal Aortic Aneurysm Other      Macular Degeneration No family hx of        Social History:  Marital Status:   [2]  Social History   Substance Use Topics     Smoking status: Current Some Day Smoker     Packs/day: 1.00     Years: 4.00     Types: Cigarettes     Last attempt to quit: 1/1/2004     Smokeless tobacco: Former User     Alcohol use No        Medications:      traZODone (DESYREL) 50 MG tablet   cloNIDine (CATAPRES) 0.2 MG tablet   DULoxetine (CYMBALTA) 60 MG EC capsule   ondansetron (ZOFRAN ODT) 4 MG ODT tab   albuterol (2.5 MG/3ML) 0.083% neb solution   ipratropium (ATROVENT) 0.02 % neb solution   diphenhydrAMINE (BENADRYL ALLERGY) 25 MG tablet   nortriptyline (PAMELOR) 10 MG capsule   mirtazapine (REMERON SOL-TAB) 15 MG ODT tab   SUMAtriptan  "(IMITREX) 50 MG tablet   ACETAMINOPHEN PO   albuterol 90 MCG/ACT inhaler   order for DME   order for DME         Review of Systems   Constitutional: Positive for appetite change (decreased) and fever.        Feels \"weird\", general malaise   HENT: Negative for congestion.    Respiratory: Negative for cough and shortness of breath.    Cardiovascular: Negative for chest pain.   Gastrointestinal: Positive for abdominal pain, diarrhea, nausea and vomiting. Negative for blood in stool.   Genitourinary: Negative for dysuria and flank pain.   All other systems reviewed and are negative.      Physical Exam   BP: (!) 156/104  Pulse: 91  Heart Rate: 98  Temp: 98.4  F (36.9  C)  Resp: 20  Height: 167.6 cm (5' 6\")  Weight: 77.1 kg (170 lb)  SpO2: 100 %      Physical Exam   Constitutional: She is oriented to person, place, and time. She appears well-developed and well-nourished. No distress.   Curled up on exam bed. Calm, cooperative.   HENT:   Head: Normocephalic and atraumatic.   Eyes: Conjunctivae are normal.   Cardiovascular: Normal rate, regular rhythm and normal heart sounds.    Pulmonary/Chest: Effort normal and breath sounds normal. No respiratory distress.   Abdominal: Soft. Bowel sounds are normal. There is tenderness (throughout upper abdomen). There is no rebound and no guarding.   Multiple surgical scars to abdomen noted   Neurological: She is alert and oriented to person, place, and time.   Skin: Skin is warm and dry. She is not diaphoretic.   Psychiatric: She has a normal mood and affect.   Nursing note and vitals reviewed.      ED Course     ED Course     Procedures        Results for orders placed or performed during the hospital encounter of 01/14/18 (from the past 24 hour(s))   CBC with platelets differential   Result Value Ref Range    WBC 9.1 4.0 - 11.0 10e9/L    RBC Count 4.19 3.8 - 5.2 10e12/L    Hemoglobin 10.4 (L) 11.7 - 15.7 g/dL    Hematocrit 32.7 (L) 35.0 - 47.0 %    MCV 78 78 - 100 fl    MCH 24.8 (L) " 26.5 - 33.0 pg    MCHC 31.8 31.5 - 36.5 g/dL    RDW 18.7 (H) 10.0 - 15.0 %    Platelet Count 206 150 - 450 10e9/L    Diff Method Automated Method     % Neutrophils 69.1 %    % Lymphocytes 20.4 %    % Monocytes 5.1 %    % Eosinophils 4.3 %    % Basophils 0.4 %    % Immature Granulocytes 0.7 %    Absolute Neutrophil 6.3 1.6 - 8.3 10e9/L    Absolute Lymphocytes 1.9 0.8 - 5.3 10e9/L    Absolute Monocytes 0.5 0.0 - 1.3 10e9/L    Absolute Eosinophils 0.4 0.0 - 0.7 10e9/L    Absolute Basophils 0.0 0.0 - 0.2 10e9/L    Abs Immature Granulocytes 0.1 0 - 0.4 10e9/L   Comprehensive metabolic panel   Result Value Ref Range    Sodium 136 133 - 144 mmol/L    Potassium 3.7 3.4 - 5.3 mmol/L    Chloride 105 94 - 109 mmol/L    Carbon Dioxide 23 20 - 32 mmol/L    Anion Gap 8 3 - 14 mmol/L    Glucose 119 (H) 70 - 99 mg/dL    Urea Nitrogen 7 7 - 30 mg/dL    Creatinine 1.05 (H) 0.52 - 1.04 mg/dL    GFR Estimate 59 (L) >60 mL/min/1.7m2    GFR Estimate If Black 71 >60 mL/min/1.7m2    Calcium 7.9 (L) 8.5 - 10.1 mg/dL    Bilirubin Total 0.3 0.2 - 1.3 mg/dL    Albumin 3.0 (L) 3.4 - 5.0 g/dL    Protein Total 6.9 6.8 - 8.8 g/dL    Alkaline Phosphatase 125 40 - 150 U/L    ALT 37 0 - 50 U/L    AST 18 0 - 45 U/L   CRP inflammation   Result Value Ref Range    CRP Inflammation 22.0 (H) 0.0 - 8.0 mg/L   Lactic acid whole blood   Result Value Ref Range    Lactic Acid 1.3 0.7 - 2.0 mmol/L   Lipase   Result Value Ref Range    Lipase 61 (L) 73 - 393 U/L   UA with Microscopic   Result Value Ref Range    Color Urine Colorless     Appearance Urine Clear     Glucose Urine Negative NEG^Negative mg/dL    Bilirubin Urine Negative NEG^Negative    Ketones Urine Negative NEG^Negative mg/dL    Specific Gravity Urine 1.002 (L) 1.003 - 1.035    Blood Urine Negative NEG^Negative    pH Urine 7.0 5.0 - 7.0 pH    Protein Albumin Urine Negative NEG^Negative mg/dL    Urobilinogen mg/dL 0.0 0.0 - 2.0 mg/dL    Nitrite Urine Negative NEG^Negative    Leukocyte Esterase Urine  Negative NEG^Negative    Source Midstream Urine     WBC Urine <1 0 - 2 /HPF    RBC Urine <1 0 - 2 /HPF    Squamous Epithelial /HPF Urine 4 (H) 0 - 1 /HPF    Mucous Urine Present (A) NEG^Negative /LPF     *Note: Due to a large number of results and/or encounters for the requested time period, some results have not been displayed. A complete set of results can be found in Results Review.       Medications - No data to display     Assessments & Plan (with Medical Decision Making)  Doreen Peralta is a 37 year old female with a complex history who presented to the ED complaining of fever, general malaise, upper abdominal pain, and increased nausea with vomiting from baseline. Started today. On arrival to the ED she was afebrile with normal heart rate in the 90s. BP elevated at 156/104. Last tylenol was 3 hours prior to arrival. Patient had diffuse tenderness throughout upper abdomen but no rebound, guarding. Bowel sound present and abdomen was non-distended. She specifically requested no IV. She was offered anti-emetics and pain medications but again declined, stating she just wanted labs checked for now. CBC, CMP, lipase, CRP, and urine drawn, along with one blood culture and lactate given her history of recurrent sepsis. Lactate was normal at 1.3. White count normal at 9.1, as was the differential. Hemoglobin was low at 10.4, but she denies any bleeding. Slightly microcytic, which has been an ongoing issue for her per Care Everywhere lab results. PCP is following this. CRP mildly elevated at 22. LFTs and lipase normal. Creatinine unchanged from 11/20/17. Urinalysis unremarkable. I discussed these results with the patient and she was very reassured and requested to go home. I explained that at this time cause of increased symptoms unclear, but labs are reassuring. She declined imaging for further evaluation. She was advised to continue use of home medications for symptoms, and to call her PCP in the morning to schedule  follow up in the next couple days. If she develops worsening symptoms can return to the ED. Patient expressed understanding and discharged to home in suitable condition.     I have reviewed the nursing notes.    I have reviewed the findings, diagnosis, plan and need for follow up with the patient.    Discharge Medication List as of 1/14/2018 11:17 PM          Final diagnoses:   Fever in adult   Upper abdominal pain of unknown etiology     Note: Chart documentation done in part with Dragon Voice Recognition software. Although reviewed after completion, some word and grammatical errors may remain.     1/14/2018   [facility] EMERGENCY DEPARTMENT     Vianey Warren PA-C  01/15/18 0018

## 2018-01-15 NOTE — DISCHARGE INSTRUCTIONS
At this time, the cause of your fever and increased abdominal pain is unclear, but your lab tests were reassuring. I urge you to continue monitoring symptoms. Use Tylenol 1000 mg every 6 hours as needed for fever or pain. Please call and schedule follow-up visit with your primary care provider in the next few days for reassessment. As discussed, if symptoms worsen please return to the emergency department.    Thank you for choosing [facility]'s Emergency Department. It was a pleasure taking care of you today. If you have any questions, please call [phone].    Vianey Warren PA-C

## 2018-01-20 LAB
BACTERIA SPEC CULT: NORMAL
SPECIMEN SOURCE: NORMAL

## 2018-02-01 LAB
ALT SERPL-CCNC: 23 IU/L (ref 8–45)
AST SERPL-CCNC: 21 IU/L (ref 2–40)
CREAT SERPL-MCNC: 1.12 MG/DL (ref 0.57–1.11)
GFR SERPL CREATININE-BSD FRML MDRD: 55 ML/MIN/1.73M2
GLUCOSE SERPL-MCNC: 73 MG/DL (ref 65–100)
POTASSIUM SERPL-SCNC: 4.2 MMOL/L (ref 3.5–5)

## 2018-02-17 ENCOUNTER — HOSPITAL ENCOUNTER (EMERGENCY)
Facility: CLINIC | Age: 38
Discharge: HOME | End: 2018-02-17
Attending: EMERGENCY MEDICINE | Admitting: EMERGENCY MEDICINE
Payer: COMMERCIAL

## 2018-02-17 VITALS
WEIGHT: 180 LBS | HEART RATE: 90 BPM | TEMPERATURE: 98 F | RESPIRATION RATE: 18 BRPM | OXYGEN SATURATION: 100 % | HEIGHT: 66 IN | BODY MASS INDEX: 28.93 KG/M2 | SYSTOLIC BLOOD PRESSURE: 139 MMHG | DIASTOLIC BLOOD PRESSURE: 98 MMHG

## 2018-02-17 DIAGNOSIS — A08.11 GASTROENTERITIS DUE TO NOROVIRUS: ICD-10-CM

## 2018-02-17 LAB
ALBUMIN SERPL-MCNC: 3.5 G/DL (ref 3.4–5)
ALP SERPL-CCNC: 127 U/L (ref 40–150)
ALT SERPL W P-5'-P-CCNC: 27 U/L (ref 0–50)
ANION GAP SERPL CALCULATED.3IONS-SCNC: 9 MMOL/L (ref 3–14)
AST SERPL W P-5'-P-CCNC: 23 U/L (ref 0–45)
BASOPHILS # BLD AUTO: 0 10E9/L (ref 0–0.2)
BASOPHILS NFR BLD AUTO: 0.2 %
BILIRUB SERPL-MCNC: 0.3 MG/DL (ref 0.2–1.3)
BUN SERPL-MCNC: 6 MG/DL (ref 7–30)
CALCIUM SERPL-MCNC: 8.6 MG/DL (ref 8.5–10.1)
CHLORIDE SERPL-SCNC: 105 MMOL/L (ref 94–109)
CO2 SERPL-SCNC: 22 MMOL/L (ref 20–32)
CREAT SERPL-MCNC: 0.87 MG/DL (ref 0.52–1.04)
CRP SERPL-MCNC: 52.8 MG/L (ref 0–8)
DIFFERENTIAL METHOD BLD: ABNORMAL
EOSINOPHIL # BLD AUTO: 0.1 10E9/L (ref 0–0.7)
EOSINOPHIL NFR BLD AUTO: 0.7 %
ERYTHROCYTE [DISTWIDTH] IN BLOOD BY AUTOMATED COUNT: 17.6 % (ref 10–15)
GFR SERPL CREATININE-BSD FRML MDRD: 73 ML/MIN/1.7M2
GLUCOSE SERPL-MCNC: 103 MG/DL (ref 70–99)
HCT VFR BLD AUTO: 35.8 % (ref 35–47)
HGB BLD-MCNC: 10.7 G/DL (ref 11.7–15.7)
IMM GRANULOCYTES # BLD: 0 10E9/L (ref 0–0.4)
IMM GRANULOCYTES NFR BLD: 0.2 %
LYMPHOCYTES # BLD AUTO: 0.6 10E9/L (ref 0.8–5.3)
LYMPHOCYTES NFR BLD AUTO: 7 %
MCH RBC QN AUTO: 24.7 PG (ref 26.5–33)
MCHC RBC AUTO-ENTMCNC: 29.9 G/DL (ref 31.5–36.5)
MCV RBC AUTO: 83 FL (ref 78–100)
MONOCYTES # BLD AUTO: 0.3 10E9/L (ref 0–1.3)
MONOCYTES NFR BLD AUTO: 3.1 %
NEUTROPHILS # BLD AUTO: 8.1 10E9/L (ref 1.6–8.3)
NEUTROPHILS NFR BLD AUTO: 88.8 %
PLATELET # BLD AUTO: 288 10E9/L (ref 150–450)
POTASSIUM SERPL-SCNC: 3.7 MMOL/L (ref 3.4–5.3)
PROT SERPL-MCNC: 7.8 G/DL (ref 6.8–8.8)
RBC # BLD AUTO: 4.34 10E12/L (ref 3.8–5.2)
SODIUM SERPL-SCNC: 136 MMOL/L (ref 133–144)
WBC # BLD AUTO: 9.2 10E9/L (ref 4–11)

## 2018-02-17 PROCEDURE — 99284 EMERGENCY DEPT VISIT MOD MDM: CPT | Mod: Z6 | Performed by: EMERGENCY MEDICINE

## 2018-02-17 PROCEDURE — 99283 EMERGENCY DEPT VISIT LOW MDM: CPT | Performed by: EMERGENCY MEDICINE

## 2018-02-17 PROCEDURE — 85025 COMPLETE CBC W/AUTO DIFF WBC: CPT | Performed by: EMERGENCY MEDICINE

## 2018-02-17 PROCEDURE — 36415 COLL VENOUS BLD VENIPUNCTURE: CPT | Performed by: EMERGENCY MEDICINE

## 2018-02-17 PROCEDURE — 80053 COMPREHEN METABOLIC PANEL: CPT | Performed by: EMERGENCY MEDICINE

## 2018-02-17 PROCEDURE — 25000132 ZZH RX MED GY IP 250 OP 250 PS 637: Performed by: EMERGENCY MEDICINE

## 2018-02-17 PROCEDURE — 86140 C-REACTIVE PROTEIN: CPT | Performed by: EMERGENCY MEDICINE

## 2018-02-17 RX ORDER — HYDROXYZINE HYDROCHLORIDE 25 MG/1
25 TABLET, FILM COATED ORAL ONCE
Status: COMPLETED | OUTPATIENT
Start: 2018-02-17 | End: 2018-02-17

## 2018-02-17 RX ADMIN — HYDROXYZINE HYDROCHLORIDE 25 MG: 25 TABLET ORAL at 04:41

## 2018-02-17 ASSESSMENT — ENCOUNTER SYMPTOMS
DIARRHEA: 1
NAUSEA: 1
CHILLS: 1
ABDOMINAL DISTENTION: 1
ABDOMINAL PAIN: 1
FEVER: 1
VOMITING: 1
APPETITE CHANGE: 1

## 2018-02-17 NOTE — ED PROVIDER NOTES
History     Chief Complaint   Patient presents with     Fever     Diarrhea     Abdominal Pain     The history is provided by the patient and medical records.     Doeren Peralta is a 37 year old female who presents to the ED for evaluation of fever, diarrhea, and vomiting that started yesterday.  She also noticed a bilateral medial thigh rash started around the same time.  Her daughter had similar symptoms last week and was diagnosed with a viral gastroenteritis.  Her symptoms have subsequently cleared.  The patient reports that her fever has been as high as 103 F, but currently she is afebrile in the ED.    Problem List:    Patient Active Problem List    Diagnosis Date Noted     Sepsis (H) - possible 08/24/2017     Priority: Medium     Hypokalemia 08/24/2017     Priority: Medium     Essential hypertension 08/24/2017     Priority: Medium     Sepsis due to Klebsiella (H) 07/27/2017     Priority: Medium     Insomnia, unspecified type 03/31/2017     Priority: Medium     Abdominal pain 02/15/2017     Priority: Medium     Abdominal pain, generalized 01/28/2017     Priority: Medium     SIRS (systemic inflammatory response syndrome) (H) 01/26/2017     Priority: Medium     History of deep venous thrombosis 01/26/2017     Priority: Medium     Nausea and vomiting 01/26/2017     Priority: Medium     Vitamin D deficiency 01/16/2017     Priority: Medium     Chronic abdominal pain 11/15/2016     Priority: Medium     Patient is followed by Larisa Lorenzo PA-C for ongoing prescription of pain medication.  All refills should be approved by this provider, or covering partner.    Medication(s): no regular narcotics - narcotics given at ED visits  Maximum quantity per month: N/A  Clinic visit frequency required: Q 6  months     Controlled substance agreement:  Encounter-Level CSA - 11/9/15:               Controlled Substance Agreement - Scan on 11/19/2015 11:17 AM : CONTROLLED SUBSTANCE AGREEMENT 11/09/15 (below)           Encounter-Level CSA - 2/27/15:               Controlled Substance Agreement - Scan on 3/12/2015  7:50 AM : Controlled Medication Agreement 02/27/15 (below)            Pain Clinic evaluation in the past: Yes    DIRE Total Score(s):  No flowsheet data found.    Last [facility] website verification:  done on 11/15/16   [email or web address]        Attention to G-tube (H) 11/08/2016     Priority: Medium     Fever 10/16/2016     Priority: Medium     Coagulation defect (H) [D68.9] 09/16/2016     Priority: Medium     Chronic diarrhea 07/22/2016     Priority: Medium     Migraine 07/20/2016     Priority: Medium     Positive blood culture - Klebsiella oxytoca from Port-a-cath culture 07/18/2016     Priority: Medium     Mitral regurgitation mild-mod by Echo June 2016 07/18/2016     Priority: Medium     Anemia, iron deficiency 07/17/2016     Priority: Medium     Anemia in other chronic diseases classified elsewhere 06/14/2016     Priority: Medium     Munchausen syndrome - previously suspected 06/14/2016     Priority: Medium     Long-term (current) use of anticoagulants [Z79.01] 05/16/2016     Priority: Medium     Anxiety 05/16/2016     Priority: Medium     S/P partial resection of colon 04/11/2016     Priority: Medium     Malnutrition (H) 04/11/2016     Priority: Medium     Jejunostomy tube present (H) 03/21/2016     Priority: Medium     Malfunctioning jejunostomy tube (H) 12/22/2015     Priority: Medium     Malfunction of gastrostomy tube (H) 11/19/2015     Priority: Medium     PEG tube malfunction (H) 10/16/2015     Priority: Medium     Health Care Home 01/16/2015     Priority: Medium     *See Letters for [facility] Care Plan: My Access Plan           PEG (percutaneous endoscopic gastrostomy) status 11/05/2014     Priority: Medium     Gastroparesis 04/11/2014     Priority: Medium     Overview:   Had ileostomy performed at [facility] in Jan 2012 as treatment       Migraines 04/11/2014     Priority: Medium     4/11/2014  With  periods, every other month.       Intermittent asthma 04/11/2014     Priority: Medium     4/11/2014   With URIs, allergies       Allergic rhinitis 04/11/2014     Priority: Medium     Problem list name updated by automated process. Provider to review       Abnormal Pap smear of cervix 04/11/2014     Priority: Medium     4/11/2014  S/p colp and LEEP. Sees OB/GYN at [facility]. Pap every year x20 years - normal since.       S/P LEEP of cervix 02/06/2014     Priority: Medium     Patellofemoral stress syndrome 01/18/2014     Priority: Medium     Hx SBO 10/09/2013     Priority: Medium     4/11/2014  Recurrent. Sees GI (Dr. Redding - at [facility]) every 6 months or so.  Sees feeding tube nurse next week.       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 04/11/2013     Priority: Medium     Constipation by delayed colonic transit 10/24/2012     Priority: Medium     Atopic rhinitis 03/20/2009     Priority: Medium     Hyperbilirubinemia 03/11/2009     Priority: Medium        Past Medical History:    Past Medical History:   Diagnosis Date     Asthma      Bilateral ovarian cysts      Cervical cancer (H) 01/01/2008     Chronic pain      Colonic dysmotility      Constipation      E. coli sepsis (H) 5/8/2016     Enteritis      Fungemia 5/5/2016     Gastro-oesophageal reflux disease      H/O ileostomy      Hx of abnormal Pap smear      Hypertension      IBS (irritable bowel syndrome)      Other chronic pain      PONV (postoperative nausea and vomiting)      Thrombosis, hepatic vein (H)        Past [facility] History:    Past [facility] History:   Procedure Laterality Date     COLONOSCOPY  7/10/2012    Procedure: COLONOSCOPY;;  Surgeon: Nicole Redding MD;  [facility]     COLONOSCOPY N/A 2/19/2017    Procedure: COLONOSCOPY;  Surgeon: Randell Muller MD;  [facility] GI     COLONOSCOPY N/A 2/21/2017    Procedure: COLONOSCOPY;  Surgeon: Randell Muller MD;  [facility]     ECHO CHELO  7/19/2016          ENDOSCOPIC INSERTION  TUBE GASTROSTOMY N/A 1/21/2016    Procedure: ENDOSCOPIC INSERTION TUBE GASTROSTOMY;  Surgeon: Nicole Redding MD;  [facility]     ESOPHAGOSCOPY, GASTROSCOPY, DUODENOSCOPY (EGD), COMBINED  7/10/2012    Procedure: COMBINED ESOPHAGOSCOPY, GASTROSCOPY, DUODENOSCOPY (EGD);  Upper Endoscopy, Ileoscopy    Latex Allergy  with biopsies;  Surgeon: Nicole Redding MD;  [facility]     ESOPHAGOSCOPY, GASTROSCOPY, DUODENOSCOPY (EGD), COMBINED N/A 11/5/2014    Procedure: COMBINED ESOPHAGOSCOPY, GASTROSCOPY, DUODENOSCOPY (EGD);  Surgeon: Nicole Redding MD;  [facility]     HC REPLACE DUODENOSTOMY/JEJUNOSTOMY TUBE PERCUTANEOUS N/A 8/27/2015    Procedure: REPLACE GASTROJEJUNOSTOMY TUBE, PERCUTANEOUS;  Surgeon: Mio Holder MD;  [facility]     HC REPLACE DUODENOSTOMY/JEJUNOSTOMY TUBE PERCUTANEOUS N/A 1/7/2016    Procedure: REPLACE JEJUNOSTOMY TUBE, PERCUTANEOUS;  Surgeon: Elsa Medel MD;  [facility]     HC REPLACE DUODENOSTOMY/JEJUNOSTOMY TUBE PERCUTANEOUS N/A 1/28/2016    Procedure: REPLACE JEJUNOSTOMY TUBE, PERCUTANEOUS;  Surgeon: Elsa Medel MD;  [facility]     HC REPLACE GASTROSTOMY/CECOSTOMY TUBE PERCUTANEOUS Left 5/19/2015    Procedure: REPLACE GASTROSTOMY TUBE, PERCUTANEOUS;  Surgeon: Melecio Morejon Chi, MD;  [facility]     HC UGI ENDOSCOPY W PLACEMENT GASTROSTOMY TUBE PERCUT N/A 10/1/2015    Procedure: COMBINED ESOPHAGOSCOPY, GASTROSCOPY, DUODENOSCOPY (EGD), PLACE PERCUTANEOUS ENDOSCOPIC GASTROSTOMY TUBE;  Surgeon: Mio Holder MD;  [facility]     INSERT PORT VASCULAR ACCESS Right 7/20/2017    Procedure: INSERT PORT VASCULAR ACCESS;  Chest Port Placement  **Latex Allergy**;  Surgeon: Coy Rocha PA-C;  [facility]     LAPAROSCOPIC ASSISTED COLECTOMY  1/20/2012    Procedure:LAPAROSCOPIC ASSISTED COLECTOMY; Laparoscopic Ileostomy       LAPAROSCOPIC ASSISTED COLECTOMY LEFT (DESCENDING)  10/24/2012    Procedure: LAPAROSCOPIC ASSISTED  COLECTOMY LEFT (DESCENDING);   Hand Assisted Laproscopic Subtotal abdominal Colectomy,Iieorectal anastamosis, Ileostomy Closure.       LAPAROSCOPIC ASSISTED INSERTION TUBE JEJUNOSTOMY N/A 10/16/2015    Procedure: LAPAROSCOPIC ASSISTED INSERTION TUBE JEJUNOSTOMY;  Surgeon: Elsa Medel MD;  [facility]     LAPAROSCOPIC CHOLECYSTECTOMY  2002    [facility] ctr. stones duct     LAPAROSCOPIC ILEOSTOMY  1/20/2012    [facility], loop     LAPAROSCOPIC OOPHORECTOMY Right 2009    [facility]     LAPAROTOMY EXPLORATORY N/A 1/28/2016    Procedure: LAPAROTOMY EXPLORATORY;  Surgeon: Elsa Medel MD;  [facility]     LEEP TX, CERVICAL  2009    [facility]     PICC INSERTION Left 10/21/2015    5fr DL Power PICC, 37cm (2cm external) in the L basilic vein w/ tip in the SVC RA junction.     REMOVE GASTROSTOMY TUBE ADULT N/A 12/12/2014    Procedure: REMOVE GASTROSTOMY TUBE ADULT;  Surgeon: Nicole Redding MD;  [facility]     REMOVE PORT VASCULAR ACCESS Right 6/30/2016    Procedure: REMOVE PORT VASCULAR ACCESS;  Surgeon: Pradeep Orosco MD;  Location: [facility]     REMOVE PORT VASCULAR ACCESS Right 9/8/2017    Procedure: REMOVE PORT VASCULAR ACCESS;  right side mediport removal;  Surgeon: Zechariah Worthington MD;  Location: [facility] OR     replace GASTROSTOMY TUBE ADULT  5/19/15       Family History:    Family History   Problem Relation Age of Onset     Thyroid Disease Mother      Sjogren's Mother      GASTROINTESTINAL DISEASE Mother      Intermittent nausea vomiting diarrhea     Colon Polyps Mother      Prostate Problems Father      prostate enlargement     Lupus Maternal Grandmother      CANCER Maternal Grandfather      Lung     Colon Cancer Maternal Grandfather 65     CANCER Paternal Grandmother      Lung      CEREBROVASCULAR DISEASE Paternal Grandmother      DIABETES Paternal Grandmother      Cardiovascular Paternal Grandmother      CHF     CANCER Paternal Grandfather      Lung     Glaucoma Paternal  "Grandfather      Abdominal Aortic Aneurysm Other      Macular Degeneration No family hx of        Social History:  Marital Status:   [2]  Social History   Substance Use Topics     Smoking status: Current Some Day Smoker     Packs/day: 1.00     Years: 4.00     Types: Cigarettes     Last attempt to quit: 1/1/2004     Smokeless tobacco: Former User     Alcohol use No        Medications:      traZODone (DESYREL) 50 MG tablet   cloNIDine (CATAPRES) 0.2 MG tablet   DULoxetine (CYMBALTA) 60 MG EC capsule   ondansetron (ZOFRAN ODT) 4 MG ODT tab   albuterol (2.5 MG/3ML) 0.083% neb solution   ipratropium (ATROVENT) 0.02 % neb solution   order for DME   order for DME   diphenhydrAMINE (BENADRYL ALLERGY) 25 MG tablet   nortriptyline (PAMELOR) 10 MG capsule   mirtazapine (REMERON SOL-TAB) 15 MG ODT tab   SUMAtriptan (IMITREX) 50 MG tablet   ACETAMINOPHEN PO   albuterol 90 MCG/ACT inhaler         Review of Systems   Constitutional: Positive for appetite change, chills and fever.   Gastrointestinal: Positive for abdominal distention, abdominal pain, diarrhea, nausea and vomiting.   Skin: Positive for rash (Purpuric rash on the bilateral medial thighs).   All other systems reviewed and are negative.      Physical Exam   BP: (!) 139/98  Pulse: 90  Heart Rate: 90  Temp: 98  F (36.7  C)  Resp: 18  Height: 167.6 cm (5' 6\")  Weight: 81.6 kg (180 lb)  SpO2: 100 %      Physical Exam   Constitutional: She is oriented to person, place, and time. She appears well-developed.   HENT:   Head: Normocephalic and atraumatic.   Mouth/Throat: Oropharynx is clear and moist.   Eyes: EOM are normal. Pupils are equal, round, and reactive to light.   Neck: Normal range of motion. Neck supple.   Cardiovascular: Normal rate, normal heart sounds and intact distal pulses.    Pulmonary/Chest: Effort normal and breath sounds normal.   Abdominal: Soft. She exhibits distension. Bowel sounds are increased. There is tenderness (Mild generalized). There is " no rebound and no guarding.   Tympany to percussion over the entire colon.   Musculoskeletal: Normal range of motion.   Neurological: She is alert and oriented to person, place, and time.   Skin: Skin is warm. Rash (Palpable purpuric plaques on the medial thighs that are only mildly tender to palpation.  These do not appear to ayse.) noted.   Psychiatric: She has a normal mood and affect.   Nursing note and vitals reviewed.      ED Course     ED Course     Procedures          Results for orders placed or performed during the hospital encounter of 02/17/18 (from the past 48 hour(s))   CBC with platelets differential   Result Value Ref Range    WBC 9.2 4.0 - 11.0 10e9/L    RBC Count 4.34 3.8 - 5.2 10e12/L    Hemoglobin 10.7 (L) 11.7 - 15.7 g/dL    Hematocrit 35.8 35.0 - 47.0 %    MCV 83 78 - 100 fl    MCH 24.7 (L) 26.5 - 33.0 pg    MCHC 29.9 (L) 31.5 - 36.5 g/dL    RDW 17.6 (H) 10.0 - 15.0 %    Platelet Count 288 150 - 450 10e9/L    Diff Method Automated Method     % Neutrophils 88.8 %    % Lymphocytes 7.0 %    % Monocytes 3.1 %    % Eosinophils 0.7 %    % Basophils 0.2 %    % Immature Granulocytes 0.2 %    Absolute Neutrophil 8.1 1.6 - 8.3 10e9/L    Absolute Lymphocytes 0.6 (L) 0.8 - 5.3 10e9/L    Absolute Monocytes 0.3 0.0 - 1.3 10e9/L    Absolute Eosinophils 0.1 0.0 - 0.7 10e9/L    Absolute Basophils 0.0 0.0 - 0.2 10e9/L    Abs Immature Granulocytes 0.0 0 - 0.4 10e9/L   Comprehensive metabolic panel   Result Value Ref Range    Sodium 136 133 - 144 mmol/L    Potassium 3.7 3.4 - 5.3 mmol/L    Chloride 105 94 - 109 mmol/L    Carbon Dioxide 22 20 - 32 mmol/L    Anion Gap 9 3 - 14 mmol/L    Glucose 103 (H) 70 - 99 mg/dL    Urea Nitrogen 6 (L) 7 - 30 mg/dL    Creatinine 0.87 0.52 - 1.04 mg/dL    GFR Estimate 73 >60 mL/min/1.7m2    GFR Estimate If Black 89 >60 mL/min/1.7m2    Calcium 8.6 8.5 - 10.1 mg/dL    Bilirubin Total 0.3 0.2 - 1.3 mg/dL    Albumin 3.5 3.4 - 5.0 g/dL    Protein Total 7.8 6.8 - 8.8 g/dL    Alkaline  Phosphatase 127 40 - 150 U/L    ALT 27 0 - 50 U/L    AST 23 0 - 45 U/L   CRP inflammation   Result Value Ref Range    CRP Inflammation 52.8 (H) 0.0 - 8.0 mg/L     *Note: Due to a large number of results and/or encounters for the requested time period, some results have not been displayed. A complete set of results can be found in Results Review.              Assessments & Plan (with Medical Decision Making)  Doreen Peralta is a 37-year-old presenting to the ED for evaluation of fever, diarrhea, and vomiting that started yesterday.  She is also noticed a rash on her bilateral medial thighs that started around the same time.  This rash has been quite pruritic..  It appears her daughter had similar symptoms last week.  On exam, she has a normal cardiopulmonary exam.  Her abdomen is soft but distended with tympany to percussion throughout.  There is no guarding rebound tenderness.  She does have hyperactive bowel sounds.  The rash on the medial thighs appears to be purpuric but is not tender.  Labs were obtained showing a normal CBC and conference of metabolic panel.  Her C-reactive protein is moderately elevated at 52.8.  Given the presenting symptoms and the recent exposure from her daughter having stomach flu last week, this most likely is Norovirus gastroenteritis.  I discussed the management of this including pushing fluids to prevent dehydration, following the BRAT diet and getting rest.  I reviewed with her indications return for reevaluation.  All questions from the patient were answered and she was suitable for discharge in satisfactory condition.     I have reviewed the nursing notes.    I have reviewed the findings, diagnosis, plan and need for follow up with the patient.       New Prescriptions    No medications on file       Final diagnoses:   Gastroenteritis due to norovirus       2/17/2018   [facility] EMERGENCY DEPARTMENT     Gera Mcconnell MD  02/17/18 [id number]

## 2018-02-17 NOTE — ED AVS SNAPSHOT
[facility] Emergency Department    [address] [place] [address]    Phone:  [phone]    Fax:  [phone]                                       Doreen Peralta   MRN: [id number]    Department:  [facility] Emergency Department   Date of Visit:  2/17/2018           After Visit Summary Signature Page     I have received my discharge instructions, and my questions have been answered. I have discussed any challenges I see with this plan with the nurse or doctor.    ..........................................................................................................................................  Patient/Patient Representative Signature      ..........................................................................................................................................  Patient Representative Print Name and Relationship to Patient    ..................................................               ................................................  Date                                            Time    ..........................................................................................................................................  Reviewed by Signature/Title    ...................................................              ..............................................  Date                                                            Time

## 2018-02-17 NOTE — ED NOTES
Patient with fever, diarrhea, vomiting starting yesterday. Also noticed rash on bilateral thigh area.

## 2018-02-17 NOTE — ED AVS SNAPSHOT
[facility] Emergency Department    [address]     [place] [address]    Phone:  [phone]    Fax:  [phone]                                       Doreen Peralta   MRN: [id number]    Department:  [facility] Emergency Department   Date of Visit:  2/17/2018           Patient Information     Date Of Birth          1980        Your diagnoses for this visit were:     Gastroenteritis due to norovirus        You were seen by Gera Mcconnell MD.      Follow-up Information     Follow up with Franny Antoine MD.    Specialty:  Internal Medicine    Why:  As needed    Contact information:    [facility]  [address]  [place] [address]  [phone]          Discharge Instructions         Understanding Norovirus  Norovirus is a virus that can infect the stomach and intestines. It is the most common cause of diarrhea and vomiting. The virus spreads easily in areas of close human contact, such as schools and cruise ships.  What causes norovirus infection?  You can be infected with norovirus by coming into contact with a person who has the virus or by touching a contaminated surface. Norovirus is very contagious. Washing your hands well can lower your risk of getting the virus.  You can also get it by consuming food and water contaminated with the virus. Foods most likely to become tainted include:    Shellfish    Ready-to-eat salads and sandwiches    Produce such as celery, melons, and leafy vegetables  What are the symptoms of norovirus?  Some people may have no symptoms. In people who do, symptoms show up suddenly,  typically  within a day of being exposed. The illness lasts 1 to 3 days. Symptoms include:    Fever    Diarrhea    Nausea    Vomiting    Headache    Achiness    Stomach cramping  More severe cases are often seen in infants, older adults, and people with other health problems. Symptoms may last longer and be more severe in these groups.  How is   norovirus treated?  There is no medicine to cure norovirus. Treatment includes:    Rest. You may feel better faster if you get plenty of rest.    Fluids. Drinking lots of fluids will help you stay hydrated. Don t drink alcohol or beverages with caffeine. They can make your symptoms worse.    Medicine. Over-the-counter medicines for diarrhea may ease symptoms. These should be used only by adults. Pain relievers, such as acetaminophen or ibuprofen, can help with headaches and body aches.  What are the possible complications of norovirus?  Dehydrationis the main concern with norovirus infection. Severe dehydration may need to be treated in the hospital. You may need to get fluids through an IV (directly into a vein).  When should I call my  healthcare provider?  Call your healthcare provider right away if you have any of these:    Fever of 100.4 F (38 C) or higher, or as directed    Belly (abdominal) pain that gets worse    Severe dizziness, especially when getting up from bed    Vomiting so severe that you can t keep fluids down   Date Last Reviewed: 3/28/2016    2619-6542 The "[employer]. [address] [place] [address]. All rights reserved. This information is not intended as a substitute for professional medical care. Always follow your healthcare professional's instructions.          24 Hour Appointment Hotline       To make an appointment at any [facility], call [phone] ([phone]). If you don't have a family doctor or clinic, we will help you find one. [facility] clinics are conveniently located to serve the needs of you and your family.             Review of your medicines      Our records show that you are taking the medicines listed below. If these are incorrect, please call your family doctor or clinic.        Dose / Directions Last dose taken    ACETAMINOPHEN PO   Dose:  500-1000 mg        Take 500-1,000 mg by mouth every 6 hours as needed for pain   Refills:  0         albuterol (2.5 MG/3ML) 0.083% neb solution   Dose:  2.5 mg   Quantity:  360 mL        Take 1 vial (2.5 mg) by nebulization every 4 hours as needed for shortness of breath / dyspnea or wheezing   Refills:  0        albuterol 90 MCG/ACT inhaler   Dose:  2 puff        Inhale 2 puffs into the lungs every 6 hours as needed   Refills:  0        cloNIDine 0.2 MG tablet   Commonly known as:  CATAPRES   Dose:  0.4 mg   Quantity:  60 tablet        Take 2 tablets (0.4 mg) by mouth every evening - DUE FOR FOLLOW UP   Refills:  0        diphenhydrAMINE 25 MG tablet   Commonly known as:  BENADRYL ALLERGY   Dose:  25 mg   Quantity:  30 tablet        Take 1 tablet (25 mg) by mouth every 6 hours as needed for itching or other (Nausea)   Refills:  0        DULoxetine 60 MG EC capsule   Commonly known as:  CYMBALTA   Quantity:  90 capsule        TAKE 1 CAPSULE(60 MG) BY MOUTH DAILY   Refills:  1        ipratropium 0.02 % neb solution   Commonly known as:  ATROVENT   Dose:  0.5 mg   Quantity:  225 mL        Take 2.5 mLs (0.5 mg) by nebulization every 6 hours as needed for wheezing   Refills:  0        mirtazapine 15 MG ODT tab   Commonly known as:  REMERON SOL-TAB   Dose:  7.5 mg   Quantity:  45 tablet        0.5 tablets (7.5 mg) by Orally disintegrating tablet route At Bedtime   Refills:  0        nortriptyline 10 MG capsule   Commonly known as:  PAMELOR   Dose:  30-40 mg   Quantity:  120 capsule        Take 3-4 capsules (30-40 mg) by mouth At Bedtime   Refills:  5        ondansetron 4 MG ODT tab   Commonly known as:  ZOFRAN ODT   Dose:  4-8 mg   Quantity:  30 tablet        Take 1-2 tablets (4-8 mg) by mouth every 6 hours as needed for nausea   Refills:  0        * order for DME   Quantity:  1 Box        2 by 2 gauze pads, use for dressing changes as directed   Refills:  1        * order for DME   Quantity:  1 each        1 inch paper tape, Use for dressing changes as directed   Refills:  0        SUMAtriptan 50 MG tablet   Commonly  known as:  IMITREX   Dose:   mg   Quantity:  18 tablet        Take 1-2 tablets ( mg) by mouth at onset of headache for migraine - may repeat dose after 2h if headache recurs.  Max: 200mg/24 hours   Refills:  1        traZODone 50 MG tablet   Commonly known as:  DESYREL   Quantity:  60 tablet        TAKE 1 TO 2 TABLETS BY MOUTH AT BEDTIME AS NEEDED   Refills:  0        * Notice:  This list has 2 medication(s) that are the same as other medications prescribed for you. Read the directions carefully, and ask your doctor or other care provider to review them with you.            Procedures and tests performed during your visit     CBC with platelets differential    CRP inflammation    Comprehensive metabolic panel      Orders Needing Specimen Collection     None      Pending Results     No orders found from 2/15/2018 to 2/18/2018.            Pending Culture Results     No orders found from 2/15/2018 to 2/18/2018.            Pending Results Instructions     If you had any lab results that were not finalized at the time of your Discharge, you can call the ED Lab Result RN at [phone]. You will be contacted by this team for any positive Lab results or changes in treatment. The nurses are available 7 days a week from 10A to 6:30P.  You can leave a message 24 hours per day and they will return your call.        Thank you for choosing [facility]       Thank you for choosing [facility] for your care. Our goal is always to provide you with excellent care. Hearing back from our patients is one way we can continue to improve our services. Please take a few minutes to complete the written survey that you may receive in the mail after you visit with us. Thank you!        [employer] Information     [employer] gives you secure access to your electronic health record. If you see a primary care provider, you can also send messages to your care team and make appointments. If you have questions, please call your primary care clinic.   If you do not have a primary care provider, please call [phone] and they will assist you.        Care EveryWhere ID     This is your Care EveryWhere ID. This could be used by other organizations to access your [facility] medical records  [id number]        Equal Access to Services     TRAMAINE CLAYTON : Carrie Law, clark villeda, vesta kaalmaskyler long, elham gomez. So [facility] [phone].    ATENCIÓN: Si habla español, tiene a wade disposición servicios gratuitos de asistencia lingüística. Llame al [phone].    We comply with applicable federal civil rights laws and [place] laws. We do not discriminate on the basis of race, color, national origin, age, disability, sex, sexual orientation, or gender identity.            After Visit Summary       This is your record. Keep this with you and show to your community pharmacist(s) and doctor(s) at your next visit.

## 2018-02-24 ENCOUNTER — HOSPITAL ENCOUNTER (EMERGENCY)
Facility: CLINIC | Age: 38
Discharge: HOME | End: 2018-02-24
Attending: PHYSICIAN ASSISTANT | Admitting: PHYSICIAN ASSISTANT
Payer: COMMERCIAL

## 2018-02-24 ENCOUNTER — APPOINTMENT (OUTPATIENT)
Dept: CT IMAGING | Facility: CLINIC | Age: 38
End: 2018-02-24
Attending: PHYSICIAN ASSISTANT
Payer: COMMERCIAL

## 2018-02-24 VITALS
BODY MASS INDEX: 28.73 KG/M2 | WEIGHT: 178 LBS | TEMPERATURE: 97.7 F | OXYGEN SATURATION: 98 % | DIASTOLIC BLOOD PRESSURE: 76 MMHG | SYSTOLIC BLOOD PRESSURE: 125 MMHG | RESPIRATION RATE: 16 BRPM | HEART RATE: 78 BPM

## 2018-02-24 DIAGNOSIS — R10.9 LEFT SIDED ABDOMINAL PAIN: ICD-10-CM

## 2018-02-24 DIAGNOSIS — R11.10 VOMITING AND DIARRHEA: ICD-10-CM

## 2018-02-24 DIAGNOSIS — R19.7 VOMITING AND DIARRHEA: ICD-10-CM

## 2018-02-24 LAB
ALBUMIN SERPL-MCNC: 3.7 G/DL (ref 3.4–5)
ALP SERPL-CCNC: 131 U/L (ref 40–150)
ALT SERPL W P-5'-P-CCNC: 33 U/L (ref 0–50)
ANION GAP SERPL CALCULATED.3IONS-SCNC: 9 MMOL/L (ref 3–14)
AST SERPL W P-5'-P-CCNC: 23 U/L (ref 0–45)
BASOPHILS # BLD AUTO: 0.1 10E9/L (ref 0–0.2)
BASOPHILS NFR BLD AUTO: 0.3 %
BILIRUB SERPL-MCNC: 0.5 MG/DL (ref 0.2–1.3)
BUN SERPL-MCNC: 17 MG/DL (ref 7–30)
CALCIUM SERPL-MCNC: 9.2 MG/DL (ref 8.5–10.1)
CHLORIDE SERPL-SCNC: 102 MMOL/L (ref 94–109)
CO2 SERPL-SCNC: 24 MMOL/L (ref 20–32)
CREAT SERPL-MCNC: 1.02 MG/DL (ref 0.52–1.04)
CRP SERPL-MCNC: 8.7 MG/L (ref 0–8)
DIFFERENTIAL METHOD BLD: ABNORMAL
EOSINOPHIL # BLD AUTO: 0.1 10E9/L (ref 0–0.7)
EOSINOPHIL NFR BLD AUTO: 0.5 %
ERYTHROCYTE [DISTWIDTH] IN BLOOD BY AUTOMATED COUNT: 17.3 % (ref 10–15)
GFR SERPL CREATININE-BSD FRML MDRD: 61 ML/MIN/1.7M2
GLUCOSE SERPL-MCNC: 80 MG/DL (ref 70–99)
HCT VFR BLD AUTO: 39.8 % (ref 35–47)
HGB BLD-MCNC: 12 G/DL (ref 11.7–15.7)
IMM GRANULOCYTES # BLD: 0.1 10E9/L (ref 0–0.4)
IMM GRANULOCYTES NFR BLD: 0.5 %
LYMPHOCYTES # BLD AUTO: 3.1 10E9/L (ref 0.8–5.3)
LYMPHOCYTES NFR BLD AUTO: 21.4 %
MCH RBC QN AUTO: 24 PG (ref 26.5–33)
MCHC RBC AUTO-ENTMCNC: 30.2 G/DL (ref 31.5–36.5)
MCV RBC AUTO: 79 FL (ref 78–100)
MONOCYTES # BLD AUTO: 0.9 10E9/L (ref 0–1.3)
MONOCYTES NFR BLD AUTO: 6.4 %
NEUTROPHILS # BLD AUTO: 10.1 10E9/L (ref 1.6–8.3)
NEUTROPHILS NFR BLD AUTO: 70.9 %
PLATELET # BLD AUTO: 534 10E9/L (ref 150–450)
POTASSIUM SERPL-SCNC: 4.1 MMOL/L (ref 3.4–5.3)
PROT SERPL-MCNC: 8.8 G/DL (ref 6.8–8.8)
RBC # BLD AUTO: 5.01 10E12/L (ref 3.8–5.2)
SODIUM SERPL-SCNC: 135 MMOL/L (ref 133–144)
WBC # BLD AUTO: 14.3 10E9/L (ref 4–11)

## 2018-02-24 PROCEDURE — 74176 CT ABD & PELVIS W/O CONTRAST: CPT

## 2018-02-24 PROCEDURE — 86140 C-REACTIVE PROTEIN: CPT | Performed by: PHYSICIAN ASSISTANT

## 2018-02-24 PROCEDURE — 85025 COMPLETE CBC W/AUTO DIFF WBC: CPT | Performed by: PHYSICIAN ASSISTANT

## 2018-02-24 PROCEDURE — 96376 TX/PRO/DX INJ SAME DRUG ADON: CPT | Performed by: PHYSICIAN ASSISTANT

## 2018-02-24 PROCEDURE — 96374 THER/PROPH/DIAG INJ IV PUSH: CPT | Performed by: PHYSICIAN ASSISTANT

## 2018-02-24 PROCEDURE — 96375 TX/PRO/DX INJ NEW DRUG ADDON: CPT | Performed by: PHYSICIAN ASSISTANT

## 2018-02-24 PROCEDURE — 99285 EMERGENCY DEPT VISIT HI MDM: CPT | Mod: 25 | Performed by: PHYSICIAN ASSISTANT

## 2018-02-24 PROCEDURE — 80053 COMPREHEN METABOLIC PANEL: CPT | Performed by: PHYSICIAN ASSISTANT

## 2018-02-24 PROCEDURE — 99285 EMERGENCY DEPT VISIT HI MDM: CPT | Mod: Z6 | Performed by: PHYSICIAN ASSISTANT

## 2018-02-24 PROCEDURE — 96361 HYDRATE IV INFUSION ADD-ON: CPT | Performed by: PHYSICIAN ASSISTANT

## 2018-02-24 PROCEDURE — 25000128 H RX IP 250 OP 636: Performed by: PHYSICIAN ASSISTANT

## 2018-02-24 RX ORDER — HYDROMORPHONE HYDROCHLORIDE 1 MG/ML
0.5 INJECTION, SOLUTION INTRAMUSCULAR; INTRAVENOUS; SUBCUTANEOUS ONCE
Status: COMPLETED | OUTPATIENT
Start: 2018-02-24 | End: 2018-02-24

## 2018-02-24 RX ORDER — DIPHENHYDRAMINE HYDROCHLORIDE 50 MG/ML
50 INJECTION INTRAMUSCULAR; INTRAVENOUS ONCE
Status: COMPLETED | OUTPATIENT
Start: 2018-02-24 | End: 2018-02-24

## 2018-02-24 RX ORDER — DIPHENHYDRAMINE HYDROCHLORIDE 50 MG/ML
12.5 INJECTION INTRAMUSCULAR; INTRAVENOUS ONCE
Status: COMPLETED | OUTPATIENT
Start: 2018-02-24 | End: 2018-02-24

## 2018-02-24 RX ADMIN — SODIUM CHLORIDE 1000 ML: 9 INJECTION, SOLUTION INTRAVENOUS at 14:48

## 2018-02-24 RX ADMIN — HYDROMORPHONE HYDROCHLORIDE 0.5 MG: 1 INJECTION, SOLUTION INTRAMUSCULAR; INTRAVENOUS; SUBCUTANEOUS at 15:30

## 2018-02-24 RX ADMIN — DIPHENHYDRAMINE HYDROCHLORIDE 50 MG: 50 INJECTION, SOLUTION INTRAMUSCULAR; INTRAVENOUS at 14:48

## 2018-02-24 RX ADMIN — DIPHENHYDRAMINE HYDROCHLORIDE 12.5 MG: 50 INJECTION, SOLUTION INTRAMUSCULAR; INTRAVENOUS at 16:44

## 2018-02-24 ASSESSMENT — ENCOUNTER SYMPTOMS
COUGH: 0
DIZZINESS: 1
LIGHT-HEADEDNESS: 1
VOMITING: 1
DYSURIA: 0
DIARRHEA: 1
BLOOD IN STOOL: 0
ABDOMINAL PAIN: 1
APPETITE CHANGE: 1
FEVER: 1
NAUSEA: 1
SHORTNESS OF BREATH: 0

## 2018-02-24 NOTE — ED PROVIDER NOTES
"  History     Chief Complaint   Patient presents with     Dizziness     HPI  Doreen Peralta is a 37 year old female with a complex medical history as detailed below who presents to the emergency department complaining of feeling dizzy.  2 weeks ago she was clinically diagnosed with norovirus due to vomiting and diarrhea.  She states symptoms have persisted since then.  She has \"been eating Zofran like candy\" without relief.  She had 4 episodes of nonbloody watery stools this morning, and several episodes of vomiting this morning.  She complains of pain through her left abdomen.  She has had low-grade temps up to 100.4 F.  She \"tried to hold out as long as possible\" but started feeling lightheaded when she stood up which led her to come here.  She denies blood in her vomit.  Denies cough, congestion, shortness of breath.  Denies urinary symptoms, but urine output has decreased to only a couple times per day.    Problem List:    Patient Active Problem List    Diagnosis Date Noted     Sepsis (H) - possible 08/24/2017     Priority: Medium     Hypokalemia 08/24/2017     Priority: Medium     Essential hypertension 08/24/2017     Priority: Medium     Sepsis due to Klebsiella (H) 07/27/2017     Priority: Medium     Insomnia, unspecified type 03/31/2017     Priority: Medium     Abdominal pain 02/15/2017     Priority: Medium     Abdominal pain, generalized 01/28/2017     Priority: Medium     SIRS (systemic inflammatory response syndrome) (H) 01/26/2017     Priority: Medium     History of deep venous thrombosis 01/26/2017     Priority: Medium     Nausea and vomiting 01/26/2017     Priority: Medium     Vitamin D deficiency 01/16/2017     Priority: Medium     Chronic abdominal pain 11/15/2016     Priority: Medium     Patient is followed by Larisa Lorenzo PA-C for ongoing prescription of pain medication.  All refills should be approved by this provider, or covering partner.    Medication(s): no regular narcotics - " narcotics given at ED visits  Maximum quantity per month: N/A  Clinic visit frequency required: Q 6  months     Controlled substance agreement:  Encounter-Level CSA - 11/9/15:               Controlled Substance Agreement - Scan on 11/19/2015 11:17 AM : CONTROLLED SUBSTANCE AGREEMENT 11/09/15 (below)          Encounter-Level CSA - 2/27/15:               Controlled Substance Agreement - Scan on 3/12/2015  7:50 AM : Controlled Medication Agreement 02/27/15 (below)            Pain Clinic evaluation in the past: Yes    DIRE Total Score(s):  No flowsheet data found.    Last [facility] website verification:  done on 11/15/16   [email or web address]        Attention to G-tube (H) 11/08/2016     Priority: Medium     Fever 10/16/2016     Priority: Medium     Coagulation defect (H) [D68.9] 09/16/2016     Priority: Medium     Chronic diarrhea 07/22/2016     Priority: Medium     Migraine 07/20/2016     Priority: Medium     Positive blood culture - Klebsiella oxytoca from Port-a-cath culture 07/18/2016     Priority: Medium     Mitral regurgitation mild-mod by Echo June 2016 07/18/2016     Priority: Medium     Anemia, iron deficiency 07/17/2016     Priority: Medium     Anemia in other chronic diseases classified elsewhere 06/14/2016     Priority: Medium     Munchausen syndrome - previously suspected 06/14/2016     Priority: Medium     Long-term (current) use of anticoagulants [Z79.01] 05/16/2016     Priority: Medium     Anxiety 05/16/2016     Priority: Medium     S/P partial resection of colon 04/11/2016     Priority: Medium     Malnutrition (H) 04/11/2016     Priority: Medium     Jejunostomy tube present (H) 03/21/2016     Priority: Medium     Malfunctioning jejunostomy tube (H) 12/22/2015     Priority: Medium     Malfunction of gastrostomy tube (H) 11/19/2015     Priority: Medium     PEG tube malfunction (H) 10/16/2015     Priority: Medium     Health Care Home 01/16/2015     Priority: Medium     *See Letters for [facility] Care Plan:  My Access Plan           PEG (percutaneous endoscopic gastrostomy) status 11/05/2014     Priority: Medium     Gastroparesis 04/11/2014     Priority: Medium     Overview:   Had ileostomy performed at [facility] in Jan 2012 as treatment       Migraines 04/11/2014     Priority: Medium     4/11/2014  With periods, every other month.       Intermittent asthma 04/11/2014     Priority: Medium     4/11/2014   With URIs, allergies       Allergic rhinitis 04/11/2014     Priority: Medium     Problem list name updated by automated process. Provider to review       Abnormal Pap smear of cervix 04/11/2014     Priority: Medium     4/11/2014  S/p colp and LEEP. Sees OB/GYN at [facility]. Pap every year x20 years - normal since.       S/P LEEP of cervix 02/06/2014     Priority: Medium     Patellofemoral stress syndrome 01/18/2014     Priority: Medium     Hx SBO 10/09/2013     Priority: Medium     4/11/2014  Recurrent. Sees GI (Dr. Redding - at [facility]) every 6 months or so.  Sees feeding tube nurse next week.       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 04/11/2013     Priority: Medium     Constipation by delayed colonic transit 10/24/2012     Priority: Medium     Atopic rhinitis 03/20/2009     Priority: Medium     Hyperbilirubinemia 03/11/2009     Priority: Medium        Past Medical History:    Past Medical History:   Diagnosis Date     Asthma      Bilateral ovarian cysts      Cervical cancer (H) 01/01/2008     Chronic pain      Colonic dysmotility      Constipation      E. coli sepsis (H) 5/8/2016     Enteritis      Fungemia 5/5/2016     Gastro-oesophageal reflux disease      H/O ileostomy      Hx of abnormal Pap smear      Hypertension      IBS (irritable bowel syndrome)      Other chronic pain      PONV (postoperative nausea and vomiting)      Thrombosis, hepatic vein (H)        Past [facility] History:    Past [facility] History:   Procedure Laterality Date     COLONOSCOPY  7/10/2012    Procedure: COLONOSCOPY;;  Surgeon: Nicole Redding  MD Neil;  [facility]     COLONOSCOPY N/A 2/19/2017    Procedure: COLONOSCOPY;  Surgeon: Randell Muller MD;  Location: [facility] GI     COLONOSCOPY N/A 2/21/2017    Procedure: COLONOSCOPY;  Surgeon: Randell Muller MD;  Location: [facility]     ECHO CHELO  7/19/2016          ENDOSCOPIC INSERTION TUBE GASTROSTOMY N/A 1/21/2016    Procedure: ENDOSCOPIC INSERTION TUBE GASTROSTOMY;  Surgeon: Nicole Redding MD;  Location[facility]     ESOPHAGOSCOPY, GASTROSCOPY, DUODENOSCOPY (EGD), COMBINED  7/10/2012    Procedure: COMBINED ESOPHAGOSCOPY, GASTROSCOPY, DUODENOSCOPY (EGD);  Upper Endoscopy, Ileoscopy    Latex Allergy  with biopsies;  Surgeon: Nicole Redding MD;  [facility]     ESOPHAGOSCOPY, GASTROSCOPY, DUODENOSCOPY (EGD), COMBINED N/A 11/5/2014    Procedure: COMBINED ESOPHAGOSCOPY, GASTROSCOPY, DUODENOSCOPY (EGD);  Surgeon: Nicole Redding MD;  [facility]     HC REPLACE DUODENOSTOMY/JEJUNOSTOMY TUBE PERCUTANEOUS N/A 8/27/2015    Procedure: REPLACE GASTROJEJUNOSTOMY TUBE, PERCUTANEOUS;  Surgeon: Mio Holder MD;  [facility]     HC REPLACE DUODENOSTOMY/JEJUNOSTOMY TUBE PERCUTANEOUS N/A 1/7/2016    Procedure: REPLACE JEJUNOSTOMY TUBE, PERCUTANEOUS;  Surgeon: Elsa Medel MD;  [facility]     HC REPLACE DUODENOSTOMY/JEJUNOSTOMY TUBE PERCUTANEOUS N/A 1/28/2016    Procedure: REPLACE JEJUNOSTOMY TUBE, PERCUTANEOUS;  Surgeon: Elsa Medel MD;  [facility]     HC REPLACE GASTROSTOMY/CECOSTOMY TUBE PERCUTANEOUS Left 5/19/2015    Procedure: REPLACE GASTROSTOMY TUBE, PERCUTANEOUS;  Surgeon: Melecio Morejon Chi, MD;  Location: [facility]     HC UGI ENDOSCOPY W PLACEMENT GASTROSTOMY TUBE PERCUT N/A 10/1/2015    Procedure: COMBINED ESOPHAGOSCOPY, GASTROSCOPY, DUODENOSCOPY (EGD), PLACE PERCUTANEOUS ENDOSCOPIC GASTROSTOMY TUBE;  Surgeon: Mio Holder MD;  [facility]: [facility]     INSERT PORT VASCULAR ACCESS Right 7/20/2017    Procedure: INSERT PORT VASCULAR ACCESS;  Chest Port  Placement  **Latex Allergy**;  Surgeon: Coy Rocha PA-C;  [facility]     LAPAROSCOPIC ASSISTED COLECTOMY  1/20/2012    Procedure:LAPAROSCOPIC ASSISTED COLECTOMY; Laparoscopic Ileostomy       LAPAROSCOPIC ASSISTED COLECTOMY LEFT (DESCENDING)  10/24/2012    Procedure: LAPAROSCOPIC ASSISTED COLECTOMY LEFT (DESCENDING);   Hand Assisted Laproscopic Subtotal abdominal Colectomy,Iieorectal anastamosis, Ileostomy Closure.       LAPAROSCOPIC ASSISTED INSERTION TUBE JEJUNOSTOMY N/A 10/16/2015    Procedure: LAPAROSCOPIC ASSISTED INSERTION TUBE JEJUNOSTOMY;  Surgeon: Elsa Medel MD;  [facility]     LAPAROSCOPIC CHOLECYSTECTOMY  2002    [facility] stones duct     LAPAROSCOPIC ILEOSTOMY  1/20/2012    [facility], loop     LAPAROSCOPIC OOPHORECTOMY Right 2009    [facility]     LAPAROTOMY EXPLORATORY N/A 1/28/2016    Procedure: LAPAROTOMY EXPLORATORY;  Surgeon: Elsa Medel MD;  [facility]     LEEP TX, CERVICAL  2009    [facility]     PICC INSERTION Left 10/21/2015    5fr DL Power PICC, 37cm (2cm external) in the L basilic vein w/ tip in the SVC RA junction.     REMOVE GASTROSTOMY TUBE ADULT N/A 12/12/2014    Procedure: REMOVE GASTROSTOMY TUBE ADULT;  Surgeon: Nicole Redding MD;  [facility]     REMOVE PORT VASCULAR ACCESS Right 6/30/2016    Procedure: REMOVE PORT VASCULAR ACCESS;  Surgeon: Pradeep Orosco MD;  Location: [facility]     REMOVE PORT VASCULAR ACCESS Right 9/8/2017    Procedure: REMOVE PORT VASCULAR ACCESS;  right side mediport removal;  Surgeon: Zechariah Worthington MD;  Location: [facility] OR     replace GASTROSTOMY TUBE ADULT  5/19/15       Family History:    Family History   Problem Relation Age of Onset     Thyroid Disease Mother      Sjogren's Mother      GASTROINTESTINAL DISEASE Mother      Intermittent nausea vomiting diarrhea     Colon Polyps Mother      Prostate Problems Father      prostate enlargement     Lupus Maternal Grandmother      CANCER Maternal  Grandfather      Lung     Colon Cancer Maternal Grandfather 65     CANCER Paternal Grandmother      Lung      CEREBROVASCULAR DISEASE Paternal Grandmother      DIABETES Paternal Grandmother      Cardiovascular Paternal Grandmother      CHF     CANCER Paternal Grandfather      Lung     Glaucoma Paternal Grandfather      Abdominal Aortic Aneurysm Other      Macular Degeneration No family hx of        Social History:  Marital Status:   [2]  Social History   Substance Use Topics     Smoking status: Current Some Day Smoker     Packs/day: 1.00     Years: 4.00     Types: Cigarettes     Last attempt to quit: 1/1/2004     Smokeless tobacco: Former User     Alcohol use No        Medications:      SUMAtriptan (IMITREX) 50 MG tablet   traZODone (DESYREL) 50 MG tablet   cloNIDine (CATAPRES) 0.2 MG tablet   DULoxetine (CYMBALTA) 60 MG EC capsule   ondansetron (ZOFRAN ODT) 4 MG ODT tab   albuterol (2.5 MG/3ML) 0.083% neb solution   ipratropium (ATROVENT) 0.02 % neb solution   order for DME   order for DME   diphenhydrAMINE (BENADRYL ALLERGY) 25 MG tablet   nortriptyline (PAMELOR) 10 MG capsule   mirtazapine (REMERON SOL-TAB) 15 MG ODT tab   ACETAMINOPHEN PO   albuterol 90 MCG/ACT inhaler         Review of Systems   Constitutional: Positive for appetite change (decreased) and fever.   HENT: Negative for congestion.    Respiratory: Negative for cough and shortness of breath.    Gastrointestinal: Positive for abdominal pain, diarrhea, nausea and vomiting. Negative for blood in stool.   Genitourinary: Positive for decreased urine volume. Negative for dysuria.   Neurological: Positive for dizziness and light-headedness.   All other systems reviewed and are negative.      Physical Exam   BP: (!) 140/108  Pulse: 96  Heart Rate: 78  Temp: 97.7  F (36.5  C)  Resp: 18  Weight: 80.7 kg (178 lb)  SpO2: 100 %      Physical Exam   Constitutional: She is oriented to person, place, and time. She appears well-developed and well-nourished.  No distress.   HENT:   Head: Normocephalic and atraumatic.   Mouth/Throat: Oropharynx is clear and moist.   Eyes: Conjunctivae are normal.   Neck: Normal range of motion.   Cardiovascular: Normal rate, regular rhythm and normal heart sounds.    Pulmonary/Chest: Effort normal and breath sounds normal. No respiratory distress.   Abdominal: Soft. She exhibits no distension. There is tenderness in the left upper quadrant and left lower quadrant. There is no rebound, no guarding, no CVA tenderness, no tenderness at McBurney's point and negative Benson's sign.   Neurological: She is alert and oriented to person, place, and time.   Skin: Skin is warm and dry. She is not diaphoretic.   Psychiatric: She has a normal mood and affect.   Nursing note and vitals reviewed.      ED Course     ED Course     Procedures        Results for orders placed or performed during the hospital encounter of 02/24/18 (from the past 24 hour(s))   CBC with platelets differential   Result Value Ref Range    WBC 14.3 (H) 4.0 - 11.0 10e9/L    RBC Count 5.01 3.8 - 5.2 10e12/L    Hemoglobin 12.0 11.7 - 15.7 g/dL    Hematocrit 39.8 35.0 - 47.0 %    MCV 79 78 - 100 fl    MCH 24.0 (L) 26.5 - 33.0 pg    MCHC 30.2 (L) 31.5 - 36.5 g/dL    RDW 17.3 (H) 10.0 - 15.0 %    Platelet Count 534 (H) 150 - 450 10e9/L    Diff Method Automated Method     % Neutrophils 70.9 %    % Lymphocytes 21.4 %    % Monocytes 6.4 %    % Eosinophils 0.5 %    % Basophils 0.3 %    % Immature Granulocytes 0.5 %    Absolute Neutrophil 10.1 (H) 1.6 - 8.3 10e9/L    Absolute Lymphocytes 3.1 0.8 - 5.3 10e9/L    Absolute Monocytes 0.9 0.0 - 1.3 10e9/L    Absolute Eosinophils 0.1 0.0 - 0.7 10e9/L    Absolute Basophils 0.1 0.0 - 0.2 10e9/L    Abs Immature Granulocytes 0.1 0 - 0.4 10e9/L   Comprehensive metabolic panel   Result Value Ref Range    Sodium 135 133 - 144 mmol/L    Potassium 4.1 3.4 - 5.3 mmol/L    Chloride 102 94 - 109 mmol/L    Carbon Dioxide 24 20 - 32 mmol/L    Anion Gap 9 3 -  14 mmol/L    Glucose 80 70 - 99 mg/dL    Urea Nitrogen 17 7 - 30 mg/dL    Creatinine 1.02 0.52 - 1.04 mg/dL    GFR Estimate 61 >60 mL/min/1.7m2    GFR Estimate If Black 74 >60 mL/min/1.7m2    Calcium 9.2 8.5 - 10.1 mg/dL    Bilirubin Total 0.5 0.2 - 1.3 mg/dL    Albumin 3.7 3.4 - 5.0 g/dL    Protein Total 8.8 6.8 - 8.8 g/dL    Alkaline Phosphatase 131 40 - 150 U/L    ALT 33 0 - 50 U/L    AST 23 0 - 45 U/L   CRP inflammation   Result Value Ref Range    CRP Inflammation 8.7 (H) 0.0 - 8.0 mg/L   CT Abdomen Pelvis w/o Contrast    Narrative    CT ABDOMEN AND PELVIS WITHOUT CONTRAST  2/24/2018 3:58 PM    HISTORY:  LLQ pain, diarrhea, vomiting. Contrast dye allergy. History  of gastrostomy tube, cholecystectomy, oophorectomy, colectomy,  ileostomy.    TECHNIQUE: Scans obtained from the diaphragm through the pelvis  without oral or IV contrast.   Radiation dose for this scan was reduced using automated exposure  control, adjustment of the mA and/or kV according to patient size, or  iterative reconstruction technique.    COMPARISON:  Limited abdominal ultrasound dated 10/17/2017. CT abdomen  and pelvis dated 8/30/2017.    FINDINGS:   Visualized portions of the lung bases and mediastinal contents are  unremarkable. There are no aggressive osseous lesions.    Patient is status post cholecystectomy. The liver, pancreas, spleen,  and bilateral adrenal glands are normal in appearance for a  noncontrast CT. Nonobstructive right intrarenal calculus (image 27  series 2 and image 71 series 3 measures up to 0.3 cm. This is very  similar in appearance to the prior study dated 8/30/2017. Irregularity  of the posterior left kidney is again noted likely representing  scarring. The kidneys are otherwise normal in appearance for a  noncontrast CT. No hydronephrosis, hydroureter or ureteral calculus is  identified. Urinary bladder is unremarkable.    The uterus is grossly of normal size. Structure which could represent  a normal left ovary  "is noted. No adnexal mass is identified. No  adenopathy, free fluid or free air is seen in the peritoneal cavity.  Aorta is of normal appearance.    The colon is grossly of normal caliber without pericolonic  inflammatory change to suggest acute diverticulitis. Postop changes  are seen at the sigmoid colon and descending colon junction which  could represent resection of much of the ascending colon. No evidence  for bowel obstruction is identified. There is distention of the  proximal small bowel (second portion of the duodenum) just proximal to  the location where it crosses the anterior aorta between the aorta and  superior mesenteric artery. The small bowel is minimally distended  just distal to this portion but otherwise the small bowel is of normal  caliber. This likely represents a normal variant.      Impression    IMPRESSION:  1. Postop changes status post cholecystectomy and partial colectomy.  2. No definite etiology for patient's left lower quadrant abdominal  pain is identified.  3. Nonobstructive right intrarenal calculus is again noted.     *Note: Due to a large number of results and/or encounters for the requested time period, some results have not been displayed. A complete set of results can be found in Results Review.       Medications   0.9% sodium chloride BOLUS (0 mLs Intravenous Stopped 2/24/18 1706)   diphenhydrAMINE (BENADRYL) injection 50 mg (50 mg Intravenous Given 2/24/18 1448)   HYDROmorphone (PF) (DILAUDID) injection 0.5 mg (0.5 mg Intravenous Given 2/24/18 1530)   diphenhydrAMINE (BENADRYL) injection 12.5 mg (12.5 mg Intravenous Given 2/24/18 1644)        Assessments & Plan (with Medical Decision Making)  Doreen Peralta is a 37 year old female who presented to the ED complaining of persistent vomiting and diarrhea for the last couple weeks.  She was diagnosed with norovirus at that time but symptoms have persisted.  She reports feeling weak and \"crappy.\"  Of note, she does have chronic " issues with nausea, vomiting, and diarrhea due to her complex GI history.On arrival to the ED her blood pressure was elevated at 140/108, but otherwise afebrile with unremarkable vital signs.  She had tenderness throughout the left side of her abdomen. IV access obtained, labs drawn. Given fluids and benadryl for nausea with good relief. Given small dose dilaudid for pain relief as well. Initial labs showed elevated white count of 14.3 with left shift. CRP mildly elevated at 8.7. CMP unremarkable. Due to persistent symptoms and elevated white count there was concern for possible colitis versus diverticulitis or other intra-abdominal process contributing to her symptoms.  Because of this, CT abdomen/pelvis obtained.  This showed no acute findings to explain her persistent nausea/vomiting and diarrhea.  Results were discussed with the patient and her mother.  Patient was feeling improved with therapies given.  She had no episodes of emesis or diarrhea during the course of ED stay.  Symptoms likely related to chronic issues complicated by a lingering viral gastroenteritis. She was comfortable continuing to manage symptoms at home with close follow-up with her PCP in the next couple days.  She was advised continue use of Zofran or Benadryl at home as needed for nausea, and sticking to clear liquid diet for now and advancing diet slowly as tolerated.  She was provided instructions on when to return to the ED. Patient expressed understanding and was discharged home in stable condition.     I have reviewed the nursing notes.    I have reviewed the findings, diagnosis, plan and need for follow up with the patient.    Discharge Medication List as of 2/24/2018  5:06 PM          Final diagnoses:   Vomiting and diarrhea   Left sided abdominal pain     Note: Chart documentation done in part with Dragon Voice Recognition software. Although reviewed after completion, some word and grammatical errors may remain.      2/24/2018    [facility] EMERGENCY DEPARTMENT     Vianey Warren PA-C  02/24/18 [id number]

## 2018-02-24 NOTE — ED AVS SNAPSHOT
[facility]    [address]     [place] [address]    Phone:  [phone]    Fax:  [phone]                                       Doreen Peralta   MRN: [id number]    Department:  [facility]   Date of Visit:  2/24/2018           Patient Information     Date Of Birth          1980        Your diagnoses for this visit were:     Vomiting and diarrhea     Left sided abdominal pain        You were seen by Vianey Warren PA-C.      Follow-up Information     Follow up with [facility].    Specialty:  EMERGENCY MEDICINE    Why:  If symptoms worsen    Contact information:    [address]   [place] [address] [phone]    Additional information:    From [address]: Exit at [employer] on [place] side of [place]. Turn right on [address]. Turn left at stoplight on [address]. [facility] will be in view two blocks ahead        Follow up with Franny Antoine MD In 2 days.    Specialty:  Internal Medicine    Why:  As needed for persistent symptoms    Contact information:    [facility]  [address] [address]  [phone]          Discharge Instructions       Please use your home Zofran to manage recurrent nausea.  Stick to a clear liquid diet for the next 24 hours, then advance it slowly as tolerated.  Follow-up in the clinic with your primary care provider on Monday if no improvement in symptoms.  Return to the emergency department if things worsen.    Thank you for choosing [facility] Emergency [facility]. It was a pleasure taking care of you today. If you have any questions, please call [phone].    Vianey Warren PA-C      Self-Care for Vomiting and Diarrhea    Vomiting and diarrhea can make you miserable. Your stomach and bowels are reacting to an irritant. This might be food, medicine, or viral stomach flu. Vomiting and diarrhea are  two ways your body can remove the problem from your system. Nausea is a symptom that discourages you from eating. This gives your stomach and bowels time to recover. To get back to normal, start with self-care to ease your discomfort.  Drink liquids  Drink or sip liquids to avoid losing too much fluid (dehydration):    Clear liquids such as water or broth are the best choices.    Do not drink beverages with a lot of sugar in them, such as juices and sodas. These can make diarrhea worse.    If you have severe vomiting, do not drink sport drinks, such as electrolyte solutions. These don't have the right mix of water, sugar, and minerals. They can also make the symptoms worse. In this situation, commercially available oral rehydration solutions are best.     Suck on ice chips if the thought of drinking something makes you queasy.  When you re able to eat again  Tips include the following:    As your appetite comes back, you can resume your normal diet.    Ask your healthcare provider whether you should stay away from any foods.  Medicines  Know the following about medicines:    Vomiting and diarrhea are ways your body uses to rid itself of harmful substances such as bacteria. DO NOT use antidiarrheal or antivomiting (antiemetic) medicines unless your healthcare provider tells you to do so.    Aspirin, medicine with aspirin, and many aspirin substitutes can irritate your stomach. So avoid them when you have stomach upset.    Certain prescription and over-the-counter medicines can cause vomiting and diarrhea. Talk with your healthcare provider about any medicines you take that may be causing these symptoms.    Certain over-the-counter antihistamines can help control nausea. Other medicines can help soothe stomach upset. Ask your healthcare provider which medicines may help you.  When to call your healthcare provider  Call your healthcare provider if you have:    Bloody or black vomit or stools    Severe, steady belly  pain    Vomiting with a severe headache or vomiting after a head injury    Vomiting and diarrhea together for more than an hour    An inability to hold down even sips of liquids for more than 12 hours    Vomiting that lasts more than 24 hours    Severe diarrhea that lasts more than 2 days    Yellowish color to your skin or the whites of your eyes    Inability to urinate. In infants and young children, not making wet diapers         24 Hour Appointment Hotline       To make an appointment at any [facility], call [phone] ([phone]). If you don't have a family doctor or clinic, we will help you find one. [facility] are conveniently located to serve the needs of you and your family.             Review of your medicines      CONTINUE these medicines which may have CHANGED, or have new prescriptions. If we are uncertain of the size of tablets/capsules you have at home, strength may be listed as something that might have changed.        Dose / Directions Last dose taken    nortriptyline 10 MG capsule   Commonly known as:  PAMELOR   Dose:  30-40 mg   What changed:  how much to take   Quantity:  120 capsule        Take 3-4 capsules (30-40 mg) by mouth At Bedtime   Refills:  5          Our records show that you are taking the medicines listed below. If these are incorrect, please call your family doctor or clinic.        Dose / Directions Last dose taken    ACETAMINOPHEN PO   Dose:  500-1000 mg        Take 500-1,000 mg by mouth every 6 hours as needed for pain   Refills:  0        albuterol (2.5 MG/3ML) 0.083% neb solution   Dose:  2.5 mg   Quantity:  360 mL        Take 1 vial (2.5 mg) by nebulization every 4 hours as needed for shortness of breath / dyspnea or wheezing   Refills:  0        albuterol 90 MCG/ACT inhaler   Dose:  2 puff        Inhale 2 puffs into the lungs every 6 hours as needed   Refills:  0        cloNIDine 0.2 MG tablet   Commonly known as:  CATAPRES   Dose:  0.4 mg   Quantity:  60  tablet        Take 2 tablets (0.4 mg) by mouth every evening - DUE FOR FOLLOW UP   Refills:  0        diphenhydrAMINE 25 MG tablet   Commonly known as:  BENADRYL ALLERGY   Dose:  25 mg   Quantity:  30 tablet        Take 1 tablet (25 mg) by mouth every 6 hours as needed for itching or other (Nausea)   Refills:  0        DULoxetine 60 MG EC capsule   Commonly known as:  CYMBALTA   Quantity:  90 capsule        TAKE 1 CAPSULE(60 MG) BY MOUTH DAILY   Refills:  1        ipratropium 0.02 % neb solution   Commonly known as:  ATROVENT   Dose:  0.5 mg   Quantity:  225 mL        Take 2.5 mLs (0.5 mg) by nebulization every 6 hours as needed for wheezing   Refills:  0        mirtazapine 15 MG ODT tab   Commonly known as:  REMERON SOL-TAB   Dose:  7.5 mg   Quantity:  45 tablet        0.5 tablets (7.5 mg) by Orally disintegrating tablet route At Bedtime   Refills:  0        ondansetron 4 MG ODT tab   Commonly known as:  ZOFRAN ODT   Dose:  4-8 mg   Quantity:  30 tablet        Take 1-2 tablets (4-8 mg) by mouth every 6 hours as needed for nausea   Refills:  0        * order for DME   Quantity:  1 Box        2 by 2 gauze pads, use for dressing changes as directed   Refills:  1        * order for DME   Quantity:  1 each        1 inch paper tape, Use for dressing changes as directed   Refills:  0        SUMAtriptan 50 MG tablet   Commonly known as:  IMITREX   Dose:   mg   Quantity:  9 tablet        Take 1-2 tablets ( mg) by mouth at onset of headache for migraine - LAST REFILL, DUE FOR FOLLOW UP   Refills:  0        traZODone 50 MG tablet   Commonly known as:  DESYREL   Quantity:  60 tablet        TAKE 1 TO 2 TABLETS BY MOUTH AT BEDTIME AS NEEDED   Refills:  0        * Notice:  This list has 2 medication(s) that are the same as other medications prescribed for you. Read the directions carefully, and ask your doctor or other care provider to review them with you.            Procedures and tests performed during your visit      CBC with platelets differential    CRP inflammation    CT Abdomen Pelvis w/o Contrast    Comprehensive metabolic panel    Give 20 ounces of water 15 minutes before CT of abdomen    Peripheral IV catheter      Orders Needing Specimen Collection     None      Pending Results     Date and Time Order Name Status Description    2/24/2018 1458 CT Abdomen Pelvis w/o Contrast Preliminary             Pending Culture Results     No orders found from 2/22/2018 to 2/25/2018.            Pending Results Instructions     If you had any lab results that were not finalized at the time of your Discharge, you can call the ED Lab Result RN at [phone]. You will be contacted by this team for any positive Lab results or changes in treatment. The nurses are available 7 days a week from 10A to 6:30P.  You can leave a message 24 hours per day and they will return your call.        Thank you for choosing [facility]       Thank you for choosing [facility] for your care. Our goal is always to provide you with excellent care. Hearing back from our patients is one way we can continue to improve our services. Please take a few minutes to complete the written survey that you may receive in the mail after you visit with us. Thank you!        [facility] Information     [facility] gives you secure access to your electronic health record. If you see a primary care provider, you can also send messages to your care team and make appointments. If you have questions, please call your primary care clinic.  If you do not have a primary care provider, please call [phone] and they will assist you.        Care EveryWhere ID     This is your Care EveryWhere ID. This could be used by other organizations to access your [facility] medical records  [id number]        Equal Access to Services     TRAMAINE CLAYTON : Carrie Law, wafanny villeda, qaybta kaalelham adame. So [facility] [phone].    ATENCIÓN: Si  habla suyapa, tiene a wade disposición servicios gratuitos de asistencia lingüística. Llame al [phone].    We comply with applicable federal civil rights laws and [place] laws. We do not discriminate on the basis of race, color, national origin, age, disability, sex, sexual orientation, or gender identity.            After Visit Summary       This is your record. Keep this with you and show to your community pharmacist(s) and doctor(s) at your next visit.

## 2018-02-24 NOTE — ED NOTES
Pt states she has diagnose with Norovirus x 2 weeks ago.  States she is still having n/v/d.  c/o dizziness and concerned about dehyration.  Lack of appetite.

## 2018-02-24 NOTE — ED AVS SNAPSHOT
[facility] Emergency Department    [address] [place] [address]    Phone:  [phone]    Fax:  [phone]                                       Doreen Peralta   MRN: [id number]    Department:  [facility]   Date of Visit:  2/24/2018           After Visit Summary Signature Page     I have received my discharge instructions, and my questions have been answered. I have discussed any challenges I see with this plan with the nurse or doctor.    ..........................................................................................................................................  Patient/Patient Representative Signature      ..........................................................................................................................................  Patient Representative Print Name and Relationship to Patient    ..................................................               ................................................  Date                                            Time    ..........................................................................................................................................  Reviewed by Signature/Title    ...................................................              ..............................................  Date                                                            Time

## 2018-02-24 NOTE — DISCHARGE INSTRUCTIONS
Please use your home Zofran to manage recurrent nausea.  Stick to a clear liquid diet for the next 24 hours, then advance it slowly as tolerated.  Follow-up in the clinic with your primary care provider on Monday if no improvement in symptoms.  Return to the emergency department if things worsen.    Thank you for choosing [facility] Emergency Department. It was a pleasure taking care of you today. If you have any questions, please call [phone].    Vianey Warren PA-C      Self-Care for Vomiting and Diarrhea    Vomiting and diarrhea can make you miserable. Your stomach and bowels are reacting to an irritant. This might be food, medicine, or viral stomach flu. Vomiting and diarrhea are two ways your body can remove the problem from your system. Nausea is a symptom that discourages you from eating. This gives your stomach and bowels time to recover. To get back to normal, start with self-care to ease your discomfort.  Drink liquids  Drink or sip liquids to avoid losing too much fluid (dehydration):    Clear liquids such as water or broth are the best choices.    Do not drink beverages with a lot of sugar in them, such as juices and sodas. These can make diarrhea worse.    If you have severe vomiting, do not drink sport drinks, such as electrolyte solutions. These don't have the right mix of water, sugar, and minerals. They can also make the symptoms worse. In this situation, commercially available oral rehydration solutions are best.     Suck on ice chips if the thought of drinking something makes you queasy.  When you re able to eat again  Tips include the following:    As your appetite comes back, you can resume your normal diet.    Ask your healthcare provider whether you should stay away from any foods.  Medicines  Know the following about medicines:    Vomiting and diarrhea are ways your body uses to rid itself of harmful substances such as bacteria. DO NOT use antidiarrheal or antivomiting  (antiemetic) medicines unless your healthcare provider tells you to do so.    Aspirin, medicine with aspirin, and many aspirin substitutes can irritate your stomach. So avoid them when you have stomach upset.    Certain prescription and over-the-counter medicines can cause vomiting and diarrhea. Talk with your healthcare provider about any medicines you take that may be causing these symptoms.    Certain over-the-counter antihistamines can help control nausea. Other medicines can help soothe stomach upset. Ask your healthcare provider which medicines may help you.  When to call your healthcare provider  Call your healthcare provider if you have:    Bloody or black vomit or stools    Severe, steady belly pain    Vomiting with a severe headache or vomiting after a head injury    Vomiting and diarrhea together for more than an hour    An inability to hold down even sips of liquids for more than 12 hours    Vomiting that lasts more than 24 hours    Severe diarrhea that lasts more than 2 days    Yellowish color to your skin or the whites of your eyes    Inability to urinate. In infants and young children, not making wet diapers

## 2018-02-26 ENCOUNTER — TRANSFERRED RECORDS (OUTPATIENT)
Dept: HEALTH INFORMATION MANAGEMENT | Facility: CLINIC | Age: 38
End: 2018-02-26

## 2018-02-26 LAB
ALT SERPL-CCNC: 37 IU/L (ref 8–45)
AST SERPL-CCNC: 23 IU/L (ref 2–40)
CREAT SERPL-MCNC: 1.09 MG/DL (ref 0.57–1.11)
GFR SERPL CREATININE-BSD FRML MDRD: 57 ML/MIN/1.73M2
GLUCOSE SERPL-MCNC: 80 MG/DL (ref 65–100)
POTASSIUM SERPL-SCNC: 4.5 MMOL/L (ref 3.5–5)

## 2018-02-27 ENCOUNTER — HOSPITAL ENCOUNTER (EMERGENCY)
Facility: CLINIC | Age: 38
Discharge: HOME | End: 2018-02-27
Attending: NURSE PRACTITIONER | Admitting: NURSE PRACTITIONER
Payer: COMMERCIAL

## 2018-02-27 VITALS
RESPIRATION RATE: 18 BRPM | HEART RATE: 80 BPM | DIASTOLIC BLOOD PRESSURE: 84 MMHG | BODY MASS INDEX: 28.45 KG/M2 | SYSTOLIC BLOOD PRESSURE: 138 MMHG | OXYGEN SATURATION: 96 % | HEIGHT: 66 IN | TEMPERATURE: 99 F | WEIGHT: 177 LBS

## 2018-02-27 DIAGNOSIS — R11.15 NON-INTRACTABLE CYCLICAL VOMITING WITH NAUSEA: ICD-10-CM

## 2018-02-27 DIAGNOSIS — R10.32 ABDOMINAL PAIN, LEFT LOWER QUADRANT: ICD-10-CM

## 2018-02-27 LAB
ALBUMIN SERPL-MCNC: 3.7 G/DL (ref 3.4–5)
ALP SERPL-CCNC: 142 U/L (ref 40–150)
ALT SERPL W P-5'-P-CCNC: 39 U/L (ref 0–50)
ANION GAP SERPL CALCULATED.3IONS-SCNC: 13 MMOL/L (ref 3–14)
AST SERPL W P-5'-P-CCNC: 22 U/L (ref 0–45)
BASOPHILS # BLD AUTO: 0 10E9/L (ref 0–0.2)
BASOPHILS NFR BLD AUTO: 0.2 %
BILIRUB SERPL-MCNC: 0.7 MG/DL (ref 0.2–1.3)
BUN SERPL-MCNC: 11 MG/DL (ref 7–30)
CALCIUM SERPL-MCNC: 8.6 MG/DL (ref 8.5–10.1)
CHLORIDE SERPL-SCNC: 105 MMOL/L (ref 94–109)
CO2 SERPL-SCNC: 16 MMOL/L (ref 20–32)
CREAT SERPL-MCNC: 1.06 MG/DL (ref 0.52–1.04)
DIFFERENTIAL METHOD BLD: ABNORMAL
EOSINOPHIL # BLD AUTO: 0 10E9/L (ref 0–0.7)
EOSINOPHIL NFR BLD AUTO: 0.1 %
ERYTHROCYTE [DISTWIDTH] IN BLOOD BY AUTOMATED COUNT: 17.1 % (ref 10–15)
GFR SERPL CREATININE-BSD FRML MDRD: 58 ML/MIN/1.7M2
GLUCOSE SERPL-MCNC: 94 MG/DL (ref 70–99)
HCT VFR BLD AUTO: 40.8 % (ref 35–47)
HGB BLD-MCNC: 12.5 G/DL (ref 11.7–15.7)
IMM GRANULOCYTES # BLD: 0.1 10E9/L (ref 0–0.4)
IMM GRANULOCYTES NFR BLD: 0.5 %
LIPASE SERPL-CCNC: 180 U/L (ref 73–393)
LYMPHOCYTES # BLD AUTO: 1.6 10E9/L (ref 0.8–5.3)
LYMPHOCYTES NFR BLD AUTO: 9.4 %
MCH RBC QN AUTO: 24.4 PG (ref 26.5–33)
MCHC RBC AUTO-ENTMCNC: 30.6 G/DL (ref 31.5–36.5)
MCV RBC AUTO: 80 FL (ref 78–100)
MONOCYTES # BLD AUTO: 0.3 10E9/L (ref 0–1.3)
MONOCYTES NFR BLD AUTO: 1.9 %
NEUTROPHILS # BLD AUTO: 15.1 10E9/L (ref 1.6–8.3)
NEUTROPHILS NFR BLD AUTO: 87.9 %
PLATELET # BLD AUTO: 539 10E9/L (ref 150–450)
POTASSIUM SERPL-SCNC: 4.6 MMOL/L (ref 3.4–5.3)
PROT SERPL-MCNC: 8.9 G/DL (ref 6.8–8.8)
RBC # BLD AUTO: 5.13 10E12/L (ref 3.8–5.2)
SODIUM SERPL-SCNC: 134 MMOL/L (ref 133–144)
WBC # BLD AUTO: 17.2 10E9/L (ref 4–11)

## 2018-02-27 PROCEDURE — 96361 HYDRATE IV INFUSION ADD-ON: CPT | Performed by: NURSE PRACTITIONER

## 2018-02-27 PROCEDURE — 96366 THER/PROPH/DIAG IV INF ADDON: CPT | Performed by: NURSE PRACTITIONER

## 2018-02-27 PROCEDURE — 25000125 ZZHC RX 250: Performed by: NURSE PRACTITIONER

## 2018-02-27 PROCEDURE — 25000128 H RX IP 250 OP 636: Performed by: NURSE PRACTITIONER

## 2018-02-27 PROCEDURE — 96376 TX/PRO/DX INJ SAME DRUG ADON: CPT | Performed by: NURSE PRACTITIONER

## 2018-02-27 PROCEDURE — 96360 HYDRATION IV INFUSION INIT: CPT | Mod: 59 | Performed by: NURSE PRACTITIONER

## 2018-02-27 PROCEDURE — 83690 ASSAY OF LIPASE: CPT | Performed by: NURSE PRACTITIONER

## 2018-02-27 PROCEDURE — 99285 EMERGENCY DEPT VISIT HI MDM: CPT | Mod: Z6 | Performed by: NURSE PRACTITIONER

## 2018-02-27 PROCEDURE — 99285 EMERGENCY DEPT VISIT HI MDM: CPT | Mod: 25 | Performed by: NURSE PRACTITIONER

## 2018-02-27 PROCEDURE — 85025 COMPLETE CBC W/AUTO DIFF WBC: CPT | Performed by: NURSE PRACTITIONER

## 2018-02-27 PROCEDURE — 80053 COMPREHEN METABOLIC PANEL: CPT | Performed by: NURSE PRACTITIONER

## 2018-02-27 PROCEDURE — 96365 THER/PROPH/DIAG IV INF INIT: CPT | Performed by: NURSE PRACTITIONER

## 2018-02-27 PROCEDURE — 96375 TX/PRO/DX INJ NEW DRUG ADDON: CPT | Performed by: NURSE PRACTITIONER

## 2018-02-27 RX ORDER — LORAZEPAM 2 MG/ML
1 INJECTION INTRAMUSCULAR ONCE
Status: COMPLETED | OUTPATIENT
Start: 2018-02-27 | End: 2018-02-27

## 2018-02-27 RX ORDER — DIPHENHYDRAMINE HYDROCHLORIDE 50 MG/ML
INJECTION INTRAMUSCULAR; INTRAVENOUS
Status: DISCONTINUED
Start: 2018-02-27 | End: 2018-02-28 | Stop reason: HOSPADM

## 2018-02-27 RX ORDER — HYDROMORPHONE HYDROCHLORIDE 2 MG/1
2 TABLET ORAL EVERY 4 HOURS PRN
Qty: 12 TABLET | Refills: 0 | Status: SHIPPED | OUTPATIENT
Start: 2018-02-27 | End: 2018-03-03

## 2018-02-27 RX ORDER — ONDANSETRON 2 MG/ML
8 INJECTION INTRAMUSCULAR; INTRAVENOUS EVERY 30 MIN PRN
Status: DISCONTINUED | OUTPATIENT
Start: 2018-02-27 | End: 2018-02-28 | Stop reason: HOSPADM

## 2018-02-27 RX ORDER — DIPHENHYDRAMINE HYDROCHLORIDE 50 MG/ML
25 INJECTION INTRAMUSCULAR; INTRAVENOUS ONCE
Status: COMPLETED | OUTPATIENT
Start: 2018-02-27 | End: 2018-02-27

## 2018-02-27 RX ADMIN — SODIUM CHLORIDE 1000 ML: 9 INJECTION, SOLUTION INTRAVENOUS at 18:20

## 2018-02-27 RX ADMIN — DIPHENHYDRAMINE HYDROCHLORIDE 25 MG: 50 INJECTION, SOLUTION INTRAMUSCULAR; INTRAVENOUS at 20:21

## 2018-02-27 RX ADMIN — HYDROMORPHONE HYDROCHLORIDE 1 MG: 1 INJECTION, SOLUTION INTRAMUSCULAR; INTRAVENOUS; SUBCUTANEOUS at 18:20

## 2018-02-27 RX ADMIN — DIPHENHYDRAMINE HYDROCHLORIDE 25 MG: 50 INJECTION, SOLUTION INTRAMUSCULAR; INTRAVENOUS at 18:30

## 2018-02-27 RX ADMIN — SODIUM CHLORIDE 1000 ML: 9 INJECTION, SOLUTION INTRAVENOUS at 22:14

## 2018-02-27 RX ADMIN — METRONIDAZOLE 500 MG: 500 INJECTION, SOLUTION INTRAVENOUS at 18:20

## 2018-02-27 RX ADMIN — HYDROMORPHONE HYDROCHLORIDE 1 MG: 1 INJECTION, SOLUTION INTRAMUSCULAR; INTRAVENOUS; SUBCUTANEOUS at 20:21

## 2018-02-27 RX ADMIN — LORAZEPAM 1 MG: 2 INJECTION INTRAMUSCULAR; INTRAVENOUS at 19:02

## 2018-02-27 RX ADMIN — HYDROMORPHONE HYDROCHLORIDE 1 MG: 1 INJECTION, SOLUTION INTRAMUSCULAR; INTRAVENOUS; SUBCUTANEOUS at 22:09

## 2018-02-27 RX ADMIN — ONDANSETRON 8 MG: 2 INJECTION INTRAMUSCULAR; INTRAVENOUS at 19:03

## 2018-02-27 RX ADMIN — ONDANSETRON 8 MG: 2 INJECTION INTRAMUSCULAR; INTRAVENOUS at 18:22

## 2018-02-27 ASSESSMENT — ENCOUNTER SYMPTOMS
ABDOMINAL PAIN: 1
APPETITE CHANGE: 1
DIARRHEA: 1
WEAKNESS: 1
VOMITING: 1
CHILLS: 1
NAUSEA: 1
ACTIVITY CHANGE: 1

## 2018-02-27 NOTE — ED AVS SNAPSHOT
[facility] Emergency Department    [address] [place] [address]    Phone:  [phone]    Fax:  [phone]                                       Doreen Peralta   MRN: [id number]    Department:  [facility] Emergency Department   Date of Visit:  2/27/2018           After Visit Summary Signature Page     I have received my discharge instructions, and my questions have been answered. I have discussed any challenges I see with this plan with the nurse or doctor.    ..........................................................................................................................................  Patient/Patient Representative Signature      ..........................................................................................................................................  Patient Representative Print Name and Relationship to Patient    ..................................................               ................................................  Date                                            Time    ..........................................................................................................................................  Reviewed by Signature/Title    ...................................................              ..............................................  Date                                                            Time

## 2018-02-27 NOTE — ED AVS SNAPSHOT
[facility] Emergency Department    [address] [place] [address]    Phone:  [phone]    Fax:  [phone]                                       Doreen Peralta   MRN: [id number]    Department:  [facility] Emergency Department   Date of Visit:  2/27/2018           Patient Information     Date Of Birth          1980        Your diagnoses for this visit were:     Non-intractable cyclical vomiting with nausea     Abdominal pain, left lower quadrant        You were seen by Shaunna Raines, GERARD CNP.      Follow-up Information     Follow up with Franny Antoine MD In 1 week.    Specialty:  Internal Medicine    Contact information:    [facility]  [address] [address]  [phone]          Follow up with [facility] Emergency Department.    Specialty:  EMERGENCY MEDICINE    Why:  If symptoms worsen    Contact information:    [address] [place] [address] [phone]    Additional information:    From [address] [address]: Exit at [address] on south side of [place]. Turn right on [address]. Turn left at stoplight on [address]. [facility] will be in view two blocks ahead        Discharge Instructions         Clostridium Difficile Infection  Clostridium difficile (C. diff) bacteria can be very harmful. They affect the intestinal tract. They can cause symptoms ranging from mild diarrhea to severe inflammation of the large intestine (colon). C. diff infection is most common during the days and weeks after treatment with antibiotics. Anyone can become infected. But the risk is greatly increased for people in hospitals and for people living in nursing homes or long-term care facilities. This is because antibiotic use is common there. Germs also spread easily in these places.    What causes C. diff infection?  The stomach and intestines have hundreds of kinds of bacteria. Many of these bacteria actually help keep  harmful bacteria like C. diff from causing problems. Small amounts of C. diff are normal in the intestine and don t cause problems. When you take an antibiotic, the normal balance of good and bad bacteria may be affected. There may be too few good bacteria and too many harmful bacteria like C diff. In hospitals and nursing homes, C. diff may be spread from an infected person to others. This can happen when staff or visitors touch infected people or objects such as bed rails, stethoscopes, or bedpans and then touch other people or surfaces.  What are the symptoms of C. diff infection?  About half of people with C. diff infection have no symptoms. Yet they can still pass the infection to others. Others do have symptoms. These include:    Watery diarrhea, which may contain mucus    Pain and cramping    Fever  Some who are infected develop serious problems. Symptoms include:    Belly (abdominal) pain    Abdominal swelling    Nausea and vomiting    Little or no diarrhea  How is C. diff infection diagnosed?  To confirm the infection, a sample of stool is tested for the bacteria or the toxins made by the bacteria.  How is C. diff infection treated?  Your healthcare provider will tell you to stop taking any antibiotics you have been prescribed, based on your healthcare needs. He or she may prescribe different medicines as needed. In certain cases, you may be given an antibiotic directed at the C. diff infection. Talk with your healthcare provider before stopping or starting any medicines.    Fluids are often given by IV (intravenously) through a vein. This helps replace fluids lost through diarrhea.    In rare cases you may need surgery if treatment doesn t cure severe symptoms  To lessen symptoms:    Drink plenty of fluids to replace water lost through diarrhea. Talk with your healthcare provider or nurse about which fluids are best.    Follow your healthcare provider s instructions for when and what to eat.    Unless your  healthcare provider tells you to do so, don't take medicines for diarrhea.    Tell your healthcare provider if symptoms return. Even after treatment, C. diff may come back.  Your doctor may give you an additional medicine if your symptoms come back or you are at risk for another C. diff infection. This medicine is called bezlotoxumab. It is not an antibiotic, but it can help keep your C. diff symptoms from returning.  What are the complications of C. diff infection?  Complications include:    Dehydration    Electrolyte imbalances    Low protein in the blood    Severe widening (dilation) of the large intestine    A hole (perforation) in the bowel    Low blood pressure    Kidney failure    Inflammation or infection all over the body    Death  How is C. diff prevented?  Hospitals and nursing homes take these steps to help prevent C. diff infections:    Limiting use of antibiotics. Giving antibiotics only when needed can help reduce C. diff infections.    Handwashing. Hospital staff should wash their hands before and after treating each person. They should also wash their hands after touching any surface in someone's' room. Soap and water work better than alcohol-based hand .    Protective clothing. Healthcare workers should wear gloves and a gown when entering the room of someone with C. diff infection. They should remove these items before leaving and then wash their hands.    Private rooms. People with C. diff should be in private rooms. Or they may share rooms with others who have the same infection.    Thorough cleaning. Equipment and rooms should be cleaned and disinfected every day.    Education. Everyone should be shown the best ways to avoid infection.  You can do the following to help prevent C. diff:    Take antibiotics only when you really need them. Antibiotics don t help treat illnesses caused by viruses. This includes colds and the flu. Don t ask for antibiotics from your healthcare provider if he  or she says they won t work.    When you are given antibiotics, take them as directed. Don t take more or less than the dosage prescribed. Do not take them for shorter or longer than your provider tells you to, even if you feel better.    Wash your hands carefully. Do this after using the bathroom and before eating. Use plenty of soap and warm water. Alcohol-based hand  may not work against C. diff germs.  Everyone can help prevent C. diff:  In a hospital or care facility:    Wash your hands well before and after visiting someone who has C. diff infection. Use soap and water. Alcohol-based hand  may not work against C. diff.    If the staff asks you to, wear gloves. Take any other steps you are asked to follow to help prevent infection.  At home:    If instructed, wear gloves when caring for a family member with C. diff infection. Throw the gloves away after each use. Then wash your hands well.    Wash the person s clothes, bed linen, and towels separately. Use hot water. Use both detergent and liquid bleach.    Disinfect surfaces in the person s room. This includes the phone, light switches, and remote controls.  Practice good handwashing:    Use warm water and plenty of soap. Rub your hands together well.    Clean your whole hand. Wash under nails, between fingers, and up your wrists.    Wash for at least 15 seconds to 20 seconds.     Rinse. Let the water run down your fingers, not up your wrists.    Dry your hands well. Then use a paper towel to turn off the faucet and open the door.  Date Last Reviewed: 1/1/2017 2000-2017 [employer] [address] [place] [address]. All rights reserved. This information is not intended as a substitute for professional medical care. Always follow your healthcare professional's instructions.          24 Hour Appointment Hotline       To make an appointment at any [facility], call [phone] ([phone]). If you don't have a  family doctor or clinic, we will help you find one. [facility] clinics are conveniently located to serve the needs of you and your family.             Review of your medicines      START taking        Dose / Directions Last dose taken    HYDROmorphone 2 MG tablet   Commonly known as:  DILAUDID   Dose:  2 mg   Quantity:  12 tablet        Take 1 tablet (2 mg) by mouth every 4 hours as needed for pain   Refills:  0          Our records show that you are taking the medicines listed below. If these are incorrect, please call your family doctor or clinic.        Dose / Directions Last dose taken    ACETAMINOPHEN PO   Dose:  500-1000 mg        Take 500-1,000 mg by mouth every 6 hours as needed for pain   Refills:  0        albuterol (2.5 MG/3ML) 0.083% neb solution   Dose:  2.5 mg   Quantity:  360 mL        Take 1 vial (2.5 mg) by nebulization every 4 hours as needed for shortness of breath / dyspnea or wheezing   Refills:  0        albuterol 90 MCG/ACT inhaler   Dose:  2 puff        Inhale 2 puffs into the lungs every 6 hours as needed   Refills:  0        cloNIDine 0.2 MG tablet   Commonly known as:  CATAPRES   Dose:  0.4 mg   Quantity:  60 tablet        Take 2 tablets (0.4 mg) by mouth every evening - DUE FOR FOLLOW UP   Refills:  0        diphenhydrAMINE 25 MG tablet   Commonly known as:  BENADRYL ALLERGY   Dose:  25 mg   Quantity:  30 tablet        Take 1 tablet (25 mg) by mouth every 6 hours as needed for itching or other (Nausea)   Refills:  0        DULoxetine 60 MG EC capsule   Commonly known as:  CYMBALTA   Quantity:  90 capsule        TAKE 1 CAPSULE(60 MG) BY MOUTH DAILY   Refills:  1        ipratropium 0.02 % neb solution   Commonly known as:  ATROVENT   Dose:  0.5 mg   Quantity:  225 mL        Take 2.5 mLs (0.5 mg) by nebulization every 6 hours as needed for wheezing   Refills:  0        mirtazapine 15 MG ODT tab   Commonly known as:  REMERON SOL-TAB   Dose:  7.5 mg   Quantity:  45 tablet        0.5 tablets (7.5  mg) by Orally disintegrating tablet route At Bedtime   Refills:  0        nortriptyline 10 MG capsule   Commonly known as:  PAMELOR   Dose:  30-40 mg   Quantity:  120 capsule        Take 3-4 capsules (30-40 mg) by mouth At Bedtime   Refills:  5        ondansetron 4 MG ODT tab   Commonly known as:  ZOFRAN ODT   Dose:  4-8 mg   Quantity:  30 tablet        Take 1-2 tablets (4-8 mg) by mouth every 6 hours as needed for nausea   Refills:  0        * order for DME   Quantity:  1 Box        2 by 2 gauze pads, use for dressing changes as directed   Refills:  1        * order for DME   Quantity:  1 each        1 inch paper tape, Use for dressing changes as directed   Refills:  0        SUMAtriptan 50 MG tablet   Commonly known as:  IMITREX   Dose:   mg   Quantity:  9 tablet        Take 1-2 tablets ( mg) by mouth at onset of headache for migraine - LAST REFILL, DUE FOR FOLLOW UP   Refills:  0        traZODone 50 MG tablet   Commonly known as:  DESYREL   Quantity:  60 tablet        TAKE 1 TO 2 TABLETS BY MOUTH AT BEDTIME AS NEEDED   Refills:  0        * Notice:  This list has 2 medication(s) that are the same as other medications prescribed for you. Read the directions carefully, and ask your doctor or other care provider to review them with you.            Prescriptions were sent or printed at these locations (1 Prescription)                   [facility]   [address] [address]    Telephone:  [phone]   Fax:  [phone]   Hours:                  Printed at Department/Unit printer (1 of 1)         HYDROmorphone (DILAUDID) 2 MG tablet                Procedures and tests performed during your visit     CBC with platelets differential    Comprehensive metabolic panel    Lipase    Peripheral IV catheter      Orders Needing Specimen Collection     None      Pending Results     No orders found from 2/25/2018 to 2/28/2018.            Pending Culture Results     No  orders found from 2/25/2018 to 2/28/2018.            Pending Results Instructions     If you had any lab results that were not finalized at the time of your Discharge, you can call the ED Lab Result RN at [phone]. You will be contacted by this team for any positive Lab results or changes in treatment. The nurses are available 7 days a week from 10A to 6:30P.  You can leave a message 24 hours per day and they will return your call.        Thank you for choosing [facility]       Thank you for choosing [facility] for your care. Our goal is always to provide you with excellent care. Hearing back from our patients is one way we can continue to improve our services. Please take a few minutes to complete the written survey that you may receive in the mail after you visit with us. Thank you!        [employer] Information     [facility] gives you secure access to your electronic health record. If you see a primary care provider, you can also send messages to your care team and make appointments. If you have questions, please call your primary care clinic.  If you do not have a primary care provider, please call [phone] and they will assist you.        Care EveryWhere ID     This is your Care EveryWhere ID. This could be used by other organizations to access your [facility] medical records  [id number]        Equal Access to Services     TRAMAINE CLAYTON : Carrie Law, wafanny villeda, qajustin kaalmaskyler long, elham gomez. So [facility] [phone].    ATENCIÓN: Si habla español, tiene a wade disposición servicios gratuitos de asistencia lingüística. Llame al [phone].    We comply with applicable federal civil rights laws and [place] laws. We do not discriminate on the basis of race, color, national origin, age, disability, sex, sexual orientation, or gender identity.            After Visit Summary       This is your record. Keep this with you and show to your community  pharmacist(s) and doctor(s) at your next visit.

## 2018-02-27 NOTE — ED NOTES
She was diagnosed with c-dif and received a call today.  She has been sick for 2 weeks and is unable to keep her meds down to treat it.

## 2018-02-28 ENCOUNTER — CARE COORDINATION (OUTPATIENT)
Dept: CARE COORDINATION | Facility: CLINIC | Age: 38
End: 2018-02-28

## 2018-02-28 NOTE — PROGRESS NOTES
Clinic Care Coordination Contact    Situation: Patient chart reviewed by care coordinator.    Background: Received notice pt was on the ED/IP list for nausea and vomiting with 10 ED visit within the last 6 months    Assessment: Pt is followed by a non [facility] primary care clinic at [facility] in [facility]    Plan/Recommendations: No outreach at this time from Care Coordination.     Yuridia DUFF, RN, PHN  Care Coordination    [facility]  [address] [place] [address]  Office: [phone]  Fax [phone]   [facility]  [address] [place] [address]  Office: [phone] Fax [phone]  [email or web address]   [email or web address]   Connect with [facility] on social media.

## 2018-02-28 NOTE — ED NOTES
Pt reports that last dose of medication did not help with her pain and she is asking for something more.  No orders, will update provider.

## 2018-02-28 NOTE — ED NOTES
Pt reported that she has had red on the toilet paper on her 4th trip to the bathroom, talked with her about monitoring and if on paper may be from wiping but to check stool next time and do not flush if noticing blood in stool or if it is black.  Pt also requesting to try another iv to see if able to get her fluids in faster because she does not want to be here for 13 hours as we are unable to run fluid any faster than 100mL per hour

## 2018-03-03 ENCOUNTER — HOSPITAL ENCOUNTER (EMERGENCY)
Facility: CLINIC | Age: 38
Discharge: HOME | End: 2018-03-03
Attending: FAMILY MEDICINE | Admitting: FAMILY MEDICINE
Payer: COMMERCIAL

## 2018-03-03 VITALS
SYSTOLIC BLOOD PRESSURE: 135 MMHG | DIASTOLIC BLOOD PRESSURE: 73 MMHG | OXYGEN SATURATION: 97 % | RESPIRATION RATE: 20 BRPM | TEMPERATURE: 99.5 F

## 2018-03-03 DIAGNOSIS — R19.7 VOMITING AND DIARRHEA: ICD-10-CM

## 2018-03-03 DIAGNOSIS — R11.10 VOMITING AND DIARRHEA: ICD-10-CM

## 2018-03-03 DIAGNOSIS — A04.72 C. DIFFICILE DIARRHEA: ICD-10-CM

## 2018-03-03 DIAGNOSIS — R10.32 ABDOMINAL PAIN, LEFT LOWER QUADRANT: ICD-10-CM

## 2018-03-03 LAB
ALBUMIN SERPL-MCNC: 3.2 G/DL (ref 3.4–5)
ALP SERPL-CCNC: 124 U/L (ref 40–150)
ALT SERPL W P-5'-P-CCNC: 26 U/L (ref 0–50)
ANION GAP SERPL CALCULATED.3IONS-SCNC: 10 MMOL/L (ref 3–14)
AST SERPL W P-5'-P-CCNC: 19 U/L (ref 0–45)
BASOPHILS # BLD AUTO: 0 10E9/L (ref 0–0.2)
BASOPHILS NFR BLD AUTO: 0.2 %
BILIRUB SERPL-MCNC: 0.3 MG/DL (ref 0.2–1.3)
BUN SERPL-MCNC: 13 MG/DL (ref 7–30)
CALCIUM SERPL-MCNC: 8.7 MG/DL (ref 8.5–10.1)
CHLORIDE SERPL-SCNC: 107 MMOL/L (ref 94–109)
CO2 SERPL-SCNC: 23 MMOL/L (ref 20–32)
CREAT SERPL-MCNC: 0.84 MG/DL (ref 0.52–1.04)
CRP SERPL-MCNC: 29.1 MG/L (ref 0–8)
DIFFERENTIAL METHOD BLD: ABNORMAL
EOSINOPHIL # BLD AUTO: 0 10E9/L (ref 0–0.7)
EOSINOPHIL NFR BLD AUTO: 0.4 %
ERYTHROCYTE [DISTWIDTH] IN BLOOD BY AUTOMATED COUNT: 17.3 % (ref 10–15)
ERYTHROCYTE [SEDIMENTATION RATE] IN BLOOD BY WESTERGREN METHOD: 67 MM/H (ref 0–20)
GFR SERPL CREATININE-BSD FRML MDRD: 76 ML/MIN/1.7M2
GLUCOSE SERPL-MCNC: 97 MG/DL (ref 70–99)
HCT VFR BLD AUTO: 35.4 % (ref 35–47)
HGB BLD-MCNC: 10.7 G/DL (ref 11.7–15.7)
IMM GRANULOCYTES # BLD: 0 10E9/L (ref 0–0.4)
IMM GRANULOCYTES NFR BLD: 0.2 %
LACTATE BLD-SCNC: 1.1 MMOL/L (ref 0.7–2)
LIPASE SERPL-CCNC: 174 U/L (ref 73–393)
LYMPHOCYTES # BLD AUTO: 1.3 10E9/L (ref 0.8–5.3)
LYMPHOCYTES NFR BLD AUTO: 11.1 %
MCH RBC QN AUTO: 24.2 PG (ref 26.5–33)
MCHC RBC AUTO-ENTMCNC: 30.2 G/DL (ref 31.5–36.5)
MCV RBC AUTO: 80 FL (ref 78–100)
MONOCYTES # BLD AUTO: 0.3 10E9/L (ref 0–1.3)
MONOCYTES NFR BLD AUTO: 2.8 %
NEUTROPHILS # BLD AUTO: 9.7 10E9/L (ref 1.6–8.3)
NEUTROPHILS NFR BLD AUTO: 85.3 %
PLATELET # BLD AUTO: 472 10E9/L (ref 150–450)
POTASSIUM SERPL-SCNC: 3.9 MMOL/L (ref 3.4–5.3)
PROT SERPL-MCNC: 8.1 G/DL (ref 6.8–8.8)
RBC # BLD AUTO: 4.42 10E12/L (ref 3.8–5.2)
SODIUM SERPL-SCNC: 140 MMOL/L (ref 133–144)
WBC # BLD AUTO: 11.3 10E9/L (ref 4–11)

## 2018-03-03 PROCEDURE — 25000128 H RX IP 250 OP 636: Performed by: FAMILY MEDICINE

## 2018-03-03 PROCEDURE — 85025 COMPLETE CBC W/AUTO DIFF WBC: CPT | Performed by: FAMILY MEDICINE

## 2018-03-03 PROCEDURE — 96361 HYDRATE IV INFUSION ADD-ON: CPT | Performed by: FAMILY MEDICINE

## 2018-03-03 PROCEDURE — 80053 COMPREHEN METABOLIC PANEL: CPT | Performed by: FAMILY MEDICINE

## 2018-03-03 PROCEDURE — 96375 TX/PRO/DX INJ NEW DRUG ADDON: CPT | Performed by: FAMILY MEDICINE

## 2018-03-03 PROCEDURE — 86140 C-REACTIVE PROTEIN: CPT | Performed by: FAMILY MEDICINE

## 2018-03-03 PROCEDURE — 96376 TX/PRO/DX INJ SAME DRUG ADON: CPT | Performed by: FAMILY MEDICINE

## 2018-03-03 PROCEDURE — 99285 EMERGENCY DEPT VISIT HI MDM: CPT | Mod: 25 | Performed by: FAMILY MEDICINE

## 2018-03-03 PROCEDURE — 96374 THER/PROPH/DIAG INJ IV PUSH: CPT | Performed by: FAMILY MEDICINE

## 2018-03-03 PROCEDURE — 83605 ASSAY OF LACTIC ACID: CPT | Performed by: FAMILY MEDICINE

## 2018-03-03 PROCEDURE — 83690 ASSAY OF LIPASE: CPT | Performed by: FAMILY MEDICINE

## 2018-03-03 PROCEDURE — 85652 RBC SED RATE AUTOMATED: CPT | Performed by: FAMILY MEDICINE

## 2018-03-03 PROCEDURE — 99285 EMERGENCY DEPT VISIT HI MDM: CPT | Mod: Z6 | Performed by: FAMILY MEDICINE

## 2018-03-03 RX ORDER — SODIUM CHLORIDE 9 MG/ML
INJECTION, SOLUTION INTRAVENOUS CONTINUOUS
Status: DISCONTINUED | OUTPATIENT
Start: 2018-03-03 | End: 2018-03-04 | Stop reason: HOSPADM

## 2018-03-03 RX ORDER — METRONIDAZOLE 500 MG/1
500 TABLET ORAL 3 TIMES DAILY
COMMUNITY
Start: 2018-02-27 | End: 2018-03-07

## 2018-03-03 RX ORDER — TRAZODONE HYDROCHLORIDE 100 MG/1
TABLET ORAL
Refills: 8 | Status: ON HOLD | COMMUNITY
Start: 2018-02-13 | End: 2018-04-22

## 2018-03-03 RX ORDER — ALFALFA 650 MG
TABLET ORAL
COMMUNITY
End: 2019-02-05

## 2018-03-03 RX ORDER — HYDROMORPHONE HYDROCHLORIDE 2 MG/1
2 TABLET ORAL EVERY 4 HOURS PRN
Qty: 12 TABLET | Refills: 0 | Status: SHIPPED | OUTPATIENT
Start: 2018-03-03 | End: 2018-03-07

## 2018-03-03 RX ORDER — EMOLLIENT COMBINATION NO.49
LOTION (ML) TOPICAL
Status: ON HOLD | COMMUNITY
End: 2018-04-22

## 2018-03-03 RX ORDER — CLONIDINE HYDROCHLORIDE 0.2 MG/1
0.4 TABLET ORAL
COMMUNITY
Start: 2018-02-12 | End: 2018-04-16

## 2018-03-03 RX ORDER — ONDANSETRON 8 MG/1
8 TABLET, FILM COATED ORAL
Status: ON HOLD | COMMUNITY
Start: 2018-02-26 | End: 2018-04-22

## 2018-03-03 RX ORDER — ONDANSETRON 4 MG/1
TABLET, FILM COATED ORAL
Refills: 11 | Status: ON HOLD | COMMUNITY
Start: 2017-11-21 | End: 2018-04-22

## 2018-03-03 RX ORDER — DIPHENHYDRAMINE HYDROCHLORIDE 50 MG/ML
50 INJECTION INTRAMUSCULAR; INTRAVENOUS ONCE
Status: COMPLETED | OUTPATIENT
Start: 2018-03-03 | End: 2018-03-03

## 2018-03-03 RX ADMIN — DIPHENHYDRAMINE HYDROCHLORIDE 50 MG: 50 INJECTION, SOLUTION INTRAMUSCULAR; INTRAVENOUS at 20:35

## 2018-03-03 RX ADMIN — SODIUM CHLORIDE 1000 ML: 9 INJECTION, SOLUTION INTRAVENOUS at 21:29

## 2018-03-03 RX ADMIN — HYDROMORPHONE HYDROCHLORIDE 1 MG: 1 INJECTION, SOLUTION INTRAMUSCULAR; INTRAVENOUS; SUBCUTANEOUS at 20:38

## 2018-03-03 RX ADMIN — HYDROMORPHONE HYDROCHLORIDE 1 MG: 1 INJECTION, SOLUTION INTRAMUSCULAR; INTRAVENOUS; SUBCUTANEOUS at 21:30

## 2018-03-03 RX ADMIN — SODIUM CHLORIDE 1000 ML: 9 INJECTION, SOLUTION INTRAVENOUS at 20:34

## 2018-03-03 ASSESSMENT — ENCOUNTER SYMPTOMS
LIGHT-HEADEDNESS: 1
VOMITING: 1
DIARRHEA: 1
ABDOMINAL PAIN: 1
NAUSEA: 1
DIZZINESS: 1

## 2018-03-03 NOTE — ED AVS SNAPSHOT
[facility] Emergency Department    [address] [place] [address]    Phone:  [phone]    Fax:  [phone]                                       Doreen Peralta   MRN: [id number]    Department:  [facility] Emergency Department   Date of Visit:  3/3/2018           After Visit Summary Signature Page     I have received my discharge instructions, and my questions have been answered. I have discussed any challenges I see with this plan with the nurse or doctor.    ..........................................................................................................................................  Patient/Patient Representative Signature      ..........................................................................................................................................  Patient Representative Print Name and Relationship to Patient    ..................................................               ................................................  Date                                            Time    ..........................................................................................................................................  Reviewed by Signature/Title    ...................................................              ..............................................  Date                                                            Time

## 2018-03-03 NOTE — ED AVS SNAPSHOT
[facility] Emergency Department    [address] [place] [address]    Phone:  [phone]    Fax:  [phone]                                       Doreen Peralta   MRN: [id number]    Department:  [facility] Emergency Department   Date of Visit:  3/3/2018           Patient Information     Date Of Birth          1980        Your diagnoses for this visit were:     Abdominal pain, left lower quadrant     Vomiting and diarrhea     C. difficile diarrhea        You were seen by Sam Pugh MD.      Follow-up Information     Follow up with Franny Antoine MD.    Specialty:  Internal Medicine    Why:  this week    Contact information:    [facility]  [address] [facility] [address] [place] [address]  [phone]          Discharge Instructions       Take the Dilaudid for pain and the Benadryl for nausea.  Continue the rest of your medications including your oral Flagyl.  Encourage fluids.  Diet and activity as tolerated.  Recheck in clinic with your primary physician this next week if you need further refills.  Return to the ED if worse/concerns.  It was nice visiting with you again tonight.   I hope you feel better soon.       Thank you for choosing [facility]. We appreciate the opportunity to meet your urgent medical needs. Please let us know if we could have done anything to make your stay more satisfying.    After discharge, please closely monitor for any new or worsening symptoms. Return to the Emergency Department if you develop any acute worsening signs or symptoms.    If you received an opiate pain medication or sedative during your visit, please do not drive for at least 8 hours.     If you had lab work, cultures or imaging studies done during your stay, the final results may still be pending. We will call you if your plan of care needs to change. However, if you are not improving as expected, please follow up with your primary care provider or  clinic.     Start any prescription medications that were prescribed to you and take them as directed.     Please see additional handouts that may be pertinent to your condition.          24 Hour Appointment Hotline       To make an appointment at any [facility], call [phone] ([phone]). If you don't have a family doctor or clinic, we will help you find one. [facility] clinics are conveniently located to serve the needs of you and your family.             Review of your medicines      CONTINUE these medicines which may have CHANGED, or have new prescriptions. If we are uncertain of the size of tablets/capsules you have at home, strength may be listed as something that might have changed.        Dose / Directions Last dose taken    nortriptyline 10 MG capsule   Commonly known as:  PAMELOR   Dose:  30-40 mg   What changed:  how much to take   Quantity:  120 capsule        Take 3-4 capsules (30-40 mg) by mouth At Bedtime   Refills:  5          Our records show that you are taking the medicines listed below. If these are incorrect, please call your family doctor or clinic.        Dose / Directions Last dose taken    ACETAMINOPHEN PO   Dose:  500-1000 mg        Take 500-1,000 mg by mouth every 6 hours as needed for pain   Refills:  0        albuterol (2.5 MG/3ML) 0.083% neb solution   Dose:  2.5 mg   Quantity:  360 mL        Take 1 vial (2.5 mg) by nebulization every 4 hours as needed for shortness of breath / dyspnea or wheezing   Refills:  0        albuterol 90 MCG/ACT inhaler   Dose:  2 puff        Inhale 2 puffs into the lungs every 6 hours as needed   Refills:  0        Alfalfa 650 MG Tabs        Refills:  0        * cloNIDine 0.2 MG tablet   Commonly known as:  CATAPRES   Dose:  0.4 mg   Quantity:  60 tablet        Take 2 tablets (0.4 mg) by mouth every evening - DUE FOR FOLLOW UP   Refills:  0        * cloNIDine 0.2 MG tablet   Commonly known as:  CATAPRES   Dose:  0.4 mg        Take 0.4 mg by mouth    Refills:  0        diphenhydrAMINE 25 MG tablet   Commonly known as:  BENADRYL ALLERGY   Dose:  25 mg   Quantity:  30 tablet        Take 1 tablet (25 mg) by mouth every 6 hours as needed for itching or other (Nausea)   Refills:  0        DULoxetine 60 MG EC capsule   Commonly known as:  CYMBALTA   Quantity:  90 capsule        TAKE 1 CAPSULE(60 MG) BY MOUTH DAILY   Refills:  1        GRX VITAMIN E Lotn        Refills:  0        HYDROmorphone 2 MG tablet   Commonly known as:  DILAUDID   Dose:  2 mg   Quantity:  12 tablet        Take 1 tablet (2 mg) by mouth every 4 hours as needed for pain   Refills:  0        ipratropium 0.02 % neb solution   Commonly known as:  ATROVENT   Dose:  0.5 mg   Quantity:  225 mL        Take 2.5 mLs (0.5 mg) by nebulization every 6 hours as needed for wheezing   Refills:  0        metroNIDAZOLE 500 MG tablet   Commonly known as:  FLAGYL   Dose:  500 mg        Take 500 mg by mouth 3 times daily   Refills:  0        mirtazapine 15 MG ODT tab   Commonly known as:  REMERON SOL-TAB   Dose:  7.5 mg   Quantity:  45 tablet        0.5 tablets (7.5 mg) by Orally disintegrating tablet route At Bedtime   Refills:  0        * ondansetron 4 MG tablet   Commonly known as:  ZOFRAN        TK 1 TO 2  TS PO EVERY 8 HOURS IF NEEDED TK 2 TS   Refills:  11        * ondansetron 8 MG tablet   Commonly known as:  ZOFRAN   Dose:  8 mg        Take 8 mg by mouth   Refills:  0        SUMAtriptan 50 MG tablet   Commonly known as:  IMITREX   Dose:   mg   Quantity:  9 tablet        Take 1-2 tablets ( mg) by mouth at onset of headache for migraine - LAST REFILL, DUE FOR FOLLOW UP   Refills:  0        traZODone 100 MG tablet   Commonly known as:  DESYREL        TK SS TO ONE T PO HS PRF SLEEP / ANXIETY   Refills:  8        * Notice:  This list has 4 medication(s) that are the same as other medications prescribed for you. Read the directions carefully, and ask your doctor or other care provider to review them  with you.      STOP taking        Dose Reason for stopping Comments    order for DME                      Prescriptions were sent or printed at these locations (1 Prescription)                   [facility] [address] [place], [place] - [address] AT [facility] [address]   [address] [facility] [address] [facility] [address]    Telephone:  [phone]   Fax:  [phone]   Hours:                  Printed at Department/Unit printer (1 of 1)         HYDROmorphone (DILAUDID) 2 MG tablet                Procedures and tests performed during your visit     CBC with platelets differential    CRP inflammation    Comprehensive metabolic panel    Erythrocyte sedimentation rate auto    Lactic acid whole blood    Lipase    Peripheral IV: Standard      Orders Needing Specimen Collection     None      Pending Results     No orders found from 3/1/2018 to 3/4/2018.            Pending Culture Results     No orders found from 3/1/2018 to 3/4/2018.            Pending Results Instructions     If you had any lab results that were not finalized at the time of your Discharge, you can call the ED Lab Result RN at [phone]. You will be contacted by this team for any positive Lab results or changes in treatment. The nurses are available 7 days a week from 10A to 6:30P.  You can leave a message 24 hours per day and they will return your call.        Thank you for choosing [facility]       Thank you for choosing [facility] for your care. Our goal is always to provide you with excellent care. Hearing back from our patients is one way we can continue to improve our services. Please take a few minutes to complete the written survey that you may receive in the mail after you visit with us. Thank you!        [email or web address] Information     [employer] gives you secure access to your electronic health record. If you see a primary care provider, you can also send messages to your care team and make appointments. If you have questions, please call your  primary care clinic.  If you do not have a primary care provider, please call [phone] and they will assist you.        Care EveryWhere ID     This is your Care EveryWhere ID. This could be used by other organizations to access your [facility] medical records  [id number]        Equal Access to Services     TRAMAINE CLAYTON : Carrie Law, clark villeda, vesta long, elham gomez. So [facility] [phone].    ATENCIÓN: Si habla español, tiene a wade disposición servicios gratuitos de asistencia lingüística. Llame al [phone].    We comply with applicable federal civil rights laws and [place] laws. We do not discriminate on the basis of race, color, national origin, age, disability, sex, sexual orientation, or gender identity.            After Visit Summary       This is your record. Keep this with you and show to your community pharmacist(s) and doctor(s) at your next visit.

## 2018-03-04 NOTE — ED NOTES
1 IV attempt - Right Upper Arm - Unsuccessful.  No other palpable veins noted; Ultrasound will be used for IV access.

## 2018-03-04 NOTE — ED NOTES
Patient states her nausea has resolved.  Little improvement in pain.  Patient states she has made several trips to restroom to pass loose stool.

## 2018-03-04 NOTE — DISCHARGE INSTRUCTIONS
Take the Dilaudid for pain and the Benadryl for nausea.  Continue the rest of your medications including your oral Flagyl.  Encourage fluids.  Diet and activity as tolerated.  Recheck in clinic with your primary physician this next week if you need further refills.  Return to the ED if worse/concerns.  It was nice visiting with you again tonight.   I hope you feel better soon.       Thank you for choosing [facility]. We appreciate the opportunity to meet your urgent medical needs. Please let us know if we could have done anything to make your stay more satisfying.    After discharge, please closely monitor for any new or worsening symptoms. Return to the Emergency Department if you develop any acute worsening signs or symptoms.    If you received an opiate pain medication or sedative during your visit, please do not drive for at least 8 hours.     If you had lab work, cultures or imaging studies done during your stay, the final results may still be pending. We will call you if your plan of care needs to change. However, if you are not improving as expected, please follow up with your primary care provider or clinic.     Start any prescription medications that were prescribed to you and take them as directed.     Please see additional handouts that may be pertinent to your condition.

## 2018-03-04 NOTE — ED PROVIDER NOTES
History     Chief Complaint   Patient presents with     Nausea & Vomiting     The history is provided by the patient.     Doreen Peralta is a 37 year old female who presents to the ED complaining of vomiting. Patient was diagnosed with C diff on Tuesday, 4 days ago and started on oral Flagyl over the phone from clinic.   She was then seen in the ED because of persistent pain, N/V/D.  Patient was treated with several doses of Benadryl and Dilaudid and experienced relief. She was discharged with oral dilaudid to help manage the abdominal pain. Patient reports she experienced complete relief on Wednesday and Thursday but the abdominal came back after she ran out of Dilaudid.     She started vomiting on Friday which she feels is due to the pain-nausea-vomiting-pain, cycle. Patient has not been able to keep her Flagyl down due to the abdominal pain and nausea. Patient can't tell if there is blood in her diarrhea as she is on her period. Patient's abdominal pain is worse on the left side. Patient has experienced relief with Benadryl, Zofran is no longer working. Patient becomes dizzy and lightheaded if she stands up.    Problem List:    Patient Active Problem List    Diagnosis Date Noted     Sepsis (H) - possible 08/24/2017     Priority: Medium     Hypokalemia 08/24/2017     Priority: Medium     Essential hypertension 08/24/2017     Priority: Medium     Sepsis due to Klebsiella (H) 07/27/2017     Priority: Medium     Insomnia, unspecified type 03/31/2017     Priority: Medium     Abdominal pain 02/15/2017     Priority: Medium     Abdominal pain, generalized 01/28/2017     Priority: Medium     SIRS (systemic inflammatory response syndrome) (H) 01/26/2017     Priority: Medium     History of deep venous thrombosis 01/26/2017     Priority: Medium     Nausea and vomiting 01/26/2017     Priority: Medium     Vitamin D deficiency 01/16/2017     Priority: Medium     Chronic abdominal pain 11/15/2016     Priority: Medium      Patient is followed by Larisa Lorenzo PA-C for ongoing prescription of pain medication.  All refills should be approved by this provider, or covering partner.    Medication(s): no regular narcotics - narcotics given at ED visits  Maximum quantity per month: N/A  Clinic visit frequency required: Q 6  months     Controlled substance agreement:  Encounter-Level CSA - 11/9/15:               Controlled Substance Agreement - Scan on 11/19/2015 11:17 AM : CONTROLLED SUBSTANCE AGREEMENT 11/09/15 (below)          Encounter-Level CSA - 2/27/15:               Controlled Substance Agreement - Scan on 3/12/2015  7:50 AM : Controlled Medication Agreement 02/27/15 (below)            Pain Clinic evaluation in the past: Yes    DIRE Total Score(s):  No flowsheet data found.    Last [facility] website verification:  done on 11/15/16   [email or web address]        Attention to G-tube (H) 11/08/2016     Priority: Medium     Fever 10/16/2016     Priority: Medium     Coagulation defect (H) [D68.9] 09/16/2016     Priority: Medium     Chronic diarrhea 07/22/2016     Priority: Medium     Migraine 07/20/2016     Priority: Medium     Positive blood culture - Klebsiella oxytoca from Port-a-cath culture 07/18/2016     Priority: Medium     Mitral regurgitation mild-mod by Echo June 2016 07/18/2016     Priority: Medium     Anemia, iron deficiency 07/17/2016     Priority: Medium     Anemia in other chronic diseases classified elsewhere 06/14/2016     Priority: Medium     Munchausen syndrome - previously suspected 06/14/2016     Priority: Medium     Long-term (current) use of anticoagulants [Z79.01] 05/16/2016     Priority: Medium     Anxiety 05/16/2016     Priority: Medium     S/P partial resection of colon 04/11/2016     Priority: Medium     Malnutrition (H) 04/11/2016     Priority: Medium     Jejunostomy tube present (H) 03/21/2016     Priority: Medium     Malfunctioning jejunostomy tube (H) 12/22/2015     Priority: Medium     Malfunction  of gastrostomy tube (H) 11/19/2015     Priority: Medium     PEG tube malfunction (H) 10/16/2015     Priority: Medium     Health Care Home 01/16/2015     Priority: Medium     *See Letters for [facility] Care Plan: My Access Plan           PEG (percutaneous endoscopic gastrostomy) status 11/05/2014     Priority: Medium     Gastroparesis 04/11/2014     Priority: Medium     Overview:   Had ileostomy performed at [facility] in Jan 2012 as treatment       Migraines 04/11/2014     Priority: Medium     4/11/2014  With periods, every other month.       Intermittent asthma 04/11/2014     Priority: Medium     4/11/2014   With URIs, allergies       Allergic rhinitis 04/11/2014     Priority: Medium     Problem list name updated by automated process. Provider to review       Abnormal Pap smear of cervix 04/11/2014     Priority: Medium     4/11/2014  S/p colp and LEEP. Sees OB/GYN at [facility]. Pap every year x20 years - normal since.       S/P LEEP of cervix 02/06/2014     Priority: Medium     Patellofemoral stress syndrome 01/18/2014     Priority: Medium     Hx SBO 10/09/2013     Priority: Medium     4/11/2014  Recurrent. Sees GI (Dr. Redding - at [facility]) every 6 months or so.  Sees feeding tube nurse next week.       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 04/11/2013     Priority: Medium     Constipation by delayed colonic transit 10/24/2012     Priority: Medium     Atopic rhinitis 03/20/2009     Priority: Medium     Hyperbilirubinemia 03/11/2009     Priority: Medium        Past Medical History:    Past Medical History:   Diagnosis Date     Asthma      Bilateral ovarian cysts      Cervical cancer (H) 01/01/2008     Chronic pain      Colonic dysmotility      Constipation      E. coli sepsis (H) 5/8/2016     Enteritis      Fungemia 5/5/2016     Gastro-oesophageal reflux disease      H/O ileostomy      Hx of abnormal Pap smear      Hypertension      IBS (irritable bowel syndrome)      Other chronic pain      PONV (postoperative nausea and  vomiting)      Thrombosis, hepatic vein (H)        Past [facility] History:    Past [facility] History:   Procedure Laterality Date     COLONOSCOPY  7/10/2012    Procedure: COLONOSCOPY;;  Surgeon: Nicole Redding MD;  [facility]     COLONOSCOPY N/A 2/19/2017    Procedure: COLONOSCOPY;  Surgeon: Randell Muller MD;  Location: [facility] GI     COLONOSCOPY N/A 2/21/2017    Procedure: COLONOSCOPY;  Surgeon: Randell Muller MD;  Location: [facility] GI     ECHO CHELO  7/19/2016          ENDOSCOPIC INSERTION TUBE GASTROSTOMY N/A 1/21/2016    Procedure: ENDOSCOPIC INSERTION TUBE GASTROSTOMY;  Surgeon: Nicole Redding MD;  Location[facility] OR     ESOPHAGOSCOPY, GASTROSCOPY, DUODENOSCOPY (EGD), COMBINED  7/10/2012    Procedure: COMBINED ESOPHAGOSCOPY, GASTROSCOPY, DUODENOSCOPY (EGD);  Upper Endoscopy, Ileoscopy    Latex Allergy  with biopsies;  Surgeon: Nicole Redding MD;  Location: [facility]     ESOPHAGOSCOPY, GASTROSCOPY, DUODENOSCOPY (EGD), COMBINED N/A 11/5/2014    Procedure: COMBINED ESOPHAGOSCOPY, GASTROSCOPY, DUODENOSCOPY (EGD);  Surgeon: Nicole Redding MD;  Location[facility] OR     HC REPLACE DUODENOSTOMY/JEJUNOSTOMY TUBE PERCUTANEOUS N/A 8/27/2015    Procedure: REPLACE GASTROJEJUNOSTOMY TUBE, PERCUTANEOUS;  Surgeon: Mio Holder MD;  [facility]     HC REPLACE DUODENOSTOMY/JEJUNOSTOMY TUBE PERCUTANEOUS N/A 1/7/2016    Procedure: REPLACE JEJUNOSTOMY TUBE, PERCUTANEOUS;  Surgeon: Elsa Medel MD;  [facility] OR     HC REPLACE DUODENOSTOMY/JEJUNOSTOMY TUBE PERCUTANEOUS N/A 1/28/2016    Procedure: REPLACE JEJUNOSTOMY TUBE, PERCUTANEOUS;  Surgeon: Elsa Medel MD;  Location[facility]     HC REPLACE GASTROSTOMY/CECOSTOMY TUBE PERCUTANEOUS Left 5/19/2015    Procedure: REPLACE GASTROSTOMY TUBE, PERCUTANEOUS;  Surgeon: Melecio Morejon Chi, MD;  Location: [facility] GI     HC UGI ENDOSCOPY W PLACEMENT GASTROSTOMY TUBE PERCUT N/A 10/1/2015    Procedure: COMBINED ESOPHAGOSCOPY, GASTROSCOPY, DUODENOSCOPY (EGD),  PLACE PERCUTANEOUS ENDOSCOPIC GASTROSTOMY TUBE;  Surgeon: Mio Holder MD;  Location: [facility]     INSERT PORT VASCULAR ACCESS Right 7/20/2017    Procedure: INSERT PORT VASCULAR ACCESS;  Chest Port Placement  **Latex Allergy**;  Surgeon: Coy Rocha PA-C;  [facility]     LAPAROSCOPIC ASSISTED COLECTOMY  1/20/2012    Procedure:LAPAROSCOPIC ASSISTED COLECTOMY; Laparoscopic Ileostomy       LAPAROSCOPIC ASSISTED COLECTOMY LEFT (DESCENDING)  10/24/2012    Procedure: LAPAROSCOPIC ASSISTED COLECTOMY LEFT (DESCENDING);   Hand Assisted Laproscopic Subtotal abdominal Colectomy,Iieorectal anastamosis, Ileostomy Closure.       LAPAROSCOPIC ASSISTED INSERTION TUBE JEJUNOSTOMY N/A 10/16/2015    Procedure: LAPAROSCOPIC ASSISTED INSERTION TUBE JEJUNOSTOMY;  Surgeon: Elsa Medel MD;  Location:  OR     LAPAROSCOPIC CHOLECYSTECTOMY  2002    [facility]. stones duct     LAPAROSCOPIC ILEOSTOMY  1/20/2012    [facility], loop     LAPAROSCOPIC OOPHORECTOMY Right 2009    [facility]     LAPAROTOMY EXPLORATORY N/A 1/28/2016    Procedure: LAPAROTOMY EXPLORATORY;  Surgeon: Elsa Medel MD;  Location: [facility] OR     LEEP TX, CERVICAL  2009    [facility]     PICC INSERTION Left 10/21/2015    5fr DL Power PICC, 37cm (2cm external) in the L basilic vein w/ tip in the SVC RA junction.     REMOVE GASTROSTOMY TUBE ADULT N/A 12/12/2014    Procedure: REMOVE GASTROSTOMY TUBE ADULT;  Surgeon: Nicole Redding MD;  Location: [facility]     REMOVE PORT VASCULAR ACCESS Right 6/30/2016    Procedure: REMOVE PORT VASCULAR ACCESS;  Surgeon: Pradeep Orosco MD;  Location: [facility] OR     REMOVE PORT VASCULAR ACCESS Right 9/8/2017    Procedure: REMOVE PORT VASCULAR ACCESS;  right side mediport removal;  Surgeon: Zechariah Worthington MD;  Location: [facility] OR     replace GASTROSTOMY TUBE ADULT  5/19/15       Family History:    Family History   Problem Relation Age of Onset     Thyroid Disease Mother      Sjogren's Mother       GASTROINTESTINAL DISEASE Mother      Intermittent nausea vomiting diarrhea     Colon Polyps Mother      Prostate Problems Father      prostate enlargement     Lupus Maternal Grandmother      CANCER Maternal Grandfather      Lung     Colon Cancer Maternal Grandfather 65     CANCER Paternal Grandmother      Lung      CEREBROVASCULAR DISEASE Paternal Grandmother      DIABETES Paternal Grandmother      Cardiovascular Paternal Grandmother      CHF     CANCER Paternal Grandfather      Lung     Glaucoma Paternal Grandfather      Abdominal Aortic Aneurysm Other      Macular Degeneration No family hx of        Social History:  Marital Status:   [2]  Social History   Substance Use Topics     Smoking status: Current Some Day Smoker     Packs/day: 1.00     Years: 4.00     Types: Cigarettes     Last attempt to quit: 1/1/2004     Smokeless tobacco: Former User     Alcohol use No        Medications:      metroNIDAZOLE (FLAGYL) 500 MG tablet   ondansetron (ZOFRAN) 4 MG tablet   cloNIDine (CATAPRES) 0.2 MG tablet   traZODone (DESYREL) 100 MG tablet   ondansetron (ZOFRAN) 8 MG tablet   Emollient (GRX VITAMIN E) LOTN   Alfalfa 650 MG TABS   HYDROmorphone (DILAUDID) 2 MG tablet   SUMAtriptan (IMITREX) 50 MG tablet   cloNIDine (CATAPRES) 0.2 MG tablet   DULoxetine (CYMBALTA) 60 MG EC capsule   albuterol (2.5 MG/3ML) 0.083% neb solution   ipratropium (ATROVENT) 0.02 % neb solution   diphenhydrAMINE (BENADRYL ALLERGY) 25 MG tablet   nortriptyline (PAMELOR) 10 MG capsule   mirtazapine (REMERON SOL-TAB) 15 MG ODT tab   ACETAMINOPHEN PO   albuterol 90 MCG/ACT inhaler   [DISCONTINUED] traZODone (DESYREL) 50 MG tablet         Review of Systems   Gastrointestinal: Positive for abdominal pain (left sided), diarrhea, nausea and vomiting. Blood in stool: can't tell as she is on her period.   Neurological: Positive for dizziness and light-headedness.   All other systems reviewed and are negative.      Physical Exam   BP: 135/90  Heart Rate:  75  Temp: 99.5  F (37.5  C)  Resp: 20  SpO2: 99 %      Physical Exam   Constitutional: She is oriented to person, place, and time. She appears well-developed and well-nourished. She appears distressed (mild/mod).   HENT:   Mouth/Throat: Oropharynx is clear and moist.   Eyes: EOM are normal.   Neck: Neck supple.   Cardiovascular: Normal rate, regular rhythm and normal heart sounds.    Pulmonary/Chest: Effort normal and breath sounds normal. No respiratory distress.   Abdominal: There is tenderness (moderate LLQ/LUQ). There is no rebound.   Musculoskeletal: Normal range of motion. She exhibits no edema.   Neurological: She is alert and oriented to person, place, and time. No cranial nerve deficit.   Skin: Skin is warm and dry.   Psychiatric: She has a normal mood and affect.       ED Course    He finds that Benadryl works better than Zofran for her nausea.  IV placed.  Labs drawn.  Dilaudid for pain.  Benadryl for nausea and 1 L normal saline.  She recently had a CT scans of her labs are reasonable, will hold off on repeating that.  Her goal is to get the pain under control so that she can resume her intake and keep her medication down.    9:14 PM: Labs are trending towards improvement.  Her white count continues to come down.  Electrolytes are normal.  Pain went from 5/10 down to 4/10.  She appears more comfortable.  The Benadryl worked quite well for her nausea.  We will give her a second dose of Dilaudid.  Her first liter of saline is almost in.  We will plan on giving her a second and see if she can tolerate orals.    11:01 PM:  She is feeling better after the second liter of normal saline and was able to tolerate oral liquids.  She feels like she can make it at home.  I refilled her oral Dilaudid #12 for pain.  She is hoping the Flagyl is effective in the next couple of days so she doesn't need it any longer.  May need to consider switching antibiotics to oral Vancomycin if C diff persists.         ED Course      Procedures      Results for orders placed or performed during the hospital encounter of 03/03/18 (from the past 24 hour(s))   CBC with platelets differential   Result Value Ref Range    WBC 11.3 (H) 4.0 - 11.0 10e9/L    RBC Count 4.42 3.8 - 5.2 10e12/L    Hemoglobin 10.7 (L) 11.7 - 15.7 g/dL    Hematocrit 35.4 35.0 - 47.0 %    MCV 80 78 - 100 fl    MCH 24.2 (L) 26.5 - 33.0 pg    MCHC 30.2 (L) 31.5 - 36.5 g/dL    RDW 17.3 (H) 10.0 - 15.0 %    Platelet Count 472 (H) 150 - 450 10e9/L    Diff Method Automated Method     % Neutrophils 85.3 %    % Lymphocytes 11.1 %    % Monocytes 2.8 %    % Eosinophils 0.4 %    % Basophils 0.2 %    % Immature Granulocytes 0.2 %    Absolute Neutrophil 9.7 (H) 1.6 - 8.3 10e9/L    Absolute Lymphocytes 1.3 0.8 - 5.3 10e9/L    Absolute Monocytes 0.3 0.0 - 1.3 10e9/L    Absolute Eosinophils 0.0 0.0 - 0.7 10e9/L    Absolute Basophils 0.0 0.0 - 0.2 10e9/L    Abs Immature Granulocytes 0.0 0 - 0.4 10e9/L   Comprehensive metabolic panel   Result Value Ref Range    Sodium 140 133 - 144 mmol/L    Potassium 3.9 3.4 - 5.3 mmol/L    Chloride 107 94 - 109 mmol/L    Carbon Dioxide 23 20 - 32 mmol/L    Anion Gap 10 3 - 14 mmol/L    Glucose 97 70 - 99 mg/dL    Urea Nitrogen 13 7 - 30 mg/dL    Creatinine 0.84 0.52 - 1.04 mg/dL    GFR Estimate 76 >60 mL/min/1.7m2    GFR Estimate If Black >90 >60 mL/min/1.7m2    Calcium 8.7 8.5 - 10.1 mg/dL    Bilirubin Total 0.3 0.2 - 1.3 mg/dL    Albumin 3.2 (L) 3.4 - 5.0 g/dL    Protein Total 8.1 6.8 - 8.8 g/dL    Alkaline Phosphatase 124 40 - 150 U/L    ALT 26 0 - 50 U/L    AST 19 0 - 45 U/L   Lactic acid whole blood   Result Value Ref Range    Lactic Acid 1.1 0.7 - 2.0 mmol/L   Lipase   Result Value Ref Range    Lipase 174 73 - 393 U/L   Erythrocyte sedimentation rate auto   Result Value Ref Range    Sed Rate 67 (H) 0 - 20 mm/h   CRP inflammation   Result Value Ref Range    CRP Inflammation 29.1 (H) 0.0 - 8.0 mg/L     *Note: Due to a large number of results and/or  encounters for the requested time period, some results have not been displayed. A complete set of results can be found in Results Review.           Assessments & Plan (with Medical Decision Making)    (R10.32) Abdominal pain, left lower quadrant  Comment:   Plan: refilled her oral Dilaudid #12 tabs    (R11.10,  R19.7) Vomiting and diarrhea  Comment:   Plan: Benadryl works well.    (A04.72) C. difficile diarrhea  Comment: currently on Flagyl  Plan: continue Flagyl for now.  May need oral Vanco if persistent.          I have reviewed the nursing notes.    I have reviewed the findings, diagnosis, plan and need for follow up with the patient.       New Prescriptions    No medications on file       Final diagnoses:   Abdominal pain, left lower quadrant   Vomiting and diarrhea   C. difficile diarrhea     This document serves as a record of services personally performed by Sam Pugh MD. It was created on their behalf by Geoff Jha, a trained medical scribe. The creation of this record is based on the provider's personal observations and the statements of the patient. This document has been checked and approved by the attending provider.    Note: Chart documentation done in part with Dragon Voice Recognition software. Although reviewed after completion, some word and grammatical errors may remain.    3/3/2018   [facility] EMERGENCY DEPARTMENT     Sam Pugh MD  03/03/18 [id number]

## 2018-03-04 NOTE — ED NOTES
Patient directed to specific restroom for her sole use;  Sign placed on door restricting use.  Housekeeping to clean restroom in am.

## 2018-03-04 NOTE — ED NOTES
Patient was seen on Tuesday here in the ER was DX with C-diff. Received fluids and was feeling better. Yesterday and today feels the N/V/D are back and worse again.  Hoping to get some IV fluids and nausea meds to help her.

## 2018-03-07 ENCOUNTER — HOSPITAL ENCOUNTER (EMERGENCY)
Facility: CLINIC | Age: 38
Discharge: HOME | End: 2018-03-07
Attending: FAMILY MEDICINE | Admitting: FAMILY MEDICINE
Payer: COMMERCIAL

## 2018-03-07 VITALS
SYSTOLIC BLOOD PRESSURE: 121 MMHG | TEMPERATURE: 97.5 F | DIASTOLIC BLOOD PRESSURE: 80 MMHG | BODY MASS INDEX: 28.45 KG/M2 | HEART RATE: 67 BPM | OXYGEN SATURATION: 97 % | HEIGHT: 66 IN | WEIGHT: 177 LBS | RESPIRATION RATE: 19 BRPM

## 2018-03-07 DIAGNOSIS — R10.9 CHRONIC ABDOMINAL PAIN: ICD-10-CM

## 2018-03-07 DIAGNOSIS — A04.72 C. DIFFICILE COLITIS: ICD-10-CM

## 2018-03-07 DIAGNOSIS — G89.29 CHRONIC ABDOMINAL PAIN: ICD-10-CM

## 2018-03-07 LAB
ALBUMIN SERPL-MCNC: 3.5 G/DL (ref 3.4–5)
ALP SERPL-CCNC: 134 U/L (ref 40–150)
ALT SERPL W P-5'-P-CCNC: 19 U/L (ref 0–50)
ANION GAP SERPL CALCULATED.3IONS-SCNC: 8 MMOL/L (ref 3–14)
AST SERPL W P-5'-P-CCNC: 15 U/L (ref 0–45)
BASOPHILS # BLD AUTO: 0 10E9/L (ref 0–0.2)
BASOPHILS NFR BLD AUTO: 0.4 %
BILIRUB SERPL-MCNC: 0.5 MG/DL (ref 0.2–1.3)
BUN SERPL-MCNC: 10 MG/DL (ref 7–30)
CALCIUM SERPL-MCNC: 9.2 MG/DL (ref 8.5–10.1)
CHLORIDE SERPL-SCNC: 105 MMOL/L (ref 94–109)
CO2 SERPL-SCNC: 24 MMOL/L (ref 20–32)
CREAT SERPL-MCNC: 0.82 MG/DL (ref 0.52–1.04)
CRP SERPL-MCNC: 35.4 MG/L (ref 0–8)
DIFFERENTIAL METHOD BLD: ABNORMAL
EOSINOPHIL # BLD AUTO: 0 10E9/L (ref 0–0.7)
EOSINOPHIL NFR BLD AUTO: 0.6 %
ERYTHROCYTE [DISTWIDTH] IN BLOOD BY AUTOMATED COUNT: 17 % (ref 10–15)
ERYTHROCYTE [SEDIMENTATION RATE] IN BLOOD BY WESTERGREN METHOD: 69 MM/H (ref 0–20)
GFR SERPL CREATININE-BSD FRML MDRD: 78 ML/MIN/1.7M2
GLUCOSE SERPL-MCNC: 92 MG/DL (ref 70–99)
HCT VFR BLD AUTO: 37.4 % (ref 35–47)
HGB BLD-MCNC: 11.3 G/DL (ref 11.7–15.7)
IMM GRANULOCYTES # BLD: 0 10E9/L (ref 0–0.4)
IMM GRANULOCYTES NFR BLD: 0.1 %
LYMPHOCYTES # BLD AUTO: 1.3 10E9/L (ref 0.8–5.3)
LYMPHOCYTES NFR BLD AUTO: 19.4 %
MCH RBC QN AUTO: 24.1 PG (ref 26.5–33)
MCHC RBC AUTO-ENTMCNC: 30.2 G/DL (ref 31.5–36.5)
MCV RBC AUTO: 80 FL (ref 78–100)
MONOCYTES # BLD AUTO: 0.4 10E9/L (ref 0–1.3)
MONOCYTES NFR BLD AUTO: 5.3 %
NEUTROPHILS # BLD AUTO: 5.1 10E9/L (ref 1.6–8.3)
NEUTROPHILS NFR BLD AUTO: 74.2 %
PLATELET # BLD AUTO: 496 10E9/L (ref 150–450)
POTASSIUM SERPL-SCNC: 4.4 MMOL/L (ref 3.4–5.3)
PROT SERPL-MCNC: 8.7 G/DL (ref 6.8–8.8)
RBC # BLD AUTO: 4.68 10E12/L (ref 3.8–5.2)
SODIUM SERPL-SCNC: 137 MMOL/L (ref 133–144)
WBC # BLD AUTO: 6.9 10E9/L (ref 4–11)

## 2018-03-07 PROCEDURE — 99285 EMERGENCY DEPT VISIT HI MDM: CPT | Mod: 25 | Performed by: FAMILY MEDICINE

## 2018-03-07 PROCEDURE — 25000128 H RX IP 250 OP 636: Performed by: FAMILY MEDICINE

## 2018-03-07 PROCEDURE — 85025 COMPLETE CBC W/AUTO DIFF WBC: CPT | Performed by: FAMILY MEDICINE

## 2018-03-07 PROCEDURE — 96376 TX/PRO/DX INJ SAME DRUG ADON: CPT | Performed by: FAMILY MEDICINE

## 2018-03-07 PROCEDURE — 96361 HYDRATE IV INFUSION ADD-ON: CPT | Performed by: FAMILY MEDICINE

## 2018-03-07 PROCEDURE — 80053 COMPREHEN METABOLIC PANEL: CPT | Performed by: FAMILY MEDICINE

## 2018-03-07 PROCEDURE — 99285 EMERGENCY DEPT VISIT HI MDM: CPT | Mod: Z6 | Performed by: FAMILY MEDICINE

## 2018-03-07 PROCEDURE — 87040 BLOOD CULTURE FOR BACTERIA: CPT | Performed by: FAMILY MEDICINE

## 2018-03-07 PROCEDURE — 96374 THER/PROPH/DIAG INJ IV PUSH: CPT | Performed by: FAMILY MEDICINE

## 2018-03-07 PROCEDURE — 96375 TX/PRO/DX INJ NEW DRUG ADDON: CPT | Performed by: FAMILY MEDICINE

## 2018-03-07 PROCEDURE — 36415 COLL VENOUS BLD VENIPUNCTURE: CPT | Performed by: FAMILY MEDICINE

## 2018-03-07 PROCEDURE — 86140 C-REACTIVE PROTEIN: CPT | Performed by: FAMILY MEDICINE

## 2018-03-07 PROCEDURE — 85652 RBC SED RATE AUTOMATED: CPT | Performed by: FAMILY MEDICINE

## 2018-03-07 RX ORDER — ONDANSETRON 2 MG/ML
4 INJECTION INTRAMUSCULAR; INTRAVENOUS EVERY 30 MIN PRN
Status: DISCONTINUED | OUTPATIENT
Start: 2018-03-07 | End: 2018-03-07 | Stop reason: HOSPADM

## 2018-03-07 RX ORDER — VANCOMYCIN HYDROCHLORIDE 125 MG/1
125 CAPSULE ORAL 4 TIMES DAILY
Qty: 56 CAPSULE | Refills: 0 | Status: SHIPPED | OUTPATIENT
Start: 2018-03-07 | End: 2019-02-05

## 2018-03-07 RX ORDER — HYDROMORPHONE HYDROCHLORIDE 2 MG/1
2 TABLET ORAL EVERY 4 HOURS PRN
Qty: 8 TABLET | Refills: 0 | Status: SHIPPED | OUTPATIENT
Start: 2018-03-07 | End: 2018-03-29

## 2018-03-07 RX ORDER — DIPHENHYDRAMINE HYDROCHLORIDE 50 MG/ML
25 INJECTION INTRAMUSCULAR; INTRAVENOUS ONCE
Status: COMPLETED | OUTPATIENT
Start: 2018-03-07 | End: 2018-03-07

## 2018-03-07 RX ORDER — SODIUM CHLORIDE 9 MG/ML
1000 INJECTION, SOLUTION INTRAVENOUS CONTINUOUS
Status: DISCONTINUED | OUTPATIENT
Start: 2018-03-07 | End: 2018-03-07 | Stop reason: HOSPADM

## 2018-03-07 RX ORDER — KETOROLAC TROMETHAMINE 30 MG/ML
30 INJECTION, SOLUTION INTRAMUSCULAR; INTRAVENOUS ONCE
Status: DISCONTINUED | OUTPATIENT
Start: 2018-03-07 | End: 2018-03-07 | Stop reason: HOSPADM

## 2018-03-07 RX ADMIN — DIPHENHYDRAMINE HYDROCHLORIDE 25 MG: 50 INJECTION, SOLUTION INTRAMUSCULAR; INTRAVENOUS at 11:59

## 2018-03-07 RX ADMIN — SODIUM CHLORIDE 1000 ML: 9 INJECTION, SOLUTION INTRAVENOUS at 11:24

## 2018-03-07 RX ADMIN — ONDANSETRON 4 MG: 2 INJECTION INTRAMUSCULAR; INTRAVENOUS at 12:00

## 2018-03-07 RX ADMIN — HYDROMORPHONE HYDROCHLORIDE 1 MG: 1 INJECTION, SOLUTION INTRAMUSCULAR; INTRAVENOUS; SUBCUTANEOUS at 12:01

## 2018-03-07 RX ADMIN — HYDROMORPHONE HYDROCHLORIDE 1 MG: 1 INJECTION, SOLUTION INTRAMUSCULAR; INTRAVENOUS; SUBCUTANEOUS at 11:25

## 2018-03-07 RX ADMIN — DIPHENHYDRAMINE HYDROCHLORIDE 25 MG: 50 INJECTION, SOLUTION INTRAMUSCULAR; INTRAVENOUS at 11:27

## 2018-03-07 NOTE — ED NOTES
Nausea, vomiting and diarrhea, states she cannot keep anything down and she has been having fevers at home

## 2018-03-07 NOTE — ED AVS SNAPSHOT
[facility] Emergency Department    [address] [place] [address]    Phone:  [phone]    Fax:  [phone]                                       Doreen Peralta   MRN: [id number]    Department:  [facility] Emergency Department   Date of Visit:  3/7/2018           After Visit Summary Signature Page     I have received my discharge instructions, and my questions have been answered. I have discussed any challenges I see with this plan with the nurse or doctor.    ..........................................................................................................................................  Patient/Patient Representative Signature      ..........................................................................................................................................  Patient Representative Print Name and Relationship to Patient    ..................................................               ................................................  Date                                            Time    ..........................................................................................................................................  Reviewed by Signature/Title    ...................................................              ..............................................  Date                                                            Time

## 2018-03-07 NOTE — ED PROVIDER NOTES
History     Chief Complaint   Patient presents with     Nausea, Vomiting, & Diarrhea     HPI  Doreen Peralta is a 37 year old female who presents with nausea vomiting diarrhea that has persisted for the past almost a month now.  Patient unfortunately was diagnosed with C. difficile colitis in February and started on metronidazole.  She has been into the emergency department 2 or 3 times since being started on this.  She states that she will be fine for a couple days and then her pain and vomiting will return again.  She was started on some oral Dilaudid which did seem to help and it seems like then she ends up back in the emergency department when she runs out.  Patient states she is having more than 10-15 stools per day and having the same number of vomiting here in the last 24 hours.  It does not look like she has been seen by her primary care doctor since she has been diagnosed with the C. difficile colitis.  Patient denies any dysuria or hematuria.  She is complaining of mostly left lower quadrant abdominal pain.  Patient has not noticed any blood in her stools.    Problem List:    Patient Active Problem List    Diagnosis Date Noted     Sepsis (H) - possible 08/24/2017     Priority: Medium     Hypokalemia 08/24/2017     Priority: Medium     Essential hypertension 08/24/2017     Priority: Medium     Sepsis due to Klebsiella (H) 07/27/2017     Priority: Medium     Insomnia, unspecified type 03/31/2017     Priority: Medium     Abdominal pain 02/15/2017     Priority: Medium     Abdominal pain, generalized 01/28/2017     Priority: Medium     SIRS (systemic inflammatory response syndrome) (H) 01/26/2017     Priority: Medium     History of deep venous thrombosis 01/26/2017     Priority: Medium     Nausea and vomiting 01/26/2017     Priority: Medium     Vitamin D deficiency 01/16/2017     Priority: Medium     Chronic abdominal pain 11/15/2016     Priority: Medium     Patient is followed by Larisa Lorenzo PA-C  for ongoing prescription of pain medication.  All refills should be approved by this provider, or covering partner.    Medication(s): no regular narcotics - narcotics given at ED visits  Maximum quantity per month: N/A  Clinic visit frequency required: Q 6  months     Controlled substance agreement:  Encounter-Level CSA - 11/9/15:               Controlled Substance Agreement - Scan on 11/19/2015 11:17 AM : CONTROLLED SUBSTANCE AGREEMENT 11/09/15 (below)          Encounter-Level CSA - 2/27/15:               Controlled Substance Agreement - Scan on 3/12/2015  7:50 AM : Controlled Medication Agreement 02/27/15 (below)            Pain Clinic evaluation in the past: Yes    DIRE Total Score(s):  No flowsheet data found.    Last [facility] website verification:  done on 11/15/16   [email or web address]        Attention to G-tube (H) 11/08/2016     Priority: Medium     Fever 10/16/2016     Priority: Medium     Coagulation defect (H) [D68.9] 09/16/2016     Priority: Medium     Chronic diarrhea 07/22/2016     Priority: Medium     Migraine 07/20/2016     Priority: Medium     Positive blood culture - Klebsiella oxytoca from Port-a-cath culture 07/18/2016     Priority: Medium     Mitral regurgitation mild-mod by Echo June 2016 07/18/2016     Priority: Medium     Anemia, iron deficiency 07/17/2016     Priority: Medium     Anemia in other chronic diseases classified elsewhere 06/14/2016     Priority: Medium     Munchausen syndrome - previously suspected 06/14/2016     Priority: Medium     Long-term (current) use of anticoagulants [Z79.01] 05/16/2016     Priority: Medium     Anxiety 05/16/2016     Priority: Medium     S/P partial resection of colon 04/11/2016     Priority: Medium     Malnutrition (H) 04/11/2016     Priority: Medium     Jejunostomy tube present (H) 03/21/2016     Priority: Medium     Malfunctioning jejunostomy tube (H) 12/22/2015     Priority: Medium     Malfunction of gastrostomy tube (H) 11/19/2015     Priority:  Medium     PEG tube malfunction (H) 10/16/2015     Priority: Medium     Health Care Home 01/16/2015     Priority: Medium     *See Letters for [facility] Care Plan: My Access Plan           PEG (percutaneous endoscopic gastrostomy) status 11/05/2014     Priority: Medium     Gastroparesis 04/11/2014     Priority: Medium     Overview:   Had ileostomy performed at [facility] in Jan 2012 as treatment       Migraines 04/11/2014     Priority: Medium     4/11/2014  With periods, every other month.       Intermittent asthma 04/11/2014     Priority: Medium     4/11/2014   With URIs, allergies       Allergic rhinitis 04/11/2014     Priority: Medium     Problem list name updated by automated process. Provider to review       Abnormal Pap smear of cervix 04/11/2014     Priority: Medium     4/11/2014  S/p colp and LEEP. Sees OB/GYN at [facility]. Pap every year x20 years - normal since.       S/P LEEP of cervix 02/06/2014     Priority: Medium     Patellofemoral stress syndrome 01/18/2014     Priority: Medium     Hx SBO 10/09/2013     Priority: Medium     4/11/2014  Recurrent. Sees GI (Dr. Redding - at [facility]) every 6 months or so.  Sees feeding tube nurse next week.       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 04/11/2013     Priority: Medium     Constipation by delayed colonic transit 10/24/2012     Priority: Medium     Atopic rhinitis 03/20/2009     Priority: Medium     Hyperbilirubinemia 03/11/2009     Priority: Medium        Past Medical History:    Past Medical History:   Diagnosis Date     Asthma      Bilateral ovarian cysts      Cervical cancer (H) 01/01/2008     Chronic pain      Colonic dysmotility      Constipation      E. coli sepsis (H) 5/8/2016     Enteritis      Fungemia 5/5/2016     Gastro-oesophageal reflux disease      H/O ileostomy      Hx of abnormal Pap smear      Hypertension      IBS (irritable bowel syndrome)      Other chronic pain      PONV (postoperative nausea and vomiting)      Thrombosis, hepatic vein (H)        Past  [facility] History:    Past [facility] History:   Procedure Laterality Date     COLONOSCOPY  7/10/2012    Procedure: COLONOSCOPY;;  Surgeon: Nicole Redding MD;  Location: [facility]     COLONOSCOPY N/A 2/19/2017    Procedure: COLONOSCOPY;  Surgeon: Randell Muller MD;  Location: [facility] GI     COLONOSCOPY N/A 2/21/2017    Procedure: COLONOSCOPY;  Surgeon: Randell Muller MD;  Location: [facility] GI     ECHO CHELO  7/19/2016          ENDOSCOPIC INSERTION TUBE GASTROSTOMY N/A 1/21/2016    Procedure: ENDOSCOPIC INSERTION TUBE GASTROSTOMY;  Surgeon: Nicole Redding MD;  Location[facility] OR     ESOPHAGOSCOPY, GASTROSCOPY, DUODENOSCOPY (EGD), COMBINED  7/10/2012    Procedure: COMBINED ESOPHAGOSCOPY, GASTROSCOPY, DUODENOSCOPY (EGD);  Upper Endoscopy, Ileoscopy    Latex Allergy  with biopsies;  Surgeon: Nicole Redding MD;  Location[facility] OR     ESOPHAGOSCOPY, GASTROSCOPY, DUODENOSCOPY (EGD), COMBINED N/A 11/5/2014    Procedure: COMBINED ESOPHAGOSCOPY, GASTROSCOPY, DUODENOSCOPY (EGD);  Surgeon: Nicole Redding MD;  [facility] OR     HC REPLACE DUODENOSTOMY/JEJUNOSTOMY TUBE PERCUTANEOUS N/A 8/27/2015    Procedure: REPLACE GASTROJEJUNOSTOMY TUBE, PERCUTANEOUS;  Surgeon: Mio Holder MD;  [facility]     HC REPLACE DUODENOSTOMY/JEJUNOSTOMY TUBE PERCUTANEOUS N/A 1/7/2016    Procedure: REPLACE JEJUNOSTOMY TUBE, PERCUTANEOUS;  Surgeon: Elsa Medel MD;  [facility]     HC REPLACE DUODENOSTOMY/JEJUNOSTOMY TUBE PERCUTANEOUS N/A 1/28/2016    Procedure: REPLACE JEJUNOSTOMY TUBE, PERCUTANEOUS;  Surgeon: Elsa Medel MD;  Location[facility]     HC REPLACE GASTROSTOMY/CECOSTOMY TUBE PERCUTANEOUS Left 5/19/2015    Procedure: REPLACE GASTROSTOMY TUBE, PERCUTANEOUS;  Surgeon: Melecio Morejon Chi, MD;  Location: [facility] GI     HC UGI ENDOSCOPY W PLACEMENT GASTROSTOMY TUBE PERCUT N/A 10/1/2015    Procedure: COMBINED ESOPHAGOSCOPY, GASTROSCOPY, DUODENOSCOPY (EGD), PLACE PERCUTANEOUS ENDOSCOPIC GASTROSTOMY TUBE;   Surgeon: Mio Holder MD;  Location: [facility]     INSERT PORT VASCULAR ACCESS Right 7/20/2017    Procedure: INSERT PORT VASCULAR ACCESS;  Chest Port Placement  **Latex Allergy**;  Surgeon: Coy Rocha PA-C;  [facility]     LAPAROSCOPIC ASSISTED COLECTOMY  1/20/2012    Procedure:LAPAROSCOPIC ASSISTED COLECTOMY; Laparoscopic Ileostomy       LAPAROSCOPIC ASSISTED COLECTOMY LEFT (DESCENDING)  10/24/2012    Procedure: LAPAROSCOPIC ASSISTED COLECTOMY LEFT (DESCENDING);   Hand Assisted Laproscopic Subtotal abdominal Colectomy,Iieorectal anastamosis, Ileostomy Closure.       LAPAROSCOPIC ASSISTED INSERTION TUBE JEJUNOSTOMY N/A 10/16/2015    Procedure: LAPAROSCOPIC ASSISTED INSERTION TUBE JEJUNOSTOMY;  Surgeon: Elsa Medel MD;  Location:  OR     LAPAROSCOPIC CHOLECYSTECTOMY  2002    [facility]. stones duct     LAPAROSCOPIC ILEOSTOMY  1/20/2012    [facility], loop     L[facility]Y [facility]     LAPAROTOMY EXPLORATORY N/A 1/28/2016    Procedure: LAPAROTOMY EXPLORATORY;  Surgeon: Elsa Medel MD;  Location: [facility] OR     LEEP TX, CERVICAL  2009    [facility]     PICC INSERTION Left 10/21/2015    5fr DL Power PICC, 37cm (2cm external) in the L basilic vein w/ tip in the SVC RA junction.     REMOVE GASTROSTOMY TUBE ADULT N/A 12/12/2014    Procedure: REMOVE GASTROSTOMY TUBE ADULT;  Surgeon: Nicole Redding MD;  Location: [facility] GI     REMOVE PORT VASCULAR ACCESS Right 6/30/2016    Procedure: REMOVE PORT VASCULAR ACCESS;  Surgeon: Pradeep Orosco MD;  Location: [facility] OR     REMOVE PORT VASCULAR ACCESS Right 9/8/2017    Procedure: REMOVE PORT VASCULAR ACCESS;  right side mediport removal;  Surgeon: Zechariah Worthington MD;  Location: [facility] OR     replace GASTROSTOMY TUBE ADULT  5/19/15       Family History:    Family History   Problem Relation Age of Onset     Thyroid Disease Mother      Sjogren's Mother      GASTROINTESTINAL DISEASE Mother      Intermittent nausea  "vomiting diarrhea     Colon Polyps Mother      Prostate Problems Father      prostate enlargement     Lupus Maternal Grandmother      CANCER Maternal Grandfather      Lung     Colon Cancer Maternal Grandfather 65     CANCER Paternal Grandmother      Lung      CEREBROVASCULAR DISEASE Paternal Grandmother      DIABETES Paternal Grandmother      Cardiovascular Paternal Grandmother      CHF     CANCER Paternal Grandfather      Lung     Glaucoma Paternal Grandfather      Abdominal Aortic Aneurysm Other      Macular Degeneration No family hx of        Social History:  Marital Status:   [2]  Social History   Substance Use Topics     Smoking status: Current Some Day Smoker     Packs/day: 1.00     Years: 4.00     Types: Cigarettes     Last attempt to quit: 1/1/2004     Smokeless tobacco: Former User     Alcohol use No        Medications:      metroNIDAZOLE (FLAGYL) 500 MG tablet   ondansetron (ZOFRAN) 4 MG tablet   cloNIDine (CATAPRES) 0.2 MG tablet   traZODone (DESYREL) 100 MG tablet   ondansetron (ZOFRAN) 8 MG tablet   Emollient (GRX VITAMIN E) LOTN   Alfalfa 650 MG TABS   HYDROmorphone (DILAUDID) 2 MG tablet   SUMAtriptan (IMITREX) 50 MG tablet   cloNIDine (CATAPRES) 0.2 MG tablet   DULoxetine (CYMBALTA) 60 MG EC capsule   albuterol (2.5 MG/3ML) 0.083% neb solution   ipratropium (ATROVENT) 0.02 % neb solution   diphenhydrAMINE (BENADRYL ALLERGY) 25 MG tablet   nortriptyline (PAMELOR) 10 MG capsule   mirtazapine (REMERON SOL-TAB) 15 MG ODT tab   ACETAMINOPHEN PO   albuterol 90 MCG/ACT inhaler         Review of Systems   All other systems reviewed and are negative.      Physical Exam   BP: 121/80  Pulse: 67  Temp: 97.5  F (36.4  C)  Resp: 19  Height: 167.6 cm (5' 6\")  Weight: 80.3 kg (177 lb)  SpO2: 97 %      Physical Exam   Constitutional: She is oriented to person, place, and time. She appears well-developed and well-nourished. No distress.   HENT:   Mouth/Throat: Oropharynx is clear and moist.   Eyes: Conjunctivae " are normal.   Neck: Normal range of motion. Neck supple.   Cardiovascular: Normal rate, regular rhythm, normal heart sounds and intact distal pulses.  Exam reveals no gallop and no friction rub.    No murmur heard.  Pulmonary/Chest: Effort normal and breath sounds normal. No respiratory distress. She has no wheezes. She has no rales. She exhibits no tenderness.   Abdominal: Soft. Bowel sounds are normal. She exhibits no distension and no mass. There is tenderness (llq). There is no guarding.   Musculoskeletal: Normal range of motion. She exhibits no edema or tenderness.   Neurological: She is alert and oriented to person, place, and time.   Skin: Skin is warm and dry. No rash noted. She is not diaphoretic.   Psychiatric: She has a normal mood and affect. Judgment normal.   Nursing note and vitals reviewed.      ED Course     ED Course     Procedures           Results for orders placed or performed during the hospital encounter of 03/07/18   CBC with platelets differential   Result Value Ref Range    WBC 6.9 4.0 - 11.0 10e9/L    RBC Count 4.68 3.8 - 5.2 10e12/L    Hemoglobin 11.3 (L) 11.7 - 15.7 g/dL    Hematocrit 37.4 35.0 - 47.0 %    MCV 80 78 - 100 fl    MCH 24.1 (L) 26.5 - 33.0 pg    MCHC 30.2 (L) 31.5 - 36.5 g/dL    RDW 17.0 (H) 10.0 - 15.0 %    Platelet Count 496 (H) 150 - 450 10e9/L    Diff Method Automated Method     % Neutrophils 74.2 %    % Lymphocytes 19.4 %    % Monocytes 5.3 %    % Eosinophils 0.6 %    % Basophils 0.4 %    % Immature Granulocytes 0.1 %    Absolute Neutrophil 5.1 1.6 - 8.3 10e9/L    Absolute Lymphocytes 1.3 0.8 - 5.3 10e9/L    Absolute Monocytes 0.4 0.0 - 1.3 10e9/L    Absolute Eosinophils 0.0 0.0 - 0.7 10e9/L    Absolute Basophils 0.0 0.0 - 0.2 10e9/L    Abs Immature Granulocytes 0.0 0 - 0.4 10e9/L   Comprehensive metabolic panel   Result Value Ref Range    Sodium 137 133 - 144 mmol/L    Potassium 4.4 3.4 - 5.3 mmol/L    Chloride 105 94 - 109 mmol/L    Carbon Dioxide 24 20 - 32 mmol/L     Anion Gap 8 3 - 14 mmol/L    Glucose 92 70 - 99 mg/dL    Urea Nitrogen 10 7 - 30 mg/dL    Creatinine 0.82 0.52 - 1.04 mg/dL    GFR Estimate 78 >60 mL/min/1.7m2    GFR Estimate If Black >90 >60 mL/min/1.7m2    Calcium 9.2 8.5 - 10.1 mg/dL    Bilirubin Total 0.5 0.2 - 1.3 mg/dL    Albumin 3.5 3.4 - 5.0 g/dL    Protein Total 8.7 6.8 - 8.8 g/dL    Alkaline Phosphatase 134 40 - 150 U/L    ALT 19 0 - 50 U/L    AST 15 0 - 45 U/L   CRP inflammation   Result Value Ref Range    CRP Inflammation 35.4 (H) 0.0 - 8.0 mg/L   Erythrocyte sedimentation rate auto   Result Value Ref Range    Sed Rate 69 (H) 0 - 20 mm/h     *Note: Due to a large number of results and/or encounters for the requested time period, some results have not been displayed. A complete set of results can be found in Results Review.     Medications   0.9% sodium chloride BOLUS (0 mLs Intravenous Stopped 3/7/18 1241)     Followed by   sodium chloride 0.9% infusion (not administered)   ondansetron (ZOFRAN) injection 4 mg (4 mg Intravenous Given 3/7/18 1200)   ketorolac (TORADOL) injection 30 mg (30 mg Intravenous Not Given 3/7/18 1203)   HYDROmorphone (DILAUDID) injection 1 mg (1 mg Intravenous Given 3/7/18 1125)   diphenhydrAMINE (BENADRYL) injection 25 mg (25 mg Intravenous Given 3/7/18 1127)   HYDROmorphone (DILAUDID) injection 1 mg (1 mg Intravenous Given 3/7/18 1201)   diphenhydrAMINE (BENADRYL) injection 25 mg (25 mg Intravenous Given 3/7/18 1159)     Labs are reviewed and patient's white count has normalized, the last 2 visits they were elevated but trending down.  Patient's electrolytes are pristine, there are no signs of dehydration.  Patient's CRP and sed rate are elevated though.  Patient received IV Dilaudid and some Benadryl here with good relief.  At first I was thinking that the patient would need to be hospitalized that she has had multiple visits to the ER for this problem but with her labs looking very pristine, and with her feeling better, I  think she can be discharged home.  I am concerned that she continues to have diarrhea that she may be failing oral Flagyl so I think changing her to oral vancomycin would be the next best step.  She states that she had 2 diarrhea stools here although this was not documented by nursing and we were unable to collect samples.  I am going to send her home with stool collection staff for her to take to her clinic office tomorrow to have her stool rechecked again for C. difficile.  I am very concerned about her chronic pain issues and needing narcotics here again.  I am going to give her a few oral Dilaudid pills to get her through till tomorrow.  I called her primary care doctor's office and was able to get an appointment tomorrow at 230 and I told her that she will need to get any further pain pills for this condition from her primary care doctor.  I stressed to her that the emergency department would not be able to give her any more narcotics for this issue.  She expressed understanding.    Assessments & Plan (with Medical Decision Making)  C. difficile colitis, chronic abdominal pain     I have reviewed the nursing notes.    I have reviewed the findings, diagnosis, plan and need for follow up with the patient.        3/7/2018   [facility] EMERGENCY DEPARTMENT     Braxton Almeida MD  03/07/18 0404

## 2018-03-07 NOTE — ED AVS SNAPSHOT
[facility] Emergency Department    [address] [place] [address]    Phone:  [phone]    Fax:  [phone]                                       Doreen Peralta   MRN: [id number]    Department:  [facility] Emergency Department   Date of Visit:  3/7/2018           Patient Information     Date Of Birth          1980        Your diagnoses for this visit were:     C. difficile colitis     Chronic abdominal pain        You were seen by Braxton Almeida MD.      Follow-up Information     Go to Franny Antoine MD.    Specialty:  Internal Medicine    Why:  tomorrow 2:30pm    Contact information:    [facility]  [address] [id number] [phone]        Discharge References/Attachments     C. DIFFICILE, MEDICINE TO TREAT A REPEAT INFECTION (ENGLISH)    CHRONIC PAIN (ENGLISH)    PAIN, THE CYCLE OF CHRONIC (ENGLISH)    PAIN, COMPLEMENTARY CARE FOR (ENGLISH)      24 Hour Appointment Hotline       To make an appointment at any [facility], call [phone] ([phone]). If you don't have a family doctor or clinic, we will help you find one. [facility] are conveniently located to serve the needs of you and your family.             Review of your medicines      START taking        Dose / Directions Last dose taken    vancomycin 125 MG capsule   Commonly known as:  VANCOCIN HCL   Dose:  125 mg   Quantity:  56 capsule        Take 1 capsule (125 mg) by mouth 4 times daily for 14 days   Refills:  0          Our records show that you are taking the medicines listed below. If these are incorrect, please call your family doctor or clinic.        Dose / Directions Last dose taken    ACETAMINOPHEN PO   Dose:  500-1000 mg        Take 500-1,000 mg by mouth every 6 hours as needed for pain   Refills:  0        albuterol (2.5 MG/3ML) 0.083% neb solution   Dose:  2.5 mg   Quantity:  360 mL        Take 1 vial (2.5 mg) by nebulization every 4 hours as  needed for shortness of breath / dyspnea or wheezing   Refills:  0        albuterol 90 MCG/ACT inhaler   Dose:  2 puff        Inhale 2 puffs into the lungs every 6 hours as needed   Refills:  0        Alfalfa 650 MG Tabs        Refills:  0        * cloNIDine 0.2 MG tablet   Commonly known as:  CATAPRES   Dose:  0.4 mg   Quantity:  60 tablet        Take 2 tablets (0.4 mg) by mouth every evening - DUE FOR FOLLOW UP   Refills:  0        * cloNIDine 0.2 MG tablet   Commonly known as:  CATAPRES   Dose:  0.4 mg        Take 0.4 mg by mouth   Refills:  0        diphenhydrAMINE 25 MG tablet   Commonly known as:  BENADRYL ALLERGY   Dose:  25 mg   Quantity:  30 tablet        Take 1 tablet (25 mg) by mouth every 6 hours as needed for itching or other (Nausea)   Refills:  0        DULoxetine 60 MG EC capsule   Commonly known as:  CYMBALTA   Quantity:  90 capsule        TAKE 1 CAPSULE(60 MG) BY MOUTH DAILY   Refills:  1        GRX VITAMIN E Lotn        Refills:  0        HYDROmorphone 2 MG tablet   Commonly known as:  DILAUDID   Dose:  2 mg   Quantity:  8 tablet        Take 1 tablet (2 mg) by mouth every 4 hours as needed for pain   Refills:  0        ipratropium 0.02 % neb solution   Commonly known as:  ATROVENT   Dose:  0.5 mg   Quantity:  225 mL        Take 2.5 mLs (0.5 mg) by nebulization every 6 hours as needed for wheezing   Refills:  0        mirtazapine 15 MG ODT tab   Commonly known as:  REMERON SOL-TAB   Dose:  7.5 mg   Quantity:  45 tablet        0.5 tablets (7.5 mg) by Orally disintegrating tablet route At Bedtime   Refills:  0        nortriptyline 10 MG capsule   Commonly known as:  PAMELOR   Dose:  30-40 mg   Quantity:  120 capsule        Take 3-4 capsules (30-40 mg) by mouth At Bedtime   Refills:  5        * ondansetron 4 MG tablet   Commonly known as:  ZOFRAN        TK 1 TO 2  TS PO EVERY 8 HOURS IF NEEDED TK 2 TS   Refills:  11        * ondansetron 8 MG tablet   Commonly known as:  ZOFRAN   Dose:  8 mg         Take 8 mg by mouth   Refills:  0        SUMAtriptan 50 MG tablet   Commonly known as:  IMITREX   Dose:   mg   Quantity:  9 tablet        Take 1-2 tablets ( mg) by mouth at onset of headache for migraine - LAST REFILL, DUE FOR FOLLOW UP   Refills:  0        traZODone 100 MG tablet   Commonly known as:  DESYREL        TK SS TO ONE T PO HS PRF SLEEP / ANXIETY   Refills:  8        * Notice:  This list has 4 medication(s) that are the same as other medications prescribed for you. Read the directions carefully, and ask your doctor or other care provider to review them with you.      STOP taking        Dose Reason for stopping Comments    metroNIDAZOLE 500 MG tablet   Commonly known as:  FLAGYL                      Prescriptions were sent or printed at these locations (2 Prescriptions)                   [facility] [address] [place] [address] - [address] [place] [address] AT [facility] [address]   [address] [address]    Telephone:  [phone]   Fax:  [phone]   Hours:                  Printed at Department/Unit printer (2 of 2)         HYDROmorphone (DILAUDID) 2 MG tablet               vancomycin (VANCOCIN HCL) 125 MG capsule                Procedures and tests performed during your visit     Procedure/Test Number of Times Performed    Blood culture 2    CBC with platelets differential 1    CRP inflammation 1    Comprehensive metabolic panel 1    Erythrocyte sedimentation rate auto 1    Patient care order 1    Peripheral IV catheter 1      Orders Needing Specimen Collection     None      Pending Results     Date and Time Order Name Status Description    3/7/2018 1130 Blood culture In process     3/7/2018 1050 Blood culture In process             Pending Culture Results     Date and Time Order Name Status Description    3/7/2018 1130 Blood culture In process     3/7/2018 1050 Blood culture In process             Pending Results Instructions     If you had any lab results that were  not finalized at the time of your Discharge, you can call the ED Lab Result RN at [phone]. You will be contacted by this team for any positive Lab results or changes in treatment. The nurses are available 7 days a week from 10A to 6:30P.  You can leave a message 24 hours per day and they will return your call.        Thank you for choosing [facility]       Thank you for choosing [facility] for your care. Our goal is always to provide you with excellent care. Hearing back from our patients is one way we can continue to improve our services. Please take a few minutes to complete the written survey that you may receive in the mail after you visit with us. Thank you!        [facility] Information     [employer] gives you secure access to your electronic health record. If you see a primary care provider, you can also send messages to your care team and make appointments. If you have questions, please call your primary care clinic.  If you do not have a primary care provider, please call [phone] and they will assist you.        Care EveryWhere ID     This is your Care EveryWhere ID. This could be used by other organizations to access your [facility] medical records  [id number]        Equal Access to Services     TRAMAINE CLAYTON : Carrie Law, clark villeda, elham guerrero. So [facility] [phone].    ATENCIÓN: Si habla español, tiene a wade disposición servicios gratuitos de asistencia lingüística. Onealame al [phone].    We comply with applicable federal civil rights laws and [place] laws. We do not discriminate on the basis of race, color, national origin, age, disability, sex, sexual orientation, or gender identity.            After Visit Summary       This is your record. Keep this with you and show to your community pharmacist(s) and doctor(s) at your next visit.

## 2018-03-08 ENCOUNTER — TRANSFERRED RECORDS (OUTPATIENT)
Dept: HEALTH INFORMATION MANAGEMENT | Facility: CLINIC | Age: 38
End: 2018-03-08

## 2018-03-08 LAB
CREAT SERPL-MCNC: 0.8 MG/DL (ref 0.57–1.11)
GFR SERPL CREATININE-BSD FRML MDRD: >60 ML/MIN/1.73M2
GLUCOSE SERPL-MCNC: 84 MG/DL (ref 65–100)
POTASSIUM SERPL-SCNC: 4.2 MMOL/L (ref 3.5–5)

## 2018-03-13 LAB
BACTERIA SPEC CULT: NORMAL
BACTERIA SPEC CULT: NORMAL
SPECIMEN SOURCE: NORMAL
SPECIMEN SOURCE: NORMAL

## 2018-03-22 ENCOUNTER — TRANSFERRED RECORDS (OUTPATIENT)
Dept: HEALTH INFORMATION MANAGEMENT | Facility: CLINIC | Age: 38
End: 2018-03-22

## 2018-03-22 ENCOUNTER — TELEPHONE (OUTPATIENT)
Dept: GASTROENTEROLOGY | Facility: CLINIC | Age: 38
End: 2018-03-22

## 2018-03-22 NOTE — TELEPHONE ENCOUNTER
[facility] Call Center    Phone Message    May a detailed message be left on voicemail: yes    Reason for Call: Other: patient recently diagnosed with c-diff- records coming from [facility]- please call to schedule.     Action Taken: Message routed to:  Adult Clinics: Gastroenterology (GI) p [id number]

## 2018-03-26 ENCOUNTER — HOSPITAL ENCOUNTER (EMERGENCY)
Facility: CLINIC | Age: 38
Discharge: HOME | End: 2018-03-26
Attending: EMERGENCY MEDICINE | Admitting: EMERGENCY MEDICINE
Payer: COMMERCIAL

## 2018-03-26 VITALS
OXYGEN SATURATION: 100 % | DIASTOLIC BLOOD PRESSURE: 87 MMHG | WEIGHT: 174.38 LBS | HEART RATE: 70 BPM | TEMPERATURE: 98.9 F | BODY MASS INDEX: 28.03 KG/M2 | HEIGHT: 66 IN | RESPIRATION RATE: 18 BRPM | SYSTOLIC BLOOD PRESSURE: 132 MMHG

## 2018-03-26 DIAGNOSIS — E86.0 DEHYDRATION: ICD-10-CM

## 2018-03-26 DIAGNOSIS — A04.72 C. DIFFICILE COLITIS: ICD-10-CM

## 2018-03-26 DIAGNOSIS — R11.2 NON-INTRACTABLE VOMITING WITH NAUSEA, UNSPECIFIED VOMITING TYPE: ICD-10-CM

## 2018-03-26 LAB
ANION GAP SERPL CALCULATED.3IONS-SCNC: 7 MMOL/L (ref 3–14)
BASOPHILS # BLD AUTO: 0 10E9/L (ref 0–0.2)
BASOPHILS NFR BLD AUTO: 0.2 %
BUN SERPL-MCNC: 10 MG/DL (ref 7–30)
CALCIUM SERPL-MCNC: 9.3 MG/DL (ref 8.5–10.1)
CHLORIDE SERPL-SCNC: 105 MMOL/L (ref 94–109)
CO2 SERPL-SCNC: 24 MMOL/L (ref 20–32)
CREAT SERPL-MCNC: 0.86 MG/DL (ref 0.52–1.04)
DIFFERENTIAL METHOD BLD: ABNORMAL
EOSINOPHIL # BLD AUTO: 0 10E9/L (ref 0–0.7)
EOSINOPHIL NFR BLD AUTO: 0.2 %
ERYTHROCYTE [DISTWIDTH] IN BLOOD BY AUTOMATED COUNT: 16.8 % (ref 10–15)
GFR SERPL CREATININE-BSD FRML MDRD: 74 ML/MIN/1.7M2
GLUCOSE SERPL-MCNC: 79 MG/DL (ref 70–99)
HCT VFR BLD AUTO: 35.9 % (ref 35–47)
HGB BLD-MCNC: 11.4 G/DL (ref 11.7–15.7)
LYMPHOCYTES # BLD AUTO: 2.5 10E9/L (ref 0.8–5.3)
LYMPHOCYTES NFR BLD AUTO: 20.7 %
MCH RBC QN AUTO: 24.7 PG (ref 26.5–33)
MCHC RBC AUTO-ENTMCNC: 31.8 G/DL (ref 31.5–36.5)
MCV RBC AUTO: 78 FL (ref 78–100)
MONOCYTES # BLD AUTO: 0.6 10E9/L (ref 0–1.3)
MONOCYTES NFR BLD AUTO: 4.7 %
NEUTROPHILS # BLD AUTO: 9 10E9/L (ref 1.6–8.3)
NEUTROPHILS NFR BLD AUTO: 74.2 %
PLATELET # BLD AUTO: 566 10E9/L (ref 150–450)
POTASSIUM SERPL-SCNC: 4.1 MMOL/L (ref 3.4–5.3)
RBC # BLD AUTO: 4.61 10E12/L (ref 3.8–5.2)
SODIUM SERPL-SCNC: 136 MMOL/L (ref 133–144)
WBC # BLD AUTO: 12.2 10E9/L (ref 4–11)

## 2018-03-26 PROCEDURE — 80048 BASIC METABOLIC PNL TOTAL CA: CPT | Performed by: EMERGENCY MEDICINE

## 2018-03-26 PROCEDURE — 96361 HYDRATE IV INFUSION ADD-ON: CPT | Performed by: EMERGENCY MEDICINE

## 2018-03-26 PROCEDURE — 85025 COMPLETE CBC W/AUTO DIFF WBC: CPT | Performed by: EMERGENCY MEDICINE

## 2018-03-26 PROCEDURE — 25000128 H RX IP 250 OP 636: Performed by: EMERGENCY MEDICINE

## 2018-03-26 PROCEDURE — 96374 THER/PROPH/DIAG INJ IV PUSH: CPT | Performed by: EMERGENCY MEDICINE

## 2018-03-26 PROCEDURE — 99284 EMERGENCY DEPT VISIT MOD MDM: CPT | Mod: 25 | Performed by: EMERGENCY MEDICINE

## 2018-03-26 PROCEDURE — 96375 TX/PRO/DX INJ NEW DRUG ADDON: CPT | Performed by: EMERGENCY MEDICINE

## 2018-03-26 PROCEDURE — 99283 EMERGENCY DEPT VISIT LOW MDM: CPT | Mod: Z6 | Performed by: EMERGENCY MEDICINE

## 2018-03-26 RX ORDER — SODIUM CHLORIDE 9 MG/ML
1000 INJECTION, SOLUTION INTRAVENOUS CONTINUOUS
Status: DISCONTINUED | OUTPATIENT
Start: 2018-03-26 | End: 2018-03-26 | Stop reason: HOSPADM

## 2018-03-26 RX ORDER — DIPHENHYDRAMINE HYDROCHLORIDE 50 MG/ML
25 INJECTION INTRAMUSCULAR; INTRAVENOUS ONCE
Status: COMPLETED | OUTPATIENT
Start: 2018-03-26 | End: 2018-03-26

## 2018-03-26 RX ORDER — ONDANSETRON 2 MG/ML
4 INJECTION INTRAMUSCULAR; INTRAVENOUS EVERY 30 MIN PRN
Status: DISCONTINUED | OUTPATIENT
Start: 2018-03-26 | End: 2018-03-26 | Stop reason: HOSPADM

## 2018-03-26 RX ORDER — DIPHENHYDRAMINE HYDROCHLORIDE 50 MG/ML
25 INJECTION INTRAMUSCULAR; INTRAVENOUS
Status: DISCONTINUED | OUTPATIENT
Start: 2018-03-26 | End: 2018-03-26 | Stop reason: HOSPADM

## 2018-03-26 RX ADMIN — DIPHENHYDRAMINE HYDROCHLORIDE 25 MG: 50 INJECTION, SOLUTION INTRAMUSCULAR; INTRAVENOUS at 17:02

## 2018-03-26 RX ADMIN — ONDANSETRON 4 MG: 2 INJECTION INTRAMUSCULAR; INTRAVENOUS at 17:04

## 2018-03-26 RX ADMIN — SODIUM CHLORIDE 1000 ML: 9 INJECTION, SOLUTION INTRAVENOUS at 17:07

## 2018-03-26 RX ADMIN — DIPHENHYDRAMINE HYDROCHLORIDE 25 MG: 50 INJECTION, SOLUTION INTRAMUSCULAR; INTRAVENOUS at 16:58

## 2018-03-26 NOTE — DISCHARGE INSTRUCTIONS
Dehydration (Adult)  Dehydration occurs when your body loses too much fluid. This may be the result of prolonged vomiting or diarrhea, excessive sweating, or a high fever. It may also happen if you don t drink enough fluid when you re sick or out in the heat. Misuse of diuretics (water pills) can also be a cause.  Symptoms include thirst and decreased urine output. You may also feel dizzy, weak, fatigued, or very drowsy. The diet described below is usually enough to treat dehydration. In some cases, you may need medicine.  Home care    Drink at least 12 8-ounce glasses of fluid every day to resolve the dehydration. Fluid may include water; orange juice; lemonade; apple, grape, or cranberry juice; clear fruit drinks; electrolyte replacement and sports drinks; and teas and coffee without caffeine. Don't drink alcohol. If you have been diagnosed with a kidney disease, ask your doctor how much and what types of fluids you should drink to prevent dehydration. If you have kidney disease, fluid can build up in the body. This can be dangerous to your health.    If you have a fever, muscle aches, or a headache as a result of a cold or flu, you may take acetaminophen or ibuprofen, unless another medicine was prescribed. If you have chronic liver or kidney disease, or have ever had a stomach ulcer or gastrointestinal bleeding, talk with your healthcare provider before using these medicines. Don't take aspirin if you are younger than 18 and have a fever. Aspirin raises the chance for severe liver injury.  Follow-up care  Follow up with your healthcare provider, or as advised.  When to seek medical advice  Call your healthcare provider right away if any of these occur:    Continued vomiting    Frequent diarrhea (more than 5 times a day); blood (red or black color) or mucus in diarrhea    Blood in vomit or stool    Swollen abdomen or increasing abdominal pain    Weakness, dizziness, or fainting    Unusual drowsiness or  "confusion    Reduced urine output or extreme thirst    Fever of 100.4 F (38 C) or higher  Date Last Reviewed: 5/1/2017 2000-2017 [employer]. [address] [place] [address], [place] [address]. All rights reserved. This information is not intended as a substitute for professional medical care. Always follow your healthcare professional's instructions.           * VOMITING [6yr-Adult]  Vomiting is a common symptom that may be due to different causes. These include gastroenteritis (\"stomach-flu\"), food poisoning and gastritis. There are other more serious causes of vomiting which may be hard to diagnose early in the illness. Therefore, it is important to watch for the warning signs listed below.  The main danger from repeated vomiting is \"dehydration\". This is due to excess loss of water and minerals from the body. When this occurs, body fluids must be replaced.`  HOME CARE:    If symptoms are severe, rest at home for the next 24 hours.    You may use acetaminophen (Tylenol) 650-1000 mg every 6 hours to control fever, unless another medicine was prescribed. [ NOTE : If you have chronic liver disease, talk with your doctor before using acetaminophen.] (Aspirin should never be used in anyone under 18 years of age who is ill with a fever. It may cause severe liver damage.)    Avoid tobacco and alcohol use, which may worsen your symptoms.    If medicines for vomiting were prescribed, take as directed.  DURING THE FIRST 12-24 HOURS follow the diet below. Try to take frequent small sips even if you vomit occasionally:    FRUIT JUICES: Apple, grape juice, clear fruit drinks, electrolyte replacement and sports drinks.    BEVERAGES: Sport drinks such as Gatorade, soft drinks without caffeine; mineral water (plain or flavored), decaffeinated tea and coffee.    SOUPS: Clear broth, consommé and bouillon    DESSERTS: Plain gelatin (Jell-O), popsicles and fruit juice bars.  DURING THE NEXT 24 HOURS you may add the following " to the above:    Hot cereal, plain toast, bread, rolls, crackers    Plain noodles, rice, mashed potatoes, chicken noodle or rice soup    Unsweetened canned fruit (avoid pineapple), bananas    Avoid dairy products    Limit caffeine and chocolate. No spices or seasonings except salt.  DURING THE NEXT 24 HOURS  Slowly go back to a normal diet, as you feel better and your symptoms lessen.  FOLLOW UP with your doctor as advised if you are not improving over the next 2-3 days.  GET PROMPT MEDICAL ATTENTION if any of the following occur:    Constant abdominal pain that stays in the same spot or gets worse    Continued vomiting (unable to keep liquids down) for 24 hours    Frequent diarrhea (more than 5 times a day); blood (red or black color) in diarrhea    No urine output for 12 hours or extreme thirst    Weakness, dizziness or fainting    Unusually drowsy or confused    Fever over 101.0  F (38.3  C) for more than 3 days    Yellow color of the eyes or skin    3797-4377 [employer]. [address] [place] [address]. All rights reserved. This information is not intended as a substitute for professional medical care. Always follow your healthcare professional's instructions.  This information has been modified by your health care provider with permission from the publisher.

## 2018-03-26 NOTE — ED AVS SNAPSHOT
[facility] Emergency Department    [address] [place] [address]    Phone:  [phone]    Fax:  [phone]                                       Doreen Peralta   MRN: [id number]    Department:  [facility] Emergency Department   Date of Visit:  3/26/2018           Patient Information     Date Of Birth          1980        Your diagnoses for this visit were:     Non-intractable vomiting with nausea, unspecified vomiting type     Dehydration     C. difficile colitis        You were seen by Sam Raines MD.      Follow-up Information     Follow up with GI.    Why:  next week to discuss C Diff and symptoms        Discharge Instructions         Dehydration (Adult)  Dehydration occurs when your body loses too much fluid. This may be the result of prolonged vomiting or diarrhea, excessive sweating, or a high fever. It may also happen if you don t drink enough fluid when you re sick or out in the heat. Misuse of diuretics (water pills) can also be a cause.  Symptoms include thirst and decreased urine output. You may also feel dizzy, weak, fatigued, or very drowsy. The diet described below is usually enough to treat dehydration. In some cases, you may need medicine.  Home care    Drink at least 12 8-ounce glasses of fluid every day to resolve the dehydration. Fluid may include water; orange juice; lemonade; apple, grape, or cranberry juice; clear fruit drinks; electrolyte replacement and sports drinks; and teas and coffee without caffeine. Don't drink alcohol. If you have been diagnosed with a kidney disease, ask your doctor how much and what types of fluids you should drink to prevent dehydration. If you have kidney disease, fluid can build up in the body. This can be dangerous to your health.    If you have a fever, muscle aches, or a headache as a result of a cold or flu, you may take acetaminophen or ibuprofen, unless another medicine was prescribed. If you have chronic liver  "or kidney disease, or have ever had a stomach ulcer or gastrointestinal bleeding, talk with your healthcare provider before using these medicines. Don't take aspirin if you are younger than 18 and have a fever. Aspirin raises the chance for severe liver injury.  Follow-up care  Follow up with your healthcare provider, or as advised.  When to seek medical advice  Call your healthcare provider right away if any of these occur:    Continued vomiting    Frequent diarrhea (more than 5 times a day); blood (red or black color) or mucus in diarrhea    Blood in vomit or stool    Swollen abdomen or increasing abdominal pain    Weakness, dizziness, or fainting    Unusual drowsiness or confusion    Reduced urine output or extreme thirst    Fever of 100.4 F (38 C) or higher  Date Last Reviewed: 5/1/2017 2000-2017 [employer]. [address] [place] [address], [place] [address]. All rights reserved. This information is not intended as a substitute for professional medical care. Always follow your healthcare professional's instructions.           * VOMITING [6yr-Adult]  Vomiting is a common symptom that may be due to different causes. These include gastroenteritis (\"stomach-flu\"), food poisoning and gastritis. There are other more serious causes of vomiting which may be hard to diagnose early in the illness. Therefore, it is important to watch for the warning signs listed below.  The main danger from repeated vomiting is \"dehydration\". This is due to excess loss of water and minerals from the body. When this occurs, body fluids must be replaced.`  HOME CARE:    If symptoms are severe, rest at home for the next 24 hours.    You may use acetaminophen (Tylenol) 650-1000 mg every 6 hours to control fever, unless another medicine was prescribed. [ NOTE : If you have chronic liver disease, talk with your doctor before using acetaminophen.] (Aspirin should never be used in anyone under 18 years of age who is ill with a fever. It " may cause severe liver damage.)    Avoid tobacco and alcohol use, which may worsen your symptoms.    If medicines for vomiting were prescribed, take as directed.  DURING THE FIRST 12-24 HOURS follow the diet below. Try to take frequent small sips even if you vomit occasionally:    FRUIT JUICES: Apple, grape juice, clear fruit drinks, electrolyte replacement and sports drinks.    BEVERAGES: Sport drinks such as Gatorade, soft drinks without caffeine; mineral water (plain or flavored), decaffeinated tea and coffee.    SOUPS: Clear broth, consommé and bouillon    DESSERTS: Plain gelatin (Jell-O), popsicles and fruit juice bars.  DURING THE NEXT 24 HOURS you may add the following to the above:    Hot cereal, plain toast, bread, rolls, crackers    Plain noodles, rice, mashed potatoes, chicken noodle or rice soup    Unsweetened canned fruit (avoid pineapple), bananas    Avoid dairy products    Limit caffeine and chocolate. No spices or seasonings except salt.  DURING THE NEXT 24 HOURS  Slowly go back to a normal diet, as you feel better and your symptoms lessen.  FOLLOW UP with your doctor as advised if you are not improving over the next 2-3 days.  GET PROMPT MEDICAL ATTENTION if any of the following occur:    Constant abdominal pain that stays in the same spot or gets worse    Continued vomiting (unable to keep liquids down) for 24 hours    Frequent diarrhea (more than 5 times a day); blood (red or black color) in diarrhea    No urine output for 12 hours or extreme thirst    Weakness, dizziness or fainting    Unusually drowsy or confused    Fever over 101.0  F (38.3  C) for more than 3 days    Yellow color of the eyes or skin    2502-1892 [employer]. [address] [place] [address]. All rights reserved. This information is not intended as a substitute for professional medical care. Always follow your healthcare professional's instructions.  This information has been modified by your health care  provider with permission from the publisher.      Your next 10 appointments already scheduled     Apr 04, 2018  9:00 AM CDT   [facility]    [address] [place] [id number] [phone]              24 Hour Appointment Hotline       To make an appointment at any [facility], call [phone] ([phone]). If you don't have a family doctor or clinic, we will help you find one. [facility] are conveniently located to serve the needs of you and your family.             Review of your medicines      Our records show that you are taking the medicines listed below. If these are incorrect, please call your family doctor or clinic.        Dose / Directions Last dose taken    ACETAMINOPHEN PO   Dose:  500-1000 mg        Take 500-1,000 mg by mouth every 6 hours as needed for pain   Refills:  0        albuterol (2.5 MG/3ML) 0.083% neb solution   Dose:  2.5 mg   Quantity:  360 mL        Take 1 vial (2.5 mg) by nebulization every 4 hours as needed for shortness of breath / dyspnea or wheezing   Refills:  0        albuterol 90 MCG/ACT inhaler   Dose:  2 puff        Inhale 2 puffs into the lungs every 6 hours as needed   Refills:  0        Alfalfa 650 MG Tabs        Refills:  0        * cloNIDine 0.2 MG tablet   Commonly known as:  CATAPRES   Dose:  0.4 mg   Quantity:  60 tablet        Take 2 tablets (0.4 mg) by mouth every evening - DUE FOR FOLLOW UP   Refills:  0        * cloNIDine 0.2 MG tablet   Commonly known as:  CATAPRES   Dose:  0.4 mg        Take 0.4 mg by mouth   Refills:  0        diphenhydrAMINE 25 MG tablet   Commonly known as:  BENADRYL ALLERGY   Dose:  25 mg   Quantity:  30 tablet        Take 1 tablet (25 mg) by mouth every 6 hours as needed for itching or other (Nausea)   Refills:  0        DULoxetine 60 MG EC capsule   Commonly known as:  CYMBALTA   Quantity:  90 capsule        TAKE 1 CAPSULE(60  MG) BY MOUTH DAILY   Refills:  1        GRX VITAMIN E Lotn        Refills:  0        HYDROmorphone 2 MG tablet   Commonly known as:  DILAUDID   Dose:  2 mg   Quantity:  8 tablet        Take 1 tablet (2 mg) by mouth every 4 hours as needed for pain   Refills:  0        ipratropium 0.02 % neb solution   Commonly known as:  ATROVENT   Dose:  0.5 mg   Quantity:  225 mL        Take 2.5 mLs (0.5 mg) by nebulization every 6 hours as needed for wheezing   Refills:  0        mirtazapine 15 MG ODT tab   Commonly known as:  REMERON SOL-TAB   Dose:  7.5 mg   Quantity:  45 tablet        0.5 tablets (7.5 mg) by Orally disintegrating tablet route At Bedtime   Refills:  0        nortriptyline 10 MG capsule   Commonly known as:  PAMELOR   Dose:  30-40 mg   Quantity:  120 capsule        Take 3-4 capsules (30-40 mg) by mouth At Bedtime   Refills:  5        * ondansetron 4 MG tablet   Commonly known as:  ZOFRAN        TK 1 TO 2  TS PO EVERY 8 HOURS IF NEEDED TK 2 TS   Refills:  11        * ondansetron 8 MG tablet   Commonly known as:  ZOFRAN   Dose:  8 mg        Take 8 mg by mouth   Refills:  0        SUMAtriptan 50 MG tablet   Commonly known as:  IMITREX   Dose:   mg   Quantity:  9 tablet        Take 1-2 tablets ( mg) by mouth at onset of headache for migraine - LAST REFILL, DUE FOR FOLLOW UP   Refills:  0        traZODone 100 MG tablet   Commonly known as:  DESYREL        TK SS TO ONE T PO HS PRF SLEEP / ANXIETY   Refills:  8        * Notice:  This list has 4 medication(s) that are the same as other medications prescribed for you. Read the directions carefully, and ask your doctor or other care provider to review them with you.            Procedures and tests performed during your visit     Basic metabolic panel    CBC with platelets differential    Peripheral IV catheter      Orders Needing Specimen Collection     None      Pending Results     No orders found from 3/24/2018 to 3/27/2018.            Pending Culture  Results     No orders found from 3/24/2018 to 3/27/2018.            Pending Results Instructions     If you had any lab results that were not finalized at the time of your Discharge, you can call the ED Lab Result RN at [phone]. You will be contacted by this team for any positive Lab results or changes in treatment. The nurses are available 7 days a week from 10A to 6:30P.  You can leave a message 24 hours per day and they will return your call.        Thank you for choosing [facility]       Thank you for choosing [facility] for your care. Our goal is always to provide you with excellent care. Hearing back from our patients is one way we can continue to improve our services. Please take a few minutes to complete the written survey that you may receive in the mail after you visit with us. Thank you!        [employer] Information     [employer] gives you secure access to your electronic health record. If you see a primary care provider, you can also send messages to your care team and make appointments. If you have questions, please call your primary care clinic.  If you do not have a primary care provider, please call [phone] and they will assist you.        Care EveryWhere ID     This is your Care EveryWhere ID. This could be used by other organizations to access your [facility] medical records  [id number]        Equal Access to Services     TRAMAINE CLAYTON : Carrie Law, wadreda christiana, qayeta kaalmada ethan, elham gomez. So [facility] [phone].    ATENCIÓN: Si habla español, tiene a wade disposición servicios gratuitos de asistencia lingüística. Llame al [phone].    We comply with applicable federal civil rights laws and [place] laws. We do not discriminate on the basis of race, color, national origin, age, disability, sex, sexual orientation, or gender identity.            After Visit Summary       This is your record. Keep this with you and show to your  community pharmacist(s) and doctor(s) at your next visit.

## 2018-03-26 NOTE — ED NOTES
She has been dealing with C-dif for at least 2 months and was last discharged from the hospital on 3/10.  She has been unable to swallow her vanco since yesterday morning and the diarrhea is worse and she has been vomiting.

## 2018-03-26 NOTE — TELEPHONE ENCOUNTER
Nurse reviewed chart and care everywhere, only 1 positive Cdiff documented in lab results. This does not meet qualifications to schedule with Dr. Sutton per protocol.    Nurse called patient and spoke to her, after hearing all of her history and she is a current patient at the [facility] scheduled her with Dr Sutton.    Shari Vasques RN, BSN  [facility]  GI/Gen Surg/Hepatology Care Coordinator

## 2018-03-26 NOTE — ED AVS SNAPSHOT
[facility] Emergency Department    [address] [place] [address]    Phone:  [phone]    Fax:  [phone]                                       Doreen Peralta   MRN: [id number]    Department:  [facility] Emergency Department   Date of Visit:  3/26/2018           After Visit Summary Signature Page     I have received my discharge instructions, and my questions have been answered. I have discussed any challenges I see with this plan with the nurse or doctor.    ..........................................................................................................................................  Patient/Patient Representative Signature      ..........................................................................................................................................  Patient Representative Print Name and Relationship to Patient    ..................................................               ................................................  Date                                            Time    ..........................................................................................................................................  Reviewed by Signature/Title    ...................................................              ..............................................  Date                                                            Time

## 2018-03-27 ENCOUNTER — APPOINTMENT (OUTPATIENT)
Dept: CT IMAGING | Facility: CLINIC | Age: 38
End: 2018-03-27
Attending: EMERGENCY MEDICINE
Payer: COMMERCIAL

## 2018-03-27 ENCOUNTER — HOSPITAL ENCOUNTER (EMERGENCY)
Facility: CLINIC | Age: 38
Discharge: HOME | End: 2018-03-27
Attending: EMERGENCY MEDICINE | Admitting: EMERGENCY MEDICINE
Payer: COMMERCIAL

## 2018-03-27 VITALS
SYSTOLIC BLOOD PRESSURE: 146 MMHG | HEIGHT: 66 IN | HEART RATE: 100 BPM | OXYGEN SATURATION: 100 % | TEMPERATURE: 98.5 F | DIASTOLIC BLOOD PRESSURE: 89 MMHG | WEIGHT: 174 LBS | RESPIRATION RATE: 15 BRPM | BODY MASS INDEX: 27.97 KG/M2

## 2018-03-27 DIAGNOSIS — R11.2 INTRACTABLE VOMITING WITH NAUSEA, UNSPECIFIED VOMITING TYPE: ICD-10-CM

## 2018-03-27 DIAGNOSIS — A04.72 C. DIFFICILE COLITIS: ICD-10-CM

## 2018-03-27 LAB
ANION GAP SERPL CALCULATED.3IONS-SCNC: 10 MMOL/L (ref 3–14)
BASOPHILS # BLD AUTO: 0 10E9/L (ref 0–0.2)
BASOPHILS NFR BLD AUTO: 0.3 %
BUN SERPL-MCNC: 8 MG/DL (ref 7–30)
CALCIUM SERPL-MCNC: 9.5 MG/DL (ref 8.5–10.1)
CHLORIDE SERPL-SCNC: 104 MMOL/L (ref 94–109)
CO2 SERPL-SCNC: 21 MMOL/L (ref 20–32)
CREAT SERPL-MCNC: 0.87 MG/DL (ref 0.52–1.04)
DIFFERENTIAL METHOD BLD: ABNORMAL
EOSINOPHIL # BLD AUTO: 0 10E9/L (ref 0–0.7)
EOSINOPHIL NFR BLD AUTO: 0.2 %
ERYTHROCYTE [DISTWIDTH] IN BLOOD BY AUTOMATED COUNT: 16.7 % (ref 10–15)
GFR SERPL CREATININE-BSD FRML MDRD: 73 ML/MIN/1.7M2
GLUCOSE SERPL-MCNC: 77 MG/DL (ref 70–99)
HCG SERPL QL: NEGATIVE
HCT VFR BLD AUTO: 35.4 % (ref 35–47)
HGB BLD-MCNC: 11.3 G/DL (ref 11.7–15.7)
IMM GRANULOCYTES # BLD: 0 10E9/L (ref 0–0.4)
IMM GRANULOCYTES NFR BLD: 0.2 %
LYMPHOCYTES # BLD AUTO: 2.5 10E9/L (ref 0.8–5.3)
LYMPHOCYTES NFR BLD AUTO: 22.8 %
MCH RBC QN AUTO: 24.8 PG (ref 26.5–33)
MCHC RBC AUTO-ENTMCNC: 31.9 G/DL (ref 31.5–36.5)
MCV RBC AUTO: 78 FL (ref 78–100)
MONOCYTES # BLD AUTO: 0.6 10E9/L (ref 0–1.3)
MONOCYTES NFR BLD AUTO: 5.3 %
NEUTROPHILS # BLD AUTO: 7.8 10E9/L (ref 1.6–8.3)
NEUTROPHILS NFR BLD AUTO: 71.2 %
PLATELET # BLD AUTO: 530 10E9/L (ref 150–450)
POTASSIUM SERPL-SCNC: 5 MMOL/L (ref 3.4–5.3)
RBC # BLD AUTO: 4.56 10E12/L (ref 3.8–5.2)
SODIUM SERPL-SCNC: 135 MMOL/L (ref 133–144)
WBC # BLD AUTO: 10.9 10E9/L (ref 4–11)

## 2018-03-27 PROCEDURE — 96361 HYDRATE IV INFUSION ADD-ON: CPT | Performed by: EMERGENCY MEDICINE

## 2018-03-27 PROCEDURE — 36415 COLL VENOUS BLD VENIPUNCTURE: CPT | Performed by: EMERGENCY MEDICINE

## 2018-03-27 PROCEDURE — 80048 BASIC METABOLIC PNL TOTAL CA: CPT | Performed by: EMERGENCY MEDICINE

## 2018-03-27 PROCEDURE — 99285 EMERGENCY DEPT VISIT HI MDM: CPT | Mod: 25 | Performed by: EMERGENCY MEDICINE

## 2018-03-27 PROCEDURE — 74176 CT ABD & PELVIS W/O CONTRAST: CPT

## 2018-03-27 PROCEDURE — 25000128 H RX IP 250 OP 636: Performed by: EMERGENCY MEDICINE

## 2018-03-27 PROCEDURE — 84703 CHORIONIC GONADOTROPIN ASSAY: CPT | Performed by: EMERGENCY MEDICINE

## 2018-03-27 PROCEDURE — 25000125 ZZHC RX 250: Performed by: EMERGENCY MEDICINE

## 2018-03-27 PROCEDURE — 96375 TX/PRO/DX INJ NEW DRUG ADDON: CPT | Performed by: EMERGENCY MEDICINE

## 2018-03-27 PROCEDURE — 99285 EMERGENCY DEPT VISIT HI MDM: CPT | Mod: Z6 | Performed by: EMERGENCY MEDICINE

## 2018-03-27 PROCEDURE — 85025 COMPLETE CBC W/AUTO DIFF WBC: CPT | Performed by: EMERGENCY MEDICINE

## 2018-03-27 PROCEDURE — 96374 THER/PROPH/DIAG INJ IV PUSH: CPT | Performed by: EMERGENCY MEDICINE

## 2018-03-27 RX ORDER — SODIUM CHLORIDE 9 MG/ML
1000 INJECTION, SOLUTION INTRAVENOUS CONTINUOUS
Status: DISCONTINUED | OUTPATIENT
Start: 2018-03-27 | End: 2018-03-27 | Stop reason: HOSPADM

## 2018-03-27 RX ORDER — ONDANSETRON 4 MG/1
4 TABLET, ORALLY DISINTEGRATING ORAL ONCE
Status: COMPLETED | OUTPATIENT
Start: 2018-03-27 | End: 2018-03-27

## 2018-03-27 RX ORDER — DIPHENHYDRAMINE HYDROCHLORIDE 50 MG/ML
50 INJECTION INTRAMUSCULAR; INTRAVENOUS ONCE
Status: COMPLETED | OUTPATIENT
Start: 2018-03-27 | End: 2018-03-27

## 2018-03-27 RX ORDER — LORAZEPAM 2 MG/ML
1 INJECTION INTRAMUSCULAR ONCE
Status: COMPLETED | OUTPATIENT
Start: 2018-03-27 | End: 2018-03-27

## 2018-03-27 RX ORDER — ONDANSETRON 2 MG/ML
4 INJECTION INTRAMUSCULAR; INTRAVENOUS EVERY 30 MIN PRN
Status: DISCONTINUED | OUTPATIENT
Start: 2018-03-27 | End: 2018-03-27 | Stop reason: HOSPADM

## 2018-03-27 RX ADMIN — DIPHENHYDRAMINE HYDROCHLORIDE 50 MG: 50 INJECTION, SOLUTION INTRAMUSCULAR; INTRAVENOUS at 16:00

## 2018-03-27 RX ADMIN — ONDANSETRON 4 MG: 2 INJECTION INTRAMUSCULAR; INTRAVENOUS at 17:32

## 2018-03-27 RX ADMIN — ONDANSETRON 4 MG: 4 TABLET, ORALLY DISINTEGRATING ORAL at 16:00

## 2018-03-27 RX ADMIN — LORAZEPAM 1 MG: 2 INJECTION INTRAMUSCULAR; INTRAVENOUS at 17:35

## 2018-03-27 RX ADMIN — SODIUM CHLORIDE 1000 ML: 9 INJECTION, SOLUTION INTRAVENOUS at 17:32

## 2018-03-27 NOTE — ED AVS SNAPSHOT
" [facility] Emergency Department    [address] [place] [address]    Phone:  [phone]    Fax:  [phone]                                       Doreen Peralta   MRN: [id number]    Department:  [facility] Emergency Department   Date of Visit:  3/27/2018           Patient Information     Date Of Birth          1980        Your diagnoses for this visit were:     Intractable vomiting with nausea, unspecified vomiting type     C. difficile colitis        You were seen by Sam Raines MD.      Follow-up Information     Follow up with Franny Antoine MD In 1 day.    Specialty:  Internal Medicine    Contact information:    [facility]  [address] [place] [address]  [phone]          Discharge Instructions          * VOMITING [6yr-Adult]  Vomiting is a common symptom that may be due to different causes. These include gastroenteritis (\"stomach-flu\"), food poisoning and gastritis. There are other more serious causes of vomiting which may be hard to diagnose early in the illness. Therefore, it is important to watch for the warning signs listed below.  The main danger from repeated vomiting is \"dehydration\". This is due to excess loss of water and minerals from the body. When this occurs, body fluids must be replaced.`  HOME CARE:    If symptoms are severe, rest at home for the next 24 hours.    You may use acetaminophen (Tylenol) 650-1000 mg every 6 hours to control fever, unless another medicine was prescribed. [ NOTE : If you have chronic liver disease, talk with your doctor before using acetaminophen.] (Aspirin should never be used in anyone under 18 years of age who is ill with a fever. It may cause severe liver damage.)    Avoid tobacco and alcohol use, which may worsen your symptoms.    If medicines for vomiting were prescribed, take as directed.  DURING THE FIRST 12-24 HOURS follow the diet below. Try to take frequent small sips even if you vomit " occasionally:    FRUIT JUICES: Apple, grape juice, clear fruit drinks, electrolyte replacement and sports drinks.    BEVERAGES: Sport drinks such as Gatorade, soft drinks without caffeine; mineral water (plain or flavored), decaffeinated tea and coffee.    SOUPS: Clear broth, consommé and bouillon    DESSERTS: Plain gelatin (Jell-O), popsicles and fruit juice bars.  DURING THE NEXT 24 HOURS you may add the following to the above:    Hot cereal, plain toast, bread, rolls, crackers    Plain noodles, rice, mashed potatoes, chicken noodle or rice soup    Unsweetened canned fruit (avoid pineapple), bananas    Avoid dairy products    Limit caffeine and chocolate. No spices or seasonings except salt.  DURING THE NEXT 24 HOURS  Slowly go back to a normal diet, as you feel better and your symptoms lessen.  FOLLOW UP with your doctor as advised if you are not improving over the next 2-3 days.  GET PROMPT MEDICAL ATTENTION if any of the following occur:    Constant abdominal pain that stays in the same spot or gets worse    Continued vomiting (unable to keep liquids down) for 24 hours    Frequent diarrhea (more than 5 times a day); blood (red or black color) in diarrhea    No urine output for 12 hours or extreme thirst    Weakness, dizziness or fainting    Unusually drowsy or confused    Fever over 101.0  F (38.3  C) for more than 3 days    Yellow color of the eyes or skin    3644-8836 [employer]. [address] [place] [address]. All rights reserved. This information is not intended as a substitute for professional medical care. Always follow your healthcare professional's instructions.  This information has been modified by your health care provider with permission from the publisher.      Your next 10 appointments already scheduled     Apr 04, 2018  9:00 AM CDT   New Visit with Dominic Sutton MD   [facility] ([facility]    [address] [place] [address]  [id number] [phone]              24 Hour Appointment Hotline       To make an appointment at any [facility]), call [phone] ([phone]). If you don't have a family doctor or clinic, we will help you find one. [facility] are conveniently located to serve the needs of you and your family.             Review of your medicines      Our records show that you are taking the medicines listed below. If these are incorrect, please call your family doctor or clinic.        Dose / Directions Last dose taken    ACETAMINOPHEN PO   Dose:  500-1000 mg        Take 500-1,000 mg by mouth every 6 hours as needed for pain   Refills:  0        albuterol (2.5 MG/3ML) 0.083% neb solution   Dose:  2.5 mg   Quantity:  360 mL        Take 1 vial (2.5 mg) by nebulization every 4 hours as needed for shortness of breath / dyspnea or wheezing   Refills:  0        albuterol 90 MCG/ACT inhaler   Dose:  2 puff        Inhale 2 puffs into the lungs every 6 hours as needed   Refills:  0        Alfalfa 650 MG Tabs        Refills:  0        * cloNIDine 0.2 MG tablet   Commonly known as:  CATAPRES   Dose:  0.4 mg   Quantity:  60 tablet        Take 2 tablets (0.4 mg) by mouth every evening - DUE FOR FOLLOW UP   Refills:  0        * cloNIDine 0.2 MG tablet   Commonly known as:  CATAPRES   Dose:  0.4 mg        Take 0.4 mg by mouth   Refills:  0        diphenhydrAMINE 25 MG tablet   Commonly known as:  BENADRYL ALLERGY   Dose:  25 mg   Quantity:  30 tablet        Take 1 tablet (25 mg) by mouth every 6 hours as needed for itching or other (Nausea)   Refills:  0        DULoxetine 60 MG EC capsule   Commonly known as:  CYMBALTA   Quantity:  90 capsule        TAKE 1 CAPSULE(60 MG) BY MOUTH DAILY   Refills:  1        GRX VITAMIN E Lotn        Refills:  0        HYDROmorphone 2 MG tablet   Commonly known as:  DILAUDID   Dose:  2 mg   Quantity:  8 tablet        Take 1 tablet (2 mg) by mouth every 4 hours as needed for pain   Refills:  0         ipratropium 0.02 % neb solution   Commonly known as:  ATROVENT   Dose:  0.5 mg   Quantity:  225 mL        Take 2.5 mLs (0.5 mg) by nebulization every 6 hours as needed for wheezing   Refills:  0        mirtazapine 15 MG ODT tab   Commonly known as:  REMERON SOL-TAB   Dose:  7.5 mg   Quantity:  45 tablet        0.5 tablets (7.5 mg) by Orally disintegrating tablet route At Bedtime   Refills:  0        nortriptyline 10 MG capsule   Commonly known as:  PAMELOR   Dose:  30-40 mg   Quantity:  120 capsule        Take 3-4 capsules (30-40 mg) by mouth At Bedtime   Refills:  5        * ondansetron 4 MG tablet   Commonly known as:  ZOFRAN        TK 1 TO 2  TS PO EVERY 8 HOURS IF NEEDED TK 2 TS   Refills:  11        * ondansetron 8 MG tablet   Commonly known as:  ZOFRAN   Dose:  8 mg        Take 8 mg by mouth   Refills:  0        SUMAtriptan 50 MG tablet   Commonly known as:  IMITREX   Dose:   mg   Quantity:  9 tablet        Take 1-2 tablets ( mg) by mouth at onset of headache for migraine - LAST REFILL, DUE FOR FOLLOW UP   Refills:  0        traZODone 100 MG tablet   Commonly known as:  DESYREL        TK SS TO ONE T PO HS PRF SLEEP / ANXIETY   Refills:  8        * Notice:  This list has 4 medication(s) that are the same as other medications prescribed for you. Read the directions carefully, and ask your doctor or other care provider to review them with you.            Procedures and tests performed during your visit     Basic metabolic panel    CBC with platelets differential    CT Abdomen Pelvis w/o Contrast    HCG qualitative Blood    Peripheral IV catheter      Orders Needing Specimen Collection     None      Pending Results     No orders found from 3/25/2018 to 3/28/2018.            Pending Culture Results     No orders found from 3/25/2018 to 3/28/2018.            Pending Results Instructions     If you had any lab results that were not finalized at the time of your Discharge, you can call the ED Lab  Result RN at [phone]. You will be contacted by this team for any positive Lab results or changes in treatment. The nurses are available 7 days a week from 10A to 6:30P.  You can leave a message 24 hours per day and they will return your call.        Thank you for choosing [facility]       Thank you for choosing [facility] for your care. Our goal is always to provide you with excellent care. Hearing back from our patients is one way we can continue to improve our services. Please take a few minutes to complete the written survey that you may receive in the mail after you visit with us. Thank you!        [employer] Information     [employer] gives you secure access to your electronic health record. If you see a primary care provider, you can also send messages to your care team and make appointments. If you have questions, please call your primary care clinic.  If you do not have a primary care provider, please call [phone] and they will assist you.        Care EveryWhere ID     This is your Care EveryWhere ID. This could be used by other organizations to access your [facility] medical records  [id number]        Equal Access to Services     TRAMAINE CLAYTON : Hadii caitie Law, wafanny villeda, qajustin long, elham sal . So [facility] [phone].    ATENCIÓN: Si habla español, tiene a wade disposición servicios gratuitos de asistencia lingüística. Llame al [phone].    We comply with applicable federal civil rights laws and [place] laws. We do not discriminate on the basis of race, color, national origin, age, disability, sex, sexual orientation, or gender identity.            After Visit Summary       This is your record. Keep this with you and show to your community pharmacist(s) and doctor(s) at your next visit.

## 2018-03-27 NOTE — ED AVS SNAPSHOT
[facility] Emergency Department    [address] [place] [address]    Phone:  [phone]    Fax:  [phone]                                       Doreen Peralta   MRN: [id number]    Department:  [facility] Emergency Department   Date of Visit:  3/27/2018           After Visit Summary Signature Page     I have received my discharge instructions, and my questions have been answered. I have discussed any challenges I see with this plan with the nurse or doctor.    ..........................................................................................................................................  Patient/Patient Representative Signature      ..........................................................................................................................................  Patient Representative Print Name and Relationship to Patient    ..................................................               ................................................  Date                                            Time    ..........................................................................................................................................  Reviewed by Signature/Title    ...................................................              ..............................................  Date                                                            Time

## 2018-03-27 NOTE — DISCHARGE INSTRUCTIONS
"   * VOMITING [7yr-Adult]  Vomiting is a common symptom that may be due to different causes. These include gastroenteritis (\"stomach-flu\"), food poisoning and gastritis. There are other more serious causes of vomiting which may be hard to diagnose early in the illness. Therefore, it is important to watch for the warning signs listed below.  The main danger from repeated vomiting is \"dehydration\". This is due to excess loss of water and minerals from the body. When this occurs, body fluids must be replaced.`  HOME CARE:    If symptoms are severe, rest at home for the next 24 hours.    You may use acetaminophen (Tylenol) 650-1000 mg every 6 hours to control fever, unless another medicine was prescribed. [ NOTE : If you have chronic liver disease, talk with your doctor before using acetaminophen.] (Aspirin should never be used in anyone under 18 years of age who is ill with a fever. It may cause severe liver damage.)    Avoid tobacco and alcohol use, which may worsen your symptoms.    If medicines for vomiting were prescribed, take as directed.  DURING THE FIRST 12-24 HOURS follow the diet below. Try to take frequent small sips even if you vomit occasionally:    FRUIT JUICES: Apple, grape juice, clear fruit drinks, electrolyte replacement and sports drinks.    BEVERAGES: Sport drinks such as Gatorade, soft drinks without caffeine; mineral water (plain or flavored), decaffeinated tea and coffee.    SOUPS: Clear broth, consommé and bouillon    DESSERTS: Plain gelatin (Jell-O), popsicles and fruit juice bars.  DURING THE NEXT 24 HOURS you may add the following to the above:    Hot cereal, plain toast, bread, rolls, crackers    Plain noodles, rice, mashed potatoes, chicken noodle or rice soup    Unsweetened canned fruit (avoid pineapple), bananas    Avoid dairy products    Limit caffeine and chocolate. No spices or seasonings except salt.  DURING THE NEXT 24 HOURS  Slowly go back to a normal diet, as you feel better and " your symptoms lessen.  FOLLOW UP with your doctor as advised if you are not improving over the next 2-3 days.  GET PROMPT MEDICAL ATTENTION if any of the following occur:    Constant abdominal pain that stays in the same spot or gets worse    Continued vomiting (unable to keep liquids down) for 24 hours    Frequent diarrhea (more than 5 times a day); blood (red or black color) in diarrhea    No urine output for 12 hours or extreme thirst    Weakness, dizziness or fainting    Unusually drowsy or confused    Fever over 101.0  F (38.3  C) for more than 3 days    Yellow color of the eyes or skin    4825-5277 [employer]. [address] [place] [address]. All rights reserved. This information is not intended as a substitute for professional medical care. Always follow your healthcare professional's instructions.  This information has been modified by your health care provider with permission from the publisher.

## 2018-03-27 NOTE — ED PROVIDER NOTES
History     Chief Complaint   Patient presents with     Nausea, Vomiting, & Diarrhea     HPI  Doreen Peralta is a 37 year old female who presents with some vomiting and unable to hold down her oral vancomycin for Clostridium difficile.  She has had some loose stools but not out of line for what is being clinically treated she states.  No fever.  No significant abdominal pain.    Problem List:    Patient Active Problem List    Diagnosis Date Noted     Sepsis (H) - possible 08/24/2017     Priority: Medium     Hypokalemia 08/24/2017     Priority: Medium     Essential hypertension 08/24/2017     Priority: Medium     Sepsis due to Klebsiella (H) 07/27/2017     Priority: Medium     Insomnia, unspecified type 03/31/2017     Priority: Medium     Abdominal pain 02/15/2017     Priority: Medium     Abdominal pain, generalized 01/28/2017     Priority: Medium     SIRS (systemic inflammatory response syndrome) (H) 01/26/2017     Priority: Medium     History of deep venous thrombosis 01/26/2017     Priority: Medium     Nausea and vomiting 01/26/2017     Priority: Medium     Vitamin D deficiency 01/16/2017     Priority: Medium     Chronic abdominal pain 11/15/2016     Priority: Medium     Patient is followed by Larisa Lorenzo PA-C for ongoing prescription of pain medication.  All refills should be approved by this provider, or covering partner.    Medication(s): no regular narcotics - narcotics given at ED visits  Maximum quantity per month: N/A  Clinic visit frequency required: Q 6  months     Controlled substance agreement:  Encounter-Level CSA - 11/9/15:               Controlled Substance Agreement - Scan on 11/19/2015 11:17 AM : CONTROLLED SUBSTANCE AGREEMENT 11/09/15 (below)          Encounter-Level CSA - 2/27/15:               Controlled Substance Agreement - Scan on 3/12/2015  7:50 AM : Controlled Medication Agreement 02/27/15 (below)            Pain Clinic evaluation in the past: Yes    DIRE Total Score(s):  No  flowsheet data found.    Last [facility] website verification:  done on 11/15/16   [email or web address]        Attention to G-tube (H) 11/08/2016     Priority: Medium     Fever 10/16/2016     Priority: Medium     Coagulation defect (H) [D68.9] 09/16/2016     Priority: Medium     Chronic diarrhea 07/22/2016     Priority: Medium     Migraine 07/20/2016     Priority: Medium     Positive blood culture - Klebsiella oxytoca from Port-a-cath culture 07/18/2016     Priority: Medium     Mitral regurgitation mild-mod by Echo June 2016 07/18/2016     Priority: Medium     Anemia, iron deficiency 07/17/2016     Priority: Medium     Anemia in other chronic diseases classified elsewhere 06/14/2016     Priority: Medium     Munchausen syndrome - previously suspected 06/14/2016     Priority: Medium     Long-term (current) use of anticoagulants [Z79.01] 05/16/2016     Priority: Medium     Anxiety 05/16/2016     Priority: Medium     S/P partial resection of colon 04/11/2016     Priority: Medium     Malnutrition (H) 04/11/2016     Priority: Medium     Jejunostomy tube present (H) 03/21/2016     Priority: Medium     Malfunctioning jejunostomy tube (H) 12/22/2015     Priority: Medium     Malfunction of gastrostomy tube (H) 11/19/2015     Priority: Medium     PEG tube malfunction (H) 10/16/2015     Priority: Medium     Health Care Home 01/16/2015     Priority: Medium     *See Letters for [facility] Care Plan: My Access Plan           PEG (percutaneous endoscopic gastrostomy) status 11/05/2014     Priority: Medium     Gastroparesis 04/11/2014     Priority: Medium     Overview:   Had ileostomy performed at [facility] in Jan 2012 as treatment       Migraines 04/11/2014     Priority: Medium     4/11/2014  With periods, every other month.       Intermittent asthma 04/11/2014     Priority: Medium     4/11/2014   With URIs, allergies       Allergic rhinitis 04/11/2014     Priority: Medium     Problem list name updated by automated process. Provider to  review       Abnormal Pap smear of cervix 04/11/2014     Priority: Medium     4/11/2014  S/p colp and LEEP. Sees OB/GYN at [facility]. Pap every year x20 years - normal since.       S/P LEEP of cervix 02/06/2014     Priority: Medium     Patellofemoral stress syndrome 01/18/2014     Priority: Medium     Hx SBO 10/09/2013     Priority: Medium     4/11/2014  Recurrent. Sees GI (Dr. Redding - at [facility]) every 6 months or so.  Sees feeding tube nurse next week.       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 04/11/2013     Priority: Medium     Constipation by delayed colonic transit 10/24/2012     Priority: Medium     Atopic rhinitis 03/20/2009     Priority: Medium     Hyperbilirubinemia 03/11/2009     Priority: Medium        Past Medical History:    Past Medical History:   Diagnosis Date     Asthma      Bilateral ovarian cysts      Cervical cancer (H) 01/01/2008     Chronic pain      Colonic dysmotility      Constipation      E. coli sepsis (H) 5/8/2016     Enteritis      Fungemia 5/5/2016     Gastro-oesophageal reflux disease      H/O ileostomy      Hx of abnormal Pap smear      Hypertension      IBS (irritable bowel syndrome)      Other chronic pain      PONV (postoperative nausea and vomiting)      Thrombosis, hepatic vein (H)        Past [facility] History:    Past [facility] History:   Procedure Laterality Date     COLONOSCOPY  7/10/2012    Procedure: COLONOSCOPY;;  Surgeon: Nicole Redding MD;  Location:  OR     COLONOSCOPY N/A 2/19/2017    Procedure: COLONOSCOPY;  Surgeon: Randell Muller MD;  Location: [facility] GI     COLONOSCOPY N/A 2/21/2017    Procedure: COLONOSCOPY;  Surgeon: Randell Muller MD;  Location: [facility]     ECHO CHELO  7/19/2016          ENDOSCOPIC INSERTION TUBE GASTROSTOMY N/A 1/21/2016    Procedure: ENDOSCOPIC INSERTION TUBE GASTROSTOMY;  Surgeon: Nicole Redding MD;  Location:  OR     ESOPHAGOSCOPY, GASTROSCOPY, DUODENOSCOPY (EGD), COMBINED  7/10/2012    Procedure: COMBINED ESOPHAGOSCOPY,  GASTROSCOPY, DUODENOSCOPY (EGD);  Upper Endoscopy, Ileoscopy    Latex Allergy  with biopsies;  Surgeon: Nicole Redding MD;  [facility]     ESOPHAGOSCOPY, GASTROSCOPY, DUODENOSCOPY (EGD), COMBINED N/A 11/5/2014    Procedure: COMBINED ESOPHAGOSCOPY, GASTROSCOPY, DUODENOSCOPY (EGD);  Surgeon: Nicole Redding MD;  [facility]     HC REPLACE DUODENOSTOMY/JEJUNOSTOMY TUBE PERCUTANEOUS N/A 8/27/2015    Procedure: REPLACE GASTROJEJUNOSTOMY TUBE, PERCUTANEOUS;  Surgeon: Mio Holder MD;  [facility]     HC REPLACE DUODENOSTOMY/JEJUNOSTOMY TUBE PERCUTANEOUS N/A 1/7/2016    Procedure: REPLACE JEJUNOSTOMY TUBE, PERCUTANEOUS;  Surgeon: Elsa Medel MD;  Location[facility]     HC REPLACE DUODENOSTOMY/JEJUNOSTOMY TUBE PERCUTANEOUS N/A 1/28/2016    Procedure: REPLACE JEJUNOSTOMY TUBE, PERCUTANEOUS;  Surgeon: Elsa Medel MD;  [facility]     HC REPLACE GASTROSTOMY/CECOSTOMY TUBE PERCUTANEOUS Left 5/19/2015    Procedure: REPLACE GASTROSTOMY TUBE, PERCUTANEOUS;  Surgeon: Melecio Morejon Chi, MD;  Location: [facility]     HC UGI ENDOSCOPY W PLACEMENT GASTROSTOMY TUBE PERCUT N/A 10/1/2015    Procedure: COMBINED ESOPHAGOSCOPY, GASTROSCOPY, DUODENOSCOPY (EGD), PLACE PERCUTANEOUS ENDOSCOPIC GASTROSTOMY TUBE;  Surgeon: Mio Holder MD;  Location: [facility]     INSERT PORT VASCULAR ACCESS Right 7/20/2017    Procedure: INSERT PORT VASCULAR ACCESS;  Chest Port Placement  **Latex Allergy**;  Surgeon: Coy Rocha PA-C;  [facility]     LAPAROSCOPIC ASSISTED COLECTOMY  1/20/2012    Procedure:LAPAROSCOPIC ASSISTED COLECTOMY; Laparoscopic Ileostomy       LAPAROSCOPIC ASSISTED COLECTOMY LEFT (DESCENDING)  10/24/2012    Procedure: LAPAROSCOPIC ASSISTED COLECTOMY LEFT (DESCENDING);   Hand Assisted Laproscopic Subtotal abdominal Colectomy,Iieorectal anastamosis, Ileostomy Closure.       LAPAROSCOPIC ASSISTED INSERTION TUBE JEJUNOSTOMY N/A 10/16/2015    Procedure: LAPAROSCOPIC ASSISTED INSERTION  TUBE JEJUNOSTOMY;  Surgeon: Elsa Medel MD;  [facility]     LAPAROSCOPIC CHOLECYSTECTOMY  2002    [facility] c[facility] stones duct     LAPAROSCOPIC ILEOSTOMY  1/20/2012    [facility], loop     LAPAROSCOPIC OOPHORECTOMY Right 2009    [facility]     LAPAROTOMY EXPLORATORY N/A 1/28/2016    Procedure: LAPAROTOMY EXPLORATORY;  Surgeon: Elsa Medel MD;  [facility]: [facility]     LEEP TX, CERVICAL  2009    [facility]     PICC INSERTION Left 10/21/2015    5fr DL Power PICC, 37cm (2cm external) in the L basilic vein w/ tip in the SVC RA junction.     REMOVE GASTROSTOMY TUBE ADULT N/A 12/12/2014    Procedure: REMOVE GASTROSTOMY TUBE ADULT;  Surgeon: Nicole Redding MD;  Location: [facility]     REMOVE PORT VASCULAR ACCESS Right 6/30/2016    Procedure: REMOVE PORT VASCULAR ACCESS;  Surgeon: Pradeep Orosco MD;  Location: [facility] OR     REMOVE PORT VASCULAR ACCESS Right 9/8/2017    Procedure: REMOVE PORT VASCULAR ACCESS;  right side mediport removal;  Surgeon: Zechariah Worthington MD;  Location: [facility] OR     replace GASTROSTOMY TUBE ADULT  5/19/15       Family History:    Family History   Problem Relation Age of Onset     Thyroid Disease Mother      Sjogren's Mother      GASTROINTESTINAL DISEASE Mother      Intermittent nausea vomiting diarrhea     Colon Polyps Mother      Prostate Problems Father      prostate enlargement     Lupus Maternal Grandmother      CANCER Maternal Grandfather      Lung     Colon Cancer Maternal Grandfather 65     CANCER Paternal Grandmother      Lung      CEREBROVASCULAR DISEASE Paternal Grandmother      DIABETES Paternal Grandmother      Cardiovascular Paternal Grandmother      CHF     CANCER Paternal Grandfather      Lung     Glaucoma Paternal Grandfather      Abdominal Aortic Aneurysm Other      Macular Degeneration No family hx of        Social History:  Marital Status:   [2]  Social History   Substance Use Topics     Smoking status: Current Some Day Smoker     Packs/day: 1.00      "Years: 4.00     Types: Cigarettes     Last attempt to quit: 1/1/2004     Smokeless tobacco: Former User     Alcohol use No        Medications:      HYDROmorphone (DILAUDID) 2 MG tablet   ondansetron (ZOFRAN) 4 MG tablet   cloNIDine (CATAPRES) 0.2 MG tablet   traZODone (DESYREL) 100 MG tablet   ondansetron (ZOFRAN) 8 MG tablet   Emollient (GRX VITAMIN E) LOTN   Alfalfa 650 MG TABS   SUMAtriptan (IMITREX) 50 MG tablet   cloNIDine (CATAPRES) 0.2 MG tablet   DULoxetine (CYMBALTA) 60 MG EC capsule   albuterol (2.5 MG/3ML) 0.083% neb solution   ipratropium (ATROVENT) 0.02 % neb solution   diphenhydrAMINE (BENADRYL ALLERGY) 25 MG tablet   nortriptyline (PAMELOR) 10 MG capsule   mirtazapine (REMERON SOL-TAB) 15 MG ODT tab   ACETAMINOPHEN PO   albuterol 90 MCG/ACT inhaler         Review of Systems  All other systems are reviewed and are negative    Physical Exam   BP: (!) 138/93  Pulse: 72  Temp: 98.9  F (37.2  C)  Resp: 18  Height: 167.6 cm (5' 6\")  Weight: 79.1 kg (174 lb 6 oz)  SpO2: 100 %      Physical Exam   Constitutional: She appears well-developed and well-nourished. No distress.   HENT:   Head: Normocephalic and atraumatic.   Mouth/Throat: Oropharynx is clear and moist.   Eyes: Pupils are equal, round, and reactive to light. No scleral icterus.   Neck: Normal range of motion. Neck supple.   Cardiovascular: Normal rate, regular rhythm, normal heart sounds and intact distal pulses.    No murmur heard.  Pulmonary/Chest: No stridor. No respiratory distress. She has no wheezes. She has no rales.   Abdominal: Soft. There is no tenderness.   Musculoskeletal: She exhibits no edema or tenderness.   Neurological: She is alert.   Skin: Skin is warm and dry. No rash noted. She is not diaphoretic. No erythema. No pallor.   Psychiatric: She has a normal mood and affect.   Nursing note and vitals reviewed.      ED Course     ED Course     Procedures               Critical Care time:  none               Results for orders placed or " performed during the hospital encounter of 03/26/18 (from the past 24 hour(s))   CBC with platelets differential   Result Value Ref Range    WBC 12.2 (H) 4.0 - 11.0 10e9/L    RBC Count 4.61 3.8 - 5.2 10e12/L    Hemoglobin 11.4 (L) 11.7 - 15.7 g/dL    Hematocrit 35.9 35.0 - 47.0 %    MCV 78 78 - 100 fl    MCH 24.7 (L) 26.5 - 33.0 pg    MCHC 31.8 31.5 - 36.5 g/dL    RDW 16.8 (H) 10.0 - 15.0 %    Platelet Count 566 (H) 150 - 450 10e9/L    Diff Method Automated Method     % Neutrophils 74.2 %    % Lymphocytes 20.7 %    % Monocytes 4.7 %    % Eosinophils 0.2 %    % Basophils 0.2 %    Absolute Neutrophil 9.0 (H) 1.6 - 8.3 10e9/L    Absolute Lymphocytes 2.5 0.8 - 5.3 10e9/L    Absolute Monocytes 0.6 0.0 - 1.3 10e9/L    Absolute Eosinophils 0.0 0.0 - 0.7 10e9/L    Absolute Basophils 0.0 0.0 - 0.2 10e9/L   Basic metabolic panel   Result Value Ref Range    Sodium 136 133 - 144 mmol/L    Potassium 4.1 3.4 - 5.3 mmol/L    Chloride 105 94 - 109 mmol/L    Carbon Dioxide 24 20 - 32 mmol/L    Anion Gap 7 3 - 14 mmol/L    Glucose 79 70 - 99 mg/dL    Urea Nitrogen 10 7 - 30 mg/dL    Creatinine 0.86 0.52 - 1.04 mg/dL    GFR Estimate 74 >60 mL/min/1.7m2    GFR Estimate If Black >90 >60 mL/min/1.7m2    Calcium 9.3 8.5 - 10.1 mg/dL     *Note: Due to a large number of results and/or encounters for the requested time period, some results have not been displayed. A complete set of results can be found in Results Review.       Medications   0.9% sodium chloride BOLUS (0 mLs Intravenous Stopped 3/26/18 3011)   diphenhydrAMINE (BENADRYL) injection 25 mg (25 mg Intravenous Given 3/26/18 1797)       Assessments & Plan (with Medical Decision Making)  37-year-old female who is being currently treated with oral vancomycin for recurrent Clostridium difficile colitis.  She presents with some nausea and unable to tolerate her vancomycin.  She improved here with IV fluids, Zofran and Benadryl.  Stable for discharge home.  No significant lab  abnormality.       I have reviewed the nursing notes.    I have reviewed the findings, diagnosis, plan and need for follow up with the patient.       Discharge Medication List as of 3/26/2018  5:51 PM          Final diagnoses:   Non-intractable vomiting with nausea, unspecified vomiting type   Dehydration   C. difficile colitis       3/26/2018   [facility] EMERGENCY DEPARTMENT     Sam Raines MD  03/26/18 [id number]

## 2018-03-27 NOTE — ED NOTES
Was in yesterday with vomiting, states she was doing well until her benadryl wore off.  Woke up at midnight puking, abdominal cramping.

## 2018-03-28 NOTE — ED PROVIDER NOTES
History     Chief Complaint   Patient presents with     Nausea, Vomiting, & Diarrhea     HPI  Doreen Peralta is a 37 year old female who presents with vomiting.  She has a long standing history or vomiting and GI issues.  She is currently on oral vanco for C. Diff and plan is to follow up with GI next week.  She is here today with inability to keep fluids down.  Some moderate generalized abd. Discomfort, dull and achy.  No fever.      Problem List:    Patient Active Problem List    Diagnosis Date Noted     Sepsis (H) - possible 08/24/2017     Priority: Medium     Hypokalemia 08/24/2017     Priority: Medium     Essential hypertension 08/24/2017     Priority: Medium     Sepsis due to Klebsiella (H) 07/27/2017     Priority: Medium     Insomnia, unspecified type 03/31/2017     Priority: Medium     Abdominal pain 02/15/2017     Priority: Medium     Abdominal pain, generalized 01/28/2017     Priority: Medium     SIRS (systemic inflammatory response syndrome) (H) 01/26/2017     Priority: Medium     History of deep venous thrombosis 01/26/2017     Priority: Medium     Nausea and vomiting 01/26/2017     Priority: Medium     Vitamin D deficiency 01/16/2017     Priority: Medium     Chronic abdominal pain 11/15/2016     Priority: Medium     Patient is followed by Larisa Lorenzo PA-C for ongoing prescription of pain medication.  All refills should be approved by this provider, or covering partner.    Medication(s): no regular narcotics - narcotics given at ED visits  Maximum quantity per month: N/A  Clinic visit frequency required: Q 6  months     Controlled substance agreement:  Encounter-Level CSA - 11/9/15:               Controlled Substance Agreement - Scan on 11/19/2015 11:17 AM : CONTROLLED SUBSTANCE AGREEMENT 11/09/15 (below)          Encounter-Level CSA - 2/27/15:               Controlled Substance Agreement - Scan on 3/12/2015  7:50 AM : Controlled Medication Agreement 02/27/15 (below)            Pain Clinic  evaluation in the past: Yes    DIRE Total Score(s):  No flowsheet data found.    Last [facility] website verification:  done on 11/15/16   [email or web address]        Attention to G-tube (H) 11/08/2016     Priority: Medium     Fever 10/16/2016     Priority: Medium     Coagulation defect (H) [D68.9] 09/16/2016     Priority: Medium     Chronic diarrhea 07/22/2016     Priority: Medium     Migraine 07/20/2016     Priority: Medium     Positive blood culture - Klebsiella oxytoca from Port-a-cath culture 07/18/2016     Priority: Medium     Mitral regurgitation mild-mod by Echo June 2016 07/18/2016     Priority: Medium     Anemia, iron deficiency 07/17/2016     Priority: Medium     Anemia in other chronic diseases classified elsewhere 06/14/2016     Priority: Medium     Munchausen syndrome - previously suspected 06/14/2016     Priority: Medium     Long-term (current) use of anticoagulants [Z79.01] 05/16/2016     Priority: Medium     Anxiety 05/16/2016     Priority: Medium     S/P partial resection of colon 04/11/2016     Priority: Medium     Malnutrition (H) 04/11/2016     Priority: Medium     Jejunostomy tube present (H) 03/21/2016     Priority: Medium     Malfunctioning jejunostomy tube (H) 12/22/2015     Priority: Medium     Malfunction of gastrostomy tube (H) 11/19/2015     Priority: Medium     PEG tube malfunction (H) 10/16/2015     Priority: Medium     Health Care Home 01/16/2015     Priority: Medium     *See Letters for [facility] Care Plan: My Access Plan           PEG (percutaneous endoscopic gastrostomy) status 11/05/2014     Priority: Medium     Gastroparesis 04/11/2014     Priority: Medium     Overview:   Had ileostomy performed at [facility] in Jan 2012 as treatment       Migraines 04/11/2014     Priority: Medium     4/11/2014  With periods, every other month.       Intermittent asthma 04/11/2014     Priority: Medium     4/11/2014   With URIs, allergies       Allergic rhinitis 04/11/2014     Priority: Medium      Problem list name updated by automated process. Provider to review       Abnormal Pap smear of cervix 04/11/2014     Priority: Medium     4/11/2014  S/p colp and LEEP. Sees OB/GYN at [facility]. Pap every year x20 years - normal since.       S/P LEEP of cervix 02/06/2014     Priority: Medium     Patellofemoral stress syndrome 01/18/2014     Priority: Medium     Hx SBO 10/09/2013     Priority: Medium     4/11/2014  Recurrent. Sees GI (Dr. Redding - at [facility]) every 6 months or so.  Sees feeding tube nurse next week.       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 04/11/2013     Priority: Medium     Constipation by delayed colonic transit 10/24/2012     Priority: Medium     Atopic rhinitis 03/20/2009     Priority: Medium     Hyperbilirubinemia 03/11/2009     Priority: Medium        Past Medical History:    Past Medical History:   Diagnosis Date     Asthma      Bilateral ovarian cysts      Cervical cancer (H) 01/01/2008     Chronic pain      Colonic dysmotility      Constipation      E. coli sepsis (H) 5/8/2016     Enteritis      Fungemia 5/5/2016     Gastro-oesophageal reflux disease      H/O ileostomy      Hx of abnormal Pap smear      Hypertension      IBS (irritable bowel syndrome)      Other chronic pain      PONV (postoperative nausea and vomiting)      Thrombosis, hepatic vein (H)        Past [facility] History:    Past [facility] History:   Procedure Laterality Date     COLONOSCOPY  7/10/2012    Procedure: COLONOSCOPY;;  Surgeon: Nicole Redding MD;  Location: [facility]     COLONOSCOPY N/A 2/19/2017    Procedure: COLONOSCOPY;  Surgeon: Randell Muller MD;  Location: [facility] GI     COLONOSCOPY N/A 2/21/2017    Procedure: COLONOSCOPY;  Surgeon: Randell Muller MD;  Location: [facility] GI     ECHO CHELO  7/19/2016          ENDOSCOPIC INSERTION TUBE GASTROSTOMY N/A 1/21/2016    Procedure: ENDOSCOPIC INSERTION TUBE GASTROSTOMY;  Surgeon: Nicole Redding MD;  Location:  OR     ESOPHAGOSCOPY, GASTROSCOPY, DUODENOSCOPY  (EGD), COMBINED  7/10/2012    Procedure: COMBINED ESOPHAGOSCOPY, GASTROSCOPY, DUODENOSCOPY (EGD);  Upper Endoscopy, Ileoscopy    Latex Allergy  with biopsies;  Surgeon: Nicole Redding MD;  [facility]     ESOPHAGOSCOPY, GASTROSCOPY, DUODENOSCOPY (EGD), COMBINED N/A 11/5/2014    Procedure: COMBINED ESOPHAGOSCOPY, GASTROSCOPY, DUODENOSCOPY (EGD);  Surgeon: Nicole Redding MD;  Location[facility] OR     HC REPLACE DUODENOSTOMY/JEJUNOSTOMY TUBE PERCUTANEOUS N/A 8/27/2015    Procedure: REPLACE GASTROJEJUNOSTOMY TUBE, PERCUTANEOUS;  Surgeon: Mio Holder MD;  Location[facility] OR     HC REPLACE DUODENOSTOMY/JEJUNOSTOMY TUBE PERCUTANEOUS N/A 1/7/2016    Procedure: REPLACE JEJUNOSTOMY TUBE, PERCUTANEOUS;  Surgeon: Elsa Medel MD;  Location: [facility] OR     HC REPLACE DUODENOSTOMY/JEJUNOSTOMY TUBE PERCUTANEOUS N/A 1/28/2016    Procedure: REPLACE JEJUNOSTOMY TUBE, PERCUTANEOUS;  Surgeon: Elsa Medel MD;  Location: [facility] OR     HC REPLACE GASTROSTOMY/CECOSTOMY TUBE PERCUTANEOUS Left 5/19/2015    Procedure: REPLACE GASTROSTOMY TUBE, PERCUTANEOUS;  Surgeon: Melecio Morejon Chi, MD;  Location: [facility] GI     HC UGI ENDOSCOPY W PLACEMENT GASTROSTOMY TUBE PERCUT N/A 10/1/2015    Procedure: COMBINED ESOPHAGOSCOPY, GASTROSCOPY, DUODENOSCOPY (EGD), PLACE PERCUTANEOUS ENDOSCOPIC GASTROSTOMY TUBE;  Surgeon: Mio Holder MD;  Location: [facility]     INSERT PORT VASCULAR ACCESS Right 7/20/2017    Procedure: INSERT PORT VASCULAR ACCESS;  Chest Port Placement  **Latex Allergy**;  Surgeon: Coy Rocha PA-C;  [facility]     LAPAROSCOPIC ASSISTED COLECTOMY  1/20/2012    Procedure:LAPAROSCOPIC ASSISTED COLECTOMY; Laparoscopic Ileostomy       LAPAROSCOPIC ASSISTED COLECTOMY LEFT (DESCENDING)  10/24/2012    Procedure: LAPAROSCOPIC ASSISTED COLECTOMY LEFT (DESCENDING);   Hand Assisted Laproscopic Subtotal abdominal Colectomy,Iieorectal anastamosis, Ileostomy Closure.       LAPAROSCOPIC ASSISTED INSERTION TUBE  JEJUNOSTOMY N/A 10/16/2015    Procedure: LAPAROSCOPIC ASSISTED INSERTION TUBE JEJUNOSTOMY;  Surgeon: lEsa Medel MD;  Location: [facility] OR     LAPAROSCOPIC CHOLECYSTECTOMY  2002    [facility] stones duct     LAPAROSCOPIC ILEOSTOMY  1/20/2012    [facility], loop     LAPAROSCOPIC OOPHORECTOMY Right 2009    [facility]     LAPAROTOMY EXPLORATORY N/A 1/28/2016    Procedure: LAPAROTOMY EXPLORATORY;  Surgeon: Elsa Medel MD;  Location: [facility] OR     LEEP TX, CERVICAL  2009    [facility]     PICC INSERTION Left 10/21/2015    5fr DL Power PICC, 37cm (2cm external) in the L basilic vein w/ tip in the SVC RA junction.     REMOVE GASTROSTOMY TUBE ADULT N/A 12/12/2014    Procedure: REMOVE GASTROSTOMY TUBE ADULT;  Surgeon: Nicole Redding MD;  Location: [facility] GI     REMOVE PORT VASCULAR ACCESS Right 6/30/2016    Procedure: REMOVE PORT VASCULAR ACCESS;  Surgeon: Pradeep Orosco MD;  Location: [facility] OR     REMOVE PORT VASCULAR ACCESS Right 9/8/2017    Procedure: REMOVE PORT VASCULAR ACCESS;  right side mediport removal;  Surgeon: Zechariah Worthington MD;  Location: [facility] OR     replace GASTROSTOMY TUBE ADULT  5/19/15       Family History:    Family History   Problem Relation Age of Onset     Thyroid Disease Mother      Sjogren's Mother      GASTROINTESTINAL DISEASE Mother      Intermittent nausea vomiting diarrhea     Colon Polyps Mother      Prostate Problems Father      prostate enlargement     Lupus Maternal Grandmother      CANCER Maternal Grandfather      Lung     Colon Cancer Maternal Grandfather 65     CANCER Paternal Grandmother      Lung      CEREBROVASCULAR DISEASE Paternal Grandmother      DIABETES Paternal Grandmother      Cardiovascular Paternal Grandmother      CHF     CANCER Paternal Grandfather      Lung     Glaucoma Paternal Grandfather      Abdominal Aortic Aneurysm Other      Macular Degeneration No family hx of        Social History:  Marital Status:   [2]  Social History   Substance Use  "Topics     Smoking status: Current Some Day Smoker     Packs/day: 1.00     Years: 4.00     Types: Cigarettes     Last attempt to quit: 1/1/2004     Smokeless tobacco: Former User     Alcohol use No        Medications:      HYDROmorphone (DILAUDID) 2 MG tablet   ondansetron (ZOFRAN) 4 MG tablet   cloNIDine (CATAPRES) 0.2 MG tablet   traZODone (DESYREL) 100 MG tablet   ondansetron (ZOFRAN) 8 MG tablet   Emollient (GRX VITAMIN E) LOTN   Alfalfa 650 MG TABS   SUMAtriptan (IMITREX) 50 MG tablet   cloNIDine (CATAPRES) 0.2 MG tablet   DULoxetine (CYMBALTA) 60 MG EC capsule   albuterol (2.5 MG/3ML) 0.083% neb solution   ipratropium (ATROVENT) 0.02 % neb solution   diphenhydrAMINE (BENADRYL ALLERGY) 25 MG tablet   nortriptyline (PAMELOR) 10 MG capsule   mirtazapine (REMERON SOL-TAB) 15 MG ODT tab   ACETAMINOPHEN PO   albuterol 90 MCG/ACT inhaler         Review of Systems    Physical Exam   BP: 146/89  Pulse: 100  Temp: 98.5  F (36.9  C)  Resp: 15  Height: 167.6 cm (5' 6\")  Weight: 78.9 kg (174 lb)  SpO2: 100 %      Physical Exam   Constitutional: She appears well-developed and well-nourished. No distress.   HENT:   Head: Normocephalic and atraumatic.   Mouth/Throat: Oropharynx is clear and moist.   Eyes: Pupils are equal, round, and reactive to light. No scleral icterus.   Neck: Normal range of motion. Neck supple.   Cardiovascular: Normal rate, regular rhythm, normal heart sounds and intact distal pulses.    No murmur heard.  Pulmonary/Chest: No stridor. No respiratory distress. She has no wheezes. She has no rales.   Abdominal: Soft. There is tenderness (mild and generalized.). There is no rigidity, no rebound and no guarding.   Musculoskeletal: She exhibits no edema or tenderness.   Neurological: She is alert.   Skin: Skin is warm and dry. No rash noted. She is not diaphoretic. No erythema. No pallor.   Psychiatric: She has a normal mood and affect.   Nursing note and vitals reviewed.      ED Course     ED Course "     Procedures                   Results for orders placed or performed during the hospital encounter of 03/27/18 (from the past 24 hour(s))   CBC with platelets differential   Result Value Ref Range    WBC 10.9 4.0 - 11.0 10e9/L    RBC Count 4.56 3.8 - 5.2 10e12/L    Hemoglobin 11.3 (L) 11.7 - 15.7 g/dL    Hematocrit 35.4 35.0 - 47.0 %    MCV 78 78 - 100 fl    MCH 24.8 (L) 26.5 - 33.0 pg    MCHC 31.9 31.5 - 36.5 g/dL    RDW 16.7 (H) 10.0 - 15.0 %    Platelet Count 530 (H) 150 - 450 10e9/L    Diff Method Automated Method     % Neutrophils 71.2 %    % Lymphocytes 22.8 %    % Monocytes 5.3 %    % Eosinophils 0.2 %    % Basophils 0.3 %    % Immature Granulocytes 0.2 %    Absolute Neutrophil 7.8 1.6 - 8.3 10e9/L    Absolute Lymphocytes 2.5 0.8 - 5.3 10e9/L    Absolute Monocytes 0.6 0.0 - 1.3 10e9/L    Absolute Eosinophils 0.0 0.0 - 0.7 10e9/L    Absolute Basophils 0.0 0.0 - 0.2 10e9/L    Abs Immature Granulocytes 0.0 0 - 0.4 10e9/L   HCG qualitative Blood   Result Value Ref Range    HCG Qualitative Serum Negative NEG^Negative   Basic metabolic panel   Result Value Ref Range    Sodium 135 133 - 144 mmol/L    Potassium 5.0 3.4 - 5.3 mmol/L    Chloride 104 94 - 109 mmol/L    Carbon Dioxide 21 20 - 32 mmol/L    Anion Gap 10 3 - 14 mmol/L    Glucose 77 70 - 99 mg/dL    Urea Nitrogen 8 7 - 30 mg/dL    Creatinine 0.87 0.52 - 1.04 mg/dL    GFR Estimate 73 >60 mL/min/1.7m2    GFR Estimate If Black 89 >60 mL/min/1.7m2    Calcium 9.5 8.5 - 10.1 mg/dL   CT Abdomen Pelvis w/o Contrast    Narrative    CT ABDOMEN PELVIS W/O CONTRAST 3/27/2018 5:56 PM    HISTORY: Vomiting.    TECHNIQUE: CT imaging of the abdomen and pelvis is performed without  IV contrast.  Abdominal organs, pelvic organs, bowel, aorta,  retroperitoneum, and abdominal wall are also assessed to the limits of  no IV contrast.  Radiation dose for this scan was reduced using  automated exposure control, adjustment of the mA and/or kV according  to patient size, or  iterative reconstruction technique.    COMPARISON: February 24, 2018.    FINDINGS:    Liver: Normal.  Gallbladder: [facility]ly absent.  Pancreas:Normal.  Spleen:Normal.  Adrenals:Normal.  Ascites:None.    Kidneys/ureters: 2 mm nonobstructing stone in the mid right kidney.  Normal left kidney. No hydronephrosis.  Bladder:Normal.  Pelvic free fluid:None.    Bowels:Unremarkable.  No evidence of obstruction.  Appendix: Not well visualized but there are no findings to suggest  appendicitis.    Abdominal or pelvic lymphadenopathy:None.    Miscellaneous findings:None.      Impression    IMPRESSION:    1. No evidence of bowel obstruction appreciated.  2. Nonobstructing subcentimeter stone in the mid right kidney.  3. Status post cholecystectomy.    SELVIN LEWIS MD     *Note: Due to a large number of results and/or encounters for the requested time period, some results have not been displayed. A complete set of results can be found in Results Review.       Medications   diphenhydrAMINE (BENADRYL) injection 50 mg (50 mg Intramuscular Given 3/27/18 1600)   ondansetron (ZOFRAN-ODT) ODT tab 4 mg (4 mg Oral Given 3/27/18 1600)   0.9% sodium chloride BOLUS (0 mLs Intravenous Stopped 3/27/18 1836)   LORazepam (ATIVAN) injection 1 mg (1 mg Intravenous Given 3/27/18 1735)       Assessments & Plan (with Medical Decision Making)  Recurrent vomiting.  CT without evidence for obstruction.  Labs without worrisome finding.  Improved here with above medications.  Discharged home.  Should f/u with PCP tomorrow.       I have reviewed the nursing notes.        Discharge Medication List as of 3/27/2018  6:36 PM          Final diagnoses:   Intractable vomiting with nausea, unspecified vomiting type   C. difficile colitis       3/27/2018   [facility] EMERGENCY DEPARTMENT     Sam Raines MD  03/27/18 1994

## 2018-03-29 ENCOUNTER — HOSPITAL ENCOUNTER (EMERGENCY)
Facility: CLINIC | Age: 38
Discharge: HOME | End: 2018-03-29
Attending: NURSE PRACTITIONER | Admitting: NURSE PRACTITIONER
Payer: COMMERCIAL

## 2018-03-29 ENCOUNTER — APPOINTMENT (OUTPATIENT)
Dept: GENERAL RADIOLOGY | Facility: CLINIC | Age: 38
End: 2018-03-29
Attending: NURSE PRACTITIONER
Payer: COMMERCIAL

## 2018-03-29 VITALS
WEIGHT: 174 LBS | OXYGEN SATURATION: 98 % | DIASTOLIC BLOOD PRESSURE: 71 MMHG | BODY MASS INDEX: 27.97 KG/M2 | HEART RATE: 100 BPM | HEIGHT: 66 IN | TEMPERATURE: 100.2 F | RESPIRATION RATE: 14 BRPM | SYSTOLIC BLOOD PRESSURE: 131 MMHG

## 2018-03-29 DIAGNOSIS — A49.8 CLOSTRIDIUM DIFFICILE INFECTION: ICD-10-CM

## 2018-03-29 DIAGNOSIS — R11.2 NON-INTRACTABLE VOMITING WITH NAUSEA, UNSPECIFIED VOMITING TYPE: ICD-10-CM

## 2018-03-29 DIAGNOSIS — R10.84 ABDOMINAL PAIN, GENERALIZED: ICD-10-CM

## 2018-03-29 LAB
ALBUMIN SERPL-MCNC: 3.8 G/DL (ref 3.4–5)
ALBUMIN UR-MCNC: NEGATIVE MG/DL
ALP SERPL-CCNC: 127 U/L (ref 40–150)
ALT SERPL W P-5'-P-CCNC: 28 U/L (ref 0–50)
ANION GAP SERPL CALCULATED.3IONS-SCNC: 10 MMOL/L (ref 3–14)
APPEARANCE UR: ABNORMAL
AST SERPL W P-5'-P-CCNC: 23 U/L (ref 0–45)
BASOPHILS # BLD AUTO: 0 10E9/L (ref 0–0.2)
BASOPHILS NFR BLD AUTO: 0.2 %
BILIRUB SERPL-MCNC: 0.7 MG/DL (ref 0.2–1.3)
BILIRUB UR QL STRIP: NEGATIVE
BUN SERPL-MCNC: 10 MG/DL (ref 7–30)
CALCIUM SERPL-MCNC: 9 MG/DL (ref 8.5–10.1)
CHLORIDE SERPL-SCNC: 106 MMOL/L (ref 94–109)
CO2 SERPL-SCNC: 19 MMOL/L (ref 20–32)
COLOR UR AUTO: YELLOW
CREAT SERPL-MCNC: 0.73 MG/DL (ref 0.52–1.04)
CRP SERPL-MCNC: 22.5 MG/L (ref 0–8)
DIFFERENTIAL METHOD BLD: ABNORMAL
EOSINOPHIL # BLD AUTO: 0 10E9/L (ref 0–0.7)
EOSINOPHIL NFR BLD AUTO: 0.1 %
ERYTHROCYTE [DISTWIDTH] IN BLOOD BY AUTOMATED COUNT: 17.1 % (ref 10–15)
GFR SERPL CREATININE-BSD FRML MDRD: 89 ML/MIN/1.7M2
GLUCOSE SERPL-MCNC: 90 MG/DL (ref 70–99)
GLUCOSE UR STRIP-MCNC: NEGATIVE MG/DL
HCT VFR BLD AUTO: 34.5 % (ref 35–47)
HGB BLD-MCNC: 11 G/DL (ref 11.7–15.7)
HGB UR QL STRIP: NEGATIVE
IMM GRANULOCYTES # BLD: 0.1 10E9/L (ref 0–0.4)
IMM GRANULOCYTES NFR BLD: 0.5 %
KETONES UR STRIP-MCNC: NEGATIVE MG/DL
LACTATE BLD-SCNC: 1.3 MMOL/L (ref 0.7–2)
LEUKOCYTE ESTERASE UR QL STRIP: NEGATIVE
LYMPHOCYTES # BLD AUTO: 0.5 10E9/L (ref 0.8–5.3)
LYMPHOCYTES NFR BLD AUTO: 3.9 %
MCH RBC QN AUTO: 24.9 PG (ref 26.5–33)
MCHC RBC AUTO-ENTMCNC: 31.9 G/DL (ref 31.5–36.5)
MCV RBC AUTO: 78 FL (ref 78–100)
MONOCYTES # BLD AUTO: 0.3 10E9/L (ref 0–1.3)
MONOCYTES NFR BLD AUTO: 2.6 %
MUCOUS THREADS #/AREA URNS LPF: PRESENT /LPF
NEUTROPHILS # BLD AUTO: 11.8 10E9/L (ref 1.6–8.3)
NEUTROPHILS NFR BLD AUTO: 92.7 %
NITRATE UR QL: NEGATIVE
PH UR STRIP: 6 PH (ref 5–7)
PLATELET # BLD AUTO: 444 10E9/L (ref 150–450)
POTASSIUM SERPL-SCNC: 4 MMOL/L (ref 3.4–5.3)
PROT SERPL-MCNC: 8.4 G/DL (ref 6.8–8.8)
RBC # BLD AUTO: 4.42 10E12/L (ref 3.8–5.2)
RBC #/AREA URNS AUTO: 1 /HPF (ref 0–2)
SODIUM SERPL-SCNC: 135 MMOL/L (ref 133–144)
SOURCE: ABNORMAL
SP GR UR STRIP: 1.01 (ref 1–1.03)
SQUAMOUS #/AREA URNS AUTO: 5 /HPF (ref 0–1)
UROBILINOGEN UR STRIP-MCNC: 0 MG/DL (ref 0–2)
WBC # BLD AUTO: 12.7 10E9/L (ref 4–11)
WBC #/AREA URNS AUTO: 3 /HPF (ref 0–5)

## 2018-03-29 PROCEDURE — 96374 THER/PROPH/DIAG INJ IV PUSH: CPT | Performed by: NURSE PRACTITIONER

## 2018-03-29 PROCEDURE — 81001 URINALYSIS AUTO W/SCOPE: CPT | Performed by: NURSE PRACTITIONER

## 2018-03-29 PROCEDURE — 83605 ASSAY OF LACTIC ACID: CPT | Performed by: NURSE PRACTITIONER

## 2018-03-29 PROCEDURE — 87040 BLOOD CULTURE FOR BACTERIA: CPT | Performed by: NURSE PRACTITIONER

## 2018-03-29 PROCEDURE — 25000128 H RX IP 250 OP 636: Performed by: NURSE PRACTITIONER

## 2018-03-29 PROCEDURE — 86140 C-REACTIVE PROTEIN: CPT | Performed by: NURSE PRACTITIONER

## 2018-03-29 PROCEDURE — 96375 TX/PRO/DX INJ NEW DRUG ADDON: CPT | Performed by: NURSE PRACTITIONER

## 2018-03-29 PROCEDURE — 96376 TX/PRO/DX INJ SAME DRUG ADON: CPT | Performed by: NURSE PRACTITIONER

## 2018-03-29 PROCEDURE — 99285 EMERGENCY DEPT VISIT HI MDM: CPT | Mod: 25 | Performed by: NURSE PRACTITIONER

## 2018-03-29 PROCEDURE — 74019 RADEX ABDOMEN 2 VIEWS: CPT | Mod: TC

## 2018-03-29 PROCEDURE — 85025 COMPLETE CBC W/AUTO DIFF WBC: CPT | Performed by: NURSE PRACTITIONER

## 2018-03-29 PROCEDURE — 99285 EMERGENCY DEPT VISIT HI MDM: CPT | Mod: Z6 | Performed by: NURSE PRACTITIONER

## 2018-03-29 PROCEDURE — 96361 HYDRATE IV INFUSION ADD-ON: CPT | Performed by: NURSE PRACTITIONER

## 2018-03-29 PROCEDURE — 80053 COMPREHEN METABOLIC PANEL: CPT | Performed by: NURSE PRACTITIONER

## 2018-03-29 RX ORDER — HYDROMORPHONE HYDROCHLORIDE 2 MG/1
2 TABLET ORAL EVERY 6 HOURS PRN
Qty: 14 TABLET | Refills: 0 | Status: SHIPPED | OUTPATIENT
Start: 2018-03-29 | End: 2018-04-08

## 2018-03-29 RX ORDER — HYDROMORPHONE HYDROCHLORIDE 1 MG/ML
0.5 INJECTION, SOLUTION INTRAMUSCULAR; INTRAVENOUS; SUBCUTANEOUS
Status: DISCONTINUED | OUTPATIENT
Start: 2018-03-29 | End: 2018-03-29

## 2018-03-29 RX ORDER — SODIUM CHLORIDE 9 MG/ML
1000 INJECTION, SOLUTION INTRAVENOUS CONTINUOUS
Status: DISCONTINUED | OUTPATIENT
Start: 2018-03-29 | End: 2018-03-29 | Stop reason: HOSPADM

## 2018-03-29 RX ORDER — DIPHENHYDRAMINE HYDROCHLORIDE 50 MG/ML
50 INJECTION INTRAMUSCULAR; INTRAVENOUS ONCE
Status: COMPLETED | OUTPATIENT
Start: 2018-03-29 | End: 2018-03-29

## 2018-03-29 RX ORDER — DIPHENHYDRAMINE HYDROCHLORIDE 50 MG/ML
25 INJECTION INTRAMUSCULAR; INTRAVENOUS ONCE
Status: DISCONTINUED | OUTPATIENT
Start: 2018-03-29 | End: 2018-03-29

## 2018-03-29 RX ADMIN — SODIUM CHLORIDE 1000 ML: 9 INJECTION, SOLUTION INTRAVENOUS at 18:08

## 2018-03-29 RX ADMIN — DIPHENHYDRAMINE HYDROCHLORIDE 50 MG: 50 INJECTION, SOLUTION INTRAMUSCULAR; INTRAVENOUS at 18:02

## 2018-03-29 RX ADMIN — SODIUM CHLORIDE 1000 ML: 9 INJECTION, SOLUTION INTRAVENOUS at 19:07

## 2018-03-29 RX ADMIN — DIPHENHYDRAMINE HYDROCHLORIDE 50 MG: 50 INJECTION, SOLUTION INTRAMUSCULAR; INTRAVENOUS at 20:15

## 2018-03-29 RX ADMIN — HYDROMORPHONE HYDROCHLORIDE 1 MG: 1 INJECTION, SOLUTION INTRAMUSCULAR; INTRAVENOUS; SUBCUTANEOUS at 18:33

## 2018-03-29 RX ADMIN — HYDROMORPHONE HYDROCHLORIDE 1 MG: 1 INJECTION, SOLUTION INTRAMUSCULAR; INTRAVENOUS; SUBCUTANEOUS at 18:05

## 2018-03-29 RX ADMIN — HYDROMORPHONE HYDROCHLORIDE 1 MG: 1 INJECTION, SOLUTION INTRAMUSCULAR; INTRAVENOUS; SUBCUTANEOUS at 19:03

## 2018-03-29 ASSESSMENT — ENCOUNTER SYMPTOMS
ACTIVITY CHANGE: 1
WEAKNESS: 1
FEVER: 1
VOMITING: 1
APPETITE CHANGE: 1
ABDOMINAL PAIN: 1
NAUSEA: 1

## 2018-03-29 NOTE — ED AVS SNAPSHOT
[facility] Emergency Department    [address] [place] [address]    Phone:  [phone]    Fax:  [phone]                                       Doreen Peralta   MRN: [id number]    Department:  [facility] Emergency Department   Date of Visit:  3/29/2018           Patient Information     Date Of Birth          1980        Your diagnoses for this visit were:     Abdominal pain, generalized     Non-intractable vomiting with nausea, unspecified vomiting type     Clostridium difficile infection        You were seen by Shaunna Raines APRN CNP.      Follow-up Information     Follow up with Dominic Sutton MD.    Specialty:  Gastroenterology    Why:  As scheduled    Contact information:    [address]  [place] [address]  [phone]          Follow up with [facility] Emergency Department.    Specialty:  EMERGENCY MEDICINE    Why:  If symptoms worsen    Contact information:    [address] [place] [address] [phone]    Additional information:    From [address]: Exit at [address] on south side of [place]. Turn right on [address]. Turn left at stoplight on [address]. [facility] will be in view two blocks ahead        Discharge Instructions         Clostridium Difficile Infection  Clostridium difficile (C. diff) bacteria can be very harmful. They affect the intestinal tract. They can cause symptoms ranging from mild diarrhea to severe inflammation of the large intestine (colon). C. diff infection is most common during the days and weeks after treatment with antibiotics. Anyone can become infected. But the risk is greatly increased for people in hospitals and for people living in nursing homes or long-term care facilities. This is because antibiotic use is common there. Germs also spread easily in these places.    What causes C. diff infection?  The stomach and intestines have hundreds of kinds of bacteria. Many of  these bacteria actually help keep harmful bacteria like C. diff from causing problems. Small amounts of C. diff are normal in the intestine and don t cause problems. When you take an antibiotic, the normal balance of good and bad bacteria may be affected. There may be too few good bacteria and too many harmful bacteria like C diff. In hospitals and nursing homes, C. diff may be spread from an infected person to others. This can happen when staff or visitors touch infected people or objects such as bed rails, stethoscopes, or bedpans and then touch other people or surfaces.  What are the symptoms of C. diff infection?  About half of people with C. diff infection have no symptoms. Yet they can still pass the infection to others. Others do have symptoms. These include:    Watery diarrhea, which may contain mucus    Pain and cramping    Fever  Some who are infected develop serious problems. Symptoms include:    Belly (abdominal) pain    Abdominal swelling    Nausea and vomiting    Little or no diarrhea  How is C. diff infection diagnosed?  To confirm the infection, a sample of stool is tested for the bacteria or the toxins made by the bacteria.  How is C. diff infection treated?  Your healthcare provider will tell you to stop taking any antibiotics you have been prescribed, based on your healthcare needs. He or she may prescribe different medicines as needed. In certain cases, you may be given an antibiotic directed at the C. diff infection. Talk with your healthcare provider before stopping or starting any medicines.    Fluids are often given by IV (intravenously) through a vein. This helps replace fluids lost through diarrhea.    In rare cases you may need surgery if treatment doesn t cure severe symptoms  To lessen symptoms:    Drink plenty of fluids to replace water lost through diarrhea. Talk with your healthcare provider or nurse about which fluids are best.    Follow your healthcare provider s instructions for  when and what to eat.    Unless your healthcare provider tells you to do so, don't take medicines for diarrhea.    Tell your healthcare provider if symptoms return. Even after treatment, C. diff may come back.  Your doctor may give you an additional medicine if your symptoms come back or you are at risk for another C. diff infection. This medicine is called bezlotoxumab. It is not an antibiotic, but it can help keep your C. diff symptoms from returning.  What are the complications of C. diff infection?  Complications include:    Dehydration    Electrolyte imbalances    Low protein in the blood    Severe widening (dilation) of the large intestine    A hole (perforation) in the bowel    Low blood pressure    Kidney failure    Inflammation or infection all over the body    Death  How is C. diff prevented?  Hospitals and nursing homes take these steps to help prevent C. diff infections:    Limiting use of antibiotics. Giving antibiotics only when needed can help reduce C. diff infections.    Handwashing. Hospital staff should wash their hands before and after treating each person. They should also wash their hands after touching any surface in someone's' room. Soap and water work better than alcohol-based hand .    Protective clothing. Healthcare workers should wear gloves and a gown when entering the room of someone with C. diff infection. They should remove these items before leaving and then wash their hands.    Private rooms. People with C. diff should be in private rooms. Or they may share rooms with others who have the same infection.    Thorough cleaning. Equipment and rooms should be cleaned and disinfected every day.    Education. Everyone should be shown the best ways to avoid infection.  You can do the following to help prevent C. diff:    Take antibiotics only when you really need them. Antibiotics don t help treat illnesses caused by viruses. This includes colds and the flu. Don t ask for  antibiotics from your healthcare provider if he or she says they won t work.    When you are given antibiotics, take them as directed. Don t take more or less than the dosage prescribed. Do not take them for shorter or longer than your provider tells you to, even if you feel better.    Wash your hands carefully. Do this after using the bathroom and before eating. Use plenty of soap and warm water. Alcohol-based hand  may not work against C. diff germs.  Everyone can help prevent C. diff:  In a hospital or care facility:    Wash your hands well before and after visiting someone who has C. diff infection. Use soap and water. Alcohol-based hand  may not work against C. diff.    If the staff asks you to, wear gloves. Take any other steps you are asked to follow to help prevent infection.  At home:    If instructed, wear gloves when caring for a family member with C. diff infection. Throw the gloves away after each use. Then wash your hands well.    Wash the person s clothes, bed linen, and towels separately. Use hot water. Use both detergent and liquid bleach.    Disinfect surfaces in the person s room. This includes the phone, light switches, and remote controls.  Practice good handwashing:    Use warm water and plenty of soap. Rub your hands together well.    Clean your whole hand. Wash under nails, between fingers, and up your wrists.    Wash for at least 15 seconds to 20 seconds.     Rinse. Let the water run down your fingers, not up your wrists.    Dry your hands well. Then use a paper towel to turn off the faucet and open the door.  Date Last Reviewed: 1/1/2017 2000-2017 [employer]. [address] [place] [id number]. All rights reserved. This information is not intended as a substitute for professional medical care. Always follow your healthcare professional's instructions.        *Abdominal Pain, Unknown Cause (Female)    The exact cause of your abdominal (stomach) pain  is not certain. This does not mean that this is something to worry about, or the right tests were not done. Everyone likes to know the exact cause of the problem, but sometimes with abdominal pain, there is no clear-cut cause, and this could be a good thing. The good news is that your symptoms can be treated, and you will feel better.   Your condition does not seem serious now; however, sometimes the signs of a serious problem may take more time to appear. For this reason, it is important for you to watch for any new symptoms, problems, or worsening of your condition.  Over the next few days, the abdominal pain may come and go, or be continuous. Other common symptoms can include nausea and vomiting. Sometimes it can be difficult to tell if you feel nauseous, you may just feel bad and not associate that feeling with nausea. Constipation, diarrhea, and a fever may go along with the pain.  The pain may continue even if treated correctly over the following days. Depending on how things go, sometimes the cause can become clear and may require further or different treatment. Additional evaluations, medications, or tests may be needed.  Home care  Your health care provider may prescribe medications for pain, symptoms, or an infection.  Follow the health care provider's instructions for taking these medications.  General care    Rest until your next exam. No strenuous activities.    Try to find positions that ease discomfort. A small pillow placed on the abdomen may help relieve pain.    Something warm on your abdomen (such as a heating pad) may help, but be careful not to burn yourself.  Diet    Do not force yourself to eat, especially if having cramps, vomiting, or diarrhea.    Water is important so you do not get dehydrated. Soup may also be good. Sports drinks may also help, especially if they are not too acidic. Make sure you don't drink sugary drinks as this can make things worse. Take liquids in small amounts. Do not  guzzle them.    Caffeine sometimes makes the pain and cramping worse.    Avoid dairy products if you have vomiting or diarrhea.    Don't eat large amounts at a time. Wait a few minutes between bites.    Eat a diet low in fiber (called a low-residue diet). Foods allowed include refined breads, white rice, fruit and vegetable juices without pulp, tender meats. These foods will pass more easily through the intestine.    Avoid fried or fatty foods, dairy, alcohol and spicy foods until your symptoms go away.  Follow-up care  Follow up with your health care provider as instructed, or if your pain does not begin to improve in the next 24 hours.  When to seek medical care  Seek prompt medical care if any of the following occur:    Pain gets worse or moves to the right lower abdomen    New or worsening vomiting or diarrhea    Swelling of the abdomen    Unable to pass stool for more than three days    New fever over 101  F (38.3 C), or rising fever    Blood in vomit or bowel movements (dark red or black color)    Jaundice (yellow color of eyes and skin)    Weakness, dizziness    Chest, arm, back, neck or jaw pain    Unexpected vaginal bleeding or missed period  Call 911  Call emergency services if any of the following occur:    Trouble breathing    Confusion    Fainting or loss of consciousness    Rapid heart rate    Seizure    3141-2153 [facility] [employer], [address] [place] [address], [place] [address]. All rights reserved. This information is not intended as a substitute for professional medical care. Always follow your healthcare professional's instructions.          Your next 10 appointments already scheduled     Apr 04, 2018  9:00 AM CDT   New Visit with Dominic Sutton MD   [facility] ([facility]    [address] [place] [address] [phone]              24 Hour Appointment Hotline       To make an appointment at any [facility], call [phone]  ([phone]). If you don't have a family doctor or clinic, we will help you find one. [place] clinics are conveniently located to serve the needs of you and your family.             Review of your medicines      CONTINUE these medicines which may have CHANGED, or have new prescriptions. If we are uncertain of the size of tablets/capsules you have at home, strength may be listed as something that might have changed.        Dose / Directions Last dose taken    HYDROmorphone 2 MG tablet   Commonly known as:  DILAUDID   Dose:  2 mg   What changed:    - when to take this  - reasons to take this  - additional instructions   Quantity:  14 tablet        Take 1 tablet (2 mg) by mouth every 6 hours as needed for severe pain maximum 6 tablet(s) per day   Refills:  0          Our records show that you are taking the medicines listed below. If these are incorrect, please call your family doctor or clinic.        Dose / Directions Last dose taken    ACETAMINOPHEN PO   Dose:  500-1000 mg        Take 500-1,000 mg by mouth every 6 hours as needed for pain   Refills:  0        albuterol (2.5 MG/3ML) 0.083% neb solution   Dose:  2.5 mg   Quantity:  360 mL        Take 1 vial (2.5 mg) by nebulization every 4 hours as needed for shortness of breath / dyspnea or wheezing   Refills:  0        albuterol 90 MCG/ACT inhaler   Dose:  2 puff        Inhale 2 puffs into the lungs every 6 hours as needed   Refills:  0        Alfalfa 650 MG Tabs        Refills:  0        * cloNIDine 0.2 MG tablet   Commonly known as:  CATAPRES   Dose:  0.4 mg   Quantity:  60 tablet        Take 2 tablets (0.4 mg) by mouth every evening - DUE FOR FOLLOW UP   Refills:  0        * cloNIDine 0.2 MG tablet   Commonly known as:  CATAPRES   Dose:  0.4 mg        Take 0.4 mg by mouth   Refills:  0        diphenhydrAMINE 25 MG tablet   Commonly known as:  BENADRYL ALLERGY   Dose:  25 mg   Quantity:  30 tablet        Take 1 tablet (25 mg) by mouth every 6 hours as needed  for itching or other (Nausea)   Refills:  0        DULoxetine 60 MG EC capsule   Commonly known as:  CYMBALTA   Quantity:  90 capsule        TAKE 1 CAPSULE(60 MG) BY MOUTH DAILY   Refills:  1        GRX VITAMIN E Lotn        Refills:  0        ipratropium 0.02 % neb solution   Commonly known as:  ATROVENT   Dose:  0.5 mg   Quantity:  225 mL        Take 2.5 mLs (0.5 mg) by nebulization every 6 hours as needed for wheezing   Refills:  0        mirtazapine 15 MG ODT tab   Commonly known as:  REMERON SOL-TAB   Dose:  7.5 mg   Quantity:  45 tablet        0.5 tablets (7.5 mg) by Orally disintegrating tablet route At Bedtime   Refills:  0        nortriptyline 10 MG capsule   Commonly known as:  PAMELOR   Dose:  30-40 mg   Quantity:  120 capsule        Take 3-4 capsules (30-40 mg) by mouth At Bedtime   Refills:  5        * ondansetron 4 MG tablet   Commonly known as:  ZOFRAN        TK 1 TO 2  TS PO EVERY 8 HOURS IF NEEDED TK 2 TS   Refills:  11        * ondansetron 8 MG tablet   Commonly known as:  ZOFRAN   Dose:  8 mg        Take 8 mg by mouth   Refills:  0        SUMAtriptan 50 MG tablet   Commonly known as:  IMITREX   Dose:   mg   Quantity:  9 tablet        Take 1-2 tablets ( mg) by mouth at onset of headache for migraine - LAST REFILL, DUE FOR FOLLOW UP   Refills:  0        traZODone 100 MG tablet   Commonly known as:  DESYREL        TK SS TO ONE T PO HS PRF SLEEP / ANXIETY   Refills:  8        * Notice:  This list has 4 medication(s) that are the same as other medications prescribed for you. Read the directions carefully, and ask your doctor or other care provider to review them with you.            Prescriptions were sent or printed at these locations (1 Prescription)                   [facility] - [address] [address]    Telephone:  [phone]   Fax:  [phone]   Hours:                  Printed at Department/Unit printer (1  of 1)         HYDROmorphone (DILAUDID) 2 MG tablet                Procedures and tests performed during your visit     Blood culture ONE site    CBC with platelets differential    CRP inflammation    Comprehensive metabolic panel    Lactic acid whole blood    Peripheral IV catheter    UA with Microscopic    XR Abdomen 2 Views      Orders Needing Specimen Collection     None      Pending Results     Date and Time Order Name Status Description    3/29/2018 1710 Blood culture ONE site In process             Pending Culture Results     Date and Time Order Name Status Description    3/29/2018 1710 Blood culture ONE site In process             Pending Results Instructions     If you had any lab results that were not finalized at the time of your Discharge, you can call the ED Lab Result RN at [phone]. You will be contacted by this team for any positive Lab results or changes in treatment. The nurses are available 7 days a week from 10A to 6:30P.  You can leave a message 24 hours per day and they will return your call.        Thank you for choosing [facility]       Thank you for choosing [facility] for your care. Our goal is always to provide you with excellent care. Hearing back from our patients is one way we can continue to improve our services. Please take a few minutes to complete the written survey that you may receive in the mail after you visit with us. Thank you!        [facility] Information     [employer] gives you secure access to your electronic health record. If you see a primary care provider, you can also send messages to your care team and make appointments. If you have questions, please call your primary care clinic.  If you do not have a primary care provider, please call [phone] and they will assist you.        Care EveryWhere ID     This is your Care EveryWhere ID. This could be used by other organizations to access your [facility] medical records  [id number]        Equal Access to Services     TRAMAINE  FORREST : Meronii caitie Law, wadreda luqadaha, qaybta kaorlando long, elham gomez. So [facility] [phone].    ATENCIÓN: Si habla español, tiene a wade disposición servicios gratuitos de asistencia lingüística. Llame al [phone].    We comply with applicable federal civil rights laws and [place] laws. We do not discriminate on the basis of race, color, national origin, age, disability, sex, sexual orientation, or gender identity.            After Visit Summary       This is your record. Keep this with you and show to your community pharmacist(s) and doctor(s) at your next visit.

## 2018-03-29 NOTE — ED PROVIDER NOTES
History     Chief Complaint   Patient presents with     Abdominal Pain     The history is provided by the patient and a parent.     Doreen Peralta is a 37 year old female who presents to the emergency department today with intractable abdominal pain, fever and intractable nausea and vomiting.  Patient has a long standing GI history including gastroparesis, colon resection, GJ tubes that have since been removed and most recently the diagnosis of Clostridium difficile.  Patient has been on vancomycin but has been unable to keep this down secondary to nausea and vomiting.  Patient was evaluated in the emergency department here on the 26th and 27th with abdominal pain, nausea and vomiting.  Patient had blood tests done both days as well as a CT scan of her abdomen and pelvis on the 27th with no abnormal acute findings.  Today patient's pain has increased and she now has a fever on arrival of 101.3.  Patient is mildly tachycardic, she reports that she is unable to keep any medications down to help her symptoms.  Patient reports lying on her left side helps her abdominal pain, any other positions makes her symptoms worse.  Patient reports that earlier today she did not have a fever and that this started rather abruptly in route to the hospital here.  Patient did speak with a GI doctor from [facility] and does have an outpatient follow-up scheduled for Wednesday.  Patient has been to the [facility] in the past for her issues, she has sought out care in [place] as well as the [place] in [place] and [facility] and has been told by several different GI specialist that her quality of life is probably the best it is going to be given her current intra-abdominal history and surgical history.  That being said, the diagnosis of Clostridium difficile is fairly recent and patient has been told by her primary that she may be a good candidate for a fecal transplant.      Problem List:    Patient  Active Problem List    Diagnosis Date Noted     Sepsis (H) - possible 08/24/2017     Priority: Medium     Hypokalemia 08/24/2017     Priority: Medium     Essential hypertension 08/24/2017     Priority: Medium     Sepsis due to Klebsiella (H) 07/27/2017     Priority: Medium     Insomnia, unspecified type 03/31/2017     Priority: Medium     Abdominal pain 02/15/2017     Priority: Medium     Abdominal pain, generalized 01/28/2017     Priority: Medium     SIRS (systemic inflammatory response syndrome) (H) 01/26/2017     Priority: Medium     History of deep venous thrombosis 01/26/2017     Priority: Medium     Nausea and vomiting 01/26/2017     Priority: Medium     Vitamin D deficiency 01/16/2017     Priority: Medium     Chronic abdominal pain 11/15/2016     Priority: Medium     Patient is followed by Larisa Lorenzo PA-C for ongoing prescription of pain medication.  All refills should be approved by this provider, or covering partner.    Medication(s): no regular narcotics - narcotics given at ED visits  Maximum quantity per month: N/A  Clinic visit frequency required: Q 6  months     Controlled substance agreement:  Encounter-Level CSA - 11/9/15:               Controlled Substance Agreement - Scan on 11/19/2015 11:17 AM : CONTROLLED SUBSTANCE AGREEMENT 11/09/15 (below)          Encounter-Level CSA - 2/27/15:               Controlled Substance Agreement - Scan on 3/12/2015  7:50 AM : Controlled Medication Agreement 02/27/15 (below)            Pain Clinic evaluation in the past: Yes    DIRE Total Score(s):  No flowsheet data found.    Last [facility] website verification:  done on 11/15/16   [email or web address]        Attention to G-tube (H) 11/08/2016     Priority: Medium     Fever 10/16/2016     Priority: Medium     Coagulation defect (H) [D68.9] 09/16/2016     Priority: Medium     Chronic diarrhea 07/22/2016     Priority: Medium     Migraine 07/20/2016     Priority: Medium     Positive blood culture -  Klebsiella oxytoca from Port-a-cath culture 07/18/2016     Priority: Medium     Mitral regurgitation mild-mod by Echo June 2016 07/18/2016     Priority: Medium     Anemia, iron deficiency 07/17/2016     Priority: Medium     Anemia in other chronic diseases classified elsewhere 06/14/2016     Priority: Medium     Munchausen syndrome - previously suspected 06/14/2016     Priority: Medium     Long-term (current) use of anticoagulants [Z79.01] 05/16/2016     Priority: Medium     Anxiety 05/16/2016     Priority: Medium     S/P partial resection of colon 04/11/2016     Priority: Medium     Malnutrition (H) 04/11/2016     Priority: Medium     Jejunostomy tube present (H) 03/21/2016     Priority: Medium     Malfunctioning jejunostomy tube (H) 12/22/2015     Priority: Medium     Malfunction of gastrostomy tube (H) 11/19/2015     Priority: Medium     PEG tube malfunction (H) 10/16/2015     Priority: Medium     Health Care Home 01/16/2015     Priority: Medium     *See Letters for [facility] Care Plan: My Access Plan           PEG (percutaneous endoscopic gastrostomy) status 11/05/2014     Priority: Medium     Gastroparesis 04/11/2014     Priority: Medium     Overview:   Had ileostomy performed at [facility] in Jan 2012 as treatment       Migraines 04/11/2014     Priority: Medium     4/11/2014  With periods, every other month.       Intermittent asthma 04/11/2014     Priority: Medium     4/11/2014   With URIs, allergies       Allergic rhinitis 04/11/2014     Priority: Medium     Problem list name updated by automated process. Provider to review       Abnormal Pap smear of cervix 04/11/2014     Priority: Medium     4/11/2014  S/p colp and LEEP. Sees OB/GYN at [facility]. Pap every year x20 years - normal since.       S/P LEEP of cervix 02/06/2014     Priority: Medium     Patellofemoral stress syndrome 01/18/2014     Priority: Medium     Hx SBO 10/09/2013     Priority: Medium     4/11/2014  Recurrent. Sees GI (Dr. Redding - at [facility]) every  6 months or so.  Sees feeding tube nurse next week.       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 04/11/2013     Priority: Medium     Constipation by delayed colonic transit 10/24/2012     Priority: Medium     Atopic rhinitis 03/20/2009     Priority: Medium     Hyperbilirubinemia 03/11/2009     Priority: Medium        Past Medical History:    Past Medical History:   Diagnosis Date     Asthma      Bilateral ovarian cysts      Cervical cancer (H) 01/01/2008     Chronic pain      Colonic dysmotility      Constipation      E. coli sepsis (H) 5/8/2016     Enteritis      Fungemia 5/5/2016     Gastro-oesophageal reflux disease      H/O ileostomy      Hx of abnormal Pap smear      Hypertension      IBS (irritable bowel syndrome)      Other chronic pain      PONV (postoperative nausea and vomiting)      Thrombosis, hepatic vein (H)        Past [facility] History:    Past [facility] History:   Procedure Laterality Date     COLONOSCOPY  7/10/2012    Procedure: COLONOSCOPY;;  Surgeon: Nicole Redding MD;  [facility]     COLONOSCOPY N/A 2/19/2017    Procedure: COLONOSCOPY;  Surgeon: Randell Muller MD;  [facility]     COLONOSCOPY N/A 2/21/2017    Procedure: COLONOSCOPY;  Surgeon: Randell Muller MD;  [facility]     ECHO CHELO  7/19/2016          ENDOSCOPIC INSERTION TUBE GASTROSTOMY N/A 1/21/2016    Procedure: ENDOSCOPIC INSERTION TUBE GASTROSTOMY;  Surgeon: Nicole Redding MD;  [facility]     ESOPHAGOSCOPY, GASTROSCOPY, DUODENOSCOPY (EGD), COMBINED  7/10/2012    Procedure: COMBINED ESOPHAGOSCOPY, GASTROSCOPY, DUODENOSCOPY (EGD);  Upper Endoscopy, Ileoscopy    Latex Allergy  with biopsies;  Surgeon: Nicole Redding MD;  [facility]     ESOPHAGOSCOPY, GASTROSCOPY, DUODENOSCOPY (EGD), COMBINED N/A 11/5/2014    Procedure: COMBINED ESOPHAGOSCOPY, GASTROSCOPY, DUODENOSCOPY (EGD);  Surgeon: Nicole Redding MD;  [facility]     HC REPLACE DUODENOSTOMY/JEJUNOSTOMY TUBE PERCUTANEOUS N/A  8/27/2015    Procedure: REPLACE GASTROJEJUNOSTOMY TUBE, PERCUTANEOUS;  Surgeon: Mio Holder MD;  [facility]     HC REPLACE DUODENOSTOMY/JEJUNOSTOMY TUBE PERCUTANEOUS N/A 1/7/2016    Procedure: REPLACE JEJUNOSTOMY TUBE, PERCUTANEOUS;  Surgeon: Elsa Medel MD;  [facility]     HC REPLACE DUODENOSTOMY/JEJUNOSTOMY TUBE PERCUTANEOUS N/A 1/28/2016    Procedure: REPLACE JEJUNOSTOMY TUBE, PERCUTANEOUS;  Surgeon: Elsa Medel MD;  [facility]     HC REPLACE GASTROSTOMY/CECOSTOMY TUBE PERCUTANEOUS Left 5/19/2015    Procedure: REPLACE GASTROSTOMY TUBE, PERCUTANEOUS;  Surgeon: Melecio Morejon Chi, MD;  [facility]     HC UGI ENDOSCOPY W PLACEMENT GASTROSTOMY TUBE PERCUT N/A 10/1/2015    Procedure: COMBINED ESOPHAGOSCOPY, GASTROSCOPY, DUODENOSCOPY (EGD), PLACE PERCUTANEOUS ENDOSCOPIC GASTROSTOMY TUBE;  Surgeon: Mio Holder MD;  [facility]     INSERT PORT VASCULAR ACCESS Right 7/20/2017    Procedure: INSERT PORT VASCULAR ACCESS;  Chest Port Placement  **Latex Allergy**;  Surgeon: Coy Rocha PA-C;  [facility]     LAPAROSCOPIC ASSISTED COLECTOMY  1/20/2012    Procedure:LAPAROSCOPIC ASSISTED COLECTOMY; Laparoscopic Ileostomy       LAPAROSCOPIC ASSISTED COLECTOMY LEFT (DESCENDING)  10/24/2012    Procedure: LAPAROSCOPIC ASSISTED COLECTOMY LEFT (DESCENDING);   Hand Assisted Laproscopic Subtotal abdominal Colectomy,Iieorectal anastamosis, Ileostomy Closure.       LAPAROSCOPIC ASSISTED INSERTION TUBE JEJUNOSTOMY N/A 10/16/2015    Procedure: LAPAROSCOPIC ASSISTED INSERTION TUBE JEJUNOSTOMY;  Surgeon: Elsa Medel MD;  [facility]     LAPAROSCOPIC CHOLECYSTECTOMY  2002    [facility] stones duct     LAPAROSCOPIC ILEOSTOMY  1/20/2012    [facility], loop     LAPAROSCOPIC OOPHORECTOMY Right 2009    [employer]     LAPAROTOMY EXPLORATORY N/A 1/28/2016    Procedure: LAPAROTOMY EXPLORATORY;  Surgeon: Elsa Medel MD;  [facility]     LEEP TX, CERVICAL  2009     [facility]     PICC INSERTION Left 10/21/2015    5fr DL Power PICC, 37cm (2cm external) in the L basilic vein w/ tip in the SVC RA junction.     REMOVE GASTROSTOMY TUBE ADULT N/A 12/12/2014    Procedure: REMOVE GASTROSTOMY TUBE ADULT;  Surgeon: Nicole Redding MD;  [facility]     REMOVE PORT VASCULAR ACCESS Right 6/30/2016    Procedure: REMOVE PORT VASCULAR ACCESS;  Surgeon: Pradeep Orosco MD;  Location[facility]     REMOVE PORT VASCULAR ACCESS Right 9/8/2017    Procedure: REMOVE PORT VASCULAR ACCESS;  right side mediport removal;  Surgeon: Zechariah Worthington MD;  Location: [facility] OR     replace GASTROSTOMY TUBE ADULT  5/19/15       Family History:    Family History   Problem Relation Age of Onset     Thyroid Disease Mother      Sjogren's Mother      GASTROINTESTINAL DISEASE Mother      Intermittent nausea vomiting diarrhea     Colon Polyps Mother      Prostate Problems Father      prostate enlargement     Lupus Maternal Grandmother      CANCER Maternal Grandfather      Lung     Colon Cancer Maternal Grandfather 65     CANCER Paternal Grandmother      Lung      CEREBROVASCULAR DISEASE Paternal Grandmother      DIABETES Paternal Grandmother      Cardiovascular Paternal Grandmother      CHF     CANCER Paternal Grandfather      Lung     Glaucoma Paternal Grandfather      Abdominal Aortic Aneurysm Other      Macular Degeneration No family hx of        Social History:  Marital Status:   [2]  Social History   Substance Use Topics     Smoking status: Current Some Day Smoker     Packs/day: 1.00     Years: 4.00     Types: Cigarettes     Last attempt to quit: 1/1/2004     Smokeless tobacco: Former User     Alcohol use No        Medications:      HYDROmorphone (DILAUDID) 2 MG tablet   ondansetron (ZOFRAN) 4 MG tablet   cloNIDine (CATAPRES) 0.2 MG tablet   traZODone (DESYREL) 100 MG tablet   ondansetron (ZOFRAN) 8 MG tablet   Emollient (GRX VITAMIN E) LOTN   Alfalfa 650 MG TABS   SUMAtriptan (IMITREX)  "50 MG tablet   cloNIDine (CATAPRES) 0.2 MG tablet   DULoxetine (CYMBALTA) 60 MG EC capsule   albuterol (2.5 MG/3ML) 0.083% neb solution   ipratropium (ATROVENT) 0.02 % neb solution   diphenhydrAMINE (BENADRYL ALLERGY) 25 MG tablet   nortriptyline (PAMELOR) 10 MG capsule   mirtazapine (REMERON SOL-TAB) 15 MG ODT tab   ACETAMINOPHEN PO   albuterol 90 MCG/ACT inhaler         Review of Systems   Constitutional: Positive for activity change, appetite change and fever.   Gastrointestinal: Positive for abdominal pain, nausea and vomiting.   Neurological: Positive for weakness.   All other systems reviewed and are negative.      Physical Exam   BP: 134/79  Pulse: 116  Temp: 101.3  F (38.5  C)  Resp: 18  Height: 167.6 cm (5' 6\")  Weight: 78.9 kg (174 lb)  SpO2: 100 %      Physical Exam   Constitutional: She is oriented to person, place, and time. She appears distressed.   37-year-old female rolling around on the bed moaning in pain   HENT:   Head: Normocephalic.   Dry mucus membranes   Eyes: Conjunctivae are normal.   Neck: Normal range of motion.   Cardiovascular: Regular rhythm and normal heart sounds.    Tachycardic at 116   Pulmonary/Chest: Effort normal and breath sounds normal.   Abdominal: Soft. She exhibits no distension. There is tenderness (Generalized). There is no rebound and no guarding.   Musculoskeletal: Normal range of motion.   Neurological: She is alert and oriented to person, place, and time.   Skin: She is not diaphoretic.       ED Course  This is patient's third visit to our emergency department this week for the same complaints, I feel she would benefit from admission, but given her extensive GI history would need to be transferred to facility where GI can be involved in her care.  I discussed patient with Dr. Yan, hospitalist from [facility] as patient is due to see a GI doctor at [facility].  Unfortunately the GI doctor she is scheduled to see, , is not credentialed at " "[facility] and there are GI service will no longer be there as of April 1 and do not come there on the weekends.  Given that today is Thursday in light of all of these findings, it makes no sense to have patient transferred to [place].  Patient refuses to be transferred to the [facility] as she feels they treat her poorly and they are \"mean to me\".  Patient reports she wants to stay in the emergency room until she is feeling better and wants to go back home.     ED Course     Procedures    Results for orders placed or performed during the hospital encounter of 03/29/18   XR Abdomen 2 Views    Narrative    XR ABDOMEN 2 VW  3/29/2018 7:44 PM     HISTORY:  abdominal pain, recent CT;     COMPARISON: None.      Impression    IMPRESSION: Gas pattern is normal. No free air. The calculus seen over  the right kidney on the recent CT scan is not identified on this plain  film. [facility] clip is seen in the right upper abdomen consistent with  a previous cholecystectomy.    NELIDA MCMAHON MD   CBC with platelets differential   Result Value Ref Range    WBC 12.7 (H) 4.0 - 11.0 10e9/L    RBC Count 4.42 3.8 - 5.2 10e12/L    Hemoglobin 11.0 (L) 11.7 - 15.7 g/dL    Hematocrit 34.5 (L) 35.0 - 47.0 %    MCV 78 78 - 100 fl    MCH 24.9 (L) 26.5 - 33.0 pg    MCHC 31.9 31.5 - 36.5 g/dL    RDW 17.1 (H) 10.0 - 15.0 %    Platelet Count 444 150 - 450 10e9/L    Diff Method Automated Method     % Neutrophils 92.7 %    % Lymphocytes 3.9 %    % Monocytes 2.6 %    % Eosinophils 0.1 %    % Basophils 0.2 %    % Immature Granulocytes 0.5 %    Absolute Neutrophil 11.8 (H) 1.6 - 8.3 10e9/L    Absolute Lymphocytes 0.5 (L) 0.8 - 5.3 10e9/L    Absolute Monocytes 0.3 0.0 - 1.3 10e9/L    Absolute Eosinophils 0.0 0.0 - 0.7 10e9/L    Absolute Basophils 0.0 0.0 - 0.2 10e9/L    Abs Immature Granulocytes 0.1 0 - 0.4 10e9/L   Comprehensive metabolic panel   Result Value Ref Range    Sodium 135 133 - 144 mmol/L    Potassium 4.0 3.4 - 5.3 mmol/L "    Chloride 106 94 - 109 mmol/L    Carbon Dioxide 19 (L) 20 - 32 mmol/L    Anion Gap 10 3 - 14 mmol/L    Glucose 90 70 - 99 mg/dL    Urea Nitrogen 10 7 - 30 mg/dL    Creatinine 0.73 0.52 - 1.04 mg/dL    GFR Estimate 89 >60 mL/min/1.7m2    GFR Estimate If Black >90 >60 mL/min/1.7m2    Calcium 9.0 8.5 - 10.1 mg/dL    Bilirubin Total 0.7 0.2 - 1.3 mg/dL    Albumin 3.8 3.4 - 5.0 g/dL    Protein Total 8.4 6.8 - 8.8 g/dL    Alkaline Phosphatase 127 40 - 150 U/L    ALT 28 0 - 50 U/L    AST 23 0 - 45 U/L   Lactic acid whole blood   Result Value Ref Range    Lactic Acid 1.3 0.7 - 2.0 mmol/L   CRP inflammation   Result Value Ref Range    CRP Inflammation 22.5 (H) 0.0 - 8.0 mg/L   UA with Microscopic   Result Value Ref Range    Color Urine Yellow     Appearance Urine Slightly Cloudy     Glucose Urine Negative NEG^Negative mg/dL    Bilirubin Urine Negative NEG^Negative    Ketones Urine Negative NEG^Negative mg/dL    Specific Gravity Urine 1.014 1.003 - 1.035    Blood Urine Negative NEG^Negative    pH Urine 6.0 5.0 - 7.0 pH    Protein Albumin Urine Negative NEG^Negative mg/dL    Urobilinogen mg/dL 0.0 0.0 - 2.0 mg/dL    Nitrite Urine Negative NEG^Negative    Leukocyte Esterase Urine Negative NEG^Negative    Source Midstream Urine     WBC Urine 3 0 - 5 /HPF    RBC Urine 1 0 - 2 /HPF    Squamous Epithelial /HPF Urine 5 (H) 0 - 1 /HPF    Mucous Urine Present (A) NEG^Negative /LPF     *Note: Due to a large number of results and/or encounters for the requested time period, some results have not been displayed. A complete set of results can be found in Results Review.       Medications   0.9% sodium chloride BOLUS (0 mLs Intravenous Stopped 3/29/18 1907)     Followed by   sodium chloride 0.9% infusion (not administered)   diphenhydrAMINE (BENADRYL) injection 50 mg (not administered)   HYDROmorphone (DILAUDID) injection 1 mg (1 mg Intravenous Given 3/29/18 1903)   diphenhydrAMINE (BENADRYL) injection 50 mg (50 mg Intravenous Given  3/29/18 1802)   diphenhydrAMINE (BENADRYL) injection 50 mg (50 mg Intravenous Given 3/29/18 1802)   0.9% sodium chloride BOLUS (1,000 mLs Intravenous New Bag 3/29/18 1907)       Assessments & Plan (with Medical Decision Making)  Doreen is a 37-year-old female who is in the emergency room today for the third time in a week for abdominal pain, intractable nausea and vomiting.  Doreen has multiple gastrointestinal issues and is scheduled to see a GI specialist next Wednesday.  The cola supposed to be taking vancomycin for her Clostridium difficile infection but has been unable to for the last 2 days secondary to her nausea and vomiting.  Patient has not been taking anything for pain.  Please refer to HPI, focused exam and ED course.  Patient on arrival here is febrile, this came down without any medication, tachycardia has improved as well with 2 L of normal saline.  Nausea and vomiting has improved with the Benadryl which is according to patient the only medication that works for her.  First dose of Benadryl she was given was infiltrated and this was repeated when IV was established.  Patient was given 3 doses of IV Dilaudid for her abdominal pain which is now manageable.  I discussed with patient her ongoing gastrointestinal issues and her repeat visits to the emergency room tonight.  As noted above I did attempt to get her to transfer to [facility] which was she was willing to do however, the GI doctor she is scheduled to see does not have credentials at [facility] and they are actually losing her GI service as of April 1, they did not have GI there over the weekend.  I did discuss with patient and her mom in detail that given that this is her third visit in a week with ongoing symptoms in light of a low-grade fever, I would really like to transfer her to the [facility] so GI can be involved and further workup can be done from their standpoint, patient refuses to be transferred to the [facility]  "secondary to being reportedly treated poorly when she has been there in the past.  Patient's mother would like patient admitted here overnight for observation however, patient would like to go home, she \"hates being in the hospital\" and after treatment here is feeling \"much\" better and she is able to tolerate oral fluids.  Workup here today is really unchanged for patient she has a mild leukocytosis with a left shift, hemoglobin is stable, CMP is within normal limits and lactic acid is normal at 1.3.  CRP is mildly elevated at 22.5 which is typical for patient, urinalysis is negative for infection.  CT scan was done when patient was here on the 27th so I did not want to repeat this today despite her fever here, I did obtain an abdominal x-ray to rule out obstruction and this is negative for any free air with a normal gas pattern.  I will send patient home with small supply of Dilaudid for her abdominal pain, she can take Benadryl as needed for nausea and vomiting and follow-up with GI next week as scheduled.  Patient has had now 5 narcotic prescriptions in the last 2 months including tinnitus Dilaudid, she is at risk for opioid dependence and if her symptoms persist she will need to discuss chronic pain management with her primary provider.  Reasons to return to the emergency room were discussed in detail which patient is well aware of, she was discharged in stable condition with her mom.  I have a low threshold for her return.       I have reviewed the nursing notes.    I have reviewed the findings, diagnosis, plan and need for follow up with the patient.    New Prescriptions    HYDROMORPHONE (DILAUDID) 2 MG TABLET    Take 1 tablet (2 mg) by mouth every 6 hours as needed for severe pain maximum 6 tablet(s) per day       Final diagnoses:   Abdominal pain, generalized   Non-intractable vomiting with nausea, unspecified vomiting type   Clostridium difficile infection       3/29/2018   [facility] EMERGENCY " DEPARTMENT     Shaunna Raines, GERARD CNP  03/29/18 2044

## 2018-03-29 NOTE — ED AVS SNAPSHOT
[facility] Emergency Department    [address] [place] [address]    Phone:  [phone]    Fax:  [phone]                                       Doreen Peralta   MRN: [id number]    Department:  [facility] Emergency Department   Date of Visit:  3/29/2018           After Visit Summary Signature Page     I have received my discharge instructions, and my questions have been answered. I have discussed any challenges I see with this plan with the nurse or doctor.    ..........................................................................................................................................  Patient/Patient Representative Signature      ..........................................................................................................................................  Patient Representative Print Name and Relationship to Patient    ..................................................               ................................................  Date                                            Time    ..........................................................................................................................................  Reviewed by Signature/Title    ...................................................              ..............................................  Date                                                            Time

## 2018-03-29 NOTE — ED NOTES
She has had c-dif for 2 months and had abdominal pain, fever and unable to keep her antibiotics down.

## 2018-03-30 NOTE — DISCHARGE INSTRUCTIONS
Clostridium Difficile Infection  Clostridium difficile (C. diff) bacteria can be very harmful. They affect the intestinal tract. They can cause symptoms ranging from mild diarrhea to severe inflammation of the large intestine (colon). C. diff infection is most common during the days and weeks after treatment with antibiotics. Anyone can become infected. But the risk is greatly increased for people in hospitals and for people living in nursing homes or long-term care facilities. This is because antibiotic use is common there. Germs also spread easily in these places.    What causes C. diff infection?  The stomach and intestines have hundreds of kinds of bacteria. Many of these bacteria actually help keep harmful bacteria like C. diff from causing problems. Small amounts of C. diff are normal in the intestine and don t cause problems. When you take an antibiotic, the normal balance of good and bad bacteria may be affected. There may be too few good bacteria and too many harmful bacteria like C diff. In hospitals and nursing homes, C. diff may be spread from an infected person to others. This can happen when staff or visitors touch infected people or objects such as bed rails, stethoscopes, or bedpans and then touch other people or surfaces.  What are the symptoms of C. diff infection?  About half of people with C. diff infection have no symptoms. Yet they can still pass the infection to others. Others do have symptoms. These include:    Watery diarrhea, which may contain mucus    Pain and cramping    Fever  Some who are infected develop serious problems. Symptoms include:    Belly (abdominal) pain    Abdominal swelling    Nausea and vomiting    Little or no diarrhea  How is C. diff infection diagnosed?  To confirm the infection, a sample of stool is tested for the bacteria or the toxins made by the bacteria.  How is C. diff infection treated?  Your healthcare provider will tell you to stop taking any antibiotics you  have been prescribed, based on your healthcare needs. He or she may prescribe different medicines as needed. In certain cases, you may be given an antibiotic directed at the C. diff infection. Talk with your healthcare provider before stopping or starting any medicines.    Fluids are often given by IV (intravenously) through a vein. This helps replace fluids lost through diarrhea.    In rare cases you may need surgery if treatment doesn t cure severe symptoms  To lessen symptoms:    Drink plenty of fluids to replace water lost through diarrhea. Talk with your healthcare provider or nurse about which fluids are best.    Follow your healthcare provider s instructions for when and what to eat.    Unless your healthcare provider tells you to do so, don't take medicines for diarrhea.    Tell your healthcare provider if symptoms return. Even after treatment, C. diff may come back.  Your doctor may give you an additional medicine if your symptoms come back or you are at risk for another C. diff infection. This medicine is called bezlotoxumab. It is not an antibiotic, but it can help keep your C. diff symptoms from returning.  What are the complications of C. diff infection?  Complications include:    Dehydration    Electrolyte imbalances    Low protein in the blood    Severe widening (dilation) of the large intestine    A hole (perforation) in the bowel    Low blood pressure    Kidney failure    Inflammation or infection all over the body    Death  How is C. diff prevented?  Hospitals and nursing homes take these steps to help prevent C. diff infections:    Limiting use of antibiotics. Giving antibiotics only when needed can help reduce C. diff infections.    Handwashing. Hospital staff should wash their hands before and after treating each person. They should also wash their hands after touching any surface in someone's' room. Soap and water work better than alcohol-based hand .    Protective clothing. Healthcare  workers should wear gloves and a gown when entering the room of someone with C. diff infection. They should remove these items before leaving and then wash their hands.    Private rooms. People with C. diff should be in private rooms. Or they may share rooms with others who have the same infection.    Thorough cleaning. Equipment and rooms should be cleaned and disinfected every day.    Education. Everyone should be shown the best ways to avoid infection.  You can do the following to help prevent C. diff:    Take antibiotics only when you really need them. Antibiotics don t help treat illnesses caused by viruses. This includes colds and the flu. Don t ask for antibiotics from your healthcare provider if he or she says they won t work.    When you are given antibiotics, take them as directed. Don t take more or less than the dosage prescribed. Do not take them for shorter or longer than your provider tells you to, even if you feel better.    Wash your hands carefully. Do this after using the bathroom and before eating. Use plenty of soap and warm water. Alcohol-based hand  may not work against C. diff germs.  Everyone can help prevent C. diff:  In a hospital or care facility:    Wash your hands well before and after visiting someone who has C. diff infection. Use soap and water. Alcohol-based hand  may not work against C. diff.    If the staff asks you to, wear gloves. Take any other steps you are asked to follow to help prevent infection.  At home:    If instructed, wear gloves when caring for a family member with C. diff infection. Throw the gloves away after each use. Then wash your hands well.    Wash the person s clothes, bed linen, and towels separately. Use hot water. Use both detergent and liquid bleach.    Disinfect surfaces in the person s room. This includes the phone, light switches, and remote controls.  Practice good handwashing:    Use warm water and plenty of soap. Rub your hands  together well.    Clean your whole hand. Wash under nails, between fingers, and up your wrists.    Wash for at least 15 seconds to 20 seconds.     Rinse. Let the water run down your fingers, not up your wrists.    Dry your hands well. Then use a paper towel to turn off the faucet and open the door.  Date Last Reviewed: 1/1/2017 2000-2017 [employer]. [address] [place] [address], [place] [address]. All rights reserved. This information is not intended as a substitute for professional medical care. Always follow your healthcare professional's instructions.        *Abdominal Pain, Unknown Cause (Female)    The exact cause of your abdominal (stomach) pain is not certain. This does not mean that this is something to worry about, or the right tests were not done. Everyone likes to know the exact cause of the problem, but sometimes with abdominal pain, there is no clear-cut cause, and this could be a good thing. The good news is that your symptoms can be treated, and you will feel better.   Your condition does not seem serious now; however, sometimes the signs of a serious problem may take more time to appear. For this reason, it is important for you to watch for any new symptoms, problems, or worsening of your condition.  Over the next few days, the abdominal pain may come and go, or be continuous. Other common symptoms can include nausea and vomiting. Sometimes it can be difficult to tell if you feel nauseous, you may just feel bad and not associate that feeling with nausea. Constipation, diarrhea, and a fever may go along with the pain.  The pain may continue even if treated correctly over the following days. Depending on how things go, sometimes the cause can become clear and may require further or different treatment. Additional evaluations, medications, or tests may be needed.  Home care  Your health care provider may prescribe medications for pain, symptoms, or an infection.  Follow the health care  provider's instructions for taking these medications.  General care    Rest until your next exam. No strenuous activities.    Try to find positions that ease discomfort. A small pillow placed on the abdomen may help relieve pain.    Something warm on your abdomen (such as a heating pad) may help, but be careful not to burn yourself.  Diet    Do not force yourself to eat, especially if having cramps, vomiting, or diarrhea.    Water is important so you do not get dehydrated. Soup may also be good. Sports drinks may also help, especially if they are not too acidic. Make sure you don't drink sugary drinks as this can make things worse. Take liquids in small amounts. Do not guzzle them.    Caffeine sometimes makes the pain and cramping worse.    Avoid dairy products if you have vomiting or diarrhea.    Don't eat large amounts at a time. Wait a few minutes between bites.    Eat a diet low in fiber (called a low-residue diet). Foods allowed include refined breads, white rice, fruit and vegetable juices without pulp, tender meats. These foods will pass more easily through the intestine.    Avoid fried or fatty foods, dairy, alcohol and spicy foods until your symptoms go away.  Follow-up care  Follow up with your health care provider as instructed, or if your pain does not begin to improve in the next 24 hours.  When to seek medical care  Seek prompt medical care if any of the following occur:    Pain gets worse or moves to the right lower abdomen    New or worsening vomiting or diarrhea    Swelling of the abdomen    Unable to pass stool for more than three days    New fever over 101  F (38.3 C), or rising fever    Blood in vomit or bowel movements (dark red or black color)    Jaundice (yellow color of eyes and skin)    Weakness, dizziness    Chest, arm, back, neck or jaw pain    Unexpected vaginal bleeding or missed period  Call 911  Call emergency services if any of the following occur:    Trouble  breathing    Confusion    Fainting or loss of consciousness    Rapid heart rate    Seizure    [id number] [employer], [address] [facility], [place] [address]. All rights reserved. This information is not intended as a substitute for professional medical care. Always follow your healthcare professional's instructions.

## 2018-04-04 ENCOUNTER — OFFICE VISIT (OUTPATIENT)
Dept: GASTROENTEROLOGY | Facility: CLINIC | Age: 38
End: 2018-04-04
Payer: MEDICARE

## 2018-04-04 VITALS
HEIGHT: 66 IN | DIASTOLIC BLOOD PRESSURE: 67 MMHG | WEIGHT: 175.4 LBS | BODY MASS INDEX: 28.19 KG/M2 | SYSTOLIC BLOOD PRESSURE: 120 MMHG | HEART RATE: 65 BPM

## 2018-04-04 DIAGNOSIS — R11.2 INTRACTABLE VOMITING WITH NAUSEA, UNSPECIFIED VOMITING TYPE: ICD-10-CM

## 2018-04-04 DIAGNOSIS — R19.7 DIARRHEA, UNSPECIFIED TYPE: Primary | ICD-10-CM

## 2018-04-04 DIAGNOSIS — R19.7 DIARRHEA, UNSPECIFIED TYPE: ICD-10-CM

## 2018-04-04 LAB
BACTERIA SPEC CULT: NORMAL
CRP SERPL-MCNC: 6.6 MG/L (ref 0–8)
Lab: 30
SPECIMEN SOURCE: NORMAL

## 2018-04-04 PROCEDURE — 36415 COLL VENOUS BLD VENIPUNCTURE: CPT | Performed by: INTERNAL MEDICINE

## 2018-04-04 PROCEDURE — 83993 ASSAY FOR CALPROTECTIN FECAL: CPT | Performed by: INTERNAL MEDICINE

## 2018-04-04 PROCEDURE — 86140 C-REACTIVE PROTEIN: CPT | Performed by: INTERNAL MEDICINE

## 2018-04-04 PROCEDURE — 87493 C DIFF AMPLIFIED PROBE: CPT | Mod: XU | Performed by: INTERNAL MEDICINE

## 2018-04-04 PROCEDURE — 87506 IADNA-DNA/RNA PROBE TQ 6-11: CPT | Performed by: INTERNAL MEDICINE

## 2018-04-04 PROCEDURE — 99214 OFFICE O/P EST MOD 30 MIN: CPT | Performed by: INTERNAL MEDICINE

## 2018-04-04 ASSESSMENT — PAIN SCALES - GENERAL: PAINLEVEL: MODERATE PAIN (4)

## 2018-04-04 NOTE — PROGRESS NOTES
"Doreen Peralta's goals for this visit include:   Chief Complaint   Patient presents with     Consult     C diff      She requests these members of her care team be copied on today's visit information: yes    PCP: Franny Antoine    Referring Provider:  No referring provider defined for this encounter.    Chief Complaint   Patient presents with     Consult     C diff        Initial /67  Pulse 65  Ht 1.67 m (5' 5.75\")  Wt 79.6 kg (175 lb 6.4 oz)  BMI 28.53 kg/m2 Estimated body mass index is 28.53 kg/(m^2) as calculated from the following:    Height as of this encounter: 1.67 m (5' 5.75\").    Weight as of this encounter: 79.6 kg (175 lb 6.4 oz).  Medication Reconciliation: complete    Do you need any medication refills at today's visit? New patient       Carlos Alberto Cruz CMA        "

## 2018-04-04 NOTE — PROGRESS NOTES
Visit Date:   04/04/2018      HISTORY OF PRESENT ILLNESS:  The patient is a 37-year-old woman with a complex medical history who is referred for evaluation of recent Clostridium difficile infection.  Her GI history is notable for irritable bowel syndrome with constipation and superimposed colonic inertia for which she received a subtotal colectomy with ileosigmoid anastomosis in 10/2012. That procedure followed a loop ileostomy in 01/2012.  She had a G-tube placement twice in 2015 and 2016 with eventual transition to venting G-tube in 01/2016.  She had exploratory laparotomy in 01/2016 with lysis of adhesions.  In addition to that she has history of gastroparesis, although at least 1 gastric emptying scan was normal, history of chronic abdominal pain syndrome with recurrent obstructions.  In addition to her GI problems some of the other medical history includes an episode of Klebsiella sepsis in 02/2017 which occurred in the setting of some G-tube manipulation, history of right oophorectomy, laparoscopic cholecystectomy in 2002, migraine headaches, history of intussusception, history of chronic opioid use with chronic pain, recurring fevers, noted in 02/2017, several episodes of asthma, suspected Munchausen syndrome, history of DVT 02/20/17, history of depression and history of Gilbert's, elevated bilirubin.      The patient's antibiotic history prior to Clostridium difficile infection included a course of ciprofloxacin in 11/2017 for G-tube problems as well as relatively concurrent course of antibiotic, possibly cephalexin, for sinusitis.  About 2 weeks prior to the diagnosis with Clostridium difficile infection she was visiting her daughter in the hospital.  Her daughter has issues with connective tissue disease.  Prior to the diagnosis of Clostridium difficile infection she presented with complaints of intractable nausea, vomiting and diarrhea and her physician felt it was norovirus, although no specific testing  was done that I can find.  The symptoms continued for about 2 weeks and then she had a stool test done that was positive for Clostridium difficile.  Notably there was a negative stool test for Clostridium difficile in 08/2017.  She was initially treated with Flagyl.  She predictably did not tolerate the drug because of nausea and was switched to vancomycin capsules.  She could not tolerate those either.  She would swallow them but 10 minutes later would vomit and she would feel vancomycin coming back up.  During treatment she did not notice any improvement in her symptoms and she quit taking vancomycin after about a week; approximately a week ago.  Symptoms are continuing.  In addition to nausea, vomiting and diarrhea she experiences paroxysmal episodes of high fevers of 103 with chills and shakes.  During these she feels some confusion as well as her skin burning.  The episodes feel like mini inversions of the sepsis she experienced in 02/2017 with Klebsiella.        SHE HAS A LONG LIST OF ALLERGIES AND INTOLERANCES.  THESE INCLUDE PENICILLINS, MORPHINE, AZITHROMYCIN WHICH CAUSES ITCHING, THROAT SWELLING AND CLOSING, COMPAZINE CAUSES VISUAL DISTURBANCES, LATEX CAUSES RASH, OXYCODONE/HYDROMORPHONE WITH HEADACHE AND VOMITING, FENTANYL GIVES HER HEADACHE, HYOSCYAMINE GIVES HER RASH, CYCLOBENZAPRINE CAUSE DIZZINESS, KETOROLAC CAUSES DIZZINESS, RIZATRIPTAN CAUSES VISUAL DISTURBANCES, IBUPROFEN WAS ASSOCIATED WITH STOMACH UPSET, FLUTICASONE AND SALMETEROL INHALER, DROPERIDOL GIVES HER RASH, METOCLOPRAMIDE GIVES HER EXTRAPYRAMIDAL SITE REACTIONS, LEVOFLOXACIN IS ASSOCIATED WITH EDEMA, DIATRIZOATE MEGLUMINE IS AN IV CONTRAST DYE AND IS ASSOCIATED WITH TACHYCARDIA.        She takes Zofran regularly for nausea.  She says she is not taking any narcotics and also denies marijuana use.      SOCIAL HISTORY:  The patient has 3 daughters; oldest one is 17 who was in the hospital.  She is a high school student who is doing very  well and planning to go to college next year in [place].  The other daughters are 14 and 16.  The patient is here with her mother.      Two other medications she says she is not taking that are currently on her list is nortriptyline and Remeron.      PHYSICAL EXAMINATION:   GENERAL:  She is a pleasant young woman, although became teary eyed and cried when talking about the sepsis like episodes and persistence of her symptoms.   ABDOMEN:  Has visible scars from previous surgeries.  There was no hepatomegaly.  There was some tenderness on the left side; however, I could not feel any mass.      ASSESSMENT AND PLAN:  The clinical picture is difficult to put together.  The profound nausea and vomiting seem to be preceding this diagnosis of Clostridium difficile infection and are likely unrelated, although Clostridium difficile infection can be associated with some nausea but is usually not to this extent.  It is generally a colon infection and limited to lower gastrointestinal tract.  The diarrhea might be from that; however, given the patient's extensive exposure to hospital setting she is at increased chance of having an antibody against the toxin and may be a carrier as well.  I am going to check some stool studies to look for alternative diagnoses as well as to see if Clostridium difficile is still detectable and look for evidence of inflammation with calprotectin test.  She did have an elevated CRP level a week ago of 22.5 and I would like to see whether that is increased. I am concerned about these septic sounding episodes which sound like bacteremia and raise questions about the integrity of her gut barrier.  She may need to have a pan endoscopic evaluation.  Of course the history of Munchausen syndrome is also of concern, particularly given the situation although the diagnosis is not easily put together.      Total time spent in face-to-face consultation was 45 minutes, over 50% were spent counseling and  coordinating care.         BRITTANI SKELTON MD             D: 2018   T: 2018   MT: PARI      Name:     TAIWO JIMENEZ   MRN:      [id number]        Account:      [id number]   :      1980           Visit Date:   2018      Document: [id number]

## 2018-04-04 NOTE — MR AVS SNAPSHOT
After Visit Summary   4/4/2018    Doreen Peralta    MRN: [id number]           Patient Information     Date Of Birth          1980        Visit Information        Provider Department      4/4/2018 9:00 AM Dominic Sutton MD [facility]        Today's Diagnoses     Diarrhea, unspecified type    -  1    Intractable vomiting with nausea, unspecified vomiting type           Follow-ups after your visit        Future tests that were ordered for you today     Open Standing Orders        Priority Remaining Interval Expires Ordered    Calprotectin Feces Routine 1/1 4/4/2019 4/4/2018    Clostridium difficile toxin B PCR Routine 1/1 4/4/2019 4/4/2018          Open Future Orders        Priority Expected Expires Ordered    Enteric Bacteria and Virus Panel by MIO Stool Routine  4/4/2019 4/4/2018            Who to contact     If you have questions or need follow up information about today's clinic visit or your schedule please contact [facility] directly at [phone].  Normal or non-critical lab and imaging results will be communicated to you by [employer], letter or phone within 4 business days after the clinic has received the results. If you do not hear from us within 7 days, please contact the clinic through [employer]Sentient[employer] or phone. If you have a critical or abnormal lab result, we will notify you by phone as soon as possible.  Submit refill requests through [employer] or call your pharmacy and they will forward the refill request to us. Please allow 3 business days for your refill to be completed.          Additional Information About Your Visit        MyChart Information     [employer] gives you secure access to your electronic health record. If you see a primary care provider, you can also send messages to your care team and make appointments. If you have questions, please call your primary care clinic.  If you do not have a primary care provider, please call [phone] and  "they will assist you.      Sentrinsic is an electronic gateway that provides easy, online access to your medical records. With Sentr[employer], you can request a clinic appointment, read your test results, renew a prescription or communicate with your care team.     To access your existing account, please contact your [facility] or call [phone] for assistance.        Care EveryWhere ID     This is your Care EveryWhere ID. This could be used by other organizations to access your [facility] medical records  [id number]        Your Vitals Were     Pulse Height BMI (Body Mass Index)             65 1.67 m (5' 5.75\") 28.53 kg/m2          Blood Pressure from Last 3 Encounters:   04/04/18 120/67   03/29/18 131/71   03/27/18 146/89    Weight from Last 3 Encounters:   04/04/18 79.6 kg (175 lb 6.4 oz)   03/29/18 78.9 kg (174 lb)   03/27/18 78.9 kg (174 lb)              We Performed the Following     CRP inflammation          Today's Medication Changes          These changes are accurate as of 4/4/18 10:11 AM.  If you have any questions, ask your nurse or doctor.               These medicines have changed or have updated prescriptions.        Dose/Directions    nortriptyline 10 MG capsule   Commonly known as:  PAMELOR   This may have changed:  how much to take   Used for:  Neuropathic pain, Primary insomnia        Dose:  30-40 mg   Take 3-4 capsules (30-40 mg) by mouth At Bedtime   Quantity:  120 capsule   Refills:  5                Primary Care Provider Office Phone # Fax #    Franny Antoine [phone]       [facility] [address] [place] [address]        Equal Access to Services     [facility]: Hadii caitie Law, waaxda luqadaha, qaybta kaalelham adame. So [facility] [phone].    ATENCIÓN: Si habla español, tiene a wade disposición servicios gratuitos de asistencia lingüística. Llame al [phone].    We " comply with applicable federal civil rights laws and [place] laws. We do not discriminate on the basis of race, color, national origin, age, disability, sex, sexual orientation, or gender identity.            Thank you!     Thank you for choosing [facility]  for your care. Our goal is always to provide you with excellent care. Hearing back from our patients is one way we can continue to improve our services. Please take a few minutes to complete the written survey that you may receive in the mail after your visit with us. Thank you!             Your Updated Medication List - Protect others around you: Learn how to safely use, store and throw away your medicines at www.disposemymeds.org.          This list is accurate as of 4/4/18 10:11 AM.  Always use your most recent med list.                   Brand Name Dispense Instructions for use Diagnosis    ACETAMINOPHEN PO      Take 500-1,000 mg by mouth every 6 hours as needed for pain        albuterol (2.5 MG/3ML) 0.083% neb solution     360 mL    Take 1 vial (2.5 mg) by nebulization every 4 hours as needed for shortness of breath / dyspnea or wheezing    Gastrostomy tube dependent (H), SBO (small bowel obstruction), Chronic abdominal pain, Chronic diarrhea       albuterol 90 MCG/ACT inhaler      Inhale 2 puffs into the lungs every 6 hours as needed        Alfalfa 650 MG Tabs           * cloNIDine 0.2 MG tablet    CATAPRES    60 tablet    Take 2 tablets (0.4 mg) by mouth every evening - DUE FOR FOLLOW UP    Anxiety       * cloNIDine 0.2 MG tablet    CATAPRES     Take 0.4 mg by mouth        diphenhydrAMINE 25 MG tablet    BENADRYL ALLERGY    30 tablet    Take 1 tablet (25 mg) by mouth every 6 hours as needed for itching or other (Nausea)        DULoxetine 60 MG EC capsule    CYMBALTA    90 capsule    TAKE 1 CAPSULE(60 MG) BY MOUTH DAILY    Abdominal pain, epigastric, Abdominal migraine, not intractable       GRX VITAMIN E Lotn           HYDROmorphone 2  MG tablet    DILAUDID    14 tablet    Take 1 tablet (2 mg) by mouth every 6 hours as needed for severe pain maximum 6 tablet(s) per day        ipratropium 0.02 % neb solution    ATROVENT    225 mL    Take 2.5 mLs (0.5 mg) by nebulization every 6 hours as needed for wheezing    Gastrostomy tube dependent (H), SBO (small bowel obstruction), Chronic abdominal pain, Chronic diarrhea       mirtazapine 15 MG ODT tab    REMERON SOL-TAB    45 tablet    0.5 tablets (7.5 mg) by Orally disintegrating tablet route At Bedtime    Anxiety       nortriptyline 10 MG capsule    PAMELOR    120 capsule    Take 3-4 capsules (30-40 mg) by mouth At Bedtime    Neuropathic pain, Primary insomnia       * ondansetron 4 MG tablet    ZOFRAN     TK 1 TO 2  TS PO EVERY 8 HOURS IF NEEDED TK 2 TS        * ondansetron 8 MG tablet    ZOFRAN     Take 8 mg by mouth        SUMAtriptan 50 MG tablet    IMITREX    9 tablet    Take 1-2 tablets ( mg) by mouth at onset of headache for migraine - LAST REFILL, DUE FOR FOLLOW UP    Migraine without aura and without status migrainosus, not intractable       traZODone 100 MG tablet    DESYREL     TK SS TO ONE T PO HS PRF SLEEP / ANXIETY        * Notice:  This list has 4 medication(s) that are the same as other medications prescribed for you. Read the directions carefully, and ask your doctor or other care provider to review them with you.

## 2018-04-05 LAB
C COLI+JEJUNI+LARI FUSA STL QL NAA+PROBE: NOT DETECTED
C DIFF TOX B STL QL: POSITIVE
EC STX1 GENE STL QL NAA+PROBE: NOT DETECTED
EC STX2 GENE STL QL NAA+PROBE: NOT DETECTED
ENTERIC PATHOGEN COMMENT: NORMAL
NOROV GI+II ORF1-ORF2 JNC STL QL NAA+PR: NOT DETECTED
RVA NSP5 STL QL NAA+PROBE: NOT DETECTED
SALMONELLA SP RPOD STL QL NAA+PROBE: NOT DETECTED
SHIGELLA SP+EIEC IPAH STL QL NAA+PROBE: NOT DETECTED
SPECIMEN SOURCE: ABNORMAL
V CHOL+PARA RFBL+TRKH+TNAA STL QL NAA+PR: NOT DETECTED
Y ENTERO RECN STL QL NAA+PROBE: NOT DETECTED

## 2018-04-06 LAB — CALPROTECTIN STL-MCNT: <27 MG/KG (ref 0–49.9)

## 2018-04-08 ENCOUNTER — HOSPITAL ENCOUNTER (EMERGENCY)
Facility: CLINIC | Age: 38
Discharge: HOME | End: 2018-04-09
Attending: EMERGENCY MEDICINE | Admitting: EMERGENCY MEDICINE
Payer: MEDICARE

## 2018-04-08 ENCOUNTER — APPOINTMENT (OUTPATIENT)
Dept: GENERAL RADIOLOGY | Facility: CLINIC | Age: 38
End: 2018-04-08
Attending: EMERGENCY MEDICINE
Payer: MEDICARE

## 2018-04-08 VITALS
SYSTOLIC BLOOD PRESSURE: 111 MMHG | RESPIRATION RATE: 20 BRPM | TEMPERATURE: 98.3 F | BODY MASS INDEX: 28.3 KG/M2 | OXYGEN SATURATION: 99 % | DIASTOLIC BLOOD PRESSURE: 96 MMHG | WEIGHT: 174 LBS

## 2018-04-08 DIAGNOSIS — R14.3 FLATULENCE, ERUCTATION, AND GAS PAIN: ICD-10-CM

## 2018-04-08 DIAGNOSIS — R14.2 FLATULENCE, ERUCTATION, AND GAS PAIN: ICD-10-CM

## 2018-04-08 DIAGNOSIS — R10.84 ABDOMINAL PAIN, GENERALIZED: ICD-10-CM

## 2018-04-08 DIAGNOSIS — R14.1 FLATULENCE, ERUCTATION, AND GAS PAIN: ICD-10-CM

## 2018-04-08 LAB
BASOPHILS # BLD AUTO: 0 10E9/L (ref 0–0.2)
BASOPHILS NFR BLD AUTO: 0.3 %
DIFFERENTIAL METHOD BLD: ABNORMAL
EOSINOPHIL # BLD AUTO: 0.1 10E9/L (ref 0–0.7)
EOSINOPHIL NFR BLD AUTO: 1.7 %
ERYTHROCYTE [DISTWIDTH] IN BLOOD BY AUTOMATED COUNT: 17 % (ref 10–15)
HCT VFR BLD AUTO: 36.4 % (ref 35–47)
HGB BLD-MCNC: 11.4 G/DL (ref 11.7–15.7)
IMM GRANULOCYTES # BLD: 0 10E9/L (ref 0–0.4)
IMM GRANULOCYTES NFR BLD: 0.3 %
LYMPHOCYTES # BLD AUTO: 2.1 10E9/L (ref 0.8–5.3)
LYMPHOCYTES NFR BLD AUTO: 28.7 %
MCH RBC QN AUTO: 24.8 PG (ref 26.5–33)
MCHC RBC AUTO-ENTMCNC: 31.3 G/DL (ref 31.5–36.5)
MCV RBC AUTO: 79 FL (ref 78–100)
MONOCYTES # BLD AUTO: 0.5 10E9/L (ref 0–1.3)
MONOCYTES NFR BLD AUTO: 6.7 %
NEUTROPHILS # BLD AUTO: 4.6 10E9/L (ref 1.6–8.3)
NEUTROPHILS NFR BLD AUTO: 62.3 %
PLATELET # BLD AUTO: 509 10E9/L (ref 150–450)
RBC # BLD AUTO: 4.59 10E12/L (ref 3.8–5.2)
WBC # BLD AUTO: 7.5 10E9/L (ref 4–11)

## 2018-04-08 PROCEDURE — 82150 ASSAY OF AMYLASE: CPT | Performed by: EMERGENCY MEDICINE

## 2018-04-08 PROCEDURE — 99285 EMERGENCY DEPT VISIT HI MDM: CPT | Mod: Z6 | Performed by: EMERGENCY MEDICINE

## 2018-04-08 PROCEDURE — 85025 COMPLETE CBC W/AUTO DIFF WBC: CPT | Performed by: EMERGENCY MEDICINE

## 2018-04-08 PROCEDURE — 25000128 H RX IP 250 OP 636: Performed by: EMERGENCY MEDICINE

## 2018-04-08 PROCEDURE — 99285 EMERGENCY DEPT VISIT HI MDM: CPT | Performed by: EMERGENCY MEDICINE

## 2018-04-08 PROCEDURE — 83690 ASSAY OF LIPASE: CPT | Performed by: EMERGENCY MEDICINE

## 2018-04-08 PROCEDURE — 74019 RADEX ABDOMEN 2 VIEWS: CPT | Mod: TC

## 2018-04-08 PROCEDURE — 96374 THER/PROPH/DIAG INJ IV PUSH: CPT | Performed by: EMERGENCY MEDICINE

## 2018-04-08 PROCEDURE — 80053 COMPREHEN METABOLIC PANEL: CPT | Performed by: EMERGENCY MEDICINE

## 2018-04-08 PROCEDURE — 86140 C-REACTIVE PROTEIN: CPT | Performed by: EMERGENCY MEDICINE

## 2018-04-08 RX ORDER — DIPHENHYDRAMINE HYDROCHLORIDE 50 MG/ML
25 INJECTION INTRAMUSCULAR; INTRAVENOUS ONCE
Status: COMPLETED | OUTPATIENT
Start: 2018-04-08 | End: 2018-04-08

## 2018-04-08 RX ADMIN — DIPHENHYDRAMINE HYDROCHLORIDE 25 MG: 50 INJECTION, SOLUTION INTRAMUSCULAR; INTRAVENOUS at 23:47

## 2018-04-08 NOTE — ED AVS SNAPSHOT
[facility] Emergency Department    [address] [place] [address]    Phone:  [phone]    Fax:  [phone]                                       Doreen Peralta   MRN: [id number]    Department:  [facility] Emergency Department   Date of Visit:  4/8/2018           After Visit Summary Signature Page     I have received my discharge instructions, and my questions have been answered. I have discussed any challenges I see with this plan with the nurse or doctor.    ..........................................................................................................................................  Patient/Patient Representative Signature      ..........................................................................................................................................  Patient Representative Print Name and Relationship to Patient    ..................................................               ................................................  Date                                            Time    ..........................................................................................................................................  Reviewed by Signature/Title    ...................................................              ..............................................  Date                                                            Time

## 2018-04-08 NOTE — ED AVS SNAPSHOT
[facility] Emergency Department    [address]     [place] [address]    Phone:  [phone]    Fax:  [phone]                                       Doreen Peralta   MRN: [id number]    Department:  [facility] Emergency Department   Date of Visit:  4/8/2018           Patient Information     Date Of Birth          1980        Your diagnoses for this visit were:     Flatulence, eructation, and gas pain     Abdominal pain, generalized        You were seen by Gera Mcconnell MD.      Follow-up Information     Follow up with Franny Antoine MD.    Specialty:  Internal Medicine    Why:  As needed    Contact information:    [facility]  [address] [address]  [phone]          Discharge Instructions         Abdominal Pain    Abdominal pain is pain in the stomach or belly area. Everyone has this pain from time to time. In many cases it goes away on its own. But abdominal pain can sometimes be due to a serious problem, such as appendicitis. So it s important to know when to seek help.  Causes of abdominal pain  There are many possible causes of abdominal pain. Common causes in adults include:    Constipation, diarrhea, or gas    Stomach acid flowing back up into the esophagus (acid reflux or heartburn)    Severe acid reflux, called GERD (gastroesophageal reflux disease)    A sore in the lining of the stomach or small intestine (peptic ulcer)    Inflammation of the gallbladder, liver, or pancreas    Gallstones or kidney stones    Appendicitis     Intestinal blockage     An internal organ pushing through a muscle or other tissue (hernia)    Urinary tract infections    In women, menstrual cramps, fibroids, or endometriosis    Inflammation or infection of the intestines  Diagnosing the cause of abdominal pain  Your healthcare provider will do a physical exam help find the cause of your pain. If needed, tests will be ordered. Belly pain has many  possible causes. So it can be hard to find the reason for your pain. Giving details about your pain can help. Tell your provider where and when you feel the pain, and what makes it better or worse. Also let your provider know if you have other symptoms such as:    Fever    Tiredness    Upset stomach (nausea)    Vomiting    Changes in bathroom habits  Treating abdominal pain  Some causes of pain need emergency medical treatment right away. These include appendicitis or a bowel blockage. Other problems can be treated with rest, fluids, or medicines. Your healthcare provider can give you specific instructions for treatment or self-care based on what is causing your pain.  If you have vomiting or diarrhea, sip water or other clear fluids. When you are ready to eat solid foods again, start with small amounts of easy-to-digest, low-fat foods. These include apple sauce, toast, or crackers.   When to seek medical care  Call 911 or go to the hospital right away if you:    Can t pass stool and are vomiting    Are vomiting blood or have bloody diarrhea or black, tarry diarrhea    Have chest, neck, or shoulder pain    Feel like you might pass out    Have pain in your shoulder blades with nausea    Have sudden, severe belly pain    Have new, severe pain unlike any you have felt before    Have a belly that is rigid, hard, and tender to touch  Call your healthcare provider if you have:    Pain for more than 5 days    Bloating for more than 2 days    Diarrhea for more than 5 days    A fever of 100.4 F (38.0 C) or higher, or as directed by your provider    Pain that gets worse    Weight loss for no reason    Continued lack of appetite    Blood in your stool  How to prevent abdominal pain  Here are some tips to help prevent abdominal pain:    Eat smaller amounts of food at one time.    Avoid greasy, fried, or other high-fat foods.    Avoid foods that give you gas.    Exercise regularly.    Drink plenty of fluids.  To help prevent  GERD symptoms:    Quit smoking.    Reduce alcohol and certain foods that increase stomach acid.    Avoid aspirin and over-the-counter pain and fever medicines (NSAIDS or nonsteroidal anti-inflammatory drugs), if possible    Lose extra weight.    Finish eating at least 2 hours before you go to bed or lie down.    Raise the head of your bed.  Date Last Reviewed: 7/1/2016 2000-2017 [employer]. [address] [place] [address], [place] [address]. All rights reserved. This information is not intended as a substitute for professional medical care. Always follow your healthcare professional's instructions.          24 Hour Appointment Hotline       To make an appointment at any [facility], call [phone] ([phone]). If you don't have a family doctor or clinic, we will help you find one. [place] clinics are conveniently located to serve the needs of you and your family.             Review of your medicines      Our records show that you are taking the medicines listed below. If these are incorrect, please call your family doctor or clinic.        Dose / Directions Last dose taken    ACETAMINOPHEN PO   Dose:  500-1000 mg        Take 500-1,000 mg by mouth every 6 hours as needed for pain   Refills:  0        albuterol (2.5 MG/3ML) 0.083% neb solution   Dose:  2.5 mg   Quantity:  360 mL        Take 1 vial (2.5 mg) by nebulization every 4 hours as needed for shortness of breath / dyspnea or wheezing   Refills:  0        albuterol 90 MCG/ACT inhaler   Dose:  2 puff        Inhale 2 puffs into the lungs every 6 hours as needed   Refills:  0        Alfalfa 650 MG Tabs        Refills:  0        * cloNIDine 0.2 MG tablet   Commonly known as:  CATAPRES   Dose:  0.4 mg   Quantity:  60 tablet        Take 2 tablets (0.4 mg) by mouth every evening - DUE FOR FOLLOW UP   Refills:  0        * cloNIDine 0.2 MG tablet   Commonly known as:  CATAPRES   Dose:  0.4 mg        Take 0.4 mg by mouth   Refills:  0         diphenhydrAMINE 25 MG tablet   Commonly known as:  BENADRYL ALLERGY   Dose:  25 mg   Quantity:  30 tablet        Take 1 tablet (25 mg) by mouth every 6 hours as needed for itching or other (Nausea)   Refills:  0        DULoxetine 60 MG EC capsule   Commonly known as:  CYMBALTA   Quantity:  90 capsule        TAKE 1 CAPSULE(60 MG) BY MOUTH DAILY   Refills:  1        GRX VITAMIN E Lotn        Refills:  0        ipratropium 0.02 % neb solution   Commonly known as:  ATROVENT   Dose:  0.5 mg   Quantity:  225 mL        Take 2.5 mLs (0.5 mg) by nebulization every 6 hours as needed for wheezing   Refills:  0        * ondansetron 4 MG tablet   Commonly known as:  ZOFRAN        TK 1 TO 2  TS PO EVERY 8 HOURS IF NEEDED TK 2 TS   Refills:  11        * ondansetron 8 MG tablet   Commonly known as:  ZOFRAN   Dose:  8 mg        Take 8 mg by mouth   Refills:  0        SUMAtriptan 50 MG tablet   Commonly known as:  IMITREX   Dose:   mg   Quantity:  9 tablet        Take 1-2 tablets ( mg) by mouth at onset of headache for migraine - LAST REFILL, DUE FOR FOLLOW UP   Refills:  0        traZODone 100 MG tablet   Commonly known as:  DESYREL        TK SS TO ONE T PO HS PRF SLEEP / ANXIETY   Refills:  8        * Notice:  This list has 4 medication(s) that are the same as other medications prescribed for you. Read the directions carefully, and ask your doctor or other care provider to review them with you.            Procedures and tests performed during your visit     Amylase    CBC with platelets differential    CRP inflammation    Comprehensive metabolic panel    Lipase    Peripheral IV catheter    XR Abdomen 2 Views      Orders Needing Specimen Collection     None      Pending Results     No orders found for last 3 day(s).            Pending Culture Results     No orders found for last 3 day(s).            Pending Results Instructions     If you had any lab results that were not finalized at the time of your Discharge, you can  call the ED Lab Result RN at [phone]. You will be contacted by this team for any positive Lab results or changes in treatment. The nurses are available 7 days a week from 10A to 6:30P.  You can leave a message 24 hours per day and they will return your call.        Thank you for choosing [facility]       Thank you for choosing [facility] for your care. Our goal is always to provide you with excellent care. Hearing back from our patients is one way we can continue to improve our services. Please take a few minutes to complete the written survey that you may receive in the mail after you visit with us. Thank you!        [employer] Information     [employer] gives you secure access to your electronic health record. If you see a primary care provider, you can also send messages to your care team and make appointments. If you have questions, please call your primary care clinic.  If you do not have a primary care provider, please call [phone] and they will assist you.        Care EveryWhere ID     This is your Care EveryWhere ID. This could be used by other organizations to access your [facility] medical records  [id number]        Equal Access to Services     [facility] : Hadlarissa Law, wafanny villeda, qajustin long, elham gomez. So [facility] [phone].    ATENCIÓN: Si habla español, tiene a wade disposición servicios gratuitos de asistencia lingüística. Llame al [phone].    We comply with applicable federal civil rights laws and [place] laws. We do not discriminate on the basis of race, color, national origin, age, disability, sex, sexual orientation, or gender identity.            After Visit Summary       This is your record. Keep this with you and show to your community pharmacist(s) and doctor(s) at your next visit.

## 2018-04-09 LAB
ALBUMIN SERPL-MCNC: 3.9 G/DL (ref 3.4–5)
ALP SERPL-CCNC: 153 U/L (ref 40–150)
ALT SERPL W P-5'-P-CCNC: 23 U/L (ref 0–50)
AMYLASE SERPL-CCNC: 82 U/L (ref 30–110)
ANION GAP SERPL CALCULATED.3IONS-SCNC: 6 MMOL/L (ref 3–14)
AST SERPL W P-5'-P-CCNC: 19 U/L (ref 0–45)
BILIRUB SERPL-MCNC: 0.5 MG/DL (ref 0.2–1.3)
BUN SERPL-MCNC: 10 MG/DL (ref 7–30)
CALCIUM SERPL-MCNC: 9.1 MG/DL (ref 8.5–10.1)
CHLORIDE SERPL-SCNC: 105 MMOL/L (ref 94–109)
CO2 SERPL-SCNC: 26 MMOL/L (ref 20–32)
CREAT SERPL-MCNC: 0.86 MG/DL (ref 0.52–1.04)
CRP SERPL-MCNC: 8.6 MG/L (ref 0–8)
GFR SERPL CREATININE-BSD FRML MDRD: 74 ML/MIN/1.7M2
GLUCOSE SERPL-MCNC: 84 MG/DL (ref 70–99)
LIPASE SERPL-CCNC: 246 U/L (ref 73–393)
POTASSIUM SERPL-SCNC: 4.3 MMOL/L (ref 3.4–5.3)
PROT SERPL-MCNC: 9.1 G/DL (ref 6.8–8.8)
SODIUM SERPL-SCNC: 137 MMOL/L (ref 133–144)

## 2018-04-09 PROCEDURE — 25000128 H RX IP 250 OP 636: Performed by: EMERGENCY MEDICINE

## 2018-04-09 PROCEDURE — 96375 TX/PRO/DX INJ NEW DRUG ADDON: CPT | Performed by: EMERGENCY MEDICINE

## 2018-04-09 RX ORDER — HYDROMORPHONE HYDROCHLORIDE 1 MG/ML
0.5 INJECTION, SOLUTION INTRAMUSCULAR; INTRAVENOUS; SUBCUTANEOUS
Status: COMPLETED | OUTPATIENT
Start: 2018-04-09 | End: 2018-04-09

## 2018-04-09 RX ADMIN — HYDROMORPHONE HYDROCHLORIDE 0.5 MG: 1 INJECTION, SOLUTION INTRAMUSCULAR; INTRAVENOUS; SUBCUTANEOUS at 00:13

## 2018-04-09 ASSESSMENT — ENCOUNTER SYMPTOMS
ABDOMINAL PAIN: 1
NAUSEA: 1
DIAPHORESIS: 1
ABDOMINAL DISTENTION: 1

## 2018-04-09 NOTE — ED PROVIDER NOTES
History     Chief Complaint   Patient presents with     Abdominal Pain     The history is provided by the patient, a parent and medical records.     Doreen Peralta is a 37 year old female who presents to the ED for evaluation of severe abdominal pain, nausea, vomiting and bloating that started after eating solid food for the first time since February when she was diagnosed with Clostridium difficile.  Patient reports that she has been on a liquid and jello diet but went to a party today where she ate a small amount of pasta and drank soda.  Since then she has had progressive increase in her abdominal distention accompanied by marked increase in her abdominal pain.  She has had nausea and diaphoresis but has not vomited.    Problem List:    Patient Active Problem List    Diagnosis Date Noted     Sepsis (H) - possible 08/24/2017     Priority: Medium     Hypokalemia 08/24/2017     Priority: Medium     Essential hypertension 08/24/2017     Priority: Medium     Sepsis due to Klebsiella (H) 07/27/2017     Priority: Medium     Insomnia, unspecified type 03/31/2017     Priority: Medium     Abdominal pain 02/15/2017     Priority: Medium     Abdominal pain, generalized 01/28/2017     Priority: Medium     SIRS (systemic inflammatory response syndrome) (H) 01/26/2017     Priority: Medium     History of deep venous thrombosis 01/26/2017     Priority: Medium     Nausea and vomiting 01/26/2017     Priority: Medium     Vitamin D deficiency 01/16/2017     Priority: Medium     Chronic abdominal pain 11/15/2016     Priority: Medium     Patient is followed by Larisa Lorenzo PA-C for ongoing prescription of pain medication.  All refills should be approved by this provider, or covering partner.    Medication(s): no regular narcotics - narcotics given at ED visits  Maximum quantity per month: N/A  Clinic visit frequency required: Q 6  months     Controlled substance agreement:  Encounter-Level CSA - 11/9/15:                Controlled Substance Agreement - Scan on 11/19/2015 11:17 AM : CONTROLLED SUBSTANCE AGREEMENT 11/09/15 (below)          Encounter-Level CSA - 2/27/15:               Controlled Substance Agreement - Scan on 3/12/2015  7:50 AM : Controlled Medication Agreement 02/27/15 (below)            Pain Clinic evaluation in the past: Yes    DIRE Total Score(s):  No flowsheet data found.    Last [facility] website verification:  done on 11/15/16   [email or web address]        Attention to G-tube (H) 11/08/2016     Priority: Medium     Fever 10/16/2016     Priority: Medium     Coagulation defect (H) [D68.9] 09/16/2016     Priority: Medium     Chronic diarrhea 07/22/2016     Priority: Medium     Migraine 07/20/2016     Priority: Medium     Positive blood culture - Klebsiella oxytoca from Port-a-cath culture 07/18/2016     Priority: Medium     Mitral regurgitation mild-mod by Echo June 2016 07/18/2016     Priority: Medium     Anemia, iron deficiency 07/17/2016     Priority: Medium     Anemia in other chronic diseases classified elsewhere 06/14/2016     Priority: Medium     Munchausen syndrome - previously suspected 06/14/2016     Priority: Medium     Long-term (current) use of anticoagulants [Z79.01] 05/16/2016     Priority: Medium     Anxiety 05/16/2016     Priority: Medium     S/P partial resection of colon 04/11/2016     Priority: Medium     Malnutrition (H) 04/11/2016     Priority: Medium     Jejunostomy tube present (H) 03/21/2016     Priority: Medium     Malfunctioning jejunostomy tube (H) 12/22/2015     Priority: Medium     Malfunction of gastrostomy tube (H) 11/19/2015     Priority: Medium     PEG tube malfunction (H) 10/16/2015     Priority: Medium     Health Care Home 01/16/2015     Priority: Medium     *See Letters for [facility] Care Plan: My Access Plan           PEG (percutaneous endoscopic gastrostomy) status 11/05/2014     Priority: Medium     Gastroparesis 04/11/2014     Priority: Medium     Overview:   Had ileostomy  performed [facility] in Jan 2012 as treatment       Migraines 04/11/2014     Priority: Medium     4/11/2014  With periods, every other month.       Intermittent asthma 04/11/2014     Priority: Medium     4/11/2014   With URIs, allergies       Allergic rhinitis 04/11/2014     Priority: Medium     Problem list name updated by automated process. Provider to review       Abnormal Pap smear of cervix 04/11/2014     Priority: Medium     4/11/2014  S/p colp and LEEP. Sees OB/GYN at [facility]. Pap every year x20 years - normal since.       S/P LEEP of cervix 02/06/2014     Priority: Medium     Patellofemoral stress syndrome 01/18/2014     Priority: Medium     Hx SBO 10/09/2013     Priority: Medium     4/11/2014  Recurrent. Sees GI (Dr. Redding - at [facility]) every 6 months or so.  Sees feeding tube nurse next week.       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 04/11/2013     Priority: Medium     Constipation by delayed colonic transit 10/24/2012     Priority: Medium     Atopic rhinitis 03/20/2009     Priority: Medium     Hyperbilirubinemia 03/11/2009     Priority: Medium        Past Medical History:    Past Medical History:   Diagnosis Date     Asthma      Bilateral ovarian cysts      Cervical cancer (H) 01/01/2008     Chronic pain      Colonic dysmotility      Constipation      E. coli sepsis (H) 5/8/2016     Enteritis      Fungemia 5/5/2016     Gastro-oesophageal reflux disease      H/O ileostomy      Hx of abnormal Pap smear      Hypertension      IBS (irritable bowel syndrome)      Other chronic pain      PONV (postoperative nausea and vomiting)      Thrombosis, hepatic vein (H)        Past [facility] History:    Past [facility] History:   Procedure Laterality Date     COLONOSCOPY  7/10/2012    Procedure: COLONOSCOPY;;  Surgeon: Nicole Redding MD;  Location: [facility] OR     COLONOSCOPY N/A 2/19/2017    Procedure: COLONOSCOPY;  Surgeon: Randell Muller MD;  Location:  GI     COLONOSCOPY N/A 2/21/2017    Procedure:  COLONOSCOPY;  Surgeon: Randell Muller MD;  Location: [facility] GI     ECHO CHELO  7/19/2016          ENDOSCOPIC INSERTION TUBE GASTROSTOMY N/A 1/21/2016    Procedure: ENDOSCOPIC INSERTION TUBE GASTROSTOMY;  Surgeon: Nicole Redding MD;  Location: [facility] OR     ESOPHAGOSCOPY, GASTROSCOPY, DUODENOSCOPY (EGD), COMBINED  7/10/2012    Procedure: COMBINED ESOPHAGOSCOPY, GASTROSCOPY, DUODENOSCOPY (EGD);  Upper Endoscopy, Ileoscopy    Latex Allergy  with biopsies;  Surgeon: Nicoel Redding MD;  Location: [facility] OR     ESOPHAGOSCOPY, GASTROSCOPY, DUODENOSCOPY (EGD), COMBINED N/A 11/5/2014    Procedure: COMBINED ESOPHAGOSCOPY, GASTROSCOPY, DUODENOSCOPY (EGD);  Surgeon: Nicole Redding MD;  Location: [facility] OR     HC REPLACE DUODENOSTOMY/JEJUNOSTOMY TUBE PERCUTANEOUS N/A 8/27/2015    Procedure: REPLACE GASTROJEJUNOSTOMY TUBE, PERCUTANEOUS;  Surgeon: Mio Holder MD;  Location[facility]     HC REPLACE DUODENOSTOMY/JEJUNOSTOMY TUBE PERCUTANEOUS N/A 1/7/2016    Procedure: REPLACE JEJUNOSTOMY TUBE, PERCUTANEOUS;  Surgeon: Elsa Medel MD;  Location: [facility] OR     HC REPLACE DUODENOSTOMY/JEJUNOSTOMY TUBE PERCUTANEOUS N/A 1/28/2016    Procedure: REPLACE JEJUNOSTOMY TUBE, PERCUTANEOUS;  Surgeon: Elsa Medel MD;  Location: [facility]     HC REPLACE GASTROSTOMY/CECOSTOMY TUBE PERCUTANEOUS Left 5/19/2015    Procedure: REPLACE GASTROSTOMY TUBE, PERCUTANEOUS;  Surgeon: Melecio Morejon Chi, MD;  Location: [facility] GI     HC UGI ENDOSCOPY W PLACEMENT GASTROSTOMY TUBE PERCUT N/A 10/1/2015    Procedure: COMBINED ESOPHAGOSCOPY, GASTROSCOPY, DUODENOSCOPY (EGD), PLACE PERCUTANEOUS ENDOSCOPIC GASTROSTOMY TUBE;  Surgeon: Mio Holder MD;  Location: [facility] GI     INSERT PORT VASCULAR ACCESS Right 7/20/2017    Procedure: INSERT PORT VASCULAR ACCESS;  Chest Port Placement  **Latex Allergy**;  Surgeon: Coy Rocha PA-C;  [facility]     LAPAROSCOPIC ASSISTED COLECTOMY  1/20/2012    Procedure:LAPAROSCOPIC ASSISTED COLECTOMY;  Laparoscopic Ileostomy       LAPAROSCOPIC ASSISTED COLECTOMY LEFT (DESCENDING)  10/24/2012    Procedure: LAPAROSCOPIC ASSISTED COLECTOMY LEFT (DESCENDING);   Hand Assisted Laproscopic Subtotal abdominal Colectomy,Iieorectal anastamosis, Ileostomy Closure.       LAPAROSCOPIC ASSISTED INSERTION TUBE JEJUNOSTOMY N/A 10/16/2015    Procedure: LAPAROSCOPIC ASSISTED INSERTION TUBE JEJUNOSTOMY;  Surgeon: Elsa Medel MD;  Location: [facility]     LAPAROSCOPIC CHOLECYSTECTOMY  2002    [facility] stones duct     LAPAROSCOPIC ILEOSTOMY  1/20/2012    [facility], loop     LAPAROSCOPIC OOPHORECTOMY Right 2009    [facility]     LAPAROTOMY EXPLORATORY N/A 1/28/2016    Procedure: LAPAROTOMY EXPLORATORY;  Surgeon: Elsa Medel MD;  Location: [facility]     LEEP TX, CERVICAL  2009    [facility]     PICC INSERTION Left 10/21/2015    5fr DL Power PICC, 37cm (2cm external) in the L basilic vein w/ tip in the SVC RA junction.     REMOVE GASTROSTOMY TUBE ADULT N/A 12/12/2014    Procedure: REMOVE GASTROSTOMY TUBE ADULT;  Surgeon: Nicole Redding MD;  Location: [facility] GI     REMOVE PORT VASCULAR ACCESS Right 6/30/2016    Procedure: REMOVE PORT VASCULAR ACCESS;  Surgeon: Pradeep Orosco MD;  Location: [facility]     REMOVE PORT VASCULAR ACCESS Right 9/8/2017    Procedure: REMOVE PORT VASCULAR ACCESS;  right side mediport removal;  Surgeon: Zechariah Worthington MD;  Location: [facility] OR     replace GASTROSTOMY TUBE ADULT  5/19/15       Family History:    Family History   Problem Relation Age of Onset     Thyroid Disease Mother      Sjogren's Mother      GASTROINTESTINAL DISEASE Mother      Intermittent nausea vomiting diarrhea     Colon Polyps Mother      Prostate Problems Father      prostate enlargement     Lupus Maternal Grandmother      CANCER Maternal Grandfather      Lung     Colon Cancer Maternal Grandfather 65     CANCER Paternal Grandmother      Lung      CEREBROVASCULAR DISEASE Paternal Grandmother      DIABETES Paternal  Grandmother      Cardiovascular Paternal Grandmother      CHF     CANCER Paternal Grandfather      Lung     Glaucoma Paternal Grandfather      Abdominal Aortic Aneurysm Other      Macular Degeneration No family hx of        Social History:  Marital Status:   [2]  Social History   Substance Use Topics     Smoking status: Current Some Day Smoker     Packs/day: 1.00     Years: 4.00     Types: Cigarettes     Last attempt to quit: 1/1/2004     Smokeless tobacco: Former User     Alcohol use No        Medications:      ondansetron (ZOFRAN) 4 MG tablet   cloNIDine (CATAPRES) 0.2 MG tablet   traZODone (DESYREL) 100 MG tablet   ondansetron (ZOFRAN) 8 MG tablet   Emollient (GRX VITAMIN E) LOTN   Alfalfa 650 MG TABS   SUMAtriptan (IMITREX) 50 MG tablet   cloNIDine (CATAPRES) 0.2 MG tablet   DULoxetine (CYMBALTA) 60 MG EC capsule   albuterol (2.5 MG/3ML) 0.083% neb solution   ipratropium (ATROVENT) 0.02 % neb solution   diphenhydrAMINE (BENADRYL ALLERGY) 25 MG tablet   ACETAMINOPHEN PO   albuterol 90 MCG/ACT inhaler         Review of Systems   Constitutional: Positive for diaphoresis.   Gastrointestinal: Positive for abdominal distention, abdominal pain and nausea.   All other systems reviewed and are negative.      Physical Exam   BP: (!) 111/96  Heart Rate: 97  Temp: 98.3  F (36.8  C)  Resp: 20  Weight: 78.9 kg (174 lb)  SpO2: 99 %      Physical Exam   Constitutional: She is oriented to person, place, and time. She appears well-developed. She appears distressed.   HENT:   Head: Atraumatic.   Mouth/Throat: Oropharynx is clear and moist.   Eyes: Pupils are equal, round, and reactive to light.   Neck: Normal range of motion.   Cardiovascular: Normal rate, normal heart sounds and intact distal pulses.    Pulmonary/Chest: Effort normal and breath sounds normal.   Abdominal: Soft. She exhibits distension. Bowel sounds are decreased. There is generalized tenderness. There is no rebound and no guarding.   Tympany to percussion  over the entire abdomen.   Musculoskeletal: Normal range of motion.   Neurological: She is alert and oriented to person, place, and time.   Skin: Skin is warm.   Psychiatric:   Somewhat histrionic   Nursing note and vitals reviewed.      ED Course     ED Course     Procedures                 Results for orders placed or performed during the hospital encounter of 04/08/18 (from the past 24 hour(s))   XR Abdomen 2 Views    Narrative    ABDOMEN 2 VIEWS   4/8/2018 10:22 PM     HISTORY: Abdominal pain and distention.    COMPARISON: 3/29/2018.      Impression    IMPRESSION: Bowel gas pattern is within normal limits. No convincing  radiographic evidence for bowel obstruction. No free intraperitoneal  air. Prior cholecystectomy.    CANDIDA BENNETT MD   CBC with platelets differential   Result Value Ref Range    WBC 7.5 4.0 - 11.0 10e9/L    RBC Count 4.59 3.8 - 5.2 10e12/L    Hemoglobin 11.4 (L) 11.7 - 15.7 g/dL    Hematocrit 36.4 35.0 - 47.0 %    MCV 79 78 - 100 fl    MCH 24.8 (L) 26.5 - 33.0 pg    MCHC 31.3 (L) 31.5 - 36.5 g/dL    RDW 17.0 (H) 10.0 - 15.0 %    Platelet Count 509 (H) 150 - 450 10e9/L    Diff Method Automated Method     % Neutrophils 62.3 %    % Lymphocytes 28.7 %    % Monocytes 6.7 %    % Eosinophils 1.7 %    % Basophils 0.3 %    % Immature Granulocytes 0.3 %    Absolute Neutrophil 4.6 1.6 - 8.3 10e9/L    Absolute Lymphocytes 2.1 0.8 - 5.3 10e9/L    Absolute Monocytes 0.5 0.0 - 1.3 10e9/L    Absolute Eosinophils 0.1 0.0 - 0.7 10e9/L    Absolute Basophils 0.0 0.0 - 0.2 10e9/L    Abs Immature Granulocytes 0.0 0 - 0.4 10e9/L   Comprehensive metabolic panel   Result Value Ref Range    Sodium 137 133 - 144 mmol/L    Potassium 4.3 3.4 - 5.3 mmol/L    Chloride 105 94 - 109 mmol/L    Carbon Dioxide 26 20 - 32 mmol/L    Anion Gap 6 3 - 14 mmol/L    Glucose 84 70 - 99 mg/dL    Urea Nitrogen 10 7 - 30 mg/dL    Creatinine 0.86 0.52 - 1.04 mg/dL    GFR Estimate 74 >60 mL/min/1.7m2    GFR Estimate If Black 89 >60  mL/min/1.7m2    Calcium 9.1 8.5 - 10.1 mg/dL    Bilirubin Total 0.5 0.2 - 1.3 mg/dL    Albumin 3.9 3.4 - 5.0 g/dL    Protein Total 9.1 (H) 6.8 - 8.8 g/dL    Alkaline Phosphatase 153 (H) 40 - 150 U/L    ALT 23 0 - 50 U/L    AST 19 0 - 45 U/L   Lipase   Result Value Ref Range    Lipase 246 73 - 393 U/L   Amylase   Result Value Ref Range    Amylase 82 30 - 110 U/L   CRP inflammation   Result Value Ref Range    CRP Inflammation 8.6 (H) 0.0 - 8.0 mg/L     *Note: Due to a large number of results and/or encounters for the requested time period, some results have not been displayed. A complete set of results can be found in Results Review.       Medications   diphenhydrAMINE (BENADRYL) injection 25 mg (25 mg Intravenous Given 4/8/18 5314)   HYDROmorphone (PF) (DILAUDID) injection 0.5 mg (0.5 mg Intravenous Given 4/9/18 0013)       Assessments & Plan (with Medical Decision Making)  Doreen Peralta is a 37-year-old female presenting to the ED for acute onset of her chronic abdominal pain that started after she was attended a party where she ate pasta and drink soda.  Since then she has been having increased pain with nausea, distention, and generally feels uncomfortable.  She took 12 mg of Zofran at home prior to arrival without any significant improvement in her nausea.  Upon examination, she has an moderately distended abdomen with tympany to percussion throughout.  She has generalized tenderness to palpation without guarding or rebound.  Her bowel sounds are also diminished.  The remainder of her exam is unremarkable.  A 2 view abdomen x-ray was performed showing a normal gas pattern throughout the abdomen without obvious evidence of obstruction.  Labs were obtained and show normal CBC, conference of metabolic panel, C-reactive protein, and amylase and lipase.  At this point, it seems that this is probably related to flatus and some form of irritable bowel.  She was given IV diphenhydramine and Dilaudid with marked  improvement in her symptoms.  At this time I believe she is suitable for discharge home.  We discussed indications return to the ED for reevaluation.  All questions from the patient and mother were answered prior to discharge.     I have reviewed the nursing notes.    I have reviewed the findings, diagnosis, plan and need for follow up with the patient.       New Prescriptions    No medications on file       Final diagnoses:   Flatulence, eructation, and gas pain   Abdominal pain, generalized       4/8/2018   [facility] EMERGENCY DEPARTMENT     Gera Mcconnell MD  04/09/18 0045

## 2018-04-09 NOTE — ED NOTES
Pt reports a history of C Diff since February this evening she ate solid food for the first time and is now having increased abd pain and nausea. Took 12mg of Zofran at home PTA without improvement.

## 2018-04-16 ENCOUNTER — APPOINTMENT (OUTPATIENT)
Dept: CT IMAGING | Facility: CLINIC | Age: 38
DRG: 871 | End: 2018-04-16
Attending: PHYSICIAN ASSISTANT
Payer: MEDICARE

## 2018-04-16 ENCOUNTER — HOSPITAL ENCOUNTER (INPATIENT)
Facility: CLINIC | Age: 38
LOS: 6 days | Discharge: HOME | DRG: 871 | End: 2018-04-22
Attending: PHYSICIAN ASSISTANT | Admitting: INTERNAL MEDICINE
Payer: MEDICARE

## 2018-04-16 DIAGNOSIS — R11.15 NON-INTRACTABLE CYCLICAL VOMITING WITH NAUSEA: ICD-10-CM

## 2018-04-16 DIAGNOSIS — A49.8 CLOSTRIDIUM DIFFICILE INFECTION: ICD-10-CM

## 2018-04-16 DIAGNOSIS — A41.9 SEPSIS, DUE TO UNSPECIFIED ORGANISM: ICD-10-CM

## 2018-04-16 DIAGNOSIS — A41.50: ICD-10-CM

## 2018-04-16 DIAGNOSIS — Z22.1 CLOSTRIDIUM DIFFICILE CARRIER: ICD-10-CM

## 2018-04-16 DIAGNOSIS — G47.00 INSOMNIA, UNSPECIFIED TYPE: Primary | ICD-10-CM

## 2018-04-16 DIAGNOSIS — R19.5 STOOL CULTURE POSITIVE FOR CLOSTRIDIUM DIFFICILE: ICD-10-CM

## 2018-04-16 DIAGNOSIS — R10.84 GENERALIZED ABDOMINAL PAIN: ICD-10-CM

## 2018-04-16 DIAGNOSIS — A41.9 SEPSIS (H): ICD-10-CM

## 2018-04-16 DIAGNOSIS — R10.9 LEFT SIDED ABDOMINAL PAIN OF UNKNOWN CAUSE: ICD-10-CM

## 2018-04-16 PROBLEM — A04.72 C. DIFFICILE COLITIS: Status: ACTIVE | Noted: 2018-04-16

## 2018-04-16 PROBLEM — D64.9 ANEMIA: Status: ACTIVE | Noted: 2018-04-16

## 2018-04-16 PROBLEM — Z72.0 TOBACCO ABUSE: Status: ACTIVE | Noted: 2018-04-16

## 2018-04-16 PROBLEM — I10 ESSENTIAL HYPERTENSION: Status: ACTIVE | Noted: 2017-08-24

## 2018-04-16 LAB
ALBUMIN SERPL-MCNC: 3.1 G/DL (ref 3.4–5)
ALBUMIN UR-MCNC: NEGATIVE MG/DL
ALP SERPL-CCNC: 151 U/L (ref 40–150)
ALT SERPL W P-5'-P-CCNC: 27 U/L (ref 0–50)
ANION GAP SERPL CALCULATED.3IONS-SCNC: 8 MMOL/L (ref 3–14)
APPEARANCE UR: CLEAR
AST SERPL W P-5'-P-CCNC: 22 U/L (ref 0–45)
BASOPHILS # BLD AUTO: 0.1 10E9/L (ref 0–0.2)
BASOPHILS NFR BLD AUTO: 0.6 %
BILIRUB SERPL-MCNC: 0.6 MG/DL (ref 0.2–1.3)
BILIRUB UR QL STRIP: NEGATIVE
BUN SERPL-MCNC: 4 MG/DL (ref 7–30)
CALCIUM SERPL-MCNC: 8.4 MG/DL (ref 8.5–10.1)
CHLORIDE SERPL-SCNC: 104 MMOL/L (ref 94–109)
CO2 SERPL-SCNC: 25 MMOL/L (ref 20–32)
COLOR UR AUTO: ABNORMAL
CREAT SERPL-MCNC: 0.68 MG/DL (ref 0.52–1.04)
CRP SERPL-MCNC: 103 MG/L (ref 0–8)
DIFFERENTIAL METHOD BLD: ABNORMAL
EOSINOPHIL # BLD AUTO: 0.1 10E9/L (ref 0–0.7)
EOSINOPHIL NFR BLD AUTO: 0.5 %
ERYTHROCYTE [DISTWIDTH] IN BLOOD BY AUTOMATED COUNT: 17 % (ref 10–15)
FERRITIN SERPL-MCNC: 24 NG/ML (ref 12–150)
GFR SERPL CREATININE-BSD FRML MDRD: >90 ML/MIN/1.7M2
GLUCOSE SERPL-MCNC: 101 MG/DL (ref 70–99)
GLUCOSE UR STRIP-MCNC: NEGATIVE MG/DL
HCT VFR BLD AUTO: 31.2 % (ref 35–47)
HGB BLD-MCNC: 9.8 G/DL (ref 11.7–15.7)
HGB UR QL STRIP: NEGATIVE
IMM GRANULOCYTES # BLD: 0 10E9/L (ref 0–0.4)
IMM GRANULOCYTES NFR BLD: 0.3 %
IRON SATN MFR SERPL: 3 % (ref 15–46)
IRON SERPL-MCNC: 11 UG/DL (ref 35–180)
KETONES UR STRIP-MCNC: 5 MG/DL
LACTATE BLD-SCNC: 1 MMOL/L (ref 0.7–2)
LEUKOCYTE ESTERASE UR QL STRIP: NEGATIVE
LYMPHOCYTES # BLD AUTO: 0.8 10E9/L (ref 0.8–5.3)
LYMPHOCYTES NFR BLD AUTO: 8.3 %
MCH RBC QN AUTO: 24.9 PG (ref 26.5–33)
MCHC RBC AUTO-ENTMCNC: 31.4 G/DL (ref 31.5–36.5)
MCV RBC AUTO: 79 FL (ref 78–100)
MONOCYTES # BLD AUTO: 0.6 10E9/L (ref 0–1.3)
MONOCYTES NFR BLD AUTO: 6.3 %
NEUTROPHILS # BLD AUTO: 7.8 10E9/L (ref 1.6–8.3)
NEUTROPHILS NFR BLD AUTO: 84 %
NITRATE UR QL: NEGATIVE
PH UR STRIP: 7 PH (ref 5–7)
PLATELET # BLD AUTO: 335 10E9/L (ref 150–450)
POTASSIUM SERPL-SCNC: 3.6 MMOL/L (ref 3.4–5.3)
PROT SERPL-MCNC: 7.8 G/DL (ref 6.8–8.8)
RBC # BLD AUTO: 3.94 10E12/L (ref 3.8–5.2)
RBC #/AREA URNS AUTO: 0 /HPF (ref 0–2)
RETICS # AUTO: 79.4 10E9/L (ref 25–95)
RETICS/RBC NFR AUTO: 2 % (ref 0.5–2)
SODIUM SERPL-SCNC: 137 MMOL/L (ref 133–144)
SOURCE: ABNORMAL
SP GR UR STRIP: 1 (ref 1–1.03)
TIBC SERPL-MCNC: 371 UG/DL (ref 240–430)
UROBILINOGEN UR STRIP-MCNC: 0 MG/DL (ref 0–2)
WBC # BLD AUTO: 9.3 10E9/L (ref 4–11)
WBC #/AREA URNS AUTO: 0 /HPF (ref 0–5)

## 2018-04-16 PROCEDURE — 25000132 ZZH RX MED GY IP 250 OP 250 PS 637: Mod: GY | Performed by: INTERNAL MEDICINE

## 2018-04-16 PROCEDURE — 96361 HYDRATE IV INFUSION ADD-ON: CPT | Performed by: PHYSICIAN ASSISTANT

## 2018-04-16 PROCEDURE — 99285 EMERGENCY DEPT VISIT HI MDM: CPT | Mod: 25 | Performed by: PHYSICIAN ASSISTANT

## 2018-04-16 PROCEDURE — 25000125 ZZHC RX 250: Performed by: PHYSICIAN ASSISTANT

## 2018-04-16 PROCEDURE — 86140 C-REACTIVE PROTEIN: CPT | Performed by: PHYSICIAN ASSISTANT

## 2018-04-16 PROCEDURE — 80053 COMPREHEN METABOLIC PANEL: CPT | Performed by: PHYSICIAN ASSISTANT

## 2018-04-16 PROCEDURE — 74177 CT ABD & PELVIS W/CONTRAST: CPT

## 2018-04-16 PROCEDURE — 25000128 H RX IP 250 OP 636: Performed by: PHYSICIAN ASSISTANT

## 2018-04-16 PROCEDURE — 99285 EMERGENCY DEPT VISIT HI MDM: CPT | Mod: Z6 | Performed by: PHYSICIAN ASSISTANT

## 2018-04-16 PROCEDURE — 83540 ASSAY OF IRON: CPT | Performed by: INTERNAL MEDICINE

## 2018-04-16 PROCEDURE — 87040 BLOOD CULTURE FOR BACTERIA: CPT | Performed by: PHYSICIAN ASSISTANT

## 2018-04-16 PROCEDURE — 87186 SC STD MICRODIL/AGAR DIL: CPT | Performed by: PHYSICIAN ASSISTANT

## 2018-04-16 PROCEDURE — 12000000 ZZH R&B MED SURG/OB

## 2018-04-16 PROCEDURE — 96375 TX/PRO/DX INJ NEW DRUG ADDON: CPT | Performed by: PHYSICIAN ASSISTANT

## 2018-04-16 PROCEDURE — 99207 ZZC MOONLIGHTING INDICATOR: CPT | Performed by: INTERNAL MEDICINE

## 2018-04-16 PROCEDURE — 85025 COMPLETE CBC W/AUTO DIFF WBC: CPT | Performed by: PHYSICIAN ASSISTANT

## 2018-04-16 PROCEDURE — 87086 URINE CULTURE/COLONY COUNT: CPT | Performed by: INTERNAL MEDICINE

## 2018-04-16 PROCEDURE — 83605 ASSAY OF LACTIC ACID: CPT | Performed by: PHYSICIAN ASSISTANT

## 2018-04-16 PROCEDURE — 99222 1ST HOSP IP/OBS MODERATE 55: CPT | Mod: AI | Performed by: INTERNAL MEDICINE

## 2018-04-16 PROCEDURE — 83550 IRON BINDING TEST: CPT | Performed by: INTERNAL MEDICINE

## 2018-04-16 PROCEDURE — 82728 ASSAY OF FERRITIN: CPT | Performed by: INTERNAL MEDICINE

## 2018-04-16 PROCEDURE — A9270 NON-COVERED ITEM OR SERVICE: HCPCS | Performed by: PHYSICIAN ASSISTANT

## 2018-04-16 PROCEDURE — 87800 DETECT AGNT MULT DNA DIREC: CPT | Performed by: PHYSICIAN ASSISTANT

## 2018-04-16 PROCEDURE — 85045 AUTOMATED RETICULOCYTE COUNT: CPT | Performed by: INTERNAL MEDICINE

## 2018-04-16 PROCEDURE — 96365 THER/PROPH/DIAG IV INF INIT: CPT | Mod: 59 | Performed by: PHYSICIAN ASSISTANT

## 2018-04-16 PROCEDURE — 96376 TX/PRO/DX INJ SAME DRUG ADON: CPT | Performed by: PHYSICIAN ASSISTANT

## 2018-04-16 PROCEDURE — 81001 URINALYSIS AUTO W/SCOPE: CPT | Performed by: PHYSICIAN ASSISTANT

## 2018-04-16 PROCEDURE — 25000128 H RX IP 250 OP 636: Performed by: INTERNAL MEDICINE

## 2018-04-16 PROCEDURE — 87077 CULTURE AEROBIC IDENTIFY: CPT | Performed by: PHYSICIAN ASSISTANT

## 2018-04-16 PROCEDURE — A9270 NON-COVERED ITEM OR SERVICE: HCPCS | Mod: GY | Performed by: INTERNAL MEDICINE

## 2018-04-16 RX ORDER — DULOXETIN HYDROCHLORIDE 30 MG/1
60 CAPSULE, DELAYED RELEASE ORAL DAILY
Status: DISCONTINUED | OUTPATIENT
Start: 2018-04-17 | End: 2018-04-22 | Stop reason: HOSPADM

## 2018-04-16 RX ORDER — DIPHENHYDRAMINE HYDROCHLORIDE 50 MG/ML
25 INJECTION INTRAMUSCULAR; INTRAVENOUS EVERY 6 HOURS PRN
Status: DISCONTINUED | OUTPATIENT
Start: 2018-04-16 | End: 2018-04-18

## 2018-04-16 RX ORDER — NALOXONE HYDROCHLORIDE 0.4 MG/ML
.1-.4 INJECTION, SOLUTION INTRAMUSCULAR; INTRAVENOUS; SUBCUTANEOUS
Status: CANCELLED | OUTPATIENT
Start: 2018-04-16

## 2018-04-16 RX ORDER — ONDANSETRON 4 MG/1
4 TABLET, ORALLY DISINTEGRATING ORAL EVERY 6 HOURS PRN
Status: CANCELLED | OUTPATIENT
Start: 2018-04-16

## 2018-04-16 RX ORDER — IOPAMIDOL 755 MG/ML
100 INJECTION, SOLUTION INTRAVASCULAR ONCE
Status: COMPLETED | OUTPATIENT
Start: 2018-04-16 | End: 2018-04-16

## 2018-04-16 RX ORDER — ACETAMINOPHEN 325 MG/1
650 TABLET ORAL EVERY 4 HOURS PRN
Status: DISCONTINUED | OUTPATIENT
Start: 2018-04-16 | End: 2018-04-22 | Stop reason: HOSPADM

## 2018-04-16 RX ORDER — ONDANSETRON 2 MG/ML
4 INJECTION INTRAMUSCULAR; INTRAVENOUS EVERY 6 HOURS PRN
Status: CANCELLED | OUTPATIENT
Start: 2018-04-16

## 2018-04-16 RX ORDER — ALBUTEROL SULFATE 0.83 MG/ML
2.5 SOLUTION RESPIRATORY (INHALATION) EVERY 4 HOURS PRN
Status: DISCONTINUED | OUTPATIENT
Start: 2018-04-16 | End: 2018-04-22 | Stop reason: HOSPADM

## 2018-04-16 RX ORDER — HYDROCODONE BITARTRATE AND ACETAMINOPHEN 5; 325 MG/1; MG/1
1-2 TABLET ORAL EVERY 4 HOURS PRN
Status: CANCELLED | OUTPATIENT
Start: 2018-04-16

## 2018-04-16 RX ORDER — ACETAMINOPHEN 325 MG/1
650 TABLET ORAL EVERY 4 HOURS PRN
Status: CANCELLED | OUTPATIENT
Start: 2018-04-16

## 2018-04-16 RX ORDER — DIPHENHYDRAMINE HYDROCHLORIDE 50 MG/ML
50 INJECTION INTRAMUSCULAR; INTRAVENOUS ONCE
Status: COMPLETED | OUTPATIENT
Start: 2018-04-16 | End: 2018-04-16

## 2018-04-16 RX ORDER — NALOXONE HYDROCHLORIDE 0.4 MG/ML
.1-.4 INJECTION, SOLUTION INTRAMUSCULAR; INTRAVENOUS; SUBCUTANEOUS
Status: DISCONTINUED | OUTPATIENT
Start: 2018-04-16 | End: 2018-04-22 | Stop reason: HOSPADM

## 2018-04-16 RX ORDER — HYDROMORPHONE HYDROCHLORIDE 1 MG/ML
0.5 INJECTION, SOLUTION INTRAMUSCULAR; INTRAVENOUS; SUBCUTANEOUS
Status: COMPLETED | OUTPATIENT
Start: 2018-04-16 | End: 2018-04-16

## 2018-04-16 RX ORDER — CLONIDINE HYDROCHLORIDE 0.1 MG/1
0.4 TABLET ORAL EVERY EVENING
Status: DISCONTINUED | OUTPATIENT
Start: 2018-04-16 | End: 2018-04-22 | Stop reason: HOSPADM

## 2018-04-16 RX ORDER — HYDROMORPHONE HYDROCHLORIDE 2 MG/1
2 TABLET ORAL
Status: DISCONTINUED | OUTPATIENT
Start: 2018-04-16 | End: 2018-04-22 | Stop reason: HOSPADM

## 2018-04-16 RX ORDER — ONDANSETRON 4 MG/1
4 TABLET, ORALLY DISINTEGRATING ORAL EVERY 6 HOURS PRN
Status: DISCONTINUED | OUTPATIENT
Start: 2018-04-16 | End: 2018-04-22 | Stop reason: HOSPADM

## 2018-04-16 RX ORDER — SODIUM CHLORIDE 9 MG/ML
INJECTION, SOLUTION INTRAVENOUS CONTINUOUS
Status: DISCONTINUED | OUTPATIENT
Start: 2018-04-16 | End: 2018-04-18

## 2018-04-16 RX ORDER — HYDROCODONE BITARTRATE AND ACETAMINOPHEN 10; 325 MG/1; MG/1
1 TABLET ORAL EVERY 6 HOURS PRN
Status: DISCONTINUED | OUTPATIENT
Start: 2018-04-16 | End: 2018-04-16

## 2018-04-16 RX ORDER — SODIUM CHLORIDE 9 MG/ML
1000 INJECTION, SOLUTION INTRAVENOUS CONTINUOUS
Status: DISCONTINUED | OUTPATIENT
Start: 2018-04-16 | End: 2018-04-16

## 2018-04-16 RX ORDER — ONDANSETRON 2 MG/ML
4 INJECTION INTRAMUSCULAR; INTRAVENOUS EVERY 6 HOURS PRN
Status: DISCONTINUED | OUTPATIENT
Start: 2018-04-16 | End: 2018-04-22 | Stop reason: HOSPADM

## 2018-04-16 RX ORDER — HYDROCODONE BITARTRATE AND ACETAMINOPHEN 5; 325 MG/1; MG/1
1 TABLET ORAL ONCE
Status: DISCONTINUED | OUTPATIENT
Start: 2018-04-16 | End: 2018-04-16

## 2018-04-16 RX ORDER — ONDANSETRON 2 MG/ML
4 INJECTION INTRAMUSCULAR; INTRAVENOUS EVERY 30 MIN PRN
Status: DISCONTINUED | OUTPATIENT
Start: 2018-04-16 | End: 2018-04-16

## 2018-04-16 RX ADMIN — CLONIDINE HYDROCHLORIDE 0.4 MG: 0.1 TABLET ORAL at 21:17

## 2018-04-16 RX ADMIN — DIPHENHYDRAMINE HYDROCHLORIDE 50 MG: 50 INJECTION, SOLUTION INTRAMUSCULAR; INTRAVENOUS at 16:10

## 2018-04-16 RX ADMIN — HYDROMORPHONE HYDROCHLORIDE 0.5 MG: 1 INJECTION, SOLUTION INTRAMUSCULAR; INTRAVENOUS; SUBCUTANEOUS at 17:18

## 2018-04-16 RX ADMIN — HYDROMORPHONE HYDROCHLORIDE 0.5 MG: 1 INJECTION, SOLUTION INTRAMUSCULAR; INTRAVENOUS; SUBCUTANEOUS at 16:48

## 2018-04-16 RX ADMIN — SODIUM CHLORIDE 1000 ML: 9 INJECTION, SOLUTION INTRAVENOUS at 18:17

## 2018-04-16 RX ADMIN — HYDROMORPHONE HYDROCHLORIDE 0.5 MG: 1 INJECTION, SOLUTION INTRAMUSCULAR; INTRAVENOUS; SUBCUTANEOUS at 18:17

## 2018-04-16 RX ADMIN — DIPHENHYDRAMINE HYDROCHLORIDE 25 MG: 50 INJECTION, SOLUTION INTRAMUSCULAR; INTRAVENOUS at 21:06

## 2018-04-16 RX ADMIN — HYDROMORPHONE HYDROCHLORIDE 2 MG: 2 TABLET ORAL at 21:06

## 2018-04-16 RX ADMIN — VANCOMYCIN HYDROCHLORIDE 1750 MG: 1 INJECTION, POWDER, LYOPHILIZED, FOR SOLUTION INTRAVENOUS at 20:07

## 2018-04-16 RX ADMIN — ACETAMINOPHEN 650 MG: 325 TABLET ORAL at 21:05

## 2018-04-16 RX ADMIN — IOPAMIDOL 90 ML: 755 INJECTION, SOLUTION INTRAVENOUS at 17:55

## 2018-04-16 RX ADMIN — HYDROCODONE BITARTRATE AND ACETAMINOPHEN 1 TABLET: 10; 325 TABLET ORAL at 20:06

## 2018-04-16 RX ADMIN — TAZOBACTAM SODIUM AND PIPERACILLIN SODIUM 4.5 G: 500; 4 INJECTION, SOLUTION INTRAVENOUS at 19:25

## 2018-04-16 RX ADMIN — SODIUM CHLORIDE 1000 ML: 9 INJECTION, SOLUTION INTRAVENOUS at 16:04

## 2018-04-16 RX ADMIN — HYDROMORPHONE HYDROCHLORIDE 1 MG: 1 INJECTION, SOLUTION INTRAMUSCULAR; INTRAVENOUS; SUBCUTANEOUS at 16:04

## 2018-04-16 RX ADMIN — SODIUM CHLORIDE 100 ML: 9 INJECTION, SOLUTION INTRAVENOUS at 17:56

## 2018-04-16 ASSESSMENT — ENCOUNTER SYMPTOMS
NAUSEA: 1
SHORTNESS OF BREATH: 0
DIARRHEA: 1
ABDOMINAL PAIN: 1
APPETITE CHANGE: 1
BLOOD IN STOOL: 0
FEVER: 1
VOMITING: 0
HEMATURIA: 0
COUGH: 0
DYSURIA: 0
CONSTIPATION: 0

## 2018-04-16 ASSESSMENT — ACTIVITIES OF DAILY LIVING (ADL)
RETIRED_EATING: 0-->INDEPENDENT
FALL_HISTORY_WITHIN_LAST_SIX_MONTHS: YES
DRESS: 0-->INDEPENDENT
AMBULATION: 0-->INDEPENDENT
TRANSFERRING: 0-->INDEPENDENT
NUMBER_OF_TIMES_PATIENT_HAS_FALLEN_WITHIN_LAST_SIX_MONTHS: 1
SWALLOWING: 0-->SWALLOWS FOODS/LIQUIDS WITHOUT DIFFICULTY
COGNITION: 0 - NO COGNITION ISSUES REPORTED
BATHING: 0-->INDEPENDENT
TOILETING: 0-->INDEPENDENT
RETIRED_COMMUNICATION: 0-->UNDERSTANDS/COMMUNICATES WITHOUT DIFFICULTY

## 2018-04-16 NOTE — ED PROVIDER NOTES
"  History     Chief Complaint   Patient presents with     Fever     HPI  Doreen Peralta is a 37 year old female with a complex medical history to include chronic abdominal pain, multiple abdominal surgeries, recurrent sepsis, who presents to the emergency department via EMS for a fever. The patient reports for the last 3 days she has had low-grade temperatures around 100 F.  Today several hours ago she developed \"shakes\" with a fever of 100.6 F.  She decided to lay down and when she woke up around 1:30 PM the fever was up to 104 F.  She took Tylenol and Zofran at that time.  Today she is also noticed increased left-sided abdominal pain, increased nausea.  She has had nonbloody, yellow, foul-smelling diarrhea for quite a while now.  She was diagnosed with C. difficile and saw GI for this a couple weeks ago but has not heard from them.  She is currently not on any medications for her C. difficile infection.  She stated that Flagyl and vancomycin did not work so he was given a \"try a new medicine.\"  She has had too many episodes of diarrhea to count every day.  She states the GI doctor told her that her bowel may be leaking bacteria into her bloodstream causing her recurrent sepsis.  She denies urinary symptoms, vomiting, cough or congestion.    Problem List:    Patient Active Problem List    Diagnosis Date Noted     Sepsis (H) - possible 08/24/2017     Priority: Medium     Hypokalemia 08/24/2017     Priority: Medium     Essential hypertension 08/24/2017     Priority: Medium     Sepsis due to Klebsiella (H) 07/27/2017     Priority: Medium     Insomnia, unspecified type 03/31/2017     Priority: Medium     Abdominal pain 02/15/2017     Priority: Medium     Abdominal pain, generalized 01/28/2017     Priority: Medium     SIRS (systemic inflammatory response syndrome) (H) 01/26/2017     Priority: Medium     History of deep venous thrombosis 01/26/2017     Priority: Medium     Nausea and vomiting 01/26/2017     Priority: " Medium     Vitamin D deficiency 01/16/2017     Priority: Medium     Chronic abdominal pain 11/15/2016     Priority: Medium     Patient is followed by Larisa Lorenzo PA-C for ongoing prescription of pain medication.  All refills should be approved by this provider, or covering partner.    Medication(s): no regular narcotics - narcotics given at ED visits  Maximum quantity per month: N/A  Clinic visit frequency required: Q 6  months     Controlled substance agreement:  Encounter-Level CSA - 11/9/15:               Controlled Substance Agreement - Scan on 11/19/2015 11:17 AM : CONTROLLED SUBSTANCE AGREEMENT 11/09/15 (below)          Encounter-Level CSA - 2/27/15:               Controlled Substance Agreement - Scan on 3/12/2015  7:50 AM : Controlled Medication Agreement 02/27/15 (below)            Pain Clinic evaluation in the past: Yes    DIRE Total Score(s):  No flowsheet data found.    Last [facility] website verification:  done on 11/15/16   [email or web address]        Attention to G-tube (H) 11/08/2016     Priority: Medium     Fever 10/16/2016     Priority: Medium     Coagulation defect (H) [D68.9] 09/16/2016     Priority: Medium     Chronic diarrhea 07/22/2016     Priority: Medium     Migraine 07/20/2016     Priority: Medium     Positive blood culture - Klebsiella oxytoca from Port-a-cath culture 07/18/2016     Priority: Medium     Mitral regurgitation mild-mod by Echo June 2016 07/18/2016     Priority: Medium     Anemia, iron deficiency 07/17/2016     Priority: Medium     Anemia in other chronic diseases classified elsewhere 06/14/2016     Priority: Medium     Munchausen syndrome - previously suspected 06/14/2016     Priority: Medium     Long-term (current) use of anticoagulants [Z79.01] 05/16/2016     Priority: Medium     Anxiety 05/16/2016     Priority: Medium     S/P partial resection of colon 04/11/2016     Priority: Medium     Malnutrition (H) 04/11/2016     Priority: Medium     Jejunostomy tube  present (H) 03/21/2016     Priority: Medium     Malfunctioning jejunostomy tube (H) 12/22/2015     Priority: Medium     Malfunction of gastrostomy tube (H) 11/19/2015     Priority: Medium     PEG tube malfunction (H) 10/16/2015     Priority: Medium     Health Care Home 01/16/2015     Priority: Medium     *See Letters for [facility] Care Plan: My Access Plan           PEG (percutaneous endoscopic gastrostomy) status 11/05/2014     Priority: Medium     Gastroparesis 04/11/2014     Priority: Medium     Overview:   Had ileostomy performed at [facility] in Jan 2012 as treatment       Migraines 04/11/2014     Priority: Medium     4/11/2014  With periods, every other month.       Intermittent asthma 04/11/2014     Priority: Medium     4/11/2014   With URIs, allergies       Allergic rhinitis 04/11/2014     Priority: Medium     Problem list name updated by automated process. Provider to review       Abnormal Pap smear of cervix 04/11/2014     Priority: Medium     4/11/2014  S/p colp and LEEP. Sees OB/GYN at [facility]. Pap every year x20 years - normal since.       S/P LEEP of cervix 02/06/2014     Priority: Medium     Patellofemoral stress syndrome 01/18/2014     Priority: Medium     Hx SBO 10/09/2013     Priority: Medium     4/11/2014  Recurrent. Sees GI (Dr. Redding - at [facility]) every 6 months or so.  Sees feeding tube nurse next week.       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 04/11/2013     Priority: Medium     Constipation by delayed colonic transit 10/24/2012     Priority: Medium     Atopic rhinitis 03/20/2009     Priority: Medium     Hyperbilirubinemia 03/11/2009     Priority: Medium        Past Medical History:    Past Medical History:   Diagnosis Date     Asthma      Bilateral ovarian cysts      Cervical cancer (H) 01/01/2008     Chronic pain      Colonic dysmotility      Constipation      E. coli sepsis (H) 5/8/2016     Enteritis      Fungemia 5/5/2016     Gastro-oesophageal reflux disease      H/O ileostomy      Hx of  abnormal Pap smear      Hypertension      IBS (irritable bowel syndrome)      Other chronic pain      PONV (postoperative nausea and vomiting)      Thrombosis, hepatic vein (H)        Past [facility] History:    Past [facility] History:   Procedure Laterality Date     COLONOSCOPY  7/10/2012    Procedure: COLONOSCOPY;;  Surgeon: Nicole Redding MD;  [facility]     COLONOSCOPY N/A 2/19/2017    Procedure: COLONOSCOPY;  Surgeon: Randell Muller MD;  Location: [facility] GI     COLONOSCOPY N/A 2/21/2017    Procedure: COLONOSCOPY;  Surgeon: Randell Muller MD;  Location: [facility]     ECHO CHELO  7/19/2016          ENDOSCOPIC INSERTION TUBE GASTROSTOMY N/A 1/21/2016    Procedure: ENDOSCOPIC INSERTION TUBE GASTROSTOMY;  Surgeon: Nicole Redding MD;  [facility]     ESOPHAGOSCOPY, GASTROSCOPY, DUODENOSCOPY (EGD), COMBINED  7/10/2012    Procedure: COMBINED ESOPHAGOSCOPY, GASTROSCOPY, DUODENOSCOPY (EGD);  Upper Endoscopy, Ileoscopy    Latex Allergy  with biopsies;  Surgeon: Nicole Redding MD;  [facility]     ESOPHAGOSCOPY, GASTROSCOPY, DUODENOSCOPY (EGD), COMBINED N/A 11/5/2014    Procedure: COMBINED ESOPHAGOSCOPY, GASTROSCOPY, DUODENOSCOPY (EGD);  Surgeon: Nicole Redding MD;  [facility]     HC REPLACE DUODENOSTOMY/JEJUNOSTOMY TUBE PERCUTANEOUS N/A 8/27/2015    Procedure: REPLACE GASTROJEJUNOSTOMY TUBE, PERCUTANEOUS;  Surgeon: Mio Holder MD;  [facility]     HC REPLACE DUODENOSTOMY/JEJUNOSTOMY TUBE PERCUTANEOUS N/A 1/7/2016    Procedure: REPLACE JEJUNOSTOMY TUBE, PERCUTANEOUS;  Surgeon: Elsa Medel MD;  [facility]     HC REPLACE DUODENOSTOMY/JEJUNOSTOMY TUBE PERCUTANEOUS N/A 1/28/2016    Procedure: REPLACE JEJUNOSTOMY TUBE, PERCUTANEOUS;  Surgeon: Elsa Medel MD;  [facility]     HC REPLACE GASTROSTOMY/CECOSTOMY TUBE PERCUTANEOUS Left 5/19/2015    Procedure: REPLACE GASTROSTOMY TUBE, PERCUTANEOUS;  Surgeon: Melecio Morejon Chi, MD;  Location: [facility]     HC  [facility] ENDOSCOPY W PLACEMENT GASTROSTOMY TUBE PERCUT N/A 10/1/2015    Procedure: COMBINED ESOPHAGOSCOPY, GASTROSCOPY, DUODENOSCOPY (EGD), PLACE PERCUTANEOUS ENDOSCOPIC GASTROSTOMY TUBE;  Surgeon: Mio Holder MD;  [facility]     INSERT PORT VASCULAR ACCESS Right 7/20/2017    Procedure: INSERT PORT VASCULAR ACCESS;  Chest Port Placement  **Latex Allergy**;  Surgeon: Coy Rocha PA-C;  [facility]     LAPAROSCOPIC ASSISTED COLECTOMY  1/20/2012    Procedure:LAPAROSCOPIC ASSISTED COLECTOMY; Laparoscopic Ileostomy       LAPAROSCOPIC ASSISTED COLECTOMY LEFT (DESCENDING)  10/24/2012    Procedure: LAPAROSCOPIC ASSISTED COLECTOMY LEFT (DESCENDING);   Hand Assisted Laproscopic Subtotal abdominal Colectomy,Iieorectal anastamosis, Ileostomy Closure.       LAPAROSCOPIC ASSISTED INSERTION TUBE JEJUNOSTOMY N/A 10/16/2015    Procedure: LAPAROSCOPIC ASSISTED INSERTION TUBE JEJUNOSTOMY;  Surgeon: Elsa Medel MD;  [facility]     LAPAROSCOPIC CHOLECYSTECTOMY  2002    [facility] stones duct     LAPAROSCOPIC ILEOSTOMY  1/20/2012    [facility], loop     LAPAROSCOPIC OOPHORECTOMY Right 2009    [facility]     LAPAROTOMY EXPLORATORY N/A 1/28/2016    Procedure: LAPAROTOMY EXPLORATORY;  Surgeon: Elsa Medel MD;  [facility]     LEEP TX, CERVICAL  2009    [facility]     PICC INSERTION Left 10/21/2015    5fr DL Power PICC, 37cm (2cm external) in the L basilic vein w/ tip in the SVC RA junction.     REMOVE GASTROSTOMY TUBE ADULT N/A 12/12/2014    Procedure: REMOVE GASTROSTOMY TUBE ADULT;  Surgeon: Nicole Redding MD;  [facility]     REMOVE PORT VASCULAR ACCESS Right 6/30/2016    Procedure: REMOVE PORT VASCULAR ACCESS;  Surgeon: Pradeep Orosco MD;  Location: [facility]     REMOVE PORT VASCULAR ACCESS Right 9/8/2017    Procedure: REMOVE PORT VASCULAR ACCESS;  right side mediport removal;  Surgeon: Zechariah Worthington MD;  Location: [facility] OR     replace GASTROSTOMY TUBE ADULT  5/19/15        Family History:    Family History   Problem Relation Age of Onset     Thyroid Disease Mother      Sjogren's Mother      GASTROINTESTINAL DISEASE Mother      Intermittent nausea vomiting diarrhea     Colon Polyps Mother      Prostate Problems Father      prostate enlargement     Lupus Maternal Grandmother      CANCER Maternal Grandfather      Lung     Colon Cancer Maternal Grandfather 65     CANCER Paternal Grandmother      Lung      CEREBROVASCULAR DISEASE Paternal Grandmother      DIABETES Paternal Grandmother      Cardiovascular Paternal Grandmother      CHF     CANCER Paternal Grandfather      Lung     Glaucoma Paternal Grandfather      Abdominal Aortic Aneurysm Other      Macular Degeneration No family hx of        Social History:  Marital Status:   [2]  Social History   Substance Use Topics     Smoking status: Current Some Day Smoker     Packs/day: 1.00     Years: 4.00     Types: Cigarettes     Last attempt to quit: 1/1/2004     Smokeless tobacco: Former User     Alcohol use No        Medications:      traZODone (DESYREL) 100 MG tablet   ondansetron (ZOFRAN) 8 MG tablet   Emollient (GRX VITAMIN E) LOTN   Alfalfa 650 MG TABS   cloNIDine (CATAPRES) 0.2 MG tablet   DULoxetine (CYMBALTA) 60 MG EC capsule   diphenhydrAMINE (BENADRYL ALLERGY) 25 MG tablet   ACETAMINOPHEN PO   ondansetron (ZOFRAN) 4 MG tablet   [DISCONTINUED] cloNIDine (CATAPRES) 0.2 MG tablet   SUMAtriptan (IMITREX) 50 MG tablet   albuterol (2.5 MG/3ML) 0.083% neb solution   ipratropium (ATROVENT) 0.02 % neb solution   albuterol 90 MCG/ACT inhaler         Review of Systems   Constitutional: Positive for appetite change and fever.   HENT: Negative for congestion.    Respiratory: Negative for cough and shortness of breath.    Cardiovascular: Negative for chest pain.   Gastrointestinal: Positive for abdominal pain, diarrhea and nausea. Negative for blood in stool, constipation and vomiting.   Genitourinary: Negative for dysuria and  "hematuria.   Skin: Negative for rash.   All other systems reviewed and are negative.      Physical Exam   BP: 154/89  Heart Rate: 128  Temp: 100.3  F (37.9  C)  Resp: 20  Weight: 83.1 kg (183 lb 3.2 oz)  SpO2: 97 %      Physical Exam   Constitutional: She is oriented to person, place, and time. She appears well-developed and well-nourished. No distress.   HENT:   Head: Normocephalic and atraumatic.   Tacky mucus membranes   Eyes: Conjunctivae are normal. Pupils are equal, round, and reactive to light. No scleral icterus.   Patient has no eyelashes   Neck: Normal range of motion. Neck supple.   Cardiovascular: Regular rhythm, normal heart sounds and intact distal pulses.  Tachycardia present.    Pulmonary/Chest: Effort normal and breath sounds normal. No respiratory distress.   Abdominal: Soft. Bowel sounds are normal. She exhibits no distension. There is tenderness (moderate, throughout left abdomen). There is no rebound and no guarding.   Musculoskeletal: She exhibits no edema or tenderness.   Neurological: She is alert and oriented to person, place, and time.   Skin: Skin is warm and dry. No rash noted. She is not diaphoretic.   Psychiatric: Her behavior is normal. Her mood appears anxious.   Speaks softly, whining voice. Repeatedly says, \"I'm sorry.\"   Nursing note and vitals reviewed.      ED Course     ED Course     Procedures               Critical Care time:  none               Results for orders placed or performed during the hospital encounter of 04/16/18 (from the past 24 hour(s))   CBC with platelets differential   Result Value Ref Range    WBC 9.3 4.0 - 11.0 10e9/L    RBC Count 3.94 3.8 - 5.2 10e12/L    Hemoglobin 9.8 (L) 11.7 - 15.7 g/dL    Hematocrit 31.2 (L) 35.0 - 47.0 %    MCV 79 78 - 100 fl    MCH 24.9 (L) 26.5 - 33.0 pg    MCHC 31.4 (L) 31.5 - 36.5 g/dL    RDW 17.0 (H) 10.0 - 15.0 %    Platelet Count 335 150 - 450 10e9/L    Diff Method Automated Method     % Neutrophils 84.0 %    % Lymphocytes 8.3 " %    % Monocytes 6.3 %    % Eosinophils 0.5 %    % Basophils 0.6 %    % Immature Granulocytes 0.3 %    Absolute Neutrophil 7.8 1.6 - 8.3 10e9/L    Absolute Lymphocytes 0.8 0.8 - 5.3 10e9/L    Absolute Monocytes 0.6 0.0 - 1.3 10e9/L    Absolute Eosinophils 0.1 0.0 - 0.7 10e9/L    Absolute Basophils 0.1 0.0 - 0.2 10e9/L    Abs Immature Granulocytes 0.0 0 - 0.4 10e9/L   Comprehensive metabolic panel   Result Value Ref Range    Sodium 137 133 - 144 mmol/L    Potassium 3.6 3.4 - 5.3 mmol/L    Chloride 104 94 - 109 mmol/L    Carbon Dioxide 25 20 - 32 mmol/L    Anion Gap 8 3 - 14 mmol/L    Glucose 101 (H) 70 - 99 mg/dL    Urea Nitrogen 4 (L) 7 - 30 mg/dL    Creatinine 0.68 0.52 - 1.04 mg/dL    GFR Estimate >90 >60 mL/min/1.7m2    GFR Estimate If Black >90 >60 mL/min/1.7m2    Calcium 8.4 (L) 8.5 - 10.1 mg/dL    Bilirubin Total 0.6 0.2 - 1.3 mg/dL    Albumin 3.1 (L) 3.4 - 5.0 g/dL    Protein Total 7.8 6.8 - 8.8 g/dL    Alkaline Phosphatase 151 (H) 40 - 150 U/L    ALT 27 0 - 50 U/L    AST 22 0 - 45 U/L   Lactic acid whole blood   Result Value Ref Range    Lactic Acid 1.0 0.7 - 2.0 mmol/L   CRP inflammation   Result Value Ref Range    CRP Inflammation 103.0 (H) 0.0 - 8.0 mg/L   UA with Microscopic reflex to Culture   Result Value Ref Range    Color Urine Straw     Appearance Urine Clear     Glucose Urine Negative NEG^Negative mg/dL    Bilirubin Urine Negative NEG^Negative    Ketones Urine 5 (A) NEG^Negative mg/dL    Specific Gravity Urine 1.003 1.003 - 1.035    Blood Urine Negative NEG^Negative    pH Urine 7.0 5.0 - 7.0 pH    Protein Albumin Urine Negative NEG^Negative mg/dL    Urobilinogen mg/dL 0.0 0.0 - 2.0 mg/dL    Nitrite Urine Negative NEG^Negative    Leukocyte Esterase Urine Negative NEG^Negative    Source Unspecified Urine     WBC Urine 0 0 - 5 /HPF    RBC Urine 0 0 - 2 /HPF   CT Abdomen Pelvis w Contrast    Narrative    CT ABDOMEN PELVIS WITH CONTRAST   4/16/2018 6:14 PM     HISTORY: Left abdominal pain.    TECHNIQUE:   CT abdomen and pelvis with 90 mL Isovue 370 IV. Radiation  dose for this scan was reduced using automated exposure control,  adjustment of the mA and/or kV according to patient size, or iterative  reconstruction technique.    COMPARISON: 3/27/2018.    FINDINGS:  Abdomen: There is dependent atelectasis at the lung bases. The heart  size is normal. The liver, spleen, pancreas, adrenal glands and left  kidney are normal in appearance. There is a tiny stone in the mid  right kidney. No hydronephrosis. The gallbladder is absent. There is  no abdominal or pelvic lymph node enlargement.    Pelvis: The uterus and adnexa appear normal. There are postoperative  changes in the left colon. No bowel obstruction or inflammation. No  free intraperitoneal gas or fluid.      Impression    IMPRESSION: No acute abnormality. No bowel obstruction or  inflammation.      *Note: Due to a large number of results and/or encounters for the requested time period, some results have not been displayed. A complete set of results can be found in Results Review.       Medications   0.9% sodium chloride BOLUS (0 mLs Intravenous Stopped 4/16/18 1718)     Followed by   sodium chloride 0.9% infusion (1,000 mLs Intravenous New Bag 4/16/18 1817)   ondansetron (ZOFRAN) injection 4 mg (not administered)   HYDROcodone-acetaminophen (NORCO)  MG per tablet 1 tablet (not administered)   piperacillin-tazobactam (ZOSYN) intermittent infusion 4.5 g (not administered)   HYDROmorphone (DILAUDID) injection 1 mg (1 mg Intravenous Given 4/16/18 1604)   diphenhydrAMINE (BENADRYL) injection 50 mg (50 mg Intravenous Given 4/16/18 1610)   HYDROmorphone (PF) (DILAUDID) injection 0.5 mg (0.5 mg Intravenous Given 4/16/18 1817)   sodium chloride 0.9 % bag 500mL for CT scan flush use (100 mLs Intravenous Given 4/16/18 1756)   sodium chloride (PF) 0.9% PF flush 3 mL (3 mLs Intravenous Given 4/16/18 1755)   iopamidol (ISOVUE-370) solution 100 mL (90 mLs Intravenous Given  4/16/18 5983)       Assessments & Plan (with Medical Decision Making)  Doreen Peralta is a 37 year old female with a complex abdominal history who presented to the ED complaining of fever, increased left-sided abdominal pain, nausea, and diarrhea.  On arrival to the ED temp was 100.3 F and she was tachycardic in the 120s.  Blood pressure was normal, O2 saturations within normal limits as well.  Patient exhibited tenderness to the left abdomen, but otherwise no acute abnormality seen on exam.  IV access obtained and labs drawn.  There was concern for sepsis picture as patient has had recurrent episodes of sepsis with bacteremia.  She is given IV fluids, Dilaudid, and Benadryl for symptoms.  Requested repeated doses of Dilaudid throughout ED stay for pain.  Today notable for normal white count of 9.3.  Hemoglobin was slightly low at 9.8, previous was 11.4.  BUN/creatinine were within normal limits, and she had no acute electrolyte abnormalities.  Her CRP however was quite elevated at 103.  Lactate was normal.  Urine without evidence of infection.  Patient had stated that she has had ongoing C. difficile infection for weeks that was resistant to treatment.  She reported positive testing a couple weeks ago but has not been on anything for this.  I discussed this concern with her GI provider Dr. Sutton.  He explained that based on patient's symptomology and fecal calprotectin, he thinks that patient is colonized with C. difficile and advised against treating her for it unless repeat stool samples were positive for C. difficile and she had an elevated calprotectin.  We attempted to obtain a stool sample from the patient here in the ED but she has not had a bowel movement or vomited since arrival. Dr. Sutton did advise a CT scan of her abdomen due to her increased left-sided abdominal pain, which was completed and came back unremarkable.  He otherwise advised admitting her and treating her for sepsis of unknown origin  given her history of bacteremia.  IV Zosyn and vancomycin ordered for broad-spectrum coverage.  Case was discussed with Dr. Mario who accepted patient onto his service.  Patient was staffed in the ED with Dr. Raines. Patient updated on these recommendations and was agreeable to plan of care.     I have reviewed the nursing notes.    I have reviewed the findings, diagnosis, plan and need for follow up with the patient.       ED to Inpatient Handoff:    Discussed with Dr. 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 Although reviewed after completion, some word and grammatical errors may remain.     4/16/2018   [facility] EMERGENCY DEPARTMENT     Vianey Warren PA-C  04/16/18 1921

## 2018-04-16 NOTE — IP AVS SNAPSHOT
MRN:[id number]                      After Visit Summary   4/16/2018    Doreen Peralta    MRN: [id number]           Thank you!     Thank you for choosing [facility] for your care. Our goal is always to provide you with excellent care. Hearing back from our patients is one way we can continue to improve our services. Please take a few minutes to complete the written survey that you may receive in the mail after you visit with us. Thank you!        Patient Information     Date Of Birth          1980        Designated Caregiver       Most Recent Value    Caregiver    Will someone help with your care after discharge? yes    Name of designated caregiver Armando    Phone number of caregiver [phone]    Caregiver address [address] [facility]       About your hospital stay     You were admitted on:  April 16, 2018 You last received care in the:  [address]    You were discharged on:  April 22, 2018        Reason for your hospital stay       Hospitalized due to infection (sepsis from bloodstream infection) and improved                  Who to Call     For medical emergencies, please call 911.  For non-urgent questions about your medical care, please call your primary care provider or clinic, [phone]          Attending Provider     Provider Specialty    Vianey Warren PA-C Physician Assistant    Nikhil Mario MD Internal Medicine    Gilmer Lazcano MD Internal Medicine       Primary Care Provider Office Phone # Fax #    Franny Antoine [phone]      After Care Instructions     Activity       Your activity upon discharge: activity as tolerated and no driving while on opiate analgesics            Diet       Follow this diet upon discharge: Advance to your usual diet as tolerated                  Follow-up Appointments     Follow-up and recommended labs and tests        Follow up with PCP within 3 days for hospital follow-  "up. The following labs/tests are recommended:  Basic metabolic panel.  Follow up with Dr. Sutton and his GI team at [facility] according to their recommendation for anticipated colonoscopy and EGD.                  Your next 10 appointments already scheduled     May 16, 2018 10:00 AM CDT   Return Visit with Dominic Sutton MD   [facility] ([facility]    [address] [address] [place] [address] [phone]              Pending Results     No orders found from 4/14/2018 to 4/17/2018.            Statement of Approval     Ordered          04/22/18 1423  I have reviewed and agree with all the recommendations and orders detailed in this document.  EFFECTIVE NOW     Approved and electronically signed by:  Gilmer Lazcano MD             Admission Information     Date & Time Provider Department Dept. Phone    4/16/2018 Gilmer Lazcano MD [address] [facility] [phone]      Your Vitals Were     Blood Pressure Pulse Temperature Respirations Height Weight    139/86 (BP Location: Right arm) 96 100.3  F (37.9  C) (Oral) 16 1.676 m (5' 6\") 80.7 kg (177 lb 14.6 oz)    Last Period Pulse Oximetry BMI (Body Mass Index)             04/12/2018 (Approximate) 99% 28.72 kg/m2         [employer] Information     IMT gives you secure access to your electronic health record. If you see a primary care provider, you can also send messages to your care team and make appointments. If you have questions, please call your primary care clinic.  If you do not have a primary care provider, please call [phone] and they will assist you.        Care EveryWhere ID     This is your Care EveryWhere ID. This could be used by other organizations to access your [facility] medical records  [id number]        Equal Access to Services     TRAMAINE CLAYTON : Carrie Law, clark villeda, elham guerrero. So [facility] " [phone].    ATENCIÓN: Si rinku dale, tiene a wade disposición servicios gratuitos de asistencia lingüística. Janes lebron [phone].    We comply with applicable federal civil rights laws and [place] laws. We do not discriminate on the basis of race, color, national origin, age, disability, sex, sexual orientation, or gender identity.               Review of your medicines      START taking        Dose / Directions    fidaxomicin 200 MG tablet   Commonly known as:  DIFICID   Indication:  Clostridium difficile Bacteria   Used for:  Clostridium difficile infection        Dose:  200 mg   Take 1 tablet (200 mg) by mouth 2 times daily for 9 days   Quantity:  18 tablet   Refills:  0       HYDROmorphone 2 MG tablet   Commonly known as:  DILAUDID   Used for:  Left sided abdominal pain of unknown cause        Dose:  2 mg   Take 1 tablet (2 mg) by mouth every 3 hours as needed for moderate to severe pain Use no more than 8 tabs in 24 hours   Quantity:  24 tablet   Refills:  0       sulfamethoxazole-trimethoprim 800-160 MG per tablet   Commonly known as:  BACTRIM DS/SEPTRA DS   Indication:  Bacteria in the Blood        Dose:  1 tablet   Take 1 tablet by mouth 2 times daily for 7 days   Quantity:  14 tablet   Refills:  0         CONTINUE these medicines which may have CHANGED, or have new prescriptions. If we are uncertain of the size of tablets/capsules you have at home, strength may be listed as something that might have changed.        Dose / Directions    ondansetron 8 MG tablet   Commonly known as:  ZOFRAN   This may have changed:    - when to take this  - reasons to take this  - Another medication with the same name was removed. Continue taking this medication, and follow the directions you see here.        Dose:  8 mg   Take 1 tablet (8 mg) by mouth every 8 hours as needed for nausea   Quantity:  9 tablet   Refills:  0       traZODone 100 MG tablet   Commonly known as:  DESYREL   This may have changed:  See the  new instructions.   Used for:  Insomnia, unspecified type        Dose:   mg   Take 0.5-1 tablets ( mg) by mouth nightly as needed for sleep   Quantity:  90 tablet   Refills:  8         CONTINUE these medicines which have NOT CHANGED        Dose / Directions    ACETAMINOPHEN PO        Dose:  500-1000 mg   Take 500-1,000 mg by mouth every 6 hours as needed for pain   Refills:  0       albuterol (2.5 MG/3ML) 0.083% neb solution   Used for:  Gastrostomy tube dependent (H), SBO (small bowel obstruction), Chronic abdominal pain, Chronic diarrhea        Dose:  2.5 mg   Take 1 vial (2.5 mg) by nebulization every 4 hours as needed for shortness of breath / dyspnea or wheezing   Quantity:  360 mL   Refills:  0       albuterol 90 MCG/ACT inhaler        Dose:  2 puff   Inhale 2 puffs into the lungs every 6 hours as needed   Refills:  0       Alfalfa 650 MG Tabs        Refills:  0       cloNIDine 0.2 MG tablet   Commonly known as:  CATAPRES   Used for:  Anxiety        Dose:  0.4 mg   Take 2 tablets (0.4 mg) by mouth every evening - DUE FOR FOLLOW UP   Quantity:  60 tablet   Refills:  0       diphenhydrAMINE 25 MG tablet   Commonly known as:  BENADRYL ALLERGY        Dose:  25 mg   Take 1 tablet (25 mg) by mouth every 6 hours as needed for itching or other (Nausea)   Quantity:  30 tablet   Refills:  0       DULoxetine 60 MG EC capsule   Commonly known as:  CYMBALTA   Used for:  Abdominal pain, epigastric, Abdominal migraine, not intractable        TAKE 1 CAPSULE(60 MG) BY MOUTH DAILY   Quantity:  90 capsule   Refills:  1       ipratropium 0.02 % neb solution   Commonly known as:  ATROVENT   Used for:  Gastrostomy tube dependent (H), SBO (small bowel obstruction), Chronic abdominal pain, Chronic diarrhea        Dose:  0.5 mg   Take 2.5 mLs (0.5 mg) by nebulization every 6 hours as needed for wheezing   Quantity:  225 mL   Refills:  0       SUMAtriptan 50 MG tablet   Commonly known as:  IMITREX   Used for:  Migraine  without aura and without status migrainosus, not intractable        Dose:   mg   Take 1-2 tablets ( mg) by mouth at onset of headache for migraine - LAST REFILL, DUE FOR FOLLOW UP   Quantity:  9 tablet   Refills:  0         STOP taking     GRX VITAMIN E Lotn                Where to get your medicines      These medications were sent to [facility], [place] - [address]  [address] [facility] , [place] [address]     Phone:  [phone]     fidaxomicin 200 MG tablet    sulfamethoxazole-trimethoprim 800-160 MG per tablet         Some of these will need a paper prescription and others can be bought over the counter. Ask your nurse if you have questions.     Bring a paper prescription for each of these medications     HYDROmorphone 2 MG tablet                Protect others around you: Learn how to safely use, store and throw away your medicines at www.disposemymeds.org.        ANTIBIOTIC INSTRUCTION     You've Been Prescribed an Antibiotic - Now What?  Your healthcare team thinks that you or your loved one might have an infection. Some infections can be treated with antibiotics, which are powerful, life-saving drugs. Like all medications, antibiotics have side effects and should only be used when necessary. There are some important things you should know about your antibiotic treatment.      Your healthcare team may run tests before you start taking an antibiotic.    Your team may take samples (e.g., from your blood, urine or other areas) to run tests to look for bacteria. These test can be important to determine if you need an antibiotic at all and, if you do, which antibiotic will work best.      Within a few days, your healthcare team might change or even stop your antibiotic.    Your team may start you on an antibiotic while they are working to find out what is making you sick.    Your team might change your antibiotic because test results show that a different antibiotic would be  better to treat your infection.    In some cases, once your team has more information, they learn that you do not need an antibiotic at all. They may find out that you don't have an infection, or that the antibiotic you're taking won't work against your infection. For example, an infection caused by a virus can't be treated with antibiotics. Staying on an antibiotic when you don't need it is more likely to be harmful than helpful.      You may experience side effects from your antibiotic.    Like all medications, antibiotics have side effects. Some of these can be serious.    Let you healthcare team know if you have any known allergies when you are admitted to the hospital.    One significant side effect of nearly all antibiotics is the risk of severe and sometimes deadly diarrhea caused by Clostridium difficile (C. Difficile). This occurs when a person takes antibiotics because some good germs are destroyed. Antibiotic use allows C. diificile to take over, putting patients at high risk for this serious infection.    As a patient or caregiver, it is important to understand your or your loved one's antibiotic treatment. It is especially important for caregivers to speak up when patients can't speak for themselves. Here are some important questions to ask your healthcare team.    What infection is this antibiotic treating and how do you know I have that infection?    What side effects might occur from this antibiotic?    How long will I need to take this antibiotic?    Is it safe to take this antibiotic with other medications or supplements (e.g., vitamins) that I am taking?     Are there any special directions I need to know about taking this antibiotic? For example, should I take it with food?    How will I be monitored to know whether my infection is responding to the antibiotic?    What tests may help to make sure the right antibiotic is prescribed for me?      Information provided by:  www.cdc.gov/getsmart  U.S.  Department of Health and Human Services  Centers for disease Control and Prevention  National Center for Emerging and Zoonotic Infectious Diseases  Division of Healthcare Quality Promotion        Information about OPIOIDS     PRESCRIPTION OPIOIDS: WHAT YOU NEED TO KNOW   You have a prescription for an opioid (narcotic) pain medicine. Opioids can cause addiction. If you have a history of chemical dependency of any type, you are at a higher risk of becoming addicted to opioids. Only take this medicine after all other options have been tried. Take it for as short a time and as few doses as possible.     Do not:    Drive. If you drive while taking these medicines, you could be arrested for driving under the influence (DUI).    Operate heavy machinery    Do any other dangerous activities while taking these medicines.     Drink any alcohol while taking these medicines.      Take with any other medicines that contain acetaminophen. Read all labels carefully. Look for the word  acetaminophen  or  Tylenol.  Ask your pharmacist if you have questions or are unsure.    Store your pills in a secure place, locked if possible. We will not replace any lost or stolen medicine. If you don t finish your medicine, please throw away (dispose) as directed by your pharmacist. The [place] Pollution Control Agency has more information about safe disposal: https://www.pca.[facility].[place].us/living-green/managing-unwanted-medications    All opioids tend to cause constipation. Drink plenty of water and eat foods that have a lot of fiber, such as fruits, vegetables, prune juice, apple juice and high-fiber cereal. Take a laxative (Miralax, milk of magnesia, Colace, Senna) if you don t move your bowels at least every other day.              Medication List: This is a list of all your medications and when to take them. Check marks below indicate your daily home schedule. Keep this list as a reference.      Medications           Morning Afternoon  Evening Bedtime As Needed    ACETAMINOPHEN PO   Take 500-1,000 mg by mouth every 6 hours as needed for pain   Last time this was given:  650 mg on 4/17/2018  8:07 PM                                albuterol (2.5 MG/3ML) 0.083% neb solution   Take 1 vial (2.5 mg) by nebulization every 4 hours as needed for shortness of breath / dyspnea or wheezing                                albuterol 90 MCG/ACT inhaler   Inhale 2 puffs into the lungs every 6 hours as needed                                Alfalfa 650 MG Tabs                                cloNIDine 0.2 MG tablet   Commonly known as:  CATAPRES   Take 2 tablets (0.4 mg) by mouth every evening - DUE FOR FOLLOW UP   Last time this was given:  0.4 mg on 4/21/2018  9:10 PM                                diphenhydrAMINE 25 MG tablet   Commonly known as:  BENADRYL ALLERGY   Take 1 tablet (25 mg) by mouth every 6 hours as needed for itching or other (Nausea)                                DULoxetine 60 MG EC capsule   Commonly known as:  CYMBALTA   TAKE 1 CAPSULE(60 MG) BY MOUTH DAILY   Last time this was given:  60 mg on 4/22/2018  8:56 AM         Given to you this morning                       fidaxomicin 200 MG tablet   Commonly known as:  DIFICID   Take 1 tablet (200 mg) by mouth 2 times daily for 9 days   Last time this was given:  200 mg on 4/22/2018  3:06 PM         Given to you this morning                       HYDROmorphone 2 MG tablet   Commonly known as:  DILAUDID   Take 1 tablet (2 mg) by mouth every 3 hours as needed for moderate to severe pain Use no more than 8 tabs in 24 hours   Last time this was given:  2 mg on 4/22/2018  3:04 PM             Last given to you at 3:30    Available to take at home anytime after 6:30               ipratropium 0.02 % neb solution   Commonly known as:  ATROVENT   Take 2.5 mLs (0.5 mg) by nebulization every 6 hours as needed for wheezing                                ondansetron 8 MG tablet   Commonly known as:  ZOFRAN   Take  1 tablet (8 mg) by mouth every 8 hours as needed for nausea                                sulfamethoxazole-trimethoprim 800-160 MG per tablet   Commonly known as:  BACTRIM DS/SEPTRA DS   Take 1 tablet by mouth 2 times daily for 7 days   Last time this was given:  1 tablet on 4/22/2018  8:56 AM         Given to you this morning        Take at home this evening               SUMAtriptan 50 MG tablet   Commonly known as:  IMITREX   Take 1-2 tablets ( mg) by mouth at onset of headache for migraine - LAST REFILL, DUE FOR FOLLOW UP                                traZODone 100 MG tablet   Commonly known as:  DESYREL   Take 0.5-1 tablets ( mg) by mouth nightly as needed for sleep

## 2018-04-16 NOTE — LETTER
Transition Communication Hand-off for Care Transitions to Next Level of Care Provider    Name: Doreen Peralta  : 1980  MRN #: [id number]  Primary Care Provider: Franny Antoine  Primary Care MD Name: Dr. Larisa Lathma  Primary Clinic: [facility] [address] [facility] [address]  Primary Care Clinic [facility]: [facility]  Reason for Hospitalization:  Clostridium difficile carrier [Z22.1]  Sepsis (H) [A41.9]  Left sided abdominal pain of unknown cause [R10.30]  Admit Date/Time: 2018  3:08 PM  Discharge Date: 18  Payor Source: Payor: BLUE PLUS / Plan: BLUE PLUS MNCARE / Product Type: HMO /     Readmission Assessment Measure (ROB) Risk Score/category: Elevated         Reason for Communication Hand-off Referral: Other Elevated Risk for Re-Admission    Discharge Plan: Home     Concern for non-adherence with plan of care:   Y/N : No    Discharge Needs Assessment:  Needs       Most Recent Value    Anticipated Changes Related to Illness none    Equipment Currently Used at Home none    # of Referrals Placed by CTS External Care Coordination        Follow-up plan:  Future Appointments  Date Time Provider Department Center   2018 10:00 AM Dominic Sutton MD [facility]        Key Recommendations:  Post Hospital Follow Up Appt scheduled with Dr. Larisa Latham on    at 1766    PAMELA Schaffer  [facility] [phone]/ [facility] [phone]      AVS/Discharge Summary is the source of truth; this is a helpful guide for improved communication of patient story

## 2018-04-16 NOTE — ED NOTES
Pt reports that pt she has had a fever for the past 3-4 days, highest reported 104 today approx 1330.  Pt took Tylenol.  Pt reports abd pain that started today.  Pt reports nausea, vomiting, and diarrhea.

## 2018-04-16 NOTE — LETTER
"Transition Communication Hand-off for Care Transitions to Next Level of Care Provider    Name: Doreen Peralta  : 1980  MRN #: [id number]  Primary Care Provider: Franny Antoine     Primary Clinic: [facility] [address] [facility] [address]     Reason for Hospitalization:  Clostridium difficile carrier [Z22.1]  Sepsis (H) [A41.9]  Left sided abdominal pain of unknown cause [R10.30]  Admit Date/Time: 2018  3:08 PM  Discharge Date: 1-2 days  Payor Source: Payor: BLUE PLUS / Plan: BLUE PLUS MNCARE / Product Type: HMO /     Readmission Assessment Measure (ROB) Risk Score/category: elevated          Reason for Communication Hand-off Referral: {CCREASONCMCTNHANDOFFREFERRALCTS:82494}    Discharge Plan:       Concern for non-adherence with plan of care:   Y/N ***  Discharge Needs Assessment:  Needs       Most Recent Value    Anticipated Changes Related to Illness none    Equipment Currently Used at Home none          Already enrolled in Tele-monitoring program and name of program:  ***  Follow-up specialty is recommended: {YES / NO:12053::\"Yes\"}    Follow-up plan:  No future appointments.    Any outstanding tests or procedures:              Key Recommendations:      Christine Stone    AVS/Discharge Summary is the source of truth; this is a helpful guide for improved communication of patient story          "

## 2018-04-17 ENCOUNTER — DOCUMENTATION ONLY (OUTPATIENT)
Dept: GASTROENTEROLOGY | Facility: CLINIC | Age: 38
End: 2018-04-17

## 2018-04-17 DIAGNOSIS — A04.72 C. DIFFICILE COLITIS: ICD-10-CM

## 2018-04-17 DIAGNOSIS — R78.81 BACTEREMIA: Primary | ICD-10-CM

## 2018-04-17 PROBLEM — R65.10 SIRS (SYSTEMIC INFLAMMATORY RESPONSE SYNDROME) (H): Status: RESOLVED | Noted: 2017-01-26 | Resolved: 2018-04-17

## 2018-04-17 PROBLEM — Z87.898 HISTORY OF BACTEREMIA: Status: ACTIVE | Noted: 2018-04-17

## 2018-04-17 PROBLEM — R19.5 STOOL CULTURE POSITIVE FOR CLOSTRIDIUM DIFFICILE: Status: ACTIVE | Noted: 2018-04-16

## 2018-04-17 PROBLEM — R65.10 SIRS (SYSTEMIC INFLAMMATORY RESPONSE SYNDROME) (H): Status: ACTIVE | Noted: 2017-08-24

## 2018-04-17 PROBLEM — E87.6 HYPOKALEMIA: Status: ACTIVE | Noted: 2017-08-24

## 2018-04-17 LAB
ABO + RH BLD: NORMAL
ABO + RH BLD: NORMAL
ANION GAP SERPL CALCULATED.3IONS-SCNC: 9 MMOL/L (ref 3–14)
BLD GP AB SCN SERPL QL: NORMAL
BLOOD BANK CMNT PATIENT-IMP: NORMAL
BUN SERPL-MCNC: 4 MG/DL (ref 7–30)
C COLI+JEJUNI+LARI FUSA STL QL NAA+PROBE: NOT DETECTED
C DIFF TOX B STL QL: POSITIVE
CALCIUM SERPL-MCNC: 7.6 MG/DL (ref 8.5–10.1)
CHLORIDE SERPL-SCNC: 109 MMOL/L (ref 94–109)
CO2 SERPL-SCNC: 23 MMOL/L (ref 20–32)
CREAT SERPL-MCNC: 0.73 MG/DL (ref 0.52–1.04)
EC STX1 GENE STL QL NAA+PROBE: NOT DETECTED
EC STX2 GENE STL QL NAA+PROBE: NOT DETECTED
ENTERIC PATHOGEN COMMENT: NORMAL
ERYTHROCYTE [DISTWIDTH] IN BLOOD BY AUTOMATED COUNT: 17.2 % (ref 10–15)
GFR SERPL CREATININE-BSD FRML MDRD: 89 ML/MIN/1.7M2
GLUCOSE SERPL-MCNC: 94 MG/DL (ref 70–99)
HCT VFR BLD AUTO: 25.1 % (ref 35–47)
HEMOCCULT SP1 STL QL: NEGATIVE
HGB BLD-MCNC: 7.8 G/DL (ref 11.7–15.7)
HGB BLD-MCNC: 8.1 G/DL (ref 11.7–15.7)
HGB BLD-MCNC: 8.4 G/DL (ref 11.7–15.7)
LACTATE BLD-SCNC: 1.3 MMOL/L (ref 0.7–2)
MAGNESIUM SERPL-MCNC: 2 MG/DL (ref 1.6–2.3)
MCH RBC QN AUTO: 24.9 PG (ref 26.5–33)
MCHC RBC AUTO-ENTMCNC: 31.1 G/DL (ref 31.5–36.5)
MCV RBC AUTO: 80 FL (ref 78–100)
NOROV GI+II ORF1-ORF2 JNC STL QL NAA+PR: NOT DETECTED
PLATELET # BLD AUTO: 258 10E9/L (ref 150–450)
POTASSIUM SERPL-SCNC: 3.3 MMOL/L (ref 3.4–5.3)
POTASSIUM SERPL-SCNC: 3.9 MMOL/L (ref 3.4–5.3)
PROCALCITONIN SERPL-MCNC: 3.48 NG/ML
RBC # BLD AUTO: 3.13 10E12/L (ref 3.8–5.2)
RVA NSP5 STL QL NAA+PROBE: NOT DETECTED
SALMONELLA SP RPOD STL QL NAA+PROBE: NOT DETECTED
SHIGELLA SP+EIEC IPAH STL QL NAA+PROBE: NOT DETECTED
SODIUM SERPL-SCNC: 141 MMOL/L (ref 133–144)
SPECIMEN EXP DATE BLD: NORMAL
SPECIMEN SOURCE: ABNORMAL
V CHOL+PARA RFBL+TRKH+TNAA STL QL NAA+PR: NOT DETECTED
WBC # BLD AUTO: 6.6 10E9/L (ref 4–11)
Y ENTERO RECN STL QL NAA+PROBE: NOT DETECTED

## 2018-04-17 PROCEDURE — 82274 ASSAY TEST FOR BLOOD FECAL: CPT | Performed by: INTERNAL MEDICINE

## 2018-04-17 PROCEDURE — 36416 COLLJ CAPILLARY BLOOD SPEC: CPT | Performed by: INTERNAL MEDICINE

## 2018-04-17 PROCEDURE — 99232 SBSQ HOSP IP/OBS MODERATE 35: CPT | Performed by: PEDIATRICS

## 2018-04-17 PROCEDURE — 87493 C DIFF AMPLIFIED PROBE: CPT | Performed by: INTERNAL MEDICINE

## 2018-04-17 PROCEDURE — 87506 IADNA-DNA/RNA PROBE TQ 6-11: CPT | Performed by: INTERNAL MEDICINE

## 2018-04-17 PROCEDURE — 86900 BLOOD TYPING SEROLOGIC ABO: CPT | Performed by: PEDIATRICS

## 2018-04-17 PROCEDURE — 25000132 ZZH RX MED GY IP 250 OP 250 PS 637: Performed by: INTERNAL MEDICINE

## 2018-04-17 PROCEDURE — 85027 COMPLETE CBC AUTOMATED: CPT | Performed by: INTERNAL MEDICINE

## 2018-04-17 PROCEDURE — 84145 PROCALCITONIN (PCT): CPT | Performed by: PEDIATRICS

## 2018-04-17 PROCEDURE — 25000132 ZZH RX MED GY IP 250 OP 250 PS 637: Performed by: PEDIATRICS

## 2018-04-17 PROCEDURE — 86850 RBC ANTIBODY SCREEN: CPT | Performed by: PEDIATRICS

## 2018-04-17 PROCEDURE — 83605 ASSAY OF LACTIC ACID: CPT | Performed by: PEDIATRICS

## 2018-04-17 PROCEDURE — 83993 ASSAY FOR CALPROTECTIN FECAL: CPT | Performed by: INTERNAL MEDICINE

## 2018-04-17 PROCEDURE — A9270 NON-COVERED ITEM OR SERVICE: HCPCS | Mod: GY | Performed by: PEDIATRICS

## 2018-04-17 PROCEDURE — 36415 COLL VENOUS BLD VENIPUNCTURE: CPT | Performed by: PEDIATRICS

## 2018-04-17 PROCEDURE — 85018 HEMOGLOBIN: CPT | Performed by: PEDIATRICS

## 2018-04-17 PROCEDURE — 25000128 H RX IP 250 OP 636: Performed by: INTERNAL MEDICINE

## 2018-04-17 PROCEDURE — 12000000 ZZH R&B MED SURG/OB

## 2018-04-17 PROCEDURE — 83735 ASSAY OF MAGNESIUM: CPT | Performed by: PEDIATRICS

## 2018-04-17 PROCEDURE — 84132 ASSAY OF SERUM POTASSIUM: CPT | Performed by: PEDIATRICS

## 2018-04-17 PROCEDURE — 86901 BLOOD TYPING SEROLOGIC RH(D): CPT | Performed by: PEDIATRICS

## 2018-04-17 PROCEDURE — 80048 BASIC METABOLIC PNL TOTAL CA: CPT | Performed by: INTERNAL MEDICINE

## 2018-04-17 PROCEDURE — A9270 NON-COVERED ITEM OR SERVICE: HCPCS | Mod: GY | Performed by: INTERNAL MEDICINE

## 2018-04-17 PROCEDURE — 99207 ZZC CDG-MDM COMPONENT: MEETS LOW - DOWN CODED: CPT | Performed by: PEDIATRICS

## 2018-04-17 RX ORDER — POTASSIUM CL/LIDO/0.9 % NACL 10MEQ/0.1L
10 INTRAVENOUS SOLUTION, PIGGYBACK (ML) INTRAVENOUS
Status: DISCONTINUED | OUTPATIENT
Start: 2018-04-17 | End: 2018-04-22 | Stop reason: HOSPADM

## 2018-04-17 RX ORDER — POTASSIUM CHLORIDE 29.8 MG/ML
20 INJECTION INTRAVENOUS
Status: DISCONTINUED | OUTPATIENT
Start: 2018-04-17 | End: 2018-04-17 | Stop reason: CLARIF

## 2018-04-17 RX ORDER — POTASSIUM CHLORIDE 7.45 MG/ML
10 INJECTION INTRAVENOUS
Status: DISCONTINUED | OUTPATIENT
Start: 2018-04-17 | End: 2018-04-22 | Stop reason: HOSPADM

## 2018-04-17 RX ORDER — HEPARIN SODIUM,PORCINE 10 UNIT/ML
2-5 VIAL (ML) INTRAVENOUS
Status: DISCONTINUED | OUTPATIENT
Start: 2018-04-17 | End: 2018-04-22 | Stop reason: HOSPADM

## 2018-04-17 RX ORDER — POTASSIUM CHLORIDE 1.5 G/1.58G
20-40 POWDER, FOR SOLUTION ORAL
Status: DISCONTINUED | OUTPATIENT
Start: 2018-04-17 | End: 2018-04-22 | Stop reason: HOSPADM

## 2018-04-17 RX ORDER — LIDOCAINE 40 MG/G
CREAM TOPICAL
Status: DISCONTINUED | OUTPATIENT
Start: 2018-04-17 | End: 2018-04-22 | Stop reason: HOSPADM

## 2018-04-17 RX ORDER — POTASSIUM CHLORIDE 1500 MG/1
20-40 TABLET, EXTENDED RELEASE ORAL
Status: DISCONTINUED | OUTPATIENT
Start: 2018-04-17 | End: 2018-04-22 | Stop reason: HOSPADM

## 2018-04-17 RX ORDER — MAGNESIUM SULFATE HEPTAHYDRATE 40 MG/ML
4 INJECTION, SOLUTION INTRAVENOUS EVERY 4 HOURS PRN
Status: DISCONTINUED | OUTPATIENT
Start: 2018-04-17 | End: 2018-04-22 | Stop reason: HOSPADM

## 2018-04-17 RX ADMIN — TAZOBACTAM SODIUM AND PIPERACILLIN SODIUM 3.38 G: 375; 3 INJECTION, SOLUTION INTRAVENOUS at 14:24

## 2018-04-17 RX ADMIN — DULOXETINE HYDROCHLORIDE 60 MG: 30 CAPSULE, DELAYED RELEASE PELLETS ORAL at 08:31

## 2018-04-17 RX ADMIN — HYDROMORPHONE HYDROCHLORIDE 2 MG: 2 TABLET ORAL at 16:17

## 2018-04-17 RX ADMIN — DIPHENHYDRAMINE HYDROCHLORIDE 25 MG: 50 INJECTION, SOLUTION INTRAMUSCULAR; INTRAVENOUS at 16:17

## 2018-04-17 RX ADMIN — DIPHENHYDRAMINE HYDROCHLORIDE 25 MG: 50 INJECTION, SOLUTION INTRAMUSCULAR; INTRAVENOUS at 03:41

## 2018-04-17 RX ADMIN — HYDROMORPHONE HYDROCHLORIDE 2 MG: 2 TABLET ORAL at 12:52

## 2018-04-17 RX ADMIN — HYDROMORPHONE HYDROCHLORIDE 2 MG: 2 TABLET ORAL at 06:39

## 2018-04-17 RX ADMIN — HYDROMORPHONE HYDROCHLORIDE 2 MG: 2 TABLET ORAL at 03:41

## 2018-04-17 RX ADMIN — HYDROMORPHONE HYDROCHLORIDE 2 MG: 2 TABLET ORAL at 09:28

## 2018-04-17 RX ADMIN — HYDROMORPHONE HYDROCHLORIDE 2 MG: 2 TABLET ORAL at 20:07

## 2018-04-17 RX ADMIN — TAZOBACTAM SODIUM AND PIPERACILLIN SODIUM 3.38 G: 375; 3 INJECTION, SOLUTION INTRAVENOUS at 01:47

## 2018-04-17 RX ADMIN — SODIUM CHLORIDE: 9 INJECTION, SOLUTION INTRAVENOUS at 18:19

## 2018-04-17 RX ADMIN — TAZOBACTAM SODIUM AND PIPERACILLIN SODIUM 3.38 G: 375; 3 INJECTION, SOLUTION INTRAVENOUS at 08:31

## 2018-04-17 RX ADMIN — HYDROMORPHONE HYDROCHLORIDE 2 MG: 2 TABLET ORAL at 23:35

## 2018-04-17 RX ADMIN — VANCOMYCIN HYDROCHLORIDE 1750 MG: 10 INJECTION, POWDER, LYOPHILIZED, FOR SOLUTION INTRAVENOUS at 09:15

## 2018-04-17 RX ADMIN — POTASSIUM CHLORIDE 40 MEQ: 1500 TABLET, EXTENDED RELEASE ORAL at 14:23

## 2018-04-17 RX ADMIN — DIPHENHYDRAMINE HYDROCHLORIDE 25 MG: 50 INJECTION, SOLUTION INTRAMUSCULAR; INTRAVENOUS at 09:28

## 2018-04-17 RX ADMIN — ACETAMINOPHEN 650 MG: 325 TABLET ORAL at 20:07

## 2018-04-17 RX ADMIN — POTASSIUM CHLORIDE 20 MEQ: 1500 TABLET, EXTENDED RELEASE ORAL at 16:29

## 2018-04-17 RX ADMIN — HYDROMORPHONE HYDROCHLORIDE 2 MG: 2 TABLET ORAL at 00:40

## 2018-04-17 RX ADMIN — CLONIDINE HYDROCHLORIDE 0.4 MG: 0.1 TABLET ORAL at 20:07

## 2018-04-17 RX ADMIN — SODIUM CHLORIDE: 9 INJECTION, SOLUTION INTRAVENOUS at 06:40

## 2018-04-17 NOTE — CONSULTS
Pharmacy Vancomycin Initial Note  Date of Service 2018  Patient's  1980  37 year old, female    Indication: Sepsis    Current estimated CrCl = Estimated Creatinine Clearance: 123.7 mL/min (based on Cr of 0.68).    Creatinine for last 3 days  2018:  4:00 PM Creatinine 0.68 mg/dL    Recent Vancomycin Level(s) for last 3 days  No results found for requested labs within last 72 hours.      Vancomycin IV Administrations (past 72 hours)      No vancomycin orders with administrations in past 72 hours.                Nephrotoxins and other renal medications (Future)    Start     Dose/Rate Route Frequency Ordered Stop    18 1930  vancomycin (VANCOCIN) 1,750 mg in sodium chloride 0.9 % 500 mL intermittent infusion      1,750 mg  over 2 Hours Intravenous EVERY 12 HOURS 18 19218 1850  piperacillin-tazobactam (ZOSYN) intermittent infusion 4.5 g      4.5 g  200 mL/hr over 30 Minutes Intravenous ONCE 18 1849            Contrast Orders - past 72 hours (72h ago through future)    Start     Dose/Rate Route Frequency Ordered Stop    18 1739  iopamidol (ISOVUE-370) solution 100 mL      100 mL Intravenous ONCE 18 1738 18 1755                Plan:  1.  Start vancomycin  1750 mg IV q12h.   2.  Goal Trough Level: 15-20 mg/L   3.  Pharmacy will check trough levels as appropriate in 1-3 Days.    4. Serum creatinine levels will be ordered daily for the first week of therapy and at least twice weekly for subsequent weeks.    5. [place] method utilized to dose vancomycin therapy: Method 2    Parminder Sewell

## 2018-04-17 NOTE — PROGRESS NOTES
[facility]    Hospitalist Progress Note    Date of Service (when I saw the patient): 04/17/2018    Assessment & Plan   Doreen Peralta is a 37 year old female who was admitted on 4/16/2018 with fever and tachycardia consistent with systemic inflammatory response syndrome as well as acutely worsening left lower quadrant abdominal pain.  Cause remains unclear.  Sepsis is possible, but infection has not been identified.  She does have complicated history of recurring bacteremia and persistent positive testing for Clostridium difficile toxin PCR which her gastroenterologist as previously attributed to colonization rather than pathologic infection.  Repeat testing for intestinal infection is pending.  Abdomen CT did not demonstrate radiographic findings concerning for infection or abscess.  She has not had symptoms suspicious for recent UTI nor does she have pyuria.  Signs of SIRS are resolving although left lower quadrant abdominal pain persists.  She is more anemic with approximately 3-4 g drop in hemoglobin since admission for reasons that are not clear as stool testing for occult blood was negative.  Nevertheless, occult gastrointestinal bleeding is difficult to exclude with certainty, but her hemodynamic stability argues against aggressive GI bleeding.  She did recently menstruate but is not actively doing so.  She clearly has underlying iron deficiency which likely contributes.    Principal Problem:    SIRS (systemic inflammatory response syndrome) (H) - fever, tachycardia  Active Problems:    Nausea and vomiting    Hypokalemia    Iron deficiency anemia    Stool toxin PCR positive for Clostridium difficile on 4/4/18    Intermittent asthma    S/P partial resection of colon    Munchausen syndrome - previously suspected    Migraine    Chronic abdominal pain    History of deep venous thrombosis    Essential hypertension    Tobacco abuse    History of bacteremia - recurrent    Continue  empiric parenteral antibiotics pending culture results  Continue IV fluids until oral intake improves, advance diet as tolerated   Check pro-calcitonin along with stool studies and fecal Calprotectin level, reconsider specific treatment for Clostridium difficile if there is increasing suspicion for active infection although the patient would likely require an alternative treatment to oral metronidazole and vancomycin because she did not tolerate those medications orally previously so would consult with GI for recommendations in that instance  Follow serial hemoglobin, transfuse for hemoglobin less than 7 or if she becomes unstable hemodynamically raising concern for active bleeding, possible need for blood transfusion discussed with the patient today who expressed agreement if it becomes medically necessary though formal consent was not yet obtained as transfusion has not yet been recommended  Replace potassium according to protocol and follow potassium closely  Take magnesium and replace if indicated    Pain Plan: # Pain Assessment:  Current Pain Score 4/17/2018   Patient currently in pain? sleeping: patient not able to self report   Pain score (0-10) -   Pain location -   Pain descriptors -   CPOT pain score -   - Doreen is experiencing pain due to left lower quadrant abdominal pain. Pain management was discussed and the plan was created in a collaborative fashion.  Doreen's response to the current recommendations: engaged  - Opioid regimen: IV Narcotics as needed  - Response to opioid medications: Reduction of symptoms   - Bowel regimen: not needed    DVT Prophylaxis: Pneumatic Compression Devices  Code Status: Full Code    Disposition: Expected discharge in 1-2 days once culture results are available, her treatment plan has been clarified and she is clinically improved.    Gilmer Lazcano    Interval History   She really is not feeling any better today.  She aches all over although has not had recurrent fever so  far today.  Her left lower abdominal pain persists much worse than usual although is no worse today than it was yesterday.  She has constant left lower quadrant abdominal pain aggravated by touch.  She remains nauseated but has no longer been vomiting.  She did have a loose stool this morning.  She has not seen gross blood in her stool today or recently.  She denies any recent blood in her emesis.  She does report that she recently finished menstruating although her menstrual period was not particularly heavy this month.  She denies any lightheadedness.  She remains hemodynamically stable with heart rate that is returned to normal.  Oxygenation remains normal.  She is tolerating only some sips of liquids so far.    -Data reviewed today: I reviewed all new labs and imaging results over the last 24 hours. I personally reviewed no images or EKG's today.    Physical Exam   Temp: 98.7  F (37.1  C) Temp src: Oral BP: 122/71 Pulse: 88 Heart Rate: 88 Resp: 16 SpO2: 98 % O2 Device: Nasal cannula Oxygen Delivery: 2 LPM  Vitals:    04/16/18 1511 04/16/18 2300   Weight: 83.1 kg (183 lb 3.2 oz) 82.8 kg (182 lb 8 oz)     Vital Signs with Ranges  Temp:  [96.5  F (35.8  C)-100.3  F (37.9  C)] 98.7  F (37.1  C)  Pulse:  [60-88] 88  Heart Rate:  [] 88  Resp:  [15-20] 16  BP: ()/(48-91) 122/71  SpO2:  [87 %-100 %] 98 %  I/O last 3 completed shifts:  In: 3849 [P.O.:1310; I.V.:2539]  Out: -     Constitutional: Ill-appearing lying in bed  Respiratory: Normal respiratory effort, clear lungs  Cardiovascular: Regular rate and rhythm, easily palpable radial pulse with normal capillary refill  GI: Normoactive bowel sounds, no abdominal distention, soft abdomen, tender in the left lower quadrant without involuntary guarding    Medications     sodium chloride 125 mL/hr at 04/17/18 0640       cloNIDine  0.4 mg Oral QPM     DULoxetine  60 mg Oral Daily     piperacillin-tazobactam  3.375 g Intravenous Q6H     vancomycin (VANCOCIN) IV   1,750 mg Intravenous Q12H       Data   Data reviewed today:  I personally reviewed no images or EKG's today.    Recent Labs  Lab 04/17/18  0607 04/16/18  1600   WBC 6.6 9.3   HGB 7.8* 9.8*   MCV 80 79    335    137   POTASSIUM 3.3* 3.6   CHLORIDE 109 104   CO2 23 25   BUN 4* 4*   CR 0.73 0.68   ANIONGAP 9 8   TARA 7.6* 8.4*   GLC 94 101*   ALBUMIN  --  3.1*   PROTTOTAL  --  7.8   BILITOTAL  --  0.6   ALKPHOS  --  151*   ALT  --  27   AST  --  22       Recent Results (from the past 24 hour(s))   CT Abdomen Pelvis w Contrast    Narrative    CT ABDOMEN PELVIS WITH CONTRAST   4/16/2018 6:14 PM     HISTORY: Left abdominal pain.    TECHNIQUE:  CT abdomen and pelvis with 90 mL Isovue 370 IV. Radiation  dose for this scan was reduced using automated exposure control,  adjustment of the mA and/or kV according to patient size, or iterative  reconstruction technique.    COMPARISON: 3/27/2018.    FINDINGS:  Abdomen: There is dependent atelectasis at the lung bases. The heart  size is normal. The liver, spleen, pancreas, adrenal glands and left  kidney are normal in appearance. There is a tiny stone in the mid  right kidney. No hydronephrosis. The gallbladder is absent. There is  no abdominal or pelvic lymph node enlargement.    Pelvis: The uterus and adnexa appear normal. There are postoperative  changes in the left colon. No bowel obstruction or inflammation. No  free intraperitoneal gas or fluid.      Impression    IMPRESSION: No acute abnormality. No bowel obstruction or  inflammation.     SHAR PEREZ MD     Blood and urine cultures are pending  Stool testing for enteric pathogens, Clostridium difficile toxin PCR, and Calprotectin level are pending  Stool testing for occult blood was negative  Iron studies were abnormal with serum iron 11, transferrin saturation 3, reticulocyte 2.0, and ferritin 24

## 2018-04-17 NOTE — PROGRESS NOTES
"S-(situation): Patient arrives to room 254 via cart from ED    B-(background): Patient to ED with elevated temperature at home, chills,diarrhea, and abdominal pain.    A-(assessment): Temp 100.0 , otherwise negative.  Patient is rolling on bed moaning.  Requesting pillows, water, juice, pain medication, and IV Benedryl.     R-(recommendations): Orders reviewed with Patient. Will monitor patient per MD orders.     Inpatient nursing criteria listed below were met:    Health care directives status obtained and documented: Yes    SCD's Documented: Yes    Skin issues/needs documented:NA, Patient states, \" I have no issues except for where I had IVs.\"       Isolation needs addressed, if appropriate: Yes, ENTERIC    Fall Prevention: Care plan updated, Education given and documented Yes, patient expresses frustration regarding fall precautions.  She states, \" I am not going to fall, you don't need to be here.\" \"this has never happened to me before.\"  Reiterated that patient was on medications that could increase her chances or falling and that she had SCDs and IVS attached. She states, \" I can get them on and off myself so you know (referring to SCDs)     MRSA swab completed for patient 55 years and older (exclude BENITO and TKA): NA  My Chart patient sign up addressed and documented: Yes  Care Plan initiated: Yes  Education Assessment documented:Yes  Education Documented (Pre-existing chronic infection such as, MRSA/VRE need education on admission): Yes  Admission Medication Reconciliation completed: Yes, done in ED. Patient states, \"nothing has changed\" \"I don't see why I have to go over them again.\"   Explained rational and patient closed eyes.   New medication patient education completed and documented (Possible Side Effects of Common Medications handout): Yes  Home medications if not able to send immediately home with family stored here: NA  Reminder note placed in discharge instructions: NA  Discharge planning " review completed (admission navigator) Yes

## 2018-04-17 NOTE — CONSULTS
"CLINICAL NUTRITION SERVICES  -  ASSESSMENT NOTE    REASON FOR ASSESSMENT  Doreen Peralta is a 37 year old female seen by Registered Dietitian for Admission Nutrition Risk Screen - unintentional loss of 10 lbs or more in the past 2 months and reduced oral intake over the last month and RN Consult - decrease appetite and loss of > 10 pounds    NUTRITION HISTORY  - Information obtained from chart and patient  -Noted pt presented to ED with fever, LLQ abdominal pain, vomiting; also ongoing multiple episodes of watery yellow diarrhea which is unchanged for her   -Pt diagnosed with SIRS, iron deficiency anemia, C. Diff and tobacco abuse and has hx of chronic abdominal pain, gastroparesis, PEG tube, malnutrition, chronic diarrhea  -During care team rounding, pt's nurse stated that pt prefers to stay on a clear liquid diet for now  -Pt reports she has lost 40 lbs since December [2017]; she stated \"I was overweight, but didn't really plan to lose that much that quickly\"  -She stated she weighed 205 lbs in December and weighs 170 lbs at home scale; she thinks scale here at hospital is inaccurate when told of her current weight of 182 lbs  -Pt reports having a g-tube and had it removed in October [2017]  -She reports her appetite has been poor since December  -She reports nothing sounds appetitizing to her since her abdominal pain is so severe  -Writer suggested Ensure Clear as a nutritional supplement option, pt declined stating she has an intolerance to whey protein which is in this product     CURRENT NUTRITION ORDERS  Diet Order:     Clear Liquid     Current Intake/Tolerance:  Intake not noted in flowsheets    PHYSICAL FINDINGS  Observed  No nutrition-related physical findings observed  Obtained from Chart/Interdisciplinary Team  None noted    ANTHROPOMETRICS  Height: 5' 6\"  Weight: 182 lbs 8 oz  Body mass index is 29.46 kg/(m^2).  Weight Status:  Overweight BMI 25-29.9  IBW: 130 lbs  % IBW: 140%  Weight History:     Wt " Readings from Last 25 Encounters:   04/16/18 82.8 kg (182 lb 8 oz)   04/08/18 78.9 kg (174 lb)   04/04/18 79.6 kg (175 lb 6.4 oz)   03/29/18 78.9 kg (174 lb)   03/27/18 78.9 kg (174 lb)   03/26/18 79.1 kg (174 lb 6 oz)   03/07/18 80.3 kg (177 lb)   02/27/18 80.3 kg (177 lb)   02/24/18 80.7 kg (178 lb)   02/17/18 81.6 kg (180 lb)   01/14/18 77.1 kg (170 lb)   10/16/17 85.8 kg (189 lb 3.2 oz)   10/07/17 86.2 kg (190 lb)   10/07/17 87.8 kg (193 lb 8 oz)   09/25/17 88.9 kg (196 lb)   09/08/17 89.4 kg (197 lb)   09/06/17 89.4 kg (197 lb)   09/01/17 89.7 kg (197 lb 12.8 oz)   08/31/17 91.2 kg (201 lb)   08/30/17 91.8 kg (202 lb 4.8 oz)   08/24/17 91 kg (200 lb 9.9 oz)   08/19/17 92 kg (202 lb 14.4 oz)   08/07/17 90.7 kg (200 lb)   08/06/17 90.7 kg (200 lb)   08/02/17 90.7 kg (200 lb)   Loss of 9.9% BW over the last 8 months; Loss of 3.7% BW over the last 6 months    LABS  Labs reviewed  K (L): 3.3    MEDICATIONS  Medications reviewed  IVF NS @ 125 mL/hr  PRN: Zofran, KLOR-CON    ASSESSED NUTRITION NEEDS PER APPROVED PRACTICE GUIDELINES:    Dosing Weight 65 kg (ABW)  Estimated Energy Needs: 6589-7312 kcals (25-30 Kcal/Kg)  Justification: maintenance  Estimated Protein Needs: 52-65 grams protein (0.8-1 g pro/Kg)  Justification: maintenance  Estimated Fluid Needs: (1 mL/Kcal)  Justification: maintenance    MALNUTRITION:  % Weight Loss:  Weight loss does not meet criteria for malnutrition; pt's 40# reported weight loss over the past 4 months is not reflective in patient's EHR, therefore it is inconclusive   % Intake:  </= 50% for >/= 1 month (severe malnutrition)  Subcutaneous Fat Loss:  None observed  Muscle Loss:  None observed  Fluid Retention:  None noted    Malnutrition Diagnosis: Patient does not meet two of the above criteria necessary for diagnosing malnutrition    NUTRITION DIAGNOSIS:  Inadequate protein-energy intake related to clear liquid diet, abdominal pain, diarrhea as evidenced by estimated nutrient intake <  estimated nutrient needs.     NUTRITION INTERVENTIONS  Recommendations / Nutrition Prescription  -Recommend diet advancement as tolerated.  -Continue to monitor and encourage PO intake.    Implementation  Nutrition education: No education needs assessed at this time  Medical Food Supplement    Nutrition Goals  Diet advancement as tolerated.     MONITORING AND EVALUATION:  Progress towards goals will be monitored and evaluated per protocol and Practice Guidelines    Justine Keene RD, LD  Clinical Dietitian  [phone]

## 2018-04-17 NOTE — PLAN OF CARE
"Problem: Patient Care Overview  Goal: Plan of Care/Patient Progress Review  Patient is receiving PO Dilaudid about every 3 hours.   She appears more comfortable and states improved comfort.    She has not voided since her arrival from ED.   No stools.       Problem: Sepsis/Septic Shock (Adult)  Intervention: Provide Oxygenation/Ventilation/Perfusion Support  2 L applied while patient is asleep.  Oxygen saturations down to 87% while asleep, she goes right back up to 100% while awake.   Per patient, \" my mom is worried I have sleep apnea, cause I snore so loudly.\"  LS are clear and slightly diminished.  Intervention: Monitor/Manage Perfusion  VSS, currently afebrile.   Intervention: Prevent/Manage DVT/VTE Risk  SCDs in use.        "

## 2018-04-17 NOTE — ED NOTES
ED Nursing criteria listed below was addressed during verbal handoff:     Abnormal vitals: Yes  Abnormal results: Yes  Med Reconciliation completed: Yes  Meds given in ED: Yes  Any Overdue Meds: N/A  Core Measures: N/A  Isolation: Yes  Special needs: Yes  Skin assessment: Yes    Observation Patient  Education provided: Yes, video was shown by previous Rn and a handout was given.    Report given to Abilio WARNER, pt to room 254.

## 2018-04-17 NOTE — H&P
[facility]    History and Physical  Hospitalist       Date of Admission:  4/16/2018  Date of Service (when I saw the patient): 04/16/18    Assessment & Plan       Active Problems:    Sepsis (H) - possible    Assessment: presents with low grade fever and reported fever to 104 along with rigors and tachycardia.  She has normal BP, mentation and lactic acid.  No obvious source of infection with no respiratory symptoms, UTI symptoms, headache, skin sores or murmur on exam.  She is having ongoing diarrhea with concern for possible c diff.  She has also had previous blood cultures positive for Klebsiella, Acinetobacter, Candida, E.coli, and ochrobactrum over the past two years.  She no longer has a G tube, J tube or central line.  There has been concern from GI that should could be seeding her blood from her bowel.  At this point with her previous history of sepsis with positive blood cultures and current findings for early sepsis will need to admit and start on antibiotics for sepsis of unknown source for now.     Plan: admit to inpatient, BC done, send UC, start zosyn and vanco, send stool for c diff and MIO, continue antibiotics until BC negative after 48 hours.  Will check calprotectin and if elevated would start oral vancomycin for c diff.       Chronic abdominal pain    Assessment: chronic and appears stable.  CT scan is unremarkable.  Patient complains of worsening LLQ pain and CT does show increased stool in this area.    Plan: oral dilaudid for now      Nausea and vomiting    Assessment: reports vomiting as of today but has kept her medications down    Plan: clear liquid diet, zofran and benadryl prn      Essential hypertension    Assessment: controlled    Plan: continue clonidine      Anemia    Assessment: no symptoms suggesting GIB and has previous history of iron deficiency    Plan: check occult blood, retic count and iron studies, repeat labs tomorrow am      C. difficile  colitis - possible    Assessment: recently tested positive but GI has suggested waiting on her calprotectin prior to treating    Plan: send stool for c diff but hold on treating for now per GI recommendation      Intermittent asthma    Assessment: controlled    Plan: prn nebs      Munchausen syndrome - previously suspected    Assessment: suspected previously with her central line and her G tube but has neither in place currently    Plan: no intervention      Migraine    Assessment: no headaches    Plan: monitor      History of deep venous thrombosis    Assessment: noted past history    Plan: ambulate and PCD's      Tobacco abuse    Assessment: chronic    Plan: no acute intervention        # Pain Assessment:  Current Pain Score 4/16/2018   Patient currently in pain? -   Pain score (0-10) 6   Pain location -   Pain descriptors -   CPOT pain score -   - Doreen is experiencing pain due to abdominal pain. Pain management was discussed and the plan was created in a collaborative fashion.  Doreen's response to the current recommendations: engaged  - Opioid regimen: dilaudid orally  - Response to opioid medications: Reduction of symptoms   - Bowel regimen: not needed          DVT Prophylaxis: Pneumatic Compression Devices  Code Status: Full Code    Disposition: Expected discharge in 2-3 days once BC negative and no signs of ongoing sepsis.    Nikhil Mario MD    Primary Care Physician   Franny Antoine    Chief Complaint   Fever    History is obtained from the patient    History of Present Illness   Doreen Peralta is a 37 year old female who presents with fever.  She has had low grade fever per her report over the past 3 days of around 100.  Today she started shaking and then lied down to take a nap.  When she woke up she had a fever of 104 so came to the ED. She also complains of increased LLQ abdominal pain and also states she has had vomiting today although she states she has been able to keep her medications  down.  She has ongoing multiple episodes of watery yellow diarrhea which is unchanged for her.  She denies any respiratory or urinary symptoms.  She denies any headaches or skin sores.      Past Medical History    I have reviewed this patient's medical history and updated it with pertinent information if needed.   Past Medical History:   Diagnosis Date     Asthma      Bilateral ovarian cysts      Cervical cancer (H) 01/01/2008    cervical cancer      Chronic pain      Colonic dysmotility     s/p subtotal colectomy     Constipation     chronic     E. coli sepsis (H) 5/8/2016     Enteritis      Fungemia 5/5/2016     Gastro-oesophageal reflux disease      H/O ileostomy      Hx of abnormal Pap smear     s/p LEEP - no further details provided     Hypertension      IBS (irritable bowel syndrome)      Other chronic pain      PONV (postoperative nausea and vomiting)      Thrombosis, hepatic vein (H)     microvascular       Past [facility] History   I have reviewed this patient's surgical history and updated it with pertinent information if needed.  Past [facility] History:   Procedure Laterality Date     COLONOSCOPY  7/10/2012    Procedure: COLONOSCOPY;;  Surgeon: Nicole Redding MD;  [facility]     COLONOSCOPY N/A 2/19/2017    Procedure: COLONOSCOPY;  Surgeon: Randell Muller MD;  [facility]     COLONOSCOPY N/A 2/21/2017    Procedure: COLONOSCOPY;  Surgeon: Randell Muller MD;  [facility]: [facility]     ECHO CHELO  7/19/2016          ENDOSCOPIC INSERTION TUBE GASTROSTOMY N/A 1/21/2016    Procedure: ENDOSCOPIC INSERTION TUBE GASTROSTOMY;  Surgeon: Nicole Redding MD;  [facility]     ESOPHAGOSCOPY, GASTROSCOPY, DUODENOSCOPY (EGD), COMBINED  7/10/2012    Procedure: COMBINED ESOPHAGOSCOPY, GASTROSCOPY, DUODENOSCOPY (EGD);  Upper Endoscopy, Ileoscopy    Latex Allergy  with biopsies;  Surgeon: Nicole Redding MD;  [facility]     ESOPHAGOSCOPY, GASTROSCOPY, DUODENOSCOPY (EGD), COMBINED N/A  11/5/2014    Procedure: COMBINED ESOPHAGOSCOPY, GASTROSCOPY, DUODENOSCOPY (EGD);  Surgeon: Nicole Redding MD;  [facility]     HC REPLACE DUODENOSTOMY/JEJUNOSTOMY TUBE PERCUTANEOUS N/A 8/27/2015    Procedure: REPLACE GASTROJEJUNOSTOMY TUBE, PERCUTANEOUS;  Surgeon: Mio Holder MD;  [facility]     HC REPLACE DUODENOSTOMY/JEJUNOSTOMY TUBE PERCUTANEOUS N/A 1/7/2016    Procedure: REPLACE JEJUNOSTOMY TUBE, PERCUTANEOUS;  Surgeon: Elsa Medel MD;  [facility]     HC REPLACE DUODENOSTOMY/JEJUNOSTOMY TUBE PERCUTANEOUS N/A 1/28/2016    Procedure: REPLACE JEJUNOSTOMY TUBE, PERCUTANEOUS;  Surgeon: Elsa Medel MD;  [facility]     HC REPLACE GASTROSTOMY/CECOSTOMY TUBE PERCUTANEOUS Left 5/19/2015    Procedure: REPLACE GASTROSTOMY TUBE, PERCUTANEOUS;  Surgeon: Melecio Morejon Chi, MD;  [facility]     HC UGI ENDOSCOPY W PLACEMENT GASTROSTOMY TUBE PERCUT N/A 10/1/2015    Procedure: COMBINED ESOPHAGOSCOPY, GASTROSCOPY, DUODENOSCOPY (EGD), PLACE PERCUTANEOUS ENDOSCOPIC GASTROSTOMY TUBE;  Surgeon: Mio Holder MD;  [facility]     INSERT PORT VASCULAR ACCESS Right 7/20/2017    Procedure: INSERT PORT VASCULAR ACCESS;  Chest Port Placement  **Latex Allergy**;  Surgeon: Coy Rocha PA-C;  [facility]     LAPAROSCOPIC ASSISTED COLECTOMY  1/20/2012    Procedure:LAPAROSCOPIC ASSISTED COLECTOMY; Laparoscopic Ileostomy       LAPAROSCOPIC ASSISTED COLECTOMY LEFT (DESCENDING)  10/24/2012    Procedure: LAPAROSCOPIC ASSISTED COLECTOMY LEFT (DESCENDING);   Hand Assisted Laproscopic Subtotal abdominal Colectomy,Iieorectal anastamosis, Ileostomy Closure.       LAPAROSCOPIC ASSISTED INSERTION TUBE JEJUNOSTOMY N/A 10/16/2015    Procedure: LAPAROSCOPIC ASSISTED INSERTION TUBE JEJUNOSTOMY;  Surgeon: Elsa Medel MD;  [facility]     LAPAROSCOPIC CHOLECYSTECTOMY  [address] [place] [address]. stones duct     LAPAROSCOPIC ILEOSTOMY  1/20/2012    [facility], loop     LAPAROSCOPIC  OOPHORECTOMY Right 2009    [facility]     LAPAROTOMY EXPLORATORY N/A 1/28/2016    Procedure: LAPAROTOMY EXPLORATORY;  Surgeon: Elsa Medel MD;  [facility]     LEEP TX, CERVICAL  2009    [facility]     PICC INSERTION Left 10/21/2015    5fr DL Power PICC, 37cm (2cm external) in the L basilic vein w/ tip in the SVC RA junction.     REMOVE GASTROSTOMY TUBE ADULT N/A 12/12/2014    Procedure: REMOVE GASTROSTOMY TUBE ADULT;  Surgeon: Nicole Redding MD;  [facility]     REMOVE PORT VASCULAR ACCESS Right 6/30/2016    Procedure: REMOVE PORT VASCULAR ACCESS;  Surgeon: Pradeep Orosco MD;  [facility]     REMOVE PORT VASCULAR ACCESS Right 9/8/2017    Procedure: REMOVE PORT VASCULAR ACCESS;  right side mediport removal;  Surgeon: Zechariah Worthington MD;  Location: [facility]     replace GASTROSTOMY TUBE ADULT  5/19/15       Prior to Admission Medications   Prior to Admission Medications   Prescriptions Last Dose Informant Patient Reported? Taking?   ACETAMINOPHEN PO 4/16/2018 at 1300 Self Yes Yes   Sig: Take 500-1,000 mg by mouth every 6 hours as needed for pain    Alfalfa 650 MG TABS Past Week at Unknown time  Yes Yes   DULoxetine (CYMBALTA) 60 MG EC capsule 4/16/2018 at 0900  No Yes   Sig: TAKE 1 CAPSULE(60 MG) BY MOUTH DAILY   Emollient (GRX VITAMIN E) LOTN Past Month at Unknown time  Yes Yes   SUMAtriptan (IMITREX) 50 MG tablet More than a month at Unknown time  No No   Sig: Take 1-2 tablets ( mg) by mouth at onset of headache for migraine - LAST REFILL, DUE FOR FOLLOW UP   albuterol (2.5 MG/3ML) 0.083% neb solution More than a month at Unknown time  Yes No   Sig: Take 1 vial (2.5 mg) by nebulization every 4 hours as needed for shortness of breath / dyspnea or wheezing   albuterol 90 MCG/ACT inhaler More than a month at Unknown time Self Yes No   Sig: Inhale 2 puffs into the lungs every 6 hours as needed    cloNIDine (CATAPRES) 0.2 MG tablet 4/15/2018 at 2000  No Yes   Sig: Take 2  tablets (0.4 mg) by mouth every evening - DUE FOR FOLLOW UP   diphenhydrAMINE (BENADRYL ALLERGY) 25 MG tablet 4/15/2018 at 2000  No Yes   Sig: Take 1 tablet (25 mg) by mouth every 6 hours as needed for itching or other (Nausea)   ipratropium (ATROVENT) 0.02 % neb solution More than a month at Unknown time  Yes No   Sig: Take 2.5 mLs (0.5 mg) by nebulization every 6 hours as needed for wheezing   ondansetron (ZOFRAN) 4 MG tablet More than a month at Unknown time  Yes No   Sig: TK 1 TO 2  TS PO EVERY 8 HOURS IF NEEDED TK 2 TS   ondansetron (ZOFRAN) 8 MG tablet 4/16/2018 at 1200  Yes Yes   Sig: Take 8 mg by mouth   traZODone (DESYREL) 100 MG tablet 4/15/2018 at 2000  Yes Yes   Sig: TK SS TO ONE T PO HS PRF SLEEP / ANXIETY      Facility-Administered Medications: None     Allergies   Allergies   Allergen Reactions     Hyoscyamine Rash     Metoclopramide Other (See Comments)     Eye twitching.      Peaches [Peach] Other (See Comments)     Raw. Cooked OK     Sucralose Other (See Comments)     All artificial sweeteners. Aspartame also. Swollen glands     Advair Diskus Other (See Comments)     Throat burns     Azithromycin Other (See Comments)     Burning in throat     Compazine [Prochlorperazine] Visual Disturbance     Contrast Dye Itching     States is allergic to CT contrast dye     Cyclobenzaprine Visual Disturbance     Diatrizoate Other (See Comments)     Patient reports she tolerates IV contrast if given 50mg IV of Benadryl prior to scan.      Fentanyl Other (See Comments)     migraine     Ibuprofen GI Disturbance     Lactulose Nausea and Vomiting     Gas and bloating     Levaquin [Levofloxacin] Swelling     Per ED M.D. And RN      Morphine Sulfate Other (See Comments)     Chest pain       Oxycodone Other (See Comments)     Burning throat, but can take Norco.      Rizatriptan Visual Disturbance     Droperidol Hives and Rash     Isovue [Iopamidol] Palpitations     Pt had racing heart and sob      Ketorolac Anxiety      Latex Swelling and Rash     Kiwi, likely also avacado, ? banana     Levsin Rash       Social History   I have reviewed this patient's social history and updated it with pertinent information if needed. Doreen Peralta  reports that she has been smoking Cigarettes.  She has a 4.00 pack-year smoking history. She has quit using smokeless tobacco. She reports that she does not drink alcohol or use illicit drugs.    Family History   I have reviewed this patient's family history and updated it with pertinent information if needed.   Family History   Problem Relation Age of Onset     Thyroid Disease Mother      Sjogren's Mother      GASTROINTESTINAL DISEASE Mother      Intermittent nausea vomiting diarrhea     Colon Polyps Mother      Prostate Problems Father      prostate enlargement     Lupus Maternal Grandmother      CANCER Maternal Grandfather      Lung     Colon Cancer Maternal Grandfather 65     CANCER Paternal Grandmother      Lung      CEREBROVASCULAR DISEASE Paternal Grandmother      DIABETES Paternal Grandmother      Cardiovascular Paternal Grandmother      CHF     CANCER Paternal Grandfather      Lung     Glaucoma Paternal Grandfather      Abdominal Aortic Aneurysm Other      Macular Degeneration No family hx of        Review of Systems   The 10 point Review of Systems is negative other than noted in the HPI or here.     Physical Exam   Temp: 100.3  F (37.9  C) Temp src: Oral BP: (!) 132/91   Heart Rate: 102 Resp: 19 SpO2: 95 % O2 Device: None (Room air)    Vital Signs with Ranges  Temp:  [100.3  F (37.9  C)] 100.3  F (37.9  C)  Heart Rate:  [102-128] 102  Resp:  [19-20] 19  BP: (126-154)/(79-91) 132/91  SpO2:  [92 %-98 %] 95 %  183 lbs 3.2 oz    Constitutional:   awake, alert, cooperative, no apparent distress, and appears stated age     Eyes:   Lids and lashes normal, pupils equal, round and reactive to light, extra ocular muscles intact, sclera clear, conjunctiva normal     ENT:   Normocephalic, without  obvious abnormality, atraumatic, sinuses nontender on palpation, external ears without lesions, oral pharynx with moist mucous membranes, tonsils without erythema or exudates, gums normal and good dentition.     Neck:   Supple, symmetrical, trachea midline, no adenopathy, thyroid symmetric, not enlarged and no tenderness, skin normal     Hematologic / Lymphatic:   no cervical lymphadenopathy and no supraclavicular lymphadenopathy     Back:   Symmetric, no curvature, spinous processes are non-tender on palpation, paraspinous muscles are non-tender on palpation, no costal vertebral tenderness     Lungs:   No increased work of breathing, good air exchange, clear to auscultation bilaterally, no crackles or wheezing     Cardiovascular:   Tachycardic but regular with no audible murmur, rub or gallop     Abdomen:   normal bowel sounds, soft, non-distended, non-tender, no masses palpated, no hepatosplenomegally     Neurologic:   Awake, alert, oriented to name, place and time.  Cranial nerves II-XII are grossly intact.  Motor is 5 out of 5 bilaterally.   Sensory is intact.        Data   Data reviewed today:  I personally reviewed no images or EKG's today.    Recent Labs  Lab 04/16/18  1600   WBC 9.3   HGB 9.8*   MCV 79         POTASSIUM 3.6   CHLORIDE 104   CO2 25   BUN 4*   CR 0.68   ANIONGAP 8   TARA 8.4*   *   ALBUMIN 3.1*   PROTTOTAL 7.8   BILITOTAL 0.6   ALKPHOS 151*   ALT 27   AST 22       Recent Results (from the past 24 hour(s))   CT Abdomen Pelvis w Contrast    Narrative    CT ABDOMEN PELVIS WITH CONTRAST   4/16/2018 6:14 PM     HISTORY: Left abdominal pain.    TECHNIQUE:  CT abdomen and pelvis with 90 mL Isovue 370 IV. Radiation  dose for this scan was reduced using automated exposure control,  adjustment of the mA and/or kV according to patient size, or iterative  reconstruction technique.    COMPARISON: 3/27/2018.    FINDINGS:  Abdomen: There is dependent atelectasis at the lung bases. The  heart  size is normal. The liver, spleen, pancreas, adrenal glands and left  kidney are normal in appearance. There is a tiny stone in the mid  right kidney. No hydronephrosis. The gallbladder is absent. There is  no abdominal or pelvic lymph node enlargement.    Pelvis: The uterus and adnexa appear normal. There are postoperative  changes in the left colon. No bowel obstruction or inflammation. No  free intraperitoneal gas or fluid.      Impression    IMPRESSION: No acute abnormality. No bowel obstruction or  inflammation.

## 2018-04-17 NOTE — PROGRESS NOTES
"Rounding on patient and asked if she needed to used  The bathroom she was entering to change her pants.  She stated, \"I didn't even try, that bed alarm makes me not want to go.\"  \" the doctor needs to order if off\".   Writer reiterated the reason for it safety etc.  She stated, \" I will not fall, I don't want it on\"   Patient agreed to call when she needed help getting up to the bathroom.   Will round frequently.   "

## 2018-04-17 NOTE — PROGRESS NOTES
Pt's IV pump beeped indicating antibiotic was finished infusing.  I went into patients room and she had silenced the pump alarm.  Stated to pt that she should not be touching the equipment and that we will be the ones turning it off.  Pt nodded and rolled over.  Bedside RN updated.

## 2018-04-17 NOTE — PROGRESS NOTES
SPIRITUAL HEALTH SERVICES  SPIRITUAL ASSESSMENT Progress Note  [facility]      During Rounding,  introduced himself to Doreen Peralta and informed her of his availability.    Marshal Blackman M.Div., [facility]  Staff   Office tel: [phone]

## 2018-04-18 PROBLEM — A41.50: Status: ACTIVE | Noted: 2017-08-24

## 2018-04-18 LAB
BACTERIA SPEC CULT: NO GROWTH
HGB BLD-MCNC: 8.4 G/DL (ref 11.7–15.7)
Lab: NORMAL
POTASSIUM SERPL-SCNC: 3.7 MMOL/L (ref 3.4–5.3)
SPECIMEN SOURCE: NORMAL

## 2018-04-18 PROCEDURE — 25000128 H RX IP 250 OP 636: Performed by: INTERNAL MEDICINE

## 2018-04-18 PROCEDURE — 25000132 ZZH RX MED GY IP 250 OP 250 PS 637: Performed by: INTERNAL MEDICINE

## 2018-04-18 PROCEDURE — 25800025 ZZH RX 258: Performed by: PEDIATRICS

## 2018-04-18 PROCEDURE — 99233 SBSQ HOSP IP/OBS HIGH 50: CPT | Performed by: PEDIATRICS

## 2018-04-18 PROCEDURE — A9270 NON-COVERED ITEM OR SERVICE: HCPCS | Mod: GY | Performed by: INTERNAL MEDICINE

## 2018-04-18 PROCEDURE — 36415 COLL VENOUS BLD VENIPUNCTURE: CPT | Performed by: PEDIATRICS

## 2018-04-18 PROCEDURE — 85018 HEMOGLOBIN: CPT | Performed by: PEDIATRICS

## 2018-04-18 PROCEDURE — 25000128 H RX IP 250 OP 636: Performed by: PEDIATRICS

## 2018-04-18 PROCEDURE — 36416 COLLJ CAPILLARY BLOOD SPEC: CPT | Performed by: PEDIATRICS

## 2018-04-18 PROCEDURE — 12000000 ZZH R&B MED SURG/OB

## 2018-04-18 PROCEDURE — 84132 ASSAY OF SERUM POTASSIUM: CPT | Performed by: PEDIATRICS

## 2018-04-18 PROCEDURE — 36569 INSJ PICC 5 YR+ W/O IMAGING: CPT | Mod: 52

## 2018-04-18 PROCEDURE — 40000556 ZZH STATISTIC PERIPHERAL IV START W US GUIDANCE

## 2018-04-18 RX ORDER — DIPHENHYDRAMINE HYDROCHLORIDE 50 MG/ML
50 INJECTION INTRAMUSCULAR; INTRAVENOUS EVERY 6 HOURS PRN
Status: DISCONTINUED | OUTPATIENT
Start: 2018-04-18 | End: 2018-04-22 | Stop reason: HOSPADM

## 2018-04-18 RX ORDER — DEXTROSE MONOHYDRATE, SODIUM CHLORIDE, AND POTASSIUM CHLORIDE 50; 1.49; 4.5 G/1000ML; G/1000ML; G/1000ML
INJECTION, SOLUTION INTRAVENOUS CONTINUOUS
Status: DISCONTINUED | OUTPATIENT
Start: 2018-04-18 | End: 2018-04-20

## 2018-04-18 RX ADMIN — TAZOBACTAM SODIUM AND PIPERACILLIN SODIUM 3.38 G: 375; 3 INJECTION, SOLUTION INTRAVENOUS at 12:40

## 2018-04-18 RX ADMIN — TAZOBACTAM SODIUM AND PIPERACILLIN SODIUM 3.38 G: 375; 3 INJECTION, SOLUTION INTRAVENOUS at 07:08

## 2018-04-18 RX ADMIN — ONDANSETRON 4 MG: 2 INJECTION INTRAMUSCULAR; INTRAVENOUS at 01:14

## 2018-04-18 RX ADMIN — VANCOMYCIN HYDROCHLORIDE 1750 MG: 10 INJECTION, POWDER, LYOPHILIZED, FOR SOLUTION INTRAVENOUS at 01:50

## 2018-04-18 RX ADMIN — HYDROMORPHONE HYDROCHLORIDE 2 MG: 2 TABLET ORAL at 01:30

## 2018-04-18 RX ADMIN — VANCOMYCIN HYDROCHLORIDE 1750 MG: 10 INJECTION, POWDER, LYOPHILIZED, FOR SOLUTION INTRAVENOUS at 14:13

## 2018-04-18 RX ADMIN — HYDROMORPHONE HYDROCHLORIDE 2 MG: 2 TABLET ORAL at 17:27

## 2018-04-18 RX ADMIN — HYDROMORPHONE HYDROCHLORIDE 2 MG: 2 TABLET ORAL at 20:28

## 2018-04-18 RX ADMIN — DIPHENHYDRAMINE HYDROCHLORIDE 25 MG: 50 INJECTION, SOLUTION INTRAMUSCULAR; INTRAVENOUS at 07:45

## 2018-04-18 RX ADMIN — DULOXETINE HYDROCHLORIDE 60 MG: 30 CAPSULE, DELAYED RELEASE PELLETS ORAL at 09:06

## 2018-04-18 RX ADMIN — HYDROMORPHONE HYDROCHLORIDE 2 MG: 2 TABLET ORAL at 07:45

## 2018-04-18 RX ADMIN — DIPHENHYDRAMINE HYDROCHLORIDE 50 MG: 50 INJECTION, SOLUTION INTRAMUSCULAR; INTRAVENOUS at 20:28

## 2018-04-18 RX ADMIN — HYDROMORPHONE HYDROCHLORIDE 2 MG: 2 TABLET ORAL at 10:50

## 2018-04-18 RX ADMIN — TAZOBACTAM SODIUM AND PIPERACILLIN SODIUM 3.38 G: 375; 3 INJECTION, SOLUTION INTRAVENOUS at 01:16

## 2018-04-18 RX ADMIN — HYDROMORPHONE HYDROCHLORIDE 2 MG: 2 TABLET ORAL at 14:26

## 2018-04-18 RX ADMIN — POTASSIUM CHLORIDE, DEXTROSE MONOHYDRATE AND SODIUM CHLORIDE: 150; 5; 450 INJECTION, SOLUTION INTRAVENOUS at 17:15

## 2018-04-18 RX ADMIN — DIPHENHYDRAMINE HYDROCHLORIDE 25 MG: 50 INJECTION, SOLUTION INTRAMUSCULAR; INTRAVENOUS at 14:13

## 2018-04-18 RX ADMIN — DIPHENHYDRAMINE HYDROCHLORIDE 25 MG: 50 INJECTION, SOLUTION INTRAMUSCULAR; INTRAVENOUS at 15:40

## 2018-04-18 RX ADMIN — DIPHENHYDRAMINE HYDROCHLORIDE 25 MG: 50 INJECTION, SOLUTION INTRAMUSCULAR; INTRAVENOUS at 01:12

## 2018-04-18 RX ADMIN — TAZOBACTAM SODIUM AND PIPERACILLIN SODIUM 3.38 G: 375; 3 INJECTION, SOLUTION INTRAVENOUS at 18:51

## 2018-04-18 RX ADMIN — HYDROMORPHONE HYDROCHLORIDE 2 MG: 2 TABLET ORAL at 04:47

## 2018-04-18 RX ADMIN — SODIUM CHLORIDE: 9 INJECTION, SOLUTION INTRAVENOUS at 10:50

## 2018-04-18 RX ADMIN — CLONIDINE HYDROCHLORIDE 0.4 MG: 0.1 TABLET ORAL at 20:28

## 2018-04-18 ASSESSMENT — PAIN DESCRIPTION - DESCRIPTORS
DESCRIPTORS: ACHING
DESCRIPTORS: ACHING;SORE
DESCRIPTORS: ACHING
DESCRIPTORS: ACHING;SORE

## 2018-04-18 NOTE — PROVIDER NOTIFICATION
ED and OB unable to get a new IV in, even using ultrasound. MD notified, PICC order placed.  I called [facility] and they will be here 1.5-2 hours from now (1403).  Bedside nurse informed.

## 2018-04-18 NOTE — PROVIDER NOTIFICATION
DATE:  4/18/2018   TIME OF RECEIPT FROM LAB:  0655  LAB TEST:  Blood cultures  LAB VALUE:  Positve, gram - rods  RESULTS GIVEN WITH READ-BACK TO (PROVIDER):  iNkhil Mario MD  TIME LAB VALUE REPORTED TO PROVIDER:   0700

## 2018-04-18 NOTE — PLAN OF CARE
Problem: Patient Care Overview  Goal: Plan of Care/Patient Progress Review  Outcome: Improving  Vss, afebrile. Tolerating clear liquids and drank 900mL water overnight and asked for jello now, however pt asks for IV Benadryl as soon as available to help with nausea. Pt also asks for prn Dilaudid as soon as dose is available for LLQ abdominal pain. Pt has been outside to smoke x2 overnight despite risks of leaving building to smoke were verbalized to pt. Bowel sounds are hypoactive. Had loose watery stools overnight, one incontinent, and voiding well. Weight is up 0.4kg from yesterday's weight. [facility] here earlier in night and unable to get PICC access but were able to place extended length PIV and is infusing well. Will continue to monitor I and O, pain, labs, vs.

## 2018-04-18 NOTE — PLAN OF CARE
Problem: Sepsis/Septic Shock (Adult)  Goal: Signs and Symptoms of Listed Potential Problems Will be Absent, Minimized or Managed (Sepsis/Septic Shock)  Signs and symptoms of listed potential problems will be absent, minimized or managed by discharge/transition of care (reference Sepsis/Septic Shock (Adult) CPG).   Outcome: Improving  Pt up in room, showered today.  Continues to have liquid stool.  Pt is eating small amts of clear liquids.  C/O abdominal pain and is receiving dilaudid 2mg oral and benadryl UV every 6 hours.  VSS today, afebrile.  Abdominal with BS+

## 2018-04-18 NOTE — PROGRESS NOTES
S-(situation): Note    B-(background): Abd Pain    A-(assessment): Note that pt is positive for C-diff and has been leaving the floor to go to first floor.  Pt stated that she was allow to leave the floor yesterday.  Dr. Lazcano and writer both instructed pt not to leave the floor because of her c-diff findings-pt continues to leave the floor and has returned safely.    R-(recommendations): Cont to monitor.

## 2018-04-18 NOTE — PROGRESS NOTES
[facility]    Hospitalist Progress Note    Date of Service (when I saw the patient): 04/18/2018    Assessment & Plan   Doreen Peralta is a 37 year old female who was admitted on 4/16/2018 due to concern for sepsis.  Blood culture is positive for gram-negative rods on preliminary results consistent with gram-negative septicemia, and pro-calcitonin level was significantly elevated.   There is concern for possible bowel source for infection due to her ongoing left lower quadrant abdominal pain although abdomen CT did not demonstrate radiographic findings concerning for perforation, obstruction, or abscess at the time of admission.  Stool testing for Clostridium difficile toxin remains positive as it was recently, and GI previously advised against specific antibiotic treatment for Clostridium difficile infection while awaiting results of fecal Calprotectin level.  She continues to have diarrhea which may be worsening.  She has been hemodynamically stable with normal lactic acid so far.  Iron deficiency anemia persists, but hemoglobin has been stable over the last 24 hours without signs of acute bleeding, so worsening anemia due to sepsis appears most likely.  Hypokalemia associated with diarrhea has improved with replacement.    Principal Problem:    Gram negative septicemia (H)  Active Problems:    Nausea and vomiting    Hypokalemia    Iron deficiency anemia    Stool toxin PCR positive for Clostridium difficile on 4/4/18    Intermittent asthma    S/P partial resection of colon    Munchausen syndrome - previously suspected    Migraine    Chronic abdominal pain    History of deep venous thrombosis    Essential hypertension    Tobacco abuse    History of bacteremia - recurrent    Continue empiric Zosyn pending culture results, discontinue vancomycin due to identification of gram-negative bacteremia  Anticipate discussing her case with her primary gastroenterologist once results of blood  culture and fecal calprotectin level are available to include discussion as to whether to treat Clostridium difficile    Pain Plan: # Pain Assessment:  Current Pain Score 4/18/2018   Patient currently in pain? -   Pain score (0-10) 6   Pain location -   Pain descriptors -   CPOT pain score -   - Doreen is experiencing pain due to worsening left lower abdominal pain. Pain management was discussed with Doreen and her mother and the plan was created in a collaborative fashion.  Doreen's response to the current recommendations: engaged  - Opioid regimen: Continue IV narcotics as needed for severe pain  - Response to opioid medications: Reduction of symptoms transiently  - Bowel regimen: not needed      DVT Prophylaxis: Pneumatic Compression Devices  Code Status: Full Code    Disposition: Expected discharge in 2-4 days once test results including blood culture results are available, case has been discussed with gastroenterology to formulate an appropriate treatment plan, and she is clinically improved.    This was a prolonged visit today of 50 minutes including 30 minutes from 10:50 AM to 11:20 AM today spent in the patient's room in face-to-face discussion and counseling with the patient and her mother answering their questions and addressing their concerns.    Gilmer Lazcano    Interval History   She continues to have left lower abdominal pain which has not changed.  She has noticed that she will start to feel more bloated and distended in her abdomen and then will develop tingling in her fingertips followed by shaking chills and subsequent fever.  This has continued here in the hospital.  Once fever subsides, her abdominal bloating and distention temporarily improve.  Nausea seems better and she has not been vomiting.  She has continued to spike fever to 101.9 although this is decreased compared with 104 at home.  She remains hemodynamically stable.  Her respiratory status has been stable with normal oxygenation.   Urine output has been good.  She reports that she has having increasing diarrhea which is now watery.  She is tolerating some oral intake.  Nursing reports that peripheral IV access has been challenging, so a midline peripheral IV was placed overnight.    -Data reviewed today: I reviewed all new labs and imaging results over the last 24 hours. I personally reviewed no images or EKG's today.    Physical Exam   Temp: 98.5  F (36.9  C) Temp src: Oral BP: 126/68 Pulse: 83 Heart Rate: 66 Resp: 20 SpO2: 94 % O2 Device: None (Room air)    Vitals:    04/16/18 1511 04/16/18 2300 04/18/18 0333   Weight: 83.1 kg (183 lb 3.2 oz) 82.8 kg (182 lb 8 oz) 83.1 kg (183 lb 3.2 oz)     Vital Signs with Ranges  Temp:  [96.4  F (35.8  C)-101.9  F (38.8  C)] 98.5  F (36.9  C)  Pulse:  [] 83  Heart Rate:  [] 66  Resp:  [15-20] 20  BP: (120-144)/(60-88) 126/68  SpO2:  [94 %-98 %] 94 %  I/O last 3 completed shifts:  In: 5200 [P.O.:2380; I.V.:2820]  Out: 2900 [Urine:2900]    Constitutional: No acute distress resting in bed  Respiratory: Normal respiratory effort  Cardiovascular: Regular rate and rhythm, good radial pulse with brisk capillary refill  GI: Hypoactive bowel sounds, abdomen appears moderately distended and is soft, left lower quadrant abdominal tenderness is present without involuntary guarding  Skin/Integumen: No rashes evident  Neuro: Normal mental status    Medications     sodium chloride 125 mL/hr at 04/18/18 0400       cloNIDine  0.4 mg Oral QPM     DULoxetine  60 mg Oral Daily     piperacillin-tazobactam  3.375 g Intravenous Q6H     vancomycin (VANCOCIN) IV  1,750 mg Intravenous Q12H       Data   Data reviewed today:  I personally reviewed no images or EKG's today.    Recent Labs  Lab 04/18/18  0615 04/17/18  2154 04/17/18  2041 04/17/18  1506 04/17/18  0607 04/16/18  1600   WBC  --   --   --   --  6.6 9.3   HGB 8.4* 8.4*  --  8.1* 7.8* 9.8*   MCV  --   --   --   --  80 79   PLT  --   --   --   --  258 335   NA   --   --   --   --  141 137   POTASSIUM  --   --  3.9  --  3.3* 3.6   CHLORIDE  --   --   --   --  109 104   CO2  --   --   --   --  23 25   BUN  --   --   --   --  4* 4*   CR  --   --   --   --  0.73 0.68   ANIONGAP  --   --   --   --  9 8   TARA  --   --   --   --  7.6* 8.4*   GLC  --   --   --   --  94 101*   ALBUMIN  --   --   --   --   --  3.1*   PROTTOTAL  --   --   --   --   --  7.8   BILITOTAL  --   --   --   --   --  0.6   ALKPHOS  --   --   --   --   --  151*   ALT  --   --   --   --   --  27   AST  --   --   --   --   --  22     Blood culture obtained from the right forearm is growing gram-negative rods on preliminary results  Urine culture is negative so far  Stool testing for enteric pathogens was negative  Pro-calcitonin was elevated at 3.48, high risk for progression to severe sepsis  Fecal Calprotectin level is pending

## 2018-04-18 NOTE — PROGRESS NOTES
Attempted PICC Placement    Attempted PICC placement x1 to basilic vein, gained access, unable to thread guide wire more than 2 cm past the needle tip.  No other veins appeared to have a safe diameter for PICC placement except for Brachial medial vein that was superficial to artery and surrounded by nerve tissue.  Attempted x1 to this vein by bypassing artery, pt stated that it was too painful close to the nerve tissue and declined to have placement continue.  Placed 20g iv to right forearm to provide access until further assessment can be made for placement.

## 2018-04-18 NOTE — PLAN OF CARE
Problem: Patient Care Overview  Goal: Plan of Care/Patient Progress Review  Outcome: No Change  Temp: 100.3  F (37.9  C) Temp src: Oral BP: 127/82 Pulse: 97 Heart Rate: 97 Resp: 20 SpO2: 95 % O2 Device: None (Room air)  Pt c/o abdominal bloating and pain 6/10, pain decreased with PO dilaudid.  IV access infilatrated this evening, unable to flush or get blood return.  Attempts made by ED and OB staff with ultrasound without success.  Orders obtained for PICCSTAT.  Pt aware and in agreement with plan.  Continuing to monitor.

## 2018-04-19 LAB — POTASSIUM SERPL-SCNC: 4.6 MMOL/L (ref 3.4–5.3)

## 2018-04-19 PROCEDURE — 36415 COLL VENOUS BLD VENIPUNCTURE: CPT | Performed by: PEDIATRICS

## 2018-04-19 PROCEDURE — 99207 ZZC CDG-MDM COMPONENT: MEETS LOW - DOWN CODED: CPT | Performed by: PEDIATRICS

## 2018-04-19 PROCEDURE — 25000128 H RX IP 250 OP 636: Performed by: INTERNAL MEDICINE

## 2018-04-19 PROCEDURE — 99232 SBSQ HOSP IP/OBS MODERATE 35: CPT | Performed by: PEDIATRICS

## 2018-04-19 PROCEDURE — A9270 NON-COVERED ITEM OR SERVICE: HCPCS | Mod: GY | Performed by: INTERNAL MEDICINE

## 2018-04-19 PROCEDURE — 25000128 H RX IP 250 OP 636: Performed by: PEDIATRICS

## 2018-04-19 PROCEDURE — 84132 ASSAY OF SERUM POTASSIUM: CPT | Performed by: PEDIATRICS

## 2018-04-19 PROCEDURE — 25800025 ZZH RX 258: Performed by: PEDIATRICS

## 2018-04-19 PROCEDURE — 12000000 ZZH R&B MED SURG/OB

## 2018-04-19 PROCEDURE — 25000132 ZZH RX MED GY IP 250 OP 250 PS 637: Mod: GY | Performed by: INTERNAL MEDICINE

## 2018-04-19 RX ORDER — PANTOPRAZOLE SODIUM 40 MG/1
40 TABLET, DELAYED RELEASE ORAL EVERY MORNING
Status: DISCONTINUED | OUTPATIENT
Start: 2018-04-19 | End: 2018-04-22 | Stop reason: HOSPADM

## 2018-04-19 RX ADMIN — HYDROMORPHONE HYDROCHLORIDE 2 MG: 2 TABLET ORAL at 06:46

## 2018-04-19 RX ADMIN — TAZOBACTAM SODIUM AND PIPERACILLIN SODIUM 3.38 G: 375; 3 INJECTION, SOLUTION INTRAVENOUS at 20:35

## 2018-04-19 RX ADMIN — HYDROMORPHONE HYDROCHLORIDE 2 MG: 2 TABLET ORAL at 03:52

## 2018-04-19 RX ADMIN — DIPHENHYDRAMINE HYDROCHLORIDE 50 MG: 50 INJECTION, SOLUTION INTRAMUSCULAR; INTRAVENOUS at 22:16

## 2018-04-19 RX ADMIN — DULOXETINE HYDROCHLORIDE 60 MG: 30 CAPSULE, DELAYED RELEASE PELLETS ORAL at 09:22

## 2018-04-19 RX ADMIN — HYDROMORPHONE HYDROCHLORIDE 2 MG: 2 TABLET ORAL at 15:48

## 2018-04-19 RX ADMIN — HYDROMORPHONE HYDROCHLORIDE 2 MG: 2 TABLET ORAL at 12:37

## 2018-04-19 RX ADMIN — DIPHENHYDRAMINE HYDROCHLORIDE 50 MG: 50 INJECTION, SOLUTION INTRAMUSCULAR; INTRAVENOUS at 09:22

## 2018-04-19 RX ADMIN — POTASSIUM CHLORIDE, DEXTROSE MONOHYDRATE AND SODIUM CHLORIDE: 150; 5; 450 INJECTION, SOLUTION INTRAVENOUS at 19:18

## 2018-04-19 RX ADMIN — HYDROMORPHONE HYDROCHLORIDE 2 MG: 2 TABLET ORAL at 09:43

## 2018-04-19 RX ADMIN — TAZOBACTAM SODIUM AND PIPERACILLIN SODIUM 3.38 G: 375; 3 INJECTION, SOLUTION INTRAVENOUS at 14:09

## 2018-04-19 RX ADMIN — TAZOBACTAM SODIUM AND PIPERACILLIN SODIUM 3.38 G: 375; 3 INJECTION, SOLUTION INTRAVENOUS at 00:35

## 2018-04-19 RX ADMIN — HYDROMORPHONE HYDROCHLORIDE 2 MG: 2 TABLET ORAL at 00:35

## 2018-04-19 RX ADMIN — DIPHENHYDRAMINE HYDROCHLORIDE 50 MG: 50 INJECTION, SOLUTION INTRAMUSCULAR; INTRAVENOUS at 02:53

## 2018-04-19 RX ADMIN — DIPHENHYDRAMINE HYDROCHLORIDE 50 MG: 50 INJECTION, SOLUTION INTRAMUSCULAR; INTRAVENOUS at 15:48

## 2018-04-19 RX ADMIN — CLONIDINE HYDROCHLORIDE 0.4 MG: 0.1 TABLET ORAL at 19:18

## 2018-04-19 RX ADMIN — HYDROMORPHONE HYDROCHLORIDE 2 MG: 2 TABLET ORAL at 19:18

## 2018-04-19 RX ADMIN — HYDROMORPHONE HYDROCHLORIDE 2 MG: 2 TABLET ORAL at 22:17

## 2018-04-19 RX ADMIN — TAZOBACTAM SODIUM AND PIPERACILLIN SODIUM 3.38 G: 375; 3 INJECTION, SOLUTION INTRAVENOUS at 06:25

## 2018-04-19 RX ADMIN — PANTOPRAZOLE SODIUM 40 MG: 40 TABLET, DELAYED RELEASE ORAL at 18:27

## 2018-04-19 RX ADMIN — POTASSIUM CHLORIDE, DEXTROSE MONOHYDRATE AND SODIUM CHLORIDE: 150; 5; 450 INJECTION, SOLUTION INTRAVENOUS at 06:29

## 2018-04-19 ASSESSMENT — PAIN DESCRIPTION - DESCRIPTORS
DESCRIPTORS: ACHING;CRAMPING;SHARP

## 2018-04-19 NOTE — PROGRESS NOTES
[facility]    Hospitalist Progress Note    Date of Service (when I saw the patient): 04/19/2018    Assessment & Plan   Doreen Peralta is a 37 year old female who was admitted on 4/16/2018 with concern for sepsis with one blood culture growing gram-negative rods on preliminary results.  Signs of sepsis are subsiding with empiric antibiotic therapy.  Left lower abdominal pain continues which could be the source for gram-negative bacteremia although no acute radiographic abnormalities were apparent on abdominal CT.  She again has had positive testing for Clostridium difficile toxin PCR, but her GI specialist has advised against empiric treatment of Clostridium difficile pending results of fecal calprotectin level which continues to be pending today.  Ongoing nausea limits oral intake although she is tolerating some oral intake.    Principal Problem:    Gram negative septicemia (H)  Active Problems:    Nausea and vomiting    Hypokalemia    Iron deficiency anemia    Stool toxin PCR positive for Clostridium difficile on 4/4/18    Intermittent asthma    S/P partial resection of colon    Munchausen syndrome - previously suspected    Migraine    Chronic abdominal pain    History of deep venous thrombosis    Essential hypertension    Tobacco abuse    History of bacteremia - recurrent      Pain Plan: # Pain Assessment:  Current Pain Score 4/19/2018   Patient currently in pain? yes   Pain score (0-10) 4   Pain location Abdomen   Pain descriptors -   CPOT pain score -   - Doreen is experiencing pain due to left lower abdominal pain. Pain management was discussed and the plan was created in a collaborative fashion.  Doreen's response to the current recommendations: engaged  - Opioid regimen: IV Dilaudid as needed  - Response to opioid medications: Reduction of symptoms temporarily  - Bowel regimen: Not needed  - Pharmacologic adjuvants: IV Benadryl        DVT Prophylaxis: Low Risk/Ambulatory with  no VTE prophylaxis indicated  Code Status: Full Code    Disposition: Expected discharge in 1-2 days once results of blood culture and fecal calprotectin testing are available.    Gilmer Sullivanjoe    Interval History   She generally feels better today now that fever has subsided.  However, her left lower quadrant abdominal pain persists and remains severe.  She continues to regularly require doses of IV Dilaudid to manage the pain and is finding that IV Benadryl also seems to help.  She continues to be nauseated although has not been vomiting.  She is tolerating liquids and was able to tolerate some mashed potatoes today.  She continues to have watery diarrhea that is worse than her usual baseline.  There is not been any blood in her stools.  She has been without fever for over 24 hours.  She remains hemodynamically stable with normal respiratory status.  She is voiding well.  She wants to go home.    -Data reviewed today: I reviewed all new labs and imaging results over the last 24 hours. I personally reviewed no images or EKG's today.    Physical Exam   Temp: 99.3  F (37.4  C) Temp src: Oral BP: 138/90 Pulse: 77 Heart Rate: 68 Resp: 16 SpO2: 96 % O2 Device: None (Room air)    Vitals:    04/16/18 2300 04/18/18 0333 04/19/18 0626   Weight: 82.8 kg (182 lb 8 oz) 83.1 kg (183 lb 3.2 oz) 84.2 kg (185 lb 10 oz)     Vital Signs with Ranges  Temp:  [96.8  F (36  C)-99.3  F (37.4  C)] 99.3  F (37.4  C)  Pulse:  [64-80] 77  Heart Rate:  [64-72] 68  Resp:  [16-20] 16  BP: (117-141)/(75-92) 138/90  SpO2:  [95 %-100 %] 96 %  I/O last 3 completed shifts:  In: 3807.75 [P.O.:1540; I.V.:2267.75]  Out: 2850 [Urine:2700; Stool:150]    Constitutional: No acute distress resting in bed  Cardiovascular: Regular rate and rhythm, good radial pulse with brisk capillary refill  GI: Hypoactive bowel sounds, nondistended abdomen, soft abdomen, tenderness present in the left lower quadrant without involuntary guarding or rebound    Medications      dextrose 5% and 0.45% NaCl + KCl 20 mEq/L 75 mL/hr at 04/19/18 0629       cloNIDine  0.4 mg Oral QPM     DULoxetine  60 mg Oral Daily     piperacillin-tazobactam  3.375 g Intravenous Q6H       Data   Data reviewed today:  I personally reviewed no images or EKG's today.    Recent Labs  Lab 04/19/18  0608 04/18/18  1834 04/18/18  0615 04/17/18  2154 04/17/18  2041 04/17/18  1506 04/17/18  0607 04/16/18  1600   WBC  --   --   --   --   --   --  6.6 9.3   HGB  --   --  8.4* 8.4*  --  8.1* 7.8* 9.8*   MCV  --   --   --   --   --   --  80 79   PLT  --   --   --   --   --   --  258 335   NA  --   --   --   --   --   --  141 137   POTASSIUM 4.6 3.7  --   --  3.9  --  3.3* 3.6   CHLORIDE  --   --   --   --   --   --  109 104   CO2  --   --   --   --   --   --  23 25   BUN  --   --   --   --   --   --  4* 4*   CR  --   --   --   --   --   --  0.73 0.68   ANIONGAP  --   --   --   --   --   --  9 8   TARA  --   --   --   --   --   --  7.6* 8.4*   GLC  --   --   --   --   --   --  94 101*   ALBUMIN  --   --   --   --   --   --   --  3.1*   PROTTOTAL  --   --   --   --   --   --   --  7.8   BILITOTAL  --   --   --   --   --   --   --  0.6   ALKPHOS  --   --   --   --   --   --   --  151*   ALT  --   --   --   --   --   --   --  27   AST  --   --   --   --   --   --   --  22     One blood culture from admission is growing gram-negative rods on preliminary results, other blood culture from admission remains negative to date  Fecal calprotectin level continues to be pending

## 2018-04-19 NOTE — PLAN OF CARE
Problem: Patient Care Overview  Goal: Plan of Care/Patient Progress Review  Outcome: Improving  VSS, up indep, adequate intake of water, but very poor intake of anything else, adequate UO, max temp of 99.4, Dilaudid 2 mg given Q 3H-abdominal pain has been from 4-7 T/O shift, Benadryl somewhat helpful with nausea.

## 2018-04-19 NOTE — PLAN OF CARE
Problem: Patient Care Overview  Goal: Plan of Care/Patient Progress Review  Outcome: Improving  Vital signs stable on RA.  Afebrile.  A&O x4.  Pt tearful and weapy upon request of IV Benadryl and PO dilaudid for control of abd pain and nausea.  Received dilaudid: 2030, 0030, 0400 and Benadryl requested/admin- 2030 & 0300.  Lung sounds clear.  Bowel sounds normoactive.  Up independently, Ambulated to/from BR and in halls x2.  Voiding adequately.  SCDs refused.  Skin is intact.  Pt continues to report loose/watery stools x1 this shift.  IVFs infusing, site asymptomatic.

## 2018-04-20 LAB
CALPROTECTIN STL-MCNT: 44.9 MG/KG (ref 0–49.9)
MAGNESIUM SERPL-MCNC: 2.1 MG/DL (ref 1.6–2.3)
POTASSIUM SERPL-SCNC: 4.2 MMOL/L (ref 3.4–5.3)

## 2018-04-20 PROCEDURE — 36416 COLLJ CAPILLARY BLOOD SPEC: CPT | Performed by: PEDIATRICS

## 2018-04-20 PROCEDURE — A9270 NON-COVERED ITEM OR SERVICE: HCPCS | Mod: GY | Performed by: PEDIATRICS

## 2018-04-20 PROCEDURE — 25000128 H RX IP 250 OP 636: Performed by: PEDIATRICS

## 2018-04-20 PROCEDURE — 84132 ASSAY OF SERUM POTASSIUM: CPT | Performed by: PEDIATRICS

## 2018-04-20 PROCEDURE — 12000000 ZZH R&B MED SURG/OB

## 2018-04-20 PROCEDURE — 25000128 H RX IP 250 OP 636: Performed by: INTERNAL MEDICINE

## 2018-04-20 PROCEDURE — 25000132 ZZH RX MED GY IP 250 OP 250 PS 637: Mod: GY | Performed by: INTERNAL MEDICINE

## 2018-04-20 PROCEDURE — 83735 ASSAY OF MAGNESIUM: CPT | Performed by: PEDIATRICS

## 2018-04-20 PROCEDURE — 25000125 ZZHC RX 250: Performed by: PEDIATRICS

## 2018-04-20 PROCEDURE — 99232 SBSQ HOSP IP/OBS MODERATE 35: CPT | Performed by: PEDIATRICS

## 2018-04-20 PROCEDURE — 25000132 ZZH RX MED GY IP 250 OP 250 PS 637: Performed by: PEDIATRICS

## 2018-04-20 PROCEDURE — 25800025 ZZH RX 258: Performed by: PEDIATRICS

## 2018-04-20 PROCEDURE — A9270 NON-COVERED ITEM OR SERVICE: HCPCS | Mod: GY | Performed by: INTERNAL MEDICINE

## 2018-04-20 RX ORDER — SULFAMETHOXAZOLE/TRIMETHOPRIM 800-160 MG
1 TABLET ORAL 2 TIMES DAILY
Status: DISCONTINUED | OUTPATIENT
Start: 2018-04-20 | End: 2018-04-22 | Stop reason: HOSPADM

## 2018-04-20 RX ADMIN — VANCOMYCIN HYDROCHLORIDE 250 MG: 1 INJECTION, POWDER, LYOPHILIZED, FOR SOLUTION INTRAVENOUS at 21:56

## 2018-04-20 RX ADMIN — HYDROMORPHONE HYDROCHLORIDE 2 MG: 2 TABLET ORAL at 10:59

## 2018-04-20 RX ADMIN — VANCOMYCIN HYDROCHLORIDE 250 MG: 1 INJECTION, POWDER, LYOPHILIZED, FOR SOLUTION INTRAVENOUS at 17:12

## 2018-04-20 RX ADMIN — PANTOPRAZOLE SODIUM 40 MG: 40 TABLET, DELAYED RELEASE ORAL at 08:03

## 2018-04-20 RX ADMIN — DULOXETINE HYDROCHLORIDE 60 MG: 30 CAPSULE, DELAYED RELEASE PELLETS ORAL at 08:03

## 2018-04-20 RX ADMIN — CLONIDINE HYDROCHLORIDE 0.4 MG: 0.1 TABLET ORAL at 19:06

## 2018-04-20 RX ADMIN — POTASSIUM CHLORIDE, DEXTROSE MONOHYDRATE AND SODIUM CHLORIDE: 150; 5; 450 INJECTION, SOLUTION INTRAVENOUS at 09:48

## 2018-04-20 RX ADMIN — HYDROMORPHONE HYDROCHLORIDE 2 MG: 2 TABLET ORAL at 16:03

## 2018-04-20 RX ADMIN — HYDROMORPHONE HYDROCHLORIDE 2 MG: 2 TABLET ORAL at 04:58

## 2018-04-20 RX ADMIN — SULFAMETHOXAZOLE AND TRIMETHOPRIM 1 TABLET: 800; 160 TABLET ORAL at 21:56

## 2018-04-20 RX ADMIN — DIPHENHYDRAMINE HYDROCHLORIDE 50 MG: 50 INJECTION, SOLUTION INTRAMUSCULAR; INTRAVENOUS at 23:51

## 2018-04-20 RX ADMIN — AMOXICILLIN AND CLAVULANATE POTASSIUM 1 TABLET: 875; 125 TABLET, FILM COATED ORAL at 19:06

## 2018-04-20 RX ADMIN — TAZOBACTAM SODIUM AND PIPERACILLIN SODIUM 3.38 G: 375; 3 INJECTION, SOLUTION INTRAVENOUS at 08:03

## 2018-04-20 RX ADMIN — HYDROMORPHONE HYDROCHLORIDE 2 MG: 2 TABLET ORAL at 08:03

## 2018-04-20 RX ADMIN — DIPHENHYDRAMINE HYDROCHLORIDE 50 MG: 50 INJECTION, SOLUTION INTRAMUSCULAR; INTRAVENOUS at 10:58

## 2018-04-20 RX ADMIN — DIPHENHYDRAMINE HYDROCHLORIDE 50 MG: 50 INJECTION, SOLUTION INTRAMUSCULAR; INTRAVENOUS at 17:11

## 2018-04-20 RX ADMIN — DIPHENHYDRAMINE HYDROCHLORIDE 50 MG: 50 INJECTION, SOLUTION INTRAMUSCULAR; INTRAVENOUS at 04:58

## 2018-04-20 RX ADMIN — HYDROMORPHONE HYDROCHLORIDE 2 MG: 2 TABLET ORAL at 22:00

## 2018-04-20 RX ADMIN — HYDROMORPHONE HYDROCHLORIDE 2 MG: 2 TABLET ORAL at 13:04

## 2018-04-20 RX ADMIN — TAZOBACTAM SODIUM AND PIPERACILLIN SODIUM 3.38 G: 375; 3 INJECTION, SOLUTION INTRAVENOUS at 01:27

## 2018-04-20 RX ADMIN — HYDROMORPHONE HYDROCHLORIDE 2 MG: 2 TABLET ORAL at 01:27

## 2018-04-20 RX ADMIN — HYDROMORPHONE HYDROCHLORIDE 2 MG: 2 TABLET ORAL at 19:06

## 2018-04-20 ASSESSMENT — PAIN DESCRIPTION - DESCRIPTORS: DESCRIPTORS: SHARP

## 2018-04-20 NOTE — PROGRESS NOTES
[facility]    Hospitalist Progress Note    Date of Service (when I saw the patient): 04/20/2018    Assessment & Plan   Doreen Peralta is a 37 year old female who was admitted on 4/16/2018 With concern for sepsis.  One blood culture is growing unusual organism of Ochrobactrum as well as a second gram-negative that has not yet been identified.  Source for infection is not clear although colon seems most likely.  Clostridium difficile toxin test is positive and fecal calprotectin level has increased as compared with about 1-2 weeks ago and is nearly elevated now raising concern for possible active Clostridium difficile infection.  Electrolyte disturbances have subsided.    Principal Problem:    Gram negative septicemia (H) - Ochrobactrum anthropi  Active Problems:    Nausea and vomiting    Hypokalemia    Iron deficiency anemia    Stool toxin PCR positive for Clostridium difficile on 4/17/18    Intermittent asthma    S/P partial resection of colon    Munchausen syndrome - previously suspected    Migraine    Chronic abdominal pain    History of deep venous thrombosis    Essential hypertension    Tobacco abuse    History of bacteremia - recurrent    Case discussed by telephone with gastroenterology at [facility].  Because of ongoing symptoms of left lower abdominal pain and diarrhea as well as persistently positive Clostridium difficile toxin test and rising fecal calprotectin level, they advised treatment of possible Clostridia difficile infection with oral vancomycin with which the patient was agreeable.  Because of concern for Clostridium difficile colonization and possible infection, the patient was advised not to ambulate throughout the hospital in accordance with isolation recommendations.    Patient expresses strong preference to discharge today.  Advised against hospital discharge until we have more information regarding her blood culture results with which the patient was agreeable.  We  will transition today from IV Zosyn to oral Augmentin empirically pending culture results and also add Bactrim DS empirically which normally provides adequate coverage for Ochrobactrum.  If she deteriorates, may need to resume IV Zosyn until final blood culture results are available.      Discontinue IV fluids and advance diet as tolerated      Pain Plan: # Pain Assessment:  Current Pain Score 4/20/2018   Patient currently in pain? -   Pain score (0-10) 5   Pain location Abdomen   Pain descriptors -   CPOT pain score -   - Doreen is experiencing pain due to abdominal pain. Pain management was discussed and the plan was created in a collaborative fashion.  Doreen's response to the current recommendations: engaged  - Opioid regimen: Oral Dilaudid as needed  - Response to opioid medications: Reduction of symptoms   - Bowel regimen: not needed        DVT Prophylaxis: Pneumatic Compression Devices  Code Status: Full Code    Disposition: Expected discharge in 1-2 days once additional blood culture results are available and assuming patient continues to improve clinically.    Gilmer Lazcano    Interval History   She would really like to go home today.  She feels about the same today overall.  Her abdominal pain continues but has been adequately controlled with oral Dilaudid.  She denies any vomiting although continues to be nauseated which limits her oral intake, but she has been tolerating fluids.  She has continued to be afebrile and hemodynamically stable.  Oxygenation is normal.  She is voiding well.  She says she continues to have diarrhea although the frequency is decreasing, but she had a large volume diarrheal stool today.  She has not seen blood in her stool.    -Data reviewed today: I reviewed all new labs and imaging results over the last 24 hours. I personally reviewed no images or EKG's today.    Physical Exam   Temp: 97.8  F (36.6  C) Temp src: Oral BP: (!) 151/100 Pulse: 72 Heart Rate: 66 Resp: 16 SpO2: 96  % O2 Device: None (Room air)    Vitals:    04/18/18 0333 04/19/18 0626 04/20/18 0500   Weight: 83.1 kg (183 lb 3.2 oz) 84.2 kg (185 lb 10 oz) 83.6 kg (184 lb 4.9 oz)     Vital Signs with Ranges  Temp:  [97.3  F (36.3  C)-99.2  F (37.3  C)] 97.8  F (36.6  C)  Pulse:  [63-88] 72  Heart Rate:  [66] 66  Resp:  [16-18] 16  BP: (117-151)/() 151/100  SpO2:  [96 %-99 %] 96 %  I/O last 3 completed shifts:  In: 3313.75 [P.O.:2490; I.V.:823.75]  Out: 3100 [Urine:3100]    Constitutional: Tearful when discussing options for evaluation and treatment and when advised not to discharge today  Cardiovascular: Regular rate and rhythm, good radial pulse, normal capillary refill  GI: Nondistended abdomen, hypoactive bowel sounds, soft, tender in the left lower abdomen    Medications     dextrose 5% and 0.45% NaCl + KCl 20 mEq/L 75 mL/hr at 04/20/18 0948       cloNIDine  0.4 mg Oral QPM     DULoxetine  60 mg Oral Daily     pantoprazole  40 mg Oral QAM     piperacillin-tazobactam  3.375 g Intravenous Q6H       Data   Data reviewed today:  I personally reviewed no images or EKG's today.    Recent Labs  Lab 04/20/18  0600 04/19/18  0608 04/18/18  1834 04/18/18  0615 04/17/18  2154  04/17/18  1506 04/17/18  0607 04/16/18  1600   WBC  --   --   --   --   --   --   --  6.6 9.3   HGB  --   --   --  8.4* 8.4*  --  8.1* 7.8* 9.8*   MCV  --   --   --   --   --   --   --  80 79   PLT  --   --   --   --   --   --   --  258 335   NA  --   --   --   --   --   --   --  141 137   POTASSIUM 4.2 4.6 3.7  --   --   < >  --  3.3* 3.6   CHLORIDE  --   --   --   --   --   --   --  109 104   CO2  --   --   --   --   --   --   --  23 25   BUN  --   --   --   --   --   --   --  4* 4*   CR  --   --   --   --   --   --   --  0.73 0.68   ANIONGAP  --   --   --   --   --   --   --  9 8   TARA  --   --   --   --   --   --   --  7.6* 8.4*   GLC  --   --   --   --   --   --   --  94 101*   ALBUMIN  --   --   --   --   --   --   --   --  3.1*   PROTTOTAL  --   --    --   --   --   --   --   --  7.8   BILITOTAL  --   --   --   --   --   --   --   --  0.6   ALKPHOS  --   --   --   --   --   --   --   --  151*   ALT  --   --   --   --   --   --   --   --  27   AST  --   --   --   --   --   --   --   --  22   < > = values in this interval not displayed.    One blood culture is growing Ochrobactrum and an additional gram-negative layton that has not yet been characterized with antibiotics susceptibility test results that are pending  Second blood culture remains negative to date  Fecal calprotectin level was 44.9 at the upper limit of the normal range and significantly elevated as compared with 1-2 weeks ago when it was less than 27

## 2018-04-20 NOTE — PLAN OF CARE
Problem: Patient Care Overview  Goal: Plan of Care/Patient Progress Review  Outcome: No Change  VSS on room air. Afebrile. A&Ox4. Independent in the room. Adequate fluid intake and output. Pt. Verbalized no loose stools this shift. Pt. Has severe abdominal pain. Giving oral Dilaudid every 3 hours for pain and Benadryl every 6 hours. New IV was placed in left upper arm. Started Protonix tonight.

## 2018-04-20 NOTE — PLAN OF CARE
Problem: Patient Care Overview  Goal: Plan of Care/Patient Progress Review  Outcome: Improving  VSS. C/o abdominal pain 4-6/10. Requesting 2mg po Dilaudid every 3 hours and IV Benadryl every 6 hours for nausea. Good UOP. x1 soft/pudding consist. bm this shift. IV abx d/c'd, switched to po with hopes of discharge to home tomorrow, Sat. Continue to monitor.

## 2018-04-20 NOTE — PLAN OF CARE
"Problem: Patient Care Overview  Goal: Plan of Care/Patient Progress Review  Outcome: Improving  Vital signs stable on RA.  Afebrile.  A&O x4.  Lung sounds clear.  Bowel sounds normoactive.  Up independently, Ambulated to/from BR.  Voiding adequately.  SCDs refused.  Skin is intact with bruising and scaring.  Pt reports loose stools have slowed down and \"overall feeling better as far as the sepsis goes, but abd pain and nausea continue to be a problem\", requiring PO dilaudid every 3hrs: 2215, 0130, & 0500 and IV Benadryl every 6hrs: 2215 & 0500.  IVFs infusing, site asymptomatic.  Weight-83.6, down about 1kg since yesterday, per standing scale weights.            "

## 2018-04-21 ENCOUNTER — APPOINTMENT (OUTPATIENT)
Dept: ULTRASOUND IMAGING | Facility: CLINIC | Age: 38
DRG: 871 | End: 2018-04-21
Attending: PEDIATRICS
Payer: MEDICARE

## 2018-04-21 PROBLEM — N17.9 ACUTE RENAL FAILURE (H): Status: ACTIVE | Noted: 2018-04-21

## 2018-04-21 LAB
ALBUMIN UR-MCNC: NEGATIVE MG/DL
ANION GAP SERPL CALCULATED.3IONS-SCNC: 12 MMOL/L (ref 3–14)
ANION GAP SERPL CALCULATED.3IONS-SCNC: 9 MMOL/L (ref 3–14)
APPEARANCE UR: CLEAR
BACTERIA #/AREA URNS HPF: ABNORMAL /HPF
BILIRUB UR QL STRIP: NEGATIVE
BUN SERPL-MCNC: 6 MG/DL (ref 7–30)
BUN SERPL-MCNC: 8 MG/DL (ref 7–30)
CALCIUM SERPL-MCNC: 8.2 MG/DL (ref 8.5–10.1)
CALCIUM SERPL-MCNC: 8.3 MG/DL (ref 8.5–10.1)
CHLORIDE SERPL-SCNC: 105 MMOL/L (ref 94–109)
CHLORIDE SERPL-SCNC: 106 MMOL/L (ref 94–109)
CO2 SERPL-SCNC: 23 MMOL/L (ref 20–32)
CO2 SERPL-SCNC: 25 MMOL/L (ref 20–32)
COLOR UR AUTO: ABNORMAL
CREAT SERPL-MCNC: 1.75 MG/DL (ref 0.52–1.04)
CREAT SERPL-MCNC: 1.87 MG/DL (ref 0.52–1.04)
CREAT UR-MCNC: 25 MG/DL
FRACT EXCRET NA UR+SERPL-RTO: 3.9 %
GFR SERPL CREATININE-BSD FRML MDRD: 30 ML/MIN/1.7M2
GFR SERPL CREATININE-BSD FRML MDRD: 33 ML/MIN/1.7M2
GLUCOSE SERPL-MCNC: 84 MG/DL (ref 70–99)
GLUCOSE SERPL-MCNC: 90 MG/DL (ref 70–99)
GLUCOSE UR STRIP-MCNC: NEGATIVE MG/DL
HGB BLD-MCNC: 7.6 G/DL (ref 11.7–15.7)
HGB UR QL STRIP: NEGATIVE
KETONES UR STRIP-MCNC: NEGATIVE MG/DL
LEUKOCYTE ESTERASE UR QL STRIP: NEGATIVE
NITRATE UR QL: NEGATIVE
PH UR STRIP: 7 PH (ref 5–7)
POTASSIUM SERPL-SCNC: 3.7 MMOL/L (ref 3.4–5.3)
POTASSIUM SERPL-SCNC: 4 MMOL/L (ref 3.4–5.3)
RBC #/AREA URNS AUTO: <1 /HPF (ref 0–2)
SODIUM SERPL-SCNC: 140 MMOL/L (ref 133–144)
SODIUM SERPL-SCNC: 140 MMOL/L (ref 133–144)
SODIUM UR-SCNC: 76 MMOL/L
SOURCE: ABNORMAL
SP GR UR STRIP: 1 (ref 1–1.03)
SQUAMOUS #/AREA URNS AUTO: 4 /HPF (ref 0–1)
UROBILINOGEN UR STRIP-MCNC: 0 MG/DL (ref 0–2)
WBC #/AREA URNS AUTO: 1 /HPF (ref 0–5)

## 2018-04-21 PROCEDURE — 85018 HEMOGLOBIN: CPT | Performed by: PEDIATRICS

## 2018-04-21 PROCEDURE — 99233 SBSQ HOSP IP/OBS HIGH 50: CPT | Performed by: PEDIATRICS

## 2018-04-21 PROCEDURE — A9270 NON-COVERED ITEM OR SERVICE: HCPCS | Mod: GY | Performed by: INTERNAL MEDICINE

## 2018-04-21 PROCEDURE — 12000000 ZZH R&B MED SURG/OB

## 2018-04-21 PROCEDURE — 25000128 H RX IP 250 OP 636: Performed by: PEDIATRICS

## 2018-04-21 PROCEDURE — 81001 URINALYSIS AUTO W/SCOPE: CPT | Performed by: PEDIATRICS

## 2018-04-21 PROCEDURE — A9270 NON-COVERED ITEM OR SERVICE: HCPCS | Mod: GY | Performed by: PEDIATRICS

## 2018-04-21 PROCEDURE — 76770 US EXAM ABDO BACK WALL COMP: CPT

## 2018-04-21 PROCEDURE — 84300 ASSAY OF URINE SODIUM: CPT | Performed by: PEDIATRICS

## 2018-04-21 PROCEDURE — 82570 ASSAY OF URINE CREATININE: CPT | Performed by: PEDIATRICS

## 2018-04-21 PROCEDURE — 25000125 ZZHC RX 250: Performed by: PEDIATRICS

## 2018-04-21 PROCEDURE — 25000132 ZZH RX MED GY IP 250 OP 250 PS 637: Mod: GY | Performed by: INTERNAL MEDICINE

## 2018-04-21 PROCEDURE — 80048 BASIC METABOLIC PNL TOTAL CA: CPT | Performed by: PEDIATRICS

## 2018-04-21 PROCEDURE — 25000132 ZZH RX MED GY IP 250 OP 250 PS 637: Performed by: PEDIATRICS

## 2018-04-21 PROCEDURE — 36416 COLLJ CAPILLARY BLOOD SPEC: CPT | Performed by: PEDIATRICS

## 2018-04-21 RX ADMIN — DIPHENHYDRAMINE HYDROCHLORIDE 50 MG: 50 INJECTION, SOLUTION INTRAMUSCULAR; INTRAVENOUS at 17:56

## 2018-04-21 RX ADMIN — HYDROMORPHONE HYDROCHLORIDE 2 MG: 2 TABLET ORAL at 21:09

## 2018-04-21 RX ADMIN — HYDROMORPHONE HYDROCHLORIDE 2 MG: 2 TABLET ORAL at 08:25

## 2018-04-21 RX ADMIN — DIPHENHYDRAMINE HYDROCHLORIDE 50 MG: 50 INJECTION, SOLUTION INTRAMUSCULAR; INTRAVENOUS at 05:45

## 2018-04-21 RX ADMIN — SULFAMETHOXAZOLE AND TRIMETHOPRIM 1 TABLET: 800; 160 TABLET ORAL at 08:25

## 2018-04-21 RX ADMIN — HYDROMORPHONE HYDROCHLORIDE 2 MG: 2 TABLET ORAL at 11:52

## 2018-04-21 RX ADMIN — VANCOMYCIN HYDROCHLORIDE 250 MG: 1 INJECTION, POWDER, LYOPHILIZED, FOR SOLUTION INTRAVENOUS at 08:38

## 2018-04-21 RX ADMIN — AMOXICILLIN AND CLAVULANATE POTASSIUM 1 TABLET: 875; 125 TABLET, FILM COATED ORAL at 08:26

## 2018-04-21 RX ADMIN — DULOXETINE HYDROCHLORIDE 60 MG: 30 CAPSULE, DELAYED RELEASE PELLETS ORAL at 08:26

## 2018-04-21 RX ADMIN — HYDROMORPHONE HYDROCHLORIDE 2 MG: 2 TABLET ORAL at 17:56

## 2018-04-21 RX ADMIN — FIDAXOMICIN 200 MG: 200 TABLET, FILM COATED ORAL at 21:10

## 2018-04-21 RX ADMIN — HYDROMORPHONE HYDROCHLORIDE 2 MG: 2 TABLET ORAL at 15:05

## 2018-04-21 RX ADMIN — HYDROMORPHONE HYDROCHLORIDE 2 MG: 2 TABLET ORAL at 23:52

## 2018-04-21 RX ADMIN — AMOXICILLIN AND CLAVULANATE POTASSIUM 1 TABLET: 875; 125 TABLET, FILM COATED ORAL at 21:10

## 2018-04-21 RX ADMIN — DIPHENHYDRAMINE HYDROCHLORIDE 50 MG: 50 INJECTION, SOLUTION INTRAMUSCULAR; INTRAVENOUS at 11:53

## 2018-04-21 RX ADMIN — VANCOMYCIN HYDROCHLORIDE 250 MG: 1 INJECTION, POWDER, LYOPHILIZED, FOR SOLUTION INTRAVENOUS at 13:21

## 2018-04-21 RX ADMIN — HYDROMORPHONE HYDROCHLORIDE 2 MG: 2 TABLET ORAL at 01:56

## 2018-04-21 RX ADMIN — HYDROMORPHONE HYDROCHLORIDE 2 MG: 2 TABLET ORAL at 05:12

## 2018-04-21 RX ADMIN — CLONIDINE HYDROCHLORIDE 0.4 MG: 0.1 TABLET ORAL at 21:10

## 2018-04-21 RX ADMIN — VANCOMYCIN HYDROCHLORIDE 250 MG: 1 INJECTION, POWDER, LYOPHILIZED, FOR SOLUTION INTRAVENOUS at 15:59

## 2018-04-21 RX ADMIN — PANTOPRAZOLE SODIUM 40 MG: 40 TABLET, DELAYED RELEASE ORAL at 08:25

## 2018-04-21 RX ADMIN — SULFAMETHOXAZOLE AND TRIMETHOPRIM 1 TABLET: 800; 160 TABLET ORAL at 21:10

## 2018-04-21 RX ADMIN — DIPHENHYDRAMINE HYDROCHLORIDE 50 MG: 50 INJECTION, SOLUTION INTRAMUSCULAR; INTRAVENOUS at 23:52

## 2018-04-21 ASSESSMENT — PAIN DESCRIPTION - DESCRIPTORS: DESCRIPTORS: ACHING

## 2018-04-21 NOTE — PROGRESS NOTES
[facility]    Hospitalist Progress Note    Date of Service (when I saw the patient): 04/21/2018    Assessment & Plan   Doreen Peralta is a 37 year old female who was admitted on 4/16/2018 with concern for sepsis, and she subsequently has grown 3 strains of gram-negative rods including Ochrobactrum out of 1 blood culture although other blood culture remains negative.  This clinical presentation, positive blood culture, and elevated pro-calcitonin level are suspicious for gram-negative sepsis.  Signs of septicemia are improving, but culture results continue to be pending.  Today, new acute renal failure is identified with elevated fractional excretion of sodium that is concerning for acute tubular necrosis.  She is neither hyperkalemic nor acidotic so far and has not been oliguric.  Cause for acute renal failure is uncertain with differential diagnosis that could include contrast nephropathy after IV contrast and/or nephrotoxicity associated with previous IV Zosyn and vancomycin therapy amongst other possibilities.  Although urinary obstruction was not noted on initial abdomen CT, she has had persistent left-sided abdominal pain, so worsening urinary obstruction is in the differential diagnosis.  There has been increasing concern for Clostridium difficile infection due to the combination of persistent diarrhea, acute on chronic left lower abdominal pain, and positive Clostridium difficile toxin testing with rising fecal calprotectin, so oral vancomycin was started yesterday.  Anemia persists and is worsening and she has known iron deficiency.  Active bleeding has not been evident so far.    Principal Problem:    Gram negative septicemia (H) - Ochrobactrum anthropi & 2 others  Active Problems:    Nausea and vomiting    Hypokalemia    Iron deficiency anemia    Stool toxin PCR positive for Clostridium difficile on 4/17/18    Acute renal failure (H)    Intermittent asthma    S/P partial  resection of colon    Munchausen syndrome - previously suspected    Migraine    Chronic abdominal pain    History of deep venous thrombosis    Essential hypertension    Tobacco abuse    History of bacteremia - recurrent    Urine sent for culture  Renal ultrasound ordered today to exclude obstruction  As she appears well-hydrated with excellent fluid intake, and because fractional excretion of sodium is elevated lowering suspicion for prerenal azotemia, will not start IV fluids Follow renal function closely over the next 24 hours to determine trend of changing renal function  For now, continue current antibiotics including Augmentin, Bactrim, and oral vancomycin.  According to Up-to-date, oral vancomycin is not well absorbed systemically but apparently has been associated with nephrotoxicity previously.  Therefore, if abnormal renal function persists, would switch from oral vancomycin to oral Dificid therapy for Clostridium difficile.  May adjust other antibiotic therapy depending upon antibiotics susceptibility test results of blood culture.  Follow hemoglobin, reconsider transfusion for hemoglobin less than 7-8 or if she develops active bleeding or hemodynamic instability      Pain Plan: # Pain Assessment:  Current Pain Score 4/21/2018   Patient currently in pain? yes   Pain score (0-10) -   Pain location Abdomen   Pain descriptors Sharp   CPOT pain score -   - Doreen is experiencing pain due to left lower abdominal pain. Pain management was discussed and the plan was created in a collaborative fashion.  Doreen's response to the current recommendations: engaged  - Opioid regimen: Oral Dilaudid  - Response to opioid medications: Reduction of symptoms temporarily  - Bowel regimen: not needed        DVT Prophylaxis: Low Risk/Ambulatory with no VTE prophylaxis indicated  Code Status: Full Code    Disposition: Expected discharge in 1-2 days once antibiotics susceptibility test results of blood culture are available and  "renal function has stabilized or started to improve.    Gilmer Lazcano    Interval History   She generally feels better since fever has subsided.  However, she continues to have constant pain in the left lower abdomen that is worse than usual.  There are times when the pain is severe.  She also has intermittent bloating of her abdomen.  She remains hemodynamically stable.  She is drinking fluids well but has worsening abdominal pain when she tries to eat solids.  She is voiding regularly without difficulty and denies any dysuria.  She continues to have loose stools although they have more consistency to them now and she describes them today as like \"mud\".  She has not seen blood in her stool.  Oxygenation is normal.    -Data reviewed today: I reviewed all new labs and imaging results over the last 24 hours. I personally reviewed no images or EKG's today.    Physical Exam   Temp: 97.1  F (36.2  C) Temp src: Oral BP: 138/89   Heart Rate: 85 Resp: 16 SpO2: 98 % O2 Device: None (Room air)    Vitals:    04/18/18 0333 04/19/18 0626 04/20/18 0500   Weight: 83.1 kg (183 lb 3.2 oz) 84.2 kg (185 lb 10 oz) 83.6 kg (184 lb 4.9 oz)     Vital Signs with Ranges  Temp:  [97.1  F (36.2  C)-97.4  F (36.3  C)] 97.1  F (36.2  C)  Heart Rate:  [65-85] 85  Resp:  [16-18] 16  BP: (125-138)/(83-89) 138/89  SpO2:  [96 %-100 %] 98 %  I/O last 3 completed shifts:  In: 2118 [P.O.:1290; I.V.:828]  Out: 2950 [Urine:2950]    Constitutional: No acute distress resting in bed  Cardiovascular: Regular rate and rhythm, normal radial pulse with brisk capillary refill  GI: Hypoactive bowel sounds, distended abdomen, soft, left-sided tenderness present without involuntary guarding    Medications       amoxicillin-clavulanate  1 tablet Oral Q12H ISABEL     cloNIDine  0.4 mg Oral QPM     DULoxetine  60 mg Oral Daily     pantoprazole  40 mg Oral QAM     sulfamethoxazole-trimethoprim  1 tablet Oral BID     vancomycin (SUGAR-FREE)  250 mg Oral 4x Daily "       Data   Data reviewed today:  I personally reviewed no images or EKG's today.    Recent Labs  Lab 04/21/18  0533 04/20/18  0600 04/19/18  0608  04/18/18  0615 04/17/18  2154  04/17/18  0607 04/16/18  1600   WBC  --   --   --   --   --   --   --  6.6 9.3   HGB 7.6*  --   --   --  8.4* 8.4*  < > 7.8* 9.8*   MCV  --   --   --   --   --   --   --  80 79   PLT  --   --   --   --   --   --   --  258 335     --   --   --   --   --   --  141 137   POTASSIUM 3.7 4.2 4.6  < >  --   --   < > 3.3* 3.6   CHLORIDE 106  --   --   --   --   --   --  109 104   CO2 25  --   --   --   --   --   --  23 25   BUN 6*  --   --   --   --   --   --  4* 4*   CR 1.75*  --   --   --   --   --   --  0.73 0.68   ANIONGAP 9  --   --   --   --   --   --  9 8   TARA 8.2*  --   --   --   --   --   --  7.6* 8.4*   GLC 84  --   --   --   --   --   --  94 101*   ALBUMIN  --   --   --   --   --   --   --   --  3.1*   PROTTOTAL  --   --   --   --   --   --   --   --  7.8   BILITOTAL  --   --   --   --   --   --   --   --  0.6   ALKPHOS  --   --   --   --   --   --   --   --  151*   ALT  --   --   --   --   --   --   --   --  27   AST  --   --   --   --   --   --   --   --  22   < > = values in this interval not displayed.    One blood culture from admission is growing not only Ochrobactrum but also 2 other gram negative rods.  Ochrobactrum was resistant in Vitro to Zosyn although is susceptible to Bactrim.  Antibiotic susceptibility testing for the other 2 strains of gram-negative rods is pending.  Other blood culture from admission remains negative to date  Urinalysis today is negative with specific gravity 1.003  Fractional excretion of sodium today is elevated at 3.9%

## 2018-04-21 NOTE — PLAN OF CARE
Problem: Patient Care Overview  Goal: Individualization & Mutuality  Outcome: No Change  Requesting po dilaudid q 3hr.

## 2018-04-21 NOTE — PLAN OF CARE
"Problem: Patient Care Overview  Goal: Plan of Care/Patient Progress Review  Outcome: Improving  Pt admitted with fever and sepsis. Blood cultures positive for ochrobactru. Pt is on vancomycin, augmentin and bactrim. Per notes the second blood culture is also positive for gram negative and is not yet identified. CDiff positive on enteric precautions. Pt getting PRN dilaudid for pain. IV benadryl for nausea. Per notes plan to discharge in 1-2 days once the 2nd blood culture organism can be identified. Blood pressure 125/84, pulse 72, temperature 97.4  F (36.3  C), temperature source Oral, resp. rate 18, height 1.676 m (5' 6\"), weight 83.6 kg (184 lb 4.9 oz), last menstrual period 04/12/2018, SpO2 96 %, not currently breastfeeding.           "

## 2018-04-21 NOTE — PROGRESS NOTES
"Pt Hgb this am 7.6. Will update MD. Blood pressure 125/84, pulse 72, temperature 97.4  F (36.3  C), temperature source Oral, resp. rate 18, height 1.676 m (5' 6\"), weight 83.6 kg (184 lb 4.9 oz), last menstrual period 04/12/2018, SpO2 96 %, not currently breastfeeding.      "

## 2018-04-21 NOTE — PLAN OF CARE
Problem: Patient Care Overview  Goal: Plan of Care/Patient Progress Review  Outcome: No Change  Pt up independently in room, intake adequate, states she doesn't have much of an appetite, fluid intake alright. VSS.  Afebrile, LS clear.  Awaiting further lab sensitives from [facility]. PO dilaudid  Requesting Q3 hr. Benadryl IV given q 6 for nausea.  PT c/o IV site is tender, ED will attempt to use ultrasound to place new IV, pt is a hard stick.

## 2018-04-21 NOTE — PLAN OF CARE
"Problem: Patient Care Overview  Goal: Plan of Care/Patient Progress Review  Outcome: No Change  Pt continues to c/o abdominal pain and nausea. Receiving po Dilaudid every 3 hours and IV Benadryl every 6 hours per pt request. Left shoulder IV site is red, has an area of hardness and is tender to the touch. Non of these symptoms have gotten worse since the start of the shift. IV flushes well and pt states \"I can feel my medicine working\". Agrees to leave it in for now as pt is an extremely hard stick. VSS. Crt: 1.87 this amy which is increased from 1.75 this morning. Good uop. States she has increased abd pain after eating. Renal US done this amy - see results. Continue to monitor.       "

## 2018-04-21 NOTE — PLAN OF CARE
"Problem: Patient Care Overview  Goal: Plan of Care/Patient Progress Review  Outcome: Improving  Temp: 97.3  F (36.3  C) Temp src: Oral BP: 136/83 Pulse: 72 Heart Rate: 80 Resp: 18 SpO2: 100 % O2 Device: None (Room air)   Pt c/o \"sharp\" abdominal pain 6/10.  Reports pain is controlled with ordered PO meds, requesting PO dilaudid q3h.  Resting comfortably in bed in between cares.  Up ad berny independent in room.  Bowel sounds active and audible in all quadrants.  Pt reports 3x loose BMs today.  Tolerating regular diet at lunch, but reported increased pain and took clear liquids for dinner.  Continuing to monitor.      "

## 2018-04-22 VITALS
HEIGHT: 66 IN | SYSTOLIC BLOOD PRESSURE: 139 MMHG | WEIGHT: 177.91 LBS | DIASTOLIC BLOOD PRESSURE: 86 MMHG | TEMPERATURE: 100.3 F | RESPIRATION RATE: 16 BRPM | BODY MASS INDEX: 28.59 KG/M2 | OXYGEN SATURATION: 99 % | HEART RATE: 96 BPM

## 2018-04-22 LAB
ABO + RH BLD: NORMAL
ABO + RH BLD: NORMAL
ANION GAP SERPL CALCULATED.3IONS-SCNC: 10 MMOL/L (ref 3–14)
BACTERIA SPEC CULT: ABNORMAL
BACTERIA SPEC CULT: NORMAL
BLD GP AB SCN SERPL QL: NORMAL
BLOOD BANK CMNT PATIENT-IMP: NORMAL
BUN SERPL-MCNC: 8 MG/DL (ref 7–30)
CALCIUM SERPL-MCNC: 9.1 MG/DL (ref 8.5–10.1)
CHLORIDE SERPL-SCNC: 100 MMOL/L (ref 94–109)
CO2 SERPL-SCNC: 25 MMOL/L (ref 20–32)
CREAT SERPL-MCNC: 1.83 MG/DL (ref 0.52–1.04)
GFR SERPL CREATININE-BSD FRML MDRD: 31 ML/MIN/1.7M2
GLUCOSE SERPL-MCNC: 85 MG/DL (ref 70–99)
HGB BLD-MCNC: 9.5 G/DL (ref 11.7–15.7)
POTASSIUM SERPL-SCNC: 4.2 MMOL/L (ref 3.4–5.3)
PROCALCITONIN SERPL-MCNC: 2.04 NG/ML
SODIUM SERPL-SCNC: 135 MMOL/L (ref 133–144)
SPECIMEN EXP DATE BLD: NORMAL
SPECIMEN SOURCE: ABNORMAL
SPECIMEN SOURCE: NORMAL

## 2018-04-22 PROCEDURE — 25000128 H RX IP 250 OP 636: Performed by: PEDIATRICS

## 2018-04-22 PROCEDURE — A9270 NON-COVERED ITEM OR SERVICE: HCPCS | Mod: GY | Performed by: PEDIATRICS

## 2018-04-22 PROCEDURE — 86900 BLOOD TYPING SEROLOGIC ABO: CPT | Performed by: PEDIATRICS

## 2018-04-22 PROCEDURE — 84145 PROCALCITONIN (PCT): CPT | Performed by: PEDIATRICS

## 2018-04-22 PROCEDURE — 85018 HEMOGLOBIN: CPT | Performed by: PEDIATRICS

## 2018-04-22 PROCEDURE — 80048 BASIC METABOLIC PNL TOTAL CA: CPT | Performed by: PEDIATRICS

## 2018-04-22 PROCEDURE — 25000132 ZZH RX MED GY IP 250 OP 250 PS 637: Performed by: INTERNAL MEDICINE

## 2018-04-22 PROCEDURE — A9270 NON-COVERED ITEM OR SERVICE: HCPCS | Mod: GY | Performed by: INTERNAL MEDICINE

## 2018-04-22 PROCEDURE — 99239 HOSP IP/OBS DSCHRG MGMT >30: CPT | Performed by: PEDIATRICS

## 2018-04-22 PROCEDURE — 86850 RBC ANTIBODY SCREEN: CPT | Performed by: PEDIATRICS

## 2018-04-22 PROCEDURE — 86901 BLOOD TYPING SEROLOGIC RH(D): CPT | Performed by: PEDIATRICS

## 2018-04-22 PROCEDURE — 36415 COLL VENOUS BLD VENIPUNCTURE: CPT | Performed by: PEDIATRICS

## 2018-04-22 PROCEDURE — 25000132 ZZH RX MED GY IP 250 OP 250 PS 637: Performed by: PEDIATRICS

## 2018-04-22 RX ORDER — HYDROMORPHONE HYDROCHLORIDE 2 MG/1
2 TABLET ORAL
Qty: 24 TABLET | Refills: 0 | Status: SHIPPED | OUTPATIENT
Start: 2018-04-22 | End: 2018-04-29

## 2018-04-22 RX ORDER — SULFAMETHOXAZOLE/TRIMETHOPRIM 800-160 MG
1 TABLET ORAL 2 TIMES DAILY
Qty: 14 TABLET | Refills: 0 | Status: SHIPPED | OUTPATIENT
Start: 2018-04-22 | End: 2019-02-05

## 2018-04-22 RX ORDER — TRAZODONE HYDROCHLORIDE 100 MG/1
50-100 TABLET ORAL
Qty: 90 TABLET | Refills: 8 | COMMUNITY
Start: 2018-04-22

## 2018-04-22 RX ORDER — ONDANSETRON 8 MG/1
8 TABLET, FILM COATED ORAL EVERY 8 HOURS PRN
Qty: 9 TABLET | COMMUNITY
Start: 2018-04-22 | End: 2019-02-05

## 2018-04-22 RX ADMIN — HYDROMORPHONE HYDROCHLORIDE 2 MG: 2 TABLET ORAL at 06:13

## 2018-04-22 RX ADMIN — FIDAXOMICIN 200 MG: 200 TABLET, FILM COATED ORAL at 08:56

## 2018-04-22 RX ADMIN — PANTOPRAZOLE SODIUM 40 MG: 40 TABLET, DELAYED RELEASE ORAL at 08:56

## 2018-04-22 RX ADMIN — HYDROMORPHONE HYDROCHLORIDE 2 MG: 2 TABLET ORAL at 15:04

## 2018-04-22 RX ADMIN — DIPHENHYDRAMINE HYDROCHLORIDE 50 MG: 50 INJECTION, SOLUTION INTRAMUSCULAR; INTRAVENOUS at 06:13

## 2018-04-22 RX ADMIN — DIPHENHYDRAMINE HYDROCHLORIDE 50 MG: 50 INJECTION, SOLUTION INTRAMUSCULAR; INTRAVENOUS at 12:21

## 2018-04-22 RX ADMIN — AMOXICILLIN AND CLAVULANATE POTASSIUM 1 TABLET: 875; 125 TABLET, FILM COATED ORAL at 08:56

## 2018-04-22 RX ADMIN — FIDAXOMICIN 200 MG: 200 TABLET, FILM COATED ORAL at 15:06

## 2018-04-22 RX ADMIN — DULOXETINE HYDROCHLORIDE 60 MG: 30 CAPSULE, DELAYED RELEASE PELLETS ORAL at 08:56

## 2018-04-22 RX ADMIN — HYDROMORPHONE HYDROCHLORIDE 2 MG: 2 TABLET ORAL at 08:56

## 2018-04-22 RX ADMIN — HYDROMORPHONE HYDROCHLORIDE 2 MG: 2 TABLET ORAL at 03:12

## 2018-04-22 RX ADMIN — SULFAMETHOXAZOLE AND TRIMETHOPRIM 1 TABLET: 800; 160 TABLET ORAL at 08:56

## 2018-04-22 RX ADMIN — HYDROMORPHONE HYDROCHLORIDE 2 MG: 2 TABLET ORAL at 12:22

## 2018-04-22 ASSESSMENT — PAIN DESCRIPTION - DESCRIPTORS
DESCRIPTORS: ACHING
DESCRIPTORS: ACHING

## 2018-04-22 NOTE — PLAN OF CARE
Problem: Patient Care Overview  Goal: Plan of Care/Patient Progress Review  Outcome: Improving  Max T 99.4 recheck 98.1.  Appetite fair, pt states she is just not hungry.  Up independently in room, has had 1 watery stool this shift.  Requesting Dilaudid q 3 hrs , along with IV benadryl Q 6.

## 2018-04-22 NOTE — DISCHARGE INSTRUCTIONS
Pt  Has an appt to see Dr. Larisa Latham at [facility] in [place]    On April 25 @  1100

## 2018-04-22 NOTE — DISCHARGE SUMMARY
[facility]    Discharge Summary  Hospitalist    Date of Admission:  4/16/2018  Date of Discharge:  4/22/2018  Discharging Provider: Gilmer Lazcano  Date of Service (when I saw the patient): 04/22/18    Discharge Diagnoses     Gram negative septicemia (H) - Ochrobactrum, Stenotrophomonas & Pantoea    Nausea and vomiting    Hypokalemia    Iron deficiency anemia    Stool toxin PCR positive for Clostridium difficile on 4/17/18    Acute renal failure (H)    Intermittent asthma    S/P partial resection of colon    Munchausen syndrome - previously suspected    Migraine    Chronic abdominal pain    History of deep venous thrombosis    Essential hypertension    Tobacco abuse    History of bacteremia - recurrent    * No resolved hospital problems. *      History of Present Illness   Doreen Peralta is an 37 year old female with complicated health history notable for multiple abdominal surgeries including bowel surgery and recurring episodes of sepsis with bacteremia who presented with 3 day history of low-grade fever and 1 day history of shaking chills with fever to 104 and increasing left lower abdominal pain.  Due to concern for sepsis, hospital admission was advised. See admission history and physical for details.    Hospital Course   Doreen Peralta was admitted on 4/16/2018.  The following problems were addressed during her hospitalization:    Problem #1 gram-negative septicemia due to Ochrobactrum, Stenotrophomonas, and Pantoea species.  One blood culture from admission grew these 3 gram-negative rods.  Other blood culture drawn at admission was negative throughout her hospital stay.  Urine culture was negative.  Stool testing for enteric pathogens was negative.  She was initially treated with broad-spectrum parenteral antibiotics including vancomycin and Zosyn.  IV vancomycin was discontinued once gram-negative infection was identified.  She improved with resolution of signs of sepsis.   All 3 gram-negative organisms were susceptible to sulfamethoxazole trimethoprim, so she was advised to complete a course of antibiotic therapy with Bactrim DS after discharge.  After investigation, polymicrobial gram-negative septicemia was suspected.  There was concern for possible underlying GI source for bacteremia.    Problem #2 acute on chronic left lower quadrant abdominal pain and diarrhea possibly due to Clostridium difficile.  Abdomen CT did not demonstrate any acute abnormalities.  Stool testing for Clostridium difficile toxin PCR was positive.  Fecal calprotectin level was in the high normal range, increased compared with 2-3 weeks previously.  Case was discussed by telephone with gastroenterology.  GI recommended outpatient follow-up after hospital discharge with their team for anticipated colonoscopy and EGD.  In the context of her need for ongoing antibiotic therapy for treatment of gram-negative septicemia, ongoing symptoms consistent with Clostridium difficile infection, and abnormal test results, specific treatment for Clostridium difficile infection appeared warranted.    Due to previous intolerance to metronidazole, she was treated initially with oral vancomycin.  However, once she developed acute renal failure, transition to oral Dificid rather than vancomycin was recommended because of concern for possible nephrotoxicity.  Although prescription for oral Dificid was provided at discharge, pharmacy reported that this medication was not covered by her insurance plan.  Prior authorization could not be pursued on the day of discharge because her insurance company was not open on Sunday, the day of discharge.  Due to its high cost, patient indicated that she could not afford to purchase the medication herself.  Options included no specific treatment for Clostridium difficile, return to oral vancomycin therapy with risk of exacerbating nephrotoxicity, or pursuing prior authorization from her insurance  plan to cover the cost of oral Dificid.  After discussion, patient expressed preference to contact her primary care provider tomorrow after discharge to pursue prior authorization for Dificid therapy to complete a 10 day course of treatment.  She was provided 2 doses of Dificid at discharge to be taken the night of discharge and tomorrow morning.    Problem #3 acute renal failure and suspected acute tubular necrosis.  Renal function was normal at admission.  She developed subsequent increase in creatinine to 1.87, more than double her admission creatinine.  Repeat urinalysis was unremarkable.  Renal ultrasound demonstrated probable kidney enlargement bilaterally that could be consistent with an acute nephritis.  Fractional excretion of sodium was elevated at 3.9% consistent with acute tubular necrosis.  She did not develop hyperkalemia or acidosis nor was she significantly uremic.  Due to concern for nephrotoxicity, oral vancomycin was discontinued.  Onset of acute renal failure coincided in time with administration of IV vancomycin and Zosyn early in her hospital stay as well as with IV contrast that had been administered for abdominal CT and initial evaluation in the emergency room.  After investigation, acute renal failure with probable acute tubular necrosis was suspected with suspicion for nephrotoxicity from IV antibiotics, IV contrast, or both.  Close outpatient follow-up of renal function was recommended.    Problem #4 iron deficiency anemia.  She was anemic throughout her hospital stay and iron studies were consistent with severe iron deficiency.  She did not have active bleeding and her hemoglobin was stable.  She did not require blood transfusion.  Outpatient follow-up with her PCP to discuss supplemental iron therapy was recommended.    Pain plan  # Discharge Pain Plan:   - During her hospitalization, Doreen experienced pain due to left lower quadrant abdominal pain.  The pain plan for discharge was  "discussed with Doreen and the plan was created in a collaborative fashion.    - Opioids prescribed on discharge: hydromorphone  - Duration of opioids after discharge: Per CDC opioid prescribing guidelines, a 3 day prescription of opioids was provided.  - Bowel regimen: not needed  - Pharmacologic adjuvants:  Acetaminophen  - Non-pharmacologic adjuvants: Heat and Ice      Gilmer Lazcano    Significant Results and Procedures   No procedures performed during this admission    Pending Results   none    Code Status   Full Code       Primary Care Physician   Franny Antoine    Physical Exam   177 lbs 14.58 oz  /71 (BP Location: Right arm)  Pulse 67  Temp 98.1  F (36.7  C) (Oral)  Resp 18  Ht 1.676 m (5' 6\")  Wt 80.7 kg (177 lb 14.6 oz)  LMP 04/12/2018 (Approximate)  SpO2 99%  Breastfeeding? No  BMI 28.72 kg/m2    No acute distress lying in bed  Nondistended abdomen, soft, tender in the left lower abdomen without peritoneal signs    Discharge Disposition   Discharged to home  Condition at discharge: Stable    Consultations This Hospital Stay   PHARMACY TO DOSE [facility]  PHARMACY TO DOSE [facility]  NUTRITION SERVICES ADULT IP CONSULT  VASCULAR ACCESS ADULT IP CONSULT    Time Spent on this Encounter   I, Gilmer Lazcano, personally saw the patient today and spent greater than 30 minutes discharging this patient.    Discharge Orders     Reason for your hospital stay   Hospitalized due to infection (sepsis from bloodstream infection) and improved     Follow-up and recommended labs and tests    Follow up with PCP within 3 days for hospital follow- up. The following labs/tests are recommended:  Basic metabolic panel.  Follow up with Dr. Sutton and his GI team at [facility] according to their recommendation for anticipated colonoscopy and EGD.     Activity   Your activity upon discharge: activity as tolerated and no driving while on opiate analgesics     Full Code     Diet   Follow this diet upon discharge: Advance to your " usual diet as tolerated       Discharge Medications   Current Discharge Medication List      START taking these medications    Details   fidaxomicin (DIFICID) 200 MG tablet Take 1 tablet (200 mg) by mouth 2 times daily for 9 days  Qty: 18 tablet, Refills: 0    Associated Diagnoses: Clostridium difficile infection      HYDROmorphone (DILAUDID) 2 MG tablet Take 1 tablet (2 mg) by mouth every 3 hours as needed for moderate to severe pain Use no more than 8 tabs in 24 hours  Qty: 24 tablet, Refills: 0    Associated Diagnoses: Left sided abdominal pain of unknown cause      sulfamethoxazole-trimethoprim (BACTRIM DS/SEPTRA DS) 800-160 MG per tablet Take 1 tablet by mouth 2 times daily for 7 days  Qty: 14 tablet, Refills: 0    Associated Diagnoses: Gram negative septicemia (H)         CONTINUE these medications which have CHANGED    Details   ondansetron (ZOFRAN) 8 MG tablet Take 1 tablet (8 mg) by mouth every 8 hours as needed for nausea  Qty: 9 tablet    Associated Diagnoses: Non-intractable cyclical vomiting with nausea      traZODone (DESYREL) 100 MG tablet Take 0.5-1 tablets ( mg) by mouth nightly as needed for sleep  Qty: 90 tablet, Refills: 8    Associated Diagnoses: Insomnia, unspecified type         CONTINUE these medications which have NOT CHANGED    Details   ACETAMINOPHEN PO Take 500-1,000 mg by mouth every 6 hours as needed for pain       Alfalfa 650 MG TABS       cloNIDine (CATAPRES) 0.2 MG tablet Take 2 tablets (0.4 mg) by mouth every evening - DUE FOR FOLLOW UP  Qty: 60 tablet, Refills: 0    Comments: Due for appointment for further refills, please give reminder.  Associated Diagnoses: Anxiety      diphenhydrAMINE (BENADRYL ALLERGY) 25 MG tablet Take 1 tablet (25 mg) by mouth every 6 hours as needed for itching or other (Nausea)  Qty: 30 tablet, Refills: 0      DULoxetine (CYMBALTA) 60 MG EC capsule TAKE 1 CAPSULE(60 MG) BY MOUTH DAILY  Qty: 90 capsule, Refills: 1    Associated Diagnoses: Abdominal  pain, epigastric; Abdominal migraine, not intractable      albuterol (2.5 MG/3ML) 0.083% neb solution Take 1 vial (2.5 mg) by nebulization every 4 hours as needed for shortness of breath / dyspnea or wheezing  Qty: 360 mL    Associated Diagnoses: Gastrostomy tube dependent (H); SBO (small bowel obstruction); Chronic abdominal pain; Chronic diarrhea      albuterol 90 MCG/ACT inhaler Inhale 2 puffs into the lungs every 6 hours as needed       ipratropium (ATROVENT) 0.02 % neb solution Take 2.5 mLs (0.5 mg) by nebulization every 6 hours as needed for wheezing  Qty: 225 mL    Associated Diagnoses: Gastrostomy tube dependent (H); SBO (small bowel obstruction); Chronic abdominal pain; Chronic diarrhea      SUMAtriptan (IMITREX) 50 MG tablet Take 1-2 tablets ( mg) by mouth at onset of headache for migraine - LAST REFILL, DUE FOR FOLLOW UP  Qty: 9 tablet, Refills: 0    Associated Diagnoses: Migraine without aura and without status migrainosus, not intractable         STOP taking these medications       Emollient (GRX VITAMIN E) LOTN Comments:   Reason for Stopping:             Allergies   Allergies   Allergen Reactions     Hyoscyamine Rash     Metoclopramide Other (See Comments)     Eye twitching.      Peaches [Peach] Other (See Comments)     Raw. Cooked OK     Sucralose Other (See Comments)     All artificial sweeteners. Aspartame also. Swollen glands     Advair Diskus Other (See Comments)     Throat burns     Azithromycin Other (See Comments)     Burning in throat     Compazine [Prochlorperazine] Visual Disturbance     Contrast Dye Itching     States is allergic to CT contrast dye     Cyclobenzaprine Visual Disturbance     Diatrizoate Other (See Comments)     Patient reports she tolerates IV contrast if given 50mg IV of Benadryl prior to scan.      Fentanyl Other (See Comments)     migraine     Ibuprofen GI Disturbance     Lactulose Nausea and Vomiting     Gas and bloating     Levaquin [Levofloxacin] Swelling      Per ED M.D. And RN      Morphine Sulfate Other (See Comments)     Chest pain       Oxycodone Other (See Comments)     Burning throat, but can take Norco.      Rizatriptan Visual Disturbance     Droperidol Hives and Rash     Isovue [Iopamidol] Palpitations     Pt had racing heart and sob      Ketorolac Anxiety     Latex Swelling and Rash     Kiwi, likely also avacado, ? banana     Levsin Rash     Data   Most Recent 3 CBC's:  Recent Labs   Lab Test  04/22/18   0630  04/21/18   0533  04/18/18   0615   04/17/18   0607  04/16/18   1600  04/08/18   2340   WBC   --    --    --    --   6.6  9.3  7.5   HGB  9.5*  7.6*  8.4*   < >  7.8*  9.8*  11.4*   MCV   --    --    --    --   80  79  79   PLT   --    --    --    --   258  335  509*    < > = values in this interval not displayed.      Most Recent 3 BMP's:  Recent Labs   Lab Test  04/22/18   0630  04/21/18   1809  04/21/18   0533   NA  135  140  140   POTASSIUM  4.2  4.0  3.7   CHLORIDE  100  105  106   CO2  25  23  25   BUN  8  8  6*   CR  1.83*  1.87*  1.75*   ANIONGAP  10  12  9   TARA  9.1  8.3*  8.2*   GLC  85  90  84     Most Recent 2 LFT's:  Recent Labs   Lab Test  04/16/18   1600  04/08/18   2340   AST  22  19   ALT  27  23   ALKPHOS  151*  153*   BILITOTAL  0.6  0.5     Most Recent 6 Bacteria Isolates From Any Culture (See EPIC Reports for Culture Details):  Recent Labs   Lab Test  04/16/18   1700  04/16/18   1650  04/16/18   1600  03/29/18   1730  03/07/18   1150  03/07/18   1112   CULT  No growth  Cultured on the 1st day of incubation:  Stenotrophomonas maltophilia  *  Critical Value/Significant Value, preliminary result only, called to and read back by  Vianey Velazco RN at 0655 TA    Cultured on the 1st day of incubation:  Ochrobactrum anthropi  *  Cultured on the 1st day of incubation:  Pantoea agglomerans  *  Critical Value/Significant Value called to and read back by  Edie Jose RN on 4.21.2018 at 1306. KVO    (Note)  NEGATIVE for the following  organisms and resistance markers:  Acinetobacter sp., Citrobacter sp., Enterobacter sp., Proteus sp., E.  coli, K. pneumoniae/oxytoca, P. aeruginosa, CTX-M, KPC, NDM, VIM, IMP  and OXA by [employer] multiplex nucleic acid test. Final identification  and antimicrobial susceptibility testing will be verified by standard  methods.    Critical Value/Significant Value called to and read back by Kris Saunders RN at [facility] station 2A at 13:10pm 4/18/2018 ()    No growth after 6 days  No growth after 6 days  No growth after 6 days  No growth after 6 days       Results for orders placed or performed during the hospital encounter of 04/16/18   CT Abdomen Pelvis w Contrast    Narrative    CT ABDOMEN PELVIS WITH CONTRAST   4/16/2018 6:14 PM     HISTORY: Left abdominal pain.    TECHNIQUE:  CT abdomen and pelvis with 90 mL Isovue 370 IV. Radiation  dose for this scan was reduced using automated exposure control,  adjustment of the mA and/or kV according to patient size, or iterative  reconstruction technique.    COMPARISON: 3/27/2018.    FINDINGS:  Abdomen: There is dependent atelectasis at the lung bases. The heart  size is normal. The liver, spleen, pancreas, adrenal glands and left  kidney are normal in appearance. There is a tiny stone in the mid  right kidney. No hydronephrosis. The gallbladder is absent. There is  no abdominal or pelvic lymph node enlargement.    Pelvis: The uterus and adnexa appear normal. There are postoperative  changes in the left colon. No bowel obstruction or inflammation. No  free intraperitoneal gas or fluid.      Impression    IMPRESSION: No acute abnormality. No bowel obstruction or  inflammation.     SHAR PEREZ MD   US Renal Complete    Narrative    ULTRASOUND RETROPERITONEAL COMPLETE 4/21/2018 5:35 PM     HISTORY: Acute renal failure, gram neg septicemia, LLQ pain, question  obstruction.     COMPARISON: None.    FINDINGS: The bilateral renal parenchyma are unremarkable  in  echogenicity without evidence for shadowing stone or mass. No renal  cysts. No hydronephrosis. Right kidney measures 13.2 x 6.3 x 5.0 cm  and the left measures 13.8 x 6.2 x 5.5 cm. Cortical thickness is 1.6  cm on the right and 2.1 cm on the left. Bladder is unremarkable given  its level of distention.      Impression    IMPRESSION:   1. No obstruction demonstrated.  2. Kidneys are larger than typical, this could indicate acute  nephritis, clinical correlation needed.    JAYDON MANZO MD     *Note: Due to a large number of results and/or encounters for the requested time period, some results have not been displayed. A complete set of results can be found in Results Review.

## 2018-04-22 NOTE — PLAN OF CARE
Problem: Sepsis/Septic Shock (Adult)  Goal: Signs and Symptoms of Listed Potential Problems Will be Absent, Minimized or Managed (Sepsis/Septic Shock)  Signs and symptoms of listed potential problems will be absent, minimized or managed by discharge/transition of care (reference Sepsis/Septic Shock (Adult) CPG).   Outcome: No Change  Pt continues to c/o abd pain and is taking Dilaudid 2mg oral every 3hours and Benadryl 50 mg I every 6 hours.  VSS, Vdg in good amts.  Up and about in her room.  Please review system assessment.

## 2018-04-22 NOTE — PLAN OF CARE
Problem: Patient Care Overview  Goal: Plan of Care/Patient Progress Review  Temp: 98.1  F (36.7  C) Temp src: Oral BP: 132/69 Pulse: 77 Heart Rate: 85 Resp: 16 SpO2: 99 % O2 Device: None (Room air)    Pt in bed resting no signs of distress noted. Pain under control with current regimen. Pt c/o of intermittent abd pain. No complaints of nausea this shift. Will cont to monitor

## 2018-04-22 NOTE — PROGRESS NOTES
Name: Doreen Peralta    MRN#: [id number]    Reason for Hospitalization: Clostridium difficile carrier [Z22.1]  Sepsis (H) [A41.9]  Left sided abdominal pain of unknown cause [R10.30]    Discharge Date: 4/22/2018    Patient / Family response to discharge plan: in agreement    Follow-Up Appt: Future Appointments  Date Time Provider Department Center   5/16/2018 10:00 AM Dominic Sutton MD [facility]       Other Providers (Care Coordinator, County Services, PCA services etc): Yes: [facility] [place] [facility]     Discharge Disposition: home - Follow up appt scheduled at [facility] with Dr. Larisa Latham for Wednesday, April 25th at 1055.  Informed patient that her PCP is out this week and her follow up appt is scheduled with a different MD.  Patient is understanding of this.      PAMELA Schaffer  [facility] [phone]/ [facility] [phone]

## 2018-04-22 NOTE — PROGRESS NOTES
S-(situation): Patient discharged to home with spouse @ [id number]    B-(background): fever/ sepsis    A-(assessment): VSS, pain controlled with dilaudid    R-(recommendations): Discharge instructions reviewed with pt . Listed belongings gathered and returned to patient. *yes        Discharge Nursing Criteria:     Care Plan and Patient education resolved: yes    New Medications- pt has been educated about purpose and side effects: yes          I  MISC  Prescriptions if needed, hard copies sent with patient  yes  Home and hospital aquired medications returned to patient:yes  Medication Bin checked and emptied on dischargeyes  Patient reports post-discharge pain management plan is effective: eys

## 2018-04-23 ENCOUNTER — TELEPHONE (OUTPATIENT)
Dept: FAMILY MEDICINE | Facility: OTHER | Age: 38
End: 2018-04-23

## 2018-04-23 NOTE — TELEPHONE ENCOUNTER
Patient called to schedule an appointment for a hospital follow-up or appeared on a report showing that they were recently discharged from the hospital.    Patient was admitted to : [facility]  Discharged date: 04-  Reason for hospital admission:  Insomnia, Unspecified Type, Sepsis  Does patient have future appointment scheduled with provider? Yes - Herman  Date of future appointment:    05-    This information will be used to help the care team plan for the patients upcoming visit.  The triage RN may determine that a follow up call is necessary and reach out to the patient via the phone number listed in the chart.     Please route this message on routine priority to the Triage RN pool.

## 2018-04-23 NOTE — TELEPHONE ENCOUNTER
Follow up encounter documentation:    Reason for follow up: Doreen JOSEPH Peralta appeared on our list for recent discharge from an Emergency Room, inpatient admission, or TCU/nursing home facility.    Visit date: 4/16/2018  Location: [facility]   Reason for visit: Insomnia, sepsis   Discharge instructions include: Follow up in clinic with provider for hospital f/u  Follow up phone call indicated? Yes. Details: I spoke with pt. She is feeling better. Still tired. Follow up scheduled. No further questions or concerns.   Doreen Crenshaw, RN, BSN

## 2018-04-25 ENCOUNTER — TELEPHONE (OUTPATIENT)
Dept: GASTROENTEROLOGY | Facility: CLINIC | Age: 38
End: 2018-04-25

## 2018-04-25 NOTE — TELEPHONE ENCOUNTER
Health Call Center    Phone Message    May a detailed message be left on voicemail: yes    Reason for Call: Other: [facility] called for the patient demanding to know why patient hasn't been contacted in regards to getting her combined upper and lower endoscopy/colonoscopy.  Patient states Dr Sutton had wanted these done, i don't see orders in the chart.  according to betty, patient is jennifer that it has been 2 weeks and she hasn't been contacted.  betty asks that we contact patient directly.  please advise.     Action Taken: Message routed to:  Adult Clinics: Gastroenterology (GI) p [id number]

## 2018-04-26 NOTE — TELEPHONE ENCOUNTER
Received call from Dr. Sutton who reports that he will put the orders for the combined upper and lower endoscopy/colonoscopy.    Mari Schneider RN, BSN

## 2018-04-26 NOTE — TELEPHONE ENCOUNTER
Unable to contact the patient by phone. A generic message was left requesting a return call to the clinic.     Renetta Jimenez CMA

## 2018-04-26 NOTE — TELEPHONE ENCOUNTER
----- Message from Dominic Sutton MD sent at 4/26/2018  3:47 PM CDT -----  Endoscopy orders were placed.  More importantly, she needs to see infectious disease.  She grew out 3 bacteria from her blood that are not typically found in people.  Thanks,

## 2018-04-26 NOTE — PROGRESS NOTES
"Doreen Peralta  Gender: female  : 1980  [address]   [place] [id number] [phone] (home)     Medical Record: [id number]  Pharmacy:    [id number] - [place], [place] - [address] AT [address]  [facility] - [place], [place] - [address]  Primary Care Provider: Franny Antoine    Parent's names are: Aby Rainey (mother) and Data Unavailable (father).      [facility]  2018     Discharge Phone Call:  Key Words/Key Times      Introduction - AIDET (Acknowledge, Introduce, Duration, Explanation)      Empathy-   We are calling to see how you are since your recent stay in the hospital?     Call back COMMENTS: I am still weak.       Clinical Questions -  (f/u appts, medication side effects/purpose, ability to care for self at home) \"For your safety, it is important to us that you understand the purpose and side effects of your medications, can you tell me what your new medications are?\"     Call back COMMENTS: yes they explained things well      Staff Recognition -  We like to recognize staff and physicians who have done an excellent job.  Do you remember any people from your care team that you would like recognize?     Call back COMMENTS: Dr. Lazcano, all the nurses       Very Good Care -  We want to provide very good care to all patients.  How was your care?     Call back COMMENTS:       Opportunities for Improvement -  Our goal is to be the best.  Do you have any suggestions for things that we could improve upon?     Call back COMMENTS:       Thank You         "

## 2018-04-27 ENCOUNTER — TELEPHONE (OUTPATIENT)
Dept: GASTROENTEROLOGY | Facility: CLINIC | Age: 38
End: 2018-04-27

## 2018-04-27 NOTE — TELEPHONE ENCOUNTER
Again unable to contact the patient by phone. Another message was left requesting a return call to the clinic.    Renetta Jimenez CMA

## 2018-04-29 ENCOUNTER — HOSPITAL ENCOUNTER (EMERGENCY)
Facility: CLINIC | Age: 38
Discharge: HOME | End: 2018-04-29
Attending: EMERGENCY MEDICINE | Admitting: EMERGENCY MEDICINE
Payer: MEDICARE

## 2018-04-29 VITALS
WEIGHT: 171 LBS | SYSTOLIC BLOOD PRESSURE: 131 MMHG | DIASTOLIC BLOOD PRESSURE: 91 MMHG | RESPIRATION RATE: 19 BRPM | TEMPERATURE: 98.9 F | HEIGHT: 66 IN | OXYGEN SATURATION: 100 % | BODY MASS INDEX: 27.48 KG/M2 | HEART RATE: 83 BPM

## 2018-04-29 DIAGNOSIS — A04.72 C. DIFFICILE ENTERITIS: ICD-10-CM

## 2018-04-29 DIAGNOSIS — R11.0 NAUSEA: ICD-10-CM

## 2018-04-29 DIAGNOSIS — R79.89 ELEVATED SERUM CREATININE: ICD-10-CM

## 2018-04-29 LAB
ALBUMIN SERPL-MCNC: 3.7 G/DL (ref 3.4–5)
ALP SERPL-CCNC: 206 U/L (ref 40–150)
ALT SERPL W P-5'-P-CCNC: 46 U/L (ref 0–50)
ANION GAP SERPL CALCULATED.3IONS-SCNC: 11 MMOL/L (ref 3–14)
AST SERPL W P-5'-P-CCNC: 40 U/L (ref 0–45)
BASOPHILS # BLD AUTO: 0.1 10E9/L (ref 0–0.2)
BASOPHILS NFR BLD AUTO: 0.5 %
BILIRUB SERPL-MCNC: 0.3 MG/DL (ref 0.2–1.3)
BUN SERPL-MCNC: 18 MG/DL (ref 7–30)
CALCIUM SERPL-MCNC: 9.3 MG/DL (ref 8.5–10.1)
CHLORIDE SERPL-SCNC: 103 MMOL/L (ref 94–109)
CO2 SERPL-SCNC: 21 MMOL/L (ref 20–32)
CREAT SERPL-MCNC: 1.79 MG/DL (ref 0.52–1.04)
DIFFERENTIAL METHOD BLD: ABNORMAL
EOSINOPHIL # BLD AUTO: 0 10E9/L (ref 0–0.7)
EOSINOPHIL NFR BLD AUTO: 0.4 %
ERYTHROCYTE [DISTWIDTH] IN BLOOD BY AUTOMATED COUNT: 16.7 % (ref 10–15)
GFR SERPL CREATININE-BSD FRML MDRD: 32 ML/MIN/1.7M2
GLUCOSE SERPL-MCNC: 84 MG/DL (ref 70–99)
HCT VFR BLD AUTO: 35.1 % (ref 35–47)
HGB BLD-MCNC: 11.1 G/DL (ref 11.7–15.7)
IMM GRANULOCYTES # BLD: 0 10E9/L (ref 0–0.4)
IMM GRANULOCYTES NFR BLD: 0.2 %
LACTATE BLD-SCNC: 1.2 MMOL/L (ref 0.7–2)
LYMPHOCYTES # BLD AUTO: 2.5 10E9/L (ref 0.8–5.3)
LYMPHOCYTES NFR BLD AUTO: 26.4 %
MAGNESIUM SERPL-MCNC: 2.2 MG/DL (ref 1.6–2.3)
MCH RBC QN AUTO: 23.9 PG (ref 26.5–33)
MCHC RBC AUTO-ENTMCNC: 31.6 G/DL (ref 31.5–36.5)
MCV RBC AUTO: 76 FL (ref 78–100)
MONOCYTES # BLD AUTO: 0.5 10E9/L (ref 0–1.3)
MONOCYTES NFR BLD AUTO: 5.4 %
NEUTROPHILS # BLD AUTO: 6.4 10E9/L (ref 1.6–8.3)
NEUTROPHILS NFR BLD AUTO: 67.1 %
PHOSPHATE SERPL-MCNC: 2.9 MG/DL (ref 2.5–4.5)
PLATELET # BLD AUTO: 877 10E9/L (ref 150–450)
POTASSIUM SERPL-SCNC: 4.8 MMOL/L (ref 3.4–5.3)
PROT SERPL-MCNC: 9.8 G/DL (ref 6.8–8.8)
RBC # BLD AUTO: 4.64 10E12/L (ref 3.8–5.2)
SODIUM SERPL-SCNC: 135 MMOL/L (ref 133–144)
WBC # BLD AUTO: 9.6 10E9/L (ref 4–11)

## 2018-04-29 PROCEDURE — 85025 COMPLETE CBC W/AUTO DIFF WBC: CPT | Performed by: EMERGENCY MEDICINE

## 2018-04-29 PROCEDURE — 96375 TX/PRO/DX INJ NEW DRUG ADDON: CPT | Performed by: EMERGENCY MEDICINE

## 2018-04-29 PROCEDURE — A9270 NON-COVERED ITEM OR SERVICE: HCPCS | Mod: GY | Performed by: EMERGENCY MEDICINE

## 2018-04-29 PROCEDURE — 83605 ASSAY OF LACTIC ACID: CPT | Performed by: EMERGENCY MEDICINE

## 2018-04-29 PROCEDURE — 84100 ASSAY OF PHOSPHORUS: CPT | Performed by: EMERGENCY MEDICINE

## 2018-04-29 PROCEDURE — 96361 HYDRATE IV INFUSION ADD-ON: CPT | Performed by: EMERGENCY MEDICINE

## 2018-04-29 PROCEDURE — 93010 ELECTROCARDIOGRAM REPORT: CPT | Mod: Z6 | Performed by: EMERGENCY MEDICINE

## 2018-04-29 PROCEDURE — 93005 ELECTROCARDIOGRAM TRACING: CPT | Performed by: EMERGENCY MEDICINE

## 2018-04-29 PROCEDURE — 96376 TX/PRO/DX INJ SAME DRUG ADON: CPT | Performed by: EMERGENCY MEDICINE

## 2018-04-29 PROCEDURE — 80053 COMPREHEN METABOLIC PANEL: CPT | Performed by: EMERGENCY MEDICINE

## 2018-04-29 PROCEDURE — 99285 EMERGENCY DEPT VISIT HI MDM: CPT | Mod: 25 | Performed by: EMERGENCY MEDICINE

## 2018-04-29 PROCEDURE — 25000128 H RX IP 250 OP 636: Performed by: EMERGENCY MEDICINE

## 2018-04-29 PROCEDURE — 83735 ASSAY OF MAGNESIUM: CPT | Performed by: EMERGENCY MEDICINE

## 2018-04-29 PROCEDURE — 25000132 ZZH RX MED GY IP 250 OP 250 PS 637: Mod: GY | Performed by: EMERGENCY MEDICINE

## 2018-04-29 PROCEDURE — 96374 THER/PROPH/DIAG INJ IV PUSH: CPT | Performed by: EMERGENCY MEDICINE

## 2018-04-29 RX ORDER — HYDROMORPHONE HYDROCHLORIDE 2 MG/1
2 TABLET ORAL EVERY 6 HOURS PRN
Qty: 12 TABLET | Refills: 0 | Status: SHIPPED | OUTPATIENT
Start: 2018-04-29 | End: 2018-05-16

## 2018-04-29 RX ORDER — HYDROMORPHONE HYDROCHLORIDE 1 MG/ML
0.5 INJECTION, SOLUTION INTRAMUSCULAR; INTRAVENOUS; SUBCUTANEOUS
Status: COMPLETED | OUTPATIENT
Start: 2018-04-29 | End: 2018-04-29

## 2018-04-29 RX ORDER — DIPHENHYDRAMINE HYDROCHLORIDE 50 MG/ML
50 INJECTION INTRAMUSCULAR; INTRAVENOUS ONCE
Status: COMPLETED | OUTPATIENT
Start: 2018-04-29 | End: 2018-04-29

## 2018-04-29 RX ORDER — SODIUM CHLORIDE 9 MG/ML
1000 INJECTION, SOLUTION INTRAVENOUS CONTINUOUS
Status: DISCONTINUED | OUTPATIENT
Start: 2018-04-29 | End: 2018-04-29 | Stop reason: HOSPADM

## 2018-04-29 RX ORDER — HYDROMORPHONE HYDROCHLORIDE 1 MG/ML
0.5 INJECTION, SOLUTION INTRAMUSCULAR; INTRAVENOUS; SUBCUTANEOUS
Status: DISCONTINUED | OUTPATIENT
Start: 2018-04-29 | End: 2018-04-29 | Stop reason: HOSPADM

## 2018-04-29 RX ORDER — LIDOCAINE 40 MG/G
CREAM TOPICAL ONCE
Status: DISCONTINUED | OUTPATIENT
Start: 2018-04-29 | End: 2018-04-29 | Stop reason: HOSPADM

## 2018-04-29 RX ORDER — VANCOMYCIN HYDROCHLORIDE 125 MG/1
125 CAPSULE ORAL 4 TIMES DAILY
Qty: 56 CAPSULE | Refills: 0 | Status: SHIPPED | OUTPATIENT
Start: 2018-04-29 | End: 2019-02-05

## 2018-04-29 RX ADMIN — HYDROMORPHONE HYDROCHLORIDE 0.5 MG: 1 INJECTION, SOLUTION INTRAMUSCULAR; INTRAVENOUS; SUBCUTANEOUS at 12:04

## 2018-04-29 RX ADMIN — SODIUM CHLORIDE 1000 ML: 9 INJECTION, SOLUTION INTRAVENOUS at 12:03

## 2018-04-29 RX ADMIN — HYDROMORPHONE HYDROCHLORIDE 0.5 MG: 1 INJECTION, SOLUTION INTRAMUSCULAR; INTRAVENOUS; SUBCUTANEOUS at 12:59

## 2018-04-29 RX ADMIN — SODIUM CHLORIDE 1000 ML: 9 INJECTION, SOLUTION INTRAVENOUS at 14:04

## 2018-04-29 RX ADMIN — FIDAXOMICIN 200 MG: 200 TABLET, FILM COATED ORAL at 12:42

## 2018-04-29 RX ADMIN — DIPHENHYDRAMINE HYDROCHLORIDE 50 MG: 50 INJECTION, SOLUTION INTRAMUSCULAR; INTRAVENOUS at 12:06

## 2018-04-29 RX ADMIN — DIPHENHYDRAMINE HYDROCHLORIDE 50 MG: 50 INJECTION, SOLUTION INTRAMUSCULAR; INTRAVENOUS at 14:03

## 2018-04-29 RX ADMIN — HYDROMORPHONE HYDROCHLORIDE 0.5 MG: 1 INJECTION, SOLUTION INTRAMUSCULAR; INTRAVENOUS; SUBCUTANEOUS at 14:03

## 2018-04-29 RX ADMIN — HYDROMORPHONE HYDROCHLORIDE 0.5 MG: 1 INJECTION, SOLUTION INTRAMUSCULAR; INTRAVENOUS; SUBCUTANEOUS at 12:30

## 2018-04-29 NOTE — ED AVS SNAPSHOT
[facility] Emergency Department    [address] [place] [address]    Phone:  [phone]    Fax:  [phone]                                       Doreen Peralta   MRN: [id number]    Department:  [facility] Emergency Department   Date of Visit:  4/29/2018           Patient Information     Date Of Birth          1980        Your diagnoses for this visit were:     C. difficile enteritis     Elevated serum creatinine     Nausea        You were seen by Arely Lam MD.      Follow-up Information     Follow up with Franny Antoine MD In 1 day.    Specialty:  Internal Medicine    Contact information:    [facility]  [address]  [place] [address] [phone]          Discharge Instructions       Restart your vancomycin.  If you can get the other medication prior authorized, start it as soon as possible.    Dilaudid as needed for severe pain.    Benadryl if needed for nausea.    Follow-up tomorrow as scheduled.    Return for significant worsening, changes or concerns.    I hope that you can get the new medication and start to improve quickly!!!      Your next 10 appointments already scheduled     May 03, 2018   Procedure with Loi Majano MD   [facility], [place], Endoscopy ([facility], [facility])    [address] [address] [phone]           The [facility] is located on the corner of [address] on the [address] [facility]. It is easily accessible from virtually any point in the [place] area, via [address].            May 16, 2018 10:00 AM CDT   Return Visit with Dominic Sutton MD   [facility] ([facility]    [id number] [place] [address] [phone]              24 Hour Appointment Hotline       To make an appointment at any [facility], call [phone]  ([phone]). If you don't have a family doctor or clinic, we will help you find one. [facility] are conveniently located to serve the needs of you and your family.             Review of your medicines      START taking        Dose / Directions Last dose taken    HYDROmorphone 2 MG tablet   Commonly known as:  DILAUDID   Dose:  2 mg   Quantity:  12 tablet        Take 1 tablet (2 mg) by mouth every 6 hours as needed for severe pain maximum 6 tablet(s) per day   Refills:  0        vancomycin 125 MG capsule   Commonly known as:  VANCOCIN   Dose:  125 mg   Quantity:  56 capsule        Take 1 capsule (125 mg) by mouth 4 times daily for 14 days   Refills:  0          CONTINUE these medicines which may have CHANGED, or have new prescriptions. If we are uncertain of the size of tablets/capsules you have at home, strength may be listed as something that might have changed.        Dose / Directions Last dose taken    diphenhydrAMINE HCl 50 MG Tabs   Commonly known as:  BENADRYL   Dose:  50 mg   What changed:    - medication strength  - how much to take  - reasons to take this   Quantity:  30 tablet        Take 50 mg by mouth every 6 hours as needed (nausea)   Refills:  0          Our records show that you are taking the medicines listed below. If these are incorrect, please call your family doctor or clinic.        Dose / Directions Last dose taken    ACETAMINOPHEN PO   Dose:  500-1000 mg        Take 500-1,000 mg by mouth every 6 hours as needed for pain   Refills:  0        albuterol (2.5 MG/3ML) 0.083% neb solution   Dose:  2.5 mg   Quantity:  360 mL        Take 1 vial (2.5 mg) by nebulization every 4 hours as needed for shortness of breath / dyspnea or wheezing   Refills:  0        albuterol 90 MCG/ACT inhaler   Dose:  2 puff        Inhale 2 puffs into the lungs every 6 hours as needed   Refills:  0        Alfalfa 650 MG Tabs        Refills:  0        cloNIDine 0.2 MG tablet   Commonly known as:  CATAPRES   Dose:   0.4 mg   Quantity:  60 tablet        Take 2 tablets (0.4 mg) by mouth every evening - DUE FOR FOLLOW UP   Refills:  0        DULoxetine 60 MG EC capsule   Commonly known as:  CYMBALTA   Quantity:  90 capsule        TAKE 1 CAPSULE(60 MG) BY MOUTH DAILY   Refills:  1        fidaxomicin 200 MG tablet   Commonly known as:  DIFICID   Dose:  200 mg   Indication:  Clostridium difficile Bacteria   Quantity:  18 tablet        Take 1 tablet (200 mg) by mouth 2 times daily for 9 days   Refills:  0        ipratropium 0.02 % neb solution   Commonly known as:  ATROVENT   Dose:  0.5 mg   Quantity:  225 mL        Take 2.5 mLs (0.5 mg) by nebulization every 6 hours as needed for wheezing   Refills:  0        ondansetron 8 MG tablet   Commonly known as:  ZOFRAN   Dose:  8 mg   Quantity:  9 tablet        Take 1 tablet (8 mg) by mouth every 8 hours as needed for nausea   Refills:  0        sulfamethoxazole-trimethoprim 800-160 MG per tablet   Commonly known as:  BACTRIM DS/SEPTRA DS   Dose:  1 tablet   Indication:  Bacteria in the Blood   Quantity:  14 tablet        Take 1 tablet by mouth 2 times daily for 7 days   Refills:  0        SUMAtriptan 50 MG tablet   Commonly known as:  IMITREX   Dose:   mg   Quantity:  9 tablet        Take 1-2 tablets ( mg) by mouth at onset of headache for migraine - LAST REFILL, DUE FOR FOLLOW UP   Refills:  0        traZODone 100 MG tablet   Commonly known as:  DESYREL   Dose:   mg   Quantity:  90 tablet        Take 0.5-1 tablets ( mg) by mouth nightly as needed for sleep   Refills:  8                Information about OPIOIDS     PRESCRIPTION OPIOIDS: WHAT YOU NEED TO KNOW   You have a prescription for an opioid (narcotic) pain medicine. Opioids can cause addiction. If you have a history of chemical dependency of any type, you are at a higher risk of becoming addicted to opioids. Only take this medicine after all other options have been tried. Take it for as short a time and as  few doses as possible.     Do not:    Drive. If you drive while taking these medicines, you could be arrested for driving under the influence (DUI).    Operate heavy machinery    Do any other dangerous activities while taking these medicines.     Drink any alcohol while taking these medicines.      Take with any other medicines that contain acetaminophen. Read all labels carefully. Look for the word  acetaminophen  or  Tylenol.  Ask your pharmacist if you have questions or are unsure.    Store your pills in a secure place, locked if possible. We will not replace any lost or stolen medicine. If you don t finish your medicine, please throw away (dispose) as directed by your pharmacist. The [place] [employer] has more information about safe disposal: https://www.pca.[facility].[place]/living-green/managing-unwanted-medications    All opioids tend to cause constipation. Drink plenty of water and eat foods that have a lot of fiber, such as fruits, vegetables, prune juice, apple juice and high-fiber cereal. Take a laxative (Miralax, milk of magnesia, Colace, Senna) if you don t move your bowels at least every other day.         Prescriptions were sent or printed at these locations (3 Prescriptions)                   [facility] [place], [place] - [address]    [address] [place] [address]    Telephone:  [phone]   Fax:  [phone]   Hours:                  E-Prescribed (1 of 3)         diphenhydrAMINE HCl 50 MG TABS                 Printed at Department/Unit printer (2 of 3)         HYDROmorphone (DILAUDID) 2 MG tablet               vancomycin (VANCOCIN) 125 MG capsule                Procedures and tests performed during your visit     CBC with platelets differential    Comprehensive metabolic panel    EKG 12-lead, tracing only    Lactic acid whole blood    Magnesium    Peripheral IV catheter    Phosphorus      Orders Needing Specimen Collection     None      Pending Results      No orders found from 4/27/2018 to 4/30/2018.            Pending Culture Results     No orders found from 4/27/2018 to 4/30/2018.            Pending Results Instructions     If you had any lab results that were not finalized at the time of your Discharge, you can call the ED Lab Result RN at [phone]. You will be contacted by this team for any positive Lab results or changes in treatment. The nurses are available 7 days a week from 10A to 6:30P.  You can leave a message 24 hours per day and they will return your call.        Thank you for choosing [facility]       Thank you for choosing [facility] for your care. Our goal is always to provide you with excellent care. Hearing back from our patients is one way we can continue to improve our services. Please take a few minutes to complete the written survey that you may receive in the mail after you visit with us. Thank you!        [facility] Information     [employer] gives you secure access to your electronic health record. If you see a primary care provider, you can also send messages to your care team and make appointments. If you have questions, please call your primary care clinic.  If you do not have a primary care provider, please call [phone] and they will assist you.        Care EveryWhere ID     This is your Care EveryWhere ID. This could be used by other organizations to access your [facility] medical records  [id number]        Equal Access to Services     TRAMAINE CLAYTON : Carrie Law, wadreda luqadaha, qaybta kaalgraciela long, elham gomez. So [facility] [place] [phone].    ATENCIÓN: Si habla español, tiene a wade disposición servicios gratuitos de asistencia lingüística. Llame al [phone].    We comply with applicable federal civil rights laws and [place] laws. We do not discriminate on the basis of race, color, national origin, age, disability, sex, sexual orientation, or gender identity.            After Visit  Summary       This is your record. Keep this with you and show to your community pharmacist(s) and doctor(s) at your next visit.

## 2018-04-29 NOTE — ED AVS SNAPSHOT
[facility] Emergency Department    [address] [place] [address]    Phone:  [phone]    Fax:  [phone]                                       Doreen Peralta   MRN: [id number]    Department:  [facility] Emergency Department   Date of Visit:  4/29/2018           After Visit Summary Signature Page     I have received my discharge instructions, and my questions have been answered. I have discussed any challenges I see with this plan with the nurse or doctor.    ..........................................................................................................................................  Patient/Patient Representative Signature      ..........................................................................................................................................  Patient Representative Print Name and Relationship to Patient    ..................................................               ................................................  Date                                            Time    ..........................................................................................................................................  Reviewed by Signature/Title    ...................................................              ..............................................  Date                                                            Time

## 2018-04-29 NOTE — DISCHARGE INSTRUCTIONS
Restart your vancomycin.  If you can get the other medication prior authorized, start it as soon as possible.    Dilaudid as needed for severe pain.    Benadryl if needed for nausea.    Follow-up tomorrow as scheduled.    Return for significant worsening, changes or concerns.    I hope that you can get the new medication and start to improve quickly!!!

## 2018-04-29 NOTE — ED TRIAGE NOTES
She feels extremely weak and was unable to get out of bed on her own today.  She was hospitalized with sepsis recently and released last Sunday.  She hasn't felt well all weak and had a syncopal episode on Thursday.  She had a large BM of diarrhea this morning and continues to be treated for c-dif.

## 2018-04-30 ENCOUNTER — TELEPHONE (OUTPATIENT)
Dept: GASTROENTEROLOGY | Facility: CLINIC | Age: 38
End: 2018-04-30

## 2018-04-30 NOTE — ED PROVIDER NOTES
History     Chief Complaint   Patient presents with     Generalized Weakness     The history is provided by the patient and medical records.     This is a 37-year-old female with complicated past medical history including colon dysmotility status post partial colectomy, DVT, numerous episodes of sepsis secondary to indwelling catheters, NG tube, ports, recent diagnosis of C. difficile and recent hospitalization in this facility 4/16 through 4/22 presenting with weakness.  Patient was admitted with gram-negative sepsis, discharged on Bactrim.  She was also discharged on Dificid but was unable to obtain this without prior authorization and subsequently has not been on any medications for C. difficile for the week.  She has continued to take the Bactrim and is just finishing her doses.  She notes that she has continued to have numerous stools per day to the point where she thinks she is dehydrated.  She will wake up in the middle of the night because of cramping and the need to have a bowel movement.  She has been progressively feeling more weak.  She had a syncopal episode 3 days ago.  She has not had any fevers or chills.  She does have severe nausea.  She was discharged with Benadryl and Dilaudid but is out of her Dilaudid tablets.  While hospitalized, she had acute renal insufficiency, likely ATN.  There is concerned that her vancomycin, other antibiotic and contrast likely were the causes.  Because of this, even her oral vancomycin was discontinued.  Patient notes that her abdominal pain has increased.  It is mostly in the left lower quadrant.  The worse her abdominal pain gets, the worse her nausea subsequently is.  She denies blood in her stools.    Problem List:    Patient Active Problem List    Diagnosis Date Noted     Acute renal failure (H) 04/21/2018     Priority: Medium     History of bacteremia - recurrent 04/17/2018     Priority: Medium     Tobacco abuse 04/16/2018     Priority: Medium     Iron  deficiency anemia 04/16/2018     Priority: Medium     Stool toxin PCR positive for Clostridium difficile on 4/17/18 04/16/2018     Priority: Medium     Gram negative septicemia (H) - Ochrobactrum, Stenotrophomonas & Pantoea 08/24/2017     Priority: Medium     Hypokalemia 08/24/2017     Priority: Medium     Essential hypertension 08/24/2017     Priority: Medium     Sepsis due to Klebsiella (H) 07/27/2017     Priority: Medium     Insomnia, unspecified type 03/31/2017     Priority: Medium     Abdominal pain 02/15/2017     Priority: Medium     Abdominal pain, generalized 01/28/2017     Priority: Medium     History of deep venous thrombosis 01/26/2017     Priority: Medium     Nausea and vomiting 01/26/2017     Priority: Medium     Vitamin D deficiency 01/16/2017     Priority: Medium     Chronic abdominal pain 11/15/2016     Priority: Medium     Patient is followed by Larisa Lorenzo PA-C for ongoing prescription of pain medication.  All refills should be approved by this provider, or covering partner.    Medication(s): no regular narcotics - narcotics given at ED visits  Maximum quantity per month: N/A  Clinic visit frequency required: Q 6  months     Controlled substance agreement:  Encounter-Level CSA - 11/9/15:               Controlled Substance Agreement - Scan on 11/19/2015 11:17 AM : CONTROLLED SUBSTANCE AGREEMENT 11/09/15 (below)          Encounter-Level CSA - 2/27/15:               Controlled Substance Agreement - Scan on 3/12/2015  7:50 AM : Controlled Medication Agreement 02/27/15 (below)            Pain Clinic evaluation in the past: Yes    DIRE Total Score(s):  No flowsheet data found.    Last [facility] website verification:  done on 11/15/16   [email or web address]        Attention to G-tube (H) 11/08/2016     Priority: Medium     Fever 10/16/2016     Priority: Medium     Coagulation defect (H) [D68.9] 09/16/2016     Priority: Medium     Chronic diarrhea 07/22/2016     Priority: Medium     Migraine  07/20/2016     Priority: Medium     Positive blood culture - Klebsiella oxytoca from Port-a-cath culture 07/18/2016     Priority: Medium     Mitral regurgitation mild-mod by Echo June 2016 07/18/2016     Priority: Medium     Anemia, iron deficiency 07/17/2016     Priority: Medium     Anemia in other chronic diseases classified elsewhere 06/14/2016     Priority: Medium     Munchausen syndrome - previously suspected 06/14/2016     Priority: Medium     Long-term (current) use of anticoagulants [Z79.01] 05/16/2016     Priority: Medium     Anxiety 05/16/2016     Priority: Medium     S/P partial resection of colon 04/11/2016     Priority: Medium     Malnutrition (H) 04/11/2016     Priority: Medium     Jejunostomy tube present (H) 03/21/2016     Priority: Medium     Malfunctioning jejunostomy tube (H) 12/22/2015     Priority: Medium     Malfunction of gastrostomy tube (H) 11/19/2015     Priority: Medium     PEG tube malfunction (H) 10/16/2015     Priority: Medium     Health Care Home 01/16/2015     Priority: Medium     *See Letters for [facility] Care Plan: My Access Plan           PEG (percutaneous endoscopic gastrostomy) status 11/05/2014     Priority: Medium     Gastroparesis 04/11/2014     Priority: Medium     Overview:   Had ileostomy performed at [facility] in Jan 2012 as treatment       Migraines 04/11/2014     Priority: Medium     4/11/2014  With periods, every other month.       Intermittent asthma 04/11/2014     Priority: Medium     4/11/2014   With URIs, allergies       Allergic rhinitis 04/11/2014     Priority: Medium     Problem list name updated by automated process. Provider to review       Abnormal Pap smear of cervix 04/11/2014     Priority: Medium     4/11/2014  S/p colp and LEEP. Sees OB/GYN at [facility]. Pap every year x20 years - normal since.       S/P LEEP of cervix 02/06/2014     Priority: Medium     Patellofemoral stress syndrome 01/18/2014     Priority: Medium     Hx SBO 10/09/2013     Priority: Medium      4/11/2014  Recurrent. Sees GI (Dr. Redding - at [facility]) every 6 months or so.  Sees feeding tube nurse next week.       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 04/11/2013     Priority: Medium     Constipation by delayed colonic transit 10/24/2012     Priority: Medium     Atopic rhinitis 03/20/2009     Priority: Medium     Hyperbilirubinemia 03/11/2009     Priority: Medium        Past Medical History:    Past Medical History:   Diagnosis Date     Asthma      Bilateral ovarian cysts      Cervical cancer (H) 01/01/2008     Chronic pain      Colonic dysmotility      Constipation      E. coli sepsis (H) 5/8/2016     Enteritis      Fungemia 5/5/2016     Gastro-oesophageal reflux disease      H/O ileostomy      Hx of abnormal Pap smear      Hypertension      IBS (irritable bowel syndrome)      Other chronic pain      PONV (postoperative nausea and vomiting)      Thrombosis, hepatic vein (H)        Past [facility] History:    Past [facility] History:   Procedure Laterality Date     COLONOSCOPY  7/10/2012    Procedure: COLONOSCOPY;;  Surgeon: Nicole Redding MD;  [facility]     COLONOSCOPY N/A 2/19/2017    Procedure: COLONOSCOPY;  Surgeon: Randell Muller MD;  [facility] GI     COLONOSCOPY N/A 2/21/2017    Procedure: COLONOSCOPY;  Surgeon: Randell Muller MD;  Location[facility]     ECHO CHELO  7/19/2016          ENDOSCOPIC INSERTION TUBE GASTROSTOMY N/A 1/21/2016    Procedure: ENDOSCOPIC INSERTION TUBE GASTROSTOMY;  Surgeon: Nicole Redding MD;  [facility]     ESOPHAGOSCOPY, GASTROSCOPY, DUODENOSCOPY (EGD), COMBINED  7/10/2012    Procedure: COMBINED ESOPHAGOSCOPY, GASTROSCOPY, DUODENOSCOPY (EGD);  Upper Endoscopy, Ileoscopy    Latex Allergy  with biopsies;  Surgeon: Nicole Redding MD;  [facility]     ESOPHAGOSCOPY, GASTROSCOPY, DUODENOSCOPY (EGD), COMBINED N/A 11/5/2014    Procedure: COMBINED ESOPHAGOSCOPY, GASTROSCOPY, DUODENOSCOPY (EGD);  Surgeon: Nicole Redding MD;  [facility]      HC REPLACE DUODENOSTOMY/JEJUNOSTOMY TUBE PERCUTANEOUS N/A 8/27/2015    Procedure: REPLACE GASTROJEJUNOSTOMY TUBE, PERCUTANEOUS;  Surgeon: Mio Holder MD;  [facility]     HC REPLACE DUODENOSTOMY/JEJUNOSTOMY TUBE PERCUTANEOUS N/A 1/7/2016    Procedure: REPLACE JEJUNOSTOMY TUBE, PERCUTANEOUS;  Surgeon: Elsa Medel MD;  [facility]     HC REPLACE DUODENOSTOMY/JEJUNOSTOMY TUBE PERCUTANEOUS N/A 1/28/2016    Procedure: REPLACE JEJUNOSTOMY TUBE, PERCUTANEOUS;  Surgeon: Elsa Medel MD;  [facility]     HC REPLACE GASTROSTOMY/CECOSTOMY TUBE PERCUTANEOUS Left 5/19/2015    Procedure: REPLACE GASTROSTOMY TUBE, PERCUTANEOUS;  Surgeon: Melecio Morejon Chi, MD;  Location[facility]     HC UGI ENDOSCOPY W PLACEMENT GASTROSTOMY TUBE PERCUT N/A 10/1/2015    Procedure: COMBINED ESOPHAGOSCOPY, GASTROSCOPY, DUODENOSCOPY (EGD), PLACE PERCUTANEOUS ENDOSCOPIC GASTROSTOMY TUBE;  Surgeon: Mio Holder MD;  [facility]     INSERT PORT VASCULAR ACCESS Right 7/20/2017    Procedure: INSERT PORT VASCULAR ACCESS;  Chest Port Placement  **Latex Allergy**;  Surgeon: Coy Rocha PA-C;  [facility]     LAPAROSCOPIC ASSISTED COLECTOMY  1/20/2012    Procedure:LAPAROSCOPIC ASSISTED COLECTOMY; Laparoscopic Ileostomy       LAPAROSCOPIC ASSISTED COLECTOMY LEFT (DESCENDING)  10/24/2012    Procedure: LAPAROSCOPIC ASSISTED COLECTOMY LEFT (DESCENDING);   Hand Assisted Laproscopic Subtotal abdominal Colectomy,Iieorectal anastamosis, Ileostomy Closure.       LAPAROSCOPIC ASSISTED INSERTION TUBE JEJUNOSTOMY N/A 10/16/2015    Procedure: LAPAROSCOPIC ASSISTED INSERTION TUBE JEJUNOSTOMY;  Surgeon: Elsa Medel MD;  [facility]     LAPAROSCOPIC CHOLECYSTECTOMY  2002    [facility] ctr. stones duct     LAPAROSCOPIC ILEOSTOMY  1/20/2012    [facility], loop     LAPAROSCOPIC OOPHORECTOMY Right 2009    [facility]     LAPAROTOMY EXPLORATORY N/A 1/28/2016    Procedure: LAPAROTOMY EXPLORATORY;  Surgeon: Lizy  Elsa Rogers MD;  [facility]     LEEP TX, CERVICAL  2009    [facility]     PICC INSERTION Left 10/21/2015    5fr DL Power PICC, 37cm (2cm external) in the L basilic vein w/ tip in the SVC RA junction.     REMOVE GASTROSTOMY TUBE ADULT N/A 12/12/2014    Procedure: REMOVE GASTROSTOMY TUBE ADULT;  Surgeon: Nicole Redding MD;  [facility]     REMOVE PORT VASCULAR ACCESS Right 6/30/2016    Procedure: REMOVE PORT VASCULAR ACCESS;  Surgeon: Pradeep Orosco MD;  [facility]     REMOVE PORT VASCULAR ACCESS Right 9/8/2017    Procedure: REMOVE PORT VASCULAR ACCESS;  right side mediport removal;  Surgeon: Zechariah Worthington MD;  Location: [facility] OR     replace GASTROSTOMY TUBE ADULT  5/19/15       Family History:    Family History   Problem Relation Age of Onset     Thyroid Disease Mother      Sjogren's Mother      GASTROINTESTINAL DISEASE Mother      Intermittent nausea vomiting diarrhea     Colon Polyps Mother      Prostate Problems Father      prostate enlargement     Lupus Maternal Grandmother      CANCER Maternal Grandfather      Lung     Colon Cancer Maternal Grandfather 65     CANCER Paternal Grandmother      Lung      CEREBROVASCULAR DISEASE Paternal Grandmother      DIABETES Paternal Grandmother      Cardiovascular Paternal Grandmother      CHF     CANCER Paternal Grandfather      Lung     Glaucoma Paternal Grandfather      Abdominal Aortic Aneurysm Other      Macular Degeneration No family hx of        Social History:  Marital Status:   [2]  Social History   Substance Use Topics     Smoking status: Current Some Day Smoker     Packs/day: 1.00     Years: 4.00     Types: Cigarettes     Last attempt to quit: 1/1/2004     Smokeless tobacco: Former User     Alcohol use No        Medications:      ACETAMINOPHEN PO   [place] 650 MG TABS   cloNIDine (CATAPRES) 0.2 MG tablet   diphenhydrAMINE HCl 50 MG TABS   DULoxetine (CYMBALTA) 60 MG EC capsule   HYDROmorphone (DILAUDID) 2 MG tablet  "  ondansetron (ZOFRAN) 8 MG tablet   sulfamethoxazole-trimethoprim (BACTRIM DS/SEPTRA DS) 800-160 MG per tablet   traZODone (DESYREL) 100 MG tablet   vancomycin (VANCOCIN) 125 MG capsule   albuterol (2.5 MG/3ML) 0.083% neb solution   albuterol 90 MCG/ACT inhaler   fidaxomicin (DIFICID) 200 MG tablet   ipratropium (ATROVENT) 0.02 % neb solution   SUMAtriptan (IMITREX) 50 MG tablet         Review of Systems   All other ROS reviewed and are negative or non-contributory except as stated in HPI.     Physical Exam   BP: (!) 137/102  Pulse: 83  Heart Rate: 73  Temp: 98.9  F (37.2  C)  Resp: 18  Height: 167.6 cm (5' 6\")  Weight: 77.6 kg (171 lb)  SpO2: 100 %      Physical Exam   Constitutional: She appears well-developed and well-nourished.   Chronically ill-appearing female lying in the bed.  Anxious and uncomfortable appearing.   HENT:   Head: Normocephalic.   Nose: Nose normal.   Dry mucous membranes with some whiteness on her tongue   Eyes: Conjunctivae and EOM are normal.   Neck: Normal range of motion. Neck supple.   Cardiovascular: Regular rhythm, normal heart sounds and intact distal pulses.    Tachycardia   Pulmonary/Chest: Effort normal and breath sounds normal.   Abdominal: Soft.   Diffuse abdominal tenderness without rebound or mass.  Well-healed scars.   Musculoskeletal: Normal range of motion. She exhibits no edema.   Neurological: She is alert. She exhibits normal muscle tone.   Skin: Skin is warm and dry. She is not diaphoretic.   Numerous areas of ecchymosis on bilateral forearms and feet secondary to IV sticks.   Psychiatric: Her behavior is normal.   Anxious   Vitals reviewed.      ED Course (with Medical Decision Making)    Pt seen and examined by me.  RN and EPIC notes reviewed.      Patient with long history of abdominal pain, abdominal surgeries, dehydration, recurrent infections presenting with weakness, nausea, diarrhea, abdominal pain.  She has had C. difficile that has been untreated for a " week.    We have had difficulty over time getting IV access, but I am going to attempt this and give her some fluids, pain and nausea medications, check labs.    After numerous IV attempts including ultrasound-guided, IV access was finally obtained.    Patient was given 2 L of fluids, Benadryl for her nausea, Dilaudid for pain.  She notes significant improvement in her symptoms.    Patient's white count is normal.  Lactate is normal.  She still has some elevated creatinine.    There are just a couple of case reports of vancomycin actually being absorbed from colon inflammation causing measurable blood levels.  Patient does not have much colon left and her doses of vancomycin have been quite low.  I do not think that they would cause any renal toxicity.  Unfortunately, we have not been able to obtain Dificid for her as the prior authorization has not gone through yet and it is the weekend.    I am planning on discharging her home with a small amount of Dilaudid, continue Benadryl, drink plenty of fluids.  Restart vancomycin.  If the prior authorization goes through for Dificid, start Dificid.  She has an appointment this week for endoscopy and colonoscopy.  She can talk to her provider during her preop visit about any further refills.       Procedures       EKG Interpretation:      Interpreted by Arely Lam  Time reviewed:0910   Symptoms at time of EKG: Nausea, abdominal pain  Rhythm: Normal sinus rhythm  Rate: 78  Axis: Normal -P wave access abnormal in inferior leads  Ectopy: None  Conduction: Normal  ST Segments/ T Waves: No acute ischemic changes and negative precordial T waves  Q Waves: None  Comparison to prior: Similar to 8/16    Clinical Impression: non-specific EKG        Results for orders placed or performed during the hospital encounter of 04/29/18 (from the past 24 hour(s))   CBC with platelets differential   Result Value Ref Range    WBC 9.6 4.0 - 11.0 10e9/L    RBC Count 4.64 3.8 - 5.2 10e12/L     Hemoglobin 11.1 (L) 11.7 - 15.7 g/dL    Hematocrit 35.1 35.0 - 47.0 %    MCV 76 (L) 78 - 100 fl    MCH 23.9 (L) 26.5 - 33.0 pg    MCHC 31.6 31.5 - 36.5 g/dL    RDW 16.7 (H) 10.0 - 15.0 %    Platelet Count 877 (H) 150 - 450 10e9/L    Diff Method Automated Method     % Neutrophils 67.1 %    % Lymphocytes 26.4 %    % Monocytes 5.4 %    % Eosinophils 0.4 %    % Basophils 0.5 %    % Immature Granulocytes 0.2 %    Absolute Neutrophil 6.4 1.6 - 8.3 10e9/L    Absolute Lymphocytes 2.5 0.8 - 5.3 10e9/L    Absolute Monocytes 0.5 0.0 - 1.3 10e9/L    Absolute Eosinophils 0.0 0.0 - 0.7 10e9/L    Absolute Basophils 0.1 0.0 - 0.2 10e9/L    Abs Immature Granulocytes 0.0 0 - 0.4 10e9/L   Comprehensive metabolic panel   Result Value Ref Range    Sodium 135 133 - 144 mmol/L    Potassium 4.8 3.4 - 5.3 mmol/L    Chloride 103 94 - 109 mmol/L    Carbon Dioxide 21 20 - 32 mmol/L    Anion Gap 11 3 - 14 mmol/L    Glucose 84 70 - 99 mg/dL    Urea Nitrogen 18 7 - 30 mg/dL    Creatinine 1.79 (H) 0.52 - 1.04 mg/dL    GFR Estimate 32 (L) >60 mL/min/1.7m2    GFR Estimate If Black 39 (L) >60 mL/min/1.7m2    Calcium 9.3 8.5 - 10.1 mg/dL    Bilirubin Total 0.3 0.2 - 1.3 mg/dL    Albumin 3.7 3.4 - 5.0 g/dL    Protein Total 9.8 (H) 6.8 - 8.8 g/dL    Alkaline Phosphatase 206 (H) 40 - 150 U/L    ALT 46 0 - 50 U/L    AST 40 0 - 45 U/L   Magnesium   Result Value Ref Range    Magnesium 2.2 1.6 - 2.3 mg/dL   Phosphorus   Result Value Ref Range    Phosphorus 2.9 2.5 - 4.5 mg/dL   Lactic acid whole blood   Result Value Ref Range    Lactic Acid 1.2 0.7 - 2.0 mmol/L     *Note: Due to a large number of results and/or encounters for the requested time period, some results have not been displayed. A complete set of results can be found in Results Review.       Medications   0.9% sodium chloride BOLUS (0 mLs Intravenous Stopped 4/29/18 3614)   HYDROmorphone (PF) (DILAUDID) injection 0.5 mg (0.5 mg Intravenous Given 4/29/18 1252)   diphenhydrAMINE (BENADRYL) injection  50 mg (50 mg Intravenous Given 4/29/18 1206)   fidaxomicin (DIFICID) tablet 200 mg (200 mg Oral Given 4/29/18 1242)   diphenhydrAMINE (BENADRYL) injection 50 mg (50 mg Intravenous Given 4/29/18 1403)       Assessments & Plan     I have reviewed the findings, diagnosis, plan and need for follow up with the patient.    Discharge Medication List as of 4/29/2018  3:14 PM      START taking these medications    Details   HYDROmorphone (DILAUDID) 2 MG tablet Take 1 tablet (2 mg) by mouth every 6 hours as needed for severe pain maximum 6 tablet(s) per day, Disp-12 tablet, R-0, Local Print      vancomycin (VANCOCIN) 125 MG capsule Take 1 capsule (125 mg) by mouth 4 times daily for 14 days, Disp-56 capsule, R-0, Local Print             Final diagnoses:   C. difficile enteritis   Elevated serum creatinine   Nausea     Disposition: Patient discharged home with family in stable condition.  Plan as above.  Return for concerns.    Note: Chart documentation done in part with Dragon Voice Recognition software. Although reviewed after completion, some word and grammatical errors may remain.     4/29/2018   [facility] EMERGENCY DEPARTMENT     Arely Lam MD  04/30/18 0012

## 2018-04-30 NOTE — TELEPHONE ENCOUNTER
Patient scheduled for Double     Indication for procedure. C-diff and bacteremia     Referring Provider. Dominic Sutton MD    ? No     Arrival time verified? Patient instructed to arrive at 1130 for MAC     Facility location verified? [address], 1st [address].     Instructions given regarding prep and procedure. Transportation policy reviewed; verbalized understanding. Spoke with patient about preparation. Pt will use miralax as she has very minimal bowel.     Prep Type Miralax     Are you taking any anticoagulants or blood thinners? Denies     Instructions given? Yes     Electronic implanted devices? Denies     Pre procedure teaching completed? Yes    Transportation from procedure? Yes, mother with be with patient    H&P / Pre op physical completed? Recent inpatient stay and ED visit 4/29    Pee Francisco RN

## 2018-04-30 NOTE — TELEPHONE ENCOUNTER
The patient was contacted and given the number to call and schedule an appointment with Infectious Disease. The patient had no further questions or concerns.    Renetta Jimenez CMA

## 2018-05-02 ENCOUNTER — ANESTHESIA EVENT (OUTPATIENT)
Dept: GASTROENTEROLOGY | Facility: CLINIC | Age: 38
End: 2018-05-02
Payer: MEDICARE

## 2018-05-03 ENCOUNTER — HOSPITAL ENCOUNTER (OUTPATIENT)
Facility: CLINIC | Age: 38
Discharge: HOME | End: 2018-05-03
Attending: INTERNAL MEDICINE | Admitting: INTERNAL MEDICINE
Payer: MEDICARE

## 2018-05-03 ENCOUNTER — ANESTHESIA (OUTPATIENT)
Dept: GASTROENTEROLOGY | Facility: CLINIC | Age: 38
End: 2018-05-03
Payer: MEDICARE

## 2018-05-03 ENCOUNTER — SURGERY (OUTPATIENT)
Age: 38
End: 2018-05-03

## 2018-05-03 VITALS
RESPIRATION RATE: 19 BRPM | DIASTOLIC BLOOD PRESSURE: 83 MMHG | HEART RATE: 50 BPM | OXYGEN SATURATION: 100 % | SYSTOLIC BLOOD PRESSURE: 126 MMHG

## 2018-05-03 LAB
COLONOSCOPY: NORMAL
UPPER GI ENDOSCOPY: NORMAL

## 2018-05-03 PROCEDURE — 37000009 ZZH ANESTHESIA TECHNICAL FEE, EACH ADDTL 15 MIN: Performed by: INTERNAL MEDICINE

## 2018-05-03 PROCEDURE — 40000141 ZZH STATISTIC PERIPHERAL IV START W/O US GUIDANCE

## 2018-05-03 PROCEDURE — 43239 EGD BIOPSY SINGLE/MULTIPLE: CPT | Performed by: INTERNAL MEDICINE

## 2018-05-03 PROCEDURE — 25000128 H RX IP 250 OP 636: Performed by: NURSE ANESTHETIST, CERTIFIED REGISTERED

## 2018-05-03 PROCEDURE — 37000008 ZZH ANESTHESIA TECHNICAL FEE, 1ST 30 MIN: Performed by: INTERNAL MEDICINE

## 2018-05-03 PROCEDURE — 45380 COLONOSCOPY AND BIOPSY: CPT | Performed by: INTERNAL MEDICINE

## 2018-05-03 PROCEDURE — 88305 TISSUE EXAM BY PATHOLOGIST: CPT | Performed by: INTERNAL MEDICINE

## 2018-05-03 RX ORDER — ONDANSETRON 2 MG/ML
4 INJECTION INTRAMUSCULAR; INTRAVENOUS EVERY 30 MIN PRN
Status: CANCELLED | OUTPATIENT
Start: 2018-05-03

## 2018-05-03 RX ORDER — ONDANSETRON 2 MG/ML
INJECTION INTRAMUSCULAR; INTRAVENOUS PRN
Status: DISCONTINUED | OUTPATIENT
Start: 2018-05-03 | End: 2018-05-03

## 2018-05-03 RX ORDER — SODIUM CHLORIDE, SODIUM LACTATE, POTASSIUM CHLORIDE, CALCIUM CHLORIDE 600; 310; 30; 20 MG/100ML; MG/100ML; MG/100ML; MG/100ML
INJECTION, SOLUTION INTRAVENOUS CONTINUOUS
Status: CANCELLED | OUTPATIENT
Start: 2018-05-03

## 2018-05-03 RX ORDER — SODIUM CHLORIDE, SODIUM LACTATE, POTASSIUM CHLORIDE, CALCIUM CHLORIDE 600; 310; 30; 20 MG/100ML; MG/100ML; MG/100ML; MG/100ML
INJECTION, SOLUTION INTRAVENOUS CONTINUOUS PRN
Status: DISCONTINUED | OUTPATIENT
Start: 2018-05-03 | End: 2018-05-03

## 2018-05-03 RX ORDER — ONDANSETRON 4 MG/1
4 TABLET, ORALLY DISINTEGRATING ORAL EVERY 30 MIN PRN
Status: CANCELLED | OUTPATIENT
Start: 2018-05-03

## 2018-05-03 RX ORDER — DIPHENHYDRAMINE HYDROCHLORIDE 50 MG/ML
INJECTION INTRAMUSCULAR; INTRAVENOUS PRN
Status: DISCONTINUED | OUTPATIENT
Start: 2018-05-03 | End: 2018-05-03

## 2018-05-03 RX ORDER — NALOXONE HYDROCHLORIDE 0.4 MG/ML
.1-.4 INJECTION, SOLUTION INTRAMUSCULAR; INTRAVENOUS; SUBCUTANEOUS
Status: CANCELLED | OUTPATIENT
Start: 2018-05-03 | End: 2018-05-04

## 2018-05-03 RX ORDER — HYDROMORPHONE HYDROCHLORIDE 1 MG/ML
.3-.5 INJECTION, SOLUTION INTRAMUSCULAR; INTRAVENOUS; SUBCUTANEOUS EVERY 5 MIN PRN
Status: CANCELLED | OUTPATIENT
Start: 2018-05-03

## 2018-05-03 RX ORDER — PROPOFOL 10 MG/ML
INJECTION, EMULSION INTRAVENOUS CONTINUOUS PRN
Status: DISCONTINUED | OUTPATIENT
Start: 2018-05-03 | End: 2018-05-03

## 2018-05-03 RX ORDER — PROPOFOL 10 MG/ML
INJECTION, EMULSION INTRAVENOUS PRN
Status: DISCONTINUED | OUTPATIENT
Start: 2018-05-03 | End: 2018-05-03

## 2018-05-03 RX ADMIN — MIDAZOLAM 2 MG: 1 INJECTION INTRAMUSCULAR; INTRAVENOUS at 13:15

## 2018-05-03 RX ADMIN — SODIUM CHLORIDE, POTASSIUM CHLORIDE, SODIUM LACTATE AND CALCIUM CHLORIDE: 600; 310; 30; 20 INJECTION, SOLUTION INTRAVENOUS at 13:10

## 2018-05-03 RX ADMIN — ONDANSETRON 4 MG: 2 INJECTION INTRAMUSCULAR; INTRAVENOUS at 13:39

## 2018-05-03 RX ADMIN — PROPOFOL 100 MCG/KG/MIN: 10 INJECTION, EMULSION INTRAVENOUS at 13:15

## 2018-05-03 RX ADMIN — PROPOFOL 30 MG: 10 INJECTION, EMULSION INTRAVENOUS at 13:21

## 2018-05-03 RX ADMIN — DIPHENHYDRAMINE HYDROCHLORIDE 50 MG: 50 INJECTION, SOLUTION INTRAMUSCULAR; INTRAVENOUS at 13:15

## 2018-05-03 RX ADMIN — PROPOFOL 20 MG: 10 INJECTION, EMULSION INTRAVENOUS at 13:18

## 2018-05-03 NOTE — ANESTHESIA CARE TRANSFER NOTE
Patient: Doreen Peralta    Procedure(s):  EGD/Colonoscopy  - Wound Class: II-Clean Contaminated   - Wound Class: II-Clean Contaminated    Diagnosis: Bacteremia,C. difficile colitis       Diagnosis Additional Information: No value filed.    Anesthesia Type:   No value filed.     Note:  Airway :Room Air  Destination: endoscopy.        Vitals: (Last set prior to Anesthesia Care Transfer)    CRNA VITALS  5/3/2018 1337 - 5/3/2018 1415      5/3/2018             NIBP: 130/77    Pulse: 65    NIBP Mean: 95    Ht Rate: 64    SpO2: 100 %                Electronically Signed By: GERARD Sparrow CRNA  May 3, 2018  2:15 PM

## 2018-05-03 NOTE — ANESTHESIA POSTPROCEDURE EVALUATION
Patient: Doreen Peralta    Procedure(s):  EGD/Colonoscopy  - Wound Class: II-Clean Contaminated   - Wound Class: II-Clean Contaminated    Diagnosis:Bacteremia,C. difficile colitis       Diagnosis Additional Information: No value filed.    Anesthesia Type:  No value filed.    Note:  Anesthesia Post Evaluation    Patient location during evaluation: Endoscopy Recovery  Patient participation: Able to fully participate in evaluation  Level of consciousness: awake and alert  Pain management: satisfactory to patient  Airway patency: patent  Cardiovascular status: blood pressure returned to baseline and hemodynamically stable  Respiratory status: room air  Hydration status: euvolemic  PONV: controlled     Anesthetic complications: None          Last vitals:  Vitals:    05/03/18 1500 05/03/18 1505 05/03/18 1510   BP: 126/83     Pulse:      Resp: 13 17 19   SpO2: 100% 100% 100%         Electronically Signed By: Andre Figueroa MD  May 3, 2018  4:27 PM

## 2018-05-03 NOTE — IP AVS SNAPSHOT
MRN:[id number]                      After Visit Summary   5/3/2018    Doreen Peralta    MRN: [id number]           Thank you!     Thank you for choosing [facility] for your care. Our goal is always to provide you with excellent care. Hearing back from our patients is one way we can continue to improve our services. Please take a few minutes to complete the written survey that you may receive in the mail after you visit with us. Thank you!        Patient Information     Date Of Birth          1980        About your hospital stay     You were admitted on:  May 3, 2018 You last received care in the:  [facility], Endoscopy    You were discharged on:  May 3, 2018       Who to Call     For medical emergencies, please call 911.  For non-urgent questions about your medical care, please call your primary care provider or clinic, [phone]  For questions related to your surgery, please call your surgery clinic        Attending Provider     Provider Specialty    Loi Black MD Gastroenterology       Primary Care Provider Office Phone # Fax #    Franny Antoine [phone]      Your next 10 appointments already scheduled     May 16, 2018 10:00 AM CDT   [facility] Visit with Dominic Sutton MD   [facility]    [address] [place] [address] [phone]            May 23, 2018  2:00 PM CDT   (Arrive by 1:45 PM)   Return Visit with Todd Price MD   [facility] ([facility])    [address] [place] [address]  [address]  [facility] [address] [phone]              Further instructions from your care team       Discharge Instructions after Colonoscopy  or Sigmoidoscopy    Today you had a _xDischarge Instructions after  Upper Endoscopy (EGD)    Activity and Diet  You were given medicine for pain. You may be dizzy or sleepy.  For 24 hours:    Do  not drive or use heavy equipment.    Do not make important decisions.    Do not drink any alcohol.  __x_ You may return to your regular diet.    Discomfort  You may have a sore throat for 2 to 3 days. It may help to:    Avoid hot liquids for 24 hours.    Use sore throat lozenges.    Gargle as needed with salt water up to 4 times a day. Mix 1 cup of warm water  with 1 teaspoon of salt. Do not swallow.      You may take Tylenol (acetaminophen) for pain unless your doctor has told you not to.    Do not take aspirin or ibuprofen (Advil, Motrin) or other NSAIDS  (anti-inflammatory drugs) for ___ days.    Follow-up  ___ We took small tissue samples for study. If you do not have a follow-up visit scheduled,  call your provider s office in 2 weeks for the results.    Other instructions________________________________________________________    When to call us:  Problems are rare. Call right away if you have:    Unusual throat pain or trouble swallowing    Unusual pain in belly or chest that is not relieved by belching or passing air    Black stools (tar-like looking bowel movement)    Temperature above 100.6  F. (37.5  C).    If you vomit blood or have severe pain, go to an emergency room.    If you have questions, call:  Monday to Friday, 7 a.m. to 4:30 p.m.: Endoscopy: [phone] (We may have to call you back)    After hours: Hospital: [phone] (Ask for the GI fellow on call)___ Colonoscopy ____ Sigmoidoscopy    Activity and Diet  You were given medicine for pain. You may be dizzy or sleepy.  For 24 hours:    Do not drive or use heavy equipment.    Do not make important decisions.    Do not drink any alcohol.  You may return to your normal diet and medicines.    Discomfort    Air was placed in your colon during the exam in order to see it. Walking helps to pass the air.    You may take Tylenol (acetaminophen) for pain unless your doctor has told you not to.  Do not take aspirin or ibuprofen (Advil, Motrin, or other  anti-inflammatory  drugs) for _____ days.    Follow-up  ____ We took small tissue samples or polyps to study. Your doctor will call you with the results  within two weeks.    When to call:    Call right away if you have:    Unusual pain in belly or chest pain not relieved with passing air.    More than 1 to 2 Tablespoons of bleeding from your rectum.    Fever above 100.6  F (37.5  C).    If you have severe pain, bleeding, or shortness of breath, go to an emergency room.    If you have questions, call:  Monday to Friday, 7 a.m. to 4:30 p.m.  Endoscopy: [phone] (We may have to call you back)    After hours  Hospital: [phone] (Ask for the GI fellow on call)    Pending Results     No orders found from 5/1/2018 to 5/4/2018.            Admission Information     Date & Time Provider Department Dept. Phone    5/3/2018 Loi Black MD [facility], Endoscopy [phone]      Your Vitals Were     Blood Pressure Pulse Respirations Last Period Pulse Oximetry       121/72 50 17 04/25/2018 (Exact Date) 100%       MyChart Information     [employer] gives you secure access to your electronic health record. If you see a primary care provider, you can also send messages to your care team and make appointments. If you have questions, please call your primary care clinic.  If you do not have a primary care provider, please call [phone] and they will assist you.        Care EveryWhere ID     This is your Care EveryWhere ID. This could be used by other organizations to access your [facility] medical records  [id number]        Equal Access to Services     [facility] : Hadii caitie Law, waaxda luqadaha, qaybta kaalelham adame. So [facility] [phone].    ATENCIÓN: Si habla español, tiene a wade disposición servicios gratuitos de asistencia lingüística. Llame al [phone].    We comply with applicable federal civil rights laws and [place] laws. We do not  discriminate on the basis of race, color, national origin, age, disability, sex, sexual orientation, or gender identity.               Review of your medicines      UNREVIEWED medicines. Ask your doctor about these medicines        Dose / Directions    ACETAMINOPHEN PO        Dose:  500-1000 mg   Take 500-1,000 mg by mouth every 6 hours as needed for pain   Refills:  0       albuterol (2.5 MG/3ML) 0.083% neb solution   Used for:  Gastrostomy tube dependent (H), SBO (small bowel obstruction), Chronic abdominal pain, Chronic diarrhea        Dose:  2.5 mg   Take 1 vial (2.5 mg) by nebulization every 4 hours as needed for shortness of breath / dyspnea or wheezing   Quantity:  360 mL   Refills:  0       albuterol 90 MCG/ACT inhaler        Dose:  2 puff   Inhale 2 puffs into the lungs every 6 hours as needed   Refills:  0       Alfalfa 650 MG Tabs        Refills:  0       cloNIDine 0.2 MG tablet   Commonly known as:  CATAPRES   Used for:  Anxiety        Dose:  0.4 mg   Take 2 tablets (0.4 mg) by mouth every evening - DUE FOR FOLLOW UP   Quantity:  60 tablet   Refills:  0       diphenhydrAMINE HCl 50 MG Tabs   Commonly known as:  BENADRYL        Dose:  50 mg   Take 50 mg by mouth every 6 hours as needed (nausea)   Quantity:  30 tablet   Refills:  0       DULoxetine 60 MG EC capsule   Commonly known as:  CYMBALTA   Used for:  Abdominal pain, epigastric, Abdominal migraine, not intractable        TAKE 1 CAPSULE(60 MG) BY MOUTH DAILY   Quantity:  90 capsule   Refills:  1       HYDROmorphone 2 MG tablet   Commonly known as:  DILAUDID        Dose:  2 mg   Take 1 tablet (2 mg) by mouth every 6 hours as needed for severe pain maximum 6 tablet(s) per day   Quantity:  12 tablet   Refills:  0       ipratropium 0.02 % neb solution   Commonly known as:  ATROVENT   Used for:  Gastrostomy tube dependent (H), SBO (small bowel obstruction), Chronic abdominal pain, Chronic diarrhea        Dose:  0.5 mg   Take 2.5 mLs (0.5 mg) by  nebulization every 6 hours as needed for wheezing   Quantity:  225 mL   Refills:  0       ondansetron 8 MG tablet   Commonly known as:  ZOFRAN   Used for:  Non-intractable cyclical vomiting with nausea        Dose:  8 mg   Take 1 tablet (8 mg) by mouth every 8 hours as needed for nausea   Quantity:  9 tablet   Refills:  0       SUMAtriptan 50 MG tablet   Commonly known as:  IMITREX   Used for:  Migraine without aura and without status migrainosus, not intractable        Dose:   mg   Take 1-2 tablets ( mg) by mouth at onset of headache for migraine - LAST REFILL, DUE FOR FOLLOW UP   Quantity:  9 tablet   Refills:  0       traZODone 100 MG tablet   Commonly known as:  DESYREL   Used for:  Insomnia, unspecified type        Dose:   mg   Take 0.5-1 tablets ( mg) by mouth nightly as needed for sleep   Quantity:  90 tablet   Refills:  8       vancomycin 125 MG capsule   Commonly known as:  VANCOCIN        Dose:  125 mg   Take 1 capsule (125 mg) by mouth 4 times daily for 14 days   Quantity:  56 capsule   Refills:  0                Protect others around you: Learn how to safely use, store and throw away your medicines at www.disposemymeds.org.             Medication List: This is a list of all your medications and when to take them. Check marks below indicate your daily home schedule. Keep this list as a reference.      Medications           Morning Afternoon Evening Bedtime As Needed    ACETAMINOPHEN PO   Take 500-1,000 mg by mouth every 6 hours as needed for pain                                albuterol (2.5 MG/3ML) 0.083% neb solution   Take 1 vial (2.5 mg) by nebulization every 4 hours as needed for shortness of breath / dyspnea or wheezing                                albuterol 90 MCG/ACT inhaler   Inhale 2 puffs into the lungs every 6 hours as needed                                Alfalfa 650 MG Tabs                                cloNIDine 0.2 MG tablet   Commonly known as:  CATAPRES   Take  2 tablets (0.4 mg) by mouth every evening - DUE FOR FOLLOW UP                                diphenhydrAMINE HCl 50 MG Tabs   Commonly known as:  BENADRYL   Take 50 mg by mouth every 6 hours as needed (nausea)                                DULoxetine 60 MG EC capsule   Commonly known as:  CYMBALTA   TAKE 1 CAPSULE(60 MG) BY MOUTH DAILY                                HYDROmorphone 2 MG tablet   Commonly known as:  DILAUDID   Take 1 tablet (2 mg) by mouth every 6 hours as needed for severe pain maximum 6 tablet(s) per day                                ipratropium 0.02 % neb solution   Commonly known as:  ATROVENT   Take 2.5 mLs (0.5 mg) by nebulization every 6 hours as needed for wheezing                                ondansetron 8 MG tablet   Commonly known as:  ZOFRAN   Take 1 tablet (8 mg) by mouth every 8 hours as needed for nausea                                SUMAtriptan 50 MG tablet   Commonly known as:  IMITREX   Take 1-2 tablets ( mg) by mouth at onset of headache for migraine - LAST REFILL, DUE FOR FOLLOW UP                                traZODone 100 MG tablet   Commonly known as:  DESYREL   Take 0.5-1 tablets ( mg) by mouth nightly as needed for sleep                                vancomycin 125 MG capsule   Commonly known as:  VANCOCIN   Take 1 capsule (125 mg) by mouth 4 times daily for 14 days

## 2018-05-03 NOTE — ANESTHESIA PREPROCEDURE EVALUATION
Anesthesia Evaluation     . Pt has had prior anesthetic. Type: General and MAC           ROS/MED HX    ENT/Pulmonary:     (+)Mild Persistent asthma Treatment: Inhaler prn,  , . .    Neurologic:  - neg neurologic ROS     Cardiovascular:         METS/Exercise Tolerance:     Hematologic:     (+) History of Transfusion no previous transfusion reaction -      Musculoskeletal:  - neg musculoskeletal ROS       GI/Hepatic:     (+) Inflammatory bowel disease, bowel prep, Other GI/Hepatic multiple surgical procedures      Renal/Genitourinary:         Endo:         Psychiatric:     (+) psychiatric history other (comment)      Infectious Disease:   (+) Other Infectious Disease c diff      Malignancy:         Other:                     Physical Exam  Normal systems: cardiovascular, pulmonary and dental    Airway   Mallampati: II  TM distance: >3 FB  Neck ROM: full    Dental     Cardiovascular       Pulmonary                     Anesthesia Plan      History & Physical Review  History and physical reviewed and following examination; no interval change.    ASA Status:  3 .    NPO Status:  > 8 hours    Plan for MAC with Propofol induction. Maintenance will be TIVA.  Reason for MAC:  Deep or markedly invasive procedure (G8)  PONV prophylaxis:  Ondansetron (or other 5HT-3)  Multiple procedures  Severe headaches with fentanyl  Some psychiatric component      Postoperative Care  Postoperative pain management:  IV analgesics.      Consents  Anesthetic plan, risks, benefits and alternatives discussed with:  Patient and Parent (Mother and/or Father).  Use of blood products discussed: No .   .

## 2018-05-03 NOTE — DISCHARGE INSTRUCTIONS
[facility] Colonoscopy/Flexible Sigmoidoscopy with Monitored Anesthesia Care  For 24 hours after your procedure  Sedation:  1. Get plenty of rest. A responsible adult must stay with you for at least 24 hours after you leave the hospital.   2. Do not drive or use heavy equipment. If you have weakness or tingling, don't drive or use heavy equipment until this feeling goes away.  3. Do not drink alcohol.  4. Avoid strenuous or risky activities. Ask for help when climbing stairs.   5. You may feel lightheaded. IF so, sit for a few minutes before standing. Have someone help you get up.   6. If you have nausea (feel sick to your stomach): Drink only clear liquids such as apple juice, ginger ale, broth or 7-Up. Rest may also help. Be sure to drink enough fluids. Move to a regular diet as you feel able.  7. You may have a slight fever. Call the doctor if your fever is over 100 F (37.7 C) (taken under the tongue) or lasts longer than 24 hours.  8. You may have a dry mouth, a sore throat, muscle aches or trouble sleeping. These should go away after 24 hours.  9. Do not make important or legal decisions.   Procedural:  1. You may return to your normal diet and medicines.  2. Air was placed in your colon during the exam in order to see it. Walking helps to pass the air.  3. You may take Tylenol (acetaminophen) for pain unless your doctor has told you not to.   Do not take aspirin or ibuprofen (Advil, Motrin, or other anti-inflammatory  drugs) for ___3__ days.  Call your doctor for any of the following  1. Unusual pain in belly or chest pain not relieved with passing air.  2. More than 1 to 2 Tablespoons of bleeding from your rectum.  3. Signs of infection (fever, growing tenderness at the surgery site, a large amount of drainage or bleeding, severe pain, foul-smelling drainage, redness, swelling).  4. It has been over 8 to 10 hours since surgery and you are still not able to urinate (pass water).  5. Headache for over 24  hours.  Follow-up:  ___x_ We took small tissue samples or polyps to study. Your doctor will call you with the results within two weeks.  If you have severe pain, bleeding, or shortness of breath, go to an emergency room.  If you have questions, call:  Endoscopy: Monday to Friday, 7 a.m. to 4:30 p.m. [phone] (We may have to call you back)  After hours: Hospital  [phone] (Ask for the GI fellow on call)  [facility] Upper Endoscopy (EGD) with Monitored Anesthesia Care  For 24 hours after your procedure  Sedation:  10. Get plenty of rest. A responsible adult must stay with you for at least 24 hours after you leave the hospital.   11. Do not drive or use heavy equipment. If you have weakness or tingling, don't drive or use heavy equipment until this feeling goes away.  12. Do not drink alcohol.  13. Avoid strenuous or risky activities. Ask for help when climbing stairs.   14. You may feel lightheaded. IF so, sit for a few minutes before standing. Have someone help you get up.   15. If you have nausea (feel sick to your stomach): Drink only clear liquids such as apple juice, ginger ale, broth or 7-Up. Rest may also help. Be sure to drink enough fluids. Move to a regular diet as you feel able.  16. You may have a slight fever. Call the doctor if your fever is over 100 F (37.7 C) (taken under the tongue) or lasts longer than 24 hours.  17. You may have a dry mouth, a sore throat, muscle aches or trouble sleeping. These should go away after 24 hours.  18. Do not make important or legal decisions.   Procedural:  4. Wait one hour before eating or drinking. Start with sips of water. When your gag reflex has returned, you may return to your normal diet, medicines, and light exercise.  5. Some bloating is normal. You may have large burps or pass air.  6. You may have a sore throat for 2 to 3 days. If so, it may help to:    Avoid hot liquids for 24 hours.    Use sore throat lozenges.    Gargle as need with salt water up  to 4 times a day. Mix 1 cup of warm water with 1 teaspoon of salt. Do not swallow.  7. You may take Tylenol (acetaminophen) for pain unless your doctor has told you not to.   Do not take aspirin or ibuprofen (Advil, Motrin, or other anti-inflammatory  drugs) for ___3__ days.  Call right away if you have:  6. Unusual pain in belly or chest pain not relieved with passing air.  7. Severe throat pain or trouble swallowing.  8. Black stools (tar-like looking bowel movement).  9. Signs of infection (fever, growing tenderness at the surgery site, a large amount of drainage or bleeding, severe pain, foul-smelling drainage, redness, swelling).  10. It has been over 8 to 10 hours since surgery and you are still not able to urinate (pass water).  11. Headache for over 24 hours.  Follow-up:  __x__ We took small tissue or fluid samples. Your doctor will call you with the results within two weeks.  If you have severe pain, bleeding, vomit blood, or shortness of breath, go to an emergency room.  If you have questions, call:  Endoscopy: Monday to Friday, 7 a.m. to 4:30 p.m. [phone] (We may have to call you back)  After hours: Hospital  [phone] (Ask for the GI fellow on call)

## 2018-05-03 NOTE — IP AVS SNAPSHOT
[facility], [place], Endoscopy    [address] [facility] [address]    Phone:  [phone]                                       After Visit Summary   5/3/2018    Doreen Peralta    MRN: [id number]           After Visit Summary Signature Page     I have received my discharge instructions, and my questions have been answered. I have discussed any challenges I see with this plan with the nurse or doctor.    ..........................................................................................................................................  Patient/Patient Representative Signature      ..........................................................................................................................................  Patient Representative Print Name and Relationship to Patient    ..................................................               ................................................  Date                                            Time    ..........................................................................................................................................  Reviewed by Signature/Title    ...................................................              ..............................................  Date                                                            Time

## 2018-05-03 NOTE — LETTER
5/4/2018     Doreen Peralta  [address]   [place] [address]      Dear Doreen:    I received the results from the biopsies taken at your recent endoscopic procedures.    The small bowel (duodenum) biopsies taken during the upper endoscopy were normal and did not show celiac disease.    The small bowel (ileum) and large bowel biopsies taken at the colonoscopy were also normal and did not show evidence of infection, inflammation or Crohn's disease.    As we discussed, I will defer to Dr Sutton on the next steps in your care.    Sincerely,            Loi Dorantes MD  [facility], [place], ENDOSCOPY  [address] [address]  Phone: [phone]

## 2018-05-03 NOTE — OR NURSING
Upper endoscopy and colonoscopy tolerated moderately well with sedation given.  Many biopsie done, upper to check for celiac, and ileum, ileocolonic and colonic biopsies to check for inflammatory bowel disease with history for diarrhea. Patient has history of C. Difficile.

## 2018-05-04 LAB — COPATH REPORT: NORMAL

## 2018-05-16 ENCOUNTER — OFFICE VISIT (OUTPATIENT)
Dept: GASTROENTEROLOGY | Facility: CLINIC | Age: 38
End: 2018-05-16
Payer: MEDICARE

## 2018-05-16 VITALS
HEART RATE: 63 BPM | DIASTOLIC BLOOD PRESSURE: 84 MMHG | WEIGHT: 167.5 LBS | BODY MASS INDEX: 27.04 KG/M2 | SYSTOLIC BLOOD PRESSURE: 126 MMHG

## 2018-05-16 DIAGNOSIS — R19.7 DIARRHEA, UNSPECIFIED TYPE: Primary | ICD-10-CM

## 2018-05-16 PROCEDURE — 99213 OFFICE O/P EST LOW 20 MIN: CPT | Performed by: INTERNAL MEDICINE

## 2018-05-16 RX ORDER — CHOLESTYRAMINE LIGHT 4 G/5.7G
4 POWDER, FOR SUSPENSION ORAL 2 TIMES DAILY
Qty: 60 PACKET | Refills: 1 | Status: SHIPPED | OUTPATIENT
Start: 2018-05-16 | End: 2019-02-05

## 2018-05-16 ASSESSMENT — PAIN SCALES - GENERAL: PAINLEVEL: MILD PAIN (2)

## 2018-05-16 NOTE — PROGRESS NOTES
Visit Date:   05/16/2018      SUBJECTIVE:  The patient is a 38-year-old woman who is here for diagnosis of multiply recurrent C. difficile infection.  When last evaluated, I thought she was merely colonized with C. difficile, because despite testing positive, her calprotectin was actually normal.  Also she never had an adequate response or any positive response to antibiotic treatment of her test.  Her symptoms are diarrhea and burning in her stomach.  She is status post partial colectomy and has an ileocolonic anastomosis in her sigmoid.  She says the diarrhea started after she had that surgery performed.  She has rather significant exposures to antibiotics in the setting of recurrent sepsis syndromes.  She was recently hospitalized for another bout of sepsis.  Her blood cultures at the time grew 4 unusual species of bacteria that are not generally associated with stool organisms.  In fact, are more commonly found in water supply or bodies of water in the environment.  During the hospitalization, however, her calprotectin was barely detectable at 44, normal being less than 50.  Therefore, Dificid was ordered.  She also had recent panendoscopy with normal biopsies.  This is a repeated exercise, as she had similar endoscopies done for Klebsiella sepsis back in 2017.  The patient continues to complain of diarrhea and burning in her stomach.  She is just finishing up Dificid now.  She says Dificid actually made her symptoms worse, which has been the case with other antibiotics in the past.  She otherwise does not appear to be in any acute distress.  We discussed what could be done about diarrhea which may well be related simply to shortened colon length.  I prescribed some cholestyramine.        ASSESSMENT AND PLAN:  I continue to believe that her C. difficile positive test for the gene of toxin B is not diagnostic of any infection, rather she is merely colonized with C. difficile.  That would explain the lack of  response to antibiotic treatment and I see no particular benefit to be gained with fecal microbiota transplant.  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  She is going to see Infectious Disease in referral next week.      Total time spent in face-to-face consultation was 15 minutes; over 50% were spent counseling and coordinating care.         BRITTANI SKELTON MD             D: 2018   T: 2018   MT: PAULA      Name:     TAIWO JIMENEZ   MRN:      [id number]        Account:      [id number]   :      1980           Visit Date:   2018      Document: [id number]

## 2018-05-16 NOTE — NURSING NOTE
"Doreen Peralta's goals for this visit include:   Chief Complaint   Patient presents with     RECHECK     She requests these members of her care team be copied on today's visit information: Yes - PCP     Initial /84 (BP Location: Left arm, Patient Position: Chair, Cuff Size: Adult Regular)  Pulse 63  Wt 76 kg (167 lb 8 oz)  LMP 04/25/2018 (Exact Date)  BMI 27.04 kg/m2 Estimated body mass index is 27.04 kg/(m^2) as calculated from the following:    Height as of 4/29/18: 1.676 m (5' 6\").    Weight as of this encounter: 76 kg (167 lb 8 oz).  BP completed using cuff size: regular        "

## 2018-05-16 NOTE — MR AVS SNAPSHOT
After Visit Summary   5/16/2018    Doreen Peralta    MRN: [id number]           Patient Information     Date Of Birth          1980        Visit Information        Provider Department      5/16/2018 10:00 AM Dominic Sutton MD [facility]        Today's Diagnoses     Diarrhea, unspecified type    -  1       Follow-ups after your visit        Your next 10 appointments already scheduled     May 23, 2018  2:00 PM CDT   (Arrive by 1:45 PM)   Return Visit with Todd Price MD   [facility] ([facility])    [address] [place] [address]  [address]  [facility] [address] [phone]              Who to contact     If you have questions or need follow up information about today's clinic visit or your schedule please contact [facility] directly at [phone].  Normal or non-critical lab and imaging results will be communicated to you by MyChart, letter or phone within 4 business days after the clinic has received the results. If you do not hear from us within 7 days, please contact the clinic through Retewihart or phone. If you have a critical or abnormal lab result, we will notify you by phone as soon as possible.  Submit refill requests through Issue or call your pharmacy and they will forward the refill request to us. Please allow 3 business days for your refill to be completed.          Additional Information About Your Visit        MyChart Information     Issue gives you secure access to your electronic health record. If you see a primary care provider, you can also send messages to your care team and make appointments. If you have questions, please call your primary care clinic.  If you do not have a primary care provider, please call [phone] and they will assist you.      Issue is an electronic gateway that provides easy, online access to your medical records. With Issue, you can request  a clinic appointment, read your test results, renew a prescription or communicate with your care team.     To access your existing account, please contact your [facility] or call [phone] for assistance.        Care EveryWhere ID     This is your Care EveryWhere ID. This could be used by other organizations to access your [place] medical records  [id number]        Your Vitals Were     Pulse Last Period BMI (Body Mass Index)             63 04/25/2018 (Exact Date) 27.04 kg/m2          Blood Pressure from Last 3 Encounters:   05/16/18 126/84   05/03/18 126/83   04/29/18 (!) 131/91    Weight from Last 3 Encounters:   05/16/18 76 kg (167 lb 8 oz)   04/29/18 77.6 kg (171 lb)   04/22/18 80.7 kg (177 lb 14.6 oz)              Today, you had the following     No orders found for display         Today's Medication Changes          These changes are accurate as of 5/16/18 12:05 PM.  If you have any questions, ask your nurse or doctor.               Start taking these medicines.        Dose/Directions    cholestyramine light 4 GM Packet   Commonly known as:  QUESTRAN   Used for:  Diarrhea, unspecified type   Started by:  Dominic Sutton MD        Dose:  4 g   Take 1 packet (4 g) by mouth 2 times daily   Quantity:  60 packet   Refills:  1            Where to get your medicines      [facility] [address] [place] [address] AT [facility] & [address]  [address] [facility] [address] [facility] [address]     Phone:  [phone]     cholestyramine light 4 GM Packet                Primary Care Provider Office Phone # Fax #    Franny Antoine [phone]       [facility] [address] [facility] [address]        Equal Access to Services     TRAMAINE CLAYTON : Carrie Law, clark villeda, vesta long, elham gomez. So [facility] [phone].    ATENCIÓN: Iona dobbs  español, tiene a wade disposición servicios gratuitos de asistencia lingüística. Janes lebron [phone].    We comply with applicable federal civil rights laws and [place] laws. We do not discriminate on the basis of race, color, national origin, age, disability, sex, sexual orientation, or gender identity.            Thank you!     Thank you for choosing [facility]  for your care. Our goal is always to provide you with excellent care. Hearing back from our patients is one way we can continue to improve our services. Please take a few minutes to complete the written survey that you may receive in the mail after your visit with us. Thank you!             Your Updated Medication List - Protect others around you: Learn how to safely use, store and throw away your medicines at www.disposemymeds.org.          This list is accurate as of 5/16/18 12:05 PM.  Always use your most recent med list.                   Brand Name Dispense Instructions for use Diagnosis    ACETAMINOPHEN PO      Take 500-1,000 mg by mouth every 6 hours as needed for pain        albuterol (2.5 MG/3ML) 0.083% neb solution     360 mL    Take 1 vial (2.5 mg) by nebulization every 4 hours as needed for shortness of breath / dyspnea or wheezing    Gastrostomy tube dependent (H), SBO (small bowel obstruction), Chronic abdominal pain, Chronic diarrhea       albuterol 90 MCG/ACT inhaler      Inhale 2 puffs into the lungs every 6 hours as needed        Alfalfa 650 MG Tabs           cholestyramine light 4 GM Packet    QUESTRAN    60 packet    Take 1 packet (4 g) by mouth 2 times daily    Diarrhea, unspecified type       cloNIDine 0.2 MG tablet    CATAPRES    60 tablet    Take 2 tablets (0.4 mg) by mouth every evening - DUE FOR FOLLOW UP    Anxiety       diphenhydrAMINE HCl 50 MG Tabs    BENADRYL    30 tablet    Take 50 mg by mouth every 6 hours as needed (nausea)        DULoxetine 60 MG EC capsule    CYMBALTA    90 capsule    TAKE 1  CAPSULE(60 MG) BY MOUTH DAILY    Abdominal pain, epigastric, Abdominal migraine, not intractable       ipratropium 0.02 % neb solution    ATROVENT    225 mL    Take 2.5 mLs (0.5 mg) by nebulization every 6 hours as needed for wheezing    Gastrostomy tube dependent (H), SBO (small bowel obstruction), Chronic abdominal pain, Chronic diarrhea       ondansetron 8 MG tablet    ZOFRAN    9 tablet    Take 1 tablet (8 mg) by mouth every 8 hours as needed for nausea    Non-intractable cyclical vomiting with nausea       SUMAtriptan 50 MG tablet    IMITREX    9 tablet    Take 1-2 tablets ( mg) by mouth at onset of headache for migraine - LAST REFILL, DUE FOR FOLLOW UP    Migraine without aura and without status migrainosus, not intractable       traZODone 100 MG tablet    DESYREL    90 tablet    Take 0.5-1 tablets ( mg) by mouth nightly as needed for sleep    Insomnia, unspecified type

## 2018-05-23 ENCOUNTER — OFFICE VISIT (OUTPATIENT)
Dept: INFECTIOUS DISEASES | Facility: CLINIC | Age: 38
End: 2018-05-23
Payer: MEDICARE

## 2018-05-23 VITALS
TEMPERATURE: 98.8 F | WEIGHT: 177.1 LBS | BODY MASS INDEX: 28.58 KG/M2 | OXYGEN SATURATION: 97 % | SYSTOLIC BLOOD PRESSURE: 136 MMHG | HEART RATE: 100 BPM | DIASTOLIC BLOOD PRESSURE: 92 MMHG

## 2018-05-23 DIAGNOSIS — I80.8 THROMBOPHLEBITIS ARM: ICD-10-CM

## 2018-05-23 DIAGNOSIS — Z22.1 CLOSTRIDIUM DIFFICILE CARRIER: Primary | ICD-10-CM

## 2018-05-23 DIAGNOSIS — Z87.898 HISTORY OF BACTEREMIA: ICD-10-CM

## 2018-05-23 PROCEDURE — G0463 HOSPITAL OUTPT CLINIC VISIT: HCPCS | Mod: ZF

## 2018-05-23 ASSESSMENT — PAIN SCALES - GENERAL: PAINLEVEL: NO PAIN (0)

## 2018-05-23 NOTE — PROGRESS NOTES
INFECTIOUS DISEASE CLINIC    REASON FOR VISIT:  follow-up positive test for C. difficile     HISTORY OF PRESENT ILLNESS:   Doreen Peralta is a 39 y/o female with a complex medical history that includes multiple bowel surgeries (initially with slow transit constipation) leading to subtotal colectomy. We have seen Doreen several times in the past related to unexplained fevers and episodes of bacteremia. She previously required central line access (for IV fluid infusion and at one point TPN) and G/J tube (for feeding and venting), however she no longer has central vascular access nor a gastrostomy or jejunostomy tube.  Despite this, she did have a recent hospitalization at [facility] in 4/2018 for another episode of bacteremia, this time with three organisms found on the same blood culture:  Stenotrophomonas, Ochrobactrum, and Pantoea.     Doreen presents today regarding recent history of a positive test for C. difficile.   There is a negative test for C. difficile toxin B gene PCR from 8/25/17.  Doreen reports that she had her baseline loose stools until 2/2018, when she remembers a distinct change in bowel habits. She experienced increased frequency of more watery stools with malodor that would even wake her up at night.  The first medical provider she saw told her that she had Norovirus, but these symptoms lasted for months.  On 4/4/18, there is the first documented positive C. difficile toxin B gene PCR.  Per Doreen, she was prescribed Flagyl at that time, but could not tolerate it. Next she was prescribed oral vancomycin, which she did not tolerate too well either, and did not think her symptoms were getting better.   She was hospitalized due to the forementioned bacteremias, and the C. difficile PCR was repeated and still positive 4/17/18.  At that point, fidaxomicin was prescribed.  Doreen reports tolerating this well and completing a full course.  At the time, she was not sure if this helped her  stool consistency or frequency.  Gastroenterology began cholestyramine last week, and she is working on titrating this dose upward.  She reports that her stools are still more loose and frequent than her previous baseline.    Also, Doreen complains of left forearm pain that she thinks is related to a peripheral IV placement performed 2 days ago.  She has not had any fevers or chills, but has noticed that the arm is swollen and tender near the site.    PAST MEDICAL HISTORY:    Past Medical History:   Diagnosis Date     Asthma      Bilateral ovarian cysts      Cervical cancer (H) 01/01/2008    cervical cancer      Chronic pain      Colonic dysmotility     s/p subtotal colectomy     Constipation     chronic     E. coli sepsis (H) 5/8/2016     Enteritis      Fungemia 5/5/2016     Gastro-oesophageal reflux disease      H/O ileostomy      Hx of abnormal Pap smear     s/p LEEP - no further details provided     Hypertension      IBS (irritable bowel syndrome)      Other chronic pain      PONV (postoperative nausea and vomiting)      Thrombosis, hepatic vein (H)     microvascular     PAST SURGICAL HISTORY:  Past [facility] History:   Procedure Laterality Date     COLONOSCOPY  7/10/2012    Procedure: COLONOSCOPY;;  Surgeon: Nicole Redding MD;  [facility]     COLONOSCOPY N/A 2/19/2017    Procedure: COLONOSCOPY;  Surgeon: Randell Muller MD;  [facility]     COLONOSCOPY N/A 2/21/2017    Procedure: COLONOSCOPY;  Surgeon: Randell Muller MD;  [facility]     COLONOSCOPY N/A 5/3/2018    Procedure: COMBINED COLONOSCOPY, SINGLE OR MULTIPLE BIOPSY/POLYPECTOMY BY BIOPSY;  EGD/Colonoscopy ;  Surgeon: Loi Black MD;  [facility]     ECHO CHELO  7/19/2016          ENDOSCOPIC INSERTION TUBE GASTROSTOMY N/A 1/21/2016    Procedure: ENDOSCOPIC INSERTION TUBE GASTROSTOMY;  Surgeon: Nicole Redding MD;  [facility]     ESOPHAGOSCOPY, GASTROSCOPY, DUODENOSCOPY (EGD), COMBINED  7/10/2012     Procedure: COMBINED ESOPHAGOSCOPY, GASTROSCOPY, DUODENOSCOPY (EGD);  Upper Endoscopy, Ileoscopy    Latex Allergy  with biopsies;  Surgeon: Nicole Redding MD;  [facility]     ESOPHAGOSCOPY, GASTROSCOPY, DUODENOSCOPY (EGD), COMBINED N/A 11/5/2014    Procedure: COMBINED ESOPHAGOSCOPY, GASTROSCOPY, DUODENOSCOPY (EGD);  Surgeon: Nicole Redding MD;  [facility]     ESOPHAGOSCOPY, GASTROSCOPY, DUODENOSCOPY (EGD), COMBINED N/A 5/3/2018    Procedure: COMBINED ESOPHAGOSCOPY, GASTROSCOPY, DUODENOSCOPY (EGD), BIOPSY SINGLE OR MULTIPLE;;  Surgeon: Loi Black MD;  [facility]     HC REPLACE DUODENOSTOMY/JEJUNOSTOMY TUBE PERCUTANEOUS N/A 8/27/2015    Procedure: REPLACE GASTROJEJUNOSTOMY TUBE, PERCUTANEOUS;  Surgeon: Mio Holder MD;  [facility]     HC REPLACE DUODENOSTOMY/JEJUNOSTOMY TUBE PERCUTANEOUS N/A 1/7/2016    Procedure: REPLACE JEJUNOSTOMY TUBE, PERCUTANEOUS;  Surgeon: Elsa Medel MD;  [facility]     HC REPLACE DUODENOSTOMY/JEJUNOSTOMY TUBE PERCUTANEOUS N/A 1/28/2016    Procedure: REPLACE JEJUNOSTOMY TUBE, PERCUTANEOUS;  Surgeon: Elsa Medel MD;  [facility]     HC REPLACE GASTROSTOMY/CECOSTOMY TUBE PERCUTANEOUS Left 5/19/2015    Procedure: REPLACE GASTROSTOMY TUBE, PERCUTANEOUS;  Surgeon: Melecio Morejon Chi, MD;  [facility]     HC UGI ENDOSCOPY W PLACEMENT GASTROSTOMY TUBE PERCUT N/A 10/1/2015    Procedure: COMBINED ESOPHAGOSCOPY, GASTROSCOPY, DUODENOSCOPY (EGD), PLACE PERCUTANEOUS ENDOSCOPIC GASTROSTOMY TUBE;  Surgeon: Mio Holder MD;  [facility]     INSERT PORT VASCULAR ACCESS Right 7/20/2017    Procedure: INSERT PORT VASCULAR ACCESS;  Chest Port Placement  **Latex Allergy**;  Surgeon: Coy Rocha PA-C;  [facility]     LAPAROSCOPIC ASSISTED COLECTOMY  1/20/2012    Procedure:LAPAROSCOPIC ASSISTED COLECTOMY; Laparoscopic Ileostomy       LAPAROSCOPIC ASSISTED COLECTOMY LEFT (DESCENDING)  10/24/2012    Procedure:  LAPAROSCOPIC ASSISTED COLECTOMY LEFT (DESCENDING);   Hand Assisted Laproscopic Subtotal abdominal Colectomy,Iieorectal anastamosis, Ileostomy Closure.       LAPAROSCOPIC ASSISTED INSERTION TUBE JEJUNOSTOMY N/A 10/16/2015    Procedure: LAPAROSCOPIC ASSISTED INSERTION TUBE JEJUNOSTOMY;  Surgeon: Elsa Medel MD;  [facility]     LAPAROSCOPIC CHOLECYSTECTOMY  2002    [facility] stones duct     LAPAROSCOPIC ILEOSTOMY  1/20/2012    [facility], loop     LAPAROSCOPIC OOPHORECTOMY Right 2009    [facility]     LAPAROTOMY EXPLORATORY N/A 1/28/2016    Procedure: LAPAROTOMY EXPLORATORY;  Surgeon: Elsa Medel MD;  [facility]     LEEP TX, CERVICAL  2009    [facility]     PICC INSERTION Left 10/21/2015    5fr DL Power PICC, 37cm (2cm external) in the L basilic vein w/ tip in the SVC RA junction.     REMOVE GASTROSTOMY TUBE ADULT N/A 12/12/2014    Procedure: REMOVE GASTROSTOMY TUBE ADULT;  Surgeon: Nicole Redding MD;  [facility]     REMOVE PORT VASCULAR ACCESS Right 6/30/2016    Procedure: REMOVE PORT VASCULAR ACCESS;  Surgeon: Pradeep Orosco MD;  Location: [facility]     REMOVE PORT VASCULAR ACCESS Right 9/8/2017    Procedure: REMOVE PORT VASCULAR ACCESS;  right side mediport removal;  Surgeon: Zechariah Worthington MD;  Location: [facility] OR     replace GASTROSTOMY TUBE ADULT  5/19/15       FAMILY PAST MEDICAL HISTORY:  Family History   Problem Relation Age of Onset     Thyroid Disease Mother      Sjogren's Mother      GASTROINTESTINAL DISEASE Mother      Intermittent nausea vomiting diarrhea     Colon Polyps Mother      Prostate Problems Father      prostate enlargement     Lupus Maternal Grandmother      CANCER Maternal Grandfather      Lung     Colon Cancer Maternal Grandfather 65     CANCER Paternal Grandmother      Lung      CEREBROVASCULAR DISEASE Paternal Grandmother      DIABETES Paternal Grandmother      Cardiovascular Paternal Grandmother      CHF     CANCER Paternal Grandfather       Lung     Glaucoma Paternal Grandfather      Abdominal Aortic Aneurysm Other      Macular Degeneration No family hx of      SOCIAL HISTORY:  Social History     Social History     Marital status:      Spouse name: Mitali     Number of children: 3     Years of education: N/A     Occupational History     Medical Assistant [employer]     Pediatric clinic     Social History Main Topics     Smoking status: Current Some Day Smoker     Packs/day: 1.00     Years: 4.00     Types: Cigarettes     Last attempt to quit: 1/1/2004     Smokeless tobacco: Never Used     Alcohol use No     Drug use: No     Sexual activity: Yes     Partners: Male     Other Topics Concern     Not on file     Social History Narrative     She lives with her  and 3 teenaged daughters in an apartment.  Her parents are 5 miles away and help a lot.      CURRENT MEDICATIONS:    Current Outpatient Prescriptions   Medication     ACETAMINOPHEN PO     albuterol (2.5 MG/3ML) 0.083% neb solution     albuterol 90 MCG/ACT inhaler     Alfalfa 650 MG TABS     cholestyramine light (QUESTRAN) 4 GM Packet     cloNIDine (CATAPRES) 0.2 MG tablet     diphenhydrAMINE HCl 50 MG TABS     DULoxetine (CYMBALTA) 60 MG EC capsule     HYDROcodone-acetaminophen (NORCO) 5-325 MG per tablet     HYDROmorphone (DILAUDID) 2 MG tablet     ipratropium (ATROVENT) 0.02 % neb solution     ondansetron (ZOFRAN) 8 MG tablet     SUMAtriptan (IMITREX) 50 MG tablet     traZODone (DESYREL) 100 MG tablet     No current facility-administered medications for this visit.         ALLERGIES:      Allergies   Allergen Reactions     Hyoscyamine Rash     Metoclopramide Other (See Comments)     Eye twitching.      Peaches [Peach] Other (See Comments)     Raw. Cooked OK     Sucralose Other (See Comments)     All artificial sweeteners. Aspartame also. Swollen glands     Advair Diskus Other (See Comments)     Throat burns     Azithromycin Other (See Comments)     Burning in throat      Compazine [Prochlorperazine] Visual Disturbance     Contrast Dye Itching     States is allergic to CT contrast dye     Cyclobenzaprine Visual Disturbance     Diatrizoate Other (See Comments)     Patient reports she tolerates IV contrast if given 50mg IV of Benadryl prior to scan.      Fentanyl Other (See Comments)     migraine     Ibuprofen GI Disturbance     Lactulose Nausea and Vomiting     Gas and bloating     Levaquin [Levofloxacin] Swelling     Per ED M.D. And RN      Morphine Sulfate Other (See Comments)     Chest pain       Oxycodone Other (See Comments)     Burning throat, but can take Norco.      Rizatriptan Visual Disturbance     Droperidol Hives and Rash     Isovue [Iopamidol] Palpitations     Pt had racing heart and sob      Ketorolac Anxiety     Latex Swelling and Rash     Kiwi, likely also avacado, ? banana     Levsin Rash     REVIEW OF SYSTEMS: A full 12-point review of systems was obtained, pertinent positives and negatives as above.     PHYSICAL EXAMINATION:    VITAL SIGNS:  BP (!) 136/92 (BP Location: Right arm)  Pulse 100  Temp 98.8  F (37.1  C) (Oral)  Wt 80.3 kg (177 lb 1.6 oz)  LMP 05/23/2018 (Approximate)  SpO2 97%  BMI 28.58 kg/m2  GENERAL:  Appears well, no acute distress  HEENT: No icterus or injection. Oropharynx moist and clear without lesions or exudate.    NECK: Supple and nontender  LYMPH:  No cervical, axillary or inguinal lymphadenopathy  LUNGS: Clear to ausculation bilaterally without any increased work of breathing  HEART: Regular rate and rhythm and no murmur, gallop or rub    ABDOMEN: Well healed surgical and prior tube site scars. Normoactive bowel sounds, soft, generally tender but no focal signs, nondistended, no hepatosplenomegaly.    /GYN:  Deferred.  EXTREMITIES: Warm and well perfused without clubbing, cyanosis, or edema  SKIN:  No rashes  Left arm with swelling, warmth, and induration in relation to left superficial vein  NEURO:  Awake, alert, no focal neurologic  deficits.  PSYCH: Affect emotional. Speech fluent and appropriate.     LABORATORY DATA:    Reviewed in EPIC and detailed above.     ASSESSMENT AND PLAN:   Doreen Peralta is a 37 y/o female with a complex medical history that includes multiple bowel surgeries (initially with slow transit constipation) leading to subtotal colectomy. We have seen Doreen several times in the past related to unexplained fevers and episodes of bacteremia. She previously required central line access (for IV fluid infusion and at one point TPN) and G/J tube (for feeding and venting), however she no longer has central vascular access nor a gastrostomy or jejunostomy tube.  Despite this, she did have a recent hospitalization at [facility] in 4/2018 for another episode of bacteremia, this time with three organisms found on the same blood culture:  Stenotrophomonas, Ochrobactrum, and Pantoea.   This finding of multiple unusual organisms in blood culture with no clear etiology found has raised the suspicion for Munchausen syndrome several times in the past, although this has not been confirmed.  We have not had a direct conversation about this with Doreen.    Today, we discussed the recent history regarding C. difficile.  Given Doreen's report of sudden change in bowel habits in February, it is certainly possible that she did have a C. difficile colitis.  She does receive antibiotics frequently and is therefore at increased risk of developing C. difficile infection and also recurrence.  At this point, she received partial courses of both metronidazole and vancomycin as well as a full course of fidaxomicin.  After treatment for C. difficile is complete, the bowel may have ongoing mucosal injury to recover from. Doreen describes ongoing looser and more frequent stools than her baseline, but agrees that it is much better than what she was experiencing in February.  At this time, we would not pursue further treatment for C. difficile, and  would avoid unnecessary antibiotics which would only increase the risk for recurrence.  Repeat testing is likely to be positive, regardless of treatment, so this is less helpful.   We are happy to see her again if a provider is concerned for a recurrent C. difficile infection to help determine if treatment is indicated.    Doreen is also presenting with a new thrombophlebitis.  We are hesitant to prescribe antibiotics for this as it would increase her risk for C. difficile colitis recurrence.  We recommended to use warm/hot compresses and elevation.  We counseled to seek further care if she developed cellulitis, increase in size of the swelling or induration, and/or fevers.    We did not make a follow-up appt, but Doreen is welcome to return to ID clinic as needed in the future.    Patient was discussed with the Infectious Disease attending and clinic preceptor, Dr. Jakub Valerio.      Todd Price MD    Fellow in Adult and Pediatric Infectious Disease    pager: [phone]

## 2018-05-23 NOTE — NURSING NOTE
Chief Complaint   Patient presents with     RECHECK     f/u hospital bacteria infection     Wt 80.3 kg (177 lb 1.6 oz)  LMP 04/25/2018 (Exact Date)  BMI 28.58 kg/m2   Urszula Lambert

## 2018-05-26 ENCOUNTER — HOSPITAL ENCOUNTER (EMERGENCY)
Facility: CLINIC | Age: 38
Discharge: HOME | End: 2018-05-26
Attending: EMERGENCY MEDICINE | Admitting: EMERGENCY MEDICINE
Payer: MEDICARE

## 2018-05-26 VITALS
TEMPERATURE: 98.6 F | OXYGEN SATURATION: 100 % | HEIGHT: 66 IN | HEART RATE: 53 BPM | RESPIRATION RATE: 16 BRPM | DIASTOLIC BLOOD PRESSURE: 95 MMHG | WEIGHT: 168 LBS | BODY MASS INDEX: 27 KG/M2 | SYSTOLIC BLOOD PRESSURE: 144 MMHG

## 2018-05-26 DIAGNOSIS — R10.11 RIGHT UPPER QUADRANT PAIN: ICD-10-CM

## 2018-05-26 DIAGNOSIS — R10.11 ABDOMINAL PAIN, RIGHT UPPER QUADRANT: ICD-10-CM

## 2018-05-26 LAB
ALBUMIN SERPL-MCNC: 2.7 G/DL (ref 3.4–5)
ALBUMIN UR-MCNC: NEGATIVE MG/DL
ALP SERPL-CCNC: 214 U/L (ref 40–150)
ALT SERPL W P-5'-P-CCNC: 20 U/L (ref 0–50)
ANION GAP SERPL CALCULATED.3IONS-SCNC: 10 MMOL/L (ref 3–14)
APPEARANCE UR: CLEAR
AST SERPL W P-5'-P-CCNC: 20 U/L (ref 0–45)
BASOPHILS # BLD AUTO: 0 10E9/L (ref 0–0.2)
BASOPHILS NFR BLD AUTO: 0.3 %
BILIRUB SERPL-MCNC: 0.3 MG/DL (ref 0.2–1.3)
BILIRUB UR QL STRIP: NEGATIVE
BUN SERPL-MCNC: 10 MG/DL (ref 7–30)
CALCIUM SERPL-MCNC: 8.4 MG/DL (ref 8.5–10.1)
CHLORIDE SERPL-SCNC: 107 MMOL/L (ref 94–109)
CO2 SERPL-SCNC: 23 MMOL/L (ref 20–32)
COLOR UR AUTO: YELLOW
CREAT SERPL-MCNC: 0.87 MG/DL (ref 0.52–1.04)
DIFFERENTIAL METHOD BLD: ABNORMAL
EOSINOPHIL # BLD AUTO: 0 10E9/L (ref 0–0.7)
EOSINOPHIL NFR BLD AUTO: 0.4 %
ERYTHROCYTE [DISTWIDTH] IN BLOOD BY AUTOMATED COUNT: 18 % (ref 10–15)
GFR SERPL CREATININE-BSD FRML MDRD: 73 ML/MIN/1.7M2
GLUCOSE SERPL-MCNC: 92 MG/DL (ref 70–99)
GLUCOSE UR STRIP-MCNC: NEGATIVE MG/DL
HCG UR QL: NEGATIVE
HCT VFR BLD AUTO: 27.2 % (ref 35–47)
HGB BLD-MCNC: 8.5 G/DL (ref 11.7–15.7)
HGB UR QL STRIP: ABNORMAL
IMM GRANULOCYTES # BLD: 0 10E9/L (ref 0–0.4)
IMM GRANULOCYTES NFR BLD: 0.3 %
KETONES UR STRIP-MCNC: NEGATIVE MG/DL
LEUKOCYTE ESTERASE UR QL STRIP: NEGATIVE
LIPASE SERPL-CCNC: 75 U/L (ref 73–393)
LYMPHOCYTES # BLD AUTO: 2 10E9/L (ref 0.8–5.3)
LYMPHOCYTES NFR BLD AUTO: 25.5 %
MCH RBC QN AUTO: 24.2 PG (ref 26.5–33)
MCHC RBC AUTO-ENTMCNC: 31.3 G/DL (ref 31.5–36.5)
MCV RBC AUTO: 78 FL (ref 78–100)
MONOCYTES # BLD AUTO: 0.5 10E9/L (ref 0–1.3)
MONOCYTES NFR BLD AUTO: 6.3 %
MUCOUS THREADS #/AREA URNS LPF: PRESENT /LPF
NEUTROPHILS # BLD AUTO: 5.3 10E9/L (ref 1.6–8.3)
NEUTROPHILS NFR BLD AUTO: 67.2 %
NITRATE UR QL: NEGATIVE
PH UR STRIP: 8 PH (ref 5–7)
PLATELET # BLD AUTO: 383 10E9/L (ref 150–450)
POTASSIUM SERPL-SCNC: 3.9 MMOL/L (ref 3.4–5.3)
PROT SERPL-MCNC: 7.8 G/DL (ref 6.8–8.8)
RBC # BLD AUTO: 3.51 10E12/L (ref 3.8–5.2)
RBC #/AREA URNS AUTO: 40 /HPF (ref 0–2)
SODIUM SERPL-SCNC: 140 MMOL/L (ref 133–144)
SOURCE: ABNORMAL
SP GR UR STRIP: 1.01 (ref 1–1.03)
SQUAMOUS #/AREA URNS AUTO: 1 /HPF (ref 0–1)
TROPONIN I SERPL-MCNC: <0.015 UG/L (ref 0–0.04)
UROBILINOGEN UR STRIP-MCNC: 0 MG/DL (ref 0–2)
WBC # BLD AUTO: 7.8 10E9/L (ref 4–11)
WBC #/AREA URNS AUTO: 1 /HPF (ref 0–5)

## 2018-05-26 PROCEDURE — 80053 COMPREHEN METABOLIC PANEL: CPT | Performed by: FAMILY MEDICINE

## 2018-05-26 PROCEDURE — 99285 EMERGENCY DEPT VISIT HI MDM: CPT | Mod: Z6 | Performed by: FAMILY MEDICINE

## 2018-05-26 PROCEDURE — 36415 COLL VENOUS BLD VENIPUNCTURE: CPT | Performed by: FAMILY MEDICINE

## 2018-05-26 PROCEDURE — 99283 EMERGENCY DEPT VISIT LOW MDM: CPT | Performed by: FAMILY MEDICINE

## 2018-05-26 PROCEDURE — 81025 URINE PREGNANCY TEST: CPT | Performed by: FAMILY MEDICINE

## 2018-05-26 PROCEDURE — A9270 NON-COVERED ITEM OR SERVICE: HCPCS | Mod: GY | Performed by: FAMILY MEDICINE

## 2018-05-26 PROCEDURE — 83690 ASSAY OF LIPASE: CPT | Performed by: FAMILY MEDICINE

## 2018-05-26 PROCEDURE — 84484 ASSAY OF TROPONIN QUANT: CPT | Performed by: FAMILY MEDICINE

## 2018-05-26 PROCEDURE — 85025 COMPLETE CBC W/AUTO DIFF WBC: CPT | Performed by: FAMILY MEDICINE

## 2018-05-26 PROCEDURE — 25000132 ZZH RX MED GY IP 250 OP 250 PS 637: Mod: GY | Performed by: FAMILY MEDICINE

## 2018-05-26 PROCEDURE — 99283 EMERGENCY DEPT VISIT LOW MDM: CPT | Performed by: EMERGENCY MEDICINE

## 2018-05-26 PROCEDURE — 81001 URINALYSIS AUTO W/SCOPE: CPT | Performed by: FAMILY MEDICINE

## 2018-05-26 RX ORDER — DIPHENHYDRAMINE HCL 25 MG
25 CAPSULE ORAL ONCE
Status: COMPLETED | OUTPATIENT
Start: 2018-05-26 | End: 2018-05-26

## 2018-05-26 RX ORDER — HYDROMORPHONE HYDROCHLORIDE 2 MG/1
2 TABLET ORAL
Status: DISCONTINUED | OUTPATIENT
Start: 2018-05-26 | End: 2018-05-26 | Stop reason: HOSPADM

## 2018-05-26 RX ORDER — HYDROCODONE BITARTRATE AND ACETAMINOPHEN 5; 325 MG/1; MG/1
1 TABLET ORAL EVERY 4 HOURS PRN
Qty: 12 TABLET | Refills: 0 | Status: SHIPPED | OUTPATIENT
Start: 2018-05-26 | End: 2018-10-08

## 2018-05-26 RX ADMIN — HYDROMORPHONE HYDROCHLORIDE 2 MG: 2 TABLET ORAL at 13:05

## 2018-05-26 RX ADMIN — DIPHENHYDRAMINE HYDROCHLORIDE 25 MG: 25 CAPSULE ORAL at 13:05

## 2018-05-26 ASSESSMENT — ENCOUNTER SYMPTOMS
DIARRHEA: 1
CHEST TIGHTNESS: 1
NAUSEA: 1
HEADACHES: 0
APPETITE CHANGE: 1
BACK PAIN: 0
ABDOMINAL PAIN: 1
VOMITING: 1

## 2018-05-26 NOTE — ED NOTES
"Report from Sandra ELLISON RN and care assumed. Patient requested IV start. x2 unsuccessful attempts to right hand. She is still requesting an IV and Tech will attempt. \"I really need some IV Benadryl\".   "

## 2018-05-26 NOTE — ED TRIAGE NOTES
CO abd pain since Thursday. Hx abd problems. Had C diff for 3 months.Had a CT at [place] on Monday because of elevated D dimer. Neg for PE. Had an IV stated that day and developed phlebitis.Not getting better.

## 2018-05-26 NOTE — ED AVS SNAPSHOT
[facility] Emergency Department    [address]     [place] [address]    Phone:  [phone]    Fax:  [phone]                                       Doreen Peralta   MRN: [id number]    Department:  [facility] Emergency Department   Date of Visit:  5/26/2018           Patient Information     Date Of Birth          1980        Your diagnoses for this visit were:     Abdominal pain, right upper quadrant        You were seen by Sam Raines MD and Braxton Almeida MD.      Follow-up Information     Follow up with Franny Antoine MD. Schedule an appointment as soon as possible for a visit in 3 days.    Specialty:  Internal Medicine    Why:  For follow up on your ED stay    Contact information:    [facility]  [address] [address]  [phone]          Discharge Instructions         Abdominal Pain    Abdominal pain is pain in the stomach or belly area. Everyone has this pain from time to time. In many cases it goes away on its own. But abdominal pain can sometimes be due to a serious problem, such as appendicitis. So it s important to know when to seek help.  Causes of abdominal pain  There are many possible causes of abdominal pain. Common causes in adults include:    Constipation, diarrhea, or gas    Stomach acid flowing back up into the esophagus (acid reflux or heartburn)    Severe acid reflux, called GERD (gastroesophageal reflux disease)    A sore in the lining of the stomach or small intestine (peptic ulcer)    Inflammation of the gallbladder, liver, or pancreas    Gallstones or kidney stones    Appendicitis     Intestinal blockage     An internal organ pushing through a muscle or other tissue (hernia)    Urinary tract infections    In women, menstrual cramps, fibroids, or endometriosis    Inflammation or infection of the intestines  Diagnosing the cause of abdominal pain  Your healthcare provider will do a physical exam help find the  cause of your pain. If needed, tests will be ordered. Belly pain has many possible causes. So it can be hard to find the reason for your pain. Giving details about your pain can help. Tell your provider where and when you feel the pain, and what makes it better or worse. Also let your provider know if you have other symptoms such as:    Fever    Tiredness    Upset stomach (nausea)    Vomiting    Changes in bathroom habits  Treating abdominal pain  Some causes of pain need emergency medical treatment right away. These include appendicitis or a bowel blockage. Other problems can be treated with rest, fluids, or medicines. Your healthcare provider can give you specific instructions for treatment or self-care based on what is causing your pain.  If you have vomiting or diarrhea, sip water or other clear fluids. When you are ready to eat solid foods again, start with small amounts of easy-to-digest, low-fat foods. These include apple sauce, toast, or crackers.   When to seek medical care  Call 911 or go to the hospital right away if you:    Can t pass stool and are vomiting    Are vomiting blood or have bloody diarrhea or black, tarry diarrhea    Have chest, neck, or shoulder pain    Feel like you might pass out    Have pain in your shoulder blades with nausea    Have sudden, severe belly pain    Have new, severe pain unlike any you have felt before    Have a belly that is rigid, hard, and tender to touch  Call your healthcare provider if you have:    Pain for more than 5 days    Bloating for more than 2 days    Diarrhea for more than 5 days    A fever of 100.4 F (38 C) or higher, or as directed by your healthcare provider    Pain that gets worse    Weight loss for no reason    Continued lack of appetite    Blood in your stool  How to prevent abdominal pain  Here are some tips to help prevent abdominal pain:    Eat smaller amounts of food at one time.    Avoid greasy, fried, or other high-fat foods.    Avoid foods that  give you gas.    Exercise regularly.    Drink plenty of fluids.  To help prevent GERD symptoms:    Quit smoking.    Reduce alcohol and certain foods that increase stomach acid.    Avoid aspirin and over-the-counter pain and fever medicines (NSAIDS or nonsteroidal anti-inflammatory drugs), if possible    Lose extra weight.    Finish eating at least 2 hours before you go to bed or lie down.    Raise the head of your bed.  Date Last Reviewed: 7/1/2016 2000-2017 [employer]. [address] [place] [address], [place] [address]. All rights reserved. This information is not intended as a substitute for professional medical care. Always follow your healthcare professional's instructions.          24 Hour Appointment Hotline       To make an appointment at any [facility], call [phone] ([phone]). If you don't have a family doctor or clinic, we will help you find one. [facility] are conveniently located to serve the needs of you and your family.             Review of your medicines      START taking        Dose / Directions Last dose taken    HYDROcodone-acetaminophen 5-325 MG per tablet   Commonly known as:  NORCO   Dose:  1 tablet   Quantity:  12 tablet        Take 1 tablet by mouth every 4 hours as needed for pain   Refills:  0          Our records show that you are taking the medicines listed below. If these are incorrect, please call your family doctor or clinic.        Dose / Directions Last dose taken    ACETAMINOPHEN PO   Dose:  500-1000 mg        Take 500-1,000 mg by mouth every 6 hours as needed for pain   Refills:  0        albuterol (2.5 MG/3ML) 0.083% neb solution   Dose:  2.5 mg   Quantity:  360 mL        Take 1 vial (2.5 mg) by nebulization every 4 hours as needed for shortness of breath / dyspnea or wheezing   Refills:  0        albuterol 90 MCG/ACT inhaler   Dose:  2 puff        Inhale 2 puffs into the lungs every 6 hours as needed   Refills:  0        Alfalfa 650 MG Tabs         Refills:  0        cholestyramine light 4 GM Packet   Commonly known as:  QUESTRAN   Dose:  4 g   Quantity:  60 packet        Take 1 packet (4 g) by mouth 2 times daily   Refills:  1        cloNIDine 0.2 MG tablet   Commonly known as:  CATAPRES   Dose:  0.4 mg   Quantity:  60 tablet        Take 2 tablets (0.4 mg) by mouth every evening - DUE FOR FOLLOW UP   Refills:  0        diphenhydrAMINE HCl 50 MG Tabs   Commonly known as:  BENADRYL   Dose:  50 mg   Quantity:  30 tablet        Take 50 mg by mouth every 6 hours as needed (nausea)   Refills:  0        DULoxetine 60 MG EC capsule   Commonly known as:  CYMBALTA   Quantity:  90 capsule        TAKE 1 CAPSULE(60 MG) BY MOUTH DAILY   Refills:  1        ipratropium 0.02 % neb solution   Commonly known as:  ATROVENT   Dose:  0.5 mg   Quantity:  225 mL        Take 2.5 mLs (0.5 mg) by nebulization every 6 hours as needed for wheezing   Refills:  0        ondansetron 8 MG tablet   Commonly known as:  ZOFRAN   Dose:  8 mg   Quantity:  9 tablet        Take 1 tablet (8 mg) by mouth every 8 hours as needed for nausea   Refills:  0        SUMAtriptan 50 MG tablet   Commonly known as:  IMITREX   Dose:   mg   Quantity:  9 tablet        Take 1-2 tablets ( mg) by mouth at onset of headache for migraine - LAST REFILL, DUE FOR FOLLOW UP   Refills:  0        traZODone 100 MG tablet   Commonly known as:  DESYREL   Dose:   mg   Quantity:  90 tablet        Take 0.5-1 tablets ( mg) by mouth nightly as needed for sleep   Refills:  8                Information about OPIOIDS     PRESCRIPTION OPIOIDS: WHAT YOU NEED TO KNOW   You have a prescription for an opioid (narcotic) pain medicine. Opioids can cause addiction. If you have a history of chemical dependency of any type, you are at a higher risk of becoming addicted to opioids. Only take this medicine after all other options have been tried. Take it for as short a time and as few doses as possible.     Do  not:    Drive. If you drive while taking these medicines, you could be arrested for driving under the influence (DUI).    Operate heavy machinery    Do any other dangerous activities while taking these medicines.     Drink any alcohol while taking these medicines.      Take with any other medicines that contain acetaminophen. Read all labels carefully. Look for the word  acetaminophen  or  Tylenol.  Ask your pharmacist if you have questions or are unsure.    Store your pills in a secure place, locked if possible. We will not replace any lost or stolen medicine. If you don t finish your medicine, please throw away (dispose) as directed by your pharmacist. The [place] [employer] has more information about safe disposal: https://www.pca.[facility].[place]/living-green/managing-unwanted-medications    All opioids tend to cause constipation. Drink plenty of water and eat foods that have a lot of fiber, such as fruits, vegetables, prune juice, apple juice and high-fiber cereal. Take a laxative (Miralax, milk of magnesia, Colace, Senna) if you don t move your bowels at least every other day.         Prescriptions were sent or printed at these locations (1 Prescription)                   [facility] [place], [place] - [address]    [address] [facility] [address]    Telephone:  [phone]   Fax:  [phone]   Hours:                  Printed at Department/Unit printer (1 of 1)         HYDROcodone-acetaminophen (NORCO) 5-325 MG per tablet                Procedures and tests performed during your visit     CBC with platelets differential    Comprehensive metabolic panel    HCG qualitative urine (UPT)    Lipase    Troponin I    UA with Microscopic      Orders Needing Specimen Collection     None      Pending Results     No orders found from 5/24/2018 to 5/27/2018.            Pending Culture Results     No orders found from 5/24/2018 to 5/27/2018.            Pending Results Instructions      If you had any lab results that were not finalized at the time of your Discharge, you can call the ED Lab Result RN at [phone]. You will be contacted by this team for any positive Lab results or changes in treatment. The nurses are available 7 days a week from 10A to 6:30P.  You can leave a message 24 hours per day and they will return your call.        Thank you for choosing [facility]       Thank you for choosing [facility] for your care. Our goal is always to provide you with excellent care. Hearing back from our patients is one way we can continue to improve our services. Please take a few minutes to complete the written survey that you may receive in the mail after you visit with us. Thank you!        [email or web address] Information     [employer] gives you secure access to your electronic health record. If you see a primary care provider, you can also send messages to your care team and make appointments. If you have questions, please call your primary care clinic.  If you do not have a primary care provider, please call [phone] and they will assist you.        Care EveryWhere ID     This is your Care EveryWhere ID. This could be used by other organizations to access your [facility] medical records  [id number]        Equal Access to Services     TRAMAINE [place] : Hadlarissa Law, clark villeda, vesta long, elham gomez. So [facility] [phone].    ATENCIÓN: Si habla español, tiene a wade disposición servicios gratuitos de asistencia lingüística. Llame al [phone].    We comply with applicable federal civil rights laws and [place] laws. We do not discriminate on the basis of race, color, national origin, age, disability, sex, sexual orientation, or gender identity.            After Visit Summary       This is your record. Keep this with you and show to your community pharmacist(s) and doctor(s) at your next visit.

## 2018-05-26 NOTE — ED PROVIDER NOTES
History     Chief Complaint   Patient presents with     Abdominal Pain     The history is provided by the patient.     Doreen Peralta is a 38 year old female with a long history of abdominal issues who presents to the ED complaining of abdominal pain. Patient has been experiencing upper abdominal pain for the past 2 days. The pain is reproducible with palpation and while lying on her sides. Patient has had a decreased appetite and nausea since the onset of abdominal pain. Patient reports she has vomited 4X this morning, no blood visible. Patient had 1 stool of diarrhea today and had approximately 2-3 stools yesterday. Patient is on her period and was unable to identify any blood in her stool. Patient is also complaining of chest tightness/pain that started 5 days ago, her legs were swollen at the time of chest pain onset. Patient reports she went to [facility] clinic on 5/26 with concerns of the chest pain and had numerous tests done, including creatinine and a CT scan. The IV they placed in her left arm infiltrated. She is still complaining of chest tightness when she takes a deep breath. She denies headache and back pain. Patient reports she has had numerous abdominal surgeries, including ileostomy, bowel resection, J-tubes (2x), G tube, cholecystectomy, and tubal ligation. Patient has not had her appendix removed. Patient reports she had C. Diff for 3 months and was hospitalized on 4/16 for gram negative septicemia. She has not had any pain medications since the hospitalization.    Problem List:    Patient Active Problem List    Diagnosis Date Noted     Acute renal failure (H) 04/21/2018     Priority: Medium     History of bacteremia - recurrent 04/17/2018     Priority: Medium     Tobacco abuse 04/16/2018     Priority: Medium     Iron deficiency anemia 04/16/2018     Priority: Medium     Stool toxin PCR positive for Clostridium difficile on 4/17/18 04/16/2018     Priority: Medium     Gram negative  septicemia (H) - Ochrobactrum, Stenotrophomonas & Pantoea 08/24/2017     Priority: Medium     Hypokalemia 08/24/2017     Priority: Medium     Essential hypertension 08/24/2017     Priority: Medium     Sepsis due to Klebsiella (H) 07/27/2017     Priority: Medium     Insomnia, unspecified type 03/31/2017     Priority: Medium     Abdominal pain 02/15/2017     Priority: Medium     Abdominal pain, generalized 01/28/2017     Priority: Medium     History of deep venous thrombosis 01/26/2017     Priority: Medium     Nausea and vomiting 01/26/2017     Priority: Medium     Vitamin D deficiency 01/16/2017     Priority: Medium     Chronic abdominal pain 11/15/2016     Priority: Medium     Patient is followed by Larisa Lorenzo PA-C for ongoing prescription of pain medication.  All refills should be approved by this provider, or covering partner.    Medication(s): no regular narcotics - narcotics given at ED visits  Maximum quantity per month: N/A  Clinic visit frequency required: Q 6  months     Controlled substance agreement:  Encounter-Level CSA - 11/9/15:               Controlled Substance Agreement - Scan on 11/19/2015 11:17 AM : CONTROLLED SUBSTANCE AGREEMENT 11/09/15 (below)          Encounter-Level CSA - 2/27/15:               Controlled Substance Agreement - Scan on 3/12/2015  7:50 AM : Controlled Medication Agreement 02/27/15 (below)            Pain Clinic evaluation in the past: Yes    DIRE Total Score(s):  No flowsheet data found.    Last [facility] website verification:  done on 11/15/16   [email or web address]        Attention to G-tube (H) 11/08/2016     Priority: Medium     Fever 10/16/2016     Priority: Medium     Coagulation defect (H) [D68.9] 09/16/2016     Priority: Medium     Chronic diarrhea 07/22/2016     Priority: Medium     Migraine 07/20/2016     Priority: Medium     Positive blood culture - Klebsiella oxytoca from Port-a-cath culture 07/18/2016     Priority: Medium     Mitral regurgitation  mild-mod by Echo June 2016 07/18/2016     Priority: Medium     Anemia, iron deficiency 07/17/2016     Priority: Medium     Anemia in other chronic diseases classified elsewhere 06/14/2016     Priority: Medium     Munchausen syndrome - previously suspected 06/14/2016     Priority: Medium     Long-term (current) use of anticoagulants [Z79.01] 05/16/2016     Priority: Medium     Anxiety 05/16/2016     Priority: Medium     S/P partial resection of colon 04/11/2016     Priority: Medium     Malnutrition (H) 04/11/2016     Priority: Medium     Jejunostomy tube present (H) 03/21/2016     Priority: Medium     Malfunctioning jejunostomy tube (H) 12/22/2015     Priority: Medium     Malfunction of gastrostomy tube (H) 11/19/2015     Priority: Medium     PEG tube malfunction (H) 10/16/2015     Priority: Medium     Health Care Home 01/16/2015     Priority: Medium     *See Letters for [facility] Care Plan: My Access Plan           PEG (percutaneous endoscopic gastrostomy) status 11/05/2014     Priority: Medium     Gastroparesis 04/11/2014     Priority: Medium     Overview:   Had ileostomy performed at [facility] in Jan 2012 as treatment       Migraines 04/11/2014     Priority: Medium     4/11/2014  With periods, every other month.       Intermittent asthma 04/11/2014     Priority: Medium     4/11/2014   With URIs, allergies       Allergic rhinitis 04/11/2014     Priority: Medium     Problem list name updated by automated process. Provider to review       Abnormal Pap smear of cervix 04/11/2014     Priority: Medium     4/11/2014  S/p colp and LEEP. Sees OB/GYN at [facility]. Pap every year x20 years - normal since.       S/P LEEP of cervix 02/06/2014     Priority: Medium     Patellofemoral stress syndrome 01/18/2014     Priority: Medium     Hx SBO 10/09/2013     Priority: Medium     4/11/2014  Recurrent. Sees GI (Dr. Redding - at [facility]) every 6 months or so.  Sees feeding tube nurse next week.       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  04/11/2013     Priority: Medium     Constipation by delayed colonic transit 10/24/2012     Priority: Medium     Atopic rhinitis 03/20/2009     Priority: Medium     Hyperbilirubinemia 03/11/2009     Priority: Medium        Past Medical History:    Past Medical History:   Diagnosis Date     Asthma      Bilateral ovarian cysts      Cervical cancer (H) 01/01/2008     Chronic pain      Colonic dysmotility      Constipation      E. coli sepsis (H) 5/8/2016     Enteritis      Fungemia 5/5/2016     Gastro-oesophageal reflux disease      H/O ileostomy      Hx of abnormal Pap smear      Hypertension      IBS (irritable bowel syndrome)      Other chronic pain      PONV (postoperative nausea and vomiting)      Thrombosis, hepatic vein (H)        Past [facility] History:    Past [facility] History:   Procedure Laterality Date     COLONOSCOPY  7/10/2012    Procedure: COLONOSCOPY;;  Surgeon: Nicole Redding MD;  [facility]     COLONOSCOPY N/A 2/19/2017    Procedure: COLONOSCOPY;  Surgeon: Randell Muller MD;  [facility]: [facility] GI     COLONOSCOPY N/A 2/21/2017    Procedure: COLONOSCOPY;  Surgeon: Randell Muller MD;  Location: [facility]     COLONOSCOPY N/A 5/3/2018    Procedure: COMBINED COLONOSCOPY, SINGLE OR MULTIPLE BIOPSY/POLYPECTOMY BY BIOPSY;  EGD/Colonoscopy ;  Surgeon: Loi Black MD;  Location: [facility] GI     ECHO CHELO  7/19/2016          ENDOSCOPIC INSERTION TUBE GASTROSTOMY N/A 1/21/2016    Procedure: ENDOSCOPIC INSERTION TUBE GASTROSTOMY;  Surgeon: Nicole Redding MD;  [facility]     ESOPHAGOSCOPY, GASTROSCOPY, DUODENOSCOPY (EGD), COMBINED  7/10/2012    Procedure: COMBINED ESOPHAGOSCOPY, GASTROSCOPY, DUODENOSCOPY (EGD);  Upper Endoscopy, Ileoscopy    Latex Allergy  with biopsies;  Surgeon: Nicole Redding MD;  [facility]     ESOPHAGOSCOPY, GASTROSCOPY, DUODENOSCOPY (EGD), COMBINED N/A 11/5/2014    Procedure: COMBINED ESOPHAGOSCOPY, GASTROSCOPY, DUODENOSCOPY (EGD);  Surgeon: Miladis  Nicole Trejo MD;  [facility]     ESOPHAGOSCOPY, GASTROSCOPY, DUODENOSCOPY (EGD), COMBINED N/A 5/3/2018    Procedure: COMBINED ESOPHAGOSCOPY, GASTROSCOPY, DUODENOSCOPY (EGD), BIOPSY SINGLE OR MULTIPLE;;  Surgeon: Loi Black MD;  Location: [facility]     HC REPLACE DUODENOSTOMY/JEJUNOSTOMY TUBE PERCUTANEOUS N/A 8/27/2015    Procedure: REPLACE GASTROJEJUNOSTOMY TUBE, PERCUTANEOUS;  Surgeon: Mio Holder MD;  [facility]     HC REPLACE DUODENOSTOMY/JEJUNOSTOMY TUBE PERCUTANEOUS N/A 1/7/2016    Procedure: REPLACE JEJUNOSTOMY TUBE, PERCUTANEOUS;  Surgeon: Elsa Medel MD;  [facility]     HC REPLACE DUODENOSTOMY/JEJUNOSTOMY TUBE PERCUTANEOUS N/A 1/28/2016    Procedure: REPLACE JEJUNOSTOMY TUBE, PERCUTANEOUS;  Surgeon: Elsa Medel MD;  [facility]     HC REPLACE GASTROSTOMY/CECOSTOMY TUBE PERCUTANEOUS Left 5/19/2015    Procedure: REPLACE GASTROSTOMY TUBE, PERCUTANEOUS;  Surgeon: Melecio Morejon Chi, MD;  Location: [facility]     HC UGI ENDOSCOPY W PLACEMENT GASTROSTOMY TUBE PERCUT N/A 10/1/2015    Procedure: COMBINED ESOPHAGOSCOPY, GASTROSCOPY, DUODENOSCOPY (EGD), PLACE PERCUTANEOUS ENDOSCOPIC GASTROSTOMY TUBE;  Surgeon: Mio Holder MD;  Location[facility]     INSERT PORT VASCULAR ACCESS Right 7/20/2017    Procedure: INSERT PORT VASCULAR ACCESS;  Chest Port Placement  **Latex Allergy**;  Surgeon: Coy Rocha PA-C;  [facility]     LAPAROSCOPIC ASSISTED COLECTOMY  1/20/2012    Procedure:LAPAROSCOPIC ASSISTED COLECTOMY; Laparoscopic Ileostomy       LAPAROSCOPIC ASSISTED COLECTOMY LEFT (DESCENDING)  10/24/2012    Procedure: LAPAROSCOPIC ASSISTED COLECTOMY LEFT (DESCENDING);   Hand Assisted Laproscopic Subtotal abdominal Colectomy,Iieorectal anastamosis, Ileostomy Closure.       LAPAROSCOPIC ASSISTED INSERTION TUBE JEJUNOSTOMY N/A 10/16/2015    Procedure: LAPAROSCOPIC ASSISTED INSERTION TUBE JEJUNOSTOMY;  Surgeon: Elsa Medel MD;  [facility]      LAPAROSCOPIC CHOLECYSTECTOMY  2002    [facility] stones duct     LAPAROSCOPIC ILEOSTOMY  1/20/2012    [facility], loop     LAPAROSCOPIC OOPHORECTOMY Right 2009    [facility]     LAPAROTOMY EXPLORATORY N/A 1/28/2016    Procedure: LAPAROTOMY EXPLORATORY;  Surgeon: Elsa Medel MD;  [facility]     LEEP TX, CERVICAL  2009    [facility]     PICC INSERTION Left 10/21/2015    5fr DL Power PICC, 37cm (2cm external) in the L basilic vein w/ tip in the SVC RA junction.     REMOVE GASTROSTOMY TUBE ADULT N/A 12/12/2014    Procedure: REMOVE GASTROSTOMY TUBE ADULT;  Surgeon: Nicole Redding MD;  [facility]     REMOVE PORT VASCULAR ACCESS Right 6/30/2016    Procedure: REMOVE PORT VASCULAR ACCESS;  Surgeon: Pradeep Orosco MD;  Location: [facility] OR     REMOVE PORT VASCULAR ACCESS Right 9/8/2017    Procedure: REMOVE PORT VASCULAR ACCESS;  right side mediport removal;  Surgeon: Zechariah Worthington MD;  Location: [facility] OR     replace GASTROSTOMY TUBE ADULT  5/19/15       Family History:    Family History   Problem Relation Age of Onset     Thyroid Disease Mother      Sjogren's Mother      GASTROINTESTINAL DISEASE Mother      Intermittent nausea vomiting diarrhea     Colon Polyps Mother      Prostate Problems Father      prostate enlargement     Lupus Maternal Grandmother      CANCER Maternal Grandfather      Lung     Colon Cancer Maternal Grandfather 65     CANCER Paternal Grandmother      Lung      CEREBROVASCULAR DISEASE Paternal Grandmother      DIABETES Paternal Grandmother      Cardiovascular Paternal Grandmother      CHF     CANCER Paternal Grandfather      Lung     Glaucoma Paternal Grandfather      Abdominal Aortic Aneurysm Other      Macular Degeneration No family hx of        Social History:  Marital Status:   [2]  Social History   Substance Use Topics     Smoking status: Current Some Day Smoker     Packs/day: 1.00     Years: 4.00     Types: Cigarettes     Last attempt to quit: 1/1/2004  "    Smokeless tobacco: Never Used     Alcohol use No        Medications:      ACETAMINOPHEN PO   albuterol (2.5 MG/3ML) 0.083% neb solution   albuterol 90 MCG/ACT inhaler   Alfalfa 650 MG TABS   cholestyramine light (QUESTRAN) 4 GM Packet   cloNIDine (CATAPRES) 0.2 MG tablet   diphenhydrAMINE HCl 50 MG TABS   DULoxetine (CYMBALTA) 60 MG EC capsule   ipratropium (ATROVENT) 0.02 % neb solution   ondansetron (ZOFRAN) 8 MG tablet   SUMAtriptan (IMITREX) 50 MG tablet   traZODone (DESYREL) 100 MG tablet         Review of Systems   Constitutional: Positive for appetite change (decreased).   Respiratory: Positive for chest tightness.    Cardiovascular: Positive for chest pain (reproducible with palpations and deep breathing).   Gastrointestinal: Positive for abdominal pain (upper), diarrhea, nausea and vomiting (no blood). Blood in stool: not seen.   Musculoskeletal: Negative for back pain.   Neurological: Negative for headaches.   All other systems reviewed and are negative.      Physical Exam   BP: (!) 175/100  Pulse: 53  Temp: 98.6  F (37  C)  Resp: 16  Height: 167.6 cm (5' 6\")  Weight: 76.2 kg (168 lb)  SpO2: 100 %      Physical Exam   Constitutional: She is oriented to person, place, and time. She appears well-developed and well-nourished.   HENT:   Head: Normocephalic and atraumatic.   Eyes: Conjunctivae and EOM are normal.   Neck: Normal range of motion. Neck supple.   Cardiovascular: Normal rate, regular rhythm, normal heart sounds and intact distal pulses.    Pulmonary/Chest: Effort normal and breath sounds normal. No respiratory distress.   Abdominal: Soft. Bowel sounds are normal. There is tenderness in the right upper quadrant and epigastric area.   Musculoskeletal: Normal range of motion. She exhibits no deformity.   Neurological: She is alert and oriented to person, place, and time.   Skin: Skin is warm. No rash noted.   Mild redness and bruising over left bicep area.   Psychiatric: She has a normal mood and " affect. Her behavior is normal.   Nursing note and vitals reviewed.      ED Course     ED Course     Procedures           Results for orders placed or performed during the hospital encounter of 05/26/18   CBC with platelets differential   Result Value Ref Range    WBC 7.8 4.0 - 11.0 10e9/L    RBC Count 3.51 (L) 3.8 - 5.2 10e12/L    Hemoglobin 8.5 (L) 11.7 - 15.7 g/dL    Hematocrit 27.2 (L) 35.0 - 47.0 %    MCV 78 78 - 100 fl    MCH 24.2 (L) 26.5 - 33.0 pg    MCHC 31.3 (L) 31.5 - 36.5 g/dL    RDW 18.0 (H) 10.0 - 15.0 %    Platelet Count 383 150 - 450 10e9/L    Diff Method Automated Method     % Neutrophils 67.2 %    % Lymphocytes 25.5 %    % Monocytes 6.3 %    % Eosinophils 0.4 %    % Basophils 0.3 %    % Immature Granulocytes 0.3 %    Absolute Neutrophil 5.3 1.6 - 8.3 10e9/L    Absolute Lymphocytes 2.0 0.8 - 5.3 10e9/L    Absolute Monocytes 0.5 0.0 - 1.3 10e9/L    Absolute Eosinophils 0.0 0.0 - 0.7 10e9/L    Absolute Basophils 0.0 0.0 - 0.2 10e9/L    Abs Immature Granulocytes 0.0 0 - 0.4 10e9/L   Comprehensive metabolic panel   Result Value Ref Range    Sodium 140 133 - 144 mmol/L    Potassium 3.9 3.4 - 5.3 mmol/L    Chloride 107 94 - 109 mmol/L    Carbon Dioxide 23 20 - 32 mmol/L    Anion Gap 10 3 - 14 mmol/L    Glucose 92 70 - 99 mg/dL    Urea Nitrogen 10 7 - 30 mg/dL    Creatinine 0.87 0.52 - 1.04 mg/dL    GFR Estimate 73 >60 mL/min/1.7m2    GFR Estimate If Black 89 >60 mL/min/1.7m2    Calcium 8.4 (L) 8.5 - 10.1 mg/dL    Bilirubin Total 0.3 0.2 - 1.3 mg/dL    Albumin 2.7 (L) 3.4 - 5.0 g/dL    Protein Total 7.8 6.8 - 8.8 g/dL    Alkaline Phosphatase 214 (H) 40 - 150 U/L    ALT 20 0 - 50 U/L    AST 20 0 - 45 U/L   Lipase   Result Value Ref Range    Lipase 75 73 - 393 U/L   Troponin I   Result Value Ref Range    Troponin I ES <0.015 0.000 - 0.045 ug/L   UA with Microscopic   Result Value Ref Range    Color Urine Yellow     Appearance Urine Clear     Glucose Urine Negative NEG^Negative mg/dL    Bilirubin Urine  Negative NEG^Negative    Ketones Urine Negative NEG^Negative mg/dL    Specific Gravity Urine 1.012 1.003 - 1.035    Blood Urine Moderate (A) NEG^Negative    pH Urine 8.0 (H) 5.0 - 7.0 pH    Protein Albumin Urine Negative NEG^Negative mg/dL    Urobilinogen mg/dL 0.0 0.0 - 2.0 mg/dL    Nitrite Urine Negative NEG^Negative    Leukocyte Esterase Urine Negative NEG^Negative    Source Midstream Urine     WBC Urine 1 0 - 5 /HPF    RBC Urine 40 (H) 0 - 2 /HPF    Squamous Epithelial /HPF Urine 1 0 - 1 /HPF    Mucous Urine Present (A) NEG^Negative /LPF   HCG qualitative urine (UPT)   Result Value Ref Range    HCG Qual Urine Negative NEG^Negative     *Note: Due to a large number of results and/or encounters for the requested time period, some results have not been displayed. A complete set of results can be found in Results Review.     Medications   diphenhydrAMINE (BENADRYL) capsule 25 mg (25 mg Oral Given 5/26/18 1305)     Labs are reviewed and were unremarkable.  There is no signs of sepsis or any type of infectious process.  I think her abdominal pain is just an exacerbation of her chronic abdominal pain at this point.  She is given some pain meds as noted above and responded well to this.  Looking through the medical database, patient does not have a history of of multiple narcotic prescriptions recently so I will send her home with a short course of Norco for pain.  Patient was encouraged to follow-up with her primary care doctor in the next 1 week.    Assessments & Plan (with Medical Decision Making)  Right upper quadrant abdominal pain     I have reviewed the nursing notes.    I have reviewed the findings, diagnosis, plan and need for follow up with the patient.      Discharge Medication List as of 5/26/2018  1:52 PM      START taking these medications    Details   HYDROcodone-acetaminophen (NORCO) 5-325 MG per tablet Take 1 tablet by mouth every 4 hours as needed for pain, Disp-12 tablet, R-0, Local Print              Final diagnoses:   Abdominal pain, right upper quadrant     This document serves as a record of services personally performed by Braxton Almeida MD. It was created on their behalf by Geoff Jha, a trained medical scribe. The creation of this record is based on the provider's personal observations and the statements of the patient. This document has been checked and approved by the attending provider.    Note: Chart documentation done in part with Dragon Voice Recognition software. Although reviewed after completion, some word and grammatical errors may remain.    5/26/2018   [facility] EMERGENCY DEPARTMENT     Braxton Almeida MD  06/08/18 1030

## 2018-05-26 NOTE — DISCHARGE INSTRUCTIONS

## 2018-05-26 NOTE — ED AVS SNAPSHOT
[facility] Emergency Department    [address] [place] [address]    Phone:  [phone]    Fax:  [phone]                                       Doreen Peralta   MRN: [id number]    Department:  [facility] Emergency Department   Date of Visit:  5/26/2018           After Visit Summary Signature Page     I have received my discharge instructions, and my questions have been answered. I have discussed any challenges I see with this plan with the nurse or doctor.    ..........................................................................................................................................  Patient/Patient Representative Signature      ..........................................................................................................................................  Patient Representative Print Name and Relationship to Patient    ..................................................               ................................................  Date                                            Time    ..........................................................................................................................................  Reviewed by Signature/Title    ...................................................              ..............................................  Date                                                            Time

## 2018-06-01 ENCOUNTER — HEALTH MAINTENANCE LETTER (OUTPATIENT)
Age: 38
End: 2018-06-01

## 2018-06-10 ENCOUNTER — HOSPITAL ENCOUNTER (EMERGENCY)
Facility: CLINIC | Age: 38
Discharge: HOME | End: 2018-06-10
Attending: FAMILY MEDICINE | Admitting: FAMILY MEDICINE
Payer: MEDICARE

## 2018-06-10 VITALS
OXYGEN SATURATION: 100 % | DIASTOLIC BLOOD PRESSURE: 59 MMHG | SYSTOLIC BLOOD PRESSURE: 125 MMHG | TEMPERATURE: 98.3 F | WEIGHT: 168 LBS | RESPIRATION RATE: 14 BRPM | HEIGHT: 66 IN | BODY MASS INDEX: 27 KG/M2

## 2018-06-10 DIAGNOSIS — R11.2 INTRACTABLE VOMITING WITH NAUSEA, UNSPECIFIED VOMITING TYPE: ICD-10-CM

## 2018-06-10 DIAGNOSIS — E86.0 DEHYDRATION: ICD-10-CM

## 2018-06-10 DIAGNOSIS — G89.29 CHRONIC ABDOMINAL PAIN: ICD-10-CM

## 2018-06-10 DIAGNOSIS — K56.7 ILEUS OF UNSPECIFIED TYPE (H): ICD-10-CM

## 2018-06-10 DIAGNOSIS — R10.9 CHRONIC ABDOMINAL PAIN: ICD-10-CM

## 2018-06-10 LAB
ALBUMIN SERPL-MCNC: 3.8 G/DL (ref 3.4–5)
ALP SERPL-CCNC: 163 U/L (ref 40–150)
ALT SERPL W P-5'-P-CCNC: 18 U/L (ref 0–50)
ANION GAP SERPL CALCULATED.3IONS-SCNC: 9 MMOL/L (ref 3–14)
AST SERPL W P-5'-P-CCNC: 14 U/L (ref 0–45)
BILIRUB SERPL-MCNC: 0.5 MG/DL (ref 0.2–1.3)
BUN SERPL-MCNC: 11 MG/DL (ref 7–30)
CALCIUM SERPL-MCNC: 9.2 MG/DL (ref 8.5–10.1)
CHLORIDE SERPL-SCNC: 104 MMOL/L (ref 94–109)
CO2 SERPL-SCNC: 24 MMOL/L (ref 20–32)
CREAT SERPL-MCNC: 1.08 MG/DL (ref 0.52–1.04)
GFR SERPL CREATININE-BSD FRML MDRD: 57 ML/MIN/1.7M2
GLUCOSE SERPL-MCNC: 85 MG/DL (ref 70–99)
LACTATE BLD-SCNC: 0.9 MMOL/L (ref 0.7–2)
POTASSIUM SERPL-SCNC: 4.2 MMOL/L (ref 3.4–5.3)
PROT SERPL-MCNC: 8.7 G/DL (ref 6.8–8.8)
SODIUM SERPL-SCNC: 137 MMOL/L (ref 133–144)

## 2018-06-10 PROCEDURE — 96375 TX/PRO/DX INJ NEW DRUG ADDON: CPT | Performed by: FAMILY MEDICINE

## 2018-06-10 PROCEDURE — 99285 EMERGENCY DEPT VISIT HI MDM: CPT | Mod: 25 | Performed by: FAMILY MEDICINE

## 2018-06-10 PROCEDURE — 83605 ASSAY OF LACTIC ACID: CPT | Performed by: FAMILY MEDICINE

## 2018-06-10 PROCEDURE — 99285 EMERGENCY DEPT VISIT HI MDM: CPT | Mod: Z6 | Performed by: FAMILY MEDICINE

## 2018-06-10 PROCEDURE — 80053 COMPREHEN METABOLIC PANEL: CPT | Performed by: FAMILY MEDICINE

## 2018-06-10 PROCEDURE — 96361 HYDRATE IV INFUSION ADD-ON: CPT | Performed by: FAMILY MEDICINE

## 2018-06-10 PROCEDURE — 96374 THER/PROPH/DIAG INJ IV PUSH: CPT | Performed by: FAMILY MEDICINE

## 2018-06-10 PROCEDURE — 85025 COMPLETE CBC W/AUTO DIFF WBC: CPT | Performed by: FAMILY MEDICINE

## 2018-06-10 PROCEDURE — 25000128 H RX IP 250 OP 636: Performed by: FAMILY MEDICINE

## 2018-06-10 PROCEDURE — 96376 TX/PRO/DX INJ SAME DRUG ADON: CPT | Performed by: FAMILY MEDICINE

## 2018-06-10 RX ORDER — HYDROMORPHONE HYDROCHLORIDE 1 MG/ML
0.5 INJECTION, SOLUTION INTRAMUSCULAR; INTRAVENOUS; SUBCUTANEOUS ONCE
Status: COMPLETED | OUTPATIENT
Start: 2018-06-10 | End: 2018-06-10

## 2018-06-10 RX ORDER — DIPHENHYDRAMINE HYDROCHLORIDE 50 MG/ML
50 INJECTION INTRAMUSCULAR; INTRAVENOUS ONCE
Status: COMPLETED | OUTPATIENT
Start: 2018-06-10 | End: 2018-06-10

## 2018-06-10 RX ORDER — HYDROMORPHONE HYDROCHLORIDE 2 MG/1
2 TABLET ORAL EVERY 6 HOURS PRN
Qty: 10 TABLET | Refills: 0 | Status: SHIPPED | OUTPATIENT
Start: 2018-06-10 | End: 2018-07-30 | Stop reason: ALTCHOICE

## 2018-06-10 RX ORDER — ONDANSETRON 2 MG/ML
4 INJECTION INTRAMUSCULAR; INTRAVENOUS ONCE
Status: COMPLETED | OUTPATIENT
Start: 2018-06-10 | End: 2018-06-10

## 2018-06-10 RX ADMIN — DIPHENHYDRAMINE HYDROCHLORIDE 50 MG: 50 INJECTION, SOLUTION INTRAMUSCULAR; INTRAVENOUS at 12:20

## 2018-06-10 RX ADMIN — ONDANSETRON 4 MG: 2 INJECTION INTRAMUSCULAR; INTRAVENOUS at 12:18

## 2018-06-10 RX ADMIN — SODIUM CHLORIDE 1000 ML: 9 INJECTION, SOLUTION INTRAVENOUS at 11:44

## 2018-06-10 RX ADMIN — HYDROMORPHONE HYDROCHLORIDE 0.5 MG: 1 INJECTION, SOLUTION INTRAMUSCULAR; INTRAVENOUS; SUBCUTANEOUS at 12:21

## 2018-06-10 RX ADMIN — HYDROMORPHONE HYDROCHLORIDE 0.5 MG: 1 INJECTION, SOLUTION INTRAMUSCULAR; INTRAVENOUS; SUBCUTANEOUS at 13:01

## 2018-06-10 ASSESSMENT — ENCOUNTER SYMPTOMS
FREQUENCY: 0
DIZZINESS: 0
ANAL BLEEDING: 0
VOMITING: 1
SORE THROAT: 0
FEVER: 0
EYE PAIN: 0
DYSURIA: 0
BLOOD IN STOOL: 0
ABDOMINAL PAIN: 1
CONFUSION: 0
SHORTNESS OF BREATH: 0
CONSTIPATION: 0
BACK PAIN: 0
COUGH: 0
PALPITATIONS: 0
POLYDIPSIA: 0
CHILLS: 0
NAUSEA: 1
WEAKNESS: 0

## 2018-06-10 NOTE — ED AVS SNAPSHOT
[facility] Emergency Department    [address] [place] [address]    Phone:  [phone]    Fax:  [phone]                                       Doreen Peralta   MRN: [id number]    Department:  [facility] Emergency Department   Date of Visit:  6/10/2018           Patient Information     Date Of Birth          1980        Your diagnoses for this visit were:     Chronic abdominal pain     Ileus of unspecified type (H)     Dehydration     Intractable vomiting with nausea, unspecified vomiting type        You were seen by PaoConnor MD.      Follow-up Information     Follow up with Franny Antoine MD. Schedule an appointment as soon as possible for a visit in 1 week.    Specialty:  Internal Medicine    Contact information:    [facility]  [address] [id number] [phone]          Follow up with [facility] Emergency Department.    Specialty:  EMERGENCY MEDICINE    Why:  If symptoms worsen    Contact information:    [address] [place] [address] [phone]    Additional information:    From [address]: Exit at [address] on [place] side of [place]. Turn right on [address]. Turn left at stoplight on [address]. [facility] will be in view two blocks ahead      Discharge References/Attachments     ABDOMINAL PAIN, ADHESIONS (ENGLISH)    CHRONIC PAIN (ENGLISH)      24 Hour Appointment Hotline       To make an appointment at any [facility], call [phone] ([phone]). If you don't have a family doctor or clinic, we will help you find one. [place] clinics are conveniently located to serve the needs of you and your family.             Review of your medicines      START taking        Dose / Directions Last dose taken    HYDROmorphone 2 MG tablet   Commonly known as:  DILAUDID   Dose:  2 mg   Quantity:  10 tablet        Take 1 tablet (2 mg) by mouth every 6 hours as needed for pain    Refills:  0          Our records show that you are taking the medicines listed below. If these are incorrect, please call your family doctor or clinic.        Dose / Directions Last dose taken    ACETAMINOPHEN PO   Dose:  500-1000 mg        Take 500-1,000 mg by mouth every 6 hours as needed for pain   Refills:  0        albuterol (2.5 MG/3ML) 0.083% neb solution   Dose:  2.5 mg   Quantity:  360 mL        Take 1 vial (2.5 mg) by nebulization every 4 hours as needed for shortness of breath / dyspnea or wheezing   Refills:  0        albuterol 90 MCG/ACT inhaler   Dose:  2 puff        Inhale 2 puffs into the lungs every 6 hours as needed   Refills:  0        Alfalfa 650 MG Tabs        Refills:  0        cholestyramine light 4 GM Packet   Commonly known as:  QUESTRAN   Dose:  4 g   Quantity:  60 packet        Take 1 packet (4 g) by mouth 2 times daily   Refills:  1        cloNIDine 0.2 MG tablet   Commonly known as:  CATAPRES   Dose:  0.4 mg   Quantity:  60 tablet        Take 2 tablets (0.4 mg) by mouth every evening - DUE FOR FOLLOW UP   Refills:  0        diphenhydrAMINE HCl 50 MG Tabs   Commonly known as:  BENADRYL   Dose:  50 mg   Quantity:  30 tablet        Take 50 mg by mouth every 6 hours as needed (nausea)   Refills:  0        DULoxetine 60 MG EC capsule   Commonly known as:  CYMBALTA   Quantity:  90 capsule        TAKE 1 CAPSULE(60 MG) BY MOUTH DAILY   Refills:  1        HYDROcodone-acetaminophen 5-325 MG per tablet   Commonly known as:  NORCO   Dose:  1 tablet   Quantity:  12 tablet        Take 1 tablet by mouth every 4 hours as needed for pain   Refills:  0        ipratropium 0.02 % neb solution   Commonly known as:  ATROVENT   Dose:  0.5 mg   Quantity:  225 mL        Take 2.5 mLs (0.5 mg) by nebulization every 6 hours as needed for wheezing   Refills:  0        ondansetron 8 MG tablet   Commonly known as:  ZOFRAN   Dose:  8 mg   Quantity:  9 tablet        Take 1 tablet (8 mg) by mouth every 8 hours as  needed for nausea   Refills:  0        SUMAtriptan 50 MG tablet   Commonly known as:  IMITREX   Dose:   mg   Quantity:  9 tablet        Take 1-2 tablets ( mg) by mouth at onset of headache for migraine - LAST REFILL, DUE FOR FOLLOW UP   Refills:  0        traZODone 100 MG tablet   Commonly known as:  DESYREL   Dose:   mg   Quantity:  90 tablet        Take 0.5-1 tablets ( mg) by mouth nightly as needed for sleep   Refills:  8                Information about OPIOIDS     PRESCRIPTION OPIOIDS: WHAT YOU NEED TO KNOW   You have a prescription for an opioid (narcotic) pain medicine. Opioids can cause addiction. If you have a history of chemical dependency of any type, you are at a higher risk of becoming addicted to opioids. Only take this medicine after all other options have been tried. Take it for as short a time and as few doses as possible.     Do not:    Drive. If you drive while taking these medicines, you could be arrested for driving under the influence (DUI).    Operate heavy machinery    Do any other dangerous activities while taking these medicines.     Drink any alcohol while taking these medicines.      Take with any other medicines that contain acetaminophen. Read all labels carefully. Look for the word  acetaminophen  or  Tylenol.  Ask your pharmacist if you have questions or are unsure.    Store your pills in a secure place, locked if possible. We will not replace any lost or stolen medicine. If you don t finish your medicine, please throw away (dispose) as directed by your pharmacist. The [place] [employer] has more information about safe disposal: https://www.pca.[facility].[place]/living-green/managing-unwanted-medications    All opioids tend to cause constipation. Drink plenty of water and eat foods that have a lot of fiber, such as fruits, vegetables, prune juice, apple juice and high-fiber cereal. Take a laxative (Miralax, milk of magnesia, Colace, Senna) if you  don t move your bowels at least every other day.         Prescriptions were sent or printed at these locations (1 Prescription)                   [facility] [place], [place] - [address]   [address] [facility] [address]    Telephone:  [phone]   Fax:  [phone]   Hours:                  Printed at Department/Unit printer (1 of 1)         HYDROmorphone (DILAUDID) 2 MG tablet                Procedures and tests performed during your visit     CBC with platelets differential    Comprehensive metabolic panel    Lactic acid whole blood    Peripheral IV catheter      Orders Needing Specimen Collection     None      Pending Results     No orders found from 6/8/2018 to 6/11/2018.            Pending Culture Results     No orders found from 6/8/2018 to 6/11/2018.            Pending Results Instructions     If you had any lab results that were not finalized at the time of your Discharge, you can call the ED Lab Result RN at [phone]. You will be contacted by this team for any positive Lab results or changes in treatment. The nurses are available 7 days a week from 10A to 6:30P.  You can leave a message 24 hours per day and they will return your call.        Thank you for choosing [facility]       Thank you for choosing [facility] for your care. Our goal is always to provide you with excellent care. Hearing back from our patients is one way we can continue to improve our services. Please take a few minutes to complete the written survey that you may receive in the mail after you visit with us. Thank you!        [employer] Information     [employer] gives you secure access to your electronic health record. If you see a primary care provider, you can also send messages to your care team and make appointments. If you have questions, please call your primary care clinic.  If you do not have a primary care provider, please call [phone] and they will assist you.        Care EveryWhere ID     This is  your Care EveryWhere ID. This could be used by other organizations to access your [facility] medical records  [id number]        Equal Access to Services     TRAMAINE CLAYTON : Carrie Law, clark villeda, vesta long, elham gomez. So [facility] [phone].    ATENCIÓN: Si habla español, tiene a wade disposición servicios gratuitos de asistencia lingüística. Llame al [phone].    We comply with applicable federal civil rights laws and [place] laws. We do not discriminate on the basis of race, color, national origin, age, disability, sex, sexual orientation, or gender identity.            After Visit Summary       This is your record. Keep this with you and show to your community pharmacist(s) and doctor(s) at your next visit.

## 2018-06-10 NOTE — ED AVS SNAPSHOT
[facility] Emergency Department    [address] [place] [address]    Phone:  [phone]    Fax:  [phone]                                       Doreen Peralta   MRN: [id number]    Department:  [facility] Emergency Department   Date of Visit:  6/10/2018           After Visit Summary Signature Page     I have received my discharge instructions, and my questions have been answered. I have discussed any challenges I see with this plan with the nurse or doctor.    ..........................................................................................................................................  Patient/Patient Representative Signature      ..........................................................................................................................................  Patient Representative Print Name and Relationship to Patient    ..................................................               ................................................  Date                                            Time    ..........................................................................................................................................  Reviewed by Signature/Title    ...................................................              ..............................................  Date                                                            Time

## 2018-06-10 NOTE — ED PROVIDER NOTES
History     Chief Complaint   Patient presents with     Nausea & Vomiting     HPI  Doreen Peralta is a 38 year old female who presents to the emergency room with abdominal pain nausea and vomiting. Patient has chronic abdominal pain with intermittent small bowel obstructions/ileus. She has had extensive workup. She tries to manage this at home as much as possible but comes to the emergency room for relief when the vomiting is intractable and she is getting dehydrated. She denies any fever or chills. The pain is in its typical location of the epigastric right epigastric area. It is colicky and crampy in nature. She has had no blood in emesis or bowel movements. She has chronic loose stool which is not changed in frequency. No evidence of blood. She denies any frequency or dysuria. There is nothing atypical about her symptoms.    Problem List:    Patient Active Problem List    Diagnosis Date Noted     Acute renal failure (H) 04/21/2018     Priority: Medium     History of bacteremia - recurrent 04/17/2018     Priority: Medium     Tobacco abuse 04/16/2018     Priority: Medium     Iron deficiency anemia 04/16/2018     Priority: Medium     Stool toxin PCR positive for Clostridium difficile on 4/17/18 04/16/2018     Priority: Medium     Gram negative septicemia (H) - Ochrobactrum, Stenotrophomonas & Pantoea 08/24/2017     Priority: Medium     Hypokalemia 08/24/2017     Priority: Medium     Essential hypertension 08/24/2017     Priority: Medium     Sepsis due to Klebsiella (H) 07/27/2017     Priority: Medium     Insomnia, unspecified type 03/31/2017     Priority: Medium     Abdominal pain 02/15/2017     Priority: Medium     Abdominal pain, generalized 01/28/2017     Priority: Medium     History of deep venous thrombosis 01/26/2017     Priority: Medium     Nausea and vomiting 01/26/2017     Priority: Medium     Vitamin D deficiency 01/16/2017     Priority: Medium     Chronic abdominal pain 11/15/2016     Priority: Medium      Patient is followed by Larisa Lorenzo PA-C for ongoing prescription of pain medication.  All refills should be approved by this provider, or covering partner.    Medication(s): no regular narcotics - narcotics given at ED visits  Maximum quantity per month: N/A  Clinic visit frequency required: Q 6  months     Controlled substance agreement:  Encounter-Level CSA - 11/9/15:               Controlled Substance Agreement - Scan on 11/19/2015 11:17 AM : CONTROLLED SUBSTANCE AGREEMENT 11/09/15 (below)          Encounter-Level CSA - 2/27/15:               Controlled Substance Agreement - Scan on 3/12/2015  7:50 AM : Controlled Medication Agreement 02/27/15 (below)            Pain Clinic evaluation in the past: Yes    DIRE Total Score(s):  No flowsheet data found.    Last [facility] website verification:  done on 11/15/16   [email or web address]        Attention to G-tube (H) 11/08/2016     Priority: Medium     Fever 10/16/2016     Priority: Medium     Coagulation defect (H) [D68.9] 09/16/2016     Priority: Medium     Chronic diarrhea 07/22/2016     Priority: Medium     Migraine 07/20/2016     Priority: Medium     Positive blood culture - Klebsiella oxytoca from Port-a-cath culture 07/18/2016     Priority: Medium     Mitral regurgitation mild-mod by Echo June 2016 07/18/2016     Priority: Medium     Anemia, iron deficiency 07/17/2016     Priority: Medium     Anemia in other chronic diseases classified elsewhere 06/14/2016     Priority: Medium     Munchausen syndrome - previously suspected 06/14/2016     Priority: Medium     Long-term (current) use of anticoagulants [Z79.01] 05/16/2016     Priority: Medium     Anxiety 05/16/2016     Priority: Medium     S/P partial resection of colon 04/11/2016     Priority: Medium     Malnutrition (H) 04/11/2016     Priority: Medium     Jejunostomy tube present (H) 03/21/2016     Priority: Medium     Malfunctioning jejunostomy tube (H) 12/22/2015     Priority: Medium      Malfunction of gastrostomy tube (H) 11/19/2015     Priority: Medium     PEG tube malfunction (H) 10/16/2015     Priority: Medium     Health Care Home 01/16/2015     Priority: Medium     *See Letters for [facility] Care Plan: My Access Plan           PEG (percutaneous endoscopic gastrostomy) status 11/05/2014     Priority: Medium     Gastroparesis 04/11/2014     Priority: Medium     Overview:   Had ileostomy performed at [facility] in Jan 2012 as treatment       Migraines 04/11/2014     Priority: Medium     4/11/2014  With periods, every other month.       Intermittent asthma 04/11/2014     Priority: Medium     4/11/2014   With URIs, allergies       Allergic rhinitis 04/11/2014     Priority: Medium     Problem list name updated by automated process. Provider to review       Abnormal Pap smear of cervix 04/11/2014     Priority: Medium     4/11/2014  S/p colp and LEEP. Sees OB/GYN at [facility]. Pap every year x20 years - normal since.       S/P LEEP of cervix 02/06/2014     Priority: Medium     Patellofemoral stress syndrome 01/18/2014     Priority: Medium     Hx SBO 10/09/2013     Priority: Medium     4/11/2014  Recurrent. Sees GI (Dr. Redding - at [facility]) every 6 months or so.  Sees feeding tube nurse next week.       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 04/11/2013     Priority: Medium     Constipation by delayed colonic transit 10/24/2012     Priority: Medium     Atopic rhinitis 03/20/2009     Priority: Medium     Hyperbilirubinemia 03/11/2009     Priority: Medium        Past Medical History:    Past Medical History:   Diagnosis Date     Asthma      Bilateral ovarian cysts      Cervical cancer (H) 01/01/2008     Chronic pain      Colonic dysmotility      Constipation      E. coli sepsis (H) 5/8/2016     Enteritis      Fungemia 5/5/2016     Gastro-oesophageal reflux disease      H/O ileostomy      Hx of abnormal Pap smear      Hypertension      IBS (irritable bowel syndrome)      Other chronic pain      PONV (postoperative  nausea and vomiting)      Thrombosis, hepatic vein (H)        Past [facility] History:    Past [facility] History:   Procedure Laterality Date     COLONOSCOPY  7/10/2012    Procedure: COLONOSCOPY;;  Surgeon: Nicole Redding MD;  [facility]     COLONOSCOPY N/A 2/19/2017    Procedure: COLONOSCOPY;  Surgeon: Randell Muller MD;  [facility]: [facility] GI     COLONOSCOPY N/A 2/21/2017    Procedure: COLONOSCOPY;  Surgeon: Randell Muller MD;  [facility]     COLONOSCOPY N/A 5/3/2018    Procedure: COMBINED COLONOSCOPY, SINGLE OR MULTIPLE BIOPSY/POLYPECTOMY BY BIOPSY;  EGD/Colonoscopy ;  Surgeon: Loi Black MD;  Location: [facility] GI     ECHO CHELO  7/19/2016          ENDOSCOPIC INSERTION TUBE GASTROSTOMY N/A 1/21/2016    Procedure: ENDOSCOPIC INSERTION TUBE GASTROSTOMY;  Surgeon: Nicole Redding MD;  [facility]     ESOPHAGOSCOPY, GASTROSCOPY, DUODENOSCOPY (EGD), COMBINED  7/10/2012    Procedure: COMBINED ESOPHAGOSCOPY, GASTROSCOPY, DUODENOSCOPY (EGD);  Upper Endoscopy, Ileoscopy    Latex Allergy  with biopsies;  Surgeon: Nicole Redding MD;  [facility]     ESOPHAGOSCOPY, GASTROSCOPY, DUODENOSCOPY (EGD), COMBINED N/A 11/5/2014    Procedure: COMBINED ESOPHAGOSCOPY, GASTROSCOPY, DUODENOSCOPY (EGD);  Surgeon: Nicole Redding MD;  [facility]     ESOPHAGOSCOPY, GASTROSCOPY, DUODENOSCOPY (EGD), COMBINED N/A 5/3/2018    Procedure: COMBINED ESOPHAGOSCOPY, GASTROSCOPY, DUODENOSCOPY (EGD), BIOPSY SINGLE OR MULTIPLE;;  Surgeon: Loi Black MD;  Location: [facility] GI     HC REPLACE DUODENOSTOMY/JEJUNOSTOMY TUBE PERCUTANEOUS N/A 8/27/2015    Procedure: REPLACE GASTROJEJUNOSTOMY TUBE, PERCUTANEOUS;  Surgeon: Mio Holder MD;  [facility]     HC REPLACE DUODENOSTOMY/JEJUNOSTOMY TUBE PERCUTANEOUS N/A 1/7/2016    Procedure: REPLACE JEJUNOSTOMY TUBE, PERCUTANEOUS;  Surgeon: Elsa Medel MD;  [facility]     HC REPLACE DUODENOSTOMY/JEJUNOSTOMY TUBE PERCUTANEOUS N/A  1/28/2016    Procedure: REPLACE JEJUNOSTOMY TUBE, PERCUTANEOUS;  Surgeon: Elsa Medel MD;  [facility]     HC REPLACE GASTROSTOMY/CECOSTOMY TUBE PERCUTANEOUS Left 5/19/2015    Procedure: REPLACE GASTROSTOMY TUBE, PERCUTANEOUS;  Surgeon: Melecio Morejon Chi, MD;  Location:  GI     HC UGI ENDOSCOPY W PLACEMENT GASTROSTOMY TUBE PERCUT N/A 10/1/2015    Procedure: COMBINED ESOPHAGOSCOPY, GASTROSCOPY, DUODENOSCOPY (EGD), PLACE PERCUTANEOUS ENDOSCOPIC GASTROSTOMY TUBE;  Surgeon: Mio Holder MD;  Location:  GI     INSERT PORT VASCULAR ACCESS Right 7/20/2017    Procedure: INSERT PORT VASCULAR ACCESS;  Chest Port Placement  **Latex Allergy**;  Surgeon: Coy Rocha PA-C;  [facility]     LAPAROSCOPIC ASSISTED COLECTOMY  1/20/2012    Procedure:LAPAROSCOPIC ASSISTED COLECTOMY; Laparoscopic Ileostomy       LAPAROSCOPIC ASSISTED COLECTOMY LEFT (DESCENDING)  10/24/2012    Procedure: LAPAROSCOPIC ASSISTED COLECTOMY LEFT (DESCENDING);   Hand Assisted Laproscopic Subtotal abdominal Colectomy,Iieorectal anastamosis, Ileostomy Closure.       LAPAROSCOPIC ASSISTED INSERTION TUBE JEJUNOSTOMY N/A 10/16/2015    Procedure: LAPAROSCOPIC ASSISTED INSERTION TUBE JEJUNOSTOMY;  Surgeon: Elsa Medel MD;  Location:  OR     LAPAROSCOPIC CHOLECYSTECTOMY  2002    [facility]. stones duct     LAPAROSCOPIC ILEOSTOMY  1/20/2012    [facility], loop     LAPAROSCOPIC OOPHORECTOMY Right 2009    [facility]     LAPAROTOMY EXPLORATORY N/A 1/28/2016    Procedure: LAPAROTOMY EXPLORATORY;  Surgeon: Elsa Medel MD;  Location:  OR     LEEP TX, CERVICAL  2009    [facility]     PICC INSERTION Left 10/21/2015    5fr DL Power PICC, 37cm (2cm external) in the L basilic vein w/ tip in the SVC RA junction.     REMOVE GASTROSTOMY TUBE ADULT N/A 12/12/2014    Procedure: REMOVE GASTROSTOMY TUBE ADULT;  Surgeon: Nicole Redding MD;  [facility] GI     REMOVE PORT VASCULAR ACCESS Right 6/30/2016    Procedure:  REMOVE PORT VASCULAR ACCESS;  Surgeon: Pradeep Orosco MD;  Location: [facility] OR     REMOVE PORT VASCULAR ACCESS Right 9/8/2017    Procedure: REMOVE PORT VASCULAR ACCESS;  right side mediport removal;  Surgeon: Zechariah Worthington MD;  Location: [facility] OR     replace GASTROSTOMY TUBE ADULT  5/19/15       Family History:    Family History   Problem Relation Age of Onset     Thyroid Disease Mother      Sjogren's Mother      GASTROINTESTINAL DISEASE Mother      Intermittent nausea vomiting diarrhea     Colon Polyps Mother      Prostate Problems Father      prostate enlargement     Lupus Maternal Grandmother      CANCER Maternal Grandfather      Lung     Colon Cancer Maternal Grandfather 65     CANCER Paternal Grandmother      Lung      CEREBROVASCULAR DISEASE Paternal Grandmother      DIABETES Paternal Grandmother      Cardiovascular Paternal Grandmother      CHF     CANCER Paternal Grandfather      Lung     Glaucoma Paternal Grandfather      Abdominal Aortic Aneurysm Other      Macular Degeneration No family hx of        Social History:  Marital Status:   [2]  Social History   Substance Use Topics     Smoking status: Current Some Day Smoker     Packs/day: 1.00     Years: 4.00     Types: Cigarettes     Last attempt to quit: 1/1/2004     Smokeless tobacco: Never Used     Alcohol use No        Medications:      HYDROmorphone (DILAUDID) 2 MG tablet   ACETAMINOPHEN PO   albuterol (2.5 MG/3ML) 0.083% neb solution   albuterol 90 MCG/ACT inhaler   Alfalfa 650 MG TABS   cholestyramine light (QUESTRAN) 4 GM Packet   cloNIDine (CATAPRES) 0.2 MG tablet   diphenhydrAMINE HCl 50 MG TABS   DULoxetine (CYMBALTA) 60 MG EC capsule   HYDROcodone-acetaminophen (NORCO) 5-325 MG per tablet   ipratropium (ATROVENT) 0.02 % neb solution   ondansetron (ZOFRAN) 8 MG tablet   SUMAtriptan (IMITREX) 50 MG tablet   traZODone (DESYREL) 100 MG tablet         Review of Systems   Constitutional: Negative for chills and fever.   HENT: Negative for  "congestion and sore throat.    Eyes: Negative for pain.   Respiratory: Negative for cough and shortness of breath.    Cardiovascular: Negative for chest pain and palpitations.   Gastrointestinal: Positive for abdominal pain, nausea and vomiting. Negative for anal bleeding, blood in stool and constipation.   Endocrine: Negative for polydipsia and polyuria.   Genitourinary: Negative for dysuria and frequency.   Musculoskeletal: Negative for back pain.   Skin: Negative for rash.   Allergic/Immunologic: Negative for immunocompromised state.   Neurological: Negative for dizziness and weakness.   Psychiatric/Behavioral: Negative for confusion.   All other systems reviewed and are negative.      Physical Exam   BP: (!) 132/91  Heart Rate: 79  Temp: 98.3  F (36.8  C)  Resp: 14  Height: 167.6 cm (5' 6\")  Weight: 76.2 kg (168 lb)  SpO2: 100 %      Physical Exam   Constitutional: She is oriented to person, place, and time.   Alert female appears to be in moderate to severe pain. She is curled on the cart and guarding her abdomen. She does not appear toxic. She is afebrile and her vital signs are otherwise stable.vs  /79  Temp 98.3  F (36.8  C) (Oral)  Resp 14  Ht 1.676 m (5' 6\")  Wt 76.2 kg (168 lb)  LMP 05/23/2018 (Approximate)  SpO2 100%  Breastfeeding? No  BMI 27.12 kg/m2     HENT:   Head: Normocephalic.   Right Ear: External ear normal.   Left Ear: External ear normal.   Nose: Nose normal.   Mouth/Throat: Oropharynx is clear and moist.   Eyes: Conjunctivae are normal.   Neck: Normal range of motion. Neck supple.   Cardiovascular: Normal rate, regular rhythm and normal heart sounds.    Pulmonary/Chest: Effort normal and breath sounds normal.   Abdominal: Soft. She exhibits no mass. There is tenderness in the right upper quadrant and epigastric area. There is no rigidity, no rebound, no guarding, no CVA tenderness, no tenderness at McBurney's point and negative Benson's sign.       Musculoskeletal: Normal range " of motion.   Neurological: She is alert and oriented to person, place, and time.   Skin: Skin is warm and dry.   Psychiatric: She has a normal mood and affect. Her behavior is normal.   Nursing note and vitals reviewed.      ED Course     ED Course     Procedures               Critical Care time:  none               Results for orders placed or performed during the hospital encounter of 06/10/18 (from the past 24 hour(s))   CBC with platelets differential   Result Value Ref Range    WBC 5.2 4.0 - 11.0 10e9/L    RBC Count 4.40 3.8 - 5.2 10e12/L    Hemoglobin 10.5 (L) 11.7 - 15.7 g/dL    Hematocrit 34.5 (L) 35.0 - 47.0 %    MCV 78 78 - 100 fl    MCH 23.9 (L) 26.5 - 33.0 pg    MCHC 30.4 (L) 31.5 - 36.5 g/dL    RDW 17.6 (H) 10.0 - 15.0 %    Platelet Count 591 (H) 150 - 450 10e9/L    Diff Method Automated Method     % Immature Granulocytes 0.2 %    Abs Immature Granulocytes 0.0 0 - 0.4 10e9/L   Comprehensive metabolic panel   Result Value Ref Range    Sodium 137 133 - 144 mmol/L    Potassium 4.2 3.4 - 5.3 mmol/L    Chloride 104 94 - 109 mmol/L    Carbon Dioxide 24 20 - 32 mmol/L    Anion Gap 9 3 - 14 mmol/L    Glucose 85 70 - 99 mg/dL    Urea Nitrogen 11 7 - 30 mg/dL    Creatinine 1.08 (H) 0.52 - 1.04 mg/dL    GFR Estimate 57 (L) >60 mL/min/1.7m2    GFR Estimate If Black 69 >60 mL/min/1.7m2    Calcium 9.2 8.5 - 10.1 mg/dL    Bilirubin Total 0.5 0.2 - 1.3 mg/dL    Albumin 3.8 3.4 - 5.0 g/dL    Protein Total 8.7 6.8 - 8.8 g/dL    Alkaline Phosphatase 163 (H) 40 - 150 U/L    ALT 18 0 - 50 U/L    AST 14 0 - 45 U/L   Lactic acid whole blood   Result Value Ref Range    Lactic Acid 0.9 0.7 - 2.0 mmol/L     *Note: Due to a large number of results and/or encounters for the requested time period, some results have not been displayed. A complete set of results can be found in Results Review.       Medications   0.9% sodium chloride BOLUS (1,000 mLs Intravenous New Bag 6/10/18 2326)   HYDROmorphone (PF) (DILAUDID) injection 0.5 mg  (0.5 mg Intravenous Given 6/10/18 1221)   diphenhydrAMINE (BENADRYL) injection 50 mg (50 mg Intravenous Given 6/10/18 1220)   ondansetron (ZOFRAN) injection 4 mg (4 mg Intravenous Given 6/10/18 1218)   HYDROmorphone (PF) (DILAUDID) injection 0.5 mg (0.5 mg Intravenous Given 6/10/18 1301)       Assessments & Plan (with Medical Decision Making)   MDM--38-year-old female with chronic abdominal pain. She responded well to IV fluids 1 dose of Dilaudid and Benadryl IV. She is discharged home in improved condition. Labs are overall status quo. She has chronic anemia related to iron deficiency-malabsorption. I recommend she follow-up with her primary care to discuss parenteral iron replacement. Discussed further workup specifically imaging and I did not feel it was necessary and the patient agrees. She has had extensive workup and she does not feel this exacerbation is anything different than other exacerbations. Recommended following up with her primary care physician. Patient is comfortable with this treatment plan she is discharged home in improved condition. She was given a small prescription of Dilaudid to hopefully avoid the need to return to the emergency department. This will need to be closely monitored.  I have reviewed the nursing notes.    I have reviewed the findings, diagnosis, plan and need for follow up with the patient.       New Prescriptions    HYDROMORPHONE (DILAUDID) 2 MG TABLET    Take 1 tablet (2 mg) by mouth every 6 hours as needed for pain       Final diagnoses:   Chronic abdominal pain   Ileus of unspecified type (H)   Dehydration   Intractable vomiting with nausea, unspecified vomiting type       6/10/2018   [facility] EMERGENCY DEPARTMENT     Pao, Connor OLVERA MD  06/10/18 [id number]

## 2018-06-11 ENCOUNTER — HOSPITAL ENCOUNTER (EMERGENCY)
Facility: CLINIC | Age: 38
Discharge: HOME | End: 2018-06-11
Attending: PHYSICIAN ASSISTANT | Admitting: PHYSICIAN ASSISTANT
Payer: MEDICARE

## 2018-06-11 VITALS
SYSTOLIC BLOOD PRESSURE: 117 MMHG | WEIGHT: 168 LBS | RESPIRATION RATE: 18 BRPM | TEMPERATURE: 100.5 F | DIASTOLIC BLOOD PRESSURE: 74 MMHG | BODY MASS INDEX: 27.12 KG/M2 | OXYGEN SATURATION: 97 %

## 2018-06-11 DIAGNOSIS — R50.9 FEVER, UNKNOWN ORIGIN: ICD-10-CM

## 2018-06-11 DIAGNOSIS — R11.2 NON-INTRACTABLE VOMITING WITH NAUSEA, UNSPECIFIED VOMITING TYPE: ICD-10-CM

## 2018-06-11 LAB
ALBUMIN SERPL-MCNC: 3.4 G/DL (ref 3.4–5)
ALP SERPL-CCNC: 150 U/L (ref 40–150)
ALT SERPL W P-5'-P-CCNC: 17 U/L (ref 0–50)
ANION GAP SERPL CALCULATED.3IONS-SCNC: 8 MMOL/L (ref 3–14)
AST SERPL W P-5'-P-CCNC: 18 U/L (ref 0–45)
BASOPHILS # BLD AUTO: 0 10E9/L (ref 0–0.2)
BASOPHILS NFR BLD AUTO: 0.3 %
BILIRUB SERPL-MCNC: 0.8 MG/DL (ref 0.2–1.3)
BUN SERPL-MCNC: 5 MG/DL (ref 7–30)
CALCIUM SERPL-MCNC: 8.6 MG/DL (ref 8.5–10.1)
CHLORIDE SERPL-SCNC: 103 MMOL/L (ref 94–109)
CO2 SERPL-SCNC: 26 MMOL/L (ref 20–32)
CREAT SERPL-MCNC: 1.11 MG/DL (ref 0.52–1.04)
DIFFERENTIAL METHOD BLD: ABNORMAL
EOSINOPHIL NFR BLD AUTO: 0.1 %
ERYTHROCYTE [DISTWIDTH] IN BLOOD BY AUTOMATED COUNT: 18 % (ref 10–15)
GFR SERPL CREATININE-BSD FRML MDRD: 55 ML/MIN/1.7M2
GLUCOSE SERPL-MCNC: 103 MG/DL (ref 70–99)
HCT VFR BLD AUTO: 33 % (ref 35–47)
HGB BLD-MCNC: 10 G/DL (ref 11.7–15.7)
IMM GRANULOCYTES # BLD: 0 10E9/L (ref 0–0.4)
IMM GRANULOCYTES NFR BLD: 0.4 %
LACTATE BLD-SCNC: 1.8 MMOL/L (ref 0.7–2)
LYMPHOCYTES # BLD AUTO: 0.5 10E9/L (ref 0.8–5.3)
LYMPHOCYTES NFR BLD AUTO: 7.3 %
MCH RBC QN AUTO: 24.3 PG (ref 26.5–33)
MCHC RBC AUTO-ENTMCNC: 30.3 G/DL (ref 31.5–36.5)
MCV RBC AUTO: 80 FL (ref 78–100)
MONOCYTES # BLD AUTO: 0.1 10E9/L (ref 0–1.3)
MONOCYTES NFR BLD AUTO: 2.1 %
NEUTROPHILS # BLD AUTO: 6 10E9/L (ref 1.6–8.3)
NEUTROPHILS NFR BLD AUTO: 89.8 %
NRBC # BLD AUTO: 0 10*3/UL
NRBC BLD AUTO-RTO: 0 /100
PLATELET # BLD AUTO: 401 10E9/L (ref 150–450)
POTASSIUM SERPL-SCNC: 3.5 MMOL/L (ref 3.4–5.3)
PROT SERPL-MCNC: 8.2 G/DL (ref 6.8–8.8)
RBC # BLD AUTO: 4.11 10E12/L (ref 3.8–5.2)
SODIUM SERPL-SCNC: 137 MMOL/L (ref 133–144)
WBC # BLD AUTO: 6.7 10E9/L (ref 4–11)

## 2018-06-11 PROCEDURE — 85025 COMPLETE CBC W/AUTO DIFF WBC: CPT | Performed by: PHYSICIAN ASSISTANT

## 2018-06-11 PROCEDURE — 99283 EMERGENCY DEPT VISIT LOW MDM: CPT | Performed by: PHYSICIAN ASSISTANT

## 2018-06-11 PROCEDURE — 99284 EMERGENCY DEPT VISIT MOD MDM: CPT | Mod: Z6 | Performed by: PHYSICIAN ASSISTANT

## 2018-06-11 PROCEDURE — 80053 COMPREHEN METABOLIC PANEL: CPT | Performed by: PHYSICIAN ASSISTANT

## 2018-06-11 PROCEDURE — 83605 ASSAY OF LACTIC ACID: CPT | Performed by: PHYSICIAN ASSISTANT

## 2018-06-11 RX ORDER — DIPHENHYDRAMINE HYDROCHLORIDE 50 MG/ML
50 INJECTION INTRAMUSCULAR; INTRAVENOUS ONCE
Status: DISCONTINUED | OUTPATIENT
Start: 2018-06-11 | End: 2018-06-12 | Stop reason: HOSPADM

## 2018-06-11 RX ORDER — SODIUM CHLORIDE 9 MG/ML
1000 INJECTION, SOLUTION INTRAVENOUS CONTINUOUS
Status: DISCONTINUED | OUTPATIENT
Start: 2018-06-11 | End: 2018-06-12 | Stop reason: HOSPADM

## 2018-06-11 NOTE — ED AVS SNAPSHOT
[facility] Emergency Department    [address] [place] [address]    Phone:  [phone]    Fax:  [phone]                                       Doreen Peralta   MRN: [id number]    Department:  [facility] Emergency Department   Date of Visit:  6/11/2018           After Visit Summary Signature Page     I have received my discharge instructions, and my questions have been answered. I have discussed any challenges I see with this plan with the nurse or doctor.    ..........................................................................................................................................  Patient/Patient Representative Signature      ..........................................................................................................................................  Patient Representative Print Name and Relationship to Patient    ..................................................               ................................................  Date                                            Time    ..........................................................................................................................................  Reviewed by Signature/Title    ...................................................              ..............................................  Date                                                            Time

## 2018-06-11 NOTE — ED AVS SNAPSHOT
[facility]    [address]     [place] [address]    Phone:  [phone]    Fax:  [phone]                                       Doreen Peralta   MRN: [id number]    Department:  [facility]   Date of Visit:  6/11/2018           Patient Information     Date Of Birth          1980        Your diagnoses for this visit were:     Fever, unknown origin     Non-intractable vomiting with nausea, unspecified vomiting type        You were seen by Vianey Warren PA-C.      Follow-up Information     Follow up with [facility].    Specialty:  EMERGENCY MEDICINE    Why:  If symptoms worsen    Contact information:    [address] [place] [address] [phone]    Additional information:    From [address]: Exit at [address] on [place] side of [place]. Turn right on [address]. Turn left at stoplight on [address]. [facility] will be in view two blocks ahead        Call Franny Antoine MD.    Specialty:  Internal Medicine    Why:  For ER follow up    Contact information:    [facility]  [address] [id number] [phone]          Discharge Instructions       Your labs looked good today.  Please go home and get some rest.  Take Tylenol as needed for recurrent fever and your antinausea medications as needed for any recurrent nausea.  If you develop any acutely worsening symptoms please return to the emergency department for reassessment.  Otherwise follow-up with your primary care provider in the clinic for your recent ER visits this past week.    Thank you for choosing [facility] Emergency Department. It was a pleasure taking care of you today. If you have any questions, please call [phone].    Vianey Warren PA-C      24 Hour Appointment Hotline       To make an appointment at any [facility] (formerly [facility]), call [phone] ([phone]).  If you don't have a family doctor or clinic, we will help you find one. [place] clinics are conveniently located to serve the needs of you and your family.             Review of your medicines      Our records show that you are taking the medicines listed below. If these are incorrect, please call your family doctor or clinic.        Dose / Directions Last dose taken    ACETAMINOPHEN PO   Dose:  500-1000 mg        Take 500-1,000 mg by mouth every 6 hours as needed for pain   Refills:  0        albuterol (2.5 MG/3ML) 0.083% neb solution   Dose:  2.5 mg   Quantity:  360 mL        Take 1 vial (2.5 mg) by nebulization every 4 hours as needed for shortness of breath / dyspnea or wheezing   Refills:  0        albuterol 90 MCG/ACT inhaler   Dose:  2 puff        Inhale 2 puffs into the lungs every 6 hours as needed   Refills:  0        Alfalfa 650 MG Tabs        Refills:  0        cholestyramine light 4 GM Packet   Commonly known as:  QUESTRAN   Dose:  4 g   Quantity:  60 packet        Take 1 packet (4 g) by mouth 2 times daily   Refills:  1        cloNIDine 0.2 MG tablet   Commonly known as:  CATAPRES   Dose:  0.4 mg   Quantity:  60 tablet        Take 2 tablets (0.4 mg) by mouth every evening - DUE FOR FOLLOW UP   Refills:  0        diphenhydrAMINE HCl 50 MG Tabs   Commonly known as:  BENADRYL   Dose:  50 mg   Quantity:  30 tablet        Take 50 mg by mouth every 6 hours as needed (nausea)   Refills:  0        DULoxetine 60 MG EC capsule   Commonly known as:  CYMBALTA   Quantity:  90 capsule        TAKE 1 CAPSULE(60 MG) BY MOUTH DAILY   Refills:  1        HYDROcodone-acetaminophen 5-325 MG per tablet   Commonly known as:  NORCO   Dose:  1 tablet   Quantity:  12 tablet        Take 1 tablet by mouth every 4 hours as needed for pain   Refills:  0        HYDROmorphone 2 MG tablet   Commonly known as:  DILAUDID   Dose:  2 mg   Quantity:  10 tablet        Take 1 tablet (2 mg) by mouth every 6 hours as needed for pain   Refills:   0        ipratropium 0.02 % neb solution   Commonly known as:  ATROVENT   Dose:  0.5 mg   Quantity:  225 mL        Take 2.5 mLs (0.5 mg) by nebulization every 6 hours as needed for wheezing   Refills:  0        ondansetron 8 MG tablet   Commonly known as:  ZOFRAN   Dose:  8 mg   Quantity:  9 tablet        Take 1 tablet (8 mg) by mouth every 8 hours as needed for nausea   Refills:  0        SUMAtriptan 50 MG tablet   Commonly known as:  IMITREX   Dose:   mg   Quantity:  9 tablet        Take 1-2 tablets ( mg) by mouth at onset of headache for migraine - LAST REFILL, DUE FOR FOLLOW UP   Refills:  0        traZODone 100 MG tablet   Commonly known as:  DESYREL   Dose:   mg   Quantity:  90 tablet        Take 0.5-1 tablets ( mg) by mouth nightly as needed for sleep   Refills:  8                Procedures and tests performed during your visit     CBC with platelets differential    Comprehensive metabolic panel    Lactic acid whole blood    Peripheral IV catheter      Orders Needing Specimen Collection     Ordered          06/11/18 2047  Blood culture - STAT, [facility]: STAT, Needs to be Collected     Scheduled Task Status   06/11/18 2048 Collect Blood culture Open   Order Class:  PCU Collect                06/11/18 2047  Blood culture - STAT, [facility]: STAT, Needs to be Collected     Scheduled Task Status   06/11/18 2048 Collect Blood culture Open   Order Class:  PCU Collect                  Pending Results     No orders found from 6/9/2018 to 6/12/2018.            Pending Culture Results     No orders found from 6/9/2018 to 6/12/2018.            Pending Results Instructions     If you had any lab results that were not finalized at the time of your Discharge, you can call the ED Lab Result RN at [phone]. You will be contacted by this team for any positive Lab results or changes in treatment. The nurses are available 7 days a week from 10A to 6:30P.  You can leave a message 24 hours per day and they will  return your call.        Thank you for choosing [facility]       Thank you for choosing [facility] for your care. Our goal is always to provide you with excellent care. Hearing back from our patients is one way we can continue to improve our services. Please take a few minutes to complete the written survey that you may receive in the mail after you visit with us. Thank you!        [employer] Information     [employer] gives you secure access to your electronic health record. If you see a primary care provider, you can also send messages to your care team and make appointments. If you have questions, please call your primary care clinic.  If you do not have a primary care provider, please call [phone] and they will assist you.        Care EveryWhere ID     This is your Care EveryWhere ID. This could be used by other organizations to access your [facility] medical records  [id number]        Equal Access to Services     TRAMAINE CLAYTON : Carrie Law, clark villeda, elham guerrero. [facility] [phone].    ATENCIÓN: Si habla español, tiene a wade disposición servicios gratuitos de asistencia lingüística. Llame al [phone].    We comply with applicable federal civil rights laws and [place] laws. We do not discriminate on the basis of race, color, national origin, age, disability, sex, sexual orientation, or gender identity.            After Visit Summary       This is your record. Keep this with you and show to your community pharmacist(s) and doctor(s) at your next visit.

## 2018-06-12 NOTE — ED PROVIDER NOTES
History     Chief Complaint   Patient presents with     Fever     Vomiting     The history is provided by the patient.     Doreen Peralta is a 38 year old female who presents to the emergency department for fever and vomiting. Patient was brought in today by EMS for fever and vomiting. Patient reports she was discharged home yesterday from the ER for similar symptoms and started to vomit again last night. She states she vomited all night last night and then all day today. She states she started to have the shakes today at 1300. Patient reports she was checking her temperature and noticed it elevated at 103.0 axillary at 1500 today. She has tried Benadryl, Zofran, and Tylenol with no relief. She states she started bloating 4 days ago with nausea. She states she does have chronic diarrhea. She denies having a cough or rash. No increased abdominal pain. No urinary symptoms.     Problem List:    Patient Active Problem List    Diagnosis Date Noted     Acute renal failure (H) 04/21/2018     Priority: Medium     History of bacteremia - recurrent 04/17/2018     Priority: Medium     Tobacco abuse 04/16/2018     Priority: Medium     Iron deficiency anemia 04/16/2018     Priority: Medium     Stool toxin PCR positive for Clostridium difficile on 4/17/18 04/16/2018     Priority: Medium     Gram negative septicemia (H) - Ochrobactrum, Stenotrophomonas & Pantoea 08/24/2017     Priority: Medium     Hypokalemia 08/24/2017     Priority: Medium     Essential hypertension 08/24/2017     Priority: Medium     Sepsis due to Klebsiella (H) 07/27/2017     Priority: Medium     Insomnia, unspecified type 03/31/2017     Priority: Medium     Abdominal pain 02/15/2017     Priority: Medium     Abdominal pain, generalized 01/28/2017     Priority: Medium     History of deep venous thrombosis 01/26/2017     Priority: Medium     Nausea and vomiting 01/26/2017     Priority: Medium     Vitamin D deficiency 01/16/2017     Priority: Medium     Chronic  abdominal pain 11/15/2016     Priority: Medium     Patient is followed by Larisa Lorenzo PA-C for ongoing prescription of pain medication.  All refills should be approved by this provider, or covering partner.    Medication(s): no regular narcotics - narcotics given at ED visits  Maximum quantity per month: N/A  Clinic visit frequency required: Q 6  months     Controlled substance agreement:  Encounter-Level CSA - 11/9/15:               Controlled Substance Agreement - Scan on 11/19/2015 11:17 AM : CONTROLLED SUBSTANCE AGREEMENT 11/09/15 (below)          Encounter-Level CSA - 2/27/15:               Controlled Substance Agreement - Scan on 3/12/2015  7:50 AM : Controlled Medication Agreement 02/27/15 (below)            Pain Clinic evaluation in the past: Yes    DIRE Total Score(s):  No flowsheet data found.    Last [facility] website verification:  done on 11/15/16   [email or web address]        Attention to G-tube (H) 11/08/2016     Priority: Medium     Fever 10/16/2016     Priority: Medium     Coagulation defect (H) [D68.9] 09/16/2016     Priority: Medium     Chronic diarrhea 07/22/2016     Priority: Medium     Migraine 07/20/2016     Priority: Medium     Positive blood culture - Klebsiella oxytoca from Port-a-cath culture 07/18/2016     Priority: Medium     Mitral regurgitation mild-mod by Echo June 2016 07/18/2016     Priority: Medium     Anemia, iron deficiency 07/17/2016     Priority: Medium     Anemia in other chronic diseases classified elsewhere 06/14/2016     Priority: Medium     Munchausen syndrome - previously suspected 06/14/2016     Priority: Medium     Long-term (current) use of anticoagulants [Z79.01] 05/16/2016     Priority: Medium     Anxiety 05/16/2016     Priority: Medium     S/P partial resection of colon 04/11/2016     Priority: Medium     Malnutrition (H) 04/11/2016     Priority: Medium     Jejunostomy tube present (H) 03/21/2016     Priority: Medium     Malfunctioning jejunostomy tube  (H) 12/22/2015     Priority: Medium     Malfunction of gastrostomy tube (H) 11/19/2015     Priority: Medium     PEG tube malfunction (H) 10/16/2015     Priority: Medium     Health Care Home 01/16/2015     Priority: Medium     *See Letters for [facility] Care Plan: My Access Plan           PEG (percutaneous endoscopic gastrostomy) status 11/05/2014     Priority: Medium     Gastroparesis 04/11/2014     Priority: Medium     Overview:   Had ileostomy performed at [facility] in Jan 2012 as treatment       Migraines 04/11/2014     Priority: Medium     4/11/2014  With periods, every other month.       Intermittent asthma 04/11/2014     Priority: Medium     4/11/2014   With URIs, allergies       Allergic rhinitis 04/11/2014     Priority: Medium     Problem list name updated by automated process. Provider to review       Abnormal Pap smear of cervix 04/11/2014     Priority: Medium     4/11/2014  S/p colp and LEEP. Sees OB/GYN at [facility]. Pap every year x20 years - normal since.       S/P LEEP of cervix 02/06/2014     Priority: Medium     Patellofemoral stress syndrome 01/18/2014     Priority: Medium     Hx SBO 10/09/2013     Priority: Medium     4/11/2014  Recurrent. Sees GI (Dr. Redding - at [facility]) every 6 months or so.  Sees feeding tube nurse next week.       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 04/11/2013     Priority: Medium     Constipation by delayed colonic transit 10/24/2012     Priority: Medium     Atopic rhinitis 03/20/2009     Priority: Medium     Hyperbilirubinemia 03/11/2009     Priority: Medium        Past Medical History:    Past Medical History:   Diagnosis Date     Asthma      Bilateral ovarian cysts      Cervical cancer (H) 01/01/2008     Chronic pain      Colonic dysmotility      Constipation      E. coli sepsis (H) 5/8/2016     Enteritis      Fungemia 5/5/2016     Gastro-oesophageal reflux disease      H/O ileostomy      Hx of abnormal Pap smear      Hypertension      IBS (irritable bowel syndrome)      Other  chronic pain      PONV (postoperative nausea and vomiting)      Thrombosis, hepatic vein (H)        Past [facility] History:    Past [facility] History:   Procedure Laterality Date     COLONOSCOPY  7/10/2012    Procedure: COLONOSCOPY;;  Surgeon: Nicole Redding MD;  Location: [facility]     COLONOSCOPY N/A 2/19/2017    Procedure: COLONOSCOPY;  Surgeon: Randell Muller MD;  Location: [facility] GI     COLONOSCOPY N/A 2/21/2017    Procedure: COLONOSCOPY;  Surgeon: Randell Muller MD;  Location: [facility] GI     COLONOSCOPY N/A 5/3/2018    Procedure: COMBINED COLONOSCOPY, SINGLE OR MULTIPLE BIOPSY/POLYPECTOMY BY BIOPSY;  EGD/Colonoscopy ;  Surgeon: Loi Black MD;  Location: [facility] GI     ECHO CHELO  7/19/2016          ENDOSCOPIC INSERTION TUBE GASTROSTOMY N/A 1/21/2016    Procedure: ENDOSCOPIC INSERTION TUBE GASTROSTOMY;  Surgeon: Nicole Redding MD;  [facility]     ESOPHAGOSCOPY, GASTROSCOPY, DUODENOSCOPY (EGD), COMBINED  7/10/2012    Procedure: COMBINED ESOPHAGOSCOPY, GASTROSCOPY, DUODENOSCOPY (EGD);  Upper Endoscopy, Ileoscopy    Latex Allergy  with biopsies;  Surgeon: Nicole Redding MD;  Location[facility]     ESOPHAGOSCOPY, GASTROSCOPY, DUODENOSCOPY (EGD), COMBINED N/A 11/5/2014    Procedure: COMBINED ESOPHAGOSCOPY, GASTROSCOPY, DUODENOSCOPY (EGD);  Surgeon: Nicole Redding MD;  [facility]     ESOPHAGOSCOPY, GASTROSCOPY, DUODENOSCOPY (EGD), COMBINED N/A 5/3/2018    Procedure: COMBINED ESOPHAGOSCOPY, GASTROSCOPY, DUODENOSCOPY (EGD), BIOPSY SINGLE OR MULTIPLE;;  Surgeon: Loi Black MD;  Location: [facility] GI     HC REPLACE DUODENOSTOMY/JEJUNOSTOMY TUBE PERCUTANEOUS N/A 8/27/2015    Procedure: REPLACE GASTROJEJUNOSTOMY TUBE, PERCUTANEOUS;  Surgeon: Mio Holder MD;  Location[facility]     HC REPLACE DUODENOSTOMY/JEJUNOSTOMY TUBE PERCUTANEOUS N/A 1/7/2016    Procedure: REPLACE JEJUNOSTOMY TUBE, PERCUTANEOUS;  Surgeon: Elsa Medel MD;  [facility]     HC REPLACE  DUODENOSTOMY/JEJUNOSTOMY TUBE PERCUTANEOUS N/A 1/28/2016    Procedure: REPLACE JEJUNOSTOMY TUBE, PERCUTANEOUS;  Surgeon: Elsa Medel MD;  [facility]     HC REPLACE GASTROSTOMY/CECOSTOMY TUBE PERCUTANEOUS Left 5/19/2015    Procedure: REPLACE GASTROSTOMY TUBE, PERCUTANEOUS;  Surgeon: Melecio Morejon Chi, MD;  Location:  GI     HC UGI ENDOSCOPY W PLACEMENT GASTROSTOMY TUBE PERCUT N/A 10/1/2015    Procedure: COMBINED ESOPHAGOSCOPY, GASTROSCOPY, DUODENOSCOPY (EGD), PLACE PERCUTANEOUS ENDOSCOPIC GASTROSTOMY TUBE;  Surgeon: Mio Holder MD;  Location:  GI     INSERT PORT VASCULAR ACCESS Right 7/20/2017    Procedure: INSERT PORT VASCULAR ACCESS;  Chest Port Placement  **Latex Allergy**;  Surgeon: Coy Rocha PA-C;  Location:  OR     LAPAROSCOPIC ASSISTED COLECTOMY  1/20/2012    Procedure:LAPAROSCOPIC ASSISTED COLECTOMY; Laparoscopic Ileostomy       LAPAROSCOPIC ASSISTED COLECTOMY LEFT (DESCENDING)  10/24/2012    Procedure: LAPAROSCOPIC ASSISTED COLECTOMY LEFT (DESCENDING);   Hand Assisted Laproscopic Subtotal abdominal Colectomy,Iieorectal anastamosis, Ileostomy Closure.       LAPAROSCOPIC ASSISTED INSERTION TUBE JEJUNOSTOMY N/A 10/16/2015    Procedure: LAPAROSCOPIC ASSISTED INSERTION TUBE JEJUNOSTOMY;  Surgeon: Elsa Medel MD;  Location:  OR     LAPAROSCOPIC CHOLECYSTECTOMY  2002    [facility]. stones duct     LAPAROSCOPIC ILEOSTOMY  1/20/2012    [facility], loop     LAPAROSCOPIC OOPHORECTOMY Right 2009    [facility]     LAPAROTOMY EXPLORATORY N/A 1/28/2016    Procedure: LAPAROTOMY EXPLORATORY;  Surgeon: Elsa Medel MD;  Location:  OR     LEEP TX, CERVICAL  2009    [facility]     PICC INSERTION Left 10/21/2015    5fr DL Power PICC, 37cm (2cm external) in the L basilic vein w/ tip in the SVC RA junction.     REMOVE GASTROSTOMY TUBE ADULT N/A 12/12/2014    Procedure: REMOVE GASTROSTOMY TUBE ADULT;  Surgeon: Nicole Redding MD;  Location:  GI     REMOVE PORT  VASCULAR ACCESS Right 6/30/2016    Procedure: REMOVE PORT VASCULAR ACCESS;  Surgeon: Pradeep Orosco MD;  Location: [facility] OR     REMOVE PORT VASCULAR ACCESS Right 9/8/2017    Procedure: REMOVE PORT VASCULAR ACCESS;  right side mediport removal;  Surgeon: Zechariah Worthington MD;  Location: [facility] OR     replace GASTROSTOMY TUBE ADULT  5/19/15       Family History:    Family History   Problem Relation Age of Onset     Thyroid Disease Mother      Sjogren's Mother      GASTROINTESTINAL DISEASE Mother      Intermittent nausea vomiting diarrhea     Colon Polyps Mother      Prostate Problems Father      prostate enlargement     Lupus Maternal Grandmother      CANCER Maternal Grandfather      Lung     Colon Cancer Maternal Grandfather 65     CANCER Paternal Grandmother      Lung      CEREBROVASCULAR DISEASE Paternal Grandmother      DIABETES Paternal Grandmother      Cardiovascular Paternal Grandmother      CHF     CANCER Paternal Grandfather      Lung     Glaucoma Paternal Grandfather      Abdominal Aortic Aneurysm Other      Macular Degeneration No family hx of        Social History:  Marital Status:   [2]  Social History   Substance Use Topics     Smoking status: Current Some Day Smoker     Packs/day: 1.00     Years: 4.00     Types: Cigarettes     Last attempt to quit: 1/1/2004     Smokeless tobacco: Never Used     Alcohol use No        Medications:      ACETAMINOPHEN PO   diphenhydrAMINE HCl 50 MG TABS   ondansetron (ZOFRAN) 8 MG tablet   albuterol (2.5 MG/3ML) 0.083% neb solution   albuterol 90 MCG/ACT inhaler   Alfalfa 650 MG TABS   cholestyramine light (QUESTRAN) 4 GM Packet   cloNIDine (CATAPRES) 0.2 MG tablet   DULoxetine (CYMBALTA) 60 MG EC capsule   HYDROcodone-acetaminophen (NORCO) 5-325 MG per tablet   HYDROmorphone (DILAUDID) 2 MG tablet   ipratropium (ATROVENT) 0.02 % neb solution   SUMAtriptan (IMITREX) 50 MG tablet   traZODone (DESYREL) 100 MG tablet         Review of Systems   All other systems  reviewed and are negative.      Physical Exam   BP: 137/86  Heart Rate: 105  Temp: 100.5  F (38.1  C)  Resp: 18  Weight: 76.2 kg (168 lb)  SpO2: 96 %      Physical Exam   Constitutional: She is oriented to person, place, and time. She appears well-developed and well-nourished.  Non-toxic appearance. No distress.   Patient does not appear ill.  Mucous membranes are moist.   HENT:   Head: Normocephalic and atraumatic.   Eyes: Conjunctivae and EOM are normal.   Neck: Normal range of motion. Neck supple.   Cardiovascular: Normal rate, regular rhythm and normal heart sounds.    Pulmonary/Chest: Effort normal and breath sounds normal.   Abdominal: Soft. She exhibits distension (mild distension). There is tenderness (mild) in the right upper quadrant and epigastric area.   Musculoskeletal: Normal range of motion. She exhibits no deformity.   Neurological: She is alert and oriented to person, place, and time.   Skin: Skin is warm and dry. She is not diaphoretic.   Psychiatric: She has a normal mood and affect. Her behavior is normal.   Nursing note and vitals reviewed.      ED Course     ED Course     Procedures    Results for orders placed or performed during the hospital encounter of 06/11/18 (from the past 24 hour(s))   CBC with platelets differential   Result Value Ref Range    WBC 6.7 4.0 - 11.0 10e9/L    RBC Count 4.11 3.8 - 5.2 10e12/L    Hemoglobin 10.0 (L) 11.7 - 15.7 g/dL    Hematocrit 33.0 (L) 35.0 - 47.0 %    MCV 80 78 - 100 fl    MCH 24.3 (L) 26.5 - 33.0 pg    MCHC 30.3 (L) 31.5 - 36.5 g/dL    RDW 18.0 (H) 10.0 - 15.0 %    Platelet Count 401 150 - 450 10e9/L    Diff Method Automated Method     % Neutrophils 89.8 %    % Lymphocytes 7.3 %    % Monocytes 2.1 %    % Eosinophils 0.1 %    % Basophils 0.3 %    % Immature Granulocytes 0.4 %    Nucleated RBCs 0 0 /100    Absolute Neutrophil 6.0 1.6 - 8.3 10e9/L    Absolute Lymphocytes 0.5 (L) 0.8 - 5.3 10e9/L    Absolute Monocytes 0.1 0.0 - 1.3 10e9/L    Absolute  Basophils 0.0 0.0 - 0.2 10e9/L    Abs Immature Granulocytes 0.0 0 - 0.4 10e9/L    Absolute Nucleated RBC 0.0    Comprehensive metabolic panel   Result Value Ref Range    Sodium 137 133 - 144 mmol/L    Potassium 3.5 3.4 - 5.3 mmol/L    Chloride 103 94 - 109 mmol/L    Carbon Dioxide 26 20 - 32 mmol/L    Anion Gap 8 3 - 14 mmol/L    Glucose 103 (H) 70 - 99 mg/dL    Urea Nitrogen 5 (L) 7 - 30 mg/dL    Creatinine 1.11 (H) 0.52 - 1.04 mg/dL    GFR Estimate 55 (L) >60 mL/min/1.7m2    GFR Estimate If Black 67 >60 mL/min/1.7m2    Calcium 8.6 8.5 - 10.1 mg/dL    Bilirubin Total 0.8 0.2 - 1.3 mg/dL    Albumin 3.4 3.4 - 5.0 g/dL    Protein Total 8.2 6.8 - 8.8 g/dL    Alkaline Phosphatase 150 40 - 150 U/L    ALT 17 0 - 50 U/L    AST 18 0 - 45 U/L   Lactic acid whole blood   Result Value Ref Range    Lactic Acid 1.8 0.7 - 2.0 mmol/L     *Note: Due to a large number of results and/or encounters for the requested time period, some results have not been displayed. A complete set of results can be found in Results Review.       Medications   0.9% sodium chloride BOLUS (not administered)     Followed by   sodium chloride 0.9% infusion (not administered)   diphenhydrAMINE (BENADRYL) injection 50 mg (not administered)       Assessments & Plan (with Medical Decision Making)  Doreen Peralta is a 38 year old female with a complex history presented to the ED for fever and vomiting.  Was just seen yesterday for similar symptoms, started again last night.  On arrival to the ED patient had a low-grade temp of 100.5 F.  She took Tylenol prior to arrival so no Tylenol administered here.  Was mildly tachycardic in the low 100s.  Exam notable for mild upper abdominal tenderness which is the patient's baseline.  She did not appear acutely ill and appeared well-hydrated on exam today.  She never vomited during stay in the ED. She has a history of sepsis however so sepsis protocol labs drawn.  These revealed a normal white count, normal lactate.   Hemoglobin near her baseline.  Her BUN was 5 and creatinine 1.11, which is not suggestive of acute dehydration.  No electrolyte abnormalities to suggest severe vomiting.  Patient is very difficult to obtain IV access on, and unfortunately we were not able to obtain IV access for this patient before her labs had resulted.  However given her reassuring labs and the fact that she is tolerating clear liquids I think it reasonable to continue monitoring her symptoms at home.  She does not appear acutely septic at this time.  Patient was very agreeable to discharge as she has a court appointment in the morning.  I advised that she continue using Tylenol and her home antinausea medications as needed.  If she does develop any worsening symptoms however she was advised to return to the emergency department.  Can otherwise follow-up as needed with her PCP later this week due to 2 ER visits this past week.  Patient in agreement with plan and she was discharged home.     I have reviewed the nursing notes.    I have reviewed the findings, diagnosis, plan and need for follow up with the patient.    New Prescriptions    No medications on file       Final diagnoses:   Fever, unknown origin   Non-intractable vomiting with nausea, unspecified vomiting type     This document serves as a record of services personally performed by Vianey Warren PA. It was created on their behalf by Alessandra Martinez, a trained medical scribe. The creation of this record is based on the provider's personal observations and the statements of the patient. This document has been checked and approved by the attending provider.    Note: Chart documentation done in part with Dragon Voice Recognition software. Although reviewed after completion, some word and grammatical errors may remain.    6/11/2018   [facility] EMERGENCY DEPARTMENT     Vianey Warren PA-C  06/11/18 [id number]

## 2018-06-12 NOTE — DISCHARGE INSTRUCTIONS
Your labs looked good today.  Please go home and get some rest.  Take Tylenol as needed for recurrent fever and your antinausea medications as needed for any recurrent nausea.  If you develop any acutely worsening symptoms please return to the emergency department for reassessment.  Otherwise follow-up with your primary care provider in the clinic for your recent ER visits this past week.    Thank you for choosing [facility]'s Emergency Department. It was a pleasure taking care of you today. If you have any questions, please call [phone].    Vianey Warren PA-C

## 2018-06-12 NOTE — ED TRIAGE NOTES
Pt presents with concerns of fever and vomiting.  Pt states that fever started with shaking at 1300 and she started to monitor her temperature every 1/2 hour.  Temp max 103.0  Pt having nausea, vomiting, and abdominal pain.

## 2018-06-13 LAB
DIFFERENTIAL METHOD BLD: ABNORMAL
ERYTHROCYTE [DISTWIDTH] IN BLOOD BY AUTOMATED COUNT: 17.6 % (ref 10–15)
HCT VFR BLD AUTO: 34.5 % (ref 35–47)
HGB BLD-MCNC: 10.5 G/DL (ref 11.7–15.7)
IMM GRANULOCYTES # BLD: 0 10E9/L (ref 0–0.4)
IMM GRANULOCYTES NFR BLD: 0.2 %
LYMPHOCYTES # BLD AUTO: 1.9 10E9/L (ref 0.8–5.3)
LYMPHOCYTES NFR BLD AUTO: 37 %
MCH RBC QN AUTO: 23.9 PG (ref 26.5–33)
MCHC RBC AUTO-ENTMCNC: 30.4 G/DL (ref 31.5–36.5)
MCV RBC AUTO: 78 FL (ref 78–100)
MONOCYTES # BLD AUTO: 0.3 10E9/L (ref 0–1.3)
MONOCYTES NFR BLD AUTO: 5.3 %
NEUTROPHILS # BLD AUTO: 3 10E9/L (ref 1.6–8.3)
NEUTROPHILS NFR BLD AUTO: 57.5 %
PLATELET # BLD AUTO: 591 10E9/L (ref 150–450)
RBC # BLD AUTO: 4.4 10E12/L (ref 3.8–5.2)
WBC # BLD AUTO: 5.2 10E9/L (ref 4–11)

## 2018-06-18 NOTE — PROGRESS NOTES
Infectious Disease Clinic Staff Note: Ms. Peralta was seen, examined, and the case was discussed with Dr. Price, ID Fellow -- I agree with her interval history and examination, assessment and plan in this outpatient ID Progress note. This note reflects my observations and opinions, and the plan outlined fully reflects my approach. I have reviewed the available history, radiology, laboratory results, and reports with the Fellow.

## 2018-06-26 ENCOUNTER — HOSPITAL ENCOUNTER (EMERGENCY)
Facility: CLINIC | Age: 38
Discharge: HOME | End: 2018-06-26
Attending: FAMILY MEDICINE | Admitting: FAMILY MEDICINE
Payer: MEDICARE

## 2018-06-26 VITALS
TEMPERATURE: 99.4 F | DIASTOLIC BLOOD PRESSURE: 92 MMHG | RESPIRATION RATE: 16 BRPM | SYSTOLIC BLOOD PRESSURE: 134 MMHG | OXYGEN SATURATION: 98 % | HEART RATE: 86 BPM

## 2018-06-26 DIAGNOSIS — R78.81 BACTEREMIA: ICD-10-CM

## 2018-06-26 DIAGNOSIS — R10.13 ABDOMINAL PAIN, EPIGASTRIC: ICD-10-CM

## 2018-06-26 LAB
ALBUMIN SERPL-MCNC: 3 G/DL (ref 3.4–5)
ALBUMIN UR-MCNC: NEGATIVE MG/DL
ALP SERPL-CCNC: 170 U/L (ref 40–150)
ALT SERPL W P-5'-P-CCNC: 18 U/L (ref 0–50)
ANION GAP SERPL CALCULATED.3IONS-SCNC: 9 MMOL/L (ref 3–14)
APPEARANCE UR: CLEAR
AST SERPL W P-5'-P-CCNC: 17 U/L (ref 0–45)
BASOPHILS # BLD AUTO: 0 10E9/L (ref 0–0.2)
BASOPHILS NFR BLD AUTO: 0.3 %
BILIRUB SERPL-MCNC: 0.3 MG/DL (ref 0.2–1.3)
BILIRUB UR QL STRIP: NEGATIVE
BUN SERPL-MCNC: 5 MG/DL (ref 7–30)
CALCIUM SERPL-MCNC: 8.5 MG/DL (ref 8.5–10.1)
CHLORIDE SERPL-SCNC: 108 MMOL/L (ref 94–109)
CO2 SERPL-SCNC: 25 MMOL/L (ref 20–32)
COLOR UR AUTO: ABNORMAL
CREAT SERPL-MCNC: 0.85 MG/DL (ref 0.52–1.04)
CRP SERPL-MCNC: 180 MG/L (ref 0–8)
DIFFERENTIAL METHOD BLD: ABNORMAL
EOSINOPHIL NFR BLD AUTO: 1 %
ERYTHROCYTE [DISTWIDTH] IN BLOOD BY AUTOMATED COUNT: 17.6 % (ref 10–15)
ERYTHROCYTE [SEDIMENTATION RATE] IN BLOOD BY WESTERGREN METHOD: 83 MM/H (ref 0–20)
GFR SERPL CREATININE-BSD FRML MDRD: 75 ML/MIN/1.7M2
GLUCOSE SERPL-MCNC: 88 MG/DL (ref 70–99)
GLUCOSE UR STRIP-MCNC: NEGATIVE MG/DL
HCT VFR BLD AUTO: 29.2 % (ref 35–47)
HGB BLD-MCNC: 8.9 G/DL (ref 11.7–15.7)
HGB UR QL STRIP: ABNORMAL
IMM GRANULOCYTES # BLD: 0.1 10E9/L (ref 0–0.4)
IMM GRANULOCYTES NFR BLD: 0.6 %
KETONES UR STRIP-MCNC: NEGATIVE MG/DL
LACTATE BLD-SCNC: 0.9 MMOL/L (ref 0.7–2)
LEUKOCYTE ESTERASE UR QL STRIP: NEGATIVE
LYMPHOCYTES # BLD AUTO: 1.1 10E9/L (ref 0.8–5.3)
LYMPHOCYTES NFR BLD AUTO: 11.1 %
MCH RBC QN AUTO: 24 PG (ref 26.5–33)
MCHC RBC AUTO-ENTMCNC: 30.5 G/DL (ref 31.5–36.5)
MCV RBC AUTO: 79 FL (ref 78–100)
MONOCYTES # BLD AUTO: 0.3 10E9/L (ref 0–1.3)
MONOCYTES NFR BLD AUTO: 2.8 %
NEUTROPHILS # BLD AUTO: 8 10E9/L (ref 1.6–8.3)
NEUTROPHILS NFR BLD AUTO: 84.2 %
NITRATE UR QL: NEGATIVE
NRBC # BLD AUTO: 0 10*3/UL
NRBC BLD AUTO-RTO: 0 /100
PH UR STRIP: 7 PH (ref 5–7)
PLATELET # BLD AUTO: 367 10E9/L (ref 150–450)
POTASSIUM SERPL-SCNC: 3.4 MMOL/L (ref 3.4–5.3)
PROT SERPL-MCNC: 7.8 G/DL (ref 6.8–8.8)
RBC # BLD AUTO: 3.71 10E12/L (ref 3.8–5.2)
RBC #/AREA URNS AUTO: 1 /HPF (ref 0–2)
SODIUM SERPL-SCNC: 142 MMOL/L (ref 133–144)
SOURCE: ABNORMAL
SP GR UR STRIP: 1 (ref 1–1.03)
SQUAMOUS #/AREA URNS AUTO: 1 /HPF (ref 0–1)
UROBILINOGEN UR STRIP-MCNC: 0 MG/DL (ref 0–2)
WBC # BLD AUTO: 9.6 10E9/L (ref 4–11)
WBC #/AREA URNS AUTO: 1 /HPF (ref 0–5)

## 2018-06-26 PROCEDURE — 25000132 ZZH RX MED GY IP 250 OP 250 PS 637: Mod: GY | Performed by: FAMILY MEDICINE

## 2018-06-26 PROCEDURE — 96372 THER/PROPH/DIAG INJ SC/IM: CPT | Performed by: FAMILY MEDICINE

## 2018-06-26 PROCEDURE — 81001 URINALYSIS AUTO W/SCOPE: CPT | Performed by: FAMILY MEDICINE

## 2018-06-26 PROCEDURE — 99285 EMERGENCY DEPT VISIT HI MDM: CPT | Mod: Z6 | Performed by: FAMILY MEDICINE

## 2018-06-26 PROCEDURE — 83605 ASSAY OF LACTIC ACID: CPT | Performed by: FAMILY MEDICINE

## 2018-06-26 PROCEDURE — 87040 BLOOD CULTURE FOR BACTERIA: CPT | Mod: 91 | Performed by: FAMILY MEDICINE

## 2018-06-26 PROCEDURE — 85652 RBC SED RATE AUTOMATED: CPT | Performed by: FAMILY MEDICINE

## 2018-06-26 PROCEDURE — 36415 COLL VENOUS BLD VENIPUNCTURE: CPT | Performed by: FAMILY MEDICINE

## 2018-06-26 PROCEDURE — 80053 COMPREHEN METABOLIC PANEL: CPT | Performed by: FAMILY MEDICINE

## 2018-06-26 PROCEDURE — 25000125 ZZHC RX 250: Performed by: FAMILY MEDICINE

## 2018-06-26 PROCEDURE — 85025 COMPLETE CBC W/AUTO DIFF WBC: CPT | Performed by: FAMILY MEDICINE

## 2018-06-26 PROCEDURE — 25000128 H RX IP 250 OP 636: Performed by: FAMILY MEDICINE

## 2018-06-26 PROCEDURE — A9270 NON-COVERED ITEM OR SERVICE: HCPCS | Mod: GY | Performed by: FAMILY MEDICINE

## 2018-06-26 PROCEDURE — 99284 EMERGENCY DEPT VISIT MOD MDM: CPT | Performed by: FAMILY MEDICINE

## 2018-06-26 PROCEDURE — 86140 C-REACTIVE PROTEIN: CPT | Performed by: FAMILY MEDICINE

## 2018-06-26 RX ORDER — DIPHENHYDRAMINE HYDROCHLORIDE 50 MG/ML
25 INJECTION INTRAMUSCULAR; INTRAVENOUS ONCE
Status: DISCONTINUED | OUTPATIENT
Start: 2018-06-26 | End: 2018-06-26

## 2018-06-26 RX ORDER — HYDROMORPHONE HYDROCHLORIDE 2 MG/1
2 TABLET ORAL EVERY 8 HOURS PRN
Qty: 4 TABLET | Refills: 0 | Status: SHIPPED | OUTPATIENT
Start: 2018-06-26 | End: 2018-07-30 | Stop reason: ALTCHOICE

## 2018-06-26 RX ORDER — HYDROMORPHONE HYDROCHLORIDE 2 MG/1
2 TABLET ORAL
Status: DISCONTINUED | OUTPATIENT
Start: 2018-06-26 | End: 2018-06-26 | Stop reason: HOSPADM

## 2018-06-26 RX ORDER — CEFTRIAXONE SODIUM 1 G
2 VIAL (EA) INJECTION ONCE
Status: DISCONTINUED | OUTPATIENT
Start: 2018-06-26 | End: 2018-06-26

## 2018-06-26 RX ADMIN — HYDROMORPHONE HYDROCHLORIDE 2 MG: 2 TABLET ORAL at 14:34

## 2018-06-26 RX ADMIN — LIDOCAINE HYDROCHLORIDE 2 G: 10 INJECTION, SOLUTION EPIDURAL; INFILTRATION; INTRACAUDAL; PERINEURAL at 16:05

## 2018-06-26 NOTE — ED TRIAGE NOTES
States she had blood work done yesterday at an [facility] facility for on going fevers-she was called today stating she needed to come in because she was septic Currently afebrile

## 2018-06-26 NOTE — ED AVS SNAPSHOT
[facility] Emergency Department    [address] [place] [address]    Phone:  [phone]    Fax:  [phone]                                       Doreen Peralta   MRN: [id number]    Department:  [facility] Emergency Department   Date of Visit:  6/26/2018           Patient Information     Date Of Birth          1980        Your diagnoses for this visit were:     Bacteremia (suspected Enterobacter)     Abdominal pain, epigastric        You were seen by Cristina Almaguer MD.      Follow-up Information     Follow up with [facility] Emergency Department In 1 day.    Specialty:  EMERGENCY MEDICINE    Contact information:    [address] [place] [address] [phone]    Additional information:    From [address]: Exit at [address] on [address] of [place]. Turn right on [address]. Turn left at stoplight on [address]. [facility] will be in view two blocks ahead        Discharge Instructions       You are growing out a bacteria in the blood. We repeated those tests today and are waiting for confirmation of the blood cultures.    In the meantime, continue to monitor your temperatures every 4 hours.  Return to the emergency department at any time if you feel worse.      Return tomorrow between 2 PM-3 PM to repeat your blood count and CRP.  You will need another dose of Rocephin in 24 hours.      Take benadryl as needed for nausea, and reserve Dilaudid for severe pain (4 tablets).    The following medications were given during your stay: Dilaudid 2 mg po, Rocephin 2 gm IM    After discharge, please closely monitor for any new or worsening symptoms. Return to the Emergency Department at any time if your symptoms worsen.        24 Hour Appointment Hotline       To make an appointment at any [facility], call [phone] ([phone]). If you don't have a family doctor or clinic, we will help you find one. [facility] are  conveniently located to serve the needs of you and your family.          ED Discharge Orders     CBC with platelets differential       Last Lab Result: Hemoglobin (g/dL)       Date                     Value                 06/26/2018               8.9 (L)          ----------            CRP inflammation                    Review of your medicines      CONTINUE these medicines which may have CHANGED, or have new prescriptions. If we are uncertain of the size of tablets/capsules you have at home, strength may be listed as something that might have changed.        Dose / Directions Last dose taken    * HYDROmorphone 2 MG tablet   Commonly known as:  DILAUDID   Dose:  2 mg   What changed:  Another medication with the same name was added. Make sure you understand how and when to take each.   Quantity:  10 tablet        Take 1 tablet (2 mg) by mouth every 6 hours as needed for pain   Refills:  0        * HYDROmorphone 2 MG tablet   Commonly known as:  DILAUDID   Dose:  2 mg   What changed:  You were already taking a medication with the same name, and this prescription was added. Make sure you understand how and when to take each.   Quantity:  4 tablet        Take 1 tablet (2 mg) by mouth every 8 hours as needed for pain   Refills:  0        * Notice:  This list has 2 medication(s) that are the same as other medications prescribed for you. Read the directions carefully, and ask your doctor or other care provider to review them with you.      Our records show that you are taking the medicines listed below. If these are incorrect, please call your family doctor or clinic.        Dose / Directions Last dose taken    ACETAMINOPHEN PO   Dose:  500-1000 mg        Take 500-1,000 mg by mouth every 6 hours as needed for pain   Refills:  0        albuterol (2.5 MG/3ML) 0.083% neb solution   Dose:  2.5 mg   Quantity:  360 mL        Take 1 vial (2.5 mg) by nebulization every 4 hours as needed for shortness of breath / dyspnea or  wheezing   Refills:  0        albuterol 90 MCG/ACT inhaler   Dose:  2 puff        Inhale 2 puffs into the lungs every 6 hours as needed   Refills:  0        Alfalfa 650 MG Tabs        Refills:  0        cholestyramine light 4 GM Packet   Commonly known as:  QUESTRAN   Dose:  4 g   Quantity:  60 packet        Take 1 packet (4 g) by mouth 2 times daily   Refills:  1        cloNIDine 0.2 MG tablet   Commonly known as:  CATAPRES   Dose:  0.4 mg   Quantity:  60 tablet        Take 2 tablets (0.4 mg) by mouth every evening - DUE FOR FOLLOW UP   Refills:  0        diphenhydrAMINE HCl 50 MG Tabs   Commonly known as:  BENADRYL   Dose:  50 mg   Quantity:  30 tablet        Take 50 mg by mouth every 6 hours as needed (nausea)   Refills:  0        DULoxetine 60 MG EC capsule   Commonly known as:  CYMBALTA   Quantity:  90 capsule        TAKE 1 CAPSULE(60 MG) BY MOUTH DAILY   Refills:  1        HYDROcodone-acetaminophen 5-325 MG per tablet   Commonly known as:  NORCO   Dose:  1 tablet   Quantity:  12 tablet        Take 1 tablet by mouth every 4 hours as needed for pain   Refills:  0        ipratropium 0.02 % neb solution   Commonly known as:  ATROVENT   Dose:  0.5 mg   Quantity:  225 mL        Take 2.5 mLs (0.5 mg) by nebulization every 6 hours as needed for wheezing   Refills:  0        ondansetron 8 MG tablet   Commonly known as:  ZOFRAN   Dose:  8 mg   Quantity:  9 tablet        Take 1 tablet (8 mg) by mouth every 8 hours as needed for nausea   Refills:  0        SUMAtriptan 50 MG tablet   Commonly known as:  IMITREX   Dose:   mg   Quantity:  9 tablet        Take 1-2 tablets ( mg) by mouth at onset of headache for migraine - LAST REFILL, DUE FOR FOLLOW UP   Refills:  0        traZODone 100 MG tablet   Commonly known as:  DESYREL   Dose:   mg   Quantity:  90 tablet        Take 0.5-1 tablets ( mg) by mouth nightly as needed for sleep   Refills:  8                Information about OPIOIDS     PRESCRIPTION  OPIOIDS: WHAT YOU NEED TO KNOW   We gave you an opioid (narcotic) pain medicine. It is important to manage your pain, but opioids are not always the best choice. You should first try all the other options your care team gave you. Take this medicine for as short a time (and as few doses) as possible.     These medicines have risks:    DO NOT drive when on new or higher doses of pain medicine. These medicines can affect your alertness and reaction times, and you could be arrested for driving under the influence (DUI). If you need to use opioids long-term, talk to your care team about driving.    DO NOT operate heave machinery    DO NOT do any other dangerous activities while taking these medicines.     DO NOT drink any alcohol while taking these medicines.      If the opioid prescribed includes acetaminophen, DO NOT take with any other medicines that contain acetaminophen. Read all labels carefully. Look for the word  acetaminophen  or  Tylenol.  Ask your pharmacist if you have questions or are unsure.    You can get addicted to pain medicines, especially if you have a history of addiction (chemical, alcohol or substance dependence). Talk to your care team about ways to reduce this risk.    Store your pills in a secure place, locked if possible. We will not replace any lost or stolen medicine. If you don t finish your medicine, please throw away (dispose) as directed by your pharmacist. The [place] [employer] has more information about safe disposal: https://www.pca.[facility].[place].us/living-green/managing-unwanted-medications.     All opioids tend to cause constipation. Drink plenty of water and eat foods that have a lot of fiber, such as fruits, vegetables, prune juice, apple juice and high-fiber cereal. Take a laxative (Miralax, milk of magnesia, Colace, Senna) if you don t move your bowels at least every other day.         Prescriptions were sent or printed at these locations (1 Prescription)                    Other Prescriptions                Printed at Department/Unit printer (1 of 1)         HYDROmorphone (DILAUDID) 2 MG tablet                Procedures and tests performed during your visit     Procedure/Test Number of Times Performed    Blood culture 2    CBC with platelets differential 1    CRP inflammation 1    Comprehensive metabolic panel 1    Erythrocyte sedimentation rate auto 1    Lactic acid whole blood 1    UA reflex to Microscopic 1      Orders Needing Specimen Collection     None      Pending Results     Date and Time Order Name Status Description    6/26/2018 1241 Blood culture In process     6/26/2018 1241 Blood culture In process             Pending Culture Results     Date and Time Order Name Status Description    6/26/2018 1241 Blood culture In process     6/26/2018 1241 Blood culture In process             Pending Results Instructions     If you had any lab results that were not finalized at the time of your Discharge, you can call the ED Lab Result RN at [phone]. You will be contacted by this team for any positive Lab results or changes in treatment. The nurses are available 7 days a week from 10A to 6:30P.  You can leave a message 24 hours per day and they will return your call.        Thank you for choosing [facility]       Thank you for choosing [facility] for your care. Our goal is always to provide you with excellent care. Hearing back from our patients is one way we can continue to improve our services. Please take a few minutes to complete the written survey that you may receive in the mail after you visit with us. Thank you!        [employer] Information     [employer] gives you secure access to your electronic health record. If you see a primary care provider, you can also send messages to your care team and make appointments. If you have questions, please call your primary care clinic.  If you do not have a primary care provider, please call [phone] and they will assist you.         Care EveryWhere ID     This is your Care EveryWhere ID. This could be used by other organizations to access your [place] medical records  [id number]        Equal Access to Services     TRAMAINE CLAYTON : Carrie Law, clark villeda, vesta long, elham gomez. So [facility] [phone].    ATENCIÓN: Si habla español, tiene a wade disposición servicios gratuitos de asistencia lingüística. Llame al [phone].    We comply with applicable federal civil rights laws and [place] laws. We do not discriminate on the basis of race, color, national origin, age, disability, sex, sexual orientation, or gender identity.            After Visit Summary       This is your record. Keep this with you and show to your community pharmacist(s) and doctor(s) at your next visit.

## 2018-06-26 NOTE — DISCHARGE INSTRUCTIONS
You are growing out a bacteria in the blood. We repeated those tests today and are waiting for confirmation of the blood cultures.    In the meantime, continue to monitor your temperatures every 4 hours.  Return to the emergency department at any time if you feel worse.      Return tomorrow between 2 PM-3 PM to repeat your blood count and CRP.  You will need another dose of Rocephin in 24 hours.      Take benadryl as needed for nausea, and reserve Dilaudid for severe pain (4 tablets).    The following medications were given during your stay: Dilaudid 2 mg po, Rocephin 2 gm IM    After discharge, please closely monitor for any new or worsening symptoms. Return to the Emergency Department at any time if your symptoms worsen.

## 2018-06-26 NOTE — ED AVS SNAPSHOT
[facility] Emergency Department    [address] [place] [address]    Phone:  [phone]    Fax:  [phone]                                       Doreen Peralta   MRN: [id number]    Department:  [facility] Emergency Department   Date of Visit:  6/26/2018           After Visit Summary Signature Page     I have received my discharge instructions, and my questions have been answered. I have discussed any challenges I see with this plan with the nurse or doctor.    ..........................................................................................................................................  Patient/Patient Representative Signature      ..........................................................................................................................................  Patient Representative Print Name and Relationship to Patient    ..................................................               ................................................  Date                                            Time    ..........................................................................................................................................  Reviewed by Signature/Title    ...................................................              ..............................................  Date                                                            Time

## 2018-06-26 NOTE — ED NOTES
Change of plans. Dr Almaguer is waiting to hear from infection specialist and provider that saw pt yest before we attempt an IV again. Pt states she would like something for pain PO

## 2018-06-26 NOTE — ED NOTES
One IV attempt after hot packing arms per Shavonne WARNER without success. Lab was able to draw blood work. Dr Workman to try IV with US

## 2018-06-26 NOTE — ED PROVIDER NOTES
[facility] ED Provider Note   CC:     Chief Complaint   Patient presents with     Fever     HPI:  Doreen Peralta is a 38 year old female who presented to the emergency department for a fever. Patient reports she has been running fevers from 101-103 for 3 days. She states yesterday it was higher than 103. Patient states 6 days ago she had abdominal pain start. She states it was so bad that she couldn't stand up straight. She states the pain is sharp when it comes and located mid epigastric region. She reports having nausea and vomiting. She denies any respiratory symptoms or urinary symptoms. She reports having 2 loose stools today. She states she had an iron infusion yesterday at [facility] in [place], they also did blood work there. Patient reports she was diagnosed with C-diff colitis 3 months ago and has finished her antibiotics.    Problem List:  Patient Active Problem List    Diagnosis     Acute renal failure (H)     History of bacteremia - recurrent     Tobacco abuse     Iron deficiency anemia     Stool toxin PCR positive for Clostridium difficile on 4/17/18     Gram negative septicemia (H) - Ochrobactrum, Stenotrophomonas & Pantoea     Hypokalemia     Essential hypertension     Sepsis due to Klebsiella (H)     Insomnia, unspecified type     Abdominal pain     Abdominal pain, generalized     History of deep venous thrombosis     Nausea and vomiting     Vitamin D deficiency     Chronic abdominal pain     Attention to G-tube (H)     Fever     Coagulation defect (H) [D68.9]     Chronic diarrhea     Migraine     Positive blood culture - Klebsiella oxytoca from Port-a-cath culture     Mitral regurgitation mild-mod by Echo June 2016     Anemia, iron deficiency     Anemia in other chronic diseases classified elsewhere     Munchausen syndrome - previously suspected     Long-term (current) use of anticoagulants [Z79.01]     Anxiety     S/P partial  resection of colon     Malnutrition (H)     Jejunostomy tube present (H)     Malfunctioning jejunostomy tube (H)     Malfunction of gastrostomy tube (H)     PEG tube malfunction (H)     Health Care Home     PEG (percutaneous endoscopic gastrostomy) status     Gastroparesis     Migraines     Intermittent asthma     Allergic rhinitis     Abnormal Pap smear of cervix     S/P LEEP of cervix     Patellofemoral stress syndrome     Hx SBO     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     Constipation by delayed colonic transit     Atopic rhinitis     Hyperbilirubinemia       MEDS:   Discharge Medication List as of 6/26/2018  3:57 PM      CONTINUE these medications which have NOT CHANGED    Details   ACETAMINOPHEN PO Take 500-1,000 mg by mouth every 6 hours as needed for pain , Historical      albuterol (2.5 MG/3ML) 0.083% neb solution Take 1 vial (2.5 mg) by nebulization every 4 hours as needed for shortness of breath / dyspnea or wheezing, Disp-360 mL, Historical      albuterol 90 MCG/ACT inhaler Inhale 2 puffs into the lungs every 6 hours as needed , Historical      Alleghany 650 MG TABS Historical      cholestyramine light (QUESTRAN) 4 GM Packet Take 1 packet (4 g) by mouth 2 times daily, Disp-60 packet, R-1, E-Prescribe      cloNIDine (CATAPRES) 0.2 MG tablet Take 2 tablets (0.4 mg) by mouth every evening - DUE FOR FOLLOW UP, Disp-60 tablet, R-0, E-PrescribeDue for appointment for further refills, please give reminder.      diphenhydrAMINE HCl 50 MG TABS Take 50 mg by mouth every 6 hours as needed (nausea), Disp-30 tablet, R-0, E-Prescribe      DULoxetine (CYMBALTA) 60 MG EC capsule TAKE 1 CAPSULE(60 MG) BY MOUTH DAILY, Disp-90 capsule, R-1, E-Prescribe      HYDROcodone-acetaminophen (NORCO) 5-325 MG per tablet Take 1 tablet by mouth every 4 hours as needed for pain, Disp-12 tablet, R-0, Local Print      !! HYDROmorphone (DILAUDID) 2 MG tablet Take 1 tablet (2 mg) by mouth every 6 hours as needed for pain, Disp-10 tablet, R-0,  Local Print      ipratropium (ATROVENT) 0.02 % neb solution Take 2.5 mLs (0.5 mg) by nebulization every 6 hours as needed for wheezing, Disp-225 mL, Historical      ondansetron (ZOFRAN) 8 MG tablet Take 1 tablet (8 mg) by mouth every 8 hours as needed for nausea, Disp-9 tablet, Historical      SUMAtriptan (IMITREX) 50 MG tablet Take 1-2 tablets ( mg) by mouth at onset of headache for migraine - LAST REFILL, DUE FOR FOLLOW UP, Disp-9 tablet, R-0, E-Prescribe      traZODone (DESYREL) 100 MG tablet Take 0.5-1 tablets ( mg) by mouth nightly as needed for sleep, Disp-90 tablet, R-8, Historical       !! - Potential duplicate medications found. Please discuss with provider.          ALLERGIES:    Allergies   Allergen Reactions     Hyoscyamine Rash     Metoclopramide Other (See Comments)     Eye twitching.      Peaches [Peach] Other (See Comments)     Raw. Cooked OK     Sucralose Other (See Comments)     All artificial sweeteners. Aspartame also. Swollen glands     Advair Diskus Other (See Comments)     Throat burns     Azithromycin Other (See Comments)     Burning in throat     Compazine [Prochlorperazine] Visual Disturbance     Contrast Dye Itching     States is allergic to CT contrast dye     Cyclobenzaprine Visual Disturbance     Diatrizoate Other (See Comments)     Patient reports she tolerates IV contrast if given 50mg IV of Benadryl prior to scan.      Fentanyl Other (See Comments)     migraine     Ibuprofen GI Disturbance     Lactulose Nausea and Vomiting     Gas and bloating     Levaquin [Levofloxacin] Swelling     Per ED M.D. And RN      Morphine Sulfate Other (See Comments)     Chest pain       Oxycodone Other (See Comments)     Burning throat, but can take Norco.      Rizatriptan Visual Disturbance     Droperidol Hives and Rash     Isovue [Iopamidol] Palpitations     Pt had racing heart and sob      Ketorolac Anxiety     Latex Swelling and Rash     Kiwi, likely also avacado, ? banana     Levsin Rash        Past [facility] History:   Procedure Laterality Date     COLONOSCOPY  7/10/2012    Procedure: COLONOSCOPY;;  Surgeon: Nicole Redding MD;  [facility]     COLONOSCOPY N/A 2/19/2017    Procedure: COLONOSCOPY;  Surgeon: Randell Muller MD;  Location: [facility]     COLONOSCOPY N/A 2/21/2017    Procedure: COLONOSCOPY;  Surgeon: Randell Muller MD;  Location: [facility]     COLONOSCOPY N/A 5/3/2018    Procedure: COMBINED COLONOSCOPY, SINGLE OR MULTIPLE BIOPSY/POLYPECTOMY BY BIOPSY;  EGD/Colonoscopy ;  Surgeon: Loi Black MD;  Location: [facility]     ECHO CHELO  7/19/2016          ENDOSCOPIC INSERTION TUBE GASTROSTOMY N/A 1/21/2016    Procedure: ENDOSCOPIC INSERTION TUBE GASTROSTOMY;  Surgeon: Nicole Redding MD;  Location: [facility]     ESOPHAGOSCOPY, GASTROSCOPY, DUODENOSCOPY (EGD), COMBINED  7/10/2012    Procedure: COMBINED ESOPHAGOSCOPY, GASTROSCOPY, DUODENOSCOPY (EGD);  Upper Endoscopy, Ileoscopy    Latex Allergy  with biopsies;  Surgeon: Nicole Redding MD;  Location: [facility]     ESOPHAGOSCOPY, GASTROSCOPY, DUODENOSCOPY (EGD), COMBINED N/A 11/5/2014    Procedure: COMBINED ESOPHAGOSCOPY, GASTROSCOPY, DUODENOSCOPY (EGD);  Surgeon: Nicole Redding MD;  [facility]: [facility]     ESOPHAGOSCOPY, GASTROSCOPY, DUODENOSCOPY (EGD), COMBINED N/A 5/3/2018    Procedure: COMBINED ESOPHAGOSCOPY, GASTROSCOPY, DUODENOSCOPY (EGD), BIOPSY SINGLE OR MULTIPLE;;  Surgeon: Loi Black MD;  Location: [facility]     HC REPLACE DUODENOSTOMY/JEJUNOSTOMY TUBE PERCUTANEOUS N/A 8/27/2015    Procedure: REPLACE GASTROJEJUNOSTOMY TUBE, PERCUTANEOUS;  Surgeon: Mio Holder MD;  Location[facility]     HC REPLACE DUODENOSTOMY/JEJUNOSTOMY TUBE PERCUTANEOUS N/A 1/7/2016    Procedure: REPLACE JEJUNOSTOMY TUBE, PERCUTANEOUS;  Surgeon: Elsa Medel MD;  [facility]     HC REPLACE DUODENOSTOMY/JEJUNOSTOMY TUBE PERCUTANEOUS N/A 1/28/2016    Procedure: REPLACE JEJUNOSTOMY TUBE, PERCUTANEOUS;  Surgeon: Lizy  Elsa Rogers MD;  Location: [facility]     HC REPLACE GASTROSTOMY/CECOSTOMY TUBE PERCUTANEOUS Left 5/19/2015    Procedure: REPLACE GASTROSTOMY TUBE, PERCUTANEOUS;  Surgeon: Melecio Morejon Chi, MD;  Location: [facility]     HC UGI ENDOSCOPY W PLACEMENT GASTROSTOMY TUBE PERCUT N/A 10/1/2015    Procedure: COMBINED ESOPHAGOSCOPY, GASTROSCOPY, DUODENOSCOPY (EGD), PLACE PERCUTANEOUS ENDOSCOPIC GASTROSTOMY TUBE;  Surgeon: Mio Holder MD;  Location: [facility]     INSERT PORT VASCULAR ACCESS Right 7/20/2017    Procedure: INSERT PORT VASCULAR ACCESS;  Chest Port Placement  **Latex Allergy**;  Surgeon: Coy Rocha PA-C;  [facility]     LAPAROSCOPIC ASSISTED COLECTOMY  1/20/2012    Procedure:LAPAROSCOPIC ASSISTED COLECTOMY; Laparoscopic Ileostomy       LAPAROSCOPIC ASSISTED COLECTOMY LEFT (DESCENDING)  10/24/2012    Procedure: LAPAROSCOPIC ASSISTED COLECTOMY LEFT (DESCENDING);   Hand Assisted Laproscopic Subtotal abdominal Colectomy,Iieorectal anastamosis, Ileostomy Closure.       LAPAROSCOPIC ASSISTED INSERTION TUBE JEJUNOSTOMY N/A 10/16/2015    Procedure: LAPAROSCOPIC ASSISTED INSERTION TUBE JEJUNOSTOMY;  Surgeon: Elsa Medel MD;  Location: [facility]     LAPAROSCOPIC CHOLECYSTECTOMY  2002    [facility] stones duct     LAPAROSCOPIC ILEOSTOMY  1/20/2012    [facility], loop     LAPAROSCOPIC OOPHORECTOMY Right 2009    [facility]     LAPAROTOMY EXPLORATORY N/A 1/28/2016    Procedure: LAPAROTOMY EXPLORATORY;  Surgeon: Elsa Medel MD;  Location: [facility]     LEEP TX, CERVICAL  2009    [facility]     PICC INSERTION Left 10/21/2015    5fr DL Power PICC, 37cm (2cm external) in the L basilic vein w/ tip in the SVC RA junction.     REMOVE GASTROSTOMY TUBE ADULT N/A 12/12/2014    Procedure: REMOVE GASTROSTOMY TUBE ADULT;  Surgeon: Nicole Redding MD;  Location: [facility]     REMOVE PORT VASCULAR ACCESS Right 6/30/2016    Procedure: REMOVE PORT VASCULAR ACCESS;  Surgeon: Pradeep Orosco MD;  Location:  OR  "    REMOVE PORT VASCULAR ACCESS Right 9/8/2017    Procedure: REMOVE PORT VASCULAR ACCESS;  right side mediport removal;  Surgeon: Zechariah Worthington MD;  Location: [facility] OR     replace GASTROSTOMY TUBE ADULT  5/19/15       Social History   Substance Use Topics     Smoking status: Current Some Day Smoker     Packs/day: 1.00     Years: 4.00     Types: Cigarettes     Last attempt to quit: 1/1/2004     Smokeless tobacco: Never Used     Alcohol use No         Review of Systems   Except as noted in HPI, all other systems were reviewed and are negative    Physical Exam     Vitals were reviewed  Patient Vitals for the past 8 hrs:   BP Temp Temp src Pulse Heart Rate Resp SpO2   06/26/18 1539 - 99.4  F (37.4  C) Oral - - - -   06/26/18 1534 (!) 134/92 - - 86 - 16 98 %   06/26/18 1533 - - - - - - 99 %   06/26/18 1532 (!) 134/92 - - - - - -   06/26/18 1505 (!) 136/93 - - 85 - 16 100 %   06/26/18 1504 (!) 136/93 - - - - - -   06/26/18 1410 122/87 - - 89 - - -   06/26/18 1409 122/87 - - - - - -   06/26/18 1206 (!) 168/107 98.1  F (36.7  C) Temporal - 99 16 100 %     GENERAL APPEARANCE: Alert, nontoxic no apparent distress,   FACE: normal facies  EYES: Pupils are equal  HENT: normal external exam  NECK: no adenopathy or asymmetry  RESP: normal respiratory effort; clear breath sounds bilaterally  CV: regular rate and rhythm; no significant murmurs, gallops or rubs  ABD: soft, epigastric tenderness; no rebound or guarding; bowel sounds are normal  MS: no gross deformities noted; normal muscle tone.  EXT: No calf tenderness or pitting edema  SKIN: See the photos below.  Patient has multiple needle puncture sites on both arms, reportedly from multiple IV sticks recently from her blood work and IV; patient also has \"insect bites\" scattered throughout her lower extremities.                          Available Lab/Imaging Results     Results for orders placed or performed during the hospital encounter of 06/26/18 (from the past 24 hour(s)) "   CBC with platelets differential   Result Value Ref Range    WBC 9.6 4.0 - 11.0 10e9/L    RBC Count 3.71 (L) 3.8 - 5.2 10e12/L    Hemoglobin 8.9 (L) 11.7 - 15.7 g/dL    Hematocrit 29.2 (L) 35.0 - 47.0 %    MCV 79 78 - 100 fl    MCH 24.0 (L) 26.5 - 33.0 pg    MCHC 30.5 (L) 31.5 - 36.5 g/dL    RDW 17.6 (H) 10.0 - 15.0 %    Platelet Count 367 150 - 450 10e9/L    Diff Method Automated Method     % Neutrophils 84.2 %    % Lymphocytes 11.1 %    % Monocytes 2.8 %    % Eosinophils 1.0 %    % Basophils 0.3 %    % Immature Granulocytes 0.6 %    Nucleated RBCs 0 0 /100    Absolute Neutrophil 8.0 1.6 - 8.3 10e9/L    Absolute Lymphocytes 1.1 0.8 - 5.3 10e9/L    Absolute Monocytes 0.3 0.0 - 1.3 10e9/L    Absolute Basophils 0.0 0.0 - 0.2 10e9/L    Abs Immature Granulocytes 0.1 0 - 0.4 10e9/L    Absolute Nucleated RBC 0.0    Comprehensive metabolic panel   Result Value Ref Range    Sodium 142 133 - 144 mmol/L    Potassium 3.4 3.4 - 5.3 mmol/L    Chloride 108 94 - 109 mmol/L    Carbon Dioxide 25 20 - 32 mmol/L    Anion Gap 9 3 - 14 mmol/L    Glucose 88 70 - 99 mg/dL    Urea Nitrogen 5 (L) 7 - 30 mg/dL    Creatinine 0.85 0.52 - 1.04 mg/dL    GFR Estimate 75 >60 mL/min/1.7m2    GFR Estimate If Black >90 >60 mL/min/1.7m2    Calcium 8.5 8.5 - 10.1 mg/dL    Bilirubin Total 0.3 0.2 - 1.3 mg/dL    Albumin 3.0 (L) 3.4 - 5.0 g/dL    Protein Total 7.8 6.8 - 8.8 g/dL    Alkaline Phosphatase 170 (H) 40 - 150 U/L    ALT 18 0 - 50 U/L    AST 17 0 - 45 U/L   Lactic acid whole blood   Result Value Ref Range    Lactic Acid 0.9 0.7 - 2.0 mmol/L   CRP inflammation   Result Value Ref Range    CRP Inflammation 180.0 (H) 0.0 - 8.0 mg/L   Erythrocyte sedimentation rate auto   Result Value Ref Range    Sed Rate 83 (H) 0 - 20 mm/h   UA reflex to Microscopic   Result Value Ref Range    Color Urine Straw     Appearance Urine Clear     Glucose Urine Negative NEG^Negative mg/dL    Bilirubin Urine Negative NEG^Negative    Ketones Urine Negative NEG^Negative  mg/dL    Specific Gravity Urine 1.005 1.003 - 1.035    Blood Urine Large (A) NEG^Negative    pH Urine 7.0 5.0 - 7.0 pH    Protein Albumin Urine Negative NEG^Negative mg/dL    Urobilinogen mg/dL 0.0 0.0 - 2.0 mg/dL    Nitrite Urine Negative NEG^Negative    Leukocyte Esterase Urine Negative NEG^Negative    Source Midstream Urine     RBC Urine 1 0 - 2 /HPF    WBC Urine 1 0 - 5 /HPF    Squamous Epithelial /HPF Urine 1 0 - 1 /HPF     *Note: Due to a large number of results and/or encounters for the requested time period, some results have not been displayed. A complete set of results can be found in Results Review.                Impression     Final diagnoses:   Bacteremia (suspected Enterobacter)   Abdominal pain, epigastric         ED Course & Medical Decision Making   Doreen Peralta is a 38 year old female who presented to the emergency department from home after she was informed at home that she had one blood culture that was growing out a gram-negative organism.  Patient reports that she had abdominal pain recur about 6 days ago.  She has a history of prior bacteremia and sepsis.  She has had multiple colon surgeries, and she states that it has been thought that she is leaking some gut hipolito into the bloodstream.  There has been some suspicion as well of Munchhausen's.  Patient no longer has any IV lines or tubes, but she is still experiencing episodes of bacteremia.  She saw Dr. Price, of infectious disease at the end of May.  She has had previous gram-negative septicemia with unusual organisms, as well as C. difficile colitis.  Patient states that she has been experiencing spiking fevers that are intermittent between 101-103  Degrees.  She went to the [facility] yesterday, and from there she was sent to [facility] to have some blood work completed.  This includes a CBC, competency panel, CRP, sed rate, and 2 blood cultures.  Her lactic acid level yesterday was normal.  Her white blood count was  8.4, and a hemoglobin of 9.3.  1 of 2 blood cultures came back this morning positive for gram negative bacilli, most likely Enterobacter cloacae complex.  I reviewed the patient's medical records, and contacted the provider that took care of her yesterday.  She reported that there was a documented fever of 102.9 in the clinic yesterday.  We contacted the lab at [facility], and they were able to confirm that the patient had positive blood culture tested by PCR for the 24 most common blood pathogens in 3 antibiotic resistant change.  The suspected organism is the Enterobacter cloacae  complex.  The patient's blood work results today reveal a white blood count of 9.6 with 84% neutrophils.  CRP is 180, and sed rate is 83.  The trend for her white blood count CRP and sed rate are all rising.  I discussed the case with Dr. Price, with infectious disease, and given that the patient does not appear sick, should be treated for bacteremia without sepsis.  I recommended inpatient hospitalization, but the patient states that her brother is getting  on Saturday, and she has things that she needs to do.  Since she does not feel sick, she would like to go home and return for outpatient antibiotics.  We had difficulty obtaining a peripheral IV and she has multiple needle sticks in both upper extremities.  A more thorough exam of the skin reveals multiple  skin lesions of the lower extremities that she attributes to mosquito bites.  The lesion on the left knee appeared to be possibly track marks from needles.  Please see the photos above.  Patient agreed that she would return immediately if her symptoms worsen.  She will return tomorrow to receive Rocephin IM, as well as have her blood repeated.               Written after-visit summary and instructions were given at the time of discharge.      Discharge Medication List as of 6/26/2018  3:57 PM      START taking these medications    Details   !! HYDROmorphone  (DILAUDID) 2 MG tablet Take 1 tablet (2 mg) by mouth every 8 hours as needed for pain, Disp-4 tablet, R-0, Local Print       !! - Potential duplicate medications found. Please discuss with provider.             This document serves as a record of services personally performed by Jeferson Almaguer MD. It was created on their behalf by Alessandra Martinez, a trained medical scribe. The creation of this record is based on the provider's personal observations and the statements of the patient. This document has been checked and approved by the attending provider.    Note: Chart documentation done in part with Dragon Voice Recognition software. Although reviewed after completion, some word and grammatical errors may remain.    This note was dictated using electronic voice recognition software and although reviewed, may contain grammatical and spelling errors.        Cristina Almaguer MD  06/26/18 2213

## 2018-06-27 ENCOUNTER — HOSPITAL ENCOUNTER (EMERGENCY)
Facility: CLINIC | Age: 38
Discharge: HOME | End: 2018-06-27
Attending: FAMILY MEDICINE | Admitting: FAMILY MEDICINE
Payer: COMMERCIAL

## 2018-06-27 ENCOUNTER — HOSPITAL ENCOUNTER (EMERGENCY)
Dept: LAB | Facility: CLINIC | Age: 38
End: 2018-06-27
Attending: FAMILY MEDICINE
Payer: COMMERCIAL

## 2018-06-27 VITALS
RESPIRATION RATE: 16 BRPM | DIASTOLIC BLOOD PRESSURE: 70 MMHG | TEMPERATURE: 97.2 F | HEART RATE: 68 BPM | OXYGEN SATURATION: 100 % | SYSTOLIC BLOOD PRESSURE: 121 MMHG

## 2018-06-27 DIAGNOSIS — R10.84 ABDOMINAL PAIN, GENERALIZED: ICD-10-CM

## 2018-06-27 DIAGNOSIS — R78.81 BACTEREMIA: ICD-10-CM

## 2018-06-27 LAB
BASOPHILS # BLD AUTO: 0 10E9/L (ref 0–0.2)
BASOPHILS NFR BLD AUTO: 0.4 %
CRP SERPL-MCNC: 127 MG/L (ref 0–8)
DIFFERENTIAL METHOD BLD: ABNORMAL
EOSINOPHIL NFR BLD AUTO: 1.2 %
ERYTHROCYTE [DISTWIDTH] IN BLOOD BY AUTOMATED COUNT: 17.6 % (ref 10–15)
HCT VFR BLD AUTO: 27.6 % (ref 35–47)
HGB BLD-MCNC: 8.5 G/DL (ref 11.7–15.7)
IMM GRANULOCYTES # BLD: 0.1 10E9/L (ref 0–0.4)
IMM GRANULOCYTES NFR BLD: 0.9 %
LYMPHOCYTES # BLD AUTO: 1.8 10E9/L (ref 0.8–5.3)
LYMPHOCYTES NFR BLD AUTO: 25.6 %
MCH RBC QN AUTO: 24 PG (ref 26.5–33)
MCHC RBC AUTO-ENTMCNC: 30.8 G/DL (ref 31.5–36.5)
MCV RBC AUTO: 78 FL (ref 78–100)
MONOCYTES # BLD AUTO: 0.5 10E9/L (ref 0–1.3)
MONOCYTES NFR BLD AUTO: 6.6 %
NEUTROPHILS # BLD AUTO: 4.5 10E9/L (ref 1.6–8.3)
NEUTROPHILS NFR BLD AUTO: 65.3 %
NRBC # BLD AUTO: 0 10*3/UL
NRBC BLD AUTO-RTO: 0 /100
PLATELET # BLD AUTO: 368 10E9/L (ref 150–450)
RBC # BLD AUTO: 3.54 10E12/L (ref 3.8–5.2)
WBC # BLD AUTO: 6.9 10E9/L (ref 4–11)

## 2018-06-27 PROCEDURE — 36415 COLL VENOUS BLD VENIPUNCTURE: CPT | Performed by: FAMILY MEDICINE

## 2018-06-27 PROCEDURE — 96372 THER/PROPH/DIAG INJ SC/IM: CPT | Performed by: FAMILY MEDICINE

## 2018-06-27 PROCEDURE — 86140 C-REACTIVE PROTEIN: CPT | Performed by: FAMILY MEDICINE

## 2018-06-27 PROCEDURE — 25000125 ZZHC RX 250: Performed by: FAMILY MEDICINE

## 2018-06-27 PROCEDURE — 99284 EMERGENCY DEPT VISIT MOD MDM: CPT | Mod: 25 | Performed by: FAMILY MEDICINE

## 2018-06-27 PROCEDURE — 85025 COMPLETE CBC W/AUTO DIFF WBC: CPT | Performed by: FAMILY MEDICINE

## 2018-06-27 PROCEDURE — 99283 EMERGENCY DEPT VISIT LOW MDM: CPT | Mod: Z6 | Performed by: FAMILY MEDICINE

## 2018-06-27 PROCEDURE — 25000128 H RX IP 250 OP 636: Performed by: FAMILY MEDICINE

## 2018-06-27 RX ORDER — CEFDINIR 300 MG/1
300 CAPSULE ORAL 2 TIMES DAILY
Qty: 14 CAPSULE | Refills: 0 | Status: SHIPPED | OUTPATIENT
Start: 2018-06-27 | End: 2019-02-05

## 2018-06-27 RX ADMIN — LIDOCAINE HYDROCHLORIDE 1 G: 10 INJECTION, SOLUTION EPIDURAL; INFILTRATION; INTRACAUDAL; PERINEURAL at 15:05

## 2018-06-27 NOTE — ED PROVIDER NOTES
[facility] ED Provider Note   CC:   No chief complaint on file.    HPI:  Doreen Peralta is a 38 year old female who presented to the emergency department for follow-up of possible bacteremia.  Patient was seen in the emergency department yesterday with a single blood culture that was growing out at the [facility] laboratory that was consistent with Enterobacter cloacae.  Patient received Rocephin 2 g IM.  We had recommended admission, but the patient felt that she could go home and follow-up today.   She states that her fevers have improved and she has not had a temperature of over 99.1 s since yesterday.  Her blood cultures are negative at 19 hours.  She came in for repeat blood work and to receive a second dose of Rocephin.Vitals were reviewed                       Problem List:  Patient Active Problem List    Diagnosis     Bacteremia     Acute renal failure (H)     History of bacteremia - recurrent     Tobacco abuse     Iron deficiency anemia     Stool toxin PCR positive for Clostridium difficile on 4/17/18     Gram negative septicemia (H) - Ochrobactrum, Stenotrophomonas & Pantoea     Hypokalemia     Essential hypertension     Sepsis due to Klebsiella (H)     Insomnia, unspecified type     Abdominal pain     Abdominal pain, generalized     History of deep venous thrombosis     Nausea and vomiting     Vitamin D deficiency     Chronic abdominal pain     Attention to G-tube (H)     Fever     Coagulation defect (H) [D68.9]     Chronic diarrhea     Migraine     Positive blood culture - Klebsiella oxytoca from Port-a-cath culture     Mitral regurgitation mild-mod by Echo June 2016     Anemia, iron deficiency     Anemia in other chronic diseases classified elsewhere     Munchausen syndrome - previously suspected     Long-term (current) use of anticoagulants [Z79.01]     Anxiety     S/P partial resection of colon     Malnutrition (H)      Jejunostomy tube present (H)     Malfunctioning jejunostomy tube (H)     Malfunction of gastrostomy tube (H)     PEG tube malfunction (H)     Health Care Home     PEG (percutaneous endoscopic gastrostomy) status     Gastroparesis     Migraines     Intermittent asthma     Allergic rhinitis     Abnormal Pap smear of cervix     S/P LEEP of cervix     Patellofemoral stress syndrome     Hx SBO     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     Constipation by delayed colonic transit     Atopic rhinitis     Hyperbilirubinemia       MEDS:   Previous Medications    ACETAMINOPHEN PO    Take 500-1,000 mg by mouth every 6 hours as needed for pain     ALBUTEROL (2.5 MG/3ML) 0.083% NEB SOLUTION    Take 1 vial (2.5 mg) by nebulization every 4 hours as needed for shortness of breath / dyspnea or wheezing    ALBUTEROL 90 MCG/ACT INHALER    Inhale 2 puffs into the lungs every 6 hours as needed     ALFALFA 650 MG TABS        CHOLESTYRAMINE LIGHT (QUESTRAN) 4 GM PACKET    Take 1 packet (4 g) by mouth 2 times daily    CLONIDINE (CATAPRES) 0.2 MG TABLET    Take 2 tablets (0.4 mg) by mouth every evening - DUE FOR FOLLOW UP    DIPHENHYDRAMINE HCL 50 MG TABS    Take 50 mg by mouth every 6 hours as needed (nausea)    DULOXETINE (CYMBALTA) 60 MG EC CAPSULE    TAKE 1 CAPSULE(60 MG) BY MOUTH DAILY    HYDROCODONE-ACETAMINOPHEN (NORCO) 5-325 MG PER TABLET    Take 1 tablet by mouth every 4 hours as needed for pain    HYDROMORPHONE (DILAUDID) 2 MG TABLET    Take 1 tablet (2 mg) by mouth every 6 hours as needed for pain    HYDROMORPHONE (DILAUDID) 2 MG TABLET    Take 1 tablet (2 mg) by mouth every 8 hours as needed for pain    IPRATROPIUM (ATROVENT) 0.02 % NEB SOLUTION    Take 2.5 mLs (0.5 mg) by nebulization every 6 hours as needed for wheezing    ONDANSETRON (ZOFRAN) 8 MG TABLET    Take 1 tablet (8 mg) by mouth every 8 hours as needed for nausea    SUMATRIPTAN (IMITREX) 50 MG TABLET    Take 1-2 tablets ( mg) by mouth at onset of headache for  migraine - LAST REFILL, DUE FOR FOLLOW UP    TRAZODONE (DESYREL) 100 MG TABLET    Take 0.5-1 tablets ( mg) by mouth nightly as needed for sleep       ALLERGIES:    Allergies   Allergen Reactions     Hyoscyamine Rash     Metoclopramide Other (See Comments)     Eye twitching.      Peaches [Peach] Other (See Comments)     Raw. Cooked OK     Sucralose Other (See Comments)     All artificial sweeteners. Aspartame also. Swollen glands     Advair Diskus Other (See Comments)     Throat burns     Azithromycin Other (See Comments)     Burning in throat     Compazine [Prochlorperazine] Visual Disturbance     Contrast Dye Itching     States is allergic to CT contrast dye     Cyclobenzaprine Visual Disturbance     Diatrizoate Other (See Comments)     Patient reports she tolerates IV contrast if given 50mg IV of Benadryl prior to scan.      Fentanyl Other (See Comments)     migraine     Ibuprofen GI Disturbance     Lactulose Nausea and Vomiting     Gas and bloating     Levaquin [Levofloxacin] Swelling     Per ED M.D. And RN      Morphine Sulfate Other (See Comments)     Chest pain       Oxycodone Other (See Comments)     Burning throat, but can take Norco.      Rizatriptan Visual Disturbance     Droperidol Hives and Rash     Isovue [Iopamidol] Palpitations     Pt had racing heart and sob      Ketorolac Anxiety     Latex Swelling and Rash     Kiwi, likely also avacado, ? banana     Levsin Rash       Past [facility] History:   Procedure Laterality Date     COLONOSCOPY  7/10/2012    Procedure: COLONOSCOPY;;  Surgeon: Nicole Redding MD;  [facility]     COLONOSCOPY N/A 2/19/2017    Procedure: COLONOSCOPY;  Surgeon: Randell Muller MD;  [facility]     COLONOSCOPY N/A 2/21/2017    Procedure: COLONOSCOPY;  Surgeon: Randell Muller MD;  [facility]     COLONOSCOPY N/A 5/3/2018    Procedure: COMBINED COLONOSCOPY, SINGLE OR MULTIPLE BIOPSY/POLYPECTOMY BY BIOPSY;  EGD/Colonoscopy ;  Surgeon: Jose E Majano  MD Loi;  [facility]     ECHO CHELO  7/19/2016          ENDOSCOPIC INSERTION TUBE GASTROSTOMY N/A 1/21/2016    Procedure: ENDOSCOPIC INSERTION TUBE GASTROSTOMY;  Surgeon: Nicole Redding MD;  [facility]     ESOPHAGOSCOPY, GASTROSCOPY, DUODENOSCOPY (EGD), COMBINED  7/10/2012    Procedure: COMBINED ESOPHAGOSCOPY, GASTROSCOPY, DUODENOSCOPY (EGD);  Upper Endoscopy, Ileoscopy    Latex Allergy  with biopsies;  Surgeon: Nicole Redding MD;  [facility]     ESOPHAGOSCOPY, GASTROSCOPY, DUODENOSCOPY (EGD), COMBINED N/A 11/5/2014    Procedure: COMBINED ESOPHAGOSCOPY, GASTROSCOPY, DUODENOSCOPY (EGD);  Surgeon: Nicole Redding MD;  [facility]     ESOPHAGOSCOPY, GASTROSCOPY, DUODENOSCOPY (EGD), COMBINED N/A 5/3/2018    Procedure: COMBINED ESOPHAGOSCOPY, GASTROSCOPY, DUODENOSCOPY (EGD), BIOPSY SINGLE OR MULTIPLE;;  Surgeon: Loi Black MD;  [facility]     HC REPLACE DUODENOSTOMY/JEJUNOSTOMY TUBE PERCUTANEOUS N/A 8/27/2015    Procedure: REPLACE GASTROJEJUNOSTOMY TUBE, PERCUTANEOUS;  Surgeon: Mio Holder MD;  [facility]     HC REPLACE DUODENOSTOMY/JEJUNOSTOMY TUBE PERCUTANEOUS N/A 1/7/2016    Procedure: REPLACE JEJUNOSTOMY TUBE, PERCUTANEOUS;  Surgeon: Elsa Medel MD;  [facility]     HC REPLACE DUODENOSTOMY/JEJUNOSTOMY TUBE PERCUTANEOUS N/A 1/28/2016    Procedure: REPLACE JEJUNOSTOMY TUBE, PERCUTANEOUS;  Surgeon: Elsa Medel MD;  [facility]     HC REPLACE GASTROSTOMY/CECOSTOMY TUBE PERCUTANEOUS Left 5/19/2015    Procedure: REPLACE GASTROSTOMY TUBE, PERCUTANEOUS;  Surgeon: Melecio Morejon Chi, MD;  [facility]     HC UGI ENDOSCOPY W PLACEMENT GASTROSTOMY TUBE PERCUT N/A 10/1/2015    Procedure: COMBINED ESOPHAGOSCOPY, GASTROSCOPY, DUODENOSCOPY (EGD), PLACE PERCUTANEOUS ENDOSCOPIC GASTROSTOMY TUBE;  Surgeon: Mio Holder MD;  [facility]     INSERT PORT VASCULAR ACCESS Right 7/20/2017    Procedure: INSERT PORT VASCULAR ACCESS;   Chest Port Placement  **Latex Allergy**;  Surgeon: Coy Rocha PA-C;  [facility]     LAPAROSCOPIC ASSISTED COLECTOMY  1/20/2012    Procedure:LAPAROSCOPIC ASSISTED COLECTOMY; Laparoscopic Ileostomy       LAPAROSCOPIC ASSISTED COLECTOMY LEFT (DESCENDING)  10/24/2012    Procedure: LAPAROSCOPIC ASSISTED COLECTOMY LEFT (DESCENDING);   Hand Assisted Laproscopic Subtotal abdominal Colectomy,Iieorectal anastamosis, Ileostomy Closure.       LAPAROSCOPIC ASSISTED INSERTION TUBE JEJUNOSTOMY N/A 10/16/2015    Procedure: LAPAROSCOPIC ASSISTED INSERTION TUBE JEJUNOSTOMY;  Surgeon: Elsa Medel MD;  [facility]     LAPAROSCOPIC CHOLECYSTECTOMY  2002    [facility] stones duct     LAPAROSCOPIC ILEOSTOMY  1/20/2012    [facility], loop     LAPAROSCOPIC OOPHORECTOMY Right 2009    [facility]     LAPAROTOMY EXPLORATORY N/A 1/28/2016    Procedure: LAPAROTOMY EXPLORATORY;  Surgeon: Elsa Medel MD;  [facility]     LEEP TX, CERVICAL  2009    [facility]     PICC INSERTION Left 10/21/2015    5fr DL Power PICC, 37cm (2cm external) in the L basilic vein w/ tip in the SVC RA junction.     REMOVE GASTROSTOMY TUBE ADULT N/A 12/12/2014    Procedure: REMOVE GASTROSTOMY TUBE ADULT;  Surgeon: Nicole Redding MD;  [facility]     REMOVE PORT VASCULAR ACCESS Right 6/30/2016    Procedure: REMOVE PORT VASCULAR ACCESS;  Surgeon: Pradeep Orosco MD;  [facility]     REMOVE PORT VASCULAR ACCESS Right 9/8/2017    Procedure: REMOVE PORT VASCULAR ACCESS;  right side mediport removal;  Surgeon: Zechariah Worthington MD;  Location: [facility]     replace GASTROSTOMY TUBE ADULT  5/19/15       Social History   Substance Use Topics     Smoking status: Current Some Day Smoker     Packs/day: 1.00     Years: 4.00     Types: Cigarettes     Last attempt to quit: 1/1/2004     Smokeless tobacco: Never Used     Alcohol use No         Review of Systems   Except as noted in HPI, all other systems were reviewed and are  negative    Physical Exam   Vitals were reviewed                     GENERAL APPEARANCE: Alert, no distress  FACE: normal facies  EYES: Pupils are equal  HENT: normal external exam  RESP: normal respiratory effort; clear breath sounds bilaterally  CV: regular rate and rhythm; no significant murmurs, gallops or rubs        Available Lab/Imaging Results     Results for orders placed or performed during the hospital encounter of 06/27/18 (from the past 24 hour(s))   CBC with platelets differential   Result Value Ref Range    WBC 6.9 4.0 - 11.0 10e9/L    RBC Count 3.54 (L) 3.8 - 5.2 10e12/L    Hemoglobin 8.5 (L) 11.7 - 15.7 g/dL    Hematocrit 27.6 (L) 35.0 - 47.0 %    MCV 78 78 - 100 fl    MCH 24.0 (L) 26.5 - 33.0 pg    MCHC 30.8 (L) 31.5 - 36.5 g/dL    RDW 17.6 (H) 10.0 - 15.0 %    Platelet Count 368 150 - 450 10e9/L    Diff Method Automated Method     % Neutrophils 65.3 %    % Lymphocytes 25.6 %    % Monocytes 6.6 %    % Eosinophils 1.2 %    % Basophils 0.4 %    % Immature Granulocytes 0.9 %    Nucleated RBCs 0 0 /100    Absolute Neutrophil 4.5 1.6 - 8.3 10e9/L    Absolute Lymphocytes 1.8 0.8 - 5.3 10e9/L    Absolute Monocytes 0.5 0.0 - 1.3 10e9/L    Absolute Basophils 0.0 0.0 - 0.2 10e9/L    Abs Immature Granulocytes 0.1 0 - 0.4 10e9/L    Absolute Nucleated RBC 0.0    CRP inflammation   Result Value Ref Range    CRP Inflammation 127.0 (H) 0.0 - 8.0 mg/L     *Note: Due to a large number of results and/or encounters for the requested time period, some results have not been displayed. A complete set of results can be found in Results Review.                Impression     Final diagnoses:   Bacteremia         ED Course & Medical Decision Making   Doreen Peralta is a 38 year old female who presented to the emergency department for follow-up of positive blood cultures yesterday.  She has had 2 further blood cultures that are negative at 19 hours.  The patient's CBC reveals a white blood count of 6.9, down from 9.4 with a  normal differential today.  CRP has gone from 180 down to 127.  Patient has a wedding to attend leaving tomorrow for [place].  Since she is improved, I think that she does not need to return for additional parenteral antibiotics.  I asked her to begin Omnicef 300 mg twice a day beginning tomorrow morning and to take this for 7 days.  Follow-up in the clinic in 1 week to recheck her white blood count and CRP.  Return to the ED at any time if she has any recurrence or worsening of her symptoms.               Written after-visit summary and instructions were given at the time of discharge.      New Prescriptions    CEFDINIR (OMNICEF) 300 MG CAPSULE    Take 1 capsule (300 mg) by mouth 2 times daily for 7 days            This note was dictated using electronic voice recognition software and although reviewed, may contain grammatical and spelling errors.        Cristina Almaguer MD  06/27/18 1673

## 2018-06-27 NOTE — ED AVS SNAPSHOT
[facility] Emergency Department    [address]     [place] [address]    Phone:  [phone]    Fax:  [phone]                                       Doreen Peralta   MRN: [id number]    Department:  [facility] Emergency Department   Date of Visit:  6/27/2018           Patient Information     Date Of Birth          1980        Your diagnoses for this visit were:     Bacteremia        You were seen by Cristina Almaguer MD.      Follow-up Information     Follow up with Franny Antoine MD In 5 days.    Specialty:  Internal Medicine    Contact information:    [facility]  [address] [address]  [phone]          Follow up with [facility] Emergency Department.    Specialty:  EMERGENCY MEDICINE    Why:  If symptoms worsen    Contact information:    [address]   [place] [id number] [phone]    Additional information:    From [address]: Exit at [address] on [address] side of [place]. Turn right on [address]. Turn left at stoplight on [address] [place]. [facility] will be in view two blocks ahead        Discharge Instructions       Thank you for giving us the opportunity to see you. Your white blood count and CRP are decreasing.  Your blood cultures are not growing out any bacteria at this time.    Begin Omnicef 300 mg twice a day for 7 days.  Begin your first dose tomorrow morning.  You do not need to return for additional Rocephin.    The following medications were given during your stay: Rocephin 1 gm IM    The final results of your blood culture are pending. We will call you if your plan of care needs to change.     Please follow-up with your primary care provider early next week for recheck of your white blood count and CRP.    After discharge, please closely monitor for any new or worsening symptoms. Return to the Emergency Department at any time if your symptoms worsen.        24 Hour Appointment Hotline        To make an appointment at any [facility] clinic, call [phone] ([phone]). If you don't have a family doctor or clinic, we will help you find one. [facility] clinics are conveniently located to serve the needs of you and your family.             Review of your medicines      START taking        Dose / Directions Last dose taken    cefdinir 300 MG capsule   Commonly known as:  OMNICEF   Dose:  300 mg   Quantity:  14 capsule        Take 1 capsule (300 mg) by mouth 2 times daily for 7 days   Refills:  0          Our records show that you are taking the medicines listed below. If these are incorrect, please call your family doctor or clinic.        Dose / Directions Last dose taken    ACETAMINOPHEN PO   Dose:  500-1000 mg        Take 500-1,000 mg by mouth every 6 hours as needed for pain   Refills:  0        albuterol (2.5 MG/3ML) 0.083% neb solution   Dose:  2.5 mg   Quantity:  360 mL        Take 1 vial (2.5 mg) by nebulization every 4 hours as needed for shortness of breath / dyspnea or wheezing   Refills:  0        albuterol 90 MCG/ACT inhaler   Dose:  2 puff        Inhale 2 puffs into the lungs every 6 hours as needed   Refills:  0        Alfalfa 650 MG Tabs        Refills:  0        cholestyramine light 4 GM Packet   Commonly known as:  QUESTRAN   Dose:  4 g   Quantity:  60 packet        Take 1 packet (4 g) by mouth 2 times daily   Refills:  1        cloNIDine 0.2 MG tablet   Commonly known as:  CATAPRES   Dose:  0.4 mg   Quantity:  60 tablet        Take 2 tablets (0.4 mg) by mouth every evening - DUE FOR FOLLOW UP   Refills:  0        diphenhydrAMINE HCl 50 MG Tabs   Commonly known as:  BENADRYL   Dose:  50 mg   Quantity:  30 tablet        Take 50 mg by mouth every 6 hours as needed (nausea)   Refills:  0        DULoxetine 60 MG EC capsule   Commonly known as:  CYMBALTA   Quantity:  90 capsule        TAKE 1 CAPSULE(60 MG) BY MOUTH DAILY   Refills:  1        HYDROcodone-acetaminophen 5-325 MG per tablet    Commonly known as:  NORCO   Dose:  1 tablet   Quantity:  12 tablet        Take 1 tablet by mouth every 4 hours as needed for pain   Refills:  0        * HYDROmorphone 2 MG tablet   Commonly known as:  DILAUDID   Dose:  2 mg   Quantity:  10 tablet        Take 1 tablet (2 mg) by mouth every 6 hours as needed for pain   Refills:  0        * HYDROmorphone 2 MG tablet   Commonly known as:  DILAUDID   Dose:  2 mg   Quantity:  4 tablet        Take 1 tablet (2 mg) by mouth every 8 hours as needed for pain   Refills:  0        ipratropium 0.02 % neb solution   Commonly known as:  ATROVENT   Dose:  0.5 mg   Quantity:  225 mL        Take 2.5 mLs (0.5 mg) by nebulization every 6 hours as needed for wheezing   Refills:  0        ondansetron 8 MG tablet   Commonly known as:  ZOFRAN   Dose:  8 mg   Quantity:  9 tablet        Take 1 tablet (8 mg) by mouth every 8 hours as needed for nausea   Refills:  0        SUMAtriptan 50 MG tablet   Commonly known as:  IMITREX   Dose:   mg   Quantity:  9 tablet        Take 1-2 tablets ( mg) by mouth at onset of headache for migraine - LAST REFILL, DUE FOR FOLLOW UP   Refills:  0        traZODone 100 MG tablet   Commonly known as:  DESYREL   Dose:   mg   Quantity:  90 tablet        Take 0.5-1 tablets ( mg) by mouth nightly as needed for sleep   Refills:  8        * Notice:  This list has 2 medication(s) that are the same as other medications prescribed for you. Read the directions carefully, and ask your doctor or other care provider to review them with you.            Prescriptions were sent or printed at these locations (1 Prescription)                   [facility] [address] [place] [address] - [address] [place] [address] AT [facility] [address]   [address] [place] [address]    Telephone:  [phone]   Fax:  [phone]   Hours:                  E-Prescribed (1 of 1)         cefdinir (OMNICEF) 300 MG capsule                Orders Needing Specimen  Collection     None      Pending Results     Date and Time Order Name Status Description    6/26/2018 1241 Blood culture Preliminary     6/26/2018 1241 Blood culture Preliminary             Pending Culture Results     Date and Time Order Name Status Description    6/26/2018 1241 Blood culture Preliminary     6/26/2018 1241 Blood culture Preliminary             Pending Results Instructions     If you had any lab results that were not finalized at the time of your Discharge, you can call the ED Lab Result RN at [phone]. You will be contacted by this team for any positive Lab results or changes in treatment. The nurses are available 7 days a week from 10A to 6:30P.  You can leave a message 24 hours per day and they will return your call.        Thank you for choosing [facility]       Thank you for choosing [facility] for your care. Our goal is always to provide you with excellent care. Hearing back from our patients is one way we can continue to improve our services. Please take a few minutes to complete the written survey that you may receive in the mail after you visit with us. Thank you!        [facility] Information     [facility] gives you secure access to your electronic health record. If you see a primary care provider, you can also send messages to your care team and make appointments. If you have questions, please call your primary care clinic.  If you do not have a primary care provider, please call [phone] and they will assist you.        Care EveryWhere ID     This is your Care EveryWhere ID. This could be used by other organizations to access your [facility] medical records  [id number]        Equal Access to Services     TRAMAINE CLAYTON AH: Hadii caitie Law, waaxda luqadaha, qaybta kaalmada adeannamariayada, elham gomez. So [facility] [phone].    ATENCIÓN: Si habla español, tiene a wade disposición servicios gratuitos de asistencia lingüística. Llame al [phone].    We  comply with applicable federal civil rights laws and [place] laws. We do not discriminate on the basis of race, color, national origin, age, disability, sex, sexual orientation, or gender identity.            After Visit Summary       This is your record. Keep this with you and show to your community pharmacist(s) and doctor(s) at your next visit.

## 2018-06-27 NOTE — ED AVS SNAPSHOT
[facility] Emergency Department    [address] [place] [address]    Phone:  [phone]    Fax:  [phone]                                       Doreen Peralta   MRN: [id number]    Department:  [facility] Emergency Department   Date of Visit:  6/27/2018           After Visit Summary Signature Page     I have received my discharge instructions, and my questions have been answered. I have discussed any challenges I see with this plan with the nurse or doctor.    ..........................................................................................................................................  Patient/Patient Representative Signature      ..........................................................................................................................................  Patient Representative Print Name and Relationship to Patient    ..................................................               ................................................  Date                                            Time    ..........................................................................................................................................  Reviewed by Signature/Title    ...................................................              ..............................................  Date                                                            Time

## 2018-06-27 NOTE — DISCHARGE INSTRUCTIONS
Thank you for giving us the opportunity to see you. Your white blood count and CRP are decreasing.  Your blood cultures are not growing out any bacteria at this time.    Begin Omnicef 300 mg twice a day for 7 days.  Begin your first dose tomorrow morning.  You do not need to return for additional Rocephin.    The following medications were given during your stay: Rocephin 1 gm IM    The final results of your blood culture are pending. We will call you if your plan of care needs to change.     Please follow-up with your primary care provider early next week for recheck of your white blood count and CRP.    After discharge, please closely monitor for any new or worsening symptoms. Return to the Emergency Department at any time if your symptoms worsen.

## 2018-07-30 ENCOUNTER — HOSPITAL ENCOUNTER (EMERGENCY)
Facility: CLINIC | Age: 38
Discharge: HOME | End: 2018-07-30
Attending: PHYSICIAN ASSISTANT | Admitting: PHYSICIAN ASSISTANT
Payer: MEDICARE

## 2018-07-30 VITALS
BODY MASS INDEX: 26.2 KG/M2 | WEIGHT: 163 LBS | TEMPERATURE: 97 F | HEART RATE: 78 BPM | SYSTOLIC BLOOD PRESSURE: 137 MMHG | HEIGHT: 66 IN | DIASTOLIC BLOOD PRESSURE: 90 MMHG | RESPIRATION RATE: 17 BRPM | OXYGEN SATURATION: 100 %

## 2018-07-30 DIAGNOSIS — R19.7 DIARRHEA, UNSPECIFIED TYPE: ICD-10-CM

## 2018-07-30 DIAGNOSIS — R10.84 ABDOMINAL PAIN, GENERALIZED: ICD-10-CM

## 2018-07-30 LAB
ALBUMIN SERPL-MCNC: 3.8 G/DL (ref 3.4–5)
ALP SERPL-CCNC: 129 U/L (ref 40–150)
ALT SERPL W P-5'-P-CCNC: 43 U/L (ref 0–50)
ANION GAP SERPL CALCULATED.3IONS-SCNC: 7 MMOL/L (ref 3–14)
AST SERPL W P-5'-P-CCNC: 31 U/L (ref 0–45)
BASOPHILS # BLD AUTO: 0.1 10E9/L (ref 0–0.2)
BASOPHILS NFR BLD AUTO: 0.6 %
BILIRUB SERPL-MCNC: 0.4 MG/DL (ref 0.2–1.3)
BUN SERPL-MCNC: 13 MG/DL (ref 7–30)
C DIFF TOX B STL QL: POSITIVE
CALCIUM SERPL-MCNC: 9.1 MG/DL (ref 8.5–10.1)
CHLORIDE SERPL-SCNC: 106 MMOL/L (ref 94–109)
CO2 SERPL-SCNC: 28 MMOL/L (ref 20–32)
CREAT SERPL-MCNC: 0.98 MG/DL (ref 0.52–1.04)
DIFFERENTIAL METHOD BLD: ABNORMAL
EOSINOPHIL NFR BLD AUTO: 0.2 %
ERYTHROCYTE [DISTWIDTH] IN BLOOD BY AUTOMATED COUNT: 19 % (ref 10–15)
GFR SERPL CREATININE-BSD FRML MDRD: 64 ML/MIN/1.7M2
GLUCOSE SERPL-MCNC: 72 MG/DL (ref 70–99)
HCT VFR BLD AUTO: 39.5 % (ref 35–47)
HGB BLD-MCNC: 12.3 G/DL (ref 11.7–15.7)
IMM GRANULOCYTES # BLD: 0 10E9/L (ref 0–0.4)
IMM GRANULOCYTES NFR BLD: 0.3 %
LACTATE BLD-SCNC: 1.7 MMOL/L (ref 0.7–2)
LYMPHOCYTES # BLD AUTO: 2 10E9/L (ref 0.8–5.3)
LYMPHOCYTES NFR BLD AUTO: 16 %
MCH RBC QN AUTO: 25.4 PG (ref 26.5–33)
MCHC RBC AUTO-ENTMCNC: 31.1 G/DL (ref 31.5–36.5)
MCV RBC AUTO: 81 FL (ref 78–100)
MONOCYTES # BLD AUTO: 0.6 10E9/L (ref 0–1.3)
MONOCYTES NFR BLD AUTO: 4.4 %
NEUTROPHILS # BLD AUTO: 9.7 10E9/L (ref 1.6–8.3)
NEUTROPHILS NFR BLD AUTO: 78.5 %
NRBC # BLD AUTO: 0 10*3/UL
NRBC BLD AUTO-RTO: 0 /100
PLATELET # BLD AUTO: 583 10E9/L (ref 150–450)
POTASSIUM SERPL-SCNC: 3.8 MMOL/L (ref 3.4–5.3)
PROT SERPL-MCNC: 8.7 G/DL (ref 6.8–8.8)
RBC # BLD AUTO: 4.85 10E12/L (ref 3.8–5.2)
SODIUM SERPL-SCNC: 141 MMOL/L (ref 133–144)
SPECIMEN SOURCE: ABNORMAL
WBC # BLD AUTO: 12.4 10E9/L (ref 4–11)

## 2018-07-30 PROCEDURE — 80053 COMPREHEN METABOLIC PANEL: CPT | Performed by: PHYSICIAN ASSISTANT

## 2018-07-30 PROCEDURE — 99285 EMERGENCY DEPT VISIT HI MDM: CPT | Mod: Z6 | Performed by: PHYSICIAN ASSISTANT

## 2018-07-30 PROCEDURE — 96374 THER/PROPH/DIAG INJ IV PUSH: CPT | Performed by: PHYSICIAN ASSISTANT

## 2018-07-30 PROCEDURE — 96361 HYDRATE IV INFUSION ADD-ON: CPT | Performed by: PHYSICIAN ASSISTANT

## 2018-07-30 PROCEDURE — 25000128 H RX IP 250 OP 636: Performed by: PHYSICIAN ASSISTANT

## 2018-07-30 PROCEDURE — 36415 COLL VENOUS BLD VENIPUNCTURE: CPT | Performed by: PHYSICIAN ASSISTANT

## 2018-07-30 PROCEDURE — 87493 C DIFF AMPLIFIED PROBE: CPT | Performed by: PHYSICIAN ASSISTANT

## 2018-07-30 PROCEDURE — 96375 TX/PRO/DX INJ NEW DRUG ADDON: CPT | Performed by: PHYSICIAN ASSISTANT

## 2018-07-30 PROCEDURE — 85025 COMPLETE CBC W/AUTO DIFF WBC: CPT | Performed by: PHYSICIAN ASSISTANT

## 2018-07-30 PROCEDURE — 83605 ASSAY OF LACTIC ACID: CPT | Performed by: PHYSICIAN ASSISTANT

## 2018-07-30 PROCEDURE — 96376 TX/PRO/DX INJ SAME DRUG ADON: CPT | Performed by: PHYSICIAN ASSISTANT

## 2018-07-30 PROCEDURE — 99285 EMERGENCY DEPT VISIT HI MDM: CPT | Mod: 25 | Performed by: PHYSICIAN ASSISTANT

## 2018-07-30 RX ORDER — HYDROMORPHONE HYDROCHLORIDE 2 MG/1
2 TABLET ORAL EVERY 6 HOURS PRN
Qty: 4 TABLET | Refills: 0 | Status: SHIPPED | OUTPATIENT
Start: 2018-07-30 | End: 2018-08-07

## 2018-07-30 RX ORDER — HYDROMORPHONE HYDROCHLORIDE 1 MG/ML
0.5 INJECTION, SOLUTION INTRAMUSCULAR; INTRAVENOUS; SUBCUTANEOUS
Status: COMPLETED | OUTPATIENT
Start: 2018-07-30 | End: 2018-07-30

## 2018-07-30 RX ORDER — SODIUM CHLORIDE 9 MG/ML
INJECTION, SOLUTION INTRAVENOUS CONTINUOUS
Status: DISCONTINUED | OUTPATIENT
Start: 2018-07-30 | End: 2018-07-30 | Stop reason: HOSPADM

## 2018-07-30 RX ORDER — DIPHENHYDRAMINE HYDROCHLORIDE 50 MG/ML
50 INJECTION INTRAMUSCULAR; INTRAVENOUS ONCE
Status: COMPLETED | OUTPATIENT
Start: 2018-07-30 | End: 2018-07-30

## 2018-07-30 RX ADMIN — SODIUM CHLORIDE 1000 ML: 9 INJECTION, SOLUTION INTRAVENOUS at 18:43

## 2018-07-30 RX ADMIN — Medication 0.5 MG: at 18:55

## 2018-07-30 RX ADMIN — Medication 0.5 MG: at 17:41

## 2018-07-30 RX ADMIN — Medication 0.5 MG: at 17:55

## 2018-07-30 RX ADMIN — SODIUM CHLORIDE 1000 ML: 9 INJECTION, SOLUTION INTRAVENOUS at 17:37

## 2018-07-30 RX ADMIN — Medication 0.5 MG: at 18:21

## 2018-07-30 RX ADMIN — DIPHENHYDRAMINE HYDROCHLORIDE 50 MG: 50 INJECTION, SOLUTION INTRAMUSCULAR; INTRAVENOUS at 17:37

## 2018-07-30 NOTE — ED AVS SNAPSHOT
[facility]    [address] [place] [address]    Phone:  [phone]    Fax:  [phone]                                       Doreen Peralta   MRN: [id number]    Department:  [facility]   Date of Visit:  7/30/2018           Patient Information     Date Of Birth          1980        Your diagnoses for this visit were:     Abdominal pain, generalized     Diarrhea, unspecified type        You were seen by Galo Partida PA-C.      Follow-up Information     Follow up with Franny Antoine MD.    Specialty:  Internal Medicine    Why:  As needed, If symptoms worsen    Contact information:    [facility]  [address] [id number] [phone]          Discharge Instructions       It was a pleasure working with you today!  I hope your illness improves rapidly!     I spoke with Dr. Chavis, from Infectious Disease, this evening.  She is suggesting that you start antibiotic therapy with fidaxomicin.  She would like you to call Dr. grace tomorrow to set up a follow-up appointment this Friday or next Monday.  Your C. difficile stool test will return in the next couple days.  Based on your symptoms and exam findings, it appears that you likely have a repeat course of C. difficile given your recent antibiotic exposure.        24 Hour Appointment Hotline       To make an appointment at any [facility], call [phone] ([phone]). If you don't have a family doctor or clinic, we will help you find one. [facility] clinics are conveniently located to serve the needs of you and your family.             Review of your medicines      START taking        Dose / Directions Last dose taken    fidaxomicin 200 MG tablet   Commonly known as:  DIFICID   Dose:  200 mg   Quantity:  20 tablet        Take 1 tablet (200 mg) by mouth 2 times daily for 10 days   Refills:  0          CONTINUE these medicines which may have CHANGED,  or have new prescriptions. If we are uncertain of the size of tablets/capsules you have at home, strength may be listed as something that might have changed.        Dose / Directions Last dose taken    HYDROmorphone 2 MG tablet   Commonly known as:  DILAUDID   Dose:  2 mg   What changed:  Another medication with the same name was removed. Continue taking this medication, and follow the directions you see here.   Quantity:  4 tablet        Take 1 tablet (2 mg) by mouth every 6 hours as needed for pain   Refills:  0          Our records show that you are taking the medicines listed below. If these are incorrect, please call your family doctor or clinic.        Dose / Directions Last dose taken    ACETAMINOPHEN PO   Dose:  500-1000 mg        Take 500-1,000 mg by mouth every 6 hours as needed for pain   Refills:  0        albuterol (2.5 MG/3ML) 0.083% neb solution   Dose:  2.5 mg   Quantity:  360 mL        Take 1 vial (2.5 mg) by nebulization every 4 hours as needed for shortness of breath / dyspnea or wheezing   Refills:  0        albuterol 90 MCG/ACT inhaler   Dose:  2 puff        Inhale 2 puffs into the lungs every 6 hours as needed   Refills:  0        Alfalfa 650 MG Tabs        Refills:  0        cholestyramine light 4 GM Packet   Commonly known as:  QUESTRAN   Dose:  4 g   Quantity:  60 packet        Take 1 packet (4 g) by mouth 2 times daily   Refills:  1        cloNIDine 0.2 MG tablet   Commonly known as:  CATAPRES   Dose:  0.4 mg   Quantity:  60 tablet        Take 2 tablets (0.4 mg) by mouth every evening - DUE FOR FOLLOW UP   Refills:  0        diphenhydrAMINE HCl 50 MG Tabs   Commonly known as:  BENADRYL   Dose:  50 mg   Quantity:  30 tablet        Take 50 mg by mouth every 6 hours as needed (nausea)   Refills:  0        DULoxetine 60 MG EC capsule   Commonly known as:  CYMBALTA   Quantity:  90 capsule        TAKE 1 CAPSULE(60 MG) BY MOUTH DAILY   Refills:  1        HYDROcodone-acetaminophen 5-325 MG per  tablet   Commonly known as:  NORCO   Dose:  1 tablet   Quantity:  12 tablet        Take 1 tablet by mouth every 4 hours as needed for pain   Refills:  0        ipratropium 0.02 % neb solution   Commonly known as:  ATROVENT   Dose:  0.5 mg   Quantity:  225 mL        Take 2.5 mLs (0.5 mg) by nebulization every 6 hours as needed for wheezing   Refills:  0        ondansetron 8 MG tablet   Commonly known as:  ZOFRAN   Dose:  8 mg   Quantity:  9 tablet        Take 1 tablet (8 mg) by mouth every 8 hours as needed for nausea   Refills:  0        SUMAtriptan 50 MG tablet   Commonly known as:  IMITREX   Dose:   mg   Quantity:  9 tablet        Take 1-2 tablets ( mg) by mouth at onset of headache for migraine - LAST REFILL, DUE FOR FOLLOW UP   Refills:  0        traZODone 100 MG tablet   Commonly known as:  DESYREL   Dose:   mg   Quantity:  90 tablet        Take 0.5-1 tablets ( mg) by mouth nightly as needed for sleep   Refills:  8                Information about OPIOIDS     PRESCRIPTION OPIOIDS: WHAT YOU NEED TO KNOW   We gave you an opioid (narcotic) pain medicine. It is important to manage your pain, but opioids are not always the best choice. You should first try all the other options your care team gave you. Take this medicine for as short a time (and as few doses) as possible.     These medicines have risks:    DO NOT drive when on new or higher doses of pain medicine. These medicines can affect your alertness and reaction times, and you could be arrested for driving under the influence (DUI). If you need to use opioids long-term, talk to your care team about driving.    DO NOT operate heave machinery    DO NOT do any other dangerous activities while taking these medicines.     DO NOT drink any alcohol while taking these medicines.      If the opioid prescribed includes acetaminophen, DO NOT take with any other medicines that contain acetaminophen. Read all labels carefully. Look for the word   acetaminophen  or  Tylenol.  Ask your pharmacist if you have questions or are unsure.    You can get addicted to pain medicines, especially if you have a history of addiction (chemical, alcohol or substance dependence). Talk to your care team about ways to reduce this risk.    Store your pills in a secure place, locked if possible. We will not replace any lost or stolen medicine. If you don t finish your medicine, please throw away (dispose) as directed by your pharmacist. The [place] [employer] has more information about safe disposal: https://www.pca.[facility].[place]/living-green/managing-unwanted-medications.     All opioids tend to cause constipation. Drink plenty of water and eat foods that have a lot of fiber, such as fruits, vegetables, prune juice, apple juice and high-fiber cereal. Take a laxative (Miralax, milk of magnesia, Colace, Senna) if you don t move your bowels at least every other day.         Prescriptions were sent or printed at these locations (2 Prescriptions)                   [facility] [address]    [address] , [facility] [address]    Telephone:  [phone]   Fax:  [phone]   Hours:                  E-Prescribed (1 of 2)         fidaxomicin (DIFICID) 200 MG tablet                 Printed at Department/Unit printer (1 of 2)         HYDROmorphone (DILAUDID) 2 MG tablet                Procedures and tests performed during your visit     CBC with platelets differential    Clostridium difficile toxin B PCR    Comprehensive metabolic panel    Lactic acid whole blood    Peripheral IV catheter      Orders Needing Specimen Collection     None      Pending Results     Date and Time Order Name Status Description    7/30/2018 1621 Clostridium difficile toxin B PCR In process             Pending Culture Results     Date and Time Order Name Status Description    7/30/2018 1621 Clostridium difficile toxin B PCR In process             Pending  Results Instructions     If you had any lab results that were not finalized at the time of your Discharge, you can call the ED Lab Result RN at [phone]. You will be contacted by this team for any positive Lab results or changes in treatment. The nurses are available 7 days a week from 10A to 6:30P.  You can leave a message 24 hours per day and they will return your call.        Thank you for choosing [facility]       Thank you for choosing [facility] for your care. Our goal is always to provide you with excellent care. Hearing back from our patients is one way we can continue to improve our services. Please take a few minutes to complete the written survey that you may receive in the mail after you visit with us. Thank you!        [employer] Information     [employer] gives you secure access to your electronic health record. If you see a primary care provider, you can also send messages to your care team and make appointments. If you have questions, please call your primary care clinic.  If you do not have a primary care provider, please call [phone] and they will assist you.        Care EveryWhere ID     This is your Care EveryWhere ID. This could be used by other organizations to access your [facility] medical records  [id number]        Equal Access to Services     TRAMAINE CLAYTON : Hadii caitie Law, clark villeda, vesta long, elham gomez. So [facility] [phone].    ATENCIÓN: Si habla español, tiene a wade disposición servicios gratuitos de asistencia lingüística. Llame al [phone].    We comply with applicable federal civil rights laws and [place] laws. We do not discriminate on the basis of race, color, national origin, age, disability, sex, sexual orientation, or gender identity.            After Visit Summary       This is your record. Keep this with you and show to your community pharmacist(s) and doctor(s) at your next visit.

## 2018-07-30 NOTE — ED TRIAGE NOTES
Pt here with abdominal pain and NVD. Recently on antibiotic and history of C dif. Will be on Enteric precautions.

## 2018-07-30 NOTE — ED AVS SNAPSHOT
[facility]    [address] [place] [address]    Phone:  [phone]    Fax:  [phone]                                       Doreen Peralta   MRN: [id number]    Department:  [facility]   Date of Visit:  7/30/2018           After Visit Summary Signature Page     I have received my discharge instructions, and my questions have been answered. I have discussed any challenges I see with this plan with the nurse or doctor.    ..........................................................................................................................................  Patient/Patient Representative Signature      ..........................................................................................................................................  Patient Representative Print Name and Relationship to Patient    ..................................................               ................................................  Date                                            Time    ..........................................................................................................................................  Reviewed by Signature/Title    ...................................................              ..............................................  Date                                                            Time

## 2018-07-30 NOTE — ED PROVIDER NOTES
History     Chief Complaint   Patient presents with     Abdominal Pain     The history is provided by the patient.     Doreen Peralta is a 38 year old female who presents to the emergency department for abdominal pain. Patient reports having abdominal pain, nausea, vomiting and diarrhea for 2 weeks. Patient states she was given Bactrim 2 weeks ago and that is when her symptoms started. She states her diarrhea is bloody and stinky. Patient reports she has uncontrollable, fecal incontinence in the middle of the night. She states she has vomited 4 times today. She states her abdominal pain is a 7/10. Patient states she has tried Benadryl, Zofran and Probiotics with no relief. Patient reports she had C-diff for 3 months straight and then was told she is a carrier. Patient reports she has been good now, no C-diff, for 5 months.        Excerpt from her infectious disease consult on 5/23/18:  ASSESSMENT AND PLAN:   Doreen Peralta is a 39 y/o female with a complex medical history that includes multiple bowel surgeries (initially with slow transit constipation) leading to subtotal colectomy. We have seen Doreen several times in the past related to unexplained fevers and episodes of bacteremia. She previously required central line access (for IV fluid infusion and at one point TPN) and G/J tube (for feeding and venting), however she no longer has central vascular access nor a gastrostomy or jejunostomy tube.  Despite this, she did have a recent hospitalization at [facility] in 4/2018 for another episode of bacteremia, this time with three organisms found on the same blood culture:  Stenotrophomonas, Ochrobactrum, and Pantoea.   This finding of multiple unusual organisms in blood culture with no clear etiology found has raised the suspicion for Munchausen syndrome several times in the past, although this has not been confirmed.  We have not had a direct conversation about this with Doreen.     Today, we discussed the  recent history regarding C. difficile.  Given Doreen's report of sudden change in bowel habits in February, it is certainly possible that she did have a C. difficile colitis.  She does receive antibiotics frequently and is therefore at increased risk of developing C. difficile infection and also recurrence.  At this point, she received partial courses of both metronidazole and vancomycin as well as a full course of fidaxomicin.  After treatment for C. difficile is complete, the bowel may have ongoing mucosal injury to recover from. Doreen describes ongoing looser and more frequent stools than her baseline, but agrees that it is much better than what she was experiencing in February.  At this time, we would not pursue further treatment for C. difficile, and would avoid unnecessary antibiotics which would only increase the risk for recurrence.  Repeat testing is likely to be positive, regardless of treatment, so this is less helpful.   We are happy to see her again if a provider is concerned for a recurrent C. difficile infection to help determine if treatment is indicated.     Doreen is also presenting with a new thrombophlebitis.  We are hesitant to prescribe antibiotics for this as it would increase her risk for C. difficile colitis recurrence.  We recommended to use warm/hot compresses and elevation.  We counseled to seek further care if she developed cellulitis, increase in size of the swelling or induration, and/or fevers.     We did not make a follow-up appt, but Doreen is welcome to return to ID clinic as needed in the future.     Patient was discussed with the Infectious Disease attending and clinic preceptor, Dr. Jakub Valerio.       Todd Price MD    Fellow in Adult and Pediatric Infectious Disease    pager: [phone]      Problem List:    Patient Active Problem List    Diagnosis Date Noted     Bacteremia 06/26/2018     Priority: Medium     Acute renal failure (H) 04/21/2018     Priority: Medium      History of bacteremia - recurrent 04/17/2018     Priority: Medium     Tobacco abuse 04/16/2018     Priority: Medium     Iron deficiency anemia 04/16/2018     Priority: Medium     Stool toxin PCR positive for Clostridium difficile on 4/17/18 04/16/2018     Priority: Medium     Gram negative septicemia (H) - Ochrobactrum, Stenotrophomonas & Pantoea 08/24/2017     Priority: Medium     Hypokalemia 08/24/2017     Priority: Medium     Essential hypertension 08/24/2017     Priority: Medium     Sepsis due to Klebsiella (H) 07/27/2017     Priority: Medium     Insomnia, unspecified type 03/31/2017     Priority: Medium     Abdominal pain 02/15/2017     Priority: Medium     Abdominal pain, generalized 01/28/2017     Priority: Medium     History of deep venous thrombosis 01/26/2017     Priority: Medium     Nausea and vomiting 01/26/2017     Priority: Medium     Vitamin D deficiency 01/16/2017     Priority: Medium     Chronic abdominal pain 11/15/2016     Priority: Medium     Patient is followed by Larisa Lorenzo PA-C for ongoing prescription of pain medication.  All refills should be approved by this provider, or covering partner.    Medication(s): no regular narcotics - narcotics given at ED visits  Maximum quantity per month: N/A  Clinic visit frequency required: Q 6  months     Controlled substance agreement:  Encounter-Level CSA - 11/9/15:               Controlled Substance Agreement - Scan on 11/19/2015 11:17 AM : CONTROLLED SUBSTANCE AGREEMENT 11/09/15 (below)          Encounter-Level CSA - 2/27/15:               Controlled Substance Agreement - Scan on 3/12/2015  7:50 AM : Controlled Medication Agreement 02/27/15 (below)            Pain Clinic evaluation in the past: Yes    DIRE Total Score(s):  No flowsheet data found.    Last [facility] website verification:  done on 11/15/16   [email or web address]        Attention to G-tube (H) 11/08/2016     Priority: Medium     Fever 10/16/2016     Priority: Medium      Coagulation defect (H) [D68.9] 09/16/2016     Priority: Medium     Chronic diarrhea 07/22/2016     Priority: Medium     Migraine 07/20/2016     Priority: Medium     Positive blood culture - Klebsiella oxytoca from Port-a-cath culture 07/18/2016     Priority: Medium     Mitral regurgitation mild-mod by Echo June 2016 07/18/2016     Priority: Medium     Anemia, iron deficiency 07/17/2016     Priority: Medium     Anemia in other chronic diseases classified elsewhere 06/14/2016     Priority: Medium     Munchausen syndrome - previously suspected 06/14/2016     Priority: Medium     Long-term (current) use of anticoagulants [Z79.01] 05/16/2016     Priority: Medium     Anxiety 05/16/2016     Priority: Medium     S/P partial resection of colon 04/11/2016     Priority: Medium     Malnutrition (H) 04/11/2016     Priority: Medium     Jejunostomy tube present (H) 03/21/2016     Priority: Medium     Malfunctioning jejunostomy tube (H) 12/22/2015     Priority: Medium     Malfunction of gastrostomy tube (H) 11/19/2015     Priority: Medium     PEG tube malfunction (H) 10/16/2015     Priority: Medium     Health Care Home 01/16/2015     Priority: Medium     *See Letters for [facility] Care Plan: My Access Plan           PEG (percutaneous endoscopic gastrostomy) status 11/05/2014     Priority: Medium     Gastroparesis 04/11/2014     Priority: Medium     Overview:   Had ileostomy performed at [facility] in Jan 2012 as treatment       Migraines 04/11/2014     Priority: Medium     4/11/2014  With periods, every other month.       Intermittent asthma 04/11/2014     Priority: Medium     4/11/2014   With URIs, allergies       Allergic rhinitis 04/11/2014     Priority: Medium     Problem list name updated by automated process. Provider to review       Abnormal Pap smear of cervix 04/11/2014     Priority: Medium     4/11/2014  S/p colp and LEEP. Sees OB/GYN at [facility]. Pap every year x20 years - normal since.       S/P LEEP of cervix 02/06/2014      Priority: Medium     Patellofemoral stress syndrome 01/18/2014     Priority: Medium     Hx SBO 10/09/2013     Priority: Medium     4/11/2014  Recurrent. Sees GI (Dr. Redding - at [facility]) every 6 months or so.  Sees feeding tube nurse next week.       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 04/11/2013     Priority: Medium     Constipation by delayed colonic transit 10/24/2012     Priority: Medium     Atopic rhinitis 03/20/2009     Priority: Medium     Hyperbilirubinemia 03/11/2009     Priority: Medium        Past Medical History:    Past Medical History:   Diagnosis Date     Asthma      Bilateral ovarian cysts      Cervical cancer (H) 01/01/2008     Chronic pain      Colonic dysmotility      Constipation      E. coli sepsis (H) 5/8/2016     Enteritis      Fungemia 5/5/2016     Gastro-oesophageal reflux disease      H/O ileostomy      Hx of abnormal Pap smear      Hypertension      IBS (irritable bowel syndrome)      Other chronic pain      PONV (postoperative nausea and vomiting)      Thrombosis, hepatic vein (H)        Past [facility] History:    Past [facility] History:   Procedure Laterality Date     COLONOSCOPY  7/10/2012    Procedure: COLONOSCOPY;;  Surgeon: Nicole Redding MD;  [facility]     COLONOSCOPY N/A 2/19/2017    Procedure: COLONOSCOPY;  Surgeon: Randell Muller MD;  [facility] GI     COLONOSCOPY N/A 2/21/2017    Procedure: COLONOSCOPY;  Surgeon: Randell Muller MD;  Location: [facility]     COLONOSCOPY N/A 5/3/2018    Procedure: COMBINED COLONOSCOPY, SINGLE OR MULTIPLE BIOPSY/POLYPECTOMY BY BIOPSY;  EGD/Colonoscopy ;  Surgeon: Loi Black MD;  Location: [facility]     ECHO CHELO  7/19/2016          ENDOSCOPIC INSERTION TUBE GASTROSTOMY N/A 1/21/2016    Procedure: ENDOSCOPIC INSERTION TUBE GASTROSTOMY;  Surgeon: Nicole Redding MD;  [facility]     ESOPHAGOSCOPY, GASTROSCOPY, DUODENOSCOPY (EGD), COMBINED  7/10/2012    Procedure: COMBINED ESOPHAGOSCOPY, GASTROSCOPY, DUODENOSCOPY  (EGD);  Upper Endoscopy, Ileoscopy    Latex Allergy  with biopsies;  Surgeon: Nicole Redding MD;  [facility]     ESOPHAGOSCOPY, GASTROSCOPY, DUODENOSCOPY (EGD), COMBINED N/A 11/5/2014    Procedure: COMBINED ESOPHAGOSCOPY, GASTROSCOPY, DUODENOSCOPY (EGD);  Surgeon: Nicole Redding MD;  [facility]     ESOPHAGOSCOPY, GASTROSCOPY, DUODENOSCOPY (EGD), COMBINED N/A 5/3/2018    Procedure: COMBINED ESOPHAGOSCOPY, GASTROSCOPY, DUODENOSCOPY (EGD), BIOPSY SINGLE OR MULTIPLE;;  Surgeon: Loi Black MD;  Location[facility] GI     HC REPLACE DUODENOSTOMY/JEJUNOSTOMY TUBE PERCUTANEOUS N/A 8/27/2015    Procedure: REPLACE GASTROJEJUNOSTOMY TUBE, PERCUTANEOUS;  Surgeon: Mio Holder MD;  [facility]     HC REPLACE DUODENOSTOMY/JEJUNOSTOMY TUBE PERCUTANEOUS N/A 1/7/2016    Procedure: REPLACE JEJUNOSTOMY TUBE, PERCUTANEOUS;  Surgeon: Elsa Medel MD;  [facility]     HC REPLACE DUODENOSTOMY/JEJUNOSTOMY TUBE PERCUTANEOUS N/A 1/28/2016    Procedure: REPLACE JEJUNOSTOMY TUBE, PERCUTANEOUS;  Surgeon: Elsa Medel MD;  [facility]     HC REPLACE GASTROSTOMY/CECOSTOMY TUBE PERCUTANEOUS Left 5/19/2015    Procedure: REPLACE GASTROSTOMY TUBE, PERCUTANEOUS;  Surgeon: Melecio Morejon Chi, MD;  Location: [facility]     HC UGI ENDOSCOPY W PLACEMENT GASTROSTOMY TUBE PERCUT N/A 10/1/2015    Procedure: COMBINED ESOPHAGOSCOPY, GASTROSCOPY, DUODENOSCOPY (EGD), PLACE PERCUTANEOUS ENDOSCOPIC GASTROSTOMY TUBE;  Surgeon: Mio Holder MD;  [facility]     INSERT PORT VASCULAR ACCESS Right 7/20/2017    Procedure: INSERT PORT VASCULAR ACCESS;  Chest Port Placement  **Latex Allergy**;  Surgeon: Coy Rocha PA-C;  [facility]     LAPAROSCOPIC ASSISTED COLECTOMY  1/20/2012    Procedure:LAPAROSCOPIC ASSISTED COLECTOMY; Laparoscopic Ileostomy       LAPAROSCOPIC ASSISTED COLECTOMY LEFT (DESCENDING)  10/24/2012    Procedure: LAPAROSCOPIC ASSISTED COLECTOMY LEFT (DESCENDING);   Hand Assisted  Laproscopic Subtotal abdominal Colectomy,Iieorectal anastamosis, Ileostomy Closure.       LAPAROSCOPIC ASSISTED INSERTION TUBE JEJUNOSTOMY N/A 10/16/2015    Procedure: LAPAROSCOPIC ASSISTED INSERTION TUBE JEJUNOSTOMY;  Surgeon: Elsa Medel MD;  [facility]     LAPAROSCOPIC CHOLECYSTECTOMY  2002    [facility] c[facility]. stones duct     LAPAROSCOPIC ILEOSTOMY  1/20/2012    [facility], loop     LAPAROSCOPIC OOPHORECTOMY Right 2009    [facility]     LAPAROTOMY EXPLORATORY N/A 1/28/2016    Procedure: LAPAROTOMY EXPLORATORY;  Surgeon: Elsa Medel MD;  [facility]     LEEP TX, CERVICAL  2009    [facility]     PICC INSERTION Left 10/21/2015    5fr DL Power PICC, 37cm (2cm external) in the L basilic vein w/ tip in the SVC RA junction.     REMOVE GASTROSTOMY TUBE ADULT N/A 12/12/2014    Procedure: REMOVE GASTROSTOMY TUBE ADULT;  Surgeon: Nicole Redding MD;  [facility]     REMOVE PORT VASCULAR ACCESS Right 6/30/2016    Procedure: REMOVE PORT VASCULAR ACCESS;  Surgeon: Pradeep Orosco MD;  Location: [facility] OR     REMOVE PORT VASCULAR ACCESS Right 9/8/2017    Procedure: REMOVE PORT VASCULAR ACCESS;  right side mediport removal;  Surgeon: Zechariah Worthington MD;  Location: [facility] OR     replace GASTROSTOMY TUBE ADULT  5/19/15       Family History:    Family History   Problem Relation Age of Onset     Thyroid Disease Mother      Sjogren's Mother      GASTROINTESTINAL DISEASE Mother      Intermittent nausea vomiting diarrhea     Colon Polyps Mother      Prostate Problems Father      prostate enlargement     Lupus Maternal Grandmother      Cancer Maternal Grandfather      Lung     Colon Cancer Maternal Grandfather 65     Cancer Paternal Grandmother      Lung      Cerebrovascular Disease Paternal Grandmother      Diabetes Paternal Grandmother      Cardiovascular Paternal Grandmother      CHF     Cancer Paternal Grandfather      Lung     Glaucoma Paternal Grandfather      Abdominal Aortic Aneurysm Other   "    Macular Degeneration No family hx of        Social History:  Marital Status:   [2]  Social History   Substance Use Topics     Smoking status: Current Some Day Smoker     Packs/day: 1.00     Years: 4.00     Types: Cigarettes     Last attempt to quit: 1/1/2004     Smokeless tobacco: Never Used     Alcohol use No        Medications:      fidaxomicin (DIFICID) 200 MG tablet   HYDROmorphone (DILAUDID) 2 MG tablet   ACETAMINOPHEN PO   albuterol (2.5 MG/3ML) 0.083% neb solution   albuterol 90 MCG/ACT inhaler   Alfalfa 650 MG TABS   cholestyramine light (QUESTRAN) 4 GM Packet   cloNIDine (CATAPRES) 0.2 MG tablet   diphenhydrAMINE HCl 50 MG TABS   DULoxetine (CYMBALTA) 60 MG EC capsule   HYDROcodone-acetaminophen (NORCO) 5-325 MG per tablet   ipratropium (ATROVENT) 0.02 % neb solution   ondansetron (ZOFRAN) 8 MG tablet   SUMAtriptan (IMITREX) 50 MG tablet   traZODone (DESYREL) 100 MG tablet         Review of Systems   All other systems reviewed and are negative.      Physical Exam   BP: (!) 144/101  Pulse: 105  Temp: 97  F (36.1  C)  Resp: 14  Height: 167.6 cm (5' 6\")  Weight: 73.9 kg (163 lb)  SpO2: 100 %      Physical Exam   Constitutional: She is oriented to person, place, and time. She appears well-developed and well-nourished. No distress.   HENT:   Head: Normocephalic and atraumatic.   Mouth/Throat: Oropharynx is clear and moist. No oropharyngeal exudate.   Eyes: Pupils are equal, round, and reactive to light. Right eye exhibits no discharge. Left eye exhibits no discharge.   Neck: Normal range of motion. Neck supple. No thyromegaly present.   Cardiovascular: Normal rate, regular rhythm, normal heart sounds and intact distal pulses.  Exam reveals no gallop and no friction rub.    No murmur heard.  Pulmonary/Chest: Breath sounds normal. No respiratory distress. She has no wheezes. She has no rales. She exhibits no tenderness.   Abdominal: Soft. Bowel sounds are normal. She exhibits no distension and no mass. " There is tenderness (Generalized). There is no rebound and no guarding.   Musculoskeletal: Normal range of motion. She exhibits no edema, tenderness or deformity.   Lymphadenopathy:     She has no cervical adenopathy.   Neurological: She is alert and oriented to person, place, and time. No cranial nerve deficit.   Skin: Skin is warm and dry. No rash noted. She is not diaphoretic. No erythema. No pallor.   Psychiatric: She has a normal mood and affect. Her behavior is normal. Judgment and thought content normal.   Nursing note and vitals reviewed.      ED Course     ED Course     Procedures               Critical Care time:  none               Results for orders placed or performed during the hospital encounter of 07/30/18 (from the past 24 hour(s))   CBC with platelets differential   Result Value Ref Range    WBC 12.4 (H) 4.0 - 11.0 10e9/L    RBC Count 4.85 3.8 - 5.2 10e12/L    Hemoglobin 12.3 11.7 - 15.7 g/dL    Hematocrit 39.5 35.0 - 47.0 %    MCV 81 78 - 100 fl    MCH 25.4 (L) 26.5 - 33.0 pg    MCHC 31.1 (L) 31.5 - 36.5 g/dL    RDW 19.0 (H) 10.0 - 15.0 %    Platelet Count 583 (H) 150 - 450 10e9/L    Diff Method Automated Method     % Neutrophils 78.5 %    % Lymphocytes 16.0 %    % Monocytes 4.4 %    % Eosinophils 0.2 %    % Basophils 0.6 %    % Immature Granulocytes 0.3 %    Nucleated RBCs 0 0 /100    Absolute Neutrophil 9.7 (H) 1.6 - 8.3 10e9/L    Absolute Lymphocytes 2.0 0.8 - 5.3 10e9/L    Absolute Monocytes 0.6 0.0 - 1.3 10e9/L    Absolute Basophils 0.1 0.0 - 0.2 10e9/L    Abs Immature Granulocytes 0.0 0 - 0.4 10e9/L    Absolute Nucleated RBC 0.0    Comprehensive metabolic panel   Result Value Ref Range    Sodium 141 133 - 144 mmol/L    Potassium 3.8 3.4 - 5.3 mmol/L    Chloride 106 94 - 109 mmol/L    Carbon Dioxide 28 20 - 32 mmol/L    Anion Gap 7 3 - 14 mmol/L    Glucose 72 70 - 99 mg/dL    Urea Nitrogen 13 7 - 30 mg/dL    Creatinine 0.98 0.52 - 1.04 mg/dL    GFR Estimate 64 >60 mL/min/1.7m2    GFR Estimate  If Black 77 >60 mL/min/1.7m2    Calcium 9.1 8.5 - 10.1 mg/dL    Bilirubin Total 0.4 0.2 - 1.3 mg/dL    Albumin 3.8 3.4 - 5.0 g/dL    Protein Total 8.7 6.8 - 8.8 g/dL    Alkaline Phosphatase 129 40 - 150 U/L    ALT 43 0 - 50 U/L    AST 31 0 - 45 U/L   Lactic acid whole blood   Result Value Ref Range    Lactic Acid 1.7 0.7 - 2.0 mmol/L     *Note: Due to a large number of results and/or encounters for the requested time period, some results have not been displayed. A complete set of results can be found in Results Review.       Medications   0.9% sodium chloride BOLUS (0 mLs Intravenous Stopped 7/30/18 1841)     Followed by   0.9% sodium chloride BOLUS (0 mLs Intravenous Stopped 7/30/18 1946)     Followed by   sodium chloride 0.9% infusion (not administered)   diphenhydrAMINE (BENADRYL) injection 50 mg (50 mg Intravenous Given 7/30/18 1737)   HYDROmorphone (PF) (DILAUDID) injection 0.5 mg (0.5 mg Intravenous Given 7/30/18 1821)   HYDROmorphone (PF) (DILAUDID) injection 0.5 mg (0.5 mg Intravenous Given 7/30/18 1855)       Assessments & Plan (with Medical Decision Making)     Abdominal pain, generalized  Diarrhea, unspecified type     38 year old female presents for evaluation of nausea, vomiting, diarrhea, and abdominal pain starting shortly after initiation of Bactrim antibiotic therapy on 7/16/18 for left hand cellulitis.  Symptoms worsening throughout the past 2 weeks.  Much worse in the past 3 days.  10 diarrhea malodorous stools today associated with nausea and vomiting.  Denies any fevers or chills.  Patient with past medical history of a colonic status and C. difficile +2/2018 not responsive to metronidazole or vancomycin.  Required fidaxomicin treatment, and has been relatively symptom-free for the past 4 months.  She states these symptoms are exactly the same as her previous C. difficile episode.  On exam blood pressure 115/68, pulse 78, temperature 97.0, and oxygen saturation 100% on room air.  Generalized  abdominal tenderness without rebound or guarding.  No masses.  Remainder of the exam normal other than dry oral mucous membranes.  Patient was given 2 L of IV normal saline for rehydration.  IV Benadryl given for nausea with good success.  No further vomiting during her ED stay.  4 doses of IV Dilaudid required to control her pain here in the ED.  Laboratory levels returned with mild elevation in her white blood cell count 12,400 but normal differential.  Hemoglobin stable at 12.3.  Comprehensive metabolic panel completely normal.  Lactic acid level 1.7.  Patient's vital signs do not suggest a repeat episode of sepsis or bacteremia.  I spoke with Dr. Chavis, infectious disease at the [facility], this evening given the patient has an established relationship with Dr. Price in infectious disease.  Dr. Chavis recommended empirically starting fidaxomicin 200 mg p.o. 2 times daily for 10 days prior to his C. difficile toxin results.  She recommended a recheck appointment with infectious disease in the next 5-7 days.  Patient requested oral pain medication for at home.  I did give her #4 tabs of 2 mg Dilaudid to be used at home.  Further pain medication would need to be given by her PCP.  Patient's symptoms were much improved after her ED treatment.  She is in agreement with this plan and was suitable for discharge.  Her mother came to pick her up.       I have reviewed the nursing notes.    I have reviewed the findings, diagnosis, plan and need for follow up with the patient.       New Prescriptions    FIDAXOMICIN (DIFICID) 200 MG TABLET    Take 1 tablet (200 mg) by mouth 2 times daily for 10 days    HYDROMORPHONE (DILAUDID) 2 MG TABLET    Take 1 tablet (2 mg) by mouth every 6 hours as needed for pain       Final diagnoses:   Abdominal pain, generalized   Diarrhea, unspecified type     This document serves as a record of services personally performed by Galo Partida PA-C. It was created on their behalf by Alessandra  Juan a trained medical scribe. The creation of this record is based on the provider's personal observations and the statements of the patient. This document has been checked and approved by the attending provider.    Note: Chart documentation done in part with Dragon Voice Recognition software. Although reviewed after completion, some word and grammatical errors may remain.    7/30/2018   Galo Partida PA-C   [facility] EMERGENCY DEPARTMENT     Galo Partida PA-C  07/30/18 1952

## 2018-07-31 ENCOUNTER — TELEPHONE (OUTPATIENT)
Dept: EMERGENCY MEDICINE | Facility: CLINIC | Age: 38
End: 2018-07-31

## 2018-07-31 NOTE — ED NOTES
Pt was up to the bedside commode.  Back to bed without issue.  Pt requesting an update from provider on plan of care, notified provider.

## 2018-07-31 NOTE — ED NOTES
Pt feeling much better, called mother for a ride home.  IV removed.  Reviewed discharge instructions with pt.  No additional questions or concerns.

## 2018-07-31 NOTE — DISCHARGE INSTRUCTIONS
It was a pleasure working with you today!  I hope your illness improves rapidly!     I spoke with Dr. Chavis, from Infectious Disease, this evening.  She is suggesting that you start antibiotic therapy with fidaxomicin.  She would like you to call Dr. grace tomorrow to set up a follow-up appointment this Friday or next Monday.  Your C. difficile stool test will return in the next couple days.  Based on your symptoms and exam findings, it appears that you likely have a repeat course of C. difficile given your recent antibiotic exposure.

## 2018-07-31 NOTE — TELEPHONE ENCOUNTER
[facility] Emergency Department Lab result notification:    [place] ED lab result protocol used  C diff protocol    Reason for call  Notify of lab results, assess symptoms,  review ED providers recommendations/discharge instructions (if necessary) and advise per ED lab result f/u protocol    Lab Result  Final Clostridium difficile toxin B PCR is POSITIVE.  Toxin producing Clostridium difficile target DNA sequences detected, presumed positive for Clostridium difficile toxin B.   Rx (Flagyl or Vancomycin or Fidaxomicin) prescribed upon discharge from the [place] ED/UC:  Yes;  discharged from the ED with Rx for Fidaxomicin 200 mg PO BID for 10 days    Information table from ED Provider visit on 7/30/18  Symptoms reported at ED visit (Chief complaint, HPI) 38 year old female who presents to the emergency department for abdominal pain. Patient reports having abdominal pain, nausea, vomiting and diarrhea for 2 weeks. Patient states she was given Bactrim 2 weeks ago and that is when her symptoms started. She states her diarrhea is bloody and stinky. Patient reports she has uncontrollable, fecal incontinence in the middle of the night. She states she has vomited 4 times today. She states her abdominal pain is a 7/10. Patient states she has tried Benadryl, Zofran and Probiotics with no relief. Patient reports she had C-diff for 3 months straight and then was told she is a carrier. Patient reports she has been good now, no C-diff, for 5 months.     ED providers Impression and Plan (applicable information) 38 year old female presents for evaluation of nausea, vomiting, diarrhea, and abdominal pain starting shortly after initiation of Bactrim antibiotic therapy on 7/16/18 for left hand cellulitis.  Symptoms worsening throughout the past 2 weeks.  Much worse in the past 3 days.  10 diarrhea malodorous stools today associated with nausea and vomiting.  Denies any fevers or chills.  Patient with past medical history of a  colonic status and C. difficile +2/2018 not responsive to metronidazole or vancomycin.  Required fidaxomicin treatment, and has been relatively symptom-free for the past 4 months.  She states these symptoms are exactly the same as her previous C. difficile episode.  On exam blood pressure 115/68, pulse 78, temperature 97.0, and oxygen saturation 100% on room air.  Generalized abdominal tenderness without rebound or guarding.  No masses.  Remainder of the exam normal other than dry oral mucous membranes.  Patient was given 2 L of IV normal saline for rehydration.  IV Benadryl given for nausea with good success.  No further vomiting during her ED stay.  4 doses of IV Dilaudid required to control her pain here in the ED.  Laboratory levels returned with mild elevation in her white blood cell count 12,400 but normal differential.  Hemoglobin stable at 12.3.  Comprehensive metabolic panel completely normal.  Lactic acid level 1.7.  Patient's vital signs do not suggest a repeat episode of sepsis or bacteremia.  I spoke with Dr. Chavis, infectious disease at the [facility], this evening given the patient has an established relationship with Dr. Price in infectious disease.  Dr. Chavis recommended empirically starting fidaxomicin 200 mg p.o. 2 times daily for 10 days prior to his C. difficile toxin results.  She recommended a recheck appointment with infectious disease in the next 5-7 days.  Patient requested oral pain medication for at home.  I did give her #4 tabs of 2 mg Dilaudid to be used at home.  Further pain medication would need to be given by her PCP.  Patient's symptoms were much improved after her ED treatment.  She is in agreement with this plan and was suitable for discharge.  Her mother came to pick her up.   Miscellaneous information ED provider: Galo Partida PA-C     RN Ass  essment (Patient s current Symptoms), include time called.  [Insert Left message here if message left]  At 11:35A,  "\"Oh, I'm about the same as when I was seen in the ER.  I'm sure it will take a couple of days before I am feeling better.\"  RN Recommendations/Instructions per [facility] ED lab result protocol  Notified of positive C diff result.  Encouraged proper handwashing.  Please Contact your PCP clinic or return to the Emergency department if your:    Symptoms do not improve after 3 days on antibiotic.    Symptoms do not resolve after completing antibiotic.    Symptoms worsen or other concerning symptom's.    PCP follow-up Questions asked: YES       [RN Name]  Eric Gomez RN  [facility] RN  Lung Nodule and ED Lab Result RN  Epic pool (ED late result f/u RN): [id number]  [facility] INCIDENTAL RADIOLOGY F/U NURSES: [id number]  # [phone]      Copy of Lab result    Clostridium difficile toxin B PCR   Status:  Final result   Visible to patient:  Yes (MyChart) Next appt:  None Dx:  Abdominal pain, generalized Order: [id number]       Notes Recorded by Eric Gomez RN on 7/31/2018 at 8:13 AM  Patient discharged from the ED with Rx for Fidaxomicin 200 mg PO BID for 10 days.          Ref Range & Units 7/30/18  4:15 PM   Resulting Agency Flag Comments   Specimen Description  Feces [facility]     C Diff Toxin B PCR NEG^Negative Positive [facility] A Positive: Toxin producing Clostridium difficile target DNA sequences detected,    presumed positive for Clostridium difficile toxin B.   Clostridium difficile (Requires Enteric Isolation)   FDA approved assay performed using [employer] GeneXpert real-time PCR.         Specimen Collected: 07/30/18  4:15 PM Last Resulted: 07/30/18  8:40 PM                  "

## 2018-08-07 ENCOUNTER — APPOINTMENT (OUTPATIENT)
Dept: CT IMAGING | Facility: CLINIC | Age: 38
End: 2018-08-07
Attending: EMERGENCY MEDICINE
Payer: MEDICARE

## 2018-08-07 ENCOUNTER — HOSPITAL ENCOUNTER (EMERGENCY)
Facility: CLINIC | Age: 38
Discharge: HOME | End: 2018-08-07
Attending: EMERGENCY MEDICINE | Admitting: EMERGENCY MEDICINE
Payer: MEDICARE

## 2018-08-07 VITALS
WEIGHT: 160 LBS | DIASTOLIC BLOOD PRESSURE: 64 MMHG | OXYGEN SATURATION: 100 % | HEART RATE: 67 BPM | TEMPERATURE: 98 F | SYSTOLIC BLOOD PRESSURE: 111 MMHG | HEIGHT: 66 IN | BODY MASS INDEX: 25.71 KG/M2 | RESPIRATION RATE: 16 BRPM

## 2018-08-07 DIAGNOSIS — R10.32 ABDOMINAL PAIN, LEFT LOWER QUADRANT: ICD-10-CM

## 2018-08-07 LAB
ALBUMIN SERPL-MCNC: 3.2 G/DL (ref 3.4–5)
ALBUMIN UR-MCNC: NEGATIVE MG/DL
ALP SERPL-CCNC: 124 U/L (ref 40–150)
ALT SERPL W P-5'-P-CCNC: 37 U/L (ref 0–50)
ANION GAP SERPL CALCULATED.3IONS-SCNC: 10 MMOL/L (ref 3–14)
APPEARANCE UR: CLEAR
AST SERPL W P-5'-P-CCNC: 28 U/L (ref 0–45)
BASOPHILS # BLD AUTO: 0 10E9/L (ref 0–0.2)
BASOPHILS NFR BLD AUTO: 0.4 %
BILIRUB SERPL-MCNC: 0.4 MG/DL (ref 0.2–1.3)
BILIRUB UR QL STRIP: NEGATIVE
BUN SERPL-MCNC: 6 MG/DL (ref 7–30)
CALCIUM SERPL-MCNC: 9 MG/DL (ref 8.5–10.1)
CHLORIDE SERPL-SCNC: 105 MMOL/L (ref 94–109)
CO2 SERPL-SCNC: 24 MMOL/L (ref 20–32)
COLOR UR AUTO: ABNORMAL
CREAT SERPL-MCNC: 1.02 MG/DL (ref 0.52–1.04)
DIFFERENTIAL METHOD BLD: ABNORMAL
EOSINOPHIL NFR BLD AUTO: 0.6 %
ERYTHROCYTE [DISTWIDTH] IN BLOOD BY AUTOMATED COUNT: 18.6 % (ref 10–15)
GFR SERPL CREATININE-BSD FRML MDRD: 61 ML/MIN/1.7M2
GLUCOSE SERPL-MCNC: 92 MG/DL (ref 70–99)
GLUCOSE UR STRIP-MCNC: NEGATIVE MG/DL
HCT VFR BLD AUTO: 32.6 % (ref 35–47)
HGB BLD-MCNC: 10.2 G/DL (ref 11.7–15.7)
HGB UR QL STRIP: NEGATIVE
IMM GRANULOCYTES # BLD: 0 10E9/L (ref 0–0.4)
IMM GRANULOCYTES NFR BLD: 0.2 %
KETONES UR STRIP-MCNC: NEGATIVE MG/DL
LACTATE BLD-SCNC: 0.9 MMOL/L (ref 0.7–2)
LEUKOCYTE ESTERASE UR QL STRIP: NEGATIVE
LIPASE SERPL-CCNC: 85 U/L (ref 73–393)
LYMPHOCYTES # BLD AUTO: 1.3 10E9/L (ref 0.8–5.3)
LYMPHOCYTES NFR BLD AUTO: 24.2 %
MCH RBC QN AUTO: 25.6 PG (ref 26.5–33)
MCHC RBC AUTO-ENTMCNC: 31.3 G/DL (ref 31.5–36.5)
MCV RBC AUTO: 82 FL (ref 78–100)
MONOCYTES # BLD AUTO: 0.4 10E9/L (ref 0–1.3)
MONOCYTES NFR BLD AUTO: 6.7 %
NEUTROPHILS # BLD AUTO: 3.5 10E9/L (ref 1.6–8.3)
NEUTROPHILS NFR BLD AUTO: 67.9 %
NITRATE UR QL: NEGATIVE
NRBC # BLD AUTO: 0 10*3/UL
NRBC BLD AUTO-RTO: 0 /100
PH UR STRIP: 8 PH (ref 5–7)
PLATELET # BLD AUTO: 347 10E9/L (ref 150–450)
POTASSIUM SERPL-SCNC: 4.4 MMOL/L (ref 3.4–5.3)
PROT SERPL-MCNC: 7.8 G/DL (ref 6.8–8.8)
RBC # BLD AUTO: 3.98 10E12/L (ref 3.8–5.2)
SODIUM SERPL-SCNC: 139 MMOL/L (ref 133–144)
SOURCE: ABNORMAL
SP GR UR STRIP: 1 (ref 1–1.03)
UROBILINOGEN UR STRIP-MCNC: 0 MG/DL (ref 0–2)
WBC # BLD AUTO: 5.2 10E9/L (ref 4–11)

## 2018-08-07 PROCEDURE — 83690 ASSAY OF LIPASE: CPT | Performed by: EMERGENCY MEDICINE

## 2018-08-07 PROCEDURE — 80053 COMPREHEN METABOLIC PANEL: CPT | Performed by: EMERGENCY MEDICINE

## 2018-08-07 PROCEDURE — 25000125 ZZHC RX 250: Performed by: EMERGENCY MEDICINE

## 2018-08-07 PROCEDURE — 25000128 H RX IP 250 OP 636: Performed by: EMERGENCY MEDICINE

## 2018-08-07 PROCEDURE — 83605 ASSAY OF LACTIC ACID: CPT | Performed by: EMERGENCY MEDICINE

## 2018-08-07 PROCEDURE — 96374 THER/PROPH/DIAG INJ IV PUSH: CPT | Performed by: EMERGENCY MEDICINE

## 2018-08-07 PROCEDURE — 81003 URINALYSIS AUTO W/O SCOPE: CPT | Performed by: EMERGENCY MEDICINE

## 2018-08-07 PROCEDURE — 99285 EMERGENCY DEPT VISIT HI MDM: CPT | Mod: Z6 | Performed by: EMERGENCY MEDICINE

## 2018-08-07 PROCEDURE — 85025 COMPLETE CBC W/AUTO DIFF WBC: CPT | Performed by: EMERGENCY MEDICINE

## 2018-08-07 PROCEDURE — 96376 TX/PRO/DX INJ SAME DRUG ADON: CPT | Performed by: EMERGENCY MEDICINE

## 2018-08-07 PROCEDURE — 99285 EMERGENCY DEPT VISIT HI MDM: CPT | Mod: 25 | Performed by: EMERGENCY MEDICINE

## 2018-08-07 PROCEDURE — 96375 TX/PRO/DX INJ NEW DRUG ADDON: CPT | Performed by: EMERGENCY MEDICINE

## 2018-08-07 PROCEDURE — 87040 BLOOD CULTURE FOR BACTERIA: CPT | Performed by: EMERGENCY MEDICINE

## 2018-08-07 PROCEDURE — 74177 CT ABD & PELVIS W/CONTRAST: CPT

## 2018-08-07 RX ORDER — HYDROMORPHONE HYDROCHLORIDE 1 MG/ML
0.5 INJECTION, SOLUTION INTRAMUSCULAR; INTRAVENOUS; SUBCUTANEOUS
Status: DISCONTINUED | OUTPATIENT
Start: 2018-08-07 | End: 2018-08-07 | Stop reason: HOSPADM

## 2018-08-07 RX ORDER — HYDROMORPHONE HYDROCHLORIDE 1 MG/ML
0.5 INJECTION, SOLUTION INTRAMUSCULAR; INTRAVENOUS; SUBCUTANEOUS
Status: COMPLETED | OUTPATIENT
Start: 2018-08-07 | End: 2018-08-07

## 2018-08-07 RX ORDER — DIPHENHYDRAMINE HYDROCHLORIDE 50 MG/ML
50 INJECTION INTRAMUSCULAR; INTRAVENOUS ONCE
Status: COMPLETED | OUTPATIENT
Start: 2018-08-07 | End: 2018-08-07

## 2018-08-07 RX ORDER — IOPAMIDOL 755 MG/ML
500 INJECTION, SOLUTION INTRAVASCULAR ONCE
Status: COMPLETED | OUTPATIENT
Start: 2018-08-07 | End: 2018-08-07

## 2018-08-07 RX ORDER — ONDANSETRON 2 MG/ML
4 INJECTION INTRAMUSCULAR; INTRAVENOUS EVERY 30 MIN PRN
Status: DISCONTINUED | OUTPATIENT
Start: 2018-08-07 | End: 2018-08-07

## 2018-08-07 RX ORDER — HYDROMORPHONE HYDROCHLORIDE 2 MG/1
2 TABLET ORAL EVERY 6 HOURS PRN
Qty: 15 TABLET | Refills: 0 | Status: SHIPPED | OUTPATIENT
Start: 2018-08-07 | End: 2018-09-09

## 2018-08-07 RX ADMIN — Medication 0.5 MG: at 11:40

## 2018-08-07 RX ADMIN — IOPAMIDOL 80 ML: 755 INJECTION, SOLUTION INTRAVENOUS at 13:06

## 2018-08-07 RX ADMIN — SODIUM CHLORIDE 60 ML: 9 INJECTION, SOLUTION INTRAVENOUS at 13:06

## 2018-08-07 RX ADMIN — DIPHENHYDRAMINE HYDROCHLORIDE 50 MG: 50 INJECTION, SOLUTION INTRAMUSCULAR; INTRAVENOUS at 11:36

## 2018-08-07 RX ADMIN — Medication 0.5 MG: at 12:05

## 2018-08-07 RX ADMIN — Medication 0.5 MG: at 13:21

## 2018-08-07 RX ADMIN — Medication 0.5 MG: at 15:12

## 2018-08-07 RX ADMIN — Medication 0.5 MG: at 12:28

## 2018-08-07 NOTE — DISCHARGE INSTRUCTIONS

## 2018-08-07 NOTE — ED NOTES
Patient reports that her stomach is still hurting and is requesting more pain meds.  Dr. Workman notified.

## 2018-08-07 NOTE — ED NOTES
Patient reports that she does not have hydromorphone allergy and she can tolerate CT dye if she has benadryl.  Dr. Workman notified.

## 2018-08-07 NOTE — ED PROVIDER NOTES
History     Chief Complaint   Patient presents with     Generalized Weakness     HPI  Doreen Peralta is a 38 year old female who resents the emergency department complaining of waxing and waning fevers up to 103 for the last 2 days that she is treated with Tylenol.  She has felt malaise, generalized weakness, nausea, vomiting, ongoing diarrhea, left lower quadrant abdominal pain.  She has not had dysuria hematuria or increased urinary frequency.  She is currently being treated for C. difficile colitis with Dificid.  She had been improving.  Her stools were no longer malodorous but she has ongoing diarrhea.      She has had a very long history of bowel problems that started over 10 years ago when she was diagnosed with gastroparesis.  She has had PEG tubes and an ileostomy and a large bowel resection.  She had horrible problems with constipation prior to this and now pretty routinely has diarrhea.  She is also had sepsis with E. coli.  She has had an infected PICC line and problems with the port in the past.  She is extremely difficult for peripheral IV access and apparently is not a candidate for repeat PICC line.  She has been offered another port but does not want it.    Problem List:    Patient Active Problem List    Diagnosis Date Noted     Bacteremia 06/26/2018     Priority: Medium     Acute renal failure (H) 04/21/2018     Priority: Medium     History of bacteremia - recurrent 04/17/2018     Priority: Medium     Tobacco abuse 04/16/2018     Priority: Medium     Iron deficiency anemia 04/16/2018     Priority: Medium     Stool toxin PCR positive for Clostridium difficile on 4/17/18 04/16/2018     Priority: Medium     Gram negative septicemia (H) - Ochrobactrum, Stenotrophomonas & Pantoea 08/24/2017     Priority: Medium     Hypokalemia 08/24/2017     Priority: Medium     Essential hypertension 08/24/2017     Priority: Medium     Sepsis due to Klebsiella (H) 07/27/2017     Priority: Medium     Insomnia,  unspecified type 03/31/2017     Priority: Medium     Abdominal pain 02/15/2017     Priority: Medium     Abdominal pain, generalized 01/28/2017     Priority: Medium     History of deep venous thrombosis 01/26/2017     Priority: Medium     Nausea and vomiting 01/26/2017     Priority: Medium     Vitamin D deficiency 01/16/2017     Priority: Medium     Chronic abdominal pain 11/15/2016     Priority: Medium     Patient is followed by Larisa Lorenzo PA-C for ongoing prescription of pain medication.  All refills should be approved by this provider, or covering partner.    Medication(s): no regular narcotics - narcotics given at ED visits  Maximum quantity per month: N/A  Clinic visit frequency required: Q 6  months     Controlled substance agreement:  Encounter-Level CSA - 11/9/15:               Controlled Substance Agreement - Scan on 11/19/2015 11:17 AM : CONTROLLED SUBSTANCE AGREEMENT 11/09/15 (below)          Encounter-Level CSA - 2/27/15:               Controlled Substance Agreement - Scan on 3/12/2015  7:50 AM : Controlled Medication Agreement 02/27/15 (below)            Pain Clinic evaluation in the past: Yes    DIRE Total Score(s):  No flowsheet data found.    Last [facility] website verification:  done on 11/15/16   [email or web address]        Attention to G-tube (H) 11/08/2016     Priority: Medium     Fever 10/16/2016     Priority: Medium     Coagulation defect (H) [D68.9] 09/16/2016     Priority: Medium     Chronic diarrhea 07/22/2016     Priority: Medium     Migraine 07/20/2016     Priority: Medium     Positive blood culture - Klebsiella oxytoca from Port-a-cath culture 07/18/2016     Priority: Medium     Mitral regurgitation mild-mod by Echo June 2016 07/18/2016     Priority: Medium     Anemia, iron deficiency 07/17/2016     Priority: Medium     Anemia in other chronic diseases classified elsewhere 06/14/2016     Priority: Medium     Munchausen syndrome - previously suspected 06/14/2016     Priority:  Medium     Long-term (current) use of anticoagulants [Z79.01] 05/16/2016     Priority: Medium     Anxiety 05/16/2016     Priority: Medium     S/P partial resection of colon 04/11/2016     Priority: Medium     Malnutrition (H) 04/11/2016     Priority: Medium     Jejunostomy tube present (H) 03/21/2016     Priority: Medium     Malfunctioning jejunostomy tube (H) 12/22/2015     Priority: Medium     Malfunction of gastrostomy tube (H) 11/19/2015     Priority: Medium     PEG tube malfunction (H) 10/16/2015     Priority: Medium     Health Care Home 01/16/2015     Priority: Medium     *See Letters for H Care Plan: My Access Plan           PEG (percutaneous endoscopic gastrostomy) status 11/05/2014     Priority: Medium     Gastroparesis 04/11/2014     Priority: Medium     Overview:   Had ileostomy performed at [facility] in Jan 2012 as treatment       Migraines 04/11/2014     Priority: Medium     4/11/2014  With periods, every other month.       Intermittent asthma 04/11/2014     Priority: Medium     4/11/2014   With URIs, allergies       Allergic rhinitis 04/11/2014     Priority: Medium     Problem list name updated by automated process. Provider to review       Abnormal Pap smear of cervix 04/11/2014     Priority: Medium     4/11/2014  S/p colp and LEEP. Sees OB/GYN at [facility]. Pap every year x20 years - normal since.       S/P LEEP of cervix 02/06/2014     Priority: Medium     Patellofemoral stress syndrome 01/18/2014     Priority: Medium     Hx SBO 10/09/2013     Priority: Medium     4/11/2014  Recurrent. Sees GI (Dr. Redding - at [facility]) every 6 months or so.  Sees feeding tube nurse next week.       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 04/11/2013     Priority: Medium     Constipation by delayed colonic transit 10/24/2012     Priority: Medium     Atopic rhinitis 03/20/2009     Priority: Medium     Hyperbilirubinemia 03/11/2009     Priority: Medium        Past Medical History:    Past Medical History:   Diagnosis Date      Asthma      Bilateral ovarian cysts      Cervical cancer (H) 01/01/2008     Chronic pain      Colonic dysmotility      Constipation      E. coli sepsis (H) 5/8/2016     Enteritis      Fungemia 5/5/2016     Gastro-oesophageal reflux disease      H/O ileostomy      Hx of abnormal Pap smear      Hypertension      IBS (irritable bowel syndrome)      Other chronic pain      PONV (postoperative nausea and vomiting)      Thrombosis, hepatic vein (H)        Past [facility] History:    Past [facility] History:   Procedure Laterality Date     COLONOSCOPY  7/10/2012    Procedure: COLONOSCOPY;;  Surgeon: Nicole Redding MD;  [facility]     COLONOSCOPY N/A 2/19/2017    Procedure: COLONOSCOPY;  Surgeon: Randell Muller MD;  [facility]     COLONOSCOPY N/A 2/21/2017    Procedure: COLONOSCOPY;  Surgeon: Randell Muller MD;  [facility]     COLONOSCOPY N/A 5/3/2018    Procedure: COMBINED COLONOSCOPY, SINGLE OR MULTIPLE BIOPSY/POLYPECTOMY BY BIOPSY;  EGD/Colonoscopy ;  Surgeon: Loi Black MD;  [facility]     ECHO CHELO  7/19/2016          ENDOSCOPIC INSERTION TUBE GASTROSTOMY N/A 1/21/2016    Procedure: ENDOSCOPIC INSERTION TUBE GASTROSTOMY;  Surgeon: Nicole Redding MD;  [facility]     ESOPHAGOSCOPY, GASTROSCOPY, DUODENOSCOPY (EGD), COMBINED  7/10/2012    Procedure: COMBINED ESOPHAGOSCOPY, GASTROSCOPY, DUODENOSCOPY (EGD);  Upper Endoscopy, Ileoscopy    Latex Allergy  with biopsies;  Surgeon: Nicole Redding MD;  [facility]     ESOPHAGOSCOPY, GASTROSCOPY, DUODENOSCOPY (EGD), COMBINED N/A 11/5/2014    Procedure: COMBINED ESOPHAGOSCOPY, GASTROSCOPY, DUODENOSCOPY (EGD);  Surgeon: Nicole Redding MD;  [facility]     ESOPHAGOSCOPY, GASTROSCOPY, DUODENOSCOPY (EGD), COMBINED N/A 5/3/2018    Procedure: COMBINED ESOPHAGOSCOPY, GASTROSCOPY, DUODENOSCOPY (EGD), BIOPSY SINGLE OR MULTIPLE;;  Surgeon: Loi Black MD;  [facility]     HC REPLACE  DUODENOSTOMY/JEJUNOSTOMY TUBE PERCUTANEOUS N/A 8/27/2015    Procedure: REPLACE GASTROJEJUNOSTOMY TUBE, PERCUTANEOUS;  Surgeon: Mio Holder MD;  [facility]     HC REPLACE DUODENOSTOMY/JEJUNOSTOMY TUBE PERCUTANEOUS N/A 1/7/2016    Procedure: REPLACE JEJUNOSTOMY TUBE, PERCUTANEOUS;  Surgeon: Elsa Medel MD;  [facility]     HC REPLACE DUODENOSTOMY/JEJUNOSTOMY TUBE PERCUTANEOUS N/A 1/28/2016    Procedure: REPLACE JEJUNOSTOMY TUBE, PERCUTANEOUS;  Surgeon: Elsa Medel MD;  [facility]     HC REPLACE GASTROSTOMY/CECOSTOMY TUBE PERCUTANEOUS Left 5/19/2015    Procedure: REPLACE GASTROSTOMY TUBE, PERCUTANEOUS;  Surgeon: Melecio Morejon Chi, MD;  [facility]     HC UGI ENDOSCOPY W PLACEMENT GASTROSTOMY TUBE PERCUT N/A 10/1/2015    Procedure: COMBINED ESOPHAGOSCOPY, GASTROSCOPY, DUODENOSCOPY (EGD), PLACE PERCUTANEOUS ENDOSCOPIC GASTROSTOMY TUBE;  Surgeon: Mio Holder MD;  [facility]     INSERT PORT VASCULAR ACCESS Right 7/20/2017    Procedure: INSERT PORT VASCULAR ACCESS;  Chest Port Placement  **Latex Allergy**;  Surgeon: Coy Rocha PA-C;  [facility]     LAPAROSCOPIC ASSISTED COLECTOMY  1/20/2012    Procedure:LAPAROSCOPIC ASSISTED COLECTOMY; Laparoscopic Ileostomy       LAPAROSCOPIC ASSISTED COLECTOMY LEFT (DESCENDING)  10/24/2012    Procedure: LAPAROSCOPIC ASSISTED COLECTOMY LEFT (DESCENDING);   Hand Assisted Laproscopic Subtotal abdominal Colectomy,Iieorectal anastamosis, Ileostomy Closure.       LAPAROSCOPIC ASSISTED INSERTION TUBE JEJUNOSTOMY N/A 10/16/2015    Procedure: LAPAROSCOPIC ASSISTED INSERTION TUBE JEJUNOSTOMY;  Surgeon: Elsa Medel MD;  [facility]     LAPAROSCOPIC CHOLECYSTECTOMY  2002    [facility] stones duct     LAPAROSCOPIC ILEOSTOMY  1/20/2012    [facility], loop     LAPAROSCOPIC OOPHORECTOMY Right 2009    [facility]     LAPAROTOMY EXPLORATORY N/A 1/28/2016    Procedure: LAPAROTOMY EXPLORATORY;  Surgeon: Elsa Medel MD;   [facility]     LEEP TX, CERVICAL  2009    [facility]     PICC INSERTION Left 10/21/2015    5fr DL Power PICC, 37cm (2cm external) in the L basilic vein w/ tip in the SVC RA junction.     REMOVE GASTROSTOMY TUBE ADULT N/A 12/12/2014    Procedure: REMOVE GASTROSTOMY TUBE ADULT;  Surgeon: Nicole Redding MD;  [facility]     REMOVE PORT VASCULAR ACCESS Right 6/30/2016    Procedure: REMOVE PORT VASCULAR ACCESS;  Surgeon: Pradeep Orosco MD;  Location[facility]     REMOVE PORT VASCULAR ACCESS Right 9/8/2017    Procedure: REMOVE PORT VASCULAR ACCESS;  right side mediport removal;  Surgeon: Zechariah Worthington MD;  Location: [facility] OR     replace GASTROSTOMY TUBE ADULT  5/19/15       Family History:    Family History   Problem Relation Age of Onset     Thyroid Disease Mother      Sjogren's Mother      GASTROINTESTINAL DISEASE Mother      Intermittent nausea vomiting diarrhea     Colon Polyps Mother      Prostate Problems Father      prostate enlargement     Lupus Maternal Grandmother      Cancer Maternal Grandfather      Lung     Colon Cancer Maternal Grandfather 65     Cancer Paternal Grandmother      Lung      Cerebrovascular Disease Paternal Grandmother      Diabetes Paternal Grandmother      Cardiovascular Paternal Grandmother      CHF     Cancer Paternal Grandfather      Lung     Glaucoma Paternal Grandfather      Abdominal Aortic Aneurysm Other      Macular Degeneration No family hx of        Social History:  Marital Status:   [2]  Social History   Substance Use Topics     Smoking status: Current Some Day Smoker     Packs/day: 1.00     Years: 4.00     Types: Cigarettes     Last attempt to quit: 1/1/2004     Smokeless tobacco: Never Used     Alcohol use No        Medications:      ACETAMINOPHEN PO   HYDROmorphone (DILAUDID) 2 MG tablet   ondansetron (ZOFRAN) 8 MG tablet   albuterol (2.5 MG/3ML) 0.083% neb solution   albuterol 90 MCG/ACT inhaler   Alfalfa 650 MG TABS   cholestyramine light  "(QUESTRAN) 4 GM Packet   cloNIDine (CATAPRES) 0.2 MG tablet   diphenhydrAMINE HCl 50 MG TABS   DULoxetine (CYMBALTA) 60 MG EC capsule   fidaxomicin (DIFICID) 200 MG tablet   HYDROcodone-acetaminophen (NORCO) 5-325 MG per tablet   ipratropium (ATROVENT) 0.02 % neb solution   SUMAtriptan (IMITREX) 50 MG tablet   traZODone (DESYREL) 100 MG tablet         Review of Systems   All other systems reviewed and are negative.      Physical Exam   BP: 113/54  Pulse: 67  Temp: 98  F (36.7  C)  Resp: 16  Height: 167.6 cm (5' 6\")  Weight: 72.6 kg (160 lb)  SpO2: 100 %      Physical Exam   Constitutional: She is oriented to person, place, and time. She appears well-developed and well-nourished. She appears distressed.   Appears mildly distressed and in pain   HENT:   Head: Normocephalic and atraumatic.   Nose: Nose normal.   Mouth/Throat: Oropharynx is clear and moist. No oropharyngeal exudate.   Eyes: Conjunctivae and EOM are normal. Right eye exhibits no discharge. Left eye exhibits no discharge. No scleral icterus.   Neck: Normal range of motion. Neck supple.   Cardiovascular: Normal rate and normal heart sounds.  Exam reveals no gallop and no friction rub.    No murmur heard.  Pulmonary/Chest: Effort normal and breath sounds normal. No stridor. No respiratory distress.   Abdominal: Soft. She exhibits no distension. There is tenderness. There is rebound and guarding.   Significant left lower quadrant tenderness to palpation.  Rebound tenderness in left lower quadrant with palpating the right lower quadrant   Musculoskeletal: Normal range of motion. She exhibits no edema.   Neurological: She is alert and oriented to person, place, and time. No cranial nerve deficit. Coordination normal.   Skin: Skin is warm and dry. No rash noted. She is not diaphoretic. No erythema. No pallor.   Psychiatric: She has a normal mood and affect. Her behavior is normal.   Nursing note and vitals reviewed.      ED Course     ED Course "     Procedures                 Results for orders placed or performed during the hospital encounter of 08/07/18 (from the past 24 hour(s))   CBC with platelets differential   Result Value Ref Range    WBC 5.2 4.0 - 11.0 10e9/L    RBC Count 3.98 3.8 - 5.2 10e12/L    Hemoglobin 10.2 (L) 11.7 - 15.7 g/dL    Hematocrit 32.6 (L) 35.0 - 47.0 %    MCV 82 78 - 100 fl    MCH 25.6 (L) 26.5 - 33.0 pg    MCHC 31.3 (L) 31.5 - 36.5 g/dL    RDW 18.6 (H) 10.0 - 15.0 %    Platelet Count 347 150 - 450 10e9/L    Diff Method Automated Method     % Neutrophils 67.9 %    % Lymphocytes 24.2 %    % Monocytes 6.7 %    % Eosinophils 0.6 %    % Basophils 0.4 %    % Immature Granulocytes 0.2 %    Nucleated RBCs 0 0 /100    Absolute Neutrophil 3.5 1.6 - 8.3 10e9/L    Absolute Lymphocytes 1.3 0.8 - 5.3 10e9/L    Absolute Monocytes 0.4 0.0 - 1.3 10e9/L    Absolute Basophils 0.0 0.0 - 0.2 10e9/L    Abs Immature Granulocytes 0.0 0 - 0.4 10e9/L    Absolute Nucleated RBC 0.0    Lactic acid whole blood   Result Value Ref Range    Lactic Acid 0.9 0.7 - 2.0 mmol/L   Comprehensive metabolic panel   Result Value Ref Range    Sodium 139 133 - 144 mmol/L    Potassium 4.4 3.4 - 5.3 mmol/L    Chloride 105 94 - 109 mmol/L    Carbon Dioxide 24 20 - 32 mmol/L    Anion Gap 10 3 - 14 mmol/L    Glucose 92 70 - 99 mg/dL    Urea Nitrogen 6 (L) 7 - 30 mg/dL    Creatinine 1.02 0.52 - 1.04 mg/dL    GFR Estimate 61 >60 mL/min/1.7m2    GFR Estimate If Black 74 >60 mL/min/1.7m2    Calcium 9.0 8.5 - 10.1 mg/dL    Bilirubin Total 0.4 0.2 - 1.3 mg/dL    Albumin 3.2 (L) 3.4 - 5.0 g/dL    Protein Total 7.8 6.8 - 8.8 g/dL    Alkaline Phosphatase 124 40 - 150 U/L    ALT 37 0 - 50 U/L    AST 28 0 - 45 U/L   Lipase   Result Value Ref Range    Lipase 85 73 - 393 U/L   UA reflex to Microscopic   Result Value Ref Range    Color Urine Straw     Appearance Urine Clear     Glucose Urine Negative NEG^Negative mg/dL    Bilirubin Urine Negative NEG^Negative    Ketones Urine Negative  NEG^Negative mg/dL    Specific Gravity Urine 1.005 1.003 - 1.035    Blood Urine Negative NEG^Negative    pH Urine 8.0 (H) 5.0 - 7.0 pH    Protein Albumin Urine Negative NEG^Negative mg/dL    Urobilinogen mg/dL 0.0 0.0 - 2.0 mg/dL    Nitrite Urine Negative NEG^Negative    Leukocyte Esterase Urine Negative NEG^Negative    Source Midstream Urine    CT Abdomen Pelvis w Contrast    Narrative    CT ABDOMEN/PELVIS WITH CONTRAST August 7, 2018 1:13 PM     HISTORY: Left lower quadrant abdomen pain and fever.     TECHNIQUE: 80mL, Isovue 370. Radiation dose for this scan was reduced  using automated exposure control, adjustment of the mA and/or kV  according to patient size, or iterative reconstruction technique.    COMPARISON: None.    FINDINGS: Included lung bases are unremarkable.    The liver is normal. Previous cholecystectomy. Spleen and pancreas are  unremarkable.    No adrenal lesions. 0.2 cm right mid pole kidney stone. No  hydronephrosis or ureteral stone. No retroperitoneal adenopathy or  evidence of aortic aneurysm. No retroperitoneal adenopathy or evidence  of aortic aneurysm.    Scans through the pelvis demonstrate a normal appearing bladder. No  pelvic adenopathy. Endometrial stripe appears mildly prominent likely  related to the stage of the patient's menstrual cycle. There is a 2.2  cm left ovarian cyst.    This patient appears to have had a colectomy of some sort with a  reanastomosis in the right lower quadrant. This segment of recent  anastomosed bowel has appearance similar to the cecum and may be a  cecal small bowel anastomosis that is mildly dilated with fluid. This  is of uncertain and doubtful clinical significance and is likely a  postoperative appearance. This reanastomosis is in a slightly  different location than it was on the comparison study where it was  more left-sided and now it is more central in the abdomen. I suspect  it is somewhat mobile. No free air or free fluid.      Impression     IMPRESSION:  1. There is a small bowel anastomosis to what appears to be perhaps  residual cecum in the mid lower abdomen in a patient with a  significant colectomy. On the previous study it was more left-sided  and is likely mobile. It contains fluid and is only mildly dilated and  this is likely not clinically significant and has a postoperative  appearance. If symptoms persist a follow-up CT scan could be  performed.  2. Stable 0.2 cm right mid pole nonobstructing kidney stone.  3. 2.2 cm left ovarian cyst.    REJI BOOKER MD     *Note: Due to a large number of results and/or encounters for the requested time period, some results have not been displayed. A complete set of results can be found in Results Review.       Medications   HYDROmorphone (PF) (DILAUDID) injection 0.5 mg (0.5 mg Intravenous Given 8/7/18 1512)   HYDROmorphone (PF) (DILAUDID) injection 0.5 mg (0.5 mg Intravenous Given 8/7/18 1228)   diphenhydrAMINE (BENADRYL) injection 50 mg (50 mg Intravenous Given 8/7/18 1136)   iopamidol (ISOVUE-370) solution 500 mL (80 mLs Intravenous Given 8/7/18 1306)   sodium chloride (PF) 0.9% PF flush 3 mL (3 mLs Intravenous Given 8/7/18 1306)   sodium chloride 0.9 % bag 100mL for CT scan flush use (60 mLs Intravenous Given 8/7/18 1306)       Assessments & Plan (with Medical Decision Making)  Abdominal pain, fevers at home and generalized weakness and malaise and a chronically ill female.  Laboratory data is reassuring.  Blood cultures are pending.  CT scan is also reassuring.  The patient was discharged home in stable condition recommended close follow-up.  She was given 15 tablets of Dilaudid for home pain. The differential diagnosis, treatment options, risks and follow up discussed with a competent patient and/or competent family member who agrees with the plan.       I have reviewed the nursing notes.    I have reviewed the findings, diagnosis, plan and need for follow up with the patient.      New Prescriptions     HYDROMORPHONE (DILAUDID) 2 MG TABLET    Take 1 tablet (2 mg) by mouth every 6 hours as needed for pain       Final diagnoses:   Abdominal pain, left lower quadrant       8/7/2018   [facility] EMERGENCY DEPARTMENT     Nikhil Workman MD  08/07/18 0438

## 2018-08-07 NOTE — ED NOTES
"Patient is concerned that she is going to be sent home despite how sick she feels.  \"The last time this happened and all my labs came back that they were fine, I got a call back that I needed to be admitted for bacteremia.  I just don't want to be sent home feeling like shit.  I know how my body feels and this feels the same.  If I am going to be sent home, then I will need something for pain and a refill of my zofran because I am miserable.\"  Dr. Workman notified.   "

## 2018-08-07 NOTE — ED AVS SNAPSHOT
[facility]    [address]     [place] [address]    Phone:  [phone]    Fax:  [phone]                                       Doreen Peralta   MRN: [id number]    Department:  [facility]   Date of Visit:  8/7/2018           Patient Information     Date Of Birth          1980        Your diagnoses for this visit were:     Abdominal pain, left lower quadrant        You were seen by Nikhil Workman MD.      Follow-up Information     Follow up with Franny Antoine MD.    Specialty:  Internal Medicine    Contact information:    [facility]  [address] [address]  [phone]          Discharge Instructions         Abdominal Pain    Abdominal pain is pain in the stomach or belly area. Everyone has this pain from time to time. In many cases it goes away on its own. But abdominal pain can sometimes be due to a serious problem, such as appendicitis. So it s important to know when to seek help.  Causes of abdominal pain  There are many possible causes of abdominal pain. Common causes in adults include:    Constipation, diarrhea, or gas    Stomach acid flowing back up into the esophagus (acid reflux or heartburn)    Severe acid reflux, called GERD (gastroesophageal reflux disease)    A sore in the lining of the stomach or small intestine (peptic ulcer)    Inflammation of the gallbladder, liver, or pancreas    Gallstones or kidney stones    Appendicitis     Intestinal blockage     An internal organ pushing through a muscle or other tissue (hernia)    Urinary tract infections    In women, menstrual cramps, fibroids, or endometriosis    Inflammation or infection of the intestines  Diagnosing the cause of abdominal pain  Your healthcare provider will do a physical exam help find the cause of your pain. If needed, tests will be ordered. Belly pain has many possible causes. So it can be hard to find the reason for your  pain. Giving details about your pain can help. Tell your provider where and when you feel the pain, and what makes it better or worse. Also let your provider know if you have other symptoms such as:    Fever    Tiredness    Upset stomach (nausea)    Vomiting    Changes in bathroom habits  Treating abdominal pain  Some causes of pain need emergency medical treatment right away. These include appendicitis or a bowel blockage. Other problems can be treated with rest, fluids, or medicines. Your healthcare provider can give you specific instructions for treatment or self-care based on what is causing your pain.  If you have vomiting or diarrhea, sip water or other clear fluids. When you are ready to eat solid foods again, start with small amounts of easy-to-digest, low-fat foods. These include apple sauce, toast, or crackers.   When to seek medical care  Call 911 or go to the hospital right away if you:    Can t pass stool and are vomiting    Are vomiting blood or have bloody diarrhea or black, tarry diarrhea    Have chest, neck, or shoulder pain    Feel like you might pass out    Have pain in your shoulder blades with nausea    Have sudden, severe belly pain    Have new, severe pain unlike any you have felt before    Have a belly that is rigid, hard, and tender to touch  Call your healthcare provider if you have:    Pain for more than 5 days    Bloating for more than 2 days    Diarrhea for more than 5 days    A fever of 100.4 F (38 C) or higher, or as directed by your healthcare provider    Pain that gets worse    Weight loss for no reason    Continued lack of appetite    Blood in your stool  How to prevent abdominal pain  Here are some tips to help prevent abdominal pain:    Eat smaller amounts of food at one time.    Avoid greasy, fried, or other high-fat foods.    Avoid foods that give you gas.    Exercise regularly.    Drink plenty of fluids.  To help prevent GERD symptoms:    Quit smoking.    Reduce alcohol and  certain foods that increase stomach acid.    Avoid aspirin and over-the-counter pain and fever medicines (NSAIDS or nonsteroidal anti-inflammatory drugs), if possible    Lose extra weight.    Finish eating at least 2 hours before you go to bed or lie down.    Raise the head of your bed.  Date Last Reviewed: 7/1/2016 2000-2017 [employer]. [address] [place] [address], [place] [address]. All rights reserved. This information is not intended as a substitute for professional medical care. Always follow your healthcare professional's instructions.          24 Hour Appointment Hotline       To make an appointment at any [facility], call [phone] ([phone]). If you don't have a family doctor or clinic, we will help you find one. [facility] clinics are conveniently located to serve the needs of you and your family.             Review of your medicines      Our records show that you are taking the medicines listed below. If these are incorrect, please call your family doctor or clinic.        Dose / Directions Last dose taken    ACETAMINOPHEN PO   Dose:  500-1000 mg        Take 500-1,000 mg by mouth every 6 hours as needed for pain   Refills:  0        albuterol (2.5 MG/3ML) 0.083% neb solution   Dose:  2.5 mg   Quantity:  360 mL        Take 1 vial (2.5 mg) by nebulization every 4 hours as needed for shortness of breath / dyspnea or wheezing   Refills:  0        albuterol 90 MCG/ACT inhaler   Dose:  2 puff        Inhale 2 puffs into the lungs every 6 hours as needed   Refills:  0        Alfalfa 650 MG Tabs        Refills:  0        cholestyramine light 4 GM Packet   Commonly known as:  QUESTRAN   Dose:  4 g   Quantity:  60 packet        Take 1 packet (4 g) by mouth 2 times daily   Refills:  1        cloNIDine 0.2 MG tablet   Commonly known as:  CATAPRES   Dose:  0.4 mg   Quantity:  60 tablet        Take 2 tablets (0.4 mg) by mouth every evening - DUE FOR FOLLOW UP   Refills:  0         diphenhydrAMINE HCl 50 MG Tabs   Commonly known as:  BENADRYL   Dose:  50 mg   Quantity:  30 tablet        Take 50 mg by mouth every 6 hours as needed (nausea)   Refills:  0        DULoxetine 60 MG EC capsule   Commonly known as:  CYMBALTA   Quantity:  90 capsule        TAKE 1 CAPSULE(60 MG) BY MOUTH DAILY   Refills:  1        fidaxomicin 200 MG tablet   Commonly known as:  DIFICID   Dose:  200 mg   Quantity:  20 tablet        Take 1 tablet (200 mg) by mouth 2 times daily for 10 days   Refills:  0        HYDROcodone-acetaminophen 5-325 MG per tablet   Commonly known as:  NORCO   Dose:  1 tablet   Quantity:  12 tablet        Take 1 tablet by mouth every 4 hours as needed for pain   Refills:  0        HYDROmorphone 2 MG tablet   Commonly known as:  DILAUDID   Dose:  2 mg   Quantity:  15 tablet        Take 1 tablet (2 mg) by mouth every 6 hours as needed for pain   Refills:  0        ipratropium 0.02 % neb solution   Commonly known as:  ATROVENT   Dose:  0.5 mg   Quantity:  225 mL        Take 2.5 mLs (0.5 mg) by nebulization every 6 hours as needed for wheezing   Refills:  0        ondansetron 8 MG tablet   Commonly known as:  ZOFRAN   Dose:  8 mg   Quantity:  9 tablet        Take 1 tablet (8 mg) by mouth every 8 hours as needed for nausea   Refills:  0        SUMAtriptan 50 MG tablet   Commonly known as:  IMITREX   Dose:   mg   Quantity:  9 tablet        Take 1-2 tablets ( mg) by mouth at onset of headache for migraine - LAST REFILL, DUE FOR FOLLOW UP   Refills:  0        traZODone 100 MG tablet   Commonly known as:  DESYREL   Dose:   mg   Quantity:  90 tablet        Take 0.5-1 tablets ( mg) by mouth nightly as needed for sleep   Refills:  8                Information about OPIOIDS     PRESCRIPTION OPIOIDS: WHAT YOU NEED TO KNOW   We gave you an opioid (narcotic) pain medicine. It is important to manage your pain, but opioids are not always the best choice. You should first try all the other  options your care team gave you. Take this medicine for as short a time (and as few doses) as possible.    Some activities can increase your pain, such as bandage changes or therapy sessions. It may help to take your pain medicine 30 to 60 minutes before these activities. Reduce your stress by getting enough sleep, working on hobbies you enjoy and practicing relaxation or meditation. Talk to your care team about ways to manage your pain beyond prescription opioids.    These medicines have risks:    DO NOT drive when on new or higher doses of pain medicine. These medicines can affect your alertness and reaction times, and you could be arrested for driving under the influence (DUI). If you need to use opioids long-term, talk to your care team about driving.    DO NOT operate heavy machinery    DO NOT do any other dangerous activities while taking these medicines.    DO NOT drink any alcohol while taking these medicines.     If the opioid prescribed includes acetaminophen, DO NOT take with any other medicines that contain acetaminophen. Read all labels carefully. Look for the word  acetaminophen  or  Tylenol.  Ask your pharmacist if you have questions or are unsure.    You can get addicted to pain medicines, especially if you have a history of addiction (chemical, alcohol or substance dependence). Talk to your care team about ways to reduce this risk.    All opioids tend to cause constipation. Drink plenty of water and eat foods that have a lot of fiber, such as fruits, vegetables, prune juice, apple juice and high-fiber cereal. Take a laxative (Miralax, milk of magnesia, Colace, Senna) if you don t move your bowels at least every other day. Other side effects include upset stomach, sleepiness, dizziness, throwing up, tolerance (needing more of the medicine to have the same effect), physical dependence and slowed breathing.    Store your pills in a secure place, locked if possible. We will not replace any lost or  stolen medicine. If you don t finish your medicine, please throw away (dispose) as directed by your pharmacist. The [place] [employer] has more information about safe disposal: https://www.pca.state.[place]/living-green/managing-unwanted-medications        Prescriptions were sent or printed at these locations (1 Prescription)                   [facility] [place] [facility] [address] [facility]    [address] , [place] [address]    Telephone:  [phone]   Fax:  [phone]   Hours:                  Printed at Department/Unit printer (1 of 1)         HYDROmorphone (DILAUDID) 2 MG tablet                Procedures and tests performed during your visit     Procedure/Test Number of Times Performed    Blood culture ONE site 2    CBC with platelets differential 1    CT Abdomen Pelvis w Contrast 1    Comprehensive metabolic panel 1    Give 20 ounces of water 15 minutes before CT of abdomen 1    Lactic acid whole blood 1    Lipase 1    Peripheral IV catheter 1    UA reflex to Microscopic 1      Orders Needing Specimen Collection     None      Pending Results     Date and Time Order Name Status Description    8/7/2018 1345 Blood culture ONE site In process     8/7/2018 1055 Blood culture ONE site In process             Pending Culture Results     Date and Time Order Name Status Description    8/7/2018 1345 Blood culture ONE site In process     8/7/2018 1055 Blood culture ONE site In process             Pending Results Instructions     If you had any lab results that were not finalized at the time of your Discharge, you can call the ED Lab Result RN at [phone]. You will be contacted by this team for any positive Lab results or changes in treatment. The nurses are available 7 days a week from 10A to 6:30P.  You can leave a message 24 hours per day and they will return your call.        Thank you for choosing [facility]       Thank you for choosing [facility] for your care. Our goal is always to  provide you with excellent care. Hearing back from our patients is one way we can continue to improve our services. Please take a few minutes to complete the written survey that you may receive in the mail after you visit with us. Thank you!        [facility] Information     [facility] gives you secure access to your electronic health record. If you see a primary care provider, you can also send messages to your care team and make appointments. If you have questions, please call your primary care clinic.  If you do not have a primary care provider, please call [phone] and they will assist you.        Care EveryWhere ID     This is your Care EveryWhere ID. This could be used by other organizations to access your [facility] medical records  [id number]        Equal Access to Services     TRAMAINE CLAYTON : Carrie Law, clark villeda, vesta long, elham gomez. So [facility] [phone].    ATENCIÓN: Si habla español, tiene a wade disposición servicios gratuitos de asistencia lingüística. Llame al [phone].    We comply with applicable federal civil rights laws and [place] laws. We do not discriminate on the basis of race, color, national origin, age, disability, sex, sexual orientation, or gender identity.            After Visit Summary       This is your record. Keep this with you and show to your community pharmacist(s) and doctor(s) at your next visit.

## 2018-08-07 NOTE — ED AVS SNAPSHOT
[facility]    [address] [place] [address]    Phone:  [phone]    Fax:  [phone]                                       Doreen Peralta   MRN: [id number]    Department:  [facility]   Date of Visit:  8/7/2018           After Visit Summary Signature Page     I have received my discharge instructions, and my questions have been answered. I have discussed any challenges I see with this plan with the nurse or doctor.    ..........................................................................................................................................  Patient/Patient Representative Signature      ..........................................................................................................................................  Patient Representative Print Name and Relationship to Patient    ..................................................               ................................................  Date                                            Time    ..........................................................................................................................................  Reviewed by Signature/Title    ...................................................              ..............................................  Date                                                            Time

## 2018-08-07 NOTE — ED TRIAGE NOTES
Pt presents via EMS with generalized weakness, chills, intermittent fevers, and nausea/vomiting.  States that she began feeling like this Sunday.  She is worried with the fevers that coming on at night and breaking during the day that she may be septic.  States she had a 103 temp last night.

## 2018-08-29 DIAGNOSIS — G43.009 MIGRAINE WITHOUT AURA AND WITHOUT STATUS MIGRAINOSUS, NOT INTRACTABLE: ICD-10-CM

## 2018-08-29 NOTE — TELEPHONE ENCOUNTER
"Requested Prescriptions   Pending Prescriptions Disp Refills     SUMAtriptan (IMITREX) 50 MG tablet [Pharmacy Med Name: SUMATRIPTAN 50MG TABLETS] 9 tablet 0    Last Written Prescription Date:  2-21-18  Last Fill Quantity: 9,  # refills: 0   Last office visit: 9/1/2017 with prescribing provider:  9-1-17   Future Office Visit:   Sig: TAKE 1 TO 2 TABLETS(50  MG) BY MOUTH AT ONSET OF HEADACHE FOR MIGRAINE. DUE FOR FOLLOW UP    Serotonin Agonists Failed    8/29/2018  8:43 AM       Failed - Serotonin Agonist request needs review.    Please review patient's record. If patient has had 8 or more treatments in the past month, please forward to provider.         Passed - Blood pressure under 140/90 in past 12 months    BP Readings from Last 3 Encounters:   08/07/18 111/64   07/30/18 137/90   06/27/18 121/70                Passed - Recent (12 mo) or future (30 days) visit within the authorizing provider's specialty    Patient had office visit in the last 12 months or has a visit in the next 30 days with authorizing provider or within the authorizing provider's specialty.  See \"Patient Info\" tab in inbasket, or \"Choose Columns\" in Meds & Orders section of the refill encounter.           Passed - Patient is age 18 or older       Passed - No active pregnancy on record       Passed - No positive pregnancy test in past 12 months        "

## 2018-08-29 NOTE — TELEPHONE ENCOUNTER
Routing refill request to provider for review/approval because:  Failed serotonin agonist protocol.    Ayana Hall, RN, BSN

## 2018-09-06 RX ORDER — SUMATRIPTAN 50 MG/1
TABLET, FILM COATED ORAL
Qty: 9 TABLET | Refills: 0 | OUTPATIENT
Start: 2018-09-06

## 2018-09-09 ENCOUNTER — HOSPITAL ENCOUNTER (EMERGENCY)
Facility: CLINIC | Age: 38
Discharge: HOME | End: 2018-09-09
Attending: NURSE PRACTITIONER | Admitting: NURSE PRACTITIONER
Payer: MEDICARE

## 2018-09-09 VITALS
WEIGHT: 166 LBS | SYSTOLIC BLOOD PRESSURE: 137 MMHG | RESPIRATION RATE: 16 BRPM | BODY MASS INDEX: 26.68 KG/M2 | DIASTOLIC BLOOD PRESSURE: 91 MMHG | HEART RATE: 88 BPM | OXYGEN SATURATION: 98 % | HEIGHT: 66 IN | TEMPERATURE: 97.4 F

## 2018-09-09 DIAGNOSIS — R19.7 DIARRHEA OF PRESUMED INFECTIOUS ORIGIN: ICD-10-CM

## 2018-09-09 DIAGNOSIS — R10.84 ABDOMINAL PAIN, GENERALIZED: ICD-10-CM

## 2018-09-09 LAB
ALBUMIN SERPL-MCNC: 3 G/DL (ref 3.4–5)
ALBUMIN UR-MCNC: NEGATIVE MG/DL
ALP SERPL-CCNC: 136 U/L (ref 40–150)
ALT SERPL W P-5'-P-CCNC: 11 U/L (ref 0–50)
ANION GAP SERPL CALCULATED.3IONS-SCNC: 8 MMOL/L (ref 3–14)
APPEARANCE UR: CLEAR
AST SERPL W P-5'-P-CCNC: 21 U/L (ref 0–45)
BACTERIA #/AREA URNS HPF: ABNORMAL /HPF
BASOPHILS # BLD AUTO: 0 10E9/L (ref 0–0.2)
BASOPHILS NFR BLD AUTO: 0.2 %
BILIRUB SERPL-MCNC: 0.4 MG/DL (ref 0.2–1.3)
BILIRUB UR QL STRIP: NEGATIVE
BUN SERPL-MCNC: 9 MG/DL (ref 7–30)
C COLI+JEJUNI+LARI FUSA STL QL NAA+PROBE: NOT DETECTED
C DIFF TOX B STL QL: NEGATIVE
CALCIUM SERPL-MCNC: 8.4 MG/DL (ref 8.5–10.1)
CHLORIDE SERPL-SCNC: 106 MMOL/L (ref 94–109)
CO2 SERPL-SCNC: 21 MMOL/L (ref 20–32)
COLOR UR AUTO: YELLOW
CREAT SERPL-MCNC: 0.73 MG/DL (ref 0.52–1.04)
CRP SERPL-MCNC: 28.9 MG/L (ref 0–8)
DIFFERENTIAL METHOD BLD: ABNORMAL
EC STX1 GENE STL QL NAA+PROBE: NOT DETECTED
EC STX2 GENE STL QL NAA+PROBE: NOT DETECTED
ENTERIC PATHOGEN COMMENT: NORMAL
EOSINOPHIL NFR BLD AUTO: 0.1 %
ERYTHROCYTE [DISTWIDTH] IN BLOOD BY AUTOMATED COUNT: 17.6 % (ref 10–15)
GFR SERPL CREATININE-BSD FRML MDRD: 89 ML/MIN/1.7M2
GLUCOSE SERPL-MCNC: 92 MG/DL (ref 70–99)
GLUCOSE UR STRIP-MCNC: NEGATIVE MG/DL
HCT VFR BLD AUTO: 30 % (ref 35–47)
HGB BLD-MCNC: 9.5 G/DL (ref 11.7–15.7)
HGB UR QL STRIP: NEGATIVE
IMM GRANULOCYTES # BLD: 0.1 10E9/L (ref 0–0.4)
IMM GRANULOCYTES NFR BLD: 0.7 %
KETONES UR STRIP-MCNC: NEGATIVE MG/DL
LACTATE BLD-SCNC: 1.2 MMOL/L (ref 0.7–2)
LEUKOCYTE ESTERASE UR QL STRIP: NEGATIVE
LIPASE SERPL-CCNC: 89 U/L (ref 73–393)
LYMPHOCYTES # BLD AUTO: 1.6 10E9/L (ref 0.8–5.3)
LYMPHOCYTES NFR BLD AUTO: 18.4 %
MCH RBC QN AUTO: 25.5 PG (ref 26.5–33)
MCHC RBC AUTO-ENTMCNC: 31.7 G/DL (ref 31.5–36.5)
MCV RBC AUTO: 81 FL (ref 78–100)
MONOCYTES # BLD AUTO: 0.3 10E9/L (ref 0–1.3)
MONOCYTES NFR BLD AUTO: 3.5 %
MUCOUS THREADS #/AREA URNS LPF: PRESENT /LPF
NEUTROPHILS # BLD AUTO: 6.9 10E9/L (ref 1.6–8.3)
NEUTROPHILS NFR BLD AUTO: 77.1 %
NITRATE UR QL: NEGATIVE
NOROV GI+II ORF1-ORF2 JNC STL QL NAA+PR: NOT DETECTED
NRBC # BLD AUTO: 0 10*3/UL
NRBC BLD AUTO-RTO: 0 /100
PH UR STRIP: 7 PH (ref 5–7)
PLATELET # BLD AUTO: 362 10E9/L (ref 150–450)
POTASSIUM SERPL-SCNC: 4.6 MMOL/L (ref 3.4–5.3)
PROT SERPL-MCNC: 8.1 G/DL (ref 6.8–8.8)
RBC # BLD AUTO: 3.72 10E12/L (ref 3.8–5.2)
RBC #/AREA URNS AUTO: 1 /HPF (ref 0–2)
RVA NSP5 STL QL NAA+PROBE: NOT DETECTED
SALMONELLA SP RPOD STL QL NAA+PROBE: NOT DETECTED
SHIGELLA SP+EIEC IPAH STL QL NAA+PROBE: NOT DETECTED
SODIUM SERPL-SCNC: 135 MMOL/L (ref 133–144)
SOURCE: ABNORMAL
SP GR UR STRIP: 1.01 (ref 1–1.03)
SPECIMEN SOURCE: NORMAL
SQUAMOUS #/AREA URNS AUTO: 7 /HPF (ref 0–1)
UROBILINOGEN UR STRIP-MCNC: 0 MG/DL (ref 0–2)
V CHOL+PARA RFBL+TRKH+TNAA STL QL NAA+PR: NOT DETECTED
WBC # BLD AUTO: 8.9 10E9/L (ref 4–11)
WBC #/AREA URNS AUTO: <1 /HPF (ref 0–5)
Y ENTERO RECN STL QL NAA+PROBE: NOT DETECTED

## 2018-09-09 PROCEDURE — 99285 EMERGENCY DEPT VISIT HI MDM: CPT | Mod: 25 | Performed by: NURSE PRACTITIONER

## 2018-09-09 PROCEDURE — 87506 IADNA-DNA/RNA PROBE TQ 6-11: CPT | Performed by: NURSE PRACTITIONER

## 2018-09-09 PROCEDURE — 80053 COMPREHEN METABOLIC PANEL: CPT | Performed by: NURSE PRACTITIONER

## 2018-09-09 PROCEDURE — 99285 EMERGENCY DEPT VISIT HI MDM: CPT | Mod: Z6 | Performed by: NURSE PRACTITIONER

## 2018-09-09 PROCEDURE — 83690 ASSAY OF LIPASE: CPT | Performed by: NURSE PRACTITIONER

## 2018-09-09 PROCEDURE — 83605 ASSAY OF LACTIC ACID: CPT | Performed by: NURSE PRACTITIONER

## 2018-09-09 PROCEDURE — 87493 C DIFF AMPLIFIED PROBE: CPT | Mod: XU | Performed by: NURSE PRACTITIONER

## 2018-09-09 PROCEDURE — 96375 TX/PRO/DX INJ NEW DRUG ADDON: CPT | Performed by: NURSE PRACTITIONER

## 2018-09-09 PROCEDURE — 87040 BLOOD CULTURE FOR BACTERIA: CPT | Performed by: NURSE PRACTITIONER

## 2018-09-09 PROCEDURE — 96376 TX/PRO/DX INJ SAME DRUG ADON: CPT | Performed by: NURSE PRACTITIONER

## 2018-09-09 PROCEDURE — 81001 URINALYSIS AUTO W/SCOPE: CPT | Performed by: NURSE PRACTITIONER

## 2018-09-09 PROCEDURE — 86140 C-REACTIVE PROTEIN: CPT | Performed by: NURSE PRACTITIONER

## 2018-09-09 PROCEDURE — 96374 THER/PROPH/DIAG INJ IV PUSH: CPT | Performed by: NURSE PRACTITIONER

## 2018-09-09 PROCEDURE — 25000128 H RX IP 250 OP 636: Performed by: NURSE PRACTITIONER

## 2018-09-09 PROCEDURE — 85025 COMPLETE CBC W/AUTO DIFF WBC: CPT | Performed by: NURSE PRACTITIONER

## 2018-09-09 RX ORDER — HYDROMORPHONE HYDROCHLORIDE 2 MG/1
2 TABLET ORAL EVERY 6 HOURS PRN
Qty: 10 TABLET | Refills: 0 | Status: SHIPPED | OUTPATIENT
Start: 2018-09-09 | End: 2018-10-08

## 2018-09-09 RX ORDER — ONDANSETRON 4 MG/1
4 TABLET, ORALLY DISINTEGRATING ORAL EVERY 8 HOURS PRN
Qty: 20 TABLET | Refills: 0 | Status: ON HOLD | OUTPATIENT
Start: 2018-09-09 | End: 2019-03-12

## 2018-09-09 RX ORDER — LORAZEPAM 1 MG/1
1 TABLET ORAL EVERY 8 HOURS PRN
Qty: 6 TABLET | Refills: 0 | Status: SHIPPED | OUTPATIENT
Start: 2018-09-09 | End: 2018-10-22

## 2018-09-09 RX ORDER — LORAZEPAM 2 MG/ML
1 INJECTION INTRAMUSCULAR ONCE
Status: COMPLETED | OUTPATIENT
Start: 2018-09-09 | End: 2018-09-09

## 2018-09-09 RX ORDER — DIPHENHYDRAMINE HYDROCHLORIDE 50 MG/ML
50 INJECTION INTRAMUSCULAR; INTRAVENOUS ONCE
Status: COMPLETED | OUTPATIENT
Start: 2018-09-09 | End: 2018-09-09

## 2018-09-09 RX ADMIN — HYDROMORPHONE HYDROCHLORIDE 1 MG: 1 INJECTION, SOLUTION INTRAMUSCULAR; INTRAVENOUS; SUBCUTANEOUS at 13:01

## 2018-09-09 RX ADMIN — LORAZEPAM 1 MG: 2 INJECTION INTRAMUSCULAR; INTRAVENOUS at 13:35

## 2018-09-09 RX ADMIN — HYDROMORPHONE HYDROCHLORIDE 1 MG: 1 INJECTION, SOLUTION INTRAMUSCULAR; INTRAVENOUS; SUBCUTANEOUS at 15:14

## 2018-09-09 RX ADMIN — HYDROMORPHONE HYDROCHLORIDE 1 MG: 1 INJECTION, SOLUTION INTRAMUSCULAR; INTRAVENOUS; SUBCUTANEOUS at 14:12

## 2018-09-09 RX ADMIN — SODIUM CHLORIDE 1000 ML: 9 INJECTION, SOLUTION INTRAVENOUS at 12:58

## 2018-09-09 RX ADMIN — DIPHENHYDRAMINE HYDROCHLORIDE 50 MG: 50 INJECTION, SOLUTION INTRAMUSCULAR; INTRAVENOUS at 12:58

## 2018-09-09 RX ADMIN — HYDROMORPHONE HYDROCHLORIDE 1 MG: 1 INJECTION, SOLUTION INTRAMUSCULAR; INTRAVENOUS; SUBCUTANEOUS at 13:37

## 2018-09-09 ASSESSMENT — ENCOUNTER SYMPTOMS
APPETITE CHANGE: 1
ABDOMINAL PAIN: 1
ACTIVITY CHANGE: 1
FATIGUE: 1
CHILLS: 1
NAUSEA: 1
FEVER: 1
DIARRHEA: 1

## 2018-09-09 NOTE — ED AVS SNAPSHOT
[facility] Emergency Department    [address] [place] [address]    Phone:  [phone]    Fax:  [phone]                                       Doreen Peralta   MRN: [id number]    Department:  [facility] Emergency Department   Date of Visit:  9/9/2018           Patient Information     Date Of Birth          1980        Your diagnoses for this visit were:     Abdominal pain, generalized Chronic    Diarrhea of presumed infectious origin Presumed C-Diff       You were seen by Shaunna Raines, GERARD CNP.      Follow-up Information     Follow up with Franny Antoine MD In 1 week.    Specialty:  Internal Medicine    Contact information:    [facility]  [address] [address]  [phone]          Follow up with [facility] Emergency Department.    Specialty:  EMERGENCY MEDICINE    Why:  If symptoms worsen    Contact information:    [address] [place] [address] [phone]    Additional information:    From [address]: Exit at [address] on south side of [place]. Turn right on [address]. Turn left at stoplight on [address]. [facility] will be in view two blocks ahead        Discharge Instructions         Clostridium Difficile Infection  Clostridium difficile (C. diff) bacteria can be very harmful. They affect the intestinal tract. They can cause symptoms ranging from mild diarrhea to severe inflammation of the large intestine (colon). C. diff infection is most common during the days and weeks after treatment with antibiotics. Anyone can become infected. But the risk is greatly increased for people in hospitals and for people living in nursing homes or long-term care facilities. This is because antibiotic use is common there. Germs also spread easily in these places.    What causes C. diff infection?  The stomach and intestines have hundreds of kinds of bacteria. Many of these bacteria actually help  keep harmful bacteria like C. diff from causing problems. Small amounts of C. diff are normal in the intestine and don t cause problems. When you take an antibiotic, the normal balance of good and bad bacteria may be affected. There may be too few good bacteria and too many harmful bacteria like C diff. In hospitals and nursing homes, C. diff may be spread from an infected person to others. This can happen when staff or visitors touch infected people or objects such as bed rails, stethoscopes, or bedpans and then touch other people or surfaces.  What are the symptoms of C. diff infection?  About half of people with C. diff infection have no symptoms. Yet they can still pass the infection to others. Others do have symptoms. These include:    Watery diarrhea, which may contain mucus    Pain and cramping    Fever  Some who are infected develop serious problems. Symptoms include:    Belly (abdominal) pain    Abdominal swelling    Nausea and vomiting    Little or no diarrhea  How is C. diff infection diagnosed?  To confirm the infection, a sample of stool is tested for the bacteria or the toxins made by the bacteria.  How is C. diff infection treated?  Your healthcare provider will tell you to stop taking any antibiotics you have been prescribed, based on your healthcare needs. He or she may prescribe different medicines as needed. In certain cases, you may be given an antibiotic directed at the C. diff infection. Talk with your healthcare provider before stopping or starting any medicines.    Fluids are often given by IV (intravenously) through a vein. This helps replace fluids lost through diarrhea.    In rare cases you may need surgery if treatment doesn t cure severe symptoms  To lessen symptoms:    Drink plenty of fluids to replace water lost through diarrhea. Talk with your healthcare provider or nurse about which fluids are best.    Follow your healthcare provider s instructions for when and what to eat.    Unless  your healthcare provider tells you to do so, don't take medicines for diarrhea.    Tell your healthcare provider if symptoms return. Even after treatment, C. diff may come back.  Your doctor may give you an additional medicine if your symptoms come back or you are at risk for another C. diff infection. This medicine is called bezlotoxumab. It is not an antibiotic, but it can help keep your C. diff symptoms from returning.  What are the complications of C. diff infection?  Complications include:    Dehydration    Electrolyte imbalances    Low protein in the blood    Severe widening (dilation) of the large intestine    A hole (perforation) in the bowel    Low blood pressure    Kidney failure    Inflammation or infection all over the body    Death  How is C. diff prevented?  Hospitals and nursing homes take these steps to help prevent C. diff infections:    Limiting use of antibiotics. Giving antibiotics only when needed can help reduce C. diff infections.    Handwashing. Hospital staff should wash their hands before and after treating each person. They should also wash their hands after touching any surface in someone's' room. Soap and water work better than alcohol-based hand .    Protective clothing. Healthcare workers should wear gloves and a gown when entering the room of someone with C. diff infection. They should remove these items before leaving and then wash their hands.    Private rooms. People with C. diff should be in private rooms. Or they may share rooms with others who have the same infection.    Thorough cleaning. Equipment and rooms should be cleaned and disinfected every day.    Education. Everyone should be shown the best ways to avoid infection.  You can do the following to help prevent C. diff:    Take antibiotics only when you really need them. Antibiotics don t help treat illnesses caused by viruses. This includes colds and the flu. Don t ask for antibiotics from your healthcare provider  if he or she says they won t work.    When you are given antibiotics, take them as directed. Don t take more or less than the dosage prescribed. Do not take them for shorter or longer than your provider tells you to, even if you feel better.    Wash your hands carefully. Do this after using the bathroom and before eating. Use plenty of soap and warm water. Alcohol-based hand  may not work against C. diff germs.  Everyone can help prevent C. diff:  In a hospital or care facility:    Wash your hands well before and after visiting someone who has C. diff infection. Use soap and water. Alcohol-based hand  may not work against C. diff.    If the staff asks you to, wear gloves. Take any other steps you are asked to follow to help prevent infection.  At home:    If instructed, wear gloves when caring for a family member with C. diff infection. Throw the gloves away after each use. Then wash your hands well.    Wash the person s clothes, bed linen, and towels separately. Use hot water. Use both detergent and liquid bleach.    Disinfect surfaces in the person s room. This includes the phone, light switches, and remote controls.  Practice good handwashing:    Use warm water and plenty of soap. Rub your hands together well.    Clean your whole hand. Wash under nails, between fingers, and up your wrists.    Wash for at least 15 seconds to 20 seconds.     Rinse. Let the water run down your fingers, not up your wrists.    Dry your hands well. Then use a paper towel to turn off the faucet and open the door.  Date Last Reviewed: 1/1/2017 2000-2017 [employer]. [address] [place] [id number]. All rights reserved. This information is not intended as a substitute for professional medical care. Always follow your healthcare professional's instructions.          24 Hour Appointment Hotline       To make an appointment at any [facility], call [phone] ([phone]). If you don't  have a family doctor or clinic, we will help you find one. [place] clinics are conveniently located to serve the needs of you and your family.             Review of your medicines      START taking        Dose / Directions Last dose taken    fidaxomicin 200 MG tablet   Commonly known as:  DIFICID   Dose:  200 mg   Quantity:  20 tablet        Take 1 tablet (200 mg) by mouth 2 times daily for 10 days   Refills:  0        LORazepam 1 MG tablet   Commonly known as:  ATIVAN   Dose:  1 mg   Quantity:  6 tablet        Take 1 tablet (1 mg) by mouth every 8 hours as needed for nausea   Refills:  0        ondansetron 4 MG ODT tab   Commonly known as:  ZOFRAN ODT   Dose:  4 mg   Quantity:  20 tablet        Take 1 tablet (4 mg) by mouth every 8 hours as needed   Refills:  0          Our records show that you are taking the medicines listed below. If these are incorrect, please call your family doctor or clinic.        Dose / Directions Last dose taken    ACETAMINOPHEN PO   Dose:  500-1000 mg        Take 500-1,000 mg by mouth every 6 hours as needed for pain   Refills:  0        albuterol (2.5 MG/3ML) 0.083% neb solution   Dose:  2.5 mg   Quantity:  360 mL        Take 1 vial (2.5 mg) by nebulization every 4 hours as needed for shortness of breath / dyspnea or wheezing   Refills:  0        albuterol 90 MCG/ACT inhaler   Dose:  2 puff        Inhale 2 puffs into the lungs every 6 hours as needed   Refills:  0        Alfalfa 650 MG Tabs        Refills:  0        cholestyramine light 4 GM Packet   Commonly known as:  QUESTRAN   Dose:  4 g   Quantity:  60 packet        Take 1 packet (4 g) by mouth 2 times daily   Refills:  1        cloNIDine 0.2 MG tablet   Commonly known as:  CATAPRES   Dose:  0.4 mg   Quantity:  60 tablet        Take 2 tablets (0.4 mg) by mouth every evening - DUE FOR FOLLOW UP   Refills:  0        diphenhydrAMINE HCl 50 MG Tabs   Commonly known as:  BENADRYL   Dose:  50 mg   Quantity:  30 tablet        Take 50 mg  by mouth every 6 hours as needed (nausea)   Refills:  0        DULoxetine 60 MG EC capsule   Commonly known as:  CYMBALTA   Quantity:  90 capsule        TAKE 1 CAPSULE(60 MG) BY MOUTH DAILY   Refills:  1        HYDROcodone-acetaminophen 5-325 MG per tablet   Commonly known as:  NORCO   Dose:  1 tablet   Quantity:  12 tablet        Take 1 tablet by mouth every 4 hours as needed for pain   Refills:  0        HYDROmorphone 2 MG tablet   Commonly known as:  DILAUDID   Dose:  2 mg   Quantity:  10 tablet        Take 1 tablet (2 mg) by mouth every 6 hours as needed for pain   Refills:  0        ipratropium 0.02 % neb solution   Commonly known as:  ATROVENT   Dose:  0.5 mg   Quantity:  225 mL        Take 2.5 mLs (0.5 mg) by nebulization every 6 hours as needed for wheezing   Refills:  0        ondansetron 8 MG tablet   Commonly known as:  ZOFRAN   Dose:  8 mg   Quantity:  9 tablet        Take 1 tablet (8 mg) by mouth every 8 hours as needed for nausea   Refills:  0        SUMAtriptan 50 MG tablet   Commonly known as:  IMITREX   Dose:   mg   Quantity:  9 tablet        Take 1-2 tablets ( mg) by mouth at onset of headache for migraine - LAST REFILL, DUE FOR FOLLOW UP   Refills:  0        traZODone 100 MG tablet   Commonly known as:  DESYREL   Dose:   mg   Quantity:  90 tablet        Take 0.5-1 tablets ( mg) by mouth nightly as needed for sleep   Refills:  8                Information about OPIOIDS     PRESCRIPTION OPIOIDS: WHAT YOU NEED TO KNOW   We gave you an opioid (narcotic) pain medicine. It is important to manage your pain, but opioids are not always the best choice. You should first try all the other options your care team gave you. Take this medicine for as short a time (and as few doses) as possible.    Some activities can increase your pain, such as bandage changes or therapy sessions. It may help to take your pain medicine 30 to 60 minutes before these activities. Reduce your stress by  getting enough sleep, working on hobbies you enjoy and practicing relaxation or meditation. Talk to your care team about ways to manage your pain beyond prescription opioids.    These medicines have risks:    DO NOT drive when on new or higher doses of pain medicine. These medicines can affect your alertness and reaction times, and you could be arrested for driving under the influence (DUI). If you need to use opioids long-term, talk to your care team about driving.    DO NOT operate heavy machinery    DO NOT do any other dangerous activities while taking these medicines.    DO NOT drink any alcohol while taking these medicines.     If the opioid prescribed includes acetaminophen, DO NOT take with any other medicines that contain acetaminophen. Read all labels carefully. Look for the word  acetaminophen  or  Tylenol.  Ask your pharmacist if you have questions or are unsure.    You can get addicted to pain medicines, especially if you have a history of addiction (chemical, alcohol or substance dependence). Talk to your care team about ways to reduce this risk.    All opioids tend to cause constipation. Drink plenty of water and eat foods that have a lot of fiber, such as fruits, vegetables, prune juice, apple juice and high-fiber cereal. Take a laxative (Miralax, milk of magnesia, Colace, Senna) if you don t move your bowels at least every other day. Other side effects include upset stomach, sleepiness, dizziness, throwing up, tolerance (needing more of the medicine to have the same effect), physical dependence and slowed breathing.    Store your pills in a secure place, locked if possible. We will not replace any lost or stolen medicine. If you don t finish your medicine, please throw away (dispose) as directed by your pharmacist. The [place] [employer] has more information about safe disposal: [email or web address]        Prescriptions were sent or  printed at these locations (4 Prescriptions)                   [facility] - [place], [place] - [address] AT [facility] [address]   [address] [address]    Telephone:  [phone]   Fax:  [phone]   Hours:                  E-Prescribed (2 of 4)         fidaxomicin (DIFICID) 200 MG tablet               ondansetron (ZOFRAN ODT) 4 MG ODT tab                 Printed at Department/Unit printer (2 of 4)         HYDROmorphone (DILAUDID) 2 MG tablet               LORazepam (ATIVAN) 1 MG tablet                Procedures and tests performed during your visit     Blood culture ONE site    CBC with platelets differential    CRP inflammation    Clostridium difficile toxin B PCR    Comprehensive metabolic panel    Enteric Bacteria and Virus Panel by [facility] Stool    Lactic acid whole blood    Lipase    Peripheral IV catheter    UA with Microscopic      Orders Needing Specimen Collection     None      Pending Results     Date and Time Order Name Status Description    9/9/2018 1220 Clostridium difficile toxin B PCR In process     9/9/2018 1220 Enteric Bacteria and Virus Panel by [facility] Stool In process     9/9/2018 1137 Blood culture ONE site In process             Pending Culture Results     Date and Time Order Name Status Description    9/9/2018 1220 Clostridium difficile toxin B PCR In process     9/9/2018 1220 Enteric Bacteria and Virus Panel by [facility] Stool In process     9/9/2018 1137 Blood culture ONE site In process             Pending Results Instructions     If you had any lab results that were not finalized at the time of your Discharge, you can call the ED Lab Result RN at [phone]. You will be contacted by this team for any positive Lab results or changes in treatment. The nurses are available 7 days a week from 10A to 6:30P.  You can leave a message 24 hours per day and they will return your call.        Thank you for choosing [facility]       Thank you for choosing  [facility] for your care. Our goal is always to provide you with excellent care. Hearing back from our patients is one way we can continue to improve our services. Please take a few minutes to complete the written survey that you may receive in the mail after you visit with us. Thank you!        [employer] Information     [employer] gives you secure access to your electronic health record. If you see a primary care provider, you can also send messages to your care team and make appointments. If you have questions, please call your primary care clinic.  If you do not have a primary care provider, please call [phone] and they will assist you.        Care EveryWhere ID     This is your Care EveryWhere ID. This could be used by other organizations to access your [facility] medical records  [id number]        Equal Access to Services     TRAMAINE CLAYTON : Carrie Law, clark villeda, vesta long, elham gomez. So [facility] [phone].    ATENCIÓN: Si habla español, tiene a wade disposición servicios gratuitos de asistencia lingüística. Llame al [phone].    We comply with applicable federal civil rights laws and [place] laws. We do not discriminate on the basis of race, color, national origin, age, disability, sex, sexual orientation, or gender identity.            After Visit Summary       This is your record. Keep this with you and show to your community pharmacist(s) and doctor(s) at your next visit.

## 2018-09-09 NOTE — DISCHARGE INSTRUCTIONS
Clostridium Difficile Infection  Clostridium difficile (C. diff) bacteria can be very harmful. They affect the intestinal tract. They can cause symptoms ranging from mild diarrhea to severe inflammation of the large intestine (colon). C. diff infection is most common during the days and weeks after treatment with antibiotics. Anyone can become infected. But the risk is greatly increased for people in hospitals and for people living in nursing homes or long-term care facilities. This is because antibiotic use is common there. Germs also spread easily in these places.    What causes C. diff infection?  The stomach and intestines have hundreds of kinds of bacteria. Many of these bacteria actually help keep harmful bacteria like C. diff from causing problems. Small amounts of C. diff are normal in the intestine and don t cause problems. When you take an antibiotic, the normal balance of good and bad bacteria may be affected. There may be too few good bacteria and too many harmful bacteria like C diff. In hospitals and nursing homes, C. diff may be spread from an infected person to others. This can happen when staff or visitors touch infected people or objects such as bed rails, stethoscopes, or bedpans and then touch other people or surfaces.  What are the symptoms of C. diff infection?  About half of people with C. diff infection have no symptoms. Yet they can still pass the infection to others. Others do have symptoms. These include:    Watery diarrhea, which may contain mucus    Pain and cramping    Fever  Some who are infected develop serious problems. Symptoms include:    Belly (abdominal) pain    Abdominal swelling    Nausea and vomiting    Little or no diarrhea  How is C. diff infection diagnosed?  To confirm the infection, a sample of stool is tested for the bacteria or the toxins made by the bacteria.  How is C. diff infection treated?  Your healthcare provider will tell you to stop taking any antibiotics you  have been prescribed, based on your healthcare needs. He or she may prescribe different medicines as needed. In certain cases, you may be given an antibiotic directed at the C. diff infection. Talk with your healthcare provider before stopping or starting any medicines.    Fluids are often given by IV (intravenously) through a vein. This helps replace fluids lost through diarrhea.    In rare cases you may need surgery if treatment doesn t cure severe symptoms  To lessen symptoms:    Drink plenty of fluids to replace water lost through diarrhea. Talk with your healthcare provider or nurse about which fluids are best.    Follow your healthcare provider s instructions for when and what to eat.    Unless your healthcare provider tells you to do so, don't take medicines for diarrhea.    Tell your healthcare provider if symptoms return. Even after treatment, C. diff may come back.  Your doctor may give you an additional medicine if your symptoms come back or you are at risk for another C. diff infection. This medicine is called bezlotoxumab. It is not an antibiotic, but it can help keep your C. diff symptoms from returning.  What are the complications of C. diff infection?  Complications include:    Dehydration    Electrolyte imbalances    Low protein in the blood    Severe widening (dilation) of the large intestine    A hole (perforation) in the bowel    Low blood pressure    Kidney failure    Inflammation or infection all over the body    Death  How is C. diff prevented?  Hospitals and nursing homes take these steps to help prevent C. diff infections:    Limiting use of antibiotics. Giving antibiotics only when needed can help reduce C. diff infections.    Handwashing. Hospital staff should wash their hands before and after treating each person. They should also wash their hands after touching any surface in someone's' room. Soap and water work better than alcohol-based hand .    Protective clothing. Healthcare  workers should wear gloves and a gown when entering the room of someone with C. diff infection. They should remove these items before leaving and then wash their hands.    Private rooms. People with C. diff should be in private rooms. Or they may share rooms with others who have the same infection.    Thorough cleaning. Equipment and rooms should be cleaned and disinfected every day.    Education. Everyone should be shown the best ways to avoid infection.  You can do the following to help prevent C. diff:    Take antibiotics only when you really need them. Antibiotics don t help treat illnesses caused by viruses. This includes colds and the flu. Don t ask for antibiotics from your healthcare provider if he or she says they won t work.    When you are given antibiotics, take them as directed. Don t take more or less than the dosage prescribed. Do not take them for shorter or longer than your provider tells you to, even if you feel better.    Wash your hands carefully. Do this after using the bathroom and before eating. Use plenty of soap and warm water. Alcohol-based hand  may not work against C. diff germs.  Everyone can help prevent C. diff:  In a hospital or care facility:    Wash your hands well before and after visiting someone who has C. diff infection. Use soap and water. Alcohol-based hand  may not work against C. diff.    If the staff asks you to, wear gloves. Take any other steps you are asked to follow to help prevent infection.  At home:    If instructed, wear gloves when caring for a family member with C. diff infection. Throw the gloves away after each use. Then wash your hands well.    Wash the person s clothes, bed linen, and towels separately. Use hot water. Use both detergent and liquid bleach.    Disinfect surfaces in the person s room. This includes the phone, light switches, and remote controls.  Practice good handwashing:    Use warm water and plenty of soap. Rub your hands  together well.    Clean your whole hand. Wash under nails, between fingers, and up your wrists.    Wash for at least 15 seconds to 20 seconds.     Rinse. Let the water run down your fingers, not up your wrists.    Dry your hands well. Then use a paper towel to turn off the faucet and open the door.  Date Last Reviewed: 1/1/2017 2000-2017 [employer] [address] [place] [address], [place] [id number]. All rights reserved. This information is not intended as a substitute for professional medical care. Always follow your healthcare professional's instructions.

## 2018-09-09 NOTE — ED PROVIDER NOTES
History     Chief Complaint   Patient presents with     Fever     HPI  Doreen Peralta is a 38 year old female who presents to the emergency department today with a four-day history of fever on and off, nausea, and malodorous watery diarrhea.  Patient has extensive GI history with multiple episodes of C. difficile.  Patient was started on Bactrim a week ago for a wound infection to her left leg.  Patient reports that her stools are very similar to her prior episodes of C. Difficile.  Patient reports that the last time she saw her infectious disease doctor they reported that they really did not know what to do for patient.  Patient denies any hematochezia.  Patient reports she feels very dehydrated.    Problem List:    Patient Active Problem List    Diagnosis Date Noted     Bacteremia 06/26/2018     Priority: Medium     Acute renal failure (H) 04/21/2018     Priority: Medium     History of bacteremia - recurrent 04/17/2018     Priority: Medium     Tobacco abuse 04/16/2018     Priority: Medium     Iron deficiency anemia 04/16/2018     Priority: Medium     Stool toxin PCR positive for Clostridium difficile on 4/17/18 04/16/2018     Priority: Medium     Gram negative septicemia (H) - Ochrobactrum, Stenotrophomonas & Pantoea 08/24/2017     Priority: Medium     Hypokalemia 08/24/2017     Priority: Medium     Essential hypertension 08/24/2017     Priority: Medium     Sepsis due to Klebsiella (H) 07/27/2017     Priority: Medium     Insomnia, unspecified type 03/31/2017     Priority: Medium     Abdominal pain 02/15/2017     Priority: Medium     Abdominal pain, generalized 01/28/2017     Priority: Medium     History of deep venous thrombosis 01/26/2017     Priority: Medium     Nausea and vomiting 01/26/2017     Priority: Medium     Vitamin D deficiency 01/16/2017     Priority: Medium     Chronic abdominal pain 11/15/2016     Priority: Medium     Patient is followed by Larisa Lorenzo PA-C for ongoing prescription of  pain medication.  All refills should be approved by this provider, or covering partner.    Medication(s): no regular narcotics - narcotics given at ED visits  Maximum quantity per month: N/A  Clinic visit frequency required: Q 6  months     Controlled substance agreement:  Encounter-Level CSA - 11/9/15:               Controlled Substance Agreement - Scan on 11/19/2015 11:17 AM : CONTROLLED SUBSTANCE AGREEMENT 11/09/15 (below)          Encounter-Level CSA - 2/27/15:               Controlled Substance Agreement - Scan on 3/12/2015  7:50 AM : Controlled Medication Agreement 02/27/15 (below)            Pain Clinic evaluation in the past: Yes    DIRE Total Score(s):  No flowsheet data found.    Last [facility] website verification:  done on 11/15/16   [email or web address]        Attention to G-tube (H) 11/08/2016     Priority: Medium     Fever 10/16/2016     Priority: Medium     Coagulation defect (H) [D68.9] 09/16/2016     Priority: Medium     Chronic diarrhea 07/22/2016     Priority: Medium     Migraine 07/20/2016     Priority: Medium     Positive blood culture - Klebsiella oxytoca from Port-a-cath culture 07/18/2016     Priority: Medium     Mitral regurgitation mild-mod by Echo June 2016 07/18/2016     Priority: Medium     Anemia, iron deficiency 07/17/2016     Priority: Medium     Anemia in other chronic diseases classified elsewhere 06/14/2016     Priority: Medium     Munchausen syndrome - previously suspected 06/14/2016     Priority: Medium     Long-term (current) use of anticoagulants [Z79.01] 05/16/2016     Priority: Medium     Anxiety 05/16/2016     Priority: Medium     S/P partial resection of colon 04/11/2016     Priority: Medium     Malnutrition (H) 04/11/2016     Priority: Medium     Jejunostomy tube present (H) 03/21/2016     Priority: Medium     Malfunctioning jejunostomy tube (H) 12/22/2015     Priority: Medium     Malfunction of gastrostomy tube (H) 11/19/2015     Priority: Medium     PEG tube  malfunction (H) 10/16/2015     Priority: Medium     Health Care Home 01/16/2015     Priority: Medium     *See Letters for [facility] Care Plan: My Access Plan           PEG (percutaneous endoscopic gastrostomy) status 11/05/2014     Priority: Medium     Gastroparesis 04/11/2014     Priority: Medium     Overview:   Had ileostomy performed at [facility] in Jan 2012 as treatment       Migraines 04/11/2014     Priority: Medium     4/11/2014  With periods, every other month.       Intermittent asthma 04/11/2014     Priority: Medium     4/11/2014   With URIs, allergies       Allergic rhinitis 04/11/2014     Priority: Medium     Problem list name updated by automated process. Provider to review       Abnormal Pap smear of cervix 04/11/2014     Priority: Medium     4/11/2014  S/p colp and LEEP. Sees OB/GYN at [facility]. Pap every year x20 years - normal since.       S/P LEEP of cervix 02/06/2014     Priority: Medium     Patellofemoral stress syndrome 01/18/2014     Priority: Medium     Hx SBO 10/09/2013     Priority: Medium     4/11/2014  Recurrent. Sees GI (Dr. Redding - at [facility]) every 6 months or so.  Sees feeding tube nurse next week.       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 04/11/2013     Priority: Medium     Constipation by delayed colonic transit 10/24/2012     Priority: Medium     Atopic rhinitis 03/20/2009     Priority: Medium     Hyperbilirubinemia 03/11/2009     Priority: Medium        Past Medical History:    Past Medical History:   Diagnosis Date     Asthma      Bilateral ovarian cysts      Cervical cancer (H) 01/01/2008     Chronic pain      Colonic dysmotility      Constipation      E. coli sepsis (H) 5/8/2016     Enteritis      Fungemia 5/5/2016     Gastro-oesophageal reflux disease      H/O ileostomy      Hx of abnormal Pap smear      Hypertension      IBS (irritable bowel syndrome)      Other chronic pain      PONV (postoperative nausea and vomiting)      Thrombosis, hepatic vein (H)        Past [facility] History:     Past [facility] History:   Procedure Laterality Date     COLONOSCOPY  7/10/2012    Procedure: COLONOSCOPY;;  Surgeon: Nicole Redding MD;  [facility]     COLONOSCOPY N/A 2/19/2017    Procedure: COLONOSCOPY;  Surgeon: Randell Muller MD;  [facility]     COLONOSCOPY N/A 2/21/2017    Procedure: COLONOSCOPY;  Surgeon: Randell Muller MD;  Location: [facility]     COLONOSCOPY N/A 5/3/2018    Procedure: COMBINED COLONOSCOPY, SINGLE OR MULTIPLE BIOPSY/POLYPECTOMY BY BIOPSY;  EGD/Colonoscopy ;  Surgeon: Loi Black MD;  Location: [facility] GI     ECHO CHELO  7/19/2016          ENDOSCOPIC INSERTION TUBE GASTROSTOMY N/A 1/21/2016    Procedure: ENDOSCOPIC INSERTION TUBE GASTROSTOMY;  Surgeon: Nicole Redding MD;  [facility]     ESOPHAGOSCOPY, GASTROSCOPY, DUODENOSCOPY (EGD), COMBINED  7/10/2012    Procedure: COMBINED ESOPHAGOSCOPY, GASTROSCOPY, DUODENOSCOPY (EGD);  Upper Endoscopy, Ileoscopy    Latex Allergy  with biopsies;  Surgeon: Nicole Redding MD;  [facility]     ESOPHAGOSCOPY, GASTROSCOPY, DUODENOSCOPY (EGD), COMBINED N/A 11/5/2014    Procedure: COMBINED ESOPHAGOSCOPY, GASTROSCOPY, DUODENOSCOPY (EGD);  Surgeon: Nicole Redding MD;  [facility]     ESOPHAGOSCOPY, GASTROSCOPY, DUODENOSCOPY (EGD), COMBINED N/A 5/3/2018    Procedure: COMBINED ESOPHAGOSCOPY, GASTROSCOPY, DUODENOSCOPY (EGD), BIOPSY SINGLE OR MULTIPLE;;  Surgeon: Loi Black MD;  Location[facility]     HC REPLACE DUODENOSTOMY/JEJUNOSTOMY TUBE PERCUTANEOUS N/A 8/27/2015    Procedure: REPLACE GASTROJEJUNOSTOMY TUBE, PERCUTANEOUS;  Surgeon: Mio Holder MD;  [facility]     HC REPLACE DUODENOSTOMY/JEJUNOSTOMY TUBE PERCUTANEOUS N/A 1/7/2016    Procedure: REPLACE JEJUNOSTOMY TUBE, PERCUTANEOUS;  Surgeon: Elsa Medel MD;  [facility]     HC REPLACE DUODENOSTOMY/JEJUNOSTOMY TUBE PERCUTANEOUS N/A 1/28/2016    Procedure: REPLACE JEJUNOSTOMY TUBE, PERCUTANEOUS;  Surgeon: Elsa Medel  MD Sue;  Location: [facility]     HC REPLACE GASTROSTOMY/CECOSTOMY TUBE PERCUTANEOUS Left 5/19/2015    Procedure: REPLACE GASTROSTOMY TUBE, PERCUTANEOUS;  Surgeon: Melecio Morejon Chi, MD;  Location: [facility]     HC UGI ENDOSCOPY W PLACEMENT GASTROSTOMY TUBE PERCUT N/A 10/1/2015    Procedure: COMBINED ESOPHAGOSCOPY, GASTROSCOPY, DUODENOSCOPY (EGD), PLACE PERCUTANEOUS ENDOSCOPIC GASTROSTOMY TUBE;  Surgeon: Mio Holder MD;  [facility]     INSERT PORT VASCULAR ACCESS Right 7/20/2017    Procedure: INSERT PORT VASCULAR ACCESS;  Chest Port Placement  **Latex Allergy**;  Surgeon: Coy Rocha PA-C;  [facility]     LAPAROSCOPIC ASSISTED COLECTOMY  1/20/2012    Procedure:LAPAROSCOPIC ASSISTED COLECTOMY; Laparoscopic Ileostomy       LAPAROSCOPIC ASSISTED COLECTOMY LEFT (DESCENDING)  10/24/2012    Procedure: LAPAROSCOPIC ASSISTED COLECTOMY LEFT (DESCENDING);   Hand Assisted Laproscopic Subtotal abdominal Colectomy,Iieorectal anastamosis, Ileostomy Closure.       LAPAROSCOPIC ASSISTED INSERTION TUBE JEJUNOSTOMY N/A 10/16/2015    Procedure: LAPAROSCOPIC ASSISTED INSERTION TUBE JEJUNOSTOMY;  Surgeon: Elsa Medel MD;  [facility]     LAPAROSCOPIC CHOLECYSTECTOMY  2002    [facility] stones duct     LAPAROSCOPIC ILEOSTOMY  1/20/2012    [facility], loop     LAPAROSCOPIC OOPHORECTOMY Right 2009    [facility]     LAPAROTOMY EXPLORATORY N/A 1/28/2016    Procedure: LAPAROTOMY EXPLORATORY;  Surgeon: Elsa Medel MD;  [facility]     LEEP TX, CERVICAL  2009    [facility]     PICC INSERTION Left 10/21/2015    5fr DL Power PICC, 37cm (2cm external) in the L basilic vein w/ tip in the SVC RA junction.     REMOVE GASTROSTOMY TUBE ADULT N/A 12/12/2014    Procedure: REMOVE GASTROSTOMY TUBE ADULT;  Surgeon: iNcole Redding MD;  Location: [facility]     REMOVE PORT VASCULAR ACCESS Right 6/30/2016    Procedure: REMOVE PORT VASCULAR ACCESS;  Surgeon: Pradeep Orosco MD;  Location:  OR      REMOVE PORT VASCULAR ACCESS Right 9/8/2017    Procedure: REMOVE PORT VASCULAR ACCESS;  right side mediport removal;  Surgeon: Zechariah Worthington MD;  Location: [facility] OR     replace GASTROSTOMY TUBE ADULT  5/19/15       Family History:    Family History   Problem Relation Age of Onset     Thyroid Disease Mother      Sjogren's Mother      GASTROINTESTINAL DISEASE Mother      Intermittent nausea vomiting diarrhea     Colon Polyps Mother      Prostate Problems Father      prostate enlargement     Lupus Maternal Grandmother      Cancer Maternal Grandfather      Lung     Colon Cancer Maternal Grandfather 65     Cancer Paternal Grandmother      Lung      Cerebrovascular Disease Paternal Grandmother      Diabetes Paternal Grandmother      Cardiovascular Paternal Grandmother      CHF     Cancer Paternal Grandfather      Lung     Glaucoma Paternal Grandfather      Abdominal Aortic Aneurysm Other      Macular Degeneration No family hx of        Social History:  Marital Status:   [2]  Social History   Substance Use Topics     Smoking status: Current Some Day Smoker     Packs/day: 1.00     Years: 4.00     Types: Cigarettes     Last attempt to quit: 1/1/2004     Smokeless tobacco: Never Used     Alcohol use No        Medications:      fidaxomicin (DIFICID) 200 MG tablet   ACETAMINOPHEN PO   albuterol (2.5 MG/3ML) 0.083% neb solution   albuterol 90 MCG/ACT inhaler   Alfalfa 650 MG TABS   cholestyramine light (QUESTRAN) 4 GM Packet   cloNIDine (CATAPRES) 0.2 MG tablet   diphenhydrAMINE HCl 50 MG TABS   DULoxetine (CYMBALTA) 60 MG EC capsule   HYDROcodone-acetaminophen (NORCO) 5-325 MG per tablet   HYDROmorphone (DILAUDID) 2 MG tablet   ipratropium (ATROVENT) 0.02 % neb solution   ondansetron (ZOFRAN) 8 MG tablet   SUMAtriptan (IMITREX) 50 MG tablet   traZODone (DESYREL) 100 MG tablet         Review of Systems   Constitutional: Positive for activity change, appetite change, chills, fatigue and fever.   Gastrointestinal: Positive  "for abdominal pain, diarrhea and nausea.   Skin: Positive for pallor.   All other systems reviewed and are negative.      Physical Exam   BP: (!) 143/102  Pulse: 67  Temp: 97.6  F (36.4  C)  Resp: 18  Height: 167.6 cm (5' 6\")  Weight: 75.3 kg (166 lb)  SpO2: 99 %      Physical Exam   Constitutional: She is oriented to person, place, and time. She appears well-developed. She appears distressed (lying on bed, gaurding abdomen, appears uncomortable).   HENT:   Head: Normocephalic.   Modestly dry mucus membranes   Eyes: Conjunctivae are normal.   Neck: Normal range of motion.   Cardiovascular: Normal rate, regular rhythm and intact distal pulses.    Pulmonary/Chest: Effort normal and breath sounds normal.   Abdominal: Soft. She exhibits no distension. There is tenderness (Generalized throughout).   Musculoskeletal: Normal range of motion.   Neurological: She is alert and oriented to person, place, and time.   Skin: There is pallor.       ED Course     ED Course     Procedures      Results for orders placed or performed during the hospital encounter of 09/09/18 (from the past 24 hour(s))   CBC with platelets differential   Result Value Ref Range    WBC 8.9 4.0 - 11.0 10e9/L    RBC Count 3.72 (L) 3.8 - 5.2 10e12/L    Hemoglobin 9.5 (L) 11.7 - 15.7 g/dL    Hematocrit 30.0 (L) 35.0 - 47.0 %    MCV 81 78 - 100 fl    MCH 25.5 (L) 26.5 - 33.0 pg    MCHC 31.7 31.5 - 36.5 g/dL    RDW 17.6 (H) 10.0 - 15.0 %    Platelet Count 362 150 - 450 10e9/L    Diff Method Automated Method     % Neutrophils 77.1 %    % Lymphocytes 18.4 %    % Monocytes 3.5 %    % Eosinophils 0.1 %    % Basophils 0.2 %    % Immature Granulocytes 0.7 %    Nucleated RBCs 0 0 /100    Absolute Neutrophil 6.9 1.6 - 8.3 10e9/L    Absolute Lymphocytes 1.6 0.8 - 5.3 10e9/L    Absolute Monocytes 0.3 0.0 - 1.3 10e9/L    Absolute Basophils 0.0 0.0 - 0.2 10e9/L    Abs Immature Granulocytes 0.1 0 - 0.4 10e9/L    Absolute Nucleated RBC 0.0    Lactic acid whole blood "   Result Value Ref Range    Lactic Acid 1.2 0.7 - 2.0 mmol/L   Comprehensive metabolic panel   Result Value Ref Range    Sodium 135 133 - 144 mmol/L    Potassium 4.6 3.4 - 5.3 mmol/L    Chloride 106 94 - 109 mmol/L    Carbon Dioxide 21 20 - 32 mmol/L    Anion Gap 8 3 - 14 mmol/L    Glucose 92 70 - 99 mg/dL    Urea Nitrogen 9 7 - 30 mg/dL    Creatinine 0.73 0.52 - 1.04 mg/dL    GFR Estimate 89 >60 mL/min/1.7m2    GFR Estimate If Black >90 >60 mL/min/1.7m2    Calcium 8.4 (L) 8.5 - 10.1 mg/dL    Bilirubin Total 0.4 0.2 - 1.3 mg/dL    Albumin 3.0 (L) 3.4 - 5.0 g/dL    Protein Total 8.1 6.8 - 8.8 g/dL    Alkaline Phosphatase 136 40 - 150 U/L    ALT 11 0 - 50 U/L    AST 21 0 - 45 U/L   Lipase   Result Value Ref Range    Lipase 89 73 - 393 U/L   CRP inflammation   Result Value Ref Range    CRP Inflammation 28.9 (H) 0.0 - 8.0 mg/L   UA with Microscopic   Result Value Ref Range    Color Urine Yellow     Appearance Urine Clear     Glucose Urine Negative NEG^Negative mg/dL    Bilirubin Urine Negative NEG^Negative    Ketones Urine Negative NEG^Negative mg/dL    Specific Gravity Urine 1.008 1.003 - 1.035    Blood Urine Negative NEG^Negative    pH Urine 7.0 5.0 - 7.0 pH    Protein Albumin Urine Negative NEG^Negative mg/dL    Urobilinogen mg/dL 0.0 0.0 - 2.0 mg/dL    Nitrite Urine Negative NEG^Negative    Leukocyte Esterase Urine Negative NEG^Negative    Source Midstream Urine     WBC Urine <1 0 - 5 /HPF    RBC Urine 1 0 - 2 /HPF    Bacteria Urine Many (A) NEG^Negative /HPF    Squamous Epithelial /HPF Urine 7 (H) 0 - 1 /HPF    Mucous Urine Present (A) NEG^Negative /LPF     *Note: Due to a large number of results and/or encounters for the requested time period, some results have not been displayed. A complete set of results can be found in Results Review.       Medications   0.9% sodium chloride BOLUS (0 mLs Intravenous Stopped 9/9/18 1416)   diphenhydrAMINE (BENADRYL) injection 50 mg (50 mg Intravenous Given 9/9/18 6609)    HYDROmorphone (DILAUDID) injection 1 mg (1 mg Intravenous Given 9/9/18 1301)   LORazepam (ATIVAN) injection 1 mg (1 mg Intravenous Given 9/9/18 1335)   HYDROmorphone (DILAUDID) injection 1 mg (1 mg Intravenous Given 9/9/18 1337)   HYDROmorphone (DILAUDID) injection 1 mg (1 mg Intravenous Given 9/9/18 1412)       Assessments & Plan (with Medical Decision Making)  Doreen is a 38-year-old female, extensive GI history who presents to the emergency department today for concerns of sepsis.  Please refer to HPI and focused exam.  Patient on arrival here is afebrile she is hemodynamically stable, she does appear dehydrated and pale.  Peripheral IV was attempted by myself with ultrasound guidance, was able to draw blood but then vein blew, Dr. Workman was able to establish IV access.  Patient was given a liter of normal saline.  Patient was given 3 doses of Dilaudid, milligram Ativan and Benadryl and is feeling much better.  Blood work today is reassuring, patient has a normal white count and lactic acid.  Urinalysis is negative for infection, hemoglobin is 9.5 today which is slightly lower than normal for patient, however, she recently stopped her iron infusions as they were continuously having such a hard time establishing an IV for these.  CMP returns unremarkable.  Lipase is normal at 89, CRP is mildly elevated at 28.9.  Cultures pending.  Stool culture obtained for enteric panel and C. difficile.  Given patient's ongoing history of C. difficile in light of her recent Bactrim use I am going to go ahead and treat her for C. difficile pending culture.  Patient did not respond to vancomycin or Flagyl in the past but did respond well to fidaxomicin in the past.  Patient reports she feels she is okay to go home, I will give her a small supply of Dilaudid, she did request a couple tablets of Ativan as well.  Zofran was refilled.  Reasons to return to the emergency department were discussed in detail. I would like patient to  follow up in clinic.  Patient is agreeable to plan of care and was discharged home with her mom driving.     I have reviewed the nursing notes.    I have reviewed the findings, diagnosis, plan and need for follow up with the patient.    New Prescriptions    FIDAXOMICIN (DIFICID) 200 MG TABLET    Take 1 tablet (200 mg) by mouth 2 times daily for 10 days    HYDROMORPHONE (DILAUDID) 2 MG TABLET    Take 1 tablet (2 mg) by mouth every 6 hours as needed for pain    LORAZEPAM (ATIVAN) 1 MG TABLET    Take 1 tablet (1 mg) by mouth every 8 hours as needed for nausea    ONDANSETRON (ZOFRAN ODT) 4 MG ODT TAB    Take 1 tablet (4 mg) by mouth every 8 hours as needed       Final diagnoses:   Abdominal pain, generalized - Chronic   Diarrhea of presumed infectious origin - Presumed C-Diff       9/9/2018   [facility] EMERGENCY DEPARTMENT     Shaunna Raines, GERARD CNP  09/09/18 [id number]

## 2018-09-09 NOTE — ED TRIAGE NOTES
She has had a fever that goes up to 103 at times for the past 4 days.  She has also been on bactrim since Tuesday or Wednesday for a cellulitis and has been battling c-dif recently.

## 2018-09-09 NOTE — ED NOTES
"Pt mentioned that she has been having pains right upper chest radiating into back since Thursday. \" I have been laying in bed a lot\" Denies SOA, swelling or pain in extremities. Pain worse with movement and deep breath. JOSE Raines NP notified  "

## 2018-09-09 NOTE — ED AVS SNAPSHOT
[facility] Emergency Department    [address] [place] [address]    Phone:  [phone]    Fax:  [phone]                                       Doreen Peralta   MRN: [id number]    Department:  [facility] Emergency Department   Date of Visit:  9/9/2018           After Visit Summary Signature Page     I have received my discharge instructions, and my questions have been answered. I have discussed any challenges I see with this plan with the nurse or doctor.    ..........................................................................................................................................  Patient/Patient Representative Signature      ..........................................................................................................................................  Patient Representative Print Name and Relationship to Patient    ..................................................               ................................................  Date                                            Time    ..........................................................................................................................................  Reviewed by Signature/Title    ...................................................              ..............................................  Date                                                            Time          22EPIC Rev 08/18

## 2018-09-15 LAB
BACTERIA SPEC CULT: NORMAL
Lab: NORMAL
SPECIMEN SOURCE: NORMAL

## 2018-10-08 ENCOUNTER — HOSPITAL ENCOUNTER (EMERGENCY)
Facility: CLINIC | Age: 38
Discharge: HOME | End: 2018-10-08
Attending: EMERGENCY MEDICINE | Admitting: EMERGENCY MEDICINE
Payer: MEDICARE

## 2018-10-08 VITALS
RESPIRATION RATE: 18 BRPM | DIASTOLIC BLOOD PRESSURE: 104 MMHG | BODY MASS INDEX: 25.23 KG/M2 | WEIGHT: 157 LBS | HEIGHT: 66 IN | TEMPERATURE: 98.3 F | SYSTOLIC BLOOD PRESSURE: 131 MMHG | OXYGEN SATURATION: 99 %

## 2018-10-08 DIAGNOSIS — R19.7 NAUSEA VOMITING AND DIARRHEA: ICD-10-CM

## 2018-10-08 DIAGNOSIS — R11.2 NAUSEA VOMITING AND DIARRHEA: ICD-10-CM

## 2018-10-08 DIAGNOSIS — R10.84 ABDOMINAL PAIN, GENERALIZED: ICD-10-CM

## 2018-10-08 PROCEDURE — 99284 EMERGENCY DEPT VISIT MOD MDM: CPT | Mod: Z6 | Performed by: EMERGENCY MEDICINE

## 2018-10-08 PROCEDURE — 99283 EMERGENCY DEPT VISIT LOW MDM: CPT | Performed by: EMERGENCY MEDICINE

## 2018-10-08 RX ORDER — HYDROMORPHONE HYDROCHLORIDE 2 MG/1
2 TABLET ORAL EVERY 6 HOURS PRN
Qty: 10 TABLET | Refills: 0 | Status: SHIPPED | OUTPATIENT
Start: 2018-10-08 | End: 2018-10-12

## 2018-10-08 NOTE — ED AVS SNAPSHOT
[facility] Emergency Department    [address] [place] [address]    Phone:  [phone]    Fax:  [phone]                                       Doreen Peralta   MRN: [id number]    Department:  [facility] Emergency Department   Date of Visit:  10/8/2018           Patient Information     Date Of Birth          1980        Your diagnoses for this visit were:     Abdominal pain, generalized     Nausea vomiting and diarrhea        You were seen by Arely Lam MD.      Follow-up Information     Follow up with Franny Antoine MD.    Specialty:  Internal Medicine    Contact information:    [facility]  [address] [place] [address]  [phone]          Discharge Instructions       Restart your Difficid    Dilaudid for pain.    Add Benadryl for nausea.    Make an appointment to be seen by primary care provider as soon as possible.    Try to drink lots of fluids.    You can buy over-the-counter lidocaine patches called Salonpas.    Return for significant worsening, changes or concerns.    I am glad that you had a good vacation and I hope that you start to feel much better quickly!!      24 Hour Appointment Hotline       To make an appointment at any [facility], call [phone] ([phone]). If you don't have a family doctor or clinic, we will help you find one. [facility] clinics are conveniently located to serve the needs of you and your family.             Review of your medicines      Our records show that you are taking the medicines listed below. If these are incorrect, please call your family doctor or clinic.        Dose / Directions Last dose taken    ACETAMINOPHEN PO   Dose:  500-1000 mg        Take 500-1,000 mg by mouth every 6 hours as needed for pain   Refills:  0        albuterol (2.5 MG/3ML) 0.083% neb solution   Dose:  2.5 mg   Quantity:  360 mL        Take 1 vial (2.5 mg) by nebulization every 4 hours as needed for  shortness of breath / dyspnea or wheezing   Refills:  0        albuterol 90 MCG/ACT inhaler   Dose:  2 puff        Inhale 2 puffs into the lungs every 6 hours as needed   Refills:  0        Alfalfa 650 MG Tabs        Refills:  0        cholestyramine light 4 GM Packet   Commonly known as:  QUESTRAN   Dose:  4 g   Quantity:  60 packet        Take 1 packet (4 g) by mouth 2 times daily   Refills:  1        cloNIDine 0.2 MG tablet   Commonly known as:  CATAPRES   Dose:  0.4 mg   Quantity:  60 tablet        Take 2 tablets (0.4 mg) by mouth every evening - DUE FOR FOLLOW UP   Refills:  0        diphenhydrAMINE HCl 50 MG Tabs   Commonly known as:  BENADRYL   Dose:  50 mg   Quantity:  30 tablet        Take 50 mg by mouth every 6 hours as needed (nausea)   Refills:  0        DULoxetine 60 MG EC capsule   Commonly known as:  CYMBALTA   Quantity:  90 capsule        TAKE 1 CAPSULE(60 MG) BY MOUTH DAILY   Refills:  1        HYDROmorphone 2 MG tablet   Commonly known as:  DILAUDID   Dose:  2 mg   Quantity:  10 tablet        Take 1 tablet (2 mg) by mouth every 6 hours as needed for pain   Refills:  0        ipratropium 0.02 % neb solution   Commonly known as:  ATROVENT   Dose:  0.5 mg   Quantity:  225 mL        Take 2.5 mLs (0.5 mg) by nebulization every 6 hours as needed for wheezing   Refills:  0        LORazepam 1 MG tablet   Commonly known as:  ATIVAN   Dose:  1 mg   Quantity:  6 tablet        Take 1 tablet (1 mg) by mouth every 8 hours as needed for nausea   Refills:  0        ondansetron 8 MG tablet   Commonly known as:  ZOFRAN   Dose:  8 mg   Quantity:  9 tablet        Take 1 tablet (8 mg) by mouth every 8 hours as needed for nausea   Refills:  0        SUMAtriptan 50 MG tablet   Commonly known as:  IMITREX   Dose:   mg   Quantity:  9 tablet        Take 1-2 tablets ( mg) by mouth at onset of headache for migraine - LAST REFILL, DUE FOR FOLLOW UP   Refills:  0        traZODone 100 MG tablet   Commonly known as:   DESYREL   Dose:   mg   Quantity:  90 tablet        Take 0.5-1 tablets ( mg) by mouth nightly as needed for sleep   Refills:  8          STOP taking        Dose Reason for stopping Comments    HYDROcodone-acetaminophen 5-325 MG per tablet   Commonly known as:  NORCO                      Information about OPIOIDS     PRESCRIPTION OPIOIDS: WHAT YOU NEED TO KNOW   We gave you an opioid (narcotic) pain medicine. It is important to manage your pain, but opioids are not always the best choice. You should first try all the other options your care team gave you. Take this medicine for as short a time (and as few doses) as possible.    Some activities can increase your pain, such as bandage changes or therapy sessions. It may help to take your pain medicine 30 to 60 minutes before these activities. Reduce your stress by getting enough sleep, working on hobbies you enjoy and practicing relaxation or meditation. Talk to your care team about ways to manage your pain beyond prescription opioids.    These medicines have risks:    DO NOT drive when on new or higher doses of pain medicine. These medicines can affect your alertness and reaction times, and you could be arrested for driving under the influence (DUI). If you need to use opioids long-term, talk to your care team about driving.    DO NOT operate heavy machinery    DO NOT do any other dangerous activities while taking these medicines.    DO NOT drink any alcohol while taking these medicines.     If the opioid prescribed includes acetaminophen, DO NOT take with any other medicines that contain acetaminophen. Read all labels carefully. Look for the word  acetaminophen  or  Tylenol.  Ask your pharmacist if you have questions or are unsure.    You can get addicted to pain medicines, especially if you have a history of addiction (chemical, alcohol or substance dependence). Talk to your care team about ways to reduce this risk.    All opioids tend to cause  constipation. Drink plenty of water and eat foods that have a lot of fiber, such as fruits, vegetables, prune juice, apple juice and high-fiber cereal. Take a laxative (Miralax, milk of magnesia, Colace, Senna) if you don t move your bowels at least every other day. Other side effects include upset stomach, sleepiness, dizziness, throwing up, tolerance (needing more of the medicine to have the same effect), physical dependence and slowed breathing.    Store your pills in a secure place, locked if possible. We will not replace any lost or stolen medicine. If you don t finish your medicine, please throw away (dispose) as directed by your pharmacist. The [place] [employer] has more information about safe disposal: [email or web address]        Prescriptions were sent or printed at these locations (1 Prescription)                   [facility] [place] [address] [facility]    [address] , [facility] [address]    Telephone:  [phone]   Fax:  [phone]   Hours:                  Printed at Department/Unit printer (1 of 1)         HYDROmorphone (DILAUDID) 2 MG tablet                Orders Needing Specimen Collection     None      Pending Results     No orders found from 10/6/2018 to 10/9/2018.            Pending Culture Results     No orders found from 10/6/2018 to 10/9/2018.            Pending Results Instructions     If you had any lab results that were not finalized at the time of your Discharge, you can call the ED Lab Result RN at [phone]. You will be contacted by this team for any positive Lab results or changes in treatment. The nurses are available 7 days a week from 10A to 6:30P.  You can leave a message 24 hours per day and they will return your call.        Thank you for choosing [facility]       Thank you for choosing [facility] for your care. Our goal is always to provide you with excellent care. Hearing back from our  patients is one way we can continue to improve our services. Please take a few minutes to complete the written survey that you may receive in the mail after you visit with us. Thank you!        [employer] Information     [employer] gives you secure access to your electronic health record. If you see a primary care provider, you can also send messages to your care team and make appointments. If you have questions, please call your primary care clinic.  If you do not have a primary care provider, please call [phone] and they will assist you.        Care EveryWhere ID     This is your Care EveryWhere ID. This could be used by other organizations to access your [facility] medical records  [id number]        Equal Access to Services     TRAMAINE CLAYTON : Carrie Law, clark villeda, vesta long, elham gomez. So [facility] [phone].    ATENCIÓN: Si habla español, tiene a wade disposición servicios gratuitos de asistencia lingüística. Llame al [phone].    We comply with applicable federal civil rights laws and [place] laws. We do not discriminate on the basis of race, color, national origin, age, disability, sex, sexual orientation, or gender identity.            After Visit Summary       This is your record. Keep this with you and show to your community pharmacist(s) and doctor(s) at your next visit.

## 2018-10-08 NOTE — ED NOTES
Pt using bedside commode; she was asked to wait to be roomed to use restroom. Collecting sample if possible.

## 2018-10-08 NOTE — DISCHARGE INSTRUCTIONS
Restart your Difficid    Dilaudid for pain.    Add Benadryl for nausea.    Make an appointment to be seen by primary care provider as soon as possible.    Try to drink lots of fluids.    You can buy over-the-counter lidocaine patches called Salonpas.    Return for significant worsening, changes or concerns.    I am glad that you had a good vacation and I hope that you start to feel much better quickly!!

## 2018-10-08 NOTE — ED AVS SNAPSHOT
[facility] Emergency Department    [address] [place] [address]    Phone:  [phone]    Fax:  [phone]                                       Doreen Peralta   MRN: [id number]    Department:  [facility] Emergency Department   Date of Visit:  10/8/2018           After Visit Summary Signature Page     I have received my discharge instructions, and my questions have been answered. I have discussed any challenges I see with this plan with the nurse or doctor.    ..........................................................................................................................................  Patient/Patient Representative Signature      ..........................................................................................................................................  Patient Representative Print Name and Relationship to Patient    ..................................................               ................................................  Date                                   Time    ..........................................................................................................................................  Reviewed by Signature/Title    ...................................................              ..............................................  Date                                               Time          22EPIC Rev 08/18

## 2018-10-09 NOTE — ED PROVIDER NOTES
History     Chief Complaint   Patient presents with     Abdominal Pain     The history is provided by the patient and medical records.     This is a 38-year-old female presenting to the ED with abdominal pain.  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  Her history is too extensive to fully discuss within this note, so please refer to epic for details.  Patient states that she is actually been feeling quite well recently.  She flew to [place] with her  over the weekend.  While there, her relatives insisted that she try some of the food the family prepared.  Patient states she has been trying to watch her diet quite closely but she did eat some ribs.  By the time she was on her way home on the airplane, she noted increased abdominal pain.  She feels pain in the mid upper right abdomen, bloating.  She has had nausea with vomiting.  She is passing stools and has passed diarrhea today.  She is concerned that she may have C. difficile again.  She apparently has not been taking her Dificid but can restart it if needed.  She has not had any fevers at this point.  She is still making urine.  She denies any weakness, dizziness.  She drove herself to the ED.  She has placed a lidocaine patch on her abdomen to see if this decreases some of her abdominal discomfort.  Her pain is sharp in nature and waxes and wanes in intensity.  She has had pain like this in the past.    Problem List:    Patient Active Problem List    Diagnosis Date Noted     Bacteremia 06/26/2018     Priority: Medium     Acute renal failure (H) 04/21/2018     Priority: Medium     History of bacteremia - recurrent 04/17/2018     Priority: Medium     Tobacco abuse 04/16/2018     Priority: Medium     Iron deficiency anemia 04/16/2018     Priority: Medium     Stool toxin PCR positive for Clostridium  difficile on 4/17/18 04/16/2018     Priority: Medium     Gram negative septicemia (H) - Ochrobactrum, Stenotrophomonas & Pantoea 08/24/2017     Priority: Medium     Hypokalemia 08/24/2017     Priority: Medium     Essential hypertension 08/24/2017     Priority: Medium     Sepsis due to Klebsiella (H) 07/27/2017     Priority: Medium     Insomnia, unspecified type 03/31/2017     Priority: Medium     Abdominal pain 02/15/2017     Priority: Medium     Abdominal pain, generalized 01/28/2017     Priority: Medium     History of deep venous thrombosis 01/26/2017     Priority: Medium     Nausea and vomiting 01/26/2017     Priority: Medium     Vitamin D deficiency 01/16/2017     Priority: Medium     Chronic abdominal pain 11/15/2016     Priority: Medium     Patient is followed by Larisa Lorenzo PA-C for ongoing prescription of pain medication.  All refills should be approved by this provider, or covering partner.    Medication(s): no regular narcotics - narcotics given at ED visits  Maximum quantity per month: N/A  Clinic visit frequency required: Q 6  months     Controlled substance agreement:  Encounter-Level CSA - 11/9/15:               Controlled Substance Agreement - Scan on 11/19/2015 11:17 AM : CONTROLLED SUBSTANCE AGREEMENT 11/09/15 (below)          Encounter-Level CSA - 2/27/15:               Controlled Substance Agreement - Scan on 3/12/2015  7:50 AM : Controlled Medication Agreement 02/27/15 (below)            Pain Clinic evaluation in the past: Yes    DIRE Total Score(s):  No flowsheet data found.    Last [facility] website verification:  done on 11/15/16   [email or web address]        Attention to G-tube (H) 11/08/2016     Priority: Medium     Fever 10/16/2016     Priority: Medium     Coagulation defect (H) [D68.9] 09/16/2016     Priority: Medium     Chronic diarrhea 07/22/2016     Priority: Medium     Migraine 07/20/2016     Priority: Medium     Positive blood culture - Klebsiella oxytoca from Port-a-cath  culture 07/18/2016     Priority: Medium     Mitral regurgitation mild-mod by Echo June 2016 07/18/2016     Priority: Medium     Anemia, iron deficiency 07/17/2016     Priority: Medium     Anemia in other chronic diseases classified elsewhere 06/14/2016     Priority: Medium     Munchausen syndrome - previously suspected 06/14/2016     Priority: Medium     Long-term (current) use of anticoagulants [Z79.01] 05/16/2016     Priority: Medium     Anxiety 05/16/2016     Priority: Medium     S/P partial resection of colon 04/11/2016     Priority: Medium     Malnutrition (H) 04/11/2016     Priority: Medium     Jejunostomy tube present (H) 03/21/2016     Priority: Medium     Malfunctioning jejunostomy tube (H) 12/22/2015     Priority: Medium     Malfunction of gastrostomy tube (H) 11/19/2015     Priority: Medium     PEG tube malfunction (H) 10/16/2015     Priority: Medium     Health Care Home 01/16/2015     Priority: Medium     *See Letters for [facility] Care Plan: My Access Plan           PEG (percutaneous endoscopic gastrostomy) status 11/05/2014     Priority: Medium     Gastroparesis 04/11/2014     Priority: Medium     Overview:   Had ileostomy performed at [facility] in Jan 2012 as treatment       Migraines 04/11/2014     Priority: Medium     4/11/2014  With periods, every other month.       Intermittent asthma 04/11/2014     Priority: Medium     4/11/2014   With URIs, allergies       Allergic rhinitis 04/11/2014     Priority: Medium     Problem list name updated by automated process. Provider to review       Abnormal Pap smear of cervix 04/11/2014     Priority: Medium     4/11/2014  S/p colp and LEEP. Sees OB/GYN at [facility]. Pap every year x20 years - normal since.       S/P LEEP of cervix 02/06/2014     Priority: Medium     Patellofemoral stress syndrome 01/18/2014     Priority: Medium     Hx SBO 10/09/2013     Priority: Medium     4/11/2014  Recurrent. Sees GI (Dr. Redding - at [facility]) every 6 months or so.  Sees feeding tube  nurse next week.       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 04/11/2013     Priority: Medium     Constipation by delayed colonic transit 10/24/2012     Priority: Medium     Atopic rhinitis 03/20/2009     Priority: Medium     Hyperbilirubinemia 03/11/2009     Priority: Medium        Past Medical History:    Past Medical History:   Diagnosis Date     Asthma      Bilateral ovarian cysts      Cervical cancer (H) 01/01/2008     Chronic pain      Colonic dysmotility      Constipation      E. coli sepsis (H) 5/8/2016     Enteritis      Fungemia 5/5/2016     Gastro-oesophageal reflux disease      H/O ileostomy      Hx of abnormal Pap smear      Hypertension      IBS (irritable bowel syndrome)      Other chronic pain      PONV (postoperative nausea and vomiting)      Thrombosis, hepatic vein (H)        Past [facility] History:    Past [facility] History:   Procedure Laterality Date     COLONOSCOPY  7/10/2012    Procedure: COLONOSCOPY;;  Surgeon: Nicole Redding MD;  [facility]     COLONOSCOPY N/A 2/19/2017    Procedure: COLONOSCOPY;  Surgeon: Randell Muller MD;  [facility] GI     COLONOSCOPY N/A 2/21/2017    Procedure: COLONOSCOPY;  Surgeon: Randell Muller MD;  Location: [facility]     COLONOSCOPY N/A 5/3/2018    Procedure: COMBINED COLONOSCOPY, SINGLE OR MULTIPLE BIOPSY/POLYPECTOMY BY BIOPSY;  EGD/Colonoscopy ;  Surgeon: Loi Black MD;  Location: [facility] GI     ECHO CHELO  7/19/2016          ENDOSCOPIC INSERTION TUBE GASTROSTOMY N/A 1/21/2016    Procedure: ENDOSCOPIC INSERTION TUBE GASTROSTOMY;  Surgeon: Nicole Redding MD;  [facility]     ESOPHAGOSCOPY, GASTROSCOPY, DUODENOSCOPY (EGD), COMBINED  7/10/2012    Procedure: COMBINED ESOPHAGOSCOPY, GASTROSCOPY, DUODENOSCOPY (EGD);  Upper Endoscopy, Ileoscopy    Latex Allergy  with biopsies;  Surgeon: Nicole Redding MD;  [facility]     ESOPHAGOSCOPY, GASTROSCOPY, DUODENOSCOPY (EGD), COMBINED N/A 11/5/2014    Procedure: COMBINED  ESOPHAGOSCOPY, GASTROSCOPY, DUODENOSCOPY (EGD);  Surgeon: Nicole Redding MD;  [facility]     ESOPHAGOSCOPY, GASTROSCOPY, DUODENOSCOPY (EGD), COMBINED N/A 5/3/2018    Procedure: COMBINED ESOPHAGOSCOPY, GASTROSCOPY, DUODENOSCOPY (EGD), BIOPSY SINGLE OR MULTIPLE;;  Surgeon: Loi Black MD;  Location: [facility]     HC REPLACE DUODENOSTOMY/JEJUNOSTOMY TUBE PERCUTANEOUS N/A 8/27/2015    Procedure: REPLACE GASTROJEJUNOSTOMY TUBE, PERCUTANEOUS;  Surgeon: Mio Holder MD;  [facility]     HC REPLACE DUODENOSTOMY/JEJUNOSTOMY TUBE PERCUTANEOUS N/A 1/7/2016    Procedure: REPLACE JEJUNOSTOMY TUBE, PERCUTANEOUS;  Surgeon: Elsa Medel MD;  [facility]     HC REPLACE DUODENOSTOMY/JEJUNOSTOMY TUBE PERCUTANEOUS N/A 1/28/2016    Procedure: REPLACE JEJUNOSTOMY TUBE, PERCUTANEOUS;  Surgeon: Elsa Medel MD;  [facility]     HC REPLACE GASTROSTOMY/CECOSTOMY TUBE PERCUTANEOUS Left 5/19/2015    Procedure: REPLACE GASTROSTOMY TUBE, PERCUTANEOUS;  Surgeon: Melecio Morejon Chi, MD;  Location: [facility]     HC UGI ENDOSCOPY W PLACEMENT GASTROSTOMY TUBE PERCUT N/A 10/1/2015    Procedure: COMBINED ESOPHAGOSCOPY, GASTROSCOPY, DUODENOSCOPY (EGD), PLACE PERCUTANEOUS ENDOSCOPIC GASTROSTOMY TUBE;  Surgeon: Mio Holder MD;  [facility]     INSERT PORT VASCULAR ACCESS Right 7/20/2017    Procedure: INSERT PORT VASCULAR ACCESS;  Chest Port Placement  **Latex Allergy**;  Surgeon: Coy Rocha PA-C;  [facility]     LAPAROSCOPIC ASSISTED COLECTOMY  1/20/2012    Procedure:LAPAROSCOPIC ASSISTED COLECTOMY; Laparoscopic Ileostomy       LAPAROSCOPIC ASSISTED COLECTOMY LEFT (DESCENDING)  10/24/2012    Procedure: LAPAROSCOPIC ASSISTED COLECTOMY LEFT (DESCENDING);   Hand Assisted Laproscopic Subtotal abdominal Colectomy,Iieorectal anastamosis, Ileostomy Closure.       LAPAROSCOPIC ASSISTED INSERTION TUBE JEJUNOSTOMY N/A 10/16/2015    Procedure: LAPAROSCOPIC ASSISTED INSERTION TUBE JEJUNOSTOMY;   Surgeon: Elsa Medel MD;  [facility]     LAPAROSCOPIC CHOLECYSTECTOMY  2002    [facility] stones duct     LAPAROSCOPIC ILEOSTOMY  1/20/2012    [facility], loop     LAPAROSCOPIC OOPHORECTOMY Right 2009    [facility]     LAPAROTOMY EXPLORATORY N/A 1/28/2016    Procedure: LAPAROTOMY EXPLORATORY;  Surgeon: Elsa Medel MD;  [facility]     LEEP TX, CERVICAL  2009    [facility]     PICC INSERTION Left 10/21/2015    5fr DL Power PICC, 37cm (2cm external) in the L basilic vein w/ tip in the SVC RA junction.     REMOVE GASTROSTOMY TUBE ADULT N/A 12/12/2014    Procedure: REMOVE GASTROSTOMY TUBE ADULT;  Surgeon: Nicole Redding MD;  [facility]: [facility]     REMOVE PORT VASCULAR ACCESS Right 6/30/2016    Procedure: REMOVE PORT VASCULAR ACCESS;  Surgeon: Pradeep Orosco MD;  Location: [facility] OR     REMOVE PORT VASCULAR ACCESS Right 9/8/2017    Procedure: REMOVE PORT VASCULAR ACCESS;  right side mediport removal;  Surgeon: Zechariah Worthington MD;  Location: [facility] OR     replace GASTROSTOMY TUBE ADULT  5/19/15       Family History:    Family History   Problem Relation Age of Onset     Thyroid Disease Mother      Sjogren's Mother      GASTROINTESTINAL DISEASE Mother      Intermittent nausea vomiting diarrhea     Colon Polyps Mother      Prostate Problems Father      prostate enlargement     Lupus Maternal Grandmother      Cancer Maternal Grandfather      Lung     Colon Cancer Maternal Grandfather 65     Cancer Paternal Grandmother      Lung      Cerebrovascular Disease Paternal Grandmother      Diabetes Paternal Grandmother      Cardiovascular Paternal Grandmother      CHF     Cancer Paternal Grandfather      Lung     Glaucoma Paternal Grandfather      Abdominal Aortic Aneurysm Other      Macular Degeneration No family hx of        Social History:  Marital Status:   [2]  Social History   Substance Use Topics     Smoking status: Current Some Day Smoker     Packs/day: 1.00     Years: 4.00      "Types: Cigarettes     Last attempt to quit: 1/1/2004     Smokeless tobacco: Never Used      Comment: Vaping     Alcohol use No        Medications:      HYDROmorphone (DILAUDID) 2 MG tablet   ACETAMINOPHEN PO   albuterol (2.5 MG/3ML) 0.083% neb solution   albuterol 90 MCG/ACT inhaler   Alfalfa 650 MG TABS   cholestyramine light (QUESTRAN) 4 GM Packet   cloNIDine (CATAPRES) 0.2 MG tablet   diphenhydrAMINE HCl 50 MG TABS   DULoxetine (CYMBALTA) 60 MG EC capsule   ipratropium (ATROVENT) 0.02 % neb solution   LORazepam (ATIVAN) 1 MG tablet   ondansetron (ZOFRAN) 8 MG tablet   SUMAtriptan (IMITREX) 50 MG tablet   traZODone (DESYREL) 100 MG tablet         Review of Systems  All other ROS reviewed and are negative or non-contributory except as stated in HPI.    Physical Exam   BP: (!) 131/104  Heart Rate: 84  Temp: 98.3  F (36.8  C)  Resp: 18  Height: 167.6 cm (5' 6\")  Weight: 71.2 kg (157 lb)  SpO2: 99 %      Physical Exam   Constitutional: She appears well-developed and well-nourished.   Pleasant, healthy-appearing female sitting in the bed.  She actually looks much healthier than I have seen her in the past.   HENT:   Head: Normocephalic.   Nose: Nose normal.   Slightly tacky mucous membranes   Eyes: EOM are normal. Pupils are equal, round, and reactive to light. No scleral icterus.   Intermittently tearful   Neck: Normal range of motion. Neck supple.   Cardiovascular: Normal rate, regular rhythm, normal heart sounds and intact distal pulses.    Pulmonary/Chest: Effort normal and breath sounds normal.   Abdominal: There is no rebound and no guarding.   Well-healed abdominal scars.  Hyperactive bowel sounds.  Mild diffuse tenderness without obvious mass, but there is mild distention in the upper abdomen.   Musculoskeletal: Normal range of motion. She exhibits no edema.   Neurological: She is alert. She exhibits normal muscle tone.   Skin: Skin is warm and dry. She is not diaphoretic.   Psychiatric: Her behavior is normal. "   Anxious   Vitals reviewed.      ED Course (with Medical Decision Making)    Pt seen and examined by me.  RN and EPIC notes reviewed.      Patient with long history of abdominal pain, nausea and vomiting.  She has gastroparesis.  She has had bowel obstructions in the past.  She has had sepsis in the past.  She also is a C. difficile carrier.    We had a very long discussion regarding options, her current symptomatology, and her comfort level.  She feels that if she could get her pain and nausea under control, she would be able to push her fluids but still give herself some bowel rest.  She thinks she could manage at home and she does not think she needs an IV.  She also would prefer to not have any radiologic studies at this point.    I think that the patient is not septic appearing, does not appear significantly dehydrated, and can manage at this point at home.  She is quite comfortable with her previous history and knows when to return to the ED.  We are going to avoid IV and fluids at this time but will try oral pain medications.  She has some nausea medications at home.  Will provide her an Rx for a small amount of Dilaudid.  She can use her Benadryl and other medications for nausea.  Drink plenty of fluids.  She has Dificid at home to restart as she has some liquid diarrhea in this ED.  She apparently does not qualify for fecal transplant because she does not have any residual colon.  If she has any worsening, changes or concerns she will return promptly to the ED.     Procedures    Assessments & Plan     I have reviewed the findings, diagnosis, plan and need for follow up with the patient.  Discharge Medication List as of 10/8/2018  3:34 PM          Final diagnoses:   Abdominal pain, generalized   Nausea vomiting and diarrhea     Disposition: Patient discharged home in stable condition.  Plan as above.  Return for concerns.     Note: Chart documentation done in part with Dragon Voice Recognition software.  Although reviewed after completion, some word and grammatical errors may remain.       10/8/2018   [facility] EMERGENCY DEPARTMENT     Arely Lam MD  10/09/18 0038

## 2018-10-12 ENCOUNTER — HOSPITAL ENCOUNTER (EMERGENCY)
Facility: CLINIC | Age: 38
Discharge: HOME | End: 2018-10-12
Attending: EMERGENCY MEDICINE | Admitting: EMERGENCY MEDICINE
Payer: MEDICARE

## 2018-10-12 VITALS
HEART RATE: 75 BPM | SYSTOLIC BLOOD PRESSURE: 155 MMHG | HEIGHT: 66 IN | OXYGEN SATURATION: 100 % | BODY MASS INDEX: 25.55 KG/M2 | RESPIRATION RATE: 16 BRPM | TEMPERATURE: 98.3 F | WEIGHT: 159 LBS | DIASTOLIC BLOOD PRESSURE: 94 MMHG

## 2018-10-12 DIAGNOSIS — M54.2 NECK PAIN: ICD-10-CM

## 2018-10-12 DIAGNOSIS — R10.84 CHRONIC GENERALIZED ABDOMINAL PAIN: ICD-10-CM

## 2018-10-12 DIAGNOSIS — G89.29 CHRONIC GENERALIZED ABDOMINAL PAIN: ICD-10-CM

## 2018-10-12 PROCEDURE — 99282 EMERGENCY DEPT VISIT SF MDM: CPT | Performed by: EMERGENCY MEDICINE

## 2018-10-12 PROCEDURE — 99284 EMERGENCY DEPT VISIT MOD MDM: CPT | Mod: Z6 | Performed by: EMERGENCY MEDICINE

## 2018-10-12 RX ORDER — HYDROMORPHONE HYDROCHLORIDE 2 MG/1
2 TABLET ORAL EVERY 6 HOURS PRN
Qty: 10 TABLET | Refills: 0 | Status: SHIPPED | OUTPATIENT
Start: 2018-10-12 | End: 2018-11-19

## 2018-10-12 NOTE — ED TRIAGE NOTES
"Patient presents from [facility] with complaints of nausea and vomiting earlier in the week, \"spiked 103 fever at home.\" Since Tuesday she has been having neck pain. She saw her chiropractor and had an adjustment earlier today who sent her to the clinic. Vijaya Sky RN  "

## 2018-10-12 NOTE — ED AVS SNAPSHOT
[facility] Emergency Department    [address] [place] [address]    Phone:  [phone]    Fax:  [phone]                                       Doreen Peralta   MRN: [id number]    Department:  [facility] Emergency Department   Date of Visit:  10/12/2018           After Visit Summary Signature Page     I have received my discharge instructions, and my questions have been answered. I have discussed any challenges I see with this plan with the nurse or doctor.    ..........................................................................................................................................  Patient/Patient Representative Signature      ..........................................................................................................................................  Patient Representative Print Name and Relationship to Patient    ..................................................               ................................................  Date                                   Time    ..........................................................................................................................................  Reviewed by Signature/Title    ...................................................              ..............................................  Date                                               Time          22EPIC Rev 08/18

## 2018-10-12 NOTE — ED AVS SNAPSHOT
[facility] Emergency Department    [address] [place] [address]    Phone:  [phone]    Fax:  [phone]                                       Doreen Peralta   MRN: [id number]    Department:  [facility] Emergency Department   Date of Visit:  10/12/2018           Patient Information     Date Of Birth          1980        Your diagnoses for this visit were:     Neck pain     Chronic generalized abdominal pain        You were seen by Arely Lam MD.      Follow-up Information     Follow up with Franny Antoine MD.    Specialty:  Internal Medicine    Contact information:    [facility]  [address] [address]  [phone]          Discharge Instructions       Follow-up in clinic with your primary care provider next week.    Dilaudid as needed for pain.    Return for worsening, changes or concerns.    I hope you have a good weekend!!    24 Hour Appointment Hotline       To make an appointment at any [facility], call [phone] ([phone]). If you don't have a family doctor or clinic, we will help you find one. [facility] are conveniently located to serve the needs of you and your family.             Review of your medicines      START taking        Dose / Directions Last dose taken    HYDROmorphone 2 MG tablet   Commonly known as:  DILAUDID   Dose:  2 mg   Quantity:  10 tablet        Take 1 tablet (2 mg) by mouth every 6 hours as needed for pain   Refills:  0          Our records show that you are taking the medicines listed below. If these are incorrect, please call your family doctor or clinic.        Dose / Directions Last dose taken    ACETAMINOPHEN PO   Dose:  500-1000 mg        Take 500-1,000 mg by mouth every 6 hours as needed for pain   Refills:  0        albuterol (2.5 MG/3ML) 0.083% neb solution   Dose:  2.5 mg   Quantity:  360 mL        Take 1 vial (2.5 mg) by nebulization every 4 hours as needed for  shortness of breath / dyspnea or wheezing   Refills:  0        albuterol 90 MCG/ACT inhaler   Dose:  2 puff        Inhale 2 puffs into the lungs every 6 hours as needed   Refills:  0        Alfalfa 650 MG Tabs        Refills:  0        cholestyramine light 4 GM Packet   Commonly known as:  QUESTRAN   Dose:  4 g   Quantity:  60 packet        Take 1 packet (4 g) by mouth 2 times daily   Refills:  1        cloNIDine 0.2 MG tablet   Commonly known as:  CATAPRES   Dose:  0.4 mg   Quantity:  60 tablet        Take 2 tablets (0.4 mg) by mouth every evening - DUE FOR FOLLOW UP   Refills:  0        diphenhydrAMINE HCl 50 MG Tabs   Commonly known as:  BENADRYL   Dose:  50 mg   Quantity:  30 tablet        Take 50 mg by mouth every 6 hours as needed (nausea)   Refills:  0        DULoxetine 60 MG EC capsule   Commonly known as:  CYMBALTA   Quantity:  90 capsule        TAKE 1 CAPSULE(60 MG) BY MOUTH DAILY   Refills:  1        ipratropium 0.02 % neb solution   Commonly known as:  ATROVENT   Dose:  0.5 mg   Quantity:  225 mL        Take 2.5 mLs (0.5 mg) by nebulization every 6 hours as needed for wheezing   Refills:  0        LORazepam 1 MG tablet   Commonly known as:  ATIVAN   Dose:  1 mg   Quantity:  6 tablet        Take 1 tablet (1 mg) by mouth every 8 hours as needed for nausea   Refills:  0        ondansetron 8 MG tablet   Commonly known as:  ZOFRAN   Dose:  8 mg   Quantity:  9 tablet        Take 1 tablet (8 mg) by mouth every 8 hours as needed for nausea   Refills:  0        SUMAtriptan 50 MG tablet   Commonly known as:  IMITREX   Dose:   mg   Quantity:  9 tablet        Take 1-2 tablets ( mg) by mouth at onset of headache for migraine - LAST REFILL, DUE FOR FOLLOW UP   Refills:  0        traZODone 100 MG tablet   Commonly known as:  DESYREL   Dose:   mg   Quantity:  90 tablet        Take 0.5-1 tablets ( mg) by mouth nightly as needed for sleep   Refills:  8                Information about OPIOIDS      PRESCRIPTION OPIOIDS: WHAT YOU NEED TO KNOW   We gave you an opioid (narcotic) pain medicine. It is important to manage your pain, but opioids are not always the best choice. You should first try all the other options your care team gave you. Take this medicine for as short a time (and as few doses) as possible.    Some activities can increase your pain, such as bandage changes or therapy sessions. It may help to take your pain medicine 30 to 60 minutes before these activities. Reduce your stress by getting enough sleep, working on hobbies you enjoy and practicing relaxation or meditation. Talk to your care team about ways to manage your pain beyond prescription opioids.    These medicines have risks:    DO NOT drive when on new or higher doses of pain medicine. These medicines can affect your alertness and reaction times, and you could be arrested for driving under the influence (DUI). If you need to use opioids long-term, talk to your care team about driving.    DO NOT operate heavy machinery    DO NOT do any other dangerous activities while taking these medicines.    DO NOT drink any alcohol while taking these medicines.     If the opioid prescribed includes acetaminophen, DO NOT take with any other medicines that contain acetaminophen. Read all labels carefully. Look for the word  acetaminophen  or  Tylenol.  Ask your pharmacist if you have questions or are unsure.    You can get addicted to pain medicines, especially if you have a history of addiction (chemical, alcohol or substance dependence). Talk to your care team about ways to reduce this risk.    All opioids tend to cause constipation. Drink plenty of water and eat foods that have a lot of fiber, such as fruits, vegetables, prune juice, apple juice and high-fiber cereal. Take a laxative (Miralax, milk of magnesia, Colace, Senna) if you don t move your bowels at least every other day. Other side effects include upset stomach, sleepiness, dizziness,  throwing up, tolerance (needing more of the medicine to have the same effect), physical dependence and slowed breathing.    Store your pills in a secure place, locked if possible. We will not replace any lost or stolen medicine. If you don t finish your medicine, please throw away (dispose) as directed by your pharmacist. The [place] [employer] has more information about safe disposal: https://www.pca.[facility].mn.us/living-green/managing-unwanted-medications        Prescriptions were sent or printed at these locations (1 Prescription)                   Other Prescriptions                Printed at Department/Unit printer (1 of 1)         HYDROmorphone (DILAUDID) 2 MG tablet                Orders Needing Specimen Collection     None      Pending Results     No orders found from 10/10/2018 to 10/13/2018.            Pending Culture Results     No orders found from 10/10/2018 to 10/13/2018.            Pending Results Instructions     If you had any lab results that were not finalized at the time of your Discharge, you can call the ED Lab Result RN at [phone]. You will be contacted by this team for any positive Lab results or changes in treatment. The nurses are available 7 days a week from 10A to 6:30P.  You can leave a message 24 hours per day and they will return your call.        Thank you for choosing [facility]       Thank you for choosing [facility] for your care. Our goal is always to provide you with excellent care. Hearing back from our patients is one way we can continue to improve our services. Please take a few minutes to complete the written survey that you may receive in the mail after you visit with us. Thank you!        [facility] Information     [facility] gives you secure access to your electronic health record. If you see a primary care provider, you can also send messages to your care team and make appointments. If you have questions, please call your primary care clinic.  If you do not have  a primary care provider, please call [phone] and they will assist you.        Care EveryWhere ID     This is your Care EveryWhere ID. This could be used by other organizations to access your [place] medical records  [id number]        Equal Access to Services     TRAMAINE CLAYTON : Carrie Law, clark villeda, elham guerrero. So [facility] [phone].    ATENCIÓN: Si habla español, tiene a wade disposición servicios gratuitos de asistencia lingüística. Llame al [phone].    We comply with applicable federal civil rights laws and [place] laws. We do not discriminate on the basis of race, color, national origin, age, disability, sex, sexual orientation, or gender identity.            After Visit Summary       This is your record. Keep this with you and show to your community pharmacist(s) and doctor(s) at your next visit.

## 2018-10-12 NOTE — DISCHARGE INSTRUCTIONS
Follow-up in clinic with your primary care provider next week.    Dilaudid as needed for pain.    Return for worsening, changes or concerns.    I hope you have a good weekend!!

## 2018-10-13 NOTE — ED PROVIDER NOTES
"  History     Chief Complaint   Patient presents with     Neck Pain     The history is provided by the patient and medical records.     This is a 38-year-old female with a very complex past medical history including gastroparesis, status post numerous abdominal surgeries, total colectomy, bowel obstructions, sepsis thought secondary to possible leaky bowel, chronic abdominal pain, chronic nausea, C. difficile colonization, presenting with neck pain.  I saw the patient last 4 days ago.  At that time, she was having abdominal pain, nausea and vomiting.  Please see note.  She did appear septic, did not have a significant workup, but rather was discharged with pain and nausea medications.  She states she was actually feeling better for a couple of days, able to continue to eat and drink.  On Tuesday, 3 days ago, she woke up and felt some neck pain.  She thought she might have \"slept wrong\".  2 days ago, she had a one-time short lived fever up to 103.  She noted some mild chills, continued nausea and abdominal pain.  However, she has not had any other fever symptoms.  She continued to have neck pain so she went to see a chiropractor earlier today.  She apparently did have an evaluation and possible adjustment but the chiropractor was concerned that \"something is just not right\" and was questioning meningitis.  He sent her to be seen by a physician.  She went to clinic and [facility], was triaged by the nurse and sent to the ED for evaluation.  Patient notes she has midline neck pain that starts in the top of the neck and radiates down the spine.  She states that it is a sharp pain \"in the bone\".  The pain increases with extension of the neck or when touching the posterior neck.  Denies any headache.  No further fevers as noted.  She does have nausea vomiting, decreased oral intake and abdominal pain.  She is on Dificid for diarrheal symptoms.  She has been drinking some fluids and making urine.  No history of neck issues " in the past except when she was younger in gymnastics.    Problem List:    Patient Active Problem List    Diagnosis Date Noted     Bacteremia 06/26/2018     Priority: Medium     Acute renal failure (H) 04/21/2018     Priority: Medium     History of bacteremia - recurrent 04/17/2018     Priority: Medium     Tobacco abuse 04/16/2018     Priority: Medium     Iron deficiency anemia 04/16/2018     Priority: Medium     Stool toxin PCR positive for Clostridium difficile on 4/17/18 04/16/2018     Priority: Medium     Gram negative septicemia (H) - Ochrobactrum, Stenotrophomonas & Pantoea 08/24/2017     Priority: Medium     Hypokalemia 08/24/2017     Priority: Medium     Essential hypertension 08/24/2017     Priority: Medium     Sepsis due to Klebsiella (H) 07/27/2017     Priority: Medium     Insomnia, unspecified type 03/31/2017     Priority: Medium     Abdominal pain 02/15/2017     Priority: Medium     Abdominal pain, generalized 01/28/2017     Priority: Medium     History of deep venous thrombosis 01/26/2017     Priority: Medium     Nausea and vomiting 01/26/2017     Priority: Medium     Vitamin D deficiency 01/16/2017     Priority: Medium     Chronic abdominal pain 11/15/2016     Priority: Medium     Patient is followed by Larisa Lorenzo PA-C for ongoing prescription of pain medication.  All refills should be approved by this provider, or covering partner.    Medication(s): no regular narcotics - narcotics given at ED visits  Maximum quantity per month: N/A  Clinic visit frequency required: Q 6  months     Controlled substance agreement:  Encounter-Level CSA - 11/9/15:               Controlled Substance Agreement - Scan on 11/19/2015 11:17 AM : CONTROLLED SUBSTANCE AGREEMENT 11/09/15 (below)          Encounter-Level CSA - 2/27/15:               Controlled Substance Agreement - Scan on 3/12/2015  7:50 AM : Controlled Medication Agreement 02/27/15 (below)            Pain Clinic evaluation in the past: Yes    DIRE  Total Score(s):  No flowsheet data found.    Last [facility] website verification:  done on 11/15/16   [email or web address]        Attention to G-tube (H) 11/08/2016     Priority: Medium     Fever 10/16/2016     Priority: Medium     Coagulation defect (H) [D68.9] 09/16/2016     Priority: Medium     Chronic diarrhea 07/22/2016     Priority: Medium     Migraine 07/20/2016     Priority: Medium     Positive blood culture - Klebsiella oxytoca from Port-a-cath culture 07/18/2016     Priority: Medium     Mitral regurgitation mild-mod by Echo June 2016 07/18/2016     Priority: Medium     Anemia, iron deficiency 07/17/2016     Priority: Medium     Anemia in other chronic diseases classified elsewhere 06/14/2016     Priority: Medium     Munchausen syndrome - previously suspected 06/14/2016     Priority: Medium     Long-term (current) use of anticoagulants [Z79.01] 05/16/2016     Priority: Medium     Anxiety 05/16/2016     Priority: Medium     S/P partial resection of colon 04/11/2016     Priority: Medium     Malnutrition (H) 04/11/2016     Priority: Medium     Jejunostomy tube present (H) 03/21/2016     Priority: Medium     Malfunctioning jejunostomy tube (H) 12/22/2015     Priority: Medium     Malfunction of gastrostomy tube (H) 11/19/2015     Priority: Medium     PEG tube malfunction (H) 10/16/2015     Priority: Medium     Health Care Home 01/16/2015     Priority: Medium     *See Letters for [facility] Care Plan: My Access Plan           PEG (percutaneous endoscopic gastrostomy) status 11/05/2014     Priority: Medium     Gastroparesis 04/11/2014     Priority: Medium     Overview:   Had ileostomy performed at [facility] in Jan 2012 as treatment       Migraines 04/11/2014     Priority: Medium     4/11/2014  With periods, every other month.       Intermittent asthma 04/11/2014     Priority: Medium     4/11/2014   With URIs, allergies       Allergic rhinitis 04/11/2014     Priority: Medium     Problem list name updated by automated  process. Provider to review       Abnormal Pap smear of cervix 04/11/2014     Priority: Medium     4/11/2014  S/p colp and LEEP. Sees OB/GYN at [facility]. Pap every year x20 years - normal since.       S/P LEEP of cervix 02/06/2014     Priority: Medium     Patellofemoral stress syndrome 01/18/2014     Priority: Medium     Hx SBO 10/09/2013     Priority: Medium     4/11/2014  Recurrent. Sees GI (Dr. Redding - at [facility]) every 6 months or so.  Sees feeding tube nurse next week.       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 04/11/2013     Priority: Medium     Constipation by delayed colonic transit 10/24/2012     Priority: Medium     Atopic rhinitis 03/20/2009     Priority: Medium     Hyperbilirubinemia 03/11/2009     Priority: Medium        Past Medical History:    Past Medical History:   Diagnosis Date     Asthma      Bilateral ovarian cysts      Cervical cancer (H) 01/01/2008     Chronic pain      Colonic dysmotility      Constipation      E. coli sepsis (H) 5/8/2016     Enteritis      Fungemia 5/5/2016     Gastro-oesophageal reflux disease      H/O ileostomy      Hx of abnormal Pap smear      Hypertension      IBS (irritable bowel syndrome)      Other chronic pain      PONV (postoperative nausea and vomiting)      Thrombosis, hepatic vein (H)        Past [facility] History:    Past [facility] History:   Procedure Laterality Date     COLONOSCOPY  7/10/2012    Procedure: COLONOSCOPY;;  Surgeon: Nicole Redding MD;  [facility]     COLONOSCOPY N/A 2/19/2017    Procedure: COLONOSCOPY;  Surgeon: Randell Muller MD;  [facility]     COLONOSCOPY N/A 2/21/2017    Procedure: COLONOSCOPY;  Surgeon: Randell Muller MD;  [facility]     COLONOSCOPY N/A 5/3/2018    Procedure: COMBINED COLONOSCOPY, SINGLE OR MULTIPLE BIOPSY/POLYPECTOMY BY BIOPSY;  EGD/Colonoscopy ;  Surgeon: Loi Black MD;  [facility]     ECHO CHELO  7/19/2016          ENDOSCOPIC INSERTION TUBE GASTROSTOMY N/A 1/21/2016     Procedure: ENDOSCOPIC INSERTION TUBE GASTROSTOMY;  Surgeon: Nicole Redding MD;  [facility]     ESOPHAGOSCOPY, GASTROSCOPY, DUODENOSCOPY (EGD), COMBINED  7/10/2012    Procedure: COMBINED ESOPHAGOSCOPY, GASTROSCOPY, DUODENOSCOPY (EGD);  Upper Endoscopy, Ileoscopy    Latex Allergy  with biopsies;  Surgeon: Nicole Redding MD;  [facility]     ESOPHAGOSCOPY, GASTROSCOPY, DUODENOSCOPY (EGD), COMBINED N/A 11/5/2014    Procedure: COMBINED ESOPHAGOSCOPY, GASTROSCOPY, DUODENOSCOPY (EGD);  Surgeon: Nicole Redding MD;  [facility]     ESOPHAGOSCOPY, GASTROSCOPY, DUODENOSCOPY (EGD), COMBINED N/A 5/3/2018    Procedure: COMBINED ESOPHAGOSCOPY, GASTROSCOPY, DUODENOSCOPY (EGD), BIOPSY SINGLE OR MULTIPLE;;  Surgeon: Loi Black MD;  [facility]     HC REPLACE DUODENOSTOMY/JEJUNOSTOMY TUBE PERCUTANEOUS N/A 8/27/2015    Procedure: REPLACE GASTROJEJUNOSTOMY TUBE, PERCUTANEOUS;  Surgeon: Mio Holder MD;  [facility]     HC REPLACE DUODENOSTOMY/JEJUNOSTOMY TUBE PERCUTANEOUS N/A 1/7/2016    Procedure: REPLACE JEJUNOSTOMY TUBE, PERCUTANEOUS;  Surgeon: Elsa Medel MD;  [facility]     HC REPLACE DUODENOSTOMY/JEJUNOSTOMY TUBE PERCUTANEOUS N/A 1/28/2016    Procedure: REPLACE JEJUNOSTOMY TUBE, PERCUTANEOUS;  Surgeon: Elsa Medel MD;  [facility]     HC REPLACE GASTROSTOMY/CECOSTOMY TUBE PERCUTANEOUS Left 5/19/2015    Procedure: REPLACE GASTROSTOMY TUBE, PERCUTANEOUS;  Surgeon: Melecio Morejon Chi, MD;  [facility]     HC UGI ENDOSCOPY W PLACEMENT GASTROSTOMY TUBE PERCUT N/A 10/1/2015    Procedure: COMBINED ESOPHAGOSCOPY, GASTROSCOPY, DUODENOSCOPY (EGD), PLACE PERCUTANEOUS ENDOSCOPIC GASTROSTOMY TUBE;  Surgeon: Mio Holder MD;  [facility]     INSERT PORT VASCULAR ACCESS Right 7/20/2017    Procedure: INSERT PORT VASCULAR ACCESS;  Chest Port Placement  **Latex Allergy**;  Surgeon: Coy Rocha PA-C;  [facility]     LAPAROSCOPIC ASSISTED  COLECTOMY  1/20/2012    Procedure:LAPAROSCOPIC ASSISTED COLECTOMY; Laparoscopic Ileostomy       LAPAROSCOPIC ASSISTED COLECTOMY LEFT (DESCENDING)  10/24/2012    Procedure: LAPAROSCOPIC ASSISTED COLECTOMY LEFT (DESCENDING);   Hand Assisted Laproscopic Subtotal abdominal Colectomy,Iieorectal anastamosis, Ileostomy Closure.       LAPAROSCOPIC ASSISTED INSERTION TUBE JEJUNOSTOMY N/A 10/16/2015    Procedure: LAPAROSCOPIC ASSISTED INSERTION TUBE JEJUNOSTOMY;  Surgeon: Elsa Medel MD;  [facility]     LAPAROSCOPIC CHOLECYSTECTOMY  2002    [facility] stones duct     LAPAROSCOPIC ILEOSTOMY  1/20/2012    [facility], loop     LAPAROSCOPIC OOPHORECTOMY Right 2009    [facility]     LAPAROTOMY EXPLORATORY N/A 1/28/2016    Procedure: LAPAROTOMY EXPLORATORY;  Surgeon: Elsa Medel MD;  [facility]     LEEP TX, CERVICAL  2009    [facility]     PICC INSERTION Left 10/21/2015    5fr DL Power PICC, 37cm (2cm external) in the L basilic vein w/ tip in the SVC RA junction.     REMOVE GASTROSTOMY TUBE ADULT N/A 12/12/2014    Procedure: REMOVE GASTROSTOMY TUBE ADULT;  Surgeon: Nicole Redding MD;  [facility]     REMOVE PORT VASCULAR ACCESS Right 6/30/2016    Procedure: REMOVE PORT VASCULAR ACCESS;  Surgeon: Pradeep Orosco MD;  Location: [facility]     REMOVE PORT VASCULAR ACCESS Right 9/8/2017    Procedure: REMOVE PORT VASCULAR ACCESS;  right side mediport removal;  Surgeon: Zechariah Worthington MD;  Location: [facility] OR     replace GASTROSTOMY TUBE ADULT  5/19/15       Family History:    Family History   Problem Relation Age of Onset     Thyroid Disease Mother      Sjogren's Mother      GASTROINTESTINAL DISEASE Mother      Intermittent nausea vomiting diarrhea     Colon Polyps Mother      Prostate Problems Father      prostate enlargement     Lupus Maternal Grandmother      Cancer Maternal Grandfather      Lung     Colon Cancer Maternal Grandfather 65     Cancer Paternal Grandmother      Lung       "Cerebrovascular Disease Paternal Grandmother      Diabetes Paternal Grandmother      Cardiovascular Paternal Grandmother      CHF     Cancer Paternal Grandfather      Lung     Glaucoma Paternal Grandfather      Abdominal Aortic Aneurysm Other      Macular Degeneration No family hx of        Social History:  Marital Status:   [2]  Social History   Substance Use Topics     Smoking status: Current Some Day Smoker     Packs/day: 1.00     Years: 4.00     Types: Cigarettes     Last attempt to quit: 1/1/2004     Smokeless tobacco: Never Used      Comment: Vaping     Alcohol use No        Medications:      HYDROmorphone (DILAUDID) 2 MG tablet   ACETAMINOPHEN PO   albuterol (2.5 MG/3ML) 0.083% neb solution   albuterol 90 MCG/ACT inhaler   Alfalfa 650 MG TABS   cholestyramine light (QUESTRAN) 4 GM Packet   cloNIDine (CATAPRES) 0.2 MG tablet   diphenhydrAMINE HCl 50 MG TABS   DULoxetine (CYMBALTA) 60 MG EC capsule   ipratropium (ATROVENT) 0.02 % neb solution   LORazepam (ATIVAN) 1 MG tablet   ondansetron (ZOFRAN) 8 MG tablet   SUMAtriptan (IMITREX) 50 MG tablet   traZODone (DESYREL) 100 MG tablet         Review of Systems   All other ROS reviewed and are negative or non-contributory except as stated in HPI.     Physical Exam   BP: 140/90  Pulse: 74  Temp: 98.3  F (36.8  C)  Resp: 16  Height: 167.6 cm (5' 6\")  Weight: 72.1 kg (159 lb)  SpO2: 100 %      Physical Exam   Constitutional: She is oriented to person, place, and time. She appears well-developed and well-nourished.   Patient is lying on her right side in the bed.   HENT:   Head: Normocephalic.   Right Ear: External ear normal.   Left Ear: External ear normal.   Nose: Nose normal.   Mouth/Throat: Oropharynx is clear and moist.   Eyes: Conjunctivae and EOM are normal. Pupils are equal, round, and reactive to light.   Neck:   Patient is easily able to rotate her head.  She has normal flexion.  She has pain when trying to extend the neck.  She has mild midline " tenderness along the entire cervical spine.  No significant paraspinous muscular tenderness.   Cardiovascular: Normal rate, regular rhythm, normal heart sounds and intact distal pulses.    Pulmonary/Chest: Effort normal and breath sounds normal.   Abdominal:   Diffuse abdominal tenderness.  Numerous abdominal scars.   Musculoskeletal: Normal range of motion. She exhibits no edema.   Neurological: She is alert and oriented to person, place, and time. She exhibits normal muscle tone.   Skin: Skin is warm and dry. She is not diaphoretic.   Psychiatric: She has a normal mood and affect. Her behavior is normal.   Vitals reviewed.      ED Course (with Medical Decision Making)    Pt seen and examined by me.  RN and EPIC notes reviewed.      Patient with numerous chronic issues, new were nontraumatic neck pain presenting at the request of her chiropractor and clinic for neck pain.  She did have one episode of fever 2 days ago but is afebrile in the ED.  She has been seen numerous times for various abdominal issues, fevers and sepsis.  No history of neck issues.    The patient and I had a long discussion about her health, her neck pain, her abdominal pain, her nausea and vomiting, her fevers.  She has not had any fever for 2 days.  Her abdominal pain is her usual abdominal pain.  She does have diarrhea associated with her C. difficile and is just restarted on her Dificid.  She does not appear to be dehydrated.  Her exam is not consistent with nuchal rigidity.  She has no neurologic changes or symptoms.  No headache.    Patient is requesting no labs.  She does not want any fluids.  We discussed scanning her neck with an MRI but she would prefer no radiologic studies either at this point.  I really do not feel that her symptoms are consistent with meningitis.    I have agreed to give her another short course of Dilaudid for abdominal pain.  She has nausea medicines at home.  She can use rest, ice or heat to the neck pain area.  " Tylenol in addition to the Dilaudid.  She would like to use chiropractic care for her neck pain for another couple of \"adjustments\".    We discussed at length indications to return to the ED including further fevers, increased neck pain, headaches, other worsening symptoms or concerns.     Procedures    Assessments & Plan   I have reviewed the findings, diagnosis, plan and need for follow up with the patient.    Discharge Medication List as of 10/12/2018 12:45 PM      START taking these medications    Details   HYDROmorphone (DILAUDID) 2 MG tablet Take 1 tablet (2 mg) by mouth every 6 hours as needed for pain, Disp-10 tablet, R-0, Local Print             Final diagnoses:   Neck pain   Chronic generalized abdominal pain     Disposition: Patient discharged home in stable condition.  Plan as above.  Return for concerns.   \  Note: Chart documentation done in part with Dragon Voice Recognition software. Although reviewed after completion, some word and grammatical errors may remain.       10/12/2018   [facility] EMERGENCY DEPARTMENT     Arely Lam MD  10/13/18 0059    "

## 2018-10-22 ENCOUNTER — HOSPITAL ENCOUNTER (EMERGENCY)
Facility: CLINIC | Age: 38
Discharge: ANOTHER HOSPITAL | End: 2018-10-22
Attending: PHYSICIAN ASSISTANT | Admitting: PHYSICIAN ASSISTANT
Payer: MEDICARE

## 2018-10-22 VITALS
TEMPERATURE: 98.9 F | OXYGEN SATURATION: 98 % | RESPIRATION RATE: 12 BRPM | SYSTOLIC BLOOD PRESSURE: 120 MMHG | WEIGHT: 150 LBS | BODY MASS INDEX: 24.21 KG/M2 | DIASTOLIC BLOOD PRESSURE: 81 MMHG

## 2018-10-22 DIAGNOSIS — R11.0 NAUSEA: ICD-10-CM

## 2018-10-22 DIAGNOSIS — A41.9 SEPSIS, DUE TO UNSPECIFIED ORGANISM: ICD-10-CM

## 2018-10-22 DIAGNOSIS — R00.0 TACHYCARDIA: ICD-10-CM

## 2018-10-22 DIAGNOSIS — R07.89 CHEST TIGHTNESS: ICD-10-CM

## 2018-10-22 DIAGNOSIS — R79.89 ELEVATED TROPONIN: ICD-10-CM

## 2018-10-22 LAB
ALBUMIN SERPL-MCNC: 2.8 G/DL (ref 3.4–5)
ALP SERPL-CCNC: 241 U/L (ref 40–150)
ALT SERPL W P-5'-P-CCNC: 54 U/L (ref 0–50)
ANION GAP SERPL CALCULATED.3IONS-SCNC: 9 MMOL/L (ref 3–14)
AST SERPL W P-5'-P-CCNC: 60 U/L (ref 0–45)
BASOPHILS # BLD AUTO: 0 10E9/L (ref 0–0.2)
BASOPHILS NFR BLD AUTO: 0.2 %
BILIRUB SERPL-MCNC: 1.1 MG/DL (ref 0.2–1.3)
BUN SERPL-MCNC: 6 MG/DL (ref 7–30)
CALCIUM SERPL-MCNC: 8 MG/DL (ref 8.5–10.1)
CHLORIDE SERPL-SCNC: 108 MMOL/L (ref 94–109)
CO2 SERPL-SCNC: 21 MMOL/L (ref 20–32)
CREAT SERPL-MCNC: 0.67 MG/DL (ref 0.52–1.04)
CRP SERPL-MCNC: 89.6 MG/L (ref 0–8)
DIFFERENTIAL METHOD BLD: ABNORMAL
EOSINOPHIL NFR BLD AUTO: 0.2 %
ERYTHROCYTE [DISTWIDTH] IN BLOOD BY AUTOMATED COUNT: 16 % (ref 10–15)
GFR SERPL CREATININE-BSD FRML MDRD: >90 ML/MIN/1.7M2
GLUCOSE SERPL-MCNC: 110 MG/DL (ref 70–99)
HCT VFR BLD AUTO: 28.3 % (ref 35–47)
HGB BLD-MCNC: 9 G/DL (ref 11.7–15.7)
IMM GRANULOCYTES # BLD: 0.1 10E9/L (ref 0–0.4)
IMM GRANULOCYTES NFR BLD: 0.7 %
LACTATE BLD-SCNC: 2.1 MMOL/L (ref 0.7–2)
LIPASE SERPL-CCNC: 37 U/L (ref 73–393)
LYMPHOCYTES # BLD AUTO: 1.1 10E9/L (ref 0.8–5.3)
LYMPHOCYTES NFR BLD AUTO: 7.8 %
MCH RBC QN AUTO: 25.9 PG (ref 26.5–33)
MCHC RBC AUTO-ENTMCNC: 31.8 G/DL (ref 31.5–36.5)
MCV RBC AUTO: 82 FL (ref 78–100)
MONOCYTES # BLD AUTO: 0.7 10E9/L (ref 0–1.3)
MONOCYTES NFR BLD AUTO: 4.4 %
NEUTROPHILS # BLD AUTO: 12.7 10E9/L (ref 1.6–8.3)
NEUTROPHILS NFR BLD AUTO: 86.7 %
NRBC # BLD AUTO: 0 10*3/UL
NRBC BLD AUTO-RTO: 0 /100
PLATELET # BLD AUTO: 246 10E9/L (ref 150–450)
POTASSIUM SERPL-SCNC: 3.6 MMOL/L (ref 3.4–5.3)
PROT SERPL-MCNC: 7.7 G/DL (ref 6.8–8.8)
RBC # BLD AUTO: 3.47 10E12/L (ref 3.8–5.2)
SODIUM SERPL-SCNC: 138 MMOL/L (ref 133–144)
TROPONIN I SERPL-MCNC: 0.11 UG/L (ref 0–0.04)
WBC # BLD AUTO: 14.6 10E9/L (ref 4–11)

## 2018-10-22 PROCEDURE — 83690 ASSAY OF LIPASE: CPT | Performed by: PHYSICIAN ASSISTANT

## 2018-10-22 PROCEDURE — 85025 COMPLETE CBC W/AUTO DIFF WBC: CPT | Performed by: PHYSICIAN ASSISTANT

## 2018-10-22 PROCEDURE — 96361 HYDRATE IV INFUSION ADD-ON: CPT | Performed by: PHYSICIAN ASSISTANT

## 2018-10-22 PROCEDURE — 99285 EMERGENCY DEPT VISIT HI MDM: CPT | Mod: 25 | Performed by: PHYSICIAN ASSISTANT

## 2018-10-22 PROCEDURE — 86140 C-REACTIVE PROTEIN: CPT | Performed by: PHYSICIAN ASSISTANT

## 2018-10-22 PROCEDURE — 36415 COLL VENOUS BLD VENIPUNCTURE: CPT | Performed by: PHYSICIAN ASSISTANT

## 2018-10-22 PROCEDURE — 96376 TX/PRO/DX INJ SAME DRUG ADON: CPT | Performed by: PHYSICIAN ASSISTANT

## 2018-10-22 PROCEDURE — 25000128 H RX IP 250 OP 636: Performed by: PHYSICIAN ASSISTANT

## 2018-10-22 PROCEDURE — 93005 ELECTROCARDIOGRAM TRACING: CPT | Performed by: PHYSICIAN ASSISTANT

## 2018-10-22 PROCEDURE — 96375 TX/PRO/DX INJ NEW DRUG ADDON: CPT | Performed by: PHYSICIAN ASSISTANT

## 2018-10-22 PROCEDURE — 80053 COMPREHEN METABOLIC PANEL: CPT | Performed by: PHYSICIAN ASSISTANT

## 2018-10-22 PROCEDURE — 84484 ASSAY OF TROPONIN QUANT: CPT | Performed by: PHYSICIAN ASSISTANT

## 2018-10-22 PROCEDURE — A9270 NON-COVERED ITEM OR SERVICE: HCPCS | Mod: GY | Performed by: PHYSICIAN ASSISTANT

## 2018-10-22 PROCEDURE — 96374 THER/PROPH/DIAG INJ IV PUSH: CPT | Performed by: PHYSICIAN ASSISTANT

## 2018-10-22 PROCEDURE — 83605 ASSAY OF LACTIC ACID: CPT | Performed by: PHYSICIAN ASSISTANT

## 2018-10-22 PROCEDURE — 96365 THER/PROPH/DIAG IV INF INIT: CPT | Performed by: PHYSICIAN ASSISTANT

## 2018-10-22 PROCEDURE — 87040 BLOOD CULTURE FOR BACTERIA: CPT | Performed by: PHYSICIAN ASSISTANT

## 2018-10-22 PROCEDURE — 99285 EMERGENCY DEPT VISIT HI MDM: CPT | Mod: Z6 | Performed by: PHYSICIAN ASSISTANT

## 2018-10-22 PROCEDURE — 25000132 ZZH RX MED GY IP 250 OP 250 PS 637: Mod: GY | Performed by: PHYSICIAN ASSISTANT

## 2018-10-22 RX ORDER — ONDANSETRON 2 MG/ML
4 INJECTION INTRAMUSCULAR; INTRAVENOUS EVERY 30 MIN PRN
Status: DISCONTINUED | OUTPATIENT
Start: 2018-10-22 | End: 2018-10-23 | Stop reason: HOSPADM

## 2018-10-22 RX ORDER — ASPIRIN 81 MG/1
324 TABLET, CHEWABLE ORAL ONCE
Status: COMPLETED | OUTPATIENT
Start: 2018-10-22 | End: 2018-10-22

## 2018-10-22 RX ORDER — DIPHENHYDRAMINE HYDROCHLORIDE 50 MG/ML
50 INJECTION INTRAMUSCULAR; INTRAVENOUS ONCE
Status: COMPLETED | OUTPATIENT
Start: 2018-10-22 | End: 2018-10-22

## 2018-10-22 RX ORDER — PIPERACILLIN SODIUM, TAZOBACTAM SODIUM 4; .5 G/20ML; G/20ML
4.5 INJECTION, POWDER, LYOPHILIZED, FOR SOLUTION INTRAVENOUS ONCE
Status: COMPLETED | OUTPATIENT
Start: 2018-10-22 | End: 2018-10-22

## 2018-10-22 RX ORDER — HYDROMORPHONE HYDROCHLORIDE 1 MG/ML
0.5 INJECTION, SOLUTION INTRAMUSCULAR; INTRAVENOUS; SUBCUTANEOUS
Status: DISCONTINUED | OUTPATIENT
Start: 2018-10-22 | End: 2018-10-23 | Stop reason: HOSPADM

## 2018-10-22 RX ADMIN — DIPHENHYDRAMINE HYDROCHLORIDE 50 MG: 50 INJECTION, SOLUTION INTRAMUSCULAR; INTRAVENOUS at 19:46

## 2018-10-22 RX ADMIN — SODIUM CHLORIDE 1000 ML: 9 INJECTION, SOLUTION INTRAVENOUS at 19:44

## 2018-10-22 RX ADMIN — PIPERACILLIN AND TAZOBACTAM 4.5 G: 4; .5 INJECTION, POWDER, FOR SOLUTION INTRAVENOUS at 20:59

## 2018-10-22 RX ADMIN — ONDANSETRON HYDROCHLORIDE 4 MG: 2 SOLUTION INTRAMUSCULAR; INTRAVENOUS at 21:25

## 2018-10-22 RX ADMIN — ONDANSETRON HYDROCHLORIDE 4 MG: 2 SOLUTION INTRAMUSCULAR; INTRAVENOUS at 19:44

## 2018-10-22 RX ADMIN — ASPIRIN 81 MG 324 MG: 81 TABLET ORAL at 21:05

## 2018-10-22 RX ADMIN — HYDROMORPHONE HYDROCHLORIDE 1 MG: 1 INJECTION, SOLUTION INTRAMUSCULAR; INTRAVENOUS; SUBCUTANEOUS at 21:25

## 2018-10-22 RX ADMIN — Medication 0.5 MG: at 22:44

## 2018-10-23 NOTE — ED PROVIDER NOTES
"  History     Chief Complaint   Patient presents with     Nausea & Vomiting     HPI  Doreen Peralta is a 38 year old female with a complex medical history who presented to the ED via EMS for concerns of nausea and vomiting. The patient reports she woke up this morning \"not feeling well.\"  She took her temp and it was 103.6 F.  She states that she has been nauseated and vomiting all day and cannot keep anything down. Throughout the day today she also developed chest tightness and pain she describes as \"when your heart beats fast.\"  She states that she has had some shortness of breath with this chest tightness.  She took Tylenol prior to arrival which helped her fever.  She reports increased abdominal pain from baseline as well.  She states that she just started taking her Eliquis again about a week ago.  She is supposed to be on it for a history of blood clots in her liver but she quit taking it for a while.  She also admits to using CBD oil for chronic pain relief.  She denies any urinary symptoms.  She is still having issues with diarrhea that she relates to recurrent C. difficile infections for which she is currently taking Dificid.  She was not tested for the C. difficile infection but rather started taking antibiotics she had at home after developing symptoms again.        Problem List:    Patient Active Problem List    Diagnosis Date Noted     Bacteremia 06/26/2018     Priority: Medium     Acute renal failure (H) 04/21/2018     Priority: Medium     History of bacteremia - recurrent 04/17/2018     Priority: Medium     Tobacco abuse 04/16/2018     Priority: Medium     Iron deficiency anemia 04/16/2018     Priority: Medium     Stool toxin PCR positive for Clostridium difficile on 4/17/18 04/16/2018     Priority: Medium     Gram negative septicemia (H) - Ochrobactrum, Stenotrophomonas & Pantoea 08/24/2017     Priority: Medium     Hypokalemia 08/24/2017     Priority: Medium     Essential hypertension 08/24/2017     " Priority: Medium     Sepsis due to Klebsiella (H) 07/27/2017     Priority: Medium     Insomnia, unspecified type 03/31/2017     Priority: Medium     Abdominal pain 02/15/2017     Priority: Medium     Abdominal pain, generalized 01/28/2017     Priority: Medium     History of deep venous thrombosis 01/26/2017     Priority: Medium     Nausea and vomiting 01/26/2017     Priority: Medium     Vitamin D deficiency 01/16/2017     Priority: Medium     Chronic abdominal pain 11/15/2016     Priority: Medium     Patient is followed by Larisa Lorenzo PA-C for ongoing prescription of pain medication.  All refills should be approved by this provider, or covering partner.    Medication(s): no regular narcotics - narcotics given at ED visits  Maximum quantity per month: N/A  Clinic visit frequency required: Q 6  months     Controlled substance agreement:  Encounter-Level CSA - 11/9/15:               Controlled Substance Agreement - Scan on 11/19/2015 11:17 AM : CONTROLLED SUBSTANCE AGREEMENT 11/09/15 (below)          Encounter-Level CSA - 2/27/15:               Controlled Substance Agreement - Scan on 3/12/2015  7:50 AM : Controlled Medication Agreement 02/27/15 (below)            Pain Clinic evaluation in the past: Yes    DIRE Total Score(s):  No flowsheet data found.    Last [facility] website verification:  done on 11/15/16   [email or web address]        Attention to G-tube (H) 11/08/2016     Priority: Medium     Fever 10/16/2016     Priority: Medium     Coagulation defect (H) [D68.9] 09/16/2016     Priority: Medium     Chronic diarrhea 07/22/2016     Priority: Medium     Migraine 07/20/2016     Priority: Medium     Positive blood culture - Klebsiella oxytoca from Port-a-cath culture 07/18/2016     Priority: Medium     Mitral regurgitation mild-mod by Echo June 2016 07/18/2016     Priority: Medium     Anemia, iron deficiency 07/17/2016     Priority: Medium     Anemia in other chronic diseases classified elsewhere  06/14/2016     Priority: Medium     Munchausen syndrome - previously suspected 06/14/2016     Priority: Medium     Long-term (current) use of anticoagulants [Z79.01] 05/16/2016     Priority: Medium     Anxiety 05/16/2016     Priority: Medium     S/P partial resection of colon 04/11/2016     Priority: Medium     Malnutrition (H) 04/11/2016     Priority: Medium     Jejunostomy tube present (H) 03/21/2016     Priority: Medium     Malfunctioning jejunostomy tube (H) 12/22/2015     Priority: Medium     Malfunction of gastrostomy tube (H) 11/19/2015     Priority: Medium     PEG tube malfunction (H) 10/16/2015     Priority: Medium     Health Care Home 01/16/2015     Priority: Medium     *See Letters for [facility] Care Plan: My Access Plan           PEG (percutaneous endoscopic gastrostomy) status 11/05/2014     Priority: Medium     Gastroparesis 04/11/2014     Priority: Medium     Overview:   Had ileostomy performed at [facility] in Jan 2012 as treatment       Migraines 04/11/2014     Priority: Medium     4/11/2014  With periods, every other month.       Intermittent asthma 04/11/2014     Priority: Medium     4/11/2014   With URIs, allergies       Allergic rhinitis 04/11/2014     Priority: Medium     Problem list name updated by automated process. Provider to review       Abnormal Pap smear of cervix 04/11/2014     Priority: Medium     4/11/2014  S/p colp and LEEP. Sees OB/GYN at [facility]. Pap every year x20 years - normal since.       S/P LEEP of cervix 02/06/2014     Priority: Medium     Patellofemoral stress syndrome 01/18/2014     Priority: Medium     Hx SBO 10/09/2013     Priority: Medium     4/11/2014  Recurrent. Sees GI (Dr. Redding - at [facility]) every 6 months or so.  Sees feeding tube nurse next week.       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 04/11/2013     Priority: Medium     Constipation by delayed colonic transit 10/24/2012     Priority: Medium     Atopic rhinitis 03/20/2009     Priority: Medium     Hyperbilirubinemia  03/11/2009     Priority: Medium        Past Medical History:    Past Medical History:   Diagnosis Date     Asthma      Bilateral ovarian cysts      Cervical cancer (H) 01/01/2008     Chronic pain      Colonic dysmotility      Constipation      E. coli sepsis (H) 5/8/2016     Enteritis      Fungemia 5/5/2016     Gastro-oesophageal reflux disease      H/O ileostomy      Hx of abnormal Pap smear      Hypertension      IBS (irritable bowel syndrome)      Other chronic pain      PONV (postoperative nausea and vomiting)      Thrombosis, hepatic vein (H)        Past [facility] History:    Past [facility] History:   Procedure Laterality Date     COLONOSCOPY  7/10/2012    Procedure: COLONOSCOPY;;  Surgeon: Nicole Redding MD;  [facility]     COLONOSCOPY N/A 2/19/2017    Procedure: COLONOSCOPY;  Surgeon: Randell Muller MD;  [facility]     COLONOSCOPY N/A 2/21/2017    Procedure: COLONOSCOPY;  Surgeon: Randell Muller MD;  [facility]     COLONOSCOPY N/A 5/3/2018    Procedure: COMBINED COLONOSCOPY, SINGLE OR MULTIPLE BIOPSY/POLYPECTOMY BY BIOPSY;  EGD/Colonoscopy ;  Surgeon: Loi Black MD;  Location: [facility]     ECHO CHELO  7/19/2016          ENDOSCOPIC INSERTION TUBE GASTROSTOMY N/A 1/21/2016    Procedure: ENDOSCOPIC INSERTION TUBE GASTROSTOMY;  Surgeon: Nicole Redding MD;  [facility]     ESOPHAGOSCOPY, GASTROSCOPY, DUODENOSCOPY (EGD), COMBINED  7/10/2012    Procedure: COMBINED ESOPHAGOSCOPY, GASTROSCOPY, DUODENOSCOPY (EGD);  Upper Endoscopy, Ileoscopy    Latex Allergy  with biopsies;  Surgeon: Nicole Redding MD;  [facility]     ESOPHAGOSCOPY, GASTROSCOPY, DUODENOSCOPY (EGD), COMBINED N/A 11/5/2014    Procedure: COMBINED ESOPHAGOSCOPY, GASTROSCOPY, DUODENOSCOPY (EGD);  Surgeon: Nicole Redding MD;  [facility]     ESOPHAGOSCOPY, GASTROSCOPY, DUODENOSCOPY (EGD), COMBINED N/A 5/3/2018    Procedure: COMBINED ESOPHAGOSCOPY, GASTROSCOPY, DUODENOSCOPY (EGD), BIOPSY  SINGLE OR MULTIPLE;;  Surgeon: Loi Black MD;  [facility]     HC REPLACE DUODENOSTOMY/JEJUNOSTOMY TUBE PERCUTANEOUS N/A 8/27/2015    Procedure: REPLACE GASTROJEJUNOSTOMY TUBE, PERCUTANEOUS;  Surgeon: Mio Holder MD;  [facility]     HC REPLACE DUODENOSTOMY/JEJUNOSTOMY TUBE PERCUTANEOUS N/A 1/7/2016    Procedure: REPLACE JEJUNOSTOMY TUBE, PERCUTANEOUS;  Surgeon: Elsa Medel MD;  [facility]     HC REPLACE DUODENOSTOMY/JEJUNOSTOMY TUBE PERCUTANEOUS N/A 1/28/2016    Procedure: REPLACE JEJUNOSTOMY TUBE, PERCUTANEOUS;  Surgeon: Elsa Medel MD;  [facility]     HC REPLACE GASTROSTOMY/CECOSTOMY TUBE PERCUTANEOUS Left 5/19/2015    Procedure: REPLACE GASTROSTOMY TUBE, PERCUTANEOUS;  Surgeon: Melecio Morejon Chi, MD;  Location: [facility]     HC UGI ENDOSCOPY W PLACEMENT GASTROSTOMY TUBE PERCUT N/A 10/1/2015    Procedure: COMBINED ESOPHAGOSCOPY, GASTROSCOPY, DUODENOSCOPY (EGD), PLACE PERCUTANEOUS ENDOSCOPIC GASTROSTOMY TUBE;  Surgeon: Mio Holder MD;  [facility]     INSERT PORT VASCULAR ACCESS Right 7/20/2017    Procedure: INSERT PORT VASCULAR ACCESS;  Chest Port Placement  **Latex Allergy**;  Surgeon: Coy Rocha PA-C;  [facility]     LAPAROSCOPIC ASSISTED COLECTOMY  1/20/2012    Procedure:LAPAROSCOPIC ASSISTED COLECTOMY; Laparoscopic Ileostomy       LAPAROSCOPIC ASSISTED COLECTOMY LEFT (DESCENDING)  10/24/2012    Procedure: LAPAROSCOPIC ASSISTED COLECTOMY LEFT (DESCENDING);   Hand Assisted Laproscopic Subtotal abdominal Colectomy,Iieorectal anastamosis, Ileostomy Closure.       LAPAROSCOPIC ASSISTED INSERTION TUBE JEJUNOSTOMY N/A 10/16/2015    Procedure: LAPAROSCOPIC ASSISTED INSERTION TUBE JEJUNOSTOMY;  Surgeon: Elsa Medel MD;  [facility]     LAPAROSCOPIC CHOLECYSTECTOMY  2002    [facility]. stones duct     LAPAROSCOPIC ILEOSTOMY  1/20/2012    [facility], loop     LAPAROSCOPIC OOPHORECTOMY Right 2009    [facility]     LAPAROTOMY  EXPLORATORY N/A 1/28/2016    Procedure: LAPAROTOMY EXPLORATORY;  Surgeon: Elsa Medel MD;  [facility]     LEEP TX, CERVICAL  2009    [facility]     PICC INSERTION Left 10/21/2015    5fr DL Power PICC, 37cm (2cm external) in the L basilic vein w/ tip in the SVC RA junction.     REMOVE GASTROSTOMY TUBE ADULT N/A 12/12/2014    Procedure: REMOVE GASTROSTOMY TUBE ADULT;  Surgeon: Nicole Redding MD;  [facility]     REMOVE PORT VASCULAR ACCESS Right 6/30/2016    Procedure: REMOVE PORT VASCULAR ACCESS;  Surgeon: Pradeep Orosco MD;  Location: [facility]     REMOVE PORT VASCULAR ACCESS Right 9/8/2017    Procedure: REMOVE PORT VASCULAR ACCESS;  right side mediport removal;  Surgeon: Zechariah Worthington MD;  Location: [facility] OR     replace GASTROSTOMY TUBE ADULT  5/19/15       Family History:    Family History   Problem Relation Age of Onset     Thyroid Disease Mother      Sjogren's Mother      GASTROINTESTINAL DISEASE Mother      Intermittent nausea vomiting diarrhea     Colon Polyps Mother      Prostate Problems Father      prostate enlargement     Lupus Maternal Grandmother      Cancer Maternal Grandfather      Lung     Colon Cancer Maternal Grandfather 65     Cancer Paternal Grandmother      Lung      Cerebrovascular Disease Paternal Grandmother      Diabetes Paternal Grandmother      Cardiovascular Paternal Grandmother      CHF     Cancer Paternal Grandfather      Lung     Glaucoma Paternal Grandfather      Abdominal Aortic Aneurysm Other      Macular Degeneration No family hx of        Social History:  Marital Status:   [2]  Social History   Substance Use Topics     Smoking status: Current Some Day Smoker     Packs/day: 1.00     Years: 4.00     Types: Cigarettes     Last attempt to quit: 1/1/2004     Smokeless tobacco: Never Used      Comment: Vaping     Alcohol use No        Medications:      ACETAMINOPHEN PO   albuterol 90 MCG/ACT inhaler   cholestyramine light (QUESTRAN) 4 GM Packet    cloNIDine (CATAPRES) 0.2 MG tablet   diphenhydrAMINE HCl 50 MG TABS   DULoxetine (CYMBALTA) 60 MG EC capsule   HYDROmorphone (DILAUDID) 2 MG tablet   ondansetron (ZOFRAN) 8 MG tablet   Rivaroxaban (XARELTO PO)   traZODone (DESYREL) 100 MG tablet   albuterol (2.5 MG/3ML) 0.083% neb solution   Alfalfa 650 MG TABS   ipratropium (ATROVENT) 0.02 % neb solution   SUMAtriptan (IMITREX) 50 MG tablet         Review of Systems   All other systems reviewed and are negative.      Physical Exam   BP: 127/90  Heart Rate: 139  Temp: 98.9  F (37.2  C)  Resp: 19  Weight: 68 kg (150 lb)  SpO2: 98 %      Physical Exam   Constitutional: She is oriented to person, place, and time. She appears well-developed and well-nourished. No distress.   HENT:   Head: Normocephalic and atraumatic.   Eyes: Conjunctivae are normal. No scleral icterus.   Neck: Neck supple.   Cardiovascular: Regular rhythm and normal heart sounds.  Tachycardia present.    Pulmonary/Chest: Effort normal and breath sounds normal. No respiratory distress.   Abdominal: Soft. She exhibits no distension. There is tenderness (mild generalized). There is no rebound.   Neurological: She is alert and oriented to person, place, and time.   Skin: Skin is warm and dry. She is not diaphoretic.   Psychiatric: She has a normal mood and affect.   Nursing note and vitals reviewed.      ED Course     ED Course     Procedures           EKG Interpretation:      Interpreted by Vianey Warren  Time reviewed: 2045  Symptoms at time of EKG: chest pain   Rhythm: sinus tachycardia  Rate: Tachycardia  Axis: Normal  Ectopy: none  Conduction: normal  ST Segments/ T Waves: Non-specific ST-T wave changes  Q Waves: none  Comparison to prior: Unchanged    Clinical Impression: no acute changes      The patient has signs of Severe Sepsis as evidenced by:    1. 2 SIRS criteria, AND  2. Suspected infection, AND   3. Organ dysfunction: Lactic Acid > 1.9    Time severe sepsis diagnosis confirmed = 1940  as this was the time when Lactate resulted, and the level was > 1.9      3 Hour Severe Sepsis Bundle Completion:  1. Initial Lactic Acid Result:   Recent Labs   Lab Test  10/22/18   1940  09/09/18   1155  08/07/18   1132   LACT  2.1*  1.2  0.9     2. Blood Cultures before Antibiotics: Yes  3. Broad Spectrum Antibiotics Administered: Yes     Anti-infectives     None        4. 2000 ml of IV fluids.  Ideal body weight: 59.3 kg (130 lb 11.7 oz)  Adjusted ideal body weight: 62.8 kg (138 lb 7 oz)      Results for orders placed or performed during the hospital encounter of 10/22/18 (from the past 24 hour(s))   CBC with platelets differential   Result Value Ref Range    WBC 14.6 (H) 4.0 - 11.0 10e9/L    RBC Count 3.47 (L) 3.8 - 5.2 10e12/L    Hemoglobin 9.0 (L) 11.7 - 15.7 g/dL    Hematocrit 28.3 (L) 35.0 - 47.0 %    MCV 82 78 - 100 fl    MCH 25.9 (L) 26.5 - 33.0 pg    MCHC 31.8 31.5 - 36.5 g/dL    RDW 16.0 (H) 10.0 - 15.0 %    Platelet Count 246 150 - 450 10e9/L    Diff Method Automated Method     % Neutrophils 86.7 %    % Lymphocytes 7.8 %    % Monocytes 4.4 %    % Eosinophils 0.2 %    % Basophils 0.2 %    % Immature Granulocytes 0.7 %    Nucleated RBCs 0 0 /100    Absolute Neutrophil 12.7 (H) 1.6 - 8.3 10e9/L    Absolute Lymphocytes 1.1 0.8 - 5.3 10e9/L    Absolute Monocytes 0.7 0.0 - 1.3 10e9/L    Absolute Basophils 0.0 0.0 - 0.2 10e9/L    Abs Immature Granulocytes 0.1 0 - 0.4 10e9/L    Absolute Nucleated RBC 0.0    Comprehensive metabolic panel   Result Value Ref Range    Sodium 138 133 - 144 mmol/L    Potassium 3.6 3.4 - 5.3 mmol/L    Chloride 108 94 - 109 mmol/L    Carbon Dioxide 21 20 - 32 mmol/L    Anion Gap 9 3 - 14 mmol/L    Glucose 110 (H) 70 - 99 mg/dL    Urea Nitrogen 6 (L) 7 - 30 mg/dL    Creatinine 0.67 0.52 - 1.04 mg/dL    GFR Estimate >90 >60 mL/min/1.7m2    GFR Estimate If Black >90 >60 mL/min/1.7m2    Calcium 8.0 (L) 8.5 - 10.1 mg/dL    Bilirubin Total 1.1 0.2 - 1.3 mg/dL    Albumin 2.8 (L) 3.4 - 5.0  g/dL    Protein Total 7.7 6.8 - 8.8 g/dL    Alkaline Phosphatase 241 (H) 40 - 150 U/L    ALT 54 (H) 0 - 50 U/L    AST 60 (H) 0 - 45 U/L   Lipase   Result Value Ref Range    Lipase 37 (L) 73 - 393 U/L   CRP inflammation   Result Value Ref Range    CRP Inflammation 89.6 (H) 0.0 - 8.0 mg/L   Lactic acid whole blood   Result Value Ref Range    Lactic Acid 2.1 (H) 0.7 - 2.0 mmol/L   Troponin I   Result Value Ref Range    Troponin I ES 0.114 (H) 0.000 - 0.045 ug/L     *Note: Due to a large number of results and/or encounters for the requested time period, some results have not been displayed. A complete set of results can be found in Results Review.       Medications   0.9% sodium chloride BOLUS (1,000 mLs Intravenous New Bag 10/22/18 1944)   diphenhydrAMINE (BENADRYL) injection 50 mg (50 mg Intravenous Given 10/22/18 1946)   piperacillin-tazobactam (ZOSYN) 4.5 g vial to attach to  mL bag (0 g Intravenous Stopped 10/22/18 2140)   aspirin chewable tablet 324 mg (324 mg Oral Given 10/22/18 2105)   HYDROmorphone (DILAUDID) injection 1 mg (1 mg Intravenous Given 10/22/18 2125)       Assessments & Plan (with Medical Decision Making)  Doreen Peralta is a 38 year old female with a rather complex medical history notable for recurrent sepsis due to bacteremia who presented to the ED complaining of fever, nausea/vomiting, and chest pain that began today.  Took Tylenol prior to arrival.  She was afebrile on arrival but tachycardic in the 140s.  Blood pressure stable.  Patient exam revealed mild tenderness throughout abdomen but normal cardiopulmonary exam other than tachycardia.  IV access was obtained and labs were drawn.  She was given bolus of IV fluids initially as well as Benadryl and Zofran for nausea relief and Dilaudid for pain control.  Labs demonstrated elevated white count of 14.6 with left shift, and lactate elevated at 2.1.  IV Zosyn subsequently ordered with these findings due to sepsis. Her LFTs were also noted  to be slightly elevated.  Due to her complaint of chest pain and EKG was obtained which showed sinus tachycardia.  No significant changes from previous EKG however her troponin came back slightly elevated at 0.114.  She was given aspirin with these findings though this was rather surprising as she is fairly young and other than tobacco abuse and has no significant risk factors.  She does have a history of clotting disorder however she is currently on Xarelto so I think a PE is less likely.  Elevated troponin may be secondary to increased demand due to her rather significant tachycardia on arrival.  Her tachycardia did improve with fluids. However I think she would benefit from a hospital admission for IV antibiotics, blood culture trending, and cardiology consult.  I encouraged the patient to transfer to the [facility] or [facility] as she is rather complex and they would have all of her medical records at these facilities however she refused and requested to go to [facility] because she had bad experiences at those previously mentioned locations.  I called and spoke with Dr. Garcia, hospitalist at [facility] regarding this patient and he accepted her in transfer onto his service.  She will go by [facility] for continued monitoring.  I did discuss with him the option of starting heparin due to her elevated troponin however he advised holding on this for now.  She otherwise remained hemodynamically stable throughout ED stay.  Staffed in the ED with Dr. Pugh.     I have reviewed the nursing notes.    I have reviewed the findings, diagnosis, plan and need for follow up with the patient.    Discharge Medication List as of 10/22/2018 11:19 PM          Final diagnoses:   Sepsis, due to unspecified organism (H)   Nausea   Tachycardia   Chest tightness   Elevated troponin     Note: Chart documentation done in part with Dragon Voice Recognition software. Although reviewed after completion,  some word and grammatical errors may remain.     10/22/2018   [facility] EMERGENCY DEPARTMENT     Vianey Warren PA-C  10/23/18 0124

## 2018-10-23 NOTE — ED TRIAGE NOTES
Pt presents by EMS with concerns of nausea, vomiting, fevers, and pain in her chest.  Pt states that her stomach has been bad all week, nausea and vomiting since yesterday.  Pt states that she has c-diff and started antibiotics.

## 2018-10-28 LAB
BACTERIA SPEC CULT: NORMAL
BACTERIA SPEC CULT: NORMAL
Lab: 1896
SPECIMEN SOURCE: NORMAL
SPECIMEN SOURCE: NORMAL

## 2018-11-19 ENCOUNTER — HOSPITAL ENCOUNTER (EMERGENCY)
Facility: CLINIC | Age: 38
Discharge: HOME | End: 2018-11-19
Attending: EMERGENCY MEDICINE | Admitting: EMERGENCY MEDICINE
Payer: MEDICARE

## 2018-11-19 VITALS
HEART RATE: 74 BPM | WEIGHT: 155 LBS | DIASTOLIC BLOOD PRESSURE: 81 MMHG | TEMPERATURE: 98.4 F | SYSTOLIC BLOOD PRESSURE: 126 MMHG | RESPIRATION RATE: 16 BRPM | BODY MASS INDEX: 25.02 KG/M2 | OXYGEN SATURATION: 100 %

## 2018-11-19 DIAGNOSIS — R10.13 ABDOMINAL PAIN, EPIGASTRIC: ICD-10-CM

## 2018-11-19 DIAGNOSIS — K31.84 GASTROPARESIS: ICD-10-CM

## 2018-11-19 LAB
ALBUMIN SERPL-MCNC: 4.1 G/DL (ref 3.4–5)
ALP SERPL-CCNC: 132 U/L (ref 40–150)
ALT SERPL W P-5'-P-CCNC: 46 U/L (ref 0–50)
ANION GAP SERPL CALCULATED.3IONS-SCNC: 9 MMOL/L (ref 3–14)
AST SERPL W P-5'-P-CCNC: 25 U/L (ref 0–45)
BASOPHILS # BLD AUTO: 0 10E9/L (ref 0–0.2)
BASOPHILS NFR BLD AUTO: 0.3 %
BILIRUB SERPL-MCNC: 1.1 MG/DL (ref 0.2–1.3)
BUN SERPL-MCNC: 12 MG/DL (ref 7–30)
CALCIUM SERPL-MCNC: 9.4 MG/DL (ref 8.5–10.1)
CHLORIDE SERPL-SCNC: 103 MMOL/L (ref 94–109)
CO2 SERPL-SCNC: 24 MMOL/L (ref 20–32)
CREAT SERPL-MCNC: 1.04 MG/DL (ref 0.52–1.04)
DIFFERENTIAL METHOD BLD: ABNORMAL
EOSINOPHIL NFR BLD AUTO: 0.2 %
ERYTHROCYTE [DISTWIDTH] IN BLOOD BY AUTOMATED COUNT: 17 % (ref 10–15)
GFR SERPL CREATININE-BSD FRML MDRD: 59 ML/MIN/1.7M2
GLUCOSE SERPL-MCNC: 81 MG/DL (ref 70–99)
HCT VFR BLD AUTO: 34.6 % (ref 35–47)
HGB BLD-MCNC: 10.8 G/DL (ref 11.7–15.7)
IMM GRANULOCYTES # BLD: 0 10E9/L (ref 0–0.4)
IMM GRANULOCYTES NFR BLD: 0.3 %
LIPASE SERPL-CCNC: 171 U/L (ref 73–393)
LYMPHOCYTES # BLD AUTO: 2.3 10E9/L (ref 0.8–5.3)
LYMPHOCYTES NFR BLD AUTO: 25.6 %
MCH RBC QN AUTO: 25.7 PG (ref 26.5–33)
MCHC RBC AUTO-ENTMCNC: 31.2 G/DL (ref 31.5–36.5)
MCV RBC AUTO: 82 FL (ref 78–100)
MONOCYTES # BLD AUTO: 0.4 10E9/L (ref 0–1.3)
MONOCYTES NFR BLD AUTO: 4.4 %
NEUTROPHILS # BLD AUTO: 6.3 10E9/L (ref 1.6–8.3)
NEUTROPHILS NFR BLD AUTO: 69.2 %
NRBC # BLD AUTO: 0 10*3/UL
NRBC BLD AUTO-RTO: 0 /100
PLATELET # BLD AUTO: 345 10E9/L (ref 150–450)
POTASSIUM SERPL-SCNC: 3.8 MMOL/L (ref 3.4–5.3)
PROT SERPL-MCNC: 9.1 G/DL (ref 6.8–8.8)
RBC # BLD AUTO: 4.21 10E12/L (ref 3.8–5.2)
SODIUM SERPL-SCNC: 136 MMOL/L (ref 133–144)
WBC # BLD AUTO: 9.1 10E9/L (ref 4–11)

## 2018-11-19 PROCEDURE — 85025 COMPLETE CBC W/AUTO DIFF WBC: CPT | Performed by: EMERGENCY MEDICINE

## 2018-11-19 PROCEDURE — 99285 EMERGENCY DEPT VISIT HI MDM: CPT | Mod: Z6 | Performed by: EMERGENCY MEDICINE

## 2018-11-19 PROCEDURE — 96375 TX/PRO/DX INJ NEW DRUG ADDON: CPT | Performed by: EMERGENCY MEDICINE

## 2018-11-19 PROCEDURE — 96374 THER/PROPH/DIAG INJ IV PUSH: CPT | Performed by: EMERGENCY MEDICINE

## 2018-11-19 PROCEDURE — 83690 ASSAY OF LIPASE: CPT | Performed by: EMERGENCY MEDICINE

## 2018-11-19 PROCEDURE — 96361 HYDRATE IV INFUSION ADD-ON: CPT | Performed by: EMERGENCY MEDICINE

## 2018-11-19 PROCEDURE — 25000128 H RX IP 250 OP 636: Performed by: EMERGENCY MEDICINE

## 2018-11-19 PROCEDURE — 80053 COMPREHEN METABOLIC PANEL: CPT | Performed by: EMERGENCY MEDICINE

## 2018-11-19 PROCEDURE — 96376 TX/PRO/DX INJ SAME DRUG ADON: CPT | Performed by: EMERGENCY MEDICINE

## 2018-11-19 PROCEDURE — 99285 EMERGENCY DEPT VISIT HI MDM: CPT | Mod: 25 | Performed by: EMERGENCY MEDICINE

## 2018-11-19 RX ORDER — DIPHENHYDRAMINE HYDROCHLORIDE 50 MG/ML
25 INJECTION INTRAMUSCULAR; INTRAVENOUS ONCE
Status: COMPLETED | OUTPATIENT
Start: 2018-11-19 | End: 2018-11-19

## 2018-11-19 RX ORDER — HYDROMORPHONE HYDROCHLORIDE 1 MG/ML
0.5 INJECTION, SOLUTION INTRAMUSCULAR; INTRAVENOUS; SUBCUTANEOUS
Status: DISCONTINUED | OUTPATIENT
Start: 2018-11-19 | End: 2018-11-19 | Stop reason: HOSPADM

## 2018-11-19 RX ORDER — HYDROMORPHONE HYDROCHLORIDE 2 MG/1
2 TABLET ORAL EVERY 6 HOURS PRN
Qty: 12 TABLET | Refills: 0 | Status: SHIPPED | OUTPATIENT
Start: 2018-11-19 | End: 2018-11-27

## 2018-11-19 RX ADMIN — HYDROMORPHONE HYDROCHLORIDE 0.5 MG: 1 INJECTION, SOLUTION INTRAMUSCULAR; INTRAVENOUS; SUBCUTANEOUS at 17:42

## 2018-11-19 RX ADMIN — DIPHENHYDRAMINE HYDROCHLORIDE 25 MG: 50 INJECTION, SOLUTION INTRAMUSCULAR; INTRAVENOUS at 16:58

## 2018-11-19 RX ADMIN — SODIUM CHLORIDE 500 ML: 9 INJECTION, SOLUTION INTRAVENOUS at 16:50

## 2018-11-19 RX ADMIN — HYDROMORPHONE HYDROCHLORIDE 0.5 MG: 1 INJECTION, SOLUTION INTRAMUSCULAR; INTRAVENOUS; SUBCUTANEOUS at 17:00

## 2018-11-19 NOTE — ED PROVIDER NOTES
"  History     Chief Complaint   Patient presents with     Dehydration     HPI  Doreen Peralta is a 38 year old female with multiple medical problem who presents to the emergency department complaining of possible dehydration.  She has been having a \"episode \"of gastroparesis and has had frequent abdominal pain, cramping, vomiting and inability to eat.  She has had loose stools but not unusual for her.  No blood in her stool, fever, dysuria, hematuria.  She has had decreased urine output.  She saw her primary care physician today who referred her here for IV fluids.  Of note she was recently hospitalized and found to have pulmonary hypertension and was put on Lasix.  This may be contributing to her dehydration.    Problem List:    Patient Active Problem List    Diagnosis Date Noted     Bacteremia 06/26/2018     Priority: Medium     Acute renal failure (H) 04/21/2018     Priority: Medium     History of bacteremia - recurrent 04/17/2018     Priority: Medium     Tobacco abuse 04/16/2018     Priority: Medium     Iron deficiency anemia 04/16/2018     Priority: Medium     Stool toxin PCR positive for Clostridium difficile on 4/17/18 04/16/2018     Priority: Medium     Gram negative septicemia (H) - Ochrobactrum, Stenotrophomonas & Pantoea 08/24/2017     Priority: Medium     Hypokalemia 08/24/2017     Priority: Medium     Essential hypertension 08/24/2017     Priority: Medium     Sepsis due to Klebsiella (H) 07/27/2017     Priority: Medium     Insomnia, unspecified type 03/31/2017     Priority: Medium     Abdominal pain 02/15/2017     Priority: Medium     Abdominal pain, generalized 01/28/2017     Priority: Medium     History of deep venous thrombosis 01/26/2017     Priority: Medium     Nausea and vomiting 01/26/2017     Priority: Medium     Vitamin D deficiency 01/16/2017     Priority: Medium     Chronic abdominal pain 11/15/2016     Priority: Medium     Patient is followed by Larsia Lorenzo PA-C for ongoing " prescription of pain medication.  All refills should be approved by this provider, or covering partner.    Medication(s): no regular narcotics - narcotics given at ED visits  Maximum quantity per month: N/A  Clinic visit frequency required: Q 6  months     Controlled substance agreement:  Encounter-Level CSA - 11/9/15:               Controlled Substance Agreement - Scan on 11/19/2015 11:17 AM : CONTROLLED SUBSTANCE AGREEMENT 11/09/15 (below)          Encounter-Level CSA - 2/27/15:               Controlled Substance Agreement - Scan on 3/12/2015  7:50 AM : Controlled Medication Agreement 02/27/15 (below)            Pain Clinic evaluation in the past: Yes    DIRE Total Score(s):  No flowsheet data found.    Last [facility] website verification:  done on 11/15/16   [email or web address]        Attention to G-tube (H) 11/08/2016     Priority: Medium     Fever 10/16/2016     Priority: Medium     Coagulation defect (H) [D68.9] 09/16/2016     Priority: Medium     Chronic diarrhea 07/22/2016     Priority: Medium     Migraine 07/20/2016     Priority: Medium     Positive blood culture - Klebsiella oxytoca from Port-a-cath culture 07/18/2016     Priority: Medium     Mitral regurgitation mild-mod by Echo June 2016 07/18/2016     Priority: Medium     Anemia, iron deficiency 07/17/2016     Priority: Medium     Anemia in other chronic diseases classified elsewhere 06/14/2016     Priority: Medium     Munchausen syndrome - previously suspected 06/14/2016     Priority: Medium     Long-term (current) use of anticoagulants [Z79.01] 05/16/2016     Priority: Medium     Anxiety 05/16/2016     Priority: Medium     S/P partial resection of colon 04/11/2016     Priority: Medium     Malnutrition (H) 04/11/2016     Priority: Medium     Jejunostomy tube present (H) 03/21/2016     Priority: Medium     Malfunctioning jejunostomy tube (H) 12/22/2015     Priority: Medium     Malfunction of gastrostomy tube (H) 11/19/2015     Priority: Medium      PEG tube malfunction (H) 10/16/2015     Priority: Medium     Health Care Home 01/16/2015     Priority: Medium     *See Letters for [facility] Care Plan: My Access Plan           PEG (percutaneous endoscopic gastrostomy) status 11/05/2014     Priority: Medium     Gastroparesis 04/11/2014     Priority: Medium     Overview:   Had ileostomy performed at [facility] in Jan 2012 as treatment       Migraines 04/11/2014     Priority: Medium     4/11/2014  With periods, every other month.       Intermittent asthma 04/11/2014     Priority: Medium     4/11/2014   With URIs, allergies       Allergic rhinitis 04/11/2014     Priority: Medium     Problem list name updated by automated process. Provider to review       Abnormal Pap smear of cervix 04/11/2014     Priority: Medium     4/11/2014  S/p colp and LEEP. Sees OB/GYN at [facility]. Pap every year x20 years - normal since.       S/P LEEP of cervix 02/06/2014     Priority: Medium     Patellofemoral stress syndrome 01/18/2014     Priority: Medium     Hx SBO 10/09/2013     Priority: Medium     4/11/2014  Recurrent. Sees GI (Dr. Redding - at [facility]) every 6 months or so.  Sees feeding tube nurse next week.       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 04/11/2013     Priority: Medium     Constipation by delayed colonic transit 10/24/2012     Priority: Medium     Atopic rhinitis 03/20/2009     Priority: Medium     Hyperbilirubinemia 03/11/2009     Priority: Medium        Past Medical History:    Past Medical History:   Diagnosis Date     Asthma      Bilateral ovarian cysts      Cervical cancer (H) 01/01/2008     Chronic pain      Colonic dysmotility      Constipation      E. coli sepsis (H) 5/8/2016     Enteritis      Fungemia 5/5/2016     Gastro-oesophageal reflux disease      H/O ileostomy      Hx of abnormal Pap smear      Hypertension      IBS (irritable bowel syndrome)      Other chronic pain      PONV (postoperative nausea and vomiting)      Thrombosis, hepatic vein (H)        Past [facility]  History:    Past [facility] History:   Procedure Laterality Date     COLONOSCOPY  7/10/2012    Procedure: COLONOSCOPY;;  Surgeon: Nicole Redding MD;  [facility]     COLONOSCOPY N/A 2/19/2017    Procedure: COLONOSCOPY;  Surgeon: Randell Muller MD;  [facility] GI     COLONOSCOPY N/A 2/21/2017    Procedure: COLONOSCOPY;  Surgeon: Randell Muller MD;  [facility]     COLONOSCOPY N/A 5/3/2018    Procedure: COMBINED COLONOSCOPY, SINGLE OR MULTIPLE BIOPSY/POLYPECTOMY BY BIOPSY;  EGD/Colonoscopy ;  Surgeon: Loi Black MD;  [facility] GI     ECHO CHELO  7/19/2016          ENDOSCOPIC INSERTION TUBE GASTROSTOMY N/A 1/21/2016    Procedure: ENDOSCOPIC INSERTION TUBE GASTROSTOMY;  Surgeon: Nicole Redding MD;  [facility]     ESOPHAGOSCOPY, GASTROSCOPY, DUODENOSCOPY (EGD), COMBINED  7/10/2012    Procedure: COMBINED ESOPHAGOSCOPY, GASTROSCOPY, DUODENOSCOPY (EGD);  Upper Endoscopy, Ileoscopy    Latex Allergy  with biopsies;  Surgeon: Nicole Redding MD;  [facility]     ESOPHAGOSCOPY, GASTROSCOPY, DUODENOSCOPY (EGD), COMBINED N/A 11/5/2014    Procedure: COMBINED ESOPHAGOSCOPY, GASTROSCOPY, DUODENOSCOPY (EGD);  Surgeon: Nicole Redding MD;  [facility]     ESOPHAGOSCOPY, GASTROSCOPY, DUODENOSCOPY (EGD), COMBINED N/A 5/3/2018    Procedure: COMBINED ESOPHAGOSCOPY, GASTROSCOPY, DUODENOSCOPY (EGD), BIOPSY SINGLE OR MULTIPLE;;  Surgeon: Loi Black MD;  [facility]     HC REPLACE DUODENOSTOMY/JEJUNOSTOMY TUBE PERCUTANEOUS N/A 8/27/2015    Procedure: REPLACE GASTROJEJUNOSTOMY TUBE, PERCUTANEOUS;  Surgeon: Mio Holder MD;  [facility]     HC REPLACE DUODENOSTOMY/JEJUNOSTOMY TUBE PERCUTANEOUS N/A 1/7/2016    Procedure: REPLACE JEJUNOSTOMY TUBE, PERCUTANEOUS;  Surgeon: Elsa Medel MD;  [facility]     HC REPLACE DUODENOSTOMY/JEJUNOSTOMY TUBE PERCUTANEOUS N/A 1/28/2016    Procedure: REPLACE JEJUNOSTOMY TUBE, PERCUTANEOUS;  Surgeon:  Elsa Medel MD;  [facility]     HC REPLACE GASTROSTOMY/CECOSTOMY TUBE PERCUTANEOUS Left 5/19/2015    Procedure: REPLACE GASTROSTOMY TUBE, PERCUTANEOUS;  Surgeon: Melecio Morejon Chi, MD;  [facility]     HC UGI ENDOSCOPY W PLACEMENT GASTROSTOMY TUBE PERCUT N/A 10/1/2015    Procedure: COMBINED ESOPHAGOSCOPY, GASTROSCOPY, DUODENOSCOPY (EGD), PLACE PERCUTANEOUS ENDOSCOPIC GASTROSTOMY TUBE;  Surgeon: Mio Holdre MD;  [facility]     INSERT PORT VASCULAR ACCESS Right 7/20/2017    Procedure: INSERT PORT VASCULAR ACCESS;  Chest Port Placement  **Latex Allergy**;  Surgeon: Cyo Rocha PA-C;  [facility]     LAPAROSCOPIC ASSISTED COLECTOMY  1/20/2012    Procedure:LAPAROSCOPIC ASSISTED COLECTOMY; Laparoscopic Ileostomy       LAPAROSCOPIC ASSISTED COLECTOMY LEFT (DESCENDING)  10/24/2012    Procedure: LAPAROSCOPIC ASSISTED COLECTOMY LEFT (DESCENDING);   Hand Assisted Laproscopic Subtotal abdominal Colectomy,Iieorectal anastamosis, Ileostomy Closure.       LAPAROSCOPIC ASSISTED INSERTION TUBE JEJUNOSTOMY N/A 10/16/2015    Procedure: LAPAROSCOPIC ASSISTED INSERTION TUBE JEJUNOSTOMY;  Surgeon: Elsa Medel MD;  [facility]     LAPAROSCOPIC CHOLECYSTECTOMY  2002    [facility] stones duct     LAPAROSCOPIC ILEOSTOMY  1/20/2012    [facility], loop     LAPAROSCOPIC OOPHORECTOMY Right 2009    [facility]     LAPAROTOMY EXPLORATORY N/A 1/28/2016    Procedure: LAPAROTOMY EXPLORATORY;  Surgeon: Elsa Medel MD;  Location:  OR     LEEP TX, CERVICAL  2009    [facility]     PICC INSERTION Left 10/21/2015    5fr DL Power PICC, 37cm (2cm external) in the L basilic vein w/ tip in the SVC RA junction.     REMOVE GASTROSTOMY TUBE ADULT N/A 12/12/2014    Procedure: REMOVE GASTROSTOMY TUBE ADULT;  Surgeon: Nicole Redding MD;  [facility]     REMOVE PORT VASCULAR ACCESS Right 6/30/2016    Procedure: REMOVE PORT VASCULAR ACCESS;  Surgeon: Pradeep Orosco MD;  Location:  [facility] OR     REMOVE PORT VASCULAR ACCESS Right 9/8/2017    Procedure: REMOVE PORT VASCULAR ACCESS;  right side mediport removal;  Surgeon: Zechariah Worthington MD;  Location: [facility] OR     replace GASTROSTOMY TUBE ADULT  5/19/15       Family History:    Family History   Problem Relation Age of Onset     Thyroid Disease Mother      Sjogren's Mother      GASTROINTESTINAL DISEASE Mother      Intermittent nausea vomiting diarrhea     Colon Polyps Mother      Prostate Problems Father      prostate enlargement     Lupus Maternal Grandmother      Cancer Maternal Grandfather      Lung     Colon Cancer Maternal Grandfather 65     Cancer Paternal Grandmother      Lung      Cerebrovascular Disease Paternal Grandmother      Diabetes Paternal Grandmother      Cardiovascular Paternal Grandmother      CHF     Cancer Paternal Grandfather      Lung     Glaucoma Paternal Grandfather      Abdominal Aortic Aneurysm Other      Macular Degeneration No family hx of        Social History:  Marital Status:   [2]  Social History   Substance Use Topics     Smoking status: Current Some Day Smoker     Packs/day: 1.00     Years: 4.00     Types: Cigarettes     Last attempt to quit: 1/1/2004     Smokeless tobacco: Never Used      Comment: Vaping     Alcohol use No        Medications:      ACETAMINOPHEN PO   albuterol (2.5 MG/3ML) 0.083% neb solution   albuterol 90 MCG/ACT inhaler   Alfalfa 650 MG TABS   cholestyramine light (QUESTRAN) 4 GM Packet   cloNIDine (CATAPRES) 0.2 MG tablet   diphenhydrAMINE HCl 50 MG TABS   DULoxetine (CYMBALTA) 60 MG EC capsule   HYDROmorphone (DILAUDID) 2 MG tablet   ipratropium (ATROVENT) 0.02 % neb solution   ondansetron (ZOFRAN) 8 MG tablet   Rivaroxaban (XARELTO PO)   SUMAtriptan (IMITREX) 50 MG tablet   traZODone (DESYREL) 100 MG tablet         Review of Systems   All other systems reviewed and are negative.      Physical Exam   BP: (!) 142/93  Pulse: 74  Temp: 98.4  F (36.9  C)  Resp: 16  Weight: 70.3 kg (155  lb)  SpO2: 100 %      Physical Exam   Constitutional: She is oriented to person, place, and time. She appears well-developed and well-nourished. No distress.   HENT:   Head: Normocephalic and atraumatic.   Nose: Nose normal.   Mouth/Throat: Oropharynx is clear and moist. No oropharyngeal exudate.   Eyes: Conjunctivae and EOM are normal. Right eye exhibits no discharge. Left eye exhibits no discharge. No scleral icterus.   Neck: Normal range of motion. Neck supple.   Cardiovascular: Normal rate and normal heart sounds.  Exam reveals no gallop and no friction rub.    No murmur heard.  Pulmonary/Chest: Effort normal and breath sounds normal. No stridor. No respiratory distress.   Abdominal: Soft. She exhibits no distension. There is tenderness. There is no rebound.   Diffuse upper abdominal discomfort on exam   Musculoskeletal: Normal range of motion. She exhibits no edema.   Neurological: She is alert and oriented to person, place, and time. No cranial nerve deficit. Coordination normal.   Skin: Skin is warm and dry. No rash noted. She is not diaphoretic. No erythema. No pallor.   Psychiatric: She has a normal mood and affect. Her behavior is normal.   Nursing note and vitals reviewed.      ED Course     ED Course     Procedures          Previous history and admission reviewed.  The patient was diagnosed with pulmonary hypertension on last admission with tricuspid regurgitation was placed on Lasix.  Because of this we need to be gentle with IV hydration.  I gave her 500 ml bolus.       Results for orders placed or performed during the hospital encounter of 11/19/18 (from the past 24 hour(s))   Comprehensive metabolic panel   Result Value Ref Range    Sodium 136 133 - 144 mmol/L    Potassium 3.8 3.4 - 5.3 mmol/L    Chloride 103 94 - 109 mmol/L    Carbon Dioxide 24 20 - 32 mmol/L    Anion Gap 9 3 - 14 mmol/L    Glucose 81 70 - 99 mg/dL    Urea Nitrogen 12 7 - 30 mg/dL    Creatinine 1.04 0.52 - 1.04 mg/dL    GFR Estimate  59 (L) >60 mL/min/1.7m2    GFR Estimate If Black 72 >60 mL/min/1.7m2    Calcium 9.4 8.5 - 10.1 mg/dL    Bilirubin Total 1.1 0.2 - 1.3 mg/dL    Albumin 4.1 3.4 - 5.0 g/dL    Protein Total 9.1 (H) 6.8 - 8.8 g/dL    Alkaline Phosphatase 132 40 - 150 U/L    ALT 46 0 - 50 U/L    AST 25 0 - 45 U/L   Lipase   Result Value Ref Range    Lipase 171 73 - 393 U/L   CBC with platelets differential   Result Value Ref Range    WBC 9.1 4.0 - 11.0 10e9/L    RBC Count 4.21 3.8 - 5.2 10e12/L    Hemoglobin 10.8 (L) 11.7 - 15.7 g/dL    Hematocrit 34.6 (L) 35.0 - 47.0 %    MCV 82 78 - 100 fl    MCH 25.7 (L) 26.5 - 33.0 pg    MCHC 31.2 (L) 31.5 - 36.5 g/dL    RDW 17.0 (H) 10.0 - 15.0 %    Platelet Count 345 150 - 450 10e9/L    Diff Method Automated Method     % Neutrophils 69.2 %    % Lymphocytes 25.6 %    % Monocytes 4.4 %    % Eosinophils 0.2 %    % Basophils 0.3 %    % Immature Granulocytes 0.3 %    Nucleated RBCs 0 0 /100    Absolute Neutrophil 6.3 1.6 - 8.3 10e9/L    Absolute Lymphocytes 2.3 0.8 - 5.3 10e9/L    Absolute Monocytes 0.4 0.0 - 1.3 10e9/L    Absolute Basophils 0.0 0.0 - 0.2 10e9/L    Abs Immature Granulocytes 0.0 0 - 0.4 10e9/L    Absolute Nucleated RBC 0.0      *Note: Due to a large number of results and/or encounters for the requested time period, some results have not been displayed. A complete set of results can be found in Results Review.       Medications   HYDROmorphone (PF) (DILAUDID) injection 0.5 mg (0.5 mg Intravenous Given 11/19/18 7391)   0.9% sodium chloride BOLUS (500 mLs Intravenous New Bag 11/19/18 6255)   diphenhydrAMINE (BENADRYL) injection 25 mg (25 mg Intravenous Given 11/19/18 0568)       Assessments & Plan (with Medical Decision Making)  Gastroparesis with vomiting and abdominal pain.  Labs are unremarkable.  The patient has had many CT scans in the past so I do not think she needs one today.  She was given Dilaudid, 500 cc bolus of IV fluids and Benadryl.  She had good improvement of her nausea but  still had some abdominal pain which is not unusual for her.  Secondly, the patient has a new diagnosis of pulmonary hypertension.  This is of uncertain etiology.  I discussed the case with cardiology at [facility] who did not know the etiology for her pulmonary hypertension and she will need to undergo a workup apparently.  Because of this she was not given large amounts of IV fluids in the emergency department.  Plan:  Discharge home, follow-up with cardiology and primary care, hold 1 more day of Lasix.     I have reviewed the nursing notes.    I have reviewed the findings, diagnosis, plan and need for follow up with the patient.      New Prescriptions    No medications on file       Final diagnoses:   Abdominal pain, epigastric   Gastroparesis       11/19/2018   [facility] EMERGENCY DEPARTMENT     Nikhil Workman MD  11/19/18 0351

## 2018-11-19 NOTE — ED AVS SNAPSHOT
[facility] Emergency Department    [address] [place] [address]    Phone:  [phone]    Fax:  [phone]                                       Doreen Peralta   MRN: [id number]    Department:  [facility] Emergency Department   Date of Visit:  11/19/2018           After Visit Summary Signature Page     I have received my discharge instructions, and my questions have been answered. I have discussed any challenges I see with this plan with the nurse or doctor.    ..........................................................................................................................................  Patient/Patient Representative Signature      ..........................................................................................................................................  Patient Representative Print Name and Relationship to Patient    ..................................................               ................................................  Date                                   Time    ..........................................................................................................................................  Reviewed by Signature/Title    ...................................................              ..............................................  Date                                               Time          22EPIC Rev 08/18

## 2018-11-19 NOTE — ED AVS SNAPSHOT
[facility] Emergency Department    [address] [place] [address]    Phone:  [phone]    Fax:  [phone]                                       Doreen Peralta   MRN: [id number]    Department:  [facility] Emergency Department   Date of Visit:  11/19/2018           Patient Information     Date Of Birth          1980        Your diagnoses for this visit were:     Abdominal pain, epigastric     Gastroparesis        You were seen by Nikhil Workman MD.      Follow-up Information     Follow up with Shari Matos PA-C In 3 days.    Specialty:  Physician Assistant    Why:  If symptoms worsen, ER follow up    Contact information:    [facility]  [address] [facility] [address]  [phone]          Discharge Instructions         Gastroparesis     Gastroparesis means that food and fluids move too slowly out of the stomach into the duodenum.   Gastroparesis (also called delayed gastric emptying) happens when the stomach takes longer than normal to empty of food. This is due to a problem with motility (the movement of the muscles in the digestive tract). For many people, gastroparesis is a lifelong condition. But treatment can help relieve symptoms and prevent complications. Read on to learn more about gastroparesis and how it can be managed.  How gastroparesis develops  With normal motility, signals from nerves tell the stomach muscles when to contract. These muscles move food from the stomach into the duodenum (the first part of the small bowel). With gastroparesis, the nerves or muscles are damaged. This causes motility to slow down or stop completely. As a result, food cannot move from the stomach properly. This delayed emptying can cause nausea, vomiting, and other symptoms. Malnutrition can result. Bezoars (hardened lumps of food) can form in the stomach and cause other complications as well.  Causes of gastroparesis  Gastroparesis can be caused by any of the  following:    Diabetes    Surgery involving any of the digestive organs, such as the stomach and bowels    Certain medicines, such as strong pain medicines (narcotics)    Certain conditions, such as systemic scleroderma, Parkinson disease, and thyroid disease    After a viral illness  In many cases, the cause of gastroparesis cannot be found.  Signs and symptoms of gastroparesis  These can include:    Nausea and vomiting    Feeling full quickly when eating    Belly pain    Heartburn    Belly bloating    Weight loss    Loss of appetite    High and low blood sugar levels (in people with diabetes)  Diagnosing gastroparesis  Your healthcare provider will ask about your symptoms and health history. You ll also be examined. In addition, blood tests and X-rays are often done to check your health and rule out other problems. To confirm the problem, you may need other tests as well. These can include:    Upper endoscopy. This is done to see inside the stomach and duodenum. For the test, an endoscope is used. This is a thin, flexible tube with a tiny camera on the end. It s inserted through the mouth and down into the stomach and duodenum.    Upper gastrointestinal (GI) series. This is done to take X-rays of the upper GI tract from the mouth to the small bowel. For the test, a substance called barium is used. The barium coats the upper GI tract so that it will show up clearly on X-rays.    Gastric emptying scan. This is done to measure how quickly food leaves the stomach. For the test, a meal containing a harmless radioactive substance (tracer) is eaten. Then scans of the stomach are done. The tracer shows up clearly on the scans and shows the movement of the food through the stomach.    Antroduodenal manometry. This test gives pressure measurements of the stomach and small intestine to check how the contractions are working.    Newer tests. These are being created and include breath tests and wireless capsule  studies   Treating gastroparesis  The goal of treatment is to help you manage your condition. Treatment may include one or more of the following:    Dietary changes. You may need to make changes to your eating habits and daily diet. For instance, your healthcare provider may instruct you to eat small meals throughout the day. Doing this can keep you from feeling full too quickly. You may be placed on a liquid or  soft  diet. This means you ll eat liquid foods or foods that are mashed or put through a . In addition, you may need to avoid foods high in fats and fiber. These can slow digestion. For more help with your diet, your healthcare provider can refer you to a dietitian. In severe cases, you may need a feeding tube. This sends liquid food or medicine directly to your small bowel, bypassing the stomach.    Treating diabetes. If you are diabetic, it is important to control your blood sugar. High sugar levels worsen gastroparesis.     Medicines. These can help manage symptoms, such as nausea and vomiting. They can also improve motility. Each medicine has specific risks and side effects. Your doctor can tell you more about any medicine that is prescribed for you.    Surgery. You may need to have a tube surgically inserted into the stomach. The tube removes excess air and fluid. This can relieve severe symptoms of nausea and vomiting. In rare cases, other surgery may be needed on the stomach or small bowel. This is to create a new passageway for food to be emptied from the stomach.    Gastric electrical stimulation. This treatment is done less often and may not be available. Your healthcare provider can tell you more about this treatment if it is a choice for you.  Diabetes and gastroparesis  If you have diabetes, gastroparesis can make it harder to manage your blood sugar level. You ll need to take extra steps in your treatment to prevent complications. Work with your healthcare provider to learn what you can  do to protect your health. For more information, contact the American Diabetes Association, www.diabetes.org.   Long-term concerns   With treatment, most people can manage their symptoms and maintain their usual routines. If your symptoms are moderate to severe, you may need to see your healthcare provider more often for checkups. Also, other treatments will likely be needed.  Date Last Reviewed: 7/14/2016 2000-2018 [employer]. [address] [place] [address], [place] [address]. All rights reserved. This information is not intended as a substitute for professional medical care. Always follow your healthcare professional's instructions.          24 Hour Appointment Hotline       To make an appointment at any [facility], call [phone] ([phone]). If you don't have a family doctor or clinic, we will help you find one. [facility] clinics are conveniently located to serve the needs of you and your family.             Review of your medicines      Our records show that you are taking the medicines listed below. If these are incorrect, please call your family doctor or clinic.        Dose / Directions Last dose taken    ACETAMINOPHEN PO   Dose:  500-1000 mg        Take 500-1,000 mg by mouth every 6 hours as needed for pain   Refills:  0        albuterol (2.5 MG/3ML) 0.083% neb solution   Dose:  2.5 mg   Quantity:  360 mL        Take 1 vial (2.5 mg) by nebulization every 4 hours as needed for shortness of breath / dyspnea or wheezing   Refills:  0        albuterol 90 MCG/ACT inhaler   Dose:  2 puff        Inhale 2 puffs into the lungs every 6 hours as needed   Refills:  0        Alfalfa 650 MG Tabs        Refills:  0        cholestyramine light 4 GM Packet   Commonly known as:  QUESTRAN   Dose:  4 g   Quantity:  60 packet        Take 1 packet (4 g) by mouth 2 times daily   Refills:  1        cloNIDine 0.2 MG tablet   Commonly known as:  CATAPRES   Dose:  0.4 mg   Quantity:  60 tablet        Take 2  tablets (0.4 mg) by mouth every evening - DUE FOR FOLLOW UP   Refills:  0        diphenhydrAMINE HCl 50 MG tablet   Commonly known as:  BENADRYL   Dose:  50 mg   Quantity:  30 tablet        Take 50 mg by mouth every 6 hours as needed (nausea)   Refills:  0        DULoxetine 60 MG EC capsule   Commonly known as:  CYMBALTA   Quantity:  90 capsule        TAKE 1 CAPSULE(60 MG) BY MOUTH DAILY   Refills:  1        HYDROmorphone 2 MG tablet   Commonly known as:  DILAUDID   Dose:  2 mg   Quantity:  10 tablet        Take 1 tablet (2 mg) by mouth every 6 hours as needed for pain   Refills:  0        ipratropium 0.02 % neb solution   Commonly known as:  ATROVENT   Dose:  0.5 mg   Quantity:  225 mL        Take 2.5 mLs (0.5 mg) by nebulization every 6 hours as needed for wheezing   Refills:  0        ondansetron 8 MG tablet   Commonly known as:  ZOFRAN   Dose:  8 mg   Quantity:  9 tablet        Take 1 tablet (8 mg) by mouth every 8 hours as needed for nausea   Refills:  0        SUMAtriptan 50 MG tablet   Commonly known as:  IMITREX   Dose:   mg   Quantity:  9 tablet        Take 1-2 tablets ( mg) by mouth at onset of headache for migraine - LAST REFILL, DUE FOR FOLLOW UP   Refills:  0        traZODone 100 MG tablet   Commonly known as:  DESYREL   Dose:   mg   Quantity:  90 tablet        Take 0.5-1 tablets ( mg) by mouth nightly as needed for sleep   Refills:  8        XARELTO PO   Dose:  20 mg        Take 20 mg by mouth daily   Refills:  0                Procedures and tests performed during your visit     CBC with platelets differential    Comprehensive metabolic panel    Lipase    Peripheral IV catheter      Orders Needing Specimen Collection     None      Pending Results     No orders found from 11/17/2018 to 11/20/2018.            Pending Culture Results     No orders found from 11/17/2018 to 11/20/2018.            Pending Results Instructions     If you had any lab results that were not finalized at  the time of your Discharge, you can call the ED Lab Result RN at [phone]. You will be contacted by this team for any positive Lab results or changes in treatment. The nurses are available 7 days a week from 10A to 6:30P.  You can leave a message 24 hours per day and they will return your call.        Thank you for choosing [facility]       Thank you for choosing [facility] for your care. Our goal is always to provide you with excellent care. Hearing back from our patients is one way we can continue to improve our services. Please take a few minutes to complete the written survey that you may receive in the mail after you visit with us. Thank you!        [facility] Information     [employer] gives you secure access to your electronic health record. If you see a primary care provider, you can also send messages to your care team and make appointments. If you have questions, please call your primary care clinic.  If you do not have a primary care provider, please call [phone] and they will assist you.        Care EveryWhere ID     This is your Care EveryWhere ID. This could be used by other organizations to access your [facility] medical records  [id number]        Equal Access to Services     TRAMAINE CLAYTON : Carrie Law, clark villeda, elham guerrero. So [facility] [phone].    ATENCIÓN: Si habla español, tiene a wade disposición servicios gratuitos de asistencia lingüística. Janes al [phone].    We comply with applicable federal civil rights laws and [place] laws. We do not discriminate on the basis of race, color, national origin, age, disability, sex, sexual orientation, or gender identity.            After Visit Summary       This is your record. Keep this with you and show to your community pharmacist(s) and doctor(s) at your next visit.

## 2018-11-20 NOTE — DISCHARGE INSTRUCTIONS
Gastroparesis     Gastroparesis means that food and fluids move too slowly out of the stomach into the duodenum.   Gastroparesis (also called delayed gastric emptying) happens when the stomach takes longer than normal to empty of food. This is due to a problem with motility (the movement of the muscles in the digestive tract). For many people, gastroparesis is a lifelong condition. But treatment can help relieve symptoms and prevent complications. Read on to learn more about gastroparesis and how it can be managed.  How gastroparesis develops  With normal motility, signals from nerves tell the stomach muscles when to contract. These muscles move food from the stomach into the duodenum (the first part of the small bowel). With gastroparesis, the nerves or muscles are damaged. This causes motility to slow down or stop completely. As a result, food cannot move from the stomach properly. This delayed emptying can cause nausea, vomiting, and other symptoms. Malnutrition can result. Bezoars (hardened lumps of food) can form in the stomach and cause other complications as well.  Causes of gastroparesis  Gastroparesis can be caused by any of the following:    Diabetes    Surgery involving any of the digestive organs, such as the stomach and bowels    Certain medicines, such as strong pain medicines (narcotics)    Certain conditions, such as systemic scleroderma, Parkinson disease, and thyroid disease    After a viral illness  In many cases, the cause of gastroparesis cannot be found.  Signs and symptoms of gastroparesis  These can include:    Nausea and vomiting    Feeling full quickly when eating    Belly pain    Heartburn    Belly bloating    Weight loss    Loss of appetite    High and low blood sugar levels (in people with diabetes)  Diagnosing gastroparesis  Your healthcare provider will ask about your symptoms and health history. You ll also be examined. In addition, blood tests and X-rays are often done to check  your health and rule out other problems. To confirm the problem, you may need other tests as well. These can include:    Upper endoscopy. This is done to see inside the stomach and duodenum. For the test, an endoscope is used. This is a thin, flexible tube with a tiny camera on the end. It s inserted through the mouth and down into the stomach and duodenum.    Upper gastrointestinal (GI) series. This is done to take X-rays of the upper GI tract from the mouth to the small bowel. For the test, a substance called barium is used. The barium coats the upper GI tract so that it will show up clearly on X-rays.    Gastric emptying scan. This is done to measure how quickly food leaves the stomach. For the test, a meal containing a harmless radioactive substance (tracer) is eaten. Then scans of the stomach are done. The tracer shows up clearly on the scans and shows the movement of the food through the stomach.    Antroduodenal manometry. This test gives pressure measurements of the stomach and small intestine to check how the contractions are working.    Newer tests. These are being created and include breath tests and wireless capsule studies   Treating gastroparesis  The goal of treatment is to help you manage your condition. Treatment may include one or more of the following:    Dietary changes. You may need to make changes to your eating habits and daily diet. For instance, your healthcare provider may instruct you to eat small meals throughout the day. Doing this can keep you from feeling full too quickly. You may be placed on a liquid or  soft  diet. This means you ll eat liquid foods or foods that are mashed or put through a . In addition, you may need to avoid foods high in fats and fiber. These can slow digestion. For more help with your diet, your healthcare provider can refer you to a dietitian. In severe cases, you may need a feeding tube. This sends liquid food or medicine directly to your small bowel,  bypassing the stomach.    Treating diabetes. If you are diabetic, it is important to control your blood sugar. High sugar levels worsen gastroparesis.     Medicines. These can help manage symptoms, such as nausea and vomiting. They can also improve motility. Each medicine has specific risks and side effects. Your doctor can tell you more about any medicine that is prescribed for you.    Surgery. You may need to have a tube surgically inserted into the stomach. The tube removes excess air and fluid. This can relieve severe symptoms of nausea and vomiting. In rare cases, other surgery may be needed on the stomach or small bowel. This is to create a new passageway for food to be emptied from the stomach.    Gastric electrical stimulation. This treatment is done less often and may not be available. Your healthcare provider can tell you more about this treatment if it is a choice for you.  Diabetes and gastroparesis  If you have diabetes, gastroparesis can make it harder to manage your blood sugar level. You ll need to take extra steps in your treatment to prevent complications. Work with your healthcare provider to learn what you can do to protect your health. For more information, contact the American Diabetes Association, www.diabetes.org.   Long-term concerns   With treatment, most people can manage their symptoms and maintain their usual routines. If your symptoms are moderate to severe, you may need to see your healthcare provider more often for checkups. Also, other treatments will likely be needed.  Date Last Reviewed: 7/14/2016 2000-2018 [employer]. [address] [place] [address], [place] [address]. All rights reserved. This information is not intended as a substitute for professional medical care. Always follow your healthcare professional's instructions.

## 2018-11-27 ENCOUNTER — APPOINTMENT (OUTPATIENT)
Dept: GENERAL RADIOLOGY | Facility: CLINIC | Age: 38
End: 2018-11-27
Attending: FAMILY MEDICINE
Payer: MEDICARE

## 2018-11-27 ENCOUNTER — HOSPITAL ENCOUNTER (EMERGENCY)
Facility: CLINIC | Age: 38
Discharge: HOME | End: 2018-11-27
Attending: FAMILY MEDICINE | Admitting: FAMILY MEDICINE
Payer: MEDICARE

## 2018-11-27 VITALS
RESPIRATION RATE: 16 BRPM | TEMPERATURE: 96.8 F | DIASTOLIC BLOOD PRESSURE: 81 MMHG | SYSTOLIC BLOOD PRESSURE: 136 MMHG | OXYGEN SATURATION: 98 %

## 2018-11-27 DIAGNOSIS — R50.9 FEVER, UNSPECIFIED FEVER CAUSE: ICD-10-CM

## 2018-11-27 DIAGNOSIS — K31.84 GASTROPARESIS: ICD-10-CM

## 2018-11-27 DIAGNOSIS — R19.7 DIARRHEA, UNSPECIFIED TYPE: ICD-10-CM

## 2018-11-27 DIAGNOSIS — Z87.898 HISTORY OF BACTEREMIA: ICD-10-CM

## 2018-11-27 LAB
ALBUMIN SERPL-MCNC: 4.1 G/DL (ref 3.4–5)
ALBUMIN UR-MCNC: NEGATIVE MG/DL
ALP SERPL-CCNC: 140 U/L (ref 40–150)
ALT SERPL W P-5'-P-CCNC: 52 U/L (ref 0–50)
ANION GAP SERPL CALCULATED.3IONS-SCNC: 10 MMOL/L (ref 3–14)
APPEARANCE UR: CLEAR
AST SERPL W P-5'-P-CCNC: 35 U/L (ref 0–45)
BASOPHILS # BLD AUTO: 0 10E9/L (ref 0–0.2)
BASOPHILS NFR BLD AUTO: 0.5 %
BILIRUB SERPL-MCNC: 0.7 MG/DL (ref 0.2–1.3)
BILIRUB UR QL STRIP: NEGATIVE
BUN SERPL-MCNC: 11 MG/DL (ref 7–30)
C DIFF TOX B STL QL: POSITIVE
CALCIUM SERPL-MCNC: 9.9 MG/DL (ref 8.5–10.1)
CHLORIDE SERPL-SCNC: 106 MMOL/L (ref 94–109)
CO2 SERPL-SCNC: 22 MMOL/L (ref 20–32)
COLOR UR AUTO: NORMAL
CREAT SERPL-MCNC: 0.92 MG/DL (ref 0.52–1.04)
DIFFERENTIAL METHOD BLD: ABNORMAL
EOSINOPHIL NFR BLD AUTO: 0.3 %
ERYTHROCYTE [DISTWIDTH] IN BLOOD BY AUTOMATED COUNT: 16.9 % (ref 10–15)
GFR SERPL CREATININE-BSD FRML MDRD: 68 ML/MIN/1.7M2
GLUCOSE SERPL-MCNC: 82 MG/DL (ref 70–99)
GLUCOSE UR STRIP-MCNC: NEGATIVE MG/DL
HCT VFR BLD AUTO: 37.1 % (ref 35–47)
HGB BLD-MCNC: 11.7 G/DL (ref 11.7–15.7)
HGB UR QL STRIP: NEGATIVE
IMM GRANULOCYTES # BLD: 0 10E9/L (ref 0–0.4)
IMM GRANULOCYTES NFR BLD: 0.3 %
KETONES UR STRIP-MCNC: NEGATIVE MG/DL
LACTATE BLD-SCNC: 0.9 MMOL/L (ref 0.7–2)
LEUKOCYTE ESTERASE UR QL STRIP: NEGATIVE
LIPASE SERPL-CCNC: 203 U/L (ref 73–393)
LYMPHOCYTES # BLD AUTO: 1.8 10E9/L (ref 0.8–5.3)
LYMPHOCYTES NFR BLD AUTO: 29.4 %
MCH RBC QN AUTO: 25.5 PG (ref 26.5–33)
MCHC RBC AUTO-ENTMCNC: 31.5 G/DL (ref 31.5–36.5)
MCV RBC AUTO: 81 FL (ref 78–100)
MONOCYTES # BLD AUTO: 0.2 10E9/L (ref 0–1.3)
MONOCYTES NFR BLD AUTO: 3.8 %
NEUTROPHILS # BLD AUTO: 4.1 10E9/L (ref 1.6–8.3)
NEUTROPHILS NFR BLD AUTO: 65.7 %
NITRATE UR QL: NEGATIVE
NRBC # BLD AUTO: 0 10*3/UL
NRBC BLD AUTO-RTO: 0 /100
PH UR STRIP: 6 PH (ref 5–7)
PLATELET # BLD AUTO: 332 10E9/L (ref 150–450)
POTASSIUM SERPL-SCNC: 4.4 MMOL/L (ref 3.4–5.3)
PROT SERPL-MCNC: 9.5 G/DL (ref 6.8–8.8)
RBC # BLD AUTO: 4.58 10E12/L (ref 3.8–5.2)
RBC #/AREA URNS AUTO: 1 /HPF (ref 0–2)
SODIUM SERPL-SCNC: 138 MMOL/L (ref 133–144)
SOURCE: NORMAL
SP GR UR STRIP: 1.01 (ref 1–1.03)
SPECIMEN SOURCE: ABNORMAL
SQUAMOUS #/AREA URNS AUTO: 1 /HPF (ref 0–1)
UROBILINOGEN UR STRIP-MCNC: 0 MG/DL (ref 0–2)
WBC # BLD AUTO: 6.3 10E9/L (ref 4–11)
WBC #/AREA URNS AUTO: <1 /HPF (ref 0–5)

## 2018-11-27 PROCEDURE — 87181 SC STD AGAR DILUTION PER AGT: CPT | Performed by: FAMILY MEDICINE

## 2018-11-27 PROCEDURE — 83605 ASSAY OF LACTIC ACID: CPT | Performed by: FAMILY MEDICINE

## 2018-11-27 PROCEDURE — 36415 COLL VENOUS BLD VENIPUNCTURE: CPT | Performed by: FAMILY MEDICINE

## 2018-11-27 PROCEDURE — 85025 COMPLETE CBC W/AUTO DIFF WBC: CPT | Performed by: FAMILY MEDICINE

## 2018-11-27 PROCEDURE — 81001 URINALYSIS AUTO W/SCOPE: CPT | Performed by: FAMILY MEDICINE

## 2018-11-27 PROCEDURE — 87493 C DIFF AMPLIFIED PROBE: CPT | Performed by: FAMILY MEDICINE

## 2018-11-27 PROCEDURE — 87076 CULTURE ANAEROBE IDENT EACH: CPT | Performed by: FAMILY MEDICINE

## 2018-11-27 PROCEDURE — 99284 EMERGENCY DEPT VISIT MOD MDM: CPT | Mod: 25 | Performed by: FAMILY MEDICINE

## 2018-11-27 PROCEDURE — 80053 COMPREHEN METABOLIC PANEL: CPT | Performed by: FAMILY MEDICINE

## 2018-11-27 PROCEDURE — 87040 BLOOD CULTURE FOR BACTERIA: CPT | Performed by: FAMILY MEDICINE

## 2018-11-27 PROCEDURE — 83690 ASSAY OF LIPASE: CPT | Performed by: FAMILY MEDICINE

## 2018-11-27 PROCEDURE — 87800 DETECT AGNT MULT DNA DIREC: CPT | Performed by: FAMILY MEDICINE

## 2018-11-27 PROCEDURE — 99284 EMERGENCY DEPT VISIT MOD MDM: CPT | Mod: Z6 | Performed by: FAMILY MEDICINE

## 2018-11-27 PROCEDURE — 71046 X-RAY EXAM CHEST 2 VIEWS: CPT | Mod: TC

## 2018-11-27 ASSESSMENT — ENCOUNTER SYMPTOMS
COUGH: 0
PALPITATIONS: 0
FREQUENCY: 0
ABDOMINAL PAIN: 1
DYSURIA: 0
NAUSEA: 0
DIARRHEA: 1
WHEEZING: 0
FEVER: 1
SINUS PRESSURE: 0
BLOOD IN STOOL: 0
VOMITING: 0
SORE THROAT: 0
CONSTIPATION: 0
HEADACHES: 0
DIAPHORESIS: 1
SHORTNESS OF BREATH: 0
CHILLS: 1

## 2018-11-27 NOTE — ED TRIAGE NOTES
Presents with intermittent fevers times one week. States she attempted to go the clinic but was instructed to be checked out in ER to rule out sepsis. Currently fever free.

## 2018-11-27 NOTE — ED PROVIDER NOTES
History     Chief Complaint   Patient presents with     Fever     HPI  Doreen Peralta is a 38 year old female who presents with a history of gastroparesis and allergies to Reglan, erythromycin, Levsin, droperidol, prior episodes of gram-negative septicemia, acute renal failure, chronic abdominal pain with a pain contract, new diagnosis of pulmonary hypertension of unclear etiology, history of PEG who was last seen on member 19th for possible dehydration and related to her gastroparesis.  Had abdominal pain cramping vomiting and inability to eat at that time.  She had been referred by her primary physician for IV fluids.  testing was reassuring.  CT imaging was not required    She returns today with fevers for the last 5 days on and off during this time which she reports as up to 102 at home with some sweats at nighttime.  She has no other systemic symptoms with these other than her chronic abdominal pain which is primarily upper quadrant and associated with her gastroparesis, and also with her diarrhea that is frequent multiple stools per day but not as foul odor as she has had previously with her C. difficile.      He specifically denies dysuria urgency frequency or hematuria.  There is no upper respiratory symptoms no significant cough or wheezing.     On October 23 she had a finding of right ventricular dysfunction seen on echo.  This is been normal in 2009.  It was no PE by CT. cardiac MRI is pending.  She has been on Xarelto empirically.  She had been seen with chest pressure at the time and troponins had been elevated to 0.114.  She had been on antibiotics and had increased lactic acid at the time.    Problem List:    Patient Active Problem List    Diagnosis Date Noted     Bacteremia 06/26/2018     Priority: Medium     Acute renal failure (H) 04/21/2018     Priority: Medium     History of bacteremia - recurrent 04/17/2018     Priority: Medium     Tobacco abuse 04/16/2018     Priority: Medium     Iron  deficiency anemia 04/16/2018     Priority: Medium     Stool toxin PCR positive for Clostridium difficile on 4/17/18 04/16/2018     Priority: Medium     Gram negative septicemia (H) - Ochrobactrum, Stenotrophomonas & Pantoea 08/24/2017     Priority: Medium     Hypokalemia 08/24/2017     Priority: Medium     Essential hypertension 08/24/2017     Priority: Medium     Sepsis due to Klebsiella (H) 07/27/2017     Priority: Medium     Insomnia, unspecified type 03/31/2017     Priority: Medium     Abdominal pain 02/15/2017     Priority: Medium     Abdominal pain, generalized 01/28/2017     Priority: Medium     History of deep venous thrombosis 01/26/2017     Priority: Medium     Nausea and vomiting 01/26/2017     Priority: Medium     Vitamin D deficiency 01/16/2017     Priority: Medium     Chronic abdominal pain 11/15/2016     Priority: Medium     Patient is followed by Larisa Lorenzo PA-C for ongoing prescription of pain medication.  All refills should be approved by this provider, or covering partner.    Medication(s): no regular narcotics - narcotics given at ED visits  Maximum quantity per month: N/A  Clinic visit frequency required: Q 6  months     Controlled substance agreement:  Encounter-Level CSA - 11/9/15:               Controlled Substance Agreement - Scan on 11/19/2015 11:17 AM : CONTROLLED SUBSTANCE AGREEMENT 11/09/15 (below)          Encounter-Level CSA - 2/27/15:               Controlled Substance Agreement - Scan on 3/12/2015  7:50 AM : Controlled Medication Agreement 02/27/15 (below)            Pain Clinic evaluation in the past: Yes    DIRE Total Score(s):  No flowsheet data found.    Last [place] [facility] website verification:  done on 11/15/16   [email or web address]        Attention to G-tube (H) 11/08/2016     Priority: Medium     Fever 10/16/2016     Priority: Medium     Coagulation defect (H) [D68.9] 09/16/2016     Priority: Medium     Chronic diarrhea 07/22/2016     Priority: Medium     Migraine  07/20/2016     Priority: Medium     Positive blood culture - Klebsiella oxytoca from Port-a-cath culture 07/18/2016     Priority: Medium     Mitral regurgitation mild-mod by Echo June 2016 07/18/2016     Priority: Medium     Anemia, iron deficiency 07/17/2016     Priority: Medium     Anemia in other chronic diseases classified elsewhere 06/14/2016     Priority: Medium     Munchausen syndrome - previously suspected 06/14/2016     Priority: Medium     Long-term (current) use of anticoagulants [Z79.01] 05/16/2016     Priority: Medium     Anxiety 05/16/2016     Priority: Medium     S/P partial resection of colon 04/11/2016     Priority: Medium     Malnutrition (H) 04/11/2016     Priority: Medium     Jejunostomy tube present (H) 03/21/2016     Priority: Medium     Malfunctioning jejunostomy tube (H) 12/22/2015     Priority: Medium     Malfunction of gastrostomy tube (H) 11/19/2015     Priority: Medium     PEG tube malfunction (H) 10/16/2015     Priority: Medium     Health Care Home 01/16/2015     Priority: Medium     *See Letters for [facility] Care Plan: My Access Plan           PEG (percutaneous endoscopic gastrostomy) status 11/05/2014     Priority: Medium     Gastroparesis 04/11/2014     Priority: Medium     Overview:   Had ileostomy performed at [facility] in Jan 2012 as treatment       Migraines 04/11/2014     Priority: Medium     4/11/2014  With periods, every other month.       Intermittent asthma 04/11/2014     Priority: Medium     4/11/2014   With URIs, allergies       Allergic rhinitis 04/11/2014     Priority: Medium     Problem list name updated by automated process. Provider to review       Abnormal Pap smear of cervix 04/11/2014     Priority: Medium     4/11/2014  S/p colp and LEEP. Sees OB/GYN at [facility]. Pap every year x20 years - normal since.       S/P LEEP of cervix 02/06/2014     Priority: Medium     Patellofemoral stress syndrome 01/18/2014     Priority: Medium     Hx SBO 10/09/2013     Priority: Medium      4/11/2014  Recurrent. Sees GI (Dr. Redding - at [facility]) every 6 months or so.  Sees feeding tube nurse next week.       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 04/11/2013     Priority: Medium     Constipation by delayed colonic transit 10/24/2012     Priority: Medium     Atopic rhinitis 03/20/2009     Priority: Medium     Hyperbilirubinemia 03/11/2009     Priority: Medium        Past Medical History:    Past Medical History:   Diagnosis Date     Asthma      Bilateral ovarian cysts      Cervical cancer (H) 01/01/2008     Chronic pain      Colonic dysmotility      Constipation      E. coli sepsis (H) 5/8/2016     Enteritis      Fungemia 5/5/2016     Gastro-oesophageal reflux disease      H/O ileostomy      Hx of abnormal Pap smear      Hypertension      IBS (irritable bowel syndrome)      Other chronic pain      PONV (postoperative nausea and vomiting)      Thrombosis, hepatic vein (H)        Past [facility] History:    Past [facility] History:   Procedure Laterality Date     COLONOSCOPY  7/10/2012    Procedure: COLONOSCOPY;;  Surgeon: Nicole Redding MD;  [facility]     COLONOSCOPY N/A 2/19/2017    Procedure: COLONOSCOPY;  Surgeon: Randell Muller MD;  [facility]     COLONOSCOPY N/A 2/21/2017    Procedure: COLONOSCOPY;  Surgeon: Randell Muller MD;  [facility]     COLONOSCOPY N/A 5/3/2018    Procedure: COMBINED COLONOSCOPY, SINGLE OR MULTIPLE BIOPSY/POLYPECTOMY BY BIOPSY;  EGD/Colonoscopy ;  Surgeon: Loi Black MD;  Location: [facility]     ECHO CHELO  7/19/2016          ENDOSCOPIC INSERTION TUBE GASTROSTOMY N/A 1/21/2016    Procedure: ENDOSCOPIC INSERTION TUBE GASTROSTOMY;  Surgeon: Nicole Redding MD;  [facility]     ESOPHAGOSCOPY, GASTROSCOPY, DUODENOSCOPY (EGD), COMBINED  7/10/2012    Procedure: COMBINED ESOPHAGOSCOPY, GASTROSCOPY, DUODENOSCOPY (EGD);  Upper Endoscopy, Ileoscopy    Latex Allergy  with biopsies;  Surgeon: Nicole Redding MD;  [facility]      ESOPHAGOSCOPY, GASTROSCOPY, DUODENOSCOPY (EGD), COMBINED N/A 11/5/2014    Procedure: COMBINED ESOPHAGOSCOPY, GASTROSCOPY, DUODENOSCOPY (EGD);  Surgeon: Nicole Redding MD;  [facility]     ESOPHAGOSCOPY, GASTROSCOPY, DUODENOSCOPY (EGD), COMBINED N/A 5/3/2018    Procedure: COMBINED ESOPHAGOSCOPY, GASTROSCOPY, DUODENOSCOPY (EGD), BIOPSY SINGLE OR MULTIPLE;;  Surgeon: Loi Black MD;  [facility]     HC REPLACE DUODENOSTOMY/JEJUNOSTOMY TUBE PERCUTANEOUS N/A 8/27/2015    Procedure: REPLACE GASTROJEJUNOSTOMY TUBE, PERCUTANEOUS;  Surgeon: Mio Holder MD;  [facility]     HC REPLACE DUODENOSTOMY/JEJUNOSTOMY TUBE PERCUTANEOUS N/A 1/7/2016    Procedure: REPLACE JEJUNOSTOMY TUBE, PERCUTANEOUS;  Surgeon: Elsa Medel MD;  [facility]     HC REPLACE DUODENOSTOMY/JEJUNOSTOMY TUBE PERCUTANEOUS N/A 1/28/2016    Procedure: REPLACE JEJUNOSTOMY TUBE, PERCUTANEOUS;  Surgeon: Elsa Medel MD;  [facility]     HC REPLACE GASTROSTOMY/CECOSTOMY TUBE PERCUTANEOUS Left 5/19/2015    Procedure: REPLACE GASTROSTOMY TUBE, PERCUTANEOUS;  Surgeon: Melecio Morejon Chi, MD;  Location: [facility]     HC UGI ENDOSCOPY W PLACEMENT GASTROSTOMY TUBE PERCUT N/A 10/1/2015    Procedure: COMBINED ESOPHAGOSCOPY, GASTROSCOPY, DUODENOSCOPY (EGD), PLACE PERCUTANEOUS ENDOSCOPIC GASTROSTOMY TUBE;  Surgeon: Mio Holder MD;  [facility]     INSERT PORT VASCULAR ACCESS Right 7/20/2017    Procedure: INSERT PORT VASCULAR ACCESS;  Chest Port Placement  **Latex Allergy**;  Surgeon: Coy Rocha PA-C;  [facility]     LAPAROSCOPIC ASSISTED COLECTOMY  1/20/2012    Procedure:LAPAROSCOPIC ASSISTED COLECTOMY; Laparoscopic Ileostomy       LAPAROSCOPIC ASSISTED COLECTOMY LEFT (DESCENDING)  10/24/2012    Procedure: LAPAROSCOPIC ASSISTED COLECTOMY LEFT (DESCENDING);   Hand Assisted Laproscopic Subtotal abdominal Colectomy,Iieorectal anastamosis, Ileostomy Closure.       LAPAROSCOPIC ASSISTED INSERTION  TUBE JEJUNOSTOMY N/A 10/16/2015    Procedure: LAPAROSCOPIC ASSISTED INSERTION TUBE JEJUNOSTOMY;  Surgeon: Elsa Medel MD;  [facility]     LAPAROSCOPIC CHOLECYSTECTOMY  2002    [facility] stones duct     LAPAROSCOPIC ILEOSTOMY  1/20/2012    [facility], loop     LAPAROSCOPIC OOPHORECTOMY Right 2009    [facility]     LAPAROTOMY EXPLORATORY N/A 1/28/2016    Procedure: LAPAROTOMY EXPLORATORY;  Surgeon: Elsa Medel MD;  [facility]     LEEP TX, CERVICAL  2009    [facility]     PICC INSERTION Left 10/21/2015    5fr DL Power PICC, 37cm (2cm external) in the L basilic vein w/ tip in the SVC RA junction.     REMOVE GASTROSTOMY TUBE ADULT N/A 12/12/2014    Procedure: REMOVE GASTROSTOMY TUBE ADULT;  Surgeon: Nicole Redding MD;  [facility]     REMOVE PORT VASCULAR ACCESS Right 6/30/2016    Procedure: REMOVE PORT VASCULAR ACCESS;  Surgeon: Pradeep Orosco MD;  Location: [facility]     REMOVE PORT VASCULAR ACCESS Right 9/8/2017    Procedure: REMOVE PORT VASCULAR ACCESS;  right side mediport removal;  Surgeon: Zechariah Worthington MD;  Location: [facility] OR     replace GASTROSTOMY TUBE ADULT  5/19/15       Family History:    Family History   Problem Relation Age of Onset     Thyroid Disease Mother      Sjogren's Mother      GASTROINTESTINAL DISEASE Mother      Intermittent nausea vomiting diarrhea     Colon Polyps Mother      Prostate Problems Father      prostate enlargement     Lupus Maternal Grandmother      Cancer Maternal Grandfather      Lung     Colon Cancer Maternal Grandfather 65     Cancer Paternal Grandmother      Lung      Cerebrovascular Disease Paternal Grandmother      Diabetes Paternal Grandmother      Cardiovascular Paternal Grandmother      CHF     Cancer Paternal Grandfather      Lung     Glaucoma Paternal Grandfather      Abdominal Aortic Aneurysm Other      Macular Degeneration No family hx of        Social History:  Marital Status:   [2]  Social History   Substance  Use Topics     Smoking status: Current Some Day Smoker     Packs/day: 1.00     Years: 4.00     Types: Cigarettes     Last attempt to quit: 1/1/2004     Smokeless tobacco: Never Used      Comment: Vaping     Alcohol use No        Medications:      ACETAMINOPHEN PO   albuterol (2.5 MG/3ML) 0.083% neb solution   albuterol 90 MCG/ACT inhaler   Alfalfa 650 MG TABS   cholestyramine light (QUESTRAN) 4 GM Packet   cloNIDine (CATAPRES) 0.2 MG tablet   diphenhydrAMINE HCl 50 MG TABS   DULoxetine (CYMBALTA) 60 MG EC capsule   ipratropium (ATROVENT) 0.02 % neb solution   ondansetron (ZOFRAN) 8 MG tablet   Rivaroxaban (XARELTO PO)   SUMAtriptan (IMITREX) 50 MG tablet   traZODone (DESYREL) 100 MG tablet         Review of Systems   Constitutional: Positive for chills, diaphoresis and fever.   HENT: Negative for ear pain, sinus pressure and sore throat.    Eyes: Negative for visual disturbance.   Respiratory: Negative for cough, shortness of breath and wheezing.    Cardiovascular: Negative for chest pain and palpitations.   Gastrointestinal: Positive for abdominal pain and diarrhea. Negative for blood in stool, constipation, nausea and vomiting.   Genitourinary: Negative for dysuria, frequency and urgency.   Skin: Negative for rash.   Neurological: Negative for headaches.   All other systems reviewed and are negative.      Physical Exam   BP: 136/81  Heart Rate: 77  Temp: 96.8  F (36  C)  Resp: 14  SpO2: 100 %      Physical Exam   Constitutional: She appears distressed.   HENT:   Mouth/Throat: Oropharynx is clear and moist.   Eyes: Conjunctivae are normal.   Neck: Neck supple.   Cardiovascular: Exam reveals no gallop and no friction rub.    No murmur heard.  Pulmonary/Chest: Effort normal and breath sounds normal. No respiratory distress. She has no wheezes. She has no rales.   Abdominal: Soft. Bowel sounds are normal. She exhibits no distension. There is no tenderness. There is no rebound.   Musculoskeletal: She exhibits no edema.    Neurological: She is alert. She exhibits normal muscle tone.   Skin: No rash noted.       ED Course     ED Course     Procedures               Critical Care time:  none               Results for orders placed or performed during the hospital encounter of 11/27/18 (from the past 24 hour(s))   UA with Microscopic   Result Value Ref Range    Color Urine Straw     Appearance Urine Clear     Glucose Urine Negative NEG^Negative mg/dL    Bilirubin Urine Negative NEG^Negative    Ketones Urine Negative NEG^Negative mg/dL    Specific Gravity Urine 1.008 1.003 - 1.035    Blood Urine Negative NEG^Negative    pH Urine 6.0 5.0 - 7.0 pH    Protein Albumin Urine Negative NEG^Negative mg/dL    Urobilinogen mg/dL 0.0 0.0 - 2.0 mg/dL    Nitrite Urine Negative NEG^Negative    Leukocyte Esterase Urine Negative NEG^Negative    Source Midstream Urine     WBC Urine <1 0 - 5 /HPF    RBC Urine 1 0 - 2 /HPF    Squamous Epithelial /HPF Urine 1 0 - 1 /HPF   CBC with platelets differential   Result Value Ref Range    WBC 6.3 4.0 - 11.0 10e9/L    RBC Count 4.58 3.8 - 5.2 10e12/L    Hemoglobin 11.7 11.7 - 15.7 g/dL    Hematocrit 37.1 35.0 - 47.0 %    MCV 81 78 - 100 fl    MCH 25.5 (L) 26.5 - 33.0 pg    MCHC 31.5 31.5 - 36.5 g/dL    RDW 16.9 (H) 10.0 - 15.0 %    Platelet Count 332 150 - 450 10e9/L    Diff Method Automated Method     % Neutrophils 65.7 %    % Lymphocytes 29.4 %    % Monocytes 3.8 %    % Eosinophils 0.3 %    % Basophils 0.5 %    % Immature Granulocytes 0.3 %    Nucleated RBCs 0 0 /100    Absolute Neutrophil 4.1 1.6 - 8.3 10e9/L    Absolute Lymphocytes 1.8 0.8 - 5.3 10e9/L    Absolute Monocytes 0.2 0.0 - 1.3 10e9/L    Absolute Basophils 0.0 0.0 - 0.2 10e9/L    Abs Immature Granulocytes 0.0 0 - 0.4 10e9/L    Absolute Nucleated RBC 0.0    Comprehensive metabolic panel   Result Value Ref Range    Sodium 138 133 - 144 mmol/L    Potassium 4.4 3.4 - 5.3 mmol/L    Chloride 106 94 - 109 mmol/L    Carbon Dioxide 22 20 - 32 mmol/L    Anion  Gap 10 3 - 14 mmol/L    Glucose 82 70 - 99 mg/dL    Urea Nitrogen 11 7 - 30 mg/dL    Creatinine 0.92 0.52 - 1.04 mg/dL    GFR Estimate 68 >60 mL/min/1.7m2    GFR Estimate If Black 83 >60 mL/min/1.7m2    Calcium 9.9 8.5 - 10.1 mg/dL    Bilirubin Total 0.7 0.2 - 1.3 mg/dL    Albumin 4.1 3.4 - 5.0 g/dL    Protein Total 9.5 (H) 6.8 - 8.8 g/dL    Alkaline Phosphatase 140 40 - 150 U/L    ALT 52 (H) 0 - 50 U/L    AST 35 0 - 45 U/L   Lipase   Result Value Ref Range    Lipase 203 73 - 393 U/L   Lactic acid whole blood   Result Value Ref Range    Lactic Acid 0.9 0.7 - 2.0 mmol/L   Chest XR,  PA & LAT    Narrative    CHEST TWO VIEWS November 27, 2018 2:30 PM     HISTORY: Periodic fevers.     COMPARISON: Chest x-rays dated 8/30/2017.    FINDINGS: The lungs are grossly clear. Heart size, mediastinum and  pulmonary vascularity are within normal limits. No pneumothorax or  right pleural fluid collection. There is a subtle blunting of the left  lateral costophrenic angle on the PA view which could represent tiny  pleural fluid collection, but more likely represents scarring or  atelectasis. The right-sided [id number] and catheter have been  removed. No fracture. Previously noted feeding tube has been removed.      Impression    IMPRESSION:   1. Removal of the right-sided [id number] and catheter without  pneumothorax. Interval removal of the percutaneous feeding tube.  2. Minimal blunting left lateral costophrenic angle could represent  tiny pleural fluid collection, or likely represents blunting due to  atelectasis.  3. No other evidence of acute cardiopulmonary disease is seen.      *Note: Due to a large number of results and/or encounters for the requested time period, some results have not been displayed. A complete set of results can be found in Results Review.       Medications - No data to display    Assessments & Plan (with Medical Decision Making)     MDM: Doreen Peralta is a 38 year old female who presents with a  history of gastroparesis, prior gram-negative septicemia, acute renal failure, chronic abdominal pain, pulmonary hypertension that recently diagnosed who presented with 5 days of on and off fevers at home to 102 that is been remitting, and without acute other associated symptoms of does have chronic diarrhea and prior C. difficile and notes that the diarrhea has been persistent with numerous stools per day no blood.  She has no urinary tract symptoms.  She does have her gastroparesis related pain that is unchanged.  Multiple allergies as noted above.    Due to her complex history she underwent testing for sources of infection including urine, chest x-ray, blood cultures x2 as well as lactic acid.  That another laboratory testing was reassuring.  C. difficile testing has been sent regarding stool.  Precautions are given for return.  I asked her to follow-up with her primary provider as scheduled.    I have reviewed the nursing notes.    I have reviewed the findings, diagnosis, plan and need for follow up with the patient.       New Prescriptions    No medications on file       Final diagnoses:   Fever, unspecified fever cause - Unclear cause.  follow-up within 48 hours.  stay hydrated. tylenol or motrin as needed.   History of bacteremia - recurrent - No signs of recurrent infection at this time. blood cultures were performed.   Diarrhea, unspecified type - await c diff testing.   Gastroparesis       11/27/2018   [facility] EMERGENCY DEPARTMENT     Cristino Potter MD  11/27/18 [id number]

## 2018-11-27 NOTE — ED AVS SNAPSHOT
[facility]    [address] [place] [address]    Phone:  [phone]    Fax:  [phone]                                       Doreen Peralta   MRN: [id number]    Department:  [facility]   Date of Visit:  11/27/2018           After Visit Summary Signature Page     I have received my discharge instructions, and my questions have been answered. I have discussed any challenges I see with this plan with the nurse or doctor.    ..........................................................................................................................................  Patient/Patient Representative Signature      ..........................................................................................................................................  Patient Representative Print Name and Relationship to Patient    ..................................................               ................................................  Date                                   Time    ..........................................................................................................................................  Reviewed by Signature/Title    ...................................................              ..............................................  Date                                               Time          22EPIC Rev 08/18

## 2018-11-27 NOTE — DISCHARGE INSTRUCTIONS
ICD-10-CM    1. Fever, unspecified fever cause R50.9     Unclear cause.  follow-up within 48 hours.  stay hydrated. tylenol or motrin as needed.   2. History of bacteremia - recurrent Z87.898     No signs of recurrent infection at this time. blood cultures were performed.   3. Diarrhea, unspecified type R19.7     await c diff testing.   4. Gastroparesis K31.84          Febrile Illness with Uncertain Cause (Adult)  You have a fever, but the cause is unknown. A fever is a natural reaction of the body to an illness such as infection due to a virus or bacteria. Sometimes other conditions such as cancer or immune diseases can cause fever, especially if the fever has lasted for more than a week or 2. In most cases, the temperature itself is not harmful. It actually helps the body fight infections. A fever does not need to be treated unless you feel very uncomfortable.  Sometimes a fever can be an early sign of a more serious infection, so make sure to follow up if your condition worsens.  Home care  Unless given other instructions by your healthcare provider, follow these guidelines when caring for yourself at home.  General care    If your symptoms are not severe, rest at home for the first 2 to 3 days. When you resume activity, don't let yourself get too tired.    For your overall health, don't smoke. Also avoid being exposed to secondhand smoke.    Your appetite may be poor, so a light diet is fine. Avoid dehydration by drinking 6 to 8 glasses of fluids per day (such as water, soft drinks, sports drinks, juices, tea, or soup). If you have congestion, extra fluids will help loosen secretions in the nose and lungs.  Medicines    You can take acetaminophen or ibuprofen for pain or to lower your temperature, unless you were given a different medicine to use. (Note: If you have chronic liver or kidney disease or have ever had a stomach ulcer or gastrointestinal bleeding, talk with your healthcare provider before using  these medicines. Also talk to your provider if you are taking medicine to prevent blood clots.) Aspirin should never be given to anyone younger than 18 years of age who is ill with a viral infection or fever. It may cause severe liver or brain damage.    If you were given antibiotics for an infection, take them until they are used up, or your healthcare provider tells you to stop. It is important to finish the antibiotics even though you feel better. This is to make sure the infection has cleared. Be aware that antibiotics are not usually given for a viral infection or a fever with an unknown cause.    Over-the-counter medicines will not shorten the duration of the illness. However, they may be helpful for the following symptoms: cough, sore throat, or nasal and sinus congestion. Ask your pharmacist for product suggestions. (Note: Don't use decongestants if you have high blood pressure.)  Follow-up care  Follow up with your healthcare provider, or as advised.    If a culture or other lab tests were done, you will be notified if your treatment needs to be changed. You can call as directed for the results.    If X-rays, a CT, or an ultrasound were done, a specialist will review them. You will be notified of any findings that may affect your care.  Call 911  Call 911 if any of these occur:    Trouble breathing or swallowing, or wheezing    Chest pain    Confusion    Extreme drowsiness or trouble awakening    Fainting or loss of consciousness    Rapid heart rate    Low blood pressure    Vomiting blood, or large amounts of blood in stool    Seizure  When to seek medical advice  Call your healthcare provider right away if any of these occur:    Cough with lots of colored sputum (mucus) or blood in your sputum    Severe headache    Face, neck, throat, or ear pain    Feeling drowsy    Abdominal pain    Repeated vomiting or diarrhea    Joint pain or a new rash    Burning when urinating    Fever of 100.4 F (38 C) or higher,  or as directed by your healthcare provider    Feeling weak or dizzy  Date Last Reviewed: 11/1/2017 2000-2018 [employer]. [address] [place] [address], [place] [address]. All rights reserved. This information is not intended as a substitute for professional medical care. Always follow your healthcare professional's instructions.

## 2018-11-27 NOTE — ED AVS SNAPSHOT
[facility] Emergency Department    [address] [place] [address]    Phone:  [phone]    Fax:  [phone]                                       Doreen Peralta   MRN: [id number]    Department:  [facility] Department   Date of Visit:  11/27/2018           Patient Information     Date Of Birth          1980        Your diagnoses for this visit were:     Fever, unspecified fever cause Unclear cause.  follow-up within 48 hours.  stay hydrated. tylenol or motrin as needed.    History of bacteremia - recurrent No signs of recurrent infection at this time. blood cultures were performed.    Diarrhea, unspecified type await c diff testing.    Gastroparesis        You were seen by Cristino Potter MD.      Follow-up Information     Follow up with Shari Matos PA-C In 2 days.    Specialty:  Physician Assistant    Contact information:    [facility]  [address] [facility] [address]  [phone]          Follow up with [facility].    Specialty:  EMERGENCY MEDICINE    Why:  As needed, If symptoms worsen    Contact information:    [address] [place] [address] [phone]    Additional information:    From [address]: Exit at [address] on south side of [place]. Turn right on [address]. Turn left at stoplight on [address]. [facility] will be in view two blocks ahead        Discharge Instructions         ICD-10-CM    1. Fever, unspecified fever cause R50.9     Unclear cause.  follow-up within 48 hours.  stay hydrated. tylenol or motrin as needed.   2. History of bacteremia - recurrent Z87.898     No signs of recurrent infection at this time. blood cultures were performed.   3. Diarrhea, unspecified type R19.7     await c diff testing.   4. Gastroparesis K31.84          Febrile Illness with Uncertain Cause (Adult)  You have a fever, but the cause is unknown. A fever is a natural reaction of the  body to an illness such as infection due to a virus or bacteria. Sometimes other conditions such as cancer or immune diseases can cause fever, especially if the fever has lasted for more than a week or 2. In most cases, the temperature itself is not harmful. It actually helps the body fight infections. A fever does not need to be treated unless you feel very uncomfortable.  Sometimes a fever can be an early sign of a more serious infection, so make sure to follow up if your condition worsens.  Home care  Unless given other instructions by your healthcare provider, follow these guidelines when caring for yourself at home.  General care    If your symptoms are not severe, rest at home for the first 2 to 3 days. When you resume activity, don't let yourself get too tired.    For your overall health, don't smoke. Also avoid being exposed to secondhand smoke.    Your appetite may be poor, so a light diet is fine. Avoid dehydration by drinking 6 to 8 glasses of fluids per day (such as water, soft drinks, sports drinks, juices, tea, or soup). If you have congestion, extra fluids will help loosen secretions in the nose and lungs.  Medicines    You can take acetaminophen or ibuprofen for pain or to lower your temperature, unless you were given a different medicine to use. (Note: If you have chronic liver or kidney disease or have ever had a stomach ulcer or gastrointestinal bleeding, talk with your healthcare provider before using these medicines. Also talk to your provider if you are taking medicine to prevent blood clots.) Aspirin should never be given to anyone younger than 18 years of age who is ill with a viral infection or fever. It may cause severe liver or brain damage.    If you were given antibiotics for an infection, take them until they are used up, or your healthcare provider tells you to stop. It is important to finish the antibiotics even though you feel better. This is to make sure the infection has cleared.  Be aware that antibiotics are not usually given for a viral infection or a fever with an unknown cause.    Over-the-counter medicines will not shorten the duration of the illness. However, they may be helpful for the following symptoms: cough, sore throat, or nasal and sinus congestion. Ask your pharmacist for product suggestions. (Note: Don't use decongestants if you have high blood pressure.)  Follow-up care  Follow up with your healthcare provider, or as advised.    If a culture or other lab tests were done, you will be notified if your treatment needs to be changed. You can call as directed for the results.    If X-rays, a CT, or an ultrasound were done, a specialist will review them. You will be notified of any findings that may affect your care.  Call 911  Call 911 if any of these occur:    Trouble breathing or swallowing, or wheezing    Chest pain    Confusion    Extreme drowsiness or trouble awakening    Fainting or loss of consciousness    Rapid heart rate    Low blood pressure    Vomiting blood, or large amounts of blood in stool    Seizure  When to seek medical advice  Call your healthcare provider right away if any of these occur:    Cough with lots of colored sputum (mucus) or blood in your sputum    Severe headache    Face, neck, throat, or ear pain    Feeling drowsy    Abdominal pain    Repeated vomiting or diarrhea    Joint pain or a new rash    Burning when urinating    Fever of 100.4 F (38 C) or higher, or as directed by your healthcare provider    Feeling weak or dizzy  Date Last Reviewed: 11/1/2017 2000-2018 [employer]. [address] [place] [address]. All rights reserved. This information is not intended as a substitute for professional medical care. Always follow your healthcare professional's instructions.          24 Hour Appointment Hotline       To make an appointment at any [facility], call [phone] ([phone]). If you don't have a family doctor  or clinic, we will help you find one. [facility] are conveniently located to serve the needs of you and your family.             Review of your medicines      Our records show that you are taking the medicines listed below. If these are incorrect, please call your family doctor or clinic.        Dose / Directions Last dose taken    ACETAMINOPHEN PO   Dose:  500-1000 mg        Take 500-1,000 mg by mouth every 6 hours as needed for pain   Refills:  0        albuterol (2.5 MG/3ML) 0.083% neb solution   Commonly known as:  PROVENTIL   Dose:  2.5 mg   Quantity:  360 mL        Take 1 vial (2.5 mg) by nebulization every 4 hours as needed for shortness of breath / dyspnea or wheezing   Refills:  0        albuterol 90 MCG/ACT inhaler   Dose:  2 puff        Inhale 2 puffs into the lungs every 6 hours as needed   Refills:  0        Alfalfa 650 MG Tabs        Refills:  0        cholestyramine light 4 GM Packet   Commonly known as:  QUESTRAN   Dose:  4 g   Quantity:  60 packet        Take 1 packet (4 g) by mouth 2 times daily   Refills:  1        cloNIDine 0.2 MG tablet   Commonly known as:  CATAPRES   Dose:  0.4 mg   Quantity:  60 tablet        Take 2 tablets (0.4 mg) by mouth every evening - DUE FOR FOLLOW UP   Refills:  0        diphenhydrAMINE HCl 50 MG tablet   Commonly known as:  BENADRYL   Dose:  50 mg   Quantity:  30 tablet        Take 50 mg by mouth every 6 hours as needed (nausea)   Refills:  0        DULoxetine 60 MG capsule   Commonly known as:  CYMBALTA   Quantity:  90 capsule        TAKE 1 CAPSULE(60 MG) BY MOUTH DAILY   Refills:  1        ipratropium 0.02 % neb solution   Commonly known as:  ATROVENT   Dose:  0.5 mg   Quantity:  225 mL        Take 2.5 mLs (0.5 mg) by nebulization every 6 hours as needed for wheezing   Refills:  0        ondansetron 8 MG tablet   Commonly known as:  ZOFRAN   Dose:  8 mg   Quantity:  9 tablet        Take 1 tablet (8 mg) by mouth every 8 hours as needed for nausea   Refills:  0         SUMAtriptan 50 MG tablet   Commonly known as:  IMITREX   Dose:   mg   Quantity:  9 tablet        Take 1-2 tablets ( mg) by mouth at onset of headache for migraine - LAST REFILL, DUE FOR FOLLOW UP   Refills:  0        traZODone 100 MG tablet   Commonly known as:  DESYREL   Dose:   mg   Quantity:  90 tablet        Take 0.5-1 tablets ( mg) by mouth nightly as needed for sleep   Refills:  8        XARELTO PO   Dose:  20 mg        Take 20 mg by mouth daily   Refills:  0                Procedures and tests performed during your visit     Procedure/Test Number of Times Performed    Blood culture 2    CBC with platelets differential 1    Chest XR,  PA & LAT 1    Clostridium difficile toxin B PCR 1    Comprehensive metabolic panel 1    Lactic acid whole blood 1    Lipase 1    UA with Microscopic 1      Orders Needing Specimen Collection     None      Pending Results     Date and Time Order Name Status Description    11/27/2018 1236 Clostridium difficile toxin B PCR In process     11/27/2018 1236 Blood culture In process     11/27/2018 1236 Blood culture In process     11/27/2018 1236 Chest XR,  PA & LAT Preliminary             Pending Culture Results     Date and Time Order Name Status Description    11/27/2018 1236 Clostridium difficile toxin B PCR In process     11/27/2018 1236 Blood culture In process     11/27/2018 1236 Blood culture In process             Pending Results Instructions     If you had any lab results that were not finalized at the time of your Discharge, you can call the ED Lab Result RN at [phone]. You will be contacted by this team for any positive Lab results or changes in treatment. The nurses are available 7 days a week from 10A to 6:30P.  You can leave a message 24 hours per day and they will return your call.        Thank you for choosing [facility]       Thank you for choosing [facility] for your care. Our goal is always to provide you with excellent care. Hearing  back from our patients is one way we can continue to improve our services. Please take a few minutes to complete the written survey that you may receive in the mail after you visit with us. Thank you!        [employer] Information     [employer] gives you secure access to your electronic health record. If you see a primary care provider, you can also send messages to your care team and make appointments. If you have questions, please call your primary care clinic.  If you do not have a primary care provider, please call [phone] and they will assist you.        Care EveryWhere ID     This is your Care EveryWhere ID. This could be used by other organizations to access your [facility] medical records  [id number]        Equal Access to Services     TRAMAINE CLAYTON : Carrie Law, clark villeda, vesta long, elham gomez. So [facility] [phone].    ATENCIÓN: Si habla español, tiene a wade disposición servicios gratuitos de asistencia lingüística. Llame al [phone].    We comply with applicable federal civil rights laws and [place] laws. We do not discriminate on the basis of race, color, national origin, age, disability, sex, sexual orientation, or gender identity.            After Visit Summary       This is your record. Keep this with you and show to your community pharmacist(s) and doctor(s) at your next visit.

## 2018-11-28 ENCOUNTER — ANESTHESIA EVENT (OUTPATIENT)
Dept: EMERGENCY MEDICINE | Facility: CLINIC | Age: 38
End: 2018-11-28
Payer: MEDICARE

## 2018-11-28 ENCOUNTER — HOSPITAL ENCOUNTER (EMERGENCY)
Facility: CLINIC | Age: 38
Discharge: HOME | End: 2018-11-28
Attending: PHYSICIAN ASSISTANT | Admitting: PHYSICIAN ASSISTANT
Payer: MEDICARE

## 2018-11-28 ENCOUNTER — ANESTHESIA (OUTPATIENT)
Dept: EMERGENCY MEDICINE | Facility: CLINIC | Age: 38
End: 2018-11-28
Payer: MEDICARE

## 2018-11-28 ENCOUNTER — TELEPHONE (OUTPATIENT)
Dept: EMERGENCY MEDICINE | Facility: CLINIC | Age: 38
End: 2018-11-28

## 2018-11-28 VITALS
SYSTOLIC BLOOD PRESSURE: 119 MMHG | BODY MASS INDEX: 24.75 KG/M2 | HEART RATE: 80 BPM | DIASTOLIC BLOOD PRESSURE: 95 MMHG | HEIGHT: 66 IN | RESPIRATION RATE: 15 BRPM | WEIGHT: 154 LBS | TEMPERATURE: 98.1 F | OXYGEN SATURATION: 95 %

## 2018-11-28 DIAGNOSIS — A49.8 CLOSTRIDIUM DIFFICILE INFECTION: ICD-10-CM

## 2018-11-28 DIAGNOSIS — A04.72 C. DIFFICILE DIARRHEA: ICD-10-CM

## 2018-11-28 DIAGNOSIS — R11.0 NAUSEA: ICD-10-CM

## 2018-11-28 DIAGNOSIS — G89.29 CHRONIC ABDOMINAL PAIN: ICD-10-CM

## 2018-11-28 DIAGNOSIS — R10.9 CHRONIC ABDOMINAL PAIN: ICD-10-CM

## 2018-11-28 LAB
ALBUMIN SERPL-MCNC: 3.8 G/DL (ref 3.4–5)
ALP SERPL-CCNC: 131 U/L (ref 40–150)
ALT SERPL W P-5'-P-CCNC: 50 U/L (ref 0–50)
ANION GAP SERPL CALCULATED.3IONS-SCNC: 8 MMOL/L (ref 3–14)
AST SERPL W P-5'-P-CCNC: 30 U/L (ref 0–45)
BASOPHILS # BLD AUTO: 0 10E9/L (ref 0–0.2)
BASOPHILS NFR BLD AUTO: 0.4 %
BILIRUB SERPL-MCNC: 0.8 MG/DL (ref 0.2–1.3)
BUN SERPL-MCNC: 13 MG/DL (ref 7–30)
CALCIUM SERPL-MCNC: 8.8 MG/DL (ref 8.5–10.1)
CHLORIDE SERPL-SCNC: 104 MMOL/L (ref 94–109)
CO2 SERPL-SCNC: 23 MMOL/L (ref 20–32)
CREAT SERPL-MCNC: 1.06 MG/DL (ref 0.52–1.04)
CRP SERPL-MCNC: <2.9 MG/L (ref 0–8)
DIFFERENTIAL METHOD BLD: ABNORMAL
EOSINOPHIL NFR BLD AUTO: 0.2 %
ERYTHROCYTE [DISTWIDTH] IN BLOOD BY AUTOMATED COUNT: 16.7 % (ref 10–15)
GFR SERPL CREATININE-BSD FRML MDRD: 58 ML/MIN/1.7M2
GLUCOSE SERPL-MCNC: 78 MG/DL (ref 70–99)
HCT VFR BLD AUTO: 33.2 % (ref 35–47)
HGB BLD-MCNC: 10.7 G/DL (ref 11.7–15.7)
IMM GRANULOCYTES # BLD: 0 10E9/L (ref 0–0.4)
IMM GRANULOCYTES NFR BLD: 0.3 %
LYMPHOCYTES # BLD AUTO: 2.3 10E9/L (ref 0.8–5.3)
LYMPHOCYTES NFR BLD AUTO: 26.2 %
MCH RBC QN AUTO: 25.6 PG (ref 26.5–33)
MCHC RBC AUTO-ENTMCNC: 32.2 G/DL (ref 31.5–36.5)
MCV RBC AUTO: 79 FL (ref 78–100)
MONOCYTES # BLD AUTO: 0.3 10E9/L (ref 0–1.3)
MONOCYTES NFR BLD AUTO: 3.4 %
NEUTROPHILS # BLD AUTO: 6.2 10E9/L (ref 1.6–8.3)
NEUTROPHILS NFR BLD AUTO: 69.5 %
NRBC # BLD AUTO: 0 10*3/UL
NRBC BLD AUTO-RTO: 0 /100
PLATELET # BLD AUTO: 412 10E9/L (ref 150–450)
POTASSIUM SERPL-SCNC: 3.9 MMOL/L (ref 3.4–5.3)
PROT SERPL-MCNC: 9 G/DL (ref 6.8–8.8)
RBC # BLD AUTO: 4.18 10E12/L (ref 3.8–5.2)
SODIUM SERPL-SCNC: 135 MMOL/L (ref 133–144)
WBC # BLD AUTO: 8.9 10E9/L (ref 4–11)

## 2018-11-28 PROCEDURE — 25000128 H RX IP 250 OP 636: Performed by: PHYSICIAN ASSISTANT

## 2018-11-28 PROCEDURE — 96360 HYDRATION IV INFUSION INIT: CPT | Performed by: PHYSICIAN ASSISTANT

## 2018-11-28 PROCEDURE — 80053 COMPREHEN METABOLIC PANEL: CPT | Performed by: PHYSICIAN ASSISTANT

## 2018-11-28 PROCEDURE — 85025 COMPLETE CBC W/AUTO DIFF WBC: CPT | Performed by: PHYSICIAN ASSISTANT

## 2018-11-28 PROCEDURE — A9270 NON-COVERED ITEM OR SERVICE: HCPCS | Performed by: PHYSICIAN ASSISTANT

## 2018-11-28 PROCEDURE — 96361 HYDRATE IV INFUSION ADD-ON: CPT | Performed by: PHYSICIAN ASSISTANT

## 2018-11-28 PROCEDURE — 99284 EMERGENCY DEPT VISIT MOD MDM: CPT | Mod: 25 | Performed by: PHYSICIAN ASSISTANT

## 2018-11-28 PROCEDURE — 25000131 ZZH RX MED GY IP 250 OP 636 PS 637: Performed by: PHYSICIAN ASSISTANT

## 2018-11-28 PROCEDURE — 86140 C-REACTIVE PROTEIN: CPT | Performed by: PHYSICIAN ASSISTANT

## 2018-11-28 PROCEDURE — 25000125 ZZHC RX 250: Performed by: PHYSICIAN ASSISTANT

## 2018-11-28 PROCEDURE — 99284 EMERGENCY DEPT VISIT MOD MDM: CPT | Mod: Z6 | Performed by: PHYSICIAN ASSISTANT

## 2018-11-28 PROCEDURE — 40000671 ZZH STATISTIC ANESTHESIA CASE

## 2018-11-28 RX ORDER — VANCOMYCIN HYDROCHLORIDE 125 MG/1
125 CAPSULE ORAL 4 TIMES DAILY
Qty: 56 CAPSULE | Refills: 0 | Status: CANCELLED | OUTPATIENT
Start: 2018-11-28 | End: 2018-12-12

## 2018-11-28 RX ORDER — ONDANSETRON 4 MG/1
4 TABLET, ORALLY DISINTEGRATING ORAL ONCE
Status: COMPLETED | OUTPATIENT
Start: 2018-11-28 | End: 2018-11-28

## 2018-11-28 RX ORDER — HYDROCODONE BITARTRATE AND ACETAMINOPHEN 10; 325 MG/1; MG/1
1 TABLET ORAL ONCE
Status: COMPLETED | OUTPATIENT
Start: 2018-11-28 | End: 2018-11-28

## 2018-11-28 RX ORDER — ONDANSETRON 2 MG/ML
4 INJECTION INTRAMUSCULAR; INTRAVENOUS EVERY 30 MIN PRN
Status: DISCONTINUED | OUTPATIENT
Start: 2018-11-28 | End: 2018-11-28 | Stop reason: HOSPADM

## 2018-11-28 RX ADMIN — HYDROCODONE BITARTRATE AND ACETAMINOPHEN 1 TABLET: 10; 325 TABLET ORAL at 17:13

## 2018-11-28 RX ADMIN — SODIUM CHLORIDE 1000 ML: 9 INJECTION, SOLUTION INTRAVENOUS at 16:32

## 2018-11-28 RX ADMIN — ONDANSETRON 4 MG: 4 TABLET, ORALLY DISINTEGRATING ORAL at 16:59

## 2018-11-28 ASSESSMENT — LIFESTYLE VARIABLES: TOBACCO_USE: 1

## 2018-11-28 NOTE — ED AVS SNAPSHOT
[facility] Emergency Department    [address] [place] [address]    Phone:  [phone]    Fax:  [phone]                                       Doreen Peralta   MRN: [id number]    Department:  [facility] Emergency Department   Date of Visit:  11/28/2018           Patient Information     Date Of Birth          1980        Your diagnoses for this visit were:     Nausea     Chronic abdominal pain     C. difficile diarrhea        You were seen by Vianey Warren PA-C.      Follow-up Information     Follow up with [facility] Emergency Department.    Specialty:  EMERGENCY MEDICINE    Why:  If symptoms worsen    Contact information:    [address] [facility]   [place] [address] [phone]    Additional information:    From [address]: Exit at [address] on south side of [place]. Turn right on [address]. Turn left at stoplight on [address]. [facility] will be in view two blocks ahead        Discharge Instructions       Start taking the Dificid tonight.  Continue using your home medications to manage symptoms.  Try to advance your diet slowly, drinking lots of fluids to maintain hydration.  Contact your GI provider in the morning to let them know you were here and schedule follow-up as indicated.  If you develop any worsening symptoms do not hesitate to return to the emergency department.    Thank you for choosing [facility]'s Emergency Department. It was a pleasure taking care of you today. If you have any questions, please call [phone].    Vianey Warren PA-C      Discharge References/Attachments     C. DIFF, WHAT IS  (ENGLISH)      24 Hour Appointment Hotline       To make an appointment at any [facility], call [phone] ([phone]). If you don't have a family doctor or clinic, we will help you find one. [facility] are conveniently located to serve the needs of you and your family.              Review of your medicines      START taking        Dose / Directions Last dose taken    fidaxomicin 200 MG tablet   Commonly known as:  DIFICID   Dose:  200 mg   Quantity:  20 tablet        Take 1 tablet (200 mg) by mouth 2 times daily for 10 days   Refills:  0          Our records show that you are taking the medicines listed below. If these are incorrect, please call your family doctor or clinic.        Dose / Directions Last dose taken    ACETAMINOPHEN PO   Dose:  500-1000 mg        Take 500-1,000 mg by mouth every 6 hours as needed for pain   Refills:  0        albuterol (2.5 MG/3ML) 0.083% neb solution   Commonly known as:  PROVENTIL   Dose:  2.5 mg   Quantity:  360 mL        Take 1 vial (2.5 mg) by nebulization every 4 hours as needed for shortness of breath / dyspnea or wheezing   Refills:  0        albuterol 90 MCG/ACT inhaler   Dose:  2 puff        Inhale 2 puffs into the lungs every 6 hours as needed   Refills:  0        Alfalfa 650 MG Tabs        Refills:  0        cholestyramine light 4 GM Packet   Commonly known as:  QUESTRAN   Dose:  4 g   Quantity:  60 packet        Take 1 packet (4 g) by mouth 2 times daily   Refills:  1        cloNIDine 0.2 MG tablet   Commonly known as:  CATAPRES   Dose:  0.4 mg   Quantity:  60 tablet        Take 2 tablets (0.4 mg) by mouth every evening - DUE FOR FOLLOW UP   Refills:  0        diphenhydrAMINE HCl 50 MG tablet   Commonly known as:  BENADRYL   Dose:  50 mg   Quantity:  30 tablet        Take 50 mg by mouth every 6 hours as needed (nausea)   Refills:  0        DULoxetine 60 MG capsule   Commonly known as:  CYMBALTA   Quantity:  90 capsule        TAKE 1 CAPSULE(60 MG) BY MOUTH DAILY   Refills:  1        ipratropium 0.02 % neb solution   Commonly known as:  ATROVENT   Dose:  0.5 mg   Quantity:  225 mL        Take 2.5 mLs (0.5 mg) by nebulization every 6 hours as needed for wheezing   Refills:  0        ondansetron 8 MG tablet   Commonly known as:  ZOFRAN   Dose:  8 mg    Quantity:  9 tablet        Take 1 tablet (8 mg) by mouth every 8 hours as needed for nausea   Refills:  0        SUMAtriptan 50 MG tablet   Commonly known as:  IMITREX   Dose:   mg   Quantity:  9 tablet        Take 1-2 tablets ( mg) by mouth at onset of headache for migraine - LAST REFILL, DUE FOR FOLLOW UP   Refills:  0        traZODone 100 MG tablet   Commonly known as:  DESYREL   Dose:   mg   Quantity:  90 tablet        Take 0.5-1 tablets ( mg) by mouth nightly as needed for sleep   Refills:  8        XARELTO PO   Dose:  20 mg        Take 20 mg by mouth daily   Refills:  0                Prescriptions were sent or printed at these locations (1 Prescription)                   [facility] [address] [place] [address] - [address] [place] [address] AT [facility] [address]   [address] [place] [address]    Telephone:  [phone]   Fax:  [phone]   Hours:                  E-Prescribed (1 of 1)         fidaxomicin (DIFICID) 200 MG tablet                Procedures and tests performed during your visit     CBC with platelets differential    CRP inflammation    Comprehensive metabolic panel    Peripheral IV catheter      Orders Needing Specimen Collection     None      Pending Results     Date and Time Order Name Status Description    11/27/2018 1236 Blood culture Preliminary     11/27/2018 1236 Blood culture Preliminary             Pending Culture Results     Date and Time Order Name Status Description    11/27/2018 1236 Blood culture Preliminary     11/27/2018 1236 Blood culture Preliminary             Pending Results Instructions     If you had any lab results that were not finalized at the time of your Discharge, you can call the ED Lab Result RN at [phone]. You will be contacted by this team for any positive Lab results or changes in treatment. The nurses are available 7 days a week from 10A to 6:30P.  You can leave a message 24 hours per day and they will return your  call.        Thank you for choosing [facility]       Thank you for choosing [facility] for your care. Our goal is always to provide you with excellent care. Hearing back from our patients is one way we can continue to improve our services. Please take a few minutes to complete the written survey that you may receive in the mail after you visit with us. Thank you!        [employer] Information     [employer] gives you secure access to your electronic health record. If you see a primary care provider, you can also send messages to your care team and make appointments. If you have questions, please call your primary care clinic.  If you do not have a primary care provider, please call [phone] and they will assist you.        Care EveryWhere ID     This is your Care EveryWhere ID. This could be used by other organizations to access your [facility] medical records  [id number]        Equal Access to Services     TRAMAINE CLAYTON : Carrie Law, clark villeda, vesta long, elham gomez. So [facility] [phone].    ATENCIÓN: Si habla español, tiene a wade disposición servicios gratuitos de asistencia lingüística. Llame al [phone].    We comply with applicable federal civil rights laws and [place] laws. We do not discriminate on the basis of race, color, national origin, age, disability, sex, sexual orientation, or gender identity.            After Visit Summary       This is your record. Keep this with you and show to your community pharmacist(s) and doctor(s) at your next visit.

## 2018-11-28 NOTE — ED NOTES
Offered oral benadryl and iv zofran in which patient refused.  Pt refused crackers and po water challenge.

## 2018-11-28 NOTE — ANESTHESIA PREPROCEDURE EVALUATION
Anesthesia Evaluation     . Pt has had prior anesthetic. Type: General and MAC    History of anesthetic complications   - PONV        ROS/MED HX    ENT/Pulmonary:     (+)allergic rhinitis, tobacco use, Past use Moderate Persistent asthma Treatment: Inhaler daily,  , . .    Neurologic:     (+)neuropathy migraines,     Cardiovascular:     (+) hypertension----. Taking blood thinners : . . . :. valvular problems/murmurs type: MR . Previous cardiac testing Echodate:1/20/17results:Interpretation Summary     Left ventricular systolic function is normal. The visual ejection fraction is  estimated at 55-60%.  The right ventricular systolic function is normal.  There is mild (1+) mitral regurgitation. There is mild (1+) tricuspid  regurgitation. No aortic regurgitation is present.  There is no evidence of a mass or vegetation. This does not rule out  endocarditis.  ______________________________________________________________________________           Left Ventricle  The left ventricle is normal in size. There is mild concentric left  ventricular hypertrophy. Left ventricular systolic function is normal. The  visual ejection fraction is estimated at 55-60%. E by E prime ratio is less  than 8, that likely suggests normal left ventricular filling pressures. No  regional wall motion abnormalities noted.     Right Ventricle  The right ventricle is normal size. The right ventricular systolic function  is normal. The right ventricle is not well visualized.  Atria  Normal left atrial size. Right atrial size is normal. There is no atrial  shunt seen.     Mitral Valve  There is mild (1+) mitral regurgitation.     Tricuspid Valve  There is mild (1+) tricuspid regurgitation. The right ventricular systolic  pressure is approximated at 14mmHg plus the right atrial pressure. Small IVC  (<1.5cm) with normal respiratory collapse; right atrial pressure is estimated  at 0-5mmHg.     Aortic Valve  No aortic regurgitation is present. No  hemodynamically significant valvular  aortic stenosis.     Pulmonic Valve  The pulmonic valve is not well visualized. There is trace to mild pulmonic  valvular regurgitation. There is no pulmonic valvular stenosis.     Vessels  The aortic root is normal size.  Pericardial/Pleural  The pericardium appears normal.     Rhythm  The rhythm was normal sinus.     ______________________________________________________________________________  MMode/2D Measurements & Calculations  IVSd: 0.99 cm  LVIDd: 4.3 cm  LVIDs: 2.9 cm  LVPWd: 0.82 cm  FS: 32.5 %  EDV(Teich): 83.5 ml  ESV(Teich): 32.5 ml  LV mass(C)d: 123.9 grams  Ao root diam: 2.5 cm  LA dimension: 3.6 cm  asc Aorta Diam: 2.3 cm  LA/Ao: 1.4  LA Volume (BP): 32.0 ml     LA Volume Index (BP): 16.6 ml/m2        Doppler Measurements & Calculations  MV E max facundo: 76.2 cm/sec  MV A max facundo: 59.3 cm/sec  MV E/A: 1.3  MV dec time: 0.22 sec  PA acc time: 0.14 sec  TR max facundo: 216.1 cm/sec  TR max P.7 mmHg  Lateral E/e': 6.2  Medial E/e': 6.3            date: results:ECG reviewed date:16 results:Sinus tachycardia. Left atrial enlargement date: results:          METS/Exercise Tolerance:     Hematologic:     (+) Anemia, -      Musculoskeletal:   (+) arthritis, , , other musculoskeletal- patellofemoral stress syndrome      GI/Hepatic: Comment: Multiple GI surgeries.  Gtube in place.    (+) GERD Inflammatory bowel disease, liver disease, Other GI/Hepatic abdominal pain/hepatic flow abnormality/Nausea/vomiting/chronic diarrhea     (-) bowel prep anticipated   Renal/Genitourinary:  - ROS Renal section negative   (+) Pt has no history of transplant,       Endo:         Psychiatric:     (+) psychiatric history anxiety, depression and other (comment)      Infectious Disease:   (+) Other Infectious Disease history of sepsis      Malignancy:      - no malignancy   Other:    (+) No chance of pregnancy C-spine cleared: N/A, H/O Chronic Pain,other significant disability Other  (comment)                   Physical Exam  Normal systems: cardiovascular, pulmonary and dental    Airway   Mallampati: II  TM distance: >3 FB  Neck ROM: full    Dental     Cardiovascular   Rhythm and rate: regular and normal      Pulmonary    breath sounds clear to auscultation                    Anesthesia Plan  Procedure only, no anesthetic delivered    History & Physical Review      ASA Status:  3 .             Postoperative Care      Consents                          .

## 2018-11-28 NOTE — ED AVS SNAPSHOT
[facility] Emergency Department    [address] [place] [address]    Phone:  [phone]    Fax:  [phone]                                       Doreen Peralta   MRN: [id number]    Department:  [facility] Emergency Department   Date of Visit:  11/28/2018           After Visit Summary Signature Page     I have received my discharge instructions, and my questions have been answered. I have discussed any challenges I see with this plan with the nurse or doctor.    ..........................................................................................................................................  Patient/Patient Representative Signature      ..........................................................................................................................................  Patient Representative Print Name and Relationship to Patient    ..................................................               ................................................  Date                                   Time    ..........................................................................................................................................  Reviewed by Signature/Title    ...................................................              ..............................................  Date                                               Time          22EPIC Rev 08/18

## 2018-11-28 NOTE — ED TRIAGE NOTES
Abdominal pain, diarrhea, vomiting.  Intermittent fevers for last few days.  Found out this morning that she had a positive c-diff stool culture and was told to come in for fluids as she wasn't feeling better yet.

## 2018-11-28 NOTE — TELEPHONE ENCOUNTER
[facility] Emergency Department Lab result notification [Adult-Female]    [facility] ED lab result protocol used  C diff protocol    Reason for call  Notify of lab results, assess symptoms,  review ED providers recommendations/discharge instructions (if necessary) and advise per ED lab result f/u protocol    Lab Result (including Rx patient on, if applicable)  Final Clostridium difficile toxin B PCR is POSITIVE.  Toxin producing Clostridium difficile target DNA sequences detected, presumed positive for Clostridium difficile toxin B.   Rx (Flagyl or Vancomycin) prescribed upon discharge from the [facility] ED/:  None  If No Rx, advise and/or treat per [facility] ED Lab Result protocol.    Information table from ED Provider visit on 11/27/18  Symptoms reported at ED visit (Chief complaint, HPI) Chief Complaint   Patient presents with     Fever      HPI  Doreen Peralta is a 38 year old female who presents with a history of gastroparesis and allergies to Reglan, erythromycin, Levsin, droperidol, prior episodes of gram-negative septicemia, acute renal failure, chronic abdominal pain with a pain contract, new diagnosis of pulmonary hypertension of unclear etiology, history of PEG who was last seen on member 19th for possible dehydration and related to her gastroparesis.  Had abdominal pain cramping vomiting and inability to eat at that time.  She had been referred by her primary physician for IV fluids.  testing was reassuring.  CT imaging was not required     She returns today with fevers for the last 5 days on and off during this time which she reports as up to 102 at home with some sweats at nighttime.  She has no other systemic symptoms with these other than her chronic abdominal pain which is primarily upper quadrant and associated with her gastroparesis, and also with her diarrhea that is frequent multiple stools per day but not as foul odor as she has had previously with her C. difficile.       He  specifically denies dysuria urgency frequency or hematuria.  There is no upper respiratory symptoms no significant cough or wheezing.      On October 23 she had a finding of right ventricular dysfunction seen on echo.  This is been normal in 2009.  It was no PE by CT. cardiac MRI is pending.  She has been on Xarelto empirically.  She had been seen with chest pressure at the time and troponins had been elevated to 0.114.  She had been on antibiotics and had increased lactic acid at the time.     Significant Medical hx, if applicable (i.e. CKD, diabetes) Multiple, reviewed   Allergies Allergies   Allergen Reactions     Azithromycin Other (See Comments) and Itching     Other reaction(s): Throat Swelling/Closing  Burning in throat     Hyoscyamine Rash     Droperidol Hives and Rash     Metoclopramide Other (See Comments)     Eye twitching.      Peaches [Peach] Other (See Comments)     Raw. Cooked OK     Sucralose Other (See Comments)     All artificial sweeteners. Aspartame also. Swollen glands     Advair Diskus Other (See Comments)     Throat burns     Compazine [Prochlorperazine] Visual Disturbance     Contrast Dye Itching     States is allergic to CT contrast dye     Cyclobenzaprine Visual Disturbance     Diatrizoate Other (See Comments)     Patient reports she tolerates IV contrast if given 50mg IV of Benadryl prior to scan.      Fentanyl Other (See Comments)     migraine     Ibuprofen GI Disturbance     Lactulose Nausea and Vomiting     Gas and bloating     Levaquin [Levofloxacin] Swelling     Per ED M.D. And RN      Morphine Sulfate Other (See Comments)     Chest pain       Oxycodone Other (See Comments)     Burning throat, but can take Norco.      Rizatriptan Visual Disturbance     Isovue [Iopamidol] Palpitations     Pt had racing heart and sob      Ketorolac Anxiety     Latex Swelling and Rash     Kiwi, likely also avacado, ? banana     Levsin Rash      Weight, if applicable Wt Readings from Last 2 Encounters:  "  11/19/18 70.3 kg (155 lb)   10/22/18 68 kg (150 lb)      Coumadin/Warfarin [Yes /No] No   Creatinine Level (mg/dl) Creatinine   Date Value Ref Range Status   11/27/2018 0.92 0.52 - 1.04 mg/dL Final      Creatinine clearance (ml/min), if applicable CREATININE: 0.92 mg/dL (11/27/18 1418)  Estimated creatinine clearance: 92.9 mL/min   ED providers Impression and Plan (applicable information) 38 year old female who presents with a history of gastroparesis, prior gram-negative septicemia, acute renal failure, chronic abdominal pain, pulmonary hypertension that recently diagnosed who presented with 5 days of on and off fevers at home to 102 that is been remitting, and without acute other associated symptoms of does have chronic diarrhea and prior C. difficile and notes that the diarrhea has been persistent with numerous stools per day no blood.  She has no urinary tract symptoms.  She does have her gastroparesis related pain that is unchanged.  Multiple allergies as noted above.Due to her complex history she underwent testing for sources of infection including urine, chest x-ray, blood cultures x2 as well as lactic acid.  That another laboratory testing was reassuring.  C. difficile testing has been sent regarding stool.  Precautions are given for return.  I asked her to follow-up with her primary provider as scheduled.   ED diagnosis Fever, unspecified fever cause - Unclear cause.  follow-up within 48 hours.  stay hydrated. tylenol or motrin as needed.   History of bacteremia - recurrent - No signs of recurrent infection at this time. blood cultures were performed.   Diarrhea, unspecified type - await c diff testing.   Gastroparesis      ED provider Cristino Potter MD      RN Assessment (Patient s current Symptoms), include time called.  [Insert Left message here if message left]  At 1040A, \"I'm feeling a little bit worse because of the nausea.  Still having the stomach pain\"  Rates stomach pain as a 5 on scale of 10.  " "Stomach pain is about the same as when she was seen in the Emergency dept.  Has had 10 diarrhea stools since midnight.  Has urinated last around 5 a.m.  Denies fever this morning.  \"Fever comes and goes\"  She states that Flagyl and Vancomycin don't work for her.  \"I last took Dificid (Fidaxomicin)\"    [facility] Provider Name & Recommendations (included time consulted)  At 10:40A, Consulted with Dr Connor Cedeno, [facility] Emergency Dept provider, and he advised treating with Vancomycin 125 mg PO QID for 14 days.  If she is dehydrated she needs to return to the Emergency Dept.  Have her f/u with her clinic.     [RN Name]  Eric Gomez RN  [facility] RN  Lung Nodule and ED Lab Result RN  Epic pool (ED late result f/u RN): [id number]  [facility] INCIDENTAL RADIOLOGY F/U NURSES: [id number]  Ph# [phone]    Copy of Lab result   Clostridium difficile toxin B PCR [[id number]] (Order [id number])   Exam Information   Exam Date Exam Time Accession # Results    11/27/18  1:50 PM [id number]    Component Results   Component Value Flag Ref Range Units Status Collected Lab   Specimen Description Feces    Final 11/27/2018  1:50 PM [facility]   C Diff Toxin B PCR Positive (A) NEG^Negative  Final 11/27/2018  1:50 PM 51   Comment:   Positive: Toxin producing Clostridium difficile target DNA sequences detected, presumed positive for Clostridium difficile toxin B.   Clostridium difficile (Requires Enteric Isolation)   FDA approved assay performed using [employer] Gene[employer]pert real-time PCR.        "

## 2018-11-28 NOTE — TELEPHONE ENCOUNTER
"Doreen notified of Dr Cedeno's recommendations.  She refuses to take the Vancomycin.  \"It just doesn't work for me.  I'll wait and talk with my clinic about this.\"  Reviewed symptoms of dehydration and encouraged her to consider returning to the ED due to continued stomach pain, diarrhea and possible dehydration.    Eric Gomez, RN  [facility] RN  Lung Nodule and ED Lab Result RN  Epic pool (ED late result f/u RN): [id number]  [facility] INCIDENTAL RADIOLOGY F/U NURSES: P [id number]  Ph# [phone]    "

## 2018-11-28 NOTE — ED PROVIDER NOTES
"  History     Chief Complaint   Patient presents with     Nausea, Vomiting, & Diarrhea     The history is provided by the patient.     Doreen Peralta is a 38 year old female with a very complex medical history to include chronic abdominal issues, recurrent sepsis with bacteremia, who presents to the emergency department with complaints of nausea, vomiting, and diarrhea. The patient has been having intermittent fevers for the last week and a half. She developed diarrhea about a week ago, but did not think much of it because this is common for her. The patient first thought that her gastroparesis was acting up, but then the symptoms continued. She was seen by Dr. Potter yesterday and tested positive for C Diff, but has not started her treatment yet. The patient came to the ED today because her pain has increased and she has had more vomiting and diarrhea. She has not noticed any blood in her stool. Her pain is located in her abdomen and has been constant. Patient has not been eating as much as she normally does and has lost 5 lbs within the last week. She has not taken anything for her pain yet because \"Zofran doesn't work anymore\". The patient was recently on antibiotics while she was in the hospital.  She denies any urinary symptoms or URI symptoms.        Problem List:    Patient Active Problem List    Diagnosis Date Noted     Bacteremia 06/26/2018     Priority: Medium     Acute renal failure (H) 04/21/2018     Priority: Medium     History of bacteremia - recurrent 04/17/2018     Priority: Medium     Tobacco abuse 04/16/2018     Priority: Medium     Iron deficiency anemia 04/16/2018     Priority: Medium     Stool toxin PCR positive for Clostridium difficile on 4/17/18 04/16/2018     Priority: Medium     Gram negative septicemia (H) - Ochrobactrum, Stenotrophomonas & Pantoea 08/24/2017     Priority: Medium     Hypokalemia 08/24/2017     Priority: Medium     Essential hypertension 08/24/2017     Priority: Medium     " Sepsis due to Klebsiella (H) 07/27/2017     Priority: Medium     Insomnia, unspecified type 03/31/2017     Priority: Medium     Abdominal pain 02/15/2017     Priority: Medium     Abdominal pain, generalized 01/28/2017     Priority: Medium     History of deep venous thrombosis 01/26/2017     Priority: Medium     Nausea and vomiting 01/26/2017     Priority: Medium     Vitamin D deficiency 01/16/2017     Priority: Medium     Chronic abdominal pain 11/15/2016     Priority: Medium     Patient is followed by Larisa Lorenzo PA-C for ongoing prescription of pain medication.  All refills should be approved by this provider, or covering partner.    Medication(s): no regular narcotics - narcotics given at ED visits  Maximum quantity per month: N/A  Clinic visit frequency required: Q 6  months     Controlled substance agreement:  Encounter-Level CSA - 11/9/15:               Controlled Substance Agreement - Scan on 11/19/2015 11:17 AM : CONTROLLED SUBSTANCE AGREEMENT 11/09/15 (below)          Encounter-Level CSA - 2/27/15:               Controlled Substance Agreement - Scan on 3/12/2015  7:50 AM : Controlled Medication Agreement 02/27/15 (below)            Pain Clinic evaluation in the past: Yes    DIRE Total Score(s):  No flowsheet data found.    Last [facility] website verification:  done on 11/15/16   [email or web address]        Attention to G-tube (H) 11/08/2016     Priority: Medium     Fever 10/16/2016     Priority: Medium     Coagulation defect (H) [D68.9] 09/16/2016     Priority: Medium     Chronic diarrhea 07/22/2016     Priority: Medium     Migraine 07/20/2016     Priority: Medium     Positive blood culture - Klebsiella oxytoca from Port-a-cath culture 07/18/2016     Priority: Medium     Mitral regurgitation mild-mod by Echo June 2016 07/18/2016     Priority: Medium     Anemia, iron deficiency 07/17/2016     Priority: Medium     Anemia in other chronic diseases classified elsewhere 06/14/2016     Priority:  Medium     Munchausen syndrome - previously suspected 06/14/2016     Priority: Medium     Long-term (current) use of anticoagulants [Z79.01] 05/16/2016     Priority: Medium     Anxiety 05/16/2016     Priority: Medium     S/P partial resection of colon 04/11/2016     Priority: Medium     Malnutrition (H) 04/11/2016     Priority: Medium     Jejunostomy tube present (H) 03/21/2016     Priority: Medium     Malfunctioning jejunostomy tube (H) 12/22/2015     Priority: Medium     Malfunction of gastrostomy tube (H) 11/19/2015     Priority: Medium     PEG tube malfunction (H) 10/16/2015     Priority: Medium     Health Care Home 01/16/2015     Priority: Medium     *See Letters for [facility] Care Plan: My Access Plan           PEG (percutaneous endoscopic gastrostomy) status 11/05/2014     Priority: Medium     Gastroparesis 04/11/2014     Priority: Medium     Overview:   Had ileostomy performed at [facility] in Jan 2012 as treatment       Migraines 04/11/2014     Priority: Medium     4/11/2014  With periods, every other month.       Intermittent asthma 04/11/2014     Priority: Medium     4/11/2014   With URIs, allergies       Allergic rhinitis 04/11/2014     Priority: Medium     Problem list name updated by automated process. Provider to review       Abnormal Pap smear of cervix 04/11/2014     Priority: Medium     4/11/2014  S/p colp and LEEP. Sees OB/GYN at [facility]. Pap every year x20 years - normal since.       S/P LEEP of cervix 02/06/2014     Priority: Medium     Patellofemoral stress syndrome 01/18/2014     Priority: Medium     Hx SBO 10/09/2013     Priority: Medium     4/11/2014  Recurrent. Sees GI (Dr. Redding - at [facility]) every 6 months or so.  Sees feeding tube nurse next week.       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 04/11/2013     Priority: Medium     Constipation by delayed colonic transit 10/24/2012     Priority: Medium     Atopic rhinitis 03/20/2009     Priority: Medium     Hyperbilirubinemia 03/11/2009     Priority:  Medium        Past Medical History:    Past Medical History:   Diagnosis Date     Asthma      Bilateral ovarian cysts      Cervical cancer (H) 01/01/2008     Chronic pain      Colonic dysmotility      Constipation      E. coli sepsis (H) 5/8/2016     Enteritis      Fungemia 5/5/2016     Gastro-oesophageal reflux disease      H/O ileostomy      Hx of abnormal Pap smear      Hypertension      IBS (irritable bowel syndrome)      Other chronic pain      PONV (postoperative nausea and vomiting)      Thrombosis, hepatic vein (H)        Past [facility] History:    Past [facility] History:   Procedure Laterality Date     COLONOSCOPY  7/10/2012    Procedure: COLONOSCOPY;;  Surgeon: Nicole Redding MD;  [facility]     COLONOSCOPY N/A 2/19/2017    Procedure: COLONOSCOPY;  Surgeon: Randell Muller MD;  [facility]     COLONOSCOPY N/A 2/21/2017    Procedure: COLONOSCOPY;  Surgeon: Randell Muller MD;  [facility]     COLONOSCOPY N/A 5/3/2018    Procedure: COMBINED COLONOSCOPY, SINGLE OR MULTIPLE BIOPSY/POLYPECTOMY BY BIOPSY;  EGD/Colonoscopy ;  Surgeon: Loi Black MD;  Location: [facility]     ECHO CHELO  7/19/2016          ENDOSCOPIC INSERTION TUBE GASTROSTOMY N/A 1/21/2016    Procedure: ENDOSCOPIC INSERTION TUBE GASTROSTOMY;  Surgeon: Nicole Redding MD;  [facility]     ESOPHAGOSCOPY, GASTROSCOPY, DUODENOSCOPY (EGD), COMBINED  7/10/2012    Procedure: COMBINED ESOPHAGOSCOPY, GASTROSCOPY, DUODENOSCOPY (EGD);  Upper Endoscopy, Ileoscopy    Latex Allergy  with biopsies;  Surgeon: Nicole Redding MD;  [facility]     ESOPHAGOSCOPY, GASTROSCOPY, DUODENOSCOPY (EGD), COMBINED N/A 11/5/2014    Procedure: COMBINED ESOPHAGOSCOPY, GASTROSCOPY, DUODENOSCOPY (EGD);  Surgeon: Nicole Redding MD;  [facility]     ESOPHAGOSCOPY, GASTROSCOPY, DUODENOSCOPY (EGD), COMBINED N/A 5/3/2018    Procedure: COMBINED ESOPHAGOSCOPY, GASTROSCOPY, DUODENOSCOPY (EGD), BIOPSY SINGLE OR MULTIPLE;;   Surgeon: Loi Black MD;  [facility]     HC REPLACE DUODENOSTOMY/JEJUNOSTOMY TUBE PERCUTANEOUS N/A 8/27/2015    Procedure: REPLACE GASTROJEJUNOSTOMY TUBE, PERCUTANEOUS;  Surgeon: Mio Holder MD;  [facility]     HC REPLACE DUODENOSTOMY/JEJUNOSTOMY TUBE PERCUTANEOUS N/A 1/7/2016    Procedure: REPLACE JEJUNOSTOMY TUBE, PERCUTANEOUS;  Surgeon: Elsa Medel MD;  [facility]     HC REPLACE DUODENOSTOMY/JEJUNOSTOMY TUBE PERCUTANEOUS N/A 1/28/2016    Procedure: REPLACE JEJUNOSTOMY TUBE, PERCUTANEOUS;  Surgeon: Elsa Medel MD;  [facility]     HC REPLACE GASTROSTOMY/CECOSTOMY TUBE PERCUTANEOUS Left 5/19/2015    Procedure: REPLACE GASTROSTOMY TUBE, PERCUTANEOUS;  Surgeon: Melecio Morejon Chi, MD;  Location[facility]     HC UGI ENDOSCOPY W PLACEMENT GASTROSTOMY TUBE PERCUT N/A 10/1/2015    Procedure: COMBINED ESOPHAGOSCOPY, GASTROSCOPY, DUODENOSCOPY (EGD), PLACE PERCUTANEOUS ENDOSCOPIC GASTROSTOMY TUBE;  Surgeon: Mio Holder MD;  [facility]     INSERT PORT VASCULAR ACCESS Right 7/20/2017    Procedure: INSERT PORT VASCULAR ACCESS;  Chest Port Placement  **Latex Allergy**;  Surgeon: Coy Rocha PA-C;  [facility]     LAPAROSCOPIC ASSISTED COLECTOMY  1/20/2012    Procedure:LAPAROSCOPIC ASSISTED COLECTOMY; Laparoscopic Ileostomy       LAPAROSCOPIC ASSISTED COLECTOMY LEFT (DESCENDING)  10/24/2012    Procedure: LAPAROSCOPIC ASSISTED COLECTOMY LEFT (DESCENDING);   Hand Assisted Laproscopic Subtotal abdominal Colectomy,Iieorectal anastamosis, Ileostomy Closure.       LAPAROSCOPIC ASSISTED INSERTION TUBE JEJUNOSTOMY N/A 10/16/2015    Procedure: LAPAROSCOPIC ASSISTED INSERTION TUBE JEJUNOSTOMY;  Surgeon: Elsa Medel MD;  [facility]     LAPAROSCOPIC CHOLECYSTECTOMY  2002    [facility] c[facility]. stones duct     LAPAROSCOPIC ILEOSTOMY  1/20/2012    [facility], loop     LAPAROSCOPIC OOPHORECTOMY Right 2009    [facility]     LAPAROTOMY EXPLORATORY N/A 1/28/2016     Procedure: LAPAROTOMY EXPLORATORY;  Surgeon: Elsa Medel MD;  [facility]     LEEP TX, CERVICAL  2009    [facility]     PICC INSERTION Left 10/21/2015    5fr DL Power PICC, 37cm (2cm external) in the L basilic vein w/ tip in the SVC RA junction.     REMOVE GASTROSTOMY TUBE ADULT N/A 12/12/2014    Procedure: REMOVE GASTROSTOMY TUBE ADULT;  Surgeon: Nicole Redding MD;  [facility]     REMOVE PORT VASCULAR ACCESS Right 6/30/2016    Procedure: REMOVE PORT VASCULAR ACCESS;  Surgeon: Pradeep Orosco MD;  Location: [facility] OR     REMOVE PORT VASCULAR ACCESS Right 9/8/2017    Procedure: REMOVE PORT VASCULAR ACCESS;  right side mediport removal;  Surgeon: Zechariah Worthington MD;  Location: [facility] OR     replace GASTROSTOMY TUBE ADULT  5/19/15       Family History:    Family History   Problem Relation Age of Onset     Thyroid Disease Mother      Sjogren's Mother      GASTROINTESTINAL DISEASE Mother      Intermittent nausea vomiting diarrhea     Colon Polyps Mother      Prostate Problems Father      prostate enlargement     Lupus Maternal Grandmother      Cancer Maternal Grandfather      Lung     Colon Cancer Maternal Grandfather 65     Cancer Paternal Grandmother      Lung      Cerebrovascular Disease Paternal Grandmother      Diabetes Paternal Grandmother      Cardiovascular Paternal Grandmother      CHF     Cancer Paternal Grandfather      Lung     Glaucoma Paternal Grandfather      Abdominal Aortic Aneurysm Other      Macular Degeneration No family hx of        Social History:  Marital Status:   [2]  Social History   Substance Use Topics     Smoking status: Current Some Day Smoker     Packs/day: 1.00     Years: 4.00     Types: Cigarettes     Last attempt to quit: 1/1/2004     Smokeless tobacco: Never Used      Comment: Vaping     Alcohol use No        Medications:      fidaxomicin (DIFICID) 200 MG tablet   ACETAMINOPHEN PO   albuterol (2.5 MG/3ML) 0.083% neb solution   albuterol 90 MCG/ACT  "inhaler   Alfalfa 650 MG TABS   cholestyramine light (QUESTRAN) 4 GM Packet   cloNIDine (CATAPRES) 0.2 MG tablet   diphenhydrAMINE HCl 50 MG TABS   DULoxetine (CYMBALTA) 60 MG EC capsule   ipratropium (ATROVENT) 0.02 % neb solution   ondansetron (ZOFRAN) 8 MG tablet   Rivaroxaban (XARELTO PO)   SUMAtriptan (IMITREX) 50 MG tablet   traZODone (DESYREL) 100 MG tablet         Review of Systems   All other systems reviewed and are negative.      Physical Exam   BP: (!) 119/95  Pulse: 80  Resp: 15  Height: 167.6 cm (5' 6\")  Weight: 69.9 kg (154 lb)  SpO2: 95 %      Physical Exam   Constitutional: She is oriented to person, place, and time. She appears well-developed and well-nourished. No distress.   HENT:   Head: Normocephalic and atraumatic.   Right Ear: External ear normal.   Left Ear: External ear normal.   Nose: Nose normal.   Mouth/Throat: Oropharynx is clear and moist. No oropharyngeal exudate.   Eyes: Conjunctivae and EOM are normal. Pupils are equal, round, and reactive to light. No scleral icterus.   Neck: Normal range of motion. Neck supple.   Cardiovascular: Normal rate, normal heart sounds and intact distal pulses.    Pulmonary/Chest: Effort normal and breath sounds normal. No respiratory distress. She has no wheezes. She has no rales. She exhibits no tenderness.   Abdominal: Soft. She exhibits no distension and no mass. There is generalized tenderness (mild). There is no rebound, no guarding and no CVA tenderness.   Musculoskeletal: Normal range of motion. She exhibits no edema or tenderness.   Neurological: She is alert and oriented to person, place, and time. She has normal reflexes.   Skin: Skin is warm and dry. No rash noted. She is not diaphoretic. No erythema. No pallor.   Psychiatric: She has a normal mood and affect.   Nursing note and vitals reviewed.      ED Course     ED Course     Procedures      Results for orders placed or performed during the hospital encounter of 11/28/18 (from the past 24 " hour(s))   CBC with platelets differential   Result Value Ref Range    WBC 8.9 4.0 - 11.0 10e9/L    RBC Count 4.18 3.8 - 5.2 10e12/L    Hemoglobin 10.7 (L) 11.7 - 15.7 g/dL    Hematocrit 33.2 (L) 35.0 - 47.0 %    MCV 79 78 - 100 fl    MCH 25.6 (L) 26.5 - 33.0 pg    MCHC 32.2 31.5 - 36.5 g/dL    RDW 16.7 (H) 10.0 - 15.0 %    Platelet Count 412 150 - 450 10e9/L    Diff Method Automated Method     % Neutrophils 69.5 %    % Lymphocytes 26.2 %    % Monocytes 3.4 %    % Eosinophils 0.2 %    % Basophils 0.4 %    % Immature Granulocytes 0.3 %    Nucleated RBCs 0 0 /100    Absolute Neutrophil 6.2 1.6 - 8.3 10e9/L    Absolute Lymphocytes 2.3 0.8 - 5.3 10e9/L    Absolute Monocytes 0.3 0.0 - 1.3 10e9/L    Absolute Basophils 0.0 0.0 - 0.2 10e9/L    Abs Immature Granulocytes 0.0 0 - 0.4 10e9/L    Absolute Nucleated RBC 0.0    Comprehensive metabolic panel   Result Value Ref Range    Sodium 135 133 - 144 mmol/L    Potassium 3.9 3.4 - 5.3 mmol/L    Chloride 104 94 - 109 mmol/L    Carbon Dioxide 23 20 - 32 mmol/L    Anion Gap 8 3 - 14 mmol/L    Glucose 78 70 - 99 mg/dL    Urea Nitrogen 13 7 - 30 mg/dL    Creatinine 1.06 (H) 0.52 - 1.04 mg/dL    GFR Estimate 58 (L) >60 mL/min/1.7m2    GFR Estimate If Black 70 >60 mL/min/1.7m2    Calcium 8.8 8.5 - 10.1 mg/dL    Bilirubin Total 0.8 0.2 - 1.3 mg/dL    Albumin 3.8 3.4 - 5.0 g/dL    Protein Total 9.0 (H) 6.8 - 8.8 g/dL    Alkaline Phosphatase 131 40 - 150 U/L    ALT 50 0 - 50 U/L    AST 30 0 - 45 U/L   CRP inflammation   Result Value Ref Range    CRP Inflammation <2.9 0.0 - 8.0 mg/L     *Note: Due to a large number of results and/or encounters for the requested time period, some results have not been displayed. A complete set of results can be found in Results Review.       Medications   ondansetron (ZOFRAN) injection 4 mg (4 mg Intravenous Not Given 11/28/18 5884)   0.9% sodium chloride BOLUS (0 mLs Intravenous Stopped 11/28/18 1850)   HYDROcodone-acetaminophen (NORCO)  MG per  tablet 1 tablet (1 tablet Oral Given 11/28/18 1713)   ondansetron (ZOFRAN-ODT) ODT tab 4 mg (4 mg Oral Given 11/28/18 8488)       Assessments & Plan (with Medical Decision Making)  Doreen Peralta is a 38 year old female who presented to the ED complaining of persistent nausea/vomiting and diarrhea with abdominal pain for the last week.  She was just seen in the emergency department yesterday and had stool cultures completed which came back positive for C. diff.  It looks like she received a call and was advised to start vancomycin but she refused that and decided to come back to the ED.  On arrival she had a normal temp of 98.1 F with unremarkable vital signs.  Patient had some mild generalized abdominal tenderness but no focal tenderness, otherwise reassuring exam. This patient is a notoriously difficult individual to obtain IV access on so unfortunately there was a delay with this today.  Eventually anesthesia was called after multiple failed attempts from ED staff and access was obtained and she was given IV fluids.  Her labs came back very reassuring with a white count of 8.9, negative CRP, normal LFTs.  Her BUN/creatinine were 13/1.06, not significantly different than what appears to be her baseline, though slightly elevated since yesterday's lab draw of 11/0.92.  Despite her persistent nausea and vomiting at home she had no emesis while here after nearly 5 hours and because of this I felt it reasonable to try oral medications so she received 10 mg Norco as well as ODT Zofran.  Afterwards she reported she felt much more nauseous and was repeatedly requesting IV Benadryl and Dilaudid.  Despite her nausea she still did not vomit her entire ED stay that lasted over 7 hours.  I expressed to her that her labs looked very reassuring and I would like to try oral Benadryl rather than IV because this is something she could use at home since she seems to have issues with chronic nausea but she refused.  She was offered  IV Zofran as well but again refused.  She was requesting to go home at this point which I think is reasonable given her reassuring workup and exam today.  She stated that she would not take oral vancomycin for her C. diff infection so instead she was prescribed Dificid since this worked in the past for her. She was advised to contact her GI provider and let them know she was here and that she has been diagnosed with C. diff again.  She was encouraged to return to the emergency department if she develops any worsening concerns.  Patient expressed understanding and was discharged home in stable condition.       I have reviewed the nursing notes.    I have reviewed the findings, diagnosis, plan and need for follow up with the patient.    New Prescriptions    FIDAXOMICIN (DIFICID) 200 MG TABLET    Take 1 tablet (200 mg) by mouth 2 times daily for 10 days       Final diagnoses:   Nausea   Chronic abdominal pain   C. difficile diarrhea     This document serves as a record of services personally performed by Vianey Warren PA-C. It was created on their behalf by Justine Garcia, a trained medical scribe. The creation of this record is based on the provider's personal observations and the statements of the patient. This document has been checked and approved by the attending provider.  Note: Chart documentation done in part with Dragon Voice Recognition software. Although reviewed after completion, some word and grammatical errors may remain.    11/28/2018   [facility] EMERGENCY DEPARTMENT     Vianey Warren PA-C  11/28/18 1923

## 2018-11-29 NOTE — DISCHARGE INSTRUCTIONS
Start taking the Dificid tonight.  Continue using your home medications to manage symptoms.  Try to advance your diet slowly, drinking lots of fluids to maintain hydration.  Contact your GI provider in the morning to let them know you were here and schedule follow-up as indicated.  If you develop any worsening symptoms do not hesitate to return to the emergency department.    Thank you for choosing [facility] Emergency Department. It was a pleasure taking care of you today. If you have any questions, please call [phone].    Vianey Warren PA-C

## 2018-11-29 NOTE — ANESTHESIA POSTPROCEDURE EVALUATION
Patient: Doreen Peralta    * No procedures listed *    Diagnosis:* No pre-op diagnosis entered *  Diagnosis Additional Information: No value filed.    Anesthesia Type:  No value filed.    Note:  Anesthesia Post Evaluation    Patient location during evaluation: ED  Patient participation: Able to fully participate in evaluation  Level of consciousness: awake and alert  Pain management: adequate  Airway patency: patent  Cardiovascular status: acceptable  Respiratory status: spontaneous ventilation and room air  Hydration status: acceptable  PONV: none       Comments: Patient tolerated her IV start well. No anesthesia concerns.         Last vitals:  There were no vitals filed for this visit.      Electronically Signed By: GERARD Shankar CRNA  November 29, 2018  10:51 AM

## 2018-11-30 ENCOUNTER — HOSPITAL ENCOUNTER (EMERGENCY)
Facility: CLINIC | Age: 38
Discharge: HOME | End: 2018-11-30
Attending: EMERGENCY MEDICINE | Admitting: EMERGENCY MEDICINE
Payer: MEDICARE

## 2018-11-30 VITALS
SYSTOLIC BLOOD PRESSURE: 128 MMHG | TEMPERATURE: 98.1 F | BODY MASS INDEX: 25.58 KG/M2 | WEIGHT: 158.5 LBS | RESPIRATION RATE: 16 BRPM | HEART RATE: 115 BPM | DIASTOLIC BLOOD PRESSURE: 90 MMHG | OXYGEN SATURATION: 100 %

## 2018-11-30 DIAGNOSIS — A04.72 CLOSTRIDIUM DIFFICILE ENTERITIS: ICD-10-CM

## 2018-11-30 DIAGNOSIS — R11.2 NAUSEA AND VOMITING, INTRACTABILITY OF VOMITING NOT SPECIFIED, UNSPECIFIED VOMITING TYPE: ICD-10-CM

## 2018-11-30 DIAGNOSIS — R10.84 ABDOMINAL PAIN, GENERALIZED: ICD-10-CM

## 2018-11-30 DIAGNOSIS — R78.81 BLOOD BACTERIAL CULTURE POSITIVE: ICD-10-CM

## 2018-11-30 PROCEDURE — 99284 EMERGENCY DEPT VISIT MOD MDM: CPT | Mod: Z6 | Performed by: EMERGENCY MEDICINE

## 2018-11-30 PROCEDURE — 99282 EMERGENCY DEPT VISIT SF MDM: CPT | Performed by: EMERGENCY MEDICINE

## 2018-11-30 NOTE — ED AVS SNAPSHOT
[facility] Emergency Department    [address] [place] [address]    Phone:  [phone]    Fax:  [phone]                                       Doreen Peralta   MRN: [id number]    Department:  [facility] Emergency Department   Date of Visit:  11/30/2018           After Visit Summary Signature Page     I have received my discharge instructions, and my questions have been answered. I have discussed any challenges I see with this plan with the nurse or doctor.    ..........................................................................................................................................  Patient/Patient Representative Signature      ..........................................................................................................................................  Patient Representative Print Name and Relationship to Patient    ..................................................               ................................................  Date                                   Time    ..........................................................................................................................................  Reviewed by Signature/Title    ...................................................              ..............................................  Date                                               Time          22EPIC Rev 08/18

## 2018-11-30 NOTE — ED AVS SNAPSHOT
[facility]    [address] [place] [address]    Phone:  [phone]    Fax:  [phone]                                       Doreen Peralta   MRN: [id number]    Department:  [facility]   Date of Visit:  11/30/2018           Patient Information     Date Of Birth          1980        Your diagnoses for this visit were:     Blood bacterial culture positive likely contaminate    Clostridium difficile enteritis     Abdominal pain, generalized     Nausea and vomiting, intractability of vomiting not specified, unspecified vomiting type        You were seen by Arely Lam MD.      Follow-up Information     Follow up with Shari Matos PA-C.    Specialty:  Physician Assistant    Contact information:    [facility]  [address] [facility] [address]  [phone]          Discharge Instructions       Continue the Ativan at home for nausea.    Continue the antibiotic for C difficile.    Be sure to follow-up with your primary care provider.    Return for worsening or concerns.    I hope that the rest of your weekend goes well and you start to feel MUCH better quickly!!      24 Hour Appointment Hotline       To make an appointment at any [facility], call [phone]). If you don't have a family doctor or clinic, we will help you find one. [facility] clinics are conveniently located to serve the needs of you and your family.             Review of your medicines      Our records show that you are taking the medicines listed below. If these are incorrect, please call your family doctor or clinic.        Dose / Directions Last dose taken    ACETAMINOPHEN PO   Dose:  500-1000 mg        Take 500-1,000 mg by mouth every 6 hours as needed for pain   Refills:  0        albuterol (2.5 MG/3ML) 0.083% neb solution   Commonly known as:  PROVENTIL   Dose:  2.5 mg   Quantity:  360 mL        Take 1 vial (2.5 mg) by  nebulization every 4 hours as needed for shortness of breath / dyspnea or wheezing   Refills:  0        albuterol 90 MCG/ACT inhaler   Dose:  2 puff        Inhale 2 puffs into the lungs every 6 hours as needed   Refills:  0        Alfalfa 650 MG Tabs        Refills:  0        cholestyramine light 4 GM packet   Commonly known as:  QUESTRAN   Dose:  4 g   Quantity:  60 packet        Take 1 packet (4 g) by mouth 2 times daily   Refills:  1        cloNIDine 0.2 MG tablet   Commonly known as:  CATAPRES   Dose:  0.4 mg   Quantity:  60 tablet        Take 2 tablets (0.4 mg) by mouth every evening - DUE FOR FOLLOW UP   Refills:  0        diphenhydrAMINE 50 MG tablet   Commonly known as:  BENADRYL   Dose:  50 mg   Quantity:  30 tablet        Take 50 mg by mouth every 6 hours as needed (nausea)   Refills:  0        DULoxetine 60 MG capsule   Commonly known as:  CYMBALTA   Quantity:  90 capsule        TAKE 1 CAPSULE(60 MG) BY MOUTH DAILY   Refills:  1        fidaxomicin 200 MG tablet   Commonly known as:  DIFICID   Dose:  200 mg   Quantity:  20 tablet        Take 1 tablet (200 mg) by mouth 2 times daily for 10 days   Refills:  0        ipratropium 0.02 % neb solution   Commonly known as:  ATROVENT   Dose:  0.5 mg   Quantity:  225 mL        Take 2.5 mLs (0.5 mg) by nebulization every 6 hours as needed for wheezing   Refills:  0        ondansetron 8 MG tablet   Commonly known as:  ZOFRAN   Dose:  8 mg   Quantity:  9 tablet        Take 1 tablet (8 mg) by mouth every 8 hours as needed for nausea   Refills:  0        SUMAtriptan 50 MG tablet   Commonly known as:  IMITREX   Dose:   mg   Quantity:  9 tablet        Take 1-2 tablets ( mg) by mouth at onset of headache for migraine - LAST REFILL, DUE FOR FOLLOW UP   Refills:  0        traZODone 100 MG tablet   Commonly known as:  DESYREL   Dose:   mg   Quantity:  90 tablet        Take 0.5-1 tablets ( mg) by mouth nightly as needed for sleep   Refills:  8         XARELTO PO   Dose:  20 mg        Take 20 mg by mouth daily   Refills:  0                Orders Needing Specimen Collection     None      Pending Results     No orders found from 11/28/2018 to 12/1/2018.            Pending Culture Results     No orders found from 11/28/2018 to 12/1/2018.            Pending Results Instructions     If you had any lab results that were not finalized at the time of your Discharge, you can call the ED Lab Result RN at [phone]. You will be contacted by this team for any positive Lab results or changes in treatment. The nurses are available 7 days a week from 10A to 6:30P.  You can leave a message 24 hours per day and they will return your call.        Thank you for choosing [facility]       Thank you for choosing [facility] for your care. Our goal is always to provide you with excellent care. Hearing back from our patients is one way we can continue to improve our services. Please take a few minutes to complete the written survey that you may receive in the mail after you visit with us. Thank you!        [employer] Information     [employer] gives you secure access to your electronic health record. If you see a primary care provider, you can also send messages to your care team and make appointments. If you have questions, please call your primary care clinic.  If you do not have a primary care provider, please call [phone] and they will assist you.        Care EveryWhere ID     This is your Care EveryWhere ID. This could be used by other organizations to access your [facility] medical records  [id number]        Equal Access to Services     TRAMAINE CLAYTON : Hadii caitie Law, waaxda christiana, qaybta kaalmaosvaldo jordanay joaquin gomez. So [facility] [phone].    ATENCIÓN: Si habla español, tiene a wade disposición servicios gratuitos de asistencia lingüística. Llame al [phone].    We comply with applicable federal civil rights laws and [place] laws.  We do not discriminate on the basis of race, color, national origin, age, disability, sex, sexual orientation, or gender identity.            After Visit Summary       This is your record. Keep this with you and show to your community pharmacist(s) and doctor(s) at your next visit.

## 2018-11-30 NOTE — TELEPHONE ENCOUNTER
[facility] Emergency Department Lab result notification [Adult-Female]    [facility] ED lab result protocol used  Blood Culture Protocol    Reason for call  Notify of lab results, assess symptoms,  review ED providers recommendations/discharge instructions (if necessary) and advise per ED lab result f/u protocol    Lab Result (including Rx patient on, if applicable)  Emergency Dept discharge antibiotic prescribed: Fidaxomicin Take 1 tablet (200 mg) by mouth 2 times daily for 10 days (C diff)  Preliminary Blood culture on 11/30/18 shows the presence of bacteria (s): Gram positive cocci in clusters  Per [facility] ED lab result protocol, a patient which a POSITIVE blood culture is to be contacted and symptoms assessed.  If symptom's worsening, patient advised to return to ED.  If symptom's improved or unchanged, RN will consult with [facility] ED provider for recommendations.    Information table from ED Provider visit on 11/27/18  Symptoms reported at ED visit (Chief complaint, HPI) Doreen Peralta is a 38 year old female who presents with a history of gastroparesis and allergies to Reglan, erythromycin, Levsin, droperidol, prior episodes of gram-negative septicemia, acute renal failure, chronic abdominal pain with a pain contract, new diagnosis of pulmonary hypertension of unclear etiology, history of PEG who was last seen on member 19th for possible dehydration and related to her gastroparesis.  Had abdominal pain cramping vomiting and inability to eat at that time.  She had been referred by her primary physician for IV fluids.  testing was reassuring.  CT imaging was not required     She returns today with fevers for the last 5 days on and off during this time which she reports as up to 102 at home with some sweats at nighttime.  She has no other systemic symptoms with these other than her chronic abdominal pain which is primarily upper quadrant and associated with her gastroparesis, and also with her  diarrhea that is frequent multiple stools per day but not as foul odor as she has had previously with her C. difficile.       He specifically denies dysuria urgency frequency or hematuria.  There is no upper respiratory symptoms no significant cough or wheezing.      On October 23 she had a finding of right ventricular dysfunction seen on echo.  This is been normal in 2009.  It was no PE by CT. cardiac MRI is pending.  She has been on Xarelto empirically.  She had been seen with chest pressure at the time and troponins had been elevated to 0.114.  She had been on antibiotics and had increased lactic acid at the time.      Significant Medical hx, if applicable (i.e. CKD, diabetes) Bacteremia - recurrent   Allergies Allergies   Allergen Reactions     Azithromycin Other (See Comments) and Itching     Other reaction(s): Throat Swelling/Closing  Burning in throat     Hyoscyamine Rash     Droperidol Hives and Rash     Metoclopramide Other (See Comments)     Eye twitching.      Peaches [Peach] Other (See Comments)     Raw. Cooked OK     Sucralose Other (See Comments)     All artificial sweeteners. Aspartame also. Swollen glands     Advair Diskus Other (See Comments)     Throat burns     Compazine [Prochlorperazine] Visual Disturbance     Contrast Dye Itching     States is allergic to CT contrast dye     Cyclobenzaprine Visual Disturbance     Diatrizoate Other (See Comments)     Patient reports she tolerates IV contrast if given 50mg IV of Benadryl prior to scan.      Fentanyl Other (See Comments)     migraine     Ibuprofen GI Disturbance     Lactulose Nausea and Vomiting     Gas and bloating     Levaquin [Levofloxacin] Swelling     Per ED M.D. And RN      Morphine Sulfate Other (See Comments)     Chest pain       Oxycodone Other (See Comments)     Burning throat, but can take Norco.      Rizatriptan Visual Disturbance     Isovue [Iopamidol] Palpitations     Pt had racing heart and sob      Ketorolac Anxiety     Latex  "Swelling and Rash     Kiwi, likely also avacado, ? banana     Levsin Rash      Weight, if applicable Wt Readings from Last 2 Encounters:   11/28/18 69.9 kg (154 lb)   11/19/18 70.3 kg (155 lb)      Coumadin/Warfarin [Yes /No] No   Creatinine Level (mg/dl) Creatinine   Date Value Ref Range Status   11/28/2018 1.06 (H) 0.52 - 1.04 mg/dL Final      Creatinine clearance (ml/min), if applicable CREATININE: 1.06 mg/dL ABNORMAL (11/28/18 1631)  Estimated creatinine clearance: 80.2 mL/min   Pregnant (Yes/No/NA) No   Breastfeeding (Yes/No/NA) No   ED providers Impression and Plan (applicable information) Doreen Peralta is a 38 year old female who presents with a history of gastroparesis, prior gram-negative septicemia, acute renal failure, chronic abdominal pain, pulmonary hypertension that recently diagnosed who presented with 5 days of on and off fevers at home to 102 that is been remitting, and without acute other associated symptoms of does have chronic diarrhea and prior C. difficile and notes that the diarrhea has been persistent with numerous stools per day no blood.  She has no urinary tract symptoms.  She does have her gastroparesis related pain that is unchanged.  Multiple allergies as noted above.     Due to her complex history she underwent testing for sources of infection including urine, chest x-ray, blood cultures x2 as well as lactic acid.  That another laboratory testing was reassuring.  C. difficile testing has been sent regarding stool.  Precautions are given for return.  I asked her to follow-up with her primary provider as scheduled.   ED diagnosis Fever, unspecified fever cause   ED provider Cristino Potter MD      RN Assessment (Patient s current Symptoms), include time called.  [Insert Left message here if message left]  Doreen has continued to have fevers intermittently over the past week.  I feel \"horrible\" but not \"septic\".  Per norm \"uncontrollable shakes\", high fever, headache, and horrible " "abdominal pain and nausea.  Also \"kind of out of it, like I don't know what's going on.   Using Ativan for nausea (per PCP) , Zofran doesn't work.  Is urinating, no rash.   Doreen very reluctant to come to ED, would like to be treated on outpatient basis.  This nurse did update Doreen on Dr. Raines's recommendations, will attempt to contact her PCP for possible appt and did fax prelim results to Shari Ulloa in [place]) as FYI.  Did encourage Doreen to present to ED if not able to see PCP.      [facility] Provider Name & Recommendations (included time consulted)  Consulted with Dr. SILVERIO Raines in [facility], recommendations to return to ED given symptoms and personal history.  If unwilling, can attempt to be seen per PCP today.       Please Contact your PCP clinic or return to the Emergency department if your:    Symptoms return.    Symptoms do not improve after 3 days on antibiotic.    Symptoms do not resolve after completing antibiotic.    Symptoms worsen or other concerning symptom's.    PCP follow-up Questions asked: YES       Elena Dunlap RN    [facility] RN  Lung Nodule and ED Lab Results F/U RN  Epic pool (ED late result f/u RN) : [id number]   # [phone]    Copy of Lab result   Order   Blood culture [[id number]] (Order [id number])   Preliminary Result   Exam Information   Exam Date Exam Time Accession # Results    11/27/18  2:17 PM [id number]    Component Results   Component Collected Lab   Specimen Description 11/27/2018  2:17    Blood Left Arm   Special Requests 11/27/2018  2:17 PM 75   Aerobic and anaerobic bottles received   Culture Micro (Abnormal) 11/27/2018  2:17    Cultured on the 3rd day of incubation:   Gram positive cocci in clusters      Culture Micro 11/27/2018  2:17    Critical Value/Significant Value, preliminary result only, called to and read back by   Culture Micro 11/27/2018  2:17    Elena Dunlap RN [id number] 11/30/2018 AA       "

## 2018-11-30 NOTE — ED NOTES
Kevin from microbiology lab at the [facility] reports positive blood culture for staph. Dr. Lam notified. Magalie Tierney RN

## 2018-11-30 NOTE — ED PROVIDER NOTES
History     Chief Complaint   Patient presents with     Abnormal Labs     The history is provided by the patient and medical records.     Doreen Peralta is a 38 year old female who presents to the emergency department with abnormal labs. The patient has been seen 3 times in the ED over the last week for abdominal pain and C Diff. She has a long GI history and has had C Diff in the past. 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 She is usually on Dificid, but she was out of this prescription this week. The patient says that her symptoms are getting worse every day. She says that she more nauseated and has more pain, vomiting, and diarrhea. She is on her second day of Dificid, but says it usually takes a week to work. The patient was at her PCP yesterday and she wanted to try something new for the patient's nausea. She put the patient on Ativan and gave her a few patches of scopolamine, but the patient is allergic to Levsin and was told that these medications are in the same family. The patient had labs drawn while in the this ED on the 27th and was called today with the results. She was told that she had a positive blood culture and she should be seen by either her PCP or in the ED today.       Note from ED visit on 11/27/18:  MDM: Doreen Peralta is a 38 year old female who presents with a history of gastroparesis, prior gram-negative septicemia, acute renal failure, chronic abdominal pain, pulmonary hypertension that recently diagnosed who presented with 5 days of on and off fevers at home to 102 that is been remitting, and without acute other associated symptoms of does have chronic diarrhea and prior C. difficile and notes that the diarrhea has been persistent with numerous stools per day no blood.  She has no urinary tract symptoms.  She does have her gastroparesis related pain that is unchanged.   Multiple allergies as noted above.     Due to her complex history she underwent testing for sources of infection including urine, chest x-ray, blood cultures x2 as well as lactic acid.  That another laboratory testing was reassuring.  C. difficile testing has been sent regarding stool.  Precautions are given for return.  I asked her to follow-up with her primary provider as scheduled.      Note from ED visit on 11/28/18:    Assessments & Plan (with Medical Decision Making)  Doreen Peralta is a 38 year old female who presented to the ED complaining of persistent nausea/vomiting and diarrhea with abdominal pain for the last week.  She was just seen in the emergency department yesterday and had stool cultures completed which came back positive for C. diff.  It looks like she received a call and was advised to start vancomycin but she refused that and decided to come back to the ED.  On arrival she had a normal temp of 98.1 F with unremarkable vital signs.  Patient had some mild generalized abdominal tenderness but no focal tenderness, otherwise reassuring exam. This patient is a notoriously difficult individual to obtain IV access on so unfortunately there was a delay with this today.  Eventually anesthesia was called after multiple failed attempts from ED staff and access was obtained and she was given IV fluids.  Her labs came back very reassuring with a white count of 8.9, negative CRP, normal LFTs.  Her BUN/creatinine were 13/1.06, not significantly different than what appears to be her baseline, though slightly elevated since yesterday's lab draw of 11/0.92.  Despite her persistent nausea and vomiting at home she had no emesis while here after nearly 5 hours and because of this I felt it reasonable to try oral medications so she received 10 mg Norco as well as ODT Zofran.  Afterwards she reported she felt much more nauseous and was repeatedly requesting IV Benadryl and Dilaudid.  Despite her nausea she still did  not vomit her entire ED stay that lasted over 7 hours.  I expressed to her that her labs looked very reassuring and I would like to try oral Benadryl rather than IV because this is something she could use at home since she seems to have issues with chronic nausea but she refused.  She was offered IV Zofran as well but again refused.  She was requesting to go home at this point which I think is reasonable given her reassuring workup and exam today.  She stated that she would not take oral vancomycin for her C. diff infection so instead she was prescribed Dificid since this worked in the past for her. She was advised to contact her GI provider and let them know she was here and that she has been diagnosed with C. diff again.  She was encouraged to return to the emergency department if she develops any worsening concerns.  Patient expressed understanding and was discharged home in stable condition.      Problem List:    Patient Active Problem List    Diagnosis Date Noted     Bacteremia 06/26/2018     Priority: Medium     Acute renal failure (H) 04/21/2018     Priority: Medium     History of bacteremia - recurrent 04/17/2018     Priority: Medium     Tobacco abuse 04/16/2018     Priority: Medium     Iron deficiency anemia 04/16/2018     Priority: Medium     Stool toxin PCR positive for Clostridium difficile on 4/17/18 04/16/2018     Priority: Medium     Gram negative septicemia (H) - Ochrobactrum, Stenotrophomonas & Pantoea 08/24/2017     Priority: Medium     Hypokalemia 08/24/2017     Priority: Medium     Essential hypertension 08/24/2017     Priority: Medium     Sepsis due to Klebsiella (H) 07/27/2017     Priority: Medium     Insomnia, unspecified type 03/31/2017     Priority: Medium     Abdominal pain 02/15/2017     Priority: Medium     Abdominal pain, generalized 01/28/2017     Priority: Medium     History of deep venous thrombosis 01/26/2017     Priority: Medium     Nausea and vomiting 01/26/2017     Priority:  Medium     Vitamin D deficiency 01/16/2017     Priority: Medium     Chronic abdominal pain 11/15/2016     Priority: Medium     Patient is followed by Larisa Lorenzo PA-C for ongoing prescription of pain medication.  All refills should be approved by this provider, or covering partner.    Medication(s): no regular narcotics - narcotics given at ED visits  Maximum quantity per month: N/A  Clinic visit frequency required: Q 6  months     Controlled substance agreement:  Encounter-Level CSA - 11/9/15:               Controlled Substance Agreement - Scan on 11/19/2015 11:17 AM : CONTROLLED SUBSTANCE AGREEMENT 11/09/15 (below)          Encounter-Level CSA - 2/27/15:               Controlled Substance Agreement - Scan on 3/12/2015  7:50 AM : Controlled Medication Agreement 02/27/15 (below)            Pain Clinic evaluation in the past: Yes    DIRE Total Score(s):  No flowsheet data found.    Last [facility] website verification:  done on 11/15/16   [email or web address]        Attention to G-tube (H) 11/08/2016     Priority: Medium     Fever 10/16/2016     Priority: Medium     Coagulation defect (H) [D68.9] 09/16/2016     Priority: Medium     Chronic diarrhea 07/22/2016     Priority: Medium     Migraine 07/20/2016     Priority: Medium     Positive blood culture - Klebsiella oxytoca from Port-a-cath culture 07/18/2016     Priority: Medium     Mitral regurgitation mild-mod by Echo June 2016 07/18/2016     Priority: Medium     Anemia, iron deficiency 07/17/2016     Priority: Medium     Anemia in other chronic diseases classified elsewhere 06/14/2016     Priority: Medium     Munchausen syndrome - previously suspected 06/14/2016     Priority: Medium     Long-term (current) use of anticoagulants [Z79.01] 05/16/2016     Priority: Medium     Anxiety 05/16/2016     Priority: Medium     S/P partial resection of colon 04/11/2016     Priority: Medium     Malnutrition (H) 04/11/2016     Priority: Medium     Jejunostomy tube  present (H) 03/21/2016     Priority: Medium     Malfunctioning jejunostomy tube (H) 12/22/2015     Priority: Medium     Malfunction of gastrostomy tube (H) 11/19/2015     Priority: Medium     PEG tube malfunction (H) 10/16/2015     Priority: Medium     Health Care Home 01/16/2015     Priority: Medium     *See Letters for [facility] Care Plan: My Access Plan           PEG (percutaneous endoscopic gastrostomy) status 11/05/2014     Priority: Medium     Gastroparesis 04/11/2014     Priority: Medium     Overview:   Had ileostomy performed at [facility] in Jan 2012 as treatment       Migraines 04/11/2014     Priority: Medium     4/11/2014  With periods, every other month.       Intermittent asthma 04/11/2014     Priority: Medium     4/11/2014   With URIs, allergies       Allergic rhinitis 04/11/2014     Priority: Medium     Problem list name updated by automated process. Provider to review       Abnormal Pap smear of cervix 04/11/2014     Priority: Medium     4/11/2014  S/p colp and LEEP. Sees OB/GYN at [facility]. Pap every year x20 years - normal since.       S/P LEEP of cervix 02/06/2014     Priority: Medium     Patellofemoral stress syndrome 01/18/2014     Priority: Medium     Hx SBO 10/09/2013     Priority: Medium     4/11/2014  Recurrent. Sees GI (Dr. Redding - at [facility]) every 6 months or so.  Sees feeding tube nurse next week.       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 04/11/2013     Priority: Medium     Constipation by delayed colonic transit 10/24/2012     Priority: Medium     Atopic rhinitis 03/20/2009     Priority: Medium     Hyperbilirubinemia 03/11/2009     Priority: Medium        Past Medical History:    Past Medical History:   Diagnosis Date     Asthma      Bilateral ovarian cysts      Cervical cancer (H) 01/01/2008     Chronic pain      Colonic dysmotility      Constipation      E. coli sepsis (H) 5/8/2016     Enteritis      Fungemia 5/5/2016     Gastro-oesophageal reflux disease      H/O ileostomy      Hx of  abnormal Pap smear      Hypertension      IBS (irritable bowel syndrome)      Other chronic pain      PONV (postoperative nausea and vomiting)      Thrombosis, hepatic vein (H)        Past [facility] History:    Past [facility] History:   Procedure Laterality Date     COLONOSCOPY  7/10/2012    Procedure: COLONOSCOPY;;  Surgeon: Nicole Redding MD;  [facility]     COLONOSCOPY N/A 2/19/2017    Procedure: COLONOSCOPY;  Surgeon: Randell Muller MD;  [facility] GI     COLONOSCOPY N/A 2/21/2017    Procedure: COLONOSCOPY;  Surgeon: Randell Muller MD;  [facility]     COLONOSCOPY N/A 5/3/2018    Procedure: COMBINED COLONOSCOPY, SINGLE OR MULTIPLE BIOPSY/POLYPECTOMY BY BIOPSY;  EGD/Colonoscopy ;  Surgeon: Loi Black MD;  Location: [facility]     ECHO CHELO  7/19/2016          ENDOSCOPIC INSERTION TUBE GASTROSTOMY N/A 1/21/2016    Procedure: ENDOSCOPIC INSERTION TUBE GASTROSTOMY;  Surgeon: Nicole Redding MD;  [facility]     ESOPHAGOSCOPY, GASTROSCOPY, DUODENOSCOPY (EGD), COMBINED  7/10/2012    Procedure: COMBINED ESOPHAGOSCOPY, GASTROSCOPY, DUODENOSCOPY (EGD);  Upper Endoscopy, Ileoscopy    Latex Allergy  with biopsies;  Surgeon: Nicole Redding MD;  [facility]     ESOPHAGOSCOPY, GASTROSCOPY, DUODENOSCOPY (EGD), COMBINED N/A 11/5/2014    Procedure: COMBINED ESOPHAGOSCOPY, GASTROSCOPY, DUODENOSCOPY (EGD);  Surgeon: Nicole Redding MD;  [facility]     ESOPHAGOSCOPY, GASTROSCOPY, DUODENOSCOPY (EGD), COMBINED N/A 5/3/2018    Procedure: COMBINED ESOPHAGOSCOPY, GASTROSCOPY, DUODENOSCOPY (EGD), BIOPSY SINGLE OR MULTIPLE;;  Surgeon: Loi Black MD;  [facility]     HC REPLACE DUODENOSTOMY/JEJUNOSTOMY TUBE PERCUTANEOUS N/A 8/27/2015    Procedure: REPLACE GASTROJEJUNOSTOMY TUBE, PERCUTANEOUS;  Surgeon: Mio Holder MD;  [facility]     HC REPLACE DUODENOSTOMY/JEJUNOSTOMY TUBE PERCUTANEOUS N/A 1/7/2016    Procedure: REPLACE JEJUNOSTOMY TUBE,  PERCUTANEOUS;  Surgeon: Elsa Medel MD;  [facility]     HC REPLACE DUODENOSTOMY/JEJUNOSTOMY TUBE PERCUTANEOUS N/A 1/28/2016    Procedure: REPLACE JEJUNOSTOMY TUBE, PERCUTANEOUS;  Surgeon: Elsa Medel MD;  [facility]     HC REPLACE GASTROSTOMY/CECOSTOMY TUBE PERCUTANEOUS Left 5/19/2015    Procedure: REPLACE GASTROSTOMY TUBE, PERCUTANEOUS;  Surgeon: Melecio Morejon Chi, MD;  [facility]     HC UGI ENDOSCOPY W PLACEMENT GASTROSTOMY TUBE PERCUT N/A 10/1/2015    Procedure: COMBINED ESOPHAGOSCOPY, GASTROSCOPY, DUODENOSCOPY (EGD), PLACE PERCUTANEOUS ENDOSCOPIC GASTROSTOMY TUBE;  Surgeon: Mio Holder MD;  [facility]     INSERT PORT VASCULAR ACCESS Right 7/20/2017    Procedure: INSERT PORT VASCULAR ACCESS;  Chest Port Placement  **Latex Allergy**;  Surgeon: Coy Rocha PA-C;  [facility]     LAPAROSCOPIC ASSISTED COLECTOMY  1/20/2012    Procedure:LAPAROSCOPIC ASSISTED COLECTOMY; Laparoscopic Ileostomy       LAPAROSCOPIC ASSISTED COLECTOMY LEFT (DESCENDING)  10/24/2012    Procedure: LAPAROSCOPIC ASSISTED COLECTOMY LEFT (DESCENDING);   Hand Assisted Laproscopic Subtotal abdominal Colectomy,Iieorectal anastamosis, Ileostomy Closure.       LAPAROSCOPIC ASSISTED INSERTION TUBE JEJUNOSTOMY N/A 10/16/2015    Procedure: LAPAROSCOPIC ASSISTED INSERTION TUBE JEJUNOSTOMY;  Surgeon: Elsa Medel MD;  [facility]     LAPAROSCOPIC CHOLECYSTECTOMY  2002    [facility] stones duct     LAPAROSCOPIC ILEOSTOMY  1/20/2012    [facility], loop     LAPAROSCOPIC OOPHORECTOMY Right 2009    [facility]     LAPAROTOMY EXPLORATORY N/A 1/28/2016    Procedure: LAPAROTOMY EXPLORATORY;  Surgeon: Elsa Medel MD;  [facility]     LEEP TX, CERVICAL  2009    [facility]     PICC INSERTION Left 10/21/2015    5fr DL Power PICC, 37cm (2cm external) in the L basilic vein w/ tip in the SVC RA junction.     REMOVE GASTROSTOMY TUBE ADULT N/A 12/12/2014    Procedure: REMOVE GASTROSTOMY TUBE  ADULT;  Surgeon: Nicole Redding MD;  [facility]     REMOVE PORT VASCULAR ACCESS Right 6/30/2016    Procedure: REMOVE PORT VASCULAR ACCESS;  Surgeon: Pradeep Orosco MD;  Location: [facility]     REMOVE PORT VASCULAR ACCESS Right 9/8/2017    Procedure: REMOVE PORT VASCULAR ACCESS;  right side mediport removal;  Surgeon: Zechariah Worthington MD;  Location: [facility] OR     replace GASTROSTOMY TUBE ADULT  5/19/15       Family History:    Family History   Problem Relation Age of Onset     Thyroid Disease Mother      Sjogren's Mother      GASTROINTESTINAL DISEASE Mother      Intermittent nausea vomiting diarrhea     Colon Polyps Mother      Prostate Problems Father      prostate enlargement     Lupus Maternal Grandmother      Cancer Maternal Grandfather      Lung     Colon Cancer Maternal Grandfather 65     Cancer Paternal Grandmother      Lung      Cerebrovascular Disease Paternal Grandmother      Diabetes Paternal Grandmother      Cardiovascular Paternal Grandmother      CHF     Cancer Paternal Grandfather      Lung     Glaucoma Paternal Grandfather      Abdominal Aortic Aneurysm Other      Macular Degeneration No family hx of        Social History:  Marital Status:   [2]  Social History   Substance Use Topics     Smoking status: Current Some Day Smoker     Packs/day: 1.00     Years: 4.00     Types: Cigarettes     Last attempt to quit: 1/1/2004     Smokeless tobacco: Never Used      Comment: Vaping     Alcohol use No        Medications:      ACETAMINOPHEN PO   albuterol (2.5 MG/3ML) 0.083% neb solution   albuterol 90 MCG/ACT inhaler   Alfalfa 650 MG TABS   cholestyramine light (QUESTRAN) 4 GM Packet   cloNIDine (CATAPRES) 0.2 MG tablet   diphenhydrAMINE HCl 50 MG TABS   DULoxetine (CYMBALTA) 60 MG EC capsule   fidaxomicin (DIFICID) 200 MG tablet   ipratropium (ATROVENT) 0.02 % neb solution   ondansetron (ZOFRAN) 8 MG tablet   Rivaroxaban (XARELTO PO)   SUMAtriptan (IMITREX) 50 MG tablet   traZODone  "(DESYREL) 100 MG tablet     Review of Systems   All other ROS reviewed and are negative or non-contributory except as stated in HPI.    Physical Exam   BP: (!) 153/95  Pulse: 115  Temp: 98.1  F (36.7  C)  Resp: 16  Weight: 71.9 kg (158 lb 8 oz)  SpO2: 100 %    Physical Exam   Constitutional: She appears well-developed and well-nourished.   Anxious, basically healthy appearing female lying in the bed   HENT:   Head: Normocephalic.   Nose: Nose normal.   Dry/tacky mucous membranes   Eyes: Conjunctivae and EOM are normal. No scleral icterus.   Neck: Normal range of motion. Neck supple.   Cardiovascular: Regular rhythm, normal heart sounds and intact distal pulses.    Borderline tachycardia   Pulmonary/Chest: Effort normal and breath sounds normal.   Abdominal: Soft.   Abdominal scars.  Diffuse abdominal tenderness without rebound   Musculoskeletal: Normal range of motion.   Neurological: She is alert. She exhibits normal muscle tone.   Skin: Skin is warm and dry. She is not diaphoretic.   Psychiatric: Her behavior is normal.   Anxious   Vitals reviewed.      ED Course (with Medical Decision Making)    Pt seen and examined by me.  RN and EPIC notes reviewed.      38-year-old female with history of chronic diarrhea status post total colectomy, recurrent episodes of bacteremia and septicemia, C. difficile, chronic abdominal pain.  Seen in this ED 11/27 for possible sepsis.  Labs were unremarkable, blood cultures and C. difficile were checked.  C. difficile returned positive and she was restarted on Dificid.  One blood culture returned with gram-positive cocci, staph.  I suspect this is a contaminant.  Patient does not appear septic.  Her heart rate was mildly elevated on arrival to the ED, but on my exam she is within normal limits/borderline tachycardia.  She is afebrile.  She states she is in the ED because \"she was told to come in\".  She has been able to keep down her Dificid although she says she \"vomited all the way " "to the hospital\".  She has nausea medications and pain medications at home from her primary care provider who she just saw yesterday.  I do not think she appears septic in any way nor do I think she needs to have another full workup today.    She was verbally reassured.  She will take her usual medications at home and can call on Monday to her primary care provider for any further needs.  Return for concerns.     Procedures    Assessments & Plan     I have reviewed the findings, diagnosis, plan and need for follow up with the patient.    Discharge Medication List as of 11/30/2018  4:29 PM          Final diagnoses:   Blood bacterial culture positive - likely contaminate   Clostridium difficile enteritis   Abdominal pain, generalized   Nausea and vomiting, intractability of vomiting not specified, unspecified vomiting type     Disposition: Patient discharged home in stable condition.  Plan as above.  Return for concerns.       This document serves as a record of services personally performed by Arely Lam MD. It was created on their behalf by Justine Garcia, a trained medical scribe. The creation of this record is based on the provider's personal observations and the statements of the patient. This document has been checked and approved by the attending provider.  Note: Chart documentation done in part with Dragon Voice Recognition software. Although reviewed after completion, some word and grammatical errors may remain.    11/30/2018   [facility] EMERGENCY DEPARTMENT     Arely Lam MD  11/30/18 [id number]    "

## 2018-11-30 NOTE — DISCHARGE INSTRUCTIONS
Continue the Ativan at home for nausea.    Continue the antibiotic for C difficile.    Be sure to follow-up with your primary care provider.    Return for worsening or concerns.    I hope that the rest of your weekend goes well and you start to feel MUCH better quickly!!

## 2018-12-03 LAB
BACTERIA SPEC CULT: NO GROWTH
Lab: NORMAL
SPECIMEN SOURCE: NORMAL

## 2018-12-04 ENCOUNTER — TELEPHONE (OUTPATIENT)
Dept: EMERGENCY MEDICINE | Facility: CLINIC | Age: 38
End: 2018-12-04

## 2018-12-04 LAB
BACTERIA SPEC CULT: ABNORMAL
Lab: ABNORMAL
SPECIMEN SOURCE: ABNORMAL

## 2018-12-04 NOTE — TELEPHONE ENCOUNTER
[facility] Emergency Department Lab result notification [Adult-Female]    [place] ED lab result protocol used  Blood and CSF Culture    Reason for call  Notify of lab results, assess symptoms,  review ED providers recommendations/discharge instructions (if necessary) and advise per ED lab result f/u protocol    Lab Result (including Rx patient on, if applicable)  Emergency Dept discharge antibiotic prescribed: Fidaxomicin Take 1 tablet (200 mg) by mouth 2 times daily for 10 days (C diff)  Final BLOOD culture on 12/4/18 shows the presence of bacteria(s):Staphylococcus saccharolyticus  , which is [NOT TESTED] to ED discharge antibiotic.  Patient to be notified of result, symptoms's assessed and advised per [place] ED lab result protocol  Information table from ED Provider visit on 11/30/18  Symptoms reported at ED visit (Chief complaint, HPI)  Abnormal Labs      The history is provided by the patient and medical records.      Doreen Peralta is a 38 year old female who presents to the emergency department with abnormal labs. The patient has been seen 3 times in the ED over the last week for abdominal pain and C Diff. She has a long GI history and has had C Diff in the past. 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 She is usually on Dificid, but she was out of this prescription this week. The patient says that her symptoms are getting worse every day. She says that she more nauseated and has more pain, vomiting, and diarrhea. She is on her second day of Dificid, but says it usually takes a week to work. The patient was at her PCP yesterday and she wanted to try something new for the patient's nausea. She put the patient on Ativan and gave her a few patches of scopolamine, but the patient is allergic to Levsin and was told that these medications are in the same family. The patient had labs  drawn while in the this ED on the 27th and was called today with the results. She was told that she had a positive blood culture and she should be seen by either her PCP or in the ED today.         Note from ED visit on 11/27/18:  MDM: Doreen Peralta is a 38 year old female who presents with a history of gastroparesis, prior gram-negative septicemia, acute renal failure, chronic abdominal pain, pulmonary hypertension that recently diagnosed who presented with 5 days of on and off fevers at home to 102 that is been remitting, and without acute other associated symptoms of does have chronic diarrhea and prior C. difficile and notes that the diarrhea has been persistent with numerous stools per day no blood.  She has no urinary tract symptoms.  She does have her gastroparesis related pain that is unchanged.  Multiple allergies as noted above.      Due to her complex history she underwent testing for sources of infection including urine, chest x-ray, blood cultures x2 as well as lactic acid.  That another laboratory testing was reassuring.  C. difficile testing has been sent regarding stool.  Precautions are given for return.  I asked her to follow-up with her primary provider as scheduled.        Note from ED visit on 11/28/18:     Assessments & Plan (with Medical Decision Making)  Doreen Peralta is a 38 year old female who presented to the ED complaining of persistent nausea/vomiting and diarrhea with abdominal pain for the last week.  She was just seen in the emergency department yesterday and had stool cultures completed which came back positive for C. diff.  It looks like she received a call and was advised to start vancomycin but she refused that and decided to come back to the ED.  On arrival she had a normal temp of 98.1 F with unremarkable vital signs.  Patient had some mild generalized abdominal tenderness but no focal tenderness, otherwise reassuring exam. This patient is a notoriously difficult  individual to obtain IV access on so unfortunately there was a delay with this today.  Eventually anesthesia was called after multiple failed attempts from ED staff and access was obtained and she was given IV fluids.  Her labs came back very reassuring with a white count of 8.9, negative CRP, normal LFTs.  Her BUN/creatinine were 13/1.06, not significantly different than what appears to be her baseline, though slightly elevated since yesterday's lab draw of 11/0.92.  Despite her persistent nausea and vomiting at home she had no emesis while here after nearly 5 hours and because of this I felt it reasonable to try oral medications so she received 10 mg Norco as well as ODT Zofran.  Afterwards she reported she felt much more nauseous and was repeatedly requesting IV Benadryl and Dilaudid.  Despite her nausea she still did not vomit her entire ED stay that lasted over 7 hours.  I expressed to her that her labs looked very reassuring and I would like to try oral Benadryl rather than IV because this is something she could use at home since she seems to have issues with chronic nausea but she refused.  She was offered IV Zofran as well but again refused.  She was requesting to go home at this point which I think is reasonable given her reassuring workup and exam today.  She stated that she would not take oral vancomycin for her C. diff infection so instead she was prescribed Dificid since this worked in the past for her. She was advised to contact her GI provider and let them know she was here and that she has been diagnosed with C. diff again.  She was encouraged to return to the emergency department if she develops any worsening concerns.  Patient expressed understanding and was discharged home in stable condition.       Significant Medical hx, if applicable (i.e. CKD, diabetes) Hx Acute renal failure, bacteremia, c-diff, septicemia   Allergies Allergies   Allergen Reactions     Azithromycin Other (See Comments) and  Itching     Other reaction(s): Throat Swelling/Closing  Burning in throat     Hyoscyamine Rash     Droperidol Hives and Rash     Metoclopramide Other (See Comments)     Eye twitching.      Peaches [Peach] Other (See Comments)     Raw. Cooked OK     Sucralose Other (See Comments)     All artificial sweeteners. Aspartame also. Swollen glands     Advair Diskus Other (See Comments)     Throat burns     Compazine [Prochlorperazine] Visual Disturbance     Contrast Dye Itching     States is allergic to CT contrast dye     Cyclobenzaprine Visual Disturbance     Diatrizoate Other (See Comments)     Patient reports she tolerates IV contrast if given 50mg IV of Benadryl prior to scan.      Fentanyl Other (See Comments)     migraine     Ibuprofen GI Disturbance     Lactulose Nausea and Vomiting     Gas and bloating     Levaquin [Levofloxacin] Swelling     Per ED M.D. And RN      Morphine Sulfate Other (See Comments)     Chest pain       Oxycodone Other (See Comments)     Burning throat, but can take Norco.      Rizatriptan Visual Disturbance     Isovue [Iopamidol] Palpitations     Pt had racing heart and sob      Ketorolac Anxiety     Latex Swelling and Rash     Kiwi, likely also avacado, ? banana     Levsin Rash      Weight, if applicable Wt Readings from Last 2 Encounters:   11/30/18 71.9 kg (158 lb 8 oz)   11/28/18 69.9 kg (154 lb)      Coumadin/Warfarin [Yes /No] No   Creatinine Level (mg/dl) Creatinine   Date Value Ref Range Status   11/28/2018 1.06 (H) 0.52 - 1.04 mg/dL Final      Creatinine clearance (ml/min), if applicable CREATININE: 1.06 mg/dL ABNORMAL (11/28/18 1631)  Estimated creatinine clearance: 73.8 mL/min   Pregnant (Yes/No/NA) No   Breastfeeding (Yes/No/NA) No   ED providers Impression and Plan (applicable information) 38-year-old female with history of chronic diarrhea status post total colectomy, recurrent episodes of bacteremia and septicemia, C. difficile, chronic abdominal pain.  Seen in this ED 11/27 for  "possible sepsis.  Labs were unremarkable, blood cultures and C. difficile were checked.  C. difficile returned positive and she was restarted on Dificid.  One blood culture returned with gram-positive cocci, staph.  I suspect this is a contaminant.  Patient does not appear septic.  Her heart rate was mildly elevated on arrival to the ED, but on my exam she is within normal limits/borderline tachycardia.  She is afebrile.  She states she is in the ED because \"she was told to come in\".  She has been able to keep down her Dificid although she says she \"vomited all the way to the hospital\".  She has nausea medications and pain medications at home from her primary care provider who she just saw yesterday.  I do not think she appears septic in any way nor do I think she needs to have another full workup today.     She was verbally reassured.  She will take her usual medications at home and can call on Monday to her primary care provider for any further needs.  Return for concerns.   ED diagnosis Blood bacterial culture positive - likely contaminate   Clostridium difficile enteritis   Abdominal pain, generalized   Nausea and vomiting, intractability of vomiting not specified, unspecified vomiting type        ED provider Arely Lam MD      RN Assessment (Patient s current Symptoms), include time called.  [Insert Left message here if message left]  12:14PM: Spoke with Patient, states that \"I feel horrible.\" States she has been in bed for 2 weeks, is not feeling any better \"I'm just not improved.\" , but not worse either since was in the ED last. Last fever was 2 days ago, temp to 100.8, no fever currently. Continues to have lower abdominal pain \"sharp\" more present in the left lower abdomen. Has passed 8 watery stools since 6am this morning. No blood noted. Is urinating at least every 6 hours. Taking in sips of fluid. Feels nauseated, no vomiting. Ativan has been helping with the nausea. Feels dizzy when out " "of bed. Has been taking   fidaxomicin (DIFICID) 200 MG tablet 20 tablet 0 11/28/2018 12/8/2018 --   Sig - Route: Take 1 tablet (200 mg) by mouth 2 times daily for 10 days - Oral     Since 11/28/18. Has not seen GI provider, \"they aren't very helpful, they just say to go to ER.\" Has a call out to her PCP Dr. Shari Matos from [facility], has not heard back yet. States she does not want to go back to ED.     RN Recommendations/Instructions per [place] ED lab result protocol  Patient notified of lab result. Advised that I would be in contact with the ED provider and call her back.      [place] Emergency Department Provider Name & Recommendations (included time consulted)  12:35PM: Spoke with Dr. Mcconnell at the [facility] ED. He advised that the Patient speak with her primary provider today regarding further treatment.      Please Contact your PCP clinic or return to the Emergency department if your:    Symptoms worsen or other concerning symptom's.    PCP follow-up Questions asked: YES       [RN Name]  Beth Sneed RN  [facility] RN  Lung Nodule and ED Lab Result RN  Epic pool (ED late result f/u RN): [id number]  [facility] INCIDENTAL RADIOLOGY F/U NURSES: [id number]  Ph# [phone]    Copy of Lab result   Blood culture [[id number]] (Order [id number])   Exam Information   Exam Date Exam Time Accession # Results    11/27/18  2:17 PM [id number]    Component Results   Component Collected Lab   Specimen Description 11/27/2018  2:17    Blood Left Arm   Special Requests 11/27/2018  2:17 PM 75   Aerobic and anaerobic bottles received   Culture Micro (Abnormal) 11/27/2018  2:17    Cultured on the 3rd day of incubation:   Staphylococcus saccharolyticus   Identification obtained by MALDI-TOF mass spectrometry research use only database. Test   characteristics determined and verified by the [facility] Diagnostic Laboratory   ([facility]) [place], [place].      Culture Micro 11/27/2018 "  2:17    Critical Value/Significant Value, preliminary result only, called to and read back by   Elena Dunlap RN [id number] 11/30/2018 AA      Culture Micro 11/27/2018  2:17    (Note)   POSITIVE for Staphylococci other than S.aureus, S.epidermidis and   S.lugdunensis, by [employer] multiplex nucleic acid test.   Coagulase-negative staphylococci are the most common venipuncture or   collection associated skin CONTAMINANTS grown in blood cultures.   Final identification and antimicrobial susceptibility testing will be   verified by standard methods.     Specimen tested with Verigene multiplex, gram-positive blood culture   nucleic acid test for the following targets: Staph aureus, Staph   epidermidis, Staph lugdunensis, other Staph species, Enterococcus   faecalis, Enterococcus faecium, Streptococcus species, S. agalactiae,   S. anginosus grp., S. pneumoniae, S. pyogenes, Listeria sp., mecA   (methicillin resistance) and Kim/B (vancomycin resistance).     Critical Value/Significant Value called to and read back by Annabella Tierney RN PHER [id number] 11.30.18 NDP      Culture & Susceptibility   STAPHYLOCOCCUS SACCHAROLYTICUS   Antibiotic Interpretation Sensitivity Unit Method Status   CEFOTAXIME Sensitive 0.38 ug/mL E-TEST Final   CLINDAMYCIN Sensitive 0.047 ug/mL E-TEST Final   PENICILLIN Sensitive <=0.016 ug/mL E-TEST Final   metronidazole Resistant >256.0 ug/mL E-TEST Final

## 2018-12-04 NOTE — TELEPHONE ENCOUNTER
12:43PM: Called Patient back, advised of ED provider recommendation as noted below. Patient is comfortable with this, has call out to provider.   12:48PM: Spoke with Patient's primary clinic ([facility]) to get fax number for the provider, Shari MOTA. Fax to provider is [phone]  12:55PM: Spoke with Vianey LARSON to advise that a fax was coming for the provider and that the Patient has a message in for the provider to call her today.   1:07PM: Lab faxed to Patient's PCP    Beth Sneed RN  [facility] RN  Lung Nodule and ED Lab Result RN  Epic pool (ED late result f/u RN): [id number]  [facility] INCIDENTAL RADIOLOGY F/U NURSES: [id number]  # [phone]

## 2018-12-31 ENCOUNTER — PATIENT OUTREACH (OUTPATIENT)
Dept: CARE COORDINATION | Facility: CLINIC | Age: 38
End: 2018-12-31

## 2019-01-19 ENCOUNTER — HOSPITAL ENCOUNTER (EMERGENCY)
Facility: CLINIC | Age: 39
Discharge: HOME | End: 2019-01-19
Attending: EMERGENCY MEDICINE | Admitting: EMERGENCY MEDICINE
Payer: MEDICARE

## 2019-01-19 VITALS
SYSTOLIC BLOOD PRESSURE: 113 MMHG | HEART RATE: 60 BPM | TEMPERATURE: 98.6 F | BODY MASS INDEX: 24.05 KG/M2 | WEIGHT: 149 LBS | RESPIRATION RATE: 16 BRPM | OXYGEN SATURATION: 100 % | DIASTOLIC BLOOD PRESSURE: 70 MMHG

## 2019-01-19 DIAGNOSIS — E86.0 DEHYDRATION: ICD-10-CM

## 2019-01-19 DIAGNOSIS — A04.72 C. DIFFICILE COLITIS: ICD-10-CM

## 2019-01-19 PROCEDURE — 96374 THER/PROPH/DIAG INJ IV PUSH: CPT | Performed by: EMERGENCY MEDICINE

## 2019-01-19 PROCEDURE — 99284 EMERGENCY DEPT VISIT MOD MDM: CPT | Mod: 25 | Performed by: EMERGENCY MEDICINE

## 2019-01-19 PROCEDURE — 25000128 H RX IP 250 OP 636: Performed by: EMERGENCY MEDICINE

## 2019-01-19 PROCEDURE — 99284 EMERGENCY DEPT VISIT MOD MDM: CPT | Mod: Z6 | Performed by: EMERGENCY MEDICINE

## 2019-01-19 PROCEDURE — 96361 HYDRATE IV INFUSION ADD-ON: CPT | Performed by: EMERGENCY MEDICINE

## 2019-01-19 RX ORDER — DIPHENHYDRAMINE HYDROCHLORIDE 50 MG/ML
50 INJECTION INTRAMUSCULAR; INTRAVENOUS ONCE
Status: COMPLETED | OUTPATIENT
Start: 2019-01-19 | End: 2019-01-19

## 2019-01-19 RX ADMIN — SODIUM CHLORIDE 1000 ML: 9 INJECTION, SOLUTION INTRAVENOUS at 14:02

## 2019-01-19 RX ADMIN — DIPHENHYDRAMINE HYDROCHLORIDE 50 MG: 50 INJECTION, SOLUTION INTRAMUSCULAR; INTRAVENOUS at 14:03

## 2019-01-19 ASSESSMENT — ENCOUNTER SYMPTOMS
RECTAL PAIN: 0
NAUSEA: 1
ABDOMINAL PAIN: 1
SHORTNESS OF BREATH: 0
DIARRHEA: 1
BLOOD IN STOOL: 0
FEVER: 0
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
## 2019-01-19 NOTE — DISCHARGE INSTRUCTIONS
Please restartfidaxomicin (DIFICID)   If symptoms are not coming down contact your GI specialist.

## 2019-01-19 NOTE — ED AVS SNAPSHOT
[facility] Emergency Department  [address] [place] [address]  Phone:  [phone]  Fax:  [phone]                                    Doreen Peralta   MRN: [id number]    Department:  [facility] Emergency Department   Date of Visit:  1/19/2019           After Visit Summary Signature Page    I have received my discharge instructions, and my questions have been answered. I have discussed any challenges I see with this plan with the nurse or doctor.    ..........................................................................................................................................  Patient/Patient Representative Signature      ..........................................................................................................................................  Patient Representative Print Name and Relationship to Patient    ..................................................               ................................................  Date                                   Time    ..........................................................................................................................................  Reviewed by Signature/Title    ...................................................              ..............................................  Date                                               Time          22EPIC Rev 08/18

## 2019-02-01 ENCOUNTER — HOSPITAL ENCOUNTER (EMERGENCY)
Facility: CLINIC | Age: 39
Discharge: HOME | End: 2019-02-01
Attending: FAMILY MEDICINE | Admitting: FAMILY MEDICINE
Payer: MEDICARE

## 2019-02-01 VITALS
OXYGEN SATURATION: 100 % | SYSTOLIC BLOOD PRESSURE: 130 MMHG | BODY MASS INDEX: 24.69 KG/M2 | HEART RATE: 80 BPM | DIASTOLIC BLOOD PRESSURE: 70 MMHG | TEMPERATURE: 99.1 F | RESPIRATION RATE: 18 BRPM | WEIGHT: 153 LBS

## 2019-02-01 DIAGNOSIS — R11.2 INTRACTABLE VOMITING WITH NAUSEA, UNSPECIFIED VOMITING TYPE: ICD-10-CM

## 2019-02-01 DIAGNOSIS — G89.4 CHRONIC PAIN SYNDROME: ICD-10-CM

## 2019-02-01 PROCEDURE — 96361 HYDRATE IV INFUSION ADD-ON: CPT | Performed by: FAMILY MEDICINE

## 2019-02-01 PROCEDURE — 96374 THER/PROPH/DIAG INJ IV PUSH: CPT | Performed by: FAMILY MEDICINE

## 2019-02-01 PROCEDURE — 25000128 H RX IP 250 OP 636: Performed by: FAMILY MEDICINE

## 2019-02-01 PROCEDURE — 99285 EMERGENCY DEPT VISIT HI MDM: CPT | Mod: 25 | Performed by: FAMILY MEDICINE

## 2019-02-01 PROCEDURE — 96375 TX/PRO/DX INJ NEW DRUG ADDON: CPT | Performed by: FAMILY MEDICINE

## 2019-02-01 PROCEDURE — 99285 EMERGENCY DEPT VISIT HI MDM: CPT | Mod: Z6 | Performed by: FAMILY MEDICINE

## 2019-02-01 RX ORDER — ONDANSETRON 2 MG/ML
4 INJECTION INTRAMUSCULAR; INTRAVENOUS EVERY 30 MIN PRN
Status: DISCONTINUED | OUTPATIENT
Start: 2019-02-01 | End: 2019-02-01 | Stop reason: HOSPADM

## 2019-02-01 RX ORDER — HYDROMORPHONE HYDROCHLORIDE 1 MG/ML
0.5 INJECTION, SOLUTION INTRAMUSCULAR; INTRAVENOUS; SUBCUTANEOUS
Status: COMPLETED | OUTPATIENT
Start: 2019-02-01 | End: 2019-02-01

## 2019-02-01 RX ORDER — DIPHENHYDRAMINE HYDROCHLORIDE 50 MG/ML
50 INJECTION INTRAMUSCULAR; INTRAVENOUS ONCE
Status: COMPLETED | OUTPATIENT
Start: 2019-02-01 | End: 2019-02-01

## 2019-02-01 RX ORDER — SODIUM CHLORIDE 9 MG/ML
1000 INJECTION, SOLUTION INTRAVENOUS CONTINUOUS
Status: DISCONTINUED | OUTPATIENT
Start: 2019-02-01 | End: 2019-02-01 | Stop reason: HOSPADM

## 2019-02-01 RX ADMIN — SODIUM CHLORIDE 1000 ML: 9 INJECTION, SOLUTION INTRAVENOUS at 19:01

## 2019-02-01 RX ADMIN — DIPHENHYDRAMINE HYDROCHLORIDE 50 MG: 50 INJECTION, SOLUTION INTRAMUSCULAR; INTRAVENOUS at 21:08

## 2019-02-01 RX ADMIN — ONDANSETRON 4 MG: 2 INJECTION INTRAMUSCULAR; INTRAVENOUS at 19:02

## 2019-02-01 RX ADMIN — HYDROMORPHONE HYDROCHLORIDE 0.5 MG: 1 INJECTION, SOLUTION INTRAMUSCULAR; INTRAVENOUS; SUBCUTANEOUS at 19:51

## 2019-02-01 ASSESSMENT — ENCOUNTER SYMPTOMS
CHEST TIGHTNESS: 0
WEAKNESS: 1
VOMITING: 1
EYE REDNESS: 0
DIZZINESS: 1
SORE THROAT: 0
FEVER: 0
DYSURIA: 0
APPETITE CHANGE: 1
NERVOUS/ANXIOUS: 1
FREQUENCY: 0
BACK PAIN: 0
SHORTNESS OF BREATH: 0
HEMATURIA: 0
ABDOMINAL PAIN: 1
CONFUSION: 0
NAUSEA: 1
LIGHT-HEADEDNESS: 1

## 2019-02-01 NOTE — ED AVS SNAPSHOT
[facility] Emergency Department  [address] [place] [address]  Phone:  [phone]  Fax:  [phone]                                    Doreen Peralta   MRN: [id number]    Department:  [facility] Emergency Department   Date of Visit:  2/1/2019           After Visit Summary Signature Page    I have received my discharge instructions, and my questions have been answered. I have discussed any challenges I see with this plan with the nurse or doctor.    ..........................................................................................................................................  Patient/Patient Representative Signature      ..........................................................................................................................................  Patient Representative Print Name and Relationship to Patient    ..................................................               ................................................  Date                                   Time    ..........................................................................................................................................  Reviewed by Signature/Title    ...................................................              ..............................................  Date                                               Time          22EPIC Rev 08/18

## 2019-02-01 NOTE — ED TRIAGE NOTES
Was at clinic today because not feeling well. Clinic did ekg and blood work. Sent her home told her to drink some fluids. Patient states still not feeling good. Patient came in by ambulance with betty.

## 2019-02-02 NOTE — ED NOTES
Ultra sound Iv was started but notice when fluids was going in the IV was bad. The fluids was stopped at this time. No medications was given through the IV.

## 2019-02-02 NOTE — ED PROVIDER NOTES
History     Chief Complaint   Patient presents with     Tachycardia     HPI  Doreen Peralta is a 38 year old female with an extensive medical history and is familiar to the ED.  The patient is status post colectomy and has chronic abdominal pain and GI problems.  She has had nausea and vomiting the last 3 days.  She was feeling weak dizzy and lightheaded so she was seen in the clinic this morning.  Her labs looked okay.  She may be having some ongoing difficulties with C. Difficile.  She has had similar presentations.  She also had some episodes where she felt like her heart was racing but this was more related to nausea and vomiting.  She denies feeling her heart racing at this time denies any chest pain or shortness of breath.  She tried some Zofran that she had at home and vomited this up.  She tried oral Dilaudid and threw this up.  He called 911 and arrives by ambulance.     Allergies:  Allergies   Allergen Reactions     Azithromycin Other (See Comments) and Itching     Other reaction(s): Throat Swelling/Closing  Burning in throat     Hyoscyamine Rash     Droperidol Hives and Rash     Metoclopramide Other (See Comments)     Eye twitching.      Peaches [Peach] Other (See Comments)     Raw. Cooked OK     Sucralose Other (See Comments)     All artificial sweeteners. Aspartame also. Swollen glands     Advair Diskus Other (See Comments)     Throat burns     Compazine [Prochlorperazine] Visual Disturbance     Contrast Dye Itching     States is allergic to CT contrast dye     Cyclobenzaprine Visual Disturbance     Diatrizoate Other (See Comments)     Patient reports she tolerates IV contrast if given 50mg IV of Benadryl prior to scan.      Fentanyl Other (See Comments)     migraine     Ibuprofen GI Disturbance     Lactulose Nausea and Vomiting     Gas and bloating     Levaquin [Levofloxacin] Swelling     Per ED M.D. And RN      Morphine Sulfate Other (See Comments)     Chest pain       Oxycodone Other (See  Comments)     Burning throat, but can take Norco.      Rizatriptan Visual Disturbance     Isovue [Iopamidol] Palpitations     Pt had racing heart and sob      Ketorolac Anxiety     Latex Swelling and Rash     Kiwi, likely also avacado, ? banana     Levsin Rash       Problem List:    Patient Active Problem List    Diagnosis Date Noted     Bacteremia 06/26/2018     Priority: Medium     Acute renal failure (H) 04/21/2018     Priority: Medium     History of bacteremia - recurrent 04/17/2018     Priority: Medium     Tobacco abuse 04/16/2018     Priority: Medium     Iron deficiency anemia 04/16/2018     Priority: Medium     Stool toxin PCR positive for Clostridium difficile on 4/17/18 04/16/2018     Priority: Medium     Gram negative septicemia (H) - Ochrobactrum, Stenotrophomonas & Pantoea 08/24/2017     Priority: Medium     Hypokalemia 08/24/2017     Priority: Medium     Essential hypertension 08/24/2017     Priority: Medium     Sepsis due to Klebsiella (H) 07/27/2017     Priority: Medium     Insomnia, unspecified type 03/31/2017     Priority: Medium     Abdominal pain 02/15/2017     Priority: Medium     Abdominal pain, generalized 01/28/2017     Priority: Medium     History of deep venous thrombosis 01/26/2017     Priority: Medium     Nausea and vomiting 01/26/2017     Priority: Medium     Vitamin D deficiency 01/16/2017     Priority: Medium     Chronic abdominal pain 11/15/2016     Priority: Medium     Patient is followed by Larisa Lorenzo PA-C for ongoing prescription of pain medication.  All refills should be approved by this provider, or covering partner.    Medication(s): no regular narcotics - narcotics given at ED visits  Maximum quantity per month: N/A  Clinic visit frequency required: Q 6  months     Controlled substance agreement:  Encounter-Level CSA - 11/9/15:               Controlled Substance Agreement - Scan on 11/19/2015 11:17 AM : CONTROLLED SUBSTANCE AGREEMENT 11/09/15 (below)           Encounter-Level CSA - 2/27/15:               Controlled Substance Agreement - Scan on 3/12/2015  7:50 AM : Controlled Medication Agreement 02/27/15 (below)            Pain Clinic evaluation in the past: Yes    DIRE Total Score(s):  No flowsheet data found.    Last [facility] website verification:  done on 11/15/16   [email or web address]        Attention to G-tube (H) 11/08/2016     Priority: Medium     Fever 10/16/2016     Priority: Medium     Coagulation defect (H) [D68.9] 09/16/2016     Priority: Medium     Chronic diarrhea 07/22/2016     Priority: Medium     Migraine 07/20/2016     Priority: Medium     Positive blood culture - Klebsiella oxytoca from Port-a-cath culture 07/18/2016     Priority: Medium     Mitral regurgitation mild-mod by Echo June 2016 07/18/2016     Priority: Medium     Anemia, iron deficiency 07/17/2016     Priority: Medium     Anemia in other chronic diseases classified elsewhere 06/14/2016     Priority: Medium     Munchausen syndrome - previously suspected 06/14/2016     Priority: Medium     Long-term (current) use of anticoagulants [Z79.01] 05/16/2016     Priority: Medium     Anxiety 05/16/2016     Priority: Medium     S/P partial resection of colon 04/11/2016     Priority: Medium     Malnutrition (H) 04/11/2016     Priority: Medium     Jejunostomy tube present (H) 03/21/2016     Priority: Medium     Malfunctioning jejunostomy tube (H) 12/22/2015     Priority: Medium     Malfunction of gastrostomy tube (H) 11/19/2015     Priority: Medium     PEG tube malfunction (H) 10/16/2015     Priority: Medium     Health Care Home 01/16/2015     Priority: Medium     *See Letters for [facility] Care Plan: My Access Plan           PEG (percutaneous endoscopic gastrostomy) status 11/05/2014     Priority: Medium     Gastroparesis 04/11/2014     Priority: Medium     Overview:   Had ileostomy performed at [facility] in Jan 2012 as treatment       Migraines 04/11/2014     Priority: Medium     4/11/2014  With  periods, every other month.       Intermittent asthma 04/11/2014     Priority: Medium     4/11/2014   With URIs, allergies       Allergic rhinitis 04/11/2014     Priority: Medium     Problem list name updated by automated process. Provider to review       Abnormal Pap smear of cervix 04/11/2014     Priority: Medium     4/11/2014  S/p colp and LEEP. Sees OB/GYN at [facility]. Pap every year x20 years - normal since.       S/P LEEP of cervix 02/06/2014     Priority: Medium     Patellofemoral stress syndrome 01/18/2014     Priority: Medium     Hx SBO 10/09/2013     Priority: Medium     4/11/2014  Recurrent. Sees GI (Dr. Redding - at [facility]) every 6 months or so.  Sees feeding tube nurse next week.       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 04/11/2013     Priority: Medium     Constipation by delayed colonic transit 10/24/2012     Priority: Medium     Atopic rhinitis 03/20/2009     Priority: Medium     Hyperbilirubinemia 03/11/2009     Priority: Medium        Past Medical History:    Past Medical History:   Diagnosis Date     Asthma      Bilateral ovarian cysts      Cervical cancer (H) 01/01/2008     Chronic pain      Colonic dysmotility      Constipation      E. coli sepsis (H) 5/8/2016     Enteritis      Fungemia 5/5/2016     Gastro-oesophageal reflux disease      H/O ileostomy      Hx of abnormal Pap smear      Hypertension      IBS (irritable bowel syndrome)      Other chronic pain      PONV (postoperative nausea and vomiting)      Thrombosis, hepatic vein (H)        Past [facility] History:    Past [facility] History:   Procedure Laterality Date     COLONOSCOPY  7/10/2012    Procedure: COLONOSCOPY;;  Surgeon: Nicole Redding MD;  [facility]     COLONOSCOPY N/A 2/19/2017    Procedure: COLONOSCOPY;  Surgeon: Randell Muller MD;  [facility]: [facility] GI     COLONOSCOPY N/A 2/21/2017    Procedure: COLONOSCOPY;  Surgeon: Randell Muller MD;  Location: [facility]     COLONOSCOPY N/A 5/3/2018    Procedure: COMBINED  COLONOSCOPY, SINGLE OR MULTIPLE BIOPSY/POLYPECTOMY BY BIOPSY;  EGD/Colonoscopy ;  Surgeon: Loi Black MD;  Location: [facility] GI     ECHO CHELO  7/19/2016          ENDOSCOPIC INSERTION TUBE GASTROSTOMY N/A 1/21/2016    Procedure: ENDOSCOPIC INSERTION TUBE GASTROSTOMY;  Surgeon: Nicole Redding MD;  [facility]     ESOPHAGOSCOPY, GASTROSCOPY, DUODENOSCOPY (EGD), COMBINED  7/10/2012    Procedure: COMBINED ESOPHAGOSCOPY, GASTROSCOPY, DUODENOSCOPY (EGD);  Upper Endoscopy, Ileoscopy    Latex Allergy  with biopsies;  Surgeon: Nicole Redding MD;  [facility]     ESOPHAGOSCOPY, GASTROSCOPY, DUODENOSCOPY (EGD), COMBINED N/A 11/5/2014    Procedure: COMBINED ESOPHAGOSCOPY, GASTROSCOPY, DUODENOSCOPY (EGD);  Surgeon: Nicoel Redding MD;  [facility]     ESOPHAGOSCOPY, GASTROSCOPY, DUODENOSCOPY (EGD), COMBINED N/A 5/3/2018    Procedure: COMBINED ESOPHAGOSCOPY, GASTROSCOPY, DUODENOSCOPY (EGD), BIOPSY SINGLE OR MULTIPLE;;  Surgeon: Loi Black MD;  Location[facility]     HC REPLACE DUODENOSTOMY/JEJUNOSTOMY TUBE PERCUTANEOUS N/A 8/27/2015    Procedure: REPLACE GASTROJEJUNOSTOMY TUBE, PERCUTANEOUS;  Surgeon: Mio Holder MD;  [facility]     HC REPLACE DUODENOSTOMY/JEJUNOSTOMY TUBE PERCUTANEOUS N/A 1/7/2016    Procedure: REPLACE JEJUNOSTOMY TUBE, PERCUTANEOUS;  Surgeon: Elsa Medel MD;  [facility]     HC REPLACE DUODENOSTOMY/JEJUNOSTOMY TUBE PERCUTANEOUS N/A 1/28/2016    Procedure: REPLACE JEJUNOSTOMY TUBE, PERCUTANEOUS;  Surgeon: Elsa Medel MD;  [facility]     HC REPLACE GASTROSTOMY/CECOSTOMY TUBE PERCUTANEOUS Left 5/19/2015    Procedure: REPLACE GASTROSTOMY TUBE, PERCUTANEOUS;  Surgeon: Melecio Morejon Chi, MD;  Location: [facility]     HC UGI ENDOSCOPY W PLACEMENT GASTROSTOMY TUBE PERCUT N/A 10/1/2015    Procedure: COMBINED ESOPHAGOSCOPY, GASTROSCOPY, DUODENOSCOPY (EGD), PLACE PERCUTANEOUS ENDOSCOPIC GASTROSTOMY TUBE;  Surgeon: Mio Holder MD;   [facility]     INSERT PORT VASCULAR ACCESS Right 7/20/2017    Procedure: INSERT PORT VASCULAR ACCESS;  Chest Port Placement  **Latex Allergy**;  Surgeon: Coy Rocha PA-C;  [facility]     LAPAROSCOPIC ASSISTED COLECTOMY  1/20/2012    Procedure:LAPAROSCOPIC ASSISTED COLECTOMY; Laparoscopic Ileostomy       LAPAROSCOPIC ASSISTED COLECTOMY LEFT (DESCENDING)  10/24/2012    Procedure: LAPAROSCOPIC ASSISTED COLECTOMY LEFT (DESCENDING);   Hand Assisted Laproscopic Subtotal abdominal Colectomy,Iieorectal anastamosis, Ileostomy Closure.       LAPAROSCOPIC ASSISTED INSERTION TUBE JEJUNOSTOMY N/A 10/16/2015    Procedure: LAPAROSCOPIC ASSISTED INSERTION TUBE JEJUNOSTOMY;  Surgeon: Elsa Medel MD;  [facility]     LAPAROSCOPIC CHOLECYSTECTOMY  2002    [facility] stones duct     LAPAROSCOPIC ILEOSTOMY  1/20/2012    [facility], loop     LAPAROSCOPIC OOPHORECTOMY Right 2009    [facility]     LAPAROTOMY EXPLORATORY N/A 1/28/2016    Procedure: LAPAROTOMY EXPLORATORY;  Surgeon: Elsa Medel MD;  [facility]     LEEP TX, CERVICAL  2009    [facility]     PICC INSERTION Left 10/21/2015    5fr DL Power PICC, 37cm (2cm external) in the L basilic vein w/ tip in the SVC RA junction.     REMOVE GASTROSTOMY TUBE ADULT N/A 12/12/2014    Procedure: REMOVE GASTROSTOMY TUBE ADULT;  Surgeon: Nicole Redding MD;  [facility]     REMOVE PORT VASCULAR ACCESS Right 6/30/2016    Procedure: REMOVE PORT VASCULAR ACCESS;  Surgeon: Pradeep Orosco MD;  Location: [facility] OR     REMOVE PORT VASCULAR ACCESS Right 9/8/2017    Procedure: REMOVE PORT VASCULAR ACCESS;  right side mediport removal;  Surgeon: Zechariah Worthington MD;  Location: [facility] OR     replace GASTROSTOMY TUBE ADULT  5/19/15       Family History:    Family History   Problem Relation Age of Onset     Thyroid Disease Mother      Sjogren's Mother      Gastrointestinal Disease Mother         Intermittent nausea vomiting diarrhea     Colon Polyps  Mother      Prostate Problems Father         prostate enlargement     Lupus Maternal Grandmother      Cancer Maternal Grandfather         Lung     Colon Cancer Maternal Grandfather 65     Cancer Paternal Grandmother         Lung      Cerebrovascular Disease Paternal Grandmother      Diabetes Paternal Grandmother      Cardiovascular Paternal Grandmother         CHF     Cancer Paternal Grandfather         Lung     Glaucoma Paternal Grandfather      Abdominal Aortic Aneurysm Other      Macular Degeneration No family hx of        Social History:  Marital Status:   [2]  Social History     Tobacco Use     Smoking status: Current Some Day Smoker     Packs/day: 1.00     Years: 4.00     Pack years: 4.00     Types: Cigarettes     Last attempt to quit: 1/1/2004     Years since quitting: 15.0     Smokeless tobacco: Never Used     Tobacco comment: Vaping   Substance Use Topics     Alcohol use: No     Drug use: No        Medications:      ACETAMINOPHEN PO   albuterol (2.5 MG/3ML) 0.083% neb solution   albuterol 90 MCG/ACT inhaler   Alfalfa 650 MG TABS   cholestyramine light (QUESTRAN) 4 GM Packet   cloNIDine (CATAPRES) 0.2 MG tablet   diphenhydrAMINE HCl 50 MG TABS   DULoxetine (CYMBALTA) 60 MG EC capsule   ipratropium (ATROVENT) 0.02 % neb solution   ondansetron (ZOFRAN) 8 MG tablet   Rivaroxaban (XARELTO PO)   SUMAtriptan (IMITREX) 50 MG tablet   traZODone (DESYREL) 100 MG tablet         Review of Systems   Constitutional: Positive for appetite change. Negative for fever.   HENT: Negative for congestion and sore throat.    Eyes: Negative for redness.   Respiratory: Negative for chest tightness and shortness of breath.    Cardiovascular: Negative for chest pain.   Gastrointestinal: Positive for abdominal pain, nausea and vomiting.   Genitourinary: Negative for dysuria, frequency and hematuria.   Musculoskeletal: Negative for back pain.   Skin: Negative for rash.   Allergic/Immunologic: Negative for immunocompromised  state.   Neurological: Positive for dizziness, weakness and light-headedness.   Psychiatric/Behavioral: Negative for confusion. The patient is nervous/anxious.    All other systems reviewed and are negative.      Physical Exam   BP: 136/78  Pulse: 80  Temp: 99.1  F (37.3  C)  Resp: 18  Weight: 69.4 kg (153 lb)  SpO2: 100 %      Physical Exam   Constitutional: She appears well-developed and well-nourished. No distress.   Alert female.  Tearful.  She appears adequately nourished and hydrated.  She is afebrile with a normal pulse and blood pressure./78   Pulse 80   Temp 99.1  F (37.3  C) (Oral)   Resp 18   Wt 69.4 kg (153 lb)   SpO2 100%   BMI 24.69 kg/m       HENT:   Head: Normocephalic and atraumatic.   Right Ear: External ear normal.   Left Ear: External ear normal.   Nose: Nose normal.   Mouth/Throat: Oropharynx is clear and moist.   Eyes: Conjunctivae and EOM are normal. Pupils are equal, round, and reactive to light.   Neck: Normal range of motion. Neck supple.   Cardiovascular: Normal rate, regular rhythm, normal heart sounds and intact distal pulses.   Pulmonary/Chest: Effort normal and breath sounds normal.   Abdominal: Soft. Bowel sounds are normal.   Musculoskeletal: Normal range of motion.   Neurological: She is alert.   Skin: Skin is warm and dry.   Psychiatric: She has a normal mood and affect.   Nursing note and vitals reviewed.      ED Course        Procedures               Critical Care time:  none               No results found. However, due to the size of the patient record, not all encounters were searched. Please check Results Review for a complete set of results.    Medications   0.9% sodium chloride BOLUS (1,000 mLs Intravenous New Bag 2/1/19 1901)     Followed by   sodium chloride 0.9% infusion (not administered)   ondansetron (ZOFRAN) injection 4 mg (4 mg Intravenous Given 2/1/19 1902)   HYDROmorphone (PF) (DILAUDID) injection 0.5 mg (0.5 mg Intravenous Given 2/1/19 1951)    diphenhydrAMINE (BENADRYL) injection 50 mg (50 mg Intravenous Given 2/1/19 2108)       Assessments & Plan (with Medical Decision Making)   MDM--patient presented with intractable pain nausea and vomiting.  She was given IV fluids and parenteral pain and nausea control.  She eventually had improvement of her nausea with IV Benadryl.  She had 1 dose of Dilaudid 0.5 mg with resolution of her pain.  She is discharged in improved condition.  I have reviewed the nursing notes.    I have reviewed the findings, diagnosis, plan and need for follow up with the patient.             Medication List      ASK your doctor about these medications    cefdinir 300 MG capsule  Commonly known as:  OMNICEF  300 mg, Oral, 2 TIMES DAILY  Ask about: Should I take this medication?     * fidaxomicin 200 MG tablet  Commonly known as:  DIFICID  200 mg, Oral, 2 TIMES DAILY  Ask about: Should I take this medication?     * fidaxomicin 200 MG tablet  Commonly known as:  DIFICID  200 mg, Oral, 2 TIMES DAILY  Ask about: Should I take this medication?     * fidaxomicin 200 MG tablet  Commonly known as:  DIFICID  200 mg, Oral, 2 TIMES DAILY  Ask about: Should I take this medication?     * fidaxomicin 200 MG tablet  Commonly known as:  DIFICID  200 mg, Oral, 2 TIMES DAILY  Ask about: Should I take this medication?     ondansetron 4 MG ODT tab  Commonly known as:  ZOFRAN ODT  4 mg, Oral, EVERY 8 HOURS PRN  Ask about: Should I take this medication?     sulfamethoxazole-trimethoprim 800-160 MG tablet  Commonly known as:  BACTRIM DS/SEPTRA DS  1 tablet, Oral, 2 TIMES DAILY  Ask about: Should I take this medication?     * vancomycin 125 MG capsule  Commonly known as:  VANCOCIN HCL  125 mg, Oral, 4 TIMES DAILY  Ask about: Should I take this medication?     * vancomycin 125 MG capsule  Commonly known as:  VANCOCIN  125 mg, Oral, 4 TIMES DAILY  Ask about: Should I take this medication?         * This list has 6 medication(s) that are the same as other  medications prescribed for you. Read the directions carefully, and ask your doctor or other care provider to review them with you.                Final diagnoses:   Chronic pain syndrome   Intractable vomiting with nausea, unspecified vomiting type       2/1/2019   [facility] EMERGENCY DEPARTMENT     Pao, Connor OLVERA MD  02/01/19 7601

## 2019-02-05 ENCOUNTER — HOSPITAL ENCOUNTER (EMERGENCY)
Facility: CLINIC | Age: 39
Discharge: HOME | End: 2019-02-06
Attending: FAMILY MEDICINE | Admitting: FAMILY MEDICINE
Payer: MEDICARE

## 2019-02-05 DIAGNOSIS — D72.823 LEUKEMOID REACTION: ICD-10-CM

## 2019-02-05 DIAGNOSIS — R00.2 PALPITATIONS: ICD-10-CM

## 2019-02-05 DIAGNOSIS — M79.10 MYALGIA: ICD-10-CM

## 2019-02-05 LAB
ALBUMIN SERPL-MCNC: 3.4 G/DL (ref 3.4–5)
ALBUMIN UR-MCNC: NEGATIVE MG/DL
ALP SERPL-CCNC: 126 U/L (ref 40–150)
ALT SERPL W P-5'-P-CCNC: 27 U/L (ref 0–50)
ANION GAP SERPL CALCULATED.3IONS-SCNC: 10 MMOL/L (ref 3–14)
APPEARANCE UR: CLEAR
AST SERPL W P-5'-P-CCNC: 34 U/L (ref 0–45)
BASOPHILS # BLD AUTO: 0 10E9/L (ref 0–0.2)
BASOPHILS NFR BLD AUTO: 0.2 %
BILIRUB SERPL-MCNC: 0.7 MG/DL (ref 0.2–1.3)
BILIRUB UR QL STRIP: NEGATIVE
BUN SERPL-MCNC: 9 MG/DL (ref 7–30)
CALCIUM SERPL-MCNC: 8.6 MG/DL (ref 8.5–10.1)
CHLORIDE SERPL-SCNC: 106 MMOL/L (ref 94–109)
CO2 SERPL-SCNC: 18 MMOL/L (ref 20–32)
COLOR UR AUTO: YELLOW
CREAT SERPL-MCNC: 0.9 MG/DL (ref 0.52–1.04)
DIFFERENTIAL METHOD BLD: ABNORMAL
EOSINOPHIL NFR BLD AUTO: 0.1 %
ERYTHROCYTE [DISTWIDTH] IN BLOOD BY AUTOMATED COUNT: 17 % (ref 10–15)
GFR SERPL CREATININE-BSD FRML MDRD: 81 ML/MIN/{1.73_M2}
GLUCOSE SERPL-MCNC: 81 MG/DL (ref 70–99)
GLUCOSE UR STRIP-MCNC: NEGATIVE MG/DL
HCT VFR BLD AUTO: 32.9 % (ref 35–47)
HGB BLD-MCNC: 9.8 G/DL (ref 11.7–15.7)
HGB UR QL STRIP: NEGATIVE
IMM GRANULOCYTES # BLD: 0.1 10E9/L (ref 0–0.4)
IMM GRANULOCYTES NFR BLD: 0.5 %
KETONES UR STRIP-MCNC: NEGATIVE MG/DL
LEUKOCYTE ESTERASE UR QL STRIP: NEGATIVE
LIPASE SERPL-CCNC: 76 U/L (ref 73–393)
LYMPHOCYTES # BLD AUTO: 0.8 10E9/L (ref 0.8–5.3)
LYMPHOCYTES NFR BLD AUTO: 5.5 %
MCH RBC QN AUTO: 24.5 PG (ref 26.5–33)
MCHC RBC AUTO-ENTMCNC: 29.8 G/DL (ref 31.5–36.5)
MCV RBC AUTO: 82 FL (ref 78–100)
MONOCYTES # BLD AUTO: 0.4 10E9/L (ref 0–1.3)
MONOCYTES NFR BLD AUTO: 2.9 %
NEUTROPHILS # BLD AUTO: 13.4 10E9/L (ref 1.6–8.3)
NEUTROPHILS NFR BLD AUTO: 90.8 %
NITRATE UR QL: NEGATIVE
NRBC # BLD AUTO: 0 10*3/UL
NRBC BLD AUTO-RTO: 0 /100
PH UR STRIP: 6 PH (ref 5–7)
PLATELET # BLD AUTO: 295 10E9/L (ref 150–450)
POTASSIUM SERPL-SCNC: 4.3 MMOL/L (ref 3.4–5.3)
PROT SERPL-MCNC: 8.5 G/DL (ref 6.8–8.8)
RBC # BLD AUTO: 4 10E12/L (ref 3.8–5.2)
SODIUM SERPL-SCNC: 134 MMOL/L (ref 133–144)
SOURCE: NORMAL
SP GR UR STRIP: 1.02 (ref 1–1.03)
T4 FREE SERPL-MCNC: 1.2 NG/DL (ref 0.76–1.46)
TSH SERPL DL<=0.005 MIU/L-ACNC: 0.29 MU/L (ref 0.4–4)
UROBILINOGEN UR STRIP-MCNC: 0.2 MG/DL (ref 0–2)
WBC # BLD AUTO: 14.7 10E9/L (ref 4–11)

## 2019-02-05 PROCEDURE — 93010 ELECTROCARDIOGRAM REPORT: CPT | Mod: Z6 | Performed by: FAMILY MEDICINE

## 2019-02-05 PROCEDURE — 96372 THER/PROPH/DIAG INJ SC/IM: CPT | Performed by: FAMILY MEDICINE

## 2019-02-05 PROCEDURE — 99284 EMERGENCY DEPT VISIT MOD MDM: CPT | Performed by: FAMILY MEDICINE

## 2019-02-05 PROCEDURE — 84439 ASSAY OF FREE THYROXINE: CPT | Performed by: FAMILY MEDICINE

## 2019-02-05 PROCEDURE — 83690 ASSAY OF LIPASE: CPT | Performed by: FAMILY MEDICINE

## 2019-02-05 PROCEDURE — 84443 ASSAY THYROID STIM HORMONE: CPT | Performed by: FAMILY MEDICINE

## 2019-02-05 PROCEDURE — 81003 URINALYSIS AUTO W/O SCOPE: CPT | Performed by: FAMILY MEDICINE

## 2019-02-05 PROCEDURE — 85025 COMPLETE CBC W/AUTO DIFF WBC: CPT | Performed by: FAMILY MEDICINE

## 2019-02-05 PROCEDURE — 80053 COMPREHEN METABOLIC PANEL: CPT | Performed by: FAMILY MEDICINE

## 2019-02-05 PROCEDURE — 93005 ELECTROCARDIOGRAM TRACING: CPT | Performed by: FAMILY MEDICINE

## 2019-02-05 PROCEDURE — 99283 EMERGENCY DEPT VISIT LOW MDM: CPT | Mod: 25 | Performed by: FAMILY MEDICINE

## 2019-02-05 PROCEDURE — 36415 COLL VENOUS BLD VENIPUNCTURE: CPT | Performed by: FAMILY MEDICINE

## 2019-02-05 RX ORDER — LISINOPRIL 2.5 MG/1
2.5 TABLET ORAL
COMMUNITY
Start: 2018-12-21 | End: 2019-02-05

## 2019-02-05 RX ORDER — LISINOPRIL 5 MG/1
5 TABLET ORAL DAILY
COMMUNITY
Start: 2018-12-14 | End: 2019-03-11

## 2019-02-05 RX ORDER — CEFTRIAXONE SODIUM 1 G
1 VIAL (EA) INJECTION ONCE
Status: DISCONTINUED | OUTPATIENT
Start: 2019-02-05 | End: 2019-02-06

## 2019-02-05 RX ORDER — FUROSEMIDE 20 MG
20 TABLET ORAL
COMMUNITY
Start: 2019-01-15

## 2019-02-05 RX ORDER — LORAZEPAM 1 MG/1
TABLET ORAL
COMMUNITY
Start: 2019-01-10 | End: 2019-03-11

## 2019-02-05 NOTE — ED AVS SNAPSHOT
[facility] Emergency Department  [address] [place] [address]  Phone:  [phone]  Fax:  [phone]                                    Doreen Peralta   MRN: [id number]    Department:  [facility] Emergency Department   Date of Visit:  2/5/2019           After Visit Summary Signature Page    I have received my discharge instructions, and my questions have been answered. I have discussed any challenges I see with this plan with the nurse or doctor.    ..........................................................................................................................................  Patient/Patient Representative Signature      ..........................................................................................................................................  Patient Representative Print Name and Relationship to Patient    ..................................................               ................................................  Date                                   Time    ..........................................................................................................................................  Reviewed by Signature/Title    ...................................................              ..............................................  Date                                               Time          22EPIC Rev 08/18

## 2019-02-06 VITALS
TEMPERATURE: 99.1 F | OXYGEN SATURATION: 99 % | RESPIRATION RATE: 16 BRPM | WEIGHT: 153 LBS | DIASTOLIC BLOOD PRESSURE: 78 MMHG | HEART RATE: 80 BPM | BODY MASS INDEX: 24.69 KG/M2 | SYSTOLIC BLOOD PRESSURE: 120 MMHG

## 2019-02-06 PROCEDURE — 25000125 ZZHC RX 250: Performed by: FAMILY MEDICINE

## 2019-02-06 PROCEDURE — 25000128 H RX IP 250 OP 636: Performed by: FAMILY MEDICINE

## 2019-02-06 PROCEDURE — 96372 THER/PROPH/DIAG INJ SC/IM: CPT | Performed by: FAMILY MEDICINE

## 2019-02-06 PROCEDURE — 36415 COLL VENOUS BLD VENIPUNCTURE: CPT | Performed by: FAMILY MEDICINE

## 2019-02-06 PROCEDURE — 87040 BLOOD CULTURE FOR BACTERIA: CPT | Performed by: FAMILY MEDICINE

## 2019-02-06 RX ADMIN — LIDOCAINE HYDROCHLORIDE 1 G: 10 INJECTION, SOLUTION EPIDURAL; INFILTRATION; INTRACAUDAL; PERINEURAL at 00:32

## 2019-02-06 ASSESSMENT — ENCOUNTER SYMPTOMS
NEUROLOGICAL NEGATIVE: 1
POLYPHAGIA: 0
FEVER: 0
WOUND: 0
BRUISES/BLEEDS EASILY: 0
NERVOUS/ANXIOUS: 1
ABDOMINAL PAIN: 1
POLYDIPSIA: 0
EYES NEGATIVE: 1
APPETITE CHANGE: 0
ADENOPATHY: 0
PALPITATIONS: 1
SHORTNESS OF BREATH: 0
CHILLS: 1
FATIGUE: 1
ACTIVITY CHANGE: 0
MYALGIAS: 1
COUGH: 0
RESPIRATORY NEGATIVE: 1

## 2019-02-06 NOTE — ED PROVIDER NOTES
History     Chief Complaint   Patient presents with     Chest Pain     HPI  Doreen Peralta is a 38 year old female who presents to the emergency room via ambulance from her home  today secondary to concern about an episode of palpitations and feeling her heart race associated with just not feeling right.  She stated that she felt like prior episodes where she was bacteremic.  Patient is well-known to me from multiple prior ER visits.  Patient has multiple medical conditions and chronic medical illnesses but has actually been doing better as of late.  She currently has no indwelling ports or catheters.  Patient with a history for chronic recurrent dental pain issues but those have been fairly stable for her recently.  She states that she feels like she has an infection and has had similar problems in the past.  She states that generally she receives a course of Rocephin as she tends to get sick rather quickly and wants to catch things today before they get out of hand.  She denies vomiting but admits to some nausea.  She has some generalized body aches.  She denies any cough or nasal congestion.  She denies significant diarrhea or bloody diarrhea but has had some increased abdominal cramping but this is not unusual for her.  She denied any pain with urination or vaginal discharge.  She denies any skin wounds or open sores.  She denies any headache or neck pain or stiffness symptoms.  She denied any ear pain or soreness to her throat.  I reviewed her care everywhere medical record to the [facility] system and found a recent clinic visit which she had a chest x-ray performed.  Chest x-ray report was copied below.      INDICATION:  Chest pain    TECHNIQUE:  Chest 2 views.    COMPARISON:  None     FINDINGS:  Cardiovascular and mediastinum: Heart size and vasculature are normal in caliber and appearance. Mediastinum is within normal limits.     Lungs and pleural spaces: Lungs are clear. No sign of infiltrate or mass.  No sign of pleural effusion. No pneumothorax.     Bones and soft tissues: No significant findings.     IMPRESSION:  Unremarkable chest.    Dictated by Melecio Bashir MD @ Feb 1 2019 10:13AM      Allergies:  Allergies   Allergen Reactions     Azithromycin Other (See Comments) and Itching     Other reaction(s): Throat Swelling/Closing  Burning in throat     Hyoscyamine Rash     Droperidol Hives and Rash     Metoclopramide Other (See Comments)     Eye twitching.      Peaches [Peach] Other (See Comments)     Raw. Cooked OK     Sucralose Other (See Comments)     All artificial sweeteners. Aspartame also. Swollen glands     Advair Diskus Other (See Comments)     Throat burns     Compazine [Prochlorperazine] Visual Disturbance     Contrast Dye Itching     States is allergic to CT contrast dye     Cyclobenzaprine Visual Disturbance     Diatrizoate Other (See Comments)     Patient reports she tolerates IV contrast if given 50mg IV of Benadryl prior to scan.      Fentanyl Other (See Comments)     migraine     Ibuprofen GI Disturbance     Lactulose Nausea and Vomiting     Gas and bloating     Levaquin [Levofloxacin] Swelling     Per ED M.D. And RN      Morphine Sulfate Other (See Comments)     Chest pain       Oxycodone Other (See Comments)     Burning throat, but can take Norco.      Rizatriptan Visual Disturbance     Isovue [Iopamidol] Palpitations     Pt had racing heart and sob      Ketorolac Anxiety     Latex Swelling and Rash     Kiwi, likely also avacado, ? banana     Levsin Rash       Problem List:    Patient Active Problem List    Diagnosis Date Noted     Bacteremia 06/26/2018     Priority: Medium     Acute renal failure (H) 04/21/2018     Priority: Medium     History of bacteremia - recurrent 04/17/2018     Priority: Medium     Tobacco abuse 04/16/2018     Priority: Medium     Iron deficiency anemia 04/16/2018     Priority: Medium     Stool toxin PCR positive for Clostridium difficile on 4/17/18 04/16/2018     Priority:  Medium     Gram negative septicemia (H) - Ochrobactrum, Stenotrophomonas & Pantoea 08/24/2017     Priority: Medium     Hypokalemia 08/24/2017     Priority: Medium     Essential hypertension 08/24/2017     Priority: Medium     Sepsis due to Klebsiella (H) 07/27/2017     Priority: Medium     Insomnia, unspecified type 03/31/2017     Priority: Medium     Abdominal pain 02/15/2017     Priority: Medium     Abdominal pain, generalized 01/28/2017     Priority: Medium     History of deep venous thrombosis 01/26/2017     Priority: Medium     Nausea and vomiting 01/26/2017     Priority: Medium     Vitamin D deficiency 01/16/2017     Priority: Medium     Chronic abdominal pain 11/15/2016     Priority: Medium     Patient is followed by Larisa Lorenzo PA-C for ongoing prescription of pain medication.  All refills should be approved by this provider, or covering partner.    Medication(s): no regular narcotics - narcotics given at ED visits  Maximum quantity per month: N/A  Clinic visit frequency required: Q 6  months     Controlled substance agreement:  Encounter-Level CSA - 11/9/15:               Controlled Substance Agreement - Scan on 11/19/2015 11:17 AM : CONTROLLED SUBSTANCE AGREEMENT 11/09/15 (below)          Encounter-Level CSA - 2/27/15:               Controlled Substance Agreement - Scan on 3/12/2015  7:50 AM : Controlled Medication Agreement 02/27/15 (below)            Pain Clinic evaluation in the past: Yes    DIRE Total Score(s):  No flowsheet data found.    Last [facility] website verification:  done on 11/15/16   [email or web address]        Attention to G-tube (H) 11/08/2016     Priority: Medium     Fever 10/16/2016     Priority: Medium     Coagulation defect (H) [D68.9] 09/16/2016     Priority: Medium     Chronic diarrhea 07/22/2016     Priority: Medium     Migraine 07/20/2016     Priority: Medium     Positive blood culture - Klebsiella oxytoca from Port-a-cath culture 07/18/2016     Priority: Medium      Mitral regurgitation mild-mod by Echo June 2016 07/18/2016     Priority: Medium     Anemia, iron deficiency 07/17/2016     Priority: Medium     Anemia in other chronic diseases classified elsewhere 06/14/2016     Priority: Medium     Munchausen syndrome - previously suspected 06/14/2016     Priority: Medium     Long-term (current) use of anticoagulants [Z79.01] 05/16/2016     Priority: Medium     Anxiety 05/16/2016     Priority: Medium     S/P partial resection of colon 04/11/2016     Priority: Medium     Malnutrition (H) 04/11/2016     Priority: Medium     Jejunostomy tube present (H) 03/21/2016     Priority: Medium     Malfunctioning jejunostomy tube (H) 12/22/2015     Priority: Medium     Malfunction of gastrostomy tube (H) 11/19/2015     Priority: Medium     PEG tube malfunction (H) 10/16/2015     Priority: Medium     Health Care Home 01/16/2015     Priority: Medium     *See Letters for [facility] Care Plan: My Access Plan           PEG (percutaneous endoscopic gastrostomy) status 11/05/2014     Priority: Medium     Gastroparesis 04/11/2014     Priority: Medium     Overview:   Had ileostomy performed at [facility] in Jan 2012 as treatment       Migraines 04/11/2014     Priority: Medium     4/11/2014  With periods, every other month.       Intermittent asthma 04/11/2014     Priority: Medium     4/11/2014   With URIs, allergies       Allergic rhinitis 04/11/2014     Priority: Medium     Problem list name updated by automated process. Provider to review       Abnormal Pap smear of cervix 04/11/2014     Priority: Medium     4/11/2014  S/p colp and LEEP. Sees OB/GYN at [facility]. Pap every year x20 years - normal since.       S/P LEEP of cervix 02/06/2014     Priority: Medium     Patellofemoral stress syndrome 01/18/2014     Priority: Medium     Hx SBO 10/09/2013     Priority: Medium     4/11/2014  Recurrent. Sees GI (Dr. Redding - at [facility]) every 6 months or so.  Sees feeding tube nurse next week.       Hepatic flow  abnormality by CT/MRI 04/11/2013     Priority: Medium     Constipation by delayed colonic transit 10/24/2012     Priority: Medium     Atopic rhinitis 03/20/2009     Priority: Medium     Hyperbilirubinemia 03/11/2009     Priority: Medium        Past Medical History:    Past Medical History:   Diagnosis Date     Asthma      Bilateral ovarian cysts      Cervical cancer (H) 01/01/2008     Chronic pain      Colonic dysmotility      Constipation      E. coli sepsis (H) 5/8/2016     Enteritis      Fungemia 5/5/2016     Gastro-oesophageal reflux disease      H/O ileostomy      Hx of abnormal Pap smear      Hypertension      IBS (irritable bowel syndrome)      Other chronic pain      PONV (postoperative nausea and vomiting)      Thrombosis, hepatic vein (H)        Past [facility] History:    Past [facility] History:   Procedure Laterality Date     COLONOSCOPY  7/10/2012    Procedure: COLONOSCOPY;;  Surgeon: Nicole Reddign MD;  [facility]     COLONOSCOPY N/A 2/19/2017    Procedure: COLONOSCOPY;  Surgeon: Randell Muller MD;  Location: [facility] GI     COLONOSCOPY N/A 2/21/2017    Procedure: COLONOSCOPY;  Surgeon: Randell Muller MD;  Location: [facility]     COLONOSCOPY N/A 5/3/2018    Procedure: COMBINED COLONOSCOPY, SINGLE OR MULTIPLE BIOPSY/POLYPECTOMY BY BIOPSY;  EGD/Colonoscopy ;  Surgeon: Loi Black MD;  Location: [facility] GI     ECHO CHELO  7/19/2016          ENDOSCOPIC INSERTION TUBE GASTROSTOMY N/A 1/21/2016    Procedure: ENDOSCOPIC INSERTION TUBE GASTROSTOMY;  Surgeon: Nicole Redding MD;  [facility]     ESOPHAGOSCOPY, GASTROSCOPY, DUODENOSCOPY (EGD), COMBINED  7/10/2012    Procedure: COMBINED ESOPHAGOSCOPY, GASTROSCOPY, DUODENOSCOPY (EGD);  Upper Endoscopy, Ileoscopy    Latex Allergy  with biopsies;  Surgeon: Nicole Redding MD;  [facility]     ESOPHAGOSCOPY, GASTROSCOPY, DUODENOSCOPY (EGD), COMBINED N/A 11/5/2014    Procedure: COMBINED ESOPHAGOSCOPY, GASTROSCOPY, DUODENOSCOPY (EGD);   Surgeon: Nicole Redding MD;  [facility]     ESOPHAGOSCOPY, GASTROSCOPY, DUODENOSCOPY (EGD), COMBINED N/A 5/3/2018    Procedure: COMBINED ESOPHAGOSCOPY, GASTROSCOPY, DUODENOSCOPY (EGD), BIOPSY SINGLE OR MULTIPLE;;  Surgeon: Loi Black MD;  Location: [facility] GI     HC REPLACE DUODENOSTOMY/JEJUNOSTOMY TUBE PERCUTANEOUS N/A 8/27/2015    Procedure: REPLACE GASTROJEJUNOSTOMY TUBE, PERCUTANEOUS;  Surgeon: Mio Holder MD;  Location[facility]     HC REPLACE DUODENOSTOMY/JEJUNOSTOMY TUBE PERCUTANEOUS N/A 1/7/2016    Procedure: REPLACE JEJUNOSTOMY TUBE, PERCUTANEOUS;  Surgeon: Elsa Medel MD;  [facility]     HC REPLACE DUODENOSTOMY/JEJUNOSTOMY TUBE PERCUTANEOUS N/A 1/28/2016    Procedure: REPLACE JEJUNOSTOMY TUBE, PERCUTANEOUS;  Surgeon: Elsa Medel MD;  [facility]     HC REPLACE GASTROSTOMY/CECOSTOMY TUBE PERCUTANEOUS Left 5/19/2015    Procedure: REPLACE GASTROSTOMY TUBE, PERCUTANEOUS;  Surgeon: Melecio Morejon Chi, MD;  Location: [facility] GI     HC UGI ENDOSCOPY W PLACEMENT GASTROSTOMY TUBE PERCUT N/A 10/1/2015    Procedure: COMBINED ESOPHAGOSCOPY, GASTROSCOPY, DUODENOSCOPY (EGD), PLACE PERCUTANEOUS ENDOSCOPIC GASTROSTOMY TUBE;  Surgeon: Mio Holder MD;  [facility]     INSERT PORT VASCULAR ACCESS Right 7/20/2017    Procedure: INSERT PORT VASCULAR ACCESS;  Chest Port Placement  **Latex Allergy**;  Surgeon: Coy Rocha PA-C;  [facility]     LAPAROSCOPIC ASSISTED COLECTOMY  1/20/2012    Procedure:LAPAROSCOPIC ASSISTED COLECTOMY; Laparoscopic Ileostomy       LAPAROSCOPIC ASSISTED COLECTOMY LEFT (DESCENDING)  10/24/2012    Procedure: LAPAROSCOPIC ASSISTED COLECTOMY LEFT (DESCENDING);   Hand Assisted Laproscopic Subtotal abdominal Colectomy,Iieorectal anastamosis, Ileostomy Closure.       LAPAROSCOPIC ASSISTED INSERTION TUBE JEJUNOSTOMY N/A 10/16/2015    Procedure: LAPAROSCOPIC ASSISTED INSERTION TUBE JEJUNOSTOMY;  Surgeon: Elsa Medel MD;  Location:   OR     LAPAROSCOPIC CHOLECYSTECTOMY  2002    [facility] stones duct     LAPAROSCOPIC ILEOSTOMY  1/20/2012    [facility], loop     LAPAROSCOPIC OOPHORECTOMY Right 2009    [facility]     LAPAROTOMY EXPLORATORY N/A 1/28/2016    Procedure: LAPAROTOMY EXPLORATORY;  Surgeon: Elsa Medel MD;  Location: [facility] OR     LEEP TX, CERVICAL  2009    [facility]     PICC INSERTION Left 10/21/2015    5fr DL Power PICC, 37cm (2cm external) in the L basilic vein w/ tip in the SVC RA junction.     REMOVE GASTROSTOMY TUBE ADULT N/A 12/12/2014    Procedure: REMOVE GASTROSTOMY TUBE ADULT;  Surgeon: Nicole Redding MD;  Location: [facility] GI     REMOVE PORT VASCULAR ACCESS Right 6/30/2016    Procedure: REMOVE PORT VASCULAR ACCESS;  Surgeon: Pradeep Orosco MD;  Location: [facility] OR     REMOVE PORT VASCULAR ACCESS Right 9/8/2017    Procedure: REMOVE PORT VASCULAR ACCESS;  right side mediport removal;  Surgeon: Zechariah Worthington MD;  Location: [facility] OR     replace GASTROSTOMY TUBE ADULT  5/19/15       Family History:    Family History   Problem Relation Age of Onset     Thyroid Disease Mother      Sjogren's Mother      Gastrointestinal Disease Mother         Intermittent nausea vomiting diarrhea     Colon Polyps Mother      Prostate Problems Father         prostate enlargement     Lupus Maternal Grandmother      Cancer Maternal Grandfather         Lung     Colon Cancer Maternal Grandfather 65     Cancer Paternal Grandmother         Lung      Cerebrovascular Disease Paternal Grandmother      Diabetes Paternal Grandmother      Cardiovascular Paternal Grandmother         CHF     Cancer Paternal Grandfather         Lung     Glaucoma Paternal Grandfather      Abdominal Aortic Aneurysm Other      Macular Degeneration No family hx of        Social History:  Marital Status:   [2]  Social History     Tobacco Use     Smoking status: Current Some Day Smoker     Packs/day: 1.00     Years: 4.00     Pack years: 4.00     Types:  Cigarettes     Last attempt to quit: 1/1/2004     Years since quitting: 15.1     Smokeless tobacco: Never Used     Tobacco comment: Vaping   Substance Use Topics     Alcohol use: No     Drug use: No        Medications:      ACETAMINOPHEN PO   cloNIDine (CATAPRES) 0.2 MG tablet   diphenhydrAMINE HCl 50 MG TABS   DULoxetine (CYMBALTA) 60 MG EC capsule   furosemide (LASIX) 20 MG tablet   lisinopril (PRINIVIL/ZESTRIL) 5 MG tablet   LORazepam (ATIVAN) 1 MG tablet   Rivaroxaban (XARELTO PO)   traZODone (DESYREL) 100 MG tablet   albuterol (2.5 MG/3ML) 0.083% neb solution   albuterol 90 MCG/ACT inhaler   ipratropium (ATROVENT) 0.02 % neb solution   SUMAtriptan (IMITREX) 50 MG tablet         Review of Systems   Constitutional: Positive for chills and fatigue. Negative for activity change, appetite change and fever.   HENT: Negative.    Eyes: Negative.    Respiratory: Negative.  Negative for cough and shortness of breath.    Cardiovascular: Positive for palpitations.   Gastrointestinal: Positive for abdominal pain (Occasional abd cramping - chronic).   Endocrine: Negative for polydipsia, polyphagia and polyuria.   Genitourinary: Negative.    Musculoskeletal: Positive for myalgias.   Skin: Negative.  Negative for rash and wound.   Neurological: Negative.    Hematological: Negative for adenopathy. Does not bruise/bleed easily.   Psychiatric/Behavioral: The patient is nervous/anxious.        Physical Exam   BP: 138/90  Pulse: 90  Heart Rate: 105  Temp: 99.1  F (37.3  C)  Resp: 20  Weight: 69.4 kg (153 lb)  SpO2: 100 %      Physical Exam   Constitutional: She is oriented to person, place, and time. Vital signs are normal. She appears well-developed and well-nourished. She appears distressed.   Patient actually appears to be in a better state of health than is typical for the patient when seen in this ER.   HENT:   Head: Normocephalic and atraumatic.   Right Ear: External ear normal.   Left Ear: External ear normal.   Nose: Nose  normal.   Mouth/Throat: Oropharynx is clear and moist.   Eyes: Conjunctivae and EOM are normal. Pupils are equal, round, and reactive to light.   Neck: Normal range of motion. Neck supple. No JVD present. No tracheal deviation present.   Cardiovascular: Normal rate, regular rhythm, normal heart sounds, intact distal pulses and normal pulses. Exam reveals no gallop and no friction rub.   No murmur heard.  Pulmonary/Chest: Effort normal and breath sounds normal. No respiratory distress.   Abdominal: Soft. Bowel sounds are increased. There is no hepatosplenomegaly, splenomegaly or hepatomegaly. There is no tenderness.   Musculoskeletal: Normal range of motion.   Neurological: She is alert and oriented to person, place, and time. She has normal strength. She displays a negative Romberg sign.   Skin: Skin is intact. Capillary refill takes less than 2 seconds. Burn (forearm - healing without sign of infection) noted. No ecchymosis, no laceration and no rash noted. She is not diaphoretic.   Psychiatric: Her speech is normal and behavior is normal. Thought content normal. Her mood appears anxious. Cognition and memory are normal.   Nursing note and vitals reviewed.      ED Course        Procedures               EKG Interpretation:      Interpreted by Jake Rodriguez  Time reviewed: 21:55  Symptoms at time of EKG: Palpitations   Rhythm: normal sinus   Rate: normal  Axis: normal  Ectopy: none  Conduction: normal  ST Segments/ T Waves: No ST-T wave changes  Q Waves: none  Comparison to prior: Unchanged    Clinical Impression: normal EKG          Critical Care time:  none               Results for orders placed or performed during the hospital encounter of 02/05/19 (from the past 24 hour(s))   CBC with platelets differential   Result Value Ref Range    WBC 14.7 (H) 4.0 - 11.0 10e9/L    RBC Count 4.00 3.8 - 5.2 10e12/L    Hemoglobin 9.8 (L) 11.7 - 15.7 g/dL    Hematocrit 32.9 (L) 35.0 - 47.0 %    MCV 82 78 - 100 fl    MCH  24.5 (L) 26.5 - 33.0 pg    MCHC 29.8 (L) 31.5 - 36.5 g/dL    RDW 17.0 (H) 10.0 - 15.0 %    Platelet Count 295 150 - 450 10e9/L    Diff Method Automated Method     % Neutrophils 90.8 %    % Lymphocytes 5.5 %    % Monocytes 2.9 %    % Eosinophils 0.1 %    % Basophils 0.2 %    % Immature Granulocytes 0.5 %    Nucleated RBCs 0 0 /100    Absolute Neutrophil 13.4 (H) 1.6 - 8.3 10e9/L    Absolute Lymphocytes 0.8 0.8 - 5.3 10e9/L    Absolute Monocytes 0.4 0.0 - 1.3 10e9/L    Absolute Basophils 0.0 0.0 - 0.2 10e9/L    Abs Immature Granulocytes 0.1 0 - 0.4 10e9/L    Absolute Nucleated RBC 0.0    Comprehensive metabolic panel   Result Value Ref Range    Sodium 134 133 - 144 mmol/L    Potassium 4.3 3.4 - 5.3 mmol/L    Chloride 106 94 - 109 mmol/L    Carbon Dioxide 18 (L) 20 - 32 mmol/L    Anion Gap 10 3 - 14 mmol/L    Glucose 81 70 - 99 mg/dL    Urea Nitrogen 9 7 - 30 mg/dL    Creatinine 0.90 0.52 - 1.04 mg/dL    GFR Estimate 81 >60 mL/min/[1.73_m2]    GFR Estimate If Black >90 >60 mL/min/[1.73_m2]    Calcium 8.6 8.5 - 10.1 mg/dL    Bilirubin Total 0.7 0.2 - 1.3 mg/dL    Albumin 3.4 3.4 - 5.0 g/dL    Protein Total 8.5 6.8 - 8.8 g/dL    Alkaline Phosphatase 126 40 - 150 U/L    ALT 27 0 - 50 U/L    AST 34 0 - 45 U/L   Lipase   Result Value Ref Range    Lipase 76 73 - 393 U/L   TSH with free T4 reflex   Result Value Ref Range    TSH 0.29 (L) 0.40 - 4.00 mU/L   T4 free   Result Value Ref Range    T4 Free 1.20 0.76 - 1.46 ng/dL   UA reflex to Microscopic and Culture   Result Value Ref Range    Color Urine Yellow     Appearance Urine Clear     Glucose Urine Negative NEG^Negative mg/dL    Bilirubin Urine Negative NEG^Negative    Ketones Urine Negative NEG^Negative mg/dL    Specific Gravity Urine 1.020 1.003 - 1.035    Blood Urine Negative NEG^Negative    pH Urine 6.0 5.0 - 7.0 pH    Protein Albumin Urine Negative NEG^Negative mg/dL    Urobilinogen mg/dL 0.2 0.0 - 2.0 mg/dL    Nitrite Urine Negative NEG^Negative    Leukocyte Esterase  Urine Negative NEG^Negative    Source Midstream Urine      *Note: Due to a large number of results and/or encounters for the requested time period, some results have not been displayed. A complete set of results can be found in Results Review.       Medications   cefTRIAXone (ROCEPHIN) 1 g in lidocaine (PF) (XYLOCAINE) 1 % injection (1 g Intramuscular Given 2/6/19 0032)       Assessments & Plan (with Medical Decision Making)  38-year-old female to the ER secondary concerns of possible early bacteremia with palpitations.  Patient's had multiple episodes of bacteremia in the past and she feels like she is starting another episode.  She states that she just feels achy all over but no specific concerns other than the episode where she thought her heart was racing.  Fortunately, EMS did not report significant tachycardia nor did we see evidence of that here in the ER.  Her EKG was reassuring as well as was her entering.  Laboratory screening exams did reveal an elevated white blood cell count.  The rest of her exams were reassuring.  Patient with a history for C. difficile we talked about the risk of treating elevated white count with antibiotics in making her C. difficile recur or become worse.  She states given her history and how she feels she really feels that she is at risk of getting severely more sick and given her significant past medical history of did agree to go ahead and treat her with a dose of IM Rocephin.  If blood culture is pending.  Patient has an appointment to be seen in our clinic tomorrow for recheck and follow-up.  She is told if there is any difficulty in being seen in her clinic tomorrow or she can get there then she should return to the ER for recheck and examination.  She is in agreement with this plan.  She was instructed on reinitiating her medications in hopes of preventing recurrence of her C. difficile.     I have reviewed the nursing notes.    I have reviewed the findings, diagnosis, plan  and need for follow up with the patient.          Medication List      ASK your doctor about these medications    ondansetron 4 MG ODT tab  Commonly known as:  ZOFRAN ODT  4 mg, Oral, EVERY 8 HOURS PRN  Ask about: Should I take this medication?                 I verbally discussed the findings of the evaluation today in the ER. I have verbally discussed with Doreen the suggested treatment(s) as described in the discharge instructions and handouts. I have prescribed the above listed medications and instructed her on appropriate use of these medications.      I have verbally suggested she follow-up in her clinic or return to the ER for increased symptoms. See the follow-up recommendations documented  in the after visit summary in this visit's EPIC chart.    Final diagnoses:   Myalgia   Palpitations   Leukemoid reaction       2/5/2019   [facility] EMERGENCY DEPARTMENT     Jake Rodriguez,   02/06/19 0050

## 2019-02-06 NOTE — ED TRIAGE NOTES
"Pt presents by EMS with concerns of chest pain.  Chest pain started at 1700.  Pt feels like her heart is racing.  Pt declined nitroglycerin from EMS.  Pt took 2 adult ASA at 1930.  Abdominal pain for the last few days.  Pt complaining of nausea and feeling \"icky.\"    "

## 2019-02-12 LAB
BACTERIA SPEC CULT: NO GROWTH
Lab: NORMAL
SPECIMEN SOURCE: NORMAL

## 2019-02-12 NOTE — RESULT ENCOUNTER NOTE
Final blood culture report is NEGATIVE.    No treatment or change in treatment per [facility] ED Lab Result protocol.

## 2019-03-11 ENCOUNTER — APPOINTMENT (OUTPATIENT)
Dept: CT IMAGING | Facility: CLINIC | Age: 39
End: 2019-03-11
Attending: EMERGENCY MEDICINE
Payer: MEDICARE

## 2019-03-11 ENCOUNTER — HOSPITAL ENCOUNTER (OUTPATIENT)
Facility: CLINIC | Age: 39
Setting detail: OBSERVATION
Discharge: HOME | End: 2019-03-12
Attending: FAMILY MEDICINE | Admitting: FAMILY MEDICINE
Payer: MEDICARE

## 2019-03-11 ENCOUNTER — APPOINTMENT (OUTPATIENT)
Dept: GENERAL RADIOLOGY | Facility: CLINIC | Age: 39
End: 2019-03-11
Attending: FAMILY MEDICINE
Payer: MEDICARE

## 2019-03-11 DIAGNOSIS — S00.81XA EXCORIATION OF FACE, INITIAL ENCOUNTER: ICD-10-CM

## 2019-03-11 DIAGNOSIS — D64.9 ANEMIA, UNSPECIFIED TYPE: ICD-10-CM

## 2019-03-11 DIAGNOSIS — K92.2 GASTROINTESTINAL HEMORRHAGE, UNSPECIFIED GASTROINTESTINAL HEMORRHAGE TYPE: ICD-10-CM

## 2019-03-11 DIAGNOSIS — R11.0 NAUSEA: Primary | ICD-10-CM

## 2019-03-11 DIAGNOSIS — R07.81 PLEURITIC CHEST PAIN: ICD-10-CM

## 2019-03-11 DIAGNOSIS — R10.9 ABDOMINAL PAIN, UNSPECIFIED ABDOMINAL LOCATION: ICD-10-CM

## 2019-03-11 DIAGNOSIS — D68.9 COAGULATION DEFECT (H): ICD-10-CM

## 2019-03-11 DIAGNOSIS — Z87.19 HX SBO: ICD-10-CM

## 2019-03-11 DIAGNOSIS — G89.29 OTHER CHRONIC PAIN: ICD-10-CM
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
## 2019-03-11 DIAGNOSIS — G43.919 INTRACTABLE MIGRAINE WITHOUT STATUS MIGRAINOSUS, UNSPECIFIED MIGRAINE TYPE: ICD-10-CM

## 2019-03-11 DIAGNOSIS — D50.9 IRON DEFICIENCY ANEMIA, UNSPECIFIED IRON DEFICIENCY ANEMIA TYPE: ICD-10-CM

## 2019-03-11 DIAGNOSIS — Z86.718 HISTORY OF DEEP VENOUS THROMBOSIS: ICD-10-CM

## 2019-03-11 DIAGNOSIS — F41.9 ANXIETY: ICD-10-CM

## 2019-03-11 DIAGNOSIS — Z79.01 LONG TERM (CURRENT) USE OF ANTICOAGULANTS: ICD-10-CM

## 2019-03-11 DIAGNOSIS — K31.84 GASTROPARESIS: ICD-10-CM

## 2019-03-11 DIAGNOSIS — M79.10 MUSCLE PAIN: ICD-10-CM

## 2019-03-11 DIAGNOSIS — I26.99 OTHER ACUTE PULMONARY EMBOLISM WITHOUT ACUTE COR PULMONALE (H): ICD-10-CM

## 2019-03-11 DIAGNOSIS — K59.01 CONSTIPATION BY DELAYED COLONIC TRANSIT: ICD-10-CM

## 2019-03-11 DIAGNOSIS — I26.99 OTHER PULMONARY EMBOLISM WITHOUT ACUTE COR PULMONALE, UNSPECIFIED CHRONICITY (H): ICD-10-CM

## 2019-03-11 DIAGNOSIS — Z87.898 HISTORY OF BACTEREMIA: ICD-10-CM

## 2019-03-11 DIAGNOSIS — S40.811A ABRASION OF RIGHT UPPER EXTREMITY, INITIAL ENCOUNTER: ICD-10-CM

## 2019-03-11 LAB
ALBUMIN SERPL-MCNC: 2.8 G/DL (ref 3.4–5)
ALBUMIN UR-MCNC: NEGATIVE MG/DL
ALP SERPL-CCNC: 111 U/L (ref 40–150)
ALT SERPL W P-5'-P-CCNC: 10 U/L (ref 0–50)
AMPHETAMINES UR QL: NOT DETECTED NG/ML
ANION GAP SERPL CALCULATED.3IONS-SCNC: 7 MMOL/L (ref 3–14)
APPEARANCE UR: CLEAR
AST SERPL W P-5'-P-CCNC: 9 U/L (ref 0–45)
BACTERIA #/AREA URNS HPF: ABNORMAL /HPF
BARBITURATES UR QL SCN: NOT DETECTED NG/ML
BASOPHILS # BLD AUTO: 0 10E9/L (ref 0–0.2)
BASOPHILS NFR BLD AUTO: 0.3 %
BENZODIAZ UR QL SCN: ABNORMAL NG/ML
BILIRUB SERPL-MCNC: 0.5 MG/DL (ref 0.2–1.3)
BILIRUB UR QL STRIP: NEGATIVE
BLD PROD TYP BPU: NORMAL
BLD UNIT ID BPU: 0
BLOOD PRODUCT CODE: NORMAL
BPU ID: NORMAL
BUN SERPL-MCNC: 6 MG/DL (ref 7–30)
BUPRENORPHINE UR QL: NOT DETECTED NG/ML
CALCIUM SERPL-MCNC: 8.7 MG/DL (ref 8.5–10.1)
CANNABINOIDS UR QL: NOT DETECTED NG/ML
CHLORIDE SERPL-SCNC: 103 MMOL/L (ref 94–109)
CK SERPL-CCNC: 39 U/L (ref 30–225)
CO2 SERPL-SCNC: 26 MMOL/L (ref 20–32)
COCAINE UR QL SCN: NOT DETECTED NG/ML
COLOR UR AUTO: YELLOW
CREAT SERPL-MCNC: 0.81 MG/DL (ref 0.52–1.04)
CRP SERPL-MCNC: 65.6 MG/L (ref 0–8)
D DIMER PPP FEU-MCNC: 0.8 UG/ML FEU (ref 0–0.5)
D-METHAMPHET UR QL: NOT DETECTED NG/ML
DIFFERENTIAL METHOD BLD: ABNORMAL
EOSINOPHIL NFR BLD AUTO: 0.1 %
ERYTHROCYTE [DISTWIDTH] IN BLOOD BY AUTOMATED COUNT: 15.6 % (ref 10–15)
FERRITIN SERPL-MCNC: 12 NG/ML (ref 12–150)
GFR SERPL CREATININE-BSD FRML MDRD: >90 ML/MIN/{1.73_M2}
GLUCOSE SERPL-MCNC: 108 MG/DL (ref 70–99)
GLUCOSE UR STRIP-MCNC: NEGATIVE MG/DL
HCT VFR BLD AUTO: 21.3 % (ref 35–47)
HGB BLD-MCNC: 6.4 G/DL (ref 11.7–15.7)
HGB UR QL STRIP: NEGATIVE
IMM GRANULOCYTES # BLD: 0.1 10E9/L (ref 0–0.4)
IMM GRANULOCYTES NFR BLD: 0.5 %
INR PPP: 1.19 (ref 0.86–1.14)
IRON SATN MFR SERPL: 4 % (ref 15–46)
IRON SERPL-MCNC: 14 UG/DL (ref 35–180)
KETONES UR STRIP-MCNC: NEGATIVE MG/DL
LACTATE BLD-SCNC: 1 MMOL/L (ref 0.7–2)
LDH SERPL L TO P-CCNC: 142 U/L (ref 81–234)
LEUKOCYTE ESTERASE UR QL STRIP: NEGATIVE
LIPASE SERPL-CCNC: 50 U/L (ref 73–393)
LYMPHOCYTES # BLD AUTO: 1 10E9/L (ref 0.8–5.3)
LYMPHOCYTES NFR BLD AUTO: 9.5 %
MCH RBC QN AUTO: 22.3 PG (ref 26.5–33)
MCHC RBC AUTO-ENTMCNC: 30 G/DL (ref 31.5–36.5)
MCV RBC AUTO: 74 FL (ref 78–100)
METHADONE UR QL SCN: NOT DETECTED NG/ML
MONOCYTES # BLD AUTO: 0.5 10E9/L (ref 0–1.3)
MONOCYTES NFR BLD AUTO: 5.4 %
NEUTROPHILS # BLD AUTO: 8.4 10E9/L (ref 1.6–8.3)
NEUTROPHILS NFR BLD AUTO: 84.2 %
NITRATE UR QL: NEGATIVE
NRBC # BLD AUTO: 0 10*3/UL
NRBC BLD AUTO-RTO: 0 /100
OPIATES UR QL SCN: ABNORMAL NG/ML
OXYCODONE UR QL SCN: ABNORMAL NG/ML
PCP UR QL SCN: NOT DETECTED NG/ML
PH UR STRIP: 7 PH (ref 5–7)
PLATELET # BLD AUTO: 310 10E9/L (ref 150–450)
POTASSIUM SERPL-SCNC: 3.8 MMOL/L (ref 3.4–5.3)
PROPOXYPH UR QL: NOT DETECTED NG/ML
PROT SERPL-MCNC: 7.4 G/DL (ref 6.8–8.8)
RBC # BLD AUTO: 2.87 10E12/L (ref 3.8–5.2)
RBC #/AREA URNS AUTO: <1 /HPF (ref 0–2)
RETICS # AUTO: 59.2 10E9/L (ref 25–95)
RETICS/RBC NFR AUTO: 2.1 % (ref 0.5–2)
SODIUM SERPL-SCNC: 136 MMOL/L (ref 133–144)
SOURCE: ABNORMAL
SP GR UR STRIP: 1.01 (ref 1–1.03)
SQUAMOUS #/AREA URNS AUTO: 3 /HPF (ref 0–1)
TIBC SERPL-MCNC: 356 UG/DL (ref 240–430)
TRANSFUSION STATUS PATIENT QL: NORMAL
TRANSFUSION STATUS PATIENT QL: NORMAL
TRICYCLICS UR QL SCN: NOT DETECTED NG/ML
TROPONIN I SERPL-MCNC: <0.015 UG/L (ref 0–0.04)
UROBILINOGEN UR STRIP-MCNC: 0 MG/DL (ref 0–2)
WBC # BLD AUTO: 10 10E9/L (ref 4–11)
WBC #/AREA URNS AUTO: 1 /HPF (ref 0–5)

## 2019-03-11 PROCEDURE — A9270 NON-COVERED ITEM OR SERVICE: HCPCS | Mod: GY | Performed by: NURSE PRACTITIONER

## 2019-03-11 PROCEDURE — 85045 AUTOMATED RETICULOCYTE COUNT: CPT | Performed by: NURSE PRACTITIONER

## 2019-03-11 PROCEDURE — 25800030 ZZH RX IP 258 OP 636: Performed by: NURSE PRACTITIONER

## 2019-03-11 PROCEDURE — 93005 ELECTROCARDIOGRAM TRACING: CPT | Performed by: FAMILY MEDICINE

## 2019-03-11 PROCEDURE — 83550 IRON BINDING TEST: CPT | Performed by: NURSE PRACTITIONER

## 2019-03-11 PROCEDURE — 82728 ASSAY OF FERRITIN: CPT | Performed by: NURSE PRACTITIONER

## 2019-03-11 PROCEDURE — 36415 COLL VENOUS BLD VENIPUNCTURE: CPT | Performed by: NURSE PRACTITIONER

## 2019-03-11 PROCEDURE — 25000128 H RX IP 250 OP 636: Performed by: EMERGENCY MEDICINE

## 2019-03-11 PROCEDURE — 85610 PROTHROMBIN TIME: CPT | Performed by: FAMILY MEDICINE

## 2019-03-11 PROCEDURE — 36415 COLL VENOUS BLD VENIPUNCTURE: CPT | Performed by: FAMILY MEDICINE

## 2019-03-11 PROCEDURE — P9016 RBC LEUKOCYTES REDUCED: HCPCS | Performed by: NURSE PRACTITIONER

## 2019-03-11 PROCEDURE — 86923 COMPATIBILITY TEST ELECTRIC: CPT | Performed by: NURSE PRACTITIONER

## 2019-03-11 PROCEDURE — G0378 HOSPITAL OBSERVATION PER HR: HCPCS

## 2019-03-11 PROCEDURE — 80306 DRUG TEST PRSMV INSTRMNT: CPT | Performed by: FAMILY MEDICINE

## 2019-03-11 PROCEDURE — 99218 ZZC INITIAL OBSERVATION CARE,LEVL I: CPT | Mod: AI | Performed by: NURSE PRACTITIONER

## 2019-03-11 PROCEDURE — 93010 ELECTROCARDIOGRAM REPORT: CPT | Mod: Z6 | Performed by: FAMILY MEDICINE

## 2019-03-11 PROCEDURE — 25000128 H RX IP 250 OP 636: Performed by: FAMILY MEDICINE

## 2019-03-11 PROCEDURE — 96374 THER/PROPH/DIAG INJ IV PUSH: CPT | Performed by: FAMILY MEDICINE

## 2019-03-11 PROCEDURE — 96361 HYDRATE IV INFUSION ADD-ON: CPT

## 2019-03-11 PROCEDURE — 25000128 H RX IP 250 OP 636: Performed by: NURSE PRACTITIONER

## 2019-03-11 PROCEDURE — 83605 ASSAY OF LACTIC ACID: CPT | Performed by: FAMILY MEDICINE

## 2019-03-11 PROCEDURE — 25000132 ZZH RX MED GY IP 250 OP 250 PS 637: Performed by: FAMILY MEDICINE

## 2019-03-11 PROCEDURE — 81001 URINALYSIS AUTO W/SCOPE: CPT | Performed by: NURSE PRACTITIONER

## 2019-03-11 PROCEDURE — 83690 ASSAY OF LIPASE: CPT | Performed by: FAMILY MEDICINE

## 2019-03-11 PROCEDURE — 99207 ZZC CDG-CODE CATEGORY CHANGED: CPT | Performed by: NURSE PRACTITIONER

## 2019-03-11 PROCEDURE — 99207 ZZC DOWN CODE DUE TO INITIAL EXAM: CPT | Performed by: NURSE PRACTITIONER

## 2019-03-11 PROCEDURE — 83615 LACTATE (LD) (LDH) ENZYME: CPT | Performed by: NURSE PRACTITIONER

## 2019-03-11 PROCEDURE — 82550 ASSAY OF CK (CPK): CPT | Performed by: FAMILY MEDICINE

## 2019-03-11 PROCEDURE — 86140 C-REACTIVE PROTEIN: CPT | Performed by: FAMILY MEDICINE

## 2019-03-11 PROCEDURE — 96376 TX/PRO/DX INJ SAME DRUG ADON: CPT | Performed by: FAMILY MEDICINE

## 2019-03-11 PROCEDURE — 96375 TX/PRO/DX INJ NEW DRUG ADDON: CPT | Mod: 59 | Performed by: FAMILY MEDICINE

## 2019-03-11 PROCEDURE — 25000125 ZZHC RX 250: Performed by: RADIOLOGY

## 2019-03-11 PROCEDURE — 85379 FIBRIN DEGRADATION QUANT: CPT | Performed by: FAMILY MEDICINE

## 2019-03-11 PROCEDURE — 80053 COMPREHEN METABOLIC PANEL: CPT | Performed by: FAMILY MEDICINE

## 2019-03-11 PROCEDURE — 86900 BLOOD TYPING SEROLOGIC ABO: CPT | Performed by: NURSE PRACTITIONER

## 2019-03-11 PROCEDURE — 25000132 ZZH RX MED GY IP 250 OP 250 PS 637: Mod: GY | Performed by: FAMILY MEDICINE

## 2019-03-11 PROCEDURE — 74177 CT ABD & PELVIS W/CONTRAST: CPT

## 2019-03-11 PROCEDURE — 85025 COMPLETE CBC W/AUTO DIFF WBC: CPT | Performed by: FAMILY MEDICINE

## 2019-03-11 PROCEDURE — 71260 CT THORAX DX C+: CPT

## 2019-03-11 PROCEDURE — 25000132 ZZH RX MED GY IP 250 OP 250 PS 637: Mod: GY | Performed by: NURSE PRACTITIONER

## 2019-03-11 PROCEDURE — 96376 TX/PRO/DX INJ SAME DRUG ADON: CPT

## 2019-03-11 PROCEDURE — 25000128 H RX IP 250 OP 636: Performed by: RADIOLOGY

## 2019-03-11 PROCEDURE — 83540 ASSAY OF IRON: CPT | Performed by: NURSE PRACTITIONER

## 2019-03-11 PROCEDURE — 99285 EMERGENCY DEPT VISIT HI MDM: CPT | Mod: 25 | Performed by: FAMILY MEDICINE

## 2019-03-11 PROCEDURE — A9270 NON-COVERED ITEM OR SERVICE: HCPCS | Mod: GY | Performed by: FAMILY MEDICINE

## 2019-03-11 PROCEDURE — 84484 ASSAY OF TROPONIN QUANT: CPT | Performed by: FAMILY MEDICINE

## 2019-03-11 PROCEDURE — 71046 X-RAY EXAM CHEST 2 VIEWS: CPT | Mod: TC

## 2019-03-11 PROCEDURE — 86901 BLOOD TYPING SEROLOGIC RH(D): CPT | Performed by: NURSE PRACTITIONER

## 2019-03-11 PROCEDURE — 86850 RBC ANTIBODY SCREEN: CPT | Performed by: NURSE PRACTITIONER

## 2019-03-11 RX ORDER — MULTIVITAMIN WITH FOLIC ACID 400 MCG
1 TABLET ORAL
Status: ON HOLD | COMMUNITY
End: 2022-12-23

## 2019-03-11 RX ORDER — LIDOCAINE 4 G/G
1 PATCH TOPICAL ONCE
Status: COMPLETED | OUTPATIENT
Start: 2019-03-11 | End: 2019-03-11

## 2019-03-11 RX ORDER — SODIUM CHLORIDE 9 MG/ML
INJECTION, SOLUTION INTRAVENOUS CONTINUOUS
Status: DISCONTINUED | OUTPATIENT
Start: 2019-03-11 | End: 2019-03-12

## 2019-03-11 RX ORDER — LISINOPRIL 2.5 MG/1
TABLET ORAL
Refills: 0 | COMMUNITY
Start: 2018-11-20

## 2019-03-11 RX ORDER — HYDROMORPHONE HYDROCHLORIDE 1 MG/ML
0.5 INJECTION, SOLUTION INTRAMUSCULAR; INTRAVENOUS; SUBCUTANEOUS
Status: COMPLETED | OUTPATIENT
Start: 2019-03-11 | End: 2019-03-11

## 2019-03-11 RX ORDER — ALFALFA 650 MG
650 TABLET ORAL DAILY
COMMUNITY

## 2019-03-11 RX ORDER — DIPHENHYDRAMINE HYDROCHLORIDE 50 MG/ML
50 INJECTION INTRAMUSCULAR; INTRAVENOUS EVERY 6 HOURS PRN
Status: DISCONTINUED | OUTPATIENT
Start: 2019-03-11 | End: 2019-03-12 | Stop reason: HOSPADM

## 2019-03-11 RX ORDER — ONDANSETRON 4 MG/1
4 TABLET, FILM COATED ORAL EVERY 8 HOURS PRN
Refills: 11 | COMMUNITY
Start: 2018-06-18 | End: 2019-03-11

## 2019-03-11 RX ORDER — HYDROMORPHONE HYDROCHLORIDE 2 MG/1
2 TABLET ORAL
Refills: 0 | Status: ON HOLD | COMMUNITY
Start: 2019-02-15 | End: 2019-03-12

## 2019-03-11 RX ORDER — IOPAMIDOL 755 MG/ML
500 INJECTION, SOLUTION INTRAVASCULAR ONCE
Status: COMPLETED | OUTPATIENT
Start: 2019-03-11 | End: 2019-03-11

## 2019-03-11 RX ORDER — METHOCARBAMOL 500 MG/1
500 TABLET, FILM COATED ORAL EVERY 6 HOURS PRN
Status: DISCONTINUED | OUTPATIENT
Start: 2019-03-11 | End: 2019-03-12 | Stop reason: HOSPADM

## 2019-03-11 RX ORDER — LIDOCAINE HYDROCHLORIDE 10 MG/ML
INJECTION, SOLUTION EPIDURAL; INFILTRATION; INTRACAUDAL; PERINEURAL
Status: DISPENSED
Start: 2019-03-11 | End: 2019-03-12

## 2019-03-11 RX ORDER — DIPHENHYDRAMINE HYDROCHLORIDE 50 MG/ML
25 INJECTION INTRAMUSCULAR; INTRAVENOUS ONCE
Status: COMPLETED | OUTPATIENT
Start: 2019-03-11 | End: 2019-03-11

## 2019-03-11 RX ORDER — METHOCARBAMOL 500 MG/1
TABLET, FILM COATED ORAL
Refills: 0 | Status: ON HOLD | COMMUNITY
Start: 2018-09-12 | End: 2019-03-12

## 2019-03-11 RX ORDER — ONDANSETRON 2 MG/ML
4 INJECTION INTRAMUSCULAR; INTRAVENOUS EVERY 6 HOURS PRN
Status: DISCONTINUED | OUTPATIENT
Start: 2019-03-11 | End: 2019-03-12 | Stop reason: HOSPADM

## 2019-03-11 RX ORDER — DULOXETIN HYDROCHLORIDE 30 MG/1
60 CAPSULE, DELAYED RELEASE ORAL DAILY
Status: DISCONTINUED | OUTPATIENT
Start: 2019-03-11 | End: 2019-03-11

## 2019-03-11 RX ORDER — LIDOCAINE 40 MG/G
CREAM TOPICAL
Status: DISCONTINUED | OUTPATIENT
Start: 2019-03-11 | End: 2019-03-12 | Stop reason: HOSPADM

## 2019-03-11 RX ORDER — ACETAMINOPHEN 325 MG/1
325 TABLET ORAL EVERY 4 HOURS PRN
Status: DISCONTINUED | OUTPATIENT
Start: 2019-03-11 | End: 2019-03-11

## 2019-03-11 RX ORDER — CHOLESTYRAMINE LIGHT 4 G/5.7G
4 POWDER, FOR SUSPENSION ORAL
COMMUNITY
Start: 2018-05-16 | End: 2019-03-11

## 2019-03-11 RX ORDER — MUPIROCIN 20 MG/G
OINTMENT TOPICAL DAILY
Refills: 0 | COMMUNITY
Start: 2019-01-18 | End: 2022-01-28

## 2019-03-11 RX ORDER — ONDANSETRON 4 MG/1
4 TABLET, ORALLY DISINTEGRATING ORAL EVERY 6 HOURS PRN
Status: DISCONTINUED | OUTPATIENT
Start: 2019-03-11 | End: 2019-03-12 | Stop reason: HOSPADM

## 2019-03-11 RX ORDER — ACETAMINOPHEN 325 MG/1
650 TABLET ORAL EVERY 4 HOURS PRN
Status: DISCONTINUED | OUTPATIENT
Start: 2019-03-11 | End: 2019-03-12 | Stop reason: HOSPADM

## 2019-03-11 RX ORDER — LORAZEPAM 2 MG/ML
1 INJECTION INTRAMUSCULAR EVERY 6 HOURS PRN
Status: DISCONTINUED | OUTPATIENT
Start: 2019-03-11 | End: 2019-03-12 | Stop reason: HOSPADM

## 2019-03-11 RX ORDER — DULOXETIN HYDROCHLORIDE 30 MG/1
60 CAPSULE, DELAYED RELEASE ORAL DAILY
Status: DISCONTINUED | OUTPATIENT
Start: 2019-03-11 | End: 2019-03-12 | Stop reason: HOSPADM

## 2019-03-11 RX ORDER — TRAZODONE HYDROCHLORIDE 50 MG/1
50-100 TABLET, FILM COATED ORAL
Status: DISCONTINUED | OUTPATIENT
Start: 2019-03-11 | End: 2019-03-12 | Stop reason: HOSPADM

## 2019-03-11 RX ORDER — HYDROMORPHONE HYDROCHLORIDE 2 MG/1
2 TABLET ORAL
Status: DISCONTINUED | OUTPATIENT
Start: 2019-03-11 | End: 2019-03-12 | Stop reason: HOSPADM

## 2019-03-11 RX ORDER — CLONIDINE HYDROCHLORIDE 0.1 MG/1
0.4 TABLET ORAL EVERY EVENING
Status: DISCONTINUED | OUTPATIENT
Start: 2019-03-11 | End: 2019-03-12 | Stop reason: HOSPADM

## 2019-03-11 RX ORDER — LORAZEPAM 2 MG/ML
1 INJECTION INTRAMUSCULAR ONCE
Status: COMPLETED | OUTPATIENT
Start: 2019-03-11 | End: 2019-03-11

## 2019-03-11 RX ORDER — GRANISETRON HYDROCHLORIDE 1 MG/1
1 TABLET, FILM COATED ORAL
COMMUNITY
Start: 2018-11-29 | End: 2019-03-11

## 2019-03-11 RX ORDER — HYDROMORPHONE HYDROCHLORIDE 1 MG/ML
0.5 INJECTION, SOLUTION INTRAMUSCULAR; INTRAVENOUS; SUBCUTANEOUS ONCE
Status: COMPLETED | OUTPATIENT
Start: 2019-03-11 | End: 2019-03-11

## 2019-03-11 RX ORDER — DIPHENHYDRAMINE HYDROCHLORIDE 50 MG/ML
25 INJECTION INTRAMUSCULAR; INTRAVENOUS EVERY 6 HOURS PRN
Status: DISCONTINUED | OUTPATIENT
Start: 2019-03-11 | End: 2019-03-11

## 2019-03-11 RX ORDER — KETOROLAC TROMETHAMINE 30 MG/ML
30 INJECTION, SOLUTION INTRAMUSCULAR; INTRAVENOUS EVERY 6 HOURS PRN
Status: DISCONTINUED | OUTPATIENT
Start: 2019-03-11 | End: 2019-03-12

## 2019-03-11 RX ORDER — NALOXONE HYDROCHLORIDE 0.4 MG/ML
.1-.4 INJECTION, SOLUTION INTRAMUSCULAR; INTRAVENOUS; SUBCUTANEOUS
Status: DISCONTINUED | OUTPATIENT
Start: 2019-03-11 | End: 2019-03-12 | Stop reason: HOSPADM

## 2019-03-11 RX ORDER — NALOXONE HYDROCHLORIDE 4 MG/.1ML
1 SPRAY NASAL PRN
Refills: 0 | COMMUNITY
Start: 2018-12-13

## 2019-03-11 RX ADMIN — SODIUM CHLORIDE, PRESERVATIVE FREE: 5 INJECTION INTRAVENOUS at 14:05

## 2019-03-11 RX ADMIN — LORAZEPAM 1 MG: 2 INJECTION INTRAMUSCULAR; INTRAVENOUS at 06:44

## 2019-03-11 RX ADMIN — TRAZODONE HYDROCHLORIDE 50 MG: 50 TABLET ORAL at 20:49

## 2019-03-11 RX ADMIN — Medication 0.5 MG: at 11:15

## 2019-03-11 RX ADMIN — Medication 0.5 MG: at 13:32

## 2019-03-11 RX ADMIN — Medication 0.5 MG: at 11:46

## 2019-03-11 RX ADMIN — HYDROMORPHONE HYDROCHLORIDE 2 MG: 2 TABLET ORAL at 18:48

## 2019-03-11 RX ADMIN — Medication 0.5 MG: at 07:40

## 2019-03-11 RX ADMIN — LORAZEPAM 1 MG: 2 INJECTION, SOLUTION INTRAMUSCULAR; INTRAVENOUS at 21:43

## 2019-03-11 RX ADMIN — IOPAMIDOL 80 ML: 755 INJECTION, SOLUTION INTRAVENOUS at 09:08

## 2019-03-11 RX ADMIN — DIPHENHYDRAMINE HYDROCHLORIDE 25 MG: 50 INJECTION, SOLUTION INTRAMUSCULAR; INTRAVENOUS at 06:42

## 2019-03-11 RX ADMIN — ACETAMINOPHEN 325 MG: 325 TABLET ORAL at 16:45

## 2019-03-11 RX ADMIN — Medication 0.5 MG: at 07:21

## 2019-03-11 RX ADMIN — Medication 0.5 MG: at 06:25

## 2019-03-11 RX ADMIN — DIPHENHYDRAMINE HYDROCHLORIDE 50 MG: 50 INJECTION, SOLUTION INTRAMUSCULAR; INTRAVENOUS at 16:19

## 2019-03-11 RX ADMIN — CLONIDINE HYDROCHLORIDE 0.4 MG: 0.1 TABLET ORAL at 19:21

## 2019-03-11 RX ADMIN — HYDROMORPHONE HYDROCHLORIDE 1 MG: 1 INJECTION, SOLUTION INTRAMUSCULAR; INTRAVENOUS; SUBCUTANEOUS at 15:00

## 2019-03-11 RX ADMIN — HYDROMORPHONE HYDROCHLORIDE 2 MG: 2 TABLET ORAL at 21:45

## 2019-03-11 RX ADMIN — LORAZEPAM 1 MG: 2 INJECTION, SOLUTION INTRAMUSCULAR; INTRAVENOUS at 15:46

## 2019-03-11 RX ADMIN — SODIUM CHLORIDE 60 ML: 9 INJECTION, SOLUTION INTRAVENOUS at 09:08

## 2019-03-11 RX ADMIN — DULOXETINE HYDROCHLORIDE 60 MG: 30 CAPSULE, DELAYED RELEASE PELLETS ORAL at 19:21

## 2019-03-11 RX ADMIN — ACETAMINOPHEN 650 MG: 325 TABLET ORAL at 20:43

## 2019-03-11 RX ADMIN — Medication 0.5 MG: at 08:19

## 2019-03-11 RX ADMIN — LIDOCAINE 1 PATCH: 246 PATCH TOPICAL at 06:26

## 2019-03-11 RX ADMIN — DIPHENHYDRAMINE HYDROCHLORIDE 25 MG: 50 INJECTION, SOLUTION INTRAMUSCULAR; INTRAVENOUS at 08:58

## 2019-03-11 RX ADMIN — METHOCARBAMOL TABLETS 500 MG: 500 TABLET, COATED ORAL at 15:32

## 2019-03-11 ASSESSMENT — ENCOUNTER SYMPTOMS
CHILLS: 0
POLYPHAGIA: 0
SHORTNESS OF BREATH: 1
NERVOUS/ANXIOUS: 1
VOMITING: 0
ABDOMINAL PAIN: 1
DYSURIA: 0
FEVER: 0
ARTHRALGIAS: 0
FATIGUE: 1
NAUSEA: 1
NEUROLOGICAL NEGATIVE: 1
PALPITATIONS: 0
DIAPHORESIS: 0
DIARRHEA: 0
POLYDIPSIA: 0
EYES NEGATIVE: 1
CHEST TIGHTNESS: 1

## 2019-03-11 ASSESSMENT — MIFFLIN-ST. JEOR: SCORE: 1403.75

## 2019-03-11 NOTE — ED TRIAGE NOTES
Pt presents by EMS with concerns of abdominal pain.  Pain started last night at 2000.   mg taken at 2000.  Denies nausea, vomiting, or diarrhea.

## 2019-03-11 NOTE — PROGRESS NOTES
S-(situation): Patient registered to Observation. Patient arrived to room 272 via cart from ED.    B-(background): Abdominal and chest pain.  Hgb 6.4.  Drop from 9.8 since February.    A-(assessment): Patient states pain is 9 of 10 upon admission.  Received hydromorphone 1mg with some relief.  Denies nausea. Requesting oral dilaudid and Ativan for pain.  This passed on to Ange Pettit CNP.  Mother and daughter present for admission.  Blood drawn for type and screen per lab.    R-(recommendations): Orders and observation goals reviewed with patient.  Plan to transfuse blood this evening.    Nursing Observation criteria listed below was met:    Skin issues/needs documented:NA  Isolation needs addressed, if appropriate: NA  Fall Prevention: Education given and documented: NA  Education Assessment documented:Yes  Education Documented (Pre-existing chronic infection such as, MRSA/VRE need education on admission): Yes  OBS video/handout Reviewed & DocumentedNo  Medication Reconciliation Complete: No  New medication patient education completed and documented (Possible Side Effects of Common Medications handout): Yes  Home medications if not able to send immediately home with family stored here: NA  Reminder note placed in discharge instructions: NA  Patient has discharge needs (If yes, please explain): No

## 2019-03-11 NOTE — ED PROVIDER NOTES
"  History     Chief Complaint   Patient presents with     Abdominal Pain     HPI  Doreen Peralta is a 38 year old female who presented to the emergency department with pleuritic type chest pain that started at 9 PM last night and was seen overnight by Dr. Rodriguez and signed over to me at change of shift pending laboratory and radiologic investigations.  Further history includes some maroonish color to her stools.  She had a previous colectomy due to \"colon  Not working \".  Her pain is throughout her chest and into the upper abdomen.  She feels short of breath.  This came on suddenly.  She has not had a swelling in her legs nor she missed any doses of her Eliquis.  She has been lightheaded and dizzy when standing.  She no longer has please see his assessment and plan below:    Assessments & Plan (with Medical Decision Making)  38-year-old female well-known to these emergency room secondary to concerns of onset of pleuritic type chest pains that started last evening and continued throughout the night.  Patient denied any fever or chills or significant cough but has significant pain with movement of her chest and breathing.  She denied any fall or injury as a reason for her symptoms.  Patient has a history for neuropathy and is on Eliquis 2 times a day.  Patient has significant change in the appearance of her skin with multiple excoriations to her skin make me concerned for the possibility of methamphetamine use but she denies but an urine drug screen was ordered and is pending.  EKG was reassuring.  She is not tachycardic.  Patient states that she ate some pineapple yesterday and sometimes she gets severe allergic reaction to pineapple so she is wondering if that could be a reason for her symptoms.  Pleuritic chest pain, at present of an unknown cause.  Lungs without evidence for wheeze and EKG unremarkable for acute ischemic change or dysrhythmia.  Patient currently taking Eliquis for anticoagulation therapy.  " Blood tests, urine tests, and chest x-ray are pending at this time.  Patient has been treated with some IV pain medication and antianxiety medicine along with a Lidoderm patch to apply over the area of maximal tenderness to her chest wall.  I signed out the patient's care to Dr. Workman at shift change. I notified the patient that Dr. Workman would be continuing to care for her and follow-up on the results of the tests. Please review Dr. Workman's ED note for further details on the care and disposition of Doreen Peralta.       allergies:  Allergies   Allergen Reactions     Azithromycin Other (See Comments) and Itching     Other reaction(s): Throat Swelling/Closing  Burning in throat     Hyoscyamine Rash     Droperidol Hives and Rash     Metoclopramide Other (See Comments)     Eye twitching.      Peaches [Peach] Other (See Comments)     Raw. Cooked OK     Sucralose Other (See Comments)     All artificial sweeteners. Aspartame also. Swollen glands     Advair Diskus Other (See Comments)     Throat burns     Compazine [Prochlorperazine] Visual Disturbance     Contrast Dye Itching     States is allergic to CT contrast dye     Cyclobenzaprine Visual Disturbance     Diatrizoate Other (See Comments)     Patient reports she tolerates IV contrast if given 50mg IV of Benadryl prior to scan.      Fentanyl Other (See Comments)     migraine     Fluticasone-Salmeterol      Throat burns     Ibuprofen GI Disturbance     Lactulose Nausea and Vomiting     Gas and bloating     Levaquin [Levofloxacin] Swelling     Per ED M.D. And RN      Morphine Sulfate Other (See Comments)     Chest pain       Oxycodone Other (See Comments)     Burning throat, but can take Norco.      Oxycodone-Acetaminophen      Burning in throat  Throat burns     Rizatriptan Visual Disturbance     Isovue [Iopamidol] Palpitations     Pt had racing heart and sob      Ketorolac Anxiety     Latex Swelling and Rash     Kiwi, likely also avacado, ? banana     Levsin Rash        Problem List:    Patient Active Problem List    Diagnosis Date Noted     Anemia 03/11/2019     Priority: Medium     Other pulmonary embolism  03/11/2019     Priority: Medium     GIB (gastrointestinal bleeding) - suspected 03/11/2019     Priority: Medium     Bacteremia 06/26/2018     Priority: Medium     Acute renal failure (H) 04/21/2018     Priority: Medium     History of bacteremia - recurrent 04/17/2018     Priority: Medium     Tobacco abuse 04/16/2018     Priority: Medium     Iron deficiency anemia 04/16/2018     Priority: Medium     Stool toxin PCR positive for Clostridium difficile on 4/17/18 04/16/2018     Priority: Medium     Gram negative septicemia (H) - Ochrobactrum, Stenotrophomonas & Pantoea 08/24/2017     Priority: Medium     Hypokalemia 08/24/2017     Priority: Medium     Essential hypertension 08/24/2017     Priority: Medium     Sepsis due to Klebsiella (H) 07/27/2017     Priority: Medium     Insomnia, unspecified type 03/31/2017     Priority: Medium     Abdominal pain 02/15/2017     Priority: Medium     Abdominal pain, generalized 01/28/2017     Priority: Medium     History of deep venous thrombosis 01/26/2017     Priority: Medium     Nausea and vomiting 01/26/2017     Priority: Medium     Vitamin D deficiency 01/16/2017     Priority: Medium     Chronic abdominal pain 11/15/2016     Priority: Medium     Patient is followed by Larisa Lorenzo PA-C for ongoing prescription of pain medication.  All refills should be approved by this provider, or covering partner.    Medication(s): no regular narcotics - narcotics given at ED visits  Maximum quantity per month: N/A  Clinic visit frequency required: Q 6  months     Controlled substance agreement:  Encounter-Level CSA - 11/9/15:               Controlled Substance Agreement - Scan on 11/19/2015 11:17 AM : CONTROLLED SUBSTANCE AGREEMENT 11/09/15 (below)          Encounter-Level CSA - 2/27/15:               Controlled Substance Agreement - Scan on  3/12/2015  7:50 AM : Controlled Medication Agreement 02/27/15 (below)            Pain Clinic evaluation in the past: Yes    DIRE Total Score(s):  No flowsheet data found.    Last [facility] website verification:  done on 11/15/16   [email or web address]        Attention to G-tube (H) 11/08/2016     Priority: Medium     Fever 10/16/2016     Priority: Medium     Coagulation defect (H) [D68.9] 09/16/2016     Priority: Medium     Chronic diarrhea 07/22/2016     Priority: Medium     Migraine 07/20/2016     Priority: Medium     Positive blood culture - Klebsiella oxytoca from Port-a-cath culture 07/18/2016     Priority: Medium     Mitral regurgitation mild-mod by Echo June 2016 07/18/2016     Priority: Medium     Anemia, iron deficiency 07/17/2016     Priority: Medium     Anemia in other chronic diseases classified elsewhere 06/14/2016     Priority: Medium     Munchausen syndrome - previously suspected 06/14/2016     Priority: Medium     Long-term (current) use of anticoagulants [Z79.01] 05/16/2016     Priority: Medium     Anxiety 05/16/2016     Priority: Medium     S/P partial resection of colon 04/11/2016     Priority: Medium     Malnutrition (H) 04/11/2016     Priority: Medium     Jejunostomy tube present (H) 03/21/2016     Priority: Medium     Malfunctioning jejunostomy tube (H) 12/22/2015     Priority: Medium     Malfunction of gastrostomy tube (H) 11/19/2015     Priority: Medium     PEG tube malfunction (H) 10/16/2015     Priority: Medium     Health Care Home 01/16/2015     Priority: Medium     *See Letters for [facility] Care Plan: My Access Plan           PEG (percutaneous endoscopic gastrostomy) status 11/05/2014     Priority: Medium     Gastroparesis 04/11/2014     Priority: Medium     Overview:   Had ileostomy performed at [facility] in Jan 2012 as treatment       Migraines 04/11/2014     Priority: Medium     4/11/2014  With periods, every other month.       Intermittent asthma 04/11/2014     Priority: Medium      4/11/2014   With URIs, allergies       Allergic rhinitis 04/11/2014     Priority: Medium     Problem list name updated by automated process. Provider to review       Abnormal Pap smear of cervix 04/11/2014     Priority: Medium     4/11/2014  S/p colp and LEEP. Sees OB/GYN at [facility]. Pap every year x20 years - normal since.       S/P LEEP of cervix 02/06/2014     Priority: Medium     Patellofemoral stress syndrome 01/18/2014     Priority: Medium     Hx SBO 10/09/2013     Priority: Medium     4/11/2014  Recurrent. Sees GI (Dr. Redding - at [facility]) every 6 months or so.  Sees feeding tube nurse next week.       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 04/11/2013     Priority: Medium     Constipation by delayed colonic transit 10/24/2012     Priority: Medium     Atopic rhinitis 03/20/2009     Priority: Medium     Hyperbilirubinemia 03/11/2009     Priority: Medium        Past Medical History:    Past Medical History:   Diagnosis Date     Asthma      Bilateral ovarian cysts      Cervical cancer (H) 01/01/2008     Chronic pain      Colonic dysmotility      Constipation      E. coli sepsis (H) 5/8/2016     Enteritis      Fungemia 5/5/2016     Gastro-oesophageal reflux disease      H/O ileostomy      Hx of abnormal Pap smear      Hypertension      IBS (irritable bowel syndrome)      Other chronic pain      PONV (postoperative nausea and vomiting)      Thrombosis, hepatic vein (H)        Past [facility] History:    Past [facility] History:   Procedure Laterality Date     COLONOSCOPY  7/10/2012    Procedure: COLONOSCOPY;;  Surgeon: Nicole Redding MD;  [facility]     COLONOSCOPY N/A 2/19/2017    Procedure: COLONOSCOPY;  Surgeon: Randell Muller MD;  Location: [facility] GI     COLONOSCOPY N/A 2/21/2017    Procedure: COLONOSCOPY;  Surgeon: Randell Muller MD;  Location: [facility]     COLONOSCOPY N/A 5/3/2018    Procedure: COMBINED COLONOSCOPY, SINGLE OR MULTIPLE BIOPSY/POLYPECTOMY BY BIOPSY;  EGD/Colonoscopy ;  Surgeon: Jose E  Loi Beauchamp MD;  Location: [facility]     ECHO CHELO  7/19/2016          ENDOSCOPIC INSERTION TUBE GASTROSTOMY N/A 1/21/2016    Procedure: ENDOSCOPIC INSERTION TUBE GASTROSTOMY;  Surgeon: Nicole Redding MD;  [facility]     ESOPHAGOSCOPY, GASTROSCOPY, DUODENOSCOPY (EGD), COMBINED  7/10/2012    Procedure: COMBINED ESOPHAGOSCOPY, GASTROSCOPY, DUODENOSCOPY (EGD);  Upper Endoscopy, Ileoscopy    Latex Allergy  with biopsies;  Surgeon: Nicole Redding MD;  [facility]     ESOPHAGOSCOPY, GASTROSCOPY, DUODENOSCOPY (EGD), COMBINED N/A 11/5/2014    Procedure: COMBINED ESOPHAGOSCOPY, GASTROSCOPY, DUODENOSCOPY (EGD);  Surgeon: Nicole Redding MD;  [facility]     ESOPHAGOSCOPY, GASTROSCOPY, DUODENOSCOPY (EGD), COMBINED N/A 5/3/2018    Procedure: COMBINED ESOPHAGOSCOPY, GASTROSCOPY, DUODENOSCOPY (EGD), BIOPSY SINGLE OR MULTIPLE;;  Surgeon: Loi Black MD;  Location: [facility] GI     HC REPLACE DUODENOSTOMY/JEJUNOSTOMY TUBE PERCUTANEOUS N/A 8/27/2015    Procedure: REPLACE GASTROJEJUNOSTOMY TUBE, PERCUTANEOUS;  Surgeon: Mio Holder MD;  [facility]     HC REPLACE DUODENOSTOMY/JEJUNOSTOMY TUBE PERCUTANEOUS N/A 1/7/2016    Procedure: REPLACE JEJUNOSTOMY TUBE, PERCUTANEOUS;  Surgeon: Elsa Medel MD;  [facility]     HC REPLACE DUODENOSTOMY/JEJUNOSTOMY TUBE PERCUTANEOUS N/A 1/28/2016    Procedure: REPLACE JEJUNOSTOMY TUBE, PERCUTANEOUS;  Surgeon: Elsa Medel MD;  [facility]     HC REPLACE GASTROSTOMY/CECOSTOMY TUBE PERCUTANEOUS Left 5/19/2015    Procedure: REPLACE GASTROSTOMY TUBE, PERCUTANEOUS;  Surgeon: Melecio Morejon Chi, MD;  Location: [facility] GI     HC UGI ENDOSCOPY W PLACEMENT GASTROSTOMY TUBE PERCUT N/A 10/1/2015    Procedure: COMBINED ESOPHAGOSCOPY, GASTROSCOPY, DUODENOSCOPY (EGD), PLACE PERCUTANEOUS ENDOSCOPIC GASTROSTOMY TUBE;  Surgeon: Mio Holder MD;  Location: [facility]     INSERT PORT VASCULAR ACCESS Right 7/20/2017    Procedure: INSERT PORT VASCULAR  ACCESS;  Chest Port Placement  **Latex Allergy**;  Surgeon: Coy Rocha PA-C;  [facility]     LAPAROSCOPIC ASSISTED COLECTOMY  1/20/2012    Procedure:LAPAROSCOPIC ASSISTED COLECTOMY; Laparoscopic Ileostomy       LAPAROSCOPIC ASSISTED COLECTOMY LEFT (DESCENDING)  10/24/2012    Procedure: LAPAROSCOPIC ASSISTED COLECTOMY LEFT (DESCENDING);   Hand Assisted Laproscopic Subtotal abdominal Colectomy,Iieorectal anastamosis, Ileostomy Closure.       LAPAROSCOPIC ASSISTED INSERTION TUBE JEJUNOSTOMY N/A 10/16/2015    Procedure: LAPAROSCOPIC ASSISTED INSERTION TUBE JEJUNOSTOMY;  Surgeon: Elsa Medel MD;  [facility]     LAPAROSCOPIC CHOLECYSTECTOMY  2002    [facility] ctr. stones duct     LAPAROSCOPIC ILEOSTOMY  1/20/2012    [facility], loop     LAPAROSCOPIC OOPHORECTOMY Right 2009    [facility]     LAPAROTOMY EXPLORATORY N/A 1/28/2016    Procedure: LAPAROTOMY EXPLORATORY;  Surgeon: Elsa Medel MD;  [facility]     LEEP TX, CERVICAL  2009    [facility]     PICC INSERTION Left 10/21/2015    5fr DL Power PICC, 37cm (2cm external) in the L basilic vein w/ tip in the SVC RA junction.     REMOVE GASTROSTOMY TUBE ADULT N/A 12/12/2014    Procedure: REMOVE GASTROSTOMY TUBE ADULT;  Surgeon: Nicole Redding MD;  [facility]     REMOVE PORT VASCULAR ACCESS Right 6/30/2016    Procedure: REMOVE PORT VASCULAR ACCESS;  Surgeon: Pradeep Orosco MD;  Location: [facility]     REMOVE PORT VASCULAR ACCESS Right 9/8/2017    Procedure: REMOVE PORT VASCULAR ACCESS;  right side mediport removal;  Surgeon: Zechariah Worthington MD;  Location: [facility] OR     replace GASTROSTOMY TUBE ADULT  5/19/15       Family History:    Family History   Problem Relation Age of Onset     Thyroid Disease Mother      Sjogren's Mother      Gastrointestinal Disease Mother         Intermittent nausea vomiting diarrhea     Colon Polyps Mother      Prostate Problems Father         prostate enlargement     Lupus Maternal Grandmother   "    Cancer Maternal Grandfather         Lung     Colon Cancer Maternal Grandfather 65     Cancer Paternal Grandmother         Lung      Cerebrovascular Disease Paternal Grandmother      Diabetes Paternal Grandmother      Cardiovascular Paternal Grandmother         CHF     Cancer Paternal Grandfather         Lung     Glaucoma Paternal Grandfather      Abdominal Aortic Aneurysm Other      Macular Degeneration No family hx of        Social History:  Marital Status:   [2]  Social History     Tobacco Use     Smoking status: Current Some Day Smoker     Packs/day: 1.00     Years: 4.00     Pack years: 4.00     Types: Cigarettes     Last attempt to quit: 1/1/2004     Years since quitting: 15.2     Smokeless tobacco: Never Used     Tobacco comment: Vaping   Substance Use Topics     Alcohol use: No     Drug use: No        Medications:      ACETAMINOPHEN PO   albuterol 90 MCG/ACT inhaler   Alfalfa 650 MG TABS   apixaban ANTICOAGULANT (ELIQUIS) 5 MG tablet   cloNIDine (CATAPRES) 0.2 MG tablet   diphenhydrAMINE HCl 50 MG TABS   DULoxetine (CYMBALTA) 60 MG EC capsule   furosemide (LASIX) 20 MG tablet   HYDROmorphone (DILAUDID) 2 MG tablet   ipratropium (ATROVENT) 0.02 % neb solution   lisinopril (PRINIVIL/ZESTRIL) 2.5 MG tablet   LORazepam (ATIVAN) 1 MG tablet   Multiple Vitamin (TAB-A-PRASANNA) TABS   mupirocin (BACTROBAN) 2 % external ointment   ondansetron (ZOFRAN ODT) 4 MG ODT tab   SUMAtriptan (IMITREX) 50 MG tablet   traZODone (DESYREL) 100 MG tablet   albuterol (2.5 MG/3ML) 0.083% neb solution   NARCAN 4 MG/0.1ML nasal spray         Review of Systems   Cardiovascular: Positive for chest pain.       Physical Exam   BP: 119/69  Pulse: 83  Heart Rate: 91  Temp: 98.5  F (36.9  C)  Resp: 20  Height: 167.6 cm (5' 6\")  Weight: 70.2 kg (154 lb 12.2 oz)  SpO2: 98 %      Physical Exam   Constitutional: She is oriented to person, place, and time. She appears well-developed and well-nourished. No distress.   HENT:   Head: " Normocephalic and atraumatic.   Eyes: No scleral icterus.   Neck: Normal range of motion. Neck supple.   Neurological: She is alert and oriented to person, place, and time.   Skin: Skin is warm and dry. No rash noted. She is not diaphoretic. No erythema. No pallor.       ED Course        Procedures                 No results found. However, due to the size of the patient record, not all encounters were searched. Please check Results Review for a complete set of results.    Medications   Lidocaine (LIDOCARE) 4 % Patch 1 patch (1 patch Transdermal Given 3/11/19 0626)   HYDROmorphone (PF) (DILAUDID) injection 0.5 mg (0.5 mg Intravenous Given 3/11/19 0740)   diphenhydrAMINE (BENADRYL) injection 25 mg (25 mg Intravenous Given 3/11/19 0642)   LORazepam (ATIVAN) injection 1 mg (1 mg Intravenous Given 3/11/19 0644)   HYDROmorphone (PF) (DILAUDID) injection 0.5 mg (0.5 mg Intravenous Given 3/11/19 0819)   diphenhydrAMINE (BENADRYL) injection 25 mg (25 mg Intravenous Given 3/11/19 0858)   100 mL saline bag CT (60 mLs Intravenous Given 3/11/19 0908)   sodium chloride (PF) 0.9% PF flush 3 mL (3 mLs Intravenous Given 3/11/19 0908)   iopamidol (ISOVUE-370) solution 500 mL (80 mLs Intravenous Given 3/11/19 0908)   HYDROmorphone (PF) (DILAUDID) injection 0.5 mg (0.5 mg Intravenous Given 3/11/19 1332)   sodium chloride (PF) 0.9% PF flush (1 mL  Given 3/12/19 0356)       Assessments & Plan (with Medical Decision Making)  1. Pulmonary embolism on xarelto -this is a very small pulmonary embolism.  I do not think further intervention is indicated at this time.  She will just need to continue on her Xarelto risks and benefits need to be considered given that it seems likely that she is having some GI bleeding.  2. Anemia - uncertain etiology.  Will admit the patient for blood transfusions and monitoring.  She may need to have an endoscopy and colonoscopy to further evaluate this.  Discussed with Dr. Melanie Jha who accepted  patient for admission and Ange will see the patient in the emergency department and write orders.     I have reviewed the nursing notes.    I have reviewed the findings, diagnosis, plan and need for follow up with the patient.         Medication List      Started    apixaban ANTICOAGULANT 5 MG tablet  Commonly known as:  ELIQUIS  5 mg, Oral, 2 TIMES DAILY     LORazepam 1 MG tablet  Commonly known as:  ATIVAN  1 mg, Oral, EVERY 4 HOURS PRN        Modified    HYDROmorphone 2 MG tablet  Commonly known as:  DILAUDID  2 mg, Oral, EVERY 3 HOURS PRN  What changed:      when to take this    reasons to take this     ondansetron 4 MG ODT tab  Commonly known as:  ZOFRAN ODT  4 mg, Oral, EVERY 8 HOURS PRN  What changed:  reasons to take this  Notes to patient:  Not given in hospital.        Discontinued    DIFICID 200 MG tablet  Generic drug:  fidaxomicin     methocarbamol 500 MG tablet  Commonly known as:  ROBAXIN     XARELTO PO            Final diagnoses:   Pleuritic chest pain   Anemia, unspecified type   Other pulmonary embolism without acute cor pulmonale, unspecified chronicity (H)       3/11/2019   [facility] EMERGENCY DEPARTMENT     Nikhil Workman MD  03/15/19 0127

## 2019-03-11 NOTE — ED NOTES
Patient back from XR. Lab was unable to get blood, they are waiting for another tech to come down and try.

## 2019-03-11 NOTE — H&P
[facility]    History and Physical - Hospitalist Service       Date of Admission:  3/11/2019    Assessment & Plan   Doreen Peralta is a 38 year old female admitted to observation on 3/11/2019.      Anemia  Noted drop from 10  in November  Hgb 8.5 on February 8, 2019  Drop to 6.4 today  No signs of bleeding  No Melena, No menstrual cycles, no noted bleeding  History of iron deficiency anemia  Will transfuse 2 units of blood today  Check Occult blood  Monitor closely   Will need to transfer out of EGD/intervention needed     Chronic diarrhea  Chronic and stable  Monitor    Other pulmonary embolism   Patient with incidental finding of a small new pulmonary blood clot  Patient is not able to state her medication compliance  Patient is supposed to be on Elliquis  Will need to transition to this on discharge  With active bleeding suspected will hold at this time  Spoke to Dr Tena at the [facility] about the above plan      GIB (gastrointestinal bleeding) - suspected  Unclear GIB at this time  No active bleeding noted but 2 gram drop in one month  Will need to monitor closely    History of deep venous thrombosis  Hx of DVT/ liver clot  Will need ongoing anticoagulation  Current GIB, will need close monitoring    Iron deficiency anemia  Noted in the past  Patient stopping going to her iron infusions  Iron work up added to labs today  Will need follow up with Gastroenterology      Abdominal pain  Patient with upper abdominal pain, epigastric pain, chronic back pain  Patient states her pain started with back pain and has moved to her upper abdomen and epigastric area  Pain is severe, worse with touch and movement  Patient is not able to tolerate gentle palpation or even listening to heart tones  Discussed this seems to be muscular  Patient has not had relief with Dilaudid, Ativan, Benadryl  She does fall asleep but will wake up in pain  Will add Toradol, Robaxin  Monitor closely  and wean narcotics  Patient with a significant narcotic history   Ordered GI Cocktail in case GERD is a source        Diet: Regular  DVT Prophylaxis: Pneumatic Compression Devices and Eliquis  Maya Catheter: not present  Code Status: Full    Disposition Plan   Expected discharge: Tomorrow, recommended to prior living arrangement once hemoglobin stable.  Entered: Ange Pettit CNP 03/11/2019, 1:10 PM     The patient's care was discussed with the Attending Physician, Dr. Jha, Bedside Nurse and Care Coordinator/.    Ange Pettit CNP  [facility]    ______________________________________________________________________    Chief Complaint   Epigastric pain, Anemia, Pulmonary Embolism    History is obtained from the patient, electronic health record, emergency department physician and patient's mother    History of Present Illness   Doreen Peralta is a 38 year old female who who is well-known to this emergency room having multiple prior ER visits here.  She states that she developed chest pains with difficulty breathing starting about 9:00 last night.  She stated it felt like a spasm to her mid back and so had her daughter do some massage hoping that it would resolve.  She states that her symptoms have gotten worse now this morning so she called for ambulance assist.  She was transported to the ER.  Patient is requesting pain medication.  Patient states that she has not used any of her oral Dilaudid for several weeks but she does have a supply of it at home.  She states that her diarrhea symptoms have been doing well since she was last treated for her C. difficile.  She states that she otherwise has some abdominal pain but the issue today is not her abdomen but her chest.  She states that she is having pain with deep breathing efforts.  Asked if she is still on her anticoagulation, and she states that she is still taking her Eliquis as prescribed twice a day.   She denies any recent fever or chills symptoms.  She states that she did not feel well all day yesterday but had no specific new symptoms other than her chronic issues. Patient admitted to picking on her skin some recently.  She denies any significant cough and has had no fever.  Apparently her  is had a febrile type illness over last week.          Review of Systems    The 10 point Review of Systems is negative other than noted in the HPI or here.     Past Medical History    I have reviewed this patient's medical history and updated it with pertinent information if needed.   Past Medical History:   Diagnosis Date     Asthma      Bilateral ovarian cysts      Cervical cancer (H) 01/01/2008    cervical cancer      Chronic pain      Colonic dysmotility     s/p subtotal colectomy     Constipation     chronic     E. coli sepsis (H) 5/8/2016     Enteritis      Fungemia 5/5/2016     Gastro-oesophageal reflux disease      H/O ileostomy      Hx of abnormal Pap smear     s/p LEEP - no further details provided     Hypertension      IBS (irritable bowel syndrome)      Other chronic pain      PONV (postoperative nausea and vomiting)      Thrombosis, hepatic vein (H)     microvascular       Past [facility] History   I have reviewed this patient's surgical history and updated it with pertinent information if needed.  Past [facility] History:   Procedure Laterality Date     COLONOSCOPY  7/10/2012    Procedure: COLONOSCOPY;;  Surgeon: Nicole Redding MD;  [facility]     COLONOSCOPY N/A 2/19/2017    Procedure: COLONOSCOPY;  Surgeon: Randell Muller MD;  Location: [facility]     COLONOSCOPY N/A 2/21/2017    Procedure: COLONOSCOPY;  Surgeon: Randell Muller MD;  Location: [facility]     COLONOSCOPY N/A 5/3/2018    Procedure: COMBINED COLONOSCOPY, SINGLE OR MULTIPLE BIOPSY/POLYPECTOMY BY BIOPSY;  EGD/Colonoscopy ;  Surgeon: Loi Black MD;  Location: [facility]     ECHO CHELO  7/19/2016          ENDOSCOPIC INSERTION  TUBE GASTROSTOMY N/A 1/21/2016    Procedure: ENDOSCOPIC INSERTION TUBE GASTROSTOMY;  Surgeon: Nicole Redding MD;  Location: [facility]     ESOPHAGOSCOPY, GASTROSCOPY, DUODENOSCOPY (EGD), COMBINED  7/10/2012    Procedure: COMBINED ESOPHAGOSCOPY, GASTROSCOPY, DUODENOSCOPY (EGD);  Upper Endoscopy, Ileoscopy    Latex Allergy  with biopsies;  Surgeon: Nicole Redding MD;  Location: [facility]     ESOPHAGOSCOPY, GASTROSCOPY, DUODENOSCOPY (EGD), COMBINED N/A 11/5/2014    Procedure: COMBINED ESOPHAGOSCOPY, GASTROSCOPY, DUODENOSCOPY (EGD);  Surgeon: Nicole Redding MD;  [facility]     ESOPHAGOSCOPY, GASTROSCOPY, DUODENOSCOPY (EGD), COMBINED N/A 5/3/2018    Procedure: COMBINED ESOPHAGOSCOPY, GASTROSCOPY, DUODENOSCOPY (EGD), BIOPSY SINGLE OR MULTIPLE;;  Surgeon: Loi Black MD;  Location: [facility]     HC REPLACE DUODENOSTOMY/JEJUNOSTOMY TUBE PERCUTANEOUS N/A 8/27/2015    Procedure: REPLACE GASTROJEJUNOSTOMY TUBE, PERCUTANEOUS;  Surgeon: Mio Holder MD;  [facility]     HC REPLACE DUODENOSTOMY/JEJUNOSTOMY TUBE PERCUTANEOUS N/A 1/7/2016    Procedure: REPLACE JEJUNOSTOMY TUBE, PERCUTANEOUS;  Surgeon: Elsa Medel MD;  [facility]     HC REPLACE DUODENOSTOMY/JEJUNOSTOMY TUBE PERCUTANEOUS N/A 1/28/2016    Procedure: REPLACE JEJUNOSTOMY TUBE, PERCUTANEOUS;  Surgeon: Elsa Medel MD;  [facility]     HC REPLACE GASTROSTOMY/CECOSTOMY TUBE PERCUTANEOUS Left 5/19/2015    Procedure: REPLACE GASTROSTOMY TUBE, PERCUTANEOUS;  Surgeon: Melecio Morejon Chi, MD;  Location: [facility]     HC UGI ENDOSCOPY W PLACEMENT GASTROSTOMY TUBE PERCUT N/A 10/1/2015    Procedure: COMBINED ESOPHAGOSCOPY, GASTROSCOPY, DUODENOSCOPY (EGD), PLACE PERCUTANEOUS ENDOSCOPIC GASTROSTOMY TUBE;  Surgeon: Mio Holder MD;  [facility]     INSERT PORT VASCULAR ACCESS Right 7/20/2017    Procedure: INSERT PORT VASCULAR ACCESS;  Chest Port Placement  **Latex Allergy**;  Surgeon: Coy Rocha PA-C;   [facility]     LAPAROSCOPIC ASSISTED COLECTOMY  1/20/2012    Procedure:LAPAROSCOPIC ASSISTED COLECTOMY; Laparoscopic Ileostomy       LAPAROSCOPIC ASSISTED COLECTOMY LEFT (DESCENDING)  10/24/2012    Procedure: LAPAROSCOPIC ASSISTED COLECTOMY LEFT (DESCENDING);   Hand Assisted Laproscopic Subtotal abdominal Colectomy,Iieorectal anastamosis, Ileostomy Closure.       LAPAROSCOPIC ASSISTED INSERTION TUBE JEJUNOSTOMY N/A 10/16/2015    Procedure: LAPAROSCOPIC ASSISTED INSERTION TUBE JEJUNOSTOMY;  Surgeon: Elsa Medel MD;  [facility]     LAPAROSCOPIC CHOLECYSTECTOMY  2002    [facility] stones duct     LAPAROSCOPIC ILEOSTOMY  1/20/2012    [facility], loop     LAPAROSCOPIC OOPHORECTOMY Right 2009    [facility]     LAPAROTOMY EXPLORATORY N/A 1/28/2016    Procedure: LAPAROTOMY EXPLORATORY;  Surgeon: Elsa Medel MD;  Location: [facility]     LEEP TX, CERVICAL  2009    [facility]     PICC INSERTION Left 10/21/2015    5fr DL Power PICC, 37cm (2cm external) in the L basilic vein w/ tip in the SVC RA junction.     REMOVE GASTROSTOMY TUBE ADULT N/A 12/12/2014    Procedure: REMOVE GASTROSTOMY TUBE ADULT;  Surgeon: Nicole Redding MD;  Location: [facility]     REMOVE PORT VASCULAR ACCESS Right 6/30/2016    Procedure: REMOVE PORT VASCULAR ACCESS;  Surgeon: Pradeep Orosco MD;  [facility]     REMOVE PORT VASCULAR ACCESS Right 9/8/2017    Procedure: REMOVE PORT VASCULAR ACCESS;  right side mediport removal;  Surgeon: Zechariah Worthington MD;  Location: [facility]     replace GASTROSTOMY TUBE ADULT  5/19/15       Social History   I have reviewed this patient's social history and updated it with pertinent information if needed.  Social History     Tobacco Use     Smoking status: Current Some Day Smoker     Packs/day: 1.00     Years: 4.00     Pack years: 4.00     Types: Cigarettes     Last attempt to quit: 1/1/2004     Years since quitting: 15.2     Smokeless tobacco: Never Used     Tobacco comment: Vaping    Substance Use Topics     Alcohol use: No     Drug use: No       Family History   I have reviewed this patient's family history and updated it with pertinent information if needed.   Family History   Problem Relation Age of Onset     Thyroid Disease Mother      Sjogren's Mother      Gastrointestinal Disease Mother         Intermittent nausea vomiting diarrhea     Colon Polyps Mother      Prostate Problems Father         prostate enlargement     Lupus Maternal Grandmother      Cancer Maternal Grandfather         Lung     Colon Cancer Maternal Grandfather 65     Cancer Paternal Grandmother         Lung      Cerebrovascular Disease Paternal Grandmother      Diabetes Paternal Grandmother      Cardiovascular Paternal Grandmother         CHF     Cancer Paternal Grandfather         Lung     Glaucoma Paternal Grandfather      Abdominal Aortic Aneurysm Other      Macular Degeneration No family hx of        Prior to Admission Medications   Prior to Admission Medications   Prescriptions Last Dose Informant Patient Reported? Taking?   ACETAMINOPHEN PO Past Month at Unknown time Self Yes Yes   Sig: Take 500-1,000 mg by mouth every 6 hours as needed for pain    [place]man 650 MG TABS 3/10/2019 at AM  Yes Yes   Sig: Take 650 mg by mouth daily   DULoxetine (CYMBALTA) 60 MG EC capsule 3/10/2019 at PM  No Yes   Sig: TAKE 1 CAPSULE(60 MG) BY MOUTH DAILY   HYDROmorphone (DILAUDID) 2 MG tablet Past Month at Unknown time  Yes Yes   Sig: Take 2 mg by mouth   Multiple Vitamin (TAB-A-PRASANNA) TABS Past Week at Unknown time  Yes Yes   Sig: Take 1 tablet by mouth   NARCAN 4 MG/0.1ML nasal spray Unknown at Unknown time  Yes No   Sig: Spray 1 spray in nostril as needed   Rivaroxaban (XARELTO PO) 3/10/2019 at PM  Yes Yes   Sig: Take 20 mg by mouth daily   SUMAtriptan (IMITREX) 50 MG tablet Past Month at Unknown time  No Yes   Sig: Take 1-2 tablets ( mg) by mouth at onset of headache for migraine - LAST REFILL, DUE FOR FOLLOW UP   albuterol (2.5  MG/3ML) 0.083% neb solution Unknown at Unknown time  Yes No   Sig: Take 1 vial (2.5 mg) by nebulization every 4 hours as needed for shortness of breath / dyspnea or wheezing   albuterol 90 MCG/ACT inhaler Past Month at Unknown time Self Yes Yes   Sig: Inhale 2 puffs into the lungs every 6 hours as needed    cloNIDine (CATAPRES) 0.2 MG tablet 3/10/2019 at PM  No Yes   Sig: Take 2 tablets (0.4 mg) by mouth every evening - DUE FOR FOLLOW UP   diphenhydrAMINE HCl 50 MG TABS 3/10/2019 at PM  No Yes   Sig: Take 50 mg by mouth every 6 hours as needed (nausea)   fidaxomicin (DIFICID) 200 MG tablet Past Month at Unknown time  Yes Yes   Sig: Take 200 mg by mouth   furosemide (LASIX) 20 MG tablet 3/10/2019 at Unknown time  Yes Yes   Sig: Take 20 mg by mouth   ipratropium (ATROVENT) 0.02 % neb solution Past Month at Unknown time  Yes Yes   Sig: Take 2.5 mLs (0.5 mg) by nebulization every 6 hours as needed for wheezing   lisinopril (PRINIVIL/ZESTRIL) 2.5 MG tablet 3/10/2019 at AM  Yes Yes   Sig: TK 1 T PO ONCE D   methocarbamol (ROBAXIN) 500 MG tablet Past Week at Unknown time  Yes Yes   Sig: TK ONE T PO TID   mupirocin (BACTROBAN) 2 % external ointment Past Week at Unknown time  Yes Yes   Sig: Apply topically daily   ondansetron (ZOFRAN ODT) 4 MG ODT tab 3/11/2019 at 0300  No Yes   Sig: Take 1 tablet (4 mg) by mouth every 8 hours as needed   traZODone (DESYREL) 100 MG tablet 3/10/2019 at PM  Yes Yes   Sig: Take 0.5-1 tablets ( mg) by mouth nightly as needed for sleep      Facility-Administered Medications: None     Allergies   Allergies   Allergen Reactions     Azithromycin Other (See Comments) and Itching     Other reaction(s): Throat Swelling/Closing  Burning in throat     Hyoscyamine Rash     Droperidol Hives and Rash     Metoclopramide Other (See Comments)     Eye twitching.      Peaches [Peach] Other (See Comments)     Raw. Cooked OK     Sucralose Other (See Comments)     All artificial sweeteners. Aspartame also.  Swollen glands     Advair Diskus Other (See Comments)     Throat burns     Compazine [Prochlorperazine] Visual Disturbance     Contrast Dye Itching     States is allergic to CT contrast dye     Cyclobenzaprine Visual Disturbance     Diatrizoate Other (See Comments)     Patient reports she tolerates IV contrast if given 50mg IV of Benadryl prior to scan.      Fentanyl Other (See Comments)     migraine     Fluticasone-Salmeterol      Throat burns     Ibuprofen GI Disturbance     Lactulose Nausea and Vomiting     Gas and bloating     Levaquin [Levofloxacin] Swelling     Per ED M.D. And RN      Morphine Sulfate Other (See Comments)     Chest pain       Oxycodone Other (See Comments)     Burning throat, but can take Norco.      Oxycodone-Acetaminophen      Burning in throat  Throat burns     Rizatriptan Visual Disturbance     Isovue [Iopamidol] Palpitations     Pt had racing heart and sob      Ketorolac Anxiety     Latex Swelling and Rash     Kiwi, likely also avacado, ? banana     Levsin Rash       Physical Exam   Vital Signs: Temp: 98.5  F (36.9  C) Temp src: Oral BP: 108/63 Pulse: 87 Heart Rate: 91 Resp: 20 SpO2: 99 %      Weight: 0 lbs 0 oz    Constitutional: awake, alert, cooperative and severe distress, appears to be in severe pain.   Respiratory: tachypneic, Moderate respiratory distress and clear to auscultation  Cardiovascular: normal apical pulses, tachycardic and regular rate and rhythm  GI: normal bowel sounds  Skin: + rashes and lesions with open areas noted, on face and neck. Ecchymosis generalized and scattered  Musculoskeletal: no lower extremity pitting edema present    Data   Data reviewed today: I reviewed all medications, new labs and imaging results over the last 24 hours.     Recent Labs   Lab 03/11/19  0733   WBC 10.0   HGB 6.4*   MCV 74*      INR 1.19*      POTASSIUM 3.8   CHLORIDE 103   CO2 26   BUN 6*   CR 0.81   ANIONGAP 7   TARA 8.7   *   ALBUMIN 2.8*   PROTTOTAL 7.4    BILITOTAL 0.5   ALKPHOS 111   ALT 10   AST 9   LIPASE 50*   TROPI <0.015     Recent Results (from the past 24 hour(s))   XR Chest 2 Views    Narrative    CHEST TWO VIEWS  3/11/2019 7:14 AM     HISTORY: SOB, Pleuritic chest pain    COMPARISON: 11/27/2018      Impression    IMPRESSION: Lungs are clear given the more shallow degree of  inspiration. Heart and pulmonary vessels within normal limits. No  evidence for pleural effusion.    TERRIE TOTH MD   CT Chest (PE) Abdomen Pelvis w Contrast    Narrative    CT CHEST PULMONARY EMBOLUS, ABDOMEN AND PELVIS WITH CONTRAST 3/11/2019  9:18 AM    HISTORY: Pulmonary embolus suspected, intermediate probability,  positive D-dimer.    COMPARISON: 3/11/2019 chest x-ray and 8/7/2018 CT abdomen and pelvis.    Technique: Thoracic inlet to pubic symphysis with IV contrast.  Pulmonary embolus technique in the chest. Contrast: 80 mL Isovue- 370.  Radiation dose for this scan was reduced using automated exposure  control, adjustment of the mA and/or kV according to patient size, or  iterative reconstruction technique.    FINDINGS:  Chest: Very tiny 0.2 cm filling defect in proximal right lower lobe  pulmonary artery, series 6 image 63. This is peripheral in location  and suggests a very tiny pulmonary embolus of indeterminate age, could  be chronic. This was discussed by myself with Dr. Workman at 9:50 AM on  3/11/2019. No other evidence for pulmonary embolus. No evidence for  thoracic aortic dissection. Small left pleural effusion and bilateral  lower lobe patchy groundglass and linear opacities, greater on the  left than the right, consistent with infectious or inflammatory  etiology or fairly prominent atelectasis.    Abdomen and pelvis: Cholecystectomy clips. Normal-appearing liver,  spleen, pancreas, adrenal glands. There is a tiny nonobstructing 0.2  cm mid right kidney stone. The right kidney otherwise appears normal.  No periaortic or pelvic adenopathy or free fluid. Patient is  status  post partial colectomy with anastomosis in the right pelvis. There is  increased fecal-like material at the anastomotic site but no  convincing acute-appearing bowel abnormality. No aggressive bone  lesions. Unremarkable bladder.      Impression    IMPRESSION:   1. Very tiny 0.2 cm filling defect proximal right lower lobe pulmonary  artery suggestive of a very tiny pulmonary embolus of indeterminate  age.  2. Small left pleural effusion, lower lobe opacities, greater on the  left, consistent with scattered atelectasis as well as additional  groundglass atelectasis opacities which could be atelectasis versus  infectious or inflammatory process.  3. Tiny nonobstructing right kidney stone stable since 8/7/2018.  4. Postoperative bowel changes in the right lower quadrant, but no  convincing acute abnormality in the abdomen or pelvis.    TERRIE TOTH MD

## 2019-03-11 NOTE — ED PROVIDER NOTES
History     Chief Complaint   Patient presents with     Abdominal Pain     HPI  Doreen Peralta is a 38 year old female who who is well-known to this emergency room having multiple prior ER visits here.  She states that she developed chest pains with difficulty breathing starting about 9:00 last night.  She stated it felt like a spasm to her mid back and so had her daughter do some massage hoping that it would resolve.  She states that her symptoms have gotten worse now this morning so she called for ambulance assist.  She was transported to the ER.  Patient is requesting pain medication.  Patient states that she has not used any of her oral Dilaudid for several weeks but she does have a supply of it at home.  She states that her diarrhea symptoms have been doing well since she was last treated for her C. difficile.  She states that she otherwise has some abdominal pain but the issue today is not her abdomen but her chest.  She states that she is having pain with deep breathing efforts.  Asked if she is still on her anticoagulation, and she states that she is still taking her Eliquis as prescribed twice a day.  She denies any recent fever or chills symptoms.  She states that she did not feel well all day yesterday but had no specific new symptoms other than her chronic issues.  Patient noted to have multiple excoriations to the skin of her face forearms which were not present 1 month ago when I saw the patient.  Patient admitted to picking on her skin some recently.  She denies any significant cough and has had no fever.  Apparently her  is had a febrile type illness over last week.  Patient states that she did eat some pineapple yesterday and sometimes she has reaction to pineapple and wonders if this could be the cause of her symptoms.      I did query the [place] controlled substance database. The patient was listed as having filled multiple controlled prescriptions in the last  year.        Allergies:  Allergies   Allergen Reactions     Azithromycin Other (See Comments) and Itching     Other reaction(s): Throat Swelling/Closing  Burning in throat     Hyoscyamine Rash     Droperidol Hives and Rash     Metoclopramide Other (See Comments)     Eye twitching.      Peaches [Peach] Other (See Comments)     Raw. Cooked OK     Sucralose Other (See Comments)     All artificial sweeteners. Aspartame also. Swollen glands     Advair Diskus Other (See Comments)     Throat burns     Compazine [Prochlorperazine] Visual Disturbance     Contrast Dye Itching     States is allergic to CT contrast dye     Cyclobenzaprine Visual Disturbance     Diatrizoate Other (See Comments)     Patient reports she tolerates IV contrast if given 50mg IV of Benadryl prior to scan.      Fentanyl Other (See Comments)     migraine     Ibuprofen GI Disturbance     Lactulose Nausea and Vomiting     Gas and bloating     Levaquin [Levofloxacin] Swelling     Per ED M.D. And RN      Morphine Sulfate Other (See Comments)     Chest pain       Oxycodone Other (See Comments)     Burning throat, but can take Norco.      Rizatriptan Visual Disturbance     Isovue [Iopamidol] Palpitations     Pt had racing heart and sob      Ketorolac Anxiety     Latex Swelling and Rash     Kiwi, likely also avacado, ? banana     Levsin Rash       Problem List:    Patient Active Problem List    Diagnosis Date Noted     Bacteremia 06/26/2018     Priority: Medium     Acute renal failure (H) 04/21/2018     Priority: Medium     History of bacteremia - recurrent 04/17/2018     Priority: Medium     Tobacco abuse 04/16/2018     Priority: Medium     Iron deficiency anemia 04/16/2018     Priority: Medium     Stool toxin PCR positive for Clostridium difficile on 4/17/18 04/16/2018     Priority: Medium     Gram negative septicemia (H) - Ochrobactrum, Stenotrophomonas & Pantoea 08/24/2017     Priority: Medium     Hypokalemia 08/24/2017     Priority: Medium     Essential  hypertension 08/24/2017     Priority: Medium     Sepsis due to Klebsiella (H) 07/27/2017     Priority: Medium     Insomnia, unspecified type 03/31/2017     Priority: Medium     Abdominal pain 02/15/2017     Priority: Medium     Abdominal pain, generalized 01/28/2017     Priority: Medium     History of deep venous thrombosis 01/26/2017     Priority: Medium     Nausea and vomiting 01/26/2017     Priority: Medium     Vitamin D deficiency 01/16/2017     Priority: Medium     Chronic abdominal pain 11/15/2016     Priority: Medium     Patient is followed by Larisa Lorenzo PA-C for ongoing prescription of pain medication.  All refills should be approved by this provider, or covering partner.    Medication(s): no regular narcotics - narcotics given at ED visits  Maximum quantity per month: N/A  Clinic visit frequency required: Q 6  months     Controlled substance agreement:  Encounter-Level CSA - 11/9/15:               Controlled Substance Agreement - Scan on 11/19/2015 11:17 AM : CONTROLLED SUBSTANCE AGREEMENT 11/09/15 (below)          Encounter-Level CSA - 2/27/15:               Controlled Substance Agreement - Scan on 3/12/2015  7:50 AM : Controlled Medication Agreement 02/27/15 (below)            Pain Clinic evaluation in the past: Yes    DIRE Total Score(s):  No flowsheet data found.    Last [facility] website verification:  done on 11/15/16   [email or web address]        Attention to G-tube (H) 11/08/2016     Priority: Medium     Fever 10/16/2016     Priority: Medium     Coagulation defect (H) [D68.9] 09/16/2016     Priority: Medium     Chronic diarrhea 07/22/2016     Priority: Medium     Migraine 07/20/2016     Priority: Medium     Positive blood culture - Klebsiella oxytoca from Port-a-cath culture 07/18/2016     Priority: Medium     Mitral regurgitation mild-mod by Echo June 2016 07/18/2016     Priority: Medium     Anemia, iron deficiency 07/17/2016     Priority: Medium     Anemia in other chronic diseases  classified elsewhere 06/14/2016     Priority: Medium     Munchausen syndrome - previously suspected 06/14/2016     Priority: Medium     Long-term (current) use of anticoagulants [Z79.01] 05/16/2016     Priority: Medium     Anxiety 05/16/2016     Priority: Medium     S/P partial resection of colon 04/11/2016     Priority: Medium     Malnutrition (H) 04/11/2016     Priority: Medium     Jejunostomy tube present (H) 03/21/2016     Priority: Medium     Malfunctioning jejunostomy tube (H) 12/22/2015     Priority: Medium     Malfunction of gastrostomy tube (H) 11/19/2015     Priority: Medium     PEG tube malfunction (H) 10/16/2015     Priority: Medium     Health Care Home 01/16/2015     Priority: Medium     *See Letters for [facility] Care Plan: My Access Plan           PEG (percutaneous endoscopic gastrostomy) status 11/05/2014     Priority: Medium     Gastroparesis 04/11/2014     Priority: Medium     Overview:   Had ileostomy performed at [facility] in Jan 2012 as treatment       Migraines 04/11/2014     Priority: Medium     4/11/2014  With periods, every other month.       Intermittent asthma 04/11/2014     Priority: Medium     4/11/2014   With URIs, allergies       Allergic rhinitis 04/11/2014     Priority: Medium     Problem list name updated by automated process. Provider to review       Abnormal Pap smear of cervix 04/11/2014     Priority: Medium     4/11/2014  S/p colp and LEEP. Sees OB/GYN at [facility]. Pap every year x20 years - normal since.       S/P LEEP of cervix 02/06/2014     Priority: Medium     Patellofemoral stress syndrome 01/18/2014     Priority: Medium     Hx SBO 10/09/2013     Priority: Medium     4/11/2014  Recurrent. Sees GI (Dr. Redding - at [facility]) every 6 months or so.  Sees feeding tube nurse next week.       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 04/11/2013     Priority: Medium     Constipation by delayed colonic transit 10/24/2012     Priority: Medium     Atopic rhinitis 03/20/2009     Priority: Medium      Hyperbilirubinemia 03/11/2009     Priority: Medium        Past Medical History:    Past Medical History:   Diagnosis Date     Asthma      Bilateral ovarian cysts      Cervical cancer (H) 01/01/2008     Chronic pain      Colonic dysmotility      Constipation      E. coli sepsis (H) 5/8/2016     Enteritis      Fungemia 5/5/2016     Gastro-oesophageal reflux disease      H/O ileostomy      Hx of abnormal Pap smear      Hypertension      IBS (irritable bowel syndrome)      Other chronic pain      PONV (postoperative nausea and vomiting)      Thrombosis, hepatic vein (H)        Past [facility] History:    Past [facility] History:   Procedure Laterality Date     COLONOSCOPY  7/10/2012    Procedure: COLONOSCOPY;;  Surgeon: Nicole Redding MD;  [facility]: [facility]     COLONOSCOPY N/A 2/19/2017    Procedure: COLONOSCOPY;  Surgeon: Randell Muller MD;  Location: [facility] GI     COLONOSCOPY N/A 2/21/2017    Procedure: COLONOSCOPY;  Surgeon: Randell Muller MD;  Location: [facility]     COLONOSCOPY N/A 5/3/2018    Procedure: COMBINED COLONOSCOPY, SINGLE OR MULTIPLE BIOPSY/POLYPECTOMY BY BIOPSY;  EGD/Colonoscopy ;  Surgeon: Loi Black MD;  Location: [facility] GI     ECHO CHELO  7/19/2016          ENDOSCOPIC INSERTION TUBE GASTROSTOMY N/A 1/21/2016    Procedure: ENDOSCOPIC INSERTION TUBE GASTROSTOMY;  Surgeon: Nicole Redding MD;  [facility]     ESOPHAGOSCOPY, GASTROSCOPY, DUODENOSCOPY (EGD), COMBINED  7/10/2012    Procedure: COMBINED ESOPHAGOSCOPY, GASTROSCOPY, DUODENOSCOPY (EGD);  Upper Endoscopy, Ileoscopy    Latex Allergy  with biopsies;  Surgeon: Nicole Redding MD;  [facility]     ESOPHAGOSCOPY, GASTROSCOPY, DUODENOSCOPY (EGD), COMBINED N/A 11/5/2014    Procedure: COMBINED ESOPHAGOSCOPY, GASTROSCOPY, DUODENOSCOPY (EGD);  Surgeon: Nicole Rdeding MD;  [facility]     ESOPHAGOSCOPY, GASTROSCOPY, DUODENOSCOPY (EGD), COMBINED N/A 5/3/2018    Procedure: COMBINED ESOPHAGOSCOPY, GASTROSCOPY,  DUODENOSCOPY (EGD), BIOPSY SINGLE OR MULTIPLE;;  Surgeon: Loi Black MD;  Location: [facility]     HC REPLACE DUODENOSTOMY/JEJUNOSTOMY TUBE PERCUTANEOUS N/A 8/27/2015    Procedure: REPLACE GASTROJEJUNOSTOMY TUBE, PERCUTANEOUS;  Surgeon: Mio Holder MD;  [facility]     HC REPLACE DUODENOSTOMY/JEJUNOSTOMY TUBE PERCUTANEOUS N/A 1/7/2016    Procedure: REPLACE JEJUNOSTOMY TUBE, PERCUTANEOUS;  Surgeon: Elsa Medel MD;  [facility]     HC REPLACE DUODENOSTOMY/JEJUNOSTOMY TUBE PERCUTANEOUS N/A 1/28/2016    Procedure: REPLACE JEJUNOSTOMY TUBE, PERCUTANEOUS;  Surgeon: Elsa Medel MD;  [facility]     HC REPLACE GASTROSTOMY/CECOSTOMY TUBE PERCUTANEOUS Left 5/19/2015    Procedure: REPLACE GASTROSTOMY TUBE, PERCUTANEOUS;  Surgeon: Melecio Morejon Chi, MD;  Location: [facility]     HC UGI ENDOSCOPY W PLACEMENT GASTROSTOMY TUBE PERCUT N/A 10/1/2015    Procedure: COMBINED ESOPHAGOSCOPY, GASTROSCOPY, DUODENOSCOPY (EGD), PLACE PERCUTANEOUS ENDOSCOPIC GASTROSTOMY TUBE;  Surgeon: Mio Holder MD;  [facility]     INSERT PORT VASCULAR ACCESS Right 7/20/2017    Procedure: INSERT PORT VASCULAR ACCESS;  Chest Port Placement  **Latex Allergy**;  Surgeon: Coy Rocha PA-C;  Location:  OR     LAPAROSCOPIC ASSISTED COLECTOMY  1/20/2012    Procedure:LAPAROSCOPIC ASSISTED COLECTOMY; Laparoscopic Ileostomy       LAPAROSCOPIC ASSISTED COLECTOMY LEFT (DESCENDING)  10/24/2012    Procedure: LAPAROSCOPIC ASSISTED COLECTOMY LEFT (DESCENDING);   Hand Assisted Laproscopic Subtotal abdominal Colectomy,Iieorectal anastamosis, Ileostomy Closure.       LAPAROSCOPIC ASSISTED INSERTION TUBE JEJUNOSTOMY N/A 10/16/2015    Procedure: LAPAROSCOPIC ASSISTED INSERTION TUBE JEJUNOSTOMY;  Surgeon: Elsa Medel MD;  [facility]     LAPAROSCOPIC CHOLECYSTECTOMY  2002    [facility]. stones duct     LAPAROSCOPIC ILEOSTOMY  1/20/2012    [facility], loop     LAPAROSCOPIC OOPHORECTOMY Right 2009     [facility]     LAPAROTOMY EXPLORATORY N/A 1/28/2016    Procedure: LAPAROTOMY EXPLORATORY;  Surgeon: Elsa Medel MD;  Location: [facility]     LEEP TX, CERVICAL  2009    [facility]     PICC INSERTION Left 10/21/2015    5fr DL Power PICC, 37cm (2cm external) in the L basilic vein w/ tip in the SVC RA junction.     REMOVE GASTROSTOMY TUBE ADULT N/A 12/12/2014    Procedure: REMOVE GASTROSTOMY TUBE ADULT;  Surgeon: Nicole Redding MD;  Location: [facility]     REMOVE PORT VASCULAR ACCESS Right 6/30/2016    Procedure: REMOVE PORT VASCULAR ACCESS;  Surgeon: Pradeep Orosco MD;  Location: [facility] OR     REMOVE PORT VASCULAR ACCESS Right 9/8/2017    Procedure: REMOVE PORT VASCULAR ACCESS;  right side mediport removal;  Surgeon: Zechariah Worthington MD;  Location: [facility] OR     replace GASTROSTOMY TUBE ADULT  5/19/15       Family History:    Family History   Problem Relation Age of Onset     Thyroid Disease Mother      Sjogren's Mother      Gastrointestinal Disease Mother         Intermittent nausea vomiting diarrhea     Colon Polyps Mother      Prostate Problems Father         prostate enlargement     Lupus Maternal Grandmother      Cancer Maternal Grandfather         Lung     Colon Cancer Maternal Grandfather 65     Cancer Paternal Grandmother         Lung      Cerebrovascular Disease Paternal Grandmother      Diabetes Paternal Grandmother      Cardiovascular Paternal Grandmother         CHF     Cancer Paternal Grandfather         Lung     Glaucoma Paternal Grandfather      Abdominal Aortic Aneurysm Other      Macular Degeneration No family hx of        Social History:  Marital Status:   [2]  Social History     Tobacco Use     Smoking status: Current Some Day Smoker     Packs/day: 1.00     Years: 4.00     Pack years: 4.00     Types: Cigarettes     Last attempt to quit: 1/1/2004     Years since quitting: 15.2     Smokeless tobacco: Never Used     Tobacco comment: Vaping   Substance Use Topics     Alcohol use: No      Drug use: No        Medications:      ACETAMINOPHEN PO   albuterol (2.5 MG/3ML) 0.083% neb solution   albuterol 90 MCG/ACT inhaler   cloNIDine (CATAPRES) 0.2 MG tablet   diphenhydrAMINE HCl 50 MG TABS   DULoxetine (CYMBALTA) 60 MG EC capsule   furosemide (LASIX) 20 MG tablet   ipratropium (ATROVENT) 0.02 % neb solution   lisinopril (PRINIVIL/ZESTRIL) 5 MG tablet   LORazepam (ATIVAN) 1 MG tablet   Rivaroxaban (XARELTO PO)   SUMAtriptan (IMITREX) 50 MG tablet   traZODone (DESYREL) 100 MG tablet         Review of Systems   Constitutional: Positive for fatigue. Negative for chills, diaphoresis and fever.   HENT: Negative.    Eyes: Negative.    Respiratory: Positive for chest tightness (left sided radiating into her mid back .) and shortness of breath.    Cardiovascular: Positive for chest pain (Pleuritic pain.). Negative for palpitations and leg swelling.   Gastrointestinal: Positive for abdominal pain (Chronic pain.\) and nausea. Negative for diarrhea and vomiting.   Endocrine: Negative for polydipsia, polyphagia and polyuria.   Genitourinary: Negative for dysuria and pelvic pain.   Musculoskeletal: Negative for arthralgias.   Neurological: Negative.    Psychiatric/Behavioral: The patient is nervous/anxious.    All other systems reviewed and are negative.      Physical Exam   BP: 119/69  Heart Rate: 91  Temp: 98.5  F (36.9  C)  Resp: 20  SpO2: 98 %      Physical Exam   Constitutional: She is oriented to person, place, and time. She appears distressed (Splinting respirations.).   Patient with splinting respirations that are rapid and the patient appears to be in distress secondary to pain with respiratory effort.  Patient also noted to have significant change in her appearance from her last visit a month ago with multiple excoriations on her skin given the impression of a person using methamphetamines as the appearance is similar to others who have been involved with use of that substance.   HENT:   Head:  Normocephalic.   Dry oral mucosa.   Eyes: Conjunctivae and EOM are normal. Pupils are equal, round, and reactive to light.   Neck: No JVD present.   Cardiovascular: Normal rate and normal heart sounds. Exam reveals no friction rub.   No murmur heard.  Pulmonary/Chest: No stridor. She is in respiratory distress. She has no wheezes. She has no rales. She exhibits tenderness.   Patient with rapid and splinting respiratory efforts complaining of feeling pain to the left anterior chest to radiation into her left back with breathing efforts.   Abdominal: Soft. Bowel sounds are increased. There is tenderness (Patient with mild right upper quadrant left upper quadrant and epigastric tenderness but no tenderness to her lower abdomen today.  Abdomen is otherwise soft.).   Musculoskeletal: Normal range of motion.   Neurological: She is alert and oriented to person, place, and time.   Skin:   Skin excoriations noted to the face and upper extremities bilaterally.   Psychiatric: Her mood appears anxious. Her speech is rapid and/or pressured. She is hyperactive. Cognition and memory are normal.   Nursing note and vitals reviewed.      ED Course        Procedures               EKG Interpretation:      Interpreted by Jake Rodriguez  Time reviewed: 6:40 AM  Symptoms at time of EKG: SOB, pleuritic chest pain   Rhythm: normal sinus   Rate: normal  Axis: normal  Ectopy: none  Conduction: normal  ST Segments/ T Waves: No ST-T wave changes  Q Waves: none  Comparison to prior: Unchanged from 2/5/19    Clinical Impression: normal EKG          Critical Care time:  none            No results found. However, due to the size of the patient record, not all encounters were searched. Please check Results Review for a complete set of results.    Medications   HYDROmorphone (PF) (DILAUDID) injection 0.5 mg (0.5 mg Intravenous Given 3/11/19 9011)   Lidocaine (LIDOCARE) 4 % Patch 1 patch (1 patch Transdermal Given 3/11/19 6037)    diphenhydrAMINE (BENADRYL) injection 25 mg (25 mg Intravenous Given 3/11/19 0642)   LORazepam (ATIVAN) injection 1 mg (1 mg Intravenous Given 3/11/19 0644)       Assessments & Plan (with Medical Decision Making)  38-year-old female well-known to these emergency room secondary to concerns of onset of pleuritic type chest pains that started last evening and continued throughout the night.  Patient denied any fever or chills or significant cough but has significant pain with movement of her chest and breathing.  She denied any fall or injury as a reason for her symptoms.  Patient has a history for neuropathy and is on Eliquis 2 times a day.  Patient has significant change in the appearance of her skin with multiple excoriations to her skin make me concerned for the possibility of methamphetamine use but she denies but an urine drug screen was ordered and is pending.  EKG was reassuring.  She is not tachycardic.  Patient states that she ate some pineapple yesterday and sometimes she gets severe allergic reaction to pineapple so she is wondering if that could be a reason for her symptoms.  Pleuritic chest pain, at present of an unknown cause.  Lungs without evidence for wheeze and EKG unremarkable for acute ischemic change or dysrhythmia.  Patient currently taking Eliquis for anticoagulation therapy.  Blood tests, urine tests, and chest x-ray are pending at this time.  Patient has been treated with some IV pain medication and antianxiety medicine along with a Lidoderm patch to apply over the area of maximal tenderness to her chest wall.  I signed out the patient's care to Dr. Workman at shift change. I notified the patient that Dr. Workman would be continuing to care for her and follow-up on the results of the tests. Please review Dr. Workman's ED note for further details on the care and disposition of Doreen Peralta.     I have reviewed the nursing notes.       Final diagnoses:   Pleuritic chest pain       3/11/2019    [facility] EMERGENCY DEPARTMENT     Jake Rodriguez,   03/11/19 0700

## 2019-03-12 VITALS
BODY MASS INDEX: 24.87 KG/M2 | RESPIRATION RATE: 16 BRPM | HEIGHT: 66 IN | WEIGHT: 154.76 LBS | OXYGEN SATURATION: 100 % | TEMPERATURE: 99.7 F | SYSTOLIC BLOOD PRESSURE: 113 MMHG | HEART RATE: 84 BPM | DIASTOLIC BLOOD PRESSURE: 69 MMHG

## 2019-03-12 LAB
ABO + RH BLD: NORMAL
ABO + RH BLD: NORMAL
ANION GAP SERPL CALCULATED.3IONS-SCNC: 7 MMOL/L (ref 3–14)
BLD GP AB SCN SERPL QL: NORMAL
BLD PROD TYP BPU: NORMAL
BLD PROD TYP BPU: NORMAL
BLD UNIT ID BPU: 0
BLOOD BANK CMNT PATIENT-IMP: NORMAL
BLOOD PRODUCT CODE: NORMAL
BPU ID: NORMAL
BUN SERPL-MCNC: 7 MG/DL (ref 7–30)
CALCIUM SERPL-MCNC: 8.2 MG/DL (ref 8.5–10.1)
CHLORIDE SERPL-SCNC: 105 MMOL/L (ref 94–109)
CO2 SERPL-SCNC: 24 MMOL/L (ref 20–32)
CREAT SERPL-MCNC: 0.71 MG/DL (ref 0.52–1.04)
ERYTHROCYTE [DISTWIDTH] IN BLOOD BY AUTOMATED COUNT: 17.4 % (ref 10–15)
GFR SERPL CREATININE-BSD FRML MDRD: >90 ML/MIN/{1.73_M2}
GLUCOSE SERPL-MCNC: 103 MG/DL (ref 70–99)
HCT VFR BLD AUTO: 25.3 % (ref 35–47)
HGB BLD-MCNC: 8.2 G/DL (ref 11.7–15.7)
HGB BLD-MCNC: 8.6 G/DL (ref 11.7–15.7)
MCH RBC QN AUTO: 25.1 PG (ref 26.5–33)
MCHC RBC AUTO-ENTMCNC: 32.4 G/DL (ref 31.5–36.5)
MCV RBC AUTO: 77 FL (ref 78–100)
NUM BPU REQUESTED: 2
PLATELET # BLD AUTO: 291 10E9/L (ref 150–450)
POTASSIUM SERPL-SCNC: 4.2 MMOL/L (ref 3.4–5.3)
RBC # BLD AUTO: 3.27 10E12/L (ref 3.8–5.2)
SODIUM SERPL-SCNC: 136 MMOL/L (ref 133–144)
SPECIMEN EXP DATE BLD: NORMAL
TRANSFUSION STATUS PATIENT QL: NORMAL
TRANSFUSION STATUS PATIENT QL: NORMAL
WBC # BLD AUTO: 10.6 10E9/L (ref 4–11)

## 2019-03-12 PROCEDURE — A9270 NON-COVERED ITEM OR SERVICE: HCPCS | Mod: GY | Performed by: NURSE PRACTITIONER

## 2019-03-12 PROCEDURE — 36430 TRANSFUSION BLD/BLD COMPNT: CPT

## 2019-03-12 PROCEDURE — 36416 COLLJ CAPILLARY BLOOD SPEC: CPT | Performed by: NURSE PRACTITIONER

## 2019-03-12 PROCEDURE — 85027 COMPLETE CBC AUTOMATED: CPT | Performed by: NURSE PRACTITIONER

## 2019-03-12 PROCEDURE — 96376 TX/PRO/DX INJ SAME DRUG ADON: CPT

## 2019-03-12 PROCEDURE — P9016 RBC LEUKOCYTES REDUCED: HCPCS | Performed by: NURSE PRACTITIONER

## 2019-03-12 PROCEDURE — 99217 ZZC OBSERVATION CARE DISCHARGE: CPT | Performed by: NURSE PRACTITIONER

## 2019-03-12 PROCEDURE — G0378 HOSPITAL OBSERVATION PER HR: HCPCS

## 2019-03-12 PROCEDURE — 80048 BASIC METABOLIC PNL TOTAL CA: CPT | Performed by: NURSE PRACTITIONER

## 2019-03-12 PROCEDURE — 25000132 ZZH RX MED GY IP 250 OP 250 PS 637: Mod: GY | Performed by: NURSE PRACTITIONER

## 2019-03-12 PROCEDURE — 85018 HEMOGLOBIN: CPT | Mod: 91 | Performed by: FAMILY MEDICINE

## 2019-03-12 PROCEDURE — 25000128 H RX IP 250 OP 636: Performed by: NURSE PRACTITIONER

## 2019-03-12 PROCEDURE — 36416 COLLJ CAPILLARY BLOOD SPEC: CPT | Performed by: FAMILY MEDICINE

## 2019-03-12 RX ORDER — LORAZEPAM 1 MG/1
1 TABLET ORAL EVERY 4 HOURS PRN
Status: DISCONTINUED | OUTPATIENT
Start: 2019-03-12 | End: 2019-03-12 | Stop reason: HOSPADM

## 2019-03-12 RX ORDER — LORAZEPAM 1 MG/1
1 TABLET ORAL EVERY 4 HOURS PRN
Qty: 12 TABLET | Refills: 0 | Status: SHIPPED | OUTPATIENT
Start: 2019-03-12 | End: 2019-06-21

## 2019-03-12 RX ORDER — HYDROMORPHONE HYDROCHLORIDE 2 MG/1
2 TABLET ORAL
Qty: 24 TABLET | Refills: 0 | Status: SHIPPED | OUTPATIENT
Start: 2019-03-12 | End: 2019-06-21

## 2019-03-12 RX ORDER — ONDANSETRON 4 MG/1
4 TABLET, ORALLY DISINTEGRATING ORAL EVERY 8 HOURS PRN
Qty: 20 TABLET | Refills: 0 | Status: SHIPPED | OUTPATIENT
Start: 2019-03-12 | End: 2019-06-21

## 2019-03-12 RX ORDER — DIPHENHYDRAMINE HCL 25 MG
50 CAPSULE ORAL EVERY 6 HOURS PRN
Status: DISCONTINUED | OUTPATIENT
Start: 2019-03-12 | End: 2019-03-12 | Stop reason: HOSPADM

## 2019-03-12 RX ADMIN — APIXABAN 5 MG: 5 TABLET, FILM COATED ORAL at 12:58

## 2019-03-12 RX ADMIN — METHOCARBAMOL TABLETS 500 MG: 500 TABLET, COATED ORAL at 07:58

## 2019-03-12 RX ADMIN — HYDROMORPHONE HYDROCHLORIDE 2 MG: 2 TABLET ORAL at 01:34

## 2019-03-12 RX ADMIN — LORAZEPAM 1 MG: 1 TABLET ORAL at 13:58

## 2019-03-12 RX ADMIN — DIPHENHYDRAMINE HYDROCHLORIDE 50 MG: 50 INJECTION, SOLUTION INTRAMUSCULAR; INTRAVENOUS at 01:45

## 2019-03-12 RX ADMIN — HYDROMORPHONE HYDROCHLORIDE 2 MG: 2 TABLET ORAL at 11:13

## 2019-03-12 RX ADMIN — DIPHENHYDRAMINE HYDROCHLORIDE 50 MG: 25 CAPSULE ORAL at 13:58

## 2019-03-12 RX ADMIN — HYDROMORPHONE HYDROCHLORIDE 2 MG: 2 TABLET ORAL at 07:58

## 2019-03-12 RX ADMIN — LORAZEPAM 1 MG: 2 INJECTION, SOLUTION INTRAMUSCULAR; INTRAVENOUS at 03:49

## 2019-03-12 RX ADMIN — HYDROMORPHONE HYDROCHLORIDE 2 MG: 2 TABLET ORAL at 14:11

## 2019-03-12 NOTE — PROVIDER NOTIFICATION
"RN at bedside regarding IV possible infiltrate.   During the assessment of Left arm, no infiltrate noted, patient stated \"no one will listen to me\"  \" I can't breath\"  \"I feel like I am dying.\"   Vitals signs HR, BP, RR WNL.  She does appear upset/distressed.   Discussed with Dr. Pak and plan is for 12 Lead.  Discussed with bedside RN, who related that she had just rounded on patient and she was asleep.   "

## 2019-03-12 NOTE — ASSESSMENT & PLAN NOTE
Unclear GIB at this time  No active bleeding noted but 2 gram drop in one month  Will need to monitor closely

## 2019-03-12 NOTE — PROGRESS NOTES
S-(situation): shift note    B-(background): abdominal/chest pain, low hgb    A-(assessment): pt is oriented when awake, asks for pain and anxiety medication whenever awake, then after given medication pt is lethargic but when awakens asks for more while falling asleep again. Pt states has upper abdominal pain that radiates to chest, stating pain with deep breaths. Lungs are clear, RA O2 sats 95-98% t/o night. Pt received second unit of PRBCs and tolerated well. Pt received oral Dilaudid x1, Benadryl before blood transfusion and later in night Ativan. Pt informed that another dose of Dilaudid would not be given when pt is lethargic. vss with occasional BPs 90s/60s after prn meds given. Temp max 99.8 (o) and is 98.4 (o) now.       R-(recommendations): monitor for s/s bleeding, send stool sample to lab when able, monitor morning labs, vs, pain control.

## 2019-03-12 NOTE — ASSESSMENT & PLAN NOTE
Noted drop from 10  in November  Hgb 8.5 on February 8, 2019  Drop to 6.4 today  No signs of bleeding  No Melena, No menstrual cycles, no noted bleeding  History of iron deficiency anemia  Will transfuse 2 units of blood today  Check Occult blood  Monitor closely   Will need to transfer out of EGD/intervention needed

## 2019-03-12 NOTE — ASSESSMENT & PLAN NOTE
Patient with upper abdominal pain, epigastric pain, chronic back pain  Patient states her pain started with back pain and has moved to her upper abdomen and epigastric area  Pain is severe, worse with touch and movement  Patient is not able to tolerate gentle palpation or even listening to heart tones  Discussed this seems to be muscular  Patient has not had relief with Dilaudid, Ativan, Benadryl  She does fall asleep but will wake up in pain  Will add Toradol, Robaxin  Monitor closely and wean narcotics  Patient with a significant narcotic history   Ordered GI Cocktail in case GERD is a source

## 2019-03-12 NOTE — DISCHARGE INSTRUCTIONS
Hospital follow up appointments:    [facility]  [place] Schaefer  Wednesday March 13th (tomorrow) at 8:25am  [phone]      [facility]  Thursday March 14th at 2:15pm  Shari Matos  [phone]      Oncology  Tuesday March 26th at 2:15   Dr. Millan  [facility]    Rheumatology  Wednesday April 24th at 11:00  [place]   [phone]  They will be sending you out a packet with directions and information.

## 2019-03-12 NOTE — ASSESSMENT & PLAN NOTE
Patient with incidental finding of a small new pulmonary blood clot  Patient is not able to state her medication compliance  Patient is supposed to be on Elliquis  Will need to transition to this on discharge  With active bleeding suspected will hold at this time  Spoke to Dr Tena at the [facility] about the above plan

## 2019-03-12 NOTE — PROGRESS NOTES
S-(situation): End of 4 hour shift note    B-(background): Abdominal / chest pain    A-(assessment) T 99.9 tylenol given,  pt c/o pain chest, spasms in her back, Robaxin, dilaudid and Ativan given.  Pt rest comfortable after.   Pt has a scab on right wrist, appears to be non healing, pt states she is a .   Pt needed to use bedpan, states she is unable to stand d/t back spasms.  Hgb 6.4 order to infuse 2 units RBC    R-(recommendations): monitor pain, hemoglobin

## 2019-03-12 NOTE — DISCHARGE SUMMARY
[facility]  Hospitalist Discharge Summary       Date of Admission:  3/11/2019  Date of Discharge:  3/12/2019  Discharging Provider: Ange Pettit CNP      Discharge Diagnoses   Principal Problem:    Anemia  Active Problems:    Other pulmonary embolism     GIB (gastrointestinal bleeding) - suspected    Chronic diarrhea    History of deep venous thrombosis    Abdominal pain    Iron deficiency anemia      Follow-ups Needed After Discharge   Follow-up Appointments     Follow-up and recommended labs and tests       Follow up with primary care provider, Shari Matos PA-C, within 7 days   for hospital follow- up. Patient with new musculoskeletal pain and patient   and family are worried about a possible autoimmune disease or M/S disease.    Patient also needs follow up with Gastroenterology ASAP.                Unresulted Labs Ordered in the Past 30 Days of this Admission     Date and Time Order Name Status Description    2/5/2019 2247 T4 free In process           Discharge Disposition   Discharged to home  Condition at discharge: Stable    Hospital Course      Doreen Peralta is a 38 year old female admitted to observation on 3/11/2019.      Anemia  Noted drop from 10  in November and 8.5 on February 8, 2019. Patient presented with a hemoglobin of 6.4 on presentation.  She can not recall signs of bleeding, no Melena, No menstrual cycles, no noted bright red bleeding. She has a history of iron deficiency anemia. Patient was admitted to observation for 2 units of blood. She tolerated this well.  She did not have a stool while here overnight, we were unable to evaluate for occult blood. Patient will need a referral to GI ASAP. This was ordered. Patient will need to either go to or be transferred to a larger hospital for colonoscopy or EGD/intervention needed as we are not able to care for this here in [place].     Chronic diarrhea  Chronic and stable. No stools while here.      Other pulmonary embolism   Patient with incidental finding of a small new pulmonary blood clot. Patient is not able to state her medication compliance.   Patient is supposed to be on Elliquis. Will need to transition to this on discharge, she did not have any signs of bleeding while in the hospital. No active bleeding noted. Patient will need to remember to take this medication on a schedule, every 12 hours. Patient states she understands this. I spoke to Dr Tena at the [facility] - Hematology about the above plan.       GIB (gastrointestinal bleeding) - suspected  Unclear GIB at this time, patient has no signs of bleeding while in the hospital. She had not noted any signs of bleeding at home. No active bleeding noted but 2 gram drop in one month. Will need to have follow up with GI specialist.     History of deep venous thrombosis  Hx of DVT/ liver clot. New small PE noted on CT this admission. Complicated with possible GI bleed. Will need ongoing anticoagulation and follow up with Hematology.       Iron deficiency anemia  Noted in the past, Patient stopping going to her iron infusions. Iron work up added to labs today for follow up provider information.  Will need follow up with Gastroenterology and Hematology.        Chest wall pain/ Abdominal pain  Patient with upper abdominal pain, epigastric pain, and acute on chronic back pain. Patient states her pain started with back pain and has moved to her upper abdomen and epigastric area. Patient describes pain as cramping, spasms.  Patient is worsened by air hunger and anxiety, patient then breaths very shallow and fast leading to more muscle cramping.  Pain is severe, worse with touch and movement. Patient is not able to tolerate gentle palpation or even listening to heart tones. Patient and family asking about possible auto immune disorders or muscular disorders. Discussed that I am not able to do a work up for those, but will provide a referral.  Patient states  the most relief is with Ativan and Benadryl. Robaxin did not help. She is also using Dilaudid for pain control.  She does fall asleep but will wake up in pain. Discussed that patient will be given a 3 day prescription, and I highly encourage her to wean and stop narcotic use. She has a significant narcotic history.       Consultations This Hospital Stay   None    Code Status   Full Code    Time Spent on this Encounter   I, Ange Pettit, personally saw the patient today and spent greater than 30 minutes discharging this patient.       Ange Pettit, [facility]  ______________________________________________________________________    Physical Exam   Vital Signs: Temp: 99.7  F (37.6  C) Temp src: Oral BP: 113/69 Pulse: 84 Heart Rate: 87 Resp: 16 SpO2: 100 % O2 Device: None (Room air) Oxygen Delivery: 2 LPM  Weight: 154 lbs 12.21 oz    Exam:  Constitutional: healthy, alert, mild distress and crying  Head: Normocephalic. No masses, lesions, tenderness or abnormalities  Cardiovascular: PMI normal. No lifts, heaves, or thrills. RRR. No murmurs, clicks gallops or rub  Respiratory: Percussion normal. Good diaphragmatic excursion. Lungs clear  Gastrointestinal: Abdomen soft, non-tender. BS normal.    Musculoskeletal: extremities normal- no gross deformities noted, gait normal and tender to palpation everywhere  Skin: no suspicious lesions or rashes  Neurologic: Gait normal. Reflexes normal and symmetric. Sensation grossly WNL.  Psychiatric: mentation appears normal, anxious, concentration poor, crying and fatigued         Primary Care Physician   Shari Matos PA-C    Immunizations       Discharge Orders      ONC/HEME ADULT REFERRAL      GASTROENTEROLOGY ADULT REF CONSULT ONLY      GASTROENTEROLOGY ADULT REF PROCEDURE ONLY [facility] [phone]; No Preference - GI Provider Only      RHEUMATOLOGY REFERRAL      Reason for your hospital stay    You were in the hospital  for low hemoglobin and chest wall pain. You also had a CT that showed a small blood clot in yo0ur lungs. We did not find a source of bleeding but it is thought that you are bleeding from your colon or stomach.     Follow-up and recommended labs and tests     Follow up with primary care provider, Shari Matos PA-C, within 7 days for hospital follow- up. Patient with new musculoskeletal pain and patient and family are worried about a possible autoimmune disease or M/S disease.  Patient also needs follow up with Gastroenterology ASAP.     Activity    Your activity upon discharge: activity as tolerated and no driving for today     Monitor and record    Monitor for blood in stools or dark/tarry stools.     Diet    Follow this diet upon discharge: Orders Placed This Encounter      Regular Diet Adult       Significant Results and Procedures   Results for orders placed or performed during the hospital encounter of 03/11/19   XR Chest 2 Views    Narrative    CHEST TWO VIEWS  3/11/2019 7:14 AM     HISTORY: SOB, Pleuritic chest pain    COMPARISON: 11/27/2018      Impression    IMPRESSION: Lungs are clear given the more shallow degree of  inspiration. Heart and pulmonary vessels within normal limits. No  evidence for pleural effusion.    TERRIE TOTH MD   CT Chest (PE) Abdomen Pelvis w Contrast    Narrative    CT CHEST PULMONARY EMBOLUS, ABDOMEN AND PELVIS WITH CONTRAST 3/11/2019  9:18 AM    HISTORY: Pulmonary embolus suspected, intermediate probability,  positive D-dimer.    COMPARISON: 3/11/2019 chest x-ray and 8/7/2018 CT abdomen and pelvis.    Technique: Thoracic inlet to pubic symphysis with IV contrast.  Pulmonary embolus technique in the chest. Contrast: 80 mL Isovue- 370.  Radiation dose for this scan was reduced using automated exposure  control, adjustment of the mA and/or kV according to patient size, or  iterative reconstruction technique.    FINDINGS:  Chest: Very tiny 0.2 cm filling defect in proximal right  lower lobe  pulmonary artery, series 6 image 63. This is peripheral in location  and suggests a very tiny pulmonary embolus of indeterminate age, could  be chronic. This was discussed by myself with Dr. Workman at 9:50 AM on  3/11/2019. No other evidence for pulmonary embolus. No evidence for  thoracic aortic dissection. Small left pleural effusion and bilateral  lower lobe patchy groundglass and linear opacities, greater on the  left than the right, consistent with infectious or inflammatory  etiology or fairly prominent atelectasis.    Abdomen and pelvis: Cholecystectomy clips. Normal-appearing liver,  spleen, pancreas, adrenal glands. There is a tiny nonobstructing 0.2  cm mid right kidney stone. The right kidney otherwise appears normal.  No periaortic or pelvic adenopathy or free fluid. Patient is status  post partial colectomy with anastomosis in the right pelvis. There is  increased fecal-like material at the anastomotic site but no  convincing acute-appearing bowel abnormality. No aggressive bone  lesions. Unremarkable bladder.      Impression    IMPRESSION:   1. Very tiny 0.2 cm filling defect proximal right lower lobe pulmonary  artery suggestive of a very tiny pulmonary embolus of indeterminate  age.  2. Small left pleural effusion, lower lobe opacities, greater on the  left, consistent with scattered atelectasis as well as additional  groundglass atelectasis opacities which could be atelectasis versus  infectious or inflammatory process.  3. Tiny nonobstructing right kidney stone stable since 8/7/2018.  4. Postoperative bowel changes in the right lower quadrant, but no  convincing acute abnormality in the abdomen or pelvis.    TERRIE TOTH MD     *Note: Due to a large number of results and/or encounters for the requested time period, some results have not been displayed. A complete set of results can be found in Results Review.       Discharge Medications   Current Discharge Medication List      START  taking these medications    Details   apixaban ANTICOAGULANT (ELIQUIS) 5 MG tablet Take 1 tablet (5 mg) by mouth 2 times daily  Qty: 60 tablet, Refills: 0    Associated Diagnoses: History of deep venous thrombosis; Other acute pulmonary embolism without acute cor pulmonale (H); Gastrointestinal hemorrhage, unspecified gastrointestinal hemorrhage type; Coagulation defect (H)      LORazepam (ATIVAN) 1 MG tablet Take 1 tablet (1 mg) by mouth every 4 hours as needed for anxiety  Qty: 12 tablet, Refills: 0    Associated Diagnoses: Anxiety         CONTINUE these medications which have CHANGED    Details   HYDROmorphone (DILAUDID) 2 MG tablet Take 1 tablet (2 mg) by mouth every 3 hours as needed for moderate to severe pain  Qty: 24 tablet, Refills: 0    Associated Diagnoses: Pleuritic chest pain      ondansetron (ZOFRAN ODT) 4 MG ODT tab Take 1 tablet (4 mg) by mouth every 8 hours as needed for nausea  Qty: 20 tablet, Refills: 0    Associated Diagnoses: Nausea         CONTINUE these medications which have NOT CHANGED    Details   ACETAMINOPHEN PO Take 500-1,000 mg by mouth every 6 hours as needed for pain       albuterol 90 MCG/ACT inhaler Inhale 2 puffs into the lungs every 6 hours as needed       Alfalfa 650 MG TABS Take 650 mg by mouth daily      cloNIDine (CATAPRES) 0.2 MG tablet Take 2 tablets (0.4 mg) by mouth every evening - DUE FOR FOLLOW UP  Qty: 60 tablet, Refills: 0    Comments: Due for appointment for further refills, please give reminder.  Associated Diagnoses: Anxiety      diphenhydrAMINE HCl 50 MG TABS Take 50 mg by mouth every 6 hours as needed (nausea)  Qty: 30 tablet, Refills: 0      DULoxetine (CYMBALTA) 60 MG EC capsule TAKE 1 CAPSULE(60 MG) BY MOUTH DAILY  Qty: 90 capsule, Refills: 1    Associated Diagnoses: Abdominal pain, epigastric; Abdominal migraine, not intractable      furosemide (LASIX) 20 MG tablet Take 20 mg by mouth      ipratropium (ATROVENT) 0.02 % neb solution Take 2.5 mLs (0.5 mg) by  nebulization every 6 hours as needed for wheezing  Qty: 225 mL    Associated Diagnoses: Gastrostomy tube dependent (H); SBO (small bowel obstruction) (H); Chronic abdominal pain; Chronic diarrhea      lisinopril (PRINIVIL/ZESTRIL) 2.5 MG tablet TK 1 T PO ONCE D  Refills: 0      Multiple Vitamin (TAB-A-PRASANNA) TABS Take 1 tablet by mouth      mupirocin (BACTROBAN) 2 % external ointment Apply topically daily  Refills: 0      SUMAtriptan (IMITREX) 50 MG tablet Take 1-2 tablets ( mg) by mouth at onset of headache for migraine - LAST REFILL, DUE FOR FOLLOW UP  Qty: 9 tablet, Refills: 0    Associated Diagnoses: Migraine without aura and without status migrainosus, not intractable      traZODone (DESYREL) 100 MG tablet Take 0.5-1 tablets ( mg) by mouth nightly as needed for sleep  Qty: 90 tablet, Refills: 8    Associated Diagnoses: Insomnia, unspecified type      albuterol (2.5 MG/3ML) 0.083% neb solution Take 1 vial (2.5 mg) by nebulization every 4 hours as needed for shortness of breath / dyspnea or wheezing  Qty: 360 mL    Associated Diagnoses: Gastrostomy tube dependent (H); SBO (small bowel obstruction) (H); Chronic abdominal pain; Chronic diarrhea      NARCAN 4 MG/0.1ML nasal spray Spray 1 spray in nostril as needed  Refills: 0         STOP taking these medications       fidaxomicin (DIFICID) 200 MG tablet Comments:   Reason for Stopping:         methocarbamol (ROBAXIN) 500 MG tablet Comments:   Reason for Stopping:         Rivaroxaban (XARELTO PO) Comments:   Reason for Stopping:             Allergies   Allergies   Allergen Reactions     Azithromycin Other (See Comments) and Itching     Other reaction(s): Throat Swelling/Closing  Burning in throat     Hyoscyamine Rash     Droperidol Hives and Rash     Metoclopramide Other (See Comments)     Eye twitching.      Peaches [Peach] Other (See Comments)     Raw. Cooked OK     Sucralose Other (See Comments)     All artificial sweeteners. Aspartame also. Swollen  glands     Advair Diskus Other (See Comments)     Throat burns     Compazine [Prochlorperazine] Visual Disturbance     Contrast Dye Itching     States is allergic to CT contrast dye     Cyclobenzaprine Visual Disturbance     Diatrizoate Other (See Comments)     Patient reports she tolerates IV contrast if given 50mg IV of Benadryl prior to scan.      Fentanyl Other (See Comments)     migraine     Fluticasone-Salmeterol      Throat burns     Ibuprofen GI Disturbance     Lactulose Nausea and Vomiting     Gas and bloating     Levaquin [Levofloxacin] Swelling     Per ED M.D. And RN      Morphine Sulfate Other (See Comments)     Chest pain       Oxycodone Other (See Comments)     Burning throat, but can take Norco.      Oxycodone-Acetaminophen      Burning in throat  Throat burns     Rizatriptan Visual Disturbance     Isovue [Iopamidol] Palpitations     Pt had racing heart and sob      Ketorolac Anxiety     Latex Swelling and Rash     Kiwi, likely also avacado, ? banana     Levsin Rash

## 2019-03-12 NOTE — ASSESSMENT & PLAN NOTE
Noted in the past  Patient stopping going to her iron infusions  Iron work up added to labs today  Will need follow up with Gastroenterology

## 2019-03-12 NOTE — PROGRESS NOTES
SPIRITUAL HEALTH SERVICES  SPIRITUAL ASSESSMENT Progress Note  [facility]      During Rounding,  introduced himself to Doreen Peralta and informed her of his availability.    Marshal Blackman M.Div., [facility]  Staff   Office tel: [phone]

## 2019-03-12 NOTE — PROGRESS NOTES
S-(situation): End of shift note.    B-(background): Abd pain    A-(assessment): VSS, afebrile.  C/o persistent epigastric pain.  PRN medications given when available and appropriate.  IV infiltrated.  Ok to leave out per MD.  Patient advised to push fluids.  Verbalizes understanding.  Discharge to home following instructions by provider.  Discharge instructions reviewed and all questions answered.  Patient given PRN meds prior to discharge to ensure patient's comfort.    R-(recommendations): Discharge home per providers orders.

## 2019-03-15 NOTE — PROGRESS NOTES
"Doreen Peralta  Gender: female  : 1980  [address]   [place] [address] [phone] (home)     Medical Record: [id number]  Pharmacy:    [id number] - [place], [place] - [address] AT [address]  [facility] - [place] - [address]  Primary Care Provider: Shari Matos    Parent's names are: Aby Rainey (mother) and Data Unavailable (father).      [facility]  March 15, 2019     Discharge Phone Call:  Key Words/Key Times      Introduction - AIDET (Acknowledge, Introduce, Duration, Explanation)      Empathy-   We are calling to see how you are since your recent stay in the hospital?     Call back COMMENTS: Ended up in [facility] 3/13/19 and discharged on 3/14/19.   Patient spiked a fever and was told by [place] to go to a bigger hospital to obtain an Endoscopy.  Patient is now awaiting results.       Clinical Questions -  (f/u appts, medication side effects/purpose, ability to care for self at home) \"For your safety, it is important to us that you understand the purpose and side effects of your medications, can you tell me what your new medications are?\"     Call back COMMENTS: [place] instructed patient to stop taking Eliquis and resume in 3 days.  Taking ativan here and there as well as dilaudid.       Staff Recognition -  We like to recognize staff and physicians who have done an excellent job.  Do you remember any people from your care team that you would like recognize?     Call back COMMENTS: Ange was really nice.         Very Good Care -  We want to provide very good care to all patients.  How was your care?     Call back COMMENTS: It was great and always is.        Opportunities for Improvement -  Our goal is to be the best.  Do you have any suggestions for things that we could improve upon?     Call back COMMENTS: No suggestions.       Thank You         "

## 2019-06-21 ENCOUNTER — HOSPITAL ENCOUNTER (EMERGENCY)
Facility: CLINIC | Age: 39
Discharge: HOME | End: 2019-06-21
Attending: EMERGENCY MEDICINE | Admitting: EMERGENCY MEDICINE
Payer: MEDICARE

## 2019-06-21 VITALS
OXYGEN SATURATION: 100 % | RESPIRATION RATE: 16 BRPM | TEMPERATURE: 99.2 F | HEART RATE: 71 BPM | WEIGHT: 155 LBS | BODY MASS INDEX: 24.91 KG/M2 | HEIGHT: 66 IN | SYSTOLIC BLOOD PRESSURE: 131 MMHG | DIASTOLIC BLOOD PRESSURE: 79 MMHG

## 2019-06-21 DIAGNOSIS — R10.84 ABDOMINAL PAIN, GENERALIZED: ICD-10-CM

## 2019-06-21 PROCEDURE — 99283 EMERGENCY DEPT VISIT LOW MDM: CPT | Performed by: EMERGENCY MEDICINE

## 2019-06-21 PROCEDURE — 99284 EMERGENCY DEPT VISIT MOD MDM: CPT | Mod: Z6 | Performed by: EMERGENCY MEDICINE

## 2019-06-21 RX ORDER — LORAZEPAM 1 MG/1
1 TABLET ORAL EVERY 6 HOURS PRN
Qty: 10 TABLET | Refills: 0 | Status: SHIPPED | OUTPATIENT
Start: 2019-06-21

## 2019-06-21 RX ORDER — HYDROMORPHONE HYDROCHLORIDE 2 MG/1
2 TABLET ORAL EVERY 6 HOURS PRN
Qty: 10 TABLET | Refills: 0 | Status: SHIPPED | OUTPATIENT
Start: 2019-06-21 | End: 2019-06-22

## 2019-06-21 RX ORDER — ONDANSETRON 4 MG/1
4 TABLET, ORALLY DISINTEGRATING ORAL EVERY 8 HOURS PRN
Qty: 20 TABLET | Refills: 0 | Status: SHIPPED | OUTPATIENT
Start: 2019-06-21 | End: 2019-06-27

## 2019-06-21 ASSESSMENT — MIFFLIN-ST. JEOR: SCORE: 1399.83

## 2019-06-21 NOTE — ED PROVIDER NOTES
"  History     Chief Complaint   Patient presents with     Nausea & Vomiting     The history is provided by the patient.     This is a 39-year-old female with extensive past medical history including hypertension, IBS, C. difficile, slow transit constipation/gastroparesis status post partial colectomy, chronic abdominal pain, provoked DVTs presented with nausea and vomiting.  Patient is well-known to this ED and has been seen multiple times with multiple medical issues.  At this point, patient states that she was recently diagnosed with a urinary tract infection and was on antibiotics.  She states she is colonized with C. difficile and when she is on antibiotics she will often get a flare of C. difficile.  She apparently was placed on Flagyl while on the antibiotics but previously has been on Dificid.  She notes upper abdominal pain, nausea and vomiting.  She also has some lower abdominal bloating and malodorous fluctuance associated with her \"C. difficile flares\".  She has not had any fevers but has had some chills.  She has had decreased oral intake of solids and fluids.  She tried Zofran 12 mg and a Benadryl this morning with only mild relief.  She is still making urine.  No cold or cough symptoms.  She previously was on chronic narcotics but has apparently been off them for a couple of months.      Allergies:  Allergies   Allergen Reactions     Azithromycin Other (See Comments) and Itching     Other reaction(s): Throat Swelling/Closing  Burning in throat     Hyoscyamine Rash     Droperidol Hives and Rash     Metoclopramide Other (See Comments)     Eye twitching.      Peaches [Peach] Other (See Comments)     Raw. Cooked OK     Sucralose Other (See Comments)     All artificial sweeteners. Aspartame also. Swollen glands     Advair Diskus Other (See Comments)     Throat burns     Compazine [Prochlorperazine] Visual Disturbance     Contrast Dye Itching     States is allergic to CT contrast dye     Cyclobenzaprine " Visual Disturbance     Diatrizoate Other (See Comments)     Patient reports she tolerates IV contrast if given 50mg IV of Benadryl prior to scan.      Fentanyl Other (See Comments)     migraine     Fluticasone-Salmeterol      Throat burns     Ibuprofen GI Disturbance     Lactulose Nausea and Vomiting     Gas and bloating     Levaquin [Levofloxacin] Swelling     Per ED M.D. And RN      Morphine Sulfate Other (See Comments)     Chest pain       Oxycodone Other (See Comments)     Burning throat, but can take Norco.      Oxycodone-Acetaminophen      Burning in throat  Throat burns     Rizatriptan Visual Disturbance     Isovue [Iopamidol] Palpitations     Pt had racing heart and sob      Ketorolac Anxiety     Latex Swelling and Rash     Kiwi, likely also avacado, ? banana     Levsin Rash       Problem List:    Patient Active Problem List    Diagnosis Date Noted     Anemia 03/11/2019     Priority: Medium     Other pulmonary embolism  03/11/2019     Priority: Medium     GIB (gastrointestinal bleeding) - suspected 03/11/2019     Priority: Medium     Bacteremia 06/26/2018     Priority: Medium     Acute renal failure (H) 04/21/2018     Priority: Medium     History of bacteremia - recurrent 04/17/2018     Priority: Medium     Tobacco abuse 04/16/2018     Priority: Medium     Iron deficiency anemia 04/16/2018     Priority: Medium     Stool toxin PCR positive for Clostridium difficile on 4/17/18 04/16/2018     Priority: Medium     Gram negative septicemia (H) - Ochrobactrum, Stenotrophomonas & Pantoea 08/24/2017     Priority: Medium     Hypokalemia 08/24/2017     Priority: Medium     Essential hypertension 08/24/2017     Priority: Medium     Sepsis due to Klebsiella (H) 07/27/2017     Priority: Medium     Insomnia, unspecified type 03/31/2017     Priority: Medium     Abdominal pain 02/15/2017     Priority: Medium     Abdominal pain, generalized 01/28/2017     Priority: Medium     History of deep venous thrombosis 01/26/2017      Priority: Medium     Nausea and vomiting 01/26/2017     Priority: Medium     Vitamin D deficiency 01/16/2017     Priority: Medium     Chronic abdominal pain 11/15/2016     Priority: Medium     Patient is followed by Larisa Lorenzo PA-C for ongoing prescription of pain medication.  All refills should be approved by this provider, or covering partner.    Medication(s): no regular narcotics - narcotics given at ED visits  Maximum quantity per month: N/A  Clinic visit frequency required: Q 6  months     Controlled substance agreement:  Encounter-Level CSA - 11/9/15:               Controlled Substance Agreement - Scan on 11/19/2015 11:17 AM : CONTROLLED SUBSTANCE AGREEMENT 11/09/15 (below)          Encounter-Level CSA - 2/27/15:               Controlled Substance Agreement - Scan on 3/12/2015  7:50 AM : Controlled Medication Agreement 02/27/15 (below)            Pain Clinic evaluation in the past: Yes    DIRE Total Score(s):  No flowsheet data found.    Last [facility] website verification:  done on 11/15/16   [email or web address]        Attention to G-tube (H) 11/08/2016     Priority: Medium     Fever 10/16/2016     Priority: Medium     Coagulation defect (H) [D68.9] 09/16/2016     Priority: Medium     Chronic diarrhea 07/22/2016     Priority: Medium     Migraine 07/20/2016     Priority: Medium     Positive blood culture - Klebsiella oxytoca from Port-a-cath culture 07/18/2016     Priority: Medium     Mitral regurgitation mild-mod by Echo June 2016 07/18/2016     Priority: Medium     Anemia, iron deficiency 07/17/2016     Priority: Medium     Anemia in other chronic diseases classified elsewhere 06/14/2016     Priority: Medium     Munchausen syndrome - previously suspected 06/14/2016     Priority: Medium     Long-term (current) use of anticoagulants [Z79.01] 05/16/2016     Priority: Medium     Anxiety 05/16/2016     Priority: Medium     S/P partial resection of colon 04/11/2016     Priority: Medium      Malnutrition (H) 04/11/2016     Priority: Medium     Jejunostomy tube present (H) 03/21/2016     Priority: Medium     Malfunctioning jejunostomy tube (H) 12/22/2015     Priority: Medium     Malfunction of gastrostomy tube (H) 11/19/2015     Priority: Medium     PEG tube malfunction (H) 10/16/2015     Priority: Medium     Health Care Home 01/16/2015     Priority: Medium     *See Letters for [facility] Care Plan: My Access Plan           PEG (percutaneous endoscopic gastrostomy) status 11/05/2014     Priority: Medium     Gastroparesis 04/11/2014     Priority: Medium     Overview:   Had ileostomy performed at [facility] in Jan 2012 as treatment       Migraines 04/11/2014     Priority: Medium     4/11/2014  With periods, every other month.       Intermittent asthma 04/11/2014     Priority: Medium     4/11/2014   With URIs, allergies       Allergic rhinitis 04/11/2014     Priority: Medium     Problem list name updated by automated process. Provider to review       Abnormal Pap smear of cervix 04/11/2014     Priority: Medium     4/11/2014  S/p colp and LEEP. Sees OB/GYN at [facility]. Pap every year x20 years - normal since.       S/P LEEP of cervix 02/06/2014     Priority: Medium     Patellofemoral stress syndrome 01/18/2014     Priority: Medium     Hx SBO 10/09/2013     Priority: Medium     4/11/2014  Recurrent. Sees GI (Dr. Redding - at [facility]) every 6 months or so.  Sees feeding tube nurse next week.       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 04/11/2013     Priority: Medium     Constipation by delayed colonic transit 10/24/2012     Priority: Medium     Atopic rhinitis 03/20/2009     Priority: Medium     Hyperbilirubinemia 03/11/2009     Priority: Medium        Past Medical History:    Past Medical History:   Diagnosis Date     Asthma      Bilateral ovarian cysts      Cervical cancer (H) 01/01/2008     Chronic pain      Colonic dysmotility      Constipation      E. coli sepsis (H) 5/8/2016     Enteritis      Fungemia 5/5/2016      Gastro-oesophageal reflux disease      H/O ileostomy      Hx of abnormal Pap smear      Hypertension      IBS (irritable bowel syndrome)      Other chronic pain      PONV (postoperative nausea and vomiting)      Thrombosis, hepatic vein (H)        Past [facility] History:    Past [facility] History:   Procedure Laterality Date     COLONOSCOPY  7/10/2012    Procedure: COLONOSCOPY;;  Surgeon: Nicole Redding MD;  [facility]     COLONOSCOPY N/A 2/19/2017    Procedure: COLONOSCOPY;  Surgeon: Randell Muller MD;  [facility]     COLONOSCOPY N/A 2/21/2017    Procedure: COLONOSCOPY;  Surgeon: Randell Muller MD;  [facility]     COLONOSCOPY N/A 5/3/2018    Procedure: COMBINED COLONOSCOPY, SINGLE OR MULTIPLE BIOPSY/POLYPECTOMY BY BIOPSY;  EGD/Colonoscopy ;  Surgeon: Loi Black MD;  [facility]     ECHO CHELO  7/19/2016          ENDOSCOPIC INSERTION TUBE GASTROSTOMY N/A 1/21/2016    Procedure: ENDOSCOPIC INSERTION TUBE GASTROSTOMY;  Surgeon: Nicole Redding MD;  [facility]     ESOPHAGOSCOPY, GASTROSCOPY, DUODENOSCOPY (EGD), COMBINED  7/10/2012    Procedure: COMBINED ESOPHAGOSCOPY, GASTROSCOPY, DUODENOSCOPY (EGD);  Upper Endoscopy, Ileoscopy    Latex Allergy  with biopsies;  Surgeon: Nicole Redding MD;  [facility]     ESOPHAGOSCOPY, GASTROSCOPY, DUODENOSCOPY (EGD), COMBINED N/A 11/5/2014    Procedure: COMBINED ESOPHAGOSCOPY, GASTROSCOPY, DUODENOSCOPY (EGD);  Surgeon: Nicole Redding MD;  [facility]     ESOPHAGOSCOPY, GASTROSCOPY, DUODENOSCOPY (EGD), COMBINED N/A 5/3/2018    Procedure: COMBINED ESOPHAGOSCOPY, GASTROSCOPY, DUODENOSCOPY (EGD), BIOPSY SINGLE OR MULTIPLE;;  Surgeon: Loi Black MD;  [facility]     HC REPLACE DUODENOSTOMY/JEJUNOSTOMY TUBE PERCUTANEOUS N/A 8/27/2015    Procedure: REPLACE GASTROJEJUNOSTOMY TUBE, PERCUTANEOUS;  Surgeon: Mio Holder MD;  [facility]     HC REPLACE DUODENOSTOMY/JEJUNOSTOMY TUBE  PERCUTANEOUS N/A 1/7/2016    Procedure: REPLACE JEJUNOSTOMY TUBE, PERCUTANEOUS;  Surgeon: Elsa Medel MD;  [facility]     HC REPLACE DUODENOSTOMY/JEJUNOSTOMY TUBE PERCUTANEOUS N/A 1/28/2016    Procedure: REPLACE JEJUNOSTOMY TUBE, PERCUTANEOUS;  Surgeon: Elsa Medel MD;  [facility]     HC REPLACE GASTROSTOMY/CECOSTOMY TUBE PERCUTANEOUS Left 5/19/2015    Procedure: REPLACE GASTROSTOMY TUBE, PERCUTANEOUS;  Surgeon: Melecio Morejon Chi, MD;  [facility]     HC UGI ENDOSCOPY W PLACEMENT GASTROSTOMY TUBE PERCUT N/A 10/1/2015    Procedure: COMBINED ESOPHAGOSCOPY, GASTROSCOPY, DUODENOSCOPY (EGD), PLACE PERCUTANEOUS ENDOSCOPIC GASTROSTOMY TUBE;  Surgeon: Mio Holder MD;  [facility]     INSERT PORT VASCULAR ACCESS Right 7/20/2017    Procedure: INSERT PORT VASCULAR ACCESS;  Chest Port Placement  **Latex Allergy**;  Surgeon: Coy Rocha PA-C;  [facility]     LAPAROSCOPIC ASSISTED COLECTOMY  1/20/2012    Procedure:LAPAROSCOPIC ASSISTED COLECTOMY; Laparoscopic Ileostomy       LAPAROSCOPIC ASSISTED COLECTOMY LEFT (DESCENDING)  10/24/2012    Procedure: LAPAROSCOPIC ASSISTED COLECTOMY LEFT (DESCENDING);   Hand Assisted Laproscopic Subtotal abdominal Colectomy,Iieorectal anastamosis, Ileostomy Closure.       LAPAROSCOPIC ASSISTED INSERTION TUBE JEJUNOSTOMY N/A 10/16/2015    Procedure: LAPAROSCOPIC ASSISTED INSERTION TUBE JEJUNOSTOMY;  Surgeon: Elsa Medel MD;  [facility]     LAPAROSCOPIC CHOLECYSTECTOMY  2002    [facility] c[facility]. stones duct     LAPAROSCOPIC ILEOSTOMY  1/20/2012    [facility], loop     LAPAROSCOPIC OOPHORECTOMY Right 2009    [facility]     LAPAROTOMY EXPLORATORY N/A 1/28/2016    Procedure: LAPAROTOMY EXPLORATORY;  Surgeon: Elsa Medel MD;  [facility]     LEEP TX, CERVICAL  2009    [facility]     PICC INSERTION Left 10/21/2015    5fr DL Power PICC, 37cm (2cm external) in the L basilic vein w/ tip in the SVC RA junction.     REMOVE  GASTROSTOMY TUBE ADULT N/A 12/12/2014    Procedure: REMOVE GASTROSTOMY TUBE ADULT;  Surgeon: Nicole Redding MD;  [facility]     REMOVE PORT VASCULAR ACCESS Right 6/30/2016    Procedure: REMOVE PORT VASCULAR ACCESS;  Surgeon: Pradeep Orosco MD;  Location: [facility]     REMOVE PORT VASCULAR ACCESS Right 9/8/2017    Procedure: REMOVE PORT VASCULAR ACCESS;  right side mediport removal;  Surgeon: Zechariah Worthington MD;  Location: [facility] OR     replace GASTROSTOMY TUBE ADULT  5/19/15       Family History:    Family History   Problem Relation Age of Onset     Thyroid Disease Mother      Sjogren's Mother      Gastrointestinal Disease Mother         Intermittent nausea vomiting diarrhea     Colon Polyps Mother      Prostate Problems Father         prostate enlargement     Lupus Maternal Grandmother      Cancer Maternal Grandfather         Lung     Colon Cancer Maternal Grandfather 65     Cancer Paternal Grandmother         Lung      Cerebrovascular Disease Paternal Grandmother      Diabetes Paternal Grandmother      Cardiovascular Paternal Grandmother         CHF     Cancer Paternal Grandfather         Lung     Glaucoma Paternal Grandfather      Abdominal Aortic Aneurysm Other      Macular Degeneration No family hx of        Social History:  Marital Status:   [2]  Social History     Tobacco Use     Smoking status: Current Some Day Smoker     Packs/day: 1.00     Years: 4.00     Pack years: 4.00     Types: Cigarettes     Last attempt to quit: 1/1/2004     Years since quitting: 15.4     Smokeless tobacco: Never Used     Tobacco comment: Vaping   Substance Use Topics     Alcohol use: No     Drug use: No        Medications:      HYDROmorphone (DILAUDID) 2 MG tablet   LORazepam (ATIVAN) 1 MG tablet   ondansetron (ZOFRAN ODT) 4 MG ODT tab   ACETAMINOPHEN PO   albuterol (2.5 MG/3ML) 0.083% neb solution   albuterol 90 MCG/ACT inhaler   Alfalfa 650 MG TABS   apixaban ANTICOAGULANT (ELIQUIS) 5 MG tablet   cloNIDine  "(CATAPRES) 0.2 MG tablet   diphenhydrAMINE HCl 50 MG TABS   DULoxetine (CYMBALTA) 60 MG EC capsule   furosemide (LASIX) 20 MG tablet   ipratropium (ATROVENT) 0.02 % neb solution   lisinopril (PRINIVIL/ZESTRIL) 2.5 MG tablet   Multiple Vitamin (TAB-A-PRASANNA) TABS   mupirocin (BACTROBAN) 2 % external ointment   NARCAN 4 MG/0.1ML nasal spray   SUMAtriptan (IMITREX) 50 MG tablet   traZODone (DESYREL) 100 MG tablet         Review of Systems     All other ROS reviewed and are negative or non-contributory except as stated in HPI.      Physical Exam   BP: 133/72  Pulse: 85  Temp: 99.2  F (37.3  C)  Resp: 16  Height: 167.6 cm (5' 6\")  Weight: 70.3 kg (155 lb)  SpO2: 100 %      Physical Exam   Constitutional: She appears well-developed and well-nourished.   Patient is lying on the bed, she looks healthy today.   HENT:   Head: Normocephalic.   Nose: Nose normal.   Mouth/Throat: Oropharynx is clear and moist.   Eyes: Conjunctivae and EOM are normal. No scleral icterus.   Neck: Normal range of motion. Neck supple.   Cardiovascular: Normal rate, regular rhythm, normal heart sounds and intact distal pulses.   Pulmonary/Chest: Effort normal and breath sounds normal.   Abdominal:   Numerous abdominal scars, bowel sounds.  Tenderness along the left abdomen without mass or rebound   Musculoskeletal: Normal range of motion.   Neurological: She is alert. She exhibits normal muscle tone.   Skin: Skin is warm and dry. She is not diaphoretic.   Psychiatric: Her behavior is normal.   Mood/affect at baseline   Vitals reviewed.      ED Course (with Medical Decision Making)    Pt seen and examined by me.  RN and EPIC notes reviewed.      Patient with abdominal pain, nausea, vomiting.  I do not think she has a bowel obstruction.  This may have all been exacerbated by her recent antibiotics.  Patient historically has been very difficult IV stick and we discussed options including ultrasound IV access for fluids, nausea medications versus trying " "alternative treatment at home.  I do not think she appears septic.  She states that she has Dificid at home but \"wanted to talk to somebody before she started it\".    After discussion we have decided to try home treatment.  She was given a small amount of Dilaudid for pain, Ativan for nausea.  She can continue her Benadryl.  I also refilled her Zofran.  If she has continued/worsening symptoms she can always return to the ED if needed.  Follow-up with primary care provider.          Procedures     Assessments & Plan     I have reviewed the findings, diagnosis, plan and need for follow up with the patient.          Medication List      Modified    HYDROmorphone 2 MG tablet  Commonly known as:  DILAUDID  2 mg, Oral, EVERY 6 HOURS PRN  What changed:      when to take this    reasons to take this     LORazepam 1 MG tablet  Commonly known as:  ATIVAN  1 mg, Oral, EVERY 6 HOURS PRN  What changed:      when to take this    reasons to take this            Final diagnoses:   Abdominal pain, generalized   Disposition: Patient discharged home in stable condition.  Plan as above.  Return for concerns.      This document serves as a record of services personally performed by  Arely Lam MD.  It was created on their behalf by Shazia Cantor, a trained medical scribe. The creation of this record is based on the provider's personal observations and the statements of the patient. This document has been checked and approved by the attending provider.    Disclaimer : This note consists of symbols derived from keyboarding, dictation and/or voice recognition software. As a result, there may be errors in the script that have gone undetected. Please consider this when interpreting information found in this chart.      6/21/2019   [facility] EMERGENCY DEPARTMENT     Arely Lam MD  06/22/19 0920    "

## 2019-06-21 NOTE — ED AVS SNAPSHOT
[facility] Emergency Department  [address] [place] [address]  Phone:  [phone]  Fax:  [phone]                                    Doreen Peralta   MRN: [id number]    Department:  [facility] Emergency Department   Date of Visit:  6/21/2019           After Visit Summary Signature Page    I have received my discharge instructions, and my questions have been answered. I have discussed any challenges I see with this plan with the nurse or doctor.    ..........................................................................................................................................  Patient/Patient Representative Signature      ..........................................................................................................................................  Patient Representative Print Name and Relationship to Patient    ..................................................               ................................................  Date                                   Time    ..........................................................................................................................................  Reviewed by Signature/Title    ...................................................              ..............................................  Date                                               Time          22EPIC Rev 08/18

## 2019-06-21 NOTE — DISCHARGE INSTRUCTIONS
Please be sure to follow-up with your primary care provider.    Restart your Dificid.    Zofran or Ativan if needed for nausea.    Dilaudid for severe pain.    Return for significant worsening, changes or concerns.    I hope you are MUCH better quickly!!!

## 2019-06-21 NOTE — ED TRIAGE NOTES
Nausea and vomiting has been for about 3 days.  Has tried 12mg of zofran already at home this morning.

## 2019-06-22 ENCOUNTER — HOSPITAL ENCOUNTER (EMERGENCY)
Facility: CLINIC | Age: 39
Discharge: HOME | End: 2019-06-22
Attending: EMERGENCY MEDICINE | Admitting: EMERGENCY MEDICINE
Payer: MEDICARE

## 2019-06-22 VITALS
HEIGHT: 66 IN | WEIGHT: 155 LBS | OXYGEN SATURATION: 93 % | RESPIRATION RATE: 16 BRPM | TEMPERATURE: 98.8 F | BODY MASS INDEX: 24.91 KG/M2 | DIASTOLIC BLOOD PRESSURE: 71 MMHG | SYSTOLIC BLOOD PRESSURE: 124 MMHG | HEART RATE: 83 BPM

## 2019-06-22 DIAGNOSIS — R19.7 VOMITING AND DIARRHEA: ICD-10-CM

## 2019-06-22 DIAGNOSIS — R11.10 VOMITING AND DIARRHEA: ICD-10-CM

## 2019-06-22 DIAGNOSIS — K31.84 GASTROPARESIS: ICD-10-CM

## 2019-06-22 DIAGNOSIS — R19.7 DIARRHEA, UNSPECIFIED TYPE: ICD-10-CM

## 2019-06-22 LAB
ALBUMIN SERPL-MCNC: 3.3 G/DL (ref 3.4–5)
ALP SERPL-CCNC: 135 U/L (ref 40–150)
ALT SERPL W P-5'-P-CCNC: 19 U/L (ref 0–50)
ANION GAP SERPL CALCULATED.3IONS-SCNC: 8 MMOL/L (ref 3–14)
AST SERPL W P-5'-P-CCNC: 18 U/L (ref 0–45)
BASOPHILS # BLD AUTO: 0 10E9/L (ref 0–0.2)
BASOPHILS NFR BLD AUTO: 0.5 %
BILIRUB SERPL-MCNC: 0.3 MG/DL (ref 0.2–1.3)
BUN SERPL-MCNC: 10 MG/DL (ref 7–30)
CALCIUM SERPL-MCNC: 8.4 MG/DL (ref 8.5–10.1)
CHLORIDE SERPL-SCNC: 107 MMOL/L (ref 94–109)
CO2 SERPL-SCNC: 25 MMOL/L (ref 20–32)
CREAT SERPL-MCNC: 0.86 MG/DL (ref 0.52–1.04)
DIFFERENTIAL METHOD BLD: ABNORMAL
EOSINOPHIL NFR BLD AUTO: 1 %
ERYTHROCYTE [DISTWIDTH] IN BLOOD BY AUTOMATED COUNT: 16.7 % (ref 10–15)
GFR SERPL CREATININE-BSD FRML MDRD: 86 ML/MIN/{1.73_M2}
GLUCOSE SERPL-MCNC: 85 MG/DL (ref 70–99)
HCT VFR BLD AUTO: 28 % (ref 35–47)
HGB BLD-MCNC: 8.4 G/DL (ref 11.7–15.7)
IMM GRANULOCYTES # BLD: 0 10E9/L (ref 0–0.4)
IMM GRANULOCYTES NFR BLD: 0.4 %
LIPASE SERPL-CCNC: 119 U/L (ref 73–393)
LYMPHOCYTES # BLD AUTO: 2.1 10E9/L (ref 0.8–5.3)
LYMPHOCYTES NFR BLD AUTO: 25.8 %
MCH RBC QN AUTO: 23.2 PG (ref 26.5–33)
MCHC RBC AUTO-ENTMCNC: 30 G/DL (ref 31.5–36.5)
MCV RBC AUTO: 77 FL (ref 78–100)
MONOCYTES # BLD AUTO: 0.5 10E9/L (ref 0–1.3)
MONOCYTES NFR BLD AUTO: 6.3 %
NEUTROPHILS # BLD AUTO: 5.3 10E9/L (ref 1.6–8.3)
NEUTROPHILS NFR BLD AUTO: 66 %
NRBC # BLD AUTO: 0 10*3/UL
NRBC BLD AUTO-RTO: 0 /100
PLATELET # BLD AUTO: 400 10E9/L (ref 150–450)
POTASSIUM SERPL-SCNC: 3.8 MMOL/L (ref 3.4–5.3)
PROT SERPL-MCNC: 7.1 G/DL (ref 6.8–8.8)
RBC # BLD AUTO: 3.62 10E12/L (ref 3.8–5.2)
SODIUM SERPL-SCNC: 140 MMOL/L (ref 133–144)
WBC # BLD AUTO: 8 10E9/L (ref 4–11)

## 2019-06-22 PROCEDURE — 83690 ASSAY OF LIPASE: CPT | Performed by: EMERGENCY MEDICINE

## 2019-06-22 PROCEDURE — 96374 THER/PROPH/DIAG INJ IV PUSH: CPT | Performed by: EMERGENCY MEDICINE

## 2019-06-22 PROCEDURE — 80053 COMPREHEN METABOLIC PANEL: CPT | Performed by: EMERGENCY MEDICINE

## 2019-06-22 PROCEDURE — 96361 HYDRATE IV INFUSION ADD-ON: CPT | Performed by: EMERGENCY MEDICINE

## 2019-06-22 PROCEDURE — 25800030 ZZH RX IP 258 OP 636: Performed by: EMERGENCY MEDICINE

## 2019-06-22 PROCEDURE — 99285 EMERGENCY DEPT VISIT HI MDM: CPT | Mod: Z6 | Performed by: EMERGENCY MEDICINE

## 2019-06-22 PROCEDURE — 85025 COMPLETE CBC W/AUTO DIFF WBC: CPT | Performed by: EMERGENCY MEDICINE

## 2019-06-22 PROCEDURE — 96375 TX/PRO/DX INJ NEW DRUG ADDON: CPT | Performed by: EMERGENCY MEDICINE

## 2019-06-22 PROCEDURE — 99285 EMERGENCY DEPT VISIT HI MDM: CPT | Mod: 25 | Performed by: EMERGENCY MEDICINE

## 2019-06-22 PROCEDURE — 25000128 H RX IP 250 OP 636: Performed by: EMERGENCY MEDICINE

## 2019-06-22 RX ORDER — DIPHENHYDRAMINE HYDROCHLORIDE 50 MG/ML
25 INJECTION INTRAMUSCULAR; INTRAVENOUS ONCE
Status: COMPLETED | OUTPATIENT
Start: 2019-06-22 | End: 2019-06-22

## 2019-06-22 RX ORDER — HYDROMORPHONE HYDROCHLORIDE 2 MG/1
2 TABLET ORAL EVERY 6 HOURS PRN
Qty: 10 TABLET | Refills: 0 | Status: SHIPPED | OUTPATIENT
Start: 2019-06-22 | End: 2019-06-25

## 2019-06-22 RX ORDER — SODIUM CHLORIDE 9 MG/ML
1000 INJECTION, SOLUTION INTRAVENOUS CONTINUOUS
Status: DISCONTINUED | OUTPATIENT
Start: 2019-06-22 | End: 2019-06-23 | Stop reason: HOSPADM

## 2019-06-22 RX ORDER — HYDROMORPHONE HYDROCHLORIDE 1 MG/ML
0.5 INJECTION, SOLUTION INTRAMUSCULAR; INTRAVENOUS; SUBCUTANEOUS
Status: DISCONTINUED | OUTPATIENT
Start: 2019-06-22 | End: 2019-06-23 | Stop reason: HOSPADM

## 2019-06-22 RX ADMIN — HYDROMORPHONE HYDROCHLORIDE 0.5 MG: 1 INJECTION, SOLUTION INTRAMUSCULAR; INTRAVENOUS; SUBCUTANEOUS at 21:34

## 2019-06-22 RX ADMIN — SODIUM CHLORIDE 1000 ML: 9 INJECTION, SOLUTION INTRAVENOUS at 21:36

## 2019-06-22 RX ADMIN — SODIUM CHLORIDE 1000 ML: 9 INJECTION, SOLUTION INTRAVENOUS at 20:55

## 2019-06-22 RX ADMIN — HYDROMORPHONE HYDROCHLORIDE 0.5 MG: 1 INJECTION, SOLUTION INTRAMUSCULAR; INTRAVENOUS; SUBCUTANEOUS at 20:58

## 2019-06-22 RX ADMIN — DIPHENHYDRAMINE HYDROCHLORIDE 25 MG: 50 INJECTION, SOLUTION INTRAMUSCULAR; INTRAVENOUS at 20:56

## 2019-06-22 ASSESSMENT — MIFFLIN-ST. JEOR: SCORE: 1399.83

## 2019-06-22 NOTE — ED AVS SNAPSHOT
[facility] Emergency Department  [address] [place] [address]  Phone:  [phone]  Fax:  [phone]                                    Doeren Peralta   MRN: [id number]    Department:  [facility] Emergency Department   Date of Visit:  6/22/2019           After Visit Summary Signature Page    I have received my discharge instructions, and my questions have been answered. I have discussed any challenges I see with this plan with the nurse or doctor.    ..........................................................................................................................................  Patient/Patient Representative Signature      ..........................................................................................................................................  Patient Representative Print Name and Relationship to Patient    ..................................................               ................................................  Date                                   Time    ..........................................................................................................................................  Reviewed by Signature/Title    ...................................................              ..............................................  Date                                               Time          22EPIC Rev 08/18

## 2019-06-23 NOTE — ED TRIAGE NOTES
Nausea, vomiting and abdominal pain continues.  Was in ED last night and tried orals and told to come back if not feeling better, pt reports pain and nausea is getting worse

## 2019-06-23 NOTE — DISCHARGE INSTRUCTIONS
Return to ER if you develop new or worsening symptoms.  Follow-up with primary care for ongoing management.

## 2019-06-23 NOTE — ED PROVIDER NOTES
History     Chief Complaint   Patient presents with     Nausea & Vomiting     Abdominal Pain     The history is provided by the patient and medical records.     Doreen Peralta is a 39 year old female who with extensive past medical history including hypertension, IBS, C. difficile, slow transit constipation/gastroparesis status post partial colectomy, chronic abdominal pain, provoked DVTs presented with nausea and vomiting. The patient was in the ED yesterday with similar complaints. She was seen by Dr. Lam and was sent home with oral Dilaudid and Ativan. Patient is having diarrhea and her typical C Diff symptoms. She was started on Dificid again yesterday, but it has not started working yet. Patient is well-known to this ED and has been seen multiple times with multiple medical issues.  At this point, patient states that she was recently diagnosed with a urinary tract infection and was on antibiotics.  She states she is colonized with C. difficile and when she is on antibiotics she will often get a flare of C. difficile.  She apparently was placed on Flagyl while on the antibiotics but previously has been on Dificid.  She notes upper abdominal pain, nausea and vomiting.  She also has some lower abdominal bloating and malodorous fluctuance associated with her C. difficile flares.  She has not had any fevers but has had some chills.  She has had decreased oral intake of solids and fluids.  She tried Zofran 12 mg and a Benadryl yesterday with only mild relief.  She is still making urine.  No cold or cough symptoms.      Allergies:  Allergies   Allergen Reactions     Azithromycin Other (See Comments) and Itching     Other reaction(s): Throat Swelling/Closing  Burning in throat     Hyoscyamine Rash     Droperidol Hives and Rash     Metoclopramide Other (See Comments)     Eye twitching.      Peaches [Peach] Other (See Comments)     Raw. Cooked OK     Sucralose Other (See Comments)     All artificial sweeteners.  Aspartame also. Swollen glands     Advair Diskus Other (See Comments)     Throat burns     Compazine [Prochlorperazine] Visual Disturbance     Contrast Dye Itching     States is allergic to CT contrast dye     Cyclobenzaprine Visual Disturbance     Diatrizoate Other (See Comments)     Patient reports she tolerates IV contrast if given 50mg IV of Benadryl prior to scan.      Fentanyl Other (See Comments)     migraine     Fluticasone-Salmeterol      Throat burns     Ibuprofen GI Disturbance     Lactulose Nausea and Vomiting     Gas and bloating     Levaquin [Levofloxacin] Swelling     Per ED M.D. And RN      Morphine Sulfate Other (See Comments)     Chest pain       Oxycodone Other (See Comments)     Burning throat, but can take Norco.      Oxycodone-Acetaminophen      Burning in throat  Throat burns     Rizatriptan Visual Disturbance     Isovue [Iopamidol] Palpitations     Pt had racing heart and sob      Ketorolac Anxiety     Latex Swelling and Rash     Kiwi, likely also avacado, ? banana     Levsin Rash       Problem List:    Patient Active Problem List    Diagnosis Date Noted     Anemia 03/11/2019     Priority: Medium     Other pulmonary embolism  03/11/2019     Priority: Medium     GIB (gastrointestinal bleeding) - suspected 03/11/2019     Priority: Medium     Bacteremia 06/26/2018     Priority: Medium     Acute renal failure (H) 04/21/2018     Priority: Medium     History of bacteremia - recurrent 04/17/2018     Priority: Medium     Tobacco abuse 04/16/2018     Priority: Medium     Iron deficiency anemia 04/16/2018     Priority: Medium     Stool toxin PCR positive for Clostridium difficile on 4/17/18 04/16/2018     Priority: Medium     Gram negative septicemia (H) - Ochrobactrum, Stenotrophomonas & Pantoea 08/24/2017     Priority: Medium     Hypokalemia 08/24/2017     Priority: Medium     Essential hypertension 08/24/2017     Priority: Medium     Sepsis due to Klebsiella (H) 07/27/2017     Priority: Medium      Insomnia, unspecified type 03/31/2017     Priority: Medium     Abdominal pain 02/15/2017     Priority: Medium     Abdominal pain, generalized 01/28/2017     Priority: Medium     History of deep venous thrombosis 01/26/2017     Priority: Medium     Nausea and vomiting 01/26/2017     Priority: Medium     Vitamin D deficiency 01/16/2017     Priority: Medium     Chronic abdominal pain 11/15/2016     Priority: Medium     Patient is followed by Larisa Lorenzo PA-C for ongoing prescription of pain medication.  All refills should be approved by this provider, or covering partner.    Medication(s): no regular narcotics - narcotics given at ED visits  Maximum quantity per month: N/A  Clinic visit frequency required: Q 6  months     Controlled substance agreement:  Encounter-Level CSA - 11/9/15:               Controlled Substance Agreement - Scan on 11/19/2015 11:17 AM : CONTROLLED SUBSTANCE AGREEMENT 11/09/15 (below)          Encounter-Level CSA - 2/27/15:               Controlled Substance Agreement - Scan on 3/12/2015  7:50 AM : Controlled Medication Agreement 02/27/15 (below)            Pain Clinic evaluation in the past: Yes    DIRE Total Score(s):  No flowsheet data found.    Last [facility] website verification:  done on 11/15/16   [email or web address]        Attention to G-tube (H) 11/08/2016     Priority: Medium     Fever 10/16/2016     Priority: Medium     Coagulation defect (H) [D68.9] 09/16/2016     Priority: Medium     Chronic diarrhea 07/22/2016     Priority: Medium     Migraine 07/20/2016     Priority: Medium     Positive blood culture - Klebsiella oxytoca from Port-a-cath culture 07/18/2016     Priority: Medium     Mitral regurgitation mild-mod by Echo June 2016 07/18/2016     Priority: Medium     Anemia, iron deficiency 07/17/2016     Priority: Medium     Anemia in other chronic diseases classified elsewhere 06/14/2016     Priority: Medium     Munchausen syndrome - previously suspected 06/14/2016      Priority: Medium     Long-term (current) use of anticoagulants [Z79.01] 05/16/2016     Priority: Medium     Anxiety 05/16/2016     Priority: Medium     S/P partial resection of colon 04/11/2016     Priority: Medium     Malnutrition (H) 04/11/2016     Priority: Medium     Jejunostomy tube present (H) 03/21/2016     Priority: Medium     Malfunctioning jejunostomy tube (H) 12/22/2015     Priority: Medium     Malfunction of gastrostomy tube (H) 11/19/2015     Priority: Medium     PEG tube malfunction (H) 10/16/2015     Priority: Medium     Health Care Home 01/16/2015     Priority: Medium     *See Letters for [facility] Care Plan: My Access Plan           PEG (percutaneous endoscopic gastrostomy) status 11/05/2014     Priority: Medium     Gastroparesis 04/11/2014     Priority: Medium     Overview:   Had ileostomy performed at [facility] in Jan 2012 as treatment       Migraines 04/11/2014     Priority: Medium     4/11/2014  With periods, every other month.       Intermittent asthma 04/11/2014     Priority: Medium     4/11/2014   With URIs, allergies       Allergic rhinitis 04/11/2014     Priority: Medium     Problem list name updated by automated process. Provider to review       Abnormal Pap smear of cervix 04/11/2014     Priority: Medium     4/11/2014  S/p colp and LEEP. Sees OB/GYN at [facility]. Pap every year x20 years - normal since.       S/P LEEP of cervix 02/06/2014     Priority: Medium     Patellofemoral stress syndrome 01/18/2014     Priority: Medium     Hx SBO 10/09/2013     Priority: Medium     4/11/2014  Recurrent. Sees GI (Dr. Redding - at [facility]) every 6 months or so.  Sees feeding tube nurse next week.       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 04/11/2013     Priority: Medium     Constipation by delayed colonic transit 10/24/2012     Priority: Medium     Atopic rhinitis 03/20/2009     Priority: Medium     Hyperbilirubinemia 03/11/2009     Priority: Medium        Past Medical History:    Past Medical History:    Diagnosis Date     Asthma      Bilateral ovarian cysts      Cervical cancer (H) 01/01/2008     Chronic pain      Colonic dysmotility      Constipation      E. coli sepsis (H) 5/8/2016     Enteritis      Fungemia 5/5/2016     Gastro-oesophageal reflux disease      H/O ileostomy      Hx of abnormal Pap smear      Hypertension      IBS (irritable bowel syndrome)      Other chronic pain      PONV (postoperative nausea and vomiting)      Thrombosis, hepatic vein (H)        Past [facility] History:    Past [facility] History:   Procedure Laterality Date     COLONOSCOPY  7/10/2012    Procedure: COLONOSCOPY;;  Surgeon: Nicole Redding MD;  [facility]: [facility]     COLONOSCOPY N/A 2/19/2017    Procedure: COLONOSCOPY;  Surgeon: Randell Muller MD;  Location: [facility] GI     COLONOSCOPY N/A 2/21/2017    Procedure: COLONOSCOPY;  Surgeon: Randell Muller MD;  Location: [facility]     COLONOSCOPY N/A 5/3/2018    Procedure: COMBINED COLONOSCOPY, SINGLE OR MULTIPLE BIOPSY/POLYPECTOMY BY BIOPSY;  EGD/Colonoscopy ;  Surgeon: Loi Black MD;  Location: [facility] GI     ECHO CHELO  7/19/2016          ENDOSCOPIC INSERTION TUBE GASTROSTOMY N/A 1/21/2016    Procedure: ENDOSCOPIC INSERTION TUBE GASTROSTOMY;  Surgeon: Nicole Redding MD;  [facility]     ESOPHAGOSCOPY, GASTROSCOPY, DUODENOSCOPY (EGD), COMBINED  7/10/2012    Procedure: COMBINED ESOPHAGOSCOPY, GASTROSCOPY, DUODENOSCOPY (EGD);  Upper Endoscopy, Ileoscopy    Latex Allergy  with biopsies;  Surgeon: Nicole Redding MD;  [facility]     ESOPHAGOSCOPY, GASTROSCOPY, DUODENOSCOPY (EGD), COMBINED N/A 11/5/2014    Procedure: COMBINED ESOPHAGOSCOPY, GASTROSCOPY, DUODENOSCOPY (EGD);  Surgeon: Nicole Redding MD;  [facility]     ESOPHAGOSCOPY, GASTROSCOPY, DUODENOSCOPY (EGD), COMBINED N/A 5/3/2018    Procedure: COMBINED ESOPHAGOSCOPY, GASTROSCOPY, DUODENOSCOPY (EGD), BIOPSY SINGLE OR MULTIPLE;;  Surgeon: Loi Black MD;  Location: [facility] GI     HC  REPLACE DUODENOSTOMY/JEJUNOSTOMY TUBE PERCUTANEOUS N/A 8/27/2015    Procedure: REPLACE GASTROJEJUNOSTOMY TUBE, PERCUTANEOUS;  Surgeon: Mio Holder MD;  Location[facility]     HC REPLACE DUODENOSTOMY/JEJUNOSTOMY TUBE PERCUTANEOUS N/A 1/7/2016    Procedure: REPLACE JEJUNOSTOMY TUBE, PERCUTANEOUS;  Surgeon: Elsa Medel MD;  [facility]     HC REPLACE DUODENOSTOMY/JEJUNOSTOMY TUBE PERCUTANEOUS N/A 1/28/2016    Procedure: REPLACE JEJUNOSTOMY TUBE, PERCUTANEOUS;  Surgeon: Elsa Medel MD;  [facility]     HC REPLACE GASTROSTOMY/CECOSTOMY TUBE PERCUTANEOUS Left 5/19/2015    Procedure: REPLACE GASTROSTOMY TUBE, PERCUTANEOUS;  Surgeon: Melecio Morejon Chi, MD;  Location: [facility] GI     HC UGI ENDOSCOPY W PLACEMENT GASTROSTOMY TUBE PERCUT N/A 10/1/2015    Procedure: COMBINED ESOPHAGOSCOPY, GASTROSCOPY, DUODENOSCOPY (EGD), PLACE PERCUTANEOUS ENDOSCOPIC GASTROSTOMY TUBE;  Surgeon: Mio Holder MD;  [facility]     INSERT PORT VASCULAR ACCESS Right 7/20/2017    Procedure: INSERT PORT VASCULAR ACCESS;  Chest Port Placement  **Latex Allergy**;  Surgeon: Coy Rocha PA-C;  Location:  OR     LAPAROSCOPIC ASSISTED COLECTOMY  1/20/2012    Procedure:LAPAROSCOPIC ASSISTED COLECTOMY; Laparoscopic Ileostomy       LAPAROSCOPIC ASSISTED COLECTOMY LEFT (DESCENDING)  10/24/2012    Procedure: LAPAROSCOPIC ASSISTED COLECTOMY LEFT (DESCENDING);   Hand Assisted Laproscopic Subtotal abdominal Colectomy,Iieorectal anastamosis, Ileostomy Closure.       LAPAROSCOPIC ASSISTED INSERTION TUBE JEJUNOSTOMY N/A 10/16/2015    Procedure: LAPAROSCOPIC ASSISTED INSERTION TUBE JEJUNOSTOMY;  Surgeon: Elsa Medel MD;  [facility]     LAPAROSCOPIC CHOLECYSTECTOMY  2002    [facility] stones duct     LAPAROSCOPIC ILEOSTOMY  1/20/2012    [facility], loop     LAPAROSCOPIC OOPHORECTOMY Right 2009    [facility]     LAPAROTOMY EXPLORATORY N/A 1/28/2016    Procedure: LAPAROTOMY EXPLORATORY;  Surgeon: Elsa Medel  MD Sue;  Location: [facility] OR     LEEP TX, CERVICAL  2009    [facility]     PICC INSERTION Left 10/21/2015    5fr DL Power PICC, 37cm (2cm external) in the L basilic vein w/ tip in the SVC RA junction.     REMOVE GASTROSTOMY TUBE ADULT N/A 12/12/2014    Procedure: REMOVE GASTROSTOMY TUBE ADULT;  Surgeon: Nicole Redding MD;  [facility]     REMOVE PORT VASCULAR ACCESS Right 6/30/2016    Procedure: REMOVE PORT VASCULAR ACCESS;  Surgeon: Pradeep Orosco MD;  Location: [facility] OR     REMOVE PORT VASCULAR ACCESS Right 9/8/2017    Procedure: REMOVE PORT VASCULAR ACCESS;  right side mediport removal;  Surgeon: Zechariah Worthington MD;  Location: [facility] OR     replace GASTROSTOMY TUBE ADULT  5/19/15       Family History:    Family History   Problem Relation Age of Onset     Thyroid Disease Mother      Sjogren's Mother      Gastrointestinal Disease Mother         Intermittent nausea vomiting diarrhea     Colon Polyps Mother      Prostate Problems Father         prostate enlargement     Lupus Maternal Grandmother      Cancer Maternal Grandfather         Lung     Colon Cancer Maternal Grandfather 65     Cancer Paternal Grandmother         Lung      Cerebrovascular Disease Paternal Grandmother      Diabetes Paternal Grandmother      Cardiovascular Paternal Grandmother         CHF     Cancer Paternal Grandfather         Lung     Glaucoma Paternal Grandfather      Abdominal Aortic Aneurysm Other      Macular Degeneration No family hx of        Social History:  Marital Status:   [2]  Social History     Tobacco Use     Smoking status: Current Some Day Smoker     Packs/day: 1.00     Years: 4.00     Pack years: 4.00     Types: Cigarettes     Last attempt to quit: 1/1/2004     Years since quitting: 15.4     Smokeless tobacco: Never Used     Tobacco comment: Vaping   Substance Use Topics     Alcohol use: No     Drug use: No        Medications:      HYDROmorphone (DILAUDID) 2 MG tablet   ACETAMINOPHEN PO   albuterol  "(2.5 MG/3ML) 0.083% neb solution   albuterol 90 MCG/ACT inhaler   Alfalfa 650 MG TABS   apixaban ANTICOAGULANT (ELIQUIS) 5 MG tablet   cloNIDine (CATAPRES) 0.2 MG tablet   diphenhydrAMINE HCl 50 MG TABS   DULoxetine (CYMBALTA) 60 MG EC capsule   furosemide (LASIX) 20 MG tablet   ipratropium (ATROVENT) 0.02 % neb solution   lisinopril (PRINIVIL/ZESTRIL) 2.5 MG tablet   LORazepam (ATIVAN) 1 MG tablet   Multiple Vitamin (TAB-A-PRASANNA) TABS   mupirocin (BACTROBAN) 2 % external ointment   NARCAN 4 MG/0.1ML nasal spray   ondansetron (ZOFRAN ODT) 4 MG ODT tab   SUMAtriptan (IMITREX) 50 MG tablet   traZODone (DESYREL) 100 MG tablet         Review of Systems   All other systems reviewed and are negative.      Physical Exam   BP: (!) 133/112  Pulse: 80  Temp: 98.4  F (36.9  C)  Resp: 17  Height: 167.6 cm (5' 6\")  Weight: 70.3 kg (155 lb)  SpO2: 100 %      Physical Exam   Constitutional: She is oriented to person, place, and time. She appears well-developed and well-nourished. No distress.   HENT:   Head: Normocephalic and atraumatic.   Eyes: No scleral icterus.   Neck: Normal range of motion. Neck supple.   Cardiovascular: Normal rate and regular rhythm.   Pulmonary/Chest: Effort normal and breath sounds normal.   Abdominal: Soft. There is tenderness.   Mild to moderate diffuse discomfort on exam   Musculoskeletal: Normal range of motion. She exhibits no edema or deformity.   Neurological: She is alert and oriented to person, place, and time.   Skin: Skin is warm and dry. No rash noted. She is not diaphoretic. No erythema. No pallor.   Psychiatric: She has a normal mood and affect.   Nursing note and vitals reviewed.      ED Course        Procedures                 Results for orders placed or performed during the hospital encounter of 06/22/19 (from the past 24 hour(s))   CBC with platelets differential   Result Value Ref Range    WBC 8.0 4.0 - 11.0 10e9/L    RBC Count 3.62 (L) 3.8 - 5.2 10e12/L    Hemoglobin 8.4 (L) 11.7 - " 15.7 g/dL    Hematocrit 28.0 (L) 35.0 - 47.0 %    MCV 77 (L) 78 - 100 fl    MCH 23.2 (L) 26.5 - 33.0 pg    MCHC 30.0 (L) 31.5 - 36.5 g/dL    RDW 16.7 (H) 10.0 - 15.0 %    Platelet Count 400 150 - 450 10e9/L    Diff Method Automated Method     % Neutrophils 66.0 %    % Lymphocytes 25.8 %    % Monocytes 6.3 %    % Eosinophils 1.0 %    % Basophils 0.5 %    % Immature Granulocytes 0.4 %    Nucleated RBCs 0 0 /100    Absolute Neutrophil 5.3 1.6 - 8.3 10e9/L    Absolute Lymphocytes 2.1 0.8 - 5.3 10e9/L    Absolute Monocytes 0.5 0.0 - 1.3 10e9/L    Absolute Basophils 0.0 0.0 - 0.2 10e9/L    Abs Immature Granulocytes 0.0 0 - 0.4 10e9/L    Absolute Nucleated RBC 0.0    Comprehensive metabolic panel   Result Value Ref Range    Sodium 140 133 - 144 mmol/L    Potassium 3.8 3.4 - 5.3 mmol/L    Chloride 107 94 - 109 mmol/L    Carbon Dioxide 25 20 - 32 mmol/L    Anion Gap 8 3 - 14 mmol/L    Glucose 85 70 - 99 mg/dL    Urea Nitrogen 10 7 - 30 mg/dL    Creatinine 0.86 0.52 - 1.04 mg/dL    GFR Estimate 86 >60 mL/min/[1.73_m2]    GFR Estimate If Black >90 >60 mL/min/[1.73_m2]    Calcium 8.4 (L) 8.5 - 10.1 mg/dL    Bilirubin Total 0.3 0.2 - 1.3 mg/dL    Albumin 3.3 (L) 3.4 - 5.0 g/dL    Protein Total 7.1 6.8 - 8.8 g/dL    Alkaline Phosphatase 135 40 - 150 U/L    ALT 19 0 - 50 U/L    AST 18 0 - 45 U/L   Lipase   Result Value Ref Range    Lipase 119 73 - 393 U/L     *Note: Due to a large number of results and/or encounters for the requested time period, some results have not been displayed. A complete set of results can be found in Results Review.       Medications   0.9% sodium chloride BOLUS (0 mLs Intravenous Stopped 6/22/19 2136)     Followed by   sodium chloride 0.9% infusion (0 mLs Intravenous Stopped 6/22/19 2209)   HYDROmorphone (PF) (DILAUDID) injection 0.5 mg (0.5 mg Intravenous Given 6/22/19 2134)   diphenhydrAMINE (BENADRYL) injection 25 mg (25 mg Intravenous Given 6/22/19 2056)       Assessments & Plan (with Medical Decision  Making)  39-year-old female with multiple medical problems presents with recurrent abdominal pain, bloating, nausea, vomiting and diarrhea.  She has a history of gastroparesis and C. difficile colitis.  She is on dificid.   Ultrasound guidance was used to place an IV IV and Dilaudid fluids and Benadryl given with improvement of her symptoms.  I do not think repeating a CT scan is indicated at this time and neither does she.  Labs have been reviewed and are essentially unchanged from previous.  Patient was given a liter of fluids along with Dilaudid and Benadryl.  Diagnostic results were reviewed by me and discussed with the patient. The diagnosis, treatment options, risks and follow-up discussed and all questions answered.    Assessment:   gastroparesis chronic nausea bloating and abdominal pain and diarrhea with a history of C. difficile.  Plan:  Dilaudid continue C. difficile treatment, follow-up with primary care.             I have reviewed the nursing notes.    I have reviewed the findings, diagnosis, plan and need for follow up with the patient.         Medication List      There are no discharge medications for this visit.         Final diagnoses:   Gastroparesis   Vomiting and diarrhea     This document serves as a record of services personally performed by Nikhil Workman MD. It was created on their behalf by Justine Garcia, a trained medical scribe. The creation of this record is based on the provider's personal observations and the statements of the patient. This document has been checked and approved by the attending provider.  Note: Chart documentation done in part with Dragon Voice Recognition software. Although reviewed after completion, some word and grammatical errors may remain.    6/22/2019   [facility] EMERGENCY DEPARTMENT     Nikhil Workman MD  06/22/19 0728

## 2019-06-26 NOTE — ED NOTES
Report given to Maggie WARNER.  Pt was at x-ray at this time.  Pt stated that she feels the benadryl has helped more than the Ativan.    Attending Attestation (For Attendings USE Only)...

## 2019-09-28 ENCOUNTER — HEALTH MAINTENANCE LETTER (OUTPATIENT)
Age: 39
End: 2019-09-28

## 2019-10-20 ENCOUNTER — HOSPITAL ENCOUNTER (EMERGENCY)
Facility: CLINIC | Age: 39
Discharge: HOME | End: 2019-10-20
Attending: EMERGENCY MEDICINE | Admitting: EMERGENCY MEDICINE
Payer: MEDICARE

## 2019-10-20 VITALS
BODY MASS INDEX: 25.71 KG/M2 | DIASTOLIC BLOOD PRESSURE: 77 MMHG | HEART RATE: 62 BPM | OXYGEN SATURATION: 99 % | SYSTOLIC BLOOD PRESSURE: 141 MMHG | HEIGHT: 66 IN | WEIGHT: 160 LBS | TEMPERATURE: 98.2 F | RESPIRATION RATE: 16 BRPM

## 2019-10-20 DIAGNOSIS — R11.2 INTRACTABLE VOMITING WITH NAUSEA, UNSPECIFIED VOMITING TYPE: ICD-10-CM

## 2019-10-20 DIAGNOSIS — K52.9 CHRONIC DIARRHEA: Chronic | ICD-10-CM

## 2019-10-20 DIAGNOSIS — K31.84 GASTROPARESIS: ICD-10-CM

## 2019-10-20 DIAGNOSIS — G89.29 CHRONIC ABDOMINAL PAIN: ICD-10-CM

## 2019-10-20 DIAGNOSIS — R10.9 CHRONIC ABDOMINAL PAIN: ICD-10-CM

## 2019-10-20 PROCEDURE — 96374 THER/PROPH/DIAG INJ IV PUSH: CPT | Performed by: EMERGENCY MEDICINE

## 2019-10-20 PROCEDURE — 96361 HYDRATE IV INFUSION ADD-ON: CPT | Performed by: EMERGENCY MEDICINE

## 2019-10-20 PROCEDURE — 96375 TX/PRO/DX INJ NEW DRUG ADDON: CPT | Performed by: EMERGENCY MEDICINE

## 2019-10-20 PROCEDURE — 25800030 ZZH RX IP 258 OP 636: Performed by: EMERGENCY MEDICINE

## 2019-10-20 PROCEDURE — 25000128 H RX IP 250 OP 636: Performed by: EMERGENCY MEDICINE

## 2019-10-20 PROCEDURE — 99285 EMERGENCY DEPT VISIT HI MDM: CPT | Mod: 25 | Performed by: EMERGENCY MEDICINE

## 2019-10-20 PROCEDURE — 99284 EMERGENCY DEPT VISIT MOD MDM: CPT | Mod: Z6 | Performed by: EMERGENCY MEDICINE

## 2019-10-20 RX ORDER — DIPHENHYDRAMINE HYDROCHLORIDE 50 MG/ML
50 INJECTION INTRAMUSCULAR; INTRAVENOUS ONCE
Status: COMPLETED | OUTPATIENT
Start: 2019-10-20 | End: 2019-10-20

## 2019-10-20 RX ORDER — ONDANSETRON 2 MG/ML
4 INJECTION INTRAMUSCULAR; INTRAVENOUS ONCE
Status: DISCONTINUED | OUTPATIENT
Start: 2019-10-20 | End: 2019-10-20 | Stop reason: HOSPADM

## 2019-10-20 RX ORDER — HYDROMORPHONE HYDROCHLORIDE 1 MG/ML
0.5 INJECTION, SOLUTION INTRAMUSCULAR; INTRAVENOUS; SUBCUTANEOUS ONCE
Status: COMPLETED | OUTPATIENT
Start: 2019-10-20 | End: 2019-10-20

## 2019-10-20 RX ADMIN — HYDROMORPHONE HYDROCHLORIDE 0.5 MG: 1 INJECTION, SOLUTION INTRAMUSCULAR; INTRAVENOUS; SUBCUTANEOUS at 11:09

## 2019-10-20 RX ADMIN — SODIUM CHLORIDE, POTASSIUM CHLORIDE, SODIUM LACTATE AND CALCIUM CHLORIDE 1000 ML: 600; 310; 30; 20 INJECTION, SOLUTION INTRAVENOUS at 11:12

## 2019-10-20 RX ADMIN — DIPHENHYDRAMINE HYDROCHLORIDE 50 MG: 50 INJECTION, SOLUTION INTRAMUSCULAR; INTRAVENOUS at 11:10

## 2019-10-20 ASSESSMENT — ENCOUNTER SYMPTOMS
FATIGUE: 1
NEUROLOGICAL NEGATIVE: 1
FEVER: 0
CARDIOVASCULAR NEGATIVE: 1
DIARRHEA: 1
VOMITING: 1
ABDOMINAL PAIN: 1
RESPIRATORY NEGATIVE: 1
BLOOD IN STOOL: 0
NAUSEA: 1
MUSCULOSKELETAL NEGATIVE: 1

## 2019-10-20 ASSESSMENT — MIFFLIN-ST. JEOR: SCORE: 1422.51

## 2019-10-20 NOTE — ED PROVIDER NOTES
"  History     Chief Complaint   Patient presents with     Diarrhea     HPI  Doreen Peralta is a 39 year old female who presents with abdominal pain, nausea, vomiting, diarrhea.  She has a complex past medical history, including gastroparesis, recurrent C. difficile colitis, chronic abdominal pain, chronic diarrhea, IBS, gastroparesis status post partial colectomy.  She is well-known to this ED, and has multiple visits for similar complaints.  She was seen in the ED in [place] on 10/11/2019, and left AMA.  She has been following with her primary care provider via telephone and had one office visit for these recurrent symptoms.  She presents today with persistent abdominal pain, nausea, vomiting, diarrhea.  She denies any fever.  She states she is already on antibiotic treatment for the C. difficile and her doctor has told her \"I will always have it\".  He is frustrated because she has been told by GI specialist that there is not much more they can offer her, and the same advice is come from her primary provider.  She is incredibly frustrated, and repeatedly states \"I do not know what to do\"    Allergies:  Allergies   Allergen Reactions     Azithromycin Other (See Comments) and Itching     Other reaction(s): Throat Swelling/Closing  Burning in throat     Hyoscyamine Rash     Droperidol Hives and Rash     Metoclopramide Other (See Comments)     Eye twitching.      Peaches [Peach] Other (See Comments)     Raw. Cooked OK     Sucralose Other (See Comments)     All artificial sweeteners. Aspartame also. Swollen glands     Advair Diskus Other (See Comments)     Throat burns     Compazine [Prochlorperazine] Visual Disturbance     Contrast Dye Itching     States is allergic to CT contrast dye     Cyclobenzaprine Visual Disturbance     Diatrizoate Other (See Comments)     Patient reports she tolerates IV contrast if given 50mg IV of Benadryl prior to scan.      Fentanyl Other (See Comments)     migraine     " Fluticasone-Salmeterol      Throat burns     Ibuprofen GI Disturbance     Lactulose Nausea and Vomiting     Gas and bloating     Levaquin [Levofloxacin] Swelling     Per ED M.D. And RN      Morphine Sulfate Other (See Comments)     Chest pain       Oxycodone Other (See Comments)     Burning throat, but can take Norco.      Oxycodone-Acetaminophen      Burning in throat  Throat burns     Rizatriptan Visual Disturbance     Isovue [Iopamidol] Palpitations     Pt had racing heart and sob      Ketorolac Anxiety     Latex Swelling and Rash     Kiwi, likely also avacado, ? banana     Levsin Rash       Problem List:    Patient Active Problem List    Diagnosis Date Noted     Anemia 03/11/2019     Priority: Medium     Other pulmonary embolism  03/11/2019     Priority: Medium     GIB (gastrointestinal bleeding) - suspected 03/11/2019     Priority: Medium     Bacteremia 06/26/2018     Priority: Medium     Acute renal failure (H) 04/21/2018     Priority: Medium     History of bacteremia - recurrent 04/17/2018     Priority: Medium     Tobacco abuse 04/16/2018     Priority: Medium     Iron deficiency anemia 04/16/2018     Priority: Medium     Stool toxin PCR positive for Clostridium difficile on 4/17/18 04/16/2018     Priority: Medium     Gram negative septicemia (H) - Ochrobactrum, Stenotrophomonas & Pantoea 08/24/2017     Priority: Medium     Hypokalemia 08/24/2017     Priority: Medium     Essential hypertension 08/24/2017     Priority: Medium     Sepsis due to Klebsiella (H) 07/27/2017     Priority: Medium     Insomnia, unspecified type 03/31/2017     Priority: Medium     Abdominal pain 02/15/2017     Priority: Medium     Abdominal pain, generalized 01/28/2017     Priority: Medium     History of deep venous thrombosis 01/26/2017     Priority: Medium     Nausea and vomiting 01/26/2017     Priority: Medium     Vitamin D deficiency 01/16/2017     Priority: Medium     Chronic abdominal pain 11/15/2016     Priority: Medium      Patient is followed by Larisa Lorenzo PA-C for ongoing prescription of pain medication.  All refills should be approved by this provider, or covering partner.    Medication(s): no regular narcotics - narcotics given at ED visits  Maximum quantity per month: N/A  Clinic visit frequency required: Q 6  months     Controlled substance agreement:  Encounter-Level CSA - 11/9/15:               Controlled Substance Agreement - Scan on 11/19/2015 11:17 AM : CONTROLLED SUBSTANCE AGREEMENT 11/09/15 (below)          Encounter-Level CSA - 2/27/15:               Controlled Substance Agreement - Scan on 3/12/2015  7:50 AM : Controlled Medication Agreement 02/27/15 (below)            Pain Clinic evaluation in the past: Yes    DIRE Total Score(s):  No flowsheet data found.    Last [facility] website verification:  done on 11/15/16   [email or web address]        Attention to G-tube (H) 11/08/2016     Priority: Medium     Fever 10/16/2016     Priority: Medium     Coagulation defect (H) [D68.9] 09/16/2016     Priority: Medium     Chronic diarrhea 07/22/2016     Priority: Medium     Migraine 07/20/2016     Priority: Medium     Positive blood culture - Klebsiella oxytoca from Port-a-cath culture 07/18/2016     Priority: Medium     Mitral regurgitation mild-mod by Echo June 2016 07/18/2016     Priority: Medium     Anemia, iron deficiency 07/17/2016     Priority: Medium     Anemia in other chronic diseases classified elsewhere 06/14/2016     Priority: Medium     Munchausen syndrome - previously suspected 06/14/2016     Priority: Medium     Long-term (current) use of anticoagulants [Z79.01] 05/16/2016     Priority: Medium     Anxiety 05/16/2016     Priority: Medium     S/P partial resection of colon 04/11/2016     Priority: Medium     Malnutrition (H) 04/11/2016     Priority: Medium     Jejunostomy tube present (H) 03/21/2016     Priority: Medium     Malfunctioning jejunostomy tube (H) 12/22/2015     Priority: Medium     Malfunction  of gastrostomy tube (H) 11/19/2015     Priority: Medium     PEG tube malfunction (H) 10/16/2015     Priority: Medium     Health Care Home 01/16/2015     Priority: Medium     *See Letters for [facility] Care Plan: My Access Plan           PEG (percutaneous endoscopic gastrostomy) status 11/05/2014     Priority: Medium     Gastroparesis 04/11/2014     Priority: Medium     Overview:   Had ileostomy performed at [facility] in Jan 2012 as treatment       Migraines 04/11/2014     Priority: Medium     4/11/2014  With periods, every other month.       Intermittent asthma 04/11/2014     Priority: Medium     4/11/2014   With URIs, allergies       Allergic rhinitis 04/11/2014     Priority: Medium     Problem list name updated by automated process. Provider to review       Abnormal Pap smear of cervix 04/11/2014     Priority: Medium     4/11/2014  S/p colp and LEEP. Sees OB/GYN at [facility]. Pap every year x20 years - normal since.       S/P LEEP of cervix 02/06/2014     Priority: Medium     Patellofemoral stress syndrome 01/18/2014     Priority: Medium     Hx SBO 10/09/2013     Priority: Medium     4/11/2014  Recurrent. Sees GI (Dr. Redding - at [facility]) every 6 months or so.  Sees feeding tube nurse next week.       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 04/11/2013     Priority: Medium     Constipation by delayed colonic transit 10/24/2012     Priority: Medium     Atopic rhinitis 03/20/2009     Priority: Medium     Hyperbilirubinemia 03/11/2009     Priority: Medium        Past Medical History:    Past Medical History:   Diagnosis Date     Asthma      Bilateral ovarian cysts      Cervical cancer (H) 01/01/2008     Chronic pain      Colonic dysmotility      Constipation      E. coli sepsis (H) 5/8/2016     Enteritis      Fungemia 5/5/2016     Gastro-oesophageal reflux disease      H/O ileostomy      Hx of abnormal Pap smear      Hypertension      IBS (irritable bowel syndrome)      Other chronic pain      PONV (postoperative nausea and  vomiting)      Thrombosis, hepatic vein (H)        Past [facility] History:    Past [facility] History:   Procedure Laterality Date     COLONOSCOPY  7/10/2012    Procedure: COLONOSCOPY;;  Surgeon: Nicole Redding MD;  [facility]     COLONOSCOPY N/A 2/19/2017    Procedure: COLONOSCOPY;  Surgeon: Randell Muller MD;  Location: [facility] GI     COLONOSCOPY N/A 2/21/2017    Procedure: COLONOSCOPY;  Surgeon: Randell Muller MD;  Location: [facility]     COLONOSCOPY N/A 5/3/2018    Procedure: COMBINED COLONOSCOPY, SINGLE OR MULTIPLE BIOPSY/POLYPECTOMY BY BIOPSY;  EGD/Colonoscopy ;  Surgeon: Loi Black MD;  Location: [facility] GI     ECHO CHELO  7/19/2016          ENDOSCOPIC INSERTION TUBE GASTROSTOMY N/A 1/21/2016    Procedure: ENDOSCOPIC INSERTION TUBE GASTROSTOMY;  Surgeon: Nicole Redding MD;  [facility]     ESOPHAGOSCOPY, GASTROSCOPY, DUODENOSCOPY (EGD), COMBINED  7/10/2012    Procedure: COMBINED ESOPHAGOSCOPY, GASTROSCOPY, DUODENOSCOPY (EGD);  Upper Endoscopy, Ileoscopy    Latex Allergy  with biopsies;  Surgeon: Nicole Redding MD;  [facility]     ESOPHAGOSCOPY, GASTROSCOPY, DUODENOSCOPY (EGD), COMBINED N/A 11/5/2014    Procedure: COMBINED ESOPHAGOSCOPY, GASTROSCOPY, DUODENOSCOPY (EGD);  Surgeon: Nicole Redding MD;  [facility]     ESOPHAGOSCOPY, GASTROSCOPY, DUODENOSCOPY (EGD), COMBINED N/A 5/3/2018    Procedure: COMBINED ESOPHAGOSCOPY, GASTROSCOPY, DUODENOSCOPY (EGD), BIOPSY SINGLE OR MULTIPLE;;  Surgeon: Loi Black MD;  Location: [facility] GI     HC REPLACE DUODENOSTOMY/JEJUNOSTOMY TUBE PERCUTANEOUS N/A 8/27/2015    Procedure: REPLACE GASTROJEJUNOSTOMY TUBE, PERCUTANEOUS;  Surgeon: Mio Holder MD;  Location[facility]     HC REPLACE DUODENOSTOMY/JEJUNOSTOMY TUBE PERCUTANEOUS N/A 1/7/2016    Procedure: REPLACE JEJUNOSTOMY TUBE, PERCUTANEOUS;  Surgeon: Elsa Medel MD;  [facility]     HC REPLACE DUODENOSTOMY/JEJUNOSTOMY TUBE PERCUTANEOUS N/A 1/28/2016     Procedure: REPLACE JEJUNOSTOMY TUBE, PERCUTANEOUS;  Surgeon: Elsa Medel MD;  Location: [facility]     HC REPLACE GASTROSTOMY/CECOSTOMY TUBE PERCUTANEOUS Left 5/19/2015    Procedure: REPLACE GASTROSTOMY TUBE, PERCUTANEOUS;  Surgeon: Melecio Morejon Chi, MD;  Location:  GI     HC UGI ENDOSCOPY W PLACEMENT GASTROSTOMY TUBE PERCUT N/A 10/1/2015    Procedure: COMBINED ESOPHAGOSCOPY, GASTROSCOPY, DUODENOSCOPY (EGD), PLACE PERCUTANEOUS ENDOSCOPIC GASTROSTOMY TUBE;  Surgeon: Mio Holder MD;  Location:  GI     INSERT PORT VASCULAR ACCESS Right 7/20/2017    Procedure: INSERT PORT VASCULAR ACCESS;  Chest Port Placement  **Latex Allergy**;  Surgeon: Coy Rocha PA-C;  [facility]     LAPAROSCOPIC ASSISTED COLECTOMY  1/20/2012    Procedure:LAPAROSCOPIC ASSISTED COLECTOMY; Laparoscopic Ileostomy       LAPAROSCOPIC ASSISTED COLECTOMY LEFT (DESCENDING)  10/24/2012    Procedure: LAPAROSCOPIC ASSISTED COLECTOMY LEFT (DESCENDING);   Hand Assisted Laproscopic Subtotal abdominal Colectomy,Iieorectal anastamosis, Ileostomy Closure.       LAPAROSCOPIC ASSISTED INSERTION TUBE JEJUNOSTOMY N/A 10/16/2015    Procedure: LAPAROSCOPIC ASSISTED INSERTION TUBE JEJUNOSTOMY;  Surgeon: Elsa Medel MD;  Location:  OR     LAPAROSCOPIC CHOLECYSTECTOMY  2002    [facility] stones duct     LAPAROSCOPIC ILEOSTOMY  1/20/2012    [facility], loop     LAPAROSCOPIC OOPHORECTOMY Right 2009    [facility]     LAPAROTOMY EXPLORATORY N/A 1/28/2016    Procedure: LAPAROTOMY EXPLORATORY;  Surgeon: Elsa Mdeel MD;  Location:  OR     LEEP TX, CERVICAL  2009    [facility]     PICC INSERTION Left 10/21/2015    5fr DL Power PICC, 37cm (2cm external) in the L basilic vein w/ tip in the SVC RA junction.     REMOVE GASTROSTOMY TUBE ADULT N/A 12/12/2014    Procedure: REMOVE GASTROSTOMY TUBE ADULT;  Surgeon: Nicole Redding MD;  Location:  GI     REMOVE PORT VASCULAR ACCESS Right 6/30/2016    Procedure: REMOVE PORT  VASCULAR ACCESS;  Surgeon: Pradeep Orosco MD;  Location: [facility] OR     REMOVE PORT VASCULAR ACCESS Right 9/8/2017    Procedure: REMOVE PORT VASCULAR ACCESS;  right side mediport removal;  Surgeon: Zechariah Worthington MD;  Location: [facility] OR     replace GASTROSTOMY TUBE ADULT  5/19/15       Family History:    Family History   Problem Relation Age of Onset     Thyroid Disease Mother      Sjogren's Mother      Gastrointestinal Disease Mother         Intermittent nausea vomiting diarrhea     Colon Polyps Mother      Prostate Problems Father         prostate enlargement     Lupus Maternal Grandmother      Cancer Maternal Grandfather         Lung     Colon Cancer Maternal Grandfather 65     Cancer Paternal Grandmother         Lung      Cerebrovascular Disease Paternal Grandmother      Diabetes Paternal Grandmother      Cardiovascular Paternal Grandmother         CHF     Cancer Paternal Grandfather         Lung     Glaucoma Paternal Grandfather      Abdominal Aortic Aneurysm Other      Macular Degeneration No family hx of        Social History:  Marital Status:   [2]  Social History     Tobacco Use     Smoking status: Current Some Day Smoker     Packs/day: 1.00     Years: 4.00     Pack years: 4.00     Types: Cigarettes     Last attempt to quit: 1/1/2004     Years since quitting: 15.8     Smokeless tobacco: Never Used     Tobacco comment: Vaping   Substance Use Topics     Alcohol use: No     Drug use: No        Medications:    ACETAMINOPHEN PO  albuterol (2.5 MG/3ML) 0.083% neb solution  albuterol 90 MCG/ACT inhaler  Alfalfa 650 MG TABS  apixaban ANTICOAGULANT (ELIQUIS) 5 MG tablet  cloNIDine (CATAPRES) 0.2 MG tablet  diphenhydrAMINE HCl 50 MG TABS  DULoxetine (CYMBALTA) 60 MG EC capsule  furosemide (LASIX) 20 MG tablet  ipratropium (ATROVENT) 0.02 % neb solution  lisinopril (PRINIVIL/ZESTRIL) 2.5 MG tablet  LORazepam (ATIVAN) 1 MG tablet  Multiple Vitamin (TAB-A-PRASANNA) TABS  mupirocin (BACTROBAN) 2 % external  "ointment  NARCAN 4 MG/0.1ML nasal spray  SUMAtriptan (IMITREX) 50 MG tablet  traZODone (DESYREL) 100 MG tablet          Review of Systems   Constitutional: Positive for fatigue. Negative for fever.   HENT: Negative.    Respiratory: Negative.    Cardiovascular: Negative.    Gastrointestinal: Positive for abdominal pain, diarrhea, nausea and vomiting. Negative for blood in stool.   Genitourinary: Negative.    Musculoskeletal: Negative.    Neurological: Negative.        Physical Exam   BP: 139/82  Pulse: 59  Temp: 98.2  F (36.8  C)  Resp: 15  Height: 167.6 cm (5' 6\")  Weight: 72.6 kg (160 lb)  SpO2: 99 %      Physical Exam  Constitutional:       General: She is not in acute distress.     Appearance: Normal appearance.   Neck:      Musculoskeletal: Normal range of motion and neck supple.   Cardiovascular:      Rate and Rhythm: Normal rate and regular rhythm.      Pulses: Normal pulses.   Pulmonary:      Effort: Pulmonary effort is normal.      Breath sounds: Normal breath sounds.   Abdominal:      General: There is no distension.      Palpations: Abdomen is soft. There is no mass.      Tenderness: There is tenderness. There is guarding. There is no rebound.      Comments: Voluntary guarding, left upper and left lower quadrants   Musculoskeletal: Normal range of motion.   Skin:     General: Skin is warm and dry.   Neurological:      General: No focal deficit present.      Mental Status: She is alert and oriented to person, place, and time.   Psychiatric:         Mood and Affect: Mood normal.         Behavior: Behavior normal.         ED Course        Procedures               Critical Care time:  none               No results found. However, due to the size of the patient record, not all encounters were searched. Please check Results Review for a complete set of results.    Medications   lactated ringers BOLUS 1,000 mL (has no administration in time range)   HYDROmorphone (PF) (DILAUDID) injection 0.5 mg (has no " "administration in time range)   ondansetron (ZOFRAN) injection 4 mg (has no administration in time range)   diphenhydrAMINE (BENADRYL) injection 50 mg (has no administration in time range)       Assessments & Plan (with Medical Decision Making)  Doreen is a 39-year-old female with complex past medical history, and multiple chronic issues, who presents to the ED with complaints of nausea, vomiting, abdominal pain, diarrhea.  She has a history of gastroparesis status post partial colectomy, recurrent C. difficile colitis, chronic abdominal pain, chronic nausea and vomiting.  She presents today because she is dehydrated and has a \"unbearable symptoms\".  She has been taking her home medicines, including Dificid for C. difficile, but she says her symptoms are still ongoing.  She is well-appearing, in no acute distress and nontoxic on her presentation to the ED.  Her abdomen is soft, no masses, no rebound, and she has some generalized tenderness, worse in the left upper and lower quadrants.  When trying to examine her she keeps grabbing my hands not allowing for palpation of her abdomen, and says \"sorry it hurts too much\".  Her vital signs are stable, and she is afebrile.  Based on her chart review I do not believe she warrants any advanced imaging, but did offer for plain film KUB to evaluate for any free air or signs of obstruction.  She declines and says that she does not think she needs that.  Since she appears nontoxic, the symptoms are chronic, and is afebrile, I do not believe that any laboratory investigation or stool studies will be beneficial for management.  I offered IV fluids, a one-time dose of Dilaudid, and Zofran.  After these meds, she is asking for more pain medication.  I offered her Tylenol and she declined.  We had a long discussion that there is not much more that I can offer her at this time, and she declines any offers of imaging, p.o. challenge, additional IV fluids, or further work-up.  I " strongly encouraged her to touch base with her primary provider regarding a referral to a different GI specialist.  She states the GI specialist at the [facility] where she has been going is the only one that specializes in C. difficile and she did not receive much support from this provider.  She is frustrated and feels that she is doing everything she can, and is listening improving.  I explained that she has some chronic and difficult conditions to manage, and may benefit from a second opinion from a GI specialist elsewhere.  This may include going outside of the [place] system if her referral thus far has led her to only one person.  I explained that due to financial constraints I understand if this is not feasible at this time, and encouraged her to do so when possible for her.  I also recommended she discuss this with her  and her primary provider to come up with a better plan for management of her bothersome symptoms.  She understands and agrees to discharge from the ED at this time.  She is discharged in stable condition, in no acute distress, and is nontoxic-appearing.     I have reviewed the nursing notes.    I have reviewed the findings, diagnosis, plan and need for follow up with the patient.       Discharge Medication List as of 10/20/2019 12:38 PM          Final diagnoses:   Chronic abdominal pain   Intractable vomiting with nausea, unspecified vomiting type   Gastroparesis   Chronic diarrhea       10/20/2019   [facility] EMERGENCY DEPARTMENT     Kimmie Jha DO  10/20/19 2013

## 2019-10-20 NOTE — ED AVS SNAPSHOT
[facility] Emergency Department  [address] [place] [address]  Phone:  [phone]  Fax:  [phone]                                    Doreen Peralta   MRN: [id number]    Department:  [facility] Emergency Department   Date of Visit:  10/20/2019           After Visit Summary Signature Page    I have received my discharge instructions, and my questions have been answered. I have discussed any challenges I see with this plan with the nurse or doctor.    ..........................................................................................................................................  Patient/Patient Representative Signature      ..........................................................................................................................................  Patient Representative Print Name and Relationship to Patient    ..................................................               ................................................  Date                                   Time    ..........................................................................................................................................  Reviewed by Signature/Title    ...................................................              ..............................................  Date                                               Time          22EPIC Rev 08/18

## 2019-10-20 NOTE — DISCHARGE INSTRUCTIONS
Continue your regular medications as previously prescribed  Try to stay hydrated, small sips of water throughout the day so you do not induce vomiting or worsen abdominal pain is fine  Call your primary provider to discuss what your options are for your chronic conditions, and if a referral outside of the system is warranted due to the lack of answers or treatment options that you have received thus far  Return if you have any concerns or worsening symptoms

## 2020-01-07 NOTE — MR AVS SNAPSHOT
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  Normal or non-critical lab and imaging results will be communicated to you by [employer], letter or phone within 4 business days after the clinic has received the results. If you do not hear from us within 7 days, please contact the clinic through [employer] or phone. If you have a critical or abnormal lab result, we will notify you by phone as soon as possible.  Submit refill requests through [employer] or call your pharmacy and they will forward the refill request to us. Please allow 3 business days for your refill to be completed.          Additional Information About Your Visit        MyChart Information     [employer] gives you secure access to your electronic health record. If you see a primary care provider, you can also send messages to your care team and make appointments. If you have questions, please call your primary care clinic.  If you do not have a primary care provider, please call [phone] and they will assist you.        Care EveryWhere ID     This is your Care EveryWhere ID. This could be used by other organizations to access your [facility] medical records  [id number]        Your Vitals Were     Pulse Temperature Last Period Pulse Oximetry BMI (Body Mass Index)       100 98.8  F (37.1  C) (Oral) 05/23/2018 (Approximate) 97% 28.58 kg/m2        Blood  Pressure from Last 3 Encounters:   06/11/18 117/74   06/10/18 125/59   05/26/18 (!) 144/95    Weight from Last 3 Encounters:   06/11/18 76.2 kg (168 lb)   06/10/18 76.2 kg (168 lb)   05/26/18 76.2 kg (168 lb)              Today, you had the following     No orders found for display       Primary Care Provider Office Phone # Fax #    Franny Antoine [phone]       [facility] [address] [facility] [address] [facility] [address]        Equal Access to Services     [facility]: Hadii aad ku hadasho Soomaali, waaxda luqadaha, qaybta kaalmada adeegyada, elham sal . So [facility] [place] [facility] [phone].    ATENCIÓN: Si habla español, tiene a wade disposición servicios gratuitos de asistencia lingüística. Llame al [phone].    We comply with applicable federal civil rights laws and [place] laws. We do not discriminate on the basis of race, color, national origin, age, disability, sex, sexual orientation, or gender identity.            Thank you!     Thank you for choosing [facility] AND INFECTIOUS DISEASES  for your care. Our goal is always to provide you with excellent care. Hearing back from our patients is one way we can continue to improve our services. Please take a few minutes to complete the written survey that you may receive in the mail after your visit with us. Thank you!             Your Updated Medication List - Protect others around you: Learn how to safely use, store and throw away your medicines at www.disposemymeds.org.          This list is accurate as of 5/23/18 11:59 PM.  Always use your most recent med list.                   Brand Name Dispense Instructions for use Diagnosis    ACETAMINOPHEN PO      Take 500-1,000 mg by mouth every 6 hours as needed for pain        albuterol (2.5 MG/3ML) 0.083% neb solution     360 mL    Take 1 vial (2.5 mg) by nebulization every 4 hours as needed for shortness of breath / dyspnea or wheezing    Gastrostomy tube  procedure for antibiotic prophylaxis. Patient was given general endotracheal anesthesia and then was placed on the [facility] table with all pressure points well padded. To facilitate the procedure biplanar fluoroscopy was used and by adjusting the angles of fluoroscopy both AP and lateral views of corresponding vertebral body was optimized. Skin over the back was prepped with antiseptic solution and the procedure was done under strict aspetic precautions. Skin and deep tissues up to the periosteum of pedicle was infilitrated with  --ml of 0.5% Marcaine with epinephrine. Then using #11 blade a small skin incission was made. The the working canula with trochar in place was inserted such that it lies over the lateral aspect of the pedicle. Then it was tapped slowly through the pedicle under constant fluoroscopic surveillance making sure that the medial border of the pedicle was not violated at any time. Then a bone biopsy canula was inserted through the canula and a bone biopsy was obtained. Bone was curetted as it was sclerotic. Then the Kyphon bone tamp was inserted through the working canula. This was done bipedicularly in similar fashion. Good placements were confirmed with fluoroscopy. The bone tamps  were then serially inflated, to reexpand the vertebral bodies, and restore more normal anatomy. In the meantime polymethylmethacrylate was mixed as per the protocol and Kyphon bone fillers were filled with it. Then they were immersed in warm water to obtain adequate consistency. The bone tamps were withdrawn, and bone cement was placed in both balloon cavities, using fluoroscopic guidance.       The following are the volumes of contrast used to inflate the bone tamps and the volume of bone cement injected:         Level T2             right---   ---1 Ml of Polymethyl methacrylate                 Once the cement had set and was seen to be in good position by fluoroscopy, the working canula and bone fillers dependent (H), SBO (small bowel obstruction), Chronic abdominal pain, Chronic diarrhea       albuterol 90 MCG/ACT inhaler      Inhale 2 puffs into the lungs every 6 hours as needed        Alfalfa 650 MG Tabs           cholestyramine light 4 GM Packet    QUESTRAN    60 packet    Take 1 packet (4 g) by mouth 2 times daily    Diarrhea, unspecified type       cloNIDine 0.2 MG tablet    CATAPRES    60 tablet    Take 2 tablets (0.4 mg) by mouth every evening - DUE FOR FOLLOW UP    Anxiety       diphenhydrAMINE HCl 50 MG Tabs    BENADRYL    30 tablet    Take 50 mg by mouth every 6 hours as needed (nausea)        DULoxetine 60 MG EC capsule    CYMBALTA    90 capsule    TAKE 1 CAPSULE(60 MG) BY MOUTH DAILY    Abdominal pain, epigastric, Abdominal migraine, not intractable       ipratropium 0.02 % neb solution    ATROVENT    225 mL    Take 2.5 mLs (0.5 mg) by nebulization every 6 hours as needed for wheezing    Gastrostomy tube dependent (H), SBO (small bowel obstruction), Chronic abdominal pain, Chronic diarrhea       ondansetron 8 MG tablet    ZOFRAN    9 tablet    Take 1 tablet (8 mg) by mouth every 8 hours as needed for nausea    Non-intractable cyclical vomiting with nausea       SUMAtriptan 50 MG tablet    IMITREX    9 tablet    Take 1-2 tablets ( mg) by mouth at onset of headache for migraine - LAST REFILL, DUE FOR FOLLOW UP    Migraine without aura and without status migrainosus, not intractable       traZODone 100 MG tablet    DESYREL    90 tablet    Take 0.5-1 tablets ( mg) by mouth nightly as needed for sleep    Insomnia, unspecified type          were removed. Final hemostasis was secured by applying pressure. The incisions were closed with 2-0 silk and Steri-strips, followed by sterile dressings. The patient was then turned supine onto the bed and awakened from anesthesia. The patient was extubated in the operating room, and taken to the recovery room in stable condition. The patient tolerated surgery without any complications. Estimated Blood Loss:   0 ml. Sponge, needle, and instrument counts were correct at the end of the case.       Electronically signed by Sofia Thurman MD on 1/7/20 at 2:40 PM

## 2020-03-15 ENCOUNTER — HEALTH MAINTENANCE LETTER (OUTPATIENT)
Age: 40
End: 2020-03-15

## 2020-03-19 NOTE — PLAN OF CARE
Problem: Goal Outcome Summary  Goal: Goal Outcome Summary  Outcome: No Change  Patient went down for colonoscopy late this AM, but was unable to complete it due to severe pain. She will have another one on Tuesday. Oxycodone every 4 hours, last at 1245 for abdominal pain.. Up ad berny in room. Back on clear liquids. Continue preparations for colonoscopy later this week.       DISPLAY PLAN FREE TEXT

## 2020-06-29 NOTE — IP AVS SNAPSHOT
[address] Observation [address] [address]    Phone:  [phone]    Fax:  [phone]                                       After Visit Summary   4/2/2017    Doreen Peralta    MRN: [id number]           After Visit Summary Signature Page     I have received my discharge instructions, and my questions have been answered. I have discussed any challenges I see with this plan with the nurse or doctor.    ..........................................................................................................................................  Patient/Patient Representative Signature      ..........................................................................................................................................  Patient Representative Print Name and Relationship to Patient    ..................................................               ................................................  Date                                            Time    ..........................................................................................................................................  Reviewed by Signature/Title    ...................................................              ..............................................  Date                                                            Time           Lidocaine patches denied. Please advise.

## 2020-07-10 ENCOUNTER — OFFICE VISIT (OUTPATIENT)
Dept: URGENT CARE | Facility: URGENT CARE | Age: 40
End: 2020-07-10
Payer: MEDICARE

## 2020-07-10 VITALS
RESPIRATION RATE: 16 BRPM | WEIGHT: 208.6 LBS | TEMPERATURE: 98.3 F | HEART RATE: 98 BPM | OXYGEN SATURATION: 100 % | HEIGHT: 66 IN | BODY MASS INDEX: 33.52 KG/M2

## 2020-07-10 DIAGNOSIS — L08.9 LOCAL INFECTION OF SKIN AND SUBCUTANEOUS TISSUE: Primary | ICD-10-CM

## 2020-07-10 DIAGNOSIS — L98.9 NON-HEALING SKIN LESION: ICD-10-CM

## 2020-07-10 DIAGNOSIS — Z91.013 SEAFOOD ALLERGY: ICD-10-CM

## 2020-07-10 PROCEDURE — 99214 OFFICE O/P EST MOD 30 MIN: CPT | Performed by: FAMILY MEDICINE

## 2020-07-10 RX ORDER — MUPIROCIN 20 MG/G
OINTMENT TOPICAL 3 TIMES DAILY
Qty: 22 G | Refills: 1 | Status: SHIPPED | OUTPATIENT
Start: 2020-07-10 | End: 2020-07-17

## 2020-07-10 RX ORDER — CEPHALEXIN 500 MG/1
500 CAPSULE ORAL 3 TIMES DAILY
Qty: 21 CAPSULE | Refills: 0 | Status: SHIPPED | OUTPATIENT
Start: 2020-07-10 | End: 2020-08-04

## 2020-07-10 RX ORDER — EPINEPHRINE 0.3 MG/.3ML
0.3 INJECTION SUBCUTANEOUS PRN
Qty: 1 EACH | Refills: 0 | Status: SHIPPED | OUTPATIENT
Start: 2020-07-10

## 2020-07-10 ASSESSMENT — MIFFLIN-ST. JEOR: SCORE: 1637.95

## 2020-07-11 NOTE — PROGRESS NOTES
Chief complaint: concern for infection    Over the holiday weekend ate some seafood and had swelling of her arms and legs and itching and patient took benadryl and it went away     Patient looked her arms and noticed some bumps she states looked like little pustules.  She states they were not itchy. But they were definitely red and swollen  She had been putting hydrogen peroxide and that helped with the redness  But then it still not completely healed patient came in to be seen.  She states she was told that she's had multiple infections and culture always grows STAPH.  She has never had MRSA  She is prone to recurrent sepsis she states and sees infectious disease  She states she was also told she is a c diff carrier and she takes medicine preventative whenever she is on antibiotic.  No new exposures  No recent travel  No one else with the same rash    She reports some other bumps that have been there for 3 months and have not healed      Accompanying Signs & Symptoms:  Fever: no   Body aches or joint pain: no   Sore throat symptoms: no   Recent cold symptoms: no       Allergies   Allergen Reactions     Azithromycin Other (See Comments) and Itching     Other reaction(s): Throat Swelling/Closing  Burning in throat     Hyoscyamine Rash     Droperidol Hives and Rash     Metoclopramide Other (See Comments)     Eye twitching.      Peaches [Peach] Other (See Comments)     Raw. Cooked OK     Sucralose Other (See Comments)     All artificial sweeteners. Aspartame also. Swollen glands     Advair Diskus Other (See Comments)     Throat burns     Compazine [Prochlorperazine] Visual Disturbance     Contrast Dye Itching     States is allergic to CT contrast dye     Cyclobenzaprine Visual Disturbance     Diatrizoate Other (See Comments)     Patient reports she tolerates IV contrast if given 50mg IV of Benadryl prior to scan.      Fentanyl Other (See Comments)     migraine     Fluticasone-Salmeterol      Throat burns     Ibuprofen  GI Disturbance     Lactulose Nausea and Vomiting     Gas and bloating     Levaquin [Levofloxacin] Swelling     Per ED M.D. And RN      Morphine Sulfate Other (See Comments)     Chest pain       Oxycodone Other (See Comments)     Burning throat, but can take Norco.      Oxycodone-Acetaminophen      Burning in throat  Throat burns     Rizatriptan Visual Disturbance     Seafood      itching     Isovue [Iopamidol] Palpitations     Pt had racing heart and sob      Ketorolac Anxiety     Latex Swelling and Rash     Kiwi, likely also avacado, ? banana     Levsin Rash       Past Medical History:   Diagnosis Date     Asthma      Bilateral ovarian cysts      Cervical cancer (H) 01/01/2008    cervical cancer      Chronic pain      Colonic dysmotility     s/p subtotal colectomy     Constipation     chronic     E. coli sepsis (H) 5/8/2016     Enteritis      Fungemia 5/5/2016     Gastro-oesophageal reflux disease      H/O ileostomy      Hx of abnormal Pap smear     s/p LEEP - no further details provided     Hypertension      IBS (irritable bowel syndrome)      Other chronic pain      PONV (postoperative nausea and vomiting)      Thrombosis, hepatic vein (H)     microvascular       ACETAMINOPHEN PO, Take 500-1,000 mg by mouth every 6 hours as needed for pain   albuterol (2.5 MG/3ML) 0.083% neb solution, Take 1 vial (2.5 mg) by nebulization every 4 hours as needed for shortness of breath / dyspnea or wheezing  albuterol 90 MCG/ACT inhaler, Inhale 2 puffs into the lungs every 6 hours as needed   Alfalfa 650 MG TABS, Take 650 mg by mouth daily  apixaban ANTICOAGULANT (ELIQUIS) 5 MG tablet, Take 1 tablet (5 mg) by mouth 2 times daily  cloNIDine (CATAPRES) 0.2 MG tablet, Take 2 tablets (0.4 mg) by mouth every evening - DUE FOR FOLLOW UP  diphenhydrAMINE HCl 50 MG TABS, Take 50 mg by mouth every 6 hours as needed (nausea)  DULoxetine (CYMBALTA) 60 MG EC capsule, TAKE 1 CAPSULE(60 MG) BY MOUTH DAILY  furosemide (LASIX) 20 MG tablet, Take  "20 mg by mouth  ipratropium (ATROVENT) 0.02 % neb solution, Take 2.5 mLs (0.5 mg) by nebulization every 6 hours as needed for wheezing  lisinopril (PRINIVIL/ZESTRIL) 2.5 MG tablet, TK 1 T PO ONCE D  LORazepam (ATIVAN) 1 MG tablet, Take 1 tablet (1 mg) by mouth every 6 hours as needed for nausea  Multiple Vitamin (TAB-A-PRASANNA) TABS, Take 1 tablet by mouth  mupirocin (BACTROBAN) 2 % external ointment, Apply topically daily  NARCAN 4 MG/0.1ML nasal spray, Spray 1 spray in nostril as needed  SUMAtriptan (IMITREX) 50 MG tablet, Take 1-2 tablets ( mg) by mouth at onset of headache for migraine - LAST REFILL, DUE FOR FOLLOW UP  traZODone (DESYREL) 100 MG tablet, Take 0.5-1 tablets ( mg) by mouth nightly as needed for sleep    No current facility-administered medications on file prior to visit.       Social History     Tobacco Use     Smoking status: Current Some Day Smoker     Packs/day: 1.00     Years: 4.00     Pack years: 4.00     Types: Cigarettes     Last attempt to quit: 2004     Years since quittin.5     Smokeless tobacco: Never Used     Tobacco comment: Vaping   Substance Use Topics     Alcohol use: No     Drug use: No       ROS:   review of systems negative except for noted above.       OBJECTIVE:  Pulse 98   Temp 98.3  F (36.8  C) (Tympanic)   Resp 16   Ht 1.676 m (5' 6\")   Wt 94.6 kg (208 lb 9.6 oz)   SpO2 100%   Breastfeeding No   BMI 33.67 kg/m     Patient refused BP   General:   awake, alert, and cooperative.  NAD.   Head: Normocephalic, atraumatic.  Eyes: Conjunctiva clear  Neuro: Alert and oriented - normal speech.  MS: Using extremities freely  PSYCH:  Normal affect, normal speech  SKIN:   Multiple excoriated papules over bilateral upper arms  No erythema no warmth no swelling no purulent discharge    Some similar looking lesions are healed up and hyperpigmented   She has a couple that are also hyperpigmented but also excoriated- she notes these have been recurrent for 3 months "     ASSESSMENT:    ICD-10-CM    1. Local infection of skin and subcutaneous tissue  L08.9 cephALEXin (KEFLEX) 500 MG capsule     mupirocin (BACTROBAN) 2 % external ointment     DERMATOLOGY REFERRAL   2. Non-healing skin lesion  L98.9 cephALEXin (KEFLEX) 500 MG capsule     mupirocin (BACTROBAN) 2 % external ointment     DERMATOLOGY REFERRAL   3. Seafood allergy  Z91.013 EPINEPHrine (ANY BX GENERIC EQUIV) 0.3 MG/0.3ML injection 2-pack       PLAN:   With her medical history of recurrent infections will cover with keflex.   bactroban for local wound care.  Recommend follow up with primary care provider   Referred to dermatology - some lesions definitely look more chronic and possible prurigo nodularis vs inflammatory condition. Referred to dermatology  Alarm signs or symptoms discussed, if present recommend go to ER   If with worsening symptoms come back in asap    Patient has multiple drug allergy now new historical account of seafood allergy.  Recommend follow up allergy - she declines referral at this time. She states she will discuss with her primary care provider  epipen prescribed in case of anaphylaxis or severe throat swelling or difficulty breathing. Signs or symptoms of anaphlaxis discussed. If with any advised to use the epipen and go to ER. epipen teaching use discussed. 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        Chrissie Eller MD

## 2020-07-15 ENCOUNTER — TELEPHONE (OUTPATIENT)
Dept: DERMATOLOGY | Facility: CLINIC | Age: 40
End: 2020-07-15

## 2020-07-15 NOTE — TELEPHONE ENCOUNTER
[facility] Call Center    Phone Message    May a detailed message be left on voicemail: yes     Reason for Call: Other: pt has referral, says her skin is coming off, please advise with her     Action Taken: Message routed to:  Adult Clinics: Dermatology p [id number]    Travel Screening: Not Applicable

## 2020-07-16 NOTE — TELEPHONE ENCOUNTER
She have sores that have not healed for over a year. Infected and white pus. Skin peeling off, no matter what she does areas of concern not healing. Face and arms. Current tx Cephalexin 500 MG x TID and also using Mupirocin using throughout the day. No improvement.       She will on and off have a fever, but not currently - she's unsure how to explain it.       Patient scheduled for video visit with Dr. Menendez Friday 7/17/2020.    LXIONG3, MEDICAL ASSISTANT

## 2020-07-16 NOTE — TELEPHONE ENCOUNTER
Spoke with patient and she will keep Video visit appt with Dr. Menendez.   Patient reminded to send photos of areas of concern prior to appointment day. Patient verbalize understanding.     LXIONG3, MEDICAL ASSISTANT

## 2020-07-16 NOTE — TELEPHONE ENCOUNTER
"Writer spoke with Dr. Menendez and she stated that if patient has had these symptoms for over a year as stated in the initial assessment that patient can keep her visit tomorrow.    Writer called patient and she stated the pus and weaping has only been with in the last week.     Writer advised that patient go to the ER as instructed by Dr. Menendez. Patient declined going to the ER because the will \"just tell her to see a dermatologist\". Writer reiterated due to symptoms we highly recommend it. Again, patient declined.    Ale Warren LPN    "

## 2020-07-17 ENCOUNTER — VIRTUAL VISIT (OUTPATIENT)
Dept: DERMATOLOGY | Facility: CLINIC | Age: 40
End: 2020-07-17
Payer: MEDICARE

## 2020-07-17 DIAGNOSIS — L98.9 NON-HEALING SKIN LESION: ICD-10-CM

## 2020-07-17 DIAGNOSIS — L08.9 LOCAL INFECTION OF SKIN AND SUBCUTANEOUS TISSUE: ICD-10-CM

## 2020-07-17 PROCEDURE — 99202 OFFICE O/P NEW SF 15 MIN: CPT | Mod: 95 | Performed by: DERMATOLOGY

## 2020-07-17 RX ORDER — MUPIROCIN 20 MG/G
OINTMENT TOPICAL 3 TIMES DAILY
Qty: 120 G | Refills: 0 | Status: SHIPPED | OUTPATIENT
Start: 2020-07-17

## 2020-07-17 ASSESSMENT — PAIN SCALES - GENERAL: PAINLEVEL: MODERATE PAIN (5)

## 2020-07-17 NOTE — Clinical Note
2 weeks and photos and phone with MD and aerobic culture of open lesion on the extremity  and nose region

## 2020-07-17 NOTE — PROGRESS NOTES
[facility]  Mychart Connected      Impression and Recommendations (Patient Counseled on the Following):  1. Superficial ulcerations with fibrin centrally, possibly external trauma mixed with dyspigmentation and scarring, concern for manipulation. Patient denies. Therefore, we will also test for chronic staph infection. PT has been removing fibrin which is likely the reason these are non healing.   -mupirocin or vaseline twice daily  -gentle cream such as cetaphil or vanicream moisturizer  -recommend swab of nasal lesions and one open lesion on extremity for staph testing  -no wash cloth      2. Lesion on left arm, non healing, recheck with photos in 2 weeks - unclear which lesions this is   -pt to send in photo    Call clinic if she develops worsening, feels sick or fevers or chills in the mean time.        Follow-up:   Follow-up with dermatology in approximately 2 weeks photos and phone. Earlier for new or changing lesions or rash.      Staff only    Charito Menendez MD    Department of Dermatology  [facility]: Phone: [phone], Fax:[phone]  [facility]: Phone: [phone], Fax: [phone]        _____________________________________________________________________________    Dermatology Problem List:  NEW    Encounter Date: Jul 17, 2020    CC:   Chief Complaint   Patient presents with     Derm Problem     Open sores        History of Present Illness:  I have reviewed the teledermatology information and the nursing intake corresponding to this issue. Doreen Peralta is a 40 year old female who presents via teledermatology for  Open for evaluation of sore on the arms and face for 1 year. Reports that the 4th of July her arms and leg started to swell and she felt sick with low grade fever and became itchy and took benadryl. She did not get better. Noted hair on  arms with curst on hiar and bumps. At that time her arm was hot and pus came out. She was started on antibiotic and creams at that time. Reports that the fever and getting sick resolved. However open sores. Notes yellow stuff within stores.     She feels these started as bumps. She then scratched them only and now they are non healing with yellow with them.     Right now using antibiotic ointment mupirocin multiple times daily . Uses wash cloth and ivory soap.      ROS:   No fevers today    Physical Examination:  General: Well-appearing  appropriately-developed individual.  Skin: Focused examination within the teledermatology photograph(s) including  was performed.   -scarring on the extremities(hyperpigmented and hypopigmented macules and papules)  -superficial ulceration on the chin, hyperpigmentation on the nasolabial folds and cheeks  -metal nose ring in place.   -superficial ulcerations on the pictures extremities with healthy  -unclear location, not labelled  Labs:  NA    Past Medical History:   Patient Active Problem List   Diagnosis     Constipation by delayed colonic transit     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     Hx SBO     S/P LEEP of cervix     Gastroparesis     Migraines     Intermittent asthma     Allergic rhinitis     Abnormal Pap smear of cervix     PEG (percutaneous endoscopic gastrostomy) status     Health Care Home     PEG tube malfunction (H)     Malfunction of gastrostomy tube (H)     Malfunctioning jejunostomy tube (H)     Jejunostomy tube present (H)     S/P partial resection of colon     Malnutrition (H)     Long-term (current) use of anticoagulants [Z79.01]     Anxiety     Anemia in other chronic diseases classified elsewhere     Munchausen syndrome - previously suspected     Anemia, iron deficiency     Positive blood culture - Klebsiella oxytoca from Port-a-cath culture     Mitral regurgitation mild-mod by Echo June 2016     Atopic rhinitis     Migraine     Patellofemoral stress syndrome      Hyperbilirubinemia     Chronic diarrhea     Coagulation defect (H) [D68.9]     Fever     Attention to G-tube (H)     Chronic abdominal pain     Vitamin D deficiency     History of deep venous thrombosis     Nausea and vomiting     Abdominal pain, generalized     Abdominal pain     Insomnia, unspecified type     Sepsis due to Klebsiella (H)     Gram negative septicemia (H) - Ochrobactrum, Stenotrophomonas & Pantoea     Hypokalemia     Essential hypertension     Tobacco abuse     Iron deficiency anemia     Stool toxin PCR positive for Clostridium difficile on 4/17/18     History of bacteremia - recurrent     Acute renal failure (H)     Bacteremia     Anemia     Other pulmonary embolism      GIB (gastrointestinal bleeding) - suspected     Past Medical History:   Diagnosis Date     Asthma      Bilateral ovarian cysts      Cervical cancer (H) 01/01/2008    cervical cancer      Chronic pain      Colonic dysmotility     s/p subtotal colectomy     Constipation     chronic     E. coli sepsis (H) 5/8/2016     Enteritis      Fungemia 5/5/2016     Gastro-oesophageal reflux disease      H/O ileostomy      Hx of abnormal Pap smear     s/p LEEP - no further details provided     Hypertension      IBS (irritable bowel syndrome)      Other chronic pain      PONV (postoperative nausea and vomiting)      Thrombosis, hepatic vein (H)     microvascular     Past [facility] History:   Procedure Laterality Date     COLONOSCOPY  7/10/2012    Procedure: COLONOSCOPY;;  Surgeon: Nicole Redding MD;  [facility]     COLONOSCOPY N/A 2/19/2017    Procedure: COLONOSCOPY;  Surgeon: Randell Muller MD;  [facility]     COLONOSCOPY N/A 2/21/2017    Procedure: COLONOSCOPY;  Surgeon: Randell Muller MD;  [facility]     COLONOSCOPY N/A 5/3/2018    Procedure: COMBINED COLONOSCOPY, SINGLE OR MULTIPLE BIOPSY/POLYPECTOMY BY BIOPSY;  EGD/Colonoscopy ;  Surgeon: Loi Black MD;  [facility]     ECHO CHELO   7/19/2016          ENDOSCOPIC INSERTION TUBE GASTROSTOMY N/A 1/21/2016    Procedure: ENDOSCOPIC INSERTION TUBE GASTROSTOMY;  Surgeon: Nicole Redding MD;  [facility]     ESOPHAGOSCOPY, GASTROSCOPY, DUODENOSCOPY (EGD), COMBINED  7/10/2012    Procedure: COMBINED ESOPHAGOSCOPY, GASTROSCOPY, DUODENOSCOPY (EGD);  Upper Endoscopy, Ileoscopy    Latex Allergy  with biopsies;  Surgeon: Nicole Redding MD;  [facility]     ESOPHAGOSCOPY, GASTROSCOPY, DUODENOSCOPY (EGD), COMBINED N/A 11/5/2014    Procedure: COMBINED ESOPHAGOSCOPY, GASTROSCOPY, DUODENOSCOPY (EGD);  Surgeon: Nicole Redding MD;  [facility]     ESOPHAGOSCOPY, GASTROSCOPY, DUODENOSCOPY (EGD), COMBINED N/A 5/3/2018    Procedure: COMBINED ESOPHAGOSCOPY, GASTROSCOPY, DUODENOSCOPY (EGD), BIOPSY SINGLE OR MULTIPLE;;  Surgeon: Loi Black MD;  [facility]     HC REPLACE DUODENOSTOMY/JEJUNOSTOMY TUBE PERCUTANEOUS N/A 8/27/2015    Procedure: REPLACE GASTROJEJUNOSTOMY TUBE, PERCUTANEOUS;  Surgeon: Mio Holder MD;  [facility]     HC REPLACE DUODENOSTOMY/JEJUNOSTOMY TUBE PERCUTANEOUS N/A 1/7/2016    Procedure: REPLACE JEJUNOSTOMY TUBE, PERCUTANEOUS;  Surgeon: Elsa Medel MD;  [facility]     HC REPLACE DUODENOSTOMY/JEJUNOSTOMY TUBE PERCUTANEOUS N/A 1/28/2016    Procedure: REPLACE JEJUNOSTOMY TUBE, PERCUTANEOUS;  Surgeon: Elsa Medel MD;  [facility]     HC REPLACE GASTROSTOMY/CECOSTOMY TUBE PERCUTANEOUS Left 5/19/2015    Procedure: REPLACE GASTROSTOMY TUBE, PERCUTANEOUS;  Surgeon: Melecio Morejon Chi, MD;  [facility]     HC UGI ENDOSCOPY W PLACEMENT GASTROSTOMY TUBE PERCUT N/A 10/1/2015    Procedure: COMBINED ESOPHAGOSCOPY, GASTROSCOPY, DUODENOSCOPY (EGD), PLACE PERCUTANEOUS ENDOSCOPIC GASTROSTOMY TUBE;  Surgeon: Mio Holder MD;  [facility]     INSERT PORT VASCULAR ACCESS Right 7/20/2017    Procedure: INSERT PORT VASCULAR ACCESS;  Chest Port Placement  **Latex Allergy**;   Surgeon: Coy Rocha PA-C;  [facility]     LAPAROSCOPIC ASSISTED COLECTOMY  1/20/2012    Procedure:LAPAROSCOPIC ASSISTED COLECTOMY; Laparoscopic Ileostomy       LAPAROSCOPIC ASSISTED COLECTOMY LEFT (DESCENDING)  10/24/2012    Procedure: LAPAROSCOPIC ASSISTED COLECTOMY LEFT (DESCENDING);   Hand Assisted Laproscopic Subtotal abdominal Colectomy,Iieorectal anastamosis, Ileostomy Closure.       LAPAROSCOPIC ASSISTED INSERTION TUBE JEJUNOSTOMY N/A 10/16/2015    Procedure: LAPAROSCOPIC ASSISTED INSERTION TUBE JEJUNOSTOMY;  Surgeon: Elsa Medel MD;  [facility]     LAPAROSCOPIC CHOLECYSTECTOMY  2002    [facility] ctr. stones duct     LAPAROSCOPIC ILEOSTOMY  1/20/2012    [facility], loop     LAPAROSCOPIC OOPHORECTOMY Right 2009    [facility]     LAPAROTOMY EXPLORATORY N/A 1/28/2016    Procedure: LAPAROTOMY EXPLORATORY;  Surgeon: Elsa Medel MD;  [facility]     LEEP TX, CERVICAL  2009    [facility]     PICC INSERTION Left 10/21/2015    5fr DL Power PICC, 37cm (2cm external) in the L basilic vein w/ tip in the SVC RA junction.     REMOVE GASTROSTOMY TUBE ADULT N/A 12/12/2014    Procedure: REMOVE GASTROSTOMY TUBE ADULT;  Surgeon: Nicole Redding MD;  [facility]     REMOVE PORT VASCULAR ACCESS Right 6/30/2016    Procedure: REMOVE PORT VASCULAR ACCESS;  Surgeon: Pradeep Orosco MD;  [facility]     REMOVE PORT VASCULAR ACCESS Right 9/8/2017    Procedure: REMOVE PORT VASCULAR ACCESS;  right side mediport removal;  Surgeon: Zechariah Worthington MD;  Location: [facility]     replace GASTROSTOMY TUBE ADULT  5/19/15       Social History:  Patient reports that she has been smoking cigarettes. She has a 4.00 pack-year smoking history. She has never used smokeless tobacco. She reports that she does not drink alcohol or use drugs.    Family History:  Family History   Problem Relation Age of Onset     Thyroid Disease Mother      Sjogren's Mother      Gastrointestinal Disease Mother          Intermittent nausea vomiting diarrhea     Colon Polyps Mother      Prostate Problems Father         prostate enlargement     Lupus Maternal Grandmother      Cancer Maternal Grandfather         Lung     Colon Cancer Maternal Grandfather 65     Cancer Paternal Grandmother         Lung      Cerebrovascular Disease Paternal Grandmother      Diabetes Paternal Grandmother      Cardiovascular Paternal Grandmother         CHF     Cancer Paternal Grandfather         Lung     Glaucoma Paternal Grandfather      Abdominal Aortic Aneurysm Other      Macular Degeneration No family hx of        Medications:  Current Outpatient Medications   Medication     ACETAMINOPHEN PO     albuterol (2.5 MG/3ML) 0.083% neb solution     albuterol 90 MCG/ACT inhaler     Alfalfa 650 MG TABS     apixaban ANTICOAGULANT (ELIQUIS) 5 MG tablet     cephALEXin (KEFLEX) 500 MG capsule     cloNIDine (CATAPRES) 0.2 MG tablet     diphenhydrAMINE HCl 50 MG TABS     DULoxetine (CYMBALTA) 60 MG EC capsule     EPINEPHrine (ANY BX GENERIC EQUIV) 0.3 MG/0.3ML injection 2-pack     furosemide (LASIX) 20 MG tablet     ipratropium (ATROVENT) 0.02 % neb solution     lisinopril (PRINIVIL/ZESTRIL) 2.5 MG tablet     LORazepam (ATIVAN) 1 MG tablet     Multiple Vitamin (TAB-A-PRASANNA) TABS     mupirocin (BACTROBAN) 2 % external ointment     mupirocin (BACTROBAN) 2 % external ointment     NARCAN 4 MG/0.1ML nasal spray     SUMAtriptan (IMITREX) 50 MG tablet     traZODone (DESYREL) 100 MG tablet     No current facility-administered medications for this visit.           Allergies   Allergen Reactions     Azithromycin Other (See Comments) and Itching     Other reaction(s): Throat Swelling/Closing  Burning in throat     Hyoscyamine Rash     Droperidol Hives and Rash     Metoclopramide Other (See Comments)     Eye twitching.      Peaches [Peach] Other (See Comments)     Raw. Cooked OK     Sucralose Other (See Comments)     All artificial sweeteners. Aspartame also. Swollen  glands     Advair Diskus Other (See Comments)     Throat burns     Compazine [Prochlorperazine] Visual Disturbance     Contrast Dye Itching     States is allergic to CT contrast dye     Cyclobenzaprine Visual Disturbance     Diatrizoate Other (See Comments)     Patient reports she tolerates IV contrast if given 50mg IV of Benadryl prior to scan.      Fentanyl Other (See Comments)     migraine     Fluticasone-Salmeterol      Throat burns     Ibuprofen GI Disturbance     Lactulose Nausea and Vomiting     Gas and bloating     Levaquin [Levofloxacin] Swelling     Per ED M.D. And RN      Morphine Sulfate Other (See Comments)     Chest pain       Oxycodone Other (See Comments)     Burning throat, but can take Norco.      Oxycodone-Acetaminophen      Burning in throat  Throat burns     Rizatriptan Visual Disturbance     Seafood      itching     Isovue [Iopamidol] Palpitations     Pt had racing heart and sob      Ketorolac Anxiety     Latex Swelling and Rash     Kiwi, likely also avacado, ? banana     Levsin Rash         _____________________________________________________________________________    Teledermatology information:  - Location of patient: [place]  - Patient presented as: provider referral  - Location of teledermatologist:  ([facility] )  - Reason teledermatology is appropriate:  of National Emergency Regarding Coronavirus disease (COVID 19) Outbreak  - Image quality and interpretability: acceptable  - Physician has received verbal consent for a Video/Photos Visit from the patient? Yes  - In-person dermatology visit recommendation: yes - for skin culture  - Date of images:  7/16/2020  - Service start time: 11:59- 12:05 with [employer] video then phone as pt was noted to be driving  - Service end time:12:24pm  - Date of report: 7/17/2020           Teledermatology Nurse Call for NEW Patients (not seen in last 3 years):     The patient was contacted by phone and we reviewed they have a visit in  "teledermatology upcoming with an MD or ANUP;  Importantly, \"a teledermatology visit may not be as thorough as an in-person visit, and the quality of the photograph and/or video sent may not be of the same quality as that taken by the dermatology clinic.\"     This video visit will be conducted via a call between you and your physician/provider via [employer]. We have found that certain health care needs can be provided without the need for an in-person physical exam.  This service lets us provide the care you need with a video conversation.  If a prescription is necessary we can send it directly to your pharmacy.  If lab work is needed we can place an order for that and you can then stop by our lab to have the test done at a later time.If during the course of the call the physician/provider feels a video visit is not appropriate, you will not be charged for this service.Visits are billed at different rates depending on your insurance coverage. Please reach out to your insurance provider with any questions.    The patient will also send photographs via [employer] for review. Instructions for photography are/were sent to the patient via [employer] messaging.       The patient verified the location of the photo/video visit to be [place].(The physician must be notified by nurse if the patient is not in the state of [place] during the encounter)    The patient denied skin pain, fever, mucosal symptoms(lesions, blisters, sores in the mouth, nose, eyes, or genitals)  IF PATIENT ENDORSES ANY OF THESE STOP AND PAGE ON CALL ATTENDING    Additional notes:  Patient summary of issue:Open sores   Location of problem on body: Arms and face   How long has area/symptoms been present: Over a year   What makes it better?:N/A  What makes it worse?:N/A   Other symptoms include the following: On and off fever and chills. Severe itching, swelling and pain to skin. She was itching the areas and now have open sores. White pus, infected started in early " July.   Which medications have been tried, for how long, and did they make it better or worse (ex. Triamcinolone, used twice daily for 2 weeks, not improved): Past tx OTC ointments did not help. She is currently tx Cephalexin and Mupirocin ointment, no improvement; started for a week now.   The patient has not seen a dermatologist.   The patient hasno past medical history of skin cancer  ROS: The patient is not generally feeling well.

## 2020-07-17 NOTE — PATIENT INSTRUCTIONS
[facility] Teledermatology Visit    Thank you for allowing us to participate in your care. Your findings, instructions and follow-up plan are as follows:  -mupirocin or vaseline twice daily  -no wash cloth  -gentle soap like dove once daily  When should I call my doctor?    If you are worsening or not improving, please, contact us or seek urgent care as noted below.     Who should I call with questions (adults)?    [facility] (adult and pediatric): [phone]     [facility] (adult): [phone]    For urgent needs outside of business hours call the [facility] at [phone] and ask for the dermatology resident on call    If this is a medical emergency and you are unable to reach an ER, Call 911      Who should I call with questions (pediatric)?  [facility]- Pediatric Dermatology  Dr. Zonia Gomez, Dr. Pearl Molina, Dr. Bianca Bruno, Shaneka Barnhart, PA  Dr. Cinda Fontaine, Dr. Sonia Mandujano & Dr. Jake Wynne  Non Urgent  Nurse Triage Line; [phone]- Alina and Lamar RN Care Coordinators   Tiffanie (/Complex ) [phone]    If you need a prescription refill, please contact your pharmacy. Refills are approved or denied by our Physicians during normal business hours, Monday through Fridays  Per office policy, refills will not be granted if you have not been seen within the past year (or sooner depending on your child's condition)    Scheduling Information:  Pediatric Appointment Scheduling and Call Center [phone]  Radiology Scheduling- [phone]  Sedation Unit Scheduling- [phone]  [place] Scheduling- General [phone]; Pediatric Dermatology [phone]  Main  Services: [phone]  Sammarinese: [phone]  Lithuanian: [phone]  Hmong/Kazakh/[place]ay[place]: [phone]  Preadmission Nursing Department Fax Number: [phone] (Fax all  pre-operative paperwork to this number)    For urgent matters arising during evenings, weekends, or holidays that cannot wait for normal business hours please call [phone] and ask for the Dermatology Resident On-Call to be paged.

## 2020-07-17 NOTE — LETTER
7/17/2020         RE: Doreen Peralta  [address] [facility] [address]        Dear Colleague,    Thank you for referring your patient, Doreen Peralta, to the [facility]. Please see a copy of my visit note below.    [facility]  Mychart Connected      Impression and Recommendations (Patient Counseled on the Following):  1. Superficial ulcerations with fibrin centrally, possibly external trauma mixed with dyspigmentation and scarring, concern for manipulation. Patient denies. Therefore, we will also test for chronic staph infection. PT has been removing fibrin which is likely the reason these are non healing.   -mupirocin or vaseline twice daily  -gentle cream such as cetaphil or vanicream moisturizer  -recommend swab of nasal lesions and one open lesion on extremity for staph testing  -no wash cloth      2. Lesion on left arm, non healing, recheck with photos in 2 weeks - unclear which lesions this is   -pt to send in photo    Call clinic if she develops worsening, feels sick or fevers or chills in the mean time.        Follow-up:   Follow-up with dermatology in approximately 2 weeks photos and phone. Earlier for new or changing lesions or rash.      Staff only    Charito Menendez MD    Department of Dermatology  [facility]: Phone: [phone], Fax:[phone]  [facility]: Phone: [phone], Fax: [phone]        _____________________________________________________________________________    Dermatology Problem List:  NEW    Encounter Date: Jul 17, 2020    CC:   Chief Complaint   Patient presents with     Derm Problem     Open sores        History of Present Illness:  I have reviewed the teledermatology information and the nursing intake corresponding to this issue. Doreen Peralta is a 40 year old female who presents via  teledermatology for  Open for evaluation of sore on the arms and face for 1 year. Reports that the 4th of July her arms and leg started to swell and she felt sick with low grade fever and became itchy and took benadryl. She did not get better. Noted hair on arms with curst on hiar and bumps. At that time her arm was hot and pus came out. She was started on antibiotic and creams at that time. Reports that the fever and getting sick resolved. However open sores. Notes yellow stuff within stores.     She feels these started as bumps. She then scratched them only and now they are non healing with yellow with them.     Right now using antibiotic ointment mupirocin multiple times daily . Uses wash cloth and ivory soap.      ROS:   No fevers today    Physical Examination:  General: Well-appearing  appropriately-developed individual.  Skin: Focused examination within the teledermatology photograph(s) including  was performed.   -scarring on the extremities(hyperpigmented and hypopigmented macules and papules)  -superficial ulceration on the chin, hyperpigmentation on the nasolabial folds and cheeks  -metal nose ring in place.   -superficial ulcerations on the pictures extremities with healthy  -unclear location, not labelled  Labs:  NA    Past Medical History:   Patient Active Problem List   Diagnosis     Constipation by delayed colonic transit     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     Hx SBO     S/P LEEP of cervix     Gastroparesis     Migraines     Intermittent asthma     Allergic rhinitis     Abnormal Pap smear of cervix     PEG (percutaneous endoscopic gastrostomy) status     Health Care Home     PEG tube malfunction (H)     Malfunction of gastrostomy tube (H)     Malfunctioning jejunostomy tube (H)     Jejunostomy tube present (H)     S/P partial resection of colon     Malnutrition (H)     Long-term (current) use of anticoagulants [Z79.01]     Anxiety     Anemia in other chronic diseases classified elsewhere      Munchausen syndrome - previously suspected     Anemia, iron deficiency     Positive blood culture - Klebsiella oxytoca from Port-a-cath culture     Mitral regurgitation mild-mod by Echo June 2016     Atopic rhinitis     Migraine     Patellofemoral stress syndrome     Hyperbilirubinemia     Chronic diarrhea     Coagulation defect (H) [D68.9]     Fever     Attention to G-tube (H)     Chronic abdominal pain     Vitamin D deficiency     History of deep venous thrombosis     Nausea and vomiting     Abdominal pain, generalized     Abdominal pain     Insomnia, unspecified type     Sepsis due to Klebsiella (H)     Gram negative septicemia (H) - Ochrobactrum, Stenotrophomonas & Pantoea     Hypokalemia     Essential hypertension     Tobacco abuse     Iron deficiency anemia     Stool toxin PCR positive for Clostridium difficile on 4/17/18     History of bacteremia - recurrent     Acute renal failure (H)     Bacteremia     Anemia     Other pulmonary embolism      GIB (gastrointestinal bleeding) - suspected     Past Medical History:   Diagnosis Date     Asthma      Bilateral ovarian cysts      Cervical cancer (H) 01/01/2008    cervical cancer      Chronic pain      Colonic dysmotility     s/p subtotal colectomy     Constipation     chronic     E. coli sepsis (H) 5/8/2016     Enteritis      Fungemia 5/5/2016     Gastro-oesophageal reflux disease      H/O ileostomy      Hx of abnormal Pap smear     s/p LEEP - no further details provided     Hypertension      IBS (irritable bowel syndrome)      Other chronic pain      PONV (postoperative nausea and vomiting)      Thrombosis, hepatic vein (H)     microvascular     Past [facility] History:   Procedure Laterality Date     COLONOSCOPY  7/10/2012    Procedure: COLONOSCOPY;;  Surgeon: Nicole Redding MD;  [facility]     COLONOSCOPY N/A 2/19/2017    Procedure: COLONOSCOPY;  Surgeon: Randell Muller MD;  [facility]     COLONOSCOPY N/A 2/21/2017    Procedure: COLONOSCOPY;   Surgeon: Randell Muller MD;  [facility]     COLONOSCOPY N/A 5/3/2018    Procedure: COMBINED COLONOSCOPY, SINGLE OR MULTIPLE BIOPSY/POLYPECTOMY BY BIOPSY;  EGD/Colonoscopy ;  Surgeon: Loi Black MD;  [facility]     ECHO CHELO  7/19/2016          ENDOSCOPIC INSERTION TUBE GASTROSTOMY N/A 1/21/2016    Procedure: ENDOSCOPIC INSERTION TUBE GASTROSTOMY;  Surgeon: Nicole Redding MD;  [facility]     ESOPHAGOSCOPY, GASTROSCOPY, DUODENOSCOPY (EGD), COMBINED  7/10/2012    Procedure: COMBINED ESOPHAGOSCOPY, GASTROSCOPY, DUODENOSCOPY (EGD);  Upper Endoscopy, Ileoscopy    Latex Allergy  with biopsies;  Surgeon: Nicole Redding MD;  [facility]     ESOPHAGOSCOPY, GASTROSCOPY, DUODENOSCOPY (EGD), COMBINED N/A 11/5/2014    Procedure: COMBINED ESOPHAGOSCOPY, GASTROSCOPY, DUODENOSCOPY (EGD);  Surgeon: Nicole Redding MD;  [facility]     ESOPHAGOSCOPY, GASTROSCOPY, DUODENOSCOPY (EGD), COMBINED N/A 5/3/2018    Procedure: COMBINED ESOPHAGOSCOPY, GASTROSCOPY, DUODENOSCOPY (EGD), BIOPSY SINGLE OR MULTIPLE;;  Surgeon: Loi Black MD;  [facility]     HC REPLACE DUODENOSTOMY/JEJUNOSTOMY TUBE PERCUTANEOUS N/A 8/27/2015    Procedure: REPLACE GASTROJEJUNOSTOMY TUBE, PERCUTANEOUS;  Surgeon: Mio Holder MD;  [facility]     HC REPLACE DUODENOSTOMY/JEJUNOSTOMY TUBE PERCUTANEOUS N/A 1/7/2016    Procedure: REPLACE JEJUNOSTOMY TUBE, PERCUTANEOUS;  Surgeon: Elsa Medel MD;  [facility]     HC REPLACE DUODENOSTOMY/JEJUNOSTOMY TUBE PERCUTANEOUS N/A 1/28/2016    Procedure: REPLACE JEJUNOSTOMY TUBE, PERCUTANEOUS;  Surgeon: Elsa Medel MD;  [facility]     HC REPLACE GASTROSTOMY/CECOSTOMY TUBE PERCUTANEOUS Left 5/19/2015    Procedure: REPLACE GASTROSTOMY TUBE, PERCUTANEOUS;  Surgeon: Melecio Morejon Chi, MD;  [facility]     HC UGI ENDOSCOPY W PLACEMENT GASTROSTOMY TUBE PERCUT N/A 10/1/2015    Procedure: COMBINED ESOPHAGOSCOPY, GASTROSCOPY,  DUODENOSCOPY (EGD), PLACE PERCUTANEOUS ENDOSCOPIC GASTROSTOMY TUBE;  Surgeon: Mio Holder MD;  [facility]     INSERT PORT VASCULAR ACCESS Right 7/20/2017    Procedure: INSERT PORT VASCULAR ACCESS;  Chest Port Placement  **Latex Allergy**;  Surgeon: Coy Rocha PA-C;  [facility]     LAPAROSCOPIC ASSISTED COLECTOMY  1/20/2012    Procedure:LAPAROSCOPIC ASSISTED COLECTOMY; Laparoscopic Ileostomy       LAPAROSCOPIC ASSISTED COLECTOMY LEFT (DESCENDING)  10/24/2012    Procedure: LAPAROSCOPIC ASSISTED COLECTOMY LEFT (DESCENDING);   Hand Assisted Laproscopic Subtotal abdominal Colectomy,Iieorectal anastamosis, Ileostomy Closure.       LAPAROSCOPIC ASSISTED INSERTION TUBE JEJUNOSTOMY N/A 10/16/2015    Procedure: LAPAROSCOPIC ASSISTED INSERTION TUBE JEJUNOSTOMY;  Surgeon: Elsa Medel MD;  [facility]     LAPAROSCOPIC CHOLECYSTECTOMY  2002    [facility]. stones duct     LAPAROSCOPIC ILEOSTOMY  1/20/2012    [facility], loop     LAPAROSCOPIC OOPHORECTOMY Right 2009    [facility]     LAPAROTOMY EXPLORATORY N/A 1/28/2016    Procedure: LAPAROTOMY EXPLORATORY;  Surgeon: Elsa Medel MD;  [facility]     LEEP TX, CERVICAL  2009    [facility]     PICC INSERTION Left 10/21/2015    5fr DL Power PICC, 37cm (2cm external) in the L basilic vein w/ tip in the SVC RA junction.     REMOVE GASTROSTOMY TUBE ADULT N/A 12/12/2014    Procedure: REMOVE GASTROSTOMY TUBE ADULT;  Surgeon: Nicole Redding MD;  [facility]     REMOVE PORT VASCULAR ACCESS Right 6/30/2016    Procedure: REMOVE PORT VASCULAR ACCESS;  Surgeon: Pradeep Orosco MD;  [facility]     REMOVE PORT VASCULAR ACCESS Right 9/8/2017    Procedure: REMOVE PORT VASCULAR ACCESS;  right side mediport removal;  Surgeon: Zechariah Worthington MD;  Location: [facility]     replace GASTROSTOMY TUBE ADULT  5/19/15       Social History:  Patient reports that she has been smoking cigarettes. She has a 4.00 pack-year smoking  history. She has never used smokeless tobacco. She reports that she does not drink alcohol or use drugs.    Family History:  Family History   Problem Relation Age of Onset     Thyroid Disease Mother      Sjogren's Mother      Gastrointestinal Disease Mother         Intermittent nausea vomiting diarrhea     Colon Polyps Mother      Prostate Problems Father         prostate enlargement     Lupus Maternal Grandmother      Cancer Maternal Grandfather         Lung     Colon Cancer Maternal Grandfather 65     Cancer Paternal Grandmother         Lung      Cerebrovascular Disease Paternal Grandmother      Diabetes Paternal Grandmother      Cardiovascular Paternal Grandmother         CHF     Cancer Paternal Grandfather         Lung     Glaucoma Paternal Grandfather      Abdominal Aortic Aneurysm Other      Macular Degeneration No family hx of        Medications:  Current Outpatient Medications   Medication     ACETAMINOPHEN PO     albuterol (2.5 MG/3ML) 0.083% neb solution     albuterol 90 MCG/ACT inhaler     Alfalfa 650 MG TABS     apixaban ANTICOAGULANT (ELIQUIS) 5 MG tablet     cephALEXin (KEFLEX) 500 MG capsule     cloNIDine (CATAPRES) 0.2 MG tablet     diphenhydrAMINE HCl 50 MG TABS     DULoxetine (CYMBALTA) 60 MG EC capsule     EPINEPHrine (ANY BX GENERIC EQUIV) 0.3 MG/0.3ML injection 2-pack     furosemide (LASIX) 20 MG tablet     ipratropium (ATROVENT) 0.02 % neb solution     lisinopril (PRINIVIL/ZESTRIL) 2.5 MG tablet     LORazepam (ATIVAN) 1 MG tablet     Multiple Vitamin (TAB-A-PRASANNA) TABS     mupirocin (BACTROBAN) 2 % external ointment     mupirocin (BACTROBAN) 2 % external ointment     NARCAN 4 MG/0.1ML nasal spray     SUMAtriptan (IMITREX) 50 MG tablet     traZODone (DESYREL) 100 MG tablet     No current facility-administered medications for this visit.           Allergies   Allergen Reactions     Azithromycin Other (See Comments) and Itching     Other reaction(s): Throat Swelling/Closing  Burning in throat      Hyoscyamine Rash     Droperidol Hives and Rash     Metoclopramide Other (See Comments)     Eye twitching.      Peaches [Peach] Other (See Comments)     Raw. Cooked OK     Sucralose Other (See Comments)     All artificial sweeteners. Aspartame also. Swollen glands     Advair Diskus Other (See Comments)     Throat burns     Compazine [Prochlorperazine] Visual Disturbance     Contrast Dye Itching     States is allergic to CT contrast dye     Cyclobenzaprine Visual Disturbance     Diatrizoate Other (See Comments)     Patient reports she tolerates IV contrast if given 50mg IV of Benadryl prior to scan.      Fentanyl Other (See Comments)     migraine     Fluticasone-Salmeterol      Throat burns     Ibuprofen GI Disturbance     Lactulose Nausea and Vomiting     Gas and bloating     Levaquin [Levofloxacin] Swelling     Per ED M.D. And RN      Morphine Sulfate Other (See Comments)     Chest pain       Oxycodone Other (See Comments)     Burning throat, but can take Norco.      Oxycodone-Acetaminophen      Burning in throat  Throat burns     Rizatriptan Visual Disturbance     Seafood      itching     Isovue [Iopamidol] Palpitations     Pt had racing heart and sob      Ketorolac Anxiety     Latex Swelling and Rash     Kiwi, likely also avacado, ? banana     Levsin Rash         _____________________________________________________________________________    Teledermatology information:  - Location of patient: [place]  - Patient presented as: provider referral  - Location of teledermatologist:  ([facility] )  - Reason teledermatology is appropriate:  of National Emergency Regarding Coronavirus disease (COVID 19) Outbreak  - Image quality and interpretability: acceptable  - Physician has received verbal consent for a Video/Photos Visit from the patient? Yes  - In-person dermatology visit recommendation: yes - for skin culture  - Date of images:  7/16/2020  - Service start time: 11:59- 12:05 with Q-Bot  "then phone as pt was noted to be driving  - Service end time:12:24pm  - Date of report: 7/17/2020           Teledermatology Nurse Call for NEW Patients (not seen in last 3 years):     The patient was contacted by phone and we reviewed they have a visit in teledermatology upcoming with an MD or ANUP;  Importantly, \"a teledermatology visit may not be as thorough as an in-person visit, and the quality of the photograph and/or video sent may not be of the same quality as that taken by the dermatology clinic.\"     This video visit will be conducted via a call between you and your physician/provider via Diditz. We have found that certain health care needs can be provided without the need for an in-person physical exam.  This service lets us provide the care you need with a video conversation.  If a prescription is necessary we can send it directly to your pharmacy.  If lab work is needed we can place an order for that and you can then stop by our lab to have the test done at a later time.If during the course of the call the physician/provider feels a video visit is not appropriate, you will not be charged for this service.Visits are billed at different rates depending on your insurance coverage. Please reach out to your insurance provider with any questions.    The patient will also send photographs via Storemates for review. Instructions for photography are/were sent to the patient via Storemates messaging.       The patient verified the location of the photo/video visit to be [place].(The physician must be notified by nurse if the patient is not in the state of [place] during the encounter)    The patient denied skin pain, fever, mucosal symptoms(lesions, blisters, sores in the mouth, nose, eyes, or genitals)  IF PATIENT ENDORSES ANY OF THESE STOP AND PAGE ON CALL ATTENDING    Additional notes:  Patient summary of issue:Open sores   Location of problem on body: Arms and face   How long has area/symptoms been present: Over a year "   What makes it better?:N/A  What makes it worse?:N/A   Other symptoms include the following: On and off fever and chills. Severe itching, swelling and pain to skin. She was itching the areas and now have open sores. White pus, infected started in early July.   Which medications have been tried, for how long, and did they make it better or worse (ex. Triamcinolone, used twice daily for 2 weeks, not improved): Past tx OTC ointments did not help. She is currently tx Cephalexin and Mupirocin ointment, no improvement; started for a week now.   The patient has not seen a dermatologist.   The patient hasno past medical history of skin cancer  ROS: The patient is not generally feeling well.                     Again, thank you for allowing me to participate in the care of your patient.        Sincerely,        Charito Menendez MD

## 2020-07-20 ENCOUNTER — TELEPHONE (OUTPATIENT)
Dept: DERMATOLOGY | Facility: CLINIC | Age: 40
End: 2020-07-20

## 2020-07-20 NOTE — TELEPHONE ENCOUNTER
I left a message for patient to call [facility].  Schedule her for a nurse only visit for culture of bacteria on external nose and open lesion on extremity.    Fariba De La Vega RN

## 2020-07-22 NOTE — TELEPHONE ENCOUNTER
Attempted to reach patient. No VM available at home or cell number to leave a message.     Lakshmi Hoyt LPN

## 2020-07-27 NOTE — TELEPHONE ENCOUNTER
Three attempts to reach patient.  Forwarding to Dr. Menendez as FYI.  Closing encounter.  Fariba De La Vega RN

## 2020-08-04 ENCOUNTER — VIRTUAL VISIT (OUTPATIENT)
Dept: DERMATOLOGY | Facility: CLINIC | Age: 40
End: 2020-08-04
Payer: MEDICARE

## 2020-08-04 DIAGNOSIS — L08.9 LOCAL INFECTION OF SKIN AND SUBCUTANEOUS TISSUE: ICD-10-CM

## 2020-08-04 DIAGNOSIS — T07.XXXA MULTIPLE EXCORIATIONS: Primary | ICD-10-CM

## 2020-08-04 DIAGNOSIS — L98.9 NON-HEALING SKIN LESION: ICD-10-CM

## 2020-08-04 PROCEDURE — 99441 ZZC PHYSICIAN TELEPHONE EVALUATION 5-10 MIN: CPT | Performed by: DERMATOLOGY

## 2020-08-04 RX ORDER — CEPHALEXIN 500 MG/1
500 CAPSULE ORAL 3 TIMES DAILY
Qty: 21 CAPSULE | Refills: 0 | Status: SHIPPED | OUTPATIENT
Start: 2020-08-04 | End: 2020-12-25

## 2020-08-04 ASSESSMENT — PAIN SCALES - GENERAL: PAINLEVEL: MODERATE PAIN (4)

## 2020-08-04 NOTE — PATIENT INSTRUCTIONS
[facility] Dermatology Visit    Thank you for allowing us to participate in your care. Your findings, instructions and follow-up plan are as follows:    Go to clinic for luzmaria culture today  Start your keflex antibiotic, if you worsen, seek immediate care  Keeping using mupirocin  I will see you in the next few weeks to see if you need a biopsy  When should I call my doctor?    If you are worsening or not improving, please, contact us or seek urgent care as noted below.     Who should I call with questions (adults)?    [facility] (adult and pediatric): [phone]     [facility] (adult): [phone]    For urgent needs outside of business hours call the [facility] at [phone] and ask for the dermatology resident on call    If this is a medical emergency and you are unable to reach an ER, Call [phone]      Who should I call with questions (pediatric)?  [facility]- Pediatric Dermatology  Dr. Zonia Gomez, Dr. Pearl Molina, Dr. Bianca Bruno, Shaneka Barnhart, PA  Dr. Cinda Fontaine, Dr. Sonia Mandujano & Dr. Jake Wynne  Non Urgent  Nurse Triage Line; [phone]- Alina and Lamar RN Care Coordinators   Tiffanie (/Complex ) [phone]    If you need a prescription refill, please contact your pharmacy. Refills are approved or denied by our Physicians during normal business hours, Monday through Fridays  Per office policy, refills will not be granted if you have not been seen within the past year (or sooner depending on your child's condition)    Scheduling Information:  Pediatric Appointment Scheduling and Call Center [phone]  Radiology Scheduling- [phone]  Sedation Unit Scheduling- [phone]  [place] Scheduling- General [phone]; Pediatric Dermatology [phone]  Main  Services: [phone]  [place]: [phone]  [place]:  [phone]  [place]: [phone]  Preadmission Nursing Department Fax Number: [phone] (Fax all pre-operative paperwork to this number)    For urgent matters arising during evenings, weekends, or holidays that cannot wait for normal business hours please call [phone] and ask for the Dermatology Resident On-Call to be paged.

## 2020-08-04 NOTE — PROGRESS NOTES
[facility] Dermatology Record:  Store and Forward and Telephone [phone]      Dermatology Problem List:  NEW    Encounter Date: Aug 4, 2020    CC:   Chief Complaint   Patient presents with     Derm Problem     Superficial ulcerations      Patient reported ulcers have worsened, new ones appeared.   Areas are oozing and bleeding. Denies fever or chills, but voices she does not feel good.   Current tx: Mupirocin or vaseline twice daily. She was unaware that she could use gentle cream such as cetaphil or vanicream moisturizer, did not do those.         History of Present Illness:  Doreen Peralta is a 40 year old female who presents for followup. Overall, feels things are  Improving. Did notice on the right forearm(photo with most lesions) increased rednessa and tenderness. Concerned for infection.   Using mupirocin      ROS: Patient is generally feeling well today      Physical Examination:  General: Well-soudning  Skin: Focused examination including upper extremities  -Pt has several erosion and ulcerations but decreased in size since last visit. No more prominent fibrin when compared to prior photos, decrease in size, with peripheral hyperpigmentation, also on chin and nose    Labs:  Pt did not come in for last culture, reordered    Past Medical History:   Patient Active Problem List   Diagnosis     Constipation by delayed colonic transit     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     Hx SBO     S/P LEEP of cervix     Gastroparesis     Migraines     Intermittent asthma     Allergic rhinitis     Abnormal Pap smear of cervix     PEG (percutaneous endoscopic gastrostomy) status     Health Care Home     PEG tube malfunction (H)     Malfunction of gastrostomy tube (H)     Malfunctioning jejunostomy tube (H)     Jejunostomy tube present (H)     S/P partial resection of colon     Malnutrition (H)     Long-term (current) use of anticoagulants [Z79.01]     Anxiety     Anemia in other chronic  diseases classified elsewhere     Munchausen syndrome - previously suspected     Anemia, iron deficiency     Positive blood culture - Klebsiella oxytoca from Port-a-cath culture     Mitral regurgitation mild-mod by Echo June 2016     Atopic rhinitis     Migraine     Patellofemoral stress syndrome     Hyperbilirubinemia     Chronic diarrhea     Coagulation defect (H) [D68.9]     Fever     Attention to G-tube (H)     Chronic abdominal pain     Vitamin D deficiency     History of deep venous thrombosis     Nausea and vomiting     Abdominal pain, generalized     Abdominal pain     Insomnia, unspecified type     Sepsis due to Klebsiella (H)     Gram negative septicemia (H) - Ochrobactrum, Stenotrophomonas & Pantoea     Hypokalemia     Essential hypertension     Tobacco abuse     Iron deficiency anemia     Stool toxin PCR positive for Clostridium difficile on 4/17/18     History of bacteremia - recurrent     Acute renal failure (H)     Bacteremia     Anemia     Other pulmonary embolism      GIB (gastrointestinal bleeding) - suspected     Past Medical History:   Diagnosis Date     Asthma      Bilateral ovarian cysts      Cervical cancer (H) 01/01/2008    cervical cancer      Chronic pain      Colonic dysmotility     s/p subtotal colectomy     Constipation     chronic     E. coli sepsis (H) 5/8/2016     Enteritis      Fungemia 5/5/2016     Gastro-oesophageal reflux disease      H/O ileostomy      Hx of abnormal Pap smear     s/p LEEP - no further details provided     Hypertension      IBS (irritable bowel syndrome)      Other chronic pain      PONV (postoperative nausea and vomiting)      Thrombosis, hepatic vein (H)     microvascular     Past [facility] History:   Procedure Laterality Date     COLONOSCOPY  7/10/2012    Procedure: COLONOSCOPY;;  Surgeon: Nicole Redding MD;  [facility]     COLONOSCOPY N/A 2/19/2017    Procedure: COLONOSCOPY;  Surgeon: Randell Muller MD;  [facility]     COLONOSCOPY N/A  2/21/2017    Procedure: COLONOSCOPY;  Surgeon: Randell Muller MD;  [facility]     COLONOSCOPY N/A 5/3/2018    Procedure: COMBINED COLONOSCOPY, SINGLE OR MULTIPLE BIOPSY/POLYPECTOMY BY BIOPSY;  EGD/Colonoscopy ;  Surgeon: Loi Black MD;  Location: [facility]     ECHO CHELO  7/19/2016          ENDOSCOPIC INSERTION TUBE GASTROSTOMY N/A 1/21/2016    Procedure: ENDOSCOPIC INSERTION TUBE GASTROSTOMY;  Surgeon: Nicole Redding MD;  [facility]     ESOPHAGOSCOPY, GASTROSCOPY, DUODENOSCOPY (EGD), COMBINED  7/10/2012    Procedure: COMBINED ESOPHAGOSCOPY, GASTROSCOPY, DUODENOSCOPY (EGD);  Upper Endoscopy, Ileoscopy    Latex Allergy  with biopsies;  Surgeon: Nicole Redding MD;  [facility]     ESOPHAGOSCOPY, GASTROSCOPY, DUODENOSCOPY (EGD), COMBINED N/A 11/5/2014    Procedure: COMBINED ESOPHAGOSCOPY, GASTROSCOPY, DUODENOSCOPY (EGD);  Surgeon: Nicole Redding MD;  [facility]     ESOPHAGOSCOPY, GASTROSCOPY, DUODENOSCOPY (EGD), COMBINED N/A 5/3/2018    Procedure: COMBINED ESOPHAGOSCOPY, GASTROSCOPY, DUODENOSCOPY (EGD), BIOPSY SINGLE OR MULTIPLE;;  Surgeon: Loi Black MD;  [facility]     HC REPLACE DUODENOSTOMY/JEJUNOSTOMY TUBE PERCUTANEOUS N/A 8/27/2015    Procedure: REPLACE GASTROJEJUNOSTOMY TUBE, PERCUTANEOUS;  Surgeon: Mio Holder MD;  [facility]     HC REPLACE DUODENOSTOMY/JEJUNOSTOMY TUBE PERCUTANEOUS N/A 1/7/2016    Procedure: REPLACE JEJUNOSTOMY TUBE, PERCUTANEOUS;  Surgeon: Elsa Medel MD;  [facility]     HC REPLACE DUODENOSTOMY/JEJUNOSTOMY TUBE PERCUTANEOUS N/A 1/28/2016    Procedure: REPLACE JEJUNOSTOMY TUBE, PERCUTANEOUS;  Surgeon: Elsa Medel MD;  [facility]     HC REPLACE GASTROSTOMY/CECOSTOMY TUBE PERCUTANEOUS Left 5/19/2015    Procedure: REPLACE GASTROSTOMY TUBE, PERCUTANEOUS;  Surgeon: Melecio Morejon Chi, MD;  Location: [facility]     HC UGI ENDOSCOPY W PLACEMENT GASTROSTOMY TUBE PERCUT N/A 10/1/2015    Procedure:  COMBINED ESOPHAGOSCOPY, GASTROSCOPY, DUODENOSCOPY (EGD), PLACE PERCUTANEOUS ENDOSCOPIC GASTROSTOMY TUBE;  Surgeon: Mio Holder MD;  [facility]     INSERT PORT VASCULAR ACCESS Right 7/20/2017    Procedure: INSERT PORT VASCULAR ACCESS;  Chest Port Placement  **Latex Allergy**;  Surgeon: Coy Rocha PA-C;  [facility]     LAPAROSCOPIC ASSISTED COLECTOMY  1/20/2012    Procedure:LAPAROSCOPIC ASSISTED COLECTOMY; Laparoscopic Ileostomy       LAPAROSCOPIC ASSISTED COLECTOMY LEFT (DESCENDING)  10/24/2012    Procedure: LAPAROSCOPIC ASSISTED COLECTOMY LEFT (DESCENDING);   Hand Assisted Laproscopic Subtotal abdominal Colectomy,Iieorectal anastamosis, Ileostomy Closure.       LAPAROSCOPIC ASSISTED INSERTION TUBE JEJUNOSTOMY N/A 10/16/2015    Procedure: LAPAROSCOPIC ASSISTED INSERTION TUBE JEJUNOSTOMY;  Surgeon: Elsa Medel MD;  Location:  OR     LAPAROSCOPIC CHOLECYSTECTOMY  2002    [facility] ctr. stones duct     LAPAROSCOPIC ILEOSTOMY  1/20/2012    [facility], loop     LAPAROSCOPIC OOPHORECTOMY Right 2009    [facility]     LAPAROTOMY EXPLORATORY N/A 1/28/2016    Procedure: LAPAROTOMY EXPLORATORY;  Surgeon: Elsa Medel MD;  Location:  OR     LEEP TX, CERVICAL  2009    [facility]     PICC INSERTION Left 10/21/2015    5fr DL Power PICC, 37cm (2cm external) in the L basilic vein w/ tip in the SVC RA junction.     REMOVE GASTROSTOMY TUBE ADULT N/A 12/12/2014    Procedure: REMOVE GASTROSTOMY TUBE ADULT;  Surgeon: Nicole Redding MD;  [facility]     REMOVE PORT VASCULAR ACCESS Right 6/30/2016    Procedure: REMOVE PORT VASCULAR ACCESS;  Surgeon: Pradeep Orosco MD;  [facility]: [facility]     REMOVE PORT VASCULAR ACCESS Right 9/8/2017    Procedure: REMOVE PORT VASCULAR ACCESS;  right side mediport removal;  Surgeon: Zechariah Worthington MD;  Location: [facility] OR     replace GASTROSTOMY TUBE ADULT  5/19/15       Social History:  Patient reports that she has been smoking cigarettes.  She has a 4.00 pack-year smoking history. She has never used smokeless tobacco. She reports that she does not drink alcohol or use drugs.    Family History:  Family History   Problem Relation Age of Onset     Thyroid Disease Mother      Sjogren's Mother      Gastrointestinal Disease Mother         Intermittent nausea vomiting diarrhea     Colon Polyps Mother      Prostate Problems Father         prostate enlargement     Lupus Maternal Grandmother      Cancer Maternal Grandfather         Lung     Colon Cancer Maternal Grandfather 65     Cancer Paternal Grandmother         Lung      Cerebrovascular Disease Paternal Grandmother      Diabetes Paternal Grandmother      Cardiovascular Paternal Grandmother         CHF     Cancer Paternal Grandfather         Lung     Glaucoma Paternal Grandfather      Abdominal Aortic Aneurysm Other      Macular Degeneration No family hx of        Medications:  Current Outpatient Medications   Medication     ACETAMINOPHEN PO     albuterol (2.5 MG/3ML) 0.083% neb solution     albuterol 90 MCG/ACT inhaler     Alfalfa 650 MG TABS     apixaban ANTICOAGULANT (ELIQUIS) 5 MG tablet     cloNIDine (CATAPRES) 0.2 MG tablet     diphenhydrAMINE HCl 50 MG TABS     DULoxetine (CYMBALTA) 60 MG EC capsule     EPINEPHrine (ANY BX GENERIC EQUIV) 0.3 MG/0.3ML injection 2-pack     furosemide (LASIX) 20 MG tablet     ipratropium (ATROVENT) 0.02 % neb solution     lisinopril (PRINIVIL/ZESTRIL) 2.5 MG tablet     LORazepam (ATIVAN) 1 MG tablet     Multiple Vitamin (TAB-A-PRASANNA) TABS     mupirocin (BACTROBAN) 2 % external ointment     mupirocin (BACTROBAN) 2 % external ointment     NARCAN 4 MG/0.1ML nasal spray     SUMAtriptan (IMITREX) 50 MG tablet     traZODone (DESYREL) 100 MG tablet     No current facility-administered medications for this visit.           Allergies   Allergen Reactions     Azithromycin Other (See Comments) and Itching     Other reaction(s): Throat Swelling/Closing  Burning in throat      Hyoscyamine Rash     Droperidol Hives and Rash     Metoclopramide Other (See Comments)     Eye twitching.      Peaches [Peach] Other (See Comments)     Raw. Cooked OK     Sucralose Other (See Comments)     All artificial sweeteners. Aspartame also. Swollen glands     Advair Diskus Other (See Comments)     Throat burns     Compazine [Prochlorperazine] Visual Disturbance     Contrast Dye Itching     States is allergic to CT contrast dye     Cyclobenzaprine Visual Disturbance     Diatrizoate Other (See Comments)     Patient reports she tolerates IV contrast if given 50mg IV of Benadryl prior to scan.      Fentanyl Other (See Comments)     migraine     Fluticasone-Salmeterol      Throat burns     Ibuprofen GI Disturbance     Lactulose Nausea and Vomiting     Gas and bloating     Levaquin [Levofloxacin] Swelling     Per ED M.D. And RN      Morphine Sulfate Other (See Comments)     Chest pain       Oxycodone Other (See Comments)     Burning throat, but can take Norco.      Oxycodone-Acetaminophen      Burning in throat  Throat burns     Rizatriptan Visual Disturbance     Seafood      itching     Isovue [Iopamidol] Palpitations     Pt had racing heart and sob      Ketorolac Anxiety     Latex Swelling and Rash     Kiwi, likely also avacado, ? banana     Levsin Rash           Impression and Recommendations (Patient Counseled on the Following):  1. Superficial ulcerations with fibrin centrally, possibly external trauma mixed with dyspigmentation and scarring, concern for manipulation.  Pt did not come in for last culture but complains for redness and warmth not seen in photo  -mupirocin or vaseline twice daily  -reordered culture, asked nursing to schedule for today  -gentle cream such as cetaphil or vanicream moisturizer  -will ask nursing to also take photos  -schedule in person visit  -keflex 500 tid for symptoms and as per pt request, reports did well on keflex in past        2. Lesion on left arm, non healing, recheck  "with photos in 2 weeks - unclear which lesions this is   -schedule in person MD visit     Reviewed if arm worsensr becomes more painful, red or she feels sick        Follow-up:   Follow-up with dermatology in approximately today for culture. Earlier for new or changing lesions or rash.      Staff only    Charito Menendez MD    Department of Dermatology  [facility]: Phone: [phone], Fax:[phone]  [facility]: Phone: [phone], Fax: [phone]      _____________________________________________________________________________    Teledermatology information:  - Location of patient: [place]  - Patient presented as: return  - Location of teledermatologist:  ([facility] )  - Reason teledermatology is appropriate:  of National Emergency Regarding Coronavirus disease (COVID 19) Outbreak  - Image quality and interpretability: acceptable  - Physician has received verbal consent for a Video/Photos Visit from the patient? Yes  - In-person dermatology visit recommendation: yes - for physician visit  - Date of images:  7/16/2020  - Service start time:11:03 to 11:06  - Service end time:  11:13am   - Date of report: 8/4/2020         Teledermatology Nurse Call for RETURN patients seen within the last 3 years:      The patient was contacted by phone and we reviewed they have a visit in teledermatology upcoming with an MD or PAANALI;  Importantly, \"a teledermatology visit may not be as thorough as an in-person visit, and the quality of the photograph and/or video sent may not be of the same quality as that taken by the dermatology clinic.\"     We have found that certain health care needs can be provided without the need for an in-person physical exam.   If a prescription is necessary we can send it directly to your pharmacy.  If lab work is needed we can place an order for that and " you can then stop by our lab to have the test done at a later time.Visits are billed at different rates depending on your insurance coverage. Please reach out to your insurance provider with any questions.    The patient chose to:                                                                                                                                                                                                                 Consent to a teledermatology visit with [employer] photos. Patient told by nursing these are already uploaded. Instructions sent to patient via [employer]. They verified they will be in the state of [place] at the time of the encounter.                                                                                                                                                                                                                                     The patient denied skin pain, fever, mucosal symptoms(lesions, blisters, sores in the mouth, nose, eyes, or genitals)  IF PATIENT ENDORSES ANY OF THESE STOP AND PAGE ON CALL ATTENDING

## 2020-08-04 NOTE — LETTER
8/4/2020         RE: Doreen Peralta  [address] [facility] [address]        Dear Colleague,    Thank you for referring your patient, Doreen Peralta, to the [facility]. Please see a copy of my visit note below.                                        [facility] Dermatology Record:  Store and Forward and Telephone [phone]      Dermatology Problem List:  NEW    Encounter Date: Aug 4, 2020    CC:   Chief Complaint   Patient presents with     Derm Problem     Superficial ulcerations      Patient reported ulcers have worsened, new ones appeared.   Areas are oozing and bleeding. Denies fever or chills, but voices she does not feel good.   Current tx: Mupirocin or vaseline twice daily. She was unaware that she could use gentle cream such as cetaphil or vanicream moisturizer, did not do those.         History of Present Illness:  Doreen Peralta is a 40 year old female who presents for followup. Overall, feels things are  Improving. Did notice on the right forearm(photo with most lesions) increased rednessa and tenderness. Concerned for infection.   Using mupirocin      ROS: Patient is generally feeling well today      Physical Examination:  General: Well-soudning  Skin: Focused examination including upper extremities  -Pt has several erosion and ulcerations but decreased in size since last visit. No more prominent fibrin when compared to prior photos, decrease in size, with peripheral hyperpigmentation, also on chin and nose    Labs:  Pt did not come in for last culture, reordered    Past Medical History:   Patient Active Problem List   Diagnosis     Constipation by delayed colonic transit     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     Hx SBO     S/P LEEP of cervix     Gastroparesis     Migraines     Intermittent asthma     Allergic rhinitis     Abnormal Pap smear of cervix     PEG (percutaneous endoscopic gastrostomy) status     Health Care Home     PEG tube malfunction (H)     Malfunction  of gastrostomy tube (H)     Malfunctioning jejunostomy tube (H)     Jejunostomy tube present (H)     S/P partial resection of colon     Malnutrition (H)     Long-term (current) use of anticoagulants [Z79.01]     Anxiety     Anemia in other chronic diseases classified elsewhere     Munchausen syndrome - previously suspected     Anemia, iron deficiency     Positive blood culture - Klebsiella oxytoca from Port-a-cath culture     Mitral regurgitation mild-mod by Echo June 2016     Atopic rhinitis     Migraine     Patellofemoral stress syndrome     Hyperbilirubinemia     Chronic diarrhea     Coagulation defect (H) [D68.9]     Fever     Attention to G-tube (H)     Chronic abdominal pain     Vitamin D deficiency     History of deep venous thrombosis     Nausea and vomiting     Abdominal pain, generalized     Abdominal pain     Insomnia, unspecified type     Sepsis due to Klebsiella (H)     Gram negative septicemia (H) - Ochrobactrum, Stenotrophomonas & Pantoea     Hypokalemia     Essential hypertension     Tobacco abuse     Iron deficiency anemia     Stool toxin PCR positive for Clostridium difficile on 4/17/18     History of bacteremia - recurrent     Acute renal failure (H)     Bacteremia     Anemia     Other pulmonary embolism      GIB (gastrointestinal bleeding) - suspected     Past Medical History:   Diagnosis Date     Asthma      Bilateral ovarian cysts      Cervical cancer (H) 01/01/2008    cervical cancer      Chronic pain      Colonic dysmotility     s/p subtotal colectomy     Constipation     chronic     E. coli sepsis (H) 5/8/2016     Enteritis      Fungemia 5/5/2016     Gastro-oesophageal reflux disease      H/O ileostomy      Hx of abnormal Pap smear     s/p LEEP - no further details provided     Hypertension      IBS (irritable bowel syndrome)      Other chronic pain      PONV (postoperative nausea and vomiting)      Thrombosis, hepatic vein (H)     microvascular     Past [facility] History:   Procedure  Laterality Date     COLONOSCOPY  7/10/2012    Procedure: COLONOSCOPY;;  Surgeon: Nicole Redding MD;  [facility]     COLONOSCOPY N/A 2/19/2017    Procedure: COLONOSCOPY;  Surgeon: Randell Muller MD;  [facility] GI     COLONOSCOPY N/A 2/21/2017    Procedure: COLONOSCOPY;  Surgeon: Randell Muller MD;  [facility]     COLONOSCOPY N/A 5/3/2018    Procedure: COMBINED COLONOSCOPY, SINGLE OR MULTIPLE BIOPSY/POLYPECTOMY BY BIOPSY;  EGD/Colonoscopy ;  Surgeon: Loi Black MD;  [facility]: [facility] GI     ECHO CHELO  7/19/2016          ENDOSCOPIC INSERTION TUBE GASTROSTOMY N/A 1/21/2016    Procedure: ENDOSCOPIC INSERTION TUBE GASTROSTOMY;  Surgeon: Nicole Redding MD;  [facility]     ESOPHAGOSCOPY, GASTROSCOPY, DUODENOSCOPY (EGD), COMBINED  7/10/2012    Procedure: COMBINED ESOPHAGOSCOPY, GASTROSCOPY, DUODENOSCOPY (EGD);  Upper Endoscopy, Ileoscopy    Latex Allergy  with biopsies;  Surgeon: Nicole Redding MD;  [facility]     ESOPHAGOSCOPY, GASTROSCOPY, DUODENOSCOPY (EGD), COMBINED N/A 11/5/2014    Procedure: COMBINED ESOPHAGOSCOPY, GASTROSCOPY, DUODENOSCOPY (EGD);  Surgeon: Nicole Redding MD;  [facility]     ESOPHAGOSCOPY, GASTROSCOPY, DUODENOSCOPY (EGD), COMBINED N/A 5/3/2018    Procedure: COMBINED ESOPHAGOSCOPY, GASTROSCOPY, DUODENOSCOPY (EGD), BIOPSY SINGLE OR MULTIPLE;;  Surgeon: Loi Black MD;  [facility]     HC REPLACE DUODENOSTOMY/JEJUNOSTOMY TUBE PERCUTANEOUS N/A 8/27/2015    Procedure: REPLACE GASTROJEJUNOSTOMY TUBE, PERCUTANEOUS;  Surgeon: Mio Holder MD;  [facility]     HC REPLACE DUODENOSTOMY/JEJUNOSTOMY TUBE PERCUTANEOUS N/A 1/7/2016    Procedure: REPLACE JEJUNOSTOMY TUBE, PERCUTANEOUS;  Surgeon: Elsa Medel MD;  [facility]     HC REPLACE DUODENOSTOMY/JEJUNOSTOMY TUBE PERCUTANEOUS N/A 1/28/2016    Procedure: REPLACE JEJUNOSTOMY TUBE, PERCUTANEOUS;  Surgeon: Elsa Medel MD;  [facility]       REPLACE GASTROSTOMY/CECOSTOMY TUBE PERCUTANEOUS Left 5/19/2015    Procedure: REPLACE GASTROSTOMY TUBE, PERCUTANEOUS;  Surgeon: Melecio Morejon Chi, MD;  Location:  GI     HC UGI ENDOSCOPY W PLACEMENT GASTROSTOMY TUBE PERCUT N/A 10/1/2015    Procedure: COMBINED ESOPHAGOSCOPY, GASTROSCOPY, DUODENOSCOPY (EGD), PLACE PERCUTANEOUS ENDOSCOPIC GASTROSTOMY TUBE;  Surgeon: Mio Holder MD;  [facility]     INSERT PORT VASCULAR ACCESS Right 7/20/2017    Procedure: INSERT PORT VASCULAR ACCESS;  Chest Port Placement  **Latex Allergy**;  Surgeon: Coy Rocha PA-C;  [facility]     LAPAROSCOPIC ASSISTED COLECTOMY  1/20/2012    Procedure:LAPAROSCOPIC ASSISTED COLECTOMY; Laparoscopic Ileostomy       LAPAROSCOPIC ASSISTED COLECTOMY LEFT (DESCENDING)  10/24/2012    Procedure: LAPAROSCOPIC ASSISTED COLECTOMY LEFT (DESCENDING);   Hand Assisted Laproscopic Subtotal abdominal Colectomy,Iieorectal anastamosis, Ileostomy Closure.       LAPAROSCOPIC ASSISTED INSERTION TUBE JEJUNOSTOMY N/A 10/16/2015    Procedure: LAPAROSCOPIC ASSISTED INSERTION TUBE JEJUNOSTOMY;  Surgeon: Elsa Medel MD;  Location:  OR     LAPAROSCOPIC CHOLECYSTECTOMY  2002    [facility] stones duct     LAPAROSCOPIC ILEOSTOMY  1/20/2012    [facility], loop     LAPAROSCOPIC OOPHORECTOMY Right 2009    [facility]     LAPAROTOMY EXPLORATORY N/A 1/28/2016    Procedure: LAPAROTOMY EXPLORATORY;  Surgeon: Elsa Medel MD;  Location:  OR     LEEP TX, CERVICAL  2009    [facility]     PICC INSERTION Left 10/21/2015    5fr DL Power PICC, 37cm (2cm external) in the L basilic vein w/ tip in the SVC RA junction.     REMOVE GASTROSTOMY TUBE ADULT N/A 12/12/2014    Procedure: REMOVE GASTROSTOMY TUBE ADULT;  Surgeon: Nicole Redding MD;  [facility]     REMOVE PORT VASCULAR ACCESS Right 6/30/2016    Procedure: REMOVE PORT VASCULAR ACCESS;  Surgeon: Pradeep Orosco MD;  Location:  OR     REMOVE PORT VASCULAR ACCESS Right  9/8/2017    Procedure: REMOVE PORT VASCULAR ACCESS;  right side mediport removal;  Surgeon: Zechariah Worthington MD;  Location: [facility] OR     replace GASTROSTOMY TUBE ADULT  5/19/15       Social History:  Patient reports that she has been smoking cigarettes. She has a 4.00 pack-year smoking history. She has never used smokeless tobacco. She reports that she does not drink alcohol or use drugs.    Family History:  Family History   Problem Relation Age of Onset     Thyroid Disease Mother      Sjogren's Mother      Gastrointestinal Disease Mother         Intermittent nausea vomiting diarrhea     Colon Polyps Mother      Prostate Problems Father         prostate enlargement     Lupus Maternal Grandmother      Cancer Maternal Grandfather         Lung     Colon Cancer Maternal Grandfather 65     Cancer Paternal Grandmother         Lung      Cerebrovascular Disease Paternal Grandmother      Diabetes Paternal Grandmother      Cardiovascular Paternal Grandmother         CHF     Cancer Paternal Grandfather         Lung     Glaucoma Paternal Grandfather      Abdominal Aortic Aneurysm Other      Macular Degeneration No family hx of        Medications:  Current Outpatient Medications   Medication     ACETAMINOPHEN PO     albuterol (2.5 MG/3ML) 0.083% neb solution     albuterol 90 MCG/ACT inhaler     Alfalfa 650 MG TABS     apixaban ANTICOAGULANT (ELIQUIS) 5 MG tablet     cloNIDine (CATAPRES) 0.2 MG tablet     diphenhydrAMINE HCl 50 MG TABS     DULoxetine (CYMBALTA) 60 MG EC capsule     EPINEPHrine (ANY BX GENERIC EQUIV) 0.3 MG/0.3ML injection 2-pack     furosemide (LASIX) 20 MG tablet     ipratropium (ATROVENT) 0.02 % neb solution     lisinopril (PRINIVIL/ZESTRIL) 2.5 MG tablet     LORazepam (ATIVAN) 1 MG tablet     Multiple Vitamin (TAB-A-PRASANNA) TABS     mupirocin (BACTROBAN) 2 % external ointment     mupirocin (BACTROBAN) 2 % external ointment     NARCAN 4 MG/0.1ML nasal spray     SUMAtriptan (IMITREX) 50 MG tablet     traZODone  (DESYREL) 100 MG tablet     No current facility-administered medications for this visit.           Allergies   Allergen Reactions     Azithromycin Other (See Comments) and Itching     Other reaction(s): Throat Swelling/Closing  Burning in throat     Hyoscyamine Rash     Droperidol Hives and Rash     Metoclopramide Other (See Comments)     Eye twitching.      Peaches [Peach] Other (See Comments)     Raw. Cooked OK     Sucralose Other (See Comments)     All artificial sweeteners. Aspartame also. Swollen glands     Advair Diskus Other (See Comments)     Throat burns     Compazine [Prochlorperazine] Visual Disturbance     Contrast Dye Itching     States is allergic to CT contrast dye     Cyclobenzaprine Visual Disturbance     Diatrizoate Other (See Comments)     Patient reports she tolerates IV contrast if given 50mg IV of Benadryl prior to scan.      Fentanyl Other (See Comments)     migraine     Fluticasone-Salmeterol      Throat burns     Ibuprofen GI Disturbance     Lactulose Nausea and Vomiting     Gas and bloating     Levaquin [Levofloxacin] Swelling     Per ED M.D. And RN      Morphine Sulfate Other (See Comments)     Chest pain       Oxycodone Other (See Comments)     Burning throat, but can take Norco.      Oxycodone-Acetaminophen      Burning in throat  Throat burns     Rizatriptan Visual Disturbance     Seafood      itching     Isovue [Iopamidol] Palpitations     Pt had racing heart and sob      Ketorolac Anxiety     Latex Swelling and Rash     Kiwi, likely also avacado, ? banana     Levsin Rash           Impression and Recommendations (Patient Counseled on the Following):  1. Superficial ulcerations with fibrin centrally, possibly external trauma mixed with dyspigmentation and scarring, concern for manipulation.  Pt did not come in for last culture but complains for redness and warmth not seen in photo  -mupirocin or vaseline twice daily  -reordered culture, asked nursing to schedule for today  -gentle cream  "such as cetaphil or vanicream moisturizer  -will ask nursing to also take photos  -schedule in person visit  -keflex 500 tid for symptoms and as per pt request, reports did well on keflex in past        2. Lesion on left arm, non healing, recheck with photos in 2 weeks - unclear which lesions this is   -schedule in person MD visit     Reviewed if arm worsensr becomes more painful, red or she feels sick        Follow-up:   Follow-up with dermatology in approximately today for culture. Earlier for new or changing lesions or rash.      Staff only    Charito Menendez MD    Department of Dermatology  [facility]: Phone: [phone], Fax:[phone]  [facility]: Phone: [phone], Fax: [phone]      _____________________________________________________________________________    Teledermatology information:  - Location of patient: [place]  - Patient presented as: return  - Location of teledermatologist:  ([facility] )  - Reason teledermatology is appropriate:  of National Emergency Regarding Coronavirus disease (COVID 19) Outbreak  - Image quality and interpretability: acceptable  - Physician has received verbal consent for a Video/Photos Visit from the patient? Yes  - In-person dermatology visit recommendation: yes - for physician visit  - Date of images:  7/16/2020  - Service start time:11:03 to 11:06  - Service end time:  11:13am   - Date of report: 8/4/2020         Teledermatology Nurse Call for RETURN patients seen within the last 3 years:      The patient was contacted by phone and we reviewed they have a visit in teledermatology upcoming with an MD or PAANALI;  Importantly, \"a teledermatology visit may not be as thorough as an in-person visit, and the quality of the photograph and/or video sent may not be of the same quality as that taken by the dermatology clinic.\" "     We have found that certain health care needs can be provided without the need for an in-person physical exam.   If a prescription is necessary we can send it directly to your pharmacy.  If lab work is needed we can place an order for that and you can then stop by our lab to have the test done at a later time.Visits are billed at different rates depending on your insurance coverage. Please reach out to your insurance provider with any questions.    The patient chose to:                                                                                                                                                                                                                 Consent to a teledermatology visit with [employer] photos. Patient told by nursing these are already uploaded. Instructions sent to patient via [employer]. They verified they will be in the state of [place] at the time of the encounter.                                                                                                                                                                                                                                     The patient denied skin pain, fever, mucosal symptoms(lesions, blisters, sores in the mouth, nose, eyes, or genitals)  IF PATIENT ENDORSES ANY OF THESE STOP AND PAGE ON CALL ATTENDING                                                                                                                                                                                                                                   Again, thank you for allowing me to participate in the care of your patient.        Sincerely,        Charito Menendez MD

## 2020-08-05 ENCOUNTER — TELEPHONE (OUTPATIENT)
Dept: DERMATOLOGY | Facility: CLINIC | Age: 40
End: 2020-08-05

## 2020-08-05 NOTE — TELEPHONE ENCOUNTER
8/5 Provided phone number [phone] to schedule in about 6 weeks (around 9/15/2020).    Emilee Sherman   Procedure    Ortho/Sports Med/Pod/Ent/Eye/[facility] Specialties  [facility] [place] [facility]   [phone]

## 2020-08-19 NOTE — TELEPHONE ENCOUNTER
8/19 2nd attempt.  Provided phone number [phone] to schedule in about 6 weeks (around 9/15/2020).    Emilee Sherman   Procedure    Ortho/Sports Med/Pod/Ent/Eye/[facility] Specialties  [facility] [place]   [phone]

## 2020-12-25 ENCOUNTER — ANCILLARY PROCEDURE (OUTPATIENT)
Dept: ULTRASOUND IMAGING | Facility: CLINIC | Age: 40
End: 2020-12-25
Attending: EMERGENCY MEDICINE
Payer: MEDICARE

## 2020-12-25 ENCOUNTER — HOSPITAL ENCOUNTER (EMERGENCY)
Facility: CLINIC | Age: 40
Discharge: HOME | End: 2020-12-25
Attending: EMERGENCY MEDICINE | Admitting: EMERGENCY MEDICINE
Payer: MEDICARE

## 2020-12-25 VITALS
SYSTOLIC BLOOD PRESSURE: 126 MMHG | RESPIRATION RATE: 18 BRPM | WEIGHT: 153 LBS | DIASTOLIC BLOOD PRESSURE: 77 MMHG | HEIGHT: 66 IN | TEMPERATURE: 98.1 F | HEART RATE: 79 BPM | OXYGEN SATURATION: 99 % | BODY MASS INDEX: 24.59 KG/M2

## 2020-12-25 DIAGNOSIS — R21 RASH AND OTHER NONSPECIFIC SKIN ERUPTION: ICD-10-CM

## 2020-12-25 DIAGNOSIS — L02.91 ABSCESS: ICD-10-CM

## 2020-12-25 DIAGNOSIS — L02.413 ABSCESS OF RIGHT ARM: ICD-10-CM

## 2020-12-25 LAB
ALBUMIN SERPL-MCNC: 3.4 G/DL (ref 3.4–5)
ALP SERPL-CCNC: 162 U/L (ref 40–150)
ALT SERPL W P-5'-P-CCNC: 17 U/L (ref 0–50)
ANION GAP SERPL CALCULATED.3IONS-SCNC: 5 MMOL/L (ref 3–14)
APTT PPP: 30 SEC (ref 22–37)
AST SERPL W P-5'-P-CCNC: 22 U/L (ref 0–45)
BASOPHILS # BLD AUTO: 0 10E9/L (ref 0–0.2)
BASOPHILS NFR BLD AUTO: 0.2 %
BILIRUB SERPL-MCNC: 0.6 MG/DL (ref 0.2–1.3)
BUN SERPL-MCNC: 8 MG/DL (ref 7–30)
CALCIUM SERPL-MCNC: 9.2 MG/DL (ref 8.5–10.1)
CHLORIDE SERPL-SCNC: 105 MMOL/L (ref 94–109)
CO2 SERPL-SCNC: 24 MMOL/L (ref 20–32)
CREAT SERPL-MCNC: 0.82 MG/DL (ref 0.52–1.04)
CRP SERPL-MCNC: 16 MG/L (ref 0–8)
DIFFERENTIAL METHOD BLD: ABNORMAL
EOSINOPHIL # BLD AUTO: 0 10E9/L (ref 0–0.7)
EOSINOPHIL NFR BLD AUTO: 0.1 %
ERYTHROCYTE [DISTWIDTH] IN BLOOD BY AUTOMATED COUNT: 17.8 % (ref 10–15)
GFR SERPL CREATININE-BSD FRML MDRD: 90 ML/MIN/{1.73_M2}
GLUCOSE SERPL-MCNC: 85 MG/DL (ref 70–99)
HCT VFR BLD AUTO: 33.1 % (ref 35–47)
HGB BLD-MCNC: 9.7 G/DL (ref 11.7–15.7)
IMM GRANULOCYTES # BLD: 0 10E9/L (ref 0–0.4)
IMM GRANULOCYTES NFR BLD: 0.2 %
INR PPP: 1.08 (ref 0.86–1.14)
LACTATE BLD-SCNC: 1 MMOL/L (ref 0.7–2)
LYMPHOCYTES # BLD AUTO: 1.7 10E9/L (ref 0.8–5.3)
LYMPHOCYTES NFR BLD AUTO: 20.5 %
MCH RBC QN AUTO: 21 PG (ref 26.5–33)
MCHC RBC AUTO-ENTMCNC: 29.3 G/DL (ref 31.5–36.5)
MCV RBC AUTO: 72 FL (ref 78–100)
MONOCYTES # BLD AUTO: 0.6 10E9/L (ref 0–1.3)
MONOCYTES NFR BLD AUTO: 7.1 %
NEUTROPHILS # BLD AUTO: 6.1 10E9/L (ref 1.6–8.3)
NEUTROPHILS NFR BLD AUTO: 71.9 %
NRBC # BLD AUTO: 0 10*3/UL
NRBC BLD AUTO-RTO: 0 /100
PLATELET # BLD AUTO: 506 10E9/L (ref 150–450)
POTASSIUM SERPL-SCNC: 3.9 MMOL/L (ref 3.4–5.3)
PROT SERPL-MCNC: 8.5 G/DL (ref 6.8–8.8)
RBC # BLD AUTO: 4.62 10E12/L (ref 3.8–5.2)
SODIUM SERPL-SCNC: 134 MMOL/L (ref 133–144)
WBC # BLD AUTO: 8.4 10E9/L (ref 4–11)

## 2020-12-25 PROCEDURE — 85730 THROMBOPLASTIN TIME PARTIAL: CPT | Performed by: EMERGENCY MEDICINE

## 2020-12-25 PROCEDURE — 86140 C-REACTIVE PROTEIN: CPT | Performed by: EMERGENCY MEDICINE

## 2020-12-25 PROCEDURE — 80053 COMPREHEN METABOLIC PANEL: CPT | Performed by: EMERGENCY MEDICINE

## 2020-12-25 PROCEDURE — 85610 PROTHROMBIN TIME: CPT | Performed by: EMERGENCY MEDICINE

## 2020-12-25 PROCEDURE — 99284 EMERGENCY DEPT VISIT MOD MDM: CPT | Mod: 25

## 2020-12-25 PROCEDURE — 76882 US LMTD JT/FCL EVL NVASC XTR: CPT | Mod: 26 | Performed by: EMERGENCY MEDICINE

## 2020-12-25 PROCEDURE — 83605 ASSAY OF LACTIC ACID: CPT | Performed by: EMERGENCY MEDICINE

## 2020-12-25 PROCEDURE — 76882 US LMTD JT/FCL EVL NVASC XTR: CPT | Mod: RT

## 2020-12-25 PROCEDURE — 258N000003 HC RX IP 258 OP 636: Performed by: EMERGENCY MEDICINE

## 2020-12-25 PROCEDURE — 250N000011 HC RX IP 250 OP 636: Performed by: EMERGENCY MEDICINE

## 2020-12-25 PROCEDURE — 10060 I&D ABSCESS SIMPLE/SINGLE: CPT | Performed by: EMERGENCY MEDICINE

## 2020-12-25 PROCEDURE — 10060 I&D ABSCESS SIMPLE/SINGLE: CPT

## 2020-12-25 PROCEDURE — 85025 COMPLETE CBC W/AUTO DIFF WBC: CPT | Performed by: EMERGENCY MEDICINE

## 2020-12-25 PROCEDURE — 99284 EMERGENCY DEPT VISIT MOD MDM: CPT | Mod: 25 | Performed by: EMERGENCY MEDICINE

## 2020-12-25 PROCEDURE — 999N000127 HC STATISTIC PERIPHERAL IV START W US GUIDANCE

## 2020-12-25 PROCEDURE — 250N000013 HC RX MED GY IP 250 OP 250 PS 637: Performed by: EMERGENCY MEDICINE

## 2020-12-25 PROCEDURE — 96361 HYDRATE IV INFUSION ADD-ON: CPT

## 2020-12-25 PROCEDURE — 96374 THER/PROPH/DIAG INJ IV PUSH: CPT

## 2020-12-25 RX ORDER — SULFAMETHOXAZOLE/TRIMETHOPRIM 800-160 MG
1 TABLET ORAL ONCE
Status: COMPLETED | OUTPATIENT
Start: 2020-12-25 | End: 2020-12-25

## 2020-12-25 RX ORDER — CEPHALEXIN 500 MG/1
500 CAPSULE ORAL 4 TIMES DAILY
Qty: 40 CAPSULE | Refills: 0 | Status: SHIPPED | OUTPATIENT
Start: 2020-12-25 | End: 2021-01-04

## 2020-12-25 RX ORDER — SODIUM CHLORIDE 9 MG/ML
INJECTION, SOLUTION INTRAVENOUS CONTINUOUS
Status: DISCONTINUED | OUTPATIENT
Start: 2020-12-25 | End: 2020-12-25 | Stop reason: HOSPADM

## 2020-12-25 RX ORDER — ONDANSETRON 2 MG/ML
4 INJECTION INTRAMUSCULAR; INTRAVENOUS EVERY 30 MIN PRN
Status: DISCONTINUED | OUTPATIENT
Start: 2020-12-25 | End: 2020-12-25 | Stop reason: HOSPADM

## 2020-12-25 RX ORDER — CEPHALEXIN 500 MG/1
500 CAPSULE ORAL ONCE
Status: COMPLETED | OUTPATIENT
Start: 2020-12-25 | End: 2020-12-25

## 2020-12-25 RX ORDER — SULFAMETHOXAZOLE/TRIMETHOPRIM 800-160 MG
2 TABLET ORAL 2 TIMES DAILY
Qty: 40 TABLET | Refills: 0 | Status: SHIPPED | OUTPATIENT
Start: 2020-12-25 | End: 2021-01-04

## 2020-12-25 RX ORDER — LIDOCAINE HYDROCHLORIDE AND EPINEPHRINE 10; 10 MG/ML; UG/ML
INJECTION, SOLUTION INFILTRATION; PERINEURAL
Status: DISCONTINUED
Start: 2020-12-25 | End: 2020-12-25 | Stop reason: HOSPADM

## 2020-12-25 RX ORDER — LIDOCAINE HYDROCHLORIDE AND EPINEPHRINE 10; 10 MG/ML; UG/ML
1 INJECTION, SOLUTION INFILTRATION; PERINEURAL ONCE
Status: DISCONTINUED | OUTPATIENT
Start: 2020-12-25 | End: 2020-12-25 | Stop reason: HOSPADM

## 2020-12-25 RX ORDER — ACETAMINOPHEN 325 MG/1
975 TABLET ORAL ONCE
Status: COMPLETED | OUTPATIENT
Start: 2020-12-25 | End: 2020-12-25

## 2020-12-25 RX ADMIN — CEPHALEXIN 500 MG: 500 CAPSULE ORAL at 20:24

## 2020-12-25 RX ADMIN — ONDANSETRON 4 MG: 2 INJECTION INTRAMUSCULAR; INTRAVENOUS at 18:46

## 2020-12-25 RX ADMIN — SULFAMETHOXAZOLE AND TRIMETHOPRIM 1 TABLET: 800; 160 TABLET ORAL at 20:24

## 2020-12-25 RX ADMIN — ACETAMINOPHEN 975 MG: 325 TABLET, FILM COATED ORAL at 18:46

## 2020-12-25 RX ADMIN — SODIUM CHLORIDE 1000 ML: 9 INJECTION, SOLUTION INTRAVENOUS at 18:44

## 2020-12-25 ASSESSMENT — MIFFLIN-ST. JEOR: SCORE: 1385.75

## 2020-12-25 NOTE — ED TRIAGE NOTES
Presents with fevers, open sores to body and abscess to right leg. Patient reports she injected heroin a couple days ago into her right leg a couple days ago and noticed an abscess forming.

## 2020-12-25 NOTE — ED PROVIDER NOTES
"    [place] EMERGENCY DEPARTMENT ([facility])  December 25, 2020  History     Chief Complaint   Patient presents with     Fever     Wound Check     HPI  Doreen Peralta is a 40 year old female who has a PMH of MDD, Munchausen syndrome, hx of DVT, iron deficiency anemia, chronic pain syndrome on chronic opiates, gram negative septicemia, hx of C. Diff, SBO, s/p subtotal colectomy, s/p lap garcia, who presents to the Emergency Department for a rash and body aches.    Patient states that she has stopped using IV drugs for the past few months but relapsed recently and is now concerned that she may have an abscess at the IV injection sites.  States that she is doing heroin (is not completely sure what else was in this) with her cousin and repeatedly injected into her right arm and leg attempting to gain IV access but failed repeatedly.  States that the sites became hot, erythemic and/or expanding and swelling.  States there was still can feel that she was unable to walk.  States that she feels \"icky\" and might have dirty fingers as she her rash keeps worsening every time she touches it.  She reports intermittent fevers, felt delirious today.  Reports some she has had vomiting and nausea as well patient has impaired appetite.    Additionally, she states that she has had an ongoing rash since July which was treated with antibiotics including Bactroban ointment and vasaline.  She says this blistering rash will occasionally flareup which it did 3 weeks ago on her arms and legs with fevers, and aches.    PAST MEDICAL HISTORY:   Past Medical History:   Diagnosis Date     Asthma      Bilateral ovarian cysts      Cervical cancer (H) 01/01/2008    cervical cancer      Chronic pain      Colonic dysmotility     s/p subtotal colectomy     Constipation     chronic     E. coli sepsis (H) 5/8/2016     Enteritis      Fungemia 5/5/2016     Gastro-oesophageal reflux disease      H/O ileostomy      Hx of abnormal Pap smear     s/p " LEEP - no further details provided     Hypertension      IBS (irritable bowel syndrome)      Other chronic pain      PONV (postoperative nausea and vomiting)      Thrombosis, hepatic vein (H)     microvascular       PAST SURGICAL HISTORY:   Past [facility] History:   Procedure Laterality Date     COLONOSCOPY  7/10/2012    Procedure: COLONOSCOPY;;  Surgeon: Nicole Redding MD;  [facility]     COLONOSCOPY N/A 2/19/2017    Procedure: COLONOSCOPY;  Surgeon: Randell Muller MD;  [facility]     COLONOSCOPY N/A 2/21/2017    Procedure: COLONOSCOPY;  Surgeon: Randell Muller MD;  [facility]     COLONOSCOPY N/A 5/3/2018    Procedure: COMBINED COLONOSCOPY, SINGLE OR MULTIPLE BIOPSY/POLYPECTOMY BY BIOPSY;  EGD/Colonoscopy ;  Surgeon: Loi Black MD;  [facility]     ECHO CHELO  7/19/2016          ENDOSCOPIC INSERTION TUBE GASTROSTOMY N/A 1/21/2016    Procedure: ENDOSCOPIC INSERTION TUBE GASTROSTOMY;  Surgeon: Nicole Redding MD;  [facility]     ESOPHAGOSCOPY, GASTROSCOPY, DUODENOSCOPY (EGD), COMBINED  7/10/2012    Procedure: COMBINED ESOPHAGOSCOPY, GASTROSCOPY, DUODENOSCOPY (EGD);  Upper Endoscopy, Ileoscopy    Latex Allergy  with biopsies;  Surgeon: Nicole Redding MD;  [facility]     ESOPHAGOSCOPY, GASTROSCOPY, DUODENOSCOPY (EGD), COMBINED N/A 11/5/2014    Procedure: COMBINED ESOPHAGOSCOPY, GASTROSCOPY, DUODENOSCOPY (EGD);  Surgeon: Nicole Redding MD;  [facility]     ESOPHAGOSCOPY, GASTROSCOPY, DUODENOSCOPY (EGD), COMBINED N/A 5/3/2018    Procedure: COMBINED ESOPHAGOSCOPY, GASTROSCOPY, DUODENOSCOPY (EGD), BIOPSY SINGLE OR MULTIPLE;;  Surgeon: Loi Black MD;  [facility]     HC REPLACE DUODENOSTOMY/JEJUNOSTOMY TUBE PERCUTANEOUS N/A 8/27/2015    Procedure: REPLACE GASTROJEJUNOSTOMY TUBE, PERCUTANEOUS;  Surgeon: Mio Holder MD;  [facility]     HC REPLACE DUODENOSTOMY/JEJUNOSTOMY TUBE PERCUTANEOUS N/A 1/7/2016    Procedure:  REPLACE JEJUNOSTOMY TUBE, PERCUTANEOUS;  Surgeon: Elsa Medel MD;  [facility]     HC REPLACE DUODENOSTOMY/JEJUNOSTOMY TUBE PERCUTANEOUS N/A 1/28/2016    Procedure: REPLACE JEJUNOSTOMY TUBE, PERCUTANEOUS;  Surgeon: Elsa Medel MD;  [facility]     HC REPLACE GASTROSTOMY/CECOSTOMY TUBE PERCUTANEOUS Left 5/19/2015    Procedure: REPLACE GASTROSTOMY TUBE, PERCUTANEOUS;  Surgeon: Melecio Morejon Chi, MD;  [facility]     HC UGI ENDOSCOPY W PLACEMENT GASTROSTOMY TUBE PERCUT N/A 10/1/2015    Procedure: COMBINED ESOPHAGOSCOPY, GASTROSCOPY, DUODENOSCOPY (EGD), PLACE PERCUTANEOUS ENDOSCOPIC GASTROSTOMY TUBE;  Surgeon: Mio Holder MD;  [facility]     INSERT PORT VASCULAR ACCESS Right 7/20/2017    Procedure: INSERT PORT VASCULAR ACCESS;  Chest Port Placement  **Latex Allergy**;  Surgeon: Coy Rocha PA-C;  [facility]     LAPAROSCOPIC ASSISTED COLECTOMY  1/20/2012    Procedure:LAPAROSCOPIC ASSISTED COLECTOMY; Laparoscopic Ileostomy       LAPAROSCOPIC ASSISTED COLECTOMY LEFT (DESCENDING)  10/24/2012    Procedure: LAPAROSCOPIC ASSISTED COLECTOMY LEFT (DESCENDING);   Hand Assisted Laproscopic Subtotal abdominal Colectomy,Iieorectal anastamosis, Ileostomy Closure.       LAPAROSCOPIC ASSISTED INSERTION TUBE JEJUNOSTOMY N/A 10/16/2015    Procedure: LAPAROSCOPIC ASSISTED INSERTION TUBE JEJUNOSTOMY;  Surgeon: Elsa Medel MD;  [facility]     LAPAROSCOPIC CHOLECYSTECTOMY  2002    [facility] stones duct     LAPAROSCOPIC ILEOSTOMY  1/20/2012    [facility], loop     LAPAROSCOPIC OOPHORECTOMY Right 2009    [facility]     LAPAROTOMY EXPLORATORY N/A 1/28/2016    Procedure: LAPAROTOMY EXPLORATORY;  Surgeon: Elsa Medel MD;  [facility]     LEEP TX, CERVICAL  2009    [facility]     PICC INSERTION Left 10/21/2015    5fr DL Power PICC, 37cm (2cm external) in the L basilic vein w/ tip in the SVC RA junction.     REMOVE GASTROSTOMY TUBE ADULT N/A 12/12/2014     Procedure: REMOVE GASTROSTOMY TUBE ADULT;  Surgeon: Nicole Redding MD;  [facility]     REMOVE PORT VASCULAR ACCESS Right 2016    Procedure: REMOVE PORT VASCULAR ACCESS;  Surgeon: Pradeep Orosco MD;  Location: [facility]     REMOVE PORT VASCULAR ACCESS Right 2017    Procedure: REMOVE PORT VASCULAR ACCESS;  right side mediport removal;  Surgeon: Zechariah Worthington MD;  Location: [facility] OR     replace GASTROSTOMY TUBE ADULT  5/19/15       Past medical history, past surgical history, medications, and allergies were reviewed with the patient. Additional pertinent items: None    FAMILY HISTORY:   Family History   Problem Relation Age of Onset     Thyroid Disease Mother      Sjogren's Mother      Gastrointestinal Disease Mother         Intermittent nausea vomiting diarrhea     Colon Polyps Mother      Prostate Problems Father         prostate enlargement     Lupus Maternal Grandmother      Cancer Maternal Grandfather         Lung     Colon Cancer Maternal Grandfather 65     Cancer Paternal Grandmother         Lung      Cerebrovascular Disease Paternal Grandmother      Diabetes Paternal Grandmother      Cardiovascular Paternal Grandmother         CHF     Cancer Paternal Grandfather         Lung     Glaucoma Paternal Grandfather      Abdominal Aortic Aneurysm Other      Macular Degeneration No family hx of        SOCIAL HISTORY:   Social History     Tobacco Use     Smoking status: Current Some Day Smoker     Packs/day: 1.00     Years: 4.00     Pack years: 4.00     Types: Cigarettes     Last attempt to quit: 2004     Years since quittin.9     Smokeless tobacco: Never Used     Tobacco comment: Vaping   Substance Use Topics     Alcohol use: No     Social history was reviewed with the patient. Additional pertinent items: None      Patient's Medications   New Prescriptions    CEPHALEXIN (KEFLEX) 500 MG CAPSULE    Take 1 capsule (500 mg) by mouth 4 times daily for 10 days     SULFAMETHOXAZOLE-TRIMETHOPRIM (BACTRIM DS) 800-160 MG TABLET    Take 2 tablets by mouth 2 times daily for 10 days   Previous Medications    ACETAMINOPHEN PO    Take 500-1,000 mg by mouth every 6 hours as needed for pain     ALBUTEROL (2.5 MG/3ML) 0.083% NEB SOLUTION    Take 1 vial (2.5 mg) by nebulization every 4 hours as needed for shortness of breath / dyspnea or wheezing    ALBUTEROL 90 MCG/ACT INHALER    Inhale 2 puffs into the lungs every 6 hours as needed     ALFALFA 650 MG TABS    Take 650 mg by mouth daily    APIXABAN ANTICOAGULANT (ELIQUIS) 5 MG TABLET    Take 1 tablet (5 mg) by mouth 2 times daily    CLONIDINE (CATAPRES) 0.2 MG TABLET    Take 2 tablets (0.4 mg) by mouth every evening - DUE FOR FOLLOW UP    DIPHENHYDRAMINE HCL 50 MG TABS    Take 50 mg by mouth every 6 hours as needed (nausea)    DULOXETINE (CYMBALTA) 60 MG EC CAPSULE    TAKE 1 CAPSULE(60 MG) BY MOUTH DAILY    EPINEPHRINE (ANY BX GENERIC EQUIV) 0.3 MG/0.3ML INJECTION 2-PACK    Inject 0.3 mLs (0.3 mg) into the muscle as needed for anaphylaxis    FUROSEMIDE (LASIX) 20 MG TABLET    Take 20 mg by mouth    IPRATROPIUM (ATROVENT) 0.02 % NEB SOLUTION    Take 2.5 mLs (0.5 mg) by nebulization every 6 hours as needed for wheezing    LISINOPRIL (PRINIVIL/ZESTRIL) 2.5 MG TABLET    TK 1 T PO ONCE D    LORAZEPAM (ATIVAN) 1 MG TABLET    Take 1 tablet (1 mg) by mouth every 6 hours as needed for nausea    MULTIPLE VITAMIN (TAB-A-PRASANNA) TABS    Take 1 tablet by mouth    MUPIROCIN (BACTROBAN) 2 % EXTERNAL OINTMENT    Apply topically daily    MUPIROCIN (BACTROBAN) 2 % EXTERNAL OINTMENT    Apply topically 3 times daily    NARCAN 4 MG/0.1ML NASAL SPRAY    Spray 1 spray in nostril as needed    SUMATRIPTAN (IMITREX) 50 MG TABLET    Take 1-2 tablets ( mg) by mouth at onset of headache for migraine - LAST REFILL, DUE FOR FOLLOW UP    TRAZODONE (DESYREL) 100 MG TABLET    Take 0.5-1 tablets ( mg) by mouth nightly as needed for sleep   Modified Medications     "No medications on file   Discontinued Medications    CEPHALEXIN (KEFLEX) 500 MG CAPSULE    Take 1 capsule (500 mg) by mouth 3 times daily          Allergies   Allergen Reactions     Azithromycin Other (See Comments) and Itching     Other reaction(s): Throat Swelling/Closing  Burning in throat     Hyoscyamine Rash     Droperidol Hives and Rash     Metoclopramide Other (See Comments)     Eye twitching.      Peaches [Peach] Other (See Comments)     Raw. Cooked OK     Sucralose Other (See Comments)     All artificial sweeteners. Aspartame also. Swollen glands     Advair Diskus Other (See Comments)     Throat burns     Compazine [Prochlorperazine] Visual Disturbance     Contrast Dye Itching     States is allergic to CT contrast dye     Cyclobenzaprine Visual Disturbance     Diatrizoate Other (See Comments)     Patient reports she tolerates IV contrast if given 50mg IV of Benadryl prior to scan.      Fentanyl Other (See Comments)     migraine     Fluticasone-Salmeterol      Throat burns     Ibuprofen GI Disturbance     Lactulose Nausea and Vomiting     Gas and bloating     Levaquin [Levofloxacin] Swelling     Per ED M.D. And RN      Morphine Sulfate Other (See Comments)     Chest pain       Oxycodone Other (See Comments)     Burning throat, but can take Norco.      Oxycodone-Acetaminophen      Burning in throat  Throat burns     Rizatriptan Visual Disturbance     Seafood      itching     Isovue [Iopamidol] Palpitations     Pt had racing heart and sob      Ketorolac Anxiety     Latex Swelling and Rash     Kiwi, likely also avacado, ? banana     Levsin Rash        Review of Systems  A complete review of systems was performed with pertinent positives and negatives noted in the HPI, and all other systems negative.    Physical Exam   BP: (!) 172/86  Pulse: 82  Temp: 98.1  F (36.7  C)  Resp: 18  Height: 167.6 cm (5' 6\")  Weight: 69.4 kg (153 lb)  SpO2: 99 %      Physical Exam  Constitutional:       General: She is not in acute " distress.     Appearance: She is not diaphoretic.   HENT:      Head: Normocephalic.      Mouth/Throat:      Pharynx: No oropharyngeal exudate.   Eyes:      Extraocular Movements: Extraocular movements intact.   Neck:      Musculoskeletal: Neck supple.   Cardiovascular:      Rate and Rhythm: Normal rate and regular rhythm.      Heart sounds: Normal heart sounds.   Pulmonary:      Effort: No respiratory distress.      Breath sounds: Normal breath sounds.   Abdominal:      General: There is no distension.      Palpations: Abdomen is soft.      Tenderness: There is no abdominal tenderness.   Musculoskeletal:         General: No deformity.   Skin:     General: Skin is warm and dry.      Comments: Excoriations and scabs diffusely on body.  Mild area of tenderness on the thigh with slight erythema.  Right lateral elbow swelling with tenderness consistent with a possible abscess   Neurological:      Mental Status: She is alert.      Comments: alert   Psychiatric:         Behavior: Behavior normal.         ED Course   5:04 PM  The patient was seen and examined by Gabino Fuentes DO in Room ED07.       Procedures    Limited Soft Tissue Ultrasound, performed and interpreted by me.    Indication:  Skin redness warmth pain. Evaluate for cellulitis vs abscess.     Body location: Right lateral elbow, right thigh    Findings: 5 images unremarkable, there is cobblestoning suggestive of cellulitis in the evaluated area. There is a fluid collection to suggest abscess at elbow site.   IMPRESSION: Abscess            Results for orders placed or performed during the hospital encounter of 12/25/20 (from the past 24 hour(s))   CBC with platelets differential   Result Value Ref Range    WBC 8.4 4.0 - 11.0 10e9/L    RBC Count 4.62 3.8 - 5.2 10e12/L    Hemoglobin 9.7 (L) 11.7 - 15.7 g/dL    Hematocrit 33.1 (L) 35.0 - 47.0 %    MCV 72 (L) 78 - 100 fl    MCH 21.0 (L) 26.5 - 33.0 pg    MCHC 29.3 (L) 31.5 - 36.5 g/dL    RDW 17.8 (H) 10.0 - 15.0  %    Platelet Count 506 (H) 150 - 450 10e9/L    Diff Method Automated Method     % Neutrophils 71.9 %    % Lymphocytes 20.5 %    % Monocytes 7.1 %    % Eosinophils 0.1 %    % Basophils 0.2 %    % Immature Granulocytes 0.2 %    Nucleated RBCs 0 0 /100    Absolute Neutrophil 6.1 1.6 - 8.3 10e9/L    Absolute Lymphocytes 1.7 0.8 - 5.3 10e9/L    Absolute Monocytes 0.6 0.0 - 1.3 10e9/L    Absolute Eosinophils 0.0 0.0 - 0.7 10e9/L    Absolute Basophils 0.0 0.0 - 0.2 10e9/L    Abs Immature Granulocytes 0.0 0 - 0.4 10e9/L    Absolute Nucleated RBC 0.0    Comprehensive metabolic panel   Result Value Ref Range    Sodium 134 133 - 144 mmol/L    Potassium 3.9 3.4 - 5.3 mmol/L    Chloride 105 94 - 109 mmol/L    Carbon Dioxide 24 20 - 32 mmol/L    Anion Gap 5 3 - 14 mmol/L    Glucose 85 70 - 99 mg/dL    Urea Nitrogen 8 7 - 30 mg/dL    Creatinine 0.82 0.52 - 1.04 mg/dL    GFR Estimate 90 >60 mL/min/[1.73_m2]    GFR Estimate If Black >90 >60 mL/min/[1.73_m2]    Calcium 9.2 8.5 - 10.1 mg/dL    Bilirubin Total 0.6 0.2 - 1.3 mg/dL    Albumin 3.4 3.4 - 5.0 g/dL    Protein Total 8.5 6.8 - 8.8 g/dL    Alkaline Phosphatase 162 (H) 40 - 150 U/L    ALT 17 0 - 50 U/L    AST 22 0 - 45 U/L   INR   Result Value Ref Range    INR 1.08 0.86 - 1.14   Partial thromboplastin time   Result Value Ref Range    PTT 30 22 - 37 sec   CRP inflammation   Result Value Ref Range    CRP Inflammation 16.0 (H) 0.0 - 8.0 mg/L   Lactic acid whole blood   Result Value Ref Range    Lactic Acid 1.0 0.7 - 2.0 mmol/L     *Note: Due to a large number of results and/or encounters for the requested time period, some results have not been displayed. A complete set of results can be found in Results Review.     Medications   0.9% sodium chloride BOLUS (0 mLs Intravenous Stopped 12/25/20 2010)     Followed by   sodium chloride 0.9% infusion (has no administration in time range)   ondansetron (ZOFRAN) injection 4 mg (4 mg Intravenous Given 12/25/20 1846)   lidocaine 1% with  EPINEPHrine 1:100,000 injection 1 mL (has no administration in time range)   lidocaine 1% with EPINEPHrine 1:100,000 1 %-1:076460 injection (has no administration in time range)   cephALEXin (KEFLEX) capsule 500 mg (has no administration in time range)   sulfamethoxazole-trimethoprim (BACTRIM DS) 800-160 MG per tablet 1 tablet (has no administration in time range)   acetaminophen (TYLENOL) tablet 975 mg (975 mg Oral Given 12/25/20 1846)        Procedure: Incision and Drainage   LOCATIONS: Right lateral elbow     ANESTHESIA:  Local field block using Lidocaine 1% with epinephrine, total of 1 mLs     PREPARATION:  Cleansed with Betadine     PROCEDURE:  Area was incised with # 11 Blade ([place]) with a Single Straight incision.  Wound treatment included Deloculation, expression of purulent material.  Packing consisted of No Packing.  Appropriate dressing was applied to cover the area.    Patient Status:        Patient tolerated the procedure well. There were no complications.                       Assessments & Plan (with Medical Decision Making)   40-year-old female presents to us with a chief complaint of redness and abscess.  She also reported fever previously today.  Vital signs are unremarkable and patient is afebrile.  He has no white count currently.  Lactic acid is normal.  Mild elevation in CRP but labs are otherwise unremarkable.  No signs of systemic sepsis.  Area of the patient's elbow was sized and drained.  We will place her on antibiotics and discharge her home    I have reviewed the nursing notes.    I have reviewed the findings, diagnosis, plan and need for follow up with the patient.    New Prescriptions    CEPHALEXIN (KEFLEX) 500 MG CAPSULE    Take 1 capsule (500 mg) by mouth 4 times daily for 10 days    SULFAMETHOXAZOLE-TRIMETHOPRIM (BACTRIM DS) 800-160 MG TABLET    Take 2 tablets by mouth 2 times daily for 10 days       Final diagnoses:   Abscess     I, Hawk Brown, am serving as a trained  medical scribe to document services personally performed by Gbaino Fuentes DO, based on the provider's statements to me.   I, Gabino Fuentes DO, was physically present and have reviewed and verified the accuracy of this note documented by Hawk Brown.     12/25/2020   [facility] EMERGENCY DEPARTMENT     Gabino Feuntes DO  12/25/20 2014

## 2020-12-25 NOTE — ED AVS SNAPSHOT
[facility] Emergency Department  [address] [address]  Phone: [phone]                                    Doreen Peralta   MRN: [id number]    Department: [facility] Emergency Department   Date of Visit: 12/25/2020           After Visit Summary Signature Page    I have received my discharge instructions, and my questions have been answered. I have discussed any challenges I see with this plan with the nurse or doctor.    ..........................................................................................................................................  Patient/Patient Representative Signature      ..........................................................................................................................................  Patient Representative Print Name and Relationship to Patient    ..................................................               ................................................  Date                                   Time    ..........................................................................................................................................  Reviewed by Signature/Title    ...................................................              ..............................................  Date                                               Time          22EPIC Rev 08/18

## 2021-01-09 ENCOUNTER — HEALTH MAINTENANCE LETTER (OUTPATIENT)
Age: 41
End: 2021-01-09

## 2021-01-17 ENCOUNTER — HOSPITAL ENCOUNTER (EMERGENCY)
Facility: CLINIC | Age: 41
Discharge: LEFT BEFORE DISCHARGE | End: 2021-01-17
Attending: EMERGENCY MEDICINE | Admitting: EMERGENCY MEDICINE
Payer: MEDICARE

## 2021-01-17 ENCOUNTER — ANCILLARY PROCEDURE (OUTPATIENT)
Dept: ULTRASOUND IMAGING | Facility: CLINIC | Age: 41
End: 2021-01-17
Payer: MEDICARE

## 2021-01-17 VITALS
OXYGEN SATURATION: 97 % | WEIGHT: 156.53 LBS | RESPIRATION RATE: 16 BRPM | SYSTOLIC BLOOD PRESSURE: 115 MMHG | HEART RATE: 92 BPM | HEIGHT: 66 IN | BODY MASS INDEX: 25.16 KG/M2 | TEMPERATURE: 98.4 F | DIASTOLIC BLOOD PRESSURE: 79 MMHG

## 2021-01-17 DIAGNOSIS — L02.414 ABSCESS OF LEFT ARM: ICD-10-CM

## 2021-01-17 DIAGNOSIS — L02.91 ABSCESS: ICD-10-CM

## 2021-01-17 DIAGNOSIS — R50.9 FEVER IN ADULT: ICD-10-CM

## 2021-01-17 DIAGNOSIS — Z11.52 ENCOUNTER FOR SCREENING LABORATORY TESTING FOR SEVERE ACUTE RESPIRATORY SYNDROME CORONAVIRUS 2 (SARS-COV-2): ICD-10-CM

## 2021-01-17 DIAGNOSIS — Z53.29 LEFT AGAINST MEDICAL ADVICE: ICD-10-CM

## 2021-01-17 LAB
ANION GAP SERPL CALCULATED.3IONS-SCNC: 6 MMOL/L (ref 3–14)
BASOPHILS # BLD AUTO: 0 10E9/L (ref 0–0.2)
BASOPHILS NFR BLD AUTO: 0.2 %
BUN SERPL-MCNC: 9 MG/DL (ref 7–30)
CALCIUM SERPL-MCNC: 8.5 MG/DL (ref 8.5–10.1)
CHLORIDE SERPL-SCNC: 104 MMOL/L (ref 94–109)
CO2 SERPL-SCNC: 27 MMOL/L (ref 20–32)
CREAT SERPL-MCNC: 0.81 MG/DL (ref 0.52–1.04)
CRP SERPL-MCNC: 48 MG/L (ref 0–8)
DIFFERENTIAL METHOD BLD: ABNORMAL
EOSINOPHIL # BLD AUTO: 0.1 10E9/L (ref 0–0.7)
EOSINOPHIL NFR BLD AUTO: 1.5 %
ERYTHROCYTE [DISTWIDTH] IN BLOOD BY AUTOMATED COUNT: 17.5 % (ref 10–15)
ERYTHROCYTE [SEDIMENTATION RATE] IN BLOOD BY WESTERGREN METHOD: 51 MM/H (ref 0–20)
GFR SERPL CREATININE-BSD FRML MDRD: >90 ML/MIN/{1.73_M2}
GLUCOSE SERPL-MCNC: 82 MG/DL (ref 70–99)
HCG SERPL QL: NEGATIVE
HCT VFR BLD AUTO: 28.9 % (ref 35–47)
HGB BLD-MCNC: 8.8 G/DL (ref 11.7–15.7)
IMM GRANULOCYTES # BLD: 0 10E9/L (ref 0–0.4)
IMM GRANULOCYTES NFR BLD: 0.3 %
LYMPHOCYTES # BLD AUTO: 1.5 10E9/L (ref 0.8–5.3)
LYMPHOCYTES NFR BLD AUTO: 16.3 %
MCH RBC QN AUTO: 21.2 PG (ref 26.5–33)
MCHC RBC AUTO-ENTMCNC: 30.4 G/DL (ref 31.5–36.5)
MCV RBC AUTO: 70 FL (ref 78–100)
MONOCYTES # BLD AUTO: 0.6 10E9/L (ref 0–1.3)
MONOCYTES NFR BLD AUTO: 6.3 %
NEUTROPHILS # BLD AUTO: 6.7 10E9/L (ref 1.6–8.3)
NEUTROPHILS NFR BLD AUTO: 75.4 %
NRBC # BLD AUTO: 0 10*3/UL
NRBC BLD AUTO-RTO: 0 /100
PLATELET # BLD AUTO: 524 10E9/L (ref 150–450)
POTASSIUM SERPL-SCNC: 3.2 MMOL/L (ref 3.4–5.3)
RBC # BLD AUTO: 4.16 10E12/L (ref 3.8–5.2)
SODIUM SERPL-SCNC: 137 MMOL/L (ref 133–144)
WBC # BLD AUTO: 8.9 10E9/L (ref 4–11)

## 2021-01-17 PROCEDURE — 96361 HYDRATE IV INFUSION ADD-ON: CPT

## 2021-01-17 PROCEDURE — 250N000011 HC RX IP 250 OP 636: Performed by: STUDENT IN AN ORGANIZED HEALTH CARE EDUCATION/TRAINING PROGRAM

## 2021-01-17 PROCEDURE — 250N000013 HC RX MED GY IP 250 OP 250 PS 637: Mod: GY | Performed by: EMERGENCY MEDICINE

## 2021-01-17 PROCEDURE — 85652 RBC SED RATE AUTOMATED: CPT | Performed by: STUDENT IN AN ORGANIZED HEALTH CARE EDUCATION/TRAINING PROGRAM

## 2021-01-17 PROCEDURE — 86140 C-REACTIVE PROTEIN: CPT | Performed by: STUDENT IN AN ORGANIZED HEALTH CARE EDUCATION/TRAINING PROGRAM

## 2021-01-17 PROCEDURE — C9803 HOPD COVID-19 SPEC COLLECT: HCPCS

## 2021-01-17 PROCEDURE — 80048 BASIC METABOLIC PNL TOTAL CA: CPT | Performed by: STUDENT IN AN ORGANIZED HEALTH CARE EDUCATION/TRAINING PROGRAM

## 2021-01-17 PROCEDURE — 76882 US LMTD JT/FCL EVL NVASC XTR: CPT

## 2021-01-17 PROCEDURE — U0005 INFEC AGEN DETEC AMPLI PROBE: HCPCS | Performed by: STUDENT IN AN ORGANIZED HEALTH CARE EDUCATION/TRAINING PROGRAM

## 2021-01-17 PROCEDURE — 99284 EMERGENCY DEPT VISIT MOD MDM: CPT | Mod: 25 | Performed by: EMERGENCY MEDICINE

## 2021-01-17 PROCEDURE — 76882 US LMTD JT/FCL EVL NVASC XTR: CPT | Mod: 26 | Performed by: EMERGENCY MEDICINE

## 2021-01-17 PROCEDURE — 258N000003 HC RX IP 258 OP 636: Performed by: STUDENT IN AN ORGANIZED HEALTH CARE EDUCATION/TRAINING PROGRAM

## 2021-01-17 PROCEDURE — U0003 INFECTIOUS AGENT DETECTION BY NUCLEIC ACID (DNA OR RNA); SEVERE ACUTE RESPIRATORY SYNDROME CORONAVIRUS 2 (SARS-COV-2) (CORONAVIRUS DISEASE [COVID-19]), AMPLIFIED PROBE TECHNIQUE, MAKING USE OF HIGH THROUGHPUT TECHNOLOGIES AS DESCRIBED BY CMS-2020-01-R: HCPCS | Performed by: STUDENT IN AN ORGANIZED HEALTH CARE EDUCATION/TRAINING PROGRAM

## 2021-01-17 PROCEDURE — 120N000001 HC R&B MED SURG/OB

## 2021-01-17 PROCEDURE — 87040 BLOOD CULTURE FOR BACTERIA: CPT | Performed by: STUDENT IN AN ORGANIZED HEALTH CARE EDUCATION/TRAINING PROGRAM

## 2021-01-17 PROCEDURE — 99284 EMERGENCY DEPT VISIT MOD MDM: CPT | Mod: 25

## 2021-01-17 PROCEDURE — 84703 CHORIONIC GONADOTROPIN ASSAY: CPT | Performed by: EMERGENCY MEDICINE

## 2021-01-17 PROCEDURE — 85025 COMPLETE CBC W/AUTO DIFF WBC: CPT | Performed by: STUDENT IN AN ORGANIZED HEALTH CARE EDUCATION/TRAINING PROGRAM

## 2021-01-17 PROCEDURE — 96374 THER/PROPH/DIAG INJ IV PUSH: CPT

## 2021-01-17 RX ORDER — POTASSIUM CHLORIDE 750 MG/1
40 TABLET, EXTENDED RELEASE ORAL ONCE
Status: COMPLETED | OUTPATIENT
Start: 2021-01-17 | End: 2021-01-17

## 2021-01-17 RX ORDER — ONDANSETRON 2 MG/ML
8 INJECTION INTRAMUSCULAR; INTRAVENOUS ONCE
Status: COMPLETED | OUTPATIENT
Start: 2021-01-17 | End: 2021-01-17

## 2021-01-17 RX ORDER — CEPHALEXIN 500 MG/1
500 CAPSULE ORAL 4 TIMES DAILY
Qty: 40 CAPSULE | Refills: 0 | Status: SHIPPED | OUTPATIENT
Start: 2021-01-17 | End: 2021-01-27

## 2021-01-17 RX ORDER — SULFAMETHOXAZOLE/TRIMETHOPRIM 800-160 MG
1 TABLET ORAL 2 TIMES DAILY
Qty: 20 TABLET | Refills: 0 | Status: SHIPPED | OUTPATIENT
Start: 2021-01-17 | End: 2021-01-27

## 2021-01-17 RX ORDER — ONDANSETRON 2 MG/ML
4 INJECTION INTRAMUSCULAR; INTRAVENOUS EVERY 30 MIN PRN
Status: DISCONTINUED | OUTPATIENT
Start: 2021-01-17 | End: 2021-01-18 | Stop reason: HOSPADM

## 2021-01-17 RX ORDER — SODIUM CHLORIDE 9 MG/ML
INJECTION, SOLUTION INTRAVENOUS CONTINUOUS
Status: DISCONTINUED | OUTPATIENT
Start: 2021-01-17 | End: 2021-01-18 | Stop reason: HOSPADM

## 2021-01-17 RX ADMIN — SODIUM CHLORIDE, POTASSIUM CHLORIDE, SODIUM LACTATE AND CALCIUM CHLORIDE 1000 ML: 600; 310; 30; 20 INJECTION, SOLUTION INTRAVENOUS at 22:06

## 2021-01-17 RX ADMIN — POTASSIUM CHLORIDE 40 MEQ: 750 TABLET, EXTENDED RELEASE ORAL at 22:57

## 2021-01-17 RX ADMIN — ONDANSETRON 8 MG: 2 INJECTION INTRAMUSCULAR; INTRAVENOUS at 22:06

## 2021-01-17 ASSESSMENT — MIFFLIN-ST. JEOR: SCORE: 1401.75

## 2021-01-18 ENCOUNTER — PATIENT OUTREACH (OUTPATIENT)
Dept: CARE COORDINATION | Facility: CLINIC | Age: 41
End: 2021-01-18

## 2021-01-18 LAB
LABORATORY COMMENT REPORT: NORMAL
SARS-COV-2 RNA RESP QL NAA+PROBE: NEGATIVE
SPECIMEN SOURCE: NORMAL

## 2021-01-18 NOTE — ED NOTES
"Charge RN brought in the ED MD Gary to discuss next steps. The pt said she was not leaving against medical advice and that we were \"uncompromising\". She said she feels sick and we are just letting her leave to go home and get worse. MD Gary explained to her about the COVID-19 policy and visitor restrictions and how this is a nationwide policy. MD Gary explained the risks of leaving against medical advice, recommended that the patient stay in the hospital, and said she is welcome to return at any time. She has been prescribed antibiotics. She also said she was \"going to tell all the news sources and everyone about this\".   "

## 2021-01-18 NOTE — ED NOTES
"Pt was screaming in the [place]re[place] outside of the nurses station to the ANS and primary nurse about not being able to go to the vestibule and leave the hospital with her friend in order to go to her car and \"get her affairs in order\". She was crying and upset about leaving.  "

## 2021-01-18 NOTE — ED TRIAGE NOTES
Pt presents with c/o multiple abscesses to upper and lower extremity with fever and chills.  Denies recent COVID exposure.  Tmax 102.  Took 2 tylenols and 4 otc ibuprofen around 1930.  H/o IVDA; recent cocaine use was 2 days ago.

## 2021-01-18 NOTE — PHARMACY-VANCOMYCIN DOSING SERVICE
Pharmacy Vancomycin Initial Note  Date of Service 2021  Patient's  1980  40 year old, female    Indication: Skin and Soft Tissue Infection in IV drug user, concern for possible bacteremia    Current estimated CrCl = Estimated Creatinine Clearance: 104.5 mL/min (based on SCr of 0.81 mg/dL).    Creatinine for last 3 days  2021:  9:43 PM Creatinine 0.81 mg/dL    Recent Vancomycin Level(s) for last 3 days  No results found for requested labs within last 72 hours.      Vancomycin IV Administrations (past 72 hours)      No vancomycin orders with administrations in past 72 hours.                Nephrotoxins and other renal medications (From now, onward)    Start     Dose/Rate Route Frequency Ordered Stop    21 1100  vancomycin 1250 mg in 0.9% NaCl 250 mL intermittent infusion 1,250 mg      1,250 mg  over 90 Minutes Intravenous EVERY 12 HOURS 21 2223      21 2200  vancomycin (VANCOCIN) 1,750 mg in sodium chloride 0.9 % 500 mL intermittent infusion      1,750 mg  over 2 Hours Intravenous ONCE 21 2155            Contrast Orders - past 72 hours (72h ago, onward)    None                Plan:  1.  Give vancomycin 1750mg IV x1 now, then start vancomycin 1250mg IV q12 hours  2.  Goal Trough Level: 15-20 mg/L   3.  Pharmacy will check trough levels as appropriate in 1-3 Days.    4. Serum creatinine levels will be ordered daily for the first week of therapy and at least twice weekly for subsequent weeks.    5. [place] method utilized to dose vancomycin therapy: Method 2    Kris Horner, PharmD, BCPS

## 2021-01-18 NOTE — ED NOTES
"Pt attempting to leave emergency department with IV. Pt states that she has things in her car that she needs to get out before admission to the hospital. Explained to pt that she cannot leave the emergency department with an IV in, pt states \"Then I'll just leave and I won't stay in the hospital.\" Charge nurse speaking with pt.  "

## 2021-01-18 NOTE — DISCHARGE INSTRUCTIONS
TODAY'S VISIT:  You were seen today for cellulitis    - You understand that you are leaving against medical advice. The risks of leaving AMA without further evaluation include, but are not limited to, permanent disability or death. You are welcome to return to the ER for further evaluation at any time despite leaving against medical advice and are encouraged to do so if your symptoms worsen or you develop new symptoms.     - If you had any labs or imaging/radiology tests performed today, you should also discuss these tests with your usual provider.     FOLLOW-UP:  Please make an appointment to follow up with:  - Your Primary Care Provider. If you do not have a PCP, please call the Primary Care Center (phone: [phone] for an appointment    - Have your provider review the results from today's visit with you again to make sure no further follow-up or additional testing is needed based on those results.     PRESCRIPTIONS / MEDICATIONS:  - keflex  - bactrim    RETURN TO THE EMERGENCY DEPARTMENT  Return to the Emergency Department at any time for any new or worsening symptoms or any concerns.

## 2021-01-18 NOTE — ED PROVIDER NOTES
"ED Provider Note  [facility]      History     Chief Complaint   Patient presents with     Fever     Wound Infection     HPI  Doreen Peralta is a 40 year old female who has a PMH of MDD, Munchausen syndrome, hx of DVT, iron deficiency anemia, chronic pain syndrome on chronic opiates, gram negative septicemia, hx of C. Diff, SBO, s/p subtotal colectomy, s/p lap garcia, polysubstance abuse(IVDU), who presents to the Emergency Department with multiple draining wounds, and a reported fever at home with a Tmax of 102F.  Patient reports she has been injecting cocaine and noticed some wounds on her arms 1 of which began to spontaneously drain white puslike fluid per patient.  Additionally she reports fevers which she took Tylenol, ibuprofen, and cold showers and states they went away.  Reports a history of bacteremia and states \" I definitely know what it feels like and I think I am bacteremic.\"  Last injection was approximately 3 days ago in her left forearm which she reports significant pain, redness, edema, and spontaneous drainage.  Additionally reports multiple other wounds which she states have drained on her right arm and right thigh.  States she has been nauseated and had multiple episodes of vomiting clear fluid and subsequently has not been able to tolerate any type of p.o. intake for the last 2 days.    Past Medical History  Past Medical History:   Diagnosis Date     Asthma      Bilateral ovarian cysts      Cervical cancer (H) 01/01/2008    cervical cancer      Chronic pain      Colonic dysmotility     s/p subtotal colectomy     Constipation     chronic     E. coli sepsis (H) 5/8/2016     Enteritis      Fungemia 5/5/2016     Gastro-oesophageal reflux disease      H/O ileostomy      Hx of abnormal Pap smear     s/p LEEP - no further details provided     Hypertension      IBS (irritable bowel syndrome)      Other chronic pain      PONV (postoperative nausea and vomiting)      Thrombosis, " hepatic vein (H)     microvascular     Past [facility] History:   Procedure Laterality Date     COLONOSCOPY  7/10/2012    Procedure: COLONOSCOPY;;  Surgeon: Nicole Redding MD;  [facility]     COLONOSCOPY N/A 2/19/2017    Procedure: COLONOSCOPY;  Surgeon: Randell Muller MD;  Location: [facility] GI     COLONOSCOPY N/A 2/21/2017    Procedure: COLONOSCOPY;  Surgeon: Randell Muller MD;  Location: [facility]     COLONOSCOPY N/A 5/3/2018    Procedure: COMBINED COLONOSCOPY, SINGLE OR MULTIPLE BIOPSY/POLYPECTOMY BY BIOPSY;  EGD/Colonoscopy ;  Surgeon: Loi Black MD;  Location: [facility] GI     ECHO CHELO  7/19/2016          ENDOSCOPIC INSERTION TUBE GASTROSTOMY N/A 1/21/2016    Procedure: ENDOSCOPIC INSERTION TUBE GASTROSTOMY;  Surgeon: Nicole Redding MD;  [facility]     ESOPHAGOSCOPY, GASTROSCOPY, DUODENOSCOPY (EGD), COMBINED  7/10/2012    Procedure: COMBINED ESOPHAGOSCOPY, GASTROSCOPY, DUODENOSCOPY (EGD);  Upper Endoscopy, Ileoscopy    Latex Allergy  with biopsies;  Surgeon: Nicole Redding MD;  [facility]     ESOPHAGOSCOPY, GASTROSCOPY, DUODENOSCOPY (EGD), COMBINED N/A 11/5/2014    Procedure: COMBINED ESOPHAGOSCOPY, GASTROSCOPY, DUODENOSCOPY (EGD);  Surgeon: Nicole Redding MD;  [facility]     ESOPHAGOSCOPY, GASTROSCOPY, DUODENOSCOPY (EGD), COMBINED N/A 5/3/2018    Procedure: COMBINED ESOPHAGOSCOPY, GASTROSCOPY, DUODENOSCOPY (EGD), BIOPSY SINGLE OR MULTIPLE;;  Surgeon: Loi Black MD;  [facility]: [facility]     HC REPLACE DUODENOSTOMY/JEJUNOSTOMY TUBE PERCUTANEOUS N/A 8/27/2015    Procedure: REPLACE GASTROJEJUNOSTOMY TUBE, PERCUTANEOUS;  Surgeon: Mio Holder MD;  [facility]     HC REPLACE DUODENOSTOMY/JEJUNOSTOMY TUBE PERCUTANEOUS N/A 1/7/2016    Procedure: REPLACE JEJUNOSTOMY TUBE, PERCUTANEOUS;  Surgeon: Esla Medel MD;  [facility]     HC REPLACE DUODENOSTOMY/JEJUNOSTOMY TUBE PERCUTANEOUS N/A 1/28/2016    Procedure: REPLACE JEJUNOSTOMY TUBE,  PERCUTANEOUS;  Surgeon: Elsa Medel MD;  [facility]     HC REPLACE GASTROSTOMY/CECOSTOMY TUBE PERCUTANEOUS Left 5/19/2015    Procedure: REPLACE GASTROSTOMY TUBE, PERCUTANEOUS;  Surgeon: Melecio Morejon Chi, MD;  Location: [facility]     HC UGI ENDOSCOPY W PLACEMENT GASTROSTOMY TUBE PERCUT N/A 10/1/2015    Procedure: COMBINED ESOPHAGOSCOPY, GASTROSCOPY, DUODENOSCOPY (EGD), PLACE PERCUTANEOUS ENDOSCOPIC GASTROSTOMY TUBE;  Surgeon: Mio Holder MD;  [facility]: [facility]     INSERT PORT VASCULAR ACCESS Right 7/20/2017    Procedure: INSERT PORT VASCULAR ACCESS;  Chest Port Placement  **Latex Allergy**;  Surgeon: Coy Rocha PA-C;  [facility]     LAPAROSCOPIC ASSISTED COLECTOMY  1/20/2012    Procedure:LAPAROSCOPIC ASSISTED COLECTOMY; Laparoscopic Ileostomy       LAPAROSCOPIC ASSISTED COLECTOMY LEFT (DESCENDING)  10/24/2012    Procedure: LAPAROSCOPIC ASSISTED COLECTOMY LEFT (DESCENDING);   Hand Assisted Laproscopic Subtotal abdominal Colectomy,Iieorectal anastamosis, Ileostomy Closure.       LAPAROSCOPIC ASSISTED INSERTION TUBE JEJUNOSTOMY N/A 10/16/2015    Procedure: LAPAROSCOPIC ASSISTED INSERTION TUBE JEJUNOSTOMY;  Surgeon: Elsa Medel MD;  [facility]     LAPAROSCOPIC CHOLECYSTECTOMY  2002    [facility] ctr. stones duct     LAPAROSCOPIC ILEOSTOMY  1/20/2012    [facility], loop     LAPAROSCOPIC OOPHORECTOMY Right 2009    [facility]     LAPAROTOMY EXPLORATORY N/A 1/28/2016    Procedure: LAPAROTOMY EXPLORATORY;  Surgeon: Elsa Medel MD;  Location:  OR     LEEP TX, CERVICAL  2009    [facility]     PICC INSERTION Left 10/21/2015    5fr DL Power PICC, 37cm (2cm external) in the L basilic vein w/ tip in the SVC RA junction.     REMOVE GASTROSTOMY TUBE ADULT N/A 12/12/2014    Procedure: REMOVE GASTROSTOMY TUBE ADULT;  Surgeon: Nicole Redding MD;  [facility]     REMOVE PORT VASCULAR ACCESS Right 6/30/2016    Procedure: REMOVE PORT VASCULAR ACCESS;  Surgeon: Kwesi  Pradeep Joseph MD;  Location: [facility] OR     REMOVE PORT VASCULAR ACCESS Right 9/8/2017    Procedure: REMOVE PORT VASCULAR ACCESS;  right side mediport removal;  Surgeon: Zechariah Worthington MD;  Location: [facility] OR     replace GASTROSTOMY TUBE ADULT  5/19/15          ACETAMINOPHEN PO       albuterol (2.5 MG/3ML) 0.083% neb solution       albuterol 90 MCG/ACT inhaler       Thayer 650 MG TABS       apixaban ANTICOAGULANT (ELIQUIS) 5 MG tablet       cloNIDine (CATAPRES) 0.2 MG tablet       diphenhydrAMINE HCl 50 MG TABS       DULoxetine (CYMBALTA) 60 MG EC capsule       EPINEPHrine (ANY BX GENERIC EQUIV) 0.3 MG/0.3ML injection 2-pack       furosemide (LASIX) 20 MG tablet       ipratropium (ATROVENT) 0.02 % neb solution       lisinopril (PRINIVIL/ZESTRIL) 2.5 MG tablet       LORazepam (ATIVAN) 1 MG tablet       Multiple Vitamin (TAB-A-PRASANNA) TABS       mupirocin (BACTROBAN) 2 % external ointment       mupirocin (BACTROBAN) 2 % external ointment       NARCAN 4 MG/0.1ML nasal spray       SUMAtriptan (IMITREX) 50 MG tablet       traZODone (DESYREL) 100 MG tablet      Allergies   Allergen Reactions     Azithromycin Other (See Comments) and Itching     Other reaction(s): Throat Swelling/Closing  Burning in throat     Hyoscyamine Rash     Droperidol Hives and Rash     Metoclopramide Other (See Comments)     Eye twitching.      Peaches [Peach] Other (See Comments)     Raw. Cooked OK     Sucralose Other (See Comments)     All artificial sweeteners. Aspartame also. Swollen glands     Advair Diskus Other (See Comments)     Throat burns     Compazine [Prochlorperazine] Visual Disturbance     Contrast Dye Itching     States is allergic to CT contrast dye     Cyclobenzaprine Visual Disturbance     Diatrizoate Other (See Comments)     Patient reports she tolerates IV contrast if given 50mg IV of Benadryl prior to scan.      Fentanyl Other (See Comments)     migraine     Fluticasone-Salmeterol      Throat burns     Ibuprofen GI Disturbance      Lactulose Nausea and Vomiting     Gas and bloating     Levaquin [Levofloxacin] Swelling     Per ED M.D. And RN      Morphine Sulfate Other (See Comments)     Chest pain       Oxycodone Other (See Comments)     Burning throat, but can take Norco.      Oxycodone-Acetaminophen      Burning in throat  Throat burns     Rizatriptan Visual Disturbance     Seafood      itching     Isovue [Iopamidol] Palpitations     Pt had racing heart and sob      Ketorolac Anxiety     Latex Swelling and Rash     Kiwi, likely also avacado, ? banana     Levsin Rash     Family History  Family History   Problem Relation Age of Onset     Thyroid Disease Mother      Sjogren's Mother      Gastrointestinal Disease Mother         Intermittent nausea vomiting diarrhea     Colon Polyps Mother      Prostate Problems Father         prostate enlargement     Lupus Maternal Grandmother      Cancer Maternal Grandfather         Lung     Colon Cancer Maternal Grandfather 65     Cancer Paternal Grandmother         Lung      Cerebrovascular Disease Paternal Grandmother      Diabetes Paternal Grandmother      Cardiovascular Paternal Grandmother         CHF     Cancer Paternal Grandfather         Lung     Glaucoma Paternal Grandfather      Abdominal Aortic Aneurysm Other      Macular Degeneration No family hx of      Social History   Social History     Tobacco Use     Smoking status: Current Some Day Smoker     Packs/day: 1.00     Years: 4.00     Pack years: 4.00     Types: Cigarettes     Last attempt to quit: 2004     Years since quittin.0     Smokeless tobacco: Never Used     Tobacco comment: Vaping   Substance Use Topics     Alcohol use: No     Drug use: Yes     Types: IV     Comment: heroin      Past medical history, past surgical history, medications, allergies, family history, and social history were reviewed with the patient. No additional pertinent items.       Review of Systems   General: positive for fevers and chills  Skin: positive for  "rash/wound, see HPI  Eyes: No eye redness or discharge  Ears/Nose/Throat: No rhinorrhea or nasal congestion  Respiratory: No cough or SOB  Cardiovascular: No chest pain or palpitations  Gastrointestinal: positive for nausea and vomiting  Genitourinary: No urinary frequency, hematuria, or dysuria  Musculoskeletal: No arthralgias or myalgias  Neurologic: No numbness or weakness  Hematologic/Lymphatic/Immunologic: No leg swelling, no easy bruising/bleeding  Endocrine: No polyuria/polydypsia      A complete review of systems was performed with pertinent positives and negatives noted in the HPI, and all other systems negative.    Physical Exam   BP: 136/70  Pulse: 87  Temp: 98.4  F (36.9  C)  Resp: 16  Height: 167.6 cm (5' 6\")  Weight: 71 kg (156 lb 8.4 oz)  SpO2: 100 %  Physical Exam  Gen:Appears uncomfortable, multiple open sores on hands, in no acute distress.  HEENT: NC/AT, MMM, RENNY, EOMI, Supple  CV: RRR, normal s1, normal s2, no m/r/g  Resp: CTAB, normal I/E effort, no additional respiratory sounds  Abd: +BS, non-tender, non-distended, no guarding or rebound tenderness  Ext: L.Upper extremity No significant deformities or trauma, moving all ext freely  Skin: TTP, indurated, erythematous/warm to touch area with minimal fluctuance/drainage from wound on medial LUE with central lesion c/w injection site. Other scattered superficial open wounds on upper extremities and lower extremities without significant involvement of adjacent tissue. Other areas of induration on R thigh and RUE  Neuro:No focal neurologic deficit, AxOx4. Strength and sensation grossly intact  Psych: Pleasant, answering questions appropriately, normal mood/affect.   ED Course      Procedures            Results for orders placed or performed during the hospital encounter of 01/17/21   POC US SOFT TISSUE     Status: None (In process)    Impression    Limited Soft Tissue Ultrasound, performed and interpreted by me.    Indication:  Skin redness warmth " pain. Evaluate for cellulitis vs abscess.     Body location: left upper extremity    Findings:  There is cobblestoning suggestive of cellulitis in the evaluated area. There is a small amount of fluid collection, but no large abscess. No foreign body identified    IMPRESSION: Cellulitis         CBC with platelets differential     Status: Abnormal   Result Value Ref Range    WBC 8.9 4.0 - 11.0 10e9/L    RBC Count 4.16 3.8 - 5.2 10e12/L    Hemoglobin 8.8 (L) 11.7 - 15.7 g/dL    Hematocrit 28.9 (L) 35.0 - 47.0 %    MCV 70 (L) 78 - 100 fl    MCH 21.2 (L) 26.5 - 33.0 pg    MCHC 30.4 (L) 31.5 - 36.5 g/dL    RDW 17.5 (H) 10.0 - 15.0 %    Platelet Count 524 (H) 150 - 450 10e9/L    Diff Method Automated Method     % Neutrophils 75.4 %    % Lymphocytes 16.3 %    % Monocytes 6.3 %    % Eosinophils 1.5 %    % Basophils 0.2 %    % Immature Granulocytes 0.3 %    Nucleated RBCs 0 0 /100    Absolute Neutrophil 6.7 1.6 - 8.3 10e9/L    Absolute Lymphocytes 1.5 0.8 - 5.3 10e9/L    Absolute Monocytes 0.6 0.0 - 1.3 10e9/L    Absolute Eosinophils 0.1 0.0 - 0.7 10e9/L    Absolute Basophils 0.0 0.0 - 0.2 10e9/L    Abs Immature Granulocytes 0.0 0 - 0.4 10e9/L    Absolute Nucleated RBC 0.0    Basic metabolic panel     Status: Abnormal   Result Value Ref Range    Sodium 137 133 - 144 mmol/L    Potassium 3.2 (L) 3.4 - 5.3 mmol/L    Chloride 104 94 - 109 mmol/L    Carbon Dioxide 27 20 - 32 mmol/L    Anion Gap 6 3 - 14 mmol/L    Glucose 82 70 - 99 mg/dL    Urea Nitrogen 9 7 - 30 mg/dL    Creatinine 0.81 0.52 - 1.04 mg/dL    GFR Estimate >90 >60 mL/min/[1.73_m2]    GFR Estimate If Black >90 >60 mL/min/[1.73_m2]    Calcium 8.5 8.5 - 10.1 mg/dL   HCG qualitative Blood     Status: None   Result Value Ref Range    HCG Qualitative Serum Negative NEG^Negative   CRP inflammation     Status: Abnormal   Result Value Ref Range    CRP Inflammation 48.0 (H) 0.0 - 8.0 mg/L   Erythrocyte sedimentation rate auto     Status: Abnormal   Result Value Ref Range     Sed Rate 51 (H) 0 - 20 mm/h   Blood culture     Status: None (Preliminary result)    Specimen: Arm, Right; Blood    Right Arm   Result Value Ref Range    Specimen Description Blood Right Arm     Culture Micro PENDING      Medications   lactated ringers BOLUS 1,000 mL (1,000 mLs Intravenous New Bag 1/17/21 2206)   ceFEPIme (MAXIPIME) 2 g vial to attach to  ml bag for ADULTS or 50 ml bag for PEDS (has no administration in time range)   vancomycin (VANCOCIN) 1,750 mg in sodium chloride 0.9 % 500 mL intermittent infusion (has no administration in time range)   vancomycin 1250 mg in 0.9% NaCl 250 mL intermittent infusion 1,250 mg (has no administration in time range)   0.9% sodium chloride BOLUS (has no administration in time range)     Followed by   sodium chloride 0.9% infusion (has no administration in time range)   ondansetron (ZOFRAN) injection 4 mg (has no administration in time range)   ondansetron (ZOFRAN) injection 8 mg (8 mg Intravenous Given 1/17/21 2206)   potassium chloride ER (KLOR-CON M) CR tablet 40 mEq (40 mEq Oral Given 1/17/21 2257)        Assessments & Plan (with Medical Decision Making)   40 year old female with a pmhx significant for IVDU, bacteremia, c/b Munchausen syndrome given the microbiology of her previous blood cultures, who comes in reporting significant fevers and multiple wounds which she reports have been intermittently draining over the last 2 to 3 days.  Additionally she also reports inability to tolerate p.o. intake for the last 2 days. Given her physical exam, and reported fevers, concern for systemic infection is high. Obtained 2 sets of bcx, cbc, bmp, CRP, and ESR. IV antibiotics and IV fluids were ordered. Antibiotic regimen was d/w ER pharmacist. Antiemetics were also ordered. Pt c/o pain, took Tylenol PTA. Unfortunately d/t exrensive allergy list and h/o IVDU, pain treatment options are limited.     Laboratory workup is remarkable for elevated CRP, normal WBC count    POC  "US shows cobblestoning, no large drainable abscess visualized    I have reviewed the nursing notes. I have reviewed the findings, diagnosis, plan and need for follow up with the patient.    Patient discussed with IM, to be admitted to their service for further management.     Of note, after admission to internal medicine for IV antibiotics and further medical management was arranged, the patient stated that she needed to go to her car to \"put her affairs in order.\"  Patient was informed that she may not leave the department with an IV in her arm.  Patient became very upset, crying and repeatedly stating that she needed to \"put her affairs in order\" and had a friend who was coming to  her car and she needed to show her some things regarding some paperwork.  Discussed with patient that if her friend was picking up her car, she could arrange for her friend to drop off any items that she may need or  any items that the patient would like to give her through security.  The patient became very upset, stating that she needed to specifically show her friend the order that the papers needed to go in as this was for an important form for government assistance.  Discussed with the patient that she is welcome to talk with her friend on the phone/video chat with her to explain what needed to be done, but the patient stated that this absolutely had to be done in person.  Discussed with the patient that current hospital policy due to the current COVID-19 pandemic is that no hospital visitors are allowed on premises.  The patient became very upset, stating that this was ridiculous and she wanted to be admitted, but absolutely had to see her friend in person, so she would need to leave.  Discussed with the patient that if she would like to leave AGAINST MEDICAL ADVICE, she is fully within her rights to do so.  The patient is currently alert and oriented x4, does not report any suicidal or homicidal ideation, and shows " "no signs of active psychosis at this time.  She expresses understanding of the need for admission, but states that she absolutely must leave at this time.  I discussed with the patient that even though she is leaving AGAINST MEDICAL ADVICE, she is welcome to return to the emergency department at any time and is encouraged to do so should her symptoms worsen.  As the patient is declining admission for IV antibiotics, I will discharge the patient home with prescriptions for Keflex and Bactrim.  It is reiterated to the patient that should her condition worsen despite taking these antibiotics, she is advised to return to the emergency department for further medical intervention, likely involving IV antibiotics.  The patient is still angry, stating that she will \"write on her discharge paperwork that she is not leaving AGAINST MEDICAL ADVICE.\"  Discussed with the patient that my medical advice is for her to stay for admission to the hospital and IV antibiotics and that, by leaving, she is in fact leaving AGAINST MEDICAL ADVICE, but reiterated that we are in no way forcing the patient to leave the hospital at this time and would be happy to do our best to facilitate any transfer of personal belongings between her and her friend within the bounds of current hospital safety protocols, but that she is legally allowed to leave the hospital should she choose to do so.    After extensive discussion, patient would still like to leave AGAINST MEDICAL ADVICE.  The risks of leaving AGAINST MEDICAL ADVICE were discussed with the patient including, but not limited to, permanent disability or death.  Patient expressed understanding of these risks and would still like to leave AGAINST MEDICAL ADVICE.  Patient to be asked to sign AMA form.  Patient encouraged to follow-up as soon as possible with PCP and it was discussed with patient that they may return to the emergency department at any time for reevaluation if they wish to do so " despite leaving AGAINST MEDICAL ADVICE and they are encouraged to do so if they develop any new or worsening symptoms.      New Prescriptions    No medications on file       Final diagnoses:   Fever in adult   Abscess       --  Maria Dolores Gary MD  [facility] EMERGENCY DEPARTMENT  1/17/2021    ED Staff Attestation:    I have seen the patient with the resident and I agree with the documentation.  I have assessed the patient independently of the resident and the above note reflects our jointly agreed upon assessment and plan.         Maria Dolores Gary MD  01/17/21 4701       Maria Dolores Gary MD  01/17/21 6935

## 2021-01-19 NOTE — PROGRESS NOTES
Attempted to contact the patient x3 for post-hospital call without answer. Will close encounter at this time.    Renetta Jimenez CMA, [facility]  Post Hospital Discharge Team

## 2021-01-20 NOTE — ANESTHESIA CARE TRANSFER NOTE
Patient: Doreen Peralta    * No procedures listed *    Diagnosis: * No pre-op diagnosis entered *  Diagnosis Additional Information: No value filed.    Anesthesia Type:   No value filed.     Note:  Anesthesia Care Transfer Notewriter    Vitals: (Last set prior to Anesthesia Care Transfer)              Electronically Signed By: GERARD Shankar CRNA  November 29, 2018  10:50 AM   By physician, RN not present.

## 2021-01-23 LAB
BACTERIA SPEC CULT: NO GROWTH
SPECIMEN SOURCE: NORMAL

## 2021-05-08 ENCOUNTER — HEALTH MAINTENANCE LETTER (OUTPATIENT)
Age: 41
End: 2021-05-08

## 2021-06-03 ENCOUNTER — HOSPITAL ENCOUNTER (EMERGENCY)
Facility: CLINIC | Age: 41
Discharge: HOME | End: 2021-06-04
Attending: EMERGENCY MEDICINE | Admitting: EMERGENCY MEDICINE
Payer: MEDICARE

## 2021-06-03 DIAGNOSIS — R11.10 VOMITING AND DIARRHEA: ICD-10-CM

## 2021-06-03 DIAGNOSIS — R19.7 VOMITING AND DIARRHEA: ICD-10-CM

## 2021-06-03 PROCEDURE — 85610 PROTHROMBIN TIME: CPT | Performed by: EMERGENCY MEDICINE

## 2021-06-03 PROCEDURE — 96374 THER/PROPH/DIAG INJ IV PUSH: CPT | Performed by: EMERGENCY MEDICINE

## 2021-06-03 PROCEDURE — 96361 HYDRATE IV INFUSION ADD-ON: CPT | Performed by: EMERGENCY MEDICINE

## 2021-06-03 PROCEDURE — 85652 RBC SED RATE AUTOMATED: CPT | Performed by: EMERGENCY MEDICINE

## 2021-06-03 PROCEDURE — 86140 C-REACTIVE PROTEIN: CPT | Performed by: EMERGENCY MEDICINE

## 2021-06-03 PROCEDURE — 84703 CHORIONIC GONADOTROPIN ASSAY: CPT | Performed by: EMERGENCY MEDICINE

## 2021-06-03 PROCEDURE — 999N000127 HC STATISTIC PERIPHERAL IV START W US GUIDANCE

## 2021-06-03 PROCEDURE — 99284 EMERGENCY DEPT VISIT MOD MDM: CPT | Performed by: EMERGENCY MEDICINE

## 2021-06-03 PROCEDURE — 99284 EMERGENCY DEPT VISIT MOD MDM: CPT | Mod: 25 | Performed by: EMERGENCY MEDICINE

## 2021-06-03 PROCEDURE — 85025 COMPLETE CBC W/AUTO DIFF WBC: CPT | Performed by: EMERGENCY MEDICINE

## 2021-06-03 PROCEDURE — 258N000003 HC RX IP 258 OP 636: Performed by: EMERGENCY MEDICINE

## 2021-06-03 PROCEDURE — 80053 COMPREHEN METABOLIC PANEL: CPT | Performed by: EMERGENCY MEDICINE

## 2021-06-03 RX ORDER — SODIUM CHLORIDE 9 MG/ML
INJECTION, SOLUTION INTRAVENOUS CONTINUOUS
Status: DISCONTINUED | OUTPATIENT
Start: 2021-06-03 | End: 2021-06-04 | Stop reason: HOSPADM

## 2021-06-03 RX ADMIN — SODIUM CHLORIDE 1000 ML: 9 INJECTION, SOLUTION INTRAVENOUS at 23:29

## 2021-06-03 ASSESSMENT — ENCOUNTER SYMPTOMS
FEVER: 0
ABDOMINAL PAIN: 1
DIARRHEA: 1
NAUSEA: 1
VOMITING: 1
SHORTNESS OF BREATH: 0

## 2021-06-03 ASSESSMENT — MIFFLIN-ST. JEOR: SCORE: 1267.35

## 2021-06-04 VITALS
HEART RATE: 92 BPM | BODY MASS INDEX: 20.57 KG/M2 | WEIGHT: 128 LBS | HEIGHT: 66 IN | TEMPERATURE: 98.5 F | SYSTOLIC BLOOD PRESSURE: 146 MMHG | OXYGEN SATURATION: 100 % | RESPIRATION RATE: 20 BRPM | DIASTOLIC BLOOD PRESSURE: 88 MMHG

## 2021-06-04 LAB
ALBUMIN SERPL-MCNC: 4.1 G/DL (ref 3.4–5)
ALBUMIN UR-MCNC: 50 MG/DL
ALP SERPL-CCNC: 161 U/L (ref 40–150)
ALT SERPL W P-5'-P-CCNC: 25 U/L (ref 0–50)
ANION GAP SERPL CALCULATED.3IONS-SCNC: 4 MMOL/L (ref 3–14)
APPEARANCE UR: CLEAR
AST SERPL W P-5'-P-CCNC: 27 U/L (ref 0–45)
BASOPHILS # BLD AUTO: 0 10E9/L (ref 0–0.2)
BASOPHILS NFR BLD AUTO: 0.3 %
BILIRUB SERPL-MCNC: 0.4 MG/DL (ref 0.2–1.3)
BILIRUB UR QL STRIP: NEGATIVE
BUN SERPL-MCNC: 21 MG/DL (ref 7–30)
CALCIUM SERPL-MCNC: 9.2 MG/DL (ref 8.5–10.1)
CHLORIDE SERPL-SCNC: 102 MMOL/L (ref 94–109)
CO2 SERPL-SCNC: 26 MMOL/L (ref 20–32)
COLOR UR AUTO: YELLOW
CREAT SERPL-MCNC: 0.9 MG/DL (ref 0.52–1.04)
CRP SERPL-MCNC: <2.9 MG/L (ref 0–8)
DIFFERENTIAL METHOD BLD: ABNORMAL
EOSINOPHIL # BLD AUTO: 0.1 10E9/L (ref 0–0.7)
EOSINOPHIL NFR BLD AUTO: 0.5 %
ERYTHROCYTE [DISTWIDTH] IN BLOOD BY AUTOMATED COUNT: 20.7 % (ref 10–15)
ERYTHROCYTE [SEDIMENTATION RATE] IN BLOOD BY WESTERGREN METHOD: 31 MM/H (ref 0–20)
GFR SERPL CREATININE-BSD FRML MDRD: 79 ML/MIN/{1.73_M2}
GLUCOSE SERPL-MCNC: 79 MG/DL (ref 70–99)
GLUCOSE UR STRIP-MCNC: NEGATIVE MG/DL
HCG SERPL QL: NEGATIVE
HCT VFR BLD AUTO: 35.9 % (ref 35–47)
HGB BLD-MCNC: 10.7 G/DL (ref 11.7–15.7)
HGB UR QL STRIP: NEGATIVE
HYALINE CASTS #/AREA URNS LPF: 30 /LPF (ref 0–2)
IMM GRANULOCYTES # BLD: 0.1 10E9/L (ref 0–0.4)
IMM GRANULOCYTES NFR BLD: 0.5 %
INR PPP: 1.04 (ref 0.86–1.14)
KETONES UR STRIP-MCNC: NEGATIVE MG/DL
LEUKOCYTE ESTERASE UR QL STRIP: NEGATIVE
LYMPHOCYTES # BLD AUTO: 2.4 10E9/L (ref 0.8–5.3)
LYMPHOCYTES NFR BLD AUTO: 25.7 %
MCH RBC QN AUTO: 21.3 PG (ref 26.5–33)
MCHC RBC AUTO-ENTMCNC: 29.8 G/DL (ref 31.5–36.5)
MCV RBC AUTO: 71 FL (ref 78–100)
MONOCYTES # BLD AUTO: 0.7 10E9/L (ref 0–1.3)
MONOCYTES NFR BLD AUTO: 6.8 %
MUCOUS THREADS #/AREA URNS LPF: PRESENT /LPF
NEUTROPHILS # BLD AUTO: 6.3 10E9/L (ref 1.6–8.3)
NEUTROPHILS NFR BLD AUTO: 66.2 %
NITRATE UR QL: NEGATIVE
NRBC # BLD AUTO: 0 10*3/UL
NRBC BLD AUTO-RTO: 0 /100
PH UR STRIP: 5.5 PH (ref 5–7)
PLATELET # BLD AUTO: 555 10E9/L (ref 150–450)
POTASSIUM SERPL-SCNC: 3.3 MMOL/L (ref 3.4–5.3)
PROT SERPL-MCNC: 9.1 G/DL (ref 6.8–8.8)
RBC # BLD AUTO: 5.03 10E12/L (ref 3.8–5.2)
RBC #/AREA URNS AUTO: 2 /HPF (ref 0–2)
SODIUM SERPL-SCNC: 132 MMOL/L (ref 133–144)
SOURCE: ABNORMAL
SP GR UR STRIP: 1.03 (ref 1–1.03)
SQUAMOUS #/AREA URNS AUTO: 1 /HPF (ref 0–1)
UROBILINOGEN UR STRIP-MCNC: NORMAL MG/DL (ref 0–2)
WBC # BLD AUTO: 9.5 10E9/L (ref 4–11)
WBC #/AREA URNS AUTO: 8 /HPF (ref 0–5)

## 2021-06-04 PROCEDURE — 81001 URINALYSIS AUTO W/SCOPE: CPT | Performed by: EMERGENCY MEDICINE

## 2021-06-04 PROCEDURE — 250N000011 HC RX IP 250 OP 636: Performed by: EMERGENCY MEDICINE

## 2021-06-04 RX ORDER — DIPHENHYDRAMINE HYDROCHLORIDE 50 MG/ML
50 INJECTION INTRAMUSCULAR; INTRAVENOUS ONCE
Status: COMPLETED | OUTPATIENT
Start: 2021-06-04 | End: 2021-06-04

## 2021-06-04 RX ORDER — ONDANSETRON 4 MG/1
4 TABLET, ORALLY DISINTEGRATING ORAL EVERY 6 HOURS PRN
Qty: 10 TABLET | Refills: 0 | Status: SHIPPED | OUTPATIENT
Start: 2021-06-04 | End: 2021-06-07

## 2021-06-04 RX ADMIN — DIPHENHYDRAMINE HYDROCHLORIDE 50 MG: 50 INJECTION INTRAMUSCULAR; INTRAVENOUS at 00:28

## 2021-06-04 NOTE — ED TRIAGE NOTES
Pt c/o nausea and vomiting for over a week, unable to eat. Pt feels dehydrated. Pt is on antibiotics for leg infection, open wound noted to RLE. Pt has hx CDIFF, was recently admitted and on IV ABX. Pt states she is here to get in with GI, and wants to get a healthy care plan (hx drug abuse, currently sober x1.5 weeks), and go back to treatment ASAP. Pt also has open wound to EDIN.

## 2021-06-04 NOTE — DISCHARGE INSTRUCTIONS
Return stool specimen to the lab when available for testing.    Keep hydrated.    Take your medications as prescribed.    Please make an appointment to follow up with Your Primary Care Provider in 3-5 days for recheck.

## 2021-06-04 NOTE — ED PROVIDER NOTES
ED Provider Note  [facility]      History     Chief Complaint   Patient presents with     Nausea, Vomiting, & Diarrhea     HPI  Doreen Peralta is a 41 year old female with a complicated medical history consistent with chemical dependency with multiple sites of skin infections in her extremities from IV drug use who has been through treatment and states that she has been on multiple antibiotics for her skin infections.  Patient notes that she has had C. difficile several times.  Patient states that she has developed nausea and vomiting over the last week associated with diarrhea consistent with her previous C. difficile, and that she feels dehydrated and is currently on two different antibiotics for her skin infections.  The patient presents here to the ER for dehydration and ongoing diarrhea and abdominal pain.    This part of the medical record was transcribed by Albin Smith, Medical Scribe, from a dictation done by Maldonado Pettit MD.       Past Medical History  Past Medical History:   Diagnosis Date     Asthma      Bilateral ovarian cysts      Cervical cancer (H) 01/01/2008    cervical cancer      Chronic pain      Colonic dysmotility     s/p subtotal colectomy     Constipation     chronic     E. coli sepsis (H) 5/8/2016     Enteritis      Fungemia 5/5/2016     Gastro-oesophageal reflux disease      H/O ileostomy      Hx of abnormal Pap smear     s/p LEEP - no further details provided     Hypertension      IBS (irritable bowel syndrome)      Other chronic pain      PONV (postoperative nausea and vomiting)      Thrombosis, hepatic vein (H)     microvascular     Past [facility] History:   Procedure Laterality Date     COLONOSCOPY  7/10/2012    Procedure: COLONOSCOPY;;  Surgeon: Nicole Redding MD;  [facility]     COLONOSCOPY N/A 2/19/2017    Procedure: COLONOSCOPY;  Surgeon: Randell Muller MD;  [facility]: [facility]     COLONOSCOPY N/A 2/21/2017    Procedure: COLONOSCOPY;   Surgeon: Randell Muller MD;  Location: [facility]     COLONOSCOPY N/A 5/3/2018    Procedure: COMBINED COLONOSCOPY, SINGLE OR MULTIPLE BIOPSY/POLYPECTOMY BY BIOPSY;  EGD/Colonoscopy ;  Surgeon: Loi Black MD;  Location: [facility]     ECHO CHELO  7/19/2016          ENDOSCOPIC INSERTION TUBE GASTROSTOMY N/A 1/21/2016    Procedure: ENDOSCOPIC INSERTION TUBE GASTROSTOMY;  Surgeon: Nicole Redding MD;  [facility]     ESOPHAGOSCOPY, GASTROSCOPY, DUODENOSCOPY (EGD), COMBINED  7/10/2012    Procedure: COMBINED ESOPHAGOSCOPY, GASTROSCOPY, DUODENOSCOPY (EGD);  Upper Endoscopy, Ileoscopy    Latex Allergy  with biopsies;  Surgeon: Nicole Redding MD;  [facility]     ESOPHAGOSCOPY, GASTROSCOPY, DUODENOSCOPY (EGD), COMBINED N/A 11/5/2014    Procedure: COMBINED ESOPHAGOSCOPY, GASTROSCOPY, DUODENOSCOPY (EGD);  Surgeon: Nicole Redding MD;  [facility]     ESOPHAGOSCOPY, GASTROSCOPY, DUODENOSCOPY (EGD), COMBINED N/A 5/3/2018    Procedure: COMBINED ESOPHAGOSCOPY, GASTROSCOPY, DUODENOSCOPY (EGD), BIOPSY SINGLE OR MULTIPLE;;  Surgeon: Loi Black MD;  Location: [facility]     HC REPLACE DUODENOSTOMY/JEJUNOSTOMY TUBE PERCUTANEOUS N/A 8/27/2015    Procedure: REPLACE GASTROJEJUNOSTOMY TUBE, PERCUTANEOUS;  Surgeon: Mio Holder MD;  [facility]     HC REPLACE DUODENOSTOMY/JEJUNOSTOMY TUBE PERCUTANEOUS N/A 1/7/2016    Procedure: REPLACE JEJUNOSTOMY TUBE, PERCUTANEOUS;  Surgeon: Elsa Medel MD;  [facility]     HC REPLACE DUODENOSTOMY/JEJUNOSTOMY TUBE PERCUTANEOUS N/A 1/28/2016    Procedure: REPLACE JEJUNOSTOMY TUBE, PERCUTANEOUS;  Surgeon: Elsa Medel MD;  [facility]     HC REPLACE GASTROSTOMY/CECOSTOMY TUBE PERCUTANEOUS Left 5/19/2015    Procedure: REPLACE GASTROSTOMY TUBE, PERCUTANEOUS;  Surgeon: Melecio Morejon Chi, MD;  Location: [facility]     HC UGI ENDOSCOPY W PLACEMENT GASTROSTOMY TUBE PERCUT N/A 10/1/2015    Procedure: COMBINED ESOPHAGOSCOPY, GASTROSCOPY,  DUODENOSCOPY (EGD), PLACE PERCUTANEOUS ENDOSCOPIC GASTROSTOMY TUBE;  Surgeon: Mio Holder MD;  [facility]     INSERT PORT VASCULAR ACCESS Right 7/20/2017    Procedure: INSERT PORT VASCULAR ACCESS;  Chest Port Placement  **Latex Allergy**;  Surgeon: Coy Rocha PA-C;  [facility]     LAPAROSCOPIC ASSISTED COLECTOMY  1/20/2012    Procedure:LAPAROSCOPIC ASSISTED COLECTOMY; Laparoscopic Ileostomy       LAPAROSCOPIC ASSISTED COLECTOMY LEFT (DESCENDING)  10/24/2012    Procedure: LAPAROSCOPIC ASSISTED COLECTOMY LEFT (DESCENDING);   Hand Assisted Laproscopic Subtotal abdominal Colectomy,Iieorectal anastamosis, Ileostomy Closure.       LAPAROSCOPIC ASSISTED INSERTION TUBE JEJUNOSTOMY N/A 10/16/2015    Procedure: LAPAROSCOPIC ASSISTED INSERTION TUBE JEJUNOSTOMY;  Surgeon: Elsa Medel MD;  [facility]     LAPAROSCOPIC CHOLECYSTECTOMY  2002    [facility]. stones duct     LAPAROSCOPIC ILEOSTOMY  1/20/2012    [facility], loop     LAPAROSCOPIC OOPHORECTOMY Right 2009    [facility]     LAPAROTOMY EXPLORATORY N/A 1/28/2016    Procedure: LAPAROTOMY EXPLORATORY;  Surgeon: Elsa Medel MD;  Location[facility]     LEEP TX, CERVICAL  2009    [facility]     PICC INSERTION Left 10/21/2015    5fr DL Power PICC, 37cm (2cm external) in the L basilic vein w/ tip in the SVC RA junction.     REMOVE GASTROSTOMY TUBE ADULT N/A 12/12/2014    Procedure: REMOVE GASTROSTOMY TUBE ADULT;  Surgeon: Nicole Redding MD;  [facility]     REMOVE PORT VASCULAR ACCESS Right 6/30/2016    Procedure: REMOVE PORT VASCULAR ACCESS;  Surgeon: Pradeep Orosco MD;  Location: [facility]     REMOVE PORT VASCULAR ACCESS Right 9/8/2017    Procedure: REMOVE PORT VASCULAR ACCESS;  right side mediport removal;  Surgeon: Zechariah Worthington MD;  Location: [facility]     replace GASTROSTOMY TUBE ADULT  5/19/15     ACETAMINOPHEN PO  albuterol (2.5 MG/3ML) 0.083% neb solution  albuterol 90 MCG/ACT inhaler  Alfalfa 650 MG  TABS  apixaban ANTICOAGULANT (ELIQUIS) 5 MG tablet  cloNIDine (CATAPRES) 0.2 MG tablet  diphenhydrAMINE HCl 50 MG TABS  DULoxetine (CYMBALTA) 60 MG EC capsule  EPINEPHrine (ANY BX GENERIC EQUIV) 0.3 MG/0.3ML injection 2-pack  furosemide (LASIX) 20 MG tablet  ipratropium (ATROVENT) 0.02 % neb solution  lisinopril (PRINIVIL/ZESTRIL) 2.5 MG tablet  LORazepam (ATIVAN) 1 MG tablet  Multiple Vitamin (TAB-A-PRASANNA) TABS  mupirocin (BACTROBAN) 2 % external ointment  mupirocin (BACTROBAN) 2 % external ointment  NARCAN 4 MG/0.1ML nasal spray  SUMAtriptan (IMITREX) 50 MG tablet  traZODone (DESYREL) 100 MG tablet      Allergies   Allergen Reactions     Azithromycin Other (See Comments) and Itching     Other reaction(s): Throat Swelling/Closing  Burning in throat     Hyoscyamine Rash     Droperidol Hives and Rash     Metoclopramide Other (See Comments)     Eye twitching.      Peaches [Peach] Other (See Comments)     Raw. Cooked OK     Sucralose Other (See Comments)     All artificial sweeteners. Aspartame also. Swollen glands     Advair Diskus Other (See Comments)     Throat burns     Compazine [Prochlorperazine] Visual Disturbance     Contrast Dye Itching     States is allergic to CT contrast dye     Cyclobenzaprine Visual Disturbance     Diatrizoate Other (See Comments)     Patient reports she tolerates IV contrast if given 50mg IV of Benadryl prior to scan.      Fentanyl Other (See Comments)     migraine     Fluticasone-Salmeterol      Throat burns     Ibuprofen GI Disturbance     Lactulose Nausea and Vomiting     Gas and bloating     Levaquin [Levofloxacin] Swelling     Per ED M.D. And RN      Morphine Sulfate Other (See Comments)     Chest pain       Oxycodone Other (See Comments)     Burning throat, but can take Norco.      Oxycodone-Acetaminophen      Burning in throat  Throat burns     Rizatriptan Visual Disturbance     Seafood      itching     Isovue [Iopamidol] Palpitations     Pt had racing heart and sob      Ketorolac  "Anxiety     Latex Swelling and Rash     Kiwi, likely also avacado, ? banana     Levsin Rash     Family History  Family History   Problem Relation Age of Onset     Thyroid Disease Mother      Sjogren's Mother      Gastrointestinal Disease Mother         Intermittent nausea vomiting diarrhea     Colon Polyps Mother      Prostate Problems Father         prostate enlargement     Lupus Maternal Grandmother      Cancer Maternal Grandfather         Lung     Colon Cancer Maternal Grandfather 65     Cancer Paternal Grandmother         Lung      Cerebrovascular Disease Paternal Grandmother      Diabetes Paternal Grandmother      Cardiovascular Paternal Grandmother         CHF     Cancer Paternal Grandfather         Lung     Glaucoma Paternal Grandfather      Abdominal Aortic Aneurysm Other      Macular Degeneration No family hx of      Social History   Social History     Tobacco Use     Smoking status: Current Some Day Smoker     Packs/day: 1.00     Years: 4.00     Pack years: 4.00     Types: Cigarettes     Last attempt to quit: 2004     Years since quittin.4     Smokeless tobacco: Never Used     Tobacco comment: Vaping   Substance Use Topics     Alcohol use: No     Drug use: Yes     Types: IV     Comment: heroin      Past medical history, past surgical history, medications, allergies, family history, and social history were reviewed with the patient. No additional pertinent items.       Review of Systems   Constitutional: Negative for fever.   Respiratory: Negative for shortness of breath.    Cardiovascular: Negative for chest pain.   Gastrointestinal: Positive for abdominal pain, diarrhea, nausea and vomiting.   All other systems reviewed and are negative.      Physical Exam   BP: (!) 166/132  Pulse: 83  Temp: 98.5  F (36.9  C)  Resp: 20  Height: 167.6 cm (5' 6\")  Weight: 58.1 kg (128 lb)  SpO2: 100 %  Physical Exam  Vitals signs and nursing note reviewed.   Constitutional:       Appearance: She is not " ill-appearing.   HENT:      Head: Atraumatic.   Eyes:      Extraocular Movements: Extraocular movements intact.      Pupils: Pupils are equal, round, and reactive to light.   Neck:      Musculoskeletal: Neck supple.   Musculoskeletal:         General: No deformity.   Skin:     Comments: Patient skin has multiple open sores from IV drug use on all extremities without evidence of current cellulitis abscess   Neurological:      General: No focal deficit present.      Mental Status: She is alert and oriented to person, place, and time.   Psychiatric:         Mood and Affect: Mood normal.         ED Course      Procedures        IV established for blood draw and fluid administration.  Results for orders placed or performed during the hospital encounter of 06/03/21 (from the past 24 hour(s))   CBC with platelets differential   Result Value Ref Range    WBC 9.5 4.0 - 11.0 10e9/L    RBC Count 5.03 3.8 - 5.2 10e12/L    Hemoglobin 10.7 (L) 11.7 - 15.7 g/dL    Hematocrit 35.9 35.0 - 47.0 %    MCV 71 (L) 78 - 100 fl    MCH 21.3 (L) 26.5 - 33.0 pg    MCHC 29.8 (L) 31.5 - 36.5 g/dL    RDW 20.7 (H) 10.0 - 15.0 %    Platelet Count 555 (H) 150 - 450 10e9/L    Diff Method Automated Method     % Neutrophils 66.2 %    % Lymphocytes 25.7 %    % Monocytes 6.8 %    % Eosinophils 0.5 %    % Basophils 0.3 %    % Immature Granulocytes 0.5 %    Nucleated RBCs 0 0 /100    Absolute Neutrophil 6.3 1.6 - 8.3 10e9/L    Absolute Lymphocytes 2.4 0.8 - 5.3 10e9/L    Absolute Monocytes 0.7 0.0 - 1.3 10e9/L    Absolute Eosinophils 0.1 0.0 - 0.7 10e9/L    Absolute Basophils 0.0 0.0 - 0.2 10e9/L    Abs Immature Granulocytes 0.1 0 - 0.4 10e9/L    Absolute Nucleated RBC 0.0    CRP inflammation   Result Value Ref Range    CRP Inflammation <2.9 0.0 - 8.0 mg/L   INR   Result Value Ref Range    INR 1.04 0.86 - 1.14   Comprehensive metabolic panel   Result Value Ref Range    Sodium 132 (L) 133 - 144 mmol/L    Potassium 3.3 (L) 3.4 - 5.3 mmol/L    Chloride 102  94 - 109 mmol/L    Carbon Dioxide 26 20 - 32 mmol/L    Anion Gap 4 3 - 14 mmol/L    Glucose 79 70 - 99 mg/dL    Urea Nitrogen 21 7 - 30 mg/dL    Creatinine 0.90 0.52 - 1.04 mg/dL    GFR Estimate 79 >60 mL/min/[1.73_m2]    GFR Estimate If Black 92 >60 mL/min/[1.73_m2]    Calcium 9.2 8.5 - 10.1 mg/dL    Bilirubin Total 0.4 0.2 - 1.3 mg/dL    Albumin 4.1 3.4 - 5.0 g/dL    Protein Total 9.1 (H) 6.8 - 8.8 g/dL    Alkaline Phosphatase 161 (H) 40 - 150 U/L    ALT 25 0 - 50 U/L    AST 27 0 - 45 U/L   HCG qualitative Blood   Result Value Ref Range    HCG Qualitative Serum Negative NEG^Negative   UA with Microscopic reflex to Culture    Specimen: Urine Midstream; Midstream Urine   Result Value Ref Range    Color Urine Yellow     Appearance Urine Clear     Glucose Urine Negative NEG^Negative mg/dL    Bilirubin Urine Negative NEG^Negative    Ketones Urine Negative NEG^Negative mg/dL    Specific Gravity Urine 1.035 1.003 - 1.035    Blood Urine Negative NEG^Negative    pH Urine 5.5 5.0 - 7.0 pH    Protein Albumin Urine 50 (A) NEG^Negative mg/dL    Urobilinogen mg/dL Normal 0.0 - 2.0 mg/dL    Nitrite Urine Negative NEG^Negative    Leukocyte Esterase Urine Negative NEG^Negative    Source Midstream Urine     WBC Urine 8 (H) 0 - 5 /HPF    RBC Urine 2 0 - 2 /HPF    Squamous Epithelial /HPF Urine 1 0 - 1 /HPF    Mucous Urine Present (A) NEG^Negative /LPF    Hyaline Casts 30 (H) 0 - 2 /LPF     *Note: Due to a large number of results and/or encounters for the requested time period, some results have not been displayed. A complete set of results can be found in Results Review.       Medications   sodium chloride (PF) 0.9% PF flush 3 mL (0 mLs Intracatheter Hold 6/3/21 9769)   sodium chloride (PF) 0.9% PF flush 3 mL (has no administration in time range)   0.9% sodium chloride BOLUS (0 mLs Intravenous Stopped 6/4/21 0102)     Followed by   sodium chloride 0.9% infusion (has no administration in time range)   diphenhydrAMINE (BENADRYL)  injection 50 mg (50 mg Intravenous Given 6/4/21 0028)         Patient was unable to give us a stool specimen while in the ER.      Assessments & Plan (with Medical Decision Making)     I have reviewed the nursing notes.    At this time the patient has been rehydrated with IV fluids and is able to take fluids orally.  She has been unable to give us a stool specimen while here and does not have any significant labs showing an infectious process.  At this time the patient will be sent home.    I have reviewed the findings, diagnosis, plan and need for follow up with the patient.    New Prescriptions    ONDANSETRON (ZOFRAN ODT) 4 MG ODT TAB    Take 1 tablet (4 mg) by mouth every 6 hours as needed for nausea or vomiting       Final diagnoses:   Vomiting and diarrhea - with mild dehydration     Return stool specimen to the lab when available for testing.    Keep hydrated.    Take your medications as prescribed.    Please make an appointment to follow up with Your Primary Care Provider in 3-5 days for recheck.    --  Maldonado Pettit MD  [facility] EMERGENCY DEPARTMENT  6/3/2021     Maldonado Pettit MD  06/04/21 0102

## 2021-10-22 ENCOUNTER — HOSPITAL ENCOUNTER (EMERGENCY)
Facility: CLINIC | Age: 41
Discharge: HOME | End: 2021-10-22
Attending: EMERGENCY MEDICINE | Admitting: EMERGENCY MEDICINE
Payer: MEDICARE

## 2021-10-22 VITALS
RESPIRATION RATE: 16 BRPM | WEIGHT: 150 LBS | OXYGEN SATURATION: 100 % | SYSTOLIC BLOOD PRESSURE: 127 MMHG | DIASTOLIC BLOOD PRESSURE: 83 MMHG | BODY MASS INDEX: 24.21 KG/M2 | TEMPERATURE: 97.7 F | HEART RATE: 70 BPM

## 2021-10-22 DIAGNOSIS — L98.9 SKIN LESION: ICD-10-CM

## 2021-10-22 PROCEDURE — 99283 EMERGENCY DEPT VISIT LOW MDM: CPT

## 2021-10-22 PROCEDURE — 99282 EMERGENCY DEPT VISIT SF MDM: CPT | Performed by: EMERGENCY MEDICINE

## 2021-10-22 PROCEDURE — 250N000009 HC RX 250: Performed by: EMERGENCY MEDICINE

## 2021-10-22 RX ORDER — LIDOCAINE HYDROCHLORIDE 20 MG/ML
JELLY TOPICAL ONCE
Status: COMPLETED | OUTPATIENT
Start: 2021-10-22 | End: 2021-10-22

## 2021-10-22 RX ORDER — LIDOCAINE HYDROCHLORIDE 20 MG/ML
JELLY TOPICAL 3 TIMES DAILY
Qty: 85 G | Refills: 1 | Status: ON HOLD | OUTPATIENT
Start: 2021-10-22 | End: 2022-12-23

## 2021-10-22 RX ADMIN — LIDOCAINE HYDROCHLORIDE: 20 JELLY TOPICAL at 02:53

## 2021-10-22 ASSESSMENT — ENCOUNTER SYMPTOMS
WOUND: 1
CONSTITUTIONAL NEGATIVE: 1
ROS SKIN COMMENTS: SEE HPI

## 2021-10-22 NOTE — ED PROVIDER NOTES
History     Chief Complaint   Patient presents with     Derm Problem     HPI  Doreen Peralta is a 41 year old female who has past medical history significant for multiple medical issues, presenting to the emergency department with concerns regarding worms underneath the skin of the hands, and arms.  Patient states many weeks worth of the symptoms, and actually months.  She states that she dropped a sample of the worms off at her primary care provider's office 2 days ago.  Has not yet received testing results.  No redness has been noted.  No fever.  Has had occasional diarrhea.  No vomiting.  No severe abdominal pains.    Allergies:  Allergies   Allergen Reactions     Azithromycin Other (See Comments) and Itching     Other reaction(s): Throat Swelling/Closing  Burning in throat     Hyoscyamine Rash     Droperidol Hives and Rash     Metoclopramide Other (See Comments)     Eye twitching.      Peaches [Peach] Other (See Comments)     Raw. Cooked OK     Sucralose Other (See Comments)     All artificial sweeteners. Aspartame also. Swollen glands     Advair Diskus Other (See Comments)     Throat burns     Compazine [Prochlorperazine] Visual Disturbance     Contrast Dye Itching     States is allergic to CT contrast dye     Cyclobenzaprine Visual Disturbance     Diatrizoate Other (See Comments)     Patient reports she tolerates IV contrast if given 50mg IV of Benadryl prior to scan.      Fentanyl Other (See Comments)     migraine     Fluticasone-Salmeterol      Throat burns     Ibuprofen GI Disturbance     Lactulose Nausea and Vomiting     Gas and bloating     Levaquin [Levofloxacin] Swelling     Per ED M.D. And RN      Morphine Sulfate Other (See Comments)     Chest pain       Oxycodone Other (See Comments)     Burning throat, but can take Norco.      Oxycodone-Acetaminophen      Burning in throat  Throat burns     Rizatriptan Visual Disturbance     Seafood      itching     Isovue [Iopamidol] Palpitations     Pt had  racing heart and sob      Ketorolac Anxiety     Latex Swelling and Rash     Kiwi, likely also avacado, ? banana     Levsin Rash       Problem List:    Patient Active Problem List    Diagnosis Date Noted     Abscess 01/17/2021     Priority: Medium     Fever in adult 01/17/2021     Priority: Medium     Anemia 03/11/2019     Priority: Medium     Other pulmonary embolism  03/11/2019     Priority: Medium     GIB (gastrointestinal bleeding) - suspected 03/11/2019     Priority: Medium     Bacteremia 06/26/2018     Priority: Medium     Acute renal failure (H) 04/21/2018     Priority: Medium     History of bacteremia - recurrent 04/17/2018     Priority: Medium     Tobacco abuse 04/16/2018     Priority: Medium     Iron deficiency anemia 04/16/2018     Priority: Medium     Stool toxin PCR positive for Clostridium difficile on 4/17/18 04/16/2018     Priority: Medium     Gram negative septicemia (H) - Ochrobactrum, Stenotrophomonas & Pantoea 08/24/2017     Priority: Medium     Hypokalemia 08/24/2017     Priority: Medium     Essential hypertension 08/24/2017     Priority: Medium     Sepsis due to Klebsiella (H) 07/27/2017     Priority: Medium     Insomnia, unspecified type 03/31/2017     Priority: Medium     Abdominal pain 02/15/2017     Priority: Medium     Abdominal pain, generalized 01/28/2017     Priority: Medium     History of deep venous thrombosis 01/26/2017     Priority: Medium     Nausea and vomiting 01/26/2017     Priority: Medium     Vitamin D deficiency 01/16/2017     Priority: Medium     Chronic abdominal pain 11/15/2016     Priority: Medium     Patient is followed by Larisa Lorenzo PA-C for ongoing prescription of pain medication.  All refills should be approved by this provider, or covering partner.    Medication(s): no regular narcotics - narcotics given at ED visits  Maximum quantity per month: N/A  Clinic visit frequency required: Q 6  months     Controlled substance agreement:  Encounter-Level CSA -  11/9/15:               Controlled Substance Agreement - Scan on 11/19/2015 11:17 AM : CONTROLLED SUBSTANCE AGREEMENT 11/09/15 (below)          Encounter-Level CSA - 2/27/15:               Controlled Substance Agreement - Scan on 3/12/2015  7:50 AM : Controlled Medication Agreement 02/27/15 (below)            Pain Clinic evaluation in the past: Yes    DIRE Total Score(s):  No flowsheet data found.    Last [facility] website verification:  done on 11/15/16   [email or web address]        Attention to G-tube (H) 11/08/2016     Priority: Medium     Fever 10/16/2016     Priority: Medium     Coagulation defect (H) [D68.9] 09/16/2016     Priority: Medium     Chronic diarrhea 07/22/2016     Priority: Medium     Migraine 07/20/2016     Priority: Medium     Positive blood culture - Klebsiella oxytoca from Port-a-cath culture 07/18/2016     Priority: Medium     Mitral regurgitation mild-mod by Echo June 2016 07/18/2016     Priority: Medium     Anemia, iron deficiency 07/17/2016     Priority: Medium     Anemia in other chronic diseases classified elsewhere 06/14/2016     Priority: Medium     Munchausen syndrome - previously suspected 06/14/2016     Priority: Medium     Long-term (current) use of anticoagulants [Z79.01] 05/16/2016     Priority: Medium     Anxiety 05/16/2016     Priority: Medium     S/P partial resection of colon 04/11/2016     Priority: Medium     Malnutrition (H) 04/11/2016     Priority: Medium     Jejunostomy tube present (H) 03/21/2016     Priority: Medium     Malfunctioning jejunostomy tube (H) 12/22/2015     Priority: Medium     Malfunction of gastrostomy tube (H) 11/19/2015     Priority: Medium     PEG tube malfunction (H) 10/16/2015     Priority: Medium     Health Care Home 01/16/2015     Priority: Medium     *See Letters for [facility] Care Plan: My Access Plan           PEG (percutaneous endoscopic gastrostomy) status 11/05/2014     Priority: Medium     Gastroparesis 04/11/2014     Priority: Medium      Overview:   Had ileostomy performed at [facility] in Jan 2012 as treatment       Migraines 04/11/2014     Priority: Medium     4/11/2014  With periods, every other month.       Intermittent asthma 04/11/2014     Priority: Medium     4/11/2014   With URIs, allergies       Allergic rhinitis 04/11/2014     Priority: Medium     Problem list name updated by automated process. Provider to review       Abnormal Pap smear of cervix 04/11/2014     Priority: Medium     4/11/2014  S/p colp and LEEP. Sees OB/GYN at [facility]. Pap every year x20 years - normal since.       S/P LEEP of cervix 02/06/2014     Priority: Medium     Patellofemoral stress syndrome 01/18/2014     Priority: Medium     Hx SBO 10/09/2013     Priority: Medium     4/11/2014  Recurrent. Sees GI (Dr. Redding - at [facility]) every 6 months or so.  Sees feeding tube nurse next week.       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 04/11/2013     Priority: Medium     Constipation by delayed colonic transit 10/24/2012     Priority: Medium     Atopic rhinitis 03/20/2009     Priority: Medium     Hyperbilirubinemia 03/11/2009     Priority: Medium        Past Medical History:    Past Medical History:   Diagnosis Date     Asthma      Bilateral ovarian cysts      Cervical cancer (H) 01/01/2008     Chronic pain      Colonic dysmotility      Constipation      E. coli sepsis (H) 5/8/2016     Enteritis      Fungemia 5/5/2016     Gastro-oesophageal reflux disease      H/O ileostomy      Hx of abnormal Pap smear      Hypertension      IBS (irritable bowel syndrome)      Other chronic pain      PONV (postoperative nausea and vomiting)      Thrombosis, hepatic vein (H)        Past [facility] History:    Past [facility] History:   Procedure Laterality Date     COLONOSCOPY  7/10/2012    Procedure: COLONOSCOPY;;  Surgeon: Nicole Redding MD;  [facility]     COLONOSCOPY N/A 2/19/2017    Procedure: COLONOSCOPY;  Surgeon: Randell Muller MD;  Location:  GI     COLONOSCOPY N/A  2/21/2017    Procedure: COLONOSCOPY;  Surgeon: Randell Muller MD;  [facility]: [facility] GI     COLONOSCOPY N/A 5/3/2018    Procedure: COMBINED COLONOSCOPY, SINGLE OR MULTIPLE BIOPSY/POLYPECTOMY BY BIOPSY;  EGD/Colonoscopy ;  Surgeon: Loi Black MD;  Location: [facility] GI     ECHO CHELO  7/19/2016          ENDOSCOPIC INSERTION TUBE GASTROSTOMY N/A 1/21/2016    Procedure: ENDOSCOPIC INSERTION TUBE GASTROSTOMY;  Surgeon: Nicole Redding MD;  [facility]     ESOPHAGOSCOPY, GASTROSCOPY, DUODENOSCOPY (EGD), COMBINED  7/10/2012    Procedure: COMBINED ESOPHAGOSCOPY, GASTROSCOPY, DUODENOSCOPY (EGD);  Upper Endoscopy, Ileoscopy    Latex Allergy  with biopsies;  Surgeon: Nicole Redding MD;  [facility]     ESOPHAGOSCOPY, GASTROSCOPY, DUODENOSCOPY (EGD), COMBINED N/A 11/5/2014    Procedure: COMBINED ESOPHAGOSCOPY, GASTROSCOPY, DUODENOSCOPY (EGD);  Surgeon: Nicole Redding MD;  [facility]     ESOPHAGOSCOPY, GASTROSCOPY, DUODENOSCOPY (EGD), COMBINED N/A 5/3/2018    Procedure: COMBINED ESOPHAGOSCOPY, GASTROSCOPY, DUODENOSCOPY (EGD), BIOPSY SINGLE OR MULTIPLE;;  Surgeon: Loi Black MD;  Location[facility] GI     HC REPLACE DUODENOSTOMY/JEJUNOSTOMY TUBE PERCUTANEOUS N/A 8/27/2015    Procedure: REPLACE GASTROJEJUNOSTOMY TUBE, PERCUTANEOUS;  Surgeon: Mio Holder MD;  [facility]     HC REPLACE DUODENOSTOMY/JEJUNOSTOMY TUBE PERCUTANEOUS N/A 1/7/2016    Procedure: REPLACE JEJUNOSTOMY TUBE, PERCUTANEOUS;  Surgeon: Elsa Medel MD;  [facility]     HC REPLACE DUODENOSTOMY/JEJUNOSTOMY TUBE PERCUTANEOUS N/A 1/28/2016    Procedure: REPLACE JEJUNOSTOMY TUBE, PERCUTANEOUS;  Surgeon: Elsa Medel MD;  [facility]     HC REPLACE GASTROSTOMY/CECOSTOMY TUBE PERCUTANEOUS Left 5/19/2015    Procedure: REPLACE GASTROSTOMY TUBE, PERCUTANEOUS;  Surgeon: Melecio Morejon Chi, MD;  Location: [facility] GI     HC UGI ENDOSCOPY W PLACEMENT GASTROSTOMY TUBE PERCUT N/A 10/1/2015    Procedure:  COMBINED ESOPHAGOSCOPY, GASTROSCOPY, DUODENOSCOPY (EGD), PLACE PERCUTANEOUS ENDOSCOPIC GASTROSTOMY TUBE;  Surgeon: Mio Holder MD;  [facility]     INSERT PORT VASCULAR ACCESS Right 7/20/2017    Procedure: INSERT PORT VASCULAR ACCESS;  Chest Port Placement  **Latex Allergy**;  Surgeon: Coy Rocha PA-C;  [facility]     LAPAROSCOPIC ASSISTED COLECTOMY  1/20/2012    Procedure:LAPAROSCOPIC ASSISTED COLECTOMY; Laparoscopic Ileostomy       LAPAROSCOPIC ASSISTED COLECTOMY LEFT (DESCENDING)  10/24/2012    Procedure: LAPAROSCOPIC ASSISTED COLECTOMY LEFT (DESCENDING);   Hand Assisted Laproscopic Subtotal abdominal Colectomy,Iieorectal anastamosis, Ileostomy Closure.       LAPAROSCOPIC ASSISTED INSERTION TUBE JEJUNOSTOMY N/A 10/16/2015    Procedure: LAPAROSCOPIC ASSISTED INSERTION TUBE JEJUNOSTOMY;  Surgeon: Elsa Medel MD;  [facility]     LAPAROSCOPIC CHOLECYSTECTOMY  2002    [facility] ctr. stones duct     LAPAROSCOPIC ILEOSTOMY  1/20/2012    [facility], loop     LAPAROSCOPIC OOPHORECTOMY Right 2009    [facility]     LAPAROTOMY EXPLORATORY N/A 1/28/2016    Procedure: LAPAROTOMY EXPLORATORY;  Surgeon: Elsa Medel MD;  [facility]     LEEP TX, CERVICAL  2009    [facility]     PICC INSERTION Left 10/21/2015    5fr DL Power PICC, 37cm (2cm external) in the L basilic vein w/ tip in the SVC RA junction.     REMOVE GASTROSTOMY TUBE ADULT N/A 12/12/2014    Procedure: REMOVE GASTROSTOMY TUBE ADULT;  Surgeon: Nicole Redding MD;  [facility]     REMOVE PORT VASCULAR ACCESS Right 6/30/2016    Procedure: REMOVE PORT VASCULAR ACCESS;  Surgeon: Pradeep Orosco MD;  Location: [facility] OR     REMOVE PORT VASCULAR ACCESS Right 9/8/2017    Procedure: REMOVE PORT VASCULAR ACCESS;  right side mediport removal;  Surgeon: Zechariah Worthington MD;  Location: [facility] OR     replace GASTROSTOMY TUBE ADULT  5/19/15       Family History:    Family History   Problem Relation Age of Onset      Thyroid Disease Mother      Sjogren's Mother      Gastrointestinal Disease Mother         Intermittent nausea vomiting diarrhea     Colon Polyps Mother      Prostate Problems Father         prostate enlargement     Lupus Maternal Grandmother      Cancer Maternal Grandfather         Lung     Colon Cancer Maternal Grandfather 65     Cancer Paternal Grandmother         Lung      Cerebrovascular Disease Paternal Grandmother      Diabetes Paternal Grandmother      Cardiovascular Paternal Grandmother         CHF     Cancer Paternal Grandfather         Lung     Glaucoma Paternal Grandfather      Abdominal Aortic Aneurysm Other      Macular Degeneration No family hx of        Social History:  Marital Status:   [2]  Social History     Tobacco Use     Smoking status: Current Some Day Smoker     Packs/day: 1.00     Years: 4.00     Pack years: 4.00     Types: Cigarettes     Last attempt to quit: 2004     Years since quittin.8     Smokeless tobacco: Never Used     Tobacco comment: Vaping   Substance Use Topics     Alcohol use: No     Drug use: Yes     Types: IV     Comment: heroin        Medications:    lidocaine (XYLOCAINE) 2 % external gel  ACETAMINOPHEN PO  albuterol (2.5 MG/3ML) 0.083% neb solution  albuterol 90 MCG/ACT inhaler  Alfalfa 650 MG TABS  apixaban ANTICOAGULANT (ELIQUIS) 5 MG tablet  cloNIDine (CATAPRES) 0.2 MG tablet  diphenhydrAMINE HCl 50 MG TABS  DULoxetine (CYMBALTA) 60 MG EC capsule  EPINEPHrine (ANY BX GENERIC EQUIV) 0.3 MG/0.3ML injection 2-pack  furosemide (LASIX) 20 MG tablet  ipratropium (ATROVENT) 0.02 % neb solution  lisinopril (PRINIVIL/ZESTRIL) 2.5 MG tablet  LORazepam (ATIVAN) 1 MG tablet  Multiple Vitamin (TAB-A-PRASANNA) TABS  mupirocin (BACTROBAN) 2 % external ointment  mupirocin (BACTROBAN) 2 % external ointment  NARCAN 4 MG/0.1ML nasal spray  SUMAtriptan (IMITREX) 50 MG tablet  traZODone (DESYREL) 100 MG tablet          Review of Systems   Constitutional: Negative.    Skin:  Positive for wound.        See hpi       Physical Exam   BP: 127/83  Pulse: 70  Temp: 97.7  F (36.5  C)  Resp: 16  Weight: 68 kg (150 lb)  SpO2: 100 %      Physical Exam  /83   Pulse 70   Temp 97.7  F (36.5  C) (Oral)   Resp 16   Wt 68 kg (150 lb)   SpO2 100%   BMI 24.21 kg/m    General: alert and in no acute distress  Head: atraumatic, normocephalic  Abd: nondistended  Musculoskel/Extremities: arms and hands with multiple areas of lesions, with scarring of the arms as pictured below        Neuro: Patient awake, alert, oriented, speech is fluent, gait is normal        ED Course        Procedures              Critical Care time:  none               No results found. However, due to the size of the patient record, not all encounters were searched. Please check Results Review for a complete set of results.    Medications   lidocaine (XYLOCAINE) 2 % external gel ( Topical Given 10/22/21 0253)       Assessments & Plan (with Medical Decision Making)  41 year old female presenting to the emergency department with concerns regarding bugs and worms that are crawling out of the skin beneath her arms and under the skin of the primarily dorsal side of the bilateral arms.  Multiple areas of healing wounds which are present.  Patient states that she has been clean and sober from all drugs over the past 90 days.  States that she is not sleeping secondary to the pain of the arms.  I do not visualize any signs of foreign body, or other object.  Patient tells me that she dropped her specimen off of worms at her primary care office 2 days ago.  Patient does have history of polysubstance abuse, and states no recent use.  No signs of cellulitis on exam.  I discussed treatment with over-the-counter medications including Tylenol.  Allergic to multiple, 25 different medications.  Unable to tolerate NSAIDs.  I do not feel narcotics are indicated.  No indication for antibiotics.  Recommended antihistamines for itching.  Follow-up  in primary care clinic has been recommended.     I have reviewed the nursing notes.    I have reviewed the findings, diagnosis, plan and need for follow up with the patient.       New Prescriptions    LIDOCAINE (XYLOCAINE) 2 % EXTERNAL GEL    Apply topically 3 times daily       Final diagnoses:   Skin lesion       10/22/2021   [facility] EMERGENCY DEPT     DominicJake wilson MD  10/22/21 0300

## 2021-10-22 NOTE — ED TRIAGE NOTES
"Patient stated that she has some sort of \"worms\" under skin in her hands/arms and in stool for approximately 3 months.  Patient has dropped off stool samples with her primary provider.  Patient stated that she can feel them \"biting her\" and is in a lot of pain.    "

## 2021-10-23 ENCOUNTER — HEALTH MAINTENANCE LETTER (OUTPATIENT)
Age: 41
End: 2021-10-23

## 2022-01-28 ENCOUNTER — VIRTUAL VISIT (OUTPATIENT)
Dept: FAMILY MEDICINE | Facility: CLINIC | Age: 42
End: 2022-01-28
Payer: MEDICARE

## 2022-01-28 DIAGNOSIS — J45.21 MILD INTERMITTENT ASTHMA WITH ACUTE EXACERBATION: Primary | ICD-10-CM

## 2022-01-28 PROCEDURE — 99441 PR PHYSICIAN TELEPHONE EVALUATION 5-10 MIN: CPT | Mod: 95 | Performed by: NURSE PRACTITIONER

## 2022-01-28 RX ORDER — PREDNISONE 20 MG/1
TABLET ORAL
Qty: 20 TABLET | Refills: 0 | Status: SHIPPED | OUTPATIENT
Start: 2022-01-28

## 2022-01-28 RX ORDER — ALBUTEROL SULFATE 90 UG/1
1-2 AEROSOL, METERED RESPIRATORY (INHALATION) EVERY 6 HOURS PRN
Qty: 18 G | Refills: 3 | Status: SHIPPED | OUTPATIENT
Start: 2022-01-28

## 2022-01-28 ASSESSMENT — ASTHMA QUESTIONNAIRES: ACT_TOTALSCORE: 25

## 2022-01-28 NOTE — PROGRESS NOTES
Doreen is a 41 year old who is being evaluated via a billable telephone visit.      What phone number would you like to be contacted at? [phone]  How would you like to obtain your AVS? Debra    Assessment & Plan     Mild intermittent asthma with acute exacerbation  No acute distress.  With symptoms will start prednisone and albuterol.  Symptomatic care and follow up discussed.  - predniSONE (DELTASONE) 20 MG tablet; Take 3 tabs by mouth daily x 3 days, then 2 tabs daily x 3 days, then 1 tab daily x 3 days, then 1/2 tab daily x 3 days.  - albuterol (PROAIR HFA/PROVENTIL HFA/VENTOLIN HFA) 108 (90 Base) MCG/ACT inhaler; Inhale 1-2 puffs into the lungs every 6 hours as needed for shortness of breath / dyspnea or wheezing      Return in about 1 week (around 2/4/2022), or if symptoms worsen or fail to improve.    GERARD Flores [facility]   Doreen is a 41 year old who presents for the following health issues     HPI     Asthma Follow-Up    Was ACT completed today?    Yes    ACT Total Scores 1/28/2022   ACT TOTAL SCORE -   ASTHMA ER VISITS -   ASTHMA HOSPITALIZATIONS -   ACT TOTAL SCORE (Goal Greater than or Equal to 20) 25   In the past 12 months, how many times did you visit the emergency room for your asthma without being admitted to the hospital? 1   In the past 12 months, how many times were you hospitalized overnight because of your asthma? 0       How many days per week do you miss taking your asthma controller medication?  7    Please describe any recent triggers for your asthma: upper respiratory infections and cold air    Have you had any Emergency Room Visits, Urgent Care Visits, or Hospital Admissions since your last office visit?  No    Concern for COVID-19  About how many days ago did these symptoms start? 2 days  Is this your first visit for this illness? Yes  In the 14 days before your symptoms started, have you had close contact with someone with  COVID-19 (Coronavirus)? No  Do you have a fever or chills? No  Are you having new or worsening difficulty breathing? Yes  Please describe what kind of difficulty you are having breathing:Moderate dyspnea (short of breath with ADLs, shortness of breath that limits the ability to walk up one flight of stairs without needing to rest)  Do you have new or worsening cough? Yes, I am coughing up mucus.   Have you had any new or unexplained body aches? No    Have you experienced any of the following NEW symptoms?    Headache: No    Sore throat: YES    Loss of taste or smell: No    Chest pain: YES    Diarrhea: No    Rash: No  What treatments have you tried? none  Who do you live with? Mom and dad  Are you, or a household member, a healthcare worker or a ? YES  Do you live in a nursing home, group home, or shelter? No  Do you have a way to get food/medications if quarantined? Yes, I have a friend or family member who can help me.    Feels the same and previous asthma exacerbations  Home COVID 19 testing negative this morning  No fever, chills, or body aches  Symptoms generally improved with a prednisone taper    Review of Systems   Constitutional, HEENT, cardiovascular, pulmonary, gi and gu systems are negative, except as otherwise noted.      Objective           Vitals:  No vitals were obtained today due to virtual visit.    Physical Exam   healthy, alert and no distress  PSYCH: Alert and oriented times 3; coherent speech, normal   rate and volume, able to articulate logical thoughts, able   to abstract reason, no tangential thoughts, no hallucinations   or delusions  Her affect is normal  RESP: No cough, no audible wheezing, able to talk in full sentences  Remainder of exam unable to be completed due to telephone visits          Phone call duration: 5 minutes

## 2022-01-29 ENCOUNTER — TELEPHONE (OUTPATIENT)
Dept: NURSING | Facility: CLINIC | Age: 42
End: 2022-01-29
Payer: MEDICARE

## 2022-01-29 DIAGNOSIS — G89.29 CHRONIC ABDOMINAL PAIN: ICD-10-CM

## 2022-01-29 DIAGNOSIS — J45.21 INTERMITTENT ASTHMA WITH ACUTE EXACERBATION, UNSPECIFIED ASTHMA SEVERITY: Primary | ICD-10-CM

## 2022-01-29 DIAGNOSIS — Z93.1 GASTROSTOMY TUBE DEPENDENT (H): ICD-10-CM

## 2022-01-29 DIAGNOSIS — K56.609 SBO (SMALL BOWEL OBSTRUCTION) (H): ICD-10-CM

## 2022-01-29 DIAGNOSIS — R10.9 CHRONIC ABDOMINAL PAIN: ICD-10-CM

## 2022-01-29 DIAGNOSIS — K52.9 CHRONIC DIARRHEA: ICD-10-CM

## 2022-01-29 NOTE — TELEPHONE ENCOUNTER
Patient is calling because she needs a nebulizer and associated medications.  She said her cough id so bad it wakes her up and the inhaler is ineffective.  She had a virtual visit yesterday and said she refused need for nebulizer. Writer doesn't see that in note.  Writer will route this note to provider of VV.  Also recommended another appointment, as there is no guarantee that yesterdays provider is on today.    Temi Iraheta RN  [facility] Nurse Advisor  11:11 AM 1/29/2022

## 2022-01-31 RX ORDER — BENZONATATE 200 MG/1
200 CAPSULE ORAL 3 TIMES DAILY PRN
Qty: 20 CAPSULE | Refills: 1 | Status: SHIPPED | OUTPATIENT
Start: 2022-01-31

## 2022-01-31 RX ORDER — ALBUTEROL SULFATE 0.83 MG/ML
2.5 SOLUTION RESPIRATORY (INHALATION) EVERY 4 HOURS PRN
Qty: 60 ML | Refills: 0 | Status: SHIPPED | OUTPATIENT
Start: 2022-01-31

## 2022-01-31 NOTE — TELEPHONE ENCOUNTER
I sent the albuterol nebs to the pharmacy.  She has the equipment or needs that also?    GERARD Flores CNP

## 2022-01-31 NOTE — TELEPHONE ENCOUNTER
Ayana Hill,    Patient called, she does in fact need the equipment, also asking for medication for cough, please advise.    ALANA Rajput

## 2022-02-02 ENCOUNTER — ANCILLARY PROCEDURE (OUTPATIENT)
Dept: GENERAL RADIOLOGY | Facility: CLINIC | Age: 42
End: 2022-02-02
Attending: NURSE PRACTITIONER
Payer: MEDICARE

## 2022-02-02 ENCOUNTER — OFFICE VISIT (OUTPATIENT)
Dept: URGENT CARE | Facility: URGENT CARE | Age: 42
End: 2022-02-02
Payer: MEDICARE

## 2022-02-02 VITALS
RESPIRATION RATE: 20 BRPM | TEMPERATURE: 97.8 F | DIASTOLIC BLOOD PRESSURE: 96 MMHG | OXYGEN SATURATION: 100 % | HEART RATE: 77 BPM | SYSTOLIC BLOOD PRESSURE: 142 MMHG

## 2022-02-02 DIAGNOSIS — R06.02 SOB (SHORTNESS OF BREATH): Primary | ICD-10-CM

## 2022-02-02 DIAGNOSIS — J45.51 SEVERE PERSISTENT ASTHMA WITH EXACERBATION (H): ICD-10-CM

## 2022-02-02 DIAGNOSIS — R06.02 SOB (SHORTNESS OF BREATH): ICD-10-CM

## 2022-02-02 PROCEDURE — 71046 X-RAY EXAM CHEST 2 VIEWS: CPT | Performed by: RADIOLOGY

## 2022-02-02 PROCEDURE — 99214 OFFICE O/P EST MOD 30 MIN: CPT | Performed by: NURSE PRACTITIONER

## 2022-02-02 RX ORDER — BUDESONIDE AND FORMOTEROL FUMARATE DIHYDRATE 80; 4.5 UG/1; UG/1
2 AEROSOL RESPIRATORY (INHALATION) 2 TIMES DAILY
Qty: 6.9 G | Refills: 0 | Status: ON HOLD | OUTPATIENT
Start: 2022-02-02 | End: 2022-12-23

## 2022-02-02 RX ORDER — MONTELUKAST SODIUM 10 MG/1
10 TABLET ORAL AT BEDTIME
Qty: 30 TABLET | Refills: 0 | Status: ON HOLD | OUTPATIENT
Start: 2022-02-02 | End: 2022-12-23

## 2022-02-02 RX ORDER — PREDNISONE 20 MG/1
TABLET ORAL
Qty: 10 TABLET | Refills: 0 | Status: SHIPPED | OUTPATIENT
Start: 2022-02-02

## 2022-02-02 NOTE — PATIENT INSTRUCTIONS
Patient Education     Acute Severe Asthma  What is acute severe asthma?   Acute severe asthma is a sudden severe asthma attack that doesn't get better after taking asthma medicine. This type of asthma is life-threatening. If you think someone is having a severe asthma attack, call 911 right away. The main treatment is done in the emergency room and the hospital. But early treatment done by first responders can save lives.   What causes acute severe asthma?  Any person with asthma can have an acute severe flare-up. Causes can include:     Having an infection, such as a cold or sinus infection    Having a severe allergic reaction    Inhaling irritants    Not taking prescribed medicine    Exercising  Who is at risk for acute severe asthma?  You may be at risk for acute severe asthma if you:     Have had a severe asthma attack in the past    Have trouble noticing when you are having asthma symptoms or how bad those symptoms are    Have asthma attacks even when using oral glucocorticoids    Don t take your asthma medicines as prescribed    Use illegal drugs    Have other health problems, such as depression, heart disease, or lung disease    What are the symptoms of acute severe asthma?  The symptoms of acute severe flare-ups often happen over hours or days. But they can come on faster. They are:     Worsening trouble breathing and wheezing    Fast breathing    Worsening cough and chest tightness    Inability to breathe when laying down    Trouble walking and talking    Sweating    Fast heart rate    Confusion or irritability    How is acute severe asthma diagnosed?  Acute severe asthma is life-threatening. So quick diagnosis is important. If you think you or someone you know is having a severe asthma attack, call 911. Healthcare providers will ask about your symptoms. They will give you a physical exam. You may need these tests:     Peak expiratory flow. This test can gauge lung function.    Pulse oximetry. This test  measures the level of oxygen in your body.    Chest X-ray. This test may be done in severe cases. Or it may be done if your healthcare provider thinks you may have some other health problem.    How is acute severe asthma treated?  Treatment for acute severe asthma is often done in a hospital. Your healthcare provider will focus on opening up your airways and helping you breathe easier. You may need:     Medicines. Your healthcare provider will give you medicines to ease your symptoms. These may be inhaled, swallowed, or given through an IV (intravenous) line.    Magnesium sulfate. This may be used if other medicines don t work. It s given through an IV.    Supplemental oxygen. This helps raise oxygen levels in your body.    Ventilator. If other treatments don t work, you may be put on a machine to help you breathe.    What can I do to prevent severe acute asthma?  To help prevent acute severe flare-ups, be sure to:     Know and stay away from those things that cause your flare-ups.    Try to stay away from people who are sick.    Wash your hands often.    Talk with your healthcare provider about vaccines you should get.    If you have severe allergies, go to an allergist.    If you smoke, get help to quit. Stay away from secondhand and thirdhand smoke, too.    Take asthma medicines as directed. This includes your long-term control medicines. It's important to take them even if you feel like your asthma is under control.    If exercise is a trigger, make sure you use your quick-relief medicine before you are active. Keep an inhaler in your purse, gym bag, or backpack.    Develop an Asthma Action Plan with your provider. Share the plan with your family members and close friends so they know when to call 911.    When should I call my healthcare provider?  Call 911 right away if you are having an asthma attack and your symptoms don t get better after you take your quick-relief or rescue medicines.   Key points about  acute severe asthma      Acute severe asthma is a sudden severe asthma attack that doesn't get better after taking asthma medicine.    This type of asthma is life-threatening. Call 911 if you think you or someone you know is having a severe asthma attack.    Acute severe asthma can have various causes. These include an infection or an allergic reaction.    Treatment may include medicines and oxygen.    You can prevent acute severe asthma by knowing and staying away from what triggers your asthma.    An Asthma Action Plan can help you, your family, and friends know what treatments are needed and when to call 911.    Next steps  Tips to help you get the most from a visit to your healthcare provider:    Know the reason for your visit and what you want to happen.    Before your visit, write down questions you want answered.    Bring someone with you to help you ask questions and remember what your provider tells you.    At the visit, write down the name of a new diagnosis, and any new medicines, treatments, or tests. Also write down any new instructions your provider gives you.    Know why a new medicine or treatment is prescribed, and how it will help you. Also know what the side effects are.    Ask if your condition can be treated in other ways.    Know why a test or procedure is recommended and what the results could mean.    Know what to expect if you do not take the medicine or have the test or procedure.    If you have a follow-up appointment, write down the date, time, and purpose for that visit.    Know how you can contact your provider if you have questions.    8176-1115 [employer]. All rights reserved. This information is not intended as a substitute for professional medical care. Always follow your healthcare professional's instructions.           Patient Education     Asthma Trigger Checklist  Allergens, irritants, and other things may trigger your asthma. It is very important to find ways to  stay away from your triggers or irritants. Check the box next to each of your triggers. After each trigger is a list of ways to stay away from it.      Dust mites. Dust mites live in mattresses, bedding, carpets, curtains, and indoor dust.     To kill dust mites, wash bedding in hot water (130 F) each week.    Cover mattress and pillows with special dust-mite-proof cases.    Don t use upholstered furniture like sofas or chairs in the bedroom.    Use allergy-proof filters for air conditioners and furnaces. Replace or clean them as instructed.    If you can, replace carpeting with wood or tile yary, especially in the bedroom.    Vacuum carpets every 2 to 3 days.    Animals. All animals with fur or feathers shed dander (allergens).     It s best to choose a pet that doesn t have fur or feathers, such as a fish or a reptile.    If you have pets, keep them off of your bed and out of your bedroom.    Wash your hands and clothes after handling pets.    Bathe your pets every week.      Mold. Mold grows in damp places, such as bathrooms, basements, and closets.     Ask someone to clean damp areas in your home every week. Or try wearing a face mask while you clean.    Run an exhaust fan while bathing. Or leave a window open in the bathroom.    Repair water leaks in or around your home.    Have someone else cut grass or rake leaves, if possible. If you do the work, wear a mask.    Don t use vaporizers or humidifiers. They encourage mold growth.      Pollen. Pollen from trees, grasses, and weeds is a common allergen. (Flower pollens are generally not a problem).     Try to learn what types of pollen affect you most. Pollen levels vary depending on the plant, the season, and the time of day.    If possible, use air conditioning instead of opening the windows in your home or car.    Have someone else do yard work, if possible.      Cockroaches. Roaches are found in many homes and produce allergens.     Keep your kitchen clean  and dry. A leaky faucet or drain can attract roaches.    Remove garbage from your home daily.    Store food in tightly sealed containers. Wash dishes as soon as they are used.    Use bait stations or traps to control roaches. Don't use chemical sprays.      Smoke. Smoke may be from cigarettes, cigars, pipes, incense or candles, barbecues or grills, and fireplaces.     Don t smoke. And don t let people smoke in your home or car.    When you travel, ask for nonsmoking rental cars and hotel rooms.    Stay away from fireplaces and wood stoves. If you can t, sit away from them. Make sure the smoke is directed outside.    Don t burn incense or use candles.    Move away from smoky outdoor cooking grills.    Move away from secondhand smoke.      Smog. Smog is from car exhaust and other pollution.     Read or listen to local air-quality reports. These let you know when air quality is poor.    Stay indoors as much as you can on days with a poor air quality index. If possible, use air conditioning instead of opening the windows.    In your car, set air conditioning to recirculate air, so less pollution gets in.      Strong odors. These include air fresheners, deodorizers, and cleaning products; perfume, deodorant, and other beauty products; incense and candles; and insect sprays and other sprays.     Use scent-free products like deodorant or body lotion.    Don't use cleaning products with bleach and ammonia. Make your own cleaning solution with white vinegar, baking soda, or mild dish soap.    Use exhaust fans while cooking. Or open a window, if possible.     Stay away from perfumes, air fresheners, potpourri, and other scented products.      Other irritants. These include dust, aerosol sprays, and powders.     Wear a face mask while doing tasks like sanding, dusting, sweeping, and yard work. Open doors and windows if working indoors.    Use pump spray bottles instead of aerosols.    Pour liquid  onto a rag or cloth  instead of spraying them.      Weather. Weather conditions can trigger symptoms or make them worse.     Watch for very high or low temperatures, very humid conditions, or a lot of wind, as these conditions can make symptoms worse.    Limit outdoor activity during the type of weather that affects you.    Wear a scarf over your mouth and nose in cold weather.      Colds, flu, and sinus infections. Upper respiratory infections can trigger asthma.     Wash your hands often with soap and warm water or use a hand  containing alcohol.    Get a yearly flu shot. And ask if you should get a pneumonia vaccine.    Take care of your general health. Get plenty of sleep. Exercise and eat a variety of healthy foods.      Food additives. Rarely, food additives can trigger asthma flare-ups .     Check food labels for sulfites or other similar ingredients. These are often found in foods such as wine, beer, and dried fruits.    Don't eat foods that contain these additives.      Medicine. Aspirin, nonsteroidal anti-inflammatory drugs (NSAIDs) such as ibuprofen and naproxen, and heart medicines such as beta-blockers may be triggers.     Tell your healthcare provider if you think certain medicines trigger symptoms.     Be sure to read the labels on over-the-counter medicines. They may have ingredients that cause symptoms for you.     Ask the pharmacist for help selecting a safe product.    Emotions. Laughing, crying, or feeling excited are triggers for some people.      To help you stay calm: Try breathing in slowly through your nose for a count of 2 seconds. Close your lips and breathe out for 4 seconds. Repeat.    Try to focus on a soothing image in your mind. This will help relax you and calm your breathing.    Remember to take your daily controller medicines. When you re upset or under stress, it s easy to forget.      Exercise. For some people, exercise can trigger symptoms. Don t let this keep you from being active.      If  you have not been exercising regularly, start slow and work up gradually.    Take all of your medicines as prescribed.    If you use quick-relief medicine, make sure you have it with you when you exercise.    Stop if you have any symptoms. Make sure you talk with your healthcare provider about these symptoms.  Claudia last reviewed this educational content on 10/1/2019    7208-8668 [employer]. All rights reserved. This information is not intended as a substitute for professional medical care. Always follow your healthcare professional's instructions.

## 2022-02-02 NOTE — PROGRESS NOTES
SUBJECTIVE:  Doreen Peralta is a 41 year old female who presents to the clinic today with a chief complaint of shortness of breath. for 7 day(s).  Her cough is described as nonproductive.    The patient's symptoms are moderate and worsening.  Associated symptoms include congestion. The patient's symptoms are exacerbated by no particular triggers  Patient has been using albuterol nebs  to improve symptoms.    Past Medical History:   Diagnosis Date     Asthma      Bilateral ovarian cysts      Cervical cancer (H) 2008    cervical cancer      Chronic pain      Colonic dysmotility     s/p subtotal colectomy     Constipation     chronic     E. coli sepsis (H) 2016     Enteritis      Fungemia 2016     Gastro-oesophageal reflux disease      H/O ileostomy      Hx of abnormal Pap smear     s/p LEEP - no further details provided     Hypertension      IBS (irritable bowel syndrome)      Other chronic pain      PONV (postoperative nausea and vomiting)      Thrombosis, hepatic vein (H)     microvascular       Social History     Tobacco Use     Smoking status: Current Some Day Smoker     Packs/day: 1.00     Years: 4.00     Pack years: 4.00     Types: Cigarettes     Last attempt to quit: 2004     Years since quittin.1     Smokeless tobacco: Never Used     Tobacco comment: Vaping   Substance Use Topics     Alcohol use: No     Drug use: Yes     Types: IV     Comment: heroin         ROS  Review of systems negative except as stated above.    OBJECTIVE:  BP (!) 142/96 (BP Location: Right arm, Patient Position: Sitting, Cuff Size: Adult Large)   Pulse 77   Temp 97.8  F (36.6  C) (Tympanic)   Resp 20   SpO2 100%   GENERAL APPEARANCE: healthy, alert and no distress  EYES: EOMI,  PERRL, conjunctiva clear  HENT: ear canals and TM's normal.  Nose and mouth without ulcers, erythema or lesions  NECK: supple, nontender, no lymphadenopathy  RESP: lungs clear to auscultation - no rales, rhonchi or wheezes  CV: regular  rates and rhythm, normal S1 S2, no murmur noted  ABDOMEN:  soft, nontender, no HSM or masses and bowel sounds normal  NEURO: Normal strength and tone, sensory exam grossly normal,  normal speech and mentation  SKIN: no suspicious lesions or rashes    X-RAY: X-ray reviewed and interpreted by myself in office no acute findings will await final radiology report  Results for orders placed or performed in visit on 02/02/22   XR Chest 2 Views     Status: None    Narrative    XR CHEST 2 VW  2/2/2022 2:50 PM       INDICATION: Shortness of breath  COMPARISON: 3/11/2019       Impression    IMPRESSION: Negative chest.    WALDEMAR DWYER MD         SYSTEM ID:  [id number]         ASSESSMENT\Plan:  (R06.02) SOB (shortness of breath)  (primary encounter diagnosis)  Comment:   Plan: XR Chest 2 Views      (J45.51) Severe persistent asthma with exacerbation  Comment: Uncontrolled patient has had a daily dose of prednisone patient is also using the arms.  Patient does have an allergy to the steroid component and inhalers will attempt montelukast and also allergy referral patient is stable x-ray was was negative patient will attempt 1 more burst of steroids montelukast and follow-up with to the emergency room if her shortness of breath worsens  Plan: predniSONE (DELTASONE) 20 MG tablet,         budesonide-formoterol (SYMBICORT) 80-4.5         MCG/ACT Inhaler, Adult Allergy/Asthma Referral,        montelukast (SINGULAIR) 10 MG tablet          GERARD Sanchez CNP

## 2022-02-04 ENCOUNTER — TELEPHONE (OUTPATIENT)
Dept: FAMILY MEDICINE | Facility: CLINIC | Age: 42
End: 2022-02-04
Payer: MEDICARE

## 2022-02-04 NOTE — TELEPHONE ENCOUNTER
CSS spoke with pt, informed clinic appt unavailable yet today. Advise to be seen in UC.  SHAHNAZ Ward RN

## 2022-02-04 NOTE — TELEPHONE ENCOUNTER
Attempted to reach patient to inquire if cough symptoms have changed since her appt? Any fever, sputum production, color? Increased shortness of breath? Using nebs, inhalers and prednisone as prescribed?   Left message to return call.   Manda PERALTA RN

## 2022-02-04 NOTE — TELEPHONE ENCOUNTER
Patient returned call. Her cough is better during the day, keeping her up at night. No new symptoms compared to when seen on 02/02/22. She is requesting cough med with codeine be sent to [facility] in [place].

## 2022-02-04 NOTE — TELEPHONE ENCOUNTER
Patient will need need to be seen for prescription for cough syrup with codeine. Should follow-up with her primary care provider.    Chasity Mason CNP

## 2022-02-04 NOTE — TELEPHONE ENCOUNTER
Reason for Call:  Cough and Asthma Issue     Detailed comments: Pt said she is up all night with coughing issues. Pt had a Office visit 2/2/22 with MAKENZIE Mason and discussed a new medication for her cough. Tesslotayo pearls cough medication is not working. MAKENZIE Mason discussed a special cough syrup she was going to send in. Please Advise.     Phone Number Patient can be reached at: Home number on file [phone] (home)    Best Time: Any Time      Can we leave a detailed message on this number? YES    Call taken on 2/4/2022 at 11:23 AM by Mirtha Wing

## 2022-06-04 ENCOUNTER — HEALTH MAINTENANCE LETTER (OUTPATIENT)
Age: 42
End: 2022-06-04

## 2022-08-19 ENCOUNTER — NURSE TRIAGE (OUTPATIENT)
Dept: FAMILY MEDICINE | Facility: OTHER | Age: 42
End: 2022-08-19

## 2022-08-19 NOTE — TELEPHONE ENCOUNTER
Nurse Triage SBAR  Is this a 2nd Level Triage? NO    SITUATION:                                                    (Clearly and briefly define the situation)  Doreen Peralta is a 42 year old female     Possible bilateral hand infection    BACKGROUND:                                                    (Provide clear, relevant background information that relates to the situation)  Spoke with patient.  Had hand surgery.  Completed antibiotic and she is seeing drainage and smells from both hands. Red streaking is coming from one.    Advised she needs to be seen today.  No openings in clinic, will go to UC/ED    NURSE ASSESSMENT:                                                    (A statement of your professional conclusion)    Needs to be seen to rule out infection        (See information below for more triage details.)  RECOMMENDATION(S) and PLAN:                                                    (What do you need from this individual?)  Protocol Recommended Disposition: Go To Office Now, See More Appropriate Protocol  Will comply with recommendation: yes      Routing to provider for recommendation on NA.    Does the patient meet one of the following criteria for ADS visit consideration? 16+ years old, with an [facility] PCP     TIP  Providers, please consider if this condition is appropriate for management at one of our Acute and Diagnostic Services sites.     If patient is a good candidate, please use dotphrase <dot>triageresponse and select Refer to ADS to document.    Encourage to return call clinic triage nurse if further questions/concerns that may come up or if symptoms do not improve, worsen, or new symptoms develop.    NOTES: Disposition was determined by the first positive assessment question, therefore all previous assessment questions were negative.  Guideline used:wound infection and taking an antibtiotic  System protocol    Jamar Lynne, XIOMYN, RN, PHN  [facility] ~ Registered Nurse  Clinic  Triage ~ [facility]  August 19, 2022        Reason for Disposition    Wound infection and taking an antibiotic    [facility] wound infection suspected (post-op)    Red streak runs from the incision and longer than 1 inch (2.5 cm)    Incision looks infected (spreading redness, pain)    Additional Information    Negative: SEVERE difficulty breathing (e.g., struggling for each breath, speaks in single words)    Negative: Sounds like a life-threatening emergency to the triager    Negative: Sinus infection and taking an antibiotic    Negative: Major abdominal surgical incision and wound gaping open with visible internal organs    Negative: Sounds like a life-threatening emergency to the triager    Negative: Bleeding from incision and won't stop after 10 minutes of direct pressure    Negative: Bleeding (more than a few drops) from incision and after blood vessel surgery (e.g., carotidendarterectomy, femoral bypass graft, kidney dialysis fistula, tracheostomy)    Negative: Bright red, wide-spread, sunburn-like rash    Negative: Fever > 100.4 F (38.0 C)    Negative: SEVERE pain in the incision    Negative: Incision gaping open and < 2 days (48 hours) since wound re-opened    Negative: Incision gaping open and length of opening > 2 inches (5 cm)    Negative: Patient sounds very sick or weak to the triager    Negative: Sounds like a serious complication to the triager    Protocols used: INFECTION ON ANTIBIOTIC FOLLOW-UP CALL-A-OH, WOUND INFECTION-A-OH, POST-OP INCISION SYMPTOMS AND [id number]-HE

## 2022-09-13 NOTE — DISCHARGE INSTRUCTIONS
Follow-up tomorrow in your clinic as we discussed for recheck on how you are feeling. If doing well then they will likely start you on a oral antibiotic course. If not improved they may continue the Rocephin. Please return to the ER for increased symptoms. As we discussed the antibiotic will likely worsen your C. Diff risk for re-infection. Please restart your probiotic medications and foods. See the C. Diff handout. We will call you should the blood culture suggest a need to change your treatment. I hope you feel better soon!    Electronically signed, Jake Rodriguez DO     English

## 2022-09-14 NOTE — OR NURSING
Attempted colonoscopy, unable to finish, patient yelling in pain, requesting to stop procedure. Report called to Ed on the floor. VSS.   [Snoring] : snoring [Witnessed Apneas] : witnessed sleep apnea [Unintentional Sleep while Active] : unintentional sleep while active [Unintentional Sleep While Inactive] : unintentional sleep while inactive [Awakes Unrefreshed] : awakening unrefreshed [Awakening With Dry Mouth] : awakening with dry mouth [Daytime Somnolence] : daytime somnolence [To Bed: ___] : ~he/she~ goes to bed at [unfilled] [Arises: ___] : arises at [unfilled] [Sleep Onset Latency: ___ minutes] : sleep onset latency of [unfilled] minutes reported [Nocturnal Awakenings: ___] : ~he/she~ typically has [unfilled] nocturnal awakenings [TST: ___] : Total sleep time is [unfilled] [Daytime Sleep: ___] : daytime sleep: [unfilled] [Recent  Weight Gain] : recent weight gain [FreeTextEntry1] : 63 year old here for initial sleep consult. Pmhx significant for obesity and GERD. Former smoker 7.5pys Here for r/o FREEMAN. Hx of COVID19 7/2022 mild uri denies residual sxs. \par Hx of tonsillectomy as child\par \par +Snoring, waking up feeling un-refreshed, non restorative sleep, drowsy driving, dry mouth, No morning headache, difficulty falling or staying asleep. Denies sob, cough, wheezing, fevers. 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

## 2022-09-25 ENCOUNTER — OFFICE VISIT (OUTPATIENT)
Dept: URGENT CARE | Facility: URGENT CARE | Age: 42
End: 2022-09-25
Payer: MEDICARE

## 2022-09-25 VITALS
WEIGHT: 171 LBS | SYSTOLIC BLOOD PRESSURE: 137 MMHG | BODY MASS INDEX: 27.6 KG/M2 | TEMPERATURE: 98.9 F | HEART RATE: 69 BPM | OXYGEN SATURATION: 100 % | DIASTOLIC BLOOD PRESSURE: 98 MMHG

## 2022-09-25 DIAGNOSIS — R05.9 COUGH: Primary | ICD-10-CM

## 2022-09-25 PROCEDURE — U0003 INFECTIOUS AGENT DETECTION BY NUCLEIC ACID (DNA OR RNA); SEVERE ACUTE RESPIRATORY SYNDROME CORONAVIRUS 2 (SARS-COV-2) (CORONAVIRUS DISEASE [COVID-19]), AMPLIFIED PROBE TECHNIQUE, MAKING USE OF HIGH THROUGHPUT TECHNOLOGIES AS DESCRIBED BY CMS-2020-01-R: HCPCS | Performed by: PHYSICIAN ASSISTANT

## 2022-09-25 PROCEDURE — U0005 INFEC AGEN DETEC AMPLI PROBE: HCPCS | Performed by: PHYSICIAN ASSISTANT

## 2022-09-25 PROCEDURE — 99213 OFFICE O/P EST LOW 20 MIN: CPT | Mod: CS | Performed by: PHYSICIAN ASSISTANT

## 2022-09-25 NOTE — PROGRESS NOTES
Assessment & Plan     Cough  COVID pending. Continue with supportive care. Get plenty of rest and push fluids. Can use Tylenol and/or ibuprofen as needed for pain and/or fever control. Discussed quarantine guidelines. Return to clinic if symptoms worsen or do not improve; otherwise follow up as needed      - Symptomatic; Unknown COVID-19 Virus (Coronavirus) by PCR Nose                 Return in about 1 week (around 10/2/2022), or if symptoms worsen or fail to improve.    ANUP [facility]              Subjective   Chief Complaint   Patient presents with     Cold Symptoms     Runny/stuffy nose, cough, drainage.          HPI     URI Adult    Onset of symptoms was 3 day(s) ago.  Course of illness is improving.    Severity mild  Current and Associated symptoms: congestion, cough   Treatment measures tried include Tylenol/Ibuprofen.  Predisposing factors include None.                Review of Systems   Constitutional, HEENT, cardiovascular, pulmonary, gi and gu systems are negative, except as otherwise noted.      Objective    BP (!) 137/98   Pulse 69   Temp 98.9  F (37.2  C) (Tympanic)   Wt 77.6 kg (171 lb)   SpO2 100%   BMI 27.60 kg/m    Body mass index is 27.6 kg/m .     Physical Exam  Constitutional:       Appearance: She is well-developed.   HENT:      Head: Normocephalic.      Right Ear: Tympanic membrane and ear canal normal.      Left Ear: Tympanic membrane and ear canal normal.   Eyes:      Conjunctiva/sclera: Conjunctivae normal.   Cardiovascular:      Rate and Rhythm: Normal rate.      Heart sounds: Normal heart sounds.   Pulmonary:      Effort: Pulmonary effort is normal.      Breath sounds: Normal breath sounds.   Skin:     General: Skin is warm and dry.      Findings: No rash.   Psychiatric:         Behavior: Behavior normal.

## 2022-09-26 LAB — SARS-COV-2 RNA RESP QL NAA+PROBE: NEGATIVE

## 2022-10-06 ENCOUNTER — MEDICAL CORRESPONDENCE (OUTPATIENT)
Dept: SURGERY | Facility: CLINIC | Age: 42
End: 2022-10-06

## 2022-10-09 ENCOUNTER — HEALTH MAINTENANCE LETTER (OUTPATIENT)
Age: 42
End: 2022-10-09

## 2022-12-22 RX ORDER — NALOXONE HYDROCHLORIDE 0.4 MG/ML
0.4 INJECTION, SOLUTION INTRAMUSCULAR; INTRAVENOUS; SUBCUTANEOUS
Status: CANCELLED | OUTPATIENT
Start: 2022-12-22

## 2022-12-22 RX ORDER — NALOXONE HYDROCHLORIDE 0.4 MG/ML
0.2 INJECTION, SOLUTION INTRAMUSCULAR; INTRAVENOUS; SUBCUTANEOUS
Status: CANCELLED | OUTPATIENT
Start: 2022-12-22

## 2022-12-22 RX ORDER — FLUMAZENIL 0.1 MG/ML
0.2 INJECTION, SOLUTION INTRAVENOUS
Status: CANCELLED | OUTPATIENT
Start: 2022-12-22 | End: 2022-12-22

## 2022-12-23 ENCOUNTER — ANESTHESIA EVENT (OUTPATIENT)
Dept: GASTROENTEROLOGY | Facility: CLINIC | Age: 42
End: 2022-12-23
Payer: MEDICARE

## 2022-12-23 ENCOUNTER — ANESTHESIA (OUTPATIENT)
Dept: GASTROENTEROLOGY | Facility: CLINIC | Age: 42
End: 2022-12-23
Payer: MEDICARE

## 2022-12-23 ENCOUNTER — HOSPITAL ENCOUNTER (OUTPATIENT)
Facility: CLINIC | Age: 42
Discharge: HOME | End: 2022-12-23
Attending: INTERNAL MEDICINE | Admitting: INTERNAL MEDICINE
Payer: MEDICARE

## 2022-12-23 VITALS
OXYGEN SATURATION: 100 % | HEIGHT: 66 IN | BODY MASS INDEX: 27.32 KG/M2 | WEIGHT: 170 LBS | HEART RATE: 65 BPM | SYSTOLIC BLOOD PRESSURE: 95 MMHG | DIASTOLIC BLOOD PRESSURE: 72 MMHG | RESPIRATION RATE: 14 BRPM

## 2022-12-23 DIAGNOSIS — K52.9 CHRONIC DIARRHEA: Primary | Chronic | ICD-10-CM

## 2022-12-23 DIAGNOSIS — Z12.11 ENCOUNTER FOR SCREENING COLONOSCOPY: Primary | ICD-10-CM

## 2022-12-23 DIAGNOSIS — K52.9 CHRONIC DIARRHEA: ICD-10-CM

## 2022-12-23 LAB
C DIFF GDH STL QL IA: POSITIVE
C DIFF TOX A+B STL QL IA: NEGATIVE
C DIFF TOX B STL QL: POSITIVE
COLONOSCOPY: NORMAL

## 2022-12-23 PROCEDURE — 45380 COLONOSCOPY AND BIOPSY: CPT | Performed by: INTERNAL MEDICINE

## 2022-12-23 PROCEDURE — 250N000011 HC RX IP 250 OP 636: Performed by: ANESTHESIOLOGY

## 2022-12-23 PROCEDURE — 87493 C DIFF AMPLIFIED PROBE: CPT | Performed by: INTERNAL MEDICINE

## 2022-12-23 PROCEDURE — 88305 TISSUE EXAM BY PATHOLOGIST: CPT | Mod: TC | Performed by: INTERNAL MEDICINE

## 2022-12-23 PROCEDURE — 370N000017 HC ANESTHESIA TECHNICAL FEE, PER MIN: Performed by: INTERNAL MEDICINE

## 2022-12-23 PROCEDURE — 999N000010 HC STATISTIC ANES STAT CODE-CRNA PER MINUTE: Performed by: INTERNAL MEDICINE

## 2022-12-23 PROCEDURE — 250N000009 HC RX 250: Performed by: NURSE ANESTHETIST, CERTIFIED REGISTERED

## 2022-12-23 PROCEDURE — 250N000011 HC RX IP 250 OP 636: Performed by: NURSE ANESTHETIST, CERTIFIED REGISTERED

## 2022-12-23 PROCEDURE — 258N000003 HC RX IP 258 OP 636: Performed by: NURSE ANESTHETIST, CERTIFIED REGISTERED

## 2022-12-23 PROCEDURE — 87324 CLOSTRIDIUM AG IA: CPT | Performed by: INTERNAL MEDICINE

## 2022-12-23 RX ORDER — PROPOFOL 10 MG/ML
INJECTION, EMULSION INTRAVENOUS CONTINUOUS PRN
Status: DISCONTINUED | OUTPATIENT
Start: 2022-12-23 | End: 2022-12-23

## 2022-12-23 RX ORDER — LIDOCAINE HYDROCHLORIDE 20 MG/ML
INJECTION, SOLUTION INFILTRATION; PERINEURAL PRN
Status: DISCONTINUED | OUTPATIENT
Start: 2022-12-23 | End: 2022-12-23

## 2022-12-23 RX ORDER — LIDOCAINE 40 MG/G
CREAM TOPICAL
Status: DISCONTINUED | OUTPATIENT
Start: 2022-12-23 | End: 2022-12-23 | Stop reason: HOSPADM

## 2022-12-23 RX ORDER — ONDANSETRON 2 MG/ML
INJECTION INTRAMUSCULAR; INTRAVENOUS PRN
Status: DISCONTINUED | OUTPATIENT
Start: 2022-12-23 | End: 2022-12-23

## 2022-12-23 RX ORDER — DEXMEDETOMIDINE HYDROCHLORIDE 4 UG/ML
INJECTION, SOLUTION INTRAVENOUS PRN
Status: DISCONTINUED | OUTPATIENT
Start: 2022-12-23 | End: 2022-12-23

## 2022-12-23 RX ORDER — GLYCOPYRROLATE 0.2 MG/ML
INJECTION, SOLUTION INTRAMUSCULAR; INTRAVENOUS PRN
Status: DISCONTINUED | OUTPATIENT
Start: 2022-12-23 | End: 2022-12-23

## 2022-12-23 RX ORDER — SODIUM CHLORIDE, SODIUM LACTATE, POTASSIUM CHLORIDE, CALCIUM CHLORIDE 600; 310; 30; 20 MG/100ML; MG/100ML; MG/100ML; MG/100ML
INJECTION, SOLUTION INTRAVENOUS CONTINUOUS PRN
Status: DISCONTINUED | OUTPATIENT
Start: 2022-12-23 | End: 2022-12-23

## 2022-12-23 RX ORDER — PROPOFOL 10 MG/ML
INJECTION, EMULSION INTRAVENOUS PRN
Status: DISCONTINUED | OUTPATIENT
Start: 2022-12-23 | End: 2022-12-23

## 2022-12-23 RX ADMIN — LIDOCAINE HYDROCHLORIDE 60 MG: 20 INJECTION, SOLUTION INFILTRATION; PERINEURAL at 10:53

## 2022-12-23 RX ADMIN — SODIUM CHLORIDE, POTASSIUM CHLORIDE, SODIUM LACTATE AND CALCIUM CHLORIDE: 600; 310; 30; 20 INJECTION, SOLUTION INTRAVENOUS at 10:50

## 2022-12-23 RX ADMIN — DEXMEDETOMIDINE HYDROCHLORIDE 12 MCG: 200 INJECTION INTRAVENOUS at 10:54

## 2022-12-23 RX ADMIN — DEXMEDETOMIDINE HYDROCHLORIDE 8 MCG: 200 INJECTION INTRAVENOUS at 10:57

## 2022-12-23 RX ADMIN — PHENYLEPHRINE HYDROCHLORIDE 50 MCG: 10 INJECTION INTRAVENOUS at 11:16

## 2022-12-23 RX ADMIN — ONDANSETRON 4 MG: 2 INJECTION INTRAMUSCULAR; INTRAVENOUS at 11:01

## 2022-12-23 RX ADMIN — PROPOFOL 40 MG: 10 INJECTION, EMULSION INTRAVENOUS at 10:55

## 2022-12-23 RX ADMIN — PROPOFOL 175 MCG/KG/MIN: 10 INJECTION, EMULSION INTRAVENOUS at 10:53

## 2022-12-23 RX ADMIN — GLYCOPYRROLATE 0.1 MG: 0.2 INJECTION, SOLUTION INTRAMUSCULAR; INTRAVENOUS at 11:17

## 2022-12-23 ASSESSMENT — COPD QUESTIONNAIRES: COPD: 0

## 2022-12-23 ASSESSMENT — ENCOUNTER SYMPTOMS
SEIZURES: 0
DYSRHYTHMIAS: 0

## 2022-12-23 ASSESSMENT — LIFESTYLE VARIABLES: TOBACCO_USE: 1

## 2022-12-23 ASSESSMENT — ACTIVITIES OF DAILY LIVING (ADL)
ADLS_ACUITY_SCORE: 35
ADLS_ACUITY_SCORE: 37

## 2022-12-23 NOTE — ANESTHESIA PREPROCEDURE EVALUATION
Anesthesia Pre-Procedure Evaluation    Patient: Doreen Peralta   MRN: [id number] : 1980        Procedure : Procedure(s):  COLONOSCOPY          Past Medical History:   Diagnosis Date     Asthma      Bilateral ovarian cysts      Cervical cancer (H) 2008    cervical cancer      Chronic pain      Colonic dysmotility     s/p subtotal colectomy     Constipation     chronic     E. coli sepsis (H) 2016     Enteritis      Fungemia 2016     Gastro-oesophageal reflux disease      H/O ileostomy      Hx of abnormal Pap smear     s/p LEEP - no further details provided     Hypertension      IBS (irritable bowel syndrome)      Other chronic pain      PONV (postoperative nausea and vomiting)      Thrombosis, hepatic vein (H)     microvascular      Past [facility] History:   Procedure Laterality Date     COLONOSCOPY  7/10/2012    Procedure: COLONOSCOPY;;  Surgeon: Nicole Redding MD;  Location: [facility]     COLONOSCOPY N/A 2017    Procedure: COLONOSCOPY;  Surgeon: Randell Muller MD;  Location: [facility]     COLONOSCOPY N/A 2017    Procedure: COLONOSCOPY;  Surgeon: Randell Muller MD;  Location: [facility]     COLONOSCOPY N/A 5/3/2018    Procedure: COMBINED COLONOSCOPY, SINGLE OR MULTIPLE BIOPSY/POLYPECTOMY BY BIOPSY;  EGD/Colonoscopy ;  Surgeon: Loi Black MD;  Location: [facility]     ECHO CHELO  2016          ENDOSCOPIC INSERTION TUBE GASTROSTOMY N/A 2016    Procedure: ENDOSCOPIC INSERTION TUBE GASTROSTOMY;  Surgeon: Nicole Redding MD;  Location: [facility]     ESOPHAGOSCOPY, GASTROSCOPY, DUODENOSCOPY (EGD), COMBINED  7/10/2012    Procedure: COMBINED ESOPHAGOSCOPY, GASTROSCOPY, DUODENOSCOPY (EGD);  Upper Endoscopy, Ileoscopy    Latex Allergy  with biopsies;  Surgeon: Nicole Redding MD;  [facility]     ESOPHAGOSCOPY, GASTROSCOPY, DUODENOSCOPY (EGD), COMBINED N/A 2014    Procedure: COMBINED ESOPHAGOSCOPY, GASTROSCOPY, DUODENOSCOPY (EGD);  Surgeon: Nicole Redding  MD Neil;  [facility]     ESOPHAGOSCOPY, GASTROSCOPY, DUODENOSCOPY (EGD), COMBINED N/A 5/3/2018    Procedure: COMBINED ESOPHAGOSCOPY, GASTROSCOPY, DUODENOSCOPY (EGD), BIOPSY SINGLE OR MULTIPLE;;  Surgeon: Loi Black MD;  Location[facility]     HC REPLACE DUODENOSTOMY/JEJUNOSTOMY TUBE PERCUTANEOUS N/A 8/27/2015    Procedure: REPLACE GASTROJEJUNOSTOMY TUBE, PERCUTANEOUS;  Surgeon: Mio Holder MD;  [facility]     HC REPLACE DUODENOSTOMY/JEJUNOSTOMY TUBE PERCUTANEOUS N/A 1/7/2016    Procedure: REPLACE JEJUNOSTOMY TUBE, PERCUTANEOUS;  Surgeon: Elsa Medel MD;  [facility]     HC REPLACE DUODENOSTOMY/JEJUNOSTOMY TUBE PERCUTANEOUS N/A 1/28/2016    Procedure: REPLACE JEJUNOSTOMY TUBE, PERCUTANEOUS;  Surgeon: Elsa Medel MD;  [facility]     HC REPLACE GASTROSTOMY/CECOSTOMY TUBE PERCUTANEOUS Left 5/19/2015    Procedure: REPLACE GASTROSTOMY TUBE, PERCUTANEOUS;  Surgeon: Melecio Morejon Chi, MD;  Location: [facility]     HC UGI ENDOSCOPY W PLACEMENT GASTROSTOMY TUBE PERCUT N/A 10/1/2015    Procedure: COMBINED ESOPHAGOSCOPY, GASTROSCOPY, DUODENOSCOPY (EGD), PLACE PERCUTANEOUS ENDOSCOPIC GASTROSTOMY TUBE;  Surgeon: Mio Holder MD;  [facility]     INSERT PORT VASCULAR ACCESS Right 7/20/2017    Procedure: INSERT PORT VASCULAR ACCESS;  Chest Port Placement  **Latex Allergy**;  Surgeon: Coy Rocha PA-C;  [facility]     LAPAROSCOPIC ASSISTED COLECTOMY  1/20/2012    Procedure:LAPAROSCOPIC ASSISTED COLECTOMY; Laparoscopic Ileostomy       LAPAROSCOPIC ASSISTED COLECTOMY LEFT (DESCENDING)  10/24/2012    Procedure: LAPAROSCOPIC ASSISTED COLECTOMY LEFT (DESCENDING);   Hand Assisted Laproscopic Subtotal abdominal Colectomy,Iieorectal anastamosis, Ileostomy Closure.       LAPAROSCOPIC ASSISTED INSERTION TUBE JEJUNOSTOMY N/A 10/16/2015    Procedure: LAPAROSCOPIC ASSISTED INSERTION TUBE JEJUNOSTOMY;  Surgeon: Elsa Medel MD;  [facility]     LAPAROSCOPIC  CHOLECYSTECTOMY  2002    [facility] stones duct     LAPAROSCOPIC ILEOSTOMY  1/20/2012    [facility], loop     LAPAROSCOPIC OOPHORECTOMY Right 2009    [facility]     LAPAROTOMY EXPLORATORY N/A 1/28/2016    Procedure: LAPAROTOMY EXPLORATORY;  Surgeon: Elsa Medel MD;  Location: [facility]     LEEP TX, CERVICAL  2009    [facility]     PICC INSERTION Left 10/21/2015    5fr DL Power PICC, 37cm (2cm external) in the L basilic vein w/ tip in the SVC RA junction.     REMOVE GASTROSTOMY TUBE ADULT N/A 12/12/2014    Procedure: REMOVE GASTROSTOMY TUBE ADULT;  Surgeon: Nicole Redding MD;  Location: [facility]     REMOVE PORT VASCULAR ACCESS Right 6/30/2016    Procedure: REMOVE PORT VASCULAR ACCESS;  Surgeon: Pradeep Orosco MD;  [facility]     REMOVE PORT VASCULAR ACCESS Right 9/8/2017    Procedure: REMOVE PORT VASCULAR ACCESS;  right side mediport removal;  Surgeon: Zechariah Worthington MD;  Location: [facility]     replace GASTROSTOMY TUBE ADULT  5/19/15      Allergies   Allergen Reactions     Azithromycin Other (See Comments) and Itching     Other reaction(s): Throat Swelling/Closing  Burning in throat     Hyoscyamine Rash     Droperidol Hives and Rash     Metoclopramide Other (See Comments)     Eye twitching.      Peaches [Peach] Other (See Comments)     Raw. Cooked OK     Sucralose Other (See Comments)     All artificial sweeteners. Aspartame also. Swollen glands     Advair Diskus Other (See Comments)     Throat burns     Compazine [Prochlorperazine] Visual Disturbance     Contrast Dye Itching     States is allergic to CT contrast dye     Cyclobenzaprine Visual Disturbance     Diatrizoate Other (See Comments)     Patient reports she tolerates IV contrast if given 50mg IV of Benadryl prior to scan.      Fentanyl Other (See Comments)     migraine     Fluticasone-Salmeterol      Throat burns     Ibuprofen GI Disturbance     Lactulose Nausea and Vomiting     Gas and bloating     Levaquin [Levofloxacin]  Swelling     Per ED M.D. And RN      Morphine Sulfate Other (See Comments)     Chest pain       Rizatriptan Visual Disturbance     Seafood      itching     Isovue [Iopamidol] Palpitations     Pt had racing heart and sob      Ketorolac Anxiety     Latex Swelling and Rash     Kiwi, likely also avacado, ? banana     Levsin Rash      Social History     Tobacco Use     Smoking status: Some Days     Packs/day: 1.00     Years: 4.00     Pack years: 4.00     Types: Cigarettes     Last attempt to quit: 2004     Years since quittin.9     Smokeless tobacco: Never     Tobacco comments:     Vaping   Substance Use Topics     Alcohol use: No      Wt Readings from Last 1 Encounters:   22 77.6 kg (171 lb)        Anesthesia Evaluation            ROS/MED HX  ENT/Pulmonary:     (+) allergic rhinitis, tobacco use, Current use, Mild Persistent, asthma  (-) COPD and sleep apnea   Neurologic:     (+) migraines,  (-) no seizures and no CVA   Cardiovascular:     (+) hypertension-----Previous cardiac testing   Echo: Date:  Results:  1. Normal LV size, normal wall thickness, normal global systolic function with an estimated EF of 60 - 65%.    2. Right ventricular cavity size is normal, global systolic RV function is normal.    3. Normal cardiac valves.    4. Compared to the prior study of 2018, there is no significant change.     Stress Test: Date: Results:    ECG Reviewed: Date: Results:    Cath: Date: Results:   (-) CAD, CHF and arrhythmias   METS/Exercise Tolerance:     Hematologic:     (+) History of blood clots (DVT + PE),     Musculoskeletal:       GI/Hepatic: Comment: S/P PEG and J tube    (+) GERD,  (-) liver disease   Renal/Genitourinary:    (-) renal disease   Endo:    (-) Type I DM and Type II DM   Psychiatric/Substance Use:       Infectious Disease:       Malignancy:   (+) Malignancy, History of Other.Other CA Cervical status post.    Other:      (+) , H/O Chronic Pain,        Physical Exam    Airway         Mallampati: II   TM distance: > 3 FB   Neck ROM: full   Mouth opening: > 3 cm    Respiratory Devices and Support         Dental  no notable dental history         Cardiovascular          Rhythm and rate: regular     Pulmonary           breath sounds clear to auscultation           OUTSIDE LABS:  CBC:   Lab Results   Component Value Date    WBC 9.5 06/03/2021    WBC 8.9 01/17/2021    HGB 10.7 (L) 06/03/2021    HGB 8.8 (L) 01/17/2021    HCT 35.9 06/03/2021    HCT 28.9 (L) 01/17/2021     (H) 06/03/2021     (H) 01/17/2021     BMP:   Lab Results   Component Value Date     (L) 06/03/2021     01/17/2021    POTASSIUM 3.3 (L) 06/03/2021    POTASSIUM 3.2 (L) 01/17/2021    CHLORIDE 102 06/03/2021    CHLORIDE 104 01/17/2021    CO2 26 06/03/2021    CO2 27 01/17/2021    BUN 21 06/03/2021    BUN 9 01/17/2021    CR 0.90 06/03/2021    CR 0.81 01/17/2021    GLC 79 06/03/2021    GLC 82 01/17/2021     COAGS:   Lab Results   Component Value Date    PTT 30 12/25/2020    INR 1.04 06/03/2021     POC:   Lab Results   Component Value Date    BGM 88 04/03/2017    HCG Negative 05/26/2018    HCGS Negative 06/03/2021     HEPATIC:   Lab Results   Component Value Date    ALBUMIN 4.1 06/03/2021    PROTTOTAL 9.1 (H) 06/03/2021    ALT 25 06/03/2021    AST 27 06/03/2021    ALKPHOS 161 (H) 06/03/2021    BILITOTAL 0.4 06/03/2021     OTHER:   Lab Results   Component Value Date    LACT 1.0 12/25/2020    A1C 5.1 01/07/2012    TARA 9.2 06/03/2021    PHOS 2.9 04/29/2018    MAG 2.2 04/29/2018    LIPASE 119 06/22/2019    AMYLASE 82 04/08/2018    TSH 0.29 (L) 02/05/2019    T4 1.20 02/05/2019    T3 74 01/07/2012    CRP <2.9 06/03/2021    SED 31 (H) 06/03/2021       Anesthesia Plan    ASA Status:  3      Anesthesia Type: MAC.              Consents            Postoperative Care       PONV prophylaxis: Ondansetron (or other 5HT-3)     Comments:                Pradeep Ortiz MD

## 2022-12-23 NOTE — OR NURSING
Patient has had a tubal ligation in 2003 and right oophorectomy.  Does not want a pregnancy test today before colonoscopy with MAC.

## 2022-12-23 NOTE — ANESTHESIA CARE TRANSFER NOTE
Patient: Doreen Peralta    Procedure: Procedure(s):  COLONOSCOPY, WITH POLYPECTOMY AND BIOPSY       Diagnosis: Irregular bowel habits [R19.8]  Diagnosis Additional Information: No value filed.    Anesthesia Type:   MAC     Note:    Oropharynx: oropharynx clear of all foreign objects and spontaneously breathing  Level of Consciousness: awake  Oxygen Supplementation: room air    Independent Airway: airway patency satisfactory and stable  Dentition: dentition unchanged  Vital Signs Stable: post-procedure vital signs reviewed and stable  Report to RN Given: handoff report given  Patient transferred to: Phase II  Comments: Pt to ENDO Phase 2 on room air, airway patent, VSS. Report to RN.  Handoff Report: Identifed the Patient, Identified the Reponsible Provider, Reviewed the pertinent medical history, Discussed the surgical course, Reviewed Intra-OP anesthesia mangement and issues during anesthesia, Set expectations for post-procedure period and Allowed opportunity for questions and acknowledgement of understanding      Vitals:  Vitals Value Taken Time   BP     Temp     Pulse 60 12/23/22 1123   Resp 13 12/23/22 1123   SpO2 100 % 12/23/22 1123   Vitals shown include unvalidated device data.    Electronically Signed By: GERARD Whitney CRNA  December 23, 2022  11:23 AM

## 2022-12-23 NOTE — ANESTHESIA POSTPROCEDURE EVALUATION
Patient: Doreen Peralta    Procedure: Procedure(s):  COLONOSCOPY, WITH POLYPECTOMY AND BIOPSY       Anesthesia Type:  MAC    Note:  Disposition: Outpatient   Postop Pain Control: Uneventful            Sign Out: Well controlled pain   PONV: No   Neuro/Psych: Uneventful            Sign Out: Acceptable/Baseline neuro status   Airway/Respiratory: Uneventful            Sign Out: Acceptable/Baseline resp. status   CV/Hemodynamics: Uneventful            Sign Out: Acceptable CV status   Other NRE: NONE   DID A NON-ROUTINE EVENT OCCUR? No           Last vitals:  Vitals Value Taken Time   BP 95/72 12/23/22 1157   Temp     Pulse 71 12/23/22 1157   Resp 27 12/23/22 1158   SpO2 100 % 12/23/22 1158   Vitals shown include unvalidated device data.    Electronically Signed By: Blade Calvillo MD  December 23, 2022  12:13 PM

## 2022-12-26 LAB
PATH REPORT.COMMENTS IMP SPEC: NORMAL
PATH REPORT.COMMENTS IMP SPEC: NORMAL
PATH REPORT.FINAL DX SPEC: NORMAL
PATH REPORT.GROSS SPEC: NORMAL
PATH REPORT.MICROSCOPIC SPEC OTHER STN: NORMAL
PATH REPORT.RELEVANT HX SPEC: NORMAL
PHOTO IMAGE: NORMAL

## 2022-12-26 PROCEDURE — 88305 TISSUE EXAM BY PATHOLOGIST: CPT | Mod: 26 | Performed by: PATHOLOGY

## 2023-04-09 ENCOUNTER — HOSPITAL ENCOUNTER (EMERGENCY)
Facility: CLINIC | Age: 43
Discharge: HOME | End: 2023-04-09
Attending: NURSE PRACTITIONER | Admitting: NURSE PRACTITIONER
Payer: MEDICARE

## 2023-04-09 VITALS
TEMPERATURE: 97.9 F | OXYGEN SATURATION: 99 % | SYSTOLIC BLOOD PRESSURE: 182 MMHG | RESPIRATION RATE: 18 BRPM | DIASTOLIC BLOOD PRESSURE: 125 MMHG | HEART RATE: 69 BPM

## 2023-04-09 DIAGNOSIS — L01.00 IMPETIGO: ICD-10-CM

## 2023-04-09 PROCEDURE — 99283 EMERGENCY DEPT VISIT LOW MDM: CPT | Performed by: NURSE PRACTITIONER

## 2023-04-09 RX ORDER — SULFAMETHOXAZOLE/TRIMETHOPRIM 800-160 MG
1 TABLET ORAL 2 TIMES DAILY
Qty: 14 TABLET | Refills: 0 | Status: SHIPPED | OUTPATIENT
Start: 2023-04-09 | End: 2023-04-09

## 2023-04-09 RX ORDER — MUPIROCIN 20 MG/G
OINTMENT TOPICAL 3 TIMES DAILY
Qty: 30 G | Refills: 0 | Status: SHIPPED | OUTPATIENT
Start: 2023-04-09 | End: 2023-04-19

## 2023-04-09 RX ORDER — SULFAMETHOXAZOLE/TRIMETHOPRIM 800-160 MG
1 TABLET ORAL ONCE
Status: DISCONTINUED | OUTPATIENT
Start: 2023-04-09 | End: 2023-04-09

## 2023-04-10 NOTE — DISCHARGE INSTRUCTIONS
Mupirocin 2% ointment: Apply 3 times a day for 10 days.  Recheck in clinic if not improving in 5 days.

## 2023-04-10 NOTE — ED PROVIDER NOTES
History     Chief Complaint   Patient presents with     Infection     HPI  Doreen Peralta is a 42 year old female who presents for evaluation of rash around her mouth that she is concerned for MRSA.  She has history of MRSA infections.  She has been applying topical antibiotic ointment without much improvement.  Denies fevers.    Allergies:  Allergies   Allergen Reactions     Azithromycin Other (See Comments) and Itching     Other reaction(s): Throat Swelling/Closing  Burning in throat     Hyoscyamine Rash     Droperidol Hives and Rash     Metoclopramide Other (See Comments)     Eye twitching.      Peaches [Peach] Other (See Comments)     Raw. Cooked OK     Sucralose Other (See Comments)     All artificial sweeteners. Aspartame also. Swollen glands     Advair Diskus Other (See Comments)     Throat burns     Compazine [Prochlorperazine] Visual Disturbance     Contrast Dye Itching     States is allergic to CT contrast dye     Cyclobenzaprine Visual Disturbance     Diatrizoate Other (See Comments)     Patient reports she tolerates IV contrast if given 50mg IV of Benadryl prior to scan.      Fentanyl Other (See Comments)     migraine     Fluticasone-Salmeterol      Throat burns     Ibuprofen GI Disturbance     Lactulose Nausea and Vomiting     Gas and bloating     Levaquin [Levofloxacin] Swelling     Per ED M.D. And RN      Morphine Sulfate Other (See Comments)     Chest pain       Rizatriptan Visual Disturbance     Seafood      itching     Isovue [Iopamidol] Palpitations     Pt had racing heart and sob      Ketorolac Anxiety     Latex Swelling and Rash     Kiwi, likely also avacado, ? banana     Levsin Rash       Problem List:    Patient Active Problem List    Diagnosis Date Noted     Abscess 01/17/2021     Priority: Medium     Fever in adult 01/17/2021     Priority: Medium     Anemia 03/11/2019     Priority: Medium     Other pulmonary embolism  03/11/2019     Priority: Medium     GIB (gastrointestinal bleeding) -  suspected 03/11/2019     Priority: Medium     Bacteremia 06/26/2018     Priority: Medium     Acute renal failure (H) 04/21/2018     Priority: Medium     History of bacteremia - recurrent 04/17/2018     Priority: Medium     Tobacco abuse 04/16/2018     Priority: Medium     Iron deficiency anemia 04/16/2018     Priority: Medium     Stool toxin PCR positive for Clostridium difficile on 4/17/18 04/16/2018     Priority: Medium     Gram negative septicemia (H) - Ochrobactrum, Stenotrophomonas & Pantoea 08/24/2017     Priority: Medium     Hypokalemia 08/24/2017     Priority: Medium     Essential hypertension 08/24/2017     Priority: Medium     Sepsis due to Klebsiella (H) 07/27/2017     Priority: Medium     Insomnia, unspecified type 03/31/2017     Priority: Medium     Abdominal pain 02/15/2017     Priority: Medium     Abdominal pain, generalized 01/28/2017     Priority: Medium     History of deep venous thrombosis 01/26/2017     Priority: Medium     Nausea and vomiting 01/26/2017     Priority: Medium     Vitamin D deficiency 01/16/2017     Priority: Medium     Chronic abdominal pain 11/15/2016     Priority: Medium     Patient is followed by Larisa Lorenzo PA-C for ongoing prescription of pain medication.  All refills should be approved by this provider, or covering partner.    Medication(s): no regular narcotics - narcotics given at ED visits  Maximum quantity per month: N/A  Clinic visit frequency required: Q 6  months     Controlled substance agreement:  Encounter-Level CSA - 11/9/15:               Controlled Substance Agreement - Scan on 11/19/2015 11:17 AM : CONTROLLED SUBSTANCE AGREEMENT 11/09/15 (below)          Encounter-Level CSA - 2/27/15:               Controlled Substance Agreement - Scan on 3/12/2015  7:50 AM : Controlled Medication Agreement 02/27/15 (below)            Pain Clinic evaluation in the past: Yes    DIRE Total Score(s):  No flowsheet data found.    Last MNPMP website verification:  done on  11/15/16   [email or web address]        Attention to G-tube (H) 11/08/2016     Priority: Medium     Fever 10/16/2016     Priority: Medium     Coagulation defect (H) [D68.9] 09/16/2016     Priority: Medium     Chronic diarrhea 07/22/2016     Priority: Medium     Migraine 07/20/2016     Priority: Medium     Positive blood culture - Klebsiella oxytoca from Port-a-cath culture 07/18/2016     Priority: Medium     Mitral regurgitation mild-mod by Echo June 2016 07/18/2016     Priority: Medium     Anemia, iron deficiency 07/17/2016     Priority: Medium     Anemia in other chronic diseases classified elsewhere 06/14/2016     Priority: Medium     Munchausen syndrome - previously suspected 06/14/2016     Priority: Medium     Long-term (current) use of anticoagulants [Z79.01] 05/16/2016     Priority: Medium     Anxiety 05/16/2016     Priority: Medium     S/P partial resection of colon 04/11/2016     Priority: Medium     Malnutrition (H) 04/11/2016     Priority: Medium     Jejunostomy tube present (H) 03/21/2016     Priority: Medium     Malfunctioning jejunostomy tube (H) 12/22/2015     Priority: Medium     Malfunction of gastrostomy tube (H) 11/19/2015     Priority: Medium     PEG tube malfunction (H) 10/16/2015     Priority: Medium     Health Care Home 01/16/2015     Priority: Medium     *See Letters for [facility] Care Plan: My Access Plan           PEG (percutaneous endoscopic gastrostomy) status 11/05/2014     Priority: Medium     Gastroparesis 04/11/2014     Priority: Medium     Overview:   Had ileostomy performed at [facility] in Jan 2012 as treatment       Migraines 04/11/2014     Priority: Medium     4/11/2014  With periods, every other month.       Intermittent asthma 04/11/2014     Priority: Medium     4/11/2014   With URIs, allergies       Allergic rhinitis 04/11/2014     Priority: Medium     Problem list name updated by automated process. Provider to review       Abnormal Pap smear of cervix 04/11/2014     Priority:  Medium     4/11/2014  S/p colp and LEEP. Sees OB/GYN at [facility]. Pap every year x20 years - normal since.       S/P LEEP of cervix 02/06/2014     Priority: Medium     Patellofemoral stress syndrome 01/18/2014     Priority: Medium     Hx SBO 10/09/2013     Priority: Medium     4/11/2014  Recurrent. Sees GI (Dr. Redding - at [facility]) every 6 months or so.  Sees feeding tube nurse next week.       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 04/11/2013     Priority: Medium     Constipation by delayed colonic transit 10/24/2012     Priority: Medium     Atopic rhinitis 03/20/2009     Priority: Medium     Hyperbilirubinemia 03/11/2009     Priority: Medium        Past Medical History:    Past Medical History:   Diagnosis Date     Asthma      Bilateral ovarian cysts      Cervical cancer (H) 01/01/2008     Chronic pain      Colonic dysmotility      Constipation      E. coli sepsis (H) 5/8/2016     Enteritis      Fungemia 5/5/2016     Gastro-oesophageal reflux disease      H/O ileostomy      Hx of abnormal Pap smear      Hypertension      IBS (irritable bowel syndrome)      Other chronic pain      PONV (postoperative nausea and vomiting)      Thrombosis, hepatic vein (H)        Past [facility] History:    Past [facility] History:   Procedure Laterality Date     COLONOSCOPY  07/10/2012    Procedure: COLONOSCOPY;;  Surgeon: Nicole Redding MD;  [facility]     COLONOSCOPY N/A 02/19/2017    Procedure: COLONOSCOPY;  Surgeon: Randell Muller MD;  [facility] GI     COLONOSCOPY N/A 02/21/2017    Procedure: COLONOSCOPY;  Surgeon: Randell Muller MD;  [facility]     COLONOSCOPY N/A 05/03/2018    Procedure: COMBINED COLONOSCOPY, SINGLE OR MULTIPLE BIOPSY/POLYPECTOMY BY BIOPSY;  EGD/Colonoscopy ;  Surgeon: Loi Black MD;  Location:  GI     COLONOSCOPY N/A 12/23/2022    Procedure: COLONOSCOPY, WITH POLYPECTOMY AND BIOPSY;  Surgeon: Nati Johnson MD;  Location:  GI     ECHO CHELO  07/19/2016           ENDOSCOPIC INSERTION TUBE GASTROSTOMY N/A 01/21/2016    Procedure: ENDOSCOPIC INSERTION TUBE GASTROSTOMY;  Surgeon: Nicole Redding MD;  [facility]     ESOPHAGOSCOPY, GASTROSCOPY, DUODENOSCOPY (EGD), COMBINED  07/10/2012    Procedure: COMBINED ESOPHAGOSCOPY, GASTROSCOPY, DUODENOSCOPY (EGD);  Upper Endoscopy, Ileoscopy    Latex Allergy  with biopsies;  Surgeon: Nicole Redding MD;  [facility]     ESOPHAGOSCOPY, GASTROSCOPY, DUODENOSCOPY (EGD), COMBINED N/A 11/05/2014    Procedure: COMBINED ESOPHAGOSCOPY, GASTROSCOPY, DUODENOSCOPY (EGD);  Surgeon: Nicole Redding MD;  [facility]     ESOPHAGOSCOPY, GASTROSCOPY, DUODENOSCOPY (EGD), COMBINED N/A 05/03/2018    Procedure: COMBINED ESOPHAGOSCOPY, GASTROSCOPY, DUODENOSCOPY (EGD), BIOPSY SINGLE OR MULTIPLE;;  Surgeon: Loi Black MD;  [facility]     HC REPLACE DUODENOSTOMY/JEJUNOSTOMY TUBE PERCUTANEOUS N/A 08/27/2015    Procedure: REPLACE GASTROJEJUNOSTOMY TUBE, PERCUTANEOUS;  Surgeon: Mio Holder MD;  [facility]     HC REPLACE DUODENOSTOMY/JEJUNOSTOMY TUBE PERCUTANEOUS N/A 01/07/2016    Procedure: REPLACE JEJUNOSTOMY TUBE, PERCUTANEOUS;  Surgeon: Elsa Medel MD;  [facility]     HC REPLACE DUODENOSTOMY/JEJUNOSTOMY TUBE PERCUTANEOUS N/A 01/28/2016    Procedure: REPLACE JEJUNOSTOMY TUBE, PERCUTANEOUS;  Surgeon: Elsa Medel MD;  [facility]     HC REPLACE GASTROSTOMY/CECOSTOMY TUBE PERCUTANEOUS Left 05/19/2015    Procedure: REPLACE GASTROSTOMY TUBE, PERCUTANEOUS;  Surgeon: Melecio Morejon Chi, MD;  [facility]     HC UGI ENDOSCOPY W PLACEMENT GASTROSTOMY TUBE PERCUT N/A 10/01/2015    Procedure: COMBINED ESOPHAGOSCOPY, GASTROSCOPY, DUODENOSCOPY (EGD), PLACE PERCUTANEOUS ENDOSCOPIC GASTROSTOMY TUBE;  Surgeon: Mio Holder MD;  [facility]     INSERT PORT VASCULAR ACCESS Right 07/20/2017    Procedure: INSERT PORT VASCULAR ACCESS;  Chest Port Placement  **Latex Allergy**;  Surgeon:  Coy Rocha PA-C;  [facility]     LAPAROSCOPIC ASSISTED COLECTOMY  01/20/2012    Procedure:LAPAROSCOPIC ASSISTED COLECTOMY; Laparoscopic Ileostomy       LAPAROSCOPIC ASSISTED COLECTOMY LEFT (DESCENDING)  10/24/2012    Procedure: LAPAROSCOPIC ASSISTED COLECTOMY LEFT (DESCENDING);   Hand Assisted Laproscopic Subtotal abdominal Colectomy,Iieorectal anastamosis, Ileostomy Closure.       LAPAROSCOPIC ASSISTED INSERTION TUBE JEJUNOSTOMY N/A 10/16/2015    Procedure: LAPAROSCOPIC ASSISTED INSERTION TUBE JEJUNOSTOMY;  Surgeon: Elsa Medel MD;  [facility]     LAPAROSCOPIC CHOLECYSTECTOMY  01/01/2002    [facility] stones duct     LAPAROSCOPIC ILEOSTOMY  01/20/2012    [facility], loop     LAPAROSCOPIC OOPHORECTOMY Right 01/01/2009    [facility]     LAPAROTOMY EXPLORATORY N/A 01/28/2016    Procedure: LAPAROTOMY EXPLORATORY;  Surgeon: Elsa Medel MD;  [facility]     LEEP TX, CERVICAL  01/01/2009    [facility]     PICC INSERTION Left 10/21/2015    5fr DL Power PICC, 37cm (2cm external) in the L basilic vein w/ tip in the SVC RA junction.     REMOVE GASTROSTOMY TUBE ADULT N/A 12/12/2014    Procedure: REMOVE GASTROSTOMY TUBE ADULT;  Surgeon: Nicole Redding MD;  [facility]     REMOVE PORT VASCULAR ACCESS Right 06/30/2016    Procedure: REMOVE PORT VASCULAR ACCESS;  Surgeon: Pradeep Orosco MD;  [facility]     REMOVE PORT VASCULAR ACCESS Right 09/08/2017    Procedure: REMOVE PORT VASCULAR ACCESS;  right side mediport removal;  Surgeon: Zechariah Worthington MD;  Location: [facility] OR     replace GASTROSTOMY TUBE ADULT  05/19/2015     TUBAL LIGATION  2003       Family History:    Family History   Problem Relation Age of Onset     Thyroid Disease Mother      Sjogren's Mother      Gastrointestinal Disease Mother         Intermittent nausea vomiting diarrhea     Colon Polyps Mother      Prostate Problems Father         prostate enlargement     Lupus Maternal Grandmother       Cancer Maternal Grandfather         Lung     Colon Cancer Maternal Grandfather 65     Cancer Paternal Grandmother         Lung      Cerebrovascular Disease Paternal Grandmother      Diabetes Paternal Grandmother      Cardiovascular Paternal Grandmother         CHF     Cancer Paternal Grandfather         Lung     Glaucoma Paternal Grandfather      Abdominal Aortic Aneurysm Other      Macular Degeneration No family hx of        Social History:  Marital Status:   [4]  Social History     Tobacco Use     Smoking status: Some Days     Packs/day: 1.00     Years: 4.00     Pack years: 4.00     Types: Cigarettes     Last attempt to quit: 2004     Years since quittin.2     Smokeless tobacco: Never     Tobacco comments:     Vaping   Vaping Use     Vaping status: Some Days     Substances: Nicotine   Substance Use Topics     Alcohol use: No     Drug use: Yes     Types: IV     Comment: heroin        Medications:    mupirocin (BACTROBAN) 2 % external ointment  ACETAMINOPHEN PO  albuterol (PROAIR HFA/PROVENTIL HFA/VENTOLIN HFA) 108 (90 Base) MCG/ACT inhaler  albuterol (PROVENTIL) (2.5 MG/3ML) 0.083% neb solution  Alfalfa 650 MG TABS  apixaban ANTICOAGULANT (ELIQUIS) 5 MG tablet  benzonatate (TESSALON) 200 MG capsule  cloNIDine (CATAPRES) 0.2 MG tablet  diphenhydrAMINE HCl 50 MG TABS  DULoxetine (CYMBALTA) 60 MG EC capsule  EPINEPHrine (ANY BX GENERIC EQUIV) 0.3 MG/0.3ML injection 2-pack  furosemide (LASIX) 20 MG tablet  ipratropium (ATROVENT) 0.02 % neb solution  lisinopril (PRINIVIL/ZESTRIL) 2.5 MG tablet  LORazepam (ATIVAN) 1 MG tablet  mupirocin (BACTROBAN) 2 % external ointment  NARCAN 4 MG/0.1ML nasal spray  predniSONE (DELTASONE) 20 MG tablet  predniSONE (DELTASONE) 20 MG tablet  SUMAtriptan (IMITREX) 50 MG tablet  traZODone (DESYREL) 100 MG tablet          Review of Systems  As mentioned above in the history present illness. All other systems were reviewed and are negative.    Physical Exam   BP: (!)  182/125  Pulse: 69  Temp: 97.9  F (36.6  C)  Resp: 18  SpO2: 99 %      Physical Exam  Constitutional:       General: She is not in acute distress.     Appearance: Normal appearance. She is well-developed. She is not ill-appearing.   HENT:      Head: Normocephalic and atraumatic.      Right Ear: External ear normal.      Left Ear: External ear normal.      Nose: Nose normal.      Mouth/Throat:      Mouth: Mucous membranes are moist.   Eyes:      Conjunctiva/sclera: Conjunctivae normal.   Cardiovascular:      Rate and Rhythm: Normal rate and regular rhythm.      Heart sounds: Normal heart sounds. No murmur heard.  Pulmonary:      Effort: Pulmonary effort is normal. No respiratory distress.      Breath sounds: Normal breath sounds.   Musculoskeletal:         General: Normal range of motion.   Skin:     General: Skin is warm and dry.      Findings: Rash (crusting lesions around mouth and chin) present.   Neurological:      General: No focal deficit present.      Mental Status: She is alert and oriented to person, place, and time.         ED Course                 Procedures              No results found. However, due to the size of the patient record, not all encounters were searched. Please check Results Review for a complete set of results.    Medications - No data to display    Assessments & Plan (with Medical Decision Making)     Rash on face consistent with impetigo. I was initially going to treat her with Bactrim DS; however, patient informed me that she is taking antibiotic for recurrent C-difficile. Because of this I am very reluctant to give her any oral antibiotics so I have provided her prescription for Bactroban.  Plan;  Mupirocin 2% ointment: Apply 3 times a day for 10 days.  Recheck in clinic if not improving in 5 days.    Medical Decision Making  The patient's presentation was of low complexity (an acute and uncomplicated illness or injury).    The patient's evaluation involved:  history and exam without  other MDM data elements    The patient's management necessitated only low risk treatment.        Discharge Medication List as of 4/9/2023  8:42 PM      START taking these medications    Details   !! mupirocin (BACTROBAN) 2 % external ointment Apply topically 3 times daily for 10 daysDisp-30 g, [id number]       !! - Potential duplicate medications found. Please discuss with provider.          Final diagnoses:   Impetigo - history of MRSA       4/9/2023   [facility] EMERGENCY DEPT     Anaid Solitario APRN CNP  04/10/23 0019

## 2023-04-10 NOTE — ED TRIAGE NOTES
MRSA flare on face and hands - painful and worsening over last few days.      Triage Assessment     Row Name 04/09/23 1958       Triage Assessment (Adult)    Airway WDL WDL       Respiratory WDL    Respiratory WDL WDL       Cardiac WDL    Cardiac WDL WDL

## 2023-06-10 ENCOUNTER — HEALTH MAINTENANCE LETTER (OUTPATIENT)
Age: 43
End: 2023-06-10

## 2023-06-22 ENCOUNTER — HOSPITAL ENCOUNTER (EMERGENCY)
Facility: CLINIC | Age: 43
Discharge: HOME | End: 2023-06-22
Attending: EMERGENCY MEDICINE | Admitting: EMERGENCY MEDICINE
Payer: MEDICARE

## 2023-06-22 ENCOUNTER — APPOINTMENT (OUTPATIENT)
Dept: CT IMAGING | Facility: CLINIC | Age: 43
End: 2023-06-22
Attending: EMERGENCY MEDICINE
Payer: MEDICARE

## 2023-06-22 VITALS
DIASTOLIC BLOOD PRESSURE: 89 MMHG | WEIGHT: 170 LBS | SYSTOLIC BLOOD PRESSURE: 142 MMHG | HEIGHT: 66 IN | HEART RATE: 69 BPM | OXYGEN SATURATION: 98 % | RESPIRATION RATE: 18 BRPM | TEMPERATURE: 99.2 F | BODY MASS INDEX: 27.32 KG/M2

## 2023-06-22 DIAGNOSIS — R07.9 CHEST PAIN, UNSPECIFIED TYPE: ICD-10-CM

## 2023-06-22 DIAGNOSIS — R68.89 FEELING UNWELL: ICD-10-CM

## 2023-06-22 DIAGNOSIS — U07.1 COVID-19: ICD-10-CM

## 2023-06-22 LAB
ALBUMIN SERPL BCG-MCNC: 4.1 G/DL (ref 3.5–5.2)
ALP SERPL-CCNC: 143 U/L (ref 35–104)
ALT SERPL W P-5'-P-CCNC: 30 U/L (ref 0–50)
ANION GAP SERPL CALCULATED.3IONS-SCNC: 7 MMOL/L (ref 7–15)
AST SERPL W P-5'-P-CCNC: 29 U/L (ref 0–45)
BASOPHILS # BLD AUTO: 0 10E3/UL (ref 0–0.2)
BASOPHILS NFR BLD AUTO: 0 %
BILIRUB SERPL-MCNC: 0.3 MG/DL
BUN SERPL-MCNC: 4.2 MG/DL (ref 6–20)
CALCIUM SERPL-MCNC: 9.4 MG/DL (ref 8.6–10)
CHLORIDE SERPL-SCNC: 104 MMOL/L (ref 98–107)
CREAT SERPL-MCNC: 0.92 MG/DL (ref 0.51–0.95)
CRP SERPL-MCNC: <3 MG/L
D DIMER PPP FEU-MCNC: 1.19 UG/ML FEU (ref 0–0.5)
DEPRECATED HCO3 PLAS-SCNC: 26 MMOL/L (ref 22–29)
EOSINOPHIL # BLD AUTO: 0 10E3/UL (ref 0–0.7)
EOSINOPHIL NFR BLD AUTO: 1 %
ERYTHROCYTE [DISTWIDTH] IN BLOOD BY AUTOMATED COUNT: 14.4 % (ref 10–15)
GFR SERPL CREATININE-BSD FRML MDRD: 79 ML/MIN/1.73M2
GLUCOSE SERPL-MCNC: 96 MG/DL (ref 70–99)
HCT VFR BLD AUTO: 38.2 % (ref 35–47)
HGB BLD-MCNC: 12.4 G/DL (ref 11.7–15.7)
IMM GRANULOCYTES # BLD: 0 10E3/UL
IMM GRANULOCYTES NFR BLD: 0 %
LYMPHOCYTES # BLD AUTO: 1.4 10E3/UL (ref 0.8–5.3)
LYMPHOCYTES NFR BLD AUTO: 22 %
MCH RBC QN AUTO: 27.4 PG (ref 26.5–33)
MCHC RBC AUTO-ENTMCNC: 32.5 G/DL (ref 31.5–36.5)
MCV RBC AUTO: 85 FL (ref 78–100)
MONOCYTES # BLD AUTO: 0.3 10E3/UL (ref 0–1.3)
MONOCYTES NFR BLD AUTO: 5 %
NEUTROPHILS # BLD AUTO: 4.5 10E3/UL (ref 1.6–8.3)
NEUTROPHILS NFR BLD AUTO: 72 %
NRBC # BLD AUTO: 0 10E3/UL
NRBC BLD AUTO-RTO: 0 /100
PLATELET # BLD AUTO: 351 10E3/UL (ref 150–450)
POTASSIUM SERPL-SCNC: 3.9 MMOL/L (ref 3.4–5.3)
PROT SERPL-MCNC: 7.5 G/DL (ref 6.4–8.3)
RBC # BLD AUTO: 4.52 10E6/UL (ref 3.8–5.2)
SODIUM SERPL-SCNC: 137 MMOL/L (ref 136–145)
WBC # BLD AUTO: 6.2 10E3/UL (ref 4–11)

## 2023-06-22 PROCEDURE — 96361 HYDRATE IV INFUSION ADD-ON: CPT | Performed by: EMERGENCY MEDICINE

## 2023-06-22 PROCEDURE — 80053 COMPREHEN METABOLIC PANEL: CPT | Performed by: EMERGENCY MEDICINE

## 2023-06-22 PROCEDURE — 99285 EMERGENCY DEPT VISIT HI MDM: CPT | Mod: 25 | Performed by: EMERGENCY MEDICINE

## 2023-06-22 PROCEDURE — 250N000011 HC RX IP 250 OP 636: Mod: JZ | Performed by: EMERGENCY MEDICINE

## 2023-06-22 PROCEDURE — 86140 C-REACTIVE PROTEIN: CPT | Performed by: EMERGENCY MEDICINE

## 2023-06-22 PROCEDURE — 99284 EMERGENCY DEPT VISIT MOD MDM: CPT | Performed by: EMERGENCY MEDICINE

## 2023-06-22 PROCEDURE — 85025 COMPLETE CBC W/AUTO DIFF WBC: CPT | Performed by: EMERGENCY MEDICINE

## 2023-06-22 PROCEDURE — 96374 THER/PROPH/DIAG INJ IV PUSH: CPT | Mod: 59 | Performed by: EMERGENCY MEDICINE

## 2023-06-22 PROCEDURE — 96375 TX/PRO/DX INJ NEW DRUG ADDON: CPT | Mod: 59 | Performed by: EMERGENCY MEDICINE

## 2023-06-22 PROCEDURE — 85379 FIBRIN DEGRADATION QUANT: CPT | Performed by: EMERGENCY MEDICINE

## 2023-06-22 PROCEDURE — G1010 CDSM STANSON: HCPCS

## 2023-06-22 PROCEDURE — 250N000013 HC RX MED GY IP 250 OP 250 PS 637: Performed by: EMERGENCY MEDICINE

## 2023-06-22 PROCEDURE — 250N000009 HC RX 250: Performed by: EMERGENCY MEDICINE

## 2023-06-22 PROCEDURE — 36415 COLL VENOUS BLD VENIPUNCTURE: CPT | Performed by: EMERGENCY MEDICINE

## 2023-06-22 PROCEDURE — 258N000003 HC RX IP 258 OP 636: Performed by: EMERGENCY MEDICINE

## 2023-06-22 PROCEDURE — 250N000011 HC RX IP 250 OP 636: Performed by: EMERGENCY MEDICINE

## 2023-06-22 RX ORDER — DIPHENHYDRAMINE HYDROCHLORIDE 50 MG/ML
50 INJECTION INTRAMUSCULAR; INTRAVENOUS
Status: COMPLETED | OUTPATIENT
Start: 2023-06-22 | End: 2023-06-22

## 2023-06-22 RX ORDER — LISINOPRIL 2.5 MG/1
2.5 TABLET ORAL ONCE
Status: COMPLETED | OUTPATIENT
Start: 2023-06-22 | End: 2023-06-22

## 2023-06-22 RX ORDER — IOPAMIDOL 755 MG/ML
63 INJECTION, SOLUTION INTRAVASCULAR ONCE
Status: COMPLETED | OUTPATIENT
Start: 2023-06-22 | End: 2023-06-22

## 2023-06-22 RX ORDER — SODIUM CHLORIDE 9 MG/ML
1000 INJECTION, SOLUTION INTRAVENOUS CONTINUOUS
Status: DISCONTINUED | OUTPATIENT
Start: 2023-06-22 | End: 2023-06-22 | Stop reason: HOSPADM

## 2023-06-22 RX ORDER — HYDROMORPHONE HYDROCHLORIDE 1 MG/ML
0.5 INJECTION, SOLUTION INTRAMUSCULAR; INTRAVENOUS; SUBCUTANEOUS
Status: DISCONTINUED | OUTPATIENT
Start: 2023-06-22 | End: 2023-06-22 | Stop reason: HOSPADM

## 2023-06-22 RX ORDER — ACETAMINOPHEN 325 MG/1
650 TABLET ORAL EVERY 4 HOURS PRN
Status: DISCONTINUED | OUTPATIENT
Start: 2023-06-22 | End: 2023-06-22 | Stop reason: HOSPADM

## 2023-06-22 RX ADMIN — SODIUM CHLORIDE 500 ML: 9 INJECTION, SOLUTION INTRAVENOUS at 13:37

## 2023-06-22 RX ADMIN — HYDROMORPHONE HYDROCHLORIDE 0.5 MG: 1 INJECTION, SOLUTION INTRAMUSCULAR; INTRAVENOUS; SUBCUTANEOUS at 13:54

## 2023-06-22 RX ADMIN — SODIUM CHLORIDE 100 ML: 9 INJECTION, SOLUTION INTRAVENOUS at 15:25

## 2023-06-22 RX ADMIN — ACETAMINOPHEN 650 MG: 325 TABLET, FILM COATED ORAL at 13:54

## 2023-06-22 RX ADMIN — LISINOPRIL 2.5 MG: 2.5 TABLET ORAL at 16:22

## 2023-06-22 RX ADMIN — DIPHENHYDRAMINE HYDROCHLORIDE 50 MG: 50 INJECTION, SOLUTION INTRAMUSCULAR; INTRAVENOUS at 14:59

## 2023-06-22 RX ADMIN — IOPAMIDOL 63 ML: 755 INJECTION, SOLUTION INTRAVENOUS at 15:24

## 2023-06-22 ASSESSMENT — ENCOUNTER SYMPTOMS
HEMATOLOGIC/LYMPHATIC NEGATIVE: 1
ALLERGIC/IMMUNOLOGIC NEGATIVE: 1
CONSTITUTIONAL NEGATIVE: 1
EYES NEGATIVE: 1
WEAKNESS: 1
WOUND: 1
ENDOCRINE NEGATIVE: 1
GASTROINTESTINAL NEGATIVE: 1
SHORTNESS OF BREATH: 1
MUSCULOSKELETAL NEGATIVE: 1
PSYCHIATRIC NEGATIVE: 1

## 2023-06-22 ASSESSMENT — ACTIVITIES OF DAILY LIVING (ADL)
ADLS_ACUITY_SCORE: 37

## 2023-06-22 NOTE — ED NOTES
"Writer applied tourniquet for IV start/blood draw.    Pt was crying and stating \" take it off, it hurts\"   unable to get IV started or blood draw at this time.  "

## 2023-06-22 NOTE — ED PROVIDER NOTES
History     Chief Complaint   Patient presents with     Shortness of Breath     HPI  Doreen Peralta is a 43 year old female who presents for evaluation with report that she has felt generalized body aches since she was diagnosed with COVID-19 infection 2 weeks prior.    Patient has multiple medical diagnoses reviewed in the medical record.  Patient's prescribed medications were also reviewed    On examination patient was accompanied by her parents; Carter lives with currently nearest [place].  Patient reports that she is felt weak since her diagnosis of COVID-19 infection 2 weeks ago.  She admits that she has schizophrenia and has a tendency to pick at her arms and skin resulting in history of MRSA infection.  She also noted that she has been treated for sepsis on 6 different occasions.  She was concerned that there was worms coming out of her hands and also expressed concern that she has had unintentional weight loss which mother reports about 40 pounds in the last year.  She admits that she been sober from substance use and drug use disorder since the fall 2022.   But is yet to pick it up due to generalized weakness and that the antibiotics as prescribed due to her COVID-19 infection.  She reports she was provided antibiotics by her primary care provider reports pleuritic chest discomfort and shortness of breath.    Allergies:  Allergies   Allergen Reactions     Azithromycin Other (See Comments) and Itching     Other reaction(s): Throat Swelling/Closing  Burning in throat     Hyoscyamine Rash     Droperidol Hives and Rash     Metoclopramide Other (See Comments)     Eye twitching.      Peaches [Peach] Other (See Comments)     Raw. Cooked OK     Sucralose Other (See Comments)     All artificial sweeteners. Aspartame also. Swollen glands     Compazine [Prochlorperazine] Visual Disturbance     Contrast Dye Itching     States is allergic to CT contrast dye     Cyclobenzaprine Visual Disturbance     Diatrizoate  Other (See Comments)     Patient reports she tolerates IV contrast if given 50mg IV of Benadryl prior to scan.      Fentanyl Other (See Comments)     migraine     Fluticasone-Salmeterol Other (See Comments)     Throat burns     Fluticasone-Salmeterol      Throat burns     Ibuprofen GI Disturbance     Lactulose Nausea and Vomiting     Gas and bloating     Levaquin [Levofloxacin] Swelling     Per ED M.D. And RN      Morphine Sulfate Other (See Comments)     Chest pain       Rizatriptan Visual Disturbance     Seafood      itching     Isovue [Iopamidol] Palpitations     Pt had racing heart and sob      Ketorolac Anxiety     Latex Swelling and Rash     Kiwi, likely also avacado, ? banana     Levsin Rash       Problem List:    Patient Active Problem List    Diagnosis Date Noted     Abscess 01/17/2021     Priority: Medium     Fever in adult 01/17/2021     Priority: Medium     Anemia 03/11/2019     Priority: Medium     Other pulmonary embolism  03/11/2019     Priority: Medium     GIB (gastrointestinal bleeding) - suspected 03/11/2019     Priority: Medium     Bacteremia 06/26/2018     Priority: Medium     Acute renal failure (H) 04/21/2018     Priority: Medium     History of bacteremia - recurrent 04/17/2018     Priority: Medium     Tobacco abuse 04/16/2018     Priority: Medium     Iron deficiency anemia 04/16/2018     Priority: Medium     Stool toxin PCR positive for Clostridium difficile on 4/17/18 04/16/2018     Priority: Medium     Gram negative septicemia (H) - Ochrobactrum, Stenotrophomonas & Pantoea 08/24/2017     Priority: Medium     Hypokalemia 08/24/2017     Priority: Medium     Essential hypertension 08/24/2017     Priority: Medium     Sepsis due to Klebsiella (H) 07/27/2017     Priority: Medium     Insomnia, unspecified type 03/31/2017     Priority: Medium     Abdominal pain 02/15/2017     Priority: Medium     Abdominal pain, generalized 01/28/2017     Priority: Medium     History of deep venous thrombosis  01/26/2017     Priority: Medium     Nausea and vomiting 01/26/2017     Priority: Medium     Vitamin D deficiency 01/16/2017     Priority: Medium     Chronic abdominal pain 11/15/2016     Priority: Medium     Patient is followed by Larisa Lorenzo PA-C for ongoing prescription of pain medication.  All refills should be approved by this provider, or covering partner.    Medication(s): no regular narcotics - narcotics given at ED visits  Maximum quantity per month: N/A  Clinic visit frequency required: Q 6  months     Controlled substance agreement:  Encounter-Level CSA - 11/9/15:               Controlled Substance Agreement - Scan on 11/19/2015 11:17 AM : CONTROLLED SUBSTANCE AGREEMENT 11/09/15 (below)          Encounter-Level CSA - 2/27/15:               Controlled Substance Agreement - Scan on 3/12/2015  7:50 AM : Controlled Medication Agreement 02/27/15 (below)            Pain Clinic evaluation in the past: Yes    DIRE Total Score(s):  No flowsheet data found.    Last [facility] website verification:  done on 11/15/16   [email or web address]        Attention to G-tube (H) 11/08/2016     Priority: Medium     Fever 10/16/2016     Priority: Medium     Coagulation defect (H) [D68.9] 09/16/2016     Priority: Medium     Chronic diarrhea 07/22/2016     Priority: Medium     Migraine 07/20/2016     Priority: Medium     Positive blood culture - Klebsiella oxytoca from Port-a-cath culture 07/18/2016     Priority: Medium     Mitral regurgitation mild-mod by Echo June 2016 07/18/2016     Priority: Medium     Anemia, iron deficiency 07/17/2016     Priority: Medium     Anemia in other chronic diseases classified elsewhere 06/14/2016     Priority: Medium     Munchausen syndrome - previously suspected 06/14/2016     Priority: Medium     Long-term (current) use of anticoagulants [Z79.01] 05/16/2016     Priority: Medium     Anxiety 05/16/2016     Priority: Medium     S/P partial resection of colon 04/11/2016     Priority:  Medium     Malnutrition (H) 04/11/2016     Priority: Medium     Jejunostomy tube present (H) 03/21/2016     Priority: Medium     Malfunctioning jejunostomy tube (H) 12/22/2015     Priority: Medium     Malfunction of gastrostomy tube (H) 11/19/2015     Priority: Medium     PEG tube malfunction (H) 10/16/2015     Priority: Medium     Health Care Home 01/16/2015     Priority: Medium     *See Letters for [facility] Care Plan: My Access Plan           PEG (percutaneous endoscopic gastrostomy) status 11/05/2014     Priority: Medium     Gastroparesis 04/11/2014     Priority: Medium     Overview:   Had ileostomy performed at [facility] in Jan 2012 as treatment       Migraines 04/11/2014     Priority: Medium     4/11/2014  With periods, every other month.       Intermittent asthma 04/11/2014     Priority: Medium     4/11/2014   With URIs, allergies       Allergic rhinitis 04/11/2014     Priority: Medium     Problem list name updated by automated process. Provider to review       Abnormal Pap smear of cervix 04/11/2014     Priority: Medium     4/11/2014  S/p colp and LEEP. Sees OB/GYN at [facility]. Pap every year x20 years - normal since.       S/P LEEP of cervix 02/06/2014     Priority: Medium     Patellofemoral stress syndrome 01/18/2014     Priority: Medium     Hx SBO 10/09/2013     Priority: Medium     4/11/2014  Recurrent. Sees GI (Dr. Redding - at [facility]) every 6 months or so.  Sees feeding tube nurse next week.       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 04/11/2013     Priority: Medium     Constipation by delayed colonic transit 10/24/2012     Priority: Medium     Atopic rhinitis 03/20/2009     Priority: Medium     Hyperbilirubinemia 03/11/2009     Priority: Medium        Past Medical History:    Past Medical History:   Diagnosis Date     Asthma      Bilateral ovarian cysts      Cervical cancer (H) 01/01/2008     Chronic pain      Colonic dysmotility      Constipation      E. coli sepsis (H) 5/8/2016     Enteritis      Fungemia  5/5/2016     Gastro-oesophageal reflux disease      H/O ileostomy      Hx of abnormal Pap smear      Hypertension      IBS (irritable bowel syndrome)      Other chronic pain      PONV (postoperative nausea and vomiting)      Thrombosis, hepatic vein (H)        Past [facility] History:    Past [facility] History:   Procedure Laterality Date     COLONOSCOPY  07/10/2012    Procedure: COLONOSCOPY;;  Surgeon: Nicole Redding MD;  [facility]     COLONOSCOPY N/A 02/19/2017    Procedure: COLONOSCOPY;  Surgeon: Randell Muller MD;  Location: [facility] GI     COLONOSCOPY N/A 02/21/2017    Procedure: COLONOSCOPY;  Surgeon: Randell Muller MD;  Location: [facility]     COLONOSCOPY N/A 05/03/2018    Procedure: COMBINED COLONOSCOPY, SINGLE OR MULTIPLE BIOPSY/POLYPECTOMY BY BIOPSY;  EGD/Colonoscopy ;  Surgeon: Loi Black MD;  Location: [facility] GI     COLONOSCOPY N/A 12/23/2022    Procedure: COLONOSCOPY, WITH POLYPECTOMY AND BIOPSY;  Surgeon: Nati Johnson MD;  Location:  GI     ECHO CHELO  07/19/2016          ENDOSCOPIC INSERTION TUBE GASTROSTOMY N/A 01/21/2016    Procedure: ENDOSCOPIC INSERTION TUBE GASTROSTOMY;  Surgeon: Nicole Redding MD;  [facility]     ESOPHAGOSCOPY, GASTROSCOPY, DUODENOSCOPY (EGD), COMBINED  07/10/2012    Procedure: COMBINED ESOPHAGOSCOPY, GASTROSCOPY, DUODENOSCOPY (EGD);  Upper Endoscopy, Ileoscopy    Latex Allergy  with biopsies;  Surgeon: Nicole Redding MD;  [facility]     ESOPHAGOSCOPY, GASTROSCOPY, DUODENOSCOPY (EGD), COMBINED N/A 11/05/2014    Procedure: COMBINED ESOPHAGOSCOPY, GASTROSCOPY, DUODENOSCOPY (EGD);  Surgeon: Nicole Redding MD;  [facility]     ESOPHAGOSCOPY, GASTROSCOPY, DUODENOSCOPY (EGD), COMBINED N/A 05/03/2018    Procedure: COMBINED ESOPHAGOSCOPY, GASTROSCOPY, DUODENOSCOPY (EGD), BIOPSY SINGLE OR MULTIPLE;;  Surgeon: Loi Black MD;  Location[facility] GI     HC REPLACE DUODENOSTOMY/JEJUNOSTOMY TUBE PERCUTANEOUS N/A  08/27/2015    Procedure: REPLACE GASTROJEJUNOSTOMY TUBE, PERCUTANEOUS;  Surgeon: Mio Holder MD;  [facility]     HC REPLACE DUODENOSTOMY/JEJUNOSTOMY TUBE PERCUTANEOUS N/A 01/07/2016    Procedure: REPLACE JEJUNOSTOMY TUBE, PERCUTANEOUS;  Surgeon: Elsa Medel MD;  [facility]     HC REPLACE DUODENOSTOMY/JEJUNOSTOMY TUBE PERCUTANEOUS N/A 01/28/2016    Procedure: REPLACE JEJUNOSTOMY TUBE, PERCUTANEOUS;  Surgeon: Elsa Medel MD;  [facility]     HC REPLACE GASTROSTOMY/CECOSTOMY TUBE PERCUTANEOUS Left 05/19/2015    Procedure: REPLACE GASTROSTOMY TUBE, PERCUTANEOUS;  Surgeon: Melecio Morejon Chi, MD;  Location:  GI     HC UGI ENDOSCOPY W PLACEMENT GASTROSTOMY TUBE PERCUT N/A 10/01/2015    Procedure: COMBINED ESOPHAGOSCOPY, GASTROSCOPY, DUODENOSCOPY (EGD), PLACE PERCUTANEOUS ENDOSCOPIC GASTROSTOMY TUBE;  Surgeon: Mio Holder MD;  Location:  GI     INSERT PORT VASCULAR ACCESS Right 07/20/2017    Procedure: INSERT PORT VASCULAR ACCESS;  Chest Port Placement  **Latex Allergy**;  Surgeon: Coy Rocha PA-C;  Location:  OR     LAPAROSCOPIC ASSISTED COLECTOMY  01/20/2012    Procedure:LAPAROSCOPIC ASSISTED COLECTOMY; Laparoscopic Ileostomy       LAPAROSCOPIC ASSISTED COLECTOMY LEFT (DESCENDING)  10/24/2012    Procedure: LAPAROSCOPIC ASSISTED COLECTOMY LEFT (DESCENDING);   Hand Assisted Laproscopic Subtotal abdominal Colectomy,Iieorectal anastamosis, Ileostomy Closure.       LAPAROSCOPIC ASSISTED INSERTION TUBE JEJUNOSTOMY N/A 10/16/2015    Procedure: LAPAROSCOPIC ASSISTED INSERTION TUBE JEJUNOSTOMY;  Surgeon: Elsa Medel MD;  Location:  OR     LAPAROSCOPIC CHOLECYSTECTOMY  01/01/2002    [facility] stones duct     LAPAROSCOPIC ILEOSTOMY  01/20/2012    [facility], loop     LAPAROSCOPIC OOPHORECTOMY Right 01/01/2009    [facility]     LAPAROTOMY EXPLORATORY N/A 01/28/2016    Procedure: LAPAROTOMY EXPLORATORY;  Surgeon: Elsa Medel MD;  Location:  OR     [facility]  [place], CERVICAL  2009    [facility]     PICC INSERTION Left 10/21/2015    5fr DL Power PICC, 37cm (2cm external) in the L basilic vein w/ tip in the SVC RA junction.     REMOVE GASTROSTOMY TUBE ADULT N/A 2014    Procedure: REMOVE GASTROSTOMY TUBE ADULT;  Surgeon: Nicole Redding MD;  Location: [facility] GI     REMOVE PORT VASCULAR ACCESS Right 2016    Procedure: REMOVE PORT VASCULAR ACCESS;  Surgeon: Pradeep Orosco MD;  Location: [facility] OR     REMOVE PORT VASCULAR ACCESS Right 2017    Procedure: REMOVE PORT VASCULAR ACCESS;  right side mediport removal;  Surgeon: Zechariah Worthington MD;  Location: [facility] OR     replace GASTROSTOMY TUBE ADULT  2015     TUBAL LIGATION         Family History:    Family History   Problem Relation Age of Onset     Thyroid Disease Mother      Sjogren's Mother      Gastrointestinal Disease Mother         Intermittent nausea vomiting diarrhea     Colon Polyps Mother      Prostate Problems Father         prostate enlargement     Lupus Maternal Grandmother      Cancer Maternal Grandfather         Lung     Colon Cancer Maternal Grandfather 65     Cancer Paternal Grandmother         Lung      Cerebrovascular Disease Paternal Grandmother      Diabetes Paternal Grandmother      Cardiovascular Paternal Grandmother         CHF     Cancer Paternal Grandfather         Lung     Glaucoma Paternal Grandfather      Abdominal Aortic Aneurysm Other      Macular Degeneration No family hx of        Social History:  Marital Status:   [4]  Social History     Tobacco Use     Smoking status: Some Days     Packs/day: 1.00     Years: 4.00     Pack years: 4.00     Types: Cigarettes     Last attempt to quit: 2004     Years since quittin.4     Smokeless tobacco: Never     Tobacco comments:     Vaping   Vaping Use     Vaping Use: Some days     Substances: Nicotine   Substance Use Topics     Alcohol use: No     Drug use: Yes     Types: IV     Comment: heroin     "    Medications:    ACETAMINOPHEN PO  albuterol (PROAIR HFA/PROVENTIL HFA/VENTOLIN HFA) 108 (90 Base) MCG/ACT inhaler  albuterol (PROVENTIL) (2.5 MG/3ML) 0.083% neb solution  Alfalfa 650 MG TABS  apixaban ANTICOAGULANT (ELIQUIS) 5 MG tablet  benzonatate (TESSALON) 200 MG capsule  cloNIDine (CATAPRES) 0.2 MG tablet  diphenhydrAMINE HCl 50 MG TABS  DULoxetine (CYMBALTA) 60 MG EC capsule  EPINEPHrine (ANY BX GENERIC EQUIV) 0.3 MG/0.3ML injection 2-pack  furosemide (LASIX) 20 MG tablet  ipratropium (ATROVENT) 0.02 % neb solution  lisinopril (PRINIVIL/ZESTRIL) 2.5 MG tablet  LORazepam (ATIVAN) 1 MG tablet  mupirocin (BACTROBAN) 2 % external ointment  NARCAN 4 MG/0.1ML nasal spray  predniSONE (DELTASONE) 20 MG tablet  predniSONE (DELTASONE) 20 MG tablet  SUMAtriptan (IMITREX) 50 MG tablet  traZODone (DESYREL) 100 MG tablet          Review of Systems   Constitutional: Negative.    HENT: Negative.    Eyes: Negative.    Respiratory: Positive for shortness of breath.    Cardiovascular: Positive for chest pain.   Gastrointestinal: Negative.    Endocrine: Negative.    Genitourinary: Negative.    Musculoskeletal: Negative.    Skin: Positive for wound (overlying hands and upper extremities).   Allergic/Immunologic: Negative.    Neurological: Positive for weakness.   Hematological: Negative.    Psychiatric/Behavioral: Negative.    All other systems reviewed and are negative.      Physical Exam   BP: (!) 154/103  Pulse: 74  Temp: 99.2  F (37.3  C)  Resp: 18  Height: 167.6 cm (5' 6\")  Weight: 77.1 kg (170 lb)  SpO2: 100 %      Physical Exam  Constitutional:       General: She is not in acute distress.     Appearance: She is not ill-appearing, toxic-appearing or diaphoretic.   HENT:      Head: Normocephalic and atraumatic.   Eyes:      Pupils: Pupils are equal, round, and reactive to light.   Cardiovascular:      Rate and Rhythm: Normal rate and regular rhythm.   Pulmonary:      Effort: Pulmonary effort is normal.   Chest:      Chest " wall: No mass, deformity, tenderness, crepitus or edema. There is no dullness to percussion.   Abdominal:      Palpations: There is no hepatomegaly, splenomegaly or mass.      Tenderness: There is no abdominal tenderness. There is no guarding or rebound.   Musculoskeletal:      Right lower leg: No tenderness. No edema.      Left lower leg: No tenderness. No edema.   Skin:     Capillary Refill: Capillary refill takes less than 2 seconds.      Coloration: Skin is not cyanotic.      Findings: No ecchymosis.   Neurological:      General: No focal deficit present.      Mental Status: She is alert and oriented to person, place, and time.   Psychiatric:         Mood and Affect: Mood normal. Mood is not anxious.         Behavior: Behavior normal. Behavior is not agitated.         ED Course                 Procedures              Critical Care time:  none             ED medications:  Medications   sodium chloride 0.9% infusion (has no administration in time range)   HYDROmorphone (PF) (DILAUDID) injection 0.5 mg (0.5 mg Intravenous $Given 6/22/23 1354)   acetaminophen (TYLENOL) tablet 650 mg (650 mg Oral $Given 6/22/23 1354)   0.9% sodium chloride BOLUS (0 mLs Intravenous Stopped 6/22/23 1614)   diphenhydrAMINE (BENADRYL) injection 50 mg (50 mg Intravenous $Given 6/22/23 1459)   iopamidol (ISOVUE-370) solution 63 mL (63 mLs Intravenous $Given 6/22/23 1524)   sodium chloride 0.9 % bag 500mL for CT scan flush use (100 mLs Intravenous $Given 6/22/23 1525)   lisinopril (ZESTRIL) tablet 2.5 mg (2.5 mg Oral $Given 6/22/23 1622)       ED Vitals:  Vitals:    06/22/23 1430 06/22/23 1437 06/22/23 1456 06/22/23 1613   BP: (!) 162/93  (!) 193/120 (!) 142/89   BP Location:    Left arm   Pulse: 64 66 58 69   Resp: 21 25 15 18   Temp:       TempSrc:       SpO2: 98% 100% 99% 98%   Weight:       Height:         Vitals:    06/22/23 1456 06/22/23 1612 06/22/23 1613 06/22/23 1617   BP: (!) 193/120 (!) 142/89 (!) 142/89    BP Location:   Left  arm    Pulse: 58 69 69    Resp: 15  18    Temp:       TempSrc:       SpO2: 99% 92% 98% 98%   Weight:       Height:           ED labs and imaging:  Results for orders placed or performed during the hospital encounter of 06/22/23   CT Chest Pulmonary Embolism w Contrast     Status: None    Narrative    CT CHEST PULMONARY EMBOLISM WITH CONTRAST  6/22/2023 3:39 PM    HISTORY: COVID-19 infection (two weeks prior), pleuritic chest pain,  elevated D-dimer. Evaluate for acute pulmonary embolism versus other  acute cardiopulmonary process.    TECHNIQUE: Scans obtained from the apices through the diaphragm with  IV contrast. 63mL of Isovue 370 IV injected. Radiation dose for this  scan was reduced using automated exposure control, adjustment of the  mA and/or kV according to patient size, or iterative reconstruction  technique. 2D and 3D MIP reconstructions were performed by the CT  technologist    COMPARISON:  Chest x-ray on 2/2/2022.    FINDINGS:  Chest/mediastinum: No evidence of pulmonary embolism. No cardiomegaly  or significant pericardial effusion. No significant atherosclerotic  vascular calcification of the coronary arteries. No significant  mediastinal or hilar lymphadenopathy.    Lungs and pleura: No pleural effusion or pneumothorax. Basilar  pulmonary opacities, likely atelectasis.    Upper abdomen: Limited evaluation of the upper abdomen due to lack of  coverage and timing of contrast. Right upper quadrant post  cholecystectomy clips.    Bones and soft tissue: No suspicious osseous lesion.      Impression    IMPRESSION:   1. No evidence of pulmonary embolism.    DIONE MINER MD         SYSTEM ID:  [id number]   Comprehensive metabolic panel     Status: Abnormal   Result Value Ref Range    Sodium 137 136 - 145 mmol/L    Potassium 3.9 3.4 - 5.3 mmol/L    Chloride 104 98 - 107 mmol/L    Carbon Dioxide (CO2) 26 22 - 29 mmol/L    Anion Gap 7 7 - 15 mmol/L    Urea Nitrogen 4.2 (L) 6.0 - 20.0 mg/dL    Creatinine  0.92 0.51 - 0.95 mg/dL    Calcium 9.4 8.6 - 10.0 mg/dL    Glucose 96 70 - 99 mg/dL    Alkaline Phosphatase 143 (H) 35 - 104 U/L    AST 29 0 - 45 U/L    ALT 30 0 - 50 U/L    Protein Total 7.5 6.4 - 8.3 g/dL    Albumin 4.1 3.5 - 5.2 g/dL    Bilirubin Total 0.3 <=1.2 mg/dL    GFR Estimate 79 >60 mL/min/1.73m2   CRP inflammation     Status: Normal   Result Value Ref Range    CRP Inflammation <3.00 <5.00 mg/L   D dimer quantitative     Status: Abnormal   Result Value Ref Range    D-Dimer Quantitative 1.19 (H) 0.00 - 0.50 ug/mL FEU    Narrative    This D-dimer assay is intended for use in conjunction with a clinical pretest probability assessment model to exclude pulmonary embolism (PE) and deep venous thrombosis (DVT) in outpatients suspected of PE or DVT. The cut-off value is 0.50 ug/mL FEU.   CBC with platelets and differential     Status: None   Result Value Ref Range    WBC Count 6.2 4.0 - 11.0 10e3/uL    RBC Count 4.52 3.80 - 5.20 10e6/uL    Hemoglobin 12.4 11.7 - 15.7 g/dL    Hematocrit 38.2 35.0 - 47.0 %    MCV 85 78 - 100 fL    MCH 27.4 26.5 - 33.0 pg    MCHC 32.5 31.5 - 36.5 g/dL    RDW 14.4 10.0 - 15.0 %    Platelet Count 351 150 - 450 10e3/uL    % Neutrophils 72 %    % Lymphocytes 22 %    % Monocytes 5 %    % Eosinophils 1 %    % Basophils 0 %    % Immature Granulocytes 0 %    NRBCs per 100 WBC 0 <1 /100    Absolute Neutrophils 4.5 1.6 - 8.3 10e3/uL    Absolute Lymphocytes 1.4 0.8 - 5.3 10e3/uL    Absolute Monocytes 0.3 0.0 - 1.3 10e3/uL    Absolute Eosinophils 0.0 0.0 - 0.7 10e3/uL    Absolute Basophils 0.0 0.0 - 0.2 10e3/uL    Absolute Immature Granulocytes 0.0 <=0.4 10e3/uL    Absolute NRBCs 0.0 10e3/uL   CBC with platelets differential     Status: None    Narrative    The following orders were created for panel order CBC with platelets differential.  Procedure                               Abnormality         Status                     ---------                               -----------         ------                      CBC with platelets and d...[[id number]]                      Final result                 Please view results for these tests on the individual orders.       Assessments & Plan (with Medical Decision Making)   Assessment Summary and Clinical Impression: 43-year-old female who presented with feeling unwell since she was diagnosed with COVID-19 infection 2 weeks prior.  She arrived afebrile she was hypertensive blood pressure 168/110 100% on room air.  She expressed concern about unintentional weight loss in the last year with mother at the bedside and that she has had some worms coming out of her hands and arms and admits to history of MRSA and treatment for sepsis on 6 different occasions including picking at her skin.  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  On my exam she appears older than her stated age but in no acute distress.  She was otherwise hemodynamically normal.  She reported pleuritic chest discomfort and agreed to a work-up to exclude bacteremia or thromboembolism as a result of her pleuritic chest pain and recent diagnosis of COVID-19 infection.  Work-up is reassuring including imaging showing no evidence of pneumonia or pulmonary embolism.  Blood work did not suggest any concern of bacteremia.  She is discharged with plan to follow-up as an outpatient as needed with symptoms of uncertain cause to be related to COVID-19 infection.    ED course and plan:  Reviewed the medical record.  I was not able to find the antibiotic she was reporting that she was prescribed after recent treatment and care for COVID-19 infection.  She was monitored with frequent vital signs on telemetry she had no hypoxia or respiratory distress.  Blood work was obtained to exclude any secondary infection and to assess for venous thromboembolism.  Work-up revealed normal CRP.  D-dimer was elevated 1.19.  Patient requested therapies to manage pain  and discomfort.  She was offered Tylenol and IV hydromorphone.  With elevated D-dimer CT chest PE protocol was obtained to exclude acute pulmonary embolism given pleuritic chest pain in light of recent COVID-19 infection.  Medical records show an allergy to contrast dye-however pretreatment IV Benadryl patient tolerates IV contrast dye.  CT chest PE protocol was negative for pulmonary embolism.  Patient's developed hypertension during her ED course.  On reevaluation we discussed that the exact cause of her symptoms reported is not clear and that her work-up was reassuring without any emergency diagnosis or findings.  She expressed comfort going home with continued care as an outpatient and follow-up with her primary care provider.      Disclaimer: This note consists of symbols derived from keyboarding, dictation and/or voice recognition software. As a result, there may be errors in the script that have gone undetected. Please consider this when interpreting information found in this chart.  I have reviewed the nursing notes.    I have reviewed the findings, diagnosis, plan and need for follow up with the patient.           Medical Decision Making  The patient's presentation was of moderate complexity (an acute illness with systemic symptoms).    The patient's evaluation involved:  ordering and/or review of 2 test(s) in this encounter (Diagnostic imaging and labs)    The patient's management necessitated moderate risk (prescription drug management including medications given in the ED).        New Prescriptions    No medications on file       Final diagnoses:   Chest pain, unspecified type   COVID-19 - diagnosed 2 weeks prior   Feeling unwell       6/22/2023   [facility] EMERGENCY DEPT     Domingo Arreola MD  06/22/23 8080

## 2023-06-22 NOTE — ED TRIAGE NOTES
"Pt reports positive covid 2 weeks ago and feeling \"body ache\"  And weak.   Pt has multiple sores all over body \" from picking\"       Triage Assessment     Row Name 06/22/23 1200       Triage Assessment (Adult)    Airway WDL WDL       Respiratory WDL    Respiratory WDL WDL       Skin Circulation/Temperature WDL    Skin Circulation/Temperature WDL X              "

## 2023-06-22 NOTE — DISCHARGE INSTRUCTIONS
1) Your evaluation today did not reveal an emergency diagnosis imaging did not show any evidence of true pneumonia or a blood clot in your lungs.  We discussed that your constellation of symptoms could be related to your COVID-19 infection that you have been dealing with over the last 2 weeks.    2) Your work-up today did not reveal any evidence of bacteremia or sepsis.  After period of care we have discussed that you appear stable for discharge to home.    3) Be sure to follow-up with your primary care provider as needed

## 2024-02-10 ENCOUNTER — OFFICE VISIT (OUTPATIENT)
Dept: URGENT CARE | Facility: URGENT CARE | Age: 44
End: 2024-02-10
Payer: MEDICARE

## 2024-02-10 VITALS
SYSTOLIC BLOOD PRESSURE: 112 MMHG | OXYGEN SATURATION: 98 % | TEMPERATURE: 97.1 F | HEART RATE: 65 BPM | WEIGHT: 211 LBS | DIASTOLIC BLOOD PRESSURE: 80 MMHG | BODY MASS INDEX: 34.06 KG/M2

## 2024-02-10 DIAGNOSIS — R30.0 DYSURIA: Primary | ICD-10-CM

## 2024-02-10 DIAGNOSIS — N76.0 BV (BACTERIAL VAGINOSIS): ICD-10-CM

## 2024-02-10 DIAGNOSIS — B96.89 BV (BACTERIAL VAGINOSIS): ICD-10-CM

## 2024-02-10 LAB
ALBUMIN UR-MCNC: NEGATIVE MG/DL
APPEARANCE UR: CLEAR
BACTERIA #/AREA URNS HPF: ABNORMAL /HPF
BILIRUB UR QL STRIP: NEGATIVE
CLUE CELLS: PRESENT
COLOR UR AUTO: YELLOW
GLUCOSE UR STRIP-MCNC: NEGATIVE MG/DL
HGB UR QL STRIP: NEGATIVE
KETONES UR STRIP-MCNC: NEGATIVE MG/DL
LEUKOCYTE ESTERASE UR QL STRIP: ABNORMAL
MUCOUS THREADS #/AREA URNS LPF: PRESENT /LPF
NITRATE UR QL: NEGATIVE
PH UR STRIP: 5.5 [PH] (ref 5–7)
RBC #/AREA URNS AUTO: ABNORMAL /HPF
SP GR UR STRIP: 1.01 (ref 1–1.03)
SQUAMOUS #/AREA URNS AUTO: ABNORMAL /LPF
TRICHOMONAS, WET PREP: ABNORMAL
UROBILINOGEN UR STRIP-ACNC: 0.2 E.U./DL
WBC #/AREA URNS AUTO: ABNORMAL /HPF
WBC'S/HIGH POWER FIELD, WET PREP: ABNORMAL
YEAST, WET PREP: ABNORMAL

## 2024-02-10 PROCEDURE — 87210 SMEAR WET MOUNT SALINE/INK: CPT | Performed by: NURSE PRACTITIONER

## 2024-02-10 PROCEDURE — 87591 N.GONORRHOEAE DNA AMP PROB: CPT | Performed by: NURSE PRACTITIONER

## 2024-02-10 PROCEDURE — 99213 OFFICE O/P EST LOW 20 MIN: CPT | Performed by: NURSE PRACTITIONER

## 2024-02-10 PROCEDURE — 81001 URINALYSIS AUTO W/SCOPE: CPT | Performed by: NURSE PRACTITIONER

## 2024-02-10 PROCEDURE — 87491 CHLMYD TRACH DNA AMP PROBE: CPT | Performed by: NURSE PRACTITIONER

## 2024-02-10 RX ORDER — METRONIDAZOLE 7.5 MG/G
1 GEL VAGINAL DAILY
Qty: 25 G | Refills: 0 | Status: SHIPPED | OUTPATIENT
Start: 2024-02-10

## 2024-02-10 ASSESSMENT — ENCOUNTER SYMPTOMS
NAUSEA: 1
RHINORRHEA: 0
VOMITING: 0
CHILLS: 1
FREQUENCY: 0
FEVER: 0
SHORTNESS OF BREATH: 0
HEMATURIA: 0
SORE THROAT: 0
WHEEZING: 0
DYSURIA: 1
FLANK PAIN: 0
HEARTBURN: 0
EYE ITCHING: 0
COUGH: 0
SINUS PAIN: 0

## 2024-02-10 NOTE — PROGRESS NOTES
"Assessment & Plan     Dysuria  - UA Macroscopic with reflex to Microscopic and Culture - Clinic Collect  UA RESULTS:  Recent Labs   Lab Test 02/10/24  0933   COLOR Yellow   APPEARANCE Clear   URINEGLC Negative   URINEBILI Negative   URINEKETONE Negative   SG 1.010   UBLD Negative   URINEPH 5.5   PROTEIN Negative   UROBILINOGEN 0.2   NITRITE Negative   LEUKEST Trace*   RBCU 0-2   WBCU 0-5   - Wet prep - Clinic Collect Positive for Clue Cells  - Neisseria gonorrhoeae PCR pending  - Chlamydia trachomatis PCR pending   - Patient declined HIV and RPR/other blood work     BV (bacterial vaginosis)  - metroNIDAZOLE (METROGEL) 0.75 % vaginal gel  Dispense: 25 g; Refill: 0  Patient declined pregnancy test and pelvic exam   Return in about 2 days (around 2/12/2024).    Stefania Mcel[facility]    Odalis Logan is a 43 year old female who presents to clinic today for the following health issues: Patient states she is concerned since she had unprotected sex two weeks ago and has noted since 2/2/2024 change in vaginal discharge described as clear,  vaginal pain - 4/10, burning, pelvic pain -  3/10, local irritation, vulvar itching, burning, and possible tears. Patient tried a medication for \"Ursoal\" over the counter for urinary symptoms in case it was that and took this yesterday. Patient has had an ablation of the uterus, bilateral tubal ligation and right ooptrectomy and declined pregnancy test.     Sexually active: yes, single partner male, contraception - tubal ligation   Predisposing factors: multiple sexual partners  Hx of previous symptom: none    Patient has been almost 7 months sobriety who went through treatment and now is living on her own. Patient states she is doing well. Patient has had surgery on both her arms due to IV drug usage and infection.  Chief Complaint   Patient presents with    Urinary Problem     UTI Sx Started Wednesday     Vaginal Problem     Itching, Burning, " and Pain     STD     Would Like Testing Done Today.    GYN Complaint  Onset of symptoms was 2/2/2024 ago.  Course of illness is worsening.    Severity moderately severe  Current and Associated symptoms: vaginal discharge described as clear,  vaginal pain - 4/10 burning, pelvic pain -  3/10, local irritation, vulvar itching, burning, lesions, and tears  Treatment measures tried include:  Usoal yesterday   Sexually active: yes, single partner male, contraception - tubal ligation   Predisposing factors: multiple sexual partners  Hx of previous symptom: none  Patient denies rigors, flank pain, temperature > 101 degrees F., vomiting, and GFR less than 30 within the last year      Review of Systems   Constitutional:  Positive for chills. Negative for fever.   HENT:  Negative for congestion, ear pain, postnasal drip, rhinorrhea, sinus pain and sore throat.    Eyes:  Negative for itching.   Respiratory:  Negative for cough, shortness of breath and wheezing.    Gastrointestinal:  Positive for nausea. Negative for heartburn and vomiting.   Genitourinary:  Positive for dyspareunia and dysuria. Negative for flank pain, frequency and hematuria.   Skin:  Negative for rash.         Objective    /80   Pulse 65   Temp 97.1  F (36.2  C)   Wt 95.7 kg (211 lb)   SpO2 98%   BMI 34.06 kg/m    Ablation uterine, Bilateral tubal ligation and right ooptrectomy   Physical Exam  Vitals and nursing note reviewed.   Constitutional:       Appearance: Normal appearance.   HENT:      Head: Normocephalic and atraumatic.      Nose: Nose normal.      Mouth/Throat:      Mouth: Mucous membranes are moist.   Eyes:      Conjunctiva/sclera: Conjunctivae normal.   Cardiovascular:      Rate and Rhythm: Normal rate and regular rhythm.      Pulses: Normal pulses.      Heart sounds: Normal heart sounds.   Pulmonary:      Effort: Pulmonary effort is normal.      Breath sounds: Normal breath sounds.   Abdominal:      General: Abdomen is flat. Bowel  sounds are normal.      Palpations: Abdomen is soft.      Tenderness: There is no right CVA tenderness or left CVA tenderness.   Lymphadenopathy:      Cervical: No cervical adenopathy.   Skin:     General: Skin is warm and dry.   Neurological:      Mental Status: She is alert.   Psychiatric:         Mood and Affect: Mood normal.         Behavior: Behavior normal.

## 2024-02-11 LAB
C TRACH DNA SPEC QL NAA+PROBE: NEGATIVE
N GONORRHOEA DNA SPEC QL NAA+PROBE: NEGATIVE

## 2024-03-16 ENCOUNTER — HEALTH MAINTENANCE LETTER (OUTPATIENT)
Age: 44
End: 2024-03-16

## 2024-08-03 ENCOUNTER — HEALTH MAINTENANCE LETTER (OUTPATIENT)
Age: 44
End: 2024-08-03

## 2024-08-15 NOTE — INTERVAL H&P NOTE
This note is for the purpose of making the H&P performed in clinic within the last 30 days available in the hospital surgical encounter.    
[Normal] : affect appropriate
AGITATION

## 2024-11-02 NOTE — IP AVS SNAPSHOT
[address] [facility]    [address] [place] [address]    Phone:  [phone]                                       After Visit Summary   4/16/2018    Doreen Peralta    MRN: [id number]           After Visit Summary Signature Page     I have received my discharge instructions, and my questions have been answered. I have discussed any challenges I see with this plan with the nurse or doctor.    ..........................................................................................................................................  Patient/Patient Representative Signature      ..........................................................................................................................................  Patient Representative Print Name and Relationship to Patient    ..................................................               ................................................  Date                                            Time    ..........................................................................................................................................  Reviewed by Signature/Title    ...................................................              ..............................................  Date                                                            Time           Nathaniel Cheung 

## 2025-08-16 ENCOUNTER — HEALTH MAINTENANCE LETTER (OUTPATIENT)
Age: 45
End: 2025-08-16

## (undated) DEVICE — Device

## (undated) DEVICE — DILATOR VASC W/INTRO GW 5FRX19CM .038" 405500

## (undated) DEVICE — PREP CHLORAPREP 26ML TINTED ORANGE  260815

## (undated) DEVICE — DECANTER BAG 2002S

## (undated) DEVICE — SU MONOCRYL 4-0 P-3 18" UND Y494G

## (undated) DEVICE — PACK MINOR PROCEDURE CUSTOM

## (undated) DEVICE — SOL ADH LIQUID BENZOIN SWAB 0.6ML C1544

## (undated) DEVICE — DRAPE LAP W/ARMBOARD 29410

## (undated) DEVICE — SU VICRYL 4-0 P-3 18" UND  J494H

## (undated) DEVICE — PACK CENTRAL LINE INSERTION SAN32CLFCG

## (undated) DEVICE — GLOVE PROTEXIS POWDER FREE SMT 7.5  2D72PT75X

## (undated) DEVICE — SU VICRYL 3-0 SH 27" J316H

## (undated) DEVICE — GLOVE PROTEXIS W/NEU-THERA 7.5  2D73TE75

## (undated) DEVICE — KNIFE HANDLE W/15 BLADE 371615

## (undated) DEVICE — NDL 18GAX1.5" 305185

## (undated) DEVICE — PREP CHLORAPREP W/ORANGE TINT 10.5ML 260715

## (undated) DEVICE — SU DERMABOND ADVANCED .7ML DNX12

## (undated) DEVICE — SU VICRYL 3-0 SH 27" UND J416H

## (undated) DEVICE — DRAPE LAP TRANSVERSE 29421

## (undated) DEVICE — DRSG STERI STRIP 1/4X3" R1541

## (undated) DEVICE — GOWN XLG DISP 9545

## (undated) DEVICE — COVER ULTRASOUND PROBE W/GEL FLEXI-FEEL 6"X58" LF  25-FF658

## (undated) DEVICE — LINEN GOWN XLG 5407

## (undated) DEVICE — LINEN TOWEL PACK X5 5464

## (undated) RX ORDER — PROPOFOL 10 MG/ML
INJECTION, EMULSION INTRAVENOUS
Status: DISPENSED
Start: 2017-09-08

## (undated) RX ORDER — LIDOCAINE HYDROCHLORIDE 20 MG/ML
INJECTION, SOLUTION EPIDURAL; INFILTRATION; INTRACAUDAL; PERINEURAL
Status: DISPENSED
Start: 2017-09-08

## (undated) RX ORDER — PROPOFOL 10 MG/ML
INJECTION, EMULSION INTRAVENOUS
Status: DISPENSED
Start: 2017-07-20

## (undated) RX ORDER — SIMETHICONE 40MG/0.6ML
SUSPENSION, DROPS(FINAL DOSAGE FORM)(ML) ORAL
Status: DISPENSED
Start: 2017-02-19

## (undated) RX ORDER — DIPHENHYDRAMINE HYDROCHLORIDE 50 MG/ML
INJECTION INTRAMUSCULAR; INTRAVENOUS
Status: DISPENSED
Start: 2017-09-25

## (undated) RX ORDER — HYDROCODONE BITARTRATE AND ACETAMINOPHEN 5; 325 MG/1; MG/1
TABLET ORAL
Status: DISPENSED
Start: 2017-07-20

## (undated) RX ORDER — SODIUM CHLORIDE 9 MG/ML
INJECTION, SOLUTION INTRAVENOUS
Status: DISPENSED
Start: 2017-03-30

## (undated) RX ORDER — ONDANSETRON 2 MG/ML
INJECTION INTRAMUSCULAR; INTRAVENOUS
Status: DISPENSED
Start: 2018-05-03

## (undated) RX ORDER — FENTANYL CITRATE 50 UG/ML
INJECTION, SOLUTION INTRAMUSCULAR; INTRAVENOUS
Status: DISPENSED
Start: 2017-09-08

## (undated) RX ORDER — LIDOCAINE HYDROCHLORIDE 10 MG/ML
INJECTION, SOLUTION INFILTRATION; PERINEURAL
Status: DISPENSED
Start: 2017-09-08

## (undated) RX ORDER — HYDROMORPHONE HYDROCHLORIDE 1 MG/ML
INJECTION, SOLUTION INTRAMUSCULAR; INTRAVENOUS; SUBCUTANEOUS
Status: DISPENSED
Start: 2017-01-05

## (undated) RX ORDER — HYDROMORPHONE HYDROCHLORIDE 1 MG/ML
INJECTION, SOLUTION INTRAMUSCULAR; INTRAVENOUS; SUBCUTANEOUS
Status: DISPENSED
Start: 2017-07-26

## (undated) RX ORDER — ONDANSETRON 2 MG/ML
INJECTION INTRAMUSCULAR; INTRAVENOUS
Status: DISPENSED
Start: 2017-07-20

## (undated) RX ORDER — LIDOCAINE HYDROCHLORIDE 10 MG/ML
INJECTION, SOLUTION EPIDURAL; INFILTRATION; INTRACAUDAL; PERINEURAL
Status: DISPENSED
Start: 2017-07-20

## (undated) RX ORDER — DIPHENHYDRAMINE HYDROCHLORIDE 50 MG/ML
INJECTION INTRAMUSCULAR; INTRAVENOUS
Status: DISPENSED
Start: 2017-07-26

## (undated) RX ORDER — DIPHENHYDRAMINE HYDROCHLORIDE 50 MG/ML
INJECTION INTRAMUSCULAR; INTRAVENOUS
Status: DISPENSED
Start: 2017-03-30

## (undated) RX ORDER — DIPHENHYDRAMINE HYDROCHLORIDE 50 MG/ML
INJECTION INTRAMUSCULAR; INTRAVENOUS
Status: DISPENSED
Start: 2018-05-03

## (undated) RX ORDER — HYDROMORPHONE HYDROCHLORIDE 2 MG/1
TABLET ORAL
Status: DISPENSED
Start: 2017-03-30

## (undated) RX ORDER — DIPHENHYDRAMINE HYDROCHLORIDE 50 MG/ML
INJECTION INTRAMUSCULAR; INTRAVENOUS
Status: DISPENSED
Start: 2017-02-19

## (undated) RX ORDER — HYDROMORPHONE HCL/0.9% NACL/PF 0.2MG/0.2
SYRINGE (ML) INTRAVENOUS
Status: DISPENSED
Start: 2017-02-19

## (undated) RX ORDER — DIPHENHYDRAMINE HYDROCHLORIDE 50 MG/ML
INJECTION INTRAMUSCULAR; INTRAVENOUS
Status: DISPENSED
Start: 2017-04-03

## (undated) RX ORDER — PROPOFOL 10 MG/ML
INJECTION, EMULSION INTRAVENOUS
Status: DISPENSED
Start: 2018-05-03

## (undated) RX ORDER — LIDOCAINE HYDROCHLORIDE 10 MG/ML
INJECTION, SOLUTION EPIDURAL; INFILTRATION; INTRACAUDAL; PERINEURAL
Status: DISPENSED
Start: 2017-07-26

## (undated) RX ORDER — FENTANYL CITRATE 50 UG/ML
INJECTION, SOLUTION INTRAMUSCULAR; INTRAVENOUS
Status: DISPENSED
Start: 2017-02-19

## (undated) RX ORDER — HYDROMORPHONE HYDROCHLORIDE 1 MG/ML
INJECTION, SOLUTION INTRAMUSCULAR; INTRAVENOUS; SUBCUTANEOUS
Status: DISPENSED
Start: 2017-03-30

## (undated) RX ORDER — HEPARIN SODIUM,PORCINE 10 UNIT/ML
VIAL (ML) INTRAVENOUS
Status: DISPENSED
Start: 2017-07-26

## (undated) RX ORDER — FENTANYL CITRATE 50 UG/ML
INJECTION, SOLUTION INTRAMUSCULAR; INTRAVENOUS
Status: DISPENSED
Start: 2017-09-25

## (undated) RX ORDER — DIPHENHYDRAMINE HYDROCHLORIDE 50 MG/ML
INJECTION INTRAMUSCULAR; INTRAVENOUS
Status: DISPENSED
Start: 2017-01-05